# Patient Record
Sex: MALE | Race: WHITE | NOT HISPANIC OR LATINO | Employment: OTHER | URBAN - METROPOLITAN AREA
[De-identification: names, ages, dates, MRNs, and addresses within clinical notes are randomized per-mention and may not be internally consistent; named-entity substitution may affect disease eponyms.]

---

## 2018-04-23 ENCOUNTER — OFFICE VISIT (OUTPATIENT)
Dept: URGENT CARE | Facility: CLINIC | Age: 63
End: 2018-04-23
Payer: COMMERCIAL

## 2018-04-23 VITALS
BODY MASS INDEX: 26.07 KG/M2 | OXYGEN SATURATION: 95 % | DIASTOLIC BLOOD PRESSURE: 90 MMHG | HEIGHT: 68 IN | WEIGHT: 172 LBS | RESPIRATION RATE: 20 BRPM | HEART RATE: 65 BPM | TEMPERATURE: 98 F | SYSTOLIC BLOOD PRESSURE: 152 MMHG

## 2018-04-23 DIAGNOSIS — L03.811 CELLULITIS OF HEAD EXCEPT FACE: Primary | ICD-10-CM

## 2018-04-23 PROCEDURE — G0382 LEV 3 HOSP TYPE B ED VISIT: HCPCS | Performed by: PHYSICIAN ASSISTANT

## 2018-04-23 PROCEDURE — 99283 EMERGENCY DEPT VISIT LOW MDM: CPT | Performed by: PHYSICIAN ASSISTANT

## 2018-04-23 RX ORDER — OXYCODONE HYDROCHLORIDE 30 MG/1
30 TABLET ORAL EVERY 12 HOURS
Refills: 0 | COMMUNITY
Start: 2018-03-27 | End: 2019-03-18

## 2018-04-23 RX ORDER — HYDRALAZINE HYDROCHLORIDE 10 MG/1
10 TABLET, FILM COATED ORAL AS NEEDED
Refills: 0 | COMMUNITY
Start: 2018-04-09 | End: 2019-08-17 | Stop reason: SDUPTHER

## 2018-04-23 RX ORDER — ROSUVASTATIN CALCIUM 10 MG/1
10 TABLET, COATED ORAL DAILY
Refills: 0 | COMMUNITY
Start: 2018-03-27 | End: 2019-07-11 | Stop reason: SDUPTHER

## 2018-04-23 RX ORDER — TRAZODONE HYDROCHLORIDE 50 MG/1
50 TABLET ORAL
Refills: 0 | COMMUNITY
Start: 2018-04-09 | End: 2019-10-07 | Stop reason: SDUPTHER

## 2018-04-23 RX ORDER — GABAPENTIN 300 MG/1
CAPSULE ORAL
Refills: 0 | COMMUNITY
Start: 2018-04-09 | End: 2019-04-22

## 2018-04-23 RX ORDER — FLUOXETINE HYDROCHLORIDE 20 MG/1
20 CAPSULE ORAL DAILY
Refills: 0 | COMMUNITY
Start: 2018-02-13 | End: 2020-04-09 | Stop reason: ALTCHOICE

## 2018-04-23 RX ORDER — LISINOPRIL 30 MG/1
TABLET ORAL
COMMUNITY
End: 2019-01-28

## 2018-04-23 RX ORDER — METOPROLOL TARTRATE 50 MG/1
50 TABLET, FILM COATED ORAL
Refills: 0 | COMMUNITY
Start: 2018-04-09 | End: 2019-03-22 | Stop reason: DRUGHIGH

## 2018-04-23 RX ORDER — HYDRALAZINE HYDROCHLORIDE 50 MG/1
50 TABLET, FILM COATED ORAL 3 TIMES DAILY
COMMUNITY
End: 2018-04-23 | Stop reason: ALTCHOICE

## 2018-04-23 RX ORDER — METOPROLOL SUCCINATE 200 MG/1
TABLET, EXTENDED RELEASE ORAL
COMMUNITY
End: 2018-04-23 | Stop reason: ALTCHOICE

## 2018-04-23 RX ORDER — LISINOPRIL 40 MG/1
40 TABLET ORAL 2 TIMES DAILY
Refills: 0 | COMMUNITY
Start: 2018-04-09 | End: 2019-03-22 | Stop reason: SDUPTHER

## 2018-04-23 RX ORDER — LORAZEPAM 1 MG/1
1 TABLET ORAL EVERY 12 HOURS
Refills: 0 | COMMUNITY
Start: 2018-03-27 | End: 2019-02-05

## 2018-04-23 RX ORDER — AMLODIPINE BESYLATE 2.5 MG/1
TABLET ORAL
Refills: 0 | COMMUNITY
Start: 2018-03-13 | End: 2019-02-20

## 2018-04-23 RX ORDER — BENAZEPRIL HYDROCHLORIDE 40 MG/1
TABLET, FILM COATED ORAL
COMMUNITY
End: 2019-01-28

## 2018-04-23 RX ORDER — LORAZEPAM 2 MG/1
TABLET ORAL
COMMUNITY
End: 2018-04-23 | Stop reason: ALTCHOICE

## 2018-04-23 NOTE — PATIENT INSTRUCTIONS
Cellulitis   AMBULATORY CARE:   Cellulitis  is a skin infection caused by bacteria  Cellulitis usually appears on the legs and feet, arms and hands, or face  Common signs and symptoms include the following:   · A red, warm, swollen area on your skin    · Pain when the area is touched    · Bumps or blisters (abscess) that may drain pus    · Bumpy, raised skin that feels like an orange peel  Call 911 if:   · You have sudden trouble breathing or chest pain  Seek care immediately if:   · Your wound gets larger and more painful  · You feel a crackling under your skin when you touch it  · You have purple dots or bumps on your skin, or you see bleeding under your skin  · You have new swelling and pain in your legs  · The red, warm, swollen area gets larger  · You see red streaks coming from the infected area  Contact your healthcare provider if:   · You have a fever  · Your fever or pain does not go away or gets worse  · The area does not get smaller after 2 days of antibiotics  · Your skin is flaking or peeling off  · You have questions or concerns about your condition or care  Treatment for cellulitis  may decrease symptoms, stop the infection from spreading, and cure the infection  Treatment depends on how severe your cellulitis is  Cellulitis may go away on its own  You may  instead need antibiotics to help treat the bacterial infection and medicines for pain  Your healthcare provider may draw a [place] around the edges of your cellulitis  If your cellulitis spreads, your healthcare provider will see it outside of the [place]  Manage your symptoms:   · Elevate the area above the level of your heart  as often as you can  This will help decrease swelling and pain  Prop the area on pillows or blankets to keep it elevated comfortably  · Clean the area daily until the wound scabs over  Gently wash the area with soap and water  Pat dry  Use dressings as directed       · Place cool or warm, wet cloths on the area as directed  Use clean cloths and clean water  Leave it on the area until the cloth is room temperature  Pat the area dry with a clean, dry cloth  The cloths may help decrease pain  Prevent cellulitis:   · Do not scratch bug bites or areas of injury  You increase your risk for cellulitis by scratching these areas  · Do not share personal items, such as towels, clothing, and razors  · Clean exercise equipment  with germ-killing  before and after you use it  · Wash your hands often  Use soap and water  Wash your hands after you use the bathroom, change a child's diapers, or sneeze  Wash your hands before you prepare or eat food  Use lotion to prevent dry, cracked skin  · Wear pressure stockings as directed  You may be told to wear the stockings if you have peripheral edema  The stockings improve blood flow and decrease swelling  · Treat athlete's foot  This can help prevent the spread of a bacterial skin infection  Follow up with your healthcare provider within 3 days, or as directed: Your healthcare provider will check if your cellulitis is getting better  You may need different medicine  Write down your questions so you remember to ask them during your visits  © 2017 [address]  Information is for End User's use only and may not be sold, redistributed or otherwise used for commercial purposes  All illustrations and images included in CareNotes® are the copyrighted property of [employer]  or [employer]  The above information is an  only  It is not intended as medical advice for individual conditions or treatments  Talk to your doctor, nurse or pharmacist before following any medical regimen to see if it is safe and effective for you

## 2018-04-23 NOTE — PROGRESS NOTES
[facility]        NAME: Kinjal Doherty is a 60 y o  male  : 1955    MRN: [id number]  DATE: 2018  TIME: 12:18 PM    Assessment and Plan   Cellulitis of head except face [L03 [id number]]  1  Cellulitis of head except face           Patient Instructions   If redness is swelling of the scalp a worsen go to the nearest emergency room for further evaluation and treatment  Follow up with PCP in 3-5 days  Proceed to  ER if symptoms worsen  Chief Complaint     Chief Complaint   Patient presents with    Mass     C/O painful lump on the posterior aspect of his head with no known injury x 2-3 days  Pt has noted a scab on the lump   Nose Bleed     C/O of bleeding from right nostril which started yesterday  History of Present Illness       Patient presents with a 2 day history of a swelling and tenderness in his right occipital area  He states he usually gets scabs on the back of his scalp which usually dissipate on their own after 2 days  However, at this time it has swelled up is more tender and has not resolved  There is not associated with any drainage fever chills  He is also had an episode of a right sided nose bleed for 3 minutes  Review of Systems   Review of Systems   Constitutional: Negative for chills  HENT: Negative for congestion, facial swelling, postnasal drip, rhinorrhea, sinus pressure and trouble swallowing  Eyes: Negative for redness  Respiratory: Negative for cough, shortness of breath and wheezing  Cardiovascular: Negative for chest pain  Gastrointestinal: Negative for abdominal pain  Neurological: Negative for dizziness, light-headedness and headaches  Hematological: Negative for adenopathy           Current Medications       Current Outpatient Prescriptions:     cloNIDine (CATAPRES-TTS-3) 0 3 mg/24 hr, Place 1 patch on the skin once a week, Disp: , Rfl:     amLODIPine (NORVASC) 10 mg tablet, , Disp: , Rfl: 0    benazepril (LOTENSIN) 40 MG tablet, Take by mouth, Disp: , Rfl:     FLUoxetine (PROzac) 20 mg capsule, , Disp: , Rfl: 0    gabapentin (NEURONTIN) 400 mg capsule, , Disp: , Rfl: 0    hydrALAZINE (APRESOLINE) 10 mg tablet, , Disp: , Rfl: 0    lisinopril (ZESTRIL) 30 mg tablet, Take by mouth, Disp: , Rfl:     lisinopril (ZESTRIL) 40 mg tablet, , Disp: , Rfl: 0    LORazepam (ATIVAN) 1 mg tablet, Take 1 mg by mouth every 12 (twelve) hours, Disp: , Rfl: 0    metoprolol tartrate (LOPRESSOR) 50 mg tablet, , Disp: , Rfl: 0    oxyCODONE (ROXICODONE) 30 MG immediate release tablet, Take 30 mg by mouth every 12 (twelve) hours, Disp: , Rfl: 0    rosuvastatin (CRESTOR) 10 MG tablet, Take 10 mg by mouth daily, Disp: , Rfl: 0    traZODone (DESYREL) 50 mg tablet, , Disp: , Rfl: 0    Current Allergies     Allergies as of 04/23/2018 - Reviewed 04/23/2018   Allergen Reaction Noted    Morphine and related  02/22/2016            The following portions of the patient's history were reviewed and updated as appropriate: allergies, current medications, past family history, past medical history, past social history, past surgical history and problem list      Past Medical History:   Diagnosis Date    CAD (coronary artery disease)     Hx of cardiac cath     Hypertension        Past Surgical History:   Procedure Laterality Date    CHOLECYSTECTOMY         No family history on file  Medications have been verified  Objective   /90   Pulse 65   Temp 98 °F (36 7 °C) (Tympanic)   Resp 20   Ht 5' 8" (1 727 m)   Wt 78 kg (172 lb)   SpO2 95%   BMI 26 15 kg/m²        Physical Exam     Physical Exam   Constitutional: He is oriented to person, place, and time  He appears well-developed and well-nourished  HENT:   Head: Normocephalic and atraumatic     Nose: Nose normal    Mouth/Throat: Oropharynx is clear and moist    Approximately 2 cm area of erythema on the right occipital area with a central scabbed area there is tenderness to palpation there is not feel to be a palpable cystic structure  There is mild swelling with the erythema  There is no drainage from wound  Nares without any visible blood or area which may have been bleeding  Eyes: Conjunctivae are normal    Neck: Neck supple  Cardiovascular: Normal rate and regular rhythm  Pulmonary/Chest: Effort normal    Musculoskeletal: Normal range of motion  Lymphadenopathy:     He has no cervical adenopathy  Neurological: He is alert and oriented to person, place, and time  Skin: Skin is warm and dry  Psychiatric: He has a normal mood and affect  His behavior is normal  Judgment and thought content normal    Nursing note and vitals reviewed

## 2018-07-27 ENCOUNTER — HOSPITAL ENCOUNTER (OUTPATIENT)
Dept: RADIOLOGY | Facility: HOSPITAL | Age: 63
Discharge: HOME | End: 2018-07-27
Attending: FAMILY MEDICINE
Payer: COMMERCIAL

## 2018-07-27 ENCOUNTER — TRANSCRIBE ORDERS (OUTPATIENT)
Dept: ADMINISTRATIVE | Facility: HOSPITAL | Age: 63
End: 2018-07-27

## 2018-07-27 DIAGNOSIS — M54.5 CHRONIC LOW BACK PAIN, UNSPECIFIED BACK PAIN LATERALITY, WITH SCIATICA PRESENCE UNSPECIFIED: ICD-10-CM

## 2018-07-27 DIAGNOSIS — G89.29 CHRONIC LOW BACK PAIN, UNSPECIFIED BACK PAIN LATERALITY, WITH SCIATICA PRESENCE UNSPECIFIED: ICD-10-CM

## 2018-07-27 DIAGNOSIS — M25.512 LEFT SHOULDER PAIN, UNSPECIFIED CHRONICITY: ICD-10-CM

## 2018-07-27 DIAGNOSIS — M25.512 LEFT SHOULDER PAIN, UNSPECIFIED CHRONICITY: Primary | ICD-10-CM

## 2018-07-27 PROCEDURE — 73030 X-RAY EXAM OF SHOULDER: CPT

## 2019-01-28 ENCOUNTER — OFFICE VISIT (OUTPATIENT)
Dept: CARDIOLOGY CLINIC | Facility: CLINIC | Age: 64
End: 2019-01-28
Payer: COMMERCIAL

## 2019-01-28 VITALS
WEIGHT: 204 LBS | DIASTOLIC BLOOD PRESSURE: 88 MMHG | BODY MASS INDEX: 30.92 KG/M2 | SYSTOLIC BLOOD PRESSURE: 142 MMHG | HEIGHT: 68 IN | HEART RATE: 70 BPM

## 2019-01-28 DIAGNOSIS — I25.10 CORONARY ARTERY DISEASE INVOLVING NATIVE CORONARY ARTERY OF NATIVE HEART WITHOUT ANGINA PECTORIS: ICD-10-CM

## 2019-01-28 DIAGNOSIS — G47.33 OSA (OBSTRUCTIVE SLEEP APNEA): ICD-10-CM

## 2019-01-28 DIAGNOSIS — I10 ESSENTIAL HYPERTENSION: Primary | ICD-10-CM

## 2019-01-28 DIAGNOSIS — J42 CHRONIC BRONCHITIS, UNSPECIFIED CHRONIC BRONCHITIS TYPE (HCC): ICD-10-CM

## 2019-01-28 DIAGNOSIS — E78.5 DYSLIPIDEMIA: ICD-10-CM

## 2019-01-28 DIAGNOSIS — F17.200 SMOKING: ICD-10-CM

## 2019-01-28 PROBLEM — I71.4 ABDOMINAL AORTIC ANEURYSM (AAA) WITHOUT RUPTURE (HCC): Status: ACTIVE | Noted: 2019-01-28

## 2019-01-28 PROBLEM — I71.40 ABDOMINAL AORTIC ANEURYSM (AAA) WITHOUT RUPTURE: Status: ACTIVE | Noted: 2019-01-28

## 2019-01-28 PROCEDURE — 99214 OFFICE O/P EST MOD 30 MIN: CPT | Performed by: INTERNAL MEDICINE

## 2019-01-28 PROCEDURE — 93000 ELECTROCARDIOGRAM COMPLETE: CPT | Performed by: INTERNAL MEDICINE

## 2019-01-28 RX ORDER — CLONIDINE HYDROCHLORIDE 0.1 MG/1
0.1 TABLET ORAL
Qty: 90 TABLET | Refills: 5 | Status: SHIPPED | OUTPATIENT
Start: 2019-01-28 | End: 2019-02-05

## 2019-01-28 RX ORDER — CLONIDINE HYDROCHLORIDE 0.1 MG/1
0.2 TABLET ORAL EVERY 12 HOURS SCHEDULED
Qty: 90 TABLET | Refills: 5 | Status: SHIPPED | OUTPATIENT
Start: 2019-01-28 | End: 2019-01-28 | Stop reason: CLARIF

## 2019-01-28 NOTE — PATIENT INSTRUCTIONS
Chronic Hypertension, Ambulatory Care   GENERAL INFORMATION:   Chronic hypertension  is a long-term condition in which your blood pressure (BP) is higher than normal  Your BP is the force of your blood moving against the walls of your arteries  Hypertension is a BP of 140/90 or higher  Common symptoms include the following:   · Headache     · Blurred vision    · Chest pain     · Dizziness or weakness     · Trouble breathing     · Nosebleeds  Seek immediate care for the following symptoms:   · Severe headache or vision loss    · Weakness in an arm or leg    · Confusion or difficulty speaking    · Discomfort in your chest that feels like squeezing, pressure, fullness, or pain    · Suddenly feeling lightheaded or trouble breathing    · Pain or discomfort in your back, neck, jaw, stomach, or arm  Treatment for chronic hypertension  may include medicine to lower your BP  You may also need to make lifestyle changes  Take your medicine exactly as directed  Manage chronic hypertension:   · Take your BP at home  Sit and rest for 5 minutes before you take your BP  Extend your arm and support it on a flat surface  Your arm should be at the same level as your heart  Follow the directions that came with your BP monitor  If possible, take at least 2 BP readings each time  Take your BP at least twice a day at the same times each day, such as morning and evening  Keep a log of your BP readings and bring it to your follow-up visits  · Eat less sodium (salt)  Do not add sodium to your food  Limit foods that are high in sodium, such as canned foods, potato chips, and cold cuts  Your healthcare provider may suggest that you follow the [address] [place] [address] Street  The plan is low in sodium, unhealthy fats, and total fat  It is high in potassium, calcium, and fiber  · Exercise regularly  Exercise at least 30 minutes per day, on most days of the week  This will help decrease your BP   Ask your healthcare provider about the best exercise plan for you  · Limit alcohol  Women should limit alcohol to 1 drink a day  Men should limit alcohol to 2 drinks a day  A drink of alcohol is 12 ounces of beer, 5 ounces of wine, or 1½ ounces of liquor  · Do not smoke  If you smoke, it is never too late to quit  Smoking can increase your BP  Smoking also worsens other health conditions you may have that can increase your risk for hypertension  Ask your healthcare provider for information if you need help quitting  Follow up with your healthcare provider as directed: You will need to return to have your BP checked and to have other lab tests done  Write down your questions so you remember to ask them during your visits  CARE AGREEMENT:   You have the right to help plan your care  Learn about your health condition and how it may be treated  Discuss treatment options with your caregivers to decide what care you want to receive  You always have the right to refuse treatment  The above information is an  only  It is not intended as medical advice for individual conditions or treatments  Talk to your doctor, nurse or pharmacist before following any medical regimen to see if it is safe and effective for you  © 2014 [address] is for End User's use only and may not be sold, redistributed or otherwise used for commercial purposes  All illustrations and images included in CareNotes® are the copyrighted property of A D A M [employer]  or Georges Perez

## 2019-01-28 NOTE — PROGRESS NOTES
Subjective:        Patient ID: Blanquita Vera is a 58 y o  male  Chief Complaint:  Hurman Phoenix is here for routine cardiac follow-up, has been noting that his blood pressure is quite high lately  He says his insurance company will not pay for 10 mg of amlodipine  He used to be on 10 mg b i d  Now he is only on 2 5 mg daily  He says you have been unsuccessful in commission angry his insurance to get higher dose amlodipine  He is getting a terrible rash, this is going on for months if not years from his Catapres patch  He says it falls off to 3 or 4 days it is so itchy  His shortness of breath remains mild to moderate, he continues to smoke, he knows he has COPD  No edema orthopnea  He denies any palpitations presyncope or syncope  EKG normal     Euvolemic  He is mildly hypertensive  The following portions of the patient's history were reviewed and updated as appropriate: allergies, current medications, past family history, past medical history, past social history, past surgical history and problem list   Review of Systems   Constitution: Negative for chills, diaphoresis, malaise/fatigue and weight gain  HENT: Negative for nosebleeds and stridor  Eyes: Negative for double vision, vision loss in left eye, vision loss in right eye and visual disturbance  Cardiovascular: Positive for dyspnea on exertion  Negative for chest pain, claudication, cyanosis, irregular heartbeat, leg swelling, near-syncope, orthopnea, palpitations, paroxysmal nocturnal dyspnea and syncope  Respiratory: Positive for shortness of breath  Negative for cough, snoring and wheezing  Endocrine: Negative for polydipsia, polyphagia and polyuria  Hematologic/Lymphatic: Negative for bleeding problem  Does not bruise/bleed easily  Skin: Positive for itching and rash  Negative for flushing  Musculoskeletal: Negative for falls and myalgias     Gastrointestinal: Negative for abdominal pain, heartburn, hematemesis, hematochezia, melena and nausea  Genitourinary: Negative for hematuria  Neurological: Negative for brief paralysis, dizziness, focal weakness, headaches, light-headedness, loss of balance and vertigo  Psychiatric/Behavioral: Negative for altered mental status and substance abuse  Allergic/Immunologic: Negative for hives  Objective:      /88   Pulse 70   Ht 5' 8" (1 727 m)   Wt 92 5 kg (204 lb)   BMI 31 02 kg/m²   Physical Exam   Constitutional: He is oriented to person, place, and time  He appears well-developed and well-nourished  No distress  HENT:   Head: Normocephalic and atraumatic  Eyes: Pupils are equal, round, and reactive to light  EOM are normal  No scleral icterus  Neck: Normal range of motion  Neck supple  No JVD present  No thyromegaly present  Cardiovascular: Normal rate, regular rhythm and normal heart sounds  Exam reveals no gallop and no friction rub  No murmur heard  Pulmonary/Chest: Effort normal and breath sounds normal  No stridor  No respiratory distress  He has no wheezes  He has no rales  Abdominal: Soft  Bowel sounds are normal  He exhibits no distension and no mass  There is no tenderness  Musculoskeletal: Normal range of motion  He exhibits no edema or deformity  Neurological: He is alert and oriented to person, place, and time  Coordination normal    Skin: Skin is warm and dry  No erythema  No pallor  Macular mildly reddish rash size of Catapres patch left upper outer arm below shoulder, no signs or symptoms of infection  Psychiatric: He has a normal mood and affect  His behavior is normal        Lab Review:   No visits with results within 2 Month(s) from this visit  Latest known visit with results is:   Appointment on 02/22/2016   Component Date Value    Wound Culture 02/22/2016 No growth     Gram Stain Result 02/22/2016 No Polys or Bacteria seen      No results found  Assessment:       1   Essential hypertension  POCT ECG cloNIDine (CATAPRES) 0 1 mg tablet    DISCONTINUED: cloNIDine (CATAPRES) 0 1 mg tablet   2  Coronary artery disease involving native coronary artery of native heart without angina pectoris     3  Smoking     4  Dyslipidemia     5  Chronic bronchitis, unspecified chronic bronch[facility] type ([facility] )     6  JAKI (obstructive sleep apnea)          Plan: For uncontrolled hypertension I am going to ask my staff to contact his insurance company to see why they do not want to pay for amlodipine 10 mg daily  I do not want him on this yet but I think we will need to get to this  In Light of the rash from the Catapres patch I have discontinued this  Again prescription for clonidine 0 1 mg p o  T i d     I suspect his dyspnea is secondary his lung disease and ongoing smoking along with untreated sleep apnea, he does not examine to be any significant heart failure  I will see him back in about 2 months, he will call sooner with any problems

## 2019-02-05 ENCOUNTER — OFFICE VISIT (OUTPATIENT)
Dept: FAMILY MEDICINE CLINIC | Facility: CLINIC | Age: 64
End: 2019-02-05
Payer: COMMERCIAL

## 2019-02-05 VITALS
TEMPERATURE: 99.9 F | OXYGEN SATURATION: 93 % | HEART RATE: 41 BPM | SYSTOLIC BLOOD PRESSURE: 178 MMHG | DIASTOLIC BLOOD PRESSURE: 105 MMHG | BODY MASS INDEX: 30.52 KG/M2 | HEIGHT: 68 IN | WEIGHT: 201.4 LBS

## 2019-02-05 DIAGNOSIS — R25.1 TREMOR: ICD-10-CM

## 2019-02-05 DIAGNOSIS — E78.00 HYPERCHOLESTEROLEMIA: ICD-10-CM

## 2019-02-05 DIAGNOSIS — E66.09 CLASS 1 OBESITY DUE TO EXCESS CALORIES WITH SERIOUS COMORBIDITY AND BODY MASS INDEX (BMI) OF 30.0 TO 30.9 IN ADULT: ICD-10-CM

## 2019-02-05 DIAGNOSIS — I10 ESSENTIAL HYPERTENSION: Primary | ICD-10-CM

## 2019-02-05 DIAGNOSIS — M54.50 CHRONIC LOW BACK PAIN WITHOUT SCIATICA, UNSPECIFIED BACK PAIN LATERALITY: ICD-10-CM

## 2019-02-05 DIAGNOSIS — F17.200 SMOKING: ICD-10-CM

## 2019-02-05 DIAGNOSIS — J01.00 ACUTE NON-RECURRENT MAXILLARY SINUSITIS: ICD-10-CM

## 2019-02-05 DIAGNOSIS — G89.29 CHRONIC LOW BACK PAIN WITHOUT SCIATICA, UNSPECIFIED BACK PAIN LATERALITY: ICD-10-CM

## 2019-02-05 DIAGNOSIS — Z12.11 SCREENING FOR COLON CANCER: ICD-10-CM

## 2019-02-05 DIAGNOSIS — Z12.5 SCREENING FOR PROSTATE CANCER: ICD-10-CM

## 2019-02-05 PROCEDURE — 99204 OFFICE O/P NEW MOD 45 MIN: CPT | Performed by: FAMILY MEDICINE

## 2019-02-05 RX ORDER — TOBRAMYCIN 3 MG/ML
SOLUTION/ DROPS OPHTHALMIC
Refills: 0 | COMMUNITY
Start: 2019-01-29 | End: 2019-03-18

## 2019-02-05 RX ORDER — AZITHROMYCIN 250 MG/1
TABLET, FILM COATED ORAL
Qty: 6 TABLET | Refills: 0 | Status: SHIPPED | OUTPATIENT
Start: 2019-02-05 | End: 2019-02-10

## 2019-02-05 RX ORDER — SILDENAFIL CITRATE 100 MG
TABLET ORAL
Refills: 0 | COMMUNITY
Start: 2019-02-01 | End: 2019-10-08 | Stop reason: SDUPTHER

## 2019-02-05 RX ORDER — HYDROCHLOROTHIAZIDE 25 MG/1
25 TABLET ORAL DAILY
COMMUNITY
End: 2019-03-22 | Stop reason: SDUPTHER

## 2019-02-05 RX ORDER — CLONIDINE HYDROCHLORIDE 0.2 MG/1
0.2 TABLET ORAL 2 TIMES DAILY
Qty: 60 TABLET | Refills: 6 | Status: SHIPPED | OUTPATIENT
Start: 2019-02-05 | End: 2019-06-21

## 2019-02-08 ENCOUNTER — OFFICE VISIT (OUTPATIENT)
Dept: FAMILY MEDICINE CLINIC | Facility: CLINIC | Age: 64
End: 2019-02-08
Payer: COMMERCIAL

## 2019-02-08 VITALS
BODY MASS INDEX: 30.98 KG/M2 | HEIGHT: 68 IN | SYSTOLIC BLOOD PRESSURE: 154 MMHG | TEMPERATURE: 99.3 F | OXYGEN SATURATION: 93 % | WEIGHT: 204.4 LBS | DIASTOLIC BLOOD PRESSURE: 94 MMHG | HEART RATE: 108 BPM

## 2019-02-08 DIAGNOSIS — F17.200 SMOKING: ICD-10-CM

## 2019-02-08 DIAGNOSIS — I10 ESSENTIAL HYPERTENSION: ICD-10-CM

## 2019-02-08 DIAGNOSIS — R06.2 WHEEZING: Primary | ICD-10-CM

## 2019-02-08 DIAGNOSIS — R06.2 WHEEZE: ICD-10-CM

## 2019-02-08 DIAGNOSIS — J20.9 ACUTE BRONCHITIS, UNSPECIFIED ORGANISM: Primary | ICD-10-CM

## 2019-02-08 PROCEDURE — 3008F BODY MASS INDEX DOCD: CPT | Performed by: FAMILY MEDICINE

## 2019-02-08 PROCEDURE — 99214 OFFICE O/P EST MOD 30 MIN: CPT | Performed by: FAMILY MEDICINE

## 2019-02-08 RX ORDER — ALBUTEROL SULFATE 90 UG/1
2 AEROSOL, METERED RESPIRATORY (INHALATION) EVERY 4 HOURS PRN
Status: DISCONTINUED | OUTPATIENT
Start: 2019-02-08 | End: 2019-08-15 | Stop reason: ALTCHOICE

## 2019-02-08 RX ORDER — AMOXICILLIN AND CLAVULANATE POTASSIUM 875; 125 MG/1; MG/1
1 TABLET, FILM COATED ORAL EVERY 12 HOURS SCHEDULED
Qty: 20 TABLET | Refills: 0 | Status: SHIPPED | OUTPATIENT
Start: 2019-02-08 | End: 2019-02-20

## 2019-02-08 RX ORDER — ALBUTEROL SULFATE 90 UG/1
2 AEROSOL, METERED RESPIRATORY (INHALATION) EVERY 6 HOURS PRN
Qty: 1 INHALER | Refills: 3 | Status: ON HOLD | OUTPATIENT
Start: 2019-02-08 | End: 2019-08-15 | Stop reason: ALTCHOICE

## 2019-02-08 RX ORDER — METHYLPREDNISOLONE 4 MG/1
TABLET ORAL
Qty: 21 EACH | Refills: 0 | Status: SHIPPED | OUTPATIENT
Start: 2019-02-08 | End: 2019-02-20

## 2019-02-08 NOTE — PROGRESS NOTES
Assessment/Plan:    No problem-specific Assessment & Plan notes found for this encounter  Diagnoses and all orders for this visit:    Acute bronchitis, unspecified organism  -     amoxicillin-clavulanate (AUGMENTIN) 875-125 mg per tablet; Take 1 tablet by mouth every 12 (twelve) hours for 10 days  -     methylPREDNISolone 4 MG tablet therapy pack; Use as directed on package    Essential hypertension  Comments:  keep home log Bps slowly improving    Smoking  Comments:  pt counseled on quitting smoking    Wheeze  -     albuterol (PROVENTIL HFA,VENTOLIN HFA) inhaler 2 puff; Inhale 2 puffs every 4 (four) hours as needed for wheezing           Subjective:      Patient ID: Nghia Mendosa is a 58 y o  male  Pt complains of fever as high as 102 3 pt has been taking tylenol to keep the fever down, pt is checking Bps at home and seeing 154 over 94      Cough   This is a new problem  The current episode started in the past 7 days  The problem occurs constantly  The cough is non-productive  Associated symptoms include chills, a fever and shortness of breath  Pertinent negatives include no chest pain, nasal congestion, rhinorrhea or wheezing  Treatments tried: zithromax  The treatment provided mild relief  Hypertension   This is a chronic problem  The current episode started more than 1 year ago  The problem has been gradually improving since onset  The problem is uncontrolled  Associated symptoms include shortness of breath  Pertinent negatives include no chest pain or palpitations  Risk factors for coronary artery disease include obesity, male gender, sedentary lifestyle and smoking/tobacco exposure  Past treatments include beta blockers, ACE inhibitors and calcium channel blockers  The current treatment provides moderate improvement  Compliance problems include diet and exercise          The following portions of the patient's history were reviewed and updated as appropriate: allergies, current medications, past family history, past medical history, past social history, past surgical history and problem list     Review of Systems   Constitutional: Positive for chills and fever  HENT: Negative for rhinorrhea  Respiratory: Positive for cough and shortness of breath  Negative for wheezing  Cardiovascular: Negative for chest pain and palpitations  Objective:      /94   Pulse (!) 108   Temp 99 3 °F (37 4 °C) (Tympanic)   Ht 5' 8" (1 727 m)   Wt 92 7 kg (204 lb 6 4 oz)   SpO2 93%   BMI 31 08 kg/m²          Physical Exam   Constitutional: He is oriented to person, place, and time  He appears well-developed and well-nourished  No distress  HENT:   Head: Normocephalic and atraumatic  Right Ear: Tympanic membrane, external ear and ear canal normal    Left Ear: Tympanic membrane, external ear and ear canal normal    Nose: Mucosal edema and rhinorrhea present  Mouth/Throat: Mucous membranes are normal  No oropharyngeal exudate  [place] OP   Eyes: Right eye exhibits no discharge  Left eye exhibits no discharge  Neck: Normal range of motion  Neck supple  Cardiovascular: Normal rate, regular rhythm and normal heart sounds  No murmur heard  Pulmonary/Chest: Effort normal  No stridor  No respiratory distress  He has wheezes  He has rhonchi  He has no rales  He exhibits no tenderness  Lymphadenopathy:     He has no cervical adenopathy  Neurological: He is alert and oriented to person, place, and time  He exhibits normal muscle tone  Skin: Skin is warm and dry  No rash noted  He is not diaphoretic  Psychiatric: He has a normal mood and affect  His behavior is normal  Judgment and thought content normal    Nursing note and vitals reviewed

## 2019-02-13 ENCOUNTER — APPOINTMENT (OUTPATIENT)
Dept: LAB | Facility: HOSPITAL | Age: 64
End: 2019-02-13
Payer: COMMERCIAL

## 2019-02-13 ENCOUNTER — HOSPITAL ENCOUNTER (OUTPATIENT)
Dept: RADIOLOGY | Facility: HOSPITAL | Age: 64
Discharge: HOME | End: 2019-02-13
Payer: COMMERCIAL

## 2019-02-13 DIAGNOSIS — Z12.5 SCREENING FOR PROSTATE CANCER: ICD-10-CM

## 2019-02-13 DIAGNOSIS — G89.29 CHRONIC LOW BACK PAIN WITHOUT SCIATICA, UNSPECIFIED BACK PAIN LATERALITY: ICD-10-CM

## 2019-02-13 DIAGNOSIS — M54.50 CHRONIC LOW BACK PAIN WITHOUT SCIATICA, UNSPECIFIED BACK PAIN LATERALITY: ICD-10-CM

## 2019-02-13 DIAGNOSIS — I10 ESSENTIAL HYPERTENSION: ICD-10-CM

## 2019-02-13 DIAGNOSIS — E78.00 HYPERCHOLESTEROLEMIA: ICD-10-CM

## 2019-02-13 LAB
ALBUMIN SERPL BCP-MCNC: 4.2 G/DL (ref 3.5–5.7)
ALP SERPL-CCNC: 62 U/L (ref 55–165)
ALT SERPL W P-5'-P-CCNC: 36 U/L (ref 7–52)
ANION GAP SERPL CALCULATED.3IONS-SCNC: 8 MMOL/L (ref 4–13)
AST SERPL W P-5'-P-CCNC: 17 U/L (ref 13–39)
BASOPHILS # BLD AUTO: 0.15 THOUSAND/UL (ref 0–0.1)
BASOPHILS NFR MAR MANUAL: 1 % (ref 0–1)
BILIRUB SERPL-MCNC: 0.5 MG/DL (ref 0.2–1)
BLASTS NFR BLD MANUAL: 1 %
BUN SERPL-MCNC: 18 MG/DL (ref 7–25)
CALCIUM SERPL-MCNC: 9.5 MG/DL (ref 8.6–10.5)
CHLORIDE SERPL-SCNC: 103 MMOL/L (ref 98–107)
CO2 SERPL-SCNC: 27 MMOL/L (ref 21–31)
CREAT SERPL-MCNC: 1.06 MG/DL (ref 0.7–1.3)
EOSINOPHIL # BLD AUTO: 0.15 THOUSAND/UL (ref 0–0.61)
EOSINOPHIL NFR BLD MANUAL: 1 % (ref 0–6)
ERYTHROCYTE [DISTWIDTH] IN BLOOD BY AUTOMATED COUNT: 14.1 % (ref 11.5–14.5)
GFR SERPL CREATININE-BSD FRML MDRD: 74 ML/MIN/1.73SQ M
GLUCOSE SERPL-MCNC: 112 MG/DL (ref 65–99)
HCT VFR BLD AUTO: 51.2 % (ref 42–47)
HGB BLD-MCNC: 16.7 G/DL (ref 14–18)
LYMPHOCYTES # BLD AUTO: 15 % (ref 20–51)
LYMPHOCYTES # BLD AUTO: 2.21 THOUSAND/UL (ref 0.6–4.47)
MCH RBC QN AUTO: 29.7 PG (ref 26–34)
MCHC RBC AUTO-ENTMCNC: 32.7 G/DL (ref 31–37)
MCV RBC AUTO: 91 FL (ref 81–99)
MONOCYTES # BLD AUTO: 1.76 THOUSAND/UL (ref 0–1.22)
MONOCYTES NFR BLD AUTO: 12 % (ref 4–12)
NEUTS SEG # BLD: 10.29 THOUSAND/UL (ref 1.81–6.82)
NEUTS SEG NFR BLD AUTO: 70 % (ref 42–75)
NRBC BLD AUTO-RTO: 0 /100 WBCS
PLATELET # BLD AUTO: 217 THOUSANDS/UL (ref 149–390)
PMV BLD AUTO: 8.9 FL (ref 8.6–11.7)
POTASSIUM SERPL-SCNC: 4.1 MMOL/L (ref 3.5–5.5)
PROT SERPL-MCNC: 7.2 G/DL (ref 6.4–8.9)
PSA SERPL-MCNC: 2.6 NG/ML (ref 0–4)
RBC # BLD AUTO: 5.64 MILLION/UL (ref 4.3–5.9)
SODIUM SERPL-SCNC: 138 MMOL/L (ref 134–143)
TOTAL CELLS COUNTED SPEC: 100
TSH SERPL DL<=0.05 MIU/L-ACNC: 1.02 UIU/ML (ref 0.45–5.33)
WBC # BLD AUTO: 14.7 THOUSAND/UL (ref 4.8–10.8)

## 2019-02-13 PROCEDURE — 85027 COMPLETE CBC AUTOMATED: CPT

## 2019-02-13 PROCEDURE — G0103 PSA SCREENING: HCPCS

## 2019-02-13 PROCEDURE — 36415 COLL VENOUS BLD VENIPUNCTURE: CPT

## 2019-02-13 PROCEDURE — 72100 X-RAY EXAM L-S SPINE 2/3 VWS: CPT

## 2019-02-13 PROCEDURE — 84443 ASSAY THYROID STIM HORMONE: CPT

## 2019-02-13 PROCEDURE — 80053 COMPREHEN METABOLIC PANEL: CPT

## 2019-02-13 PROCEDURE — 85007 BL SMEAR W/DIFF WBC COUNT: CPT

## 2019-02-18 ENCOUNTER — OFFICE VISIT (OUTPATIENT)
Dept: PAIN MEDICINE | Facility: CLINIC | Age: 64
End: 2019-02-18
Payer: COMMERCIAL

## 2019-02-18 ENCOUNTER — APPOINTMENT (OUTPATIENT)
Dept: RADIOLOGY | Facility: CLINIC | Age: 64
End: 2019-02-18
Payer: COMMERCIAL

## 2019-02-18 VITALS
DIASTOLIC BLOOD PRESSURE: 100 MMHG | SYSTOLIC BLOOD PRESSURE: 168 MMHG | BODY MASS INDEX: 30.65 KG/M2 | WEIGHT: 201.6 LBS

## 2019-02-18 DIAGNOSIS — M79.18 MYOFASCIAL PAIN SYNDROME: Primary | ICD-10-CM

## 2019-02-18 DIAGNOSIS — M25.512 CHRONIC PAIN OF BOTH SHOULDERS: ICD-10-CM

## 2019-02-18 DIAGNOSIS — M51.36 LUMBAR DEGENERATIVE DISC DISEASE: ICD-10-CM

## 2019-02-18 DIAGNOSIS — G89.29 CHRONIC LOW BACK PAIN WITHOUT SCIATICA, UNSPECIFIED BACK PAIN LATERALITY: ICD-10-CM

## 2019-02-18 DIAGNOSIS — M54.12 CERVICAL RADICULOPATHY: ICD-10-CM

## 2019-02-18 DIAGNOSIS — M54.50 CHRONIC LOW BACK PAIN WITHOUT SCIATICA, UNSPECIFIED BACK PAIN LATERALITY: ICD-10-CM

## 2019-02-18 DIAGNOSIS — M25.511 CHRONIC PAIN OF BOTH SHOULDERS: ICD-10-CM

## 2019-02-18 DIAGNOSIS — M47.816 LUMBAR SPONDYLOSIS: ICD-10-CM

## 2019-02-18 DIAGNOSIS — G89.29 CHRONIC PAIN OF BOTH SHOULDERS: ICD-10-CM

## 2019-02-18 PROBLEM — M51.369 LUMBAR DEGENERATIVE DISC DISEASE: Status: ACTIVE | Noted: 2019-02-18

## 2019-02-18 PROCEDURE — 72050 X-RAY EXAM NECK SPINE 4/5VWS: CPT

## 2019-02-18 PROCEDURE — 99204 OFFICE O/P NEW MOD 45 MIN: CPT | Performed by: ANESTHESIOLOGY

## 2019-02-18 RX ORDER — NITROGLYCERIN 0.4 MG/1
0.4 TABLET SUBLINGUAL
COMMUNITY
End: 2021-03-12 | Stop reason: SDUPTHER

## 2019-02-18 RX ORDER — LORAZEPAM 1 MG/1
0.5 TABLET ORAL EVERY 12 HOURS
COMMUNITY
End: 2019-09-04 | Stop reason: ALTCHOICE

## 2019-02-18 NOTE — PROGRESS NOTES
Assessment:  1  Myofascial pain syndrome    2  Chronic low back pain without sciatica, unspecified back pain laterality    3  Lumbar degenerative disc disease    4  Lumbar spondylosis    5  Cervical radiculopathy        Plan:  Patient is a 69-year-old male with complaints of back pain and shoulder pain over the past 40 years presents today for initial consultation  X-ray lumbar spine shows slight retrolisthesis of L1-L2 through L4-L5 with mild degenerative disc changes at L1-L2, posterior disc space narrowing at L5-S1  X-rays of bilateral shows soda joints are non remarkable  1  We will order x-rays cervical spine to assess for referral pain pattern from cervical facet arthropathy versus radicular symptoms from the cervical spine  2  We will order physical therapy lumbar core strengthening based low back pain  3  We will order physical therapy for cervical spine strengthening based on patient's evidence of neck pain and left upper extremity radiculopathy  4  Follow-up in 1 month  5  We will also order physical therapy rotator cuff strengthening   6  We will obtain a UDS and then start patient on low-dose opioid pain regimen Percocet 7 5/325 patient cannot take this medication while taking Ativan in the high risk of respiratory depression when combined opioids with the benzodiazepine    There are risks associated with opioid medications, including dependence, addiction and tolerance  The patient understands and agrees to use these medications only as prescribed  Potential side effects of the medications include, but are not limited to, constipation, drowsiness, addiction, impaired judgment and risk of fatal overdose if not taken as prescribed  The patient was warned against driving while taking sedation medications  Sharing medications is a felony  At this point in time, the patient is showing no signs of addiction, abuse, diversion or suicidal ideation          [place] Prescription Drug Monitoring Program report was reviewed and was appropriate           History of Present Illness: The patient is a 58 y o  male who presents for consultation in regards to Back Pain and Shoulder Pain  Symptoms have been present for 40 years  Symptoms began without any precipitating injury or trauma  Pain is reported to be 9 on the numeric rating scale  Symptoms are felt nearly constantly and worst in the morning, nighttime  Symptoms are characterized as sharp, dull/aching, numbing and throbbing  Symptoms are associated with no weakness  Aggravating factors include lying down, bending and exercise  Relieving factors include nothing  No change in symptoms with standing, sitting, walking, relaxation, coughing/sneezing and bowel movements  Treatments that have been helpful include prior injections including facet steroid injection  chiropractic manipulation have provided no relief  Medications to relieve symptoms include oxycodone 30 mg b i d  which provides relief most of the time  Review of Systems:    Review of Systems   Constitutional: Positive for unexpected weight change  Respiratory: Positive for shortness of breath  Musculoskeletal: Positive for arthralgias  Joint stiffness   Neurological: Positive for dizziness  All other systems reviewed and are negative  Past Medical History:   Diagnosis Date    [facility]     Abdominal Aortic Duplex 02/21/2017    Ectatic infrarenal abdominal aorta   CAD (coronary artery disease)     COPD (chronic obstructive pulmonary disease) ([facility]     History of echocardiogram 03/18/2014    EF 55%, mild MR and AI  Mild concentric LVH      History of stress test 03/06/2017    Normal     Hypertension     Obstructive sleep apnea     cannot tolerate CPAP       Past Surgical History:   Procedure Laterality Date    CARDIAC CATHETERIZATION  02/13/2012    EF 70%, widely patent renal arteries, significant single-vessel CAD-medical therapy   CARDIAC CATHETERIZATION  04/11/2013    EF 65%, 50% mid LAD, 20% prox CFX, 90% diffuse RCA, 99% mid RCA  Medical management   CHOLECYSTECTOMY         No family history on file      Social History     Occupational History    Not on file   Tobacco Use    Smoking status: Current Every Day Smoker    Smokeless tobacco: Never Used   Substance and Sexual Activity    Alcohol use: No    Drug use: No    Sexual activity: Not on file         Current Outpatient Medications:     albuterol (VENTOLIN HFA) 90 mcg/act inhaler, Inhale 2 puffs every 6 (six) hours as needed for wheezing, Disp: 1 Inhaler, Rfl: 3    amLODIPine (NORVASC) 2 5 mg tablet, , Disp: , Rfl: 0    amoxicillin-clavulanate (AUGMENTIN) 875-125 mg per tablet, Take 1 tablet by mouth every 12 (twelve) hours for 10 days, Disp: 20 tablet, Rfl: 0    cloNIDine (CATAPRES) 0 2 mg tablet, Take 1 tablet (0 2 mg total) by mouth 2 (two) times a day, Disp: 60 tablet, Rfl: 6    FLUoxetine (PROzac) 20 mg capsule, , Disp: , Rfl: 0    gabapentin (NEURONTIN) 300 mg capsule, , Disp: , Rfl: 0    hydrALAZINE (APRESOLINE) 10 mg tablet, , Disp: , Rfl: 0    hydrochlorothiazide (HYDRODIURIL) 25 mg tablet, Take 25 mg by mouth daily, Disp: , Rfl:     lisinopril (ZESTRIL) 40 mg tablet, , Disp: , Rfl: 0    methylPREDNISolone 4 MG tablet therapy pack, Use as directed on package, Disp: 21 each, Rfl: 0    metoprolol tartrate (LOPRESSOR) 50 mg tablet, , Disp: , Rfl: 0    oxyCODONE (ROXICODONE) 30 MG immediate release tablet, Take 30 mg by mouth every 12 (twelve) hours, Disp: , Rfl: 0    rosuvastatin (CRESTOR) 10 MG tablet, Take 10 mg by mouth daily, Disp: , Rfl: 0    tobramycin (TOBREX) 0 3 % SOLN, instill 1 drop twice a day into right eye, Disp: , Rfl: 0    traZODone (DESYREL) 50 mg tablet, , Disp: , Rfl: 0    VIAGRA 100 MG tablet, , Disp: , Rfl: 0    Current Facility-Administered Medications:     albuterol (PROVENTIL HFA,VENTOLIN HFA) inhaler 2 puff, 2 puff, Inhalation, Q4H PRN, Alexander Crease, DO    Allergies   Allergen Reactions    Morphine And Related     Percocet [Oxycodone-Acetaminophen]        Physical Exam:    There were no vitals taken for this visit  Constitutional: normal, well developed, well nourished, alert, in no distress and non-toxic and no overt pain behavior  and obese  Eyes: anicteric  HEENT: grossly intact  Neck: supple, symmetric, trachea midline and no masses   Pulmonary:even and unlabored  Cardiovascular:No edema or pitting edema present  Skin:Normal without rashes or lesions and well hydrated  Psychiatric:Mood and affect appropriate  Neurologic:Cranial Nerves II-XII grossly intact  Musculoskeletal:antalgic     Cervical Spine examination demonstrates  Decreased ROM secondary to pain with lateral rotation to the left/right and bending to the left/right, in addition to neck flexion  3/5 left upper extremity strength in all muscle groups  Negative Spurling's maneuver to the b/l Ue, sensitivity to light touch intact b/l Ue  Lumbar/Sacral Spine examination demonstrates  Decreased range of motion lumbar spine with pain upon: flexion, lateral rotation to the left/right, and bending to the left/right  Bilateral lumbar paraspinals tender to palpation  Muscle spasms noted in the lumbar area bilaterally  4/5 lower extremity strength in all muscle groups bilaterally  Positive seated straight leg raise for bilateral lower extremities  Sensitivity to light touch intact bilateral lower extremities  2+ reflexes in the patella and Achilles  No ankle clonus     Imaging  No orders to display       No orders of the defined types were placed in this encounter

## 2019-02-20 ENCOUNTER — OFFICE VISIT (OUTPATIENT)
Dept: FAMILY MEDICINE CLINIC | Facility: CLINIC | Age: 64
End: 2019-02-20
Payer: COMMERCIAL

## 2019-02-20 VITALS
SYSTOLIC BLOOD PRESSURE: 142 MMHG | TEMPERATURE: 98.3 F | HEIGHT: 68 IN | DIASTOLIC BLOOD PRESSURE: 86 MMHG | BODY MASS INDEX: 30.31 KG/M2 | WEIGHT: 200 LBS

## 2019-02-20 DIAGNOSIS — M51.36 LUMBAR DEGENERATIVE DISC DISEASE: ICD-10-CM

## 2019-02-20 DIAGNOSIS — M54.2 NECK PAIN: ICD-10-CM

## 2019-02-20 DIAGNOSIS — F17.200 SMOKING: ICD-10-CM

## 2019-02-20 DIAGNOSIS — I10 ESSENTIAL HYPERTENSION: Primary | ICD-10-CM

## 2019-02-20 DIAGNOSIS — E66.09 CLASS 1 OBESITY DUE TO EXCESS CALORIES WITH SERIOUS COMORBIDITY AND BODY MASS INDEX (BMI) OF 30.0 TO 30.9 IN ADULT: ICD-10-CM

## 2019-02-20 PROCEDURE — 99214 OFFICE O/P EST MOD 30 MIN: CPT | Performed by: FAMILY MEDICINE

## 2019-02-20 RX ORDER — AMLODIPINE BESYLATE 5 MG/1
5 TABLET ORAL DAILY
Qty: 30 TABLET | Refills: 6 | Status: SHIPPED | OUTPATIENT
Start: 2019-02-20 | End: 2019-03-20

## 2019-02-20 NOTE — PROGRESS NOTES
Assessment/Plan:    No problem-specific Assessment & Plan notes found for this encounter  Diagnoses and all orders for this visit:    Essential hypertension  -     amLODIPine (NORVASC) 5 mg tablet; Take 1 tablet (5 mg total) by mouth daily    Smoking  Comments:  pt counseled on quitting smoking    Class 1 obesity due to excess calories with serious comorbidity and body mass index (BMI) of 30 0 to 30 9 in adult  Comments:  pt counseled on diet and exercise    Lumbar degenerative disc disease    Neck pain        Tobacco Cessation Counseling: Tobacco cessation counseling and education was provided  The patient is sincerely urged to quit consumption of tobacco  He is ready to quit tobacco  The numerous health risks of tobacco consumption were discussed  If he decides to quit, there are  anumber of helpful adjunctive aids, and he can see me to discuss nicotine replacement therapy, chantix, or bupropion anytime in the future  Subjective:   BMI Counseling: Body mass index is 30 41 kg/m²  Discussed the patient's BMI with him  The BMI is above average  BMI counseling and education was provided to the patient  Nutrition recommendations include reducing portion sizes, decreasing overall calorie intake, 3-5 servings of fruits/vegetables daily, reducing fast food intake, consuming healthier snacks, decreasing soda and/or juice intake, moderation in carbohydrate intake, increasing intake of lean protein, reducing intake of saturated fat and trans fat and reducing intake of cholesterol  Exercise recommendations include moderate aerobic physical activity for 150 minutes/week and exercising 3-5 times per week  Patient ID: Bowen Bush is a 58 y o  male      Pt is checking Bps at home and seeing an average of 150 over 95, pt is still smoking, pt is not watching his diet or exercising weight is remaining the same, follow up for chronic low back pain, and neck pain, pt saw the pain management doctor who he states did not give him anything and referred him for PT, pt is unhappy    Hypertension   This is a chronic problem  The current episode started more than 1 month ago  The problem has been gradually improving since onset  The problem is uncontrolled  Pertinent negatives include no chest pain, headaches or palpitations  There are no associated agents to hypertension  Risk factors for coronary artery disease include obesity, male gender, sedentary lifestyle and smoking/tobacco exposure  Past treatments include calcium channel blockers and alpha 1 blockers  The current treatment provides moderate improvement  Compliance problems include diet and exercise  The following portions of the patient's history were reviewed and updated as appropriate: allergies, current medications, past family history, past medical history, past social history, past surgical history and problem list     Review of Systems   Eyes: Negative for visual disturbance  Cardiovascular: Negative for chest pain and palpitations  Neurological: Negative for headaches  Objective:      /86 (BP Location: Left arm, Patient Position: Sitting, Cuff Size: Adult)   Temp 98 3 °F (36 8 °C) (Tympanic)   Ht 5' 8" (1 727 m)   Wt 90 7 kg (200 lb)   BMI 30 41 kg/m²          Physical Exam   Constitutional: He is oriented to person, place, and time  He appears well-developed and well-nourished  No distress  HENT:   Head: Normocephalic and atraumatic  Eyes: Pupils are equal, round, and reactive to light  Conjunctivae and EOM are normal  No scleral icterus  Neck: Normal range of motion  Neck supple  Cardiovascular: Normal rate, regular rhythm and normal heart sounds  No murmur heard  Pulmonary/Chest: Effort normal and breath sounds normal  No respiratory distress  He has no wheezes  He has no rales  Abdominal: Soft  Bowel sounds are normal  He exhibits no distension and no mass  There is no tenderness  There is no rebound and no guarding  Musculoskeletal: He exhibits no edema  Lymphadenopathy:     He has no cervical adenopathy  Neurological: He is alert and oriented to person, place, and time  Skin: Skin is warm and dry  He is not diaphoretic  Psychiatric: He has a normal mood and affect  His behavior is normal  Judgment and thought content normal    Nursing note and vitals reviewed

## 2019-02-22 ENCOUNTER — TELEPHONE (OUTPATIENT)
Dept: RADIOLOGY | Facility: CLINIC | Age: 64
End: 2019-02-22

## 2019-02-22 NOTE — TELEPHONE ENCOUNTER
----- Message from Sean Bass MD sent at 2/21/2019  3:46 PM EST -----  Please call the patient regarding his abnormal result    XR cervical spine shows right C6-7 neural foramen narrowing, left C3-4 and C4-5 neural foramen narrowing

## 2019-02-28 NOTE — TELEPHONE ENCOUNTER
It has been 6 days and we cannot reach this pt  Would you like us to send a cannot reach you letter?     Please advise-

## 2019-03-18 ENCOUNTER — TELEPHONE (OUTPATIENT)
Dept: PAIN MEDICINE | Facility: CLINIC | Age: 64
End: 2019-03-18

## 2019-03-18 ENCOUNTER — OFFICE VISIT (OUTPATIENT)
Dept: PAIN MEDICINE | Facility: CLINIC | Age: 64
End: 2019-03-18
Payer: COMMERCIAL

## 2019-03-18 VITALS
BODY MASS INDEX: 31.08 KG/M2 | WEIGHT: 204.4 LBS | DIASTOLIC BLOOD PRESSURE: 110 MMHG | SYSTOLIC BLOOD PRESSURE: 153 MMHG

## 2019-03-18 DIAGNOSIS — M54.12 CERVICAL RADICULOPATHY: ICD-10-CM

## 2019-03-18 DIAGNOSIS — G89.29 CHRONIC LOW BACK PAIN WITHOUT SCIATICA, UNSPECIFIED BACK PAIN LATERALITY: ICD-10-CM

## 2019-03-18 DIAGNOSIS — M51.36 LUMBAR DEGENERATIVE DISC DISEASE: ICD-10-CM

## 2019-03-18 DIAGNOSIS — M54.50 CHRONIC LOW BACK PAIN WITHOUT SCIATICA, UNSPECIFIED BACK PAIN LATERALITY: ICD-10-CM

## 2019-03-18 DIAGNOSIS — M79.18 MYOFASCIAL PAIN SYNDROME: ICD-10-CM

## 2019-03-18 DIAGNOSIS — M47.816 LUMBAR SPONDYLOSIS: Primary | ICD-10-CM

## 2019-03-18 PROCEDURE — 99214 OFFICE O/P EST MOD 30 MIN: CPT | Performed by: ANESTHESIOLOGY

## 2019-03-18 RX ORDER — GABAPENTIN 600 MG/1
600 TABLET ORAL 3 TIMES DAILY
Qty: 90 TABLET | Refills: 0 | Status: SHIPPED | OUTPATIENT
Start: 2019-03-18 | End: 2019-04-22 | Stop reason: SDUPTHER

## 2019-03-18 RX ORDER — ACETAMINOPHEN,DIPHENHYDRAMINE HCL 500; 25 MG/1; MG/1
2 TABLET, FILM COATED ORAL
COMMUNITY
End: 2022-07-25

## 2019-03-18 RX ORDER — OXYCODONE AND ACETAMINOPHEN 7.5; 325 MG/1; MG/1
1 TABLET ORAL EVERY 8 HOURS PRN
Qty: 90 TABLET | Refills: 0 | Status: SHIPPED | OUTPATIENT
Start: 2019-03-18 | End: 2019-03-27 | Stop reason: SDUPTHER

## 2019-03-18 RX ORDER — GABAPENTIN 400 MG/1
400 CAPSULE ORAL 3 TIMES DAILY
Qty: 90 CAPSULE | Refills: 0 | Status: CANCELLED | OUTPATIENT
Start: 2019-03-18 | End: 2019-04-17

## 2019-03-18 NOTE — PROGRESS NOTES
Assessment:  1  Lumbar spondylosis    2  Cervical radiculopathy    3  Lumbar degenerative disc disease    4  Myofascial pain syndrome    5  Chronic low back pain without sciatica, unspecified back pain laterality        Plan:  Patient is a 43-year-old man with complaints of low back pain and bilateral shoulder pain over the past 40 years complicated by history significant for lumbar spondylosis, lumbar degenerative disc disease presents to office for follow-up visit  Images reviewed: X-ray of cervical spine from 02/18/2019 which shows right C6-C7 neural foraminal narrowing, slight left C3-C4 and C4-C5 neural foraminal narrowing  Patient's clinical presentation correlates significantly the x-ray of the neck  Interventional will most likely include both cervical epidural steroid injections in addition to medial branch blocks of the mentioned facet joints  Patient may require surgical intervention  Patient notes not been able to work due to decreased strength in the left upper extremity in which he is having difficulty lifting his arm past his nipple line  Labs reviewed:   UDS from 02/18/2019 was reviewed and found to be within normal limits  1   Patient will need to complete 4-6 weeks of physical therapy of the neck and low back for us to obtain MRIs of aforementioned levels  Once physical therapy has been completed and the patient is been fully assess will then proceed with an MRI of the cervical spine to better assess the discogenic changes and nerve root impingement 7 correlate patient's symptoms  Based on the results will then consider interventional therapy to help alleviate his upper extremity radiculopathy and neck pain  2   We will start patient on a low-dose opioid pain regimen of Percocet 7 5/325 mg PO t i d  for neck and low back pain  Patient instructed not to take opioid with benzo (lorazepam) within a 4 hour  Of each other secondary to increased risk of respiratory depression    3   We will titrate up patient's gabapentin to 600 mg p o  t i d   4   Follow-up in 1 month      Complete risks and benefits including bleeding, infection, tissue reaction, nerve injury and allergic reaction were discussed  The approach was demonstrated using models and literature was provided  Verbal and written consent was obtained  There are risks associated with opioid medications, including dependence, addiction and tolerance  The patient understands and agrees to use these medications only as prescribed  Potential side effects of the medications include, but are not limited to, constipation, drowsiness, addiction, impaired judgment and risk of fatal overdose if not taken as prescribed  The patient was warned against driving while taking sedation medications  Sharing medications is a felony  At this point in time, the patient is showing no signs of addiction, abuse, diversion or suicidal ideation  [place] Prescription Drug Monitoring Program report was reviewed and was appropriate       History of Present Illness: The patient is a 58 y o  male who presents for a follow up office visit in regards to Neck Pain; Shoulder Pain; Arm Pain; and Back Pain  The patients current symptoms include 8/10 constant dull/aching, sharp, throbbing, pins and needles, numbness in neck, low back, left upper extremity without any particular pain pattern  Current pain medications includes:  gabapentin 300 mg p o  t i d , Tylenol 500 mg 2 tabs p o  p m     The patient reports that this regimen is providing 0% pain relief  The patient is reporting no side effects from this pain medication regimen  I have personally reviewed and/or updated the patient's past medical history, past surgical history, family history, social history, current medications, allergies, and vital signs today  Review of Systems  Review of Systems   Respiratory: Positive for shortness of breath      Musculoskeletal: Positive for arthralgias and gait problem  Decreased range of motion  Joint stiffness     Neurological: Positive for dizziness  All other systems reviewed and are negative  Past Medical History:   Diagnosis Date    [facility]     Abdominal Aortic Duplex 02/21/2017    Ectatic infrarenal abdominal aorta   CAD (coronary artery disease)     [facility]     History of echocardiogram 03/18/2014    EF 55%, mild MR and AI  Mild concentric LVH   History of stress test 03/06/2017    Normal     Hypertension     Obstructive sleep apnea     cannot tolerate CPAP       Past Surgical History:   Procedure Laterality Date    CARDIAC CATHETERIZATION  02/13/2012    EF 70%, widely patent renal arteries, significant single-vessel CAD-medical therapy   CARDIAC CATHETERIZATION  04/11/2013    EF 65%, 50% mid LAD, 20% prox CFX, 90% diffuse RCA, 99% mid RCA  Medical management      CHOLECYSTECTOMY         Family History   Adopted: Yes   Problem Relation Age of Onset    No Known Problems Mother     No Known Problems Father     No Known Problems Sister     No Known Problems Brother     No Known Problems Daughter     No Known Problems Son     No Known Problems Maternal Aunt     No Known Problems Maternal Uncle     No Known Problems Paternal Aunt     No Known Problems Paternal Uncle     No Known Problems Maternal Grandmother     No Known Problems Maternal Grandfather     No Known Problems Paternal Grandmother     No Known Problems Paternal Grandfather        Social History     Occupational History    Not on file   Tobacco Use    Smoking status: Current Every Day Smoker    Smokeless tobacco: Never Used   Substance and Sexual Activity    Alcohol use: No    Drug use: No    Sexual activity: Not on file         Current Outpatient Medications:     albuterol (VENTOLIN HFA) 90 mcg/act inhaler, Inhale 2 puffs every 6 (six) hours as needed for wheezing, Disp: 1 Inhaler, Rfl: 3    amLODIPine (NORVASC) 5 mg tablet, Take 1 tablet (5 mg total) by mouth daily, Disp: 30 tablet, Rfl: 6    cloNIDine (CATAPRES) 0 2 mg tablet, Take 1 tablet (0 2 mg total) by mouth 2 (two) times a day, Disp: 60 tablet, Rfl: 6    FLUoxetine (PROzac) 20 mg capsule, , Disp: , Rfl: 0    gabapentin (NEURONTIN) 300 mg capsule, , Disp: , Rfl: 0    hydrALAZINE (APRESOLINE) 10 mg tablet, , Disp: , Rfl: 0    hydrochlorothiazide (HYDRODIURIL) 25 mg tablet, Take 25 mg by mouth daily, Disp: , Rfl:     lisinopril (ZESTRIL) 40 mg tablet, , Disp: , Rfl: 0    LORazepam (ATIVAN) 1 mg tablet, Take 0 5 mg by mouth every 12 (twelve) hours, Disp: , Rfl:     metoprolol tartrate (LOPRESSOR) 50 mg tablet, , Disp: , Rfl: 0    nitroglycerin (NITROSTAT) 0 4 mg SL tablet, Place 0 4 mg under the tongue every 5 (five) minutes as needed for chest pain, Disp: , Rfl:     oxyCODONE (ROXICODONE) 30 MG immediate release tablet, Take 30 mg by mouth every 12 (twelve) hours, Disp: , Rfl: 0    rosuvastatin (CRESTOR) 10 MG tablet, Take 10 mg by mouth daily, Disp: , Rfl: 0    tobramycin (TOBREX) 0 3 % SOLN, instill 1 drop twice a day into right eye, Disp: , Rfl: 0    traZODone (DESYREL) 50 mg tablet, , Disp: , Rfl: 0    VIAGRA 100 MG tablet, , Disp: , Rfl: 0    Current Facility-Administered Medications:     albuterol (PROVENTIL HFA,VENTOLIN HFA) inhaler 2 puff, 2 puff, Inhalation, Q4H PRN, Luisa Coil, DO    Allergies   Allergen Reactions    Morphine And Related     Percocet [Oxycodone-Acetaminophen]        Physical Exam:    BP (!) 153/110   Wt 92 7 kg (204 lb 6 4 oz)   BMI 31 08 kg/m²     Constitutional:normal, well developed, well nourished, alert, in no distress and non-toxic and no overt pain behavior   and obese  Eyes:anicteric  HEENT:grossly intact  Neck:supple, symmetric, trachea midline and no masses   Pulmonary:even and unlabored  Cardiovascular:No edema or pitting edema present  Skin:Normal without rashes or lesions and well hydrated  Psychiatric:Mood and affect appropriate  Neurologic:Cranial Nerves II-XII grossly intact  Musculoskeletal:antalgic     Cervical Spine examination demonstrates  Decreased ROM secondary to pain with lateral rotation to the left/right and bending to the left/right, in addition to neck flexion  3/5 left upper extremity strength in all muscle groups  Negative Spurling's maneuver to the b/l Ue, sensitivity to light touch intact b/l Ue  Lumbar/Sacral Spine examination demonstrates  Decreased range of motion lumbar spine with pain upon: flexion, lateral rotation to the left/right, and bending to the left/right  Bilateral lumbar paraspinals tender to palpation  Muscle spasms noted in the lumbar area bilaterally  4/5 lower extremity strength in all muscle groups bilaterally  Positive seated straight leg raise for bilateral lower extremities  Sensitivity to light touch intact bilateral lower extremities  2+ reflexes in the patella and Achilles  No ankle clonus       Imaging  No orders to display       No orders of the defined types were placed in this encounter

## 2019-03-19 ENCOUNTER — APPOINTMENT (OUTPATIENT)
Dept: LAB | Facility: HOSPITAL | Age: 64
End: 2019-03-19
Payer: COMMERCIAL

## 2019-03-19 ENCOUNTER — TRANSCRIBE ORDERS (OUTPATIENT)
Dept: ADMINISTRATIVE | Facility: HOSPITAL | Age: 64
End: 2019-03-19

## 2019-03-19 DIAGNOSIS — E78.00 PURE HYPERCHOLESTEROLEMIA: Primary | ICD-10-CM

## 2019-03-19 DIAGNOSIS — I10 ESSENTIAL HYPERTENSION, MALIGNANT: ICD-10-CM

## 2019-03-19 LAB
CHOLEST SERPL-MCNC: 208 MG/DL (ref 0–200)
HDLC SERPL-MCNC: 34 MG/DL (ref 40–60)
LDLC SERPL CALC-MCNC: 116 MG/DL (ref 75–193)
NONHDLC SERPL-MCNC: 174 MG/DL
TRIGL SERPL-MCNC: 289 MG/DL (ref 44–166)

## 2019-03-19 PROCEDURE — 36415 COLL VENOUS BLD VENIPUNCTURE: CPT

## 2019-03-19 PROCEDURE — 80061 LIPID PANEL: CPT

## 2019-03-20 ENCOUNTER — DOCUMENTATION (OUTPATIENT)
Dept: PAIN MEDICINE | Facility: CLINIC | Age: 64
End: 2019-03-20

## 2019-03-20 ENCOUNTER — TELEPHONE (OUTPATIENT)
Dept: PAIN MEDICINE | Facility: CLINIC | Age: 64
End: 2019-03-20

## 2019-03-20 ENCOUNTER — OFFICE VISIT (OUTPATIENT)
Dept: FAMILY MEDICINE CLINIC | Facility: CLINIC | Age: 64
End: 2019-03-20
Payer: COMMERCIAL

## 2019-03-20 VITALS
DIASTOLIC BLOOD PRESSURE: 90 MMHG | TEMPERATURE: 98.7 F | BODY MASS INDEX: 30.86 KG/M2 | WEIGHT: 203.6 LBS | SYSTOLIC BLOOD PRESSURE: 162 MMHG | HEIGHT: 68 IN

## 2019-03-20 DIAGNOSIS — F17.200 SMOKING: ICD-10-CM

## 2019-03-20 DIAGNOSIS — E78.00 HYPERCHOLESTEROLEMIA: ICD-10-CM

## 2019-03-20 DIAGNOSIS — E78.1 HYPERTRIGLYCERIDEMIA: ICD-10-CM

## 2019-03-20 DIAGNOSIS — I10 ESSENTIAL HYPERTENSION: Primary | ICD-10-CM

## 2019-03-20 PROCEDURE — 99214 OFFICE O/P EST MOD 30 MIN: CPT | Performed by: FAMILY MEDICINE

## 2019-03-20 RX ORDER — AMLODIPINE BESYLATE 10 MG/1
10 TABLET ORAL DAILY
Qty: 30 TABLET | Refills: 6 | Status: SHIPPED | OUTPATIENT
Start: 2019-03-20 | End: 2020-04-14 | Stop reason: SDUPTHER

## 2019-03-20 NOTE — TELEPHONE ENCOUNTER
LMOM for return call  Please transfer pt to [facility] in [place] when he calls  Need Opioid Contract signed  Pt came into office and signed Opioid Contract    PA request sent with copy of Opioid Contract,

## 2019-03-20 NOTE — PROGRESS NOTES
Assessment/Plan:    No problem-specific Assessment & Plan notes found for this encounter  Diagnoses and all orders for this visit:    Essential hypertension  Comments:  continue home log    Hypercholesterolemia  Comments:  pt counseled on diet and exercise    Hypertriglyceridemia  Comments:  pt counseled on diet and exercise, pt refuses additional medication    Smoking  Comments:  pt counseled on quitting smoking          Subjective:      Patient ID: Alek Isidro is a 58 y o  male  Pt is checking Bps at home and seeing an average of 177 over 105, follow up for hypercholesterolemia pt is on crestor, triglycerides are high also but pt does not want additional medications    Hypertension   This is a chronic problem  The current episode started more than 1 month ago  The problem has been gradually improving since onset  The problem is uncontrolled  Associated symptoms include headaches  Pertinent negatives include no chest pain or palpitations  Past treatments include calcium channel blockers, beta blockers and ACE inhibitors  The current treatment provides moderate improvement  Compliance problems include diet and exercise  The following portions of the patient's history were reviewed and updated as appropriate: allergies, current medications, past family history, past medical history, past social history, past surgical history and problem list     Review of Systems   Eyes: Negative for visual disturbance  Cardiovascular: Negative for chest pain and palpitations  Neurological: Positive for headaches  Objective:      /90 (BP Location: Left arm, Patient Position: Sitting, Cuff Size: Adult)   Temp 98 7 °F (37 1 °C) (Tympanic)   Ht 5' 8" (1 727 m)   Wt 92 4 kg (203 lb 9 6 oz)   BMI 30 96 kg/m²          Physical Exam   Constitutional: He is oriented to person, place, and time  He appears well-developed and well-nourished  No distress  HENT:   Head: Normocephalic and atraumatic  Eyes: Pupils are equal, round, and reactive to light  Conjunctivae and EOM are normal  No scleral icterus  Neck: Normal range of motion  Neck supple  Cardiovascular: Normal rate, regular rhythm and normal heart sounds  No murmur heard  Pulmonary/Chest: Effort normal and breath sounds normal  No respiratory distress  He has no wheezes  He has no rales  Abdominal: Soft  Bowel sounds are normal  He exhibits no distension and no mass  There is no tenderness  There is no rebound and no guarding  Musculoskeletal: He exhibits no edema  Lymphadenopathy:     He has no cervical adenopathy  Neurological: He is alert and oriented to person, place, and time  Skin: Skin is warm and dry  He is not diaphoretic  Psychiatric: He has a normal mood and affect  His behavior is normal  Judgment and thought content normal    Nursing note and vitals reviewed

## 2019-03-21 ENCOUNTER — EVALUATION (OUTPATIENT)
Dept: PHYSICAL THERAPY | Facility: CLINIC | Age: 64
End: 2019-03-21
Payer: COMMERCIAL

## 2019-03-21 DIAGNOSIS — G89.29 CHRONIC PAIN OF BOTH SHOULDERS: Primary | ICD-10-CM

## 2019-03-21 DIAGNOSIS — M25.511 CHRONIC PAIN OF BOTH SHOULDERS: Primary | ICD-10-CM

## 2019-03-21 DIAGNOSIS — M25.512 CHRONIC PAIN OF BOTH SHOULDERS: Primary | ICD-10-CM

## 2019-03-21 PROCEDURE — 97163 PT EVAL HIGH COMPLEX 45 MIN: CPT | Performed by: PHYSICAL THERAPIST

## 2019-03-22 ENCOUNTER — OFFICE VISIT (OUTPATIENT)
Dept: CARDIOLOGY CLINIC | Facility: CLINIC | Age: 64
End: 2019-03-22
Payer: COMMERCIAL

## 2019-03-22 VITALS
SYSTOLIC BLOOD PRESSURE: 170 MMHG | DIASTOLIC BLOOD PRESSURE: 98 MMHG | WEIGHT: 204 LBS | BODY MASS INDEX: 30.92 KG/M2 | HEIGHT: 68 IN | HEART RATE: 72 BPM

## 2019-03-22 DIAGNOSIS — I10 ESSENTIAL HYPERTENSION: Primary | ICD-10-CM

## 2019-03-22 PROCEDURE — 99214 OFFICE O/P EST MOD 30 MIN: CPT | Performed by: INTERNAL MEDICINE

## 2019-03-22 RX ORDER — HYDROCHLOROTHIAZIDE 25 MG/1
25 TABLET ORAL
Qty: 60 TABLET | Refills: 3 | Status: SHIPPED | OUTPATIENT
Start: 2019-03-22 | End: 2019-07-16 | Stop reason: SDUPTHER

## 2019-03-22 RX ORDER — LISINOPRIL 40 MG/1
40 TABLET ORAL 2 TIMES DAILY
Qty: 60 TABLET | Refills: 5 | Status: SHIPPED | OUTPATIENT
Start: 2019-03-22 | End: 2019-05-03

## 2019-03-22 NOTE — PROGRESS NOTES
Subjective:        Patient ID: Mariya Lr is a 58 y o  male  Chief Complaint:  Kelly Hanson is here for follow-up after her blood pressure adjustments  At several places his blood pressure has been severely high since I have seen him  He says he feels well though  Denies any chest pain palpitations presyncope or syncope, no unilateral symptoms, no alarming back pains  He does have chronic moderate dyspnea, this has not changed or worsened  Today's blood pressure 170/98  His significant other says he does not like to take the metoprolol  He did not increase the clonidine as another physician suggested  He still smokes  The following portions of the patient's history were reviewed and updated as appropriate: allergies, current medications, past family history, past medical history, past social history, past surgical history and problem list   Review of Systems   Constitution: Negative for chills, diaphoresis, malaise/fatigue and weight gain  HENT: Negative for nosebleeds and stridor  Eyes: Negative for double vision, vision loss in left eye, vision loss in right eye and visual disturbance  Cardiovascular: Positive for dyspnea on exertion  Negative for chest pain, claudication, cyanosis, irregular heartbeat, leg swelling, near-syncope, orthopnea, palpitations, paroxysmal nocturnal dyspnea and syncope  Respiratory: Negative for cough, shortness of breath, snoring and wheezing  Endocrine: Negative for polydipsia, polyphagia and polyuria  Hematologic/Lymphatic: Negative for bleeding problem  Does not bruise/bleed easily  Skin: Negative for flushing and rash  Musculoskeletal: Negative for falls and myalgias  Gastrointestinal: Negative for abdominal pain, heartburn, hematemesis, hematochezia, melena and nausea  Genitourinary: Negative for hematuria  Neurological: Negative for brief paralysis, dizziness, focal weakness, headaches, light-headedness, loss of balance and vertigo  Psychiatric/Behavioral: Negative for altered mental status and substance abuse  Allergic/Immunologic: Negative for hives  Objective:      /98   Pulse 72   Ht 5' 8" (1 727 m)   Wt 92 5 kg (204 lb)   BMI 31 02 kg/m²   Physical Exam   Constitutional: He is oriented to person, place, and time  He appears well-developed and well-nourished  No distress  HENT:   Head: Normocephalic and atraumatic  Eyes: Pupils are equal, round, and reactive to light  EOM are normal  No scleral icterus  Neck: Normal range of motion  Neck supple  No JVD present  No thyromegaly present  Cardiovascular: Normal rate, regular rhythm and normal heart sounds  Exam reveals no gallop and no friction rub  No murmur heard  Pulmonary/Chest: Effort normal  No stridor  No respiratory distress  He has no wheezes  He has no rales  Decreased breath sounds bilaterally   Abdominal: Soft  Bowel sounds are normal  He exhibits no distension and no mass  There is no tenderness  Musculoskeletal: Normal range of motion  He exhibits no edema or deformity  Neurological: He is alert and oriented to person, place, and time  Coordination normal    Skin: Skin is warm and dry  No erythema  No pallor  Psychiatric: He has a normal mood and affect   His behavior is normal  Judgment and thought content normal        Lab Review:   Appointment on 02/13/2019   Component Date Value    WBC 02/13/2019 14 70*    RBC 02/13/2019 5 64     Hemoglobin 02/13/2019 16 7     Hematocrit 02/13/2019 51 2*    MCV 02/13/2019 91     MCH 02/13/2019 29 7     MCHC 02/13/2019 32 7     RDW 02/13/2019 14 1     MPV 02/13/2019 8 9     Platelets 37/25/4752 217     nRBC 02/13/2019 0     Sodium 02/13/2019 138     Potassium 02/13/2019 4 1     Chloride 02/13/2019 103     CO2 02/13/2019 27     ANION GAP 02/13/2019 8     BUN 02/13/2019 18     Creatinine 02/13/2019 1 06     Glucose 02/13/2019 112*    Calcium 02/13/2019 9 5     AST 02/13/2019 17     ALT 02/13/2019 36     Alkaline Phosphatase 02/13/2019 62     Total Protein 02/13/2019 7 2     Albumin 02/13/2019 4 2     Total Bilirubin 02/13/2019 0 50     eGFR 02/13/2019 74     TSH 3RD GENERATON 02/13/2019 1 020     PSA 02/13/2019 2 6     Cholesterol 03/19/2019 208*    Triglycerides 03/19/2019 289*    HDL, Direct 03/19/2019 34*    LDL Calculated 03/19/2019 116     Non-HDL-Chol (CHOL-HDL) 03/19/2019 174     Segmented % 02/13/2019 70     Lymphocytes % 02/13/2019 15*    Monocytes % 02/13/2019 12     Eosinophils, % 02/13/2019 1     Basophils % 02/13/2019 1     Blasts % 02/13/2019 1*    Neutrophils Absolute 02/13/2019 10 29*    Lymphocytes Absolute 02/13/2019 2 21     Monocytes Absolute 02/13/2019 1 76*    Eosinophils Absolute 02/13/2019 0 15     Basophils Absolute 02/13/2019 0 15*    Total Counted 02/13/2019 100      No results found  Assessment:       1  Essential hypertension  hydrochlorothiazide (HYDRODIURIL) 25 mg tablet    lisinopril (ZESTRIL) 40 mg tablet    metoprolol tartrate (LOPRESSOR) 25 mg tablet    Basic metabolic panel        Plan:       Brendon Courtney has uncontrolled hypertension  An effort to not add any new medications to his regimen I simply increase his lisinopril to 40 mg b i d  And hydrochlorothiazide 25 mg b i d  I ordered a BMP in 1 months time, stressed the importance of this  I also strongly advised to quit smoking, possibly contributing to his labile hypertension  He also was only taking metoprolol tartrate once daily, changed this to 25 mg p o  B i d , stressed the importance of this medication twice a day as well citing reflex hypertension beyond his baseline upon missing a dose  Return to office in 5 or 6 weeks for follow-up  Sooner p r n  Ruel Mchugh Trying to get a copy of his last catheterization done at [facility]  He is not having any angina, but it is absent from epic records    I suspect his dyspnea is from advanced COPD and ongoing cigarette habituation and deconditioning

## 2019-03-22 NOTE — PATIENT INSTRUCTIONS
Chronic Hypertension, Ambulatory Care   GENERAL INFORMATION:   Chronic hypertension  is a long-term condition in which your blood pressure (BP) is higher than normal  Your BP is the force of your blood moving against the walls of your arteries  Hypertension is a BP of 140/90 or higher  Common symptoms include the following:   · Headache     · Blurred vision    · Chest pain     · Dizziness or weakness     · Trouble breathing     · Nosebleeds  Seek immediate care for the following symptoms:   · Severe headache or vision loss    · Weakness in an arm or leg    · Confusion or difficulty speaking    · Discomfort in your chest that feels like squeezing, pressure, fullness, or pain    · Suddenly feeling lightheaded or trouble breathing    · Pain or discomfort in your back, neck, jaw, stomach, or arm  Treatment for chronic hypertension  may include medicine to lower your BP  You may also need to make lifestyle changes  Take your medicine exactly as directed  Manage chronic hypertension:   · Take your BP at home  Sit and rest for 5 minutes before you take your BP  Extend your arm and support it on a flat surface  Your arm should be at the same level as your heart  Follow the directions that came with your BP monitor  If possible, take at least 2 BP readings each time  Take your BP at least twice a day at the same times each day, such as morning and evening  Keep a log of your BP readings and bring it to your follow-up visits  · Eat less sodium (salt)  Do not add sodium to your food  Limit foods that are high in sodium, such as canned foods, potato chips, and cold cuts  Your healthcare provider may suggest that you follow the [address] [place] [address] Street  The plan is low in sodium, unhealthy fats, and total fat  It is high in potassium, calcium, and fiber  · Exercise regularly  Exercise at least 30 minutes per day, on most days of the week  This will help decrease your BP   Ask your healthcare provider about the best exercise plan for you  · Limit alcohol  Women should limit alcohol to 1 drink a day  Men should limit alcohol to 2 drinks a day  A drink of alcohol is 12 ounces of beer, 5 ounces of wine, or 1½ ounces of liquor  · Do not smoke  If you smoke, it is never too late to quit  Smoking can increase your BP  Smoking also worsens other health conditions you may have that can increase your risk for hypertension  Ask your healthcare provider for information if you need help quitting  Follow up with your healthcare provider as directed: You will need to return to have your BP checked and to have other lab tests done  Write down your questions so you remember to ask them during your visits  CARE AGREEMENT:   You have the right to help plan your care  Learn about your health condition and how it may be treated  Discuss treatment options with your caregivers to decide what care you want to receive  You always have the right to refuse treatment  The above information is an  only  It is not intended as medical advice for individual conditions or treatments  Talk to your doctor, nurse or pharmacist before following any medical regimen to see if it is safe and effective for you  © 2014 [address] is for End User's use only and may not be sold, redistributed or otherwise used for commercial purposes  All illustrations and images included in CareNotes® are the copyrighted property of [employer] M [employer]  or [employer]

## 2019-03-25 NOTE — TELEPHONE ENCOUNTER
Called pt back who stated he took the 5/325 mg [facility] over [facility] and patient states "they did nothing" for him  Pt stated he needs Oxycodone 7 5/325mg which [employer] denied  Pt stated that he does not want the 5/325 again as he was on 2 tablets of 30mg Oxycodone from his previous doctor and that is all that worked for him  Pt stated that he will just be in pain until his next appointment next month  Please advise

## 2019-03-25 NOTE — TELEPHONE ENCOUNTER
Patient called asking to speak to Carlos Rosen  He is very upset about the script that was written  He states there is an issue with the script

## 2019-03-25 NOTE — TELEPHONE ENCOUNTER
Insurance will not approve dosages higher than 5/325 milligrams  Patient was informed that he will not be prescribed anything close to 30 milligrams of oxycodone that the highest dose that would be willing to go to the Percocet 7 5/325 milligrams    There was no issue with a script there is an issue with insurance approval

## 2019-03-26 NOTE — TELEPHONE ENCOUNTER
Pts Oxycodone 7 5-325 was approved until 9/19/19   Patient is aware  I did speak with the pharmacist and they do need a new prescription since they gave the patient a 5 day supply

## 2019-03-27 DIAGNOSIS — M47.816 LUMBAR SPONDYLOSIS: ICD-10-CM

## 2019-03-27 DIAGNOSIS — M54.12 CERVICAL RADICULOPATHY: ICD-10-CM

## 2019-03-27 DIAGNOSIS — M79.18 MYOFASCIAL PAIN SYNDROME: ICD-10-CM

## 2019-03-27 RX ORDER — OXYCODONE AND ACETAMINOPHEN 7.5; 325 MG/1; MG/1
1 TABLET ORAL EVERY 8 HOURS PRN
Qty: 90 TABLET | Refills: 0 | Status: SHIPPED | OUTPATIENT
Start: 2019-03-27 | End: 2019-04-22 | Stop reason: SDUPTHER

## 2019-03-27 RX ORDER — OXYCODONE AND ACETAMINOPHEN 7.5; 325 MG/1; MG/1
1 TABLET ORAL EVERY 8 HOURS PRN
Qty: 90 TABLET | Refills: 0 | Status: SHIPPED | OUTPATIENT
Start: 2019-03-27 | End: 2019-03-27 | Stop reason: SDUPTHER

## 2019-03-27 NOTE — TELEPHONE ENCOUNTER
Patient called stating he called his pharmacy to check the status on his medication Oxycodone 7 5-325 mg and they haven't received his script   Please advise, alonso    Call back# [phone]

## 2019-04-22 ENCOUNTER — OFFICE VISIT (OUTPATIENT)
Dept: PAIN MEDICINE | Facility: CLINIC | Age: 64
End: 2019-04-22
Payer: COMMERCIAL

## 2019-04-22 VITALS
SYSTOLIC BLOOD PRESSURE: 186 MMHG | HEIGHT: 68 IN | BODY MASS INDEX: 30.71 KG/M2 | DIASTOLIC BLOOD PRESSURE: 106 MMHG | WEIGHT: 202.6 LBS

## 2019-04-22 DIAGNOSIS — M79.18 MYOFASCIAL PAIN SYNDROME: ICD-10-CM

## 2019-04-22 DIAGNOSIS — Z79.891 LONG-TERM CURRENT USE OF OPIATE ANALGESIC: ICD-10-CM

## 2019-04-22 DIAGNOSIS — F11.20 UNCOMPLICATED OPIOID DEPENDENCE (HCC): ICD-10-CM

## 2019-04-22 DIAGNOSIS — M51.36 LUMBAR DEGENERATIVE DISC DISEASE: ICD-10-CM

## 2019-04-22 DIAGNOSIS — G89.4 CHRONIC PAIN SYNDROME: ICD-10-CM

## 2019-04-22 DIAGNOSIS — M54.12 CERVICAL RADICULOPATHY: Primary | ICD-10-CM

## 2019-04-22 DIAGNOSIS — M47.816 LUMBAR SPONDYLOSIS: ICD-10-CM

## 2019-04-22 PROCEDURE — 80305 DRUG TEST PRSMV DIR OPT OBS: CPT | Performed by: ANESTHESIOLOGY

## 2019-04-22 PROCEDURE — 99214 OFFICE O/P EST MOD 30 MIN: CPT | Performed by: ANESTHESIOLOGY

## 2019-04-22 RX ORDER — NALOXONE HYDROCHLORIDE 0.4 MG/ML
0.4 INJECTION, SOLUTION INTRAMUSCULAR; INTRAVENOUS; SUBCUTANEOUS ONCE
Qty: 1 ML | Refills: 0 | Status: SHIPPED | OUTPATIENT
Start: 2019-04-22 | End: 2021-06-15 | Stop reason: ALTCHOICE

## 2019-04-22 RX ORDER — GABAPENTIN 600 MG/1
600 TABLET ORAL 3 TIMES DAILY
Qty: 90 TABLET | Refills: 0 | Status: SHIPPED | OUTPATIENT
Start: 2019-04-22 | End: 2019-06-17 | Stop reason: SDUPTHER

## 2019-04-22 RX ORDER — OXYCODONE AND ACETAMINOPHEN 7.5; 325 MG/1; MG/1
1 TABLET ORAL EVERY 8 HOURS PRN
Qty: 90 TABLET | Refills: 0 | Status: SHIPPED | OUTPATIENT
Start: 2019-04-22 | End: 2019-05-22

## 2019-04-22 RX ORDER — OXYCODONE AND ACETAMINOPHEN 7.5; 325 MG/1; MG/1
1 TABLET ORAL EVERY 8 HOURS PRN
Qty: 90 TABLET | Refills: 0 | Status: SHIPPED | OUTPATIENT
Start: 2019-05-22 | End: 2019-06-17 | Stop reason: SDUPTHER

## 2019-05-03 ENCOUNTER — OFFICE VISIT (OUTPATIENT)
Dept: CARDIOLOGY CLINIC | Facility: CLINIC | Age: 64
End: 2019-05-03
Payer: COMMERCIAL

## 2019-05-03 ENCOUNTER — TRANSCRIBE ORDERS (OUTPATIENT)
Dept: NON INVASIVE DIAGNOSTICS | Facility: CLINIC | Age: 64
End: 2019-05-03

## 2019-05-03 VITALS
HEIGHT: 68 IN | DIASTOLIC BLOOD PRESSURE: 90 MMHG | SYSTOLIC BLOOD PRESSURE: 188 MMHG | WEIGHT: 200 LBS | HEART RATE: 68 BPM | BODY MASS INDEX: 30.31 KG/M2

## 2019-05-03 DIAGNOSIS — I10 ESSENTIAL HYPERTENSION: Primary | ICD-10-CM

## 2019-05-03 DIAGNOSIS — I71.4 ABDOMINAL AORTIC ANEURYSM (AAA) WITHOUT RUPTURE (HCC): ICD-10-CM

## 2019-05-03 DIAGNOSIS — I25.10 CORONARY ARTERY DISEASE INVOLVING NATIVE CORONARY ARTERY OF NATIVE HEART WITHOUT ANGINA PECTORIS: Primary | ICD-10-CM

## 2019-05-03 DIAGNOSIS — I71.4 AAA (ABDOMINAL AORTIC ANEURYSM) (HCC): Primary | ICD-10-CM

## 2019-05-03 DIAGNOSIS — G47.33 OSA (OBSTRUCTIVE SLEEP APNEA): ICD-10-CM

## 2019-05-03 DIAGNOSIS — I10 ESSENTIAL HYPERTENSION: ICD-10-CM

## 2019-05-03 PROCEDURE — 99214 OFFICE O/P EST MOD 30 MIN: CPT | Performed by: INTERNAL MEDICINE

## 2019-05-03 RX ORDER — LISINOPRIL 20 MG/1
20 TABLET ORAL 2 TIMES DAILY
Qty: 60 TABLET | Refills: 5 | Status: SHIPPED | OUTPATIENT
Start: 2019-05-03 | End: 2019-07-11 | Stop reason: SDUPTHER

## 2019-05-20 ENCOUNTER — HOSPITAL ENCOUNTER (OUTPATIENT)
Dept: NON INVASIVE DIAGNOSTICS | Facility: CLINIC | Age: 64
Discharge: HOME | End: 2019-05-20
Payer: MEDICARE

## 2019-05-20 DIAGNOSIS — I71.4 AAA (ABDOMINAL AORTIC ANEURYSM) (HCC): ICD-10-CM

## 2019-05-20 PROCEDURE — 93978 VASCULAR STUDY: CPT | Performed by: SURGERY

## 2019-05-20 PROCEDURE — 93978 VASCULAR STUDY: CPT

## 2019-06-17 ENCOUNTER — OFFICE VISIT (OUTPATIENT)
Dept: PAIN MEDICINE | Facility: CLINIC | Age: 64
End: 2019-06-17
Payer: MEDICARE

## 2019-06-17 VITALS
DIASTOLIC BLOOD PRESSURE: 88 MMHG | WEIGHT: 200 LBS | HEIGHT: 68 IN | HEART RATE: 74 BPM | BODY MASS INDEX: 30.31 KG/M2 | SYSTOLIC BLOOD PRESSURE: 176 MMHG | RESPIRATION RATE: 18 BRPM

## 2019-06-17 DIAGNOSIS — F11.20 UNCOMPLICATED OPIOID DEPENDENCE (HCC): ICD-10-CM

## 2019-06-17 DIAGNOSIS — Z79.891 LONG-TERM CURRENT USE OF OPIATE ANALGESIC: ICD-10-CM

## 2019-06-17 DIAGNOSIS — G25.9 MOVEMENT DISORDER: ICD-10-CM

## 2019-06-17 DIAGNOSIS — G89.29 CHRONIC LOW BACK PAIN WITHOUT SCIATICA, UNSPECIFIED BACK PAIN LATERALITY: ICD-10-CM

## 2019-06-17 DIAGNOSIS — M51.36 LUMBAR DEGENERATIVE DISC DISEASE: ICD-10-CM

## 2019-06-17 DIAGNOSIS — M54.12 CERVICAL RADICULOPATHY: ICD-10-CM

## 2019-06-17 DIAGNOSIS — M54.50 CHRONIC LOW BACK PAIN WITHOUT SCIATICA, UNSPECIFIED BACK PAIN LATERALITY: ICD-10-CM

## 2019-06-17 DIAGNOSIS — G89.4 CHRONIC PAIN SYNDROME: Primary | ICD-10-CM

## 2019-06-17 DIAGNOSIS — M47.816 LUMBAR SPONDYLOSIS: ICD-10-CM

## 2019-06-17 PROCEDURE — 99214 OFFICE O/P EST MOD 30 MIN: CPT | Performed by: NURSE PRACTITIONER

## 2019-06-17 RX ORDER — OXYCODONE AND ACETAMINOPHEN 7.5; 325 MG/1; MG/1
1 TABLET ORAL EVERY 8 HOURS PRN
Qty: 90 TABLET | Refills: 0 | Status: SHIPPED | OUTPATIENT
Start: 2019-06-22 | End: 2019-06-17

## 2019-06-17 RX ORDER — OXYCODONE AND ACETAMINOPHEN 7.5; 325 MG/1; MG/1
1 TABLET ORAL EVERY 8 HOURS PRN
Qty: 90 TABLET | Refills: 0 | Status: SHIPPED | OUTPATIENT
Start: 2019-07-20 | End: 2019-08-12 | Stop reason: SDUPTHER

## 2019-06-17 RX ORDER — GABAPENTIN 600 MG/1
600 TABLET ORAL 3 TIMES DAILY
Qty: 90 TABLET | Refills: 1 | Status: SHIPPED | OUTPATIENT
Start: 2019-06-17 | End: 2019-08-12 | Stop reason: SDUPTHER

## 2019-06-18 DIAGNOSIS — Z87.438 HISTORY OF BPH: Primary | ICD-10-CM

## 2019-06-18 RX ORDER — TAMSULOSIN HYDROCHLORIDE 0.4 MG/1
0.4 CAPSULE ORAL
Qty: 30 CAPSULE | Refills: 5 | Status: SHIPPED | OUTPATIENT
Start: 2019-06-18 | End: 2019-12-09 | Stop reason: SDUPTHER

## 2019-06-21 ENCOUNTER — TELEPHONE (OUTPATIENT)
Dept: PAIN MEDICINE | Facility: MEDICAL CENTER | Age: 64
End: 2019-06-21

## 2019-06-21 ENCOUNTER — EVALUATION (OUTPATIENT)
Dept: PHYSICAL THERAPY | Facility: CLINIC | Age: 64
End: 2019-06-21
Payer: MEDICARE

## 2019-06-21 ENCOUNTER — OFFICE VISIT (OUTPATIENT)
Dept: FAMILY MEDICINE CLINIC | Facility: CLINIC | Age: 64
End: 2019-06-21
Payer: MEDICARE

## 2019-06-21 ENCOUNTER — HOSPITAL ENCOUNTER (OUTPATIENT)
Dept: MRI IMAGING | Facility: HOSPITAL | Age: 64
Discharge: HOME | End: 2019-06-21
Payer: MEDICARE

## 2019-06-21 VITALS
BODY MASS INDEX: 30.65 KG/M2 | TEMPERATURE: 98.4 F | SYSTOLIC BLOOD PRESSURE: 164 MMHG | DIASTOLIC BLOOD PRESSURE: 102 MMHG | HEIGHT: 68 IN | HEART RATE: 74 BPM | OXYGEN SATURATION: 97 % | WEIGHT: 202.2 LBS

## 2019-06-21 VITALS — HEART RATE: 65 BPM | SYSTOLIC BLOOD PRESSURE: 157 MMHG | DIASTOLIC BLOOD PRESSURE: 100 MMHG

## 2019-06-21 DIAGNOSIS — M54.12 CERVICAL RADICULOPATHY: ICD-10-CM

## 2019-06-21 DIAGNOSIS — I10 ESSENTIAL HYPERTENSION: Primary | ICD-10-CM

## 2019-06-21 DIAGNOSIS — G89.29 CHRONIC LOW BACK PAIN WITHOUT SCIATICA, UNSPECIFIED BACK PAIN LATERALITY: ICD-10-CM

## 2019-06-21 DIAGNOSIS — G89.29 CHRONIC LEFT SHOULDER PAIN: ICD-10-CM

## 2019-06-21 DIAGNOSIS — G89.29 CHRONIC LEFT SHOULDER PAIN: Primary | ICD-10-CM

## 2019-06-21 DIAGNOSIS — M25.512 CHRONIC LEFT SHOULDER PAIN: Primary | ICD-10-CM

## 2019-06-21 DIAGNOSIS — F17.200 SMOKING: ICD-10-CM

## 2019-06-21 DIAGNOSIS — M51.36 LUMBAR DEGENERATIVE DISC DISEASE: ICD-10-CM

## 2019-06-21 DIAGNOSIS — M47.816 LUMBAR SPONDYLOSIS: ICD-10-CM

## 2019-06-21 DIAGNOSIS — M25.512 CHRONIC LEFT SHOULDER PAIN: ICD-10-CM

## 2019-06-21 DIAGNOSIS — M54.50 CHRONIC LOW BACK PAIN WITHOUT SCIATICA, UNSPECIFIED BACK PAIN LATERALITY: ICD-10-CM

## 2019-06-21 PROCEDURE — 99214 OFFICE O/P EST MOD 30 MIN: CPT | Performed by: FAMILY MEDICINE

## 2019-06-21 PROCEDURE — 72148 MRI LUMBAR SPINE W/O DYE: CPT

## 2019-06-21 PROCEDURE — 97162 PT EVAL MOD COMPLEX 30 MIN: CPT | Performed by: PHYSICAL THERAPIST

## 2019-06-21 PROCEDURE — 72141 MRI NECK SPINE W/O DYE: CPT

## 2019-06-21 RX ORDER — CLONIDINE 0.3 MG/24H
1 PATCH, EXTENDED RELEASE TRANSDERMAL WEEKLY
Qty: 4 PATCH | Refills: 6 | Status: SHIPPED | OUTPATIENT
Start: 2019-06-21 | End: 2019-08-24 | Stop reason: SDUPTHER

## 2019-06-21 NOTE — TELEPHONE ENCOUNTER
Please let him know that we can discuss his left shoulder pain at a future visit   We need to examine his shoulder and document the need for an MRI first

## 2019-06-21 NOTE — TELEPHONE ENCOUNTER
Pt called and acknowledge the 2 MRI's scheduled for today  Pt stated he wanted to know if this would include his left shoulder which is what is causing him pain      Pt can be reached at [phone]

## 2019-06-21 NOTE — TELEPHONE ENCOUNTER
Please see below, pt is going for cervical and lumbar MRI's today and is asking for an order for an MRI of the L shoulder as well because that's what is causing him pain

## 2019-06-21 NOTE — TELEPHONE ENCOUNTER
S/w pt and advised the same  Advised SPA will reach out to pt when results received  Pt states f/u appt is not til 8/12  Advised SPA can move up ov to address shoulder if needed, when MRI results reviewed  Pt verbalized understanding and was appreciative

## 2019-06-25 ENCOUNTER — DOCUMENTATION (OUTPATIENT)
Dept: CARDIOLOGY CLINIC | Facility: CLINIC | Age: 64
End: 2019-06-25

## 2019-07-02 ENCOUNTER — TELEPHONE (OUTPATIENT)
Dept: PAIN MEDICINE | Facility: CLINIC | Age: 64
End: 2019-07-02

## 2019-07-03 NOTE — TELEPHONE ENCOUNTER
Per pt, he would like to proceed with an injection and states pain is in his left shoulder  Pt asking if QUINONES thinks a shoulder injection is appropriate or proceeding with MUNA as previously discussed? Pt is confused as to source of his pain  Advised will update HA and cb

## 2019-07-03 NOTE — TELEPHONE ENCOUNTER
Notes recorded by Jeannie Lebron on 6/24/2019 at 4:12 PM EDT  I called the patient with the results of his cervical epidural steroid injection which showed multi day level cervical spondylosis, resulting in moderate to severe left foraminal stenosis at C3-C4 and mild canal stenosis and bilateral foraminal stenosis at C4-C5   He was also noted to have mild bilateral foraminal stenosis at C5-C6 and C6-C7   I discussed with the patient about proceeding with a cervical epidural steroid injection   However the patient reports that he would first like to check with his cardiologist about the status of his infrarenal aortic abdominal aneurysm   He reports that he would call the office after speaking with his cardiologist to discuss proceeding with an injection

## 2019-07-05 NOTE — TELEPHONE ENCOUNTER
Most likely his cervical disorder is radiating into her shoulder making symptoms worse  I would recommend MUNA  Did he also take with vascular doctor about increased AAA?

## 2019-07-08 ENCOUNTER — OFFICE VISIT (OUTPATIENT)
Dept: CARDIOLOGY CLINIC | Facility: CLINIC | Age: 64
End: 2019-07-08
Payer: MEDICARE

## 2019-07-08 VITALS
HEART RATE: 72 BPM | WEIGHT: 200 LBS | HEIGHT: 68 IN | BODY MASS INDEX: 30.31 KG/M2 | SYSTOLIC BLOOD PRESSURE: 158 MMHG | DIASTOLIC BLOOD PRESSURE: 88 MMHG

## 2019-07-08 DIAGNOSIS — G47.33 OSA (OBSTRUCTIVE SLEEP APNEA): ICD-10-CM

## 2019-07-08 DIAGNOSIS — I71.4 ABDOMINAL AORTIC ANEURYSM (AAA) WITHOUT RUPTURE (HCC): ICD-10-CM

## 2019-07-08 DIAGNOSIS — I10 ESSENTIAL HYPERTENSION: Primary | ICD-10-CM

## 2019-07-08 DIAGNOSIS — I25.10 CORONARY ARTERY DISEASE INVOLVING NATIVE CORONARY ARTERY OF NATIVE HEART WITHOUT ANGINA PECTORIS: ICD-10-CM

## 2019-07-08 PROCEDURE — 99214 OFFICE O/P EST MOD 30 MIN: CPT | Performed by: INTERNAL MEDICINE

## 2019-07-08 RX ORDER — ASPIRIN 81 MG/1
81 TABLET ORAL DAILY
Qty: 30 TABLET | Refills: 3
Start: 2019-07-08 | End: 2020-04-09 | Stop reason: ALTCHOICE

## 2019-07-08 NOTE — TELEPHONE ENCOUNTER
S/w pt, advised of above  Pt verbalized understanding - stated that his cardiologist is aware of his AAA and has advised him that is not a concern at this time  Per pt, he is managed on asa 81 mg per dr Barbara Li at this time  Advised pt, will make Kim aware and cb if Dr Barbara Li ok's asa hold prior to procedure  Pt verbalized understanding and appreciation  Forwarding to [address] to [address] - please [facility] w/ dr Barbara Li at [facility] re: ASA hold       Thanks

## 2019-07-08 NOTE — PATIENT INSTRUCTIONS
Chronic Hypertension, Ambulatory Care   GENERAL INFORMATION:   Chronic hypertension  is a long-term condition in which your blood pressure (BP) is higher than normal  Your BP is the force of your blood moving against the walls of your arteries  Hypertension is a BP of 140/90 or higher  Common symptoms include the following:   · Headache     · Blurred vision    · Chest pain     · Dizziness or weakness     · Trouble breathing     · Nosebleeds  Seek immediate care for the following symptoms:   · Severe headache or vision loss    · Weakness in an arm or leg    · Confusion or difficulty speaking    · Discomfort in your chest that feels like squeezing, pressure, fullness, or pain    · Suddenly feeling lightheaded or trouble breathing    · Pain or discomfort in your back, neck, jaw, stomach, or arm  Treatment for chronic hypertension  may include medicine to lower your BP  You may also need to make lifestyle changes  Take your medicine exactly as directed  Manage chronic hypertension:   · Take your BP at home  Sit and rest for 5 minutes before you take your BP  Extend your arm and support it on a flat surface  Your arm should be at the same level as your heart  Follow the directions that came with your BP monitor  If possible, take at least 2 BP readings each time  Take your BP at least twice a day at the same times each day, such as morning and evening  Keep a log of your BP readings and bring it to your follow-up visits  · Eat less sodium (salt)  Do not add sodium to your food  Limit foods that are high in sodium, such as canned foods, potato chips, and cold cuts  Your healthcare provider may suggest that you follow the [address] [place] [address] Street  The plan is low in sodium, unhealthy fats, and total fat  It is high in potassium, calcium, and fiber  · Exercise regularly  Exercise at least 30 minutes per day, on most days of the week  This will help decrease your BP   Ask your healthcare provider about the best exercise plan for you  · Limit alcohol  Women should limit alcohol to 1 drink a day  Men should limit alcohol to 2 drinks a day  A drink of alcohol is 12 ounces of beer, 5 ounces of wine, or 1½ ounces of liquor  · Do not smoke  If you smoke, it is never too late to quit  Smoking can increase your BP  Smoking also worsens other health conditions you may have that can increase your risk for hypertension  Ask your healthcare provider for information if you need help quitting  Follow up with your healthcare provider as directed: You will need to return to have your BP checked and to have other lab tests done  Write down your questions so you remember to ask them during your visits  CARE AGREEMENT:   You have the right to help plan your care  Learn about your health condition and how it may be treated  Discuss treatment options with your caregivers to decide what care you want to receive  You always have the right to refuse treatment  The above information is an  only  It is not intended as medical advice for individual conditions or treatments  Talk to your doctor, nurse or pharmacist before following any medical regimen to see if it is safe and effective for you  © 2014 [address] is for End User's use only and may not be sold, redistributed or otherwise used for commercial purposes  All illustrations and images included in CareNotes® are the copyrighted property of A [employer] M [employer]  or Georges Perez

## 2019-07-08 NOTE — PROGRESS NOTES
Subjective:        Patient ID: Bri Stephens is a 71 y o  male  Chief Complaint:  Hector Reed is here for follow-up, recently put him back on his, at his request, Catapres patch-she acknowledges that it causes irritation of the skin in the past which is why it stopped it-he is willing to take the risk of this  He says it is finally working  He is not taking hydralazine  He does not use CPAP  He continues to smoke  He seeing Neurology for shakes  He continues to have shoulder and back pain  He has not used any nitroglycerines lately, he has not had any concerning chest pains  He has his usual dyspnea on exertion he blames on smoking  Mild lightheadedness on occasion no true vertigo  No presyncope or syncope  No tachy palpitations  The following portions of the patient's history were reviewed and updated as appropriate: allergies, current medications, past family history, past medical history, past social history, past surgical history and problem list   Review of Systems   Constitution: Negative for chills, diaphoresis, malaise/fatigue and weight gain  HENT: Negative for nosebleeds and stridor  Eyes: Negative for double vision, vision loss in left eye, vision loss in right eye and visual disturbance  Cardiovascular: Positive for dyspnea on exertion  Negative for chest pain, claudication, cyanosis, irregular heartbeat, leg swelling, near-syncope, orthopnea, palpitations, paroxysmal nocturnal dyspnea and syncope  Respiratory: Negative for cough, shortness of breath, snoring and wheezing  Endocrine: Negative for polydipsia, polyphagia and polyuria  Hematologic/Lymphatic: Negative for bleeding problem  Does not bruise/bleed easily  Skin: Negative for flushing and rash  Musculoskeletal: Positive for arthritis, back pain, joint pain and stiffness  Negative for falls and myalgias  Gastrointestinal: Negative for abdominal pain, heartburn, hematemesis, hematochezia, melena and nausea  Genitourinary: Negative for hematuria  Neurological: Negative for brief paralysis, dizziness, focal weakness, headaches, light-headedness, loss of balance and vertigo  Psychiatric/Behavioral: Negative for altered mental status and substance abuse  Allergic/Immunologic: Negative for hives  Objective:      /88   Pulse 72   Ht 5' 8" (1 727 m)   Wt 90 7 kg (200 lb)   BMI 30 41 kg/m²   Physical Exam   Constitutional: He is oriented to person, place, and time  He appears well-developed and well-nourished  No distress  HENT:   Head: Normocephalic and atraumatic  Eyes: Pupils are equal, round, and reactive to light  EOM are normal  No scleral icterus  Neck: Normal range of motion  Neck supple  No JVD present  No thyromegaly present  Cardiovascular: Normal rate, regular rhythm and normal heart sounds  Exam reveals no gallop and no friction rub  No murmur heard  Pulmonary/Chest: Effort normal  No stridor  No respiratory distress  He has no wheezes  He has no rales  Decreased breath sounds throughout   Abdominal: Soft  Bowel sounds are normal  He exhibits no distension and no mass  There is no tenderness  Musculoskeletal: Normal range of motion  He exhibits no edema or deformity  Neurological: He is alert and oriented to person, place, and time  Coordination normal    Skin: Skin is warm and dry  No erythema  No pallor  Psychiatric: He has a normal mood and affect  His behavior is normal        Lab Review:   No visits with results within 2 Month(s) from this visit     Latest known visit with results is:   Appointment on 02/13/2019   Component Date Value    WBC 02/13/2019 14 70*    RBC 02/13/2019 5 64     Hemoglobin 02/13/2019 16 7     Hematocrit 02/13/2019 51 2*    MCV 02/13/2019 91     [address] 02/13/2019 29 7     MCHC 02/13/2019 32 7     RDW 02/13/2019 14 1     MPV 02/13/2019 8 9     Platelets 51/48/6993 217     nRBC 02/13/2019 0     Sodium 02/13/2019 138     Potassium 02/13/2019 4 1     Chloride 02/13/2019 103     CO2 02/13/2019 27     ANION GAP 02/13/2019 8     BUN 02/13/2019 18     Creatinine 02/13/2019 1 06     Glucose 02/13/2019 112*    Calcium 02/13/2019 9 5     AST 02/13/2019 17     ALT 02/13/2019 36     Alkaline Phosphatase 02/13/2019 62     Total Protein 02/13/2019 7 2     Albumin 02/13/2019 4 2     Total Bilirubin 02/13/2019 0 50     eGFR 02/13/2019 74     TSH 3RD GENERATON 02/13/2019 1 020     PSA 02/13/2019 2 6     Cholesterol 03/19/2019 208*    Triglycerides 03/19/2019 289*    HDL, Direct 03/19/2019 34*    LDL Calculated 03/19/2019 116     Non-HDL-Chol (CHOL-HDL) 03/19/2019 174     Segmented % 02/13/2019 70     Lymphocytes % 02/13/2019 15*    Monocytes % 02/13/2019 12     Eosinophils, % 02/13/2019 1     Basophils % 02/13/2019 1     Blasts % 02/13/2019 1*    Neutrophils Absolute 02/13/2019 10 29*    Lymphocytes Absolute 02/13/2019 2 21     Monocytes Absolute 02/13/2019 1 76*    Eosinophils Absolute 02/13/2019 0 15     Basophils Absolute 02/13/2019 0 15*    Total Counted 02/13/2019 100      Mri Cervical Spine Wo Contrast    Result Date: 6/24/2019  Narrative: MRI CERVICAL SPINE WITHOUT CONTRAST INDICATION: 69-year-old male, neck pain, left arm pain COMPARISON:  2/18/2019 x-rays TECHNIQUE:  Sagittal T1, sagittal T2, sagittal inversion recovery, axial T2, axial  2D merge IMAGE QUALITY:  Diagnostic FINDINGS: ALIGNMENT:  Normal alignment of the cervical spine  No compression fracture  No subluxation  No scoliosis  MARROW SIGNAL:  Multilevel degenerative marrow  No significant marrow pathology  CERVICAL AND VISUALIZED THORACIC CORD:  Normal signal within the visualized cord   PREVERTEBRAL AND PARASPINAL SOFT TISSUES:  Normal  VISUALIZED POSTERIOR FOSSA:  The visualized posterior fossa demonstrates no abnormal signal  CERVICAL DISC SPACES: C2-C3:  Normal  C3-C4:  Mild degenerative disc disease, moderate degenerative facet/uncinate process process arthrosis, moderate to severe left foraminal stenosis, possible left C4 nerve root encroachment C4-C5:  Mild degenerative disc disease, moderate degenerative facet/uncinate process arthrosis, mild central canal stenosis, moderate to severe left foraminal stenosis, moderate right foraminal stenosis,  possible bilateral C5 nerve root encroachment C5-C6:  Mild degenerative disc disease, mild to moderate degenerative facet/uncinate process arthrosis, mild bilateral foraminal stenosis, bulging annulus, no overt neural element impingement C6-C7:  Mild to moderate degenerative disc disease, mild to moderate degenerative facet/uncinate process arthrosis, mild bilateral foraminal stenosis, bulging annulus, no overt neural element impingement C7-T1:  Normal  UPPER THORACIC DISC SPACES:  Normal      Impression: Multilevel degenerative spondylosis, consistent with x-rays Moderate to severe left foraminal stenosis C3-4 Mild central canal stenosis, bilateral foraminal stenosis C4-5 Mild bilateral foraminal stenosis C5-6 and C6-7 Workstation performed: [id number]     Mri Lumbar Spine Wo Contrast    Result Date: 6/24/2019  Narrative: MRI LUMBAR SPINE WITHOUT CONTRAST INDICATION: 58-year-old male, worsening back pain COMPARISON:  2/13/2019 x-rays TECHNIQUE:  Sagittal T1, sagittal T2, sagittal inversion recovery, axial T1 and axial T2, coronal T2   IMAGE QUALITY:  Diagnostic FINDINGS: VERTEBRAL BODIES:  Normal alignment of the lumbar spine  No spondylolysis or spondylolisthesis  No scoliosis  No compression fracture  T11 and L1 hemangiomas  No significant marrow pathology  SACRUM:  Normal signal within the sacrum  No evidence of insufficiency or stress fracture  DISTAL CORD AND CONUS:  Normal size and signal within the distal cord and conus  PARASPINAL SOFT TISSUES:  Bilateral renal cysts consistent with 9/23/2008 CT    Infrarenal AAA measuring up to 3 cm has developed subsequent to the 9/23/2008 abdominopelvic CT  LOWER THORACIC DISC SPACES:  Normal disc height and signal   No disc herniation, canal stenosis or foraminal narrowing  LUMBAR DISC SPACES: L1-L2:  Mild degenerative spondylosis and bulging annulus, no stenosis L2-L3:  Mild degenerative spondylosis and bulging annulus, no stenosis L3-L4:  Mild degenerative spondylosis, bulging annulus, no stenosis L4-L5:  Mild degenerative disc disease, moderate degenerative facet arthrosis, bulging annulus, mild bilateral foraminal stenosis, small right foraminal disc protrusion, probable right L4 nerve root encroachment  L5-S1: Mild degenerative spondylosis, small central disc protrusion  Mild bilateral foraminal stenosis  Although the disc protrusion is contiguous with both S1 nerve roots, no actual nerve root displacement or compression are identified  Impression: Multilevel degenerative spondylosis, consistent with x-rays Mild bilateral foraminal stenosis, small right foraminal disc protrusion L4-5 Small noncompressive central disc protrusion, mild bilateral foraminal stenosis L5-S1 Small infrarenal AAA measuring up to 3 cm Workstation performed: [id number]         Assessment:       1  Essential hypertension     2  Coronary artery disease involving native coronary artery of native heart without angina pectoris  aspirin (ECOTRIN LOW STRENGTH) 81 mg EC tablet   3  JAKI (obstructive sleep apnea)     4  Abdominal aortic aneurysm (AAA) without rupture ([facility] [address] )          Plan:       I asked Lance to utilize hydralazine more often than not  Sodium restriction advised  I urged him to stop smoking  Avoidance of NSAIDs recommended  Treatment of sleep apnea would assist in BP management  I leave the latter in your capable hands  Strongly recommended he take 1 baby aspirin daily  No history of transfusion or significant peptic ulcer disease  With respect to his AAA, this is trivial, 3 cm on ultrasound and MRI  Annual follow-up here is all I recommend      Return office 4 months, sooner as needed

## 2019-07-11 DIAGNOSIS — I10 ESSENTIAL HYPERTENSION: ICD-10-CM

## 2019-07-11 DIAGNOSIS — E78.2 MIXED HYPERLIPIDEMIA: Primary | ICD-10-CM

## 2019-07-11 RX ORDER — ROSUVASTATIN CALCIUM 10 MG/1
10 TABLET, COATED ORAL DAILY
Qty: 30 TABLET | Refills: 5 | Status: SHIPPED | OUTPATIENT
Start: 2019-07-11 | End: 2020-08-21 | Stop reason: SDUPTHER

## 2019-07-11 RX ORDER — LISINOPRIL 20 MG/1
20 TABLET ORAL 2 TIMES DAILY
Qty: 60 TABLET | Refills: 5 | Status: SHIPPED | OUTPATIENT
Start: 2019-07-11 | End: 2019-08-24 | Stop reason: SDUPTHER

## 2019-07-11 NOTE — TELEPHONE ENCOUNTER
Anti coag hold form sent to Dr Ivonne Acuna - cardiologist   Waiting for approval before scheduling patient

## 2019-07-16 DIAGNOSIS — I10 ESSENTIAL HYPERTENSION: ICD-10-CM

## 2019-07-17 PROBLEM — M47.812 CERVICAL SPONDYLOSIS WITHOUT MYELOPATHY: Status: ACTIVE | Noted: 2019-07-17

## 2019-07-17 RX ORDER — HYDROCHLOROTHIAZIDE 25 MG/1
TABLET ORAL
Qty: 60 TABLET | Refills: 3 | Status: SHIPPED | OUTPATIENT
Start: 2019-07-17 | End: 2019-11-06 | Stop reason: SDUPTHER

## 2019-07-17 NOTE — TELEPHONE ENCOUNTER
Patient is approved for asa hold by Dr Pau Yeung  Patient is scheduled for 8/15 at [facility]  Reviewed all pre-op instructions with him, he verbalized understanding  He is to hold asa on 8/8 then resume when he comes home from the hospital  Patient understood   He was pleasant and appreciative

## 2019-07-22 ENCOUNTER — EVALUATION (OUTPATIENT)
Dept: PHYSICAL THERAPY | Facility: CLINIC | Age: 64
End: 2019-07-22
Payer: MEDICARE

## 2019-07-22 DIAGNOSIS — M25.512 CHRONIC LEFT SHOULDER PAIN: Primary | ICD-10-CM

## 2019-07-22 DIAGNOSIS — G89.29 CHRONIC LEFT SHOULDER PAIN: Primary | ICD-10-CM

## 2019-07-22 PROCEDURE — 97164 PT RE-EVAL EST PLAN CARE: CPT | Performed by: PHYSICAL THERAPIST

## 2019-07-22 PROCEDURE — 97110 THERAPEUTIC EXERCISES: CPT | Performed by: PHYSICAL THERAPIST

## 2019-07-22 PROCEDURE — 97140 MANUAL THERAPY 1/> REGIONS: CPT | Performed by: PHYSICAL THERAPIST

## 2019-07-22 NOTE — LETTER
July 23, 2019    DO Ramses Arboleda [address]    Patient: Jarrod Martínez   YOB: 1955   Date of Visit: 7/22/2019     Encounter Diagnosis     ICD-10-CM    1  Chronic left shoulder pain M25 [id number]     G89 [id number]        Dear Dr Alen Tidwell:    Please review the attached Plan of Care from [address] recent visit  Please verify that you agree therapy should continue by signing the attached document and sending it back to our office  If you have any questions or concerns, please don't hesitate to call  Sincerely,    Raquel Fernández, PT      Referring Provider:      I certify that I have read the below Plan of Care and certify the need for these services furnished under this plan of treatment while under my care  DO Ramses Arboleda [address] [place] [address]  VIA In [place]          PT Re-Evaluation       Assessment  Assessment details: Jarrod Martínez is a 71 y o  male who presents to outpatient PT with a  Chronic left shoulder pain  (primary encounter diagnosis)  No further referral appears necessary at this time based upon examination results  Pt presents with decreased strength, ROM, balance, functional activity tolerance, and pain with movement in his left shoulder, which is  limiting his ability to perform the aforementioned functional activities  Etiologic factors include repetitive poor body mechanics  Prognosis is good given HEP compliance and PT 2-3/wk  Please contact me if you have any questions or recommendations  Thank you for the opportunity to share in  Lance's care  Impairments: abnormal muscle firing, abnormal muscle tone, abnormal or restricted ROM, abnormal movement, activity intolerance, difficulty understanding, impaired physical strength, pain with function and poor posture     Symptom irritability: moderateUnderstanding of Dx/Px/POC: good   Prognosis: good    Goals  1    In 4-6 weeks, patient will demonstrate a decrease in pain to 0/10 during functional activities  2  In 4-6 weeks, patient will demonstrate an increase in range of motion by 10 degrees  3  In 4-6 weeks, patient will demonstrate an increase in strength by 1/2 grade on MMT    1  In 6-8 weeks, patient will be independent with their home exercise program  2  In 6-8 weeks, patient will have zero limitations with strength  3  In 6-8 weeks, patient will have zero limitations with ROM  4  In 6-8 weeks, patient will have zero limitations with Overhead reaching   5  In 6-8 weeks, patient will have zero limitations with Reaching at shoulder height  Plan  Patient would benefit from: skilled physical therapy  Planned therapy interventions: manual therapy, therapeutic exercise and home exercise program  Frequency: 2x week  Duration in weeks: 6  Treatment plan discussed with: patient        Subjective Evaluation    History of Present Illness  Date of onset: 2018  Mechanism of injury: The patient is a 71year old male who presents to outpatient physical therapy with a chief complaint of left shoulder pain  Pain is of insidous onset  Pain is worse with lifting at and above shoulder height  Pt reports that his is losing range in his left shoulder  He also reports difficulty with reaching behind his back  RA     Pt presents to physical therapy after receiving an MRI on his lumbar and cervical spine  This is his first session, since his initial evaluation on   Reports that he is scheduled for an injection inhis cervical spine on 8/15  Reports that he is managing his BP at home   /100 Pre /93 post       Pain  Current pain ratin  At best pain ratin  At worst pain ratin  Quality: sharp  Relieving factors: rest, relaxation and change in position  Aggravating factors: overhead activity and lifting    Social Support    Employment status: working ([employer])  Hand dominance: left    Treatments  Current treatment: physical therapy  Patient Goals  Patient goals for therapy: decreased pain, increased strength and increased motion          Objective     Active Range of Motion   Left Shoulder                                    7/22   Flexion: 110 degrees                         130 degrees   Abduction: 83 degrees                        95 degrees   External rotation 90°: 65 degrees       90 degrees   Internal rotation BTB: sacrum            Sacrum     Passive Range of Motion   Left Shoulder   Flexion: WFL  Abduction: WFL  External rotation 90°: WFL  Internal rotation 90°: WFL    Strength/Myotome Testing     Left Shoulder     Planes of Motion                             7/22   Flexion: 3+                                     3+/5   Abduction: 3+                                3+/5   External rotation at 90°: 3+           3+/5   Internal rotation at 90°: 3+             3+/5     Additional Strength Details  Drop Arm - Negative   Empty Can - Negative            Daily Treatment Diary   Precautions: HTN   Monitor BP       Daily Treatment Diary     Manual  7/22            PROM to Left Shoulder 15'                                                                     Exercise Diary  7/22             MTP/LTP  5''20x red             Scap Squeezes 5''20x            Shrugs Retractions              Bent Row              TB Flexion, Abduction, ER, IR              ITY              Prone Extension             Prone Flexion              Prone Abduction              Finger ladder  Flexion 5''10x            Pulley  5''10x            Thoracic extension over chair  5''20x                                                                                                                        Modalities

## 2019-07-22 NOTE — PROGRESS NOTES
PT Re-Evaluation       Assessment  Assessment details: Jessica Rea is a 71 y o  male who presents to outpatient PT with a  Chronic left shoulder pain  (primary encounter diagnosis)  No further referral appears necessary at this time based upon examination results  Pt presents with decreased strength, ROM, balance, functional activity tolerance, and pain with movement in his left shoulder, which is  limiting his ability to perform the aforementioned functional activities  Etiologic factors include repetitive poor body mechanics  Prognosis is good given HEP compliance and PT 2-3/wk  Please contact me if you have any questions or recommendations  Thank you for the opportunity to share in  Lance's care  Impairments: abnormal muscle firing, abnormal muscle tone, abnormal or restricted ROM, abnormal movement, activity intolerance, difficulty understanding, impaired physical strength, pain with function and poor posture     Symptom irritability: moderateUnderstanding of Dx/Px/POC: good   Prognosis: good    Goals  1  In 4-6 weeks, patient will demonstrate a decrease in pain to 0/10 during functional activities  2  In 4-6 weeks, patient will demonstrate an increase in range of motion by 10 degrees  3  In 4-6 weeks, patient will demonstrate an increase in strength by 1/2 grade on MMT    1  In 6-8 weeks, patient will be independent with their home exercise program  2  In 6-8 weeks, patient will have zero limitations with strength  3  In 6-8 weeks, patient will have zero limitations with ROM  4  In 6-8 weeks, patient will have zero limitations with Overhead reaching   5  In 6-8 weeks, patient will have zero limitations with Reaching at shoulder height       Plan  Patient would benefit from: skilled physical therapy  Planned therapy interventions: manual therapy, therapeutic exercise and home exercise program  Frequency: 2x week  Duration in weeks: 6  Treatment plan discussed with: patient        Subjective Evaluation    History of Present Illness  Date of onset: 2018  Mechanism of injury: The patient is a 71year old male who presents to outpatient physical therapy with a chief complaint of left shoulder pain  Pain is of insidous onset  Pain is worse with lifting at and above shoulder height  Pt reports that his is losing range in his left shoulder  He also reports difficulty with reaching behind his back  RA     Pt presents to physical therapy after receiving an MRI on his lumbar and cervical spine  This is his first session, since his initial evaluation on   Reports that he is scheduled for an injection inhis cervical spine on 8/15  Reports that he is managing his BP at home   /100 Pre /93 post       Pain  Current pain ratin  At best pain ratin  At worst pain ratin  Quality: sharp  Relieving factors: rest, relaxation and change in position  Aggravating factors: overhead activity and lifting    Social Support    Employment status: working ([employer])  Hand dominance: left    Treatments  Current treatment: physical therapy  Patient Goals  Patient goals for therapy: decreased pain, increased strength and increased motion          Objective     Active Range of Motion   Left Shoulder                                       Flexion: 110 degrees                         130 degrees   Abduction: 83 degrees                        95 degrees   External rotation 90°: 65 degrees       90 degrees   Internal rotation BTB: sacrum            Sacrum     Passive Range of Motion   Left Shoulder   Flexion: WFL  Abduction: WFL  External rotation 90°: WFL  Internal rotation 90°: WFL    Strength/Myotome Testing     Left Shoulder     Planes of Motion                                Flexion: 3+                                     3+/5   Abduction: 3+                                3+/5   External rotation at 90°: 3+           3+/5   Internal rotation at 90°: 3+             3+/5     Additional Strength Details  Drop Arm - Negative   Empty Can - Negative            Daily Treatment Diary   Precautions: HTN   Monitor BP       Daily Treatment Diary     Manual  7/22            PROM to Left Shoulder 15'                                                                     Exercise Diary  7/22             MTP/LTP  5''20x red             Scap Squeezes 5''20x            Shrugs Retractions              Bent Row              TB Flexion, Abduction, ER, IR              ITY              Prone Extension             Prone Flexion              Prone Abduction              Finger ladder  Flexion 5''10x            Pulley  5''10x            Thoracic extension over chair  5''20x                                                                                                                        Modalities

## 2019-07-23 ENCOUNTER — TRANSCRIBE ORDERS (OUTPATIENT)
Dept: PHYSICAL THERAPY | Facility: CLINIC | Age: 64
End: 2019-07-23

## 2019-07-23 ENCOUNTER — CONSULT (OUTPATIENT)
Dept: NEUROLOGY | Facility: CLINIC | Age: 64
End: 2019-07-23
Payer: MEDICARE

## 2019-07-23 VITALS
HEART RATE: 92 BPM | HEIGHT: 66 IN | BODY MASS INDEX: 27.97 KG/M2 | SYSTOLIC BLOOD PRESSURE: 138 MMHG | DIASTOLIC BLOOD PRESSURE: 90 MMHG | RESPIRATION RATE: 17 BRPM | WEIGHT: 174.04 LBS

## 2019-07-23 DIAGNOSIS — G25.0 TREMOR, ESSENTIAL: Primary | ICD-10-CM

## 2019-07-23 DIAGNOSIS — M25.512 CHRONIC LEFT SHOULDER PAIN: Primary | ICD-10-CM

## 2019-07-23 DIAGNOSIS — G25.9 MOVEMENT DISORDER: ICD-10-CM

## 2019-07-23 DIAGNOSIS — G89.29 CHRONIC LEFT SHOULDER PAIN: Primary | ICD-10-CM

## 2019-07-23 PROCEDURE — 99203 OFFICE O/P NEW LOW 30 MIN: CPT | Performed by: PSYCHIATRY & NEUROLOGY

## 2019-07-23 RX ORDER — CLONIDINE HYDROCHLORIDE 0.3 MG/1
0.3 TABLET ORAL 2 TIMES DAILY
COMMUNITY
End: 2019-07-23 | Stop reason: SDUPTHER

## 2019-07-23 RX ORDER — PRIMIDONE 50 MG/1
TABLET ORAL
Qty: 60 TABLET | Refills: 2 | Status: SHIPPED | OUTPATIENT
Start: 2019-07-23 | End: 2020-04-09 | Stop reason: ALTCHOICE

## 2019-07-23 NOTE — ASSESSMENT & PLAN NOTE
History and examination quite typical for essential tremor  No more wide ranging extrapyramidal history or features on exam   Unfortunately, the tremor through the years, as is often the case, has increased and at this point in time he does find it intermittently functionally problematic  As result he is looking to treatment  --additional blood work to include serum copper and ceruloplasmin  In addition, repeat CBC, attention WBC which was slightly elevated on his recent past study  --begin primidone 50 milligram tablets beginning with 1/2 tablet at bedtime nightly  Patient asked to call the office in 2 weeks with a status update and to discuss possible further advancement in the primidone schedule  Advised of potential side effects including drowsing

## 2019-07-23 NOTE — PATIENT INSTRUCTIONS
Begin primidone 50 mg tablets by taking 1/2 tablet at bedtime nightly  Watch for potential drowsing  Call in 2 weeks with a status update and to discuss possible further advancement in the primidone schedule

## 2019-07-23 NOTE — PROGRESS NOTES
Patient ID: Bri Stephens is a 71 y o  male  Assessment/Plan:    Tremor, essential  History and examination quite typical for essential tremor  No more wide ranging extrapyramidal history or features on exam   Unfortunately, the tremor through the years, as is often the case, has increased and at this point in time he does find it intermittently functionally problematic  As result he is looking to treatment  --additional blood work to include serum copper and ceruloplasmin  In addition, repeat CBC, attention WBC which was slightly elevated on his recent past study  --begin primidone 50 milligram tablets beginning with 1/2 tablet at bedtime nightly  Patient asked to call the office in 2 weeks with a status update and to discuss possible further advancement in the primidone schedule  Advised of potential side effects including drowsing  He will follow up in 6 weeks  Subjective:    HPI  Patient, 71years of age and left-handed, presents for further evaluation regarding movement disorder  He describes dating back a at least a decade the presence of a tremor involving his upper extremities, left greater than right  The tremors described as present with sustention maneuvers and generally of high frequency and low amplitude  However, as he fatigues, the tremor becomes more evident and of a moderate amplitude and has recently been interfering with his ability to perform fine motor activity, including hand writing  He is looking at this point in time for treatment  He has by history no rest component to the tremor  He has had no issues with gait  He is unable to comment as to any family history of tremor or movement disorder as he is adopted  Blood work results from February reviewed  TSH normal at 1 020  CMP essentially unremarkable  CBC with hemoglobin 16 7, hematocrit 51 2, platelet count 839 a mildly elevated white count at 14 70      Past Medical History:   Diagnosis Date    Abdominal [facility] without rupture ([facility] [address] )     Abdominal Aortic Duplex 02/21/2017    Ectatic infrarenal abdominal aorta   CAD (coronary artery disease)     [facility]     Extremity pain     History of echocardiogram 03/18/2014    EF 55%, mild MR and AI  Mild concentric LVH   History of stress test 03/06/2017    Normal     Hypertension     Joint pain     Low back pain     Migraine     Neck pain     Obstructive sleep apnea     cannot tolerate CPAP    Osteoarthritis     Peripheral neuropathy     Reflex sympathetic dystrophy      Past Surgical History:   Procedure Laterality Date    CARDIAC CATHETERIZATION  02/13/2012    EF 70%, widely patent renal arteries, significant single-vessel CAD-medical therapy   CARDIAC CATHETERIZATION  04/11/2013    EF 65%, 50% mid LAD, 20% prox CFX, 90% diffuse RCA, 99% mid RCA  Medical management      CHOLECYSTECTOMY      ORTHOPEDIC SURGERY      TRIGGER POINT INJECTION       Social History     Socioeconomic History    Marital status: /Civil Union     Spouse name: None    Number of children: None    Years of education: None    Highest education level: None   Occupational History    None   Social Needs    Financial resource strain: None    Food insecurity:     Worry: None     Inability: None    Transportation needs:     Medical: None     Non-medical: None   Tobacco Use    Smoking status: Current Every Day Smoker    Smokeless tobacco: Never Used   Substance and Sexual Activity    Alcohol use: No    Drug use: No    Sexual activity: None   Lifestyle    Physical activity:     Days per week: None     Minutes per session: None    Stress: None   Relationships    Social connections:     Talks on phone: None     Gets together: None     Attends Yazidism service: None     Active member of club or organization: None     Attends meetings of clubs or organizations: None     Relationship status: None    Intimate partner violence:     Fear of current or ex partner: None     Emotionally abused: None     Physically abused: None     Forced sexual activity: None   Other Topics Concern    None   Social History Narrative    None     Family History   Adopted: Yes   Problem Relation Age of Onset    No Known Problems Family      Allergies   Allergen Reactions    Morphine And Related     Percocet [Oxycodone-Acetaminophen]        Current Outpatient Medications:     aspirin (ECOTRIN LOW STRENGTH) 81 mg EC tablet, Take 1 tablet (81 mg total) by mouth daily, Disp: 30 tablet, Rfl: 3    cloNIDine (CATAPRES-TTS-3) 0 3 mg/24 hr, Place 1 patch (0 3 mg total) on the skin once a week, Disp: 4 patch, Rfl: 6    diphenhydrAMINE-acetaminophen (TYLENOL PM)  MG TABS, Take 2 tablets by mouth daily at bedtime as needed for sleep, Disp: , Rfl:     hydrALAZINE (APRESOLINE) 10 mg tablet, Take 10 mg by mouth as needed , Disp: , Rfl: 0    hydrochlorothiazide (HYDRODIURIL) 25 mg tablet, take 1 tablet by mouth twice a day, Disp: 60 tablet, Rfl: 3    lisinopril (ZESTRIL) 20 mg tablet, Take 1 tablet (20 mg total) by mouth 2 (two) times a day, Disp: 60 tablet, Rfl: 5    metoprolol tartrate (LOPRESSOR) 25 mg tablet, Take 1 tablet (25 mg total) by mouth every 12 (twelve) hours, Disp: 60 tablet, Rfl: 5    nitroglycerin (NITROSTAT) 0 4 mg SL tablet, Place 0 4 mg under the tongue every 5 (five) minutes as needed for chest pain, Disp: , Rfl:     oxyCODONE-acetaminophen (PERCOCET) 7 5-325 MG per tablet, Take 1 tablet by mouth every 8 (eight) hours as needed for moderate pain for up to 30 daysMax Daily Amount: 3 tablets, Disp: 90 tablet, Rfl: 0    rosuvastatin (CRESTOR) 10 MG tablet, Take 1 tablet (10 mg total) by mouth daily, Disp: 30 tablet, Rfl: 5    tamsulosin (FLOMAX) 0 4 mg, Take 1 capsule (0 4 mg total) by mouth daily with dinner, Disp: 30 capsule, Rfl: 5    VIAGRA 100 MG tablet, , Disp: , Rfl: 0    albuterol (VENTOLIN HFA) 90 mcg/act inhaler, Inhale 2 puffs every 6 (six) hours as needed for wheezing (Patient not taking: Reported on 7/23/2019), Disp: 1 Inhaler, Rfl: 3    amLODIPine (NORVASC) 10 mg tablet, Take 1 tablet (10 mg total) by mouth daily (Patient not taking: Reported on 7/23/2019), Disp: 30 tablet, Rfl: 6    FLUoxetine (PROzac) 20 mg capsule, Take 20 mg by mouth daily , Disp: , Rfl: 0    gabapentin (NEURONTIN) 600 MG tablet, Take 1 tablet (600 mg total) by mouth 3 (three) times a day for 30 days, Disp: 90 tablet, Rfl: 1    LORazepam (ATIVAN) 1 mg tablet, Take 0 5 mg by mouth every 12 (twelve) hours, Disp: , Rfl:     naloxone (NARCAN) 0 4 mg/mL injection, Infuse 1 mL (0 4 mg total) into a venous catheter once for 1 dose, Disp: 1 mL, Rfl: 0    primidone (MYSOLINE) 50 mg tablet, By mouth take 1 tablet twice daily or as directed, Disp: 60 tablet, Rfl: 2    traZODone (DESYREL) 50 mg tablet, Take 50 mg by mouth daily at bedtime , Disp: , Rfl: 0    Current Facility-Administered Medications:     albuterol (PROVENTIL HFA,VENTOLIN HFA) inhaler 2 puff, 2 puff, Inhalation, Q4H PRN, Delmar Carver DO    Objective:    Blood pressure 138/90, pulse 92, resp  rate 17, height 5' 6" (1 676 m), weight 78 9 kg (174 lb 0 6 oz)  Physical Exam  Head normocephalic  Eyes nonicteric  No audible anterior neck bruits  Lungs clear to auscultation  Rhythm regular  GI (abdomen) soft nontender  Bowel sounds present  No significant lower extremity edema  Neurological Exam  Alert  Pleasantly interactive  Fully oriented  Strong voice with no voice fatigue or voice tremor  Unremarkable gait with good length strides and good spontaneous arm swing  Romberg maneuver performed unremarkably  Able to tandem walk  Cranial Nerves:   I: Olfactory sense intact bilaterally  II: Visual fields full to confrontation  Pupils equal, round, reactive to light with normal accomodation  Fundus: with bilaterally marginated discs    III,IV,VI: Extraocular muscles EOMI, no nystagmus  Mild bilateral ptosis  V: Masseter and pterygoid strength  Sensation in the V1 through V3 distributions intact to pinprick and light touch bilaterally  VII: Face is symmetric with no weakness noted  VIII: Audition intact to finger rub bilaterally  IX/X: Uvula midline  Soft palate elevation symmetric  XI: Trapezius and SCM strength 5/5 bilaterally  XII: Tongue midline with no atrophy or fasciculations with appropriate movement  Accurate with finger-to-nose and heel-to-shin maneuvers bilaterally  Tone normal   No tone increase or cogwheeling with contralateral distraction maneuver  High-frequency, low-amplitude tremor of the outstretched upper extremities, left greater than right noted  The tremor increased with extended sustention and with object holding  Essentially full symmetrical strength throughout the 4 extremities  Sensory testing grossly intact to pin, position and vibration for age  Muscle stretch reflexes bilaterally 1 throughout the upper extremities, at the knees and bilaterally 1+ at the ankles  Toe response downgoing bilaterally  ROS:    Review of Systems   Constitutional: Positive for fatigue  Negative for appetite change and fever  HENT: Negative  Negative for hearing loss, tinnitus, trouble swallowing and voice change  Eyes: Negative  Negative for photophobia and pain  Respiratory: Positive for shortness of breath  Cardiovascular: Negative  Negative for palpitations  Gastrointestinal: Negative  Negative for nausea and vomiting  Endocrine: Negative  Negative for cold intolerance and heat intolerance  Genitourinary: Negative  Negative for dysuria, frequency and urgency  Musculoskeletal: Positive for back pain (upper back and shoulders), neck pain and neck stiffness  Negative for myalgias  Skin: Negative  Negative for rash  Allergic/Immunologic: Negative      Neurological: Positive for dizziness, tremors, light-headedness, numbness (right arm) and headaches  Negative for seizures, syncope, facial asymmetry, speech difficulty and weakness  Hematological: Negative  Does not bruise/bleed easily  Psychiatric/Behavioral: Positive for sleep disturbance  Negative for confusion and hallucinations  I personally reviewed the ROS that was entered by the medical assistant  *Please note this document was created using voice recognition software and may contain sound-alike word errors  *

## 2019-07-23 NOTE — LETTER
July 23, 2019     Filipe Bradford DO  [address] [address]    Patient: Vignesh Yu   YOB: 1955   Date of Visit: 7/23/2019       Dear Dr Melisa Yu:    Thank you for referring Thedore Postal to me for evaluation  Below are my notes for this consultation  If you have questions, please do not hesitate to call me  I look forward to following your patient along with you  Sincerely,        Manda Kay MD        CC: No Recipients  Manda Kay MD  7/23/2019  9:46 AM  Sign at close encounter  Patient ID: Vignesh Yu is a 71 y o  male  Assessment/Plan:    Tremor, essential  History and examination quite typical for essential tremor  No more wide ranging extrapyramidal history or features on exam   Unfortunately, the tremor through the years, as is often the case, has increased and at this point in time he does find it intermittently functionally problematic  As result he is looking to treatment  --additional blood work to include serum copper and ceruloplasmin  In addition, repeat CBC, attention WBC which was slightly elevated on his recent past study  --begin primidone 50 milligram tablets beginning with 1/2 tablet at bedtime nightly  Patient asked to call the office in 2 weeks with a status update and to discuss possible further advancement in the primidone schedule  Advised of potential side effects including drowsing  He will follow up in 6 weeks  Subjective:    HPI  Patient, 71years of age and left-handed, presents for further evaluation regarding movement disorder  He describes dating back a at least a decade the presence of a tremor involving his upper extremities, left greater than right  The tremors described as present with sustention maneuvers and generally of high frequency and low amplitude    However, as he fatigues, the tremor becomes more evident and of a moderate amplitude and has recently been interfering with his ability to perform fine motor activity, including hand writing  He is looking at this point in time for treatment  He has by history no rest component to the tremor  He has had no issues with gait  He is unable to comment as to any family history of tremor or movement disorder as he is adopted  Blood work results from February reviewed  TSH normal at 1 020  CMP essentially unremarkable  CBC with hemoglobin 16 7, hematocrit 51 2, platelet count 751 a mildly elevated white count at 14 70  Past Medical History:   Diagnosis Date    Abdominal aortic aneurysm without rupture ([address] )     Abdominal Aortic Duplex 02/21/2017    Ectatic infrarenal abdominal aorta   CAD (coronary artery disease)     [facility]     Extremity pain     History of echocardiogram 03/18/2014    EF 55%, mild MR and AI  Mild concentric LVH   History of stress test 03/06/2017    Normal     Hypertension     Joint pain     Low back pain     Migraine     Neck pain     Obstructive sleep apnea     cannot tolerate CPAP    Osteoarthritis     Peripheral neuropathy     Reflex sympathetic dystrophy      Past Surgical History:   Procedure Laterality Date    CARDIAC CATHETERIZATION  02/13/2012    EF 70%, widely patent renal arteries, significant single-vessel CAD-medical therapy   CARDIAC CATHETERIZATION  04/11/2013    EF 65%, 50% mid LAD, 20% prox CFX, 90% diffuse RCA, 99% mid RCA  Medical management      CHOLECYSTECTOMY      ORTHOPEDIC SURGERY      TRIGGER POINT INJECTION       Social History     Socioeconomic History    Marital status: /Civil Union     Spouse name: None    Number of children: None    Years of education: None    Highest education level: None   Occupational History    None   Social Needs    Financial resource strain: None    Food insecurity:     Worry: None     Inability: None    Transportation needs:     Medical: None     Non-medical: None   Tobacco Use  Smoking status: Current Every Day Smoker    Smokeless tobacco: Never Used   Substance and Sexual Activity    Alcohol use: No    Drug use: No    Sexual activity: None   Lifestyle    Physical activity:     Days per week: None     Minutes per session: None    Stress: None   Relationships    Social connections:     Talks on phone: None     Gets together: None     Attends Oriental orthodox service: None     Active member of club or organization: None     Attends meetings of clubs or organizations: None     Relationship status: None    Intimate partner violence:     Fear of current or ex partner: None     Emotionally abused: None     Physically abused: None     Forced sexual activity: None   Other Topics Concern    None   Social History Narrative    None     Family History   Adopted: Yes   Problem Relation Age of Onset    No Known Problems Family      Allergies   Allergen Reactions    Morphine And Related     Percocet [Oxycodone-Acetaminophen]        Current Outpatient Medications:     aspirin (ECOTRIN LOW STRENGTH) 81 mg EC tablet, Take 1 tablet (81 mg total) by mouth daily, Disp: 30 tablet, Rfl: 3    cloNIDine (CATAPRES-TTS-3) 0 3 mg/24 hr, Place 1 patch (0 3 mg total) on the skin once a week, Disp: 4 patch, Rfl: 6    diphenhydrAMINE-acetaminophen (TYLENOL PM)  MG TABS, Take 2 tablets by mouth daily at bedtime as needed for sleep, Disp: , Rfl:     hydrALAZINE (APRESOLINE) 10 mg tablet, Take 10 mg by mouth as needed , Disp: , Rfl: 0    hydrochlorothiazide (HYDRODIURIL) 25 mg tablet, take 1 tablet by mouth twice a day, Disp: 60 tablet, Rfl: 3    lisinopril (ZESTRIL) 20 mg tablet, Take 1 tablet (20 mg total) by mouth 2 (two) times a day, Disp: 60 tablet, Rfl: 5    metoprolol tartrate (LOPRESSOR) 25 mg tablet, Take 1 tablet (25 mg total) by mouth every 12 (twelve) hours, Disp: 60 tablet, Rfl: 5    nitroglycerin (NITROSTAT) 0 4 mg SL tablet, Place 0 4 mg under the tongue every 5 (five) minutes as needed for chest pain, Disp: , Rfl:     oxyCODONE-acetaminophen (PERCOCET) 7 5-325 MG per tablet, Take 1 tablet by mouth every 8 (eight) hours as needed for moderate pain for up to 30 daysMax Daily Amount: 3 tablets, Disp: 90 tablet, Rfl: 0    rosuvastatin (CRESTOR) 10 MG tablet, Take 1 tablet (10 mg total) by mouth daily, Disp: 30 tablet, Rfl: 5    tamsulosin (FLOMAX) 0 4 mg, Take 1 capsule (0 4 mg total) by mouth daily with dinner, Disp: 30 capsule, Rfl: 5    VIAGRA 100 MG tablet, , Disp: , Rfl: 0    albuterol (VENTOLIN HFA) 90 mcg/act inhaler, Inhale 2 puffs every 6 (six) hours as needed for wheezing (Patient not taking: Reported on 7/23/2019), Disp: 1 Inhaler, Rfl: 3    amLODIPine (NORVASC) 10 mg tablet, Take 1 tablet (10 mg total) by mouth daily (Patient not taking: Reported on 7/23/2019), Disp: 30 tablet, Rfl: 6    FLUoxetine (PROzac) 20 mg capsule, Take 20 mg by mouth daily , Disp: , Rfl: 0    gabapentin (NEURONTIN) 600 MG tablet, Take 1 tablet (600 mg total) by mouth 3 (three) times a day for 30 days, Disp: 90 tablet, Rfl: 1    LORazepam (ATIVAN) 1 mg tablet, Take 0 5 mg by mouth every 12 (twelve) hours, Disp: , Rfl:     naloxone (NARCAN) 0 4 mg/mL injection, Infuse 1 mL (0 4 mg total) into a venous catheter once for 1 dose, Disp: 1 mL, Rfl: 0    primidone (MYSOLINE) 50 mg tablet, By mouth take 1 tablet twice daily or as directed, Disp: 60 tablet, Rfl: 2    traZODone (DESYREL) 50 mg tablet, Take 50 mg by mouth daily at bedtime , Disp: , Rfl: 0    Current Facility-Administered Medications:     albuterol (PROVENTIL HFA,VENTOLIN HFA) inhaler 2 puff, 2 puff, Inhalation, Q4H PRN, Gin Kahn DO    Objective:    Blood pressure 138/90, pulse 92, resp  rate 17, height 5' 6" (1 676 m), weight 78 9 kg (174 lb 0 6 oz)  Physical Exam  Head normocephalic  Eyes nonicteric  No audible anterior neck bruits  Lungs clear to auscultation  Rhythm regular  GI (abdomen) soft nontender    Bowel sounds present  No significant lower extremity edema  Neurological Exam  Alert  Pleasantly interactive  Fully oriented  Strong voice with no voice fatigue or voice tremor  Unremarkable gait with good length strides and good spontaneous arm swing  Romberg maneuver performed unremarkably  Able to tandem walk  Cranial Nerves:   I: Olfactory sense intact bilaterally  II: Visual fields full to confrontation  Pupils equal, round, reactive to light with normal accomodation  Fundus: with bilaterally marginated discs  III,IV,VI: Extraocular muscles EOMI, no nystagmus  Mild bilateral ptosis  V: Masseter and pterygoid strength  Sensation in the V1 through V3 distributions intact to pinprick and light touch bilaterally  VII: Face is symmetric with no weakness noted  VIII: Audition intact to finger rub bilaterally  IX/X: Uvula midline  Soft palate elevation symmetric  XI: Trapezius and SCM strength 5/5 bilaterally  XII: Tongue midline with no atrophy or fasciculations with appropriate movement  Accurate with finger-to-nose and heel-to-shin maneuvers bilaterally  Tone normal   No tone increase or cogwheeling with contralateral distraction maneuver  High-frequency, low-amplitude tremor of the outstretched upper extremities, left greater than right noted  The tremor increased with extended sustention and with object holding  Essentially full symmetrical strength throughout the 4 extremities  Sensory testing grossly intact to pin, position and vibration for age  Muscle stretch reflexes bilaterally 1 throughout the upper extremities, at the knees and bilaterally 1+ at the ankles  Toe response downgoing bilaterally  ROS:    Review of Systems   Constitutional: Positive for fatigue  Negative for appetite change and fever  HENT: Negative  Negative for hearing loss, tinnitus, trouble swallowing and voice change  Eyes: Negative  Negative for photophobia and pain     Respiratory: Positive for shortness of breath  Cardiovascular: Negative  Negative for palpitations  Gastrointestinal: Negative  Negative for nausea and vomiting  Endocrine: Negative  Negative for cold intolerance and heat intolerance  Genitourinary: Negative  Negative for dysuria, frequency and urgency  Musculoskeletal: Positive for back pain (upper back and shoulders), neck pain and neck stiffness  Negative for myalgias  Skin: Negative  Negative for rash  Allergic/Immunologic: Negative  Neurological: Positive for dizziness, tremors, light-headedness, numbness (right arm) and headaches  Negative for seizures, syncope, facial asymmetry, speech difficulty and weakness  Hematological: Negative  Does not bruise/bleed easily  Psychiatric/Behavioral: Positive for sleep disturbance  Negative for confusion and hallucinations  I personally reviewed the ROS that was entered by the medical assistant  *Please note this document was created using voice recognition software and may contain sound-alike word errors  *

## 2019-07-26 ENCOUNTER — APPOINTMENT (OUTPATIENT)
Dept: PHYSICAL THERAPY | Facility: CLINIC | Age: 64
End: 2019-07-26
Payer: MEDICARE

## 2019-07-31 ENCOUNTER — OFFICE VISIT (OUTPATIENT)
Dept: FAMILY MEDICINE CLINIC | Facility: CLINIC | Age: 64
End: 2019-07-31
Payer: MEDICARE

## 2019-07-31 VITALS
WEIGHT: 205 LBS | HEART RATE: 78 BPM | DIASTOLIC BLOOD PRESSURE: 84 MMHG | BODY MASS INDEX: 32.95 KG/M2 | SYSTOLIC BLOOD PRESSURE: 128 MMHG | TEMPERATURE: 98.5 F | OXYGEN SATURATION: 98 % | HEIGHT: 66 IN

## 2019-07-31 DIAGNOSIS — F17.200 SMOKING: ICD-10-CM

## 2019-07-31 DIAGNOSIS — Z12.11 SCREENING FOR COLON CANCER: ICD-10-CM

## 2019-07-31 DIAGNOSIS — G25.0 BENIGN ESSENTIAL TREMOR: ICD-10-CM

## 2019-07-31 DIAGNOSIS — I10 ESSENTIAL HYPERTENSION: Primary | ICD-10-CM

## 2019-07-31 DIAGNOSIS — Z11.59 ENCOUNTER FOR HEPATITIS C SCREENING TEST FOR LOW RISK PATIENT: ICD-10-CM

## 2019-07-31 PROCEDURE — 99214 OFFICE O/P EST MOD 30 MIN: CPT | Performed by: FAMILY MEDICINE

## 2019-07-31 NOTE — PROGRESS NOTES
Assessment/Plan:    No problem-specific Assessment & Plan notes found for this encounter  Diagnoses and all orders for this visit:    Essential hypertension  Comments:  well controlled    Screening for colon cancer  -     Cologuard; Future    Encounter for hepatitis C screening test for low risk patient  -     Hepatitis C antibody; Future    Smoking  Comments:  pt counseled on quitting smoking    Benign essential tremor  Comments:  continue mysoline, follow up with neurology          PHQ-9 Depression Screening    PHQ-9:    Frequency of the following problems over the past two weeks:       Little interest or pleasure in doing things:  0 - not at all  Feeling down, depressed, or hopeless:  0 - not at all  PHQ-2 Score:  0            Subjective:      Patient ID: Vignesh Yu is a 71 y o  male  Hypertension   This is a chronic problem  The current episode started more than 1 year ago  The problem has been gradually improving since onset  The problem is controlled  Risk factors for coronary artery disease include obesity and sedentary lifestyle         The following portions of the patient's history were reviewed and updated as appropriate: allergies, current medications, past family history, past medical history, past social history, past surgical history and problem list     Review of Systems      Objective:    /84 (BP Location: Left arm, Patient Position: Sitting, Cuff Size: Adult)   Pulse 78   Temp 98 5 °F (36 9 °C) (Tympanic)   Ht 5' 6" (1 676 m)   Wt 93 kg (205 lb)   SpO2 98%   BMI 33 09 kg/m²      Physical Exam

## 2019-08-06 ENCOUNTER — TELEPHONE (OUTPATIENT)
Dept: NEUROLOGY | Facility: CLINIC | Age: 64
End: 2019-08-06

## 2019-08-06 NOTE — TELEPHONE ENCOUNTER
Have patient increase his primidone 50 mg tablets to 1 full tablet at bedtime nightly  Recheck with the office in a week and after 2 weeks or before then should he have any difficulties tolerating

## 2019-08-06 NOTE — TELEPHONE ENCOUNTER
Patient stopped by the office to inform Dr Melissa Mcfadden that the Primidone 50 mg 1/2 tablet he takes at night is not helping his tremors  Patient also stated that the first 2 nights on the medication he was urinating a lot and was unable to sleep  Theses symptoms have gotten better, but the tremors are still the same   He would like a call back with an update on what he should do  [phone]

## 2019-08-07 NOTE — TELEPHONE ENCOUNTER
Spoke with the patient this morning and informed him that Dr [facility] wants him to increase his Primidone 50 mg to 1 full tablet at bedtime  He should call our office right away if he has any issues or concerns with the increase  He is to also call in 1 week to inform Dr [facility] on how he is doing with the new increase

## 2019-08-12 ENCOUNTER — OFFICE VISIT (OUTPATIENT)
Dept: PAIN MEDICINE | Facility: CLINIC | Age: 64
End: 2019-08-12
Payer: MEDICARE

## 2019-08-12 VITALS
SYSTOLIC BLOOD PRESSURE: 128 MMHG | BODY MASS INDEX: 33.11 KG/M2 | HEIGHT: 66 IN | DIASTOLIC BLOOD PRESSURE: 90 MMHG | WEIGHT: 206 LBS | RESPIRATION RATE: 18 BRPM

## 2019-08-12 DIAGNOSIS — M54.12 CERVICAL RADICULOPATHY: ICD-10-CM

## 2019-08-12 DIAGNOSIS — G89.29 CHRONIC LEFT SHOULDER PAIN: Primary | ICD-10-CM

## 2019-08-12 DIAGNOSIS — Z79.891 LONG-TERM CURRENT USE OF OPIATE ANALGESIC: ICD-10-CM

## 2019-08-12 DIAGNOSIS — M51.36 LUMBAR DEGENERATIVE DISC DISEASE: ICD-10-CM

## 2019-08-12 DIAGNOSIS — M25.512 CHRONIC LEFT SHOULDER PAIN: Primary | ICD-10-CM

## 2019-08-12 DIAGNOSIS — G89.4 CHRONIC PAIN SYNDROME: ICD-10-CM

## 2019-08-12 DIAGNOSIS — M51.16 LUMBAR DISC DISEASE WITH RADICULOPATHY: ICD-10-CM

## 2019-08-12 DIAGNOSIS — F11.20 UNCOMPLICATED OPIOID DEPENDENCE (HCC): ICD-10-CM

## 2019-08-12 DIAGNOSIS — M47.816 LUMBAR SPONDYLOSIS: ICD-10-CM

## 2019-08-12 PROCEDURE — 80305 DRUG TEST PRSMV DIR OPT OBS: CPT | Performed by: NURSE PRACTITIONER

## 2019-08-12 PROCEDURE — 99214 OFFICE O/P EST MOD 30 MIN: CPT | Performed by: NURSE PRACTITIONER

## 2019-08-12 RX ORDER — OXYCODONE AND ACETAMINOPHEN 7.5; 325 MG/1; MG/1
1 TABLET ORAL EVERY 8 HOURS PRN
Qty: 90 TABLET | Refills: 0 | Status: SHIPPED | OUTPATIENT
Start: 2019-09-17 | End: 2019-10-07 | Stop reason: SDUPTHER

## 2019-08-12 RX ORDER — OXYCODONE AND ACETAMINOPHEN 7.5; 325 MG/1; MG/1
1 TABLET ORAL EVERY 8 HOURS PRN
Qty: 90 TABLET | Refills: 0 | Status: SHIPPED | OUTPATIENT
Start: 2019-08-19 | End: 2019-08-12

## 2019-08-12 RX ORDER — GABAPENTIN 600 MG/1
600 TABLET ORAL 3 TIMES DAILY
Qty: 90 TABLET | Refills: 1 | Status: SHIPPED | OUTPATIENT
Start: 2019-08-12 | End: 2019-10-07 | Stop reason: SDUPTHER

## 2019-08-12 NOTE — PROGRESS NOTES
Pt c/o neck pain that radiates to the shoulders and arms and back pain    Last dose Percocet 08/11 PM    Assessment:  1  Chronic left shoulder pain    2  Chronic pain syndrome    3  Cervical radiculopathy    4  Lumbar spondylosis    5  Lumbar disc disease with radiculopathy    6  Uncomplicated opioid dependence ([address] )    7  Long-term current use of opiate analgesic        Plan:  Jasson Harris is a 71 y o  male with a history of chronic pain syndrome secondary to cervical radiculopathy, lumbar spondylosis, lumbar degenerative disc disease, chronic low back pain  He continues with ongoing low back and neck pain  He is scheduled to undergo a cervical epidural steroid injection on 08/15/2019  He is also reporting increased left shoulder pain and was noted to have limited range of motion on examination as well as a positive empty can test   I suspect possible left rotator cuff tear  Therefore I ordered the patient an x-ray an MRI of his left shoulder for further evaluation  He was instructed that our office will call with results of the studies once are completed  He is taking oxycodone/acetaminophen 7 5/325 mg 1 tablet 3 times daily as needed for pain  He reports that this medication allows him to function on a daily basis with decreased pain  Therefore, the patient will continue on this medication as prescribed and 2 prescriptions for this medication was sent electronically to the patient's pharmacy on file with 1 prescription for this month with do not fill date of 8/19/2019 a prescription for the following month with do not fill date of 9/17/2019  The patient brought his prescription pill bottle for oxycodone/acetaminophen 7 5/325 mg 1 tablet 3 times daily  The prescription was filled on 7/21/2019 for 90 tablets  The patient has 28 remaining tablets, which is appropriate  The patient also recently seen neurology after my referral last office visit for movement disorder    He reports that he was started on primidone, that he is no longer taking due to erectile dysfunction  The patient will continue on gabapentin 600 mg 3 times daily, and a prescription for this medication was sent electronically to the patient's pharmacy on file  There are risks associated with opioid medications, including dependence, addiction and tolerance  The patient understands and agrees to use these medications only as prescribed  Potential side effects of the medications include, but are not limited to, constipation, drowsiness, addiction, impaired judgment and risk of fatal overdose if not taken as prescribed  The patient was warned against driving while taking sedation medications  Sharing medications is a felony  At this point in time, the patient is showing no signs of addiction, abuse, diversion or suicidal ideation  A urine drug screen was collected at today's office visit as part of our medication management protocol  The point of care testing results were appropriate for what was being prescribed  The specimen will be sent for confirmatory testing  The drug screen is medically necessary because the patient is either dependent on opioid medication or is being considered for opioid medication therapy and the results could impact ongoing or future treatment  The drug screen is to evaluate for the presences or absence of prescribed, non-prescribed, and/or illicit drugs/substances  [place] Prescription Drug Monitoring Program report was reviewed and was appropriate     Complete risks and benefits including bleeding, infection, tissue reaction, nerve injury and allergic reaction were discussed  The approach was demonstrated using models and literature was provided  The patient will follow up in 8 weeks or sooner with the worsening of symptoms  My impressions and treatment recommendations were discussed in detail with the patient who verbalized understanding and had no further questions    Discharge instructions were provided  I personally saw and examined the patient and I agree with the above discussed plan of care  Orders Placed This Encounter   Procedures    X-ray shoulder left 2+ views     Standing Status:   Future     Standing Expiration Date:   8/12/2023     Scheduling Instructions:      Bring along any outside films relating to this procedure   MRI shoulder left wo contrast     Standing Status:   Future     Standing Expiration Date:   8/12/2023     Scheduling Instructions: There is no preparation for this test  Please leave your jewelry and valuables at home, wedding rings are the exception  Please bring your insurance cards, a form of photo ID and a list of your medications with you  Arrive 15 minutes prior to your appointment time in order to register  Please bring any prior CT or MRI studies of this area that were not performed at a [facility]  To schedule this appointment, please contact Central Scheduling at [phone]  Order Specific Question:   What is the patient's sedation requirement? Answer:   No Sedation     No orders of the defined types were placed in this encounter  History of Present Illness:  Maya Barksdale is a 71 y o  male with a history of chronic pain syndrome secondary to cervical radiculopathy, lumbar spondylosis, lumbar degenerative disc disease, chronic low back pain  The patient was last seen office on 6/17/2019 where he was continued on oxycodone/acetaminophen 7 5/325 mg 1 tablet 3 times daily  He was also referred to physical therapy last office visit  He was also ordered an MRI of his cervical and lumbar spine for further evaluation  who presents for a follow up office visit in regards to Neck Pain (Radiates to the shoulders and arms); Shoulder Pain; Back Pain; and Arm Pain  The patients current symptoms include neck pain that radiates to his bilateral shoulders and ongoing low back pain    He describes his pain as dull aching, sharp, pressure-like, numbness, pins and needles pain that is constant nature occurring mostly during the morning nighttime hours  The patient reports that his pain and symptoms have worsened since last office visit  He currently rates his pain 8 out 10 numeric pain scale  Current pain medications includes:  Oxycodone/acetaminophen 7 5/325 mg 1 tablet 3 times daily  The patient reports that this regimen is providing 15% pain relief  The patient is reporting no side effects from this pain medication regimen  Pain Contract Signed: 04/22/2019, Last Urine Drug Screen: 04/22/2019    I have personally reviewed and/or updated the patient's past medical history, past surgical history, family history, social history, current medications, allergies, and vital signs today  Review of Systems   Respiratory: Positive for shortness of breath  Cardiovascular: Negative for chest pain  Gastrointestinal: Negative for constipation, diarrhea, nausea and vomiting  Musculoskeletal: Positive for gait problem  Negative for arthralgias, joint swelling and myalgias  Decreased ROM  Joint stiffness   Skin: Negative for rash  Neurological: Positive for dizziness  Negative for seizures and weakness  All other systems reviewed and are negative  Patient Active Problem List   Diagnosis    JAKI (obstructive sleep apnea)    Chronic bronchitis (HCC)    Ab[facility] aortic [facility]AAA[facility] rupture [facility]    Lumbar spondylosis    Lumbar degenerative disc disease    Myofascial pain syndrome    Chronic low back pain without sciatica    Cervical radiculopathy    Cervical spondylosis without myelopathy    Tremor, essential       Past Medical History:   Diagnosis Date    Abdominal aortic aneurysm without rupture ([facility] [address] )     Abdominal Aortic Duplex 02/21/2017    Ectatic infrarenal abdominal aorta      CAD (coronary artery disease)     COPD (chronic obstructive pulmonary disease) ([facility])     Extremity pain  History of echocardiogram 03/18/2014    EF 55%, mild MR and AI  Mild concentric LVH   History of stress test 03/06/2017    Normal     Hypertension     Joint pain     Low back pain     Migraine     Neck pain     Obstructive sleep apnea     cannot tolerate CPAP    Osteoarthritis     Peripheral neuropathy     Reflex sympathetic dystrophy        Past Surgical History:   Procedure Laterality Date    CARDIAC CATHETERIZATION  02/13/2012    EF 70%, widely patent renal arteries, significant single-vessel CAD-medical therapy   CARDIAC CATHETERIZATION  04/11/2013    EF 65%, 50% mid LAD, 20% prox CFX, 90% diffuse RCA, 99% mid RCA  Medical management      CHOLECYSTECTOMY      ORTHOPEDIC SURGERY      TRIGGER POINT INJECTION         Family History   Adopted: Yes   Problem Relation Age of Onset    No Known Problems Family        Social History     Occupational History    Not on file   Tobacco Use    Smoking status: Current Every Day Smoker    Smokeless tobacco: Never Used   Substance and Sexual Activity    Alcohol use: No    Drug use: No    Sexual activity: Not on file       Current Outpatient Medications on File Prior to Visit   Medication Sig    albuterol (VENTOLIN HFA) 90 mcg/act inhaler Inhale 2 puffs every 6 (six) hours as needed for wheezing    amLODIPine (NORVASC) 10 mg tablet Take 1 tablet (10 mg total) by mouth daily    aspirin (ECOTRIN LOW STRENGTH) 81 mg EC tablet Take 1 tablet (81 mg total) by mouth daily    cloNIDine (CATAPRES-TTS-3) 0 3 mg/24 hr Place 1 patch (0 3 mg total) on the skin once a week    diphenhydrAMINE-acetaminophen (TYLENOL PM)  MG TABS Take 2 tablets by mouth daily at bedtime as needed for sleep    FLUoxetine (PROzac) 20 mg capsule Take 20 mg by mouth daily     hydrALAZINE (APRESOLINE) 10 mg tablet Take 10 mg by mouth as needed     hydrochlorothiazide (HYDRODIURIL) 25 mg tablet take 1 tablet by mouth twice a day    lisinopril (ZESTRIL) 20 mg tablet Take 1 tablet (20 mg total) by mouth 2 (two) times a day    LORazepam (ATIVAN) 1 mg tablet Take 0 5 mg by mouth every 12 (twelve) hours    metoprolol tartrate (LOPRESSOR) 25 mg tablet Take 1 tablet (25 mg total) by mouth every 12 (twelve) hours    nitroglycerin (NITROSTAT) 0 4 mg SL tablet Place 0 4 mg under the tongue every 5 (five) minutes as needed for chest pain    oxyCODONE-acetaminophen (PERCOCET) 7 5-325 MG per tablet Take 1 tablet by mouth every 8 (eight) hours as needed for moderate pain for up to 30 daysMax Daily Amount: 3 tablets    primidone (MYSOLINE) 50 mg tablet By mouth take 1 tablet twice daily or as directed    rosuvastatin (CRESTOR) 10 MG tablet Take 1 tablet (10 mg total) by mouth daily    tamsulosin (FLOMAX) 0 4 mg Take 1 capsule (0 4 mg total) by mouth daily with dinner    traZODone (DESYREL) 50 mg tablet Take 50 mg by mouth daily at bedtime     VIAGRA 100 MG tablet     gabapentin (NEURONTIN) 600 MG tablet Take 1 tablet (600 mg total) by mouth 3 (three) times a day for 30 days    naloxone (NARCAN) 0 4 mg/mL injection Infuse 1 mL (0 4 mg total) into a venous catheter once for 1 dose     Current Facility-Administered Medications on File Prior to Visit   Medication    albuterol (PROVENTIL HFA,VENTOLIN HFA) inhaler 2 puff       Allergies   Allergen Reactions    Morphine And Related        Physical Exam:    /90 (BP Location: Left arm, Patient Position: Sitting, Cuff Size: Standard)   Resp 18   Ht 5' 6" (1 676 m)   Wt 93 4 kg (206 lb)   BMI 33 25 kg/m²     Constitutional:normal, well developed, well nourished, alert, in no distress and non-toxic and no overt pain behavior   and obese  Eyes:anicteric  HEENT:grossly intact  Neck:supple, symmetric, trachea midline and no masses   Pulmonary:even and unlabored  Cardiovascular:No edema or pitting edema present  Skin:Normal without rashes or lesions and well hydrated  Psychiatric:Mood and affect appropriate  Neurologic:Cranial Nerves II-XII grossly intact  Musculoskeletal:normal     Cervical Spine Exam    Appearance:  Normal lordosis  Palpation/Tenderness:  left cervical paraspinal tenderness  right cervical paraspinal tenderness  left trapezium tenderness  right trapezium tenderness  Sensory:  no sensory deficits noted  Range of Motion:  Flexion:  Minimally limited  with pain  Extension:  Minimally limited  with pain  Lateral Flexion - Left:  Minimally limited  with pain  Lateral Flexion - Right:  Minimally limited  with pain  Rotation - Left:  Minimally limited  with pain  Rotation - Right:  Minimally limited  with pain  Motor Strength:  Left Arm Flexion  5/5  Left Arm Extension  5/5  Right Arm Flexion  5/5  Right Arm Extension  5/5  Left Wrist Flexion  5/5  Left Wrist Extension  5/5  Left Finger Abduction  5/5  Right Finger Abduction  5/5  Left    5/5  Right   5/5    Imaging  MRI CERVICAL SPINE WITHOUT CONTRAST     INDICATION: 70-year-old male, neck pain, left arm pain     COMPARISON:  2/18/2019 x-rays     TECHNIQUE:  Sagittal T1, sagittal T2, sagittal inversion recovery, axial T2, axial  2D merge     IMAGE QUALITY:  Diagnostic     FINDINGS:     ALIGNMENT:  Normal alignment of the cervical spine  No compression fracture  No subluxation  No scoliosis      MARROW SIGNAL:  Multilevel degenerative marrow    No significant marrow pathology      CERVICAL AND VISUALIZED THORACIC CORD:  Normal signal within the visualized cord      PREVERTEBRAL AND PARASPINAL SOFT TISSUES:  Normal      VISUALIZED POSTERIOR FOSSA:  The visualized posterior fossa demonstrates no abnormal signal      CERVICAL DISC SPACES:     C2-C3:  Normal      C3-C4:  Mild degenerative disc disease, moderate degenerative facet/uncinate process process arthrosis, moderate to severe left foraminal stenosis, possible left C4 nerve root encroachment     C4-C5:  Mild degenerative disc disease, moderate degenerative facet/uncinate process arthrosis, mild central canal stenosis, moderate to severe left foraminal stenosis, moderate right foraminal stenosis,  possible bilateral C5 nerve root encroachment     C5-C6:  Mild degenerative disc disease, mild to moderate degenerative facet/uncinate process arthrosis, mild bilateral foraminal stenosis, bulging annulus, no overt neural element impingement     C6-C7:  Mild to moderate degenerative disc disease, mild to moderate degenerative facet/uncinate process arthrosis, mild bilateral foraminal stenosis, bulging annulus, no overt neural element impingement     C7-T1:  Normal      UPPER THORACIC DISC SPACES:  Normal      IMPRESSION:  Multilevel degenerative spondylosis, consistent with x-rays     Moderate to severe left foraminal stenosis C3-4     Mild central canal stenosis, bilateral foraminal stenosis C4-5     Mild bilateral foraminal stenosis C5-6 and C6-7

## 2019-08-13 ENCOUNTER — TELEPHONE (OUTPATIENT)
Dept: PAIN MEDICINE | Facility: MEDICAL CENTER | Age: 64
End: 2019-08-13

## 2019-08-13 NOTE — TELEPHONE ENCOUNTER
Tried to call patient, voice mail full  If he calls back today please transfer to [facility] , if calls tomorrow please transfer to [employer]    Thank you

## 2019-08-13 NOTE — TELEPHONE ENCOUNTER
Pt called stating he had question about his arrival time and directions of procedure  8/15    Pt can be reached at [phone]

## 2019-08-14 ENCOUNTER — TELEPHONE (OUTPATIENT)
Dept: NEUROLOGY | Facility: CLINIC | Age: 64
End: 2019-08-14

## 2019-08-14 NOTE — PROGRESS NOTES
Patient stopped coming to physical therapy on 7/22 measurements are from the last patient progress note and were unable to be updated  As a result, patient is discharged from physical therapy

## 2019-08-14 NOTE — TELEPHONE ENCOUNTER
Pt called stated that the primidone has caused problems with his sex life, he does not like the side effects  Reports that it has helped his tremors a little in the morning but then it would wear off  Would like to stop the medication

## 2019-08-15 ENCOUNTER — APPOINTMENT (OUTPATIENT)
Dept: RADIOLOGY | Facility: HOSPITAL | Age: 64
End: 2019-08-15
Payer: MEDICARE

## 2019-08-15 ENCOUNTER — HOSPITAL ENCOUNTER (OUTPATIENT)
Facility: HOSPITAL | Age: 64
Setting detail: OUTPATIENT SURGERY
Discharge: HOME | End: 2019-08-15
Attending: ANESTHESIOLOGY | Admitting: ANESTHESIOLOGY
Payer: MEDICARE

## 2019-08-15 VITALS
TEMPERATURE: 97.6 F | DIASTOLIC BLOOD PRESSURE: 99 MMHG | HEART RATE: 59 BPM | SYSTOLIC BLOOD PRESSURE: 190 MMHG | RESPIRATION RATE: 18 BRPM | OXYGEN SATURATION: 95 %

## 2019-08-15 PROCEDURE — 62321 NJX INTERLAMINAR CRV/THRC: CPT | Performed by: ANESTHESIOLOGY

## 2019-08-15 RX ORDER — LIDOCAINE HYDROCHLORIDE 10 MG/ML
INJECTION, SOLUTION INFILTRATION; PERINEURAL AS NEEDED
Status: DISCONTINUED | OUTPATIENT
Start: 2019-08-15 | End: 2019-08-15 | Stop reason: HOSPADM

## 2019-08-15 RX ORDER — VARENICLINE TARTRATE 0.5 MG/1
0.5 TABLET, FILM COATED ORAL DAILY
COMMUNITY
End: 2020-04-09 | Stop reason: ALTCHOICE

## 2019-08-15 RX ORDER — DEXAMETHASONE SODIUM PHOSPHATE 10 MG/ML
INJECTION, SOLUTION INTRAMUSCULAR; INTRAVENOUS AS NEEDED
Status: DISCONTINUED | OUTPATIENT
Start: 2019-08-15 | End: 2019-08-15 | Stop reason: HOSPADM

## 2019-08-15 NOTE — H&P
Assessment:  1  Chronic left shoulder pain    2  Chronic pain syndrome    3  Cervical radiculopathy    4  Lumbar spondylosis    5  Lumbar disc disease with radiculopathy    6  Uncomplicated opioid dependence ([address] )    7  Long-term current use of opiate analgesic          Plan:  Amalia Mosley is a 71 y o  male with a history of chronic pain syndrome secondary to cervical radiculopathy, lumbar spondylosis, lumbar degenerative disc disease, chronic low back pain presents today for surgical procedure  1  We will perform a C7-T1 ILESI     Complete risks and benefits including bleeding, infection, tissue reaction, nerve injury and allergic reaction were discussed  The approach was demonstrated using models and literature was provided       No orders of the defined types were placed in this encounter         History of Present Illness:  Amalia Mosley is a 71 y o  male with a history of chronic pain syndrome secondary to cervical radiculopathy, lumbar spondylosis, lumbar degenerative disc disease, chronic low back pain  The patient was last seen office on 6/17/2019 where he was continued on oxycodone/acetaminophen 7 5/325 mg 1 tablet 3 times daily  He was also referred to physical therapy last office visit  He was also ordered an MRI of his cervical and lumbar spine for further evaluation  who presents for a follow up office visit in regards to Neck Pain (Radiates to the shoulders and arms); Shoulder Pain; Back Pain; and Arm Pain  The patients current symptoms include neck pain that radiates to his bilateral shoulders and ongoing low back pain  He describes his pain as dull aching, sharp, pressure-like, numbness, pins and needles pain that is constant nature occurring mostly during the morning nighttime hours  The patient reports that his pain and symptoms have worsened since last office visit    He currently rates his pain 8 out 10 numeric pain scale      Current pain medications includes: Oxycodone/acetaminophen 7 5/325 mg 1 tablet 3 times daily  The patient reports that this regimen is providing 15% pain relief  The patient is reporting no side effects from this pain medication regimen      Pain Contract Signed: 04/22/2019, Last Urine Drug Screen: 04/22/2019     I have personally reviewed and/or updated the patient's past medical history, past surgical history, family history, social history, current medications, allergies, and vital signs today       Review of Systems   Respiratory: Positive for shortness of breath  Cardiovascular: Negative for chest pain  Gastrointestinal: Negative for constipation, diarrhea, nausea and vomiting  Musculoskeletal: Positive for gait problem  Negative for arthralgias, joint swelling and myalgias  Decreased ROM  Joint stiffness   Skin: Negative for rash  Neurological: Positive for dizziness  Negative for seizures and weakness  All other systems reviewed and are negative             Patient Active Problem List   Diagnosis    JAKI (obstructive sleep apnea)    [facility])    Abdominal aortic aneurysm (AAA) without rupture ([facility])    Lumbar spondylosis    Lumbar degenerative disc disease    Myofascial pain syndrome    Chronic low back pain without sciatica    Cervical radiculopathy    Cervical spondylosis without myelopathy    Tremor, essential         Medical History        Past Medical History:   Diagnosis Date    Abdominal [facility] aneurysm without rupture [facility] [address] )      Abdominal Aortic Duplex 02/21/2017     Ectatic infrarenal abdominal aorta   CAD (coronary artery disease)      COPD (chronic obstructive pulmonary disease) [facility])      Extremity pain      History of echocardiogram 03/18/2014     EF 55%, mild MR and AI  Mild concentric LVH      History of stress test 03/06/2017     Normal     Hypertension      Joint pain      Low back pain      Migraine      Neck pain      Obstructive sleep apnea       cannot tolerate CPAP  Osteoarthritis      Peripheral neuropathy      Reflex sympathetic dystrophy              Surgical History   Past Surgical History:   Procedure Laterality Date    CARDIAC CATHETERIZATION   02/13/2012     EF 70%, widely patent renal arteries, significant single-vessel CAD-medical therapy   CARDIAC CATHETERIZATION   04/11/2013     EF 65%, 50% mid LAD, 20% prox CFX, 90% diffuse RCA, 99% mid RCA  Medical management      CHOLECYSTECTOMY        ORTHOPEDIC SURGERY        TRIGGER POINT INJECTION                      Family History   Adopted: Yes   Problem Relation Age of Onset    No Known Problems Family           Social History           Occupational History    Not on file   Tobacco Use    Smoking status: Current Every Day Smoker    Smokeless tobacco: Never Used   Substance and Sexual Activity    Alcohol use: No    Drug use: No    Sexual activity: Not on file              Current Outpatient Medications on File Prior to Visit   Medication Sig    albuterol (VENTOLIN HFA) 90 mcg/act inhaler Inhale 2 puffs every 6 (six) hours as needed for wheezing    amLODIPine (NORVASC) 10 mg tablet Take 1 tablet (10 mg total) by mouth daily    aspirin (ECOTRIN LOW STRENGTH) 81 mg EC tablet Take 1 tablet (81 mg total) by mouth daily    cloNIDine (CATAPRES-TTS-3) 0 3 mg/24 hr Place 1 patch (0 3 mg total) on the skin once a week    diphenhydrAMINE-acetaminophen (TYLENOL PM)  MG TABS Take 2 tablets by mouth daily at bedtime as needed for sleep    FLUoxetine (PROzac) 20 mg capsule Take 20 mg by mouth daily     hydrALAZINE (APRESOLINE) 10 mg tablet Take 10 mg by mouth as needed     hydrochlorothiazide (HYDRODIURIL) 25 mg tablet take 1 tablet by mouth twice a day    lisinopril (ZESTRIL) 20 mg tablet Take 1 tablet (20 mg total) by mouth 2 (two) times a day    LORazepam (ATIVAN) 1 mg tablet Take 0 5 mg by mouth every 12 (twelve) hours    metoprolol tartrate (LOPRESSOR) 25 mg tablet Take 1 tablet (25 mg total) by mouth every 12 (twelve) hours    nitroglycerin (NITROSTAT) 0 4 mg SL tablet Place 0 4 mg under the tongue every 5 (five) minutes as needed for chest pain    oxyCODONE-acetaminophen (PERCOCET) 7 5-325 MG per tablet Take 1 tablet by mouth every 8 (eight) hours as needed for moderate pain for up to 30 daysMax Daily Amount: 3 tablets    primidone (MYSOLINE) 50 mg tablet By mouth take 1 tablet twice daily or as directed    rosuvastatin (CRESTOR) 10 MG tablet Take 1 tablet (10 mg total) by mouth daily    tamsulosin (FLOMAX) 0 4 mg Take 1 capsule (0 4 mg total) by mouth daily with dinner    traZODone (DESYREL) 50 mg tablet Take 50 mg by mouth daily at bedtime     VIAGRA 100 MG tablet      gabapentin (NEURONTIN) 600 MG tablet Take 1 tablet (600 mg total) by mouth 3 (three) times a day for 30 days    naloxone (NARCAN) 0 4 mg/mL injection Infuse 1 mL (0 4 mg total) into a venous catheter once for 1 dose          Current Facility-Administered Medications on File Prior to Visit   Medication    albuterol (PROVENTIL HFA,VENTOLIN HFA) inhaler 2 puff         Allergies   Allergen Reactions    Morphine And Related           Physical Exam:     Vitals:    08/15/19 1056   BP: (!) 190/99   Pulse: 77   Resp: 18   Temp: 97 6 °F (36 4 °C)   SpO2: 94%          Constitutional:normal, well developed, well nourished, alert, in no distress and non-toxic and no overt pain behavior   and obese  Eyes:anicteric  HEENT:grossly intact  Neck:supple, symmetric, trachea midline and no masses   Pulmonary:even and unlabored  Cardiovascular:No edema or pitting edema present  Skin:Normal without rashes or lesions and well hydrated  Psychiatric:Mood and affect appropriate  Neurologic:Cranial Nerves II-XII grossly intact  Musculoskeletal:normal      Cervical Spine Exam     Appearance:  Normal lordosis  Palpation/Tenderness:  left cervical paraspinal tenderness  right cervical paraspinal tenderness  left trapezium tenderness  right trapezium tenderness  Sensory:  no sensory deficits noted  Range of Motion:  Flexion:  Minimally limited  with pain  Extension:  Minimally limited  with pain  Lateral Flexion - Left:  Minimally limited  with pain  Lateral Flexion - Right:  Minimally limited  with pain  Rotation - Left:  Minimally limited  with pain  Rotation - Right:  Minimally limited  with pain  Motor Strength:  Left Arm Flexion  5/5  Left Arm Extension  5/5  Right Arm Flexion  5/5  Right Arm Extension  5/5  Left Wrist Flexion  5/5  Left Wrist Extension  5/5  Left Finger Abduction  5/5  Right Finger Abduction  5/5  Left    5/5  Right   5/5     Imaging  MRI CERVICAL SPINE WITHOUT CONTRAST     INDICATION: 79-year-old male, neck pain, left arm pain     COMPARISON:  2/18/2019 x-rays     TECHNIQUE:  Sagittal T1, sagittal T2, sagittal inversion recovery, axial T2, axial  2D merge     IMAGE QUALITY:  Diagnostic     FINDINGS:     ALIGNMENT:  Normal alignment of the cervical spine   No compression fracture   No subluxation   No scoliosis      MARROW SIGNAL:  Multilevel degenerative marrow   No significant marrow pathology      CERVICAL AND VISUALIZED THORACIC CORD:  Normal signal within the visualized cord      PREVERTEBRAL AND PARASPINAL SOFT TISSUES:  Normal      VISUALIZED POSTERIOR FOSSA:  The visualized posterior fossa demonstrates no abnormal signal      CERVICAL DISC SPACES:     C2-C3:  Normal      C3-C4:  Mild degenerative disc disease, moderate degenerative facet/uncinate process process arthrosis, moderate to severe left foraminal stenosis, possible left C4 nerve root encroachment     C4-C5:  Mild degenerative disc disease, moderate degenerative facet/uncinate process arthrosis, mild central canal stenosis, moderate to severe left foraminal stenosis, moderate right foraminal stenosis,  possible bilateral C5 nerve root encroachment     C5-C6:  Mild degenerative disc disease, mild to moderate degenerative facet/uncinate process arthrosis, mild bilateral foraminal stenosis, bulging annulus, no overt neural element impingement     C6-C7:  Mild to moderate degenerative disc disease, mild to moderate degenerative facet/uncinate process arthrosis, mild bilateral foraminal stenosis, bulging annulus, no overt neural element impingement     C7-T1:  Normal      UPPER THORACIC DISC SPACES:  Normal      IMPRESSION:  Multilevel degenerative spondylosis, consistent with x-rays     Moderate to severe left foraminal stenosis C3-4     Mild central canal stenosis, bilateral foraminal stenosis C4-5     Mild bilateral foraminal stenosis C5-6 and C6-7

## 2019-08-15 NOTE — OP NOTE
OPERATIVE REPORT  PATIENT NAME: Julián Cobb    :  1955  MRN: [id number]  Pt Location: [facility] OR ROOM 01    SURGERY DATE: 8/15/2019    Surgeon(s) and Role:     * Todd Seo MD - Primary    Preop Diagnosis:  Cervical spondylosis without myelopathy [M47 812]    Post-Op Diagnosis Codes:     * Cervical spondylosis without myelopathy [M47 812]    Procedure(s) (LRB):  BLOCK / INJECTION EPIDURAL STEROID CERVICAL (Bilateral)    Specimen(s):  * No specimens in log *    Estimated Blood Loss:   Minimal    Drains:  * No LDAs found *    Anesthesia Type:   Local    Operative Indications:  Cervical spondylosis without myelopathy [M47 812]      Operative Findings:  same    Complications:   None    Procedure and Technique:  Fluoroscopically-guided cervical Interlaminar Epidural Steroid Injection     Indication:  Cervical pain  Preoperative diagnosis:  Cervical degenerative disc disease  Postoperative diagnosis:  Cervical degenerative disc disease    Procedure: Fluoroscopically-guided  C7-T1 interlaminar epidural steroid injection under fluoroscopy      After discussing the risks, benefits, and alternatives to the procedure, the patient expressed understanding and wished to proceed  The patient was brought to the fluoroscopy suite and placed in the prone position  A procedural pause was conducted to verify:  correct patient identity, procedure to be performed and as applicable, correct side and site, correct patient position, and availability of implants, special equipment and special requirements  After identifying the C7-T1 space fluoroscopically, the skin was sterilely prepped and draped in the usual fashion using Chloraprep skin prep  The skin and subcutaneous tissue were anesthetized with 0 5 % lidocaine  Utilizing a loss of resistance technique and intermittent fluoroscopic guidance, a 3 5 20 gauge Tuohy needle was advanced into the epidural space    Proper needle positioning was confirmed using multiple fluoroscopic views  After negative aspiration, Omnipaque 300 contrast was injected confirming epidural spread without evidence of intravascular or intrathecal spread  A 4 ml solution consisting of 10 mg of dexamethasone in sterile saline was injected slowly and incrementally into the epidural space  Following the injection the needle was withdrawn slightly and flushed with 0 5 % lidocaine as it was fully extracted  The patient tolerated the procedure well and there were no apparent complications  After appropriate observation, the patient was dismissed from the clinic in good condition under their own power       I was present for the entire procedure    Patient Disposition:  hemodynamically stable    SIGNATURE: Hussain Rodriguez MD  DATE: August 15, 2019  TIME: 12:10 PM

## 2019-08-15 NOTE — DISCHARGE INSTRUCTIONS
How to Stop Smoking   AMBULATORY CARE:   You will improve your health and the health of others around you  if you stop smoking  Your risk for heart and lung disease, cancer, stroke, heart attack, and vision problems will also decrease  You can benefit from quitting no matter how long you have smoked  Prepare to stop smoking:  Nicotine is a highly addictive drug found in cigarettes  Withdrawal symptoms can happen when you stop smoking and make it hard to quit  These include anxiety, depression, irritability, trouble sleeping, and increased appetite  You increase your chances of success if you prepare to quit  · Set a quit date  [place] a date that is within the next 2 weeks  Do not pick a day that you think may be stressful or busy  Write down the day or [place] it on your calender  · Tell friends and family that you plan to quit  Explain that you may have withdrawal symptoms when you try to quit  Ask them to support you  They may be able to encourage you and help reduce your stress to make it easier for you to quit  · Make a list of your reasons for quitting  Put the list somewhere you will see it every day, such as your refrigerator  You can look at the list when you have a craving  · Remove all tobacco and nicotine products from your home, car, and workplace  Also, remove anything else that will tempt you to smoke, such as lighters, matches, or ashtrays  Clean your car, home, and places at work that smell like smoke  The smell of smoke can trigger a craving  · Identify triggers that make you want to smoke  This may include activities, feelings, or people  Also write down 1 way you can deal with each of your triggers  For example, if you want to smoke as soon as you wake up, plan another activity during this time, such as exercise  · Make a plan for how you will quit  Learn about the tools that can help you quit, such as medicine, counseling, or nicotine replacement therapy   Choose at least 2 options to help you quit  Tools to help you stop smoking:   · Counseling  from a trained healthcare provider can provide you with support and skills to quit smoking  The provider will also teach you to manage your withdrawal symptoms and cravings  You may receive counseling from one counselor, in group therapy, or through phone therapy called a quit line  · Nicotine replacement therapy (NRT)  such as nicotine patches, gum, or lozenges may help reduce your nicotine cravings  You may get these without a doctor's order  Do not use e-cigarettes or smokeless tobacco in place of cigarettes or to help you quit  They still contain nicotine  · Prescription medicines  such as nasal sprays or nicotine inhalers may help reduce your withdrawal symptoms  Other medicines may also be used to reduce your urge to smoke  Ask your healthcare provider about these medicines  You may need to start certain medicines 2 weeks before your quit date for them to work well  · Hypnosis  is a practice that helps guide you through thoughts and feelings  Hypnosis may help decrease your cravings and make you more willing to quit  · Acupuncture therapy  uses very thin needles to balance energy channels in the body  This is thought to help decrease cravings and symptoms of nicotine withdrawal      · Support groups  let you talk to others who are trying to quit or have already quit  It may be helpful to speak with others about how they quit  Manage your cravings:   · Avoid situations, people, and places that tempt you to smoke  Go to nonsmoking places, such as libraries or restaurants  Understand what tempts you and try to avoid these things  · Keep your hands busy  Hold things such as a stress ball or pen  · Put candy or toothpicks in your mouth  Keep lollipops, sugarless gum, or toothpicks with you at all times  · Do not have alcohol or caffeine  These drinks may tempt you to smoke   Drink healthy liquids such as water or juice instead  · Reward yourself when you resist your cravings  Rewards will motivate you and help you stay positive  · Do an activity that distracts you from your craving  Examples include going for a walk, exercising, or cleaning  Prevent weight gain after you quit:  You may gain a few pounds after you quit smoking  It is healthier for you to gain a few pounds than to continue to smoke  The following can help you prevent weight gain:  · Eat healthy foods  These include fruits, vegetables, whole-grain breads, low-fat dairy products, beans, lean meats, and fish  Eat healthy snacks, such as low-fat yogurt, if you get hungry between meals  · Drink water before, during, and between meals  This will make your stomach feel full and help prevent you from overeating  Ask your healthcare provider how much liquid to drink each day and which liquids are best for you  · Exercise  Take a walk or do some kind of exercise every day  Ask your healthcare provider what exercise is right for you  This may help reduce your cravings and reduce stress  For more support and information:   · [employer]  Phone: 3- 863 - 319-6008  Web Address: www [employer]  © 2017 [address] Information is for End User's use only and may not be sold, redistributed or otherwise used for commercial purposes  All illustrations and images included in CareNotes® are the copyrighted property of [employer] , [employer]  or Georges Perez  The above information is an  only  It is not intended as medical advice for individual conditions or treatments  Talk to your doctor, nurse or pharmacist before following any medical regimen to see if it is safe and effective for you  Epidural Steroid Injection   WHAT YOU NEED TO KNOW:   An epidural steroid injection (TIM) is a procedure to inject steroid medicine into the epidural space  The epidural space is between your spinal cord and vertebrae   Steroids reduce inflammation and fluid buildup in your spine that may be causing pain  You may be given pain medicine along with the steroids  ACTIVITY  · Do not drive or operate machinery today  · No strenuous activity today - bending, lifting, etc   · You may resume normal activites starting tomorrow - start slowly and as tolerated  · You may shower today, but no tub baths or hot tubs  · You may have numbness for several hours from the local anesthetic  Please use caution and common sense, especially with weight-bearing activities  CARE OF THE INJECTION SITE  · If you have soreness or pain, apply ice to the area today (20 minutes on/20 minutes off)  · Starting tomorrow, you may use warm, moist heat or ice if needed  · You may have an increase or change in your discomfort for 36-48 hours after your treatment  · Apply ice and continue with any pain medication you have been prescribed  · Notify [facility] if you have any of the following: redness, drainage, swelling, headache, stiff neck or fever above 100°F     SPECIAL INSTRUCTIONS  · Our office will contact you in approximately 7 days for a progress report  MEDICATIONS  · Continue to take all routine medications  · Our office may have instructed you to hold some medications  If you have a problem specifically related to your procedure, please call our office at [phone]  Problems not related to your procedure should be directed to your primary care physician

## 2019-08-17 DIAGNOSIS — I10 ESSENTIAL HYPERTENSION: Primary | ICD-10-CM

## 2019-08-17 RX ORDER — HYDRALAZINE HYDROCHLORIDE 10 MG/1
TABLET, FILM COATED ORAL
Qty: 30 TABLET | Refills: 3 | Status: SHIPPED | OUTPATIENT
Start: 2019-08-17 | End: 2019-12-26 | Stop reason: SDUPTHER

## 2019-08-20 ENCOUNTER — HOSPITAL ENCOUNTER (OUTPATIENT)
Dept: MRI IMAGING | Facility: HOSPITAL | Age: 64
Discharge: HOME | End: 2019-08-20
Payer: MEDICARE

## 2019-08-20 DIAGNOSIS — G89.29 CHRONIC LEFT SHOULDER PAIN: ICD-10-CM

## 2019-08-20 DIAGNOSIS — M25.512 CHRONIC LEFT SHOULDER PAIN: ICD-10-CM

## 2019-08-20 PROCEDURE — 73221 MRI JOINT UPR EXTREM W/O DYE: CPT

## 2019-08-22 ENCOUNTER — TELEPHONE (OUTPATIENT)
Dept: PAIN MEDICINE | Facility: CLINIC | Age: 64
End: 2019-08-22

## 2019-08-24 DIAGNOSIS — I10 ESSENTIAL HYPERTENSION: ICD-10-CM

## 2019-08-26 RX ORDER — CLONIDINE 0.3 MG/24H
1 PATCH, EXTENDED RELEASE TRANSDERMAL WEEKLY
Qty: 4 PATCH | Refills: 2 | Status: SHIPPED | OUTPATIENT
Start: 2019-08-26 | End: 2020-04-08

## 2019-08-26 RX ORDER — LISINOPRIL 20 MG/1
20 TABLET ORAL 2 TIMES DAILY
Qty: 60 TABLET | Refills: 5 | Status: SHIPPED | OUTPATIENT
Start: 2019-08-26 | End: 2019-11-11

## 2019-08-27 DIAGNOSIS — M75.110 INCOMPLETE TEAR OF ROTATOR CUFF, UNSPECIFIED LATERALITY, UNSPECIFIED WHETHER TRAUMATIC: ICD-10-CM

## 2019-08-27 DIAGNOSIS — S43.439A GLENOID LABRUM TEAR, UNSPECIFIED LATERALITY, INITIAL ENCOUNTER: Primary | ICD-10-CM

## 2019-08-27 NOTE — TELEPHONE ENCOUNTER
S/W pt and advised of below results  Pt was driving and unable to take Dr Mejia's information    Called pt back and left information on VM per pt request

## 2019-08-30 ENCOUNTER — TRANSCRIBE ORDERS (OUTPATIENT)
Dept: LAB | Facility: CLINIC | Age: 64
End: 2019-08-30

## 2019-08-30 ENCOUNTER — APPOINTMENT (OUTPATIENT)
Dept: LAB | Facility: CLINIC | Age: 64
End: 2019-08-30
Payer: MEDICARE

## 2019-08-30 ENCOUNTER — TELEPHONE (OUTPATIENT)
Dept: PAIN MEDICINE | Facility: CLINIC | Age: 64
End: 2019-08-30

## 2019-08-30 DIAGNOSIS — M75.102 TEAR OF LEFT SUPRASPINATUS TENDON: Primary | ICD-10-CM

## 2019-08-30 DIAGNOSIS — G25.0 TREMOR, ESSENTIAL: ICD-10-CM

## 2019-08-30 DIAGNOSIS — Z11.59 ENCOUNTER FOR HEPATITIS C SCREENING TEST FOR LOW RISK PATIENT: ICD-10-CM

## 2019-08-30 LAB
BASOPHILS # BLD AUTO: 0.08 THOUSANDS/ΜL (ref 0–0.1)
BASOPHILS NFR BLD AUTO: 1 % (ref 0–1)
EOSINOPHIL # BLD AUTO: 0 THOUSAND/ΜL (ref 0–0.61)
EOSINOPHIL NFR BLD AUTO: 0 % (ref 0–6)
ERYTHROCYTE [DISTWIDTH] IN BLOOD BY AUTOMATED COUNT: 13.1 % (ref 11.6–15.1)
HCT VFR BLD AUTO: 48.8 % (ref 36.5–49.3)
HGB BLD-MCNC: 15.8 G/DL (ref 12–17)
IMM GRANULOCYTES # BLD AUTO: 0.02 THOUSAND/UL (ref 0–0.2)
IMM GRANULOCYTES NFR BLD AUTO: 0 % (ref 0–2)
LYMPHOCYTES # BLD AUTO: 3.14 THOUSANDS/ΜL (ref 0.6–4.47)
LYMPHOCYTES NFR BLD AUTO: 33 % (ref 14–44)
MCH RBC QN AUTO: 30.2 PG (ref 26.8–34.3)
MCHC RBC AUTO-ENTMCNC: 32.4 G/DL (ref 31.4–37.4)
MCV RBC AUTO: 93 FL (ref 82–98)
MONOCYTES # BLD AUTO: 0.61 THOUSAND/ΜL (ref 0.17–1.22)
MONOCYTES NFR BLD AUTO: 6 % (ref 4–12)
NEUTROPHILS # BLD AUTO: 5.75 THOUSANDS/ΜL (ref 1.85–7.62)
NEUTS SEG NFR BLD AUTO: 60 % (ref 43–75)
NRBC BLD AUTO-RTO: 0 /100 WBCS
PLATELET # BLD AUTO: 221 THOUSANDS/UL (ref 149–390)
PMV BLD AUTO: 10.9 FL (ref 8.9–12.7)
RBC # BLD AUTO: 5.24 MILLION/UL (ref 3.88–5.62)
WBC # BLD AUTO: 9.6 THOUSAND/UL (ref 4.31–10.16)

## 2019-08-30 PROCEDURE — 82390 ASSAY OF CERULOPLASMIN: CPT

## 2019-08-30 PROCEDURE — 86803 HEPATITIS C AB TEST: CPT

## 2019-08-30 PROCEDURE — 85025 COMPLETE CBC W/AUTO DIFF WBC: CPT

## 2019-08-30 PROCEDURE — 36415 COLL VENOUS BLD VENIPUNCTURE: CPT

## 2019-08-30 PROCEDURE — 82525 ASSAY OF COPPER: CPT

## 2019-08-30 NOTE — TELEPHONE ENCOUNTER
RN s/w pt who stated that he already received a call regarding his MRI result as well as having an appt with Dr Carlos Everett for his shoulder  RN did confirm RM appt for 10/7 pt pleasant and appreciative

## 2019-08-30 NOTE — TELEPHONE ENCOUNTER
Please call the patient and let him know that I would like for him to follow up with orthopedics  I have place an order in [facility] for referral to orthopedics       His MRI of his shoulder showed a large articular surface partial tear of the supraspinatus tendon and   Moderate articular surface and insertional partial tear subscapularis tendon with probable biceps pulley lesion      Severe partial tear and tenosynovitis proximal long head biceps tendon      Moderate acromioclavicular osteoarthritis

## 2019-08-31 LAB
CERULOPLASMIN SERPL-MCNC: 23.7 MG/DL (ref 16–31)
HCV AB SER QL: NORMAL

## 2019-09-03 ENCOUNTER — OFFICE VISIT (OUTPATIENT)
Dept: OBGYN CLINIC | Facility: CLINIC | Age: 64
End: 2019-09-03
Payer: MEDICARE

## 2019-09-03 VITALS
BODY MASS INDEX: 32.47 KG/M2 | WEIGHT: 202 LBS | HEART RATE: 75 BPM | HEIGHT: 66 IN | SYSTOLIC BLOOD PRESSURE: 188 MMHG | DIASTOLIC BLOOD PRESSURE: 103 MMHG

## 2019-09-03 DIAGNOSIS — M75.122 NONTRAUMATIC COMPLETE TEAR OF LEFT ROTATOR CUFF: Primary | ICD-10-CM

## 2019-09-03 PROCEDURE — 20610 DRAIN/INJ JOINT/BURSA W/O US: CPT | Performed by: ORTHOPAEDIC SURGERY

## 2019-09-03 PROCEDURE — 99203 OFFICE O/P NEW LOW 30 MIN: CPT | Performed by: ORTHOPAEDIC SURGERY

## 2019-09-03 RX ORDER — BETAMETHASONE SODIUM PHOSPHATE AND BETAMETHASONE ACETATE 3; 3 MG/ML; MG/ML
12 INJECTION, SUSPENSION INTRA-ARTICULAR; INTRALESIONAL; INTRAMUSCULAR; SOFT TISSUE
Status: COMPLETED | OUTPATIENT
Start: 2019-09-03 | End: 2019-09-03

## 2019-09-03 RX ORDER — LIDOCAINE HYDROCHLORIDE 10 MG/ML
4 INJECTION, SOLUTION INFILTRATION; PERINEURAL
Status: COMPLETED | OUTPATIENT
Start: 2019-09-03 | End: 2019-09-03

## 2019-09-03 RX ADMIN — LIDOCAINE HYDROCHLORIDE 4 ML: 10 INJECTION, SOLUTION INFILTRATION; PERINEURAL at 13:13

## 2019-09-03 RX ADMIN — BETAMETHASONE SODIUM PHOSPHATE AND BETAMETHASONE ACETATE 12 MG: 3; 3 INJECTION, SUSPENSION INTRA-ARTICULAR; INTRALESIONAL; INTRAMUSCULAR; SOFT TISSUE at 13:13

## 2019-09-03 NOTE — PROGRESS NOTES
Chief Complaint   left shoulder pain    History Of Presenting Illness  Checo Zhou 1955 presents with  Left shoulder pain 1 year  No history of trauma  Gradually getting worse  Patient also has an element of neck and back pain  Patient is right handed  Patient is a   Patient presents for evaluation with x-rays an MRI  Pain in the left shoulder aggravated by usage    Decreased with rest   Patient had started physical therapy but stopped due to increasing pain      Current Medications  Current Outpatient Medications   Medication Sig Dispense Refill    amLODIPine (NORVASC) 10 mg tablet Take 1 tablet (10 mg total) by mouth daily 30 tablet 6    cloNIDine (CATAPRES-TTS-3) 0 3 mg/24 hr Place 1 patch (0 3 mg total) on the skin once a week 4 patch 2    diphenhydrAMINE-acetaminophen (TYLENOL PM)  MG TABS Take 2 tablets by mouth daily at bedtime as needed for sleep      gabapentin (NEURONTIN) 600 MG tablet Take 1 tablet (600 mg total) by mouth 3 (three) times a day for 30 days 90 tablet 1    hydrALAZINE (APRESOLINE) 10 mg tablet take 1 tablet by mouth once daily 30 tablet 3    hydrochlorothiazide (HYDRODIURIL) 25 mg tablet take 1 tablet by mouth twice a day 60 tablet 3    lisinopril (ZESTRIL) 20 mg tablet Take 1 tablet (20 mg total) by mouth 2 (two) times a day 60 tablet 5    LORazepam (ATIVAN) 1 mg tablet Take 0 5 mg by mouth every 12 (twelve) hours      metoprolol tartrate (LOPRESSOR) 25 mg tablet Take 1 tablet (25 mg total) by mouth every 12 (twelve) hours 60 tablet 5    nitroglycerin (NITROSTAT) 0 4 mg SL tablet Place 0 4 mg under the tongue every 5 (five) minutes as needed for chest pain      [START ON 9/17/2019] oxyCODONE-acetaminophen (PERCOCET) 7 5-325 MG per tablet Take 1 tablet by mouth every 8 (eight) hours as needed for moderate pain for up to 30 daysMax Daily Amount: 3 tablets 90 tablet 0    rosuvastatin (CRESTOR) 10 MG tablet Take 1 tablet (10 mg total) by mouth daily 30 tablet 5    tamsulosin (FLOMAX) 0 4 mg Take 1 capsule (0 4 mg total) by mouth daily with dinner 30 capsule 5    VIAGRA 100 MG tablet   0    aspirin (ECOTRIN LOW STRENGTH) 81 mg EC tablet Take 1 tablet (81 mg total) by mouth daily (Patient not taking: Reported on 9/3/2019) 30 tablet 3    FLUoxetine (PROzac) 20 mg capsule Take 20 mg by mouth daily   0    naloxone (NARCAN) 0 4 mg/mL injection Infuse 1 mL (0 4 mg total) into a venous catheter once for 1 dose 1 mL 0    primidone (MYSOLINE) 50 mg tablet By mouth take 1 tablet twice daily or as directed (Patient not taking: Reported on 9/3/2019) 60 tablet 2    traZODone (DESYREL) 50 mg tablet Take 50 mg by mouth daily at bedtime   0    varenicline (CHANTIX) 0 5 mg tablet Take 0 5 mg by mouth daily       No current facility-administered medications for this visit  Current Problems    Active Problems:   Patient Active Problem List    Diagnosis Date Noted    Tremor, essential 07/23/2019    Cervical spondylosis without myelopathy 07/17/2019    Lumbar spondylosis 02/18/2019    Lumbar degenerative disc disease 02/18/2019    Myofascial pain syndrome 02/18/2019    Chronic low back pain without sciatica 02/18/2019    Cervical radiculopathy 02/18/2019    JAKI (obstructive sleep apnea) 01/28/2019    Chronic bronchitis ([facility] [address] ) 01/28/2019    Abdominal aortic aneurysm (AAA) without rupture ([facility] [address] ) 01/28/2019         Review of Systems:    General: negative for - chills, fatigue, fever,  weight gain or weight loss  Psychological: negative for - anxiety, behavioral disorder, concentration difficulties  Ophthalmic: negative for - blurry vision, decreased vision, double vision,      Past Medical History:   Past Medical History:   Diagnosis Date    Abdominal aortic aneurysm without rupture ([facility] [address] )     Abdominal Aortic Duplex 02/21/2017    Ectatic infrarenal abdominal aorta      CAD (coronary artery disease)     COPD [facility]chronic obstructive pulmonary [facility])     Extremity pain     History of echocardiogram 03/18/2014    EF 55%, mild MR and AI  Mild concentric LVH   History of stress test 03/06/2017    Normal     Hypertension     Joint pain     Low back pain     Migraine     Neck pain     Obstructive sleep apnea     cannot tolerate CPAP    Osteoarthritis     Peripheral neuropathy     Reflex sympathetic dystrophy        Past Surgical History:   Past Surgical History:   Procedure Laterality Date    CARDIAC CATHETERIZATION  02/13/2012    EF 70%, widely patent renal arteries, significant single-vessel CAD-medical therapy   CARDIAC CATHETERIZATION  04/11/2013    EF 65%, 50% mid LAD, 20% prox CFX, 90% diffuse RCA, 99% mid RCA  Medical management      CHOLECYSTECTOMY      EPIDURAL BLOCK INJECTION Bilateral 8/15/2019    Procedure: BLOCK / INJECTION EPIDURAL STEROID CERVICAL;  Surgeon: Raghav Shoemaker MD;  Location: [facility];  Service: Pain Management     FL GUIDED NEEDLE PLAC BX/ASP/INJ  8/15/2019    ORTHOPEDIC SURGERY      TRIGGER POINT INJECTION         Family History:  Family history reviewed and non-contributory  Family History   Adopted: Yes   Problem Relation Age of Onset    No Known Problems Family     No Known Problems Mother     No Known Problems Father        Social History:  Social History     Socioeconomic History    Marital status: /Civil Union     Spouse name: None    Number of children: None    Years of education: None    Highest education level: None   Occupational History    None   Social Needs    Financial resource strain: None    Food insecurity:     Worry: None     Inability: None    Transportation needs:     Medical: None     Non-medical: None   Tobacco Use    Smoking status: Current Every Day Smoker     Packs/day: 1 50    Smokeless tobacco: Never Used   Substance and Sexual Activity    Alcohol use: No    Drug use: No    Sexual activity: None   Lifestyle    Physical activity:     Days per week: None Minutes per session: None    Stress: None   Relationships    Social connections:     Talks on phone: None     Gets together: None     Attends Buddhist service: None     Active member of club or organization: None     Attends meetings of clubs or organizations: None     Relationship status: None    Intimate partner violence:     Fear of current or ex partner: None     Emotionally abused: None     Physically abused: None     Forced sexual activity: None   Other Topics Concern    None   Social History Narrative    None       Allergies:    Allergies   Allergen Reactions    Morphine And Related            Physical ExaminationBP (!) 188/103 Comment: patient states BP is always high  Pulse 75   Ht 5' 6" (1 676 m)   Wt 91 6 kg (202 lb)   BMI 32 60 kg/m²   Gen: Alert and oriented to person, place, time  HEENT: EOMI, eyes clear, moist mucus membranes, hearing intact      Orthopedic Exam   cervical spine no obvious swelling or deformity   left shoulder forward flexion 100 AB duction 110 Cuff strength 4/5 subscap intact Just able to reach the nape of the neck in the small of the back   radiographs left shoulder from 2018 within normal limits   MRI of the left shoulder shows delaminated tear of the supraspinatus on the joint side as well as a tear of the biceps tendon          Impression   left shoulder rotator cuff tear incomplete with the bicipital tendon tear        Plan     discussed treatment with the patient   patient counseled on smoking cessation, icing, over-the-counter pain medication and a home exercise program and physical therapy   patient does not want surgical intervention at present due to work constraints   left glenohumeral joint and subacromial bursa injected with steroid and local anesthetic   patient re- commenced and physical therapy   follow-up in 2 months  Large joint arthrocentesis: L subacromial bursa  Date/Time: 9/3/2019 1:13 PM  Consent given by: patient  Site marked: site marked  Timeout: Immediately prior to procedure a time out was called to verify the correct patient, procedure, equipment, support staff and site/side marked as required   Supporting Documentation  Indications: pain, diagnostic evaluation and joint swelling   Procedure Details  Location: shoulder - L subacromial bursa  Preparation: Patient was prepped and draped in the usual sterile fashion  Needle size: 22 G  Ultrasound guidance: no  Approach: posterolateral  Medications administered: 12 mg betamethasone acetate-betamethasone sodium phosphate 6 (3-3) mg/mL; 4 mL lidocaine 1 %    Patient tolerance: patient tolerated the procedure well with no immediate complications  Dressing:  Sterile dressing applied          Natali Mascorro MD        Portions of the record may have been created with voice recognition software  Occasional wrong word or "sound a like" substitutions may have occurred due to the inherent limitations of voice recognition software  Read the chart carefully and recognize, using context, where substitutions have occurred

## 2019-09-03 NOTE — PATIENT INSTRUCTIONS
Rotator Cuff Tear Repair   WHAT YOU NEED TO KNOW:   Rotator cuff repair is surgery to fix a tear in one or more of your rotator cuff tendons  A tendon is a cord of tough tissue that connects your muscles to your bones  The rotator cuff is made up of a group of muscles and tendons that hold the shoulder joint in place  DISCHARGE INSTRUCTIONS:   Medicines:   · Antibiotics: This medicine is given to fight or prevent an infection caused by bacteria  Always take your antibiotics exactly as ordered by your healthcare provider  Do not stop taking your medicine unless directed by your healthcare provider  Never save antibiotics or take leftover antibiotics that were given to you for another illness  · Pain medicine: You may be given medicine to take away or decrease pain  Do not wait until the pain is severe before you take your medicine  · NSAIDs , such as ibuprofen, help decrease swelling, pain, and fever  This medicine is available with or without a doctor's order  NSAIDs can cause stomach bleeding or kidney problems in certain people  If you take blood thinner medicine, always ask your healthcare provider if NSAIDs are safe for you  Always read the medicine label and follow directions  · Take your medicine as directed  Contact your healthcare provider if you think your medicine is not helping or if you have side effects  Tell him or her if you are allergic to any medicine  Keep a list of the medicines, vitamins, and herbs you take  Include the amounts, and when and why you take them  Bring the list or the pill bottles to follow-up visits  Carry your medicine list with you in case of an emergency  Follow up with your healthcare provider as directed: Ask when you should return to have your stitches taken out  Write down your questions so you remember to ask them during your visits  Care for your wound:  Keep your shoulder wound clean and dry  Ask how to care for the wound, and if you may get it wet     Ice your shoulder: Ice may decrease pain, swelling, and muscle spasms  Use an ice pack, or put crushed ice in a plastic bag  Cover it with a towel and place it on your shoulder for 15 to 20 minutes every hour or as directed  Use a sling: You may need to use an abduction immobilizer sling for 4 to 6 weeks after surgery  This sling stops your arm from moving  The pillow attached to the sling holds your arm away from your body  This position decreases pressure on your wound, and helps blood flow to the surgery area, which may help the wound heal    Physical therapy:   · Your physical therapy may begin soon after surgery  At first, a physical therapist will work with you to do safe exercises that will allow your rotator cuff to heal  Do not use your arm to lift anything  Do not use your arm to move around, push yourself up from lying down, or to change positions  After a few weeks, the therapist may suggest therapy in the pool  The water allows you to move your arm with very little stress on your shoulder  · Over 2 to 3 months, you will do exercises that include using your shoulder more  You will begin exercises such as raising and stretching your arm  Do only those exercises that you have been told to do, and only as often as you are told to do them  Over time, you will begin doing exercises that make your shoulder muscles stronger  After 4 to 6 months, you may be able to begin activities that require you to lift your arm overhead, such as tennis and other racquet sports  It may take up to a year to return to all of your regular daily activities after you have a rotator cuff tear repair  Contact your healthcare provider if:   · Your surgery area is swollen, red, or has pus coming from it  · You have chills, a cough, or feel weak and achy  · You have a fever  · You have stiffness or pain in your shoulder that is worse and making you stop your physical therapy  · You are vomiting      · Your skin is itchy, swollen, or has a rash  · You have questions or concerns about your condition or care  Seek care immediately or call 911 if:   · You suddenly have shortness of breath  · The stitches on your shoulder wound come apart  · You have new shoulder pain that does not go away, even after you take pain medicine  · You have sudden numbness or tingling down your arm  © 2017 [address] [employer] Information is for End User's use only and may not be sold, redistributed or otherwise used for commercial purposes  All illustrations and images included in CareNotes® are the copyrighted property of [employer]  or [employer]  The above information is an  only  It is not intended as medical advice for individual conditions or treatments  Talk to your doctor, nurse or pharmacist before following any medical regimen to see if it is safe and effective for you  Cigarette Smoking and Your Health   AMBULATORY CARE:   Risks to your health if you smoke:  Nicotine and other chemicals found in tobacco damage every cell in your body  Even if you are a light smoker, you have an increased risk for cancer, heart disease, and lung disease  If you are pregnant or have diabetes, smoking increases your risk for complications  Benefits to your health if you stop smoking:   · You decrease respiratory symptoms such as coughing, wheezing, and shortness of breath  · You reduce your risk for cancers of the lung, mouth, throat, kidney, bladder, pancreas, stomach, and cervix  If you already have cancer, you increase the benefits of chemotherapy  You also reduce your risk for cancer returning or a second cancer from developing  · You reduce your risk for heart disease, blood clots, heart attack, and stroke  · You reduce your risk for lung infections, and diseases such as pneumonia, asthma, chronic bronchitis, and emphysema  · Your circulation improves   More oxygen can be delivered to your body  If you have diabetes, you lower your risk for complications, such as kidney, artery, and eye diseases  You also lower your risk for nerve damage  Nerve damage can lead to amputations, poor vision, and blindness  · You improve your body's ability to heal and to fight infections  Benefits to the health of others if you stop smoking:  Tobacco is harmful to nonsmokers who breathe in your secondhand smoke  The following are ways the health of others around you may improve when you stop smoking:  · You lower the risks for lung cancer and heart disease in nonsmoking adults  · If you are pregnant, you lower the risk for miscarriage, early delivery, low birth weight, and stillbirth  You also lower your baby's risk for SIDS, obesity, developmental delay, and neurobehavioral problems, such as ADHD  · If you have children, you lower their risk for ear infections, colds, pneumonia, bronchitis, and asthma  For more information and support to stop smoking:   · [employer]  gov  Phone: 1- 324 - 660-4007  Web Address: [place]  Follow up with your healthcare provider as directed:  Write down your questions so you remember to ask them during your visits  © 2017 [address] [employer] Information is for End User's use only and may not be sold, redistributed or otherwise used for commercial purposes  All illustrations and images included in CareNotes® are the copyrighted property of [employer]  or [employer]  The above information is an  only  It is not intended as medical advice for individual conditions or treatments  Talk to your doctor, nurse or pharmacist before following any medical regimen to see if it is safe and effective for you

## 2019-09-04 ENCOUNTER — TELEPHONE (OUTPATIENT)
Dept: OBGYN CLINIC | Facility: HOSPITAL | Age: 64
End: 2019-09-04

## 2019-09-04 ENCOUNTER — OFFICE VISIT (OUTPATIENT)
Dept: NEUROLOGY | Facility: CLINIC | Age: 64
End: 2019-09-04
Payer: MEDICARE

## 2019-09-04 VITALS
HEART RATE: 90 BPM | WEIGHT: 203 LBS | BODY MASS INDEX: 32.62 KG/M2 | SYSTOLIC BLOOD PRESSURE: 162 MMHG | DIASTOLIC BLOOD PRESSURE: 90 MMHG | RESPIRATION RATE: 16 BRPM | HEIGHT: 66 IN

## 2019-09-04 DIAGNOSIS — G25.0 TREMOR, ESSENTIAL: Primary | ICD-10-CM

## 2019-09-04 LAB — COPPER SERPL-MCNC: 101 UG/DL (ref 72–166)

## 2019-09-04 PROCEDURE — 99213 OFFICE O/P EST LOW 20 MIN: CPT | Performed by: PSYCHIATRY & NEUROLOGY

## 2019-09-04 RX ORDER — CLONAZEPAM 0.5 MG/1
TABLET ORAL
Qty: 90 TABLET | Refills: 1 | Status: SHIPPED | OUTPATIENT
Start: 2019-09-04 | End: 2020-04-09 | Stop reason: ALTCHOICE

## 2019-09-04 NOTE — TELEPHONE ENCOUNTER
Spoke to pt  Who stated he wasn't sure how long he was going to be  in pain after receiving an injection to left shoulder and what can he do for the pain  I advised pt  The next couple of days he may have discomfort/pain but he should start seeing a decrease within a week, also advised to take otc pain medication Tylenol if needed and he should not exceed max dose of 3000 mg in 24 hrs, pt  Also advised to rest arm and ice for 20 minutes on and 20 minutes off

## 2019-09-04 NOTE — LETTER
September 4, 2019     Desiree Blanchard, [address] [place] [id number]    Patient: Khloe Power   YOB: 1955   Date of Visit: 9/4/2019       Dear Dr Miya Rogers:    Thank you for referring Marianna Watkins to me for evaluation  Below are my notes for this consultation  If you have questions, please do not hesitate to call me  I look forward to following your patient along with you  Sincerely,        Rosalva Nickerson MD        CC: No Recipients  Rosalva Nickerson MD  9/4/2019 10:55 AM  Sign at close encounter  Patient ID: Khloe Power is a 71 y o  male  Assessment/Plan:    Tremor, essential  Although the primidone was beginning to have a beneficial effect with regard to his tremor, he began to experience unacceptable side effects (sexual side effects) forcing its discontinuation  His tremor has resumed a presence which suggest the need for treatment at this point as he does find times functionally impairing   --intolerant of primidone  --already on a beta-blocker through Cardiology  --as an alternative, trial on clonazepam (no longer utilizing lorazepam p r n )  Will begin slowly using 0 5 mg tablets and taking 1/2 tablet at bedtime daily for 2 days and then 1/2 tablet twice daily  Advised of potential drowsing side effects  Can be further advanced pending clinical response and tolerance  --asked to call the office at the end of next week with a status report and to discuss possible further advancement in his clonazepam schedule  He will call before then should he have any questions or difficulties tolerating  He will follow up in 2 months  Subjective:    HPI  The patient, 71years of age, returns to reassess the status of his tremor disorder  After his initial evaluation felt to represent a form of essential tremor  When initially seen, he was started on low-dose primidone  He began did derive a benefit    However, he quickly experienced side effects and specifically sexual side effects  This forced its discontinuation  His tremor has resumed its pre treatment appearance and at times he finds it functionally impairing  He did have his additional study performed and those results were reviewed  Serum copper normal at 101  Ceruloplasmin normal at 23 7  Repeat CBC with hemoglobin 15 8, hematocrit 48 8, platelet count 502 and a normalized white count of 9 60  Past Medical History:   Diagnosis Date    Abdominal aortic aneurysm without rupture ([address] )     Abdominal Aortic Duplex 02/21/2017    Ectatic infrarenal abdominal aorta   CAD (coronary artery disease)     [facility])     Extremity pain     History of echocardiogram 03/18/2014    EF 55%, mild MR and AI  Mild concentric LVH   History of stress test 03/06/2017    Normal     Hypertension     Joint pain     Low back pain     Migraine     Neck pain     Obstructive sleep apnea     cannot tolerate CPAP    Osteoarthritis     Peripheral neuropathy     Reflex sympathetic dystrophy      Past Surgical History:   Procedure Laterality Date    CARDIAC CATHETERIZATION  02/13/2012    EF 70%, widely patent renal arteries, significant single-vessel CAD-medical therapy   CARDIAC CATHETERIZATION  04/11/2013    EF 65%, 50% mid LAD, 20% prox CFX, 90% diffuse RCA, 99% mid RCA  Medical management      CHOLECYSTECTOMY      EPIDURAL BLOCK INJECTION Bilateral 8/15/2019    Procedure: BLOCK / INJECTION EPIDURAL STEROID CERVICAL;  Surgeon: Haley Pérez MD;  Location: [facility];  Service: Pain Management     FL GUIDED NEEDLE PLAC BX/ASP/INJ  8/15/2019    ORTHOPEDIC SURGERY      TRIGGER POINT INJECTION       Social History     Socioeconomic History    Marital status: /Civil Union     Spouse name: None    Number of children: None    Years of education: None    Highest education level: None   Occupational History    None   Social Needs    Financial resource strain: None    Food insecurity:     Worry: None     Inability: None    Transportation needs:     Medical: None     Non-medical: None   Tobacco Use    Smoking status: Current Every Day Smoker     Packs/day: 1 50    Smokeless tobacco: Never Used   Substance and Sexual Activity    Alcohol use: No    Drug use: No    Sexual activity: None   Lifestyle    Physical activity:     Days per week: None     Minutes per session: None    Stress: None   Relationships    Social connections:     Talks on phone: None     Gets together: None     Attends Confucianist service: None     Active member of club or organization: None     Attends meetings of clubs or organizations: None     Relationship status: None    Intimate partner violence:     Fear of current or ex partner: None     Emotionally abused: None     Physically abused: None     Forced sexual activity: None   Other Topics Concern    None   Social History Narrative    None     Family History   Adopted: Yes   Problem Relation Age of Onset    No Known Problems Family     No Known Problems Mother     No Known Problems Father      Allergies   Allergen Reactions    Morphine And Related        Current Outpatient Medications:     amLODIPine (NORVASC) 10 mg tablet, Take 1 tablet (10 mg total) by mouth daily, Disp: 30 tablet, Rfl: 6    aspirin (ECOTRIN LOW STRENGTH) 81 mg EC tablet, Take 1 tablet (81 mg total) by mouth daily, Disp: 30 tablet, Rfl: 3    cloNIDine (CATAPRES-TTS-3) 0 3 mg/24 hr, Place 1 patch (0 3 mg total) on the skin once a week, Disp: 4 patch, Rfl: 2    diphenhydrAMINE-acetaminophen (TYLENOL PM)  MG TABS, Take 2 tablets by mouth daily at bedtime as needed for sleep, Disp: , Rfl:     FLUoxetine (PROzac) 20 mg capsule, Take 20 mg by mouth daily , Disp: , Rfl: 0    gabapentin (NEURONTIN) 600 MG tablet, Take 1 tablet (600 mg total) by mouth 3 (three) times a day for 30 days, Disp: 90 tablet, Rfl: 1    hydrALAZINE (APRESOLINE) 10 mg tablet, take 1 tablet by mouth once daily, Disp: 30 tablet, Rfl: 3    hydrochlorothiazide (HYDRODIURIL) 25 mg tablet, take 1 tablet by mouth twice a day, Disp: 60 tablet, Rfl: 3    lisinopril (ZESTRIL) 20 mg tablet, Take 1 tablet (20 mg total) by mouth 2 (two) times a day, Disp: 60 tablet, Rfl: 5    metoprolol tartrate (LOPRESSOR) 25 mg tablet, Take 1 tablet (25 mg total) by mouth every 12 (twelve) hours, Disp: 60 tablet, Rfl: 5    nitroglycerin (NITROSTAT) 0 4 mg SL tablet, Place 0 4 mg under the tongue every 5 (five) minutes as needed for chest pain, Disp: , Rfl:     [START ON 9/17/2019] oxyCODONE-acetaminophen (PERCOCET) 7 5-325 MG per tablet, Take 1 tablet by mouth every 8 (eight) hours as needed for moderate pain for up to 30 daysMax Daily Amount: 3 tablets, Disp: 90 tablet, Rfl: 0    primidone (MYSOLINE) 50 mg tablet, By mouth take 1 tablet twice daily or as directed, Disp: 60 tablet, Rfl: 2    rosuvastatin (CRESTOR) 10 MG tablet, Take 1 tablet (10 mg total) by mouth daily, Disp: 30 tablet, Rfl: 5    tamsulosin (FLOMAX) 0 4 mg, Take 1 capsule (0 4 mg total) by mouth daily with dinner, Disp: 30 capsule, Rfl: 5    traZODone (DESYREL) 50 mg tablet, Take 50 mg by mouth daily at bedtime , Disp: , Rfl: 0    varenicline (CHANTIX) 0 5 mg tablet, Take 0 5 mg by mouth daily, Disp: , Rfl:     VIAGRA 100 MG tablet, , Disp: , Rfl: 0    clonazePAM (KlonoPIN) 0 5 mg tablet, By mouth take 1 tablet 3 times daily or as directed, Disp: 90 tablet, Rfl: 1    naloxone (NARCAN) 0 4 mg/mL injection, Infuse 1 mL (0 4 mg total) into a venous catheter once for 1 dose, Disp: 1 mL, Rfl: 0  No current facility-administered medications for this visit  Objective:    Blood pressure 162/90, pulse 90, resp  rate 16, height 5' 6" (1 676 m), weight 92 1 kg (203 lb)  Physical Exam  Head normocephalic  Lungs clear to auscultation  Rhythm regular  GI (abdomen) soft nontender  Bowel sounds present    No significant lower extremity edema  Neurological Exam  Alert  Pleasantly interactive  Fully oriented  No voice tremor  Full symmetrical strength throughout the 4 extremities  Good facial and general animation  Normal rest tone  No tone increase or cogwheeling with contralateral distraction maneuver  No rest tremor observed  Did have a moderate frequency, moderate amplitude tremor of the outstretched upper extremities, left greater than right, which became more apparent with extended sustention  ROS:    Review of Systems   Constitutional: Positive for fatigue  Negative for appetite change and fever  HENT: Negative  Negative for hearing loss, tinnitus, trouble swallowing and voice change  Eyes: Negative  Negative for photophobia and pain  Respiratory: Positive for shortness of breath  Cardiovascular: Negative  Negative for palpitations  Gastrointestinal: Negative  Negative for nausea and vomiting  Endocrine: Negative  Negative for cold intolerance and heat intolerance  Genitourinary: Negative  Negative for dysuria, frequency and urgency  Musculoskeletal: Negative  Negative for myalgias and neck pain  Skin: Negative  Negative for rash  Allergic/Immunologic: Negative  Neurological: Positive for dizziness, tremors, light-headedness, numbness (left arm at times) and headaches  Negative for seizures, syncope, facial asymmetry, speech difficulty and weakness  Hematological: Negative  Does not bruise/bleed easily  Psychiatric/Behavioral: Positive for sleep disturbance  Negative for confusion and hallucinations  I personally reviewed the ROS that was entered by the medical assistant  *Please note this document was created using voice recognition software and may contain sound-alike word errors  *

## 2019-09-04 NOTE — PATIENT INSTRUCTIONS
Begin clonazepam 0 5 mg tablets taking 1/2 tablet at bedtime daily for 2 days then 1/2 tablet twice daily  Watch out for possible drowsing side effects  Call late next week with a status report and discuss possible further advancement in the clonazepam schedule

## 2019-09-04 NOTE — PROGRESS NOTES
Patient ID: Marisela Marie is a 71 y o  male  Assessment/Plan:    Tremor, essential  Although the primidone was beginning to have a beneficial effect with regard to his tremor, he began to experience unacceptable side effects (sexual side effects) forcing its discontinuation  His tremor has resumed a presence which suggest the need for treatment at this point as he does find times functionally impairing   --intolerant of primidone  --already on a beta-blocker through Cardiology  --as an alternative, trial on clonazepam (no longer utilizing lorazepam p r n )  Will begin slowly using 0 5 mg tablets and taking 1/2 tablet at bedtime daily for 2 days and then 1/2 tablet twice daily  Advised of potential drowsing side effects  Can be further advanced pending clinical response and tolerance  --asked to call the office at the end of next week with a status report and to discuss possible further advancement in his clonazepam schedule  He will call before then should he have any questions or difficulties tolerating  He will follow up in 2 months  Subjective:    HPI  The patient, 71years of age, returns to reassess the status of his tremor disorder  After his initial evaluation felt to represent a form of essential tremor  When initially seen, he was started on low-dose primidone  He began did derive a benefit  However, he quickly experienced side effects and specifically sexual side effects  This forced its discontinuation  His tremor has resumed its pre treatment appearance and at times he finds it functionally impairing  He did have his additional study performed and those results were reviewed  Serum copper normal at 101  Ceruloplasmin normal at 23 7  Repeat CBC with hemoglobin 15 8, hematocrit 48 8, platelet count 248 and a normalized white count of 9 60      Past Medical History:   Diagnosis Date    [facility] )     Abdominal Aortic Duplex 02/21/2017 Ectatic infrarenal abdominal aorta   CAD (coronary artery disease)     COPD (chronic obstructive pulmonary disease) ([facility])     Extremity pain     History of echocardiogram 03/18/2014    EF 55%, mild MR and AI  Mild concentric LVH   History of stress test 03/06/2017    Normal     Hypertension     Joint pain     Low back pain     Migraine     Neck pain     Obstructive sleep apnea     cannot tolerate CPAP    Osteoarthritis     Peripheral neuropathy     Reflex sympathetic dystrophy      Past Surgical History:   Procedure Laterality Date    CARDIAC CATHETERIZATION  02/13/2012    EF 70%, widely patent renal arteries, significant single-vessel CAD-medical therapy   CARDIAC CATHETERIZATION  04/11/2013    EF 65%, 50% mid LAD, 20% prox CFX, 90% diffuse RCA, 99% mid RCA  Medical management      CHOLECYSTECTOMY      EPIDURAL BLOCK INJECTION Bilateral 8/15/2019    Procedure: BLOCK / INJECTION EPIDURAL STEROID CERVICAL;  Surgeon: Janelle Reagan MD;  Location: [facility];  Service: Pain Management     FL GUIDED NEEDLE PLAC BX/ASP/INJ  8/15/2019    ORTHOPEDIC SURGERY      TRIGGER POINT INJECTION       Social History     Socioeconomic History    Marital status: /Civil Union     Spouse name: None    Number of children: None    Years of education: None    Highest education level: None   Occupational History    None   Social Needs    Financial resource strain: None    Food insecurity:     Worry: None     Inability: None    Transportation needs:     Medical: None     Non-medical: None   Tobacco Use    Smoking status: Current Every Day Smoker     Packs/day: 1 50    Smokeless tobacco: Never Used   Substance and Sexual Activity    Alcohol use: No    Drug use: No    Sexual activity: None   Lifestyle    Physical activity:     Days per week: None     Minutes per session: None    Stress: None   Relationships    Social connections:     Talks on phone: None     Gets together: None Attends Mandaeism service: None     Active member of club or organization: None     Attends meetings of clubs or organizations: None     Relationship status: None    Intimate partner violence:     Fear of current or ex partner: None     Emotionally abused: None     Physically abused: None     Forced sexual activity: None   Other Topics Concern    None   Social History Narrative    None     Family History   Adopted: Yes   Problem Relation Age of Onset    No Known Problems Family     No Known Problems Mother     No Known Problems Father      Allergies   Allergen Reactions    Morphine And Related        Current Outpatient Medications:     amLODIPine (NORVASC) 10 mg tablet, Take 1 tablet (10 mg total) by mouth daily, Disp: 30 tablet, Rfl: 6    aspirin (ECOTRIN LOW STRENGTH) 81 mg EC tablet, Take 1 tablet (81 mg total) by mouth daily, Disp: 30 tablet, Rfl: 3    cloNIDine (CATAPRES-TTS-3) 0 3 mg/24 hr, Place 1 patch (0 3 mg total) on the skin once a week, Disp: 4 patch, Rfl: 2    diphenhydrAMINE-acetaminophen (TYLENOL PM)  MG TABS, Take 2 tablets by mouth daily at bedtime as needed for sleep, Disp: , Rfl:     FLUoxetine (PROzac) 20 mg capsule, Take 20 mg by mouth daily , Disp: , Rfl: 0    gabapentin (NEURONTIN) 600 MG tablet, Take 1 tablet (600 mg total) by mouth 3 (three) times a day for 30 days, Disp: 90 tablet, Rfl: 1    hydrALAZINE (APRESOLINE) 10 mg tablet, take 1 tablet by mouth once daily, Disp: 30 tablet, Rfl: 3    hydrochlorothiazide (HYDRODIURIL) 25 mg tablet, take 1 tablet by mouth twice a day, Disp: 60 tablet, Rfl: 3    lisinopril (ZESTRIL) 20 mg tablet, Take 1 tablet (20 mg total) by mouth 2 (two) times a day, Disp: 60 tablet, Rfl: 5    metoprolol tartrate (LOPRESSOR) 25 mg tablet, Take 1 tablet (25 mg total) by mouth every 12 (twelve) hours, Disp: 60 tablet, Rfl: 5    nitroglycerin (NITROSTAT) 0 4 mg SL tablet, Place 0 4 mg under the tongue every 5 (five) minutes as needed for chest pain, Disp: , Rfl:     [START ON 9/17/2019] oxyCODONE-acetaminophen (PERCOCET) 7 5-325 MG per tablet, Take 1 tablet by mouth every 8 (eight) hours as needed for moderate pain for up to 30 daysMax Daily Amount: 3 tablets, Disp: 90 tablet, Rfl: 0    primidone (MYSOLINE) 50 mg tablet, By mouth take 1 tablet twice daily or as directed, Disp: 60 tablet, Rfl: 2    rosuvastatin (CRESTOR) 10 MG tablet, Take 1 tablet (10 mg total) by mouth daily, Disp: 30 tablet, Rfl: 5    tamsulosin (FLOMAX) 0 4 mg, Take 1 capsule (0 4 mg total) by mouth daily with dinner, Disp: 30 capsule, Rfl: 5    traZODone (DESYREL) 50 mg tablet, Take 50 mg by mouth daily at bedtime , Disp: , Rfl: 0    varenicline (CHANTIX) 0 5 mg tablet, Take 0 5 mg by mouth daily, Disp: , Rfl:     VIAGRA 100 MG tablet, , Disp: , Rfl: 0    clonazePAM (KlonoPIN) 0 5 mg tablet, By mouth take 1 tablet 3 times daily or as directed, Disp: 90 tablet, Rfl: 1    naloxone (NARCAN) 0 4 mg/mL injection, Infuse 1 mL (0 4 mg total) into a venous catheter once for 1 dose, Disp: 1 mL, Rfl: 0  No current facility-administered medications for this visit  Objective:    Blood pressure 162/90, pulse 90, resp  rate 16, height 5' 6" (1 676 m), weight 92 1 kg (203 lb)  Physical Exam  Head normocephalic  Lungs clear to auscultation  Rhythm regular  GI (abdomen) soft nontender  Bowel sounds present  No significant lower extremity edema  Neurological Exam  Alert  Pleasantly interactive  Fully oriented  No voice tremor  Full symmetrical strength throughout the 4 extremities  Good facial and general animation  Normal rest tone  No tone increase or cogwheeling with contralateral distraction maneuver  No rest tremor observed  Did have a moderate frequency, moderate amplitude tremor of the outstretched upper extremities, left greater than right, which became more apparent with extended sustention  ROS:    Review of Systems   Constitutional: Positive for fatigue  Negative for appetite change and fever  HENT: Negative  Negative for hearing loss, tinnitus, trouble swallowing and voice change  Eyes: Negative  Negative for photophobia and pain  Respiratory: Positive for shortness of breath  Cardiovascular: Negative  Negative for palpitations  Gastrointestinal: Negative  Negative for nausea and vomiting  Endocrine: Negative  Negative for cold intolerance and heat intolerance  Genitourinary: Negative  Negative for dysuria, frequency and urgency  Musculoskeletal: Negative  Negative for myalgias and neck pain  Skin: Negative  Negative for rash  Allergic/Immunologic: Negative  Neurological: Positive for dizziness, tremors, light-headedness, numbness (left arm at times) and headaches  Negative for seizures, syncope, facial asymmetry, speech difficulty and weakness  Hematological: Negative  Does not bruise/bleed easily  Psychiatric/Behavioral: Positive for sleep disturbance  Negative for confusion and hallucinations  I personally reviewed the ROS that was entered by the medical assistant  *Please note this document was created using voice recognition software and may contain sound-alike word errors  *

## 2019-09-04 NOTE — ASSESSMENT & PLAN NOTE
Although the primidone was beginning to have a beneficial effect with regard to his tremor, he began to experience unacceptable side effects (sexual side effects) forcing its discontinuation  His tremor has resumed a presence which suggest the need for treatment at this point as he does find times functionally impairing   --intolerant of primidone  --already on a beta-blocker through Cardiology  --as an alternative, trial on clonazepam (no longer utilizing lorazepam p r n )  Will begin slowly using 0 5 mg tablets and taking 1/2 tablet at bedtime daily for 2 days and then 1/2 tablet twice daily  Advised of potential drowsing side effects  Can be further advanced pending clinical response and tolerance  --asked to call the office at the end of next week with a status report and to discuss possible further advancement in his clonazepam schedule  He will call before then should he have any questions or difficulties tolerating

## 2019-09-04 NOTE — TELEPHONE ENCOUNTER
Patient was in the office to see Dr Solis Hoover yesterday  He had an injection in his left shoulder  He states that it is still giving him pain   He is asking how long it will last?

## 2019-09-09 DIAGNOSIS — I10 ESSENTIAL HYPERTENSION: ICD-10-CM

## 2019-09-09 RX ORDER — AMLODIPINE BESYLATE 5 MG/1
TABLET ORAL
Qty: 30 TABLET | Refills: 6 | Status: SHIPPED | OUTPATIENT
Start: 2019-09-09 | End: 2019-11-15

## 2019-09-12 ENCOUNTER — TELEPHONE (OUTPATIENT)
Dept: NEUROLOGY | Facility: CLINIC | Age: 64
End: 2019-09-12

## 2019-09-12 NOTE — TELEPHONE ENCOUNTER
Patient called with update on clonazepam  Medication is helping patients "shakes", but he is asking if this could cause insomnia  Since starting medication, patient has had difficulty falling asleep  Patient asking if you have any recommendations as this is becoming a problem  Currently taking only 1/2 tab at HS  Okay to leave a detailed message

## 2019-09-12 NOTE — TELEPHONE ENCOUNTER
Have him move the half tablet of clonazepam to the morning rather than at bedtime  Hopefully will not cause daytime drowsing  Have him call back in 1-2 weeks with a status report  Thanks

## 2019-09-17 ENCOUNTER — TELEPHONE (OUTPATIENT)
Dept: PAIN MEDICINE | Facility: CLINIC | Age: 64
End: 2019-09-17

## 2019-09-17 NOTE — TELEPHONE ENCOUNTER
RN s/w pt  Pt informed Percocet was filled and reminded pt of his next appt date and time on 10/7/19  Pt verbalized understanding and appreciative of the call

## 2019-09-17 NOTE — TELEPHONE ENCOUNTER
S/w pt, would like to know "are they sending my medications in for refill"  Pt would like to discuss further       #  [phone]

## 2019-09-23 ENCOUNTER — TELEPHONE (OUTPATIENT)
Dept: PAIN MEDICINE | Facility: CLINIC | Age: 64
End: 2019-09-23

## 2019-09-23 NOTE — TELEPHONE ENCOUNTER
I would like for this patient to come in for a random pill count  He was positive for oxycodone metabolite and not the parent drug

## 2019-10-04 DIAGNOSIS — M51.36 LUMBAR DEGENERATIVE DISC DISEASE: ICD-10-CM

## 2019-10-04 DIAGNOSIS — M54.12 CERVICAL RADICULOPATHY: ICD-10-CM

## 2019-10-04 RX ORDER — GABAPENTIN 600 MG/1
TABLET ORAL
Qty: 90 TABLET | Refills: 1 | OUTPATIENT
Start: 2019-10-04

## 2019-10-04 NOTE — TELEPHONE ENCOUNTER
Pt called stating why his medication was denied for the gabapentin  Informed him that when he needs a refill for his medication he would need to call in and request since we do not take refill requests from the pharmacy  Pt stated that was absolutely ridiculous and that he will request the Gabapentin refill from his PCP and will talk to Dr Cindi Moreno about this

## 2019-10-07 ENCOUNTER — OFFICE VISIT (OUTPATIENT)
Dept: PAIN MEDICINE | Facility: CLINIC | Age: 64
End: 2019-10-07
Payer: MEDICARE

## 2019-10-07 ENCOUNTER — OFFICE VISIT (OUTPATIENT)
Dept: FAMILY MEDICINE CLINIC | Facility: CLINIC | Age: 64
End: 2019-10-07
Payer: MEDICARE

## 2019-10-07 VITALS
BODY MASS INDEX: 32.14 KG/M2 | HEIGHT: 66 IN | DIASTOLIC BLOOD PRESSURE: 80 MMHG | SYSTOLIC BLOOD PRESSURE: 140 MMHG | WEIGHT: 200 LBS

## 2019-10-07 VITALS
HEART RATE: 98 BPM | SYSTOLIC BLOOD PRESSURE: 170 MMHG | TEMPERATURE: 98.2 F | OXYGEN SATURATION: 97 % | WEIGHT: 204.2 LBS | DIASTOLIC BLOOD PRESSURE: 98 MMHG | BODY MASS INDEX: 32.82 KG/M2 | HEIGHT: 66 IN

## 2019-10-07 DIAGNOSIS — Z13.31 NEGATIVE DEPRESSION SCREENING: ICD-10-CM

## 2019-10-07 DIAGNOSIS — M47.816 LUMBAR SPONDYLOSIS: ICD-10-CM

## 2019-10-07 DIAGNOSIS — I25.10 CORONARY ARTERY DISEASE INVOLVING NATIVE CORONARY ARTERY OF NATIVE HEART WITHOUT ANGINA PECTORIS: ICD-10-CM

## 2019-10-07 DIAGNOSIS — G89.4 CHRONIC PAIN SYNDROME: ICD-10-CM

## 2019-10-07 DIAGNOSIS — J18.9 PNEUMONIA OF RIGHT LOWER LOBE DUE TO INFECTIOUS ORGANISM: Primary | ICD-10-CM

## 2019-10-07 DIAGNOSIS — F11.20 UNCOMPLICATED OPIOID DEPENDENCE (HCC): ICD-10-CM

## 2019-10-07 DIAGNOSIS — G47.00 INSOMNIA, UNSPECIFIED TYPE: ICD-10-CM

## 2019-10-07 DIAGNOSIS — M51.36 LUMBAR DEGENERATIVE DISC DISEASE: ICD-10-CM

## 2019-10-07 DIAGNOSIS — M54.12 CERVICAL RADICULOPATHY: ICD-10-CM

## 2019-10-07 DIAGNOSIS — Z79.891 LONG-TERM CURRENT USE OF OPIATE ANALGESIC: ICD-10-CM

## 2019-10-07 PROCEDURE — 99214 OFFICE O/P EST MOD 30 MIN: CPT | Performed by: ANESTHESIOLOGY

## 2019-10-07 PROCEDURE — 99213 OFFICE O/P EST LOW 20 MIN: CPT | Performed by: FAMILY MEDICINE

## 2019-10-07 RX ORDER — OXYCODONE AND ACETAMINOPHEN 7.5; 325 MG/1; MG/1
1 TABLET ORAL EVERY 8 HOURS PRN
Qty: 90 TABLET | Refills: 0 | Status: SHIPPED | OUTPATIENT
Start: 2019-10-07 | End: 2019-11-06

## 2019-10-07 RX ORDER — GABAPENTIN 600 MG/1
600 TABLET ORAL 3 TIMES DAILY
Qty: 90 TABLET | Refills: 1 | Status: SHIPPED | OUTPATIENT
Start: 2019-10-07 | End: 2019-12-09 | Stop reason: SDUPTHER

## 2019-10-07 RX ORDER — LEVOFLOXACIN 500 MG/1
500 TABLET, FILM COATED ORAL EVERY 24 HOURS
Qty: 10 TABLET | Refills: 0 | Status: SHIPPED | OUTPATIENT
Start: 2019-10-07 | End: 2019-10-17

## 2019-10-07 RX ORDER — LISINOPRIL 40 MG/1
40 TABLET ORAL DAILY
Refills: 0 | COMMUNITY
Start: 2019-09-11 | End: 2019-12-14 | Stop reason: SDUPTHER

## 2019-10-07 RX ORDER — TRAZODONE HYDROCHLORIDE 50 MG/1
50 TABLET ORAL
Qty: 30 TABLET | Refills: 6 | Status: SHIPPED | OUTPATIENT
Start: 2019-10-07 | End: 2020-04-08

## 2019-10-07 NOTE — PROGRESS NOTES
Assessment:  1  Cervical radiculopathy    2  Lumbar degenerative disc disease    3  Lumbar spondylosis    4  Uncomplicated opioid dependence ([address] )    5  Chronic pain syndrome    6  Long-term current use of opiate analgesic    7  Coronary artery disease involving native coronary artery of native heart without angina pectoris        Plan:  Kenyetta Rockwell is a 71 y o  male with a history of chronic pain syndrome secondary to cervical radiculopathy, lumbar spondylosis, lumbar degenerative disc disease, chronic low back pain  Patient is status left shoulder injection performed by Dr Olivia Cuellar to which he reported 70% relief for 2 weeks  Patient denies any other adverse events since last office visit  1  We will refill patient's Percocet 7 5/325 mg p o  T i d  On for chronic neck and low back and bilateral knee pain (2 scripts provided)  2  We will refill patient's gabapentin 600 mg p o  T i d  For radicular symptoms/neuropathic symptoms (2 scripts provided)  3  Follow-up in 2 months    [place] Prescription Drug Monitoring Program report was reviewed and was appropriate     There are risks associated with opioid medications, including dependence, addiction and tolerance  The patient understands and agrees to use these medications only as prescribed  Potential side effects of the medications include, but are not limited to, constipation, drowsiness, addiction, impaired judgment and risk of fatal overdose if not taken as prescribed  The patient was warned against driving while taking sedation medications  Sharing medications is a felony  At this point in time, the patient is showing no signs of addiction, abuse, diversion or suicidal ideation  The patient will follow-up in 8 weeks for medication prescription refill and reevaluation  The patient was advised to contact the office should their symptoms worsen in the interim  The patient was agreeable and verbalized an understanding          History of Present Illness: The patient is a 71 y o  male last seen on 8/12/19 who presents for a follow up office visit in regards to chronic pain secondary to neck pain, low back pain, bilateral knee pain  The patient currently reports 7/10 constant dull/aching sharp throbbing pain in the neck and low back which is worse in the morning in the nighttime  Current pain medications includes:  Gabapentin 600 mg p o  T i d , Percocet 7 5/325   The patient reports that this regimen is providing 30% pain relief  The patient is reporting no side effects from this pain medication regimen  Pain Contract Signed: 4/22/19  Last Urine Drug Screen: 8/12/19  Last oxycodone 10/7/19 per Pt  I have personally reviewed and/or updated the patient's past medical history, past surgical history, family history, social history, current medications, allergies, and vital signs today  Review of Systems:    Review of Systems   Respiratory: Positive for shortness of breath  Cardiovascular: Negative for chest pain  Gastrointestinal: Negative for constipation, diarrhea, nausea and vomiting  Musculoskeletal: Positive for gait problem  Negative for arthralgias, joint swelling and myalgias  Skin: Negative for rash  Neurological: Positive for dizziness  Negative for seizures and weakness  All other systems reviewed and are negative  Past Medical History:   Diagnosis Date    Abdominal aortic aneurysm without rupture ([facility] [address] )     Abdominal Aortic Duplex 02/21/2017    Ectatic infrarenal abdominal aorta   CAD (coronary artery disease)     COPD (chronic obstructive pulmonary disease) (HCC)     Extremity pain     History of echocardiogram 03/18/2014    EF 55%, mild MR and AI  Mild concentric LVH      History of stress test 03/06/2017    Normal     Hypertension     Joint pain     Low back pain     Migraine     Neck pain     Obstructive sleep apnea     cannot tolerate CPAP    Osteoarthritis     Peripheral neuropathy     Reflex sympathetic dystrophy        Past Surgical History:   Procedure Laterality Date    CARDIAC CATHETERIZATION  02/13/2012    EF 70%, widely patent renal arteries, significant single-vessel CAD-medical therapy   CARDIAC CATHETERIZATION  04/11/2013    EF 65%, 50% mid LAD, 20% prox CFX, 90% diffuse RCA, 99% mid RCA  Medical management      CHOLECYSTECTOMY      EPIDURAL BLOCK INJECTION Bilateral 8/15/2019    Procedure: BLOCK / INJECTION EPIDURAL STEROID CERVICAL;  Surgeon: Bj Alfred MD;  Location: [facility];  Service: Pain Management     FL GUIDED NEEDLE PLAC BX/ASP/INJ  8/15/2019    ORTHOPEDIC SURGERY      TRIGGER POINT INJECTION         Family History   Adopted: Yes   Problem Relation Age of Onset    No Known Problems Family     No Known Problems Mother     No Known Problems Father        Social History     Occupational History    Not on file   Tobacco Use    Smoking status: Current Every Day Smoker     Packs/day: 1 50    Smokeless tobacco: Never Used   Substance and Sexual Activity    Alcohol use: No    Drug use: No    Sexual activity: Not on file         Current Outpatient Medications:     amLODIPine (NORVASC) 10 mg tablet, Take 1 tablet (10 mg total) by mouth daily, Disp: 30 tablet, Rfl: 6    amLODIPine (NORVASC) 5 mg tablet, take 1 tablet by mouth once daily, Disp: 30 tablet, Rfl: 6    aspirin (ECOTRIN LOW STRENGTH) 81 mg EC tablet, Take 1 tablet (81 mg total) by mouth daily, Disp: 30 tablet, Rfl: 3    clonazePAM (KlonoPIN) 0 5 mg tablet, By mouth take 1 tablet 3 times daily or as directed, Disp: 90 tablet, Rfl: 1    cloNIDine (CATAPRES-TTS-3) 0 3 mg/24 hr, Place 1 patch (0 3 mg total) on the skin once a week, Disp: 4 patch, Rfl: 2    diphenhydrAMINE-acetaminophen (TYLENOL PM)  MG TABS, Take 2 tablets by mouth daily at bedtime as needed for sleep, Disp: , Rfl:     FLUoxetine (PROzac) 20 mg capsule, Take 20 mg by mouth daily , Disp: , Rfl: 0    gabapentin (NEURONTIN) 600 MG tablet, Take 1 tablet (600 mg total) by mouth 3 (three) times a day for 30 days, Disp: 90 tablet, Rfl: 1    hydrALAZINE (APRESOLINE) 10 mg tablet, take 1 tablet by mouth once daily, Disp: 30 tablet, Rfl: 3    hydrochlorothiazide (HYDRODIURIL) 25 mg tablet, take 1 tablet by mouth twice a day, Disp: 60 tablet, Rfl: 3    lisinopril (ZESTRIL) 20 mg tablet, Take 1 tablet (20 mg total) by mouth 2 (two) times a day, Disp: 60 tablet, Rfl: 5    lisinopril (ZESTRIL) 40 mg tablet, take 1/2 tablet (20MG TOTAL) by mouth twice a day, Disp: , Rfl: 0    metoprolol tartrate (LOPRESSOR) 25 mg tablet, Take 1 tablet (25 mg total) by mouth every 12 (twelve) hours, Disp: 60 tablet, Rfl: 5    naloxone (NARCAN) 0 4 mg/mL injection, Infuse 1 mL (0 4 mg total) into a venous catheter once for 1 dose, Disp: 1 mL, Rfl: 0    nitroglycerin (NITROSTAT) 0 4 mg SL tablet, Place 0 4 mg under the tongue every 5 (five) minutes as needed for chest pain, Disp: , Rfl:     oxyCODONE-acetaminophen (PERCOCET) 7 5-325 MG per tablet, Take 1 tablet by mouth every 8 (eight) hours as needed for moderate pain for up to 30 daysMax Daily Amount: 3 tablets, Disp: 90 tablet, Rfl: 0    primidone (MYSOLINE) 50 mg tablet, By mouth take 1 tablet twice daily or as directed, Disp: 60 tablet, Rfl: 2    rosuvastatin (CRESTOR) 10 MG tablet, Take 1 tablet (10 mg total) by mouth daily, Disp: 30 tablet, Rfl: 5    tamsulosin (FLOMAX) 0 4 mg, Take 1 capsule (0 4 mg total) by mouth daily with dinner, Disp: 30 capsule, Rfl: 5    traZODone (DESYREL) 50 mg tablet, Take 50 mg by mouth daily at bedtime , Disp: , Rfl: 0    varenicline (CHANTIX) 0 5 mg tablet, Take 0 5 mg by mouth daily, Disp: , Rfl:     VIAGRA 100 MG tablet, , Disp: , Rfl: 0    Allergies   Allergen Reactions    Morphine And Related        Physical Exam:    /80   Ht 5' 6" (1 676 m)   Wt 90 7 kg (200 lb)   BMI 32 28 kg/m²     Constitutional:normal, well developed, well nourished, alert, in no distress and non-toxic and no overt pain behavior  and obese  Eyes:anicteric  HEENT:grossly intact  Neck:supple, symmetric, trachea midline and no masses   Pulmonary:even and unlabored  Cardiovascular:No edema or pitting edema present  Skin:Normal without rashes or lesions and well hydrated  Psychiatric:Mood and affect appropriate  Neurologic:Cranial Nerves II-XII grossly intact  Musculoskeletal:antalgic      Imaging  No orders to display         No orders of the defined types were placed in this encounter

## 2019-10-07 NOTE — PROGRESS NOTES
Assessment/Plan:    No problem-specific Assessment & Plan notes found for this encounter  Diagnoses and all orders for this visit:    Pneumonia of right lower lobe due to infectious organism ([address] )  -     levofloxacin (LEVAQUIN) 500 mg tablet; Take 1 tablet (500 mg total) by mouth every 24 hours for 10 days    Negative depression screening          PHQ-9 Depression Screening    PHQ-9:    Frequency of the following problems over the past two weeks:       Little interest or pleasure in doing things:  0 - not at all  Feeling down, depressed, or hopeless:  0 - not at all  PHQ-2 Score:  0            Subjective:      Patient ID: Rito Aguirre is a 71 y o  male  Cough   This is a new problem  The current episode started 1 to 4 weeks ago  The problem has been unchanged  The cough is non-productive  Associated symptoms include a fever, postnasal drip, rhinorrhea, shortness of breath and wheezing  Pertinent negatives include no chills  He has tried nothing for the symptoms  The treatment provided no relief  The following portions of the patient's history were reviewed and updated as appropriate: allergies, current medications, past family history, past medical history, past social history, past surgical history and problem list     Review of Systems   Constitutional: Positive for fever  Negative for chills  HENT: Positive for postnasal drip and rhinorrhea  Respiratory: Positive for cough, shortness of breath and wheezing  Objective:    /98   Pulse 98   Temp 98 2 °F (36 8 °C) (Tympanic)   Ht 5' 6" (1 676 m)   Wt 92 6 kg (204 lb 3 2 oz)   SpO2 97%   BMI 32 96 kg/m²      Physical Exam   Constitutional: He is oriented to person, place, and time  He appears well-developed and well-nourished  No distress  HENT:   Head: Normocephalic and atraumatic     Right Ear: Tympanic membrane, external ear and ear canal normal    Left Ear: Tympanic membrane, external ear and ear canal normal    Nose: Mucosal edema and rhinorrhea present  Mouth/Throat: Mucous membranes are normal  No oropharyngeal exudate  [place] OP   Eyes: Right eye exhibits no discharge  Left eye exhibits no discharge  Neck: Normal range of motion  Neck supple  Cardiovascular: Normal rate, regular rhythm and normal heart sounds  No murmur heard  Pulmonary/Chest: Effort normal  No stridor  No respiratory distress  He has no wheezes  He has rales  He exhibits no tenderness  Lymphadenopathy:     He has no cervical adenopathy  Neurological: He is alert and oriented to person, place, and time  Skin: Skin is warm and dry  No rash noted  He is not diaphoretic  Psychiatric: He has a normal mood and affect  His behavior is normal  Judgment and thought content normal    Nursing note and vitals reviewed

## 2019-10-08 DIAGNOSIS — N52.9 ERECTILE DYSFUNCTION, UNSPECIFIED ERECTILE DYSFUNCTION TYPE: Primary | ICD-10-CM

## 2019-10-09 RX ORDER — SILDENAFIL CITRATE 100 MG
100 TABLET ORAL AS NEEDED
Qty: 10 TABLET | Refills: 0 | Status: SHIPPED | OUTPATIENT
Start: 2019-10-09 | End: 2020-04-09 | Stop reason: ALTCHOICE

## 2019-10-10 DIAGNOSIS — G25.0 TREMOR, ESSENTIAL: ICD-10-CM

## 2019-10-10 RX ORDER — PRIMIDONE 50 MG/1
TABLET ORAL
Qty: 60 TABLET | Refills: 2 | OUTPATIENT
Start: 2019-10-10

## 2019-10-10 NOTE — TELEPHONE ENCOUNTER
Please check with patient  I believe he is intolerant of primidone and no longer taking it  Please confirm  Thanks

## 2019-11-06 DIAGNOSIS — I10 ESSENTIAL HYPERTENSION: ICD-10-CM

## 2019-11-07 RX ORDER — HYDROCHLOROTHIAZIDE 25 MG/1
TABLET ORAL
Qty: 60 TABLET | Refills: 3 | Status: SHIPPED | OUTPATIENT
Start: 2019-11-07 | End: 2021-03-12 | Stop reason: SDUPTHER

## 2019-11-11 ENCOUNTER — OFFICE VISIT (OUTPATIENT)
Dept: FAMILY MEDICINE CLINIC | Facility: CLINIC | Age: 64
End: 2019-11-11
Payer: MEDICARE

## 2019-11-11 VITALS
BODY MASS INDEX: 32.98 KG/M2 | TEMPERATURE: 98.3 F | WEIGHT: 205.2 LBS | OXYGEN SATURATION: 97 % | DIASTOLIC BLOOD PRESSURE: 88 MMHG | SYSTOLIC BLOOD PRESSURE: 130 MMHG | HEART RATE: 91 BPM | HEIGHT: 66 IN

## 2019-11-11 DIAGNOSIS — I10 ESSENTIAL HYPERTENSION: ICD-10-CM

## 2019-11-11 DIAGNOSIS — E78.00 HYPERCHOLESTEROLEMIA: ICD-10-CM

## 2019-11-11 DIAGNOSIS — M25.562 CHRONIC PAIN OF BOTH KNEES: ICD-10-CM

## 2019-11-11 DIAGNOSIS — Z12.5 SCREENING FOR PROSTATE CANCER: ICD-10-CM

## 2019-11-11 DIAGNOSIS — F17.200 SMOKING: ICD-10-CM

## 2019-11-11 DIAGNOSIS — M25.561 CHRONIC PAIN OF BOTH KNEES: ICD-10-CM

## 2019-11-11 DIAGNOSIS — Z12.11 SCREENING FOR COLON CANCER: ICD-10-CM

## 2019-11-11 DIAGNOSIS — G89.29 CHRONIC PAIN OF BOTH KNEES: ICD-10-CM

## 2019-11-11 DIAGNOSIS — Z00.00 MEDICARE ANNUAL WELLNESS VISIT, INITIAL: Primary | ICD-10-CM

## 2019-11-11 DIAGNOSIS — Z13.31 NEGATIVE DEPRESSION SCREENING: ICD-10-CM

## 2019-11-11 PROCEDURE — G0402 INITIAL PREVENTIVE EXAM: HCPCS | Performed by: FAMILY MEDICINE

## 2019-11-11 PROCEDURE — 99214 OFFICE O/P EST MOD 30 MIN: CPT | Performed by: FAMILY MEDICINE

## 2019-11-11 RX ORDER — OXYCODONE AND ACETAMINOPHEN 7.5; 325 MG/1; MG/1
1 TABLET ORAL EVERY 4 HOURS PRN
COMMUNITY
End: 2019-11-14 | Stop reason: SDUPTHER

## 2019-11-11 NOTE — PROGRESS NOTES
Assessment/Plan:    No problem-specific Assessment & Plan notes found for this encounter  Diagnoses and all orders for this visit:    Medicare annual wellness visit, initial  -     TSH, 3rd generation with Free T4 reflex; Future    Screening for colon cancer  -     Cologuard; Future    Hypercholesterolemia  -     Comprehensive metabolic panel; Future  -     Lipid panel; Future    Essential hypertension  -     CBC and differential; Future    Smoking  -     CT lung screening program; Future    Negative depression screening    Screening for prostate cancer  -     PSA, Total Screen; Future    Other orders  -     oxyCODONE-acetaminophen (PERCOCET) 7 5-325 MG per tablet; Take 1 tablet by mouth every 4 (four) hours as needed for moderate pain          PHQ-9 Depression Screening    PHQ-9:    Frequency of the following problems over the past two weeks:       Little interest or pleasure in doing things:  0 - not at all  Feeling down, depressed, or hopeless:  0 - not at all  PHQ-2 Score:  0            Subjective:      Patient ID: Nba Miller is a 71 y o  male  Follow up for choelsterol, pt is checking Bps at home and seeing an average of 130 over 88    Hypertension   This is a chronic problem  The current episode started more than 1 year ago  The problem is unchanged  The problem is controlled  Associated symptoms include shortness of breath  Pertinent negatives include no chest pain or palpitations  There are no associated agents to hypertension  Risk factors for coronary artery disease include obesity, male gender, smoking/tobacco exposure and sedentary lifestyle  Past treatments include central alpha agonists, ACE inhibitors and beta blockers  The current treatment provides moderate improvement  Compliance problems include diet and exercise          The following portions of the patient's history were reviewed and updated as appropriate: allergies, current medications, past family history, past medical history, past social history, past surgical history and problem list     Review of Systems   Constitutional: Positive for fatigue  Negative for chills and fever  HENT: Negative  Eyes: Negative  Respiratory: Positive for shortness of breath  Negative for wheezing  Cardiovascular: Negative for chest pain and palpitations  Gastrointestinal: Negative for abdominal pain, blood in stool, constipation, diarrhea, nausea and vomiting  Endocrine: Negative  Genitourinary: Negative for difficulty urinating and dysuria  Musculoskeletal: Positive for arthralgias  Negative for myalgias  Skin: Negative  Allergic/Immunologic: Negative  Neurological: Negative for seizures and syncope  Hematological: Negative for adenopathy  Psychiatric/Behavioral: Negative  Objective:    /88   Pulse 91   Temp 98 3 °F (36 8 °C) (Tympanic)   Ht 5' 6" (1 676 m)   Wt 93 1 kg (205 lb 3 2 oz)   SpO2 97%   BMI 33 12 kg/m²      Physical Exam   Constitutional: He is oriented to person, place, and time  He appears well-developed and well-nourished  No distress  HENT:   Head: Normocephalic and atraumatic  Right Ear: External ear normal    Left Ear: External ear normal    Nose: Nose normal    Mouth/Throat: Oropharynx is clear and moist    Eyes: Pupils are equal, round, and reactive to light  Conjunctivae and EOM are normal  No scleral icterus  Neck: Normal range of motion  Neck supple  Cardiovascular: Normal rate, regular rhythm and normal heart sounds  Exam reveals no gallop and no friction rub  No murmur heard  Pulmonary/Chest: Effort normal and breath sounds normal  No respiratory distress  He has no wheezes  He has no rales  Abdominal: Soft  Bowel sounds are normal  He exhibits no distension and no mass  There is no tenderness  There is no rebound and no guarding  Musculoskeletal: Normal range of motion  He exhibits no edema  Lymphadenopathy:     He has no cervical adenopathy     Neurological: He is alert and oriented to person, place, and time  He has normal reflexes  Skin: Skin is warm and dry  He is not diaphoretic  Psychiatric: He has a normal mood and affect  His behavior is normal  Judgment and thought content normal    Nursing note and vitals reviewed

## 2019-11-11 NOTE — PROGRESS NOTES
Assessment and Plan:     Problem List Items Addressed This Visit     None      Visit Diagnoses     Medicare annual wellness visit, initial    -  Primary    Screening for colon cancer               Preventive health issues were discussed with patient, and age appropriate screening tests were ordered as noted in patient's After Visit Summary  Personalized health advice and appropriate referrals for health education or preventive services given if needed, as noted in patient's After Visit Summary  History of Present Illness:     Patient presents for Medicare Annual Wellness visit    Patient Care Team:  Lorrie Velasco DO as PCP - General (Family Medicine)  Dana Cummings MD (Pain Medicine)     Problem List:     Patient Active Problem List   Diagnosis    JAKI (obstructive sleep apnea)    Chronic bronchitis ([facility] [address] )    Ab[facility] (AAA) without rupture ([facility])    Lumbar spondylosis    Lumbar degenerative disc disease    Myofascial pain syndrome    Chronic low back pain without sciatica    Cervical radiculopathy    Cervical spondylosis without myelopathy    Tremor, essential      Past Medical and Surgical History:     Past Medical History:   Diagnosis Date    Abdominal aortic aneurysm without rupture ([facility] [address] )     Abdominal Aortic Duplex 02/21/2017    Ectatic infrarenal abdominal aorta   CAD (coronary artery disease)     COPD (chronic obstructive pulmonary disease) ([facility])     Extremity pain     History of echocardiogram 03/18/2014    EF 55%, mild MR and AI  Mild concentric LVH      History of stress test 03/06/2017    Normal     Hypertension     Joint pain     Low back pain     Migraine     Neck pain     Obstructive sleep apnea     cannot tolerate CPAP    Osteoarthritis     Peripheral neuropathy     Reflex sympathetic dystrophy      Past Surgical History:   Procedure Laterality Date    CARDIAC CATHETERIZATION  02/13/2012    EF 70%, widely patent renal arteries, significant single-vessel CAD-medical therapy   CARDIAC CATHETERIZATION  04/11/2013    EF 65%, 50% mid LAD, 20% prox CFX, 90% diffuse RCA, 99% mid RCA  Medical management      CHOLECYSTECTOMY      EPIDURAL BLOCK INJECTION Bilateral 8/15/2019    Procedure: BLOCK / INJECTION EPIDURAL STEROID CERVICAL;  Surgeon: Wong Robertson MD;  Location: [facility];  Service: Pain Management     FL GUIDED NEEDLE PLAC BX/ASP/INJ  8/15/2019    ORTHOPEDIC SURGERY      TRIGGER POINT INJECTION        Family History:     Family History   Adopted: Yes   Problem Relation Age of Onset    No Known Problems Family     No Known Problems Mother     No Known Problems Father       Social History:     Social History     Socioeconomic History    Marital status: /Civil Union     Spouse name: None    Number of children: None    Years of education: None    Highest education level: None   Occupational History    None   Social Needs    Financial resource strain: None    Food insecurity:     Worry: None     Inability: None    Transportation needs:     Medical: None     Non-medical: None   Tobacco Use    Smoking status: Current Every Day Smoker     Packs/day: 1 50    Smokeless tobacco: Never Used   Substance and Sexual Activity    Alcohol use: No    Drug use: No    Sexual activity: None   Lifestyle    Physical activity:     Days per week: None     Minutes per session: None    Stress: None   Relationships    Social connections:     Talks on phone: None     Gets together: None     Attends Church service: None     Active member of club or organization: None     Attends meetings of clubs or organizations: None     Relationship status: None    Intimate partner violence:     Fear of current or ex partner: None     Emotionally abused: None     Physically abused: None     Forced sexual activity: None   Other Topics Concern    None   Social History Narrative    None       Medications and Allergies:     Current Outpatient Medications   Medication Sig Dispense Refill    amLODIPine (NORVASC) 10 mg tablet Take 1 tablet (10 mg total) by mouth daily 30 tablet 6    amLODIPine (NORVASC) 5 mg tablet take 1 tablet by mouth once daily 30 tablet 6    clonazePAM (KlonoPIN) 0 5 mg tablet By mouth take 1 tablet 3 times daily or as directed 90 tablet 1    cloNIDine (CATAPRES-TTS-3) 0 3 mg/24 hr Place 1 patch (0 3 mg total) on the skin once a week 4 patch 2    diphenhydrAMINE-acetaminophen (TYLENOL PM)  MG TABS Take 2 tablets by mouth daily at bedtime as needed for sleep      FLUoxetine (PROzac) 20 mg capsule Take 20 mg by mouth daily   0    hydrALAZINE (APRESOLINE) 10 mg tablet take 1 tablet by mouth once daily 30 tablet 3    hydrochlorothiazide (HYDRODIURIL) 25 mg tablet take 1 tablet by mouth twice a day 60 tablet 3    lisinopril (ZESTRIL) 40 mg tablet take 1/2 tablet (20MG TOTAL) by mouth twice a day  0    metoprolol tartrate (LOPRESSOR) 25 mg tablet Take 1 tablet (25 mg total) by mouth every 12 (twelve) hours 60 tablet 5    nitroglycerin (NITROSTAT) 0 4 mg SL tablet Place 0 4 mg under the tongue every 5 (five) minutes as needed for chest pain      oxyCODONE-acetaminophen (PERCOCET) 7 5-325 MG per tablet Take 1 tablet by mouth every 4 (four) hours as needed for moderate pain      primidone (MYSOLINE) 50 mg tablet By mouth take 1 tablet twice daily or as directed 60 tablet 2    rosuvastatin (CRESTOR) 10 MG tablet Take 1 tablet (10 mg total) by mouth daily 30 tablet 5    tamsulosin (FLOMAX) 0 4 mg Take 1 capsule (0 4 mg total) by mouth daily with dinner 30 capsule 5    traZODone (DESYREL) 50 mg tablet Take 1 tablet (50 mg total) by mouth daily at bedtime 30 tablet 6    varenicline (CHANTIX) 0 5 mg tablet Take 0 5 mg by mouth daily      VIAGRA 100 MG tablet Take 1 tablet (100 mg total) by mouth as needed for erectile dysfunction 10 tablet 0    aspirin (ECOTRIN LOW STRENGTH) 81 mg EC tablet Take 1 tablet (81 mg total) by mouth daily (Patient not taking: Reported on 10/7/2019) 30 tablet 3    gabapentin (NEURONTIN) 600 MG tablet Take 1 tablet (600 mg total) by mouth 3 (three) times a day 90 tablet 1    lisinopril (ZESTRIL) 20 mg tablet Take 1 tablet (20 mg total) by mouth 2 (two) times a day (Patient not taking: Reported on 10/7/2019) 60 tablet 5    naloxone (NARCAN) 0 4 mg/mL injection Infuse 1 mL (0 4 mg total) into a venous catheter once for 1 dose 1 mL 0     No current facility-administered medications for this visit  Allergies   Allergen Reactions    Morphine And Related       Immunizations:     Immunization History   Administered Date(s) Administered    Td (adult), adsorbed 01/01/2011      Health Maintenance:         Topic Date Due    CRC Screening: Colonoscopy  1955    CRC Screening: Cologuard  05/18/2004    Hepatitis C Screening  Completed         Topic Date Due    DTaP,Tdap,and Td Vaccines (1 - Tdap) 01/02/2011    Pneumococcal Vaccine: 65+ Years (1 of 2 - PCV13) 05/18/2019    INFLUENZA VACCINE  07/01/2019      Medicare Health Risk Assessment:     BP (!) 178/94   Pulse 91   Temp 98 3 °F (36 8 °C) (Tympanic)   Ht 5' 6" (1 676 m)   Wt 93 1 kg (205 lb 3 2 oz)   SpO2 97%   BMI 33 12 kg/m²      Jose Jackson is here for his Initial Wellness visit  Health Risk Assessment:   Patient rates overall health as fair  Patient feels that their physical health rating is slightly worse  Eyesight was rated as slightly worse  Hearing was rated as slightly worse  Patient feels that their emotional and mental health rating is same  Pain experienced in the last 7 days has been a lot  Patient's pain rating has been 7/10  Patient states that he has experienced no weight loss or gain in last 6 months  Depression Screening:   PHQ-2 Score: 0      Fall Risk Screening:    In the past year, patient has experienced: no history of falling in past year      Home Safety:  Patient does not have trouble with stairs inside or outside of their home  Patient has working smoke alarms and has no working carbon monoxide detector  Home safety hazards include: none  Nutrition:   Current diet is Regular  Medications:   Patient is currently taking over-the-counter supplements  OTC medications include: see medication list  Patient is able to manage medications  Activities of Daily Living (ADLs)/Instrumental Activities of Daily Living (IADLs):   Walk and transfer into and out of bed and chair?: Yes  Dress and groom yourself?: Yes    Bathe or shower yourself?: Yes    Feed yourself?  Yes  Do your laundry/housekeeping?: Yes  Manage your money, pay your bills and track your expenses?: Yes  Make your own meals?: Yes    Do your own shopping?: Yes    Previous Hospitalizations:   Any hospitalizations or ED visits within the last 12 months?: No      Advance Care Planning:   Living will: No    Durable POA for healthcare: No    Advanced directive: No    Advanced directive counseling given: Yes    Five wishes given: Yes    End of Life Decisions reviewed with patient: No    Provider agrees with end of life decisions: No      Cognitive Screening:   Provider or family/friend/caregiver concerned regarding cognition?: No    PREVENTIVE SCREENINGS      Cardiovascular Screening:    General: Screening Not Indicated and History Lipid Disorder      Diabetes Screening:     General: Screening Current      Colorectal Cancer Screening:       Due for: Cologuard      Prostate Cancer Screening:    General: Screening Current      Osteoporosis Screening:    General: Screening Not Indicated      Abdominal Aortic Aneurysm (AAA) Screening:    Risk factors include: age between 73-67 yo and tobacco use        General: Screening Not Indicated and History AAA      Lung Cancer Screening:       Due for: Low Dose CT (LDCT)      Hepatitis C Screening:    General: Screening Current      Darwin Safer, DO

## 2019-11-11 NOTE — PATIENT INSTRUCTIONS
Medicare Preventive Visit Patient Instructions  Thank you for completing your Welcome to Medicare Visit or Medicare Annual Wellness Visit today  Your next wellness visit will be due in one year (11/11/2020)  The screening/preventive services that you may require over the next 5-10 years are detailed below  Some tests may not apply to you based off risk factors and/or age  Screening tests ordered at today's visit but not completed yet may show as past due  Also, please note that scanned in results may not display below  Preventive Screenings:  Service Recommendations Previous Testing/Comments   Colorectal Cancer Screening  · Colonoscopy    · Fecal Occult Blood Test (FOBT)/Fecal Immunochemical Test (FIT)  · Fecal DNA/Cologuard Test  · Flexible Sigmoidoscopy Age: 54-65 years old   Colonoscopy: every 10 years (May be performed more frequently if at higher risk)  OR  FOBT/FIT: every 1 year  OR  Cologuard: every 3 years  OR  Sigmoidoscopy: every 5 years  Screening may be recommended earlier than age 48 if at higher risk for colorectal cancer  Also, an individualized decision between you and your healthcare provider will decide whether screening between the ages of 74-80 would be appropriate   Colonoscopy: Not on file  FOBT/FIT: Not on file  Cologuard: Not on file  Sigmoidoscopy: Not on file         Prostate Cancer Screening Individualized decision between patient and health care provider in men between ages of 53-78   Medicare will cover every 12 months beginning on the day after your 50th birthday PSA: 2 6 ng/mL     Screening Current     Hepatitis C Screening Once for adults born between 1945 and 1965  More frequently in patients at high risk for Hepatitis C Hep C Antibody: 08/30/2019    Screening Current   Diabetes Screening 1-2 times per year if you're at risk for diabetes or have pre-diabetes Fasting glucose: No results in last 5 years   A1C: No results in last 5 years    Screening Current   Cholesterol Screening Once every 5 years if you don't have a lipid disorder  May order more often based on risk factors  Lipid panel: 03/19/2019    Screening Not Indicated  History Lipid Disorder      Other Preventive Screenings Covered by Medicare:  1  Abdominal Aortic Aneurysm (AAA) Screening: covered once if your at risk  You're considered to be at risk if you have a family history of AAA or a male between the age of 73-68 who smoking at least 100 cigarettes in your lifetime  2  Lung Cancer Screening: covers low dose CT scan once per year if you meet all of the following conditions: (1) Age 50-69; (2) No signs or symptoms of lung cancer; (3) Current smoker or have quit smoking within the last 15 years; (4) You have a tobacco smoking history of at least 30 pack years (packs per day x number of years you smoked); (5) You get a written order from a healthcare provider  3  Glaucoma Screening: covered annually if you're considered high risk: (1) You have diabetes OR (2) Family history of glaucoma OR (3)  aged 48 and older OR (3)  American aged 72 and older  3  Osteoporosis Screening: covered every 2 years if you meet one of the following conditions: (1) Have a vertebral abnormality; (2) On glucocorticoid therapy for more than 3 months; (3) Have primary hyperparathyroidism; (4) On osteoporosis medications and need to assess response to drug therapy  5  HIV Screening: covered annually if you're between the age of 12-76  Also covered annually if you are younger than 13 and older than 72 with risk factors for HIV infection  For pregnant patients, it is covered up to 3 times per pregnancy      Immunizations:  Immunization Recommendations   Influenza Vaccine Annual influenza vaccination during flu season is recommended for all persons aged >= 6 months who do not have contraindications   Pneumococcal Vaccine (Prevnar and Pneumovax)  * Prevnar = PCV13  * Pneumovax = PPSV23 Adults 25-60 years old: 1-3 doses may be recommended based on certain risk factors  Adults 72 years old: Prevnar (PCV13) vaccine recommended followed by Pneumovax (PPSV23) vaccine  If already received PPSV23 since turning 65, then PCV13 recommended at least one year after PPSV23 dose  Hepatitis B Vaccine 3 dose series if at intermediate or high risk (ex: diabetes, end stage renal disease, liver disease)   Tetanus (Td) Vaccine - COST NOT COVERED BY MEDICARE PART B Following completion of primary series, a booster dose should be given every 10 years to maintain immunity against tetanus  Td may also be given as tetanus wound prophylaxis  Tdap Vaccine - COST NOT COVERED BY MEDICARE PART B Recommended at least once for all adults  For pregnant patients, recommended with each pregnancy  Shingles Vaccine (Shingrix) - COST NOT COVERED BY MEDICARE PART B  2 shot series recommended in those aged 48 and above     Health Maintenance Due:      Topic Date Due    CRC Screening: Colonoscopy  1955    CRC Screening: Cologuard  05/18/2004    Hepatitis C Screening  Completed     Immunizations Due:      Topic Date Due    DTaP,Tdap,and Td Vaccines (1 - Tdap) 01/02/2011    Pneumococcal Vaccine: 65+ Years (1 of 2 - PCV13) 05/18/2019    INFLUENZA VACCINE  07/01/2019     Advance Directives   What are advance directives? Advance directives are legal documents that state your wishes and plans for medical care  These plans are made ahead of time in case you lose your ability to make decisions for yourself  Advance directives can apply to any medical decision, such as the treatments you want, and if you want to donate organs  What are the types of advance directives? There are many types of advance directives, and each state has rules about how to use them  You may choose a combination of any of the following:  · Living will: This is a written record of the treatment you want   You can also choose which treatments you do not want, which to limit, and which to stop at a certain time  This includes surgery, medicine, IV fluid, and tube feedings  · Durable power of  for healthcare [facility]): This is a written record that states who you want to make healthcare choices for you when you are unable to make them for yourself  This person, called a proxy, is usually a family member or a friend  You may choose more than 1 proxy  · Do not resuscitate (DNR) order:  A DNR order is used in case your heart stops beating or you stop breathing  It is a request not to have certain forms of treatment, such as CPR  A DNR order may be included in other types of advance directives  · Medical directive: This covers the care that you want if you are in a coma, near death, or unable to make decisions for yourself  You can list the treatments you want for each condition  Treatment may include pain medicine, surgery, blood transfusions, dialysis, IV or tube feedings, and a ventilator (breathing machine)  · Values history: This document has questions about your views, beliefs, and how you feel and think about life  This information can help others choose the care that you would choose  Why are advance directives important? An advance directive helps you control your care  Although spoken wishes may be used, it is better to have your wishes written down  Spoken wishes can be misunderstood, or not followed  Treatments may be given even if you do not want them  An advance directive may make it easier for your family to make difficult choices about your care  Cigarette Smoking and Your Health   Risks to your health if you smoke:  Nicotine and other chemicals found in tobacco damage every cell in your body  Even if you are a light smoker, you have an increased risk for cancer, heart disease, and lung disease  If you are pregnant or have diabetes, smoking increases your risk for complications     Benefits to your health if you stop smoking:   · You decrease respiratory symptoms such as coughing, wheezing, and shortness of breath  · You reduce your risk for cancers of the lung, mouth, throat, kidney, bladder, pancreas, stomach, and cervix  If you already have cancer, you increase the benefits of chemotherapy  You also reduce your risk for cancer returning or a second cancer from developing  · You reduce your risk for heart disease, blood clots, heart attack, and stroke  · You reduce your risk for lung infections, and diseases such as pneumonia, asthma, chronic bronchitis, and emphysema  · Your circulation improves  More oxygen can be delivered to your body  If you have diabetes, you lower your risk for complications, such as kidney, artery, and eye diseases  You also lower your risk for nerve damage  Nerve damage can lead to amputations, poor vision, and blindness  · You improve your body's ability to heal and to fight infections  For more information and support to stop smoking:   · [employer]  Phone: 7- 228 - [phone]  Web Address: [place]  Weight Management   Why it is important to manage your weight:  Being overweight increases your risk of health conditions such as heart disease, high blood pressure, type 2 diabetes, and certain types of cancer  It can also increase your risk for osteoarthritis, sleep apnea, and other respiratory problems  Aim for a slow, steady weight loss  Even a small amount of weight loss can lower your risk of health problems  How to lose weight safely:  A safe and healthy way to lose weight is to eat fewer calories and get regular exercise  You can lose up about 1 pound a week by decreasing the number of calories you eat by 500 calories each day  Healthy meal plan for weight management:  A healthy meal plan includes a variety of foods, contains fewer calories, and helps you stay healthy  A healthy meal plan includes the following:  · Eat whole-grain foods more often  A healthy meal plan should contain fiber   Fiber is the part of grains, fruits, and vegetables that is not broken down by your body  Whole-grain foods are healthy and provide extra fiber in your diet  Some examples of whole-grain foods are whole-wheat breads and pastas, oatmeal, brown rice, and bulgur  · Eat a variety of vegetables every day  Include dark, leafy greens such as spinach, kale, tatiana greens, and mustard greens  Eat yellow and orange vegetables such as carrots, sweet potatoes, and winter squash  · Eat a variety of fruits every day  Choose fresh or canned fruit (canned in its own juice or light syrup) instead of juice  Fruit juice has very little or no fiber  · Eat low-fat dairy foods  Drink fat-free (skim) milk or 1% milk  Eat fat-free yogurt and low-fat cottage cheese  Try low-fat cheeses such as mozzarella and other reduced-fat cheeses  · Choose meat and other protein foods that are low in fat  Choose beans or other legumes such as split peas or lentils  Choose fish, skinless poultry (chicken or turkey), or lean cuts of red meat (beef or pork)  Before you cook meat or poultry, cut off any visible fat  · Use less fat and oil  Try baking foods instead of frying them  Add less fat, such as margarine, sour cream, regular salad dressing and mayonnaise to foods  Eat fewer high-fat foods  Some examples of high-fat foods include french fries, doughnuts, ice cream, and cakes  · Eat fewer sweets  Limit foods and drinks that are high in sugar  This includes candy, cookies, regular soda, and sweetened drinks  Exercise:  Exercise at least 30 minutes per day on most days of the week  Some examples of exercise include walking, biking, dancing, and swimming  You can also fit in more physical activity by taking the stairs instead of the elevator or parking farther away from stores  Ask your healthcare provider about the best exercise plan for you        © Copyright [employer] 2018 Information is for End User's use only and may not be sold, redistributed or otherwise used for commercial purposes   All illustrations and images included in CareNotes® are the copyrighted property of A [employer]  or [facility] Fiona Levine

## 2019-11-12 ENCOUNTER — OFFICE VISIT (OUTPATIENT)
Dept: OBGYN CLINIC | Facility: CLINIC | Age: 64
End: 2019-11-12
Payer: MEDICARE

## 2019-11-12 VITALS
DIASTOLIC BLOOD PRESSURE: 103 MMHG | WEIGHT: 203 LBS | BODY MASS INDEX: 32.62 KG/M2 | HEART RATE: 75 BPM | SYSTOLIC BLOOD PRESSURE: 184 MMHG | HEIGHT: 66 IN

## 2019-11-12 DIAGNOSIS — S46.219A BICEPS TENDON TEAR: ICD-10-CM

## 2019-11-12 DIAGNOSIS — M75.122 NONTRAUMATIC COMPLETE TEAR OF LEFT ROTATOR CUFF: Primary | ICD-10-CM

## 2019-11-12 DIAGNOSIS — M19.012 ARTHRITIS OF LEFT ACROMIOCLAVICULAR JOINT: ICD-10-CM

## 2019-11-12 PROCEDURE — 99212 OFFICE O/P EST SF 10 MIN: CPT | Performed by: ORTHOPAEDIC SURGERY

## 2019-11-12 NOTE — PROGRESS NOTES
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  He has not been able to do physical therapy because he had some blood pressure issues and was busy with work  He had a cortisone injection at his last visit on 09/03/2019 that lasted approximately 1 week  He notes his pain is in the top and front aspect of his shoulder  He has seen Dr Trisha Lantigua is well for his neck and back  He notes no significant worsening pain  He does use some narcotic pain medicine from Dr Trisha Lantigua and Tylenol p m  to sleep  Review of Systems  Review of systems negative unless otherwise specified in HPI    Past Medical History  Past Medical History:   Diagnosis Date    Abdominal aortic aneurysm without rupture ([facility] [address] )     Abdominal Aortic Duplex 02/21/2017    Ectatic infrarenal abdominal aorta   CAD (coronary artery disease)     COPD (chronic obstructive pulmonary disease) (HCC)     Extremity pain     History of echocardiogram 03/18/2014    EF 55%, mild MR and AI  Mild concentric LVH   History of stress test 03/06/2017    Normal     Hypertension     Joint pain     Low back pain     Migraine     Neck pain     Obstructive sleep apnea     cannot tolerate CPAP    Osteoarthritis     Peripheral neuropathy     Reflex sympathetic dystrophy      Past Surgical History  Past Surgical History:   Procedure Laterality Date    CARDIAC CATHETERIZATION  02/13/2012    EF 70%, widely patent renal arteries, significant single-vessel CAD-medical therapy   CARDIAC CATHETERIZATION  04/11/2013    EF 65%, 50% mid LAD, 20% prox CFX, 90% diffuse RCA, 99% mid RCA  Medical management      CHOLECYSTECTOMY      EPIDURAL BLOCK INJECTION Bilateral 8/15/2019    Procedure: BLOCK / INJECTION EPIDURAL STEROID CERVICAL;  Surgeon: Ricarda Giles MD;  Location: [facility];  Service: Pain Management     FL GUIDED NEEDLE PLAC BX/ASP/INJ  8/15/2019    ORTHOPEDIC SURGERY      TRIGGER POINT INJECTION       Current Medications  Current Outpatient Medications on File Prior to Visit   Medication Sig Dispense Refill    amLODIPine (NORVASC) 10 mg tablet Take 1 tablet (10 mg total) by mouth daily 30 tablet 6    amLODIPine (NORVASC) 5 mg tablet take 1 tablet by mouth once daily 30 tablet 6    clonazePAM (KlonoPIN) 0 5 mg tablet By mouth take 1 tablet 3 times daily or as directed 90 tablet 1    cloNIDine (CATAPRES-TTS-3) 0 3 mg/24 hr Place 1 patch (0 3 mg total) on the skin once a week 4 patch 2    diphenhydrAMINE-acetaminophen (TYLENOL PM)  MG TABS Take 2 tablets by mouth daily at bedtime as needed for sleep      FLUoxetine (PROzac) 20 mg capsule Take 20 mg by mouth daily   0    hydrALAZINE (APRESOLINE) 10 mg tablet take 1 tablet by mouth once daily 30 tablet 3    hydrochlorothiazide (HYDRODIURIL) 25 mg tablet take 1 tablet by mouth twice a day 60 tablet 3    lisinopril (ZESTRIL) 40 mg tablet take 1/2 tablet (20MG TOTAL) by mouth twice a day  0    metoprolol tartrate (LOPRESSOR) 25 mg tablet Take 1 tablet (25 mg total) by mouth every 12 (twelve) hours 60 tablet 5    nitroglycerin (NITROSTAT) 0 4 mg SL tablet Place 0 4 mg under the tongue every 5 (five) minutes as needed for chest pain      oxyCODONE-acetaminophen (PERCOCET) 7 5-325 MG per tablet Take 1 tablet by mouth every 4 (four) hours as needed for moderate pain      primidone (MYSOLINE) 50 mg tablet By mouth take 1 tablet twice daily or as directed 60 tablet 2    rosuvastatin (CRESTOR) 10 MG tablet Take 1 tablet (10 mg total) by mouth daily 30 tablet 5    tamsulosin (FLOMAX) 0 4 mg Take 1 capsule (0 4 mg total) by mouth daily with dinner 30 capsule 5    traZODone (DESYREL) 50 mg tablet Take 1 tablet (50 mg total) by mouth daily at bedtime 30 tablet 6    VIAGRA 100 MG tablet Take 1 tablet (100 mg total) by mouth as needed for erectile dysfunction 10 tablet 0    aspirin (ECOTRIN LOW STRENGTH) 81 mg EC tablet Take 1 tablet (81 mg total) by mouth daily (Patient not taking: Reported on 10/7/2019) 30 tablet 3    gabapentin (NEURONTIN) 600 MG tablet Take 1 tablet (600 mg total) by mouth 3 (three) times a day 90 tablet 1    naloxone (NARCAN) 0 4 mg/mL injection Infuse 1 mL (0 4 mg total) into a venous catheter once for 1 dose 1 mL 0    varenicline (CHANTIX) 0 5 mg tablet Take 0 5 mg by mouth daily       No current facility-administered medications on file prior to visit  Recent Labs (HCT,HGB,PT,INR,ESR,CRP,GLU,HgA1C)  0   Lab Value Date/Time    HCT 48 8 08/30/2019 1420    HGB 15 8 08/30/2019 1420    WBC 9 60 08/30/2019 1420     Physical exam  · General: Awake, Alert, Oriented  · Eyes: Pupils equal, round and reactive to light  · Heart: regular rate and rhythm  · Lungs: No audible wheezing  · Abdomen: soft  left Shoulder  · No pain with palpation at the [facility] joint  There is tenderness over the bicipital groove with palpation in internal external rotation of the arm  There is pain and weakness with supraspinatus empty can testing  Minimal discomfort with resisted external rotation  He is grossly neurovascular intact  Radial pulse 4/4  Procedure  None    Imaging  None today but previous MRI notes articular surface partial tear supraspinatus and delamination of the infraspinatus and subscap tendon along with severe partial tear long head of biceps and moderate AC osteoarthritis  1  Nontraumatic complete tear of left rotator cuff    2  Biceps tendon tear    3  Arthritis of left acromioclavicular joint      Assessment:  left shoulder rotator cuff and biceps tendon tears with AC joint arthritis  Patient had minimal improvement with cortisone injection  Plan:  Patient cannot afford to undergo any surgery at this point time  Labrum start physical therapy    Continue ice to the shoulder along with medications per Dr Nydia Simpson in Tylenol or ibuprofen as needed  He can also use Lidoderm patches that he can buy from [place]  Recommend surgical intervention when this is able to be fit into his work schedule, possibly in 3-4 months  Will recheck patient back in 2 months

## 2019-11-12 NOTE — PATIENT INSTRUCTIONS
Plan:  Patient cannot afford to undergo any surgery at this point time  Labrum start physical therapy  Continue ice to the shoulder along with medications per Dr Patricia Side in Tylenol or ibuprofen as needed  He can also use Lidoderm patches that he can buy from [place]  Recommend surgical intervention when this is able to be fit into his work schedule, possibly in 3-4 months  Will recheck patient back in 2 months

## 2019-11-13 ENCOUNTER — TELEPHONE (OUTPATIENT)
Dept: NEUROLOGY | Facility: CLINIC | Age: 64
End: 2019-11-13

## 2019-11-13 ENCOUNTER — OFFICE VISIT (OUTPATIENT)
Dept: NEUROLOGY | Facility: CLINIC | Age: 64
End: 2019-11-13
Payer: MEDICARE

## 2019-11-13 VITALS
HEIGHT: 68 IN | RESPIRATION RATE: 18 BRPM | BODY MASS INDEX: 30.77 KG/M2 | HEART RATE: 78 BPM | WEIGHT: 203 LBS | DIASTOLIC BLOOD PRESSURE: 100 MMHG | SYSTOLIC BLOOD PRESSURE: 196 MMHG

## 2019-11-13 DIAGNOSIS — G25.0 TREMOR, ESSENTIAL: Primary | ICD-10-CM

## 2019-11-13 PROCEDURE — 99213 OFFICE O/P EST LOW 20 MIN: CPT | Performed by: PSYCHIATRY & NEUROLOGY

## 2019-11-13 NOTE — TELEPHONE ENCOUNTER
Telephone call to patients PCP Dr Alanna Godfrey office to inform Dr Alanna Godfrey that Mr Swathi Watkins blood pressure when Dr Annette Palafox took it today was 196/100  Patient stated that he will not be going over to Dr Alanna Godfrey office  Dr Annette Palafox just wanted Dr Collins Chun to be aware of the patient high blood pressure

## 2019-11-13 NOTE — ASSESSMENT & PLAN NOTE
Unable to tolerate clonazepam even low-dose during the day as result of drowsing side effects  In summary, primidone was showing beneficial effect, but was discontinue when he developed significant and unacceptable sexual side effects  Has already been on a beta-blocker through Cardiology  Also has been on gabapentin 600 mg 3 times daily which has been used off label at times to benefit tremor  Continues to have issues with tremor, left greater than right upper extremity, typical for an essential or exaggerated physiologic are origin  The degree to which it is present is in knowing as well as at times functionally problematic  Given the failure of benefit noted above, do feel that his interest would best be served through formal by a movement team   --refer to Dr Jayson Carlson, movement disorder specialist, for further evaluation and treatment suggestions

## 2019-11-13 NOTE — LETTER
November 13, 2019     Midge Duverney, DO [facility] [address]    Patient: Elder Dorado   YOB: 1955   Date of Visit: 11/13/2019       Dear Dr Guy Martinez:    Thank you for referring Gisel Pina to me for evaluation  Below are my notes for this consultation  If you have questions, please do not hesitate to call me  I look forward to following your patient along with you  Sincerely,        Corrina Jordan MD        CC: MD Corrina Pope MD  11/13/2019  2:12 PM  Sign at close encounter  Patient ID: Elder Dorado is a 71 y o  male  Assessment/Plan:    Tremor, essential  Unable to tolerate clonazepam even low-dose during the day as result of drowsing side effects  In summary, primidone was showing beneficial effect, but was discontinue when he developed significant and unacceptable sexual side effects  Has already been on a beta-blocker through Cardiology  Also has been on gabapentin 600 mg 3 times daily which has been used off label at times to benefit tremor  Continues to have issues with tremor, left greater than right upper extremity, typical for an essential or exaggerated physiologic are origin  The degree to which it is present is in knowing as well as at times functionally problematic  Given the failure of benefit noted above, do feel that his interest would best be served through formal by a movement team   --refer to Dr Rosa Mantilla, movement disorder specialist, for further evaluation and treatment suggestions  He will follow up with Dr Fabiano Pompa  Subjective:    HPI  Patient, 71years of age, returns to assess the response of his tremor to the initiation of low-dose clonazepam   He carries a diagnosis of an essential tremor  Unfortunately, he the initiation of clonazepam at bedtime did not help his tremor  Low-dose clonazepam was instead moved to the morning    Unfortunately, he found himself quite drowsy on the preparation, forcing its discontinuation  In summary, he had unacceptable sexual side effects from the primidone, despite the fact that it was providing tremor benefit  He has long-term been on a beta-blocker through Cardiology  In addition he remains on gabapentin for other reasons, at a dose of 600 mg t i d  and gabapentin has also been used off label to assist with tremor control  Nonetheless, he finds his current tremor circumstance not only annoying but at times functionally impairing  He is looking for further advice with regard to potential control  Past Medical History:   Diagnosis Date    Abdominal aortic aneurysm without rupture ([address] )     Abdominal Aortic Duplex 02/21/2017    Ectatic infrarenal abdominal aorta   CAD (coronary artery disease)     [facility])     Extremity pain     History of echocardiogram 03/18/2014    EF 55%, mild MR and AI  Mild concentric LVH   History of stress test 03/06/2017    Normal     Hypertension     Joint pain     Low back pain     Migraine     Neck pain     Obstructive sleep apnea     cannot tolerate CPAP    Osteoarthritis     Peripheral neuropathy     Reflex sympathetic dystrophy      Past Surgical History:   Procedure Laterality Date    CARDIAC CATHETERIZATION  02/13/2012    EF 70%, widely patent renal arteries, significant single-vessel CAD-medical therapy   CARDIAC CATHETERIZATION  04/11/2013    EF 65%, 50% mid LAD, 20% prox CFX, 90% diffuse RCA, 99% mid RCA  Medical management      CHOLECYSTECTOMY      EPIDURAL BLOCK INJECTION Bilateral 8/15/2019    Procedure: BLOCK / INJECTION EPIDURAL STEROID CERVICAL;  Surgeon: Xin Rojas MD;  Location: [facility];  Service: Pain Management     FL GUIDED NEEDLE PLAC BX/ASP/INJ  8/15/2019    ORTHOPEDIC SURGERY      TRIGGER POINT INJECTION       Social History     Socioeconomic History    Marital status: /Civil Union     Spouse name: None    Number of children: None    Years of education: None    Highest education level: None   Occupational History    None   Social Needs    Financial resource strain: None    Food insecurity:     Worry: None     Inability: None    Transportation needs:     Medical: None     Non-medical: None   Tobacco Use    Smoking status: Current Every Day Smoker     Packs/day: 1 50    Smokeless tobacco: Never Used   Substance and Sexual Activity    Alcohol use: No    Drug use: No    Sexual activity: None   Lifestyle    Physical activity:     Days per week: None     Minutes per session: None    Stress: None   Relationships    Social connections:     Talks on phone: None     Gets together: None     Attends Voodoo service: None     Active member of club or organization: None     Attends meetings of clubs or organizations: None     Relationship status: None    Intimate partner violence:     Fear of current or ex partner: None     Emotionally abused: None     Physically abused: None     Forced sexual activity: None   Other Topics Concern    None   Social History Narrative    None     Family History   Adopted: Yes   Problem Relation Age of Onset    No Known Problems Family     No Known Problems Mother     No Known Problems Father      Allergies   Allergen Reactions    Morphine And Related        Current Outpatient Medications:     amLODIPine (NORVASC) 10 mg tablet, Take 1 tablet (10 mg total) by mouth daily, Disp: 30 tablet, Rfl: 6    amLODIPine (NORVASC) 5 mg tablet, take 1 tablet by mouth once daily, Disp: 30 tablet, Rfl: 6    aspirin (ECOTRIN LOW STRENGTH) 81 mg EC tablet, Take 1 tablet (81 mg total) by mouth daily (Patient not taking: Reported on 10/7/2019), Disp: 30 tablet, Rfl: 3    clonazePAM (KlonoPIN) 0 5 mg tablet, By mouth take 1 tablet 3 times daily or as directed, Disp: 90 tablet, Rfl: 1    cloNIDine (CATAPRES-TTS-3) 0 3 mg/24 hr, Place 1 patch (0 3 mg total) on the skin once a week, Disp: 4 patch, Rfl: 2    diphenhydrAMINE-acetaminophen (TYLENOL PM)  MG TABS, Take 2 tablets by mouth daily at bedtime as needed for sleep, Disp: , Rfl:     FLUoxetine (PROzac) 20 mg capsule, Take 20 mg by mouth daily , Disp: , Rfl: 0    gabapentin (NEURONTIN) 600 MG tablet, Take 1 tablet (600 mg total) by mouth 3 (three) times a day, Disp: 90 tablet, Rfl: 1    hydrALAZINE (APRESOLINE) 10 mg tablet, take 1 tablet by mouth once daily, Disp: 30 tablet, Rfl: 3    hydrochlorothiazide (HYDRODIURIL) 25 mg tablet, take 1 tablet by mouth twice a day, Disp: 60 tablet, Rfl: 3    lisinopril (ZESTRIL) 40 mg tablet, take 1/2 tablet (20MG TOTAL) by mouth twice a day, Disp: , Rfl: 0    metoprolol tartrate (LOPRESSOR) 25 mg tablet, Take 1 tablet (25 mg total) by mouth every 12 (twelve) hours, Disp: 60 tablet, Rfl: 5    naloxone (NARCAN) 0 4 mg/mL injection, Infuse 1 mL (0 4 mg total) into a venous catheter once for 1 dose, Disp: 1 mL, Rfl: 0    nitroglycerin (NITROSTAT) 0 4 mg SL tablet, Place 0 4 mg under the tongue every 5 (five) minutes as needed for chest pain, Disp: , Rfl:     oxyCODONE-acetaminophen (PERCOCET) 7 5-325 MG per tablet, Take 1 tablet by mouth every 4 (four) hours as needed for moderate pain, Disp: , Rfl:     primidone (MYSOLINE) 50 mg tablet, By mouth take 1 tablet twice daily or as directed, Disp: 60 tablet, Rfl: 2    rosuvastatin (CRESTOR) 10 MG tablet, Take 1 tablet (10 mg total) by mouth daily, Disp: 30 tablet, Rfl: 5    tamsulosin (FLOMAX) 0 4 mg, Take 1 capsule (0 4 mg total) by mouth daily with dinner, Disp: 30 capsule, Rfl: 5    traZODone (DESYREL) 50 mg tablet, Take 1 tablet (50 mg total) by mouth daily at bedtime, Disp: 30 tablet, Rfl: 6    varenicline (CHANTIX) 0 5 mg tablet, Take 0 5 mg by mouth daily, Disp: , Rfl:     VIAGRA 100 MG tablet, Take 1 tablet (100 mg total) by mouth as needed for erectile dysfunction, Disp: 10 tablet, Rfl: 0    Objective:    Blood pressure (!) 196/100, pulse 78, resp  rate 18, height 5' 8" (1 727 m), weight 92 1 kg (203 lb)  Physical Exam  Blood pressure was retaken at the end of his appointment and improved at 178/92  Head normocephalic  Eyes nonicteric  Lungs clear to auscultation  Rhythm regular  GI (abdomen) soft nontender with bowel sounds present  Neurological Exam  Alert  Pleasantly interactive  Fully oriented  No voice tremor  Full symmetrical strength throughout the 4 extremities  Excellent facial and general animation  Normal rest tone  No tone increase or cogwheeling apparent with contralateral distraction maneuver  No rest tremor observed  Did have a tremor of the outstretched upper extremities of moderate amplitude and moderate frequency, involving the left upper extremity more than right  This became more evident with extended sustention  ROS:    Review of Systems   Constitutional: Negative  Negative for appetite change and fever  HENT: Negative  Negative for hearing loss, tinnitus, trouble swallowing and voice change  Eyes: Negative  Negative for photophobia and pain  Respiratory: Positive for shortness of breath  Cardiovascular: Negative  Negative for palpitations  Gastrointestinal: Negative  Negative for nausea and vomiting  Endocrine: Negative  Negative for cold intolerance and heat intolerance  Genitourinary: Positive for frequency and urgency  Negative for dysuria  Musculoskeletal: Negative  Negative for myalgias and neck pain  Skin: Negative  Negative for rash  Allergic/Immunologic: Negative  Neurological: Positive for dizziness and tremors (arms)  Negative for seizures, facial asymmetry, weakness, light-headedness, numbness and headaches  Hematological: Negative  Psychiatric/Behavioral: Positive for sleep disturbance  Negative for confusion and hallucinations  I personally reviewed the ROS that was entered by the medical assistant      *Please note this document was created using voice recognition software and may contain sound-alike word errors  *

## 2019-11-13 NOTE — PROGRESS NOTES
Patient ID: Rito Aguirre is a 71 y o  male  Assessment/Plan:    Tremor, essential  Unable to tolerate clonazepam even low-dose during the day as result of drowsing side effects  In summary, primidone was showing beneficial effect, but was discontinue when he developed significant and unacceptable sexual side effects  Has already been on a beta-blocker through Cardiology  Also has been on gabapentin 600 mg 3 times daily which has been used off label at times to benefit tremor  Continues to have issues with tremor, left greater than right upper extremity, typical for an essential or exaggerated physiologic are origin  The degree to which it is present is in knowing as well as at times functionally problematic  Given the failure of benefit noted above, do feel that his interest would best be served through formal by a movement team   --refer to Dr Power Zuñiga, movement disorder specialist, for further evaluation and treatment suggestions  He will follow up with Dr Sandra Ortiz  Subjective:    HPI  Patient, 71years of age, returns to assess the response of his tremor to the initiation of low-dose clonazepam   He carries a diagnosis of an essential tremor  Unfortunately, he the initiation of clonazepam at bedtime did not help his tremor  Low-dose clonazepam was instead moved to the morning  Unfortunately, he found himself quite drowsy on the preparation, forcing its discontinuation  In summary, he had unacceptable sexual side effects from the primidone, despite the fact that it was providing tremor benefit  He has long-term been on a beta-blocker through Cardiology  In addition he remains on gabapentin for other reasons, at a dose of 600 mg t i d  and gabapentin has also been used off label to assist with tremor control  Nonetheless, he finds his current tremor circumstance not only annoying but at times functionally impairing    He is looking for further advice with regard to potential control  Past Medical History:   Diagnosis Date    Abdominal aortic aneurysm without rupture ([facility] [address] )     Abdominal Aortic Duplex 02/21/2017    Ectatic infrarenal abdominal aorta   CAD (coronary artery disease)     COPD (chronic obstructive pulmonary disease) (HCC)     Extremity pain     History of echocardiogram 03/18/2014    EF 55%, mild MR and AI  Mild concentric LVH   History of stress test 03/06/2017    Normal     Hypertension     Joint pain     Low back pain     Migraine     Neck pain     Obstructive sleep apnea     cannot tolerate CPAP    Osteoarthritis     Peripheral neuropathy     Reflex sympathetic dystrophy      Past Surgical History:   Procedure Laterality Date    CARDIAC CATHETERIZATION  02/13/2012    EF 70%, widely patent renal arteries, significant single-vessel CAD-medical therapy   CARDIAC CATHETERIZATION  04/11/2013    EF 65%, 50% mid LAD, 20% prox CFX, 90% diffuse RCA, 99% mid RCA  Medical management      CHOLECYSTECTOMY      EPIDURAL BLOCK INJECTION Bilateral 8/15/2019    Procedure: BLOCK / INJECTION EPIDURAL STEROID CERVICAL;  Surgeon: Ivelisse Gilliam MD;  Location: [facility];  Service: Pain Management     FL GUIDED NEEDLE PLAC BX/ASP/INJ  8/15/2019    ORTHOPEDIC SURGERY      TRIGGER POINT INJECTION       Social History     Socioeconomic History    Marital status: /Civil Union     Spouse name: None    Number of children: None    Years of education: None    Highest education level: None   Occupational History    None   Social Needs    Financial resource strain: None    Food insecurity:     Worry: None     Inability: None    Transportation needs:     Medical: None     Non-medical: None   Tobacco Use    Smoking status: Current Every Day Smoker     Packs/day: 1 50    Smokeless tobacco: Never Used   Substance and Sexual Activity    Alcohol use: No    Drug use: No    Sexual activity: None   Lifestyle    Physical activity:     Days per week: None Minutes per session: None    Stress: None   Relationships    Social connections:     Talks on phone: None     Gets together: None     Attends Yarsanism service: None     Active member of club or organization: None     Attends meetings of clubs or organizations: None     Relationship status: None    Intimate partner violence:     Fear of current or ex partner: None     Emotionally abused: None     Physically abused: None     Forced sexual activity: None   Other Topics Concern    None   Social History Narrative    None     Family History   Adopted: Yes   Problem Relation Age of Onset    No Known Problems Family     No Known Problems Mother     No Known Problems Father      Allergies   Allergen Reactions    Morphine And Related        Current Outpatient Medications:     amLODIPine (NORVASC) 10 mg tablet, Take 1 tablet (10 mg total) by mouth daily, Disp: 30 tablet, Rfl: 6    amLODIPine (NORVASC) 5 mg tablet, take 1 tablet by mouth once daily, Disp: 30 tablet, Rfl: 6    aspirin (ECOTRIN LOW STRENGTH) 81 mg EC tablet, Take 1 tablet (81 mg total) by mouth daily (Patient not taking: Reported on 10/7/2019), Disp: 30 tablet, Rfl: 3    clonazePAM (KlonoPIN) 0 5 mg tablet, By mouth take 1 tablet 3 times daily or as directed, Disp: 90 tablet, Rfl: 1    cloNIDine (CATAPRES-TTS-3) 0 3 mg/24 hr, Place 1 patch (0 3 mg total) on the skin once a week, Disp: 4 patch, Rfl: 2    diphenhydrAMINE-acetaminophen (TYLENOL PM)  MG TABS, Take 2 tablets by mouth daily at bedtime as needed for sleep, Disp: , Rfl:     FLUoxetine (PROzac) 20 mg capsule, Take 20 mg by mouth daily , Disp: , Rfl: 0    gabapentin (NEURONTIN) 600 MG tablet, Take 1 tablet (600 mg total) by mouth 3 (three) times a day, Disp: 90 tablet, Rfl: 1    hydrALAZINE (APRESOLINE) 10 mg tablet, take 1 tablet by mouth once daily, Disp: 30 tablet, Rfl: 3    hydrochlorothiazide (HYDRODIURIL) 25 mg tablet, take 1 tablet by mouth twice a day, Disp: 60 tablet, Rfl: 3    lisinopril (ZESTRIL) 40 mg tablet, take 1/2 tablet (20MG TOTAL) by mouth twice a day, Disp: , Rfl: 0    metoprolol tartrate (LOPRESSOR) 25 mg tablet, Take 1 tablet (25 mg total) by mouth every 12 (twelve) hours, Disp: 60 tablet, Rfl: 5    naloxone (NARCAN) 0 4 mg/mL injection, Infuse 1 mL (0 4 mg total) into a venous catheter once for 1 dose, Disp: 1 mL, Rfl: 0    nitroglycerin (NITROSTAT) 0 4 mg SL tablet, Place 0 4 mg under the tongue every 5 (five) minutes as needed for chest pain, Disp: , Rfl:     oxyCODONE-acetaminophen (PERCOCET) 7 5-325 MG per tablet, Take 1 tablet by mouth every 4 (four) hours as needed for moderate pain, Disp: , Rfl:     primidone (MYSOLINE) 50 mg tablet, By mouth take 1 tablet twice daily or as directed, Disp: 60 tablet, Rfl: 2    rosuvastatin (CRESTOR) 10 MG tablet, Take 1 tablet (10 mg total) by mouth daily, Disp: 30 tablet, Rfl: 5    tamsulosin (FLOMAX) 0 4 mg, Take 1 capsule (0 4 mg total) by mouth daily with dinner, Disp: 30 capsule, Rfl: 5    traZODone (DESYREL) 50 mg tablet, Take 1 tablet (50 mg total) by mouth daily at bedtime, Disp: 30 tablet, Rfl: 6    varenicline (CHANTIX) 0 5 mg tablet, Take 0 5 mg by mouth daily, Disp: , Rfl:     VIAGRA 100 MG tablet, Take 1 tablet (100 mg total) by mouth as needed for erectile dysfunction, Disp: 10 tablet, Rfl: 0    Objective:    Blood pressure (!) 196/100, pulse 78, resp  rate 18, height 5' 8" (1 727 m), weight 92 1 kg (203 lb)  Physical Exam  Blood pressure was retaken at the end of his appointment and improved at 178/92  Head normocephalic  Eyes nonicteric  Lungs clear to auscultation  Rhythm regular  GI (abdomen) soft nontender with bowel sounds present  Neurological Exam  Alert  Pleasantly interactive  Fully oriented  No voice tremor  Full symmetrical strength throughout the 4 extremities  Excellent facial and general animation  Normal rest tone    No tone increase or cogwheeling apparent with contralateral distraction maneuver  No rest tremor observed  Did have a tremor of the outstretched upper extremities of moderate amplitude and moderate frequency, involving the left upper extremity more than right  This became more evident with extended sustention  ROS:    Review of Systems   Constitutional: Negative  Negative for appetite change and fever  HENT: Negative  Negative for hearing loss, tinnitus, trouble swallowing and voice change  Eyes: Negative  Negative for photophobia and pain  Respiratory: Positive for shortness of breath  Cardiovascular: Negative  Negative for palpitations  Gastrointestinal: Negative  Negative for nausea and vomiting  Endocrine: Negative  Negative for cold intolerance and heat intolerance  Genitourinary: Positive for frequency and urgency  Negative for dysuria  Musculoskeletal: Negative  Negative for myalgias and neck pain  Skin: Negative  Negative for rash  Allergic/Immunologic: Negative  Neurological: Positive for dizziness and tremors (arms)  Negative for seizures, facial asymmetry, weakness, light-headedness, numbness and headaches  Hematological: Negative  Psychiatric/Behavioral: Positive for sleep disturbance  Negative for confusion and hallucinations  I personally reviewed the ROS that was entered by the medical assistant  *Please note this document was created using voice recognition software and may contain sound-alike word errors  *

## 2019-11-13 NOTE — TELEPHONE ENCOUNTER
Repeated patient's blood pressure at the end of the appointment  Improved 178/92  Will be discussing his hypertensive issues at his upcoming cardiology appointment the day after tomorrow

## 2019-11-14 ENCOUNTER — TELEPHONE (OUTPATIENT)
Dept: PAIN MEDICINE | Facility: CLINIC | Age: 64
End: 2019-11-14

## 2019-11-14 DIAGNOSIS — G89.4 CHRONIC PAIN SYNDROME: ICD-10-CM

## 2019-11-14 DIAGNOSIS — G89.29 CHRONIC LOW BACK PAIN WITHOUT SCIATICA, UNSPECIFIED BACK PAIN LATERALITY: Primary | ICD-10-CM

## 2019-11-14 DIAGNOSIS — M54.50 CHRONIC LOW BACK PAIN WITHOUT SCIATICA, UNSPECIFIED BACK PAIN LATERALITY: Primary | ICD-10-CM

## 2019-11-14 DIAGNOSIS — F11.20 UNCOMPLICATED OPIOID DEPENDENCE (HCC): ICD-10-CM

## 2019-11-14 DIAGNOSIS — M51.36 LUMBAR DEGENERATIVE DISC DISEASE: ICD-10-CM

## 2019-11-14 DIAGNOSIS — M47.816 LUMBAR SPONDYLOSIS: ICD-10-CM

## 2019-11-14 DIAGNOSIS — M47.812 CERVICAL SPONDYLOSIS WITHOUT MYELOPATHY: ICD-10-CM

## 2019-11-14 RX ORDER — OXYCODONE AND ACETAMINOPHEN 7.5; 325 MG/1; MG/1
1 TABLET ORAL EVERY 8 HOURS PRN
Qty: 90 TABLET | Refills: 0 | Status: SHIPPED | OUTPATIENT
Start: 2019-11-14 | End: 2019-12-09

## 2019-11-14 NOTE — TELEPHONE ENCOUNTER
Patient   [phone]  Dr Edilson Bach     Patient is calling in requesting a refill for oxyCODONE-acetaminophen (PERCOCET) 7 5-325 MG  He said he went to the pharmacy to pick it up and they didn't have a script  He said that last time the Dr gave him a 2 month refill for this script   He is also out of pills now

## 2019-11-14 NOTE — TELEPHONE ENCOUNTER
See below  Last OV 10/7/19   2 Rx's to be sent per OV note  Only one was transmitted on that date  Has f/u 12/2/19    Please sen second Rx for below mentioned medication

## 2019-11-15 ENCOUNTER — OFFICE VISIT (OUTPATIENT)
Dept: CARDIOLOGY CLINIC | Facility: CLINIC | Age: 64
End: 2019-11-15
Payer: MEDICARE

## 2019-11-15 VITALS
BODY MASS INDEX: 30.31 KG/M2 | HEART RATE: 60 BPM | HEIGHT: 68 IN | DIASTOLIC BLOOD PRESSURE: 78 MMHG | WEIGHT: 200 LBS | SYSTOLIC BLOOD PRESSURE: 128 MMHG

## 2019-11-15 DIAGNOSIS — I71.4 ABDOMINAL AORTIC ANEURYSM (AAA) WITHOUT RUPTURE (HCC): ICD-10-CM

## 2019-11-15 DIAGNOSIS — I10 ESSENTIAL HYPERTENSION: Primary | ICD-10-CM

## 2019-11-15 DIAGNOSIS — I25.10 CORONARY ARTERY DISEASE INVOLVING NATIVE CORONARY ARTERY OF NATIVE HEART WITHOUT ANGINA PECTORIS: ICD-10-CM

## 2019-11-15 DIAGNOSIS — N52.9 ERECTILE DYSFUNCTION, UNSPECIFIED ERECTILE DYSFUNCTION TYPE: Primary | ICD-10-CM

## 2019-11-15 PROCEDURE — 99214 OFFICE O/P EST MOD 30 MIN: CPT | Performed by: INTERNAL MEDICINE

## 2019-11-15 RX ORDER — SILDENAFIL 100 MG/1
100 TABLET, FILM COATED ORAL DAILY PRN
Qty: 10 TABLET | Refills: 0 | Status: SHIPPED | OUTPATIENT
Start: 2019-11-15 | End: 2019-12-08 | Stop reason: SDUPTHER

## 2019-11-15 NOTE — PROGRESS NOTES
Subjective:        Patient ID: Rasheed Bermudez is a 71 y o  male  Chief Complaint:  Michelle Madsen is here for routine follow-up regarding hypertension  He still having some skin issues with the Catapres patch but he is able to tolerate this by migrating the site of application every week  He does not want to change anything since this is the only thing that has work for him  He is not having any chest pains, he has fairly constant though stable moderate dyspnea on exertion, he continues to smoke  No palpitations presyncope or syncope  No edema orthopnea or PND  The following portions of the patient's history were reviewed and updated as appropriate: allergies, current medications, past family history, past medical history, past social history, past surgical history and problem list   Review of Systems   Constitution: Negative for chills, diaphoresis, malaise/fatigue and weight gain  HENT: Negative for nosebleeds and stridor  Eyes: Negative for double vision, vision loss in left eye, vision loss in right eye and visual disturbance  Cardiovascular: Positive for dyspnea on exertion  Negative for chest pain, claudication, cyanosis, irregular heartbeat, leg swelling, near-syncope, orthopnea, palpitations, paroxysmal nocturnal dyspnea and syncope  Respiratory: Negative for cough, shortness of breath, snoring and wheezing  Endocrine: Negative for polydipsia, polyphagia and polyuria  Hematologic/Lymphatic: Negative for bleeding problem  Does not bruise/bleed easily  Skin: Negative for flushing and rash  Musculoskeletal: Negative for falls and myalgias  Gastrointestinal: Negative for abdominal pain, heartburn, hematemesis, hematochezia, melena and nausea  Genitourinary: Negative for hematuria  Neurological: Negative for brief paralysis, dizziness, focal weakness, headaches, light-headedness, loss of balance and vertigo     Psychiatric/Behavioral: Negative for altered mental status and substance abuse  Allergic/Immunologic: Negative for hives  Objective:      /78   Pulse 60   Ht 5' 8" (1 727 m)   Wt 90 7 kg (200 lb)   BMI 30 41 kg/m²   Physical Exam   Constitutional: He is oriented to person, place, and time  He appears well-developed and well-nourished  No distress  HENT:   Head: Normocephalic and atraumatic  Eyes: Pupils are equal, round, and reactive to light  EOM are normal  No scleral icterus  Neck: Normal range of motion  Neck supple  No JVD present  No thyromegaly present  Cardiovascular: Normal rate, regular rhythm and normal heart sounds  Exam reveals no gallop and no friction rub  No murmur heard  Pulmonary/Chest: Effort normal  No stridor  No respiratory distress  He has wheezes  He has no rales  Scattered rhonchi   Abdominal: Soft  Bowel sounds are normal  He exhibits no distension and no mass  There is no tenderness  Musculoskeletal: Normal range of motion  He exhibits no edema or deformity  Neurological: He is alert and oriented to person, place, and time  Coordination normal    Skin: Skin is warm and dry  No erythema  No pallor  Psychiatric: He has a normal mood and affect  His behavior is normal        Lab Review:   No visits with results within 2 Month(s) from this visit     Latest known visit with results is:   Appointment on 08/30/2019   Component Date Value    WBC 08/30/2019 9 60     RBC 08/30/2019 5 24     Hemoglobin 08/30/2019 15 8     Hematocrit 08/30/2019 48 8     MCV 08/30/2019 93     MCH 08/30/2019 30 2     MCHC 08/30/2019 32 4     RDW 08/30/2019 13 1     MPV 08/30/2019 10 9     Platelets 08/06/7325 221     nRBC 08/30/2019 0     Neutrophils Relative 08/30/2019 60     Immat GRANS % 08/30/2019 0     Lymphocytes Relative 08/30/2019 33     Monocytes Relative 08/30/2019 6     Eosinophils Relative 08/30/2019 0     Basophils Relative 08/30/2019 1     Neutrophils Absolute 08/30/2019 5 75     Immature Grans Absolute 08/30/2019 0 02     Lymphocytes Absolute 08/30/2019 3 14     Monocytes Absolute 08/30/2019 0 61     Eosinophils Absolute 08/30/2019 0 00     Basophils Absolute 08/30/2019 0 08     Copper 08/30/2019 101     Ceruloplasmin 08/30/2019 23 7     Hepatitis C Ab 08/30/2019 Non-reactive      No results found  Assessment:       1  Essential hypertension  Echo complete with contrast if indicated   2  Abdominal aortic aneurysm (AAA) without rupture ([address] )     3  Coronary artery disease involving native coronary artery of native heart without angina pectoris          Plan:       Lance's blood pressure is reasonably well controlled, I made no changes today  Fortunately is not having any anginal symptoms  Pulse is regular suggesting sinus rhythm  Reviewed his last ultrasound, no significant aortic aneurysm seen, 3 0 cm  Will follow-up in May of 2020 with repeat abdominal ultrasound after my next visit  Strongly recommended smoking cessation and avoidance of NSAIDs as I have previously  Treatment of sleep apnea would assist in blood pressure management, I leave this in your capable hands  I strongly recommended he follow through with your recommendation of the CT scan of his lungs you have ordered  I stressed medication compliance as well, he readily admitted to not taking everything all the time  Return office 6 months, sooner as needed

## 2019-11-15 NOTE — PATIENT INSTRUCTIONS
Chronic Hypertension, Ambulatory Care   GENERAL INFORMATION:   Chronic hypertension  is a long-term condition in which your blood pressure (BP) is higher than normal  Your BP is the force of your blood moving against the walls of your arteries  Hypertension is a BP of 140/90 or higher  Common symptoms include the following:   · Headache     · Blurred vision    · Chest pain     · Dizziness or weakness     · Trouble breathing     · Nosebleeds  Seek immediate care for the following symptoms:   · Severe headache or vision loss    · Weakness in an arm or leg    · Confusion or difficulty speaking    · Discomfort in your chest that feels like squeezing, pressure, fullness, or pain    · Suddenly feeling lightheaded or trouble breathing    · Pain or discomfort in your back, neck, jaw, stomach, or arm  Treatment for chronic hypertension  may include medicine to lower your BP  You may also need to make lifestyle changes  Take your medicine exactly as directed  Manage chronic hypertension:   · Take your BP at home  Sit and rest for 5 minutes before you take your BP  Extend your arm and support it on a flat surface  Your arm should be at the same level as your heart  Follow the directions that came with your BP monitor  If possible, take at least 2 BP readings each time  Take your BP at least twice a day at the same times each day, such as morning and evening  Keep a log of your BP readings and bring it to your follow-up visits  · Eat less sodium (salt)  Do not add sodium to your food  Limit foods that are high in sodium, such as canned foods, potato chips, and cold cuts  Your healthcare provider may suggest that you follow the [address] [place] [address] Street  The plan is low in sodium, unhealthy fats, and total fat  It is high in potassium, calcium, and fiber  · Exercise regularly  Exercise at least 30 minutes per day, on most days of the week  This will help decrease your BP   Ask your healthcare provider about the best exercise plan for you  · Limit alcohol  Women should limit alcohol to 1 drink a day  Men should limit alcohol to 2 drinks a day  A drink of alcohol is 12 ounces of beer, 5 ounces of wine, or 1½ ounces of liquor  · Do not smoke  If you smoke, it is never too late to quit  Smoking can increase your BP  Smoking also worsens other health conditions you may have that can increase your risk for hypertension  Ask your healthcare provider for information if you need help quitting  Follow up with your healthcare provider as directed: You will need to return to have your BP checked and to have other lab tests done  Write down your questions so you remember to ask them during your visits  CARE AGREEMENT:   You have the right to help plan your care  Learn about your health condition and how it may be treated  Discuss treatment options with your caregivers to decide what care you want to receive  You always have the right to refuse treatment  The above information is an  only  It is not intended as medical advice for individual conditions or treatments  Talk to your doctor, nurse or pharmacist before following any medical regimen to see if it is safe and effective for you  © 2014 [address] is for End User's use only and may not be sold, redistributed or otherwise used for commercial purposes  All illustrations and images included in CareNotes® are the copyrighted property of [employer]  or [employer]

## 2019-11-20 ENCOUNTER — HOSPITAL ENCOUNTER (OUTPATIENT)
Dept: CT IMAGING | Facility: HOSPITAL | Age: 64
Discharge: HOME | End: 2019-11-20
Attending: FAMILY MEDICINE
Payer: MEDICARE

## 2019-11-20 ENCOUNTER — HOSPITAL ENCOUNTER (OUTPATIENT)
Dept: RADIOLOGY | Facility: HOSPITAL | Age: 64
Discharge: HOME | End: 2019-11-20
Attending: FAMILY MEDICINE
Payer: MEDICARE

## 2019-11-20 DIAGNOSIS — F17.200 SMOKING: ICD-10-CM

## 2019-11-20 DIAGNOSIS — G89.29 CHRONIC PAIN OF BOTH KNEES: ICD-10-CM

## 2019-11-20 DIAGNOSIS — M25.562 CHRONIC PAIN OF BOTH KNEES: ICD-10-CM

## 2019-11-20 DIAGNOSIS — M25.561 CHRONIC PAIN OF BOTH KNEES: ICD-10-CM

## 2019-11-20 DIAGNOSIS — Z87.891 PERSONAL HISTORY OF TOBACCO USE, PRESENTING HAZARDS TO HEALTH: ICD-10-CM

## 2019-11-20 PROCEDURE — G0297 LDCT FOR LUNG CA SCREEN: HCPCS

## 2019-11-20 PROCEDURE — 73562 X-RAY EXAM OF KNEE 3: CPT

## 2019-11-22 ENCOUNTER — TELEPHONE (OUTPATIENT)
Dept: FAMILY MEDICINE CLINIC | Facility: CLINIC | Age: 64
End: 2019-11-22

## 2019-11-22 DIAGNOSIS — M25.512 CHRONIC LEFT SHOULDER PAIN: Primary | ICD-10-CM

## 2019-11-22 DIAGNOSIS — N28.1 RENAL CYST: Primary | ICD-10-CM

## 2019-11-22 DIAGNOSIS — G89.29 CHRONIC LEFT SHOULDER PAIN: Primary | ICD-10-CM

## 2019-11-22 NOTE — TELEPHONE ENCOUNTER
I let patient know that hte xrays of the knee came back negative    He wants to know what the next step is because they are both hurting him to the point of wanting to scream

## 2019-11-27 ENCOUNTER — HOSPITAL ENCOUNTER (OUTPATIENT)
Dept: ULTRASOUND IMAGING | Facility: HOSPITAL | Age: 64
Discharge: HOME | End: 2019-11-27
Attending: FAMILY MEDICINE
Payer: MEDICARE

## 2019-11-27 DIAGNOSIS — N28.1 RENAL CYST: ICD-10-CM

## 2019-11-27 PROCEDURE — 76770 US EXAM ABDO BACK WALL COMP: CPT

## 2019-12-08 DIAGNOSIS — N52.9 ERECTILE DYSFUNCTION, UNSPECIFIED ERECTILE DYSFUNCTION TYPE: ICD-10-CM

## 2019-12-09 ENCOUNTER — OFFICE VISIT (OUTPATIENT)
Dept: PAIN MEDICINE | Facility: CLINIC | Age: 64
End: 2019-12-09
Payer: MEDICARE

## 2019-12-09 ENCOUNTER — HOSPITAL ENCOUNTER (OUTPATIENT)
Dept: NON INVASIVE DIAGNOSTICS | Facility: CLINIC | Age: 64
Discharge: HOME | End: 2019-12-09
Payer: MEDICARE

## 2019-12-09 VITALS
BODY MASS INDEX: 31.07 KG/M2 | WEIGHT: 205 LBS | SYSTOLIC BLOOD PRESSURE: 156 MMHG | HEIGHT: 68 IN | DIASTOLIC BLOOD PRESSURE: 84 MMHG

## 2019-12-09 DIAGNOSIS — Z79.891 LONG-TERM CURRENT USE OF OPIATE ANALGESIC: ICD-10-CM

## 2019-12-09 DIAGNOSIS — G89.29 CHRONIC LOW BACK PAIN WITHOUT SCIATICA, UNSPECIFIED BACK PAIN LATERALITY: ICD-10-CM

## 2019-12-09 DIAGNOSIS — F11.20 OPIOID DEPENDENCE, UNCOMPLICATED (HCC): ICD-10-CM

## 2019-12-09 DIAGNOSIS — I10 ESSENTIAL HYPERTENSION: ICD-10-CM

## 2019-12-09 DIAGNOSIS — M25.562 CHRONIC PAIN OF BOTH KNEES: Primary | ICD-10-CM

## 2019-12-09 DIAGNOSIS — G89.4 CHRONIC PAIN SYNDROME: ICD-10-CM

## 2019-12-09 DIAGNOSIS — G89.29 CHRONIC PAIN OF BOTH KNEES: Primary | ICD-10-CM

## 2019-12-09 DIAGNOSIS — M47.812 CERVICAL SPONDYLOSIS WITHOUT MYELOPATHY: ICD-10-CM

## 2019-12-09 DIAGNOSIS — M54.12 CERVICAL RADICULOPATHY: ICD-10-CM

## 2019-12-09 DIAGNOSIS — Z79.891 ENCOUNTER FOR LONG-TERM OPIATE ANALGESIC USE: ICD-10-CM

## 2019-12-09 DIAGNOSIS — F11.20 UNCOMPLICATED OPIOID DEPENDENCE (HCC): ICD-10-CM

## 2019-12-09 DIAGNOSIS — M47.816 LUMBAR SPONDYLOSIS: ICD-10-CM

## 2019-12-09 DIAGNOSIS — Z87.438 HISTORY OF BPH: ICD-10-CM

## 2019-12-09 DIAGNOSIS — M25.561 CHRONIC PAIN OF BOTH KNEES: Primary | ICD-10-CM

## 2019-12-09 DIAGNOSIS — M51.36 LUMBAR DEGENERATIVE DISC DISEASE: ICD-10-CM

## 2019-12-09 DIAGNOSIS — M54.50 CHRONIC LOW BACK PAIN WITHOUT SCIATICA, UNSPECIFIED BACK PAIN LATERALITY: ICD-10-CM

## 2019-12-09 PROCEDURE — 99214 OFFICE O/P EST MOD 30 MIN: CPT | Performed by: ANESTHESIOLOGY

## 2019-12-09 PROCEDURE — 93306 TTE W/DOPPLER COMPLETE: CPT

## 2019-12-09 PROCEDURE — 93306 TTE W/DOPPLER COMPLETE: CPT | Performed by: INTERNAL MEDICINE

## 2019-12-09 PROCEDURE — 80305 DRUG TEST PRSMV DIR OPT OBS: CPT | Performed by: ANESTHESIOLOGY

## 2019-12-09 RX ORDER — GABAPENTIN 600 MG/1
600 TABLET ORAL 3 TIMES DAILY
Qty: 90 TABLET | Refills: 1 | Status: SHIPPED | OUTPATIENT
Start: 2019-12-09 | End: 2020-02-10 | Stop reason: SDUPTHER

## 2019-12-09 RX ORDER — OXYCODONE AND ACETAMINOPHEN 7.5; 325 MG/1; MG/1
1 TABLET ORAL EVERY 8 HOURS PRN
Qty: 90 TABLET | Refills: 0 | Status: SHIPPED | OUTPATIENT
Start: 2019-12-09 | End: 2019-12-09

## 2019-12-09 RX ORDER — OXYCODONE AND ACETAMINOPHEN 7.5; 325 MG/1; MG/1
1 TABLET ORAL EVERY 8 HOURS PRN
Qty: 90 TABLET | Refills: 0 | Status: SHIPPED | OUTPATIENT
Start: 2020-01-08 | End: 2020-02-10 | Stop reason: SDUPTHER

## 2019-12-09 RX ORDER — TAMSULOSIN HYDROCHLORIDE 0.4 MG/1
CAPSULE ORAL
Qty: 30 CAPSULE | Refills: 0 | Status: SHIPPED | OUTPATIENT
Start: 2019-12-09 | End: 2020-01-06

## 2019-12-09 NOTE — PROGRESS NOTES
Assessment:  1  Chronic pain of both knees    2  Chronic low back pain without sciatica, unspecified back pain laterality    3  Cervical spondylosis without myelopathy    4  Lumbar degenerative disc disease    5  Lumbar spondylosis    6  Chronic pain syndrome    7  Uncomplicated opioid dependence ([address] )    8  Cervical radiculopathy        Plan:  [facility] W 5 y  o  male with a history of chronic pain syndrome secondary to cervical radiculopathy, lumbar spondylosis, lumbar degenerative disc disease, chronic low back pain  Patient is status left shoulder injection performed by Dr Shaq Kohli to which he reported 70% relief for 2 weeks  Patient denies any other adverse events since last office visit  1  We will refill patient's Percocet 7 5/325 mg p o  T i d  On for chronic neck and low back and bilateral knee pain (2 scripts provided)  2  We will refill patient's gabapentin 600 mg p o  T i d  For radicular symptoms/neuropathic symptoms (2 scripts provided)  3  We will schedule patient for ultrasound-guided bilateral on knee intra-articular steroid injection  3  Follow-up in 2 months           Complete risks and benefits including bleeding, infection, tissue reaction, nerve injury and allergic reaction were discussed  The approach was demonstrated using models and literature was provided  Verbal and written consent was obtained  There are risks associated with opioid medications, including dependence, addiction and tolerance  The patient understands and agrees to use these medications only as prescribed  Potential side effects of the medications include, but are not limited to, constipation, drowsiness, addiction, impaired judgment and risk of fatal overdose if not taken as prescribed  The patient was warned against driving while taking sedation medications  Sharing medications is a felony  At this point in time, the patient is showing no signs of addiction, abuse, diversion or suicidal ideation      A urine drug screen was collected at today's office visit as part of our medication management protocol  The point of care testing results were appropriate for what was being prescribed  The specimen will be sent for confirmatory testing  The drug screen is medically necessary because the patient is either dependent on opioid medication or is being considered for opioid medication therapy and the results could impact ongoing or future treatment  The drug screen is to evaluate for the presences or absence of prescribed, non-prescribed, and/or illicit drugs/substances  [place] Prescription Drug Monitoring Program report was reviewed and was appropriate       History of Present Illness: The patient is a 71 y o  male who presents for a follow up office visit in regards to Shoulder Pain; Knee Pain; Neck Pain; and Back Pain  The patients current symptoms include 7/10 constant sharp, throbbing, pins and needles in bilateral shoulders, low back, bilateral knees with worse in morning at nighttime  Current pain medications includes:    The patient reports that this regimen is providing 30% pain relief  The patient is reporting no side effects from this pain medication regimen  I have personally reviewed and/or updated the patient's past medical history, past surgical history, family history, social history, current medications, allergies, and vital signs today  Review of Systems  Review of Systems   Respiratory: Positive for shortness of breath  Musculoskeletal: Positive for gait problem  Decreased range of motion   Joint stiffness     Neurological: Positive for dizziness  All other systems reviewed and are negative  Past Medical History:   Diagnosis Date    Abdominal aortic aneurysm without rupture ([address] )     Abdominal Aortic Duplex 02/21/2017    Ectatic infrarenal abdominal aorta      CAD (coronary artery disease)     COPD (chronic obstructive pulmonary disease) (HCC)     Extremity pain     History of echocardiogram 2014    EF 55%, mild MR and AI  Mild concentric LVH   History of stress test 2017    Normal     Hypertension     Joint pain     Low back pain     Migraine     Neck pain     Obstructive sleep apnea     cannot tolerate CPAP    Osteoarthritis     Peripheral neuropathy     Reflex sympathetic dystrophy        Past Surgical History:   Procedure Laterality Date    CARDIAC CATHETERIZATION  2012    EF 70%, widely patent renal arteries, significant single-vessel CAD-medical therapy   CARDIAC CATHETERIZATION  2013    EF 65%, 50% mid LAD, 20% prox CFX, 90% diffuse RCA, 99% mid RCA  Medical management      CHOLECYSTECTOMY      EPIDURAL BLOCK INJECTION Bilateral 8/15/2019    Procedure: BLOCK / INJECTION EPIDURAL STEROID CERVICAL;  Surgeon: Areta Apgar, MD;  Location: [facility];  Service: Pain Management     FL GUIDED NEEDLE PLAC BX/ASP/INJ  8/15/2019    ORTHOPEDIC SURGERY      TRIGGER POINT INJECTION         Family History   Adopted: Yes   Problem Relation Age of Onset    No Known Problems Family     No Known Problems Mother     No Known Problems Father        Social History     Occupational History    Not on file   Tobacco Use    Smoking status: Current Every Day Smoker     Packs/day: 1 50    Smokeless tobacco: Never Used   Substance and Sexual Activity    Alcohol use: No    Drug use: No    Sexual activity: Not on file         Current Outpatient Medications:     amLODIPine (NORVASC) 10 mg tablet, Take 1 tablet (10 mg total) by mouth daily (Patient not taking: Reported on 11/15/2019), Disp: 30 tablet, Rfl: 6    aspirin (ECOTRIN LOW STRENGTH) 81 mg EC tablet, Take 1 tablet (81 mg total) by mouth daily (Patient not taking: Reported on 10/7/2019), Disp: 30 tablet, Rfl: 3    clonazePAM (KlonoPIN) 0 5 mg tablet, By mouth take 1 tablet 3 times daily or as directed, Disp: 90 tablet, Rfl: 1    cloNIDine (CATAPRES-TTS-3) 0 3 mg/24 hr, Place 1 patch (0 3 mg total) on the skin once a week, Disp: 4 patch, Rfl: 2    diphenhydrAMINE-acetaminophen (TYLENOL PM)  MG TABS, Take 2 tablets by mouth daily at bedtime as needed for sleep, Disp: , Rfl:     FLUoxetine (PROzac) 20 mg capsule, Take 20 mg by mouth daily , Disp: , Rfl: 0    gabapentin (NEURONTIN) 600 MG tablet, Take 1 tablet (600 mg total) by mouth 3 (three) times a day, Disp: 90 tablet, Rfl: 1    hydrALAZINE (APRESOLINE) 10 mg tablet, take 1 tablet by mouth once daily (Patient taking differently: Take 10 mg by mouth as needed ), Disp: 30 tablet, Rfl: 3    hydrochlorothiazide (HYDRODIURIL) 25 mg tablet, take 1 tablet by mouth twice a day, Disp: 60 tablet, Rfl: 3    lisinopril (ZESTRIL) 40 mg tablet, Take 40 mg by mouth daily , Disp: , Rfl: 0    metoprolol tartrate (LOPRESSOR) 25 mg tablet, Take 1 tablet (25 mg total) by mouth every 12 (twelve) hours, Disp: 60 tablet, Rfl: 5    naloxone (NARCAN) 0 4 mg/mL injection, Infuse 1 mL (0 4 mg total) into a venous catheter once for 1 dose, Disp: 1 mL, Rfl: 0    nitroglycerin (NITROSTAT) 0 4 mg SL tablet, Place 0 4 mg under the tongue every 5 (five) minutes as needed for chest pain, Disp: , Rfl:     oxyCODONE-acetaminophen (PERCOCET) 7 5-325 MG per tablet, Take 1 tablet by mouth every 8 (eight) hours as needed for moderate painMax Daily Amount: 3 tablets, Disp: 90 tablet, Rfl: 0    primidone (MYSOLINE) 50 mg tablet, By mouth take 1 tablet twice daily or as directed (Patient not taking: Reported on 11/15/2019), Disp: 60 tablet, Rfl: 2    rosuvastatin (CRESTOR) 10 MG tablet, Take 1 tablet (10 mg total) by mouth daily, Disp: 30 tablet, Rfl: 5    sildenafil (VIAGRA) 100 mg tablet, Take 1 tablet (100 mg total) by mouth daily as needed for erectile dysfunction, Disp: 10 tablet, Rfl: 0    tamsulosin (FLOMAX) 0 4 mg, Take 1 capsule (0 4 mg total) by mouth daily with dinner, Disp: 30 capsule, Rfl: 5    traZODone (DESYREL) 50 mg tablet, Take 1 tablet (50 mg total) by mouth daily at bedtime, Disp: 30 tablet, Rfl: 6    varenicline (CHANTIX) 0 5 mg tablet, Take 0 5 mg by mouth daily, Disp: , Rfl:     VIAGRA 100 MG tablet, Take 1 tablet (100 mg total) by mouth as needed for erectile dysfunction (Patient taking differently: Take 100 mg by mouth as needed for erectile dysfunction (and hypertension) ), Disp: 10 tablet, Rfl: 0    Allergies   Allergen Reactions    Morphine And Related        Physical Exam:    /84 (BP Location: Left arm, Patient Position: Sitting, Cuff Size: Large)   Ht 5' 8" (1 727 m)   Wt 93 kg (205 lb)   BMI 31 17 kg/m²     Constitutional:normal, well developed, well nourished, alert, in no distress and non-toxic and no overt pain behavior  and obese  Eyes:anicteric  HEENT:grossly intact  Neck:supple, symmetric, trachea midline and no masses   Pulmonary:even and unlabored  Cardiovascular:No edema or pitting edema present  Skin:Normal without rashes or lesions and well hydrated  Psychiatric:Mood and affect appropriate  Neurologic:Cranial Nerves II-XII grossly intact  Musculoskeletal:antalgic    Imaging  No orders to display       No orders of the defined types were placed in this encounter

## 2019-12-11 RX ORDER — SILDENAFIL 100 MG/1
100 TABLET, FILM COATED ORAL DAILY PRN
Qty: 10 TABLET | Refills: 3 | Status: SHIPPED | OUTPATIENT
Start: 2019-12-11 | End: 2020-07-05 | Stop reason: SDUPTHER

## 2019-12-12 LAB
6MAM UR QL CFM: NEGATIVE NG/ML
7AMINOCLONAZEPAM UR QL CFM: NEGATIVE NG/ML
A-OH ALPRAZ UR QL CFM: NEGATIVE NG/ML
AMPHET UR QL CFM: NEGATIVE NG/ML
AMPHET UR QL CFM: NEGATIVE NG/ML
BUPRENORPHINE UR QL CFM: NEGATIVE NG/ML
BUTALBITAL UR QL CFM: NEGATIVE NG/ML
BZE UR QL CFM: NEGATIVE NG/ML
CODEINE UR QL CFM: NEGATIVE NG/ML
DESIPRAMINE UR QL CFM: NEGATIVE NG/ML
DESIPRAMINE UR QL CFM: NEGATIVE NG/ML
EDDP UR QL CFM: NEGATIVE NG/ML
ETHYL GLUCURONIDE UR QL CFM: NEGATIVE NG/ML
ETHYL SULFATE UR QL SCN: NEGATIVE NG/ML
FENTANYL UR QL CFM: NEGATIVE NG/ML
GLIADIN IGG SER IA-ACNC: NEGATIVE NG/ML
GLUCOSE 30M P 50 G LAC PO SERPL-MCNC: NEGATIVE NG/ML
HYDROCODONE UR QL CFM: NEGATIVE NG/ML
HYDROCODONE UR QL CFM: NEGATIVE NG/ML
HYDROMORPHONE UR QL CFM: NEGATIVE NG/ML
IMIPRAMINE UR QL CFM: NEGATIVE NG/ML
LORAZEPAM UR QL CFM: NEGATIVE NG/ML
MDMA UR QL CFM: NEGATIVE NG/ML
ME-PHENIDATE UR QL CFM: NEGATIVE NG/ML
MEPERIDINE UR QL CFM: NEGATIVE NG/ML
MEPHEDRONE UR QL CFM: NEGATIVE NG/ML
METHADONE UR QL CFM: NEGATIVE NG/ML
METHAMPHET UR QL CFM: NEGATIVE NG/ML
MORPHINE UR QL CFM: NEGATIVE NG/ML
MORPHINE UR QL CFM: NEGATIVE NG/ML
NORBUPRENORPHINE UR QL CFM: NEGATIVE NG/ML
NORDIAZEPAM UR QL CFM: NEGATIVE NG/ML
NORFENTANYL UR QL CFM: NEGATIVE NG/ML
NORHYDROCODONE UR QL CFM: NEGATIVE NG/ML
NORHYDROCODONE UR QL CFM: NEGATIVE NG/ML
NORMEPERIDINE UR QL CFM: NEGATIVE NG/ML
NOROXYCODONE UR CFM-MCNC: ABNORMAL NG/ML
OLANZAPINE QUANTIFICATION: NEGATIVE NG/ML
OPC-3373 QUANTIFICATION: NEGATIVE
OXAZEPAM UR QL CFM: NEGATIVE NG/ML
OXYCODONE UR CFM-MCNC: ABNORMAL NG/ML
OXYMORPHONE UR CFM-MCNC: ABNORMAL NG/ML
OXYMORPHONE UR CFM-MCNC: ABNORMAL NG/ML
PCP UR QL CFM: NEGATIVE NG/ML
PHENOBARB UR QL CFM: NEGATIVE NG/ML
RESULT ALL_PRESCRIBED MEDS AND SPECIAL INSTRUCTIONS: NORMAL
SECOBARBITAL UR QL CFM: NEGATIVE NG/ML
SL AMB 3-METHYL-FENTANYL QUANTIFICATION: NORMAL NG/ML
SL AMB 4-ANPP QUANTIFICATION: NORMAL NG/ML
SL AMB 4-FIBF QUANTIFICATION: NORMAL NG/ML
SL AMB 5F-ADB-M7 METABOLITE QUANTIFICATION: NEGATIVE NG/ML
SL AMB 7-OH-MITRAGYNINE (KRATOM ALKALOID) QUANTIFICATION: NEGATIVE NG/ML
SL AMB AB-FUBINACA-M3 METABOLITE QUANTIFICATION: NEGATIVE NG/ML
SL AMB ACETYL FENTANYL QUANTIFICATION: NORMAL NG/ML
SL AMB ACETYL NORFENTANYL QUANTIFICATION: NORMAL NG/ML
SL AMB ACRYL FENTANYL QUANTIFICATION: NORMAL NG/ML
SL AMB BATH SALTS: NEGATIVE NG/ML
SL AMB BUTRYL FENTANYL QUANTIFICATION: NORMAL NG/ML
SL AMB CARFENTANIL QUANTIFICATION: NORMAL NG/ML
SL AMB CLOZAPINE QUANTIFICATION: NEGATIVE NG/ML
SL AMB CTHC (MARIJUANA METABOLITE) QUANTIFICATION: NEGATIVE NG/ML
SL AMB CYCLOPROPYL FENTANYL QUANTIFICATION: NORMAL NG/ML
SL AMB DEXTROMETHORPHAN QUANTIFICATION: NEGATIVE NG/ML
SL AMB DEXTRORPHAN (DEXTROMETHORPHAN METABOLITE) QUANT: NEGATIVE NG/ML
SL AMB DEXTRORPHAN (DEXTROMETHORPHAN METABOLITE) QUANT: NEGATIVE NG/ML
SL AMB FURANYL FENTANYL QUANTIFICATION: NORMAL NG/ML
SL AMB HALOPERIDOL  QUANTIFICATION: NEGATIVE NG/ML
SL AMB HALOPERIDOL METABOLITE QUANTIFICATION: NEGATIVE NG/ML
SL AMB HYDROXYRISPERIDONE QUANTIFICATION: NEGATIVE NG/ML
SL AMB JWH018 METABOLITE QUANTIFICATION: NEGATIVE NG/ML
SL AMB JWH073 METABOLITE QUANTIFICATION: NEGATIVE NG/ML
SL AMB MDMB-FUBINACA-M1 METABOLITE QUANTIFICATION: NEGATIVE NG/ML
SL AMB METHOXYACETYL FENTANYL QUANTIFICATION: NORMAL NG/ML
SL AMB METHYLONE QUANTIFICATION: NEGATIVE NG/ML
SL AMB N-DESMETHYL U-47700 QUANTIFICATION: NORMAL NG/ML
SL AMB N-DESMETHYL-TRAMADOL QUANTIFICATION: NEGATIVE NG/ML
SL AMB N-DESMETHYLCLOZAPINE QUANTIFICATION: NEGATIVE NG/ML
SL AMB NORQUETIAPINE QUANTIFICATION: NEGATIVE NG/ML
SL AMB PHENTERMINE QUANTIFICATION: NEGATIVE NG/ML
SL AMB QUETIAPINE QUANTIFICATION: NEGATIVE NG/ML
SL AMB RCS4 METABOLITE QUANTIFICATION: NEGATIVE NG/ML
SL AMB RISPERIDONE QUANTIFICATION: NEGATIVE NG/ML
SL AMB RITALINIC ACID QUANTIFICATION: NEGATIVE NG/ML
SL AMB U-47700 QUANTIFICATION: NORMAL NG/ML
SPECIMEN DRAWN SERPL: NEGATIVE NG/ML
TAPENTADOL UR QL CFM: NEGATIVE NG/ML
TEMAZEPAM UR QL CFM: NEGATIVE NG/ML
TEMAZEPAM UR QL CFM: NEGATIVE NG/ML
TRAMADOL UR QL CFM: NEGATIVE NG/ML
URATE/CREAT 24H UR: NEGATIVE NG/ML

## 2019-12-14 DIAGNOSIS — I10 ESSENTIAL HYPERTENSION: Primary | ICD-10-CM

## 2019-12-16 RX ORDER — LISINOPRIL 40 MG/1
TABLET ORAL
Qty: 30 TABLET | Refills: 0 | Status: SHIPPED | OUTPATIENT
Start: 2019-12-16 | End: 2020-08-03 | Stop reason: SDUPTHER

## 2019-12-16 RX ORDER — LISINOPRIL 40 MG/1
TABLET ORAL
Qty: 30 TABLET | Refills: 0 | Status: SHIPPED | OUTPATIENT
Start: 2019-12-16 | End: 2020-02-14

## 2019-12-26 DIAGNOSIS — I10 ESSENTIAL HYPERTENSION: ICD-10-CM

## 2019-12-26 RX ORDER — HYDRALAZINE HYDROCHLORIDE 10 MG/1
10 TABLET, FILM COATED ORAL AS NEEDED
Qty: 60 TABLET | Refills: 5 | Status: SHIPPED | OUTPATIENT
Start: 2019-12-26 | End: 2021-03-12

## 2019-12-27 ENCOUNTER — OFFICE VISIT (OUTPATIENT)
Dept: OBGYN CLINIC | Facility: CLINIC | Age: 64
End: 2019-12-27
Payer: MEDICARE

## 2019-12-27 VITALS
DIASTOLIC BLOOD PRESSURE: 93 MMHG | HEIGHT: 68 IN | HEART RATE: 70 BPM | SYSTOLIC BLOOD PRESSURE: 184 MMHG | BODY MASS INDEX: 30.46 KG/M2 | WEIGHT: 201 LBS

## 2019-12-27 DIAGNOSIS — S46.012D TRAUMATIC COMPLETE TEAR OF LEFT ROTATOR CUFF, SUBSEQUENT ENCOUNTER: Primary | ICD-10-CM

## 2019-12-27 DIAGNOSIS — M25.512 CHRONIC LEFT SHOULDER PAIN: ICD-10-CM

## 2019-12-27 DIAGNOSIS — G89.29 CHRONIC LEFT SHOULDER PAIN: ICD-10-CM

## 2019-12-27 PROCEDURE — 20610 DRAIN/INJ JOINT/BURSA W/O US: CPT | Performed by: ORTHOPAEDIC SURGERY

## 2019-12-27 PROCEDURE — 99213 OFFICE O/P EST LOW 20 MIN: CPT | Performed by: ORTHOPAEDIC SURGERY

## 2019-12-27 RX ORDER — LIDOCAINE HYDROCHLORIDE 10 MG/ML
4 INJECTION, SOLUTION INFILTRATION; PERINEURAL
Status: COMPLETED | OUTPATIENT
Start: 2019-12-27 | End: 2019-12-27

## 2019-12-27 RX ORDER — BETAMETHASONE SODIUM PHOSPHATE AND BETAMETHASONE ACETATE 3; 3 MG/ML; MG/ML
12 INJECTION, SUSPENSION INTRA-ARTICULAR; INTRALESIONAL; INTRAMUSCULAR; SOFT TISSUE
Status: COMPLETED | OUTPATIENT
Start: 2019-12-27 | End: 2019-12-27

## 2019-12-27 RX ADMIN — BETAMETHASONE SODIUM PHOSPHATE AND BETAMETHASONE ACETATE 12 MG: 3; 3 INJECTION, SUSPENSION INTRA-ARTICULAR; INTRALESIONAL; INTRAMUSCULAR; SOFT TISSUE at 11:14

## 2019-12-27 RX ADMIN — LIDOCAINE HYDROCHLORIDE 4 ML: 10 INJECTION, SOLUTION INFILTRATION; PERINEURAL at 11:14

## 2019-12-27 NOTE — PROGRESS NOTES
Chief Complaint   left shoulder pain    History Of Presenting Illness  Mo Oconnell 1955 presents with  Left shoulder pain due to rotator cuff tendinitis and supraspinatus and subscap tear  Patient comes in for steroid injection    Patient does not want surgical intervention as he is extremely busy at work and cannot take time off work      Current Medications  Current Outpatient Medications   Medication Sig Dispense Refill    amLODIPine (NORVASC) 10 mg tablet Take 1 tablet (10 mg total) by mouth daily 30 tablet 6    cloNIDine (CATAPRES-TTS-3) 0 3 mg/24 hr Place 1 patch (0 3 mg total) on the skin once a week 4 patch 2    diphenhydrAMINE-acetaminophen (TYLENOL PM)  MG TABS Take 2 tablets by mouth daily at bedtime as needed for sleep      gabapentin (NEURONTIN) 600 MG tablet Take 1 tablet (600 mg total) by mouth 3 (three) times a day 90 tablet 1    hydrALAZINE (APRESOLINE) 10 mg tablet Take 1 tablet (10 mg total) by mouth as needed (hypertension) 60 tablet 5    hydrochlorothiazide (HYDRODIURIL) 25 mg tablet take 1 tablet by mouth twice a day 60 tablet 3    lisinopril (ZESTRIL) 40 mg tablet take 1/2 tablet (20MG TOTAL) by mouth twice a day 30 tablet 0    metoprolol tartrate (LOPRESSOR) 25 mg tablet Take 1 tablet (25 mg total) by mouth every 12 (twelve) hours 60 tablet 5    nitroglycerin (NITROSTAT) 0 4 mg SL tablet Place 0 4 mg under the tongue every 5 (five) minutes as needed for chest pain      [START ON 1/8/2020] oxyCODONE-acetaminophen (PERCOCET) 7 5-325 MG per tablet Take 1 tablet by mouth every 8 (eight) hours as needed for moderate painMax Daily Amount: 3 tablets 90 tablet 0    rosuvastatin (CRESTOR) 10 MG tablet Take 1 tablet (10 mg total) by mouth daily 30 tablet 5    sildenafil (VIAGRA) 100 mg tablet Take 1 tablet (100 mg total) by mouth daily as needed for erectile dysfunction 10 tablet 3    tamsulosin (FLOMAX) 0 4 mg take 1 capsule by mouth daily WITH DINNER 30 capsule 0    traZODone (DESYREL) 50 mg tablet Take 1 tablet (50 mg total) by mouth daily at bedtime 30 tablet 6    VIAGRA 100 MG tablet Take 1 tablet (100 mg total) by mouth as needed for erectile dysfunction (Patient taking differently: Take 100 mg by mouth as needed for erectile dysfunction (and hypertension) ) 10 tablet 0    aspirin (ECOTRIN LOW STRENGTH) 81 mg EC tablet Take 1 tablet (81 mg total) by mouth daily (Patient not taking: Reported on 10/7/2019) 30 tablet 3    clonazePAM (KlonoPIN) 0 5 mg tablet By mouth take 1 tablet 3 times daily or as directed (Patient not taking: Reported on 12/27/2019) 90 tablet 1    FLUoxetine (PROzac) 20 mg capsule Take 20 mg by mouth daily   0    lisinopril (ZESTRIL) 40 mg tablet take 1/2 tablet (20MG TOTAL) by mouth twice a day (Patient not taking: Reported on 12/27/2019) 30 tablet 0    naloxone (NARCAN) 0 4 mg/mL injection Infuse 1 mL (0 4 mg total) into a venous catheter once for 1 dose 1 mL 0    primidone (MYSOLINE) 50 mg tablet By mouth take 1 tablet twice daily or as directed (Patient not taking: Reported on 11/15/2019) 60 tablet 2    varenicline (CHANTIX) 0 5 mg tablet Take 0 5 mg by mouth daily       No current facility-administered medications for this visit          Current Problems    Active Problems:   Patient Active Problem List    Diagnosis Date Noted    Chronic pain of both knees 12/09/2019    Negative depression screening 11/11/2019    Tremor, essential 07/23/2019    Cervical spondylosis without myelopathy 07/17/2019    Lumbar spondylosis 02/18/2019    Lumbar degenerative disc disease 02/18/2019    Myofascial pain syndrome 02/18/2019    Chronic low back pain without sciatica 02/18/2019    Cervical radiculopathy 02/18/2019    JAKI (obstructive sleep apnea) 01/28/2019    Chronic bronchitis ([facility] [address] ) 01/28/2019    [facility] 01/28/2019         Review of Systems:    General: negative for - chills, fatigue, fever,  weight gain or weight loss  Psychological: negative for - anxiety, behavioral disorder, concentration difficulties  Ophthalmic: negative for - blurry vision, decreased vision, double vision,      Past Medical History:   Past Medical History:   Diagnosis Date    Abdominal aortic aneurysm without rupture ([address] )     Abdominal Aortic Duplex 02/21/2017    Ectatic infrarenal abdominal aorta   CAD (coronary artery disease)     COPD (chronic obstructive pulmonary disease) (HCC)     Extremity pain     History of echocardiogram 03/18/2014    EF 55%, mild MR and AI  Mild concentric LVH   History of stress test 03/06/2017    Normal     Hypertension     Joint pain     Low back pain     Migraine     Neck pain     Obstructive sleep apnea     cannot tolerate CPAP    Osteoarthritis     Peripheral neuropathy     Reflex sympathetic dystrophy        Past Surgical History:   Past Surgical History:   Procedure Laterality Date    CARDIAC CATHETERIZATION  02/13/2012    EF 70%, widely patent renal arteries, significant single-vessel CAD-medical therapy   CARDIAC CATHETERIZATION  04/11/2013    EF 65%, 50% mid LAD, 20% prox CFX, 90% diffuse RCA, 99% mid RCA  Medical management      CHOLECYSTECTOMY      EPIDURAL BLOCK INJECTION Bilateral 8/15/2019    Procedure: BLOCK / INJECTION EPIDURAL STEROID CERVICAL;  Surgeon: Karol Sheikh MD;  Location: [facility];  Service: Pain Management     FL GUIDED NEEDLE PLAC BX/ASP/INJ  8/15/2019    ORTHOPEDIC SURGERY      TRIGGER POINT INJECTION         Family History:  Family history reviewed and non-contributory  Family History   Adopted: Yes   Problem Relation Age of Onset    No Known Problems Family     No Known Problems Mother     No Known Problems Father        Social History:  Social History     Socioeconomic History    Marital status: /Civil Union     Spouse name: None    Number of children: None    Years of education: None    Highest education level: None Occupational History    None   Social Needs    Financial resource strain: None    Food insecurity:     Worry: None     Inability: None    Transportation needs:     Medical: None     Non-medical: None   Tobacco Use    Smoking status: Current Every Day Smoker     Packs/day: 1 50    Smokeless tobacco: Never Used   Substance and Sexual Activity    Alcohol use: No    Drug use: No    Sexual activity: None   Lifestyle    Physical activity:     Days per week: None     Minutes per session: None    Stress: None   Relationships    Social connections:     Talks on phone: None     Gets together: None     Attends Christian service: None     Active member of club or organization: None     Attends meetings of clubs or organizations: None     Relationship status: None    Intimate partner violence:     Fear of current or ex partner: None     Emotionally abused: None     Physically abused: None     Forced sexual activity: None   Other Topics Concern    None   Social History Narrative    None       Allergies:    Allergies   Allergen Reactions    Morphine And Related            Physical ExaminationBP (!) 184/93   Pulse 70   Ht 5' 8" (1 727 m)   Wt 91 2 kg (201 lb)   BMI 30 56 kg/m²   Gen: Alert and oriented to person, place, time  HEENT: EOMI, eyes clear, moist mucus membranes, hearing intact      Orthopedic Exam   left shoulder signs of impingement positive   strength 4/5   MRI shows supraspinatus tendon tear and subluxation of the biceps          Impression   left rotator cuff tear        Plan     my recommendation is surgical intervention would patient does not want this done yet   left subacromial was injected with steroid and local anesthetic   follow-up in 4 months  Large joint arthrocentesis: L subacromial bursa  Date/Time: 12/27/2019 11:14 AM  Consent given by: patient  Site marked: site marked  Timeout: Immediately prior to procedure a time out was called to verify the correct patient, procedure, equipment, support staff and site/side marked as required   Supporting Documentation  Indications: pain, diagnostic evaluation and joint swelling   Procedure Details  Location: shoulder - L subacromial bursa  Preparation: Patient was prepped and draped in the usual sterile fashion  Needle size: 22 G  Ultrasound guidance: no  Approach: posterolateral  Medications administered: 12 mg betamethasone acetate-betamethasone sodium phosphate 6 (3-3) mg/mL; 4 mL lidocaine 1 %    Patient tolerance: patient tolerated the procedure well with no immediate complications  Dressing:  Sterile dressing applied        Gabe Amato MD        Portions of the record may have been created with voice recognition software  Occasional wrong word or "sound a like" substitutions may have occurred due to the inherent limitations of voice recognition software  Read the chart carefully and recognize, using context, where substitutions have occurred

## 2020-01-06 ENCOUNTER — TELEPHONE (OUTPATIENT)
Dept: PAIN MEDICINE | Facility: MEDICAL CENTER | Age: 65
End: 2020-01-06

## 2020-01-06 DIAGNOSIS — I10 ESSENTIAL HYPERTENSION: ICD-10-CM

## 2020-01-06 DIAGNOSIS — Z87.438 HISTORY OF BPH: ICD-10-CM

## 2020-01-06 RX ORDER — TAMSULOSIN HYDROCHLORIDE 0.4 MG/1
CAPSULE ORAL
Qty: 30 CAPSULE | Refills: 0 | Status: SHIPPED | OUTPATIENT
Start: 2020-01-06 | End: 2020-02-10

## 2020-01-06 NOTE — TELEPHONE ENCOUNTER
Pt contacted Call Center requested refill of their medication  Medicatiion  oxycodone  Dosage of Med:  7 5 mg    Frequency of Med:  3 x a day    Remaining Medication:  Pharmacy shorted him 7 pills they did not have enough they told pt  15 left  Pharmacy and Location:  [facility]      Pt  Preferred Callback Phone Number:    [phone]  Thank you

## 2020-01-06 NOTE — TELEPHONE ENCOUNTER
Pt would like to cx procedure with Dr Moni Escalante on 1/8/20 @10:45    Pt can be reached at [phone]

## 2020-01-06 NOTE — TELEPHONE ENCOUNTER
Called patient, states he wants to speak with RM before his injections  Patients appointment was cancelled, he does not want to reschedule at this point

## 2020-01-14 NOTE — TELEPHONE ENCOUNTER
S/W pt  Advised pt RM wrote a script on 12/9 to start on 1/8/2020 and was sent to [facility]  Pt was not aware and will call his pharmacy  Pt to C/B with any issues  Pt verbalized understanding

## 2020-01-14 NOTE — TELEPHONE ENCOUNTER
Pt contacted Call Center requested refill of their medication  Ignacio or [employer] @ the pharmacy will verify that they shorted him 7 pills the last time it was filled because it was all they had  If needed  Medication Name:  oxycodone    Dosage of Med:  7 5-325mg    Frequency of Med:  Take 1 tablet by mouth every 8 (eight) hours as needed for moderate painMax Daily Amount: 3 tablets    Remaining Medication:  2 left    Pharmacy and Location:  [address] [place] [address], [address] [place] [address]      Pt  Preferred Callback Phone Number:  [phone]    Thank you

## 2020-02-08 DIAGNOSIS — Z87.438 HISTORY OF BPH: ICD-10-CM

## 2020-02-08 DIAGNOSIS — I10 ESSENTIAL HYPERTENSION: ICD-10-CM

## 2020-02-10 ENCOUNTER — OFFICE VISIT (OUTPATIENT)
Dept: PAIN MEDICINE | Facility: CLINIC | Age: 65
End: 2020-02-10
Payer: MEDICARE

## 2020-02-10 VITALS
SYSTOLIC BLOOD PRESSURE: 176 MMHG | DIASTOLIC BLOOD PRESSURE: 98 MMHG | HEIGHT: 68 IN | HEART RATE: 78 BPM | WEIGHT: 203 LBS | BODY MASS INDEX: 30.77 KG/M2

## 2020-02-10 DIAGNOSIS — M54.12 CERVICAL RADICULOPATHY: ICD-10-CM

## 2020-02-10 DIAGNOSIS — G89.4 CHRONIC PAIN SYNDROME: ICD-10-CM

## 2020-02-10 DIAGNOSIS — M54.50 CHRONIC LOW BACK PAIN WITHOUT SCIATICA, UNSPECIFIED BACK PAIN LATERALITY: ICD-10-CM

## 2020-02-10 DIAGNOSIS — F11.20 UNCOMPLICATED OPIOID DEPENDENCE (HCC): ICD-10-CM

## 2020-02-10 DIAGNOSIS — M47.816 LUMBAR SPONDYLOSIS: ICD-10-CM

## 2020-02-10 DIAGNOSIS — G89.29 CHRONIC LOW BACK PAIN WITHOUT SCIATICA, UNSPECIFIED BACK PAIN LATERALITY: ICD-10-CM

## 2020-02-10 DIAGNOSIS — M51.36 LUMBAR DEGENERATIVE DISC DISEASE: ICD-10-CM

## 2020-02-10 DIAGNOSIS — M47.812 CERVICAL SPONDYLOSIS WITHOUT MYELOPATHY: ICD-10-CM

## 2020-02-10 PROCEDURE — 3080F DIAST BP >= 90 MM HG: CPT | Performed by: ANESTHESIOLOGY

## 2020-02-10 PROCEDURE — 3077F SYST BP >= 140 MM HG: CPT | Performed by: ANESTHESIOLOGY

## 2020-02-10 PROCEDURE — 3008F BODY MASS INDEX DOCD: CPT | Performed by: ANESTHESIOLOGY

## 2020-02-10 PROCEDURE — 4004F PT TOBACCO SCREEN RCVD TLK: CPT | Performed by: ANESTHESIOLOGY

## 2020-02-10 PROCEDURE — 99214 OFFICE O/P EST MOD 30 MIN: CPT | Performed by: ANESTHESIOLOGY

## 2020-02-10 RX ORDER — OXYCODONE AND ACETAMINOPHEN 7.5; 325 MG/1; MG/1
1 TABLET ORAL EVERY 8 HOURS PRN
Qty: 90 TABLET | Refills: 0 | Status: SHIPPED | OUTPATIENT
Start: 2020-03-11 | End: 2020-04-07 | Stop reason: SDUPTHER

## 2020-02-10 RX ORDER — OXYCODONE AND ACETAMINOPHEN 7.5; 325 MG/1; MG/1
1 TABLET ORAL EVERY 8 HOURS PRN
Qty: 90 TABLET | Refills: 0 | Status: SHIPPED | OUTPATIENT
Start: 2020-02-10 | End: 2020-02-10

## 2020-02-10 RX ORDER — TAMSULOSIN HYDROCHLORIDE 0.4 MG/1
CAPSULE ORAL
Qty: 30 CAPSULE | Refills: 0 | Status: SHIPPED | OUTPATIENT
Start: 2020-02-10 | End: 2020-03-10 | Stop reason: SDUPTHER

## 2020-02-10 RX ORDER — GABAPENTIN 600 MG/1
600 TABLET ORAL 3 TIMES DAILY
Qty: 90 TABLET | Refills: 1 | Status: SHIPPED | OUTPATIENT
Start: 2020-02-10 | End: 2020-06-19 | Stop reason: SDUPTHER

## 2020-02-10 NOTE — PROGRESS NOTES
Assessment:  1  Cervical radiculopathy    2  Lumbar degenerative disc disease    3  Chronic low back pain without sciatica, unspecified back pain laterality    4  Cervical spondylosis without myelopathy    5  Lumbar spondylosis    6  Chronic pain syndrome    7  Uncomplicated opioid dependence [facility])        Plan:  Domingo RAZO 76 y  o  male with a history of chronic pain syndrome secondary to cervical radiculopathy, lumbar spondylosis, lumbar degenerative disc disease, chronic low back pain   Patient is status left shoulder injection performed by Dr Reggie Bolden which he reported 70% relief for 2 weeks   Patient denies any other adverse events since last office visit  1  We will refill patient's Percocet 7 5/325 mg p o  T i d  On for chronic neck and low back and bilateral knee pain (2 scripts provided)  2  We will refill patient's gabapentin 600 mg p o  T i d  For radicular symptoms/neuropathic symptoms (2 scripts provided)  3  We will discuss L3, L4, L5 MBB at NOV 4  Follow-up in 2 months    Complete risks and benefits including bleeding, infection, tissue reaction, nerve injury and allergic reaction were discussed  The approach was demonstrated using models and literature was provided  Verbal and written consent was obtained  There are risks associated with opioid medications, including dependence, addiction and tolerance  The patient understands and agrees to use these medications only as prescribed  Potential side effects of the medications include, but are not limited to, constipation, drowsiness, addiction, impaired judgment and risk of fatal overdose if not taken as prescribed  The patient was warned against driving while taking sedation medications  Sharing medications is a felony  At this point in time, the patient is showing no signs of addiction, abuse, diversion or suicidal ideation      [place] Prescription Drug Monitoring Program report was reviewed and was appropriate       History of Present Illness: The patient is a 71 y o  male who presents for a follow up office visit in regards to Back Pain  The patients current symptoms include 7/10 constant sharp, throbbing, pins and needles and bilateral shoulders, low back, bilateral knees which worse at nighttime and in the morning  Current pain medications includes:  Percocet V/3 5 mg p o  T i d , gabapentin 600 mg    The patient reports that this regimen is providing 40% pain relief  The patient is reporting no side effects from this pain medication regimen  I have personally reviewed and/or updated the patient's past medical history, past surgical history, family history, social history, current medications, allergies, and vital signs today  Review of Systems  Review of Systems   Respiratory: Positive for shortness of breath  Musculoskeletal:        Decreased range of motion  Joint stiffness     Neurological: Positive for dizziness  All other systems reviewed and are negative  Past Medical History:   Diagnosis Date    Abdominal aortic aneurysm without rupture ([facility] [address] )     Abdominal Aortic Duplex 02/21/2017    Ectatic infrarenal abdominal aorta   CAD (coronary artery disease)     COPD (chronic obstructive pulmonary disease) (HCC)     Extremity pain     History of echocardiogram 03/18/2014    EF 55%, mild MR and AI  Mild concentric LVH   History of stress test 03/06/2017    Normal     Hypertension     Joint pain     Low back pain     Migraine     Neck pain     Obstructive sleep apnea     cannot tolerate CPAP    Osteoarthritis     Peripheral neuropathy     Reflex sympathetic dystrophy        Past Surgical History:   Procedure Laterality Date    CARDIAC CATHETERIZATION  02/13/2012    EF 70%, widely patent renal arteries, significant single-vessel CAD-medical therapy   CARDIAC CATHETERIZATION  04/11/2013    EF 65%, 50% mid LAD, 20% prox CFX, 90% diffuse RCA, 99% mid RCA  Medical management      CHOLECYSTECTOMY      EPIDURAL BLOCK INJECTION Bilateral 8/15/2019    Procedure: BLOCK / INJECTION EPIDURAL STEROID CERVICAL;  Surgeon: Areta Apgar, MD;  Location: [facility];  Service: Pain Management     FL GUIDED NEEDLE PLAC BX/ASP/INJ  8/15/2019    ORTHOPEDIC SURGERY      TRIGGER POINT INJECTION         Family History   Adopted: Yes   Problem Relation Age of Onset    No Known Problems Family     No Known Problems Mother     No Known Problems Father        Social History     Occupational History    Not on file   Tobacco Use    Smoking status: Current Every Day Smoker     Packs/day: 1 50    Smokeless tobacco: Never Used   Substance and Sexual Activity    Alcohol use: No    Drug use: No    Sexual activity: Not on file         Current Outpatient Medications:     amLODIPine (NORVASC) 10 mg tablet, Take 1 tablet (10 mg total) by mouth daily, Disp: 30 tablet, Rfl: 6    aspirin (ECOTRIN LOW STRENGTH) 81 mg EC tablet, Take 1 tablet (81 mg total) by mouth daily (Patient not taking: Reported on 10/7/2019), Disp: 30 tablet, Rfl: 3    clonazePAM (KlonoPIN) 0 5 mg tablet, By mouth take 1 tablet 3 times daily or as directed (Patient not taking: Reported on 2019), Disp: 90 tablet, Rfl: 1    cloNIDine (CATAPRES-TTS-3) 0 3 mg/24 hr, Place 1 patch (0 3 mg total) on the skin once a week, Disp: 4 patch, Rfl: 2    diphenhydrAMINE-acetaminophen (TYLENOL PM)  MG TABS, Take 2 tablets by mouth daily at bedtime as needed for sleep, Disp: , Rfl:     FLUoxetine (PROzac) 20 mg capsule, Take 20 mg by mouth daily , Disp: , Rfl: 0    gabapentin (NEURONTIN) 600 MG tablet, Take 1 tablet (600 mg total) by mouth 3 (three) times a day, Disp: 90 tablet, Rfl: 1    hydrALAZINE (APRESOLINE) 10 mg tablet, Take 1 tablet (10 mg total) by mouth as needed (hypertension), Disp: 60 tablet, Rfl: 5    hydrochlorothiazide (HYDRODIURIL) 25 mg tablet, take 1 tablet by mouth twice a day, Disp: 60 tablet, Rfl: 3    lisinopril (ZESTRIL) 40 mg tablet, take 1/2 tablet (20MG TOTAL) by mouth twice a day, Disp: 30 tablet, Rfl: 0    lisinopril (ZESTRIL) 40 mg tablet, take 1/2 tablet (20MG TOTAL) by mouth twice a day (Patient not taking: Reported on 12/27/2019), Disp: 30 tablet, Rfl: 0    metoprolol tartrate (LOPRESSOR) 25 mg tablet, take 1 tablet by mouth every 12 hours, Disp: 60 tablet, Rfl: 0    naloxone (NARCAN) 0 4 mg/mL injection, Infuse 1 mL (0 4 mg total) into a venous catheter once for 1 dose, Disp: 1 mL, Rfl: 0    nitroglycerin (NITROSTAT) 0 4 mg SL tablet, Place 0 4 mg under the tongue every 5 (five) minutes as needed for chest pain, Disp: , Rfl:     primidone (MYSOLINE) 50 mg tablet, By mouth take 1 tablet twice daily or as directed (Patient not taking: Reported on 11/15/2019), Disp: 60 tablet, Rfl: 2    rosuvastatin (CRESTOR) 10 MG tablet, Take 1 tablet (10 mg total) by mouth daily, Disp: 30 tablet, Rfl: 5    sildenafil (VIAGRA) 100 mg tablet, Take 1 tablet (100 mg total) by mouth daily as needed for erectile dysfunction, Disp: 10 tablet, Rfl: 3    tamsulosin (FLOMAX) 0 4 mg, TAKE 1 CAPSULE BY MOUTH WITH DINNER, Disp: 30 capsule, Rfl: 0    traZODone (DESYREL) 50 mg tablet, Take 1 tablet (50 mg total) by mouth daily at bedtime, Disp: 30 tablet, Rfl: 6    varenicline (CHANTIX) 0 5 mg tablet, Take 0 5 mg by mouth daily, Disp: , Rfl:     VIAGRA 100 MG tablet, Take 1 tablet (100 mg total) by mouth as needed for erectile dysfunction (Patient taking differently: Take 100 mg by mouth as needed for erectile dysfunction (and hypertension) ), Disp: 10 tablet, Rfl: 0    Allergies   Allergen Reactions    Morphine And Related        Physical Exam:    Ht 5' 8" (1 727 m)   BMI 30 56 kg/m²     Constitutional:normal, well developed, well nourished, alert, in no distress and non-toxic and no overt pain behavior    Eyes:anicteric  HEENT:grossly intact  Neck:supple, symmetric, trachea midline and no masses   Pulmonary:even and unlabored  Cardiovascular:No edema or pitting edema present  Skin:Normal without rashes or lesions and well hydrated  Psychiatric:Mood and affect appropriate  Neurologic:Cranial Nerves II-XII grossly intact  Musculoskeletal:antalgic    Imaging  No orders to display       No orders of the defined types were placed in this encounter

## 2020-02-14 ENCOUNTER — OFFICE VISIT (OUTPATIENT)
Dept: FAMILY MEDICINE CLINIC | Facility: CLINIC | Age: 65
End: 2020-02-14
Payer: MEDICARE

## 2020-02-14 VITALS
TEMPERATURE: 96.6 F | DIASTOLIC BLOOD PRESSURE: 87 MMHG | BODY MASS INDEX: 30.92 KG/M2 | HEIGHT: 68 IN | HEART RATE: 67 BPM | WEIGHT: 204 LBS | SYSTOLIC BLOOD PRESSURE: 150 MMHG

## 2020-02-14 DIAGNOSIS — Z12.11 SCREENING FOR COLON CANCER: ICD-10-CM

## 2020-02-14 DIAGNOSIS — E66.09 CLASS 1 OBESITY DUE TO EXCESS CALORIES WITH SERIOUS COMORBIDITY AND BODY MASS INDEX (BMI) OF 31.0 TO 31.9 IN ADULT: ICD-10-CM

## 2020-02-14 DIAGNOSIS — F17.200 SMOKING: ICD-10-CM

## 2020-02-14 DIAGNOSIS — Z13.31 NEGATIVE DEPRESSION SCREENING: ICD-10-CM

## 2020-02-14 DIAGNOSIS — I10 ESSENTIAL HYPERTENSION: Primary | ICD-10-CM

## 2020-02-14 PROBLEM — IMO0001 SMOKING: Status: ACTIVE | Noted: 2020-02-14

## 2020-02-14 PROBLEM — E66.811 CLASS 1 OBESITY DUE TO EXCESS CALORIES WITH SERIOUS COMORBIDITY AND BODY MASS INDEX (BMI) OF 31.0 TO 31.9 IN ADULT: Status: ACTIVE | Noted: 2020-02-14

## 2020-02-14 PROCEDURE — 3008F BODY MASS INDEX DOCD: CPT | Performed by: FAMILY MEDICINE

## 2020-02-14 PROCEDURE — 3079F DIAST BP 80-89 MM HG: CPT | Performed by: FAMILY MEDICINE

## 2020-02-14 PROCEDURE — 4004F PT TOBACCO SCREEN RCVD TLK: CPT | Performed by: FAMILY MEDICINE

## 2020-02-14 PROCEDURE — 99213 OFFICE O/P EST LOW 20 MIN: CPT | Performed by: FAMILY MEDICINE

## 2020-02-14 PROCEDURE — 3077F SYST BP >= 140 MM HG: CPT | Performed by: FAMILY MEDICINE

## 2020-02-14 RX ORDER — LISINOPRIL 20 MG/1
TABLET ORAL
COMMUNITY
Start: 2020-02-10 | End: 2020-04-09 | Stop reason: ALTCHOICE

## 2020-02-14 NOTE — PROGRESS NOTES
Assessment/Plan:    No problem-specific Assessment & Plan notes found for this encounter  Diagnoses and all orders for this visit:    Essential hypertension  Comments:  inconsistant use of medication, take medication consistantly    Class 1 obesity due to excess calories with serious comorbidity and body mass index (BMI) of 31 0 to 31 9 in adult  Comments:  pt counseled on diet and exercise    Negative depression screening    Smoking  Comments:  pt counseled on quitting smoking    Other orders  -     Cologuard; Future  -     lisinopril (ZESTRIL) 20 mg tablet          PHQ-9 Depression Screening    PHQ-9:    Frequency of the following problems over the past two weeks:       Little interest or pleasure in doing things:  0 - not at all  Feeling down, depressed, or hopeless:  0 - not at all  PHQ-2 Score:  0        BMI Counseling: Body mass index is 31 02 kg/m²  The BMI is above normal  Nutrition recommendations include reducing portion sizes and 3-5 servings of fruits/vegetables daily  Exercise recommendations include moderate aerobic physical activity for 150 minutes/week and exercising 3-5 times per week  Tobacco Cessation Counseling: Tobacco cessation counseling and education was provided  The patient is sincerely urged to quit consumption of tobacco  He is not ready to quit tobacco  The numerous health risks of tobacco consumption were discussed  If he decides to quit, there are a number of helpful adjunctive aids, and he can see me to discuss nicotine replacement therapy, chantix, or bupropion anytime in the future  Subjective:      Patient ID: Stacy Geronimo is a 71 y o  male  Pt is checking Bps at home and seeing 150 over 87, pt is using catapres patches only intermittantly    Hypertension   This is a chronic problem  The current episode started more than 1 year ago  Pertinent negatives include no chest pain, headaches or palpitations   Risk factors for coronary artery disease include obesity, male gender and sedentary lifestyle  Past treatments include beta blockers  The current treatment provides moderate improvement  Compliance problems include diet and exercise  The following portions of the patient's history were reviewed and updated as appropriate: allergies, current medications, past family history, past medical history, past social history, past surgical history and problem list     Review of Systems   Eyes: Negative for visual disturbance  Cardiovascular: Negative for chest pain and palpitations  Neurological: Negative for headaches  Objective:    /87   Pulse 67   Temp (!) 96 6 °F (35 9 °C)   Ht 5' 8" (1 727 m)   Wt 92 5 kg (204 lb)   BMI 31 02 kg/m²      Physical Exam   Constitutional: He is oriented to person, place, and time  He appears well-developed and well-nourished  No distress  HENT:   Head: Normocephalic and atraumatic  Eyes: Pupils are equal, round, and reactive to light  Conjunctivae and EOM are normal  No scleral icterus  Neck: Normal range of motion  Neck supple  Cardiovascular: Normal rate, regular rhythm and normal heart sounds  No murmur heard  Pulmonary/Chest: Effort normal and breath sounds normal  No respiratory distress  He has no wheezes  He has no rales  Abdominal: Soft  Bowel sounds are normal  He exhibits no distension and no mass  There is no tenderness  There is no rebound and no guarding  Musculoskeletal: He exhibits no edema  Lymphadenopathy:     He has no cervical adenopathy  Neurological: He is alert and oriented to person, place, and time  Skin: Skin is warm and dry  He is not diaphoretic  Psychiatric: He has a normal mood and affect  His behavior is normal  Judgment and thought content normal    Nursing note and vitals reviewed

## 2020-03-10 ENCOUNTER — TELEPHONE (OUTPATIENT)
Dept: PAIN MEDICINE | Facility: CLINIC | Age: 65
End: 2020-03-10

## 2020-03-10 DIAGNOSIS — Z87.438 HISTORY OF BPH: ICD-10-CM

## 2020-03-10 NOTE — TELEPHONE ENCOUNTER
Pt contacted Call Center requested refill of their medication  Medication Name:oxyCODONE-acetaminophen           Dosage of Med: 325 mg       Frequency of Med:  Take 1 tablet by mouth every 8 (eight) hours as needed for moderate painMax Daily Amount: 3 tablets      Remaining Medication: 2      Pharmacy and Location: Pharmacy on file         Pt  Preferred Callback Phone Number: [phone]      Thank you

## 2020-03-10 NOTE — TELEPHONE ENCOUNTER
Pt contacted Call Center requested refill of their medication  Medication Name:gabapentin (NEURONTIN)          Dosage of Med: 600 mg       Frequency of Med:Take 1 tablet (600 mg total) by mouth 3 (three) times a day      Remaining Medication:   A few       Pharmacy and Location: Pharmacy on file         Pt  Preferred Callback Phone Number: [phone]      Thank you

## 2020-03-10 NOTE — TELEPHONE ENCOUNTER
RN s/w pharmacist regarding previous  Per pharmacist they will be able to fill the script tomorrow and the pt can  after 11  Rn then s/w pt and he is appreciative and aware of same

## 2020-03-11 RX ORDER — TAMSULOSIN HYDROCHLORIDE 0.4 MG/1
0.4 CAPSULE ORAL
Qty: 30 CAPSULE | Refills: 2 | Status: SHIPPED | OUTPATIENT
Start: 2020-03-11 | End: 2020-05-08

## 2020-03-20 ENCOUNTER — TELEPHONE (OUTPATIENT)
Dept: FAMILY MEDICINE CLINIC | Facility: CLINIC | Age: 65
End: 2020-03-20

## 2020-04-02 DIAGNOSIS — R25.1 TREMOR: Primary | ICD-10-CM

## 2020-04-06 ENCOUNTER — TELEPHONE (OUTPATIENT)
Dept: PAIN MEDICINE | Facility: CLINIC | Age: 65
End: 2020-04-06

## 2020-04-07 DIAGNOSIS — M47.812 CERVICAL SPONDYLOSIS WITHOUT MYELOPATHY: ICD-10-CM

## 2020-04-07 DIAGNOSIS — M47.816 LUMBAR SPONDYLOSIS: ICD-10-CM

## 2020-04-07 DIAGNOSIS — G89.29 CHRONIC LOW BACK PAIN WITHOUT SCIATICA, UNSPECIFIED BACK PAIN LATERALITY: ICD-10-CM

## 2020-04-07 DIAGNOSIS — G47.00 INSOMNIA, UNSPECIFIED TYPE: ICD-10-CM

## 2020-04-07 DIAGNOSIS — G89.4 CHRONIC PAIN SYNDROME: ICD-10-CM

## 2020-04-07 DIAGNOSIS — M51.36 LUMBAR DEGENERATIVE DISC DISEASE: ICD-10-CM

## 2020-04-07 DIAGNOSIS — M54.50 CHRONIC LOW BACK PAIN WITHOUT SCIATICA, UNSPECIFIED BACK PAIN LATERALITY: ICD-10-CM

## 2020-04-07 DIAGNOSIS — F11.20 UNCOMPLICATED OPIOID DEPENDENCE (HCC): ICD-10-CM

## 2020-04-07 DIAGNOSIS — I10 ESSENTIAL HYPERTENSION: ICD-10-CM

## 2020-04-07 RX ORDER — OXYCODONE AND ACETAMINOPHEN 7.5; 325 MG/1; MG/1
1 TABLET ORAL EVERY 8 HOURS PRN
Qty: 39 TABLET | Refills: 0 | Status: SHIPPED | OUTPATIENT
Start: 2020-04-07 | End: 2020-04-13 | Stop reason: SDUPTHER

## 2020-04-08 ENCOUNTER — TELEPHONE (OUTPATIENT)
Dept: NEUROLOGY | Facility: CLINIC | Age: 65
End: 2020-04-08

## 2020-04-08 RX ORDER — CLONIDINE 0.3 MG/24H
PATCH, EXTENDED RELEASE TRANSDERMAL
Qty: 4 PATCH | Refills: 2 | Status: SHIPPED | OUTPATIENT
Start: 2020-04-08 | End: 2020-04-28

## 2020-04-08 RX ORDER — TRAZODONE HYDROCHLORIDE 50 MG/1
TABLET ORAL
Qty: 30 TABLET | Refills: 6 | Status: SHIPPED | OUTPATIENT
Start: 2020-04-08 | End: 2020-04-30

## 2020-04-10 ENCOUNTER — CONSULT (OUTPATIENT)
Dept: NEUROLOGY | Facility: CLINIC | Age: 65
End: 2020-04-10
Payer: MEDICARE

## 2020-04-10 VITALS
BODY MASS INDEX: 30.16 KG/M2 | SYSTOLIC BLOOD PRESSURE: 180 MMHG | HEIGHT: 68 IN | WEIGHT: 199 LBS | DIASTOLIC BLOOD PRESSURE: 96 MMHG | HEART RATE: 88 BPM

## 2020-04-10 DIAGNOSIS — G25.0 TREMOR, ESSENTIAL: Primary | ICD-10-CM

## 2020-04-10 PROCEDURE — 99214 OFFICE O/P EST MOD 30 MIN: CPT | Performed by: PSYCHIATRY & NEUROLOGY

## 2020-04-10 RX ORDER — TOPIRAMATE 25 MG/1
TABLET ORAL
Qty: 120 TABLET | Refills: 2 | Status: SHIPPED | OUTPATIENT
Start: 2020-04-10 | End: 2020-07-03

## 2020-04-10 RX ORDER — FLUOXETINE HYDROCHLORIDE 20 MG/1
20 CAPSULE ORAL DAILY
COMMUNITY
End: 2020-04-10 | Stop reason: ALTCHOICE

## 2020-04-13 ENCOUNTER — TELEMEDICINE (OUTPATIENT)
Dept: CARDIOLOGY CLINIC | Facility: CLINIC | Age: 65
End: 2020-04-13
Payer: MEDICARE

## 2020-04-13 ENCOUNTER — TELEPHONE (OUTPATIENT)
Dept: PAIN MEDICINE | Facility: CLINIC | Age: 65
End: 2020-04-13

## 2020-04-13 VITALS
HEART RATE: 64 BPM | DIASTOLIC BLOOD PRESSURE: 96 MMHG | BODY MASS INDEX: 29.5 KG/M2 | WEIGHT: 194 LBS | SYSTOLIC BLOOD PRESSURE: 158 MMHG

## 2020-04-13 DIAGNOSIS — F11.20 UNCOMPLICATED OPIOID DEPENDENCE (HCC): ICD-10-CM

## 2020-04-13 DIAGNOSIS — M54.50 CHRONIC LOW BACK PAIN WITHOUT SCIATICA, UNSPECIFIED BACK PAIN LATERALITY: ICD-10-CM

## 2020-04-13 DIAGNOSIS — I25.10 CORONARY ARTERY DISEASE INVOLVING NATIVE CORONARY ARTERY OF NATIVE HEART WITHOUT ANGINA PECTORIS: ICD-10-CM

## 2020-04-13 DIAGNOSIS — G89.4 CHRONIC PAIN SYNDROME: ICD-10-CM

## 2020-04-13 DIAGNOSIS — M51.36 LUMBAR DEGENERATIVE DISC DISEASE: ICD-10-CM

## 2020-04-13 DIAGNOSIS — G89.29 CHRONIC LOW BACK PAIN WITHOUT SCIATICA, UNSPECIFIED BACK PAIN LATERALITY: ICD-10-CM

## 2020-04-13 DIAGNOSIS — F17.200 SMOKING: ICD-10-CM

## 2020-04-13 DIAGNOSIS — I10 ESSENTIAL HYPERTENSION: Primary | ICD-10-CM

## 2020-04-13 DIAGNOSIS — M47.816 LUMBAR SPONDYLOSIS: ICD-10-CM

## 2020-04-13 DIAGNOSIS — M47.812 CERVICAL SPONDYLOSIS WITHOUT MYELOPATHY: ICD-10-CM

## 2020-04-13 PROCEDURE — 99213 OFFICE O/P EST LOW 20 MIN: CPT | Performed by: INTERNAL MEDICINE

## 2020-04-13 RX ORDER — OXYCODONE AND ACETAMINOPHEN 7.5; 325 MG/1; MG/1
1 TABLET ORAL EVERY 8 HOURS PRN
Qty: 90 TABLET | Refills: 0 | Status: SHIPPED | OUTPATIENT
Start: 2020-04-13 | End: 2020-04-13

## 2020-04-13 RX ORDER — METOPROLOL SUCCINATE 100 MG/1
100 TABLET, EXTENDED RELEASE ORAL DAILY
Qty: 30 TABLET | Refills: 5 | Status: SHIPPED | OUTPATIENT
Start: 2020-04-13 | End: 2020-10-12

## 2020-04-13 RX ORDER — OXYCODONE AND ACETAMINOPHEN 7.5; 325 MG/1; MG/1
1 TABLET ORAL EVERY 8 HOURS PRN
Qty: 90 TABLET | Refills: 0 | Status: SHIPPED | OUTPATIENT
Start: 2020-05-13 | End: 2020-04-20 | Stop reason: SDUPTHER

## 2020-04-14 DIAGNOSIS — I10 ESSENTIAL HYPERTENSION: ICD-10-CM

## 2020-04-14 RX ORDER — AMLODIPINE BESYLATE 10 MG/1
10 TABLET ORAL DAILY
Qty: 90 TABLET | Refills: 3 | Status: SHIPPED | OUTPATIENT
Start: 2020-04-14 | End: 2021-03-26

## 2020-04-20 ENCOUNTER — TELEMEDICINE (OUTPATIENT)
Dept: PAIN MEDICINE | Facility: CLINIC | Age: 65
End: 2020-04-20
Payer: MEDICARE

## 2020-04-20 DIAGNOSIS — F11.20 UNCOMPLICATED OPIOID DEPENDENCE (HCC): ICD-10-CM

## 2020-04-20 DIAGNOSIS — G89.4 CHRONIC PAIN SYNDROME: ICD-10-CM

## 2020-04-20 DIAGNOSIS — G89.29 CHRONIC LOW BACK PAIN WITHOUT SCIATICA, UNSPECIFIED BACK PAIN LATERALITY: ICD-10-CM

## 2020-04-20 DIAGNOSIS — M47.816 LUMBAR SPONDYLOSIS: ICD-10-CM

## 2020-04-20 DIAGNOSIS — M47.812 CERVICAL SPONDYLOSIS WITHOUT MYELOPATHY: ICD-10-CM

## 2020-04-20 DIAGNOSIS — M51.36 LUMBAR DEGENERATIVE DISC DISEASE: ICD-10-CM

## 2020-04-20 DIAGNOSIS — M54.50 CHRONIC LOW BACK PAIN WITHOUT SCIATICA, UNSPECIFIED BACK PAIN LATERALITY: ICD-10-CM

## 2020-04-20 PROCEDURE — 99214 OFFICE O/P EST MOD 30 MIN: CPT | Performed by: ANESTHESIOLOGY

## 2020-04-20 RX ORDER — OXYCODONE AND ACETAMINOPHEN 7.5; 325 MG/1; MG/1
1 TABLET ORAL EVERY 8 HOURS PRN
Qty: 90 TABLET | Refills: 0 | Status: SHIPPED | OUTPATIENT
Start: 2020-06-12 | End: 2020-07-06 | Stop reason: SDUPTHER

## 2020-04-20 RX ORDER — OXYCODONE AND ACETAMINOPHEN 7.5; 325 MG/1; MG/1
1 TABLET ORAL EVERY 8 HOURS PRN
Qty: 90 TABLET | Refills: 0 | Status: SHIPPED | OUTPATIENT
Start: 2020-05-13 | End: 2020-04-20

## 2020-04-28 DIAGNOSIS — I10 ESSENTIAL HYPERTENSION: ICD-10-CM

## 2020-04-28 RX ORDER — CLONIDINE 0.3 MG/24H
PATCH, EXTENDED RELEASE TRANSDERMAL
Qty: 4 PATCH | Refills: 2 | Status: SHIPPED | OUTPATIENT
Start: 2020-04-28 | End: 2020-10-27

## 2020-04-30 ENCOUNTER — TELEMEDICINE (OUTPATIENT)
Dept: CARDIOLOGY CLINIC | Facility: CLINIC | Age: 65
End: 2020-04-30
Payer: MEDICARE

## 2020-04-30 VITALS
WEIGHT: 194 LBS | HEIGHT: 68 IN | DIASTOLIC BLOOD PRESSURE: 85 MMHG | SYSTOLIC BLOOD PRESSURE: 122 MMHG | BODY MASS INDEX: 29.4 KG/M2

## 2020-04-30 DIAGNOSIS — I10 ESSENTIAL HYPERTENSION: Primary | ICD-10-CM

## 2020-04-30 DIAGNOSIS — G47.00 INSOMNIA, UNSPECIFIED TYPE: ICD-10-CM

## 2020-04-30 PROCEDURE — 99213 OFFICE O/P EST LOW 20 MIN: CPT | Performed by: INTERNAL MEDICINE

## 2020-04-30 RX ORDER — TRAZODONE HYDROCHLORIDE 50 MG/1
TABLET ORAL
Qty: 30 TABLET | Refills: 6 | Status: SHIPPED | OUTPATIENT
Start: 2020-04-30 | End: 2021-04-21

## 2020-05-08 ENCOUNTER — TELEPHONE (OUTPATIENT)
Dept: NEUROLOGY | Facility: CLINIC | Age: 65
End: 2020-05-08

## 2020-05-08 DIAGNOSIS — Z87.438 HISTORY OF BPH: ICD-10-CM

## 2020-05-08 RX ORDER — TAMSULOSIN HYDROCHLORIDE 0.4 MG/1
CAPSULE ORAL
Qty: 30 CAPSULE | Refills: 2 | Status: SHIPPED | OUTPATIENT
Start: 2020-05-08 | End: 2020-06-01

## 2020-05-22 ENCOUNTER — TELEPHONE (OUTPATIENT)
Dept: OTHER | Facility: OTHER | Age: 65
End: 2020-05-22

## 2020-05-31 DIAGNOSIS — Z87.438 HISTORY OF BPH: ICD-10-CM

## 2020-06-01 RX ORDER — TAMSULOSIN HYDROCHLORIDE 0.4 MG/1
CAPSULE ORAL
Qty: 30 CAPSULE | Refills: 2 | Status: SHIPPED | OUTPATIENT
Start: 2020-06-01 | End: 2020-12-09

## 2020-06-09 ENCOUNTER — OFFICE VISIT (OUTPATIENT)
Dept: OBGYN CLINIC | Facility: CLINIC | Age: 65
End: 2020-06-09
Payer: MEDICARE

## 2020-06-09 VITALS
DIASTOLIC BLOOD PRESSURE: 74 MMHG | BODY MASS INDEX: 30.3 KG/M2 | WEIGHT: 199.9 LBS | HEIGHT: 68 IN | SYSTOLIC BLOOD PRESSURE: 168 MMHG

## 2020-06-09 DIAGNOSIS — M25.512 CHRONIC LEFT SHOULDER PAIN: Primary | ICD-10-CM

## 2020-06-09 DIAGNOSIS — R20.0 NUMBNESS AND TINGLING OF LEFT HAND: ICD-10-CM

## 2020-06-09 DIAGNOSIS — R20.2 NUMBNESS AND TINGLING OF LEFT HAND: ICD-10-CM

## 2020-06-09 DIAGNOSIS — G89.29 CHRONIC LEFT SHOULDER PAIN: Primary | ICD-10-CM

## 2020-06-09 PROCEDURE — 3077F SYST BP >= 140 MM HG: CPT | Performed by: ORTHOPAEDIC SURGERY

## 2020-06-09 PROCEDURE — 3008F BODY MASS INDEX DOCD: CPT | Performed by: ORTHOPAEDIC SURGERY

## 2020-06-09 PROCEDURE — 99213 OFFICE O/P EST LOW 20 MIN: CPT | Performed by: ORTHOPAEDIC SURGERY

## 2020-06-09 PROCEDURE — 1160F RVW MEDS BY RX/DR IN RCRD: CPT | Performed by: ORTHOPAEDIC SURGERY

## 2020-06-09 PROCEDURE — 4004F PT TOBACCO SCREEN RCVD TLK: CPT | Performed by: ORTHOPAEDIC SURGERY

## 2020-06-09 PROCEDURE — 3078F DIAST BP <80 MM HG: CPT | Performed by: ORTHOPAEDIC SURGERY

## 2020-06-19 ENCOUNTER — TELEPHONE (OUTPATIENT)
Dept: PAIN MEDICINE | Facility: MEDICAL CENTER | Age: 65
End: 2020-06-19

## 2020-06-19 DIAGNOSIS — M54.12 CERVICAL RADICULOPATHY: ICD-10-CM

## 2020-06-19 DIAGNOSIS — M51.36 LUMBAR DEGENERATIVE DISC DISEASE: ICD-10-CM

## 2020-06-19 RX ORDER — GABAPENTIN 600 MG/1
600 TABLET ORAL 3 TIMES DAILY
Qty: 90 TABLET | Refills: 1 | Status: SHIPPED | OUTPATIENT
Start: 2020-06-19 | End: 2020-08-11

## 2020-06-19 NOTE — TELEPHONE ENCOUNTER
Pt contacted Call Center requested refill of their medication  Medication Name:  Gabapentin    Dosage of Med:  600 mg    Frequency of Med:  3 times a day     Remaining Medication:  Half a dozen or so    Pharmacy and Location:  [facility] on file       Pt  Preferred Callback Phone Number:      Thank you

## 2020-07-03 DIAGNOSIS — G25.0 TREMOR, ESSENTIAL: ICD-10-CM

## 2020-07-03 RX ORDER — TOPIRAMATE 25 MG/1
TABLET ORAL
Qty: 120 TABLET | Refills: 2 | Status: SHIPPED | OUTPATIENT
Start: 2020-07-03 | End: 2021-12-07 | Stop reason: SDUPTHER

## 2020-07-05 DIAGNOSIS — N52.9 ERECTILE DYSFUNCTION, UNSPECIFIED ERECTILE DYSFUNCTION TYPE: ICD-10-CM

## 2020-07-06 ENCOUNTER — OFFICE VISIT (OUTPATIENT)
Dept: PAIN MEDICINE | Facility: CLINIC | Age: 65
End: 2020-07-06
Payer: MEDICARE

## 2020-07-06 VITALS
DIASTOLIC BLOOD PRESSURE: 74 MMHG | TEMPERATURE: 97.9 F | SYSTOLIC BLOOD PRESSURE: 166 MMHG | HEIGHT: 68 IN | BODY MASS INDEX: 30.46 KG/M2 | WEIGHT: 201 LBS

## 2020-07-06 DIAGNOSIS — M79.18 MYOFASCIAL PAIN SYNDROME: ICD-10-CM

## 2020-07-06 DIAGNOSIS — M47.816 LUMBAR SPONDYLOSIS: ICD-10-CM

## 2020-07-06 DIAGNOSIS — G89.29 CHRONIC BILATERAL LOW BACK PAIN WITHOUT SCIATICA: ICD-10-CM

## 2020-07-06 DIAGNOSIS — G89.4 CHRONIC PAIN SYNDROME: Primary | ICD-10-CM

## 2020-07-06 DIAGNOSIS — M47.812 CERVICAL SPONDYLOSIS WITHOUT MYELOPATHY: ICD-10-CM

## 2020-07-06 DIAGNOSIS — M51.36 LUMBAR DEGENERATIVE DISC DISEASE: ICD-10-CM

## 2020-07-06 DIAGNOSIS — M54.50 CHRONIC BILATERAL LOW BACK PAIN WITHOUT SCIATICA: ICD-10-CM

## 2020-07-06 DIAGNOSIS — M25.561 CHRONIC PAIN OF BOTH KNEES: ICD-10-CM

## 2020-07-06 DIAGNOSIS — G89.29 CHRONIC PAIN OF BOTH KNEES: ICD-10-CM

## 2020-07-06 DIAGNOSIS — F11.20 UNCOMPLICATED OPIOID DEPENDENCE (HCC): ICD-10-CM

## 2020-07-06 DIAGNOSIS — Z79.891 LONG-TERM CURRENT USE OF OPIATE ANALGESIC: ICD-10-CM

## 2020-07-06 DIAGNOSIS — M25.562 CHRONIC PAIN OF BOTH KNEES: ICD-10-CM

## 2020-07-06 PROCEDURE — 99213 OFFICE O/P EST LOW 20 MIN: CPT | Performed by: NURSE PRACTITIONER

## 2020-07-06 PROCEDURE — 3078F DIAST BP <80 MM HG: CPT | Performed by: NURSE PRACTITIONER

## 2020-07-06 PROCEDURE — 4004F PT TOBACCO SCREEN RCVD TLK: CPT | Performed by: NURSE PRACTITIONER

## 2020-07-06 PROCEDURE — 3008F BODY MASS INDEX DOCD: CPT | Performed by: NURSE PRACTITIONER

## 2020-07-06 PROCEDURE — 1160F RVW MEDS BY RX/DR IN RCRD: CPT | Performed by: NURSE PRACTITIONER

## 2020-07-06 PROCEDURE — 3077F SYST BP >= 140 MM HG: CPT | Performed by: NURSE PRACTITIONER

## 2020-07-06 PROCEDURE — 80305 DRUG TEST PRSMV DIR OPT OBS: CPT | Performed by: NURSE PRACTITIONER

## 2020-07-06 RX ORDER — OXYCODONE AND ACETAMINOPHEN 7.5; 325 MG/1; MG/1
1 TABLET ORAL EVERY 8 HOURS PRN
Qty: 90 TABLET | Refills: 0 | Status: SHIPPED | OUTPATIENT
Start: 2020-07-06 | End: 2020-07-06 | Stop reason: SDUPTHER

## 2020-07-06 RX ORDER — OXYCODONE AND ACETAMINOPHEN 7.5; 325 MG/1; MG/1
1 TABLET ORAL EVERY 8 HOURS PRN
Qty: 90 TABLET | Refills: 0 | Status: SHIPPED | OUTPATIENT
Start: 2020-07-06 | End: 2020-08-05

## 2020-07-06 NOTE — PROGRESS NOTES
Assessment:  1  Chronic pain syndrome    2  Chronic bilateral low back pain without sciatica    3  Lumbar spondylosis    4  Lumbar degenerative disc disease    5  Myofascial pain syndrome    6  Chronic pain of both knees    7  Cervical spondylosis without myelopathy    8  Uncomplicated opioid dependence ([address] )    9  Long-term current use of opiate analgesic        Plan:   While the patient was in the office today, I did have a thorough conversation regarding their chronic pain syndrome, medication management, and treatment plan options  Patient is a 27-year-old male with chronic pain syndrome related to chronic low back pain, lumbar spondylosis, lumbar radiculopathy, lumbar degenerative disc disease, chronic neck pain, cervical radiculopathy, bilateral knee pain  Overall, he tells me that his pain is stable wth the  Use of Percocet 7 5/325 every 8 hours as needed for pain and gabapentin 600 mg 3 times daily  As such, we will continue to provide him with these 2 medications  The patient's opioid scripts were sent to their pharmacy electronically and was given a 2 month supply of prescriptions with a Do Not Fill date(s) of 07/17/2020 and 08/14/2020  continue gabapentin to address the neuropathic component of his pain  He did not require refill this medication during today's visit  There are risks associated with opioid medications, including dependence, addiction and tolerance  The patient understands and agrees to use these medications only as prescribed  Potential side effects of the medications include, but are not limited to, constipation, drowsiness, addiction, impaired judgment and risk of fatal overdose if not taken as prescribed  The patient was warned against driving while taking sedation medications  Sharing medications is a felony  At this point in time, the patient is showing no signs of addiction, abuse, diversion or suicidal ideation      A urine drug screen was collected at today's office visit as part of our medication management protocol  The point of care testing results were appropriate for what was being prescribed  The specimen will be sent for confirmatory testing  The drug screen is medically necessary because the patient is either dependent on opioid medication or is being considered for opioid medication therapy and the results could impact ongoing or future treatment  The drug screen is to evaluate for the presences or absence of prescribed, non-prescribed, and/or illicit drugs/substances  [place] Prescription Drug Monitoring Program report was reviewed and was appropriate     While the patient was in the office today an opioid contract was thoroughly reviewed and signed by the patient  The patient was given adequate time to ask questions in regards to the contract and a signed copy was sent home for his/her records  The patient will follow-up in 2 months for medication prescription refill and reevaluation  The patient was advised to contact the office should their symptoms worsen in the interim  The patient was agreeable and verbalized an understanding  History of Present Illness: The patient is a 76 y o  male who presents for a follow up office visit in regards to Neck Pain; Back Pain; and Shoulder Pain  The patients current symptoms include Low back pain, neck pain, bilateral knee pain current pain level is a 7/10  Pain is described as dull, aching, sharp, throbbing  Current pain medications includes:  Oxycodone 7 5/325 3 times daily if needed for pain, gabapentin 600 mg 3 times daily    The patient reports that this regimen is providing 35% pain relief  The patient is reporting no side effects from this pain medication regimen  I have personally reviewed and/or updated the patient's past medical history, past surgical history, family history, social history, current medications, allergies, and vital signs today           Review of Systems  Review of Systems   Respiratory: Positive for shortness of breath  Musculoskeletal:        Joint stiffness    Skin: Positive for rash  Neurological: Positive for dizziness  All other systems reviewed and are negative  Past Medical History:   Diagnosis Date    Abdominal aortic aneurysm without rupture ([address] )     Abdominal Aortic Duplex 02/21/2017    Ectatic infrarenal abdominal aorta   CAD (coronary artery disease)     COPD (chronic obstructive pulmonary disease) (HCC)     Extremity pain     History of echocardiogram 03/18/2014    EF 55%, mild MR and AI  Mild concentric LVH   History of stress test 03/06/2017    Normal     Hypertension     Joint pain     Low back pain     Migraine     Neck pain     Obstructive sleep apnea     cannot tolerate CPAP    Osteoarthritis     Peripheral neuropathy     Reflex sympathetic dystrophy        Past Surgical History:   Procedure Laterality Date    CARDIAC CATHETERIZATION  02/13/2012    EF 70%, widely patent renal arteries, significant single-vessel CAD-medical therapy   CARDIAC CATHETERIZATION  04/11/2013    EF 65%, 50% mid LAD, 20% prox CFX, 90% diffuse RCA, 99% mid RCA  Medical management      CHOLECYSTECTOMY      EPIDURAL BLOCK INJECTION Bilateral 8/15/2019    Procedure: BLOCK / INJECTION EPIDURAL STEROID CERVICAL;  Surgeon: Sherwin Vega MD;  Location: [facility];  Service: Pain Management     FL GUIDED NEEDLE PLAC BX/ASP/INJ  8/15/2019    ORTHOPEDIC SURGERY      TRIGGER POINT INJECTION         Family History   Adopted: Yes   Problem Relation Age of Onset    No Known Problems Family     No Known Problems Mother     No Known Problems Father        Social History     Occupational History    Not on file   Tobacco Use    Smoking status: Current Every Day Smoker     Packs/day: 1 50    Smokeless tobacco: Never Used   Substance and Sexual Activity    Alcohol use: No    Drug use: No    Sexual activity: Not on file         Current Outpatient Medications:     amLODIPine (NORVASC) 10 mg tablet, Take 1 tablet (10 mg total) by mouth daily, Disp: 90 tablet, Rfl: 3    cloNIDine (CATAPRES-TTS-3) 0 3 mg/24 hr, apply 1 patch every week as directed, Disp: 4 patch, Rfl: 2    diphenhydrAMINE-acetaminophen (TYLENOL PM)  MG TABS, Take 2 tablets by mouth daily at bedtime as needed for sleep, Disp: , Rfl:     gabapentin (NEURONTIN) 600 MG tablet, Take 1 tablet (600 mg total) by mouth 3 (three) times a day, Disp: 90 tablet, Rfl: 1    hydrALAZINE (APRESOLINE) 10 mg tablet, Take 1 tablet (10 mg total) by mouth as needed (hypertension), Disp: 60 tablet, Rfl: 5    hydrochlorothiazide (HYDRODIURIL) 25 mg tablet, take 1 tablet by mouth twice a day, Disp: 60 tablet, Rfl: 3    lisinopril (ZESTRIL) 40 mg tablet, take 1/2 tablet (20MG TOTAL) by mouth twice a day, Disp: 30 tablet, Rfl: 0    metoprolol succinate (TOPROL-XL) 100 mg 24 hr tablet, Take 1 tablet (100 mg total) by mouth daily, Disp: 30 tablet, Rfl: 5    nitroglycerin (NITROSTAT) 0 4 mg SL tablet, Place 0 4 mg under the tongue every 5 (five) minutes as needed for chest pain, Disp: , Rfl:     oxyCODONE-acetaminophen (PERCOCET) 7 5-325 MG per tablet, Take 1 tablet by mouth every 8 (eight) hours as needed for moderate pain Do not fill until 8/14/2020Max Daily Amount: 3 tablets, Disp: 90 tablet, Rfl: 0    rosuvastatin (CRESTOR) 10 MG tablet, Take 1 tablet (10 mg total) by mouth daily, Disp: 30 tablet, Rfl: 5    sildenafil (VIAGRA) 100 mg tablet, Take 1 tablet (100 mg total) by mouth daily as needed for erectile dysfunction, Disp: 10 tablet, Rfl: 3    tamsulosin (FLOMAX) 0 4 mg, take 1 capsule by mouth daily with dinner, Disp: 30 capsule, Rfl: 2    topiramate (TOPAMAX) 25 mg tablet, FOLLOW INSTRUCTIONS GIVEN TO TITRATE UP TO A MAX OF 2 TABLETS BY MOUTH TWICE DAILY AS TOLERATED AND TO LEVEL OF BENIFIT FOR TREMORS, Disp: 120 tablet, Rfl: 2    traZODone (DESYREL) 50 mg tablet, take 1 tablet by mouth at bedtime, Disp: 30 tablet, Rfl: 6    naloxone (NARCAN) 0 4 mg/mL injection, Infuse 1 mL (0 4 mg total) into a venous catheter once for 1 dose (Patient not taking: Reported on 4/10/2020), Disp: 1 mL, Rfl: 0    Allergies   Allergen Reactions    Morphine And Related        Physical Exam:    /74 (BP Location: Left arm, Patient Position: Sitting, Cuff Size: Large)   Temp 97 9 °F (36 6 °C)   Ht 5' 8" (1 727 m)   Wt 91 2 kg (201 lb)   BMI 30 56 kg/m²     Constitutional:normal, well developed, well nourished, alert, in no distress and non-toxic and no overt pain behavior    Eyes:anicteric  HEENT:grossly intact  Neck:supple, symmetric, trachea midline and no masses   Pulmonary:even and unlabored  Cardiovascular:No edema or pitting edema present  Skin:Normal without rashes or lesions and well hydrated  Psychiatric:Mood and affect appropriate  Neurologic:Cranial Nerves II-XII grossly intact  Musculoskeletal:normal    Imaging  No orders to display       Orders Placed This Encounter   Procedures    MM ALL_Prescribed Meds and Special Instructions    MM DT_Alprazolam Definitive Test    MM DT_Amitriptyline Definitive Test    MM DT_Amphetamine Definitive Test    MM DT_Bath Salts Definitive Test    MM DT_Buprenorphine Definitive Test    MM DT_Butalbital Definitive Test    MM DT_Clonazepam Definitive Test    MM DT_Clozapine Definitive Test    MM DT_Cocaine Definitive Test    MM DT_Codeine Definitive Test    MM DT_Desipramine Definitive Test    MM DT_Dextromethorphan Definitive Test    MM Diazepam Definitive Test    MM DT_Ethyl Glucuronide/Ethyl Sulfate Definitive Test    MM DT_Fentanyl Definitive Test    MM DT_Fluoxetine Definitive Test    MM DT_Heroin Definitive Test    MM DT_Hydrocodone Definitive Test    MM DT_Hydromorphone Definitive Test    MM DT_Imipramine Definitive Test    MM DT_Kratom Definitive Test    MM DT_Levorphanol Definitive Test    MM Lorazepam Definitive Test    MM DT_MDMA Definitive Test    MM DT_Meperidine Definitive Test    MM DT_Methadone Definitive Test    MM DT_Methamphetamine Definitive Test    MM DT_Methylphenidate Definitive Test    MM DT_Morphine Definitive Test    MM DT_Olanzapine Definitive Test    MM DT_Oxycodone Definitive Test    MM DT_Oxazepam Definitive Test    MM DT_Oxymorphone Definitive Test    MM DT_Perform Validity Testing    MM DT_Phencyclidine Definitive Test    MM DT_Phenobarbital Definitive Test    MM DT_Phentermine Definitive Test    MM DT_Quetiapine Definitive Test    MM DT_Risperidone Definitive Test    MM DT_Secobarbital Definitive Test    MM DT_Spice Definitive Test    MM DT_Tapentadol Definitive Test    MM DT_Temazapam Definitive Test    MM DT_THC Definitive Test    MM DT_Tramadol Definitive Test

## 2020-07-06 NOTE — PATIENT INSTRUCTIONS
Percocet refill date:  07/17/2020, 08/14/2020    Opioid Pain Management   AMBULATORY CARE:   An opioid  is a type of medicine used to treat pain  Examples of opioids are oxycodone, morphine, fentanyl, or codeine  Take opioid medicines as directed, for the condition it is prescribed:  Common problems that may occur when you do not take opioid medicines as directed include the following:  · Health problems  may occur  You may have trouble breathing  You may also develop liver or kidney damage, or stomach bleeding  Any of these health problems can become life-threatening  · Opioid dependence  means your body needs the opioid medicine to keep it from going through withdrawal      · Opioid tolerance  means the opioid does not control pain as well as it used to  You need higher doses of the opioid to get pain relief  · Opioid addiction  means you are not able to control the use of the opioid medicine  You use it when you do not have pain  You crave the opioid medicine  Call 911 or have someone call 911 for any of the following:   · You are breathing slower than normal, or you have trouble breathing  · You cannot be awakened  · You have a seizure  Seek care immediately if:   · Your heart is beating slower than usual     · Your heart feels like it is jumping or fluttering  · You are so sleepy that you cannot stay awake  · You have severe muscle pain or weakness  · You see or hear things that are not real   Contact your healthcare provider if:   · You are too dizzy to stand up  · Your pain gets worse or you have new pain  · You cannot do your usual activities because of side effects from the opioid  · You are constipated or have abdominal pain  · You have questions or concerns about your condition or care  Opioid safety measures:   · Take your medicine as directed  Ask if you need more information on how to take your medicine correctly   Follow up with your healthcare provider regularly  You may need to have your dose adjusted  Do not use opioid medicine if you are pregnant or breastfeeding  · Give your healthcare provider a list of all your medicines  Include any over-the-counter medicines, vitamins, and herbs  It can be dangerous to take opioids with certain other medicines, such as antihistamines  · Keep opioid medicine in a safe place  Store your opioid medicine in a locked cabinet to keep it away from children and others  · Do not drink alcohol while you use opioids  Alcohol use with an opioid medicine can make you sleepy and slow your breathing rate  You may stop breathing completely  · Do not drive or operate heavy machinery after you take opioid medicine  Opioid medicine can make you drowsy and make it hard to concentrate  You may injure yourself or others if you drive or operate heavy machinery while taking your medicine  · Drink fluids and eat high-fiber foods  Fluids and fiber will help prevent constipation  Ask your healthcare provider what fluids are right for you and how much you should drink  Also ask for a list of foods that contain fiber  Follow up with your healthcare provider as directed: You may need to have your dose adjusted  You may be referred to a pain specialist  Write down your questions so you remember to ask them during your visits  © 2017 [address] Information is for End User's use only and may not be sold, redistributed or otherwise used for commercial purposes  All illustrations and images included in CareNotes® are the copyrighted property of [employer]  or [employer]  The above information is an  only  It is not intended as medical advice for individual conditions or treatments  Talk to your doctor, nurse or pharmacist before following any medical regimen to see if it is safe and effective for you

## 2020-07-07 RX ORDER — SILDENAFIL 100 MG/1
100 TABLET, FILM COATED ORAL DAILY PRN
Qty: 10 TABLET | Refills: 0 | Status: SHIPPED | OUTPATIENT
Start: 2020-07-07 | End: 2020-08-21

## 2020-07-08 LAB
6MAM UR QL CFM: NEGATIVE NG/ML
7AMINOCLONAZEPAM UR QL CFM: NEGATIVE NG/ML
A-OH ALPRAZ UR QL CFM: NEGATIVE NG/ML
ACCEPTABLE CREAT UR QL: NORMAL MG/DL
ACCEPTIBLE SP GR UR QL: NORMAL
AMITRIP UR QL CFM: NEGATIVE NG/ML
AMPHET UR QL CFM: NEGATIVE NG/ML
AMPHET UR QL CFM: NEGATIVE NG/ML
BUPRENORPHINE UR QL CFM: NEGATIVE NG/ML
BUTALBITAL UR QL CFM: NEGATIVE NG/ML
BZE UR QL CFM: NEGATIVE NG/ML
CODEINE UR QL CFM: NEGATIVE NG/ML
DESIPRAMINE UR QL CFM: NEGATIVE NG/ML
DESIPRAMINE UR QL CFM: NEGATIVE NG/ML
EDDP UR QL CFM: NEGATIVE NG/ML
ETHYL GLUCURONIDE UR QL CFM: NEGATIVE NG/ML
ETHYL SULFATE UR QL SCN: NEGATIVE NG/ML
FENTANYL UR QL CFM: NEGATIVE NG/ML
FLUOXETINE UR QL CFM: NEGATIVE NG/ML
GLIADIN IGG SER IA-ACNC: NEGATIVE NG/ML
HYDROCODONE UR QL CFM: NEGATIVE NG/ML
HYDROCODONE UR QL CFM: NEGATIVE NG/ML
HYDROMORPHONE UR QL CFM: NEGATIVE NG/ML
IMIPRAMINE UR QL CFM: NEGATIVE NG/ML
LORAZEPAM UR QL CFM: NEGATIVE NG/ML
MDMA UR QL CFM: NEGATIVE NG/ML
ME-PHENIDATE UR QL CFM: NEGATIVE NG/ML
MEPERIDINE UR QL CFM: NEGATIVE NG/ML
MEPHEDRONE UR QL CFM: NEGATIVE NG/ML
METHADONE UR QL CFM: NEGATIVE NG/ML
METHAMPHET UR QL CFM: NEGATIVE NG/ML
MORPHINE UR QL CFM: NEGATIVE NG/ML
MORPHINE UR QL CFM: NEGATIVE NG/ML
NITRITE UR QL: NORMAL UG/ML
NORBUPRENORPHINE UR QL CFM: NEGATIVE NG/ML
NORDIAZEPAM UR QL CFM: NEGATIVE NG/ML
NORFENTANYL UR QL CFM: NEGATIVE NG/ML
NORFLUOXETINE UR QL CFM: NEGATIVE NG/ML
NORHYDROCODONE UR QL CFM: NEGATIVE NG/ML
NORHYDROCODONE UR QL CFM: NEGATIVE NG/ML
NORMEPERIDINE UR QL CFM: NEGATIVE NG/ML
NOROXYCODONE UR CFM-MCNC: 1550.01 NG/ML
NORTRIP UR QL CFM: NEGATIVE NG/ML
OLANZAPINE QUANTIFICATION: NEGATIVE NG/ML
OXAZEPAM UR QL CFM: NEGATIVE NG/ML
OXYCODONE UR CFM-MCNC: 1403.15 NG/ML
OXYMORPHONE UR CFM-MCNC: 89.22 NG/ML
OXYMORPHONE UR CFM-MCNC: 89.22 NG/ML
PCP UR QL CFM: NEGATIVE NG/ML
PHENOBARB UR QL CFM: NEGATIVE NG/ML
RESULT ALL_PRESCRIBED MEDS AND SPECIAL INSTRUCTIONS: NORMAL
SECOBARBITAL UR QL CFM: NEGATIVE NG/ML
SL AMB 3-METHYL-FENTANYL QUANTIFICATION: NORMAL NG/ML
SL AMB 4-ANPP QUANTIFICATION: NORMAL NG/ML
SL AMB 4-FIBF QUANTIFICATION: NORMAL NG/ML
SL AMB 5F-ADB-M7 METABOLITE QUANTIFICATION: NEGATIVE NG/ML
SL AMB 7-OH-MITRAGYNINE (KRATOM ALKALOID) QUANTIFICATION: NEGATIVE NG/ML
SL AMB AB-FUBINACA-M3 METABOLITE QUANTIFICATION: NEGATIVE NG/ML
SL AMB ACETYL FENTANYL QUANTIFICATION: NORMAL NG/ML
SL AMB ACETYL NORFENTANYL QUANTIFICATION: NORMAL NG/ML
SL AMB ACRYL FENTANYL QUANTIFICATION: NORMAL NG/ML
SL AMB BATH SALTS: NEGATIVE NG/ML
SL AMB BUTRYL FENTANYL QUANTIFICATION: NORMAL NG/ML
SL AMB CARFENTANIL QUANTIFICATION: NORMAL NG/ML
SL AMB CLOZAPINE QUANTIFICATION: NEGATIVE NG/ML
SL AMB CTHC (MARIJUANA METABOLITE) QUANTIFICATION: NEGATIVE NG/ML
SL AMB CYCLOPROPYL FENTANYL QUANTIFICATION: NORMAL NG/ML
SL AMB DEXTROMETHORPHAN QUANTIFICATION: NEGATIVE NG/ML
SL AMB DEXTRORPHAN (DEXTROMETHORPHAN METABOLITE) QUANT: NEGATIVE NG/ML
SL AMB DEXTRORPHAN (DEXTROMETHORPHAN METABOLITE) QUANT: NEGATIVE NG/ML
SL AMB FURANYL FENTANYL QUANTIFICATION: NORMAL NG/ML
SL AMB HYDROXYRISPERIDONE QUANTIFICATION: NEGATIVE NG/ML
SL AMB JWH018 METABOLITE QUANTIFICATION: NEGATIVE NG/ML
SL AMB JWH073 METABOLITE QUANTIFICATION: NEGATIVE NG/ML
SL AMB MDMB-FUBINACA-M1 METABOLITE QUANTIFICATION: NEGATIVE NG/ML
SL AMB METHOXYACETYL FENTANYL QUANTIFICATION: NORMAL NG/ML
SL AMB METHYLONE QUANTIFICATION: NEGATIVE NG/ML
SL AMB N-DESMETHYL U-47700 QUANTIFICATION: NORMAL NG/ML
SL AMB N-DESMETHYL-TRAMADOL QUANTIFICATION: NEGATIVE NG/ML
SL AMB N-DESMETHYLCLOZAPINE QUANTIFICATION: NEGATIVE NG/ML
SL AMB NORQUETIAPINE QUANTIFICATION: NEGATIVE NG/ML
SL AMB PHENTERMINE QUANTIFICATION: NEGATIVE NG/ML
SL AMB QUETIAPINE QUANTIFICATION: NEGATIVE NG/ML
SL AMB RCS4 METABOLITE QUANTIFICATION: NEGATIVE NG/ML
SL AMB RISPERIDONE QUANTIFICATION: NEGATIVE NG/ML
SL AMB RITALINIC ACID QUANTIFICATION: NEGATIVE NG/ML
SL AMB U-47700 QUANTIFICATION: NORMAL NG/ML
SPECIMEN DRAWN SERPL: NEGATIVE NG/ML
SPECIMEN PH ACCEPTABLE UR: NORMAL
TAPENTADOL UR QL CFM: NEGATIVE NG/ML
TEMAZEPAM UR QL CFM: NEGATIVE NG/ML
TEMAZEPAM UR QL CFM: NEGATIVE NG/ML
TRAMADOL UR QL CFM: NEGATIVE NG/ML
URATE/CREAT 24H UR: NEGATIVE NG/ML

## 2020-08-03 DIAGNOSIS — I10 ESSENTIAL HYPERTENSION: ICD-10-CM

## 2020-08-03 RX ORDER — LISINOPRIL 20 MG/1
20 TABLET ORAL DAILY
Qty: 60 TABLET | Refills: 5 | Status: SHIPPED | OUTPATIENT
Start: 2020-08-03 | End: 2021-08-11

## 2020-08-05 ENCOUNTER — OFFICE VISIT (OUTPATIENT)
Dept: CARDIOLOGY CLINIC | Facility: CLINIC | Age: 65
End: 2020-08-05
Payer: MEDICARE

## 2020-08-05 VITALS
SYSTOLIC BLOOD PRESSURE: 136 MMHG | WEIGHT: 200 LBS | HEART RATE: 60 BPM | DIASTOLIC BLOOD PRESSURE: 80 MMHG | HEIGHT: 68 IN | BODY MASS INDEX: 30.31 KG/M2

## 2020-08-05 DIAGNOSIS — E78.2 MIXED HYPERLIPIDEMIA: ICD-10-CM

## 2020-08-05 DIAGNOSIS — I25.10 CORONARY ARTERY DISEASE INVOLVING NATIVE CORONARY ARTERY OF NATIVE HEART WITHOUT ANGINA PECTORIS: ICD-10-CM

## 2020-08-05 DIAGNOSIS — I10 ESSENTIAL HYPERTENSION: Primary | ICD-10-CM

## 2020-08-05 PROCEDURE — 3075F SYST BP GE 130 - 139MM HG: CPT | Performed by: INTERNAL MEDICINE

## 2020-08-05 PROCEDURE — 1160F RVW MEDS BY RX/DR IN RCRD: CPT | Performed by: INTERNAL MEDICINE

## 2020-08-05 PROCEDURE — 4004F PT TOBACCO SCREEN RCVD TLK: CPT | Performed by: INTERNAL MEDICINE

## 2020-08-05 PROCEDURE — 3079F DIAST BP 80-89 MM HG: CPT | Performed by: INTERNAL MEDICINE

## 2020-08-05 PROCEDURE — 93000 ELECTROCARDIOGRAM COMPLETE: CPT | Performed by: INTERNAL MEDICINE

## 2020-08-05 PROCEDURE — 3008F BODY MASS INDEX DOCD: CPT | Performed by: INTERNAL MEDICINE

## 2020-08-05 PROCEDURE — 99214 OFFICE O/P EST MOD 30 MIN: CPT | Performed by: INTERNAL MEDICINE

## 2020-08-05 NOTE — PATIENT INSTRUCTIONS
Chronic Hypertension, Ambulatory Care   GENERAL INFORMATION:   Chronic hypertension  is a long-term condition in which your blood pressure (BP) is higher than normal  Your BP is the force of your blood moving against the walls of your arteries  Hypertension is a BP of 140/90 or higher  Common symptoms include the following:   · Headache     · Blurred vision    · Chest pain     · Dizziness or weakness     · Trouble breathing     · Nosebleeds  Seek immediate care for the following symptoms:   · Severe headache or vision loss    · Weakness in an arm or leg    · Confusion or difficulty speaking    · Discomfort in your chest that feels like squeezing, pressure, fullness, or pain    · Suddenly feeling lightheaded or trouble breathing    · Pain or discomfort in your back, neck, jaw, stomach, or arm  Treatment for chronic hypertension  may include medicine to lower your BP  You may also need to make lifestyle changes  Take your medicine exactly as directed  Manage chronic hypertension:   · Take your BP at home  Sit and rest for 5 minutes before you take your BP  Extend your arm and support it on a flat surface  Your arm should be at the same level as your heart  Follow the directions that came with your BP monitor  If possible, take at least 2 BP readings each time  Take your BP at least twice a day at the same times each day, such as morning and evening  Keep a log of your BP readings and bring it to your follow-up visits  · Eat less sodium (salt)  Do not add sodium to your food  Limit foods that are high in sodium, such as canned foods, potato chips, and cold cuts  Your healthcare provider may suggest that you follow the [address] [place] [address] Street  The plan is low in sodium, unhealthy fats, and total fat  It is high in potassium, calcium, and fiber  · Exercise regularly  Exercise at least 30 minutes per day, on most days of the week  This will help decrease your BP   Ask your healthcare provider about the best exercise plan for you  · Limit alcohol  Women should limit alcohol to 1 drink a day  Men should limit alcohol to 2 drinks a day  A drink of alcohol is 12 ounces of beer, 5 ounces of wine, or 1½ ounces of liquor  · Do not smoke  If you smoke, it is never too late to quit  Smoking can increase your BP  Smoking also worsens other health conditions you may have that can increase your risk for hypertension  Ask your healthcare provider for information if you need help quitting  Follow up with your healthcare provider as directed: You will need to return to have your BP checked and to have other lab tests done  Write down your questions so you remember to ask them during your visits  CARE AGREEMENT:   You have the right to help plan your care  Learn about your health condition and how it may be treated  Discuss treatment options with your caregivers to decide what care you want to receive  You always have the right to refuse treatment  The above information is an  only  It is not intended as medical advice for individual conditions or treatments  Talk to your doctor, nurse or pharmacist before following any medical regimen to see if it is safe and effective for you  © 2014 [address] is for End User's use only and may not be sold, redistributed or otherwise used for commercial purposes  All illustrations and images included in CareNotes® are the copyrighted property of [employer]  or [employer]es Perez

## 2020-08-05 NOTE — PROGRESS NOTES
Subjective:        Patient ID: Lacie Gottron is a 76 y o  male  Chief Complaint:  Gallito Olvera is here for routine follow-up  Denies any cardiac symptoms specifically no chest pains concerning shortness of breath palpitations presyncope syncope TIA or claudication like symptoms  No edema orthopnea or PND  He only takes hydralazine as needed, he takes Crestor when he remembers it, he still smokes a couple packs cigarettes per day but is trying to cut down  The following portions of the patient's history were reviewed and updated as appropriate: allergies, current medications, past family history, past medical history, past social history, past surgical history and problem list   Review of Systems   Constitution: Negative for chills, diaphoresis, malaise/fatigue and weight gain  HENT: Negative for nosebleeds and stridor  Eyes: Negative for double vision, vision loss in left eye, vision loss in right eye and visual disturbance  Cardiovascular: Negative for chest pain, claudication, cyanosis, dyspnea on exertion, irregular heartbeat, leg swelling, near-syncope, orthopnea, palpitations, paroxysmal nocturnal dyspnea and syncope  Respiratory: Negative for cough, shortness of breath, snoring and wheezing  Endocrine: Negative for polydipsia, polyphagia and polyuria  Hematologic/Lymphatic: Negative for bleeding problem  Does not bruise/bleed easily  Skin: Negative for flushing and rash  Musculoskeletal: Positive for arthritis and joint pain (Knees)  Negative for falls and myalgias  Gastrointestinal: Negative for abdominal pain, heartburn, hematemesis, hematochezia, melena and nausea  Genitourinary: Negative for hematuria  Neurological: Negative for brief paralysis, dizziness, focal weakness, headaches, light-headedness, loss of balance and vertigo  Psychiatric/Behavioral: Negative for altered mental status and substance abuse  Allergic/Immunologic: Negative for hives  Objective:      /80   Pulse 60   Ht 5' 8"   Wt 90 7 kg (200 lb)   BMI 30 41 kg/m²   Physical Exam   Constitutional: He is oriented to person, place, and time  He appears well-developed and well-nourished  No distress  HENT:   Head: Normocephalic and atraumatic  Eyes: Pupils are equal, round, and reactive to light  EOM are normal  No scleral icterus  Neck: Normal range of motion  Neck supple  No JVD present  No thyromegaly present  Cardiovascular: Normal rate, regular rhythm and normal heart sounds  Exam reveals no gallop and no friction rub  No murmur heard  Pulmonary/Chest: Effort normal and breath sounds normal  No stridor  No respiratory distress  He has no wheezes  He has no rales  Abdominal: Soft  Bowel sounds are normal  He exhibits no distension and no mass  There is no abdominal tenderness  Musculoskeletal: Normal range of motion  General: No deformity or edema  Neurological: He is alert and oriented to person, place, and time  Coordination normal    Skin: Skin is warm and dry  No erythema  No pallor  Psychiatric: He has a normal mood and affect   His behavior is normal        Lab Review:   Office Visit on 07/06/2020   Component Date Value    RESULT ALL_PRESC MEDS SP* 20/02/3703 Not Applicable     Alpha-Hydroxyalprazolam * 07/06/2020 negative     Amitriptyline Quantifica* 07/06/2020 negative     Nortriptyline Quantifica* 07/06/2020 negative     Amphetamine Quantificati* 07/06/2020 negative     BATH SALTS 07/06/2020 negative     Mephedrone Quantification 07/06/2020 negative     METHYLONE QUANTIFICATION 07/06/2020 negative     Buprenorphine Quantifica* 07/06/2020 negative     Norbuprenorphine Quantif* 07/06/2020 negative     Butalbital Quantification 07/06/2020 negative     7-Amino-Clonazepam Quant* 07/06/2020 negative     Clozapine Quantification 07/06/2020 negative     N-desmethylclozapine Jeovany* 07/06/2020 negative     Cocaine metabolite Quant* 07/06/2020 negative     Codeine Quantification 07/06/2020 negative     Morphine Quantification 07/06/2020 negative     Hydrocodone Quantificati* 07/06/2020 negative     Norhydrocodone Quantific* 07/06/2020 negative     Hydromorphone Quantifica* 07/06/2020 negative     Desipramine Quantificati* 07/06/2020 negative     Dextromethorphan Quantif* 07/06/2020 negative     Dextrorphan (Dextrometho* 07/06/2020 negative     Nordiazepam Quantificati* 07/06/2020 negative     Temazepam Quantification 07/06/2020 negative     Oxazepam Quantification 07/06/2020 negative     Ethyl Glucuronide Quanti* 07/06/2020 negative     Ethyl Sulfate Quantifica* 07/06/2020 negative     Fentanyl Quantification 07/06/2020 negative     Norfentanyl Quantificati* 07/06/2020 negative     3-methyl-fentanyl Quanti* 07/06/2020 Fen Neg     4-ANPP Quantification 07/06/2020 Fen Neg     4-FiBF Quantification 07/06/2020 Fen Neg     Acetyl fentanyl Quantifi* 07/06/2020 Fen Neg     Acetyl norfentanyl Quant* 07/06/2020 Fen Neg     Acryl fentanyl Quantific* 07/06/2020 Fen Neg     Butryl fentanyl Quantifi* 07/06/2020 Fen Neg     Carfentanil Quantificati* 07/06/2020 Fen Neg     Cyclopropyl fentanyl Jeovany* 07/06/2020 Fen Neg     Furanyl fentanyl Quantif* 07/06/2020 Fen Neg     Methoxyacetyl fentanyl Q* 07/06/2020 Fen Neg     H-[id number] Quantification 07/06/2020 Fen Neg     N-desmethyl X-03414 Jared* 07/06/2020 Fen Neg     Fluoxetine Quantification 07/06/2020 negative     Norfluoxetine Quantifica* 07/06/2020 negative     6-GORAN (Heroin metabolite* 07/06/2020 negative     Hydrocodone Quantificati* 07/06/2020 negative     Norhydrocodone Quantific* 07/06/2020 negative     Hydromorphone Quantifica* 07/06/2020 negative     Hydromorphone Quantifica* 07/06/2020 negative     Desipramine Quantificati* 07/06/2020 negative     Imipramine Quantification 07/06/2020 negative     Mitragynine (Kratom enrique* 07/06/2020 negative     0-LH-Mtkkaahrthq (Kratom* 07/06/2020 negative     Dextrorphan (Dextrometho* 07/06/2020 negative     Lorazepam Quantification 07/06/2020 negative     MDMA Quantification 07/06/2020 negative     Meperidine Quantification 07/06/2020 negative     Normeperidine Quantifica* 07/06/2020 negative     Methadone Quantification 07/06/2020 negative     EDDP (Methadone metaboli* 07/06/2020 negative     Amphetamine Quantificati* 07/06/2020 negative     Methamphetamine Quantifi* 07/06/2020 negative     Methylphenidate Quantifi* 07/06/2020 negative     RITALINIC ACID QUANTIFIC* 07/06/2020 negative     Morphine Quantification 07/06/2020 negative     Hydromorphone Quantifica* 07/06/2020 negative     OLANZAPINE QUANTIFICATION 07/06/2020 negative     Oxycodone Quantification* 07/06/2020 1,403 149     Noroxycodone Quantificat* 07/06/2020 1,550 013     Oxymorphone Quantificati* 07/06/2020 89 216     Oxazepam Quantification 07/06/2020 negative     Oxymorphone Quantificati* 07/06/2020 89 216     CREATININE 07/06/2020 normal     OXIDANT 07/06/2020 normal     pH 07/06/2020 normal     SPECIFIC GRAVITY URINE 07/06/2020 normal     Phencyclidine Quantifica* 07/06/2020 negative     Phenobarbital Quantifica* 07/06/2020 negative     PHENTERMINE QUANTIFICATI* 07/06/2020 negative     QUETIAPINE QUANTIFICATION 07/06/2020 negative     NORQUETIAPINE QUANTIFICA* 07/06/2020 negative     Risperidone Quantificati* 07/06/2020 negative     Hydroxyrisperidone Quant* 07/06/2020 negative     Secobarbital Quantificat* 07/06/2020 negative     5F-ADB-M7 07/06/2020 negative     RQ-[id number]-M2 METABOLIT* 07/06/2020 negative     WVDK-WHUTALPI-D9 METABOL* 07/06/2020 negative     XSW425 metabolite Quanti* 07/06/2020 negative     MCT049 metabolite Quanti* 07/06/2020 negative     RCS4 METABOLITE QUANTIFI* 07/06/2020 negative     MRZ72/ METABOLITE Q* 07/06/2020 negative     Tapentadol Quantification 07/06/2020 negative     Temazepam Quantification 07/06/2020 negative     Oxazepam Quantification 07/06/2020 negative     cTHC (Marijuana metaboli* 07/06/2020 negative     Tramadol Quantification 07/06/2020 negative     O-desmethyl-tramadol Jeovany* 07/06/2020 negative     N-DESMETHYL-TRAMADOL JEOVANY* 07/06/2020 negative      No results found  Assessment:       1  Essential hypertension  POCT ECG    Comprehensive metabolic panel   2  Mixed hyperlipidemia  Lipid panel   3  Abdominal aortic aneurysm (AAA) without rupture ([address] )     4  Coronary artery disease involving native coronary artery of native heart without angina pectoris          Plan:       Merryl Gauze blood pressure is reasonably well controlled, he has no symptoms reminiscent of unstable angina pectoris, he examines euvolemic and has no signs or symptoms of heart failure  2019 lung cancer screening CT scan reviewed, ultrasound of abdomen also negative for AAA at that time  Encouraged smoking cessation and statin compliance  Ordered routine follow-up blood work  We made no medication changes today, plan to see him back in 6 months, he will call sooner with any concerning potential cardiac symptoms or should any elective surgery be required

## 2020-08-11 DIAGNOSIS — M51.36 LUMBAR DEGENERATIVE DISC DISEASE: ICD-10-CM

## 2020-08-11 DIAGNOSIS — M54.12 CERVICAL RADICULOPATHY: ICD-10-CM

## 2020-08-11 RX ORDER — GABAPENTIN 600 MG/1
TABLET ORAL
Qty: 90 TABLET | Refills: 1 | Status: SHIPPED | OUTPATIENT
Start: 2020-08-11 | End: 2020-09-03

## 2020-08-21 DIAGNOSIS — N52.9 ERECTILE DYSFUNCTION, UNSPECIFIED ERECTILE DYSFUNCTION TYPE: ICD-10-CM

## 2020-08-21 DIAGNOSIS — E78.2 MIXED HYPERLIPIDEMIA: ICD-10-CM

## 2020-08-21 RX ORDER — SILDENAFIL 100 MG/1
TABLET, FILM COATED ORAL
Qty: 10 TABLET | Refills: 0 | Status: SHIPPED | OUTPATIENT
Start: 2020-08-21 | End: 2020-10-05 | Stop reason: SDUPTHER

## 2020-08-21 RX ORDER — ROSUVASTATIN CALCIUM 10 MG/1
10 TABLET, COATED ORAL DAILY
Qty: 30 TABLET | Refills: 5 | Status: SHIPPED | OUTPATIENT
Start: 2020-08-21 | End: 2021-12-10

## 2020-08-24 ENCOUNTER — TELEPHONE (OUTPATIENT)
Dept: PAIN MEDICINE | Facility: CLINIC | Age: 65
End: 2020-08-24

## 2020-08-24 NOTE — TELEPHONE ENCOUNTER
Patient called stating he's experiencing severe back pain from an injury he had over the weekend  He would like to discuss his status with a nurse  He's scheduled for an OVS on 8/26/20   Please advise, alonso    Call back# [phone]

## 2020-08-24 NOTE — TELEPHONE ENCOUNTER
SW patient, states he can barely walk or stand up straight     Patient said he was running after his 3year old when he pulled away and something in his back locked up and is causing pain 10/10  Patient said if he places his hands to his lower spine and applies pressure then he is able to walk a little  Patient said the pain is going down his left leg  The only way the patient can get comfortable is to lay on his side  Patient has used heat to the area with no relief  Advised patient to try ice - 20 minutes on and 20 minutes off  Patient is taking Percocet 3 tablets  7 5 / 325 with no relief  ?  X-ray  Patient has an office visit on 8/26/2020 with JACEY

## 2020-08-25 ENCOUNTER — HOSPITAL ENCOUNTER (OUTPATIENT)
Dept: MRI IMAGING | Facility: HOSPITAL | Age: 65
Discharge: HOME | End: 2020-08-25
Payer: MEDICARE

## 2020-08-25 ENCOUNTER — HOSPITAL ENCOUNTER (OUTPATIENT)
Dept: RADIOLOGY | Facility: HOSPITAL | Age: 65
Discharge: HOME | End: 2020-08-25
Payer: MEDICARE

## 2020-08-25 DIAGNOSIS — M47.816 LUMBAR SPONDYLOSIS: ICD-10-CM

## 2020-08-25 DIAGNOSIS — M48.062 SPINAL STENOSIS OF LUMBAR REGION WITH NEUROGENIC CLAUDICATION: Primary | ICD-10-CM

## 2020-08-25 DIAGNOSIS — M54.16 LUMBAR RADICULOPATHY: ICD-10-CM

## 2020-08-25 DIAGNOSIS — M48.062 SPINAL STENOSIS OF LUMBAR REGION WITH NEUROGENIC CLAUDICATION: ICD-10-CM

## 2020-08-25 PROCEDURE — G1004 CDSM NDSC: HCPCS

## 2020-08-25 PROCEDURE — 72148 MRI LUMBAR SPINE W/O DYE: CPT

## 2020-08-25 PROCEDURE — 72114 X-RAY EXAM L-S SPINE BENDING: CPT

## 2020-08-26 ENCOUNTER — OFFICE VISIT (OUTPATIENT)
Dept: PAIN MEDICINE | Facility: CLINIC | Age: 65
End: 2020-08-26
Payer: MEDICARE

## 2020-08-26 VITALS
TEMPERATURE: 97.9 F | SYSTOLIC BLOOD PRESSURE: 144 MMHG | BODY MASS INDEX: 30.31 KG/M2 | DIASTOLIC BLOOD PRESSURE: 82 MMHG | HEART RATE: 88 BPM | WEIGHT: 200 LBS | HEIGHT: 68 IN | RESPIRATION RATE: 18 BRPM

## 2020-08-26 DIAGNOSIS — G89.29 CHRONIC BILATERAL LOW BACK PAIN WITHOUT SCIATICA: ICD-10-CM

## 2020-08-26 DIAGNOSIS — M79.18 MYOFASCIAL PAIN SYNDROME: ICD-10-CM

## 2020-08-26 DIAGNOSIS — M54.50 CHRONIC BILATERAL LOW BACK PAIN WITHOUT SCIATICA: ICD-10-CM

## 2020-08-26 DIAGNOSIS — G89.4 CHRONIC PAIN SYNDROME: Primary | ICD-10-CM

## 2020-08-26 DIAGNOSIS — M51.26 HNP (HERNIATED NUCLEUS PULPOSUS), LUMBAR: ICD-10-CM

## 2020-08-26 DIAGNOSIS — M47.816 LUMBAR SPONDYLOSIS: ICD-10-CM

## 2020-08-26 DIAGNOSIS — M51.36 LUMBAR DEGENERATIVE DISC DISEASE: ICD-10-CM

## 2020-08-26 PROCEDURE — 3077F SYST BP >= 140 MM HG: CPT | Performed by: NURSE PRACTITIONER

## 2020-08-26 PROCEDURE — 1160F RVW MEDS BY RX/DR IN RCRD: CPT | Performed by: NURSE PRACTITIONER

## 2020-08-26 PROCEDURE — 3008F BODY MASS INDEX DOCD: CPT | Performed by: NURSE PRACTITIONER

## 2020-08-26 PROCEDURE — 99213 OFFICE O/P EST LOW 20 MIN: CPT | Performed by: NURSE PRACTITIONER

## 2020-08-26 PROCEDURE — 3079F DIAST BP 80-89 MM HG: CPT | Performed by: NURSE PRACTITIONER

## 2020-08-26 PROCEDURE — 4004F PT TOBACCO SCREEN RCVD TLK: CPT | Performed by: NURSE PRACTITIONER

## 2020-08-26 RX ORDER — PREDNISONE 10 MG/1
TABLET ORAL
Qty: 15 TABLET | Refills: 0 | Status: SHIPPED | OUTPATIENT
Start: 2020-08-26 | End: 2020-09-14

## 2020-08-26 RX ORDER — OXYCODONE AND ACETAMINOPHEN 7.5; 325 MG/1; MG/1
1 TABLET ORAL EVERY 8 HOURS PRN
Qty: 90 TABLET | Refills: 0 | Status: SHIPPED | OUTPATIENT
Start: 2020-08-26 | End: 2020-10-14 | Stop reason: SDUPTHER

## 2020-08-26 NOTE — PROGRESS NOTES
Assessment:  1  Chronic pain syndrome    2  Chronic bilateral low back pain without sciatica    3  HNP (herniated nucleus pulposus), lumbar    4  Lumbar spondylosis    5  Lumbar degenerative disc disease    6  Myofascial pain syndrome        Plan:  While the patient was in the office today, I did have a thorough conversation regarding their chronic pain syndrome, medication management, and treatment plan options  Patient is a 48-year-old male with chronic pain syndrome related to chronic low back pain, lumbar radiculopathy, lumbar spondylosis, lumbar degenerative disc disease  Pain has been increase for the last week  He was attempting to  his 3year-old son who jerked away from him and pulled his low back causing severe pain  Two days later he felt a pop sensation in his low back when he moved and has been having increased pain since  He had a flexion-extension x-ray performed yesterday with no acute findings  An MRI of the lumbar spine performed yesterday revealed a new L4-5 broad-based posterior and right lateral disc protrusion resulting in moderate to severe central canal stenosis encroachment of the traversing L5 nerve roots  There is severe bilateral neural foraminal narrowing  There is advanced  Disc and facet degenerative change at L5-S1 resulting in severe bilateral neural foraminal narrowing  at this point, we will refer patient to [facility] for a neurosurgical evaluation  I discussed with the patient that at this point time since I feel that there is a significant inflammatory component to their pain symptoms, that they would benefit from a titrating dose of oral steroids over the next 7 days  I advised the patient that while on the steroids, they should not take any other oral NSAIDs except for acetaminophen or Tylenol until they have completed the steroid taper   I also advised them that once they have completed the steroid taper, they are to give our office a follow up phone call to let us know how they are doing and if there is any improvement  The patient was agreeable and verbalized an understanding  continue Percocet 7 5/325 every 8 hours as needed for pain  Prescription was sent electronically to his pharmacy  Continue gabapentin 600 mg 3 times daily to address the neuropathic component of his pain  He did not require refill this medication during today's visit  Will follow-up with him after his neurosurgical evaluation  There are risks associated with opioid medications, including dependence, addiction and tolerance  The patient understands and agrees to use these medications only as prescribed  Potential side effects of the medications include, but are not limited to, constipation, drowsiness, addiction, impaired judgment and risk of fatal overdose if not taken as prescribed  The patient was warned against driving while taking sedation medications  Sharing medications is a felony  At this point in time, the patient is showing no signs of addiction, abuse, diversion or suicidal ideation  [address] Prescription Drug Monitoring Program report was reviewed and was appropriate       History of Present Illness: The patient is a 76 y o  male who presents for a follow up office visit in regards to Pain  The patients current symptoms include   Low back pain that radiates to bilateral buttocks and hips  He reports occasional leg pain  He denies bladder bowel dysfunction he denies saddle anesthesia  Current pain level is a 10/10  Pain is described as dull, aching, sharp, throbbing, shooting  Current pain medications includes:   Percocet 7 5/325 every 8 hours as needed for pain, gabapentin 600 mg 3 times daily   The patient reports that this regimen is providing 0% pain relief  The patient is reporting no side effects from this pain medication regimen      I have personally reviewed and/or updated the patient's past medical history, past surgical history, family history, social history, current medications, allergies, and vital signs today  Review of Systems  Review of Systems   Respiratory: Positive for shortness of breath  Cardiovascular: Negative  Negative for chest pain  Gastrointestinal: Negative  Negative for constipation, diarrhea, nausea and vomiting  Musculoskeletal: Positive for gait problem, joint swelling (Joint Stiffness) and myalgias (Decreased ROM)  Negative for arthralgias  Skin: Negative  Negative for rash  Neurological: Negative for dizziness, seizures and weakness  All other systems reviewed and are negative  Past Medical History:   Diagnosis Date    Abdominal aortic aneurysm without rupture ([facility] [address] )     Abdominal Aortic Duplex 02/21/2017    Ectatic infrarenal abdominal aorta   CAD (coronary artery disease)     [facility]     Extremity pain     History of echocardiogram 03/18/2014    EF 55%, mild MR and AI  Mild concentric LVH   History of stress test 03/06/2017    Normal     Hypertension     Joint pain     Low back pain     Migraine     Neck pain     Obstructive sleep apnea     cannot tolerate CPAP    Osteoarthritis     Peripheral neuropathy     Reflex sympathetic dystrophy        Past Surgical History:   Procedure Laterality Date    CARDIAC CATHETERIZATION  02/13/2012    EF 70%, widely patent renal arteries, significant single-vessel CAD-medical therapy   CARDIAC CATHETERIZATION  04/11/2013    EF 65%, 50% mid LAD, 20% prox CFX, 90% diffuse RCA, 99% mid RCA  Medical management      CHOLECYSTECTOMY      EPIDURAL BLOCK INJECTION Bilateral 8/15/2019    Procedure: BLOCK / INJECTION EPIDURAL STEROID CERVICAL;  Surgeon: Polo Ctoa MD;  Location: [facility];  Service: Pain Management     FL GUIDED NEEDLE PLAC BX/ASP/INJ  8/15/2019    ORTHOPEDIC SURGERY      TRIGGER POINT INJECTION         Family History   Adopted: Yes   Problem Relation Age of Onset    No Known Problems Family     No Known Problems Mother     No Known Problems Father        Social History     Occupational History    Not on file   Tobacco Use    Smoking status: Current Every Day Smoker     Packs/day: 1 50    Smokeless tobacco: Never Used   Substance and Sexual Activity    Alcohol use: No    Drug use: No    Sexual activity: Not on file         Current Outpatient Medications:     amLODIPine (NORVASC) 10 mg tablet, Take 1 tablet (10 mg total) by mouth daily, Disp: 90 tablet, Rfl: 3    diphenhydrAMINE-acetaminophen (TYLENOL PM)  MG TABS, Take 2 tablets by mouth daily at bedtime as needed for sleep, Disp: , Rfl:     gabapentin (NEURONTIN) 600 MG tablet, take 1 tablet by mouth three times a day, Disp: 90 tablet, Rfl: 1    hydrALAZINE (APRESOLINE) 10 mg tablet, Take 1 tablet (10 mg total) by mouth as needed (hypertension), Disp: 60 tablet, Rfl: 5    hydrochlorothiazide (HYDRODIURIL) 25 mg tablet, take 1 tablet by mouth twice a day (Patient taking differently: Take 25 mg by mouth daily ), Disp: 60 tablet, Rfl: 3    lisinopril (ZESTRIL) 20 mg tablet, Take 1 tablet (20 mg total) by mouth daily, Disp: 60 tablet, Rfl: 5    metoprolol succinate (TOPROL-XL) 100 mg 24 hr tablet, Take 1 tablet (100 mg total) by mouth daily, Disp: 30 tablet, Rfl: 5    rosuvastatin (CRESTOR) 10 MG tablet, Take 1 tablet (10 mg total) by mouth daily, Disp: 30 tablet, Rfl: 5    sildenafil (VIAGRA) 100 mg tablet, TAKE 1 TABLET BY MOUTH DAILY AS NEEDED FOR ERECTILE DYSFUNCTION, Disp: 10 tablet, Rfl: 0    tamsulosin (FLOMAX) 0 4 mg, take 1 capsule by mouth daily with dinner, Disp: 30 capsule, Rfl: 2    topiramate (TOPAMAX) 25 mg tablet, FOLLOW INSTRUCTIONS GIVEN TO TITRATE UP TO A MAX OF 2 TABLETS BY MOUTH TWICE DAILY AS TOLERATED AND TO LEVEL OF BENIFIT FOR TREMORS, Disp: 120 tablet, Rfl: 2    cloNIDine (CATAPRES-TTS-3) 0 3 mg/24 hr, apply 1 patch every week as directed (Patient not taking: Reported on 8/26/2020), Disp: 4 patch, Rfl: 2    naloxone (NARCAN) 0 4 mg/mL injection, Infuse 1 mL (0 4 mg total) into a venous catheter once for 1 dose (Patient not taking: Reported on 4/10/2020), Disp: 1 mL, Rfl: 0    nitroglycerin (NITROSTAT) 0 4 mg SL tablet, Place 0 4 mg under the tongue every 5 (five) minutes as needed for chest pain, Disp: , Rfl:     oxyCODONE-acetaminophen (PERCOCET) 7 5-325 MG per tablet, Take 1 tablet by mouth every 8 (eight) hours as needed for moderate painMax Daily Amount: 3 tablets, Disp: 90 tablet, Rfl: 0    predniSONE 10 mg tablet, 3 tabs once daily x 3 days; 2 tabs once daily x 2 days; 1 tab daily x 2 days, Disp: 15 tablet, Rfl: 0    traZODone (DESYREL) 50 mg tablet, take 1 tablet by mouth at bedtime (Patient not taking: Reported on 8/26/2020), Disp: 30 tablet, Rfl: 6    Allergies   Allergen Reactions    Morphine And Related        Physical Exam:    /82   Pulse 88   Temp 97 9 °F (36 6 °C)   Resp 18   Ht 5' 8" (1 727 m)   Wt 90 7 kg (200 lb)   BMI 30 41 kg/m²     Constitutional: patient appears in moderate distress due to pain    Eyes:anicteric  HEENT:grossly intact  Neck:supple, symmetric, trachea midline and no masses   Pulmonary:even and unlabored  Cardiovascular:No edema or pitting edema present  Skin:Normal without rashes or lesions and well hydrated  Psychiatric:Mood and affect appropriate  Neurologic:Cranial Nerves II-XII grossly intact  Musculoskeletal:antalgic    Imaging  No orders to display       Orders Placed This Encounter   Procedures    Ambulatory referral to Neurosurgery

## 2020-08-27 ENCOUNTER — TELEPHONE (OUTPATIENT)
Dept: PAIN MEDICINE | Facility: MEDICAL CENTER | Age: 65
End: 2020-08-27

## 2020-08-27 DIAGNOSIS — M51.36 LUMBAR DEGENERATIVE DISC DISEASE: ICD-10-CM

## 2020-08-27 DIAGNOSIS — M54.12 CERVICAL RADICULOPATHY: ICD-10-CM

## 2020-08-27 NOTE — TELEPHONE ENCOUNTER
--SEGUNDO--    S/W pt  Pt stated he can not see Dr Jean Valerio to 9/14  Pt asking if there is anything in the mean time that can be done for the pain? Pt asking if a cane would help b/c it feels like he needs support with his arms  Advised pt he can try a cane  Advised pt BK ordered him prednisone yesterday  Advised pt that should help with the pain and should start to help in a few days  Pt stated he started it last night  Pt will C/B with any issues  Pt verbalized understanding

## 2020-08-27 NOTE — TELEPHONE ENCOUNTER
Pt called stating he would like to s/w nurse regarding the appt he has to make with a surgeon     Pt can be reached at [phone]

## 2020-08-31 ENCOUNTER — OFFICE VISIT (OUTPATIENT)
Dept: FAMILY MEDICINE CLINIC | Facility: CLINIC | Age: 65
End: 2020-08-31
Payer: MEDICARE

## 2020-08-31 VITALS
WEIGHT: 203.4 LBS | DIASTOLIC BLOOD PRESSURE: 72 MMHG | SYSTOLIC BLOOD PRESSURE: 138 MMHG | HEART RATE: 58 BPM | HEIGHT: 68 IN | TEMPERATURE: 97 F | BODY MASS INDEX: 30.83 KG/M2 | OXYGEN SATURATION: 98 %

## 2020-08-31 DIAGNOSIS — E78.00 HYPERCHOLESTEROLEMIA: ICD-10-CM

## 2020-08-31 DIAGNOSIS — I10 ESSENTIAL HYPERTENSION: Primary | ICD-10-CM

## 2020-08-31 DIAGNOSIS — Z12.5 SCREENING FOR PROSTATE CANCER: ICD-10-CM

## 2020-08-31 DIAGNOSIS — F17.200 SMOKING: ICD-10-CM

## 2020-08-31 DIAGNOSIS — Z23 ENCOUNTER FOR IMMUNIZATION: ICD-10-CM

## 2020-08-31 PROCEDURE — 99214 OFFICE O/P EST MOD 30 MIN: CPT | Performed by: FAMILY MEDICINE

## 2020-08-31 PROCEDURE — 4004F PT TOBACCO SCREEN RCVD TLK: CPT | Performed by: FAMILY MEDICINE

## 2020-08-31 PROCEDURE — 1160F RVW MEDS BY RX/DR IN RCRD: CPT | Performed by: FAMILY MEDICINE

## 2020-08-31 PROCEDURE — 3075F SYST BP GE 130 - 139MM HG: CPT | Performed by: FAMILY MEDICINE

## 2020-08-31 PROCEDURE — 3078F DIAST BP <80 MM HG: CPT | Performed by: FAMILY MEDICINE

## 2020-08-31 PROCEDURE — 3008F BODY MASS INDEX DOCD: CPT | Performed by: FAMILY MEDICINE

## 2020-08-31 RX ORDER — DOXYCYCLINE HYCLATE 100 MG/1
100 CAPSULE ORAL 2 TIMES DAILY
COMMUNITY
Start: 2020-08-13 | End: 2021-05-24 | Stop reason: ALTCHOICE

## 2020-08-31 NOTE — PROGRESS NOTES
Assessment/Plan:    No problem-specific Assessment & Plan notes found for this encounter  Diagnoses and all orders for this visit:    Essential hypertension  -     TSH, 3rd generation with Free T4 reflex; Future    Encounter for immunization  -     PNEUMOCOCCAL CONJUGATE VACCINE 13-VALENT GREATER THAN 6 MONTHS    Smoking  Comments:  pt counseled on quitting smoking    Screening for prostate cancer  -     PSA, Total Screen; Future    Hypercholesterolemia  -     CBC and differential; Future  -     Comprehensive metabolic panel; Future  -     Lipid panel; Future    Other orders  -     doxycycline hyclate (VIBRAMYCIN) 100 mg capsule; Take 100 mg by mouth 2 (two) times a day          PHQ-9 Depression Screening    PHQ-9:    Frequency of the following problems over the past two weeks:       Little interest or pleasure in doing things:  0 - not at all  Feeling down, depressed, or hopeless:  0 - not at all  PHQ-2 Score:  0            Subjective:      Patient ID: Mariya Lr is a 76 y o  male  Hypertension   This is a chronic problem  The current episode started more than 1 year ago  The problem is unchanged  The problem is controlled  Pertinent negatives include no chest pain, headaches or palpitations  Risk factors for coronary artery disease include obesity, male gender, smoking/tobacco exposure and sedentary lifestyle  Past treatments include ACE inhibitors, beta blockers and diuretics  Compliance problems include diet and exercise  The following portions of the patient's history were reviewed and updated as appropriate: allergies, current medications, past family history, past medical history, past social history, past surgical history and problem list     Review of Systems   Eyes: Negative for visual disturbance  Cardiovascular: Negative for chest pain and palpitations  Neurological: Negative for headaches           Objective:    /72   Pulse 58   Temp (!) 97 °F (36 1 °C) (Tympanic)   Ht 5' 8" (1 727 m)   Wt 92 3 kg (203 lb 6 4 oz)   SpO2 98%   BMI 30 93 kg/m²      Physical Exam  Vitals signs and nursing note reviewed  Constitutional:       General: He is not in acute distress  Appearance: He is well-developed  He is not diaphoretic  HENT:      Head: Normocephalic and atraumatic  Eyes:      General: No scleral icterus  Conjunctiva/sclera: Conjunctivae normal       Pupils: Pupils are equal, round, and reactive to light  Neck:      Musculoskeletal: Normal range of motion and neck supple  Cardiovascular:      Rate and Rhythm: Normal rate and regular rhythm  Heart sounds: Normal heart sounds  No murmur  Pulmonary:      Effort: Pulmonary effort is normal  No respiratory distress  Breath sounds: Normal breath sounds  No wheezing or rales  Abdominal:      General: Bowel sounds are normal  There is no distension  Palpations: Abdomen is soft  There is no mass  Tenderness: There is no abdominal tenderness  There is no guarding or rebound  Lymphadenopathy:      Cervical: No cervical adenopathy  Skin:     General: Skin is warm and dry  Neurological:      Mental Status: He is alert and oriented to person, place, and time  Psychiatric:         Behavior: Behavior normal          Thought Content:  Thought content normal          Judgment: Judgment normal

## 2020-09-03 RX ORDER — GABAPENTIN 800 MG/1
800 TABLET ORAL 3 TIMES DAILY
Qty: 90 TABLET | Refills: 1 | Status: SHIPPED | OUTPATIENT
Start: 2020-09-03 | End: 2020-10-19

## 2020-09-03 NOTE — TELEPHONE ENCOUNTER
RN s/w pt and informed Gabapentin was increased to 800 mg tid per previous task  Pt verbalized understanding and appreciative

## 2020-09-03 NOTE — TELEPHONE ENCOUNTER
Pt called stating that he finished the steroid pack and he is feeling all the pain again     Pt can be reached at [phone]

## 2020-09-03 NOTE — TELEPHONE ENCOUNTER
RN s/w pt  Pt states " I finished the steroid pack on Tuesday  I went to get out of bed today and the pain is all back  I was doing pretty good, even thought I didn't need a surgeon  My pain is in my lower back  I don't see Dr Nitish Guerrier till 9/14 " Per pt he is taking Percocet which is not helping much, Gabapentin 600 mg tid, tried heat and tried Tylenol  Nothing is working        Please advise-

## 2020-09-03 NOTE — TELEPHONE ENCOUNTER
Will increase gabapentin to 800 mg 3 times daily  A prescription for 100 mg dose of gabapentin was sent to his pharmacy

## 2020-09-11 ENCOUNTER — TELEPHONE (OUTPATIENT)
Dept: NEUROSURGERY | Facility: CLINIC | Age: 65
End: 2020-09-11

## 2020-09-14 ENCOUNTER — CONSULT (OUTPATIENT)
Dept: NEUROSURGERY | Facility: CLINIC | Age: 65
End: 2020-09-14
Payer: MEDICARE

## 2020-09-14 VITALS
BODY MASS INDEX: 30.92 KG/M2 | WEIGHT: 204 LBS | SYSTOLIC BLOOD PRESSURE: 150 MMHG | DIASTOLIC BLOOD PRESSURE: 92 MMHG | TEMPERATURE: 98.5 F | RESPIRATION RATE: 16 BRPM | HEIGHT: 68 IN | HEART RATE: 65 BPM

## 2020-09-14 DIAGNOSIS — M47.816 LUMBAR SPONDYLOSIS: ICD-10-CM

## 2020-09-14 DIAGNOSIS — M51.36 LUMBAR DEGENERATIVE DISC DISEASE: ICD-10-CM

## 2020-09-14 DIAGNOSIS — M51.26 HNP (HERNIATED NUCLEUS PULPOSUS), LUMBAR: ICD-10-CM

## 2020-09-14 PROCEDURE — 99203 OFFICE O/P NEW LOW 30 MIN: CPT | Performed by: NEUROLOGICAL SURGERY

## 2020-09-28 DIAGNOSIS — G25.0 TREMOR, ESSENTIAL: ICD-10-CM

## 2020-10-05 DIAGNOSIS — N52.9 ERECTILE DYSFUNCTION, UNSPECIFIED ERECTILE DYSFUNCTION TYPE: ICD-10-CM

## 2020-10-05 RX ORDER — SILDENAFIL 100 MG/1
100 TABLET, FILM COATED ORAL AS NEEDED
Qty: 10 TABLET | Refills: 6 | Status: SHIPPED | OUTPATIENT
Start: 2020-10-05 | End: 2021-10-01

## 2020-10-12 DIAGNOSIS — M51.36 LUMBAR DEGENERATIVE DISC DISEASE: ICD-10-CM

## 2020-10-12 DIAGNOSIS — I10 ESSENTIAL HYPERTENSION: ICD-10-CM

## 2020-10-12 DIAGNOSIS — M54.12 CERVICAL RADICULOPATHY: ICD-10-CM

## 2020-10-12 RX ORDER — METOPROLOL SUCCINATE 100 MG/1
TABLET, EXTENDED RELEASE ORAL
Qty: 30 TABLET | Refills: 5 | Status: SHIPPED | OUTPATIENT
Start: 2020-10-12 | End: 2021-03-12

## 2020-10-13 RX ORDER — GABAPENTIN 600 MG/1
TABLET ORAL
Qty: 90 TABLET | Refills: 1 | Status: SHIPPED | OUTPATIENT
Start: 2020-10-13 | End: 2020-12-10

## 2020-10-14 ENCOUNTER — TELEPHONE (OUTPATIENT)
Dept: PAIN MEDICINE | Facility: CLINIC | Age: 65
End: 2020-10-14

## 2020-10-14 DIAGNOSIS — G89.29 CHRONIC BILATERAL LOW BACK PAIN WITHOUT SCIATICA: ICD-10-CM

## 2020-10-14 DIAGNOSIS — M51.26 HNP (HERNIATED NUCLEUS PULPOSUS), LUMBAR: ICD-10-CM

## 2020-10-14 DIAGNOSIS — M54.50 CHRONIC BILATERAL LOW BACK PAIN WITHOUT SCIATICA: ICD-10-CM

## 2020-10-14 DIAGNOSIS — M51.36 LUMBAR DEGENERATIVE DISC DISEASE: ICD-10-CM

## 2020-10-14 DIAGNOSIS — M47.816 LUMBAR SPONDYLOSIS: ICD-10-CM

## 2020-10-14 RX ORDER — OXYCODONE AND ACETAMINOPHEN 7.5; 325 MG/1; MG/1
1 TABLET ORAL EVERY 8 HOURS PRN
Qty: 15 TABLET | Refills: 0 | Status: SHIPPED | OUTPATIENT
Start: 2020-10-14 | End: 2020-10-19 | Stop reason: SDUPTHER

## 2020-10-19 ENCOUNTER — OFFICE VISIT (OUTPATIENT)
Dept: PAIN MEDICINE | Facility: CLINIC | Age: 65
End: 2020-10-19
Payer: MEDICARE

## 2020-10-19 VITALS — TEMPERATURE: 98.6 F | HEIGHT: 68 IN | BODY MASS INDEX: 30.92 KG/M2 | WEIGHT: 204 LBS

## 2020-10-19 DIAGNOSIS — Z79.891 ENCOUNTER FOR LONG-TERM OPIATE ANALGESIC USE: ICD-10-CM

## 2020-10-19 DIAGNOSIS — G89.4 CHRONIC PAIN SYNDROME: Primary | ICD-10-CM

## 2020-10-19 DIAGNOSIS — M51.36 LUMBAR DEGENERATIVE DISC DISEASE: ICD-10-CM

## 2020-10-19 DIAGNOSIS — M79.18 MYOFASCIAL PAIN SYNDROME: ICD-10-CM

## 2020-10-19 DIAGNOSIS — Z79.891 LONG-TERM CURRENT USE OF OPIATE ANALGESIC: ICD-10-CM

## 2020-10-19 DIAGNOSIS — G89.29 CHRONIC BILATERAL LOW BACK PAIN WITHOUT SCIATICA: ICD-10-CM

## 2020-10-19 DIAGNOSIS — M54.12 CERVICAL RADICULOPATHY: ICD-10-CM

## 2020-10-19 DIAGNOSIS — M47.816 LUMBAR SPONDYLOSIS: ICD-10-CM

## 2020-10-19 DIAGNOSIS — M54.2 NECK PAIN: ICD-10-CM

## 2020-10-19 DIAGNOSIS — F11.20 UNCOMPLICATED OPIOID DEPENDENCE (HCC): ICD-10-CM

## 2020-10-19 DIAGNOSIS — M54.50 CHRONIC BILATERAL LOW BACK PAIN WITHOUT SCIATICA: ICD-10-CM

## 2020-10-19 DIAGNOSIS — M51.26 HNP (HERNIATED NUCLEUS PULPOSUS), LUMBAR: ICD-10-CM

## 2020-10-19 PROCEDURE — 99213 OFFICE O/P EST LOW 20 MIN: CPT | Performed by: NURSE PRACTITIONER

## 2020-10-19 PROCEDURE — 80305 DRUG TEST PRSMV DIR OPT OBS: CPT | Performed by: NURSE PRACTITIONER

## 2020-10-19 RX ORDER — OXYCODONE AND ACETAMINOPHEN 7.5; 325 MG/1; MG/1
1 TABLET ORAL EVERY 8 HOURS PRN
Qty: 90 TABLET | Refills: 0 | Status: SHIPPED | OUTPATIENT
Start: 2020-10-19 | End: 2020-12-14 | Stop reason: SDUPTHER

## 2020-10-19 RX ORDER — OXYCODONE AND ACETAMINOPHEN 7.5; 325 MG/1; MG/1
1 TABLET ORAL EVERY 8 HOURS PRN
Qty: 90 TABLET | Refills: 0 | Status: SHIPPED | OUTPATIENT
Start: 2020-10-19 | End: 2020-10-19 | Stop reason: SDUPTHER

## 2020-10-21 LAB
6MAM UR QL CFM: NEGATIVE NG/ML
7AMINOCLONAZEPAM UR QL CFM: NEGATIVE NG/ML
A-OH ALPRAZ UR QL CFM: NEGATIVE NG/ML
ACCEPTABLE CREAT UR QL: NORMAL MG/DL
ACCEPTIBLE SP GR UR QL: NORMAL
AMITRIP UR QL CFM: NEGATIVE NG/ML
AMPHET UR QL CFM: NEGATIVE NG/ML
AMPHET UR QL CFM: NEGATIVE NG/ML
BENZODIAZ UR QL SCN: NORMAL
BUPRENORPHINE UR QL CFM: NEGATIVE NG/ML
BUTALBITAL UR QL CFM: NEGATIVE NG/ML
BZE UR QL CFM: NEGATIVE NG/ML
CARISOPRODOL UR QL CFM: NEGATIVE NG/ML
CODEINE UR QL CFM: NEGATIVE NG/ML
DESIPRAMINE UR QL CFM: NEGATIVE NG/ML
EDDP UR QL CFM: NEGATIVE NG/ML
FENTANYL UR QL CFM: NEGATIVE NG/ML
GLIADIN IGG SER IA-ACNC: NEGATIVE NG/ML
GLUCOSE 30M P 50 G LAC PO SERPL-MCNC: NEGATIVE NG/ML
HYDROCODONE UR QL CFM: NEGATIVE NG/ML
HYDROCODONE UR QL CFM: NEGATIVE NG/ML
HYDROMORPHONE UR QL CFM: NEGATIVE NG/ML
LORAZEPAM UR QL CFM: NEGATIVE NG/ML
MDMA UR QL CFM: NEGATIVE NG/ML
MEPROBAMATE UR QL CFM: NEGATIVE NG/ML
METHADONE UR QL CFM: NEGATIVE NG/ML
METHAMPHET UR QL CFM: NEGATIVE NG/ML
MORPHINE UR QL CFM: NEGATIVE NG/ML
MORPHINE UR QL CFM: NEGATIVE NG/ML
NALTREXONE UR QL CFM: NEGATIVE NG/ML
NITRITE UR QL: NORMAL UG/ML
NORBUPRENORPHINE UR QL CFM: NEGATIVE NG/ML
NORDIAZEPAM UR QL CFM: NEGATIVE NG/ML
NORFENTANYL UR QL CFM: NEGATIVE NG/ML
NORHYDROCODONE UR QL CFM: NEGATIVE NG/ML
NORHYDROCODONE UR QL CFM: NEGATIVE NG/ML
NOROXYCODONE UR QL CFM: NORMAL NG/ML
NORTRIP UR QL CFM: NEGATIVE NG/ML
OPC-3373 QUANTIFICATION: NEGATIVE
OXAZEPAM UR QL CFM: NEGATIVE NG/ML
OXYCODONE UR QL CFM: NORMAL NG/ML
OXYMORPHONE UR QL CFM: NORMAL NG/ML
OXYMORPHONE UR QL CFM: NORMAL NG/ML
PCP UR QL CFM: NEGATIVE NG/ML
PHENOBARB UR QL CFM: NEGATIVE NG/ML
RESULT ALL_PRESCRIBED MEDS AND SPECIAL INSTRUCTIONS: NORMAL
SECOBARBITAL UR QL CFM: NEGATIVE NG/ML
SL AMB 3-METHYL-FENTANYL QUANTIFICATION: NORMAL NG/ML
SL AMB 4-ANPP QUANTIFICATION: NORMAL NG/ML
SL AMB 4-FIBF QUANTIFICATION: NORMAL NG/ML
SL AMB 7-OH-MITRAGYNINE (KRATOM ALKALOID) QUANTIFICATION: NEGATIVE NG/ML
SL AMB ACETYL FENTANYL QUANTIFICATION: NORMAL NG/ML
SL AMB ACETYL NORFENTANYL QUANTIFICATION: NORMAL NG/ML
SL AMB ACRYL FENTANYL QUANTIFICATION: NORMAL NG/ML
SL AMB BUTRYL FENTANYL QUANTIFICATION: NORMAL NG/ML
SL AMB CARFENTANIL QUANTIFICATION: NORMAL NG/ML
SL AMB CLOZAPINE QUANTIFICATION: NEGATIVE NG/ML
SL AMB CTHC (MARIJUANA METABOLITE) QUANTIFICATION: NEGATIVE NG/ML
SL AMB CYCLOPROPYL FENTANYL QUANTIFICATION: NORMAL NG/ML
SL AMB FURANYL FENTANYL QUANTIFICATION: NORMAL NG/ML
SL AMB HALOPERIDOL  QUANTIFICATION: NEGATIVE NG/ML
SL AMB HALOPERIDOL METABOLITE QUANTIFICATION: NEGATIVE NG/ML
SL AMB HYDROXYBUPROPION QUANTIFICATION: NEGATIVE NG/ML
SL AMB HYDROXYRISPERIDONE QUANTIFICATION: NEGATIVE NG/ML
SL AMB METHOXYACETYL FENTANYL QUANTIFICATION: NORMAL NG/ML
SL AMB N-DESMETHYL U-47700 QUANTIFICATION: NORMAL NG/ML
SL AMB N-DESMETHYL-TRAMADOL QUANTIFICATION: NEGATIVE NG/ML
SL AMB N-DESMETHYLCLOZAPINE QUANTIFICATION: NEGATIVE NG/ML
SL AMB PHENTERMINE QUANTIFICATION: NEGATIVE NG/ML
SL AMB PREGABALIN QUANTIFICATION: NEGATIVE
SL AMB RISPERIDONE QUANTIFICATION: NEGATIVE NG/ML
SL AMB U-47700 QUANTIFICATION: NORMAL NG/ML
SMOOTH MUSCLE AB TITR SER IF: NEGATIVE NG/ML
SPECIMEN PH ACCEPTABLE UR: NORMAL
TAPENTADOL UR QL CFM: NEGATIVE NG/ML
TEMAZEPAM UR QL CFM: NEGATIVE NG/ML
TEMAZEPAM UR QL CFM: NEGATIVE NG/ML
TRAMADOL UR QL CFM: NEGATIVE NG/ML
URATE/CREAT 24H UR: NEGATIVE NG/ML

## 2020-10-23 DIAGNOSIS — I10 ESSENTIAL HYPERTENSION: ICD-10-CM

## 2020-10-27 RX ORDER — CLONIDINE 0.3 MG/24H
PATCH, EXTENDED RELEASE TRANSDERMAL
Qty: 4 PATCH | Refills: 2 | Status: SHIPPED | OUTPATIENT
Start: 2020-10-27 | End: 2021-02-09

## 2020-12-09 DIAGNOSIS — M51.36 LUMBAR DEGENERATIVE DISC DISEASE: ICD-10-CM

## 2020-12-09 DIAGNOSIS — M54.50 CHRONIC BILATERAL LOW BACK PAIN WITHOUT SCIATICA: ICD-10-CM

## 2020-12-09 DIAGNOSIS — M47.816 LUMBAR SPONDYLOSIS: ICD-10-CM

## 2020-12-09 DIAGNOSIS — G89.29 CHRONIC BILATERAL LOW BACK PAIN WITHOUT SCIATICA: ICD-10-CM

## 2020-12-09 DIAGNOSIS — Z87.438 HISTORY OF BPH: ICD-10-CM

## 2020-12-09 DIAGNOSIS — M54.12 CERVICAL RADICULOPATHY: ICD-10-CM

## 2020-12-09 DIAGNOSIS — M51.26 HNP (HERNIATED NUCLEUS PULPOSUS), LUMBAR: ICD-10-CM

## 2020-12-09 RX ORDER — TAMSULOSIN HYDROCHLORIDE 0.4 MG/1
CAPSULE ORAL
Qty: 30 CAPSULE | Refills: 2 | Status: SHIPPED | OUTPATIENT
Start: 2020-12-09 | End: 2021-03-22

## 2020-12-10 RX ORDER — GABAPENTIN 600 MG/1
TABLET ORAL
Qty: 90 TABLET | Refills: 1 | Status: SHIPPED | OUTPATIENT
Start: 2020-12-10 | End: 2021-02-15 | Stop reason: SDUPTHER

## 2020-12-14 RX ORDER — OXYCODONE AND ACETAMINOPHEN 7.5; 325 MG/1; MG/1
1 TABLET ORAL EVERY 8 HOURS PRN
Qty: 21 TABLET | Refills: 0 | Status: SHIPPED | OUTPATIENT
Start: 2020-12-14 | End: 2020-12-21 | Stop reason: SDUPTHER

## 2020-12-21 ENCOUNTER — OFFICE VISIT (OUTPATIENT)
Dept: PAIN MEDICINE | Facility: CLINIC | Age: 65
End: 2020-12-21
Payer: MEDICARE

## 2020-12-21 VITALS
BODY MASS INDEX: 30.92 KG/M2 | HEART RATE: 60 BPM | WEIGHT: 204 LBS | DIASTOLIC BLOOD PRESSURE: 84 MMHG | SYSTOLIC BLOOD PRESSURE: 151 MMHG | HEIGHT: 68 IN

## 2020-12-21 DIAGNOSIS — G89.29 CHRONIC BILATERAL LOW BACK PAIN WITHOUT SCIATICA: ICD-10-CM

## 2020-12-21 DIAGNOSIS — M47.812 CERVICAL SPONDYLOSIS WITHOUT MYELOPATHY: ICD-10-CM

## 2020-12-21 DIAGNOSIS — M54.50 CHRONIC BILATERAL LOW BACK PAIN WITHOUT SCIATICA: ICD-10-CM

## 2020-12-21 DIAGNOSIS — M47.816 LUMBAR SPONDYLOSIS: ICD-10-CM

## 2020-12-21 DIAGNOSIS — Z12.11 ENCOUNTER FOR SCREENING COLONOSCOPY: ICD-10-CM

## 2020-12-21 DIAGNOSIS — M51.36 LUMBAR DEGENERATIVE DISC DISEASE: ICD-10-CM

## 2020-12-21 DIAGNOSIS — G89.4 CHRONIC PAIN SYNDROME: Primary | ICD-10-CM

## 2020-12-21 DIAGNOSIS — M79.18 MYOFASCIAL PAIN SYNDROME: ICD-10-CM

## 2020-12-21 DIAGNOSIS — M51.26 HNP (HERNIATED NUCLEUS PULPOSUS), LUMBAR: ICD-10-CM

## 2020-12-21 DIAGNOSIS — M54.12 CERVICAL RADICULOPATHY: ICD-10-CM

## 2020-12-21 DIAGNOSIS — M54.2 NECK PAIN: ICD-10-CM

## 2020-12-21 PROCEDURE — 99213 OFFICE O/P EST LOW 20 MIN: CPT | Performed by: NURSE PRACTITIONER

## 2020-12-21 RX ORDER — OXYCODONE AND ACETAMINOPHEN 7.5; 325 MG/1; MG/1
1 TABLET ORAL EVERY 8 HOURS PRN
Qty: 90 TABLET | Refills: 0 | Status: SHIPPED | OUTPATIENT
Start: 2020-12-21 | End: 2021-02-15 | Stop reason: SDUPTHER

## 2020-12-21 RX ORDER — OXYCODONE AND ACETAMINOPHEN 7.5; 325 MG/1; MG/1
1 TABLET ORAL EVERY 8 HOURS PRN
Qty: 90 TABLET | Refills: 0 | Status: SHIPPED | OUTPATIENT
Start: 2020-12-21 | End: 2020-12-21 | Stop reason: SDUPTHER

## 2021-01-04 NOTE — TELEPHONE ENCOUNTER
S/W pt and advised of below    CS number given Orientation to room/Bed in low position, brakes on/Side rails x 2 or 4 up, assess large gaps, such that a patient could get extremity or other body part entrapped, use additional safety procedures/Use of non-skid footwear for ambulating patients, use of appropriate size clothing to prevent risk of tripping/Assess eliminations need, assist as needed/Call light is within reach, educate patient/family on its functionality/Environment clear of unused equipment, furniture's in place, clear of hazards/Assess for adequate lighting, leave nightlight on/Patient and family education available to parents and patient/Document fall prevention teaching and include in plan of care

## 2021-01-06 ENCOUNTER — TELEPHONE (OUTPATIENT)
Dept: NEUROLOGY | Facility: CLINIC | Age: 66
End: 2021-01-06

## 2021-02-09 DIAGNOSIS — I10 ESSENTIAL HYPERTENSION: ICD-10-CM

## 2021-02-09 RX ORDER — CLONIDINE 0.3 MG/24H
PATCH, EXTENDED RELEASE TRANSDERMAL
Qty: 4 PATCH | Refills: 2 | Status: SHIPPED | OUTPATIENT
Start: 2021-02-09 | End: 2021-03-12

## 2021-02-12 ENCOUNTER — TELEPHONE (OUTPATIENT)
Dept: PAIN MEDICINE | Facility: CLINIC | Age: 66
End: 2021-02-12

## 2021-02-12 NOTE — TELEPHONE ENCOUNTER
--FYI--    Pt aware should have 21 pills of percocet left, per pt he was just guessing and is on the road and got a call from pharmacy to refill gabapentin  RN s/w pharmacist who stated pt picked up percocet on 1/19  RN did move appt from 2/22 to 2/15 so pt would not run out of meds      BK can refill on 2/15

## 2021-02-12 NOTE — TELEPHONE ENCOUNTER
Pt contacted Call Center requested refill of their medication  Medication Name:  gabapentin  oxycodone  Dosage of Med:  600 mg  7 5/325  Frequency of Med:  3 x a day  3 x a day  Remaining Medication:  10  5  Pharmacy and Location:    [facility] [place]    Pt  Preferred Callback Phone Number:  [phone]    Thank you

## 2021-02-15 ENCOUNTER — TELEMEDICINE (OUTPATIENT)
Dept: PAIN MEDICINE | Facility: CLINIC | Age: 66
End: 2021-02-15
Payer: MEDICARE

## 2021-02-15 DIAGNOSIS — M47.816 LUMBAR SPONDYLOSIS: ICD-10-CM

## 2021-02-15 DIAGNOSIS — M54.12 CERVICAL RADICULOPATHY: ICD-10-CM

## 2021-02-15 DIAGNOSIS — M54.50 CHRONIC BILATERAL LOW BACK PAIN WITHOUT SCIATICA: ICD-10-CM

## 2021-02-15 DIAGNOSIS — M51.26 HNP (HERNIATED NUCLEUS PULPOSUS), LUMBAR: ICD-10-CM

## 2021-02-15 DIAGNOSIS — Z79.891 LONG-TERM CURRENT USE OF OPIATE ANALGESIC: ICD-10-CM

## 2021-02-15 DIAGNOSIS — M79.18 MYOFASCIAL PAIN SYNDROME: ICD-10-CM

## 2021-02-15 DIAGNOSIS — G89.4 CHRONIC PAIN SYNDROME: Primary | ICD-10-CM

## 2021-02-15 DIAGNOSIS — M51.36 LUMBAR DEGENERATIVE DISC DISEASE: ICD-10-CM

## 2021-02-15 DIAGNOSIS — F11.20 UNCOMPLICATED OPIOID DEPENDENCE (HCC): ICD-10-CM

## 2021-02-15 DIAGNOSIS — M54.2 NECK PAIN: ICD-10-CM

## 2021-02-15 DIAGNOSIS — M47.812 CERVICAL SPONDYLOSIS WITHOUT MYELOPATHY: ICD-10-CM

## 2021-02-15 DIAGNOSIS — G89.29 CHRONIC BILATERAL LOW BACK PAIN WITHOUT SCIATICA: ICD-10-CM

## 2021-02-15 PROCEDURE — 99214 OFFICE O/P EST MOD 30 MIN: CPT | Performed by: NURSE PRACTITIONER

## 2021-02-15 RX ORDER — OXYCODONE AND ACETAMINOPHEN 7.5; 325 MG/1; MG/1
1 TABLET ORAL EVERY 8 HOURS PRN
Qty: 90 TABLET | Refills: 0 | Status: SHIPPED | OUTPATIENT
Start: 2021-02-15 | End: 2021-04-12 | Stop reason: SDUPTHER

## 2021-02-15 RX ORDER — OXYCODONE AND ACETAMINOPHEN 7.5; 325 MG/1; MG/1
1 TABLET ORAL EVERY 8 HOURS PRN
Qty: 90 TABLET | Refills: 0 | Status: SHIPPED | OUTPATIENT
Start: 2021-02-15 | End: 2021-02-15 | Stop reason: SDUPTHER

## 2021-02-15 RX ORDER — GABAPENTIN 600 MG/1
600 TABLET ORAL 3 TIMES DAILY
Qty: 90 TABLET | Refills: 2 | Status: SHIPPED | OUTPATIENT
Start: 2021-02-15 | End: 2021-05-06

## 2021-02-15 NOTE — PROGRESS NOTES
Virtual Regular Visit      Assessment/Plan:    Problem List Items Addressed This Visit        Nervous and Auditory    Cervical radiculopathy    Relevant Medications    gabapentin (NEURONTIN) 600 MG tablet       Musculoskeletal and Integument    Lumbar spondylosis    Relevant Medications    oxyCODONE-acetaminophen (PERCOCET) 7 5-325 MG per tablet    Lumbar degenerative disc disease    Relevant Medications    gabapentin (NEURONTIN) 600 MG tablet    oxyCODONE-acetaminophen (PERCOCET) 7 5-325 MG per tablet    Myofascial pain syndrome    Cervical spondylosis without myelopathy       Other    Chronic low back pain without sciatica    Relevant Medications    oxyCODONE-acetaminophen (PERCOCET) 7 5-325 MG per tablet    Chronic pain syndrome - Primary    Neck pain      Other Visit Diagnoses     HNP (herniated nucleus pulposus), lumbar        Relevant Medications    oxyCODONE-acetaminophen (PERCOCET) 7 5-325 MG per tablet               Reason for visit is   Chief Complaint   Patient presents with    Virtual Regular Visit        Encounter provider ELOISA Trevizo    Provider located at [address] [address]      Recent Visits  Date Type Provider Dept   02/12/21 Telephone Francoise Gutierrez MD [facility] recent visits within past 7 days and meeting all other requirements     Today's Visits  Date Type Provider Dept   02/15/21 Telemedicine ELOISA Trevizo [facility] [place]   Showing today's visits and meeting all other requirements     Future Appointments  No visits were found meeting these conditions  Showing future appointments within next 150 days and meeting all other requirements        The patient was identified by name and date of birth   Pamela Steel was informed that this is a telemedicine visit and that the visit is being conducted through [facility] and patient was informed that this is a secure, HIPAA-compliant platform  He agrees to proceed     My office door was closed  No one else was in the room  He acknowledged consent and understanding of privacy and security of the video platform  The patient has agreed to participate and understands they can discontinue the visit at any time  Patient is aware this is a billable service  Subjective  Kassy Sherwood is a 76 y o  male He is concerned over the current crisis regarding COVID 19 and has opted to proceed with a telephone visit, however, is currently not noting any signs or symptoms at this time  Patient is being seen for virtual follow-up visit  He was last seen here on 12/21/2020  During that visit, we discussed possibility of doing lumbar facet medial branch block to determine if he would be a candidate for radiofrequency ablation  I provided him with a brochure to take home and read over  Patient states that if his pain gets any worse he would consider pursuing this treatment option  In the meantime, he continues to complain of pain in his low back and neck  Current pain level is a 6/10  Pain is described as dull, aching, sharp, shooting  Current medications include Percocet 7 5/325 every 8 hours as needed for pain and gabapentin 600 mg 3 times daily  He states that the medications reduced his pain by about 50%  He denies side effects from these medications  Estrada HILL     Past Medical History:   Diagnosis Date    Abdominal aortic aneurysm without rupture ([address] )     Abdominal Aortic Duplex 02/21/2017    Ectatic infrarenal abdominal aorta   CAD (coronary artery disease)     COPD (chronic obstructive pulmonary disease) (HCC)     Extremity pain     History of echocardiogram 03/18/2014    EF 55%, mild MR and AI  Mild concentric LVH      History of stress test 03/06/2017    Normal     Hypertension     Joint pain     Low back pain     Migraine     Neck pain     Obstructive sleep apnea     cannot tolerate CPAP    Osteoarthritis     Peripheral neuropathy     Reflex sympathetic dystrophy        Past Surgical History:   Procedure Laterality Date    CARDIAC CATHETERIZATION  02/13/2012    EF 70%, widely patent renal arteries, significant single-vessel CAD-medical therapy   CARDIAC CATHETERIZATION  04/11/2013    EF 65%, 50% mid LAD, 20% prox CFX, 90% diffuse RCA, 99% mid RCA  Medical management      CHOLECYSTECTOMY      EPIDURAL BLOCK INJECTION Bilateral 8/15/2019    Procedure: BLOCK / INJECTION EPIDURAL STEROID CERVICAL;  Surgeon: Gris García MD;  Location: [facility];  Service: Pain Management     FL GUIDED NEEDLE PLAC BX/ASP/INJ  8/15/2019    ORTHOPEDIC SURGERY      TRIGGER POINT INJECTION         Current Outpatient Medications   Medication Sig Dispense Refill    amLODIPine (NORVASC) 10 mg tablet Take 1 tablet (10 mg total) by mouth daily 90 tablet 3    cloNIDine (CATAPRES-TTS-3) 0 3 mg/24 hr apply 1 patch every week as directed 4 patch 2    diphenhydrAMINE-acetaminophen (TYLENOL PM)  MG TABS Take 2 tablets by mouth daily at bedtime as needed for sleep      doxycycline hyclate (VIBRAMYCIN) 100 mg capsule Take 100 mg by mouth 2 (two) times a day      gabapentin (NEURONTIN) 600 MG tablet Take 1 tablet (600 mg total) by mouth 3 (three) times a day 90 tablet 2    hydrALAZINE (APRESOLINE) 10 mg tablet Take 1 tablet (10 mg total) by mouth as needed (hypertension) (Patient not taking: Reported on 9/14/2020) 60 tablet 5    hydrochlorothiazide (HYDRODIURIL) 25 mg tablet take 1 tablet by mouth twice a day (Patient taking differently: Take 25 mg by mouth daily ) 60 tablet 3    lisinopril (ZESTRIL) 20 mg tablet Take 1 tablet (20 mg total) by mouth daily 60 tablet 5    metoprolol succinate (TOPROL-XL) 100 mg 24 hr tablet take 1 tablet by mouth once daily 30 tablet 5    naloxone (NARCAN) 0 4 mg/mL injection Infuse 1 mL (0 4 mg total) into a venous catheter once for 1 dose (Patient not taking: Reported on 4/10/2020) 1 mL 0    nitroglycerin (NITROSTAT) 0 4 mg SL tablet Place 0 4 mg under the tongue every 5 (five) minutes as needed for chest pain      oxyCODONE-acetaminophen (PERCOCET) 7 5-325 MG per tablet Take 1 tablet by mouth every 8 (eight) hours as needed for moderate pain Do not fill until 3/15/2021Max Daily Amount: 3 tablets 90 tablet 0    rosuvastatin (CRESTOR) 10 MG tablet Take 1 tablet (10 mg total) by mouth daily 30 tablet 5    sildenafil (VIAGRA) 100 mg tablet Take 1 tablet (100 mg total) by mouth as needed for erectile dysfunction 10 tablet 6    tamsulosin (FLOMAX) 0 4 mg take 1 capsule by mouth once daily WITH DINNER 30 capsule 2    topiramate (TOPAMAX) 25 mg tablet FOLLOW INSTRUCTIONS GIVEN TO TITRATE UP TO A MAX OF 2 TABLETS BY MOUTH TWICE DAILY AS TOLERATED AND TO LEVEL OF BENIFIT FOR TREMORS 120 tablet 2    traZODone (DESYREL) 50 mg tablet take 1 tablet by mouth at bedtime 30 tablet 6     No current facility-administered medications for this visit  Allergies   Allergen Reactions    Morphine And Related        Review of Systems   Musculoskeletal: Positive for back pain, gait problem, myalgias, neck pain and neck stiffness  Neurological: Positive for numbness  Negative for weakness  Video Exam    There were no vitals filed for this visit  Physical Exam  Constitutional:       General: He is not in acute distress  Appearance: Normal appearance  He is not ill-appearing  Neurological:      General: No focal deficit present  Mental Status: He is alert and oriented to person, place, and time  Psychiatric:         Mood and Affect: Mood normal          Behavior: Behavior normal          Thought Content: Thought content normal           Assessment/plan:    Continue Percocet 7 5/325 every 8 hours as needed for pain    The patient's opioid scripts were sent to their pharmacy electronically and was given a 2 month supply of prescriptions with a Do Not Fill date(s) of 02/15/2021, 03/15/2020  Continue gabapentin 600 mg 3 times daily to address the neuropathic component of his pain  Prescription was sent electronically to his pharmacy with refills  Patient will be stopping in the office in 2 weeks for urine drug screen  The patient will follow-up in 2 months for medication prescription refill and reevaluation  The patient was advised to contact the office should their symptoms worsen in the interim  The patient was agreeable and verbalized an understanding  I spent 15 minutes directly with the patient during this visit      [facility] acknowledges that he has consented to an online visit or consultation  He understands that the online visit is based solely on information provided by him, and that, in the absence of a face-to-face physical evaluation by the physician, the diagnosis he receives is both limited and provisional in terms of accuracy and completeness  This is not intended to replace a full medical face-to-face evaluation by the physician  Sushma Cárdenas understands and accepts these terms

## 2021-02-15 NOTE — PATIENT INSTRUCTIONS
Percocet refill dates:  02/15/2021, 03/15/2021    Opioid Safety   WHAT YOU NEED TO KNOW:   An opioid medicine is used to treat pain  Examples are oxycodone, morphine, fentanyl, or codeine  Pain control and management may help you rest, heal, and return to your daily activities  You and your family will receive information about how to manage your pain at home  The instructions will include what to do if you have side effects as your pain is managed  You will get information on how to handle opioid medicine safely  You will also get suggestions on how to control pain without opioids  It is important to follow all instructions so your pain is managed effectively  DISCHARGE INSTRUCTIONS:   Call your local emergency number (911 in the [place]), or have someone else call if:   · You have a seizure  · You cannot be woken  · You have trouble staying awake and your breathing is slow or shallow  · Your speech is slurred, or you are confused  · You are dizzy or stumble when you walk  Call your doctor, or have someone close to you call if:   · You are extremely drowsy, or you have trouble staying awake or speaking  · You have pale or clammy skin  · You have blue fingernails or lips  · Your heartbeat is slower than normal     · You cannot stop vomiting  · You have questions or concerns about your condition or care  Use opioids safely:   · Take prescribed opioids exactly as directed  Opioids come with directions based on the kind and how it is given  Talk to your healthcare provider or a pharmacist if you have any questions  Do not take more than the recommended amount  Too much can cause a life-threatening overdose  Do not continue to take it after your pain stops  You may develop tolerance  This means you keep needing higher doses to get the same effect  You may also develop opioid use disorder  This means you are not able to control your opioid use      · Do not give opioids to others or take opioids that belong to someone else  The kind or amount one person takes may not be right for another  The person you share them with may also be taking medicines that do not mix with opioids  He or she may drink alcohol or use other drugs that can cause life-threatening problems when mixed with opioids  · Do not mix opioids with other medicines or alcohol  The combination can cause an overdose, or cause you to stop breathing  Alcohol, sleeping pills, and medicines such as antihistamines can make you sleepy  A combination with opioids can lead to a coma  · Do not drive or operate heavy machinery after you use an opioid  You may feel drowsy or have trouble concentrating  You can injure yourself or others if you drive or use heavy machinery when you are not alert  Your provider or pharmacist can tell you how long to wait after a dose before you do these activities  · Talk to your healthcare provider if you have any side effects  Side effects include nausea, sleepiness, itching, and trouble thinking clearly  Your provider may need to make changes to the kind or amount of opioid you are taking  He or she can also help you find ways to prevent or relieve side effects  Manage constipation:  Constipation is the most common side effect of opioid medicine  Constipation is when you have hard, dry bowel movements, or you go longer than usual between bowel movements  Tell your healthcare provider about all changes in your bowel movements while you are taking opioids  He or she may recommend laxative medicine to help you have a bowel movement  He or she may also change the kind of opioid you are taking, or change when you take it  The following are more ways you can prevent or relieve constipation:  · Drink liquids as directed  You may need to drink extra liquids to help soften and move your bowels  Ask how much liquid to drink each day and which liquids are best for you  · Eat high-fiber foods    This may help decrease constipation by adding bulk to your bowel movements  High-fiber foods include fruits, vegetables, whole-grain breads and cereals, and beans  Your healthcare provider or dietitian can help you create a high-fiber meal plan  Your provider may also recommend a fiber supplement if you cannot get enough fiber from food  · Exercise regularly  Regular physical activity can help stimulate your intestines  Walking is a good exercise to prevent or relieve constipation  Ask which exercises are best for you  · Schedule a time each day to have a bowel movement  This may help train your body to have regular bowel movements  Bend forward while you are on the toilet to help move the bowel movement out  Sit on the toilet for at least 10 minutes, even if you do not have a bowel movement  Store opioids safely:   · Store opioids where others cannot easily get them  Keep them in a locked cabinet or secure area  Do not  keep them in a purse or other bag you carry with you  A person may be looking for something else and find the opioids  · Make sure opioids are stored out of the reach of children  A child can easily overdose on opioids  Opioids may look like candy to a small child  The best way to dispose of opioids: The laws vary by country and area  In the [place], the best way is to return the opioids through a take-back program  This program is offered by the [employer] ([employer])  The following are options for using the program:  · Take the opioids to a [employer] collection site  The site is often a law enforcement center  Call your local law enforcement center for scheduled take-back days in your area  You will be given information on where to go if the collection site is in a different location  · Take the opioids to an approved pharmacy or hospital   A pharmacy or hospital may be set up as a collection site   You will need to ask if it is a [employer] collection site if you were not directed there  A pharmacy or doctor's office may not be able to take back opioids unless it is a [employer] site  · Use a mail-back system  This means you are given containers to put the opioids into  You will then mail them in the containers  · Use a take-back drop box  This is a place to leave the opioids at any time  People and animals will not be able to get into the box  Your local law enforcement agency can tell you where to find a drop box in your area  Other safe ways to dispose of opioids: The medicine may come with disposal instructions  The instructions may vary depending on the brand of medicine you are using  Instructions may come in a Medication Guide, but not every medicine has one  You may instead get instructions from your pharmacy or doctor  Follow instructions carefully  The following are general guidelines to follow:  · Find out if you can flush the opioid  Some opioids can be flushed down the toilet or poured into the sink  You will need to contact authorities in your area to see if this is an option for you  The FDA also offers a list of medicines that are safe to flush down the toilet  You can check the list if you cannot get the information for your local area  · Ask your waste management company about rules for putting opioids in the trash  The company will be able to give you specific directions  Scratch out personal information on the original medicine label so it cannot be read  Then put it in the trash  Do not label the trash or put any information on it about the opioids  It should look like regular household trash so no one is tempted to look for the opioids  Keep the trash out of the reach of children and animals  Always make sure trash is secure  · Talk to officials if you live in a facility  If you live in a nursing home or assisted living center, talk to an official  The person will know the rules for your area      Other ways to manage pain:   · Ask your healthcare provider about non-opioid medicines to control pain  Some medicines may even work better than opioids, depending on the cause of your pain  Nonprescription medicines include NSAIDs (such as ibuprofen) and acetaminophen  Prescription medicines include muscle relaxers, antidepressants, and steroids  · Pain may be managed without any medicines  Some ways to relieve pain include massage, aromatherapy, or meditation  Physical or occupational therapy may also help  For more information:   · Drug Enforcement Administration  [address] [facility] [address]  Phone: 2- 853 - 117-5355  Web Address: [facility]/drug_disposal/    · [address]  and Drug Administration  [place] , [address] [facility]  Phone: 8- 164 - [phone]  Web Address: http://"[employer]/  Follow up with your doctor or pain specialist as directed: You may need to have your dose adjusted  Your doctor or pain specialist can also help you find ways to manage pain without opioids  Write down your questions so you remember to ask them during your visits  © Copyright [address] [place] [address] Information is for End User's use only and may not be sold, redistributed or otherwise used for commercial purposes  All illustrations and images included in CareNotes® are the copyrighted property of [employer]  or Uma Levine  The above information is an  only  It is not intended as medical advice for individual conditions or treatments  Talk to your doctor, nurse or pharmacist before following any medical regimen to see if it is safe and effective for you

## 2021-03-03 LAB
6MAM UR QL CFM: NEGATIVE NG/ML
7AMINOCLONAZEPAM UR QL CFM: NEGATIVE NG/ML
A-OH ALPRAZ UR QL CFM: NEGATIVE NG/ML
ACCEPTABLE CREAT UR QL: NORMAL MG/DL
ACCEPTIBLE SP GR UR QL: NORMAL
AMPHET UR QL CFM: NEGATIVE NG/ML
AMPHET UR QL CFM: NEGATIVE NG/ML
BUPRENORPHINE UR QL CFM: NEGATIVE NG/ML
BUTALBITAL UR QL CFM: NEGATIVE NG/ML
BZE UR QL CFM: NEGATIVE NG/ML
CODEINE UR QL CFM: NEGATIVE NG/ML
DESIPRAMINE UR QL CFM: NEGATIVE NG/ML
DESIPRAMINE UR QL CFM: NEGATIVE NG/ML
EDDP UR QL CFM: NEGATIVE NG/ML
ETHYL GLUCURONIDE UR QL CFM: NEGATIVE NG/ML
ETHYL SULFATE UR QL SCN: NEGATIVE NG/ML
FENTANYL UR QL CFM: NEGATIVE NG/ML
GLIADIN IGG SER IA-ACNC: NEGATIVE NG/ML
GLUCOSE 30M P 50 G LAC PO SERPL-MCNC: NEGATIVE NG/ML
HYDROCODONE UR QL CFM: NEGATIVE NG/ML
HYDROCODONE UR QL CFM: NEGATIVE NG/ML
HYDROMORPHONE UR QL CFM: NEGATIVE NG/ML
IMIPRAMINE UR QL CFM: NEGATIVE NG/ML
LORAZEPAM UR QL CFM: NEGATIVE NG/ML
MDMA UR QL CFM: NEGATIVE NG/ML
ME-PHENIDATE UR QL CFM: NEGATIVE NG/ML
MEPERIDINE UR QL CFM: NEGATIVE NG/ML
MEPHEDRONE UR QL CFM: NEGATIVE NG/ML
METHADONE UR QL CFM: NEGATIVE NG/ML
METHAMPHET UR QL CFM: NEGATIVE NG/ML
MORPHINE UR QL CFM: NEGATIVE NG/ML
MORPHINE UR QL CFM: NEGATIVE NG/ML
NITRITE UR QL: NORMAL UG/ML
NORBUPRENORPHINE UR QL CFM: NEGATIVE NG/ML
NORDIAZEPAM UR QL CFM: NEGATIVE NG/ML
NORFENTANYL UR QL CFM: NEGATIVE NG/ML
NORHYDROCODONE UR QL CFM: NEGATIVE NG/ML
NORHYDROCODONE UR QL CFM: NEGATIVE NG/ML
NORMEPERIDINE UR QL CFM: NEGATIVE NG/ML
NOROXYCODONE UR QL CFM: NORMAL NG/ML
OLANZAPINE QUANTIFICATION: NEGATIVE NG/ML
OPC-3373 QUANTIFICATION: NEGATIVE
OXAZEPAM UR QL CFM: NEGATIVE NG/ML
OXYCODONE UR QL CFM: NORMAL NG/ML
OXYMORPHONE UR QL CFM: NORMAL NG/ML
OXYMORPHONE UR QL CFM: NORMAL NG/ML
PCP UR QL CFM: NEGATIVE NG/ML
PHENOBARB UR QL CFM: NEGATIVE NG/ML
SECOBARBITAL UR QL CFM: NEGATIVE NG/ML
SL AMB 3-METHYL-FENTANYL QUANTIFICATION: NORMAL NG/ML
SL AMB 4-ANPP QUANTIFICATION: NORMAL NG/ML
SL AMB 4-FIBF QUANTIFICATION: NORMAL NG/ML
SL AMB 5F-ADB-M7 METABOLITE QUANTIFICATION: NEGATIVE NG/ML
SL AMB 7-OH-MITRAGYNINE (KRATOM ALKALOID) QUANTIFICATION: NEGATIVE NG/ML
SL AMB AB-FUBINACA-M3 METABOLITE QUANTIFICATION: NEGATIVE NG/ML
SL AMB ACETYL FENTANYL QUANTIFICATION: NORMAL NG/ML
SL AMB ACETYL NORFENTANYL QUANTIFICATION: NORMAL NG/ML
SL AMB ACRYL FENTANYL QUANTIFICATION: NORMAL NG/ML
SL AMB BATH SALTS: NEGATIVE NG/ML
SL AMB BUTRYL FENTANYL QUANTIFICATION: NORMAL NG/ML
SL AMB CARFENTANIL QUANTIFICATION: NORMAL NG/ML
SL AMB CLOZAPINE QUANTIFICATION: NEGATIVE NG/ML
SL AMB CTHC (MARIJUANA METABOLITE) QUANTIFICATION: NEGATIVE NG/ML
SL AMB CYCLOPROPYL FENTANYL QUANTIFICATION: NORMAL NG/ML
SL AMB DEXTROMETHORPHAN QUANTIFICATION: NEGATIVE NG/ML
SL AMB DEXTRORPHAN (DEXTROMETHORPHAN METABOLITE) QUANT: NEGATIVE NG/ML
SL AMB DEXTRORPHAN (DEXTROMETHORPHAN METABOLITE) QUANT: NEGATIVE NG/ML
SL AMB FURANYL FENTANYL QUANTIFICATION: NORMAL NG/ML
SL AMB HALOPERIDOL  QUANTIFICATION: NEGATIVE NG/ML
SL AMB HALOPERIDOL METABOLITE QUANTIFICATION: NEGATIVE NG/ML
SL AMB HYDROXYRISPERIDONE QUANTIFICATION: NEGATIVE NG/ML
SL AMB JWH018 METABOLITE QUANTIFICATION: NEGATIVE NG/ML
SL AMB JWH073 METABOLITE QUANTIFICATION: NEGATIVE NG/ML
SL AMB MDMB-FUBINACA-M1 METABOLITE QUANTIFICATION: NEGATIVE NG/ML
SL AMB METHOXYACETYL FENTANYL QUANTIFICATION: NORMAL NG/ML
SL AMB METHYLONE QUANTIFICATION: NEGATIVE NG/ML
SL AMB N-DESMETHYL U-47700 QUANTIFICATION: NORMAL NG/ML
SL AMB N-DESMETHYL-TRAMADOL QUANTIFICATION: NEGATIVE NG/ML
SL AMB N-DESMETHYLCLOZAPINE QUANTIFICATION: NEGATIVE NG/ML
SL AMB NORQUETIAPINE QUANTIFICATION: NEGATIVE NG/ML
SL AMB PHENTERMINE QUANTIFICATION: NEGATIVE NG/ML
SL AMB QUETIAPINE QUANTIFICATION: NEGATIVE NG/ML
SL AMB RCS4 METABOLITE QUANTIFICATION: NEGATIVE NG/ML
SL AMB RISPERIDONE QUANTIFICATION: NEGATIVE NG/ML
SL AMB RITALINIC ACID QUANTIFICATION: NEGATIVE NG/ML
SL AMB U-47700 QUANTIFICATION: NORMAL NG/ML
SPECIMEN DRAWN SERPL: NEGATIVE NG/ML
SPECIMEN PH ACCEPTABLE UR: NORMAL
TAPENTADOL UR QL CFM: NEGATIVE NG/ML
TEMAZEPAM UR QL CFM: NEGATIVE NG/ML
TEMAZEPAM UR QL CFM: NEGATIVE NG/ML
TRAMADOL UR QL CFM: NEGATIVE NG/ML
URATE/CREAT 24H UR: NEGATIVE NG/ML

## 2021-03-12 ENCOUNTER — OFFICE VISIT (OUTPATIENT)
Dept: CARDIOLOGY CLINIC | Facility: CLINIC | Age: 66
End: 2021-03-12
Payer: MEDICARE

## 2021-03-12 VITALS
DIASTOLIC BLOOD PRESSURE: 82 MMHG | WEIGHT: 209 LBS | BODY MASS INDEX: 31.67 KG/M2 | HEIGHT: 68 IN | SYSTOLIC BLOOD PRESSURE: 120 MMHG | HEART RATE: 58 BPM

## 2021-03-12 DIAGNOSIS — G89.4 CHRONIC PAIN SYNDROME: ICD-10-CM

## 2021-03-12 DIAGNOSIS — M54.50 CHRONIC BILATERAL LOW BACK PAIN WITHOUT SCIATICA: ICD-10-CM

## 2021-03-12 DIAGNOSIS — G89.29 CHRONIC PAIN OF BOTH KNEES: ICD-10-CM

## 2021-03-12 DIAGNOSIS — R07.9 CHEST PAIN, UNSPECIFIED TYPE: ICD-10-CM

## 2021-03-12 DIAGNOSIS — M25.562 CHRONIC PAIN OF BOTH KNEES: ICD-10-CM

## 2021-03-12 DIAGNOSIS — F17.200 SMOKING: ICD-10-CM

## 2021-03-12 DIAGNOSIS — G89.29 CHRONIC BILATERAL LOW BACK PAIN WITHOUT SCIATICA: ICD-10-CM

## 2021-03-12 DIAGNOSIS — I10 ESSENTIAL HYPERTENSION: ICD-10-CM

## 2021-03-12 DIAGNOSIS — M25.561 CHRONIC PAIN OF BOTH KNEES: ICD-10-CM

## 2021-03-12 DIAGNOSIS — I25.10 CORONARY ARTERY DISEASE INVOLVING NATIVE CORONARY ARTERY OF NATIVE HEART WITHOUT ANGINA PECTORIS: Primary | ICD-10-CM

## 2021-03-12 PROCEDURE — 99214 OFFICE O/P EST MOD 30 MIN: CPT | Performed by: INTERNAL MEDICINE

## 2021-03-12 PROCEDURE — 93000 ELECTROCARDIOGRAM COMPLETE: CPT | Performed by: INTERNAL MEDICINE

## 2021-03-12 RX ORDER — METOPROLOL SUCCINATE 50 MG/1
50 TABLET, EXTENDED RELEASE ORAL DAILY
Qty: 30 TABLET | Refills: 5
Start: 2021-03-12 | End: 2021-10-07 | Stop reason: ALTCHOICE

## 2021-03-12 RX ORDER — ASPIRIN 81 MG/1
81 TABLET ORAL DAILY
Qty: 30 TABLET | Refills: 5
Start: 2021-03-12 | End: 2021-10-07 | Stop reason: ALTCHOICE

## 2021-03-12 RX ORDER — HYDROCHLOROTHIAZIDE 25 MG/1
TABLET ORAL
Qty: 60 TABLET | Refills: 3
Start: 2021-03-12 | End: 2021-10-07 | Stop reason: ALTCHOICE

## 2021-03-12 RX ORDER — NITROGLYCERIN 0.4 MG/1
0.4 TABLET SUBLINGUAL
Qty: 25 TABLET | Refills: 5 | Status: SHIPPED | OUTPATIENT
Start: 2021-03-12

## 2021-03-12 NOTE — PATIENT INSTRUCTIONS
Chest Pain   AMBULATORY CARE:   Chest pain  can be caused by a range of conditions, from not serious to life-threatening  It may be caused by a heart attack or a blood clot in your lungs  Sometimes chest pain or pressure is caused by poor blood flow to your heart (angina)  Infection, inflammation, or a fracture in the bones or cartilage in your chest can cause pain or discomfort  Chest pain can also be a symptom of a digestive problem, such as acid reflux or a stomach ulcer  An anxiety attack or a strong emotion such as anger can also cause chest pain  It is important to follow up with your healthcare provider to find the cause of your chest pain  Common symptoms you may have with chest pain:   · Fever or sweating     · Nausea or vomiting     · Shortness of breath     · Discomfort or pressure that spreads from your chest to your back, jaw, or arm     · A racing or slow heartbeat     · Feeling weak, tired, or faint    Call 911 if:   · You have any of the following signs of a heart attack:      ? Squeezing, pressure, or pain in your chest    ? You may  also have any of the following:     § Discomfort or pain in your back, neck, jaw, stomach, or arm    § Shortness of breath    § Nausea or vomiting    § Lightheadedness or a sudden cold sweat      Seek care immediately if:   · You have chest discomfort that gets worse, even with medicine  · You cough or vomit blood  · Your bowel movements are black or bloody  · You cannot stop vomiting, or it hurts to swallow  Contact your healthcare provider if:   · You have questions or concerns about your condition or care  Treatment for chest pain  may include medicine to treat your symptoms while your healthcare provider finds the cause of your chest pain  · Medicines  may be given to treat the cause of your chest pain  Examples include pain medicine, anxiety medicine, or medicines to increase blood flow to your heart       · Do not take certain medicines without asking your healthcare provider first   These include NSAIDs, herbal or vitamin supplements, or hormones (estrogen or progestin)  · One or more stents  may need to be placed in your heart if pain was caused by blockage  A stent is a wire mesh tube that helps hold your artery open  Follow up with your healthcare provider within 72 hours, or as directed: You may need to return for more tests to find the cause of your chest pain  You may be referred to a specialist, such as a cardiologist or gastroenterologist  Write down your questions so you remember to ask them during your visits  Healthy living tips: The following are general healthy guidelines  If your chest pain is caused by a heart problem, your healthcare provider will give you specific guidelines to follow  · Do not smoke  Nicotine and other chemicals in cigarettes and cigars can cause lung and heart damage  Ask your healthcare provider for information if you currently smoke and need help to quit  E-cigarettes or smokeless tobacco still contain nicotine  Talk to your healthcare provider before you use these products  · Eat a variety of healthy, low-fat, low-salt foods  Healthy foods include fruits, vegetables, whole-grain breads, low-fat dairy products, beans, lean meats, and fish  Ask for more information about a heart healthy diet  · Drink plenty of water every day  Your body is made of mostly water  Water helps your body to control your temperature and blood pressure  Ask your healthcare provider how much water you should drink every day  · Ask about activity  Your healthcare provider will tell you which activities to limit or avoid  Ask when you can drive, return to work, and have sex  Ask about the best exercise plan for you  · Maintain a healthy weight  Ask your healthcare provider how much you should weigh  Ask him or her to help you create a weight loss plan if you are overweight  · Get the flu and pneumonia vaccines  All adults should get the influenza (flu) vaccine  Get it every year as soon as it becomes available  The pneumococcal vaccine is given to adults aged 72 years or older  The vaccine is given every 5 years to prevent pneumococcal disease, such as pneumonia  If you have a stent:   · Carry your stent card with you at all times  · Let all healthcare providers know that you have a stent  © Copyright [address] Information is for End User's use only and may not be sold, redistributed or otherwise used for commercial purposes  All illustrations and images included in CareNotes® are the copyrighted property of [employer]  or [address] [place] [address]  The above information is an  only  It is not intended as medical advice for individual conditions or treatments  Talk to your doctor, nurse or pharmacist before following any medical regimen to see if it is safe and effective for you

## 2021-03-12 NOTE — PROGRESS NOTES
Subjective:        Patient ID: Ancelmo Rivera is a 76 y o  male  Chief Complaint:  Kris Rodrigez has known severe single-vessel RCA disease by remote catheterization  He wanted to get checked out today as he is not feeling right  There is much stressors at home, his partners mother passed away in their now taking care of her disabled sister, and his 3year-old is quite a handful he says  He ran out of nitrates  He "self-medicated" with respect to his blood pressure and stop taking many of his meds  We cleaned up his drug list today at his request according to what he is actually taking  He says his blood pressures actually been very good lately  Unfortunately continues to smoke heavily  He has been 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 and actually has noticed little bit more dyspnea on exertion lately  But more importantly he has had a few episodes in the evening, he thinks this may be stress but he feels his heart pounding but when he checks his blood pressure is okay, he thinks his heart rate is okay he never got a high heart rate warning  He is not positive  Denies any clear tachy palpitations just forceful heartbeat with chest pressure  Again this is mostly at rest     EKG stable today  The following portions of the patient's history were reviewed and updated as appropriate: allergies, current medications, past family history, past medical history, past social history, past surgical history and problem list   Review of Systems   Constitution: Negative for chills, diaphoresis, malaise/fatigue and weight gain  HENT: Negative for nosebleeds and stridor  Eyes: Negative for double vision, vision loss in left eye, vision loss in right eye and visual disturbance  Cardiovascular: Positive for chest pain and dyspnea on exertion  Negative for claudication, cyanosis, irregular heartbeat, leg swelling, near-syncope, orthopnea, palpitations, paroxysmal nocturnal dyspnea and syncope  Respiratory: Negative for cough, shortness of breath, snoring and wheezing  Endocrine: Negative for polydipsia, polyphagia and polyuria  Hematologic/Lymphatic: Negative for bleeding problem  Does not bruise/bleed easily  Skin: Negative for flushing and rash  Musculoskeletal: Positive for arthritis, back pain and stiffness  Negative for falls and myalgias  Gastrointestinal: Negative for abdominal pain, heartburn, hematemesis, hematochezia, melena and nausea  Genitourinary: Negative for hematuria  Neurological: Negative for brief paralysis, dizziness, focal weakness, headaches, light-headedness, loss of balance and vertigo  Psychiatric/Behavioral: Negative for altered mental status and substance abuse  Allergic/Immunologic: Negative for hives  Objective:      /82   Pulse 58   Ht 5' 8" (1 727 m)   Wt 94 8 kg (209 lb)   BMI 31 78 kg/m²   Physical Exam   Constitutional: He is oriented to person, place, and time  He appears well-developed and well-nourished  No distress  HENT:   Head: Normocephalic and atraumatic  Eyes: Pupils are equal, round, and reactive to light  EOM are normal  No scleral icterus  Neck: Normal range of motion  Neck supple  No JVD present  No thyromegaly present  Cardiovascular: Normal rate, regular rhythm and normal heart sounds  Exam reveals no gallop and no friction rub  No murmur heard  Pulmonary/Chest: Effort normal and breath sounds normal  No stridor  No respiratory distress  He has no wheezes  He has no rales  Abdominal: Soft  Bowel sounds are normal  He exhibits no distension and no mass  There is no abdominal tenderness  Musculoskeletal: Normal range of motion  General: No deformity or edema  Neurological: He is alert and oriented to person, place, and time  Coordination normal    Skin: Skin is warm and dry  No erythema  No pallor  Psychiatric: He has a normal mood and affect   His behavior is normal        Lab Review: Telemedicine on 02/15/2021   Component Date Value    Alpha-Hydroxyalprazolam * 03/01/2021 negative     Aripiprazole Quantificat* 03/01/2021 negative     [id number] QUANTIFICATION 03/01/2021 negative     BATH SALTS 03/01/2021 negative     Mephedrone Quantification 03/01/2021 negative     METHYLONE QUANTIFICATION 03/01/2021 negative     Buprenorphine Quantifica* 03/01/2021 negative     Norbuprenorphine Quantif* 03/01/2021 negative     Butalbital Quantification 03/01/2021 negative     7-Amino-Clonazepam Quant* 03/01/2021 negative     Clozapine Quantification 03/01/2021 negative     N-desmethylclozapine Jeovany* 03/01/2021 negative     Cocaine metabolite Quant* 03/01/2021 negative     Codeine Quantification 03/01/2021 negative     Morphine Quantification 03/01/2021 negative     Hydrocodone Quantificati* 03/01/2021 negative     Norhydrocodone Quantific* 03/01/2021 negative     Hydromorphone Quantifica* 03/01/2021 negative     Desipramine Quantificati* 03/01/2021 negative     Dextromethorphan Quantif* 03/01/2021 negative     Dextrorphan (Dextrometho* 03/01/2021 negative     Nordiazepam Quantificati* 03/01/2021 negative     Temazepam Quantification 03/01/2021 negative     Oxazepam Quantification 03/01/2021 negative     Ethyl Glucuronide Quanti* 03/01/2021 negative     Ethyl Sulfate Quantifica* 03/01/2021 negative     Fentanyl Quantification 03/01/2021 negative     Norfentanyl Quantificati* 03/01/2021 negative     3-methyl-fentanyl Quanti* 03/01/2021 Fen Neg     4-ANPP Quantification 03/01/2021 Fen Neg     4-FiBF Quantification 03/01/2021 Fen Neg     Acetyl fentanyl Quantifi* 03/01/2021 Fen Neg     Acetyl norfentanyl Quant* 03/01/2021 Fen Neg     Acryl fentanyl Quantific* 03/01/2021 Fen Neg     Butryl fentanyl Quantifi* 03/01/2021 Fen Neg     Carfentanil Quantificati* 03/01/2021 Fen Neg     Cyclopropyl fentanyl Jeovany* 03/01/2021 Fen Neg     Furanyl fentanyl Quantif* 03/01/2021 Fen Neg     Methoxyacetyl fentanyl Q* 03/01/2021 Fen Neg     [id number] Quantification 03/01/2021 Fen Neg     N-desmethyl [id number] Jared* 03/01/2021 Fen Neg     Haloperidol  Quantificat* 03/01/2021 negative     Haloperidol metabolite Q* 03/01/2021 negative     6-GORAN (Heroin metabolite* 03/01/2021 negative     Hydrocodone Quantificati* 03/01/2021 negative     Norhydrocodone Quantific* 03/01/2021 negative     Hydromorphone Quantifica* 03/01/2021 negative     Hydromorphone Quantifica* 03/01/2021 negative     Desipramine Quantificati* 03/01/2021 negative     Imipramine Quantification 03/01/2021 negative     Mitragynine (Kratom enrique* 03/01/2021 negative     [id number] (Kratom* 03/01/2021 negative     Dextrorphan (Dextrometho* 03/01/2021 negative     Lorazepam Quantification 03/01/2021 negative     MDMA Quantification 03/01/2021 negative     Meperidine Quantification 03/01/2021 negative     Normeperidine Quantifica* 03/01/2021 negative     Methadone Quantification 03/01/2021 negative     EDDP (Methadone metaboli* 03/01/2021 negative     Amphetamine Quantificati* 03/01/2021 negative     Methamphetamine Quantifi* 03/01/2021 negative     Methylphenidate Quantifi* 03/01/2021 negative     RITALINIC ACID QUANTIFIC* 03/01/2021 negative     Morphine Quantification 03/01/2021 negative     Hydromorphone Quantifica* 03/01/2021 negative     OLANZAPINE QUANTIFICATION 03/01/2021 negative     Oxazepam Quantification 03/01/2021 negative     Oxycodone Quantification 03/01/2021 positive-1437  [address] [place]* 03/01/2021 positive-1852  [address] [place]* 03/01/2021 positive-149  [address] [place]* 03/01/2021 positive-149  443     Phencyclidine Quantifica* 03/01/2021 negative     Phenobarbital Quantifica* 03/01/2021 negative     PHENTERMINE QUANTIFICATI* 03/01/2021 negative     QUETIAPINE QUANTIFICATION 03/01/2021 negative     NORQUETIAPINE QUANTIFICA* 03/01/2021 negative  Risperidone Quantificati* 03/01/2021 negative     Hydroxyrisperidone Quant* 03/01/2021 negative     Secobarbital Quantificat* 03/01/2021 negative     5F-[id number] 03/01/2021 negative     QX-[id number]-H6 METABOLIT* 03/01/2021 negative     [id number]-[id number] METABOL* 03/01/2021 negative     DMT999 metabolite Quanti* 03/01/2021 negative     YKW025 metabolite Quanti* 03/01/2021 negative     RCS4 METABOLITE QUANTIFI* 03/01/2021 negative     CYY95/ METABOLITE Q* 03/01/2021 negative     Tapentadol Quantification 03/01/2021 negative     Temazepam Quantification 03/01/2021 negative     Oxazepam Quantification 03/01/2021 negative     cTHC (Marijuana metaboli* 03/01/2021 negative     Tramadol Quantification 03/01/2021 negative     O-desmethyl-tramadol Jeovany* 03/01/2021 negative     N-DESMETHYL-TRAMADOL JEOVANY* 03/01/2021 negative     Amphetamine Quantificati* 03/01/2021 negative     CREATININE 03/01/2021 normal-218 3     OXIDANT 03/01/2021 normal-0     pH 03/01/2021 normal-6 0     SPECIFIC GRAVITY URINE 03/01/2021 normal-1 024      No results found  Assessment:       1  Coronary artery disease involving native coronary artery of native heart without angina pectoris  aspirin (ECOTRIN LOW STRENGTH) 81 mg EC tablet    nitroglycerin (NITROSTAT) 0 4 mg SL tablet    NM myocardial perfusion spect (rx stress and/or rest)   2  Essential hypertension  metoprolol succinate (TOPROL-XL) 50 mg 24 hr tablet    nitroglycerin (NITROSTAT) 0 4 mg SL tablet    hydrochlorothiazide (HYDRODIURIL) 25 mg tablet    NM myocardial perfusion spect (rx stress and/or rest)   3  Chest pain, unspecified type  NM myocardial perfusion spect (rx stress and/or rest)   4  Chronic bilateral low back pain without sciatica     5  Chronic pain of both knees     6  Smoking     7  Chronic pain syndrome          Plan:       Strongly urged him to resume baby aspirin therapy and Crestor therapy    He had no side effects to either in the past     As walking on the treadmill be difficult with his orthopedic issues and chronic pain I have ordered a pharmacologic nuclear stress test the near future  Continue present current active medications, med list updated  Discussed possibility of adding Ranexa to his meds in the future, discussed reluctance to add long-acting nitrate therapy as he takes Viagra every 3 days or so  Did refill sublingual nitroglycerin with instructions, not to be taken the same day as Viagra therapy  Aware he needs to call 911 report the nearest ER if he has any chest pain not relieved with nitrate therapy  I am going to keep him on his routine scheduled follow-up visit, I was clear that he needs to call if his symptoms progress or worsen in any manner despite the results of the stress test, he understands  I told him to check his blood pressure at home when he feels his heart pounding and to make specific noted his blood pressure and heart rate and 1st any abnormality or his heart rates over 80 beats per minute often to let my office know for further testing  He understands  Strongly recommended smoking cessation

## 2021-03-20 DIAGNOSIS — Z87.438 HISTORY OF BPH: ICD-10-CM

## 2021-03-22 RX ORDER — TAMSULOSIN HYDROCHLORIDE 0.4 MG/1
CAPSULE ORAL
Qty: 30 CAPSULE | Refills: 2 | Status: SHIPPED | OUTPATIENT
Start: 2021-03-22 | End: 2021-06-29

## 2021-03-25 RX ORDER — TOPIRAMATE 25 MG/1
TABLET ORAL
Qty: 120 TABLET | Refills: 2 | OUTPATIENT
Start: 2021-03-25

## 2021-03-26 DIAGNOSIS — I10 ESSENTIAL HYPERTENSION: ICD-10-CM

## 2021-03-26 RX ORDER — AMLODIPINE BESYLATE 10 MG/1
TABLET ORAL
Qty: 90 TABLET | Refills: 3 | Status: SHIPPED | OUTPATIENT
Start: 2021-03-26 | End: 2022-03-29

## 2021-03-30 DIAGNOSIS — Z23 ENCOUNTER FOR IMMUNIZATION: ICD-10-CM

## 2021-04-06 ENCOUNTER — IMMUNIZATIONS (OUTPATIENT)
Dept: FAMILY MEDICINE CLINIC | Facility: HOSPITAL | Age: 66
End: 2021-04-06

## 2021-04-06 DIAGNOSIS — Z23 ENCOUNTER FOR IMMUNIZATION: Primary | ICD-10-CM

## 2021-04-06 PROCEDURE — 91301 SARS-COV-2 / COVID-19 MRNA VACCINE (MODERNA) 100 MCG: CPT

## 2021-04-06 PROCEDURE — 0011A SARS-COV-2 / COVID-19 MRNA VACCINE (MODERNA) 100 MCG: CPT

## 2021-04-12 ENCOUNTER — OFFICE VISIT (OUTPATIENT)
Dept: PAIN MEDICINE | Facility: CLINIC | Age: 66
End: 2021-04-12
Payer: MEDICARE

## 2021-04-12 VITALS
SYSTOLIC BLOOD PRESSURE: 118 MMHG | BODY MASS INDEX: 31.37 KG/M2 | TEMPERATURE: 98.2 F | WEIGHT: 207 LBS | DIASTOLIC BLOOD PRESSURE: 76 MMHG | HEIGHT: 68 IN

## 2021-04-12 DIAGNOSIS — M54.50 CHRONIC BILATERAL LOW BACK PAIN WITHOUT SCIATICA: ICD-10-CM

## 2021-04-12 DIAGNOSIS — M47.816 LUMBAR SPONDYLOSIS: ICD-10-CM

## 2021-04-12 DIAGNOSIS — G89.29 CHRONIC BILATERAL LOW BACK PAIN WITHOUT SCIATICA: ICD-10-CM

## 2021-04-12 DIAGNOSIS — M51.36 LUMBAR DEGENERATIVE DISC DISEASE: ICD-10-CM

## 2021-04-12 DIAGNOSIS — M51.26 HNP (HERNIATED NUCLEUS PULPOSUS), LUMBAR: ICD-10-CM

## 2021-04-12 PROCEDURE — 99214 OFFICE O/P EST MOD 30 MIN: CPT | Performed by: ANESTHESIOLOGY

## 2021-04-12 RX ORDER — OXYCODONE AND ACETAMINOPHEN 7.5; 325 MG/1; MG/1
1 TABLET ORAL EVERY 8 HOURS PRN
Qty: 90 TABLET | Refills: 0 | Status: SHIPPED | OUTPATIENT
Start: 2021-05-12 | End: 2021-06-11 | Stop reason: SDUPTHER

## 2021-04-12 RX ORDER — OXYCODONE AND ACETAMINOPHEN 7.5; 325 MG/1; MG/1
1 TABLET ORAL EVERY 8 HOURS PRN
Qty: 90 TABLET | Refills: 0 | Status: SHIPPED | OUTPATIENT
Start: 2021-04-12 | End: 2021-04-12

## 2021-04-12 NOTE — PATIENT INSTRUCTIONS
Opioid Dependence   WHAT YOU NEED TO KNOW:   What is opioid dependence? Opioids are medicines, such as morphine and codeine, used to treat pain  Dependence happens after you have used opioids regularly for a long period of time  Dependence means that your body gets used to how much medicine you take  Dependence is not the same as addiction  Addiction means that a person uses opioids to get high instead of using them to control pain  What are the signs and symptoms of opioid dependence? · You need more of the opioid to get the same amount of pain relief as you did when you first started taking it  · You have tried to use less opioid medicine but are not able to  · You have withdrawal symptoms when you take less of the opioid  What are the signs and symptoms of opioid withdrawal?  You may have the following signs and symptoms if you suddenly stop taking opioids or if you decrease the amount you normally take:  · Yawning     · Runny nose     · Nausea or vomiting     · Diarrhea     · Chills or goosebumps    · Muscle aches or cramps     · Anxiety  How is opioid dependence diagnosed? Your healthcare provider will do a physical exam  He will ask you questions about your symptoms and your use of opioids  He will also ask about your current and past use of other drugs and any family history of drug abuse  How is opioid dependence treated? You may be treated in a hospital or you may be treated as an outpatient  During detoxification (detox), healthcare providers will slowly decrease your dose of the opioid medicine you are dependent on  They may use another opioid medicine such as methadone to decrease symptoms of withdrawal  You may need to take this or another medicine for some time  Your healthcare provider will also replace the opioid with another pain medicine that is less likely to cause dependence  He may also suggest that you receive counseling and social support during treatment     What are the risks of opioid dependence? There is a risk of overdose during early treatment with methadone  You may become dependent on the medicines used to treat opioid dependence  Without treatment, you may develop other health problems or become addicted to opioids  Your risk of abusing alcohol and other drugs increases  You may also develop risky behaviors that can lead to an overdose, violence, and suicidal thoughts  When should I contact my healthcare provider? · Your speech is slurred  · You have difficulty staying awake  · You have nausea and vomiting  · You are easily upset or cry easily  · You have poor balance  · You have questions or concerns about your condition or care  When should I seek immediate care or call 911? · You feel lightheaded or faint  · You have a fast, slow, or irregular heartbeat  · You have a seizure  CARE AGREEMENT:   You have the right to help plan your care  Learn about your health condition and how it may be treated  Discuss treatment options with your caregivers to decide what care you want to receive  You always have the right to refuse treatment  The above information is an  only  It is not intended as medical advice for individual conditions or treatments  Talk to your doctor, nurse or pharmacist before following any medical regimen to see if it is safe and effective for you  © 2017 [employer]  Information is for End User's use only and may not be sold, redistributed or otherwise used for commercial purposes  All illustrations and images included in CareNotes® are the copyrighted property of [employer]  or [employer]

## 2021-04-12 NOTE — PROGRESS NOTES
Assessment:  1  Lumbar spondylosis    2  Lumbar degenerative disc disease    3  HNP (herniated nucleus pulposus), lumbar    4  Chronic bilateral low back pain without sciatica        Plan:  Patient is a 79-year-old male with complaints of neck pain, midback pain, low back pain: Left shoulder pain for chronic pain syndrome secondary to cervical degenerative disc disease, lumbar degenerative disc disease, cervical and lumbar spondylosis, primary osteoarthritis of left shoulder presents office for follow-up visit  Patient denies any adverse events since last office visit  Patient shows no signs of aberrant drug-related behavior  Patient is stable with current pain regimen  1  We will refill Percocet 7 5/325 mg p o  t i d  with 1 refill provided  2  Follow-up in 2 months    History of Present Illness: The patient is a 76 y o  male who presents for a follow up office visit in regards to Back Pain and Neck Pain  The patients current symptoms include 7/10  Intermittent dull/aching, sharp, throbbing pain without any particular time pattern  Current pain medications includes:    Percocet 7 5/325 mg p o  t i d   The patient reports that this regimen is providing 50% pain relief  The patient is reporting no side effects from this pain medication regimen  I have personally reviewed and/or updated the patient's past medical history, past surgical history, family history, social history, current medications, allergies, and vital signs today  Review of Systems  Review of Systems   Respiratory: Positive for shortness of breath  Musculoskeletal:        Decreased range of motion     Neurological: Positive for dizziness  All other systems reviewed and are negative  Past Medical History:   Diagnosis Date    Abdominal aortic aneurysm without rupture ([address] )     Abdominal Aortic Duplex 02/21/2017    Ectatic infrarenal abdominal aorta      CAD (coronary artery disease)     COPD (chronic obstructive pulmonary disease) (HCC)     Extremity pain     History of echocardiogram 03/18/2014    EF 55%, mild MR and AI  Mild concentric LVH   History of stress test 03/06/2017    Normal     Hypertension     Joint pain     Low back pain     Migraine     Neck pain     Obstructive sleep apnea     cannot tolerate CPAP    Osteoarthritis     Peripheral neuropathy     Reflex sympathetic dystrophy        Past Surgical History:   Procedure Laterality Date    CARDIAC CATHETERIZATION  02/13/2012    EF 70%, widely patent renal arteries, significant single-vessel CAD-medical therapy   CARDIAC CATHETERIZATION  04/11/2013    EF 65%, 50% mid LAD, 20% prox CFX, 90% diffuse RCA, 99% mid RCA  Medical management      CHOLECYSTECTOMY      EPIDURAL BLOCK INJECTION Bilateral 8/15/2019    Procedure: BLOCK / INJECTION EPIDURAL STEROID CERVICAL;  Surgeon: Gamaliel Blanco MD;  Location: [facility];  Service: Pain Management     FL GUIDED NEEDLE PLAC BX/ASP/INJ  8/15/2019    ORTHOPEDIC SURGERY      TRIGGER POINT INJECTION         Family History   Adopted: Yes   Problem Relation Age of Onset    No Known Problems Family     No Known Problems Mother     No Known Problems Father        Social History     Occupational History    Not on file   Tobacco Use    Smoking status: Current Every Day Smoker     Packs/day: 1 50    Smokeless tobacco: Never Used   Substance and Sexual Activity    Alcohol use: No    Drug use: No    Sexual activity: Not on file         Current Outpatient Medications:     amLODIPine (NORVASC) 10 mg tablet, swallow 1 tablet once daily, Disp: 90 tablet, Rfl: 3    aspirin (ECOTRIN LOW STRENGTH) 81 mg EC tablet, Take 1 tablet (81 mg total) by mouth daily, Disp: 30 tablet, Rfl: 5    diphenhydrAMINE-acetaminophen (TYLENOL PM)  MG TABS, Take 2 tablets by mouth daily at bedtime as needed for sleep, Disp: , Rfl:     doxycycline hyclate (VIBRAMYCIN) 100 mg capsule, Take 100 mg by mouth 2 (two) times a day, Disp: , Rfl:     gabapentin (NEURONTIN) 600 MG tablet, Take 1 tablet (600 mg total) by mouth 3 (three) times a day, Disp: 90 tablet, Rfl: 2    hydrochlorothiazide (HYDRODIURIL) 25 mg tablet, 1 daily as needed for high blood pressure readings, Disp: 60 tablet, Rfl: 3    lisinopril (ZESTRIL) 20 mg tablet, Take 1 tablet (20 mg total) by mouth daily, Disp: 60 tablet, Rfl: 5    metoprolol succinate (TOPROL-XL) 50 mg 24 hr tablet, Take 1 tablet (50 mg total) by mouth daily, Disp: 30 tablet, Rfl: 5    naloxone (NARCAN) 0 4 mg/mL injection, Infuse 1 mL (0 4 mg total) into a venous catheter once for 1 dose (Patient not taking: Reported on 4/10/2020), Disp: 1 mL, Rfl: 0    nitroglycerin (NITROSTAT) 0 4 mg SL tablet, Place 1 tablet (0 4 mg total) under the tongue every 5 (five) minutes as needed for chest pain, Disp: 25 tablet, Rfl: 5    oxyCODONE-acetaminophen (PERCOCET) 7 5-325 MG per tablet, Take 1 tablet by mouth every 8 (eight) hours as needed for moderate pain Do not fill until 3/15/2021Max Daily Amount: 3 tablets, Disp: 90 tablet, Rfl: 0    rosuvastatin (CRESTOR) 10 MG tablet, Take 1 tablet (10 mg total) by mouth daily (Patient not taking: Reported on 3/12/2021), Disp: 30 tablet, Rfl: 5    sildenafil (VIAGRA) 100 mg tablet, Take 1 tablet (100 mg total) by mouth as needed for erectile dysfunction, Disp: 10 tablet, Rfl: 6    tamsulosin (FLOMAX) 0 4 mg, take 1 capsule by mouth once daily WITH DINNER, Disp: 30 capsule, Rfl: 2    topiramate (TOPAMAX) 25 mg tablet, FOLLOW INSTRUCTIONS GIVEN TO TITRATE UP TO A MAX OF 2 TABLETS BY MOUTH TWICE DAILY AS TOLERATED AND TO LEVEL OF BENIFIT FOR TREMORS, Disp: 120 tablet, Rfl: 2    traZODone (DESYREL) 50 mg tablet, take 1 tablet by mouth at bedtime, Disp: 30 tablet, Rfl: 6    Allergies   Allergen Reactions    Morphine And Related        Physical Exam:    /76 (BP Location: Left arm, Patient Position: Sitting, Cuff Size: Standard)   Temp 98 2 °F (36 8 °C)   Ht 5' 8" (1 727 m)   Wt 93 9 kg (207 lb)   BMI 31 47 kg/m²     Constitutional:normal, well developed, well nourished, alert, in no distress and non-toxic and no overt pain behavior  Eyes:anicteric  HEENT:grossly intact  Neck:supple, symmetric, trachea midline and no masses   Pulmonary:even and unlabored  Cardiovascular:No edema or pitting edema present  Skin:Normal without rashes or lesions and well hydrated  Psychiatric:Mood and affect appropriate  Neurologic:Cranial Nerves II-XII grossly intact  Musculoskeletal:normal    Imaging  No orders to display       No orders of the defined types were placed in this encounter

## 2021-04-20 DIAGNOSIS — G47.00 INSOMNIA, UNSPECIFIED TYPE: ICD-10-CM

## 2021-04-20 DIAGNOSIS — I10 ESSENTIAL HYPERTENSION: ICD-10-CM

## 2021-04-21 RX ORDER — TRAZODONE HYDROCHLORIDE 50 MG/1
TABLET ORAL
Qty: 30 TABLET | Refills: 6 | Status: SHIPPED | OUTPATIENT
Start: 2021-04-21 | End: 2021-10-07 | Stop reason: ALTCHOICE

## 2021-04-21 RX ORDER — METOPROLOL SUCCINATE 100 MG/1
TABLET, EXTENDED RELEASE ORAL
Qty: 30 TABLET | Refills: 5 | Status: SHIPPED | OUTPATIENT
Start: 2021-04-21 | End: 2021-10-28

## 2021-05-05 ENCOUNTER — IMMUNIZATIONS (OUTPATIENT)
Dept: FAMILY MEDICINE CLINIC | Facility: HOSPITAL | Age: 66
End: 2021-05-05

## 2021-05-05 DIAGNOSIS — Z23 ENCOUNTER FOR IMMUNIZATION: Primary | ICD-10-CM

## 2021-05-05 PROCEDURE — 0012A SARS-COV-2 / COVID-19 MRNA VACCINE (MODERNA) 100 MCG: CPT

## 2021-05-05 PROCEDURE — 91301 SARS-COV-2 / COVID-19 MRNA VACCINE (MODERNA) 100 MCG: CPT

## 2021-05-06 DIAGNOSIS — M51.36 LUMBAR DEGENERATIVE DISC DISEASE: ICD-10-CM

## 2021-05-06 DIAGNOSIS — M54.12 CERVICAL RADICULOPATHY: ICD-10-CM

## 2021-05-06 RX ORDER — GABAPENTIN 600 MG/1
TABLET ORAL
Qty: 90 TABLET | Refills: 1 | Status: SHIPPED | OUTPATIENT
Start: 2021-05-06 | End: 2021-06-16 | Stop reason: SDUPTHER

## 2021-05-20 ENCOUNTER — OFFICE VISIT (OUTPATIENT)
Dept: FAMILY MEDICINE CLINIC | Facility: CLINIC | Age: 66
End: 2021-05-20
Payer: MEDICARE

## 2021-05-20 VITALS
OXYGEN SATURATION: 95 % | SYSTOLIC BLOOD PRESSURE: 134 MMHG | BODY MASS INDEX: 30.65 KG/M2 | HEIGHT: 68 IN | TEMPERATURE: 98.7 F | WEIGHT: 202.2 LBS | DIASTOLIC BLOOD PRESSURE: 72 MMHG | HEART RATE: 67 BPM

## 2021-05-20 DIAGNOSIS — M79.661 RIGHT CALF PAIN: Primary | ICD-10-CM

## 2021-05-20 PROCEDURE — 99213 OFFICE O/P EST LOW 20 MIN: CPT | Performed by: FAMILY MEDICINE

## 2021-05-20 RX ORDER — METHOCARBAMOL 500 MG/1
500 TABLET, FILM COATED ORAL 2 TIMES DAILY
Qty: 40 TABLET | Refills: 1 | Status: SHIPPED | OUTPATIENT
Start: 2021-05-20 | End: 2021-05-24 | Stop reason: CLARIF

## 2021-05-20 RX ORDER — NAPROXEN 500 MG/1
500 TABLET ORAL 2 TIMES DAILY WITH MEALS
Qty: 30 TABLET | Refills: 0 | Status: SHIPPED | OUTPATIENT
Start: 2021-05-20 | End: 2021-10-07 | Stop reason: ALTCHOICE

## 2021-05-20 NOTE — PATIENT INSTRUCTIONS
Check stat duplex doppler R  LE - low suspicion  Based on hx and exam strongly suspect muscle spasm occurrences - Trial naprosyn and robaxin

## 2021-05-20 NOTE — PROGRESS NOTES
Assessment/Plan:    No problem-specific Assessment & Plan notes found for this encounter  Diagnoses and all orders for this visit:    Right calf pain  -     naproxen (NAPROSYN) 500 mg tablet; Take 1 tablet (500 mg total) by mouth 2 (two) times a day with meals  -     methocarbamol (ROBAXIN) 500 mg tablet; Take 1 tablet (500 mg total) by mouth 2 (two) times a day  -     VAS lower limb venous duplex study, unilateral/limited; Future          PHQ-9 Depression Screening    PHQ-9:   Frequency of the following problems over the past two weeks:      Little interest or pleasure in doing things: 0 - not at all  Feeling down, depressed, or hopeless: 0 - not at all  PHQ-2 Score: 0        BMI Counseling: Body mass index is 30 74 kg/m²  The BMI is above normal  Nutrition recommendations include reducing portion sizes, decreasing overall calorie intake, 3-5 servings of fruits/vegetables daily, reducing fast food intake, consuming healthier snacks and decreasing soda and/or juice intake  Exercise recommendations include vigorous aerobic physical activity for 75 minutes/week and exercising 3-5 times per week  Subjective:      Patient ID: Tom Arce is a 78 y o  male  Over past few months he has been experiencing pain in the right posterior calf  He notes it when he is lying in bed and about 3 times has had episodes of feeling like someone is stabbing him in the distal aspect of the calf  He gets up and can walk it off and it feels better  He does not experience this in any other place in any other muscle, its always the same isolated spot  No pleuritic chest pain, he has baseline shortness of breath from COPD but its no worse  No long plane or car rides, no sedentary, no recent surgeries  He is a current smoker  No known family hx of blood clots but he was adopted and is not familiar with family hx  No swelling to the leg, no warmth  No new or change in medications   He does have a lot chronic low back pain from disc disease and degenerative disease  He has had several TIM  No pain or N/T radiating down the right leg  This mornings episode has been the worst thus far  Leg Pain         The following portions of the patient's history were reviewed and updated as appropriate: allergies, current medications, past family history, past medical history, past social history, past surgical history and problem list     Review of Systems   Respiratory: Positive for shortness of breath  Negative for cough, chest tightness and wheezing  No hemoptysis   Cardiovascular: Negative for chest pain, palpitations and leg swelling  Musculoskeletal: Positive for back pain  Neurological: Negative for dizziness, syncope, light-headedness and headaches  Objective:    /72 (BP Location: Left arm, Patient Position: Sitting)   Pulse 67   Temp 98 7 °F (37 1 °C) (Tympanic)   Ht 5' 8" (1 727 m)   Wt 91 7 kg (202 lb 3 2 oz)   SpO2 95%   BMI 30 74 kg/m²      Physical Exam  Constitutional:       General: He is not in acute distress  Appearance: Normal appearance  He is normal weight  He is not ill-appearing or toxic-appearing  Cardiovascular:      Rate and Rhythm: Normal rate and regular rhythm  Pulses: Normal pulses  Heart sounds: Normal heart sounds  No murmur  Pulmonary:      Effort: Pulmonary effort is normal  No respiratory distress  Breath sounds: Normal breath sounds  Musculoskeletal:         General: No swelling or tenderness  Right lower leg: No edema  Left lower leg: No edema  Comments:  Very TTP distal medial calf musculature, no swelling, erythema, - John's, No posterior calf tenderness     Skin:     General: Skin is warm and dry  Findings: No erythema  Neurological:      General: No focal deficit present  Mental Status: He is alert and oriented to person, place, and time  Sensory: No sensory deficit

## 2021-05-21 ENCOUNTER — TELEPHONE (OUTPATIENT)
Dept: FAMILY MEDICINE CLINIC | Facility: CLINIC | Age: 66
End: 2021-05-21

## 2021-05-21 ENCOUNTER — HOSPITAL ENCOUNTER (OUTPATIENT)
Dept: NON INVASIVE DIAGNOSTICS | Facility: HOSPITAL | Age: 66
Discharge: HOME | End: 2021-05-21
Payer: MEDICARE

## 2021-05-21 DIAGNOSIS — M79.661 RIGHT CALF PAIN: ICD-10-CM

## 2021-05-21 PROCEDURE — 93971 EXTREMITY STUDY: CPT

## 2021-05-21 PROCEDURE — 93971 EXTREMITY STUDY: CPT | Performed by: SURGERY

## 2021-05-21 NOTE — TELEPHONE ENCOUNTER
Patient called in the robaxin is not covered by insurance and is to expensive is their an alternative you can call in       Please advise

## 2021-05-24 DIAGNOSIS — M62.838 MUSCLE SPASM: Primary | ICD-10-CM

## 2021-05-24 RX ORDER — CYCLOBENZAPRINE HCL 5 MG
5 TABLET ORAL 2 TIMES DAILY PRN
Qty: 40 TABLET | Refills: 0 | Status: SHIPPED | OUTPATIENT
Start: 2021-05-24 | End: 2021-10-07 | Stop reason: ALTCHOICE

## 2021-06-11 DIAGNOSIS — M51.36 LUMBAR DEGENERATIVE DISC DISEASE: ICD-10-CM

## 2021-06-11 DIAGNOSIS — G89.29 CHRONIC BILATERAL LOW BACK PAIN WITHOUT SCIATICA: ICD-10-CM

## 2021-06-11 DIAGNOSIS — M47.816 LUMBAR SPONDYLOSIS: ICD-10-CM

## 2021-06-11 DIAGNOSIS — M54.50 CHRONIC BILATERAL LOW BACK PAIN WITHOUT SCIATICA: ICD-10-CM

## 2021-06-11 DIAGNOSIS — M51.26 HNP (HERNIATED NUCLEUS PULPOSUS), LUMBAR: ICD-10-CM

## 2021-06-11 RX ORDER — OXYCODONE AND ACETAMINOPHEN 7.5; 325 MG/1; MG/1
1 TABLET ORAL EVERY 8 HOURS PRN
Qty: 12 TABLET | Refills: 0 | Status: SHIPPED | OUTPATIENT
Start: 2021-06-12 | End: 2021-06-16 | Stop reason: SDUPTHER

## 2021-06-11 NOTE — TELEPHONE ENCOUNTER
Pt contacted Call Center requested refill of their medication          Medication Name: oxyCODONE-acetaminophen (PERCOCET      Dosage of Med:7 5 -325 mg       Frequency of Med: 3xs a day       Remaining Medication: 4      Pharmacy and Location:    [address] [facility], [address] [place] [address]

## 2021-06-11 NOTE — TELEPHONE ENCOUNTER
Pt has 6/16 ov which was made at last ov  Can a short term script of percocet be sent to get pt to ov?     Last seen by RM 4/12

## 2021-06-16 ENCOUNTER — OFFICE VISIT (OUTPATIENT)
Dept: PAIN MEDICINE | Facility: CLINIC | Age: 66
End: 2021-06-16
Payer: MEDICARE

## 2021-06-16 VITALS
BODY MASS INDEX: 30.92 KG/M2 | SYSTOLIC BLOOD PRESSURE: 147 MMHG | WEIGHT: 204 LBS | DIASTOLIC BLOOD PRESSURE: 94 MMHG | HEIGHT: 68 IN | HEART RATE: 68 BPM

## 2021-06-16 DIAGNOSIS — M54.50 CHRONIC MIDLINE LOW BACK PAIN WITHOUT SCIATICA: ICD-10-CM

## 2021-06-16 DIAGNOSIS — M54.2 NECK PAIN: ICD-10-CM

## 2021-06-16 DIAGNOSIS — Z79.891 ENCOUNTER FOR LONG-TERM OPIATE ANALGESIC USE: ICD-10-CM

## 2021-06-16 DIAGNOSIS — G89.29 CHRONIC MIDLINE LOW BACK PAIN WITHOUT SCIATICA: ICD-10-CM

## 2021-06-16 DIAGNOSIS — M79.18 MYOFASCIAL PAIN SYNDROME: ICD-10-CM

## 2021-06-16 DIAGNOSIS — M47.816 LUMBAR SPONDYLOSIS: ICD-10-CM

## 2021-06-16 DIAGNOSIS — M47.812 CERVICAL SPONDYLOSIS WITHOUT MYELOPATHY: ICD-10-CM

## 2021-06-16 DIAGNOSIS — M51.36 LUMBAR DEGENERATIVE DISC DISEASE: ICD-10-CM

## 2021-06-16 DIAGNOSIS — F11.20 UNCOMPLICATED OPIOID DEPENDENCE (HCC): ICD-10-CM

## 2021-06-16 DIAGNOSIS — G89.4 CHRONIC PAIN SYNDROME: Primary | ICD-10-CM

## 2021-06-16 DIAGNOSIS — M54.12 CERVICAL RADICULOPATHY: ICD-10-CM

## 2021-06-16 PROCEDURE — 80305 DRUG TEST PRSMV DIR OPT OBS: CPT | Performed by: NURSE PRACTITIONER

## 2021-06-16 PROCEDURE — 99214 OFFICE O/P EST MOD 30 MIN: CPT | Performed by: NURSE PRACTITIONER

## 2021-06-16 RX ORDER — OXYCODONE AND ACETAMINOPHEN 7.5; 325 MG/1; MG/1
1 TABLET ORAL EVERY 8 HOURS PRN
Qty: 90 TABLET | Refills: 0 | Status: SHIPPED | OUTPATIENT
Start: 2021-06-16 | End: 2021-06-16 | Stop reason: SDUPTHER

## 2021-06-16 RX ORDER — GABAPENTIN 600 MG/1
600 TABLET ORAL 3 TIMES DAILY
Qty: 90 TABLET | Refills: 2 | Status: SHIPPED | OUTPATIENT
Start: 2021-06-16 | End: 2021-08-18 | Stop reason: SDUPTHER

## 2021-06-16 RX ORDER — OXYCODONE AND ACETAMINOPHEN 7.5; 325 MG/1; MG/1
1 TABLET ORAL EVERY 8 HOURS PRN
Qty: 90 TABLET | Refills: 0 | Status: SHIPPED | OUTPATIENT
Start: 2021-06-16 | End: 2021-08-13 | Stop reason: SDUPTHER

## 2021-06-16 NOTE — PATIENT INSTRUCTIONS
Opioid Safety   WHAT YOU NEED TO KNOW:   An opioid medicine is used to treat pain  Examples are oxycodone, morphine, fentanyl, or codeine  Pain control and management may help you rest, heal, and return to your daily activities  You and your family will receive information about how to manage your pain at home  The instructions will include what to do if you have side effects as your pain is managed  You will get information on how to handle opioid medicine safely  You will also get suggestions on how to control pain without opioids  It is important to follow all instructions so your pain is managed effectively  DISCHARGE INSTRUCTIONS:   Call your local emergency number (911 in the [place]), or have someone else call if:   · You have a seizure  · You cannot be woken  · You have trouble staying awake and your breathing is slow or shallow  · Your speech is slurred, or you are confused  · You are dizzy or stumble when you walk  Call your doctor, or have someone close to you call if:   · You are extremely drowsy, or you have trouble staying awake or speaking  · You have pale or clammy skin  · You have blue fingernails or lips  · Your heartbeat is slower than normal     · You cannot stop vomiting  · You have questions or concerns about your condition or care  Use opioids safely:   · Take prescribed opioids exactly as directed  Opioids come with directions based on the kind and how it is given  Talk to your healthcare provider or a pharmacist if you have any questions  Do not take more than the recommended amount  Too much can cause a life-threatening overdose  Do not continue to take it after your pain stops  You may develop tolerance  This means you keep needing higher doses to get the same effect  You may also develop opioid use disorder  This means you are not able to control your opioid use  · Do not give opioids to others or take opioids that belong to someone else    The kind or amount one person takes may not be right for another  The person you share them with may also be taking medicines that do not mix with opioids  He or she may drink alcohol or use other drugs that can cause life-threatening problems when mixed with opioids  · Do not mix opioids with other medicines or alcohol  The combination can cause an overdose, or cause you to stop breathing  Alcohol, sleeping pills, and medicines such as antihistamines can make you sleepy  A combination with opioids can lead to a coma  · Do not drive or operate heavy machinery after you use an opioid  You may feel drowsy or have trouble concentrating  You can injure yourself or others if you drive or use heavy machinery when you are not alert  Your provider or pharmacist can tell you how long to wait after a dose before you do these activities  · Talk to your healthcare provider if you have any side effects  Side effects include nausea, sleepiness, itching, and trouble thinking clearly  Your provider may need to make changes to the kind or amount of opioid you are taking  He or she can also help you find ways to prevent or relieve side effects  Manage constipation:  Constipation is the most common side effect of opioid medicine  Constipation is when you have hard, dry bowel movements, or you go longer than usual between bowel movements  Tell your healthcare provider about all changes in your bowel movements while you are taking opioids  He or she may recommend laxative medicine to help you have a bowel movement  He or she may also change the kind of opioid you are taking, or change when you take it  The following are more ways you can prevent or relieve constipation:  · Drink liquids as directed  You may need to drink extra liquids to help soften and move your bowels  Ask how much liquid to drink each day and which liquids are best for you  · Eat high-fiber foods    This may help decrease constipation by adding bulk to your bowel movements  High-fiber foods include fruits, vegetables, whole-grain breads and cereals, and beans  Your healthcare provider or dietitian can help you create a high-fiber meal plan  Your provider may also recommend a fiber supplement if you cannot get enough fiber from food  · Exercise regularly  Regular physical activity can help stimulate your intestines  Walking is a good exercise to prevent or relieve constipation  Ask which exercises are best for you  · Schedule a time each day to have a bowel movement  This may help train your body to have regular bowel movements  Bend forward while you are on the toilet to help move the bowel movement out  Sit on the toilet for at least 10 minutes, even if you do not have a bowel movement  Store opioids safely:   · Store opioids where others cannot easily get them  Keep them in a locked cabinet or secure area  Do not  keep them in a purse or other bag you carry with you  A person may be looking for something else and find the opioids  · Make sure opioids are stored out of the reach of children  A child can easily overdose on opioids  Opioids may look like candy to a small child  The best way to dispose of opioids: The laws vary by country and area  In the [place], the best way is to return the opioids through a take-back program  This program is offered by the [employer]  The following are options for using the program:  · Take the opioids to a [facility] collection site  The site is often a law enforcement center  Call your local law enforcement center for scheduled take-back days in your area  You will be given information on where to go if the collection site is in a different location  · Take the opioids to an approved pharmacy or hospital   A pharmacy or hospital may be set up as a collection site  You will need to ask if it is a [facility] collection site if you were not directed there   A pharmacy or doctor's office may not be able to take back opioids unless it is a [facility] site  · Use a mail-back system  This means you are given containers to put the opioids into  You will then mail them in the containers  · Use a take-back drop box  This is a place to leave the opioids at any time  People and animals will not be able to get into the box  Your local law enforcement agency can tell you where to find a drop box in your area  Other safe ways to dispose of opioids: The medicine may come with disposal instructions  The instructions may vary depending on the brand of medicine you are using  Instructions may come in a Medication Guide, but not every medicine has one  You may instead get instructions from your pharmacy or doctor  Follow instructions carefully  The following are general guidelines to follow:  · Find out if you can flush the opioid  Some opioids can be flushed down the toilet or poured into the sink  You will need to contact authorities in your area to see if this is an option for you  The FDA also offers a list of medicines that are safe to flush down the toilet  You can check the list if you cannot get the information for your local area  · Ask your waste management company about rules for putting opioids in the trash  The company will be able to give you specific directions  Scratch out personal information on the original medicine label so it cannot be read  Then put it in the trash  Do not label the trash or put any information on it about the opioids  It should look like regular household trash so no one is tempted to look for the opioids  Keep the trash out of the reach of children and animals  Always make sure trash is secure  · Talk to officials if you live in a facility  If you live in a nursing home or assisted living center, talk to an official  The person will know the rules for your area  Other ways to manage pain:   · Ask your healthcare provider about non-opioid medicines to control pain  Some medicines may even work better than opioids, depending on the cause of your pain  Nonprescription medicines include NSAIDs (such as ibuprofen) and acetaminophen  Prescription medicines include muscle relaxers, antidepressants, and steroids  · Pain may be managed without any medicines  Some ways to relieve pain include massage, aromatherapy, or meditation  Physical or occupational therapy may also help  For more information:   · Drug Enforcement Administration  [address] [facility] [address]  Phone: 6- 365 - 344-8021  Web Address: [email or web address]_[email or web address]    · [address] and Drug Administration  [place] , [address] [facility]  Phone: 6- 046 - 586-3271  Web Address: http://[employer]/  Follow up with your doctor or pain specialist as directed: You may need to have your dose adjusted  Your doctor or pain specialist can also help you find ways to manage pain without opioids  Write down your questions so you remember to ask them during your visits  © Copyright [address] [place] [address] Information is for End User's use only and may not be sold, redistributed or otherwise used for commercial purposes  All illustrations and images included in CareNotes® are the copyrighted property of [employer]  or Uma Levine  The above information is an  only  It is not intended as medical advice for individual conditions or treatments  Talk to your doctor, nurse or pharmacist before following any medical regimen to see if it is safe and effective for you

## 2021-06-16 NOTE — PROGRESS NOTES
Assessment:  1  Chronic pain syndrome    2  Chronic midline low back pain without sciatica    3  Lumbar degenerative disc disease    4  Lumbar spondylosis    5  Neck pain    6  Cervical radiculopathy    7  Cervical spondylosis without myelopathy    8  Myofascial pain syndrome    9  Encounter for long-term opiate analgesic use    10  Uncomplicated opioid dependence ([address] )        Plan:  While the patient was in the office today, I did have a thorough conversation regarding their chronic pain syndrome, medication management, and treatment plan options  Patient is being seen for 2 month follow-up visit  Overall, he reports that his pain remains pretty stable with his current medication regimen including Percocet and gabapentin  Medications reduces pain by up to 50%  He denies any significant side effects from these medications  Renewed Percocet 7 5/325 3 times daily if needed for pain  The patient's opioid scripts were sent to their pharmacy electronically and was given a 2 month supply of prescriptions with a Do Not Fill date(s) of   06/16/2021, 07/14/2021  continue gabapentin 600 mg 3 times daily to address the neuropathic component of his pain  A prescription was sent electronically to his pharmacy with refills  While the patient was in the office today an opioid contract was thoroughly reviewed and signed by the patient  The patient was given adequate time to ask questions in regards to the contract and a signed copy was sent home for his/her records  [place] Prescription Drug Monitoring Program report was reviewed and was appropriate     A urine drug screen was collected at today's office visit as part of our medication management protocol  The point of care testing results were appropriate for what was being prescribed  The specimen will be sent for confirmatory testing   The drug screen is medically necessary because the patient is either dependent on opioid medication or is being considered for opioid medication therapy and the results could impact ongoing or future treatment  The drug screen is to evaluate for the presences or absence of prescribed, non-prescribed, and/or illicit drugs/substances  There are risks associated with opioid medications, including dependence, addiction and tolerance  The patient understands and agrees to use these medications only as prescribed  Potential side effects of the medications include, but are not limited to, constipation, drowsiness, addiction, impaired judgment and risk of fatal overdose if not taken as prescribed  The patient was warned against driving while taking sedation medications  Sharing medications is a felony  At this point in time, the patient is showing no signs of addiction, abuse, diversion or suicidal ideation  The patient will follow-up in 8 weeks for medication prescription refill and reevaluation  The patient was advised to contact the office should their symptoms worsen in the interim  The patient was agreeable and verbalized an understanding  History of Present Illness: The patient is a 78 y o  male last seen on 4/12/21 who presents for a follow up office visit in regards to chronic pain secondary to  Chronic low back pain, lumbar spondylosis, lumbar herniated disc, muscle spasms  The patient currently reports  Complaints of low back pain  Current pain level is a 5/10  Quality pain is described as dull, aching, sharp, throbbing  Current pain medications includes:  Oxycodone 5/325 3 times daily as needed for pain, gabapentin 600 mg 3 times daily    The patient reports that this regimen is providing 50% pain relief  The patient is reporting no side effects from this pain medication regimen      Pain Contract Signed: 6/16/2021  Last Urine Drug Screen: 6/16/2021    I have personally reviewed and/or updated the patient's past medical history, past surgical history, family history, social history, current medications, allergies, and vital signs today  Review of Systems:    Review of Systems   Constitutional: Negative for fatigue  HENT: Negative for ear pain and hearing loss  Eyes: Negative for redness  Respiratory: Positive for shortness of breath  Negative for cough  Cardiovascular: Negative for leg swelling  Gastrointestinal: Negative for anal bleeding and rectal pain  Endocrine: Negative for polydipsia  Genitourinary: Negative for hematuria  Musculoskeletal: Positive for arthralgias, back pain, gait problem and neck pain  Negative for joint swelling  Skin: Negative for rash  Neurological: Positive for dizziness  Negative for headaches  Hematological: Does not bruise/bleed easily  Psychiatric/Behavioral: Negative for behavioral problems and hallucinations  Past Medical History:   Diagnosis Date    Abdominal [facility] rupture [facility] [address] )     Abdominal Aortic Duplex 02/21/2017    Ectatic infrarenal abdominal aorta   CAD (coronary artery disease)     [facility]     Extremity pain     History of echocardiogram 03/18/2014    EF 55%, mild MR and AI  Mild concentric LVH   History of stress test 03/06/2017    Normal     Hypertension     Joint pain     Low back pain     Migraine     Neck pain     Obstructive sleep apnea     cannot tolerate CPAP    Osteoarthritis     Peripheral neuropathy     Reflex sympathetic dystrophy        Past Surgical History:   Procedure Laterality Date    CARDIAC CATHETERIZATION  02/13/2012    EF 70%, widely patent renal arteries, significant single-vessel CAD-medical therapy   CARDIAC CATHETERIZATION  04/11/2013    EF 65%, 50% mid LAD, 20% prox CFX, 90% diffuse RCA, 99% mid RCA  Medical management      CHOLECYSTECTOMY      EPIDURAL BLOCK INJECTION Bilateral 8/15/2019    Procedure: BLOCK / INJECTION EPIDURAL STEROID CERVICAL;  Surgeon: Carroll Bedolla MD;  Location: [facility];  Service: Pain Management     FL GUIDED NEEDLE PLAC BX/ASP/INJ  8/15/2019    ORTHOPEDIC SURGERY      TRIGGER POINT INJECTION         Family History   Adopted: Yes   Problem Relation Age of Onset    No Known Problems Family     No Known Problems Mother     No Known Problems Father        Social History     Occupational History    Not on file   Tobacco Use    Smoking status: Current Every Day Smoker     Packs/day: 1 50    Smokeless tobacco: Never Used   Vaping Use    Vaping Use: Never used   Substance and Sexual Activity    Alcohol use: No    Drug use: No    Sexual activity: Not on file         Current Outpatient Medications:     amLODIPine (NORVASC) 10 mg tablet, swallow 1 tablet once daily, Disp: 90 tablet, Rfl: 3    aspirin (ECOTRIN LOW STRENGTH) 81 mg EC tablet, Take 1 tablet (81 mg total) by mouth daily, Disp: 30 tablet, Rfl: 5    cyclobenzaprine (FLEXERIL) 5 mg tablet, Take 1 tablet (5 mg total) by mouth 2 (two) times a day as needed for muscle spasms, Disp: 40 tablet, Rfl: 0    gabapentin (NEURONTIN) 600 MG tablet, Take 1 tablet (600 mg total) by mouth 3 (three) times a day, Disp: 90 tablet, Rfl: 2    lisinopril (ZESTRIL) 20 mg tablet, Take 1 tablet (20 mg total) by mouth daily, Disp: 60 tablet, Rfl: 5    metoprolol succinate (TOPROL-XL) 100 mg 24 hr tablet, take 1 tablet by mouth once daily, Disp: 30 tablet, Rfl: 5    oxyCODONE-acetaminophen (PERCOCET) 7 5-325 MG per tablet, Take 1 tablet by mouth every 8 (eight) hours as needed for moderate pain for up to 4 days Do not fill until 7/14/2021Max Daily Amount: 3 tablets, Disp: 90 tablet, Rfl: 0    rosuvastatin (CRESTOR) 10 MG tablet, Take 1 tablet (10 mg total) by mouth daily, Disp: 30 tablet, Rfl: 5    sildenafil (VIAGRA) 100 mg tablet, Take 1 tablet (100 mg total) by mouth as needed for erectile dysfunction, Disp: 10 tablet, Rfl: 6    tamsulosin (FLOMAX) 0 4 mg, take 1 capsule by mouth once daily WITH DINNER, Disp: 30 capsule, Rfl: 2    topiramate (TOPAMAX) 25 mg tablet, FOLLOW INSTRUCTIONS GIVEN TO TITRATE UP TO A MAX OF 2 TABLETS BY MOUTH TWICE DAILY AS TOLERATED AND TO LEVEL OF BENIFIT FOR TREMORS, Disp: 120 tablet, Rfl: 2    diphenhydrAMINE-acetaminophen (TYLENOL PM)  MG TABS, Take 2 tablets by mouth daily at bedtime as needed for sleep, Disp: , Rfl:     hydrochlorothiazide (HYDRODIURIL) 25 mg tablet, 1 daily as needed for high blood pressure readings (Patient not taking: Reported on 4/12/2021), Disp: 60 tablet, Rfl: 3    metoprolol succinate (TOPROL-XL) 50 mg 24 hr tablet, Take 1 tablet (50 mg total) by mouth daily (Patient not taking: Reported on 5/20/2021), Disp: 30 tablet, Rfl: 5    naproxen (NAPROSYN) 500 mg tablet, Take 1 tablet (500 mg total) by mouth 2 (two) times a day with meals (Patient not taking: Reported on 6/16/2021), Disp: 30 tablet, Rfl: 0    nitroglycerin (NITROSTAT) 0 4 mg SL tablet, Place 1 tablet (0 4 mg total) under the tongue every 5 (five) minutes as needed for chest pain, Disp: 25 tablet, Rfl: 5    traZODone (DESYREL) 50 mg tablet, take 1 tablet by mouth at bedtime (Patient not taking: Reported on 5/20/2021), Disp: 30 tablet, Rfl: 6    No Known Allergies    Physical Exam:    /94 (BP Location: Right arm, Patient Position: Sitting, Cuff Size: Standard)   Pulse 68   Ht 5' 8" (1 727 m)   Wt 92 5 kg (204 lb)   BMI 31 02 kg/m²     Constitutional:normal, well developed, well nourished, alert, in no distress and non-toxic and no overt pain behavior    Eyes:anicteric  HEENT:grossly intact  Neck:supple, symmetric, trachea midline and no masses   Pulmonary:even and unlabored  Cardiovascular:No edema or pitting edema present  Skin:Normal without rashes or lesions and well hydrated  Psychiatric:Mood and affect appropriate  Neurologic:Cranial Nerves II-XII grossly intact  Musculoskeletal:normal      Imaging  No orders to display         Orders Placed This Encounter   Procedures    MM ALL_Prescribed Meds and Special Instructions    MM DT_Alprazolam Definitive Test    MM DT_Amphetamine Definitive Test    MM DT_Benzodiazepines Screen    MM DT_Buprenorphine Definitive Test    MM DT_Butalbital Definitive Test    MM DT_Citalopram/Escitalopram Definitive Test    MM DT_Clonazepam Definitive Test    MM DT_Cocaine Definitive Test    MM DT_Codeine Definitive Test    MM Diazepam Definitive Test    MM DT_Ethyl Glucuronide/Ethyl Sulfate Definitive Test    MM DT_Fentanyl Definitive Test    MM DT_Heroin Definitive Test    MM DT_Hydrocodone Definitive Test    MM DT_Hydromorphone Definitive Test    MM Lorazepam Definitive Test    MM DT_MDMA Definitive Test    MM DT_Methadone Definitive Test    MM DT_Methamphetamine Definitive Test    MM DT_Morphine Definitive Test    MM DT_Oxazepam Definitive Test    MM DT_Oxycodone Definitive Test    MM DT_Oxymorphone Definitive Test    MM DT_Phentermine Definitive Test    MM DT_Tapentadol Definitive Test    MM DT_Temazapam Definitive Test    MM DT_THC Definitive Test    MM DT_Tramadol Definitive Test    MM DT_Validity Creatinine    MM DT_Validity Oxidant    MM DT_Validity pH    MM DT_Validity Specific

## 2021-06-17 ENCOUNTER — OFFICE VISIT (OUTPATIENT)
Dept: FAMILY MEDICINE CLINIC | Facility: CLINIC | Age: 66
End: 2021-06-17
Payer: MEDICARE

## 2021-06-17 VITALS
BODY MASS INDEX: 30.98 KG/M2 | DIASTOLIC BLOOD PRESSURE: 89 MMHG | WEIGHT: 204.4 LBS | HEART RATE: 65 BPM | HEIGHT: 68 IN | TEMPERATURE: 97 F | SYSTOLIC BLOOD PRESSURE: 134 MMHG | OXYGEN SATURATION: 97 %

## 2021-06-17 DIAGNOSIS — Z12.5 SCREENING FOR PROSTATE CANCER: ICD-10-CM

## 2021-06-17 DIAGNOSIS — Z00.00 MEDICARE ANNUAL WELLNESS VISIT, SUBSEQUENT: Primary | ICD-10-CM

## 2021-06-17 DIAGNOSIS — E78.00 HYPERCHOLESTEROLEMIA: ICD-10-CM

## 2021-06-17 DIAGNOSIS — F17.200 SMOKING: ICD-10-CM

## 2021-06-17 DIAGNOSIS — I10 ESSENTIAL HYPERTENSION: ICD-10-CM

## 2021-06-17 PROCEDURE — 1123F ACP DISCUSS/DSCN MKR DOCD: CPT | Performed by: FAMILY MEDICINE

## 2021-06-17 PROCEDURE — 99214 OFFICE O/P EST MOD 30 MIN: CPT | Performed by: FAMILY MEDICINE

## 2021-06-17 PROCEDURE — G0438 PPPS, INITIAL VISIT: HCPCS | Performed by: FAMILY MEDICINE

## 2021-06-17 NOTE — PROGRESS NOTES
Assessment and Plan:     Problem List Items Addressed This Visit     None      Visit Diagnoses     Medicare annual wellness visit, subsequent    -  Primary           Preventive health issues were discussed with patient, and age appropriate screening tests were ordered as noted in patient's After Visit Summary  Personalized health advice and appropriate referrals for health education or preventive services given if needed, as noted in patient's After Visit Summary  History of Present Illness:     Patient presents for Medicare Annual Wellness visit    Patient Care Team:  Charlie Kowalski DO as PCP - General (Family Medicine)  Julian Shabazz MD (Pain Medicine)     Problem List:     Patient Active Problem List   Diagnosis    JAKI (obstructive sleep apnea)    Chronic bronchitis ([facility] [address] )    [facility] (AAA[facility] ([facility])    Lumbar spondylosis    Lumbar degenerative disc disease    Myofascial pain syndrome    Chronic low back pain without sciatica    Cervical radiculopathy    Cervical spondylosis without myelopathy    Tremor, essential    Negative depression screening    Chronic pain of both knees    Class 1 obesity due to excess calories with serious comorbidity and body mass index (BMI) of 31 0 to 31 9 in adult    Smoking    Hypertension    Chronic pain syndrome    Uncomplicated opioid dependence ([facility] [address] )    Encounter for long-term opiate analgesic use    Neck pain      Past Medical and Surgical History:     Past Medical History:   Diagnosis Date    Abdominal aortic aneurysm without rupture ([facility] [address] )     Abdominal Aortic Duplex 02/21/2017    Ectatic infrarenal abdominal aorta   CAD (coronary artery disease)     COPD (chronic obstructive pulmonary disease) ([facility])     Extremity pain     History of echocardiogram 03/18/2014    EF 55%, mild MR and AI  Mild concentric LVH      History of stress test 03/06/2017    Normal     Hypertension     Joint pain     Low back pain  Migraine     Neck pain     Obstructive sleep apnea     cannot tolerate CPAP    Osteoarthritis     Peripheral neuropathy     Reflex sympathetic dystrophy      Past Surgical History:   Procedure Laterality Date    CARDIAC CATHETERIZATION  02/13/2012    EF 70%, widely patent renal arteries, significant single-vessel CAD-medical therapy   CARDIAC CATHETERIZATION  04/11/2013    EF 65%, 50% mid LAD, 20% prox CFX, 90% diffuse RCA, 99% mid RCA  Medical management   CHOLECYSTECTOMY      EPIDURAL BLOCK INJECTION Bilateral 8/15/2019    Procedure: BLOCK / INJECTION EPIDURAL STEROID CERVICAL;  Surgeon: Paula Alexander MD;  Location: [facility];  Service: Pain Management     FL GUIDED NEEDLE PLAC BX/ASP/INJ  8/15/2019    ORTHOPEDIC SURGERY      TRIGGER POINT INJECTION        Family History:     Family History   Adopted: Yes   Problem Relation Age of Onset    No Known Problems Family     No Known Problems Mother     No Known Problems Father       Social History:     Social History     Socioeconomic History    Marital status: /Civil Union     Spouse name: None    Number of children: None    Years of education: None    Highest education level: None   Occupational History    None   Tobacco Use    Smoking status: Current Every Day Smoker     Packs/day: 1 50    Smokeless tobacco: Never Used   Vaping Use    Vaping Use: Never used   Substance and Sexual Activity    Alcohol use: No    Drug use: No    Sexual activity: None   Other Topics Concern    None   Social History Narrative    None     Social Determinants of Health     Financial Resource Strain:     Difficulty of Paying Living Expenses:    Food Insecurity:     Worried About Running Out of Food in the Last Year:     Ran Out of Food in the Last Year:    Transportation Needs:     Lack of Transportation (Medical):      Lack of Transportation (Non-Medical):    Physical Activity:     Days of Exercise per Week:     Minutes of Exercise per Session:    Stress:     Feeling of Stress :    Social Connections:     Frequency of Communication with Friends and Family:     Frequency of Social Gatherings with Friends and Family:     Attends Yazdanism Services:     Active Member of Clubs or Organizations:     Attends Club or Organization Meetings:     Marital Status:    Intimate Partner Violence:     Fear of Current or Ex-Partner:     Emotionally Abused:     Physically Abused:     Sexually Abused:       Medications and Allergies:     Current Outpatient Medications   Medication Sig Dispense Refill    amLODIPine (NORVASC) 10 mg tablet swallow 1 tablet once daily 90 tablet 3    aspirin (ECOTRIN LOW STRENGTH) 81 mg EC tablet Take 1 tablet (81 mg total) by mouth daily 30 tablet 5    cyclobenzaprine (FLEXERIL) 5 mg tablet Take 1 tablet (5 mg total) by mouth 2 (two) times a day as needed for muscle spasms 40 tablet 0    diphenhydrAMINE-acetaminophen (TYLENOL PM)  MG TABS Take 2 tablets by mouth daily at bedtime as needed for sleep      gabapentin (NEURONTIN) 600 MG tablet Take 1 tablet (600 mg total) by mouth 3 (three) times a day 90 tablet 2    lisinopril (ZESTRIL) 20 mg tablet Take 1 tablet (20 mg total) by mouth daily 60 tablet 5    metoprolol succinate (TOPROL-XL) 100 mg 24 hr tablet take 1 tablet by mouth once daily 30 tablet 5    nitroglycerin (NITROSTAT) 0 4 mg SL tablet Place 1 tablet (0 4 mg total) under the tongue every 5 (five) minutes as needed for chest pain 25 tablet 5    oxyCODONE-acetaminophen (PERCOCET) 7 5-325 MG per tablet Take 1 tablet by mouth every 8 (eight) hours as needed for moderate pain for up to 4 days Do not fill until 7/14/2021Max Daily Amount: 3 tablets 90 tablet 0    rosuvastatin (CRESTOR) 10 MG tablet Take 1 tablet (10 mg total) by mouth daily 30 tablet 5    sildenafil (VIAGRA) 100 mg tablet Take 1 tablet (100 mg total) by mouth as needed for erectile dysfunction 10 tablet 6    tamsulosin (FLOMAX) 0 4 mg take 1 capsule by mouth once daily WITH DINNER 30 capsule 2    topiramate (TOPAMAX) 25 mg tablet FOLLOW INSTRUCTIONS GIVEN TO TITRATE UP TO A MAX OF 2 TABLETS BY MOUTH TWICE DAILY AS TOLERATED AND TO LEVEL OF BENIFIT FOR TREMORS 120 tablet 2    traZODone (DESYREL) 50 mg tablet take 1 tablet by mouth at bedtime 30 tablet 6    hydrochlorothiazide (HYDRODIURIL) 25 mg tablet 1 daily as needed for high blood pressure readings (Patient not taking: Reported on 4/12/2021) 60 tablet 3    metoprolol succinate (TOPROL-XL) 50 mg 24 hr tablet Take 1 tablet (50 mg total) by mouth daily (Patient not taking: Reported on 5/20/2021) 30 tablet 5    naproxen (NAPROSYN) 500 mg tablet Take 1 tablet (500 mg total) by mouth 2 (two) times a day with meals (Patient not taking: Reported on 6/16/2021) 30 tablet 0     No current facility-administered medications for this visit  No Known Allergies   Immunizations:     Immunization History   Administered Date(s) Administered    SARS-CoV-2 / COVID-19 mRNA IM (Moderna) 04/06/2021, 05/05/2021    Td (adult), adsorbed 01/01/2011      Health Maintenance:         Topic Date Due    Colorectal Cancer Screening  Never done    Hepatitis C Screening  Completed         Topic Date Due    Pneumococcal Vaccine: 65+ Years (1 of 2 - PPSV23) Never done    DTaP,Tdap,and Td Vaccines (1 - Tdap) 05/18/1975    Influenza Vaccine (Season Ended) 09/01/2021      Medicare Health Risk Assessment:     /89   Pulse 65   Temp (!) 97 °F (36 1 °C) (Tympanic)   Ht 5' 8" (1 727 m)   Wt 92 7 kg (204 lb 6 4 oz)   SpO2 97%   BMI 31 08 kg/m²      Israel Fortune is here for his Subsequent Wellness visit  Last Medicare Wellness visit information reviewed, patient interviewed and updates made to the record today  Health Risk Assessment:   Patient rates overall health as good  Patient feels that their physical health rating is same  Patient is satisfied with their life  Eyesight was rated as same   Hearing was rated as same  Patient feels that their emotional and mental health rating is same  Patients states they are sometimes angry  Patient states they are sometimes unusually tired/fatigued  Pain experienced in the last 7 days has been some  Patient's pain rating has been 2/10  Patient states that he has experienced no weight loss or gain in last 6 months  Depression Screening:   PHQ-2 Score: 0      Fall Risk Screening: In the past year, patient has experienced: no history of falling in past year      Home Safety:  Patient does not have trouble with stairs inside or outside of their home  Patient has working smoke alarms and has working carbon monoxide detector  Home safety hazards include: none  Nutrition:   Current diet is Regular  Medications:   Patient is not currently taking any over-the-counter supplements  Patient is able to manage medications  Activities of Daily Living (ADLs)/Instrumental Activities of Daily Living (IADLs):   Walk and transfer into and out of bed and chair?: Yes  Dress and groom yourself?: Yes    Bathe or shower yourself?: Yes    Feed yourself?  Yes  Do your laundry/housekeeping?: Yes  Manage your money, pay your bills and track your expenses?: Yes  Make your own meals?: Yes    Do your own shopping?: Yes    Previous Hospitalizations:   Any hospitalizations or ED visits within the last 12 months?: No      Advance Care Planning:   Living will: No    Durable POA for healthcare: No    Advanced directive: No    Advanced directive counseling given: Yes    Five wishes given: Yes    Patient declined ACP directive: No    End of Life Decisions reviewed with patient: No    Provider agrees with end of life decisions: No      Cognitive Screening:   Provider or family/friend/caregiver concerned regarding cognition?: No    PREVENTIVE SCREENINGS      Cardiovascular Screening:    General: Screening Not Indicated and History Lipid Disorder      Diabetes Screening:     General: Screening Not Indicated      Colorectal Cancer Screening:     General: Screening Not Indicated      Prostate Cancer Screening:    General: Screening Not Indicated      Osteoporosis Screening:    General: Screening Not Indicated      Abdominal Aortic Aneurysm (AAA) Screening:    Risk factors include: age between 73-69 yo and tobacco use        General: Screening Not Indicated and History AAA      Lung Cancer Screening:     General: Screening Not Indicated      Hepatitis C Screening:    General: Screening Current    Screening, Brief Intervention, and Referral to Treatment (SBIRT)    Screening  Typical number of drinks in a day: 0  Typical number of drinks in a week: 0  Interpretation: Low risk drinking behavior      Single Item Drug Screening:  How often have you used an illegal drug (including marijuana) or a prescription medication for non-medical reasons in the past year? never    Single Item Drug Screen Score: 0  Interpretation: Negative screen for possible drug use disorder    Review of Current Opioid Use    Opioid Risk Tool (ORT) Interpretation: Complete Opioid Risk Tool (ORT)      Mari Hebert DO

## 2021-06-17 NOTE — PROGRESS NOTES
Assessment/Plan:    No problem-specific Assessment & Plan notes found for this encounter  Diagnoses and all orders for this visit:    Medicare annual wellness visit, subsequent    Essential hypertension  Comments:  no change in the medication  Orders:  -     CBC and differential; Future    Smoking  -     CT lung screening program; Future    Screening for prostate cancer  -     PSA, Total Screen; Future    Hypercholesterolemia  Comments:  pt counseled on diet and exercise  Orders:  -     Comprehensive metabolic panel; Future  -     Lipid panel; Future  -     TSH, 3rd generation with Free T4 reflex; Future          PHQ-9 Depression Screening    PHQ-9:   Frequency of the following problems over the past two weeks:      Little interest or pleasure in doing things: 0 - not at all  Feeling down, depressed, or hopeless: 0 - not at all  PHQ-2 Score: 0            Subjective:      Patient ID: Consuelo Antoine is a 78 y o  male  Hypertension  This is a chronic problem  The current episode started more than 1 year ago  The problem is unchanged  The problem is controlled  Associated symptoms include shortness of breath  Pertinent negatives include no chest pain or palpitations  There are no associated agents to hypertension  Risk factors for coronary artery disease include obesity and sedentary lifestyle  Past treatments include beta blockers and calcium channel blockers  Compliance problems include diet and exercise  The following portions of the patient's history were reviewed and updated as appropriate: allergies, current medications, past family history, past medical history, past social history, past surgical history and problem list     Review of Systems   Constitutional: Negative  Negative for chills, fatigue and fever  HENT: Negative  Negative for hearing loss  Eyes: Negative  Negative for visual disturbance  Respiratory: Positive for shortness of breath  Negative for wheezing  Cardiovascular: Negative for chest pain and palpitations  Gastrointestinal: Negative for abdominal pain, blood in stool, constipation, diarrhea, nausea and vomiting  Endocrine: Negative  Genitourinary: Negative for difficulty urinating and dysuria  Musculoskeletal: Negative for arthralgias and myalgias  Skin: Negative  Allergic/Immunologic: Negative  Neurological: Negative for seizures and syncope  Hematological: Negative for adenopathy  Psychiatric/Behavioral: Negative  Objective:    /89   Pulse 65   Temp (!) 97 °F (36 1 °C) (Tympanic)   Ht 5' 8" (1 727 m)   Wt 92 7 kg (204 lb 6 4 oz)   SpO2 97%   BMI 31 08 kg/m²      Physical Exam  Vitals and nursing note reviewed  Constitutional:       General: He is not in acute distress  Appearance: Normal appearance  He is well-developed  He is not ill-appearing, toxic-appearing or diaphoretic  HENT:      Head: Normocephalic and atraumatic  Right Ear: Tympanic membrane, ear canal and external ear normal  There is no impacted cerumen  Left Ear: Tympanic membrane, ear canal and external ear normal  There is no impacted cerumen  Nose: Nose normal  No congestion or rhinorrhea  Mouth/Throat:      Mouth: Mucous membranes are moist       Pharynx: Oropharynx is clear  No oropharyngeal exudate or posterior oropharyngeal erythema  Eyes:      General: No scleral icterus  Right eye: No discharge  Left eye: No discharge  Extraocular Movements: Extraocular movements intact  Conjunctiva/sclera: Conjunctivae normal       Pupils: Pupils are equal, round, and reactive to light  Cardiovascular:      Rate and Rhythm: Normal rate and regular rhythm  Pulses: Normal pulses  Heart sounds: Normal heart sounds  No murmur heard  No friction rub  No gallop  Pulmonary:      Effort: Pulmonary effort is normal  No respiratory distress  Breath sounds: Normal breath sounds   No wheezing, rhonchi or rales  Abdominal:      General: Bowel sounds are normal  There is no distension  Palpations: Abdomen is soft  There is no mass  Tenderness: There is no abdominal tenderness  There is no guarding or rebound  Musculoskeletal:         General: No swelling or deformity  Normal range of motion  Cervical back: Normal range of motion and neck supple  No rigidity  Right lower leg: No edema  Left lower leg: No edema  Lymphadenopathy:      Cervical: No cervical adenopathy  Skin:     General: Skin is warm and dry  Findings: No rash  Neurological:      General: No focal deficit present  Mental Status: He is alert and oriented to person, place, and time  Sensory: No sensory deficit  Motor: No weakness  Coordination: Coordination normal       Gait: Gait normal       Deep Tendon Reflexes: Reflexes are normal and symmetric  Reflexes normal    Psychiatric:         Mood and Affect: Mood normal          Behavior: Behavior normal          Thought Content:  Thought content normal          Judgment: Judgment normal

## 2021-06-17 NOTE — PATIENT INSTRUCTIONS
Medicare Preventive Visit Patient Instructions  Thank you for completing your Welcome to Medicare Visit or Medicare Annual Wellness Visit today  Your next wellness visit will be due in one year (6/18/2022)  The screening/preventive services that you may require over the next 5-10 years are detailed below  Some tests may not apply to you based off risk factors and/or age  Screening tests ordered at today's visit but not completed yet may show as past due  Also, please note that scanned in results may not display below  Preventive Screenings:  Service Recommendations Previous Testing/Comments   Colorectal Cancer Screening  · Colonoscopy    · Fecal Occult Blood Test (FOBT)/Fecal Immunochemical Test (FIT)  · Fecal DNA/Cologuard Test  · Flexible Sigmoidoscopy Age: 54-65 years old   Colonoscopy: every 10 years (May be performed more frequently if at higher risk)  OR  FOBT/FIT: every 1 year  OR  Cologuard: every 3 years  OR  Sigmoidoscopy: every 5 years  Screening may be recommended earlier than age 48 if at higher risk for colorectal cancer  Also, an individualized decision between you and your healthcare provider will decide whether screening between the ages of 74-80 would be appropriate   Colonoscopy: Not on file  FOBT/FIT: Not on file  Cologuard: Not on file  Sigmoidoscopy: Not on file    Screening Not Indicated     Prostate Cancer Screening Individualized decision between patient and health care provider in men between ages of 53-78   Medicare will cover every 12 months beginning on the day after your 50th birthday PSA: 2 6 ng/mL     Screening Not Indicated     Hepatitis C Screening Once for adults born between 1945 and 1965  More frequently in patients at high risk for Hepatitis C Hep C Antibody: 08/30/2019    Screening Current   Diabetes Screening 1-2 times per year if you're at risk for diabetes or have pre-diabetes Fasting glucose: No results in last 5 years   A1C: No results in last 5 years    Screening Not Indicated   Cholesterol Screening Once every 5 years if you don't have a lipid disorder  May order more often based on risk factors  Lipid panel: 03/19/2019    Screening Not Indicated  History Lipid Disorder      Other Preventive Screenings Covered by Medicare:  1  Abdominal Aortic Aneurysm (AAA) Screening: covered once if your at risk  You're considered to be at risk if you have a family history of AAA or a male between the age of 73-68 who smoking at least 100 cigarettes in your lifetime  2  Lung Cancer Screening: covers low dose CT scan once per year if you meet all of the following conditions: (1) Age 50-69; (2) No signs or symptoms of lung cancer; (3) Current smoker or have quit smoking within the last 15 years; (4) You have a tobacco smoking history of at least 30 pack years (packs per day x number of years you smoked); (5) You get a written order from a healthcare provider  3  Glaucoma Screening: covered annually if you're considered high risk: (1) You have diabetes OR (2) Family history of glaucoma OR (3)  aged 48 and older OR (3)  American aged 72 and older  3  Osteoporosis Screening: covered every 2 years if you meet one of the following conditions: (1) Have a vertebral abnormality; (2) On glucocorticoid therapy for more than 3 months; (3) Have primary hyperparathyroidism; (4) On osteoporosis medications and need to assess response to drug therapy  5  HIV Screening: covered annually if you're between the age of 12-76  Also covered annually if you are younger than 13 and older than 72 with risk factors for HIV infection  For pregnant patients, it is covered up to 3 times per pregnancy      Immunizations:  Immunization Recommendations   Influenza Vaccine Annual influenza vaccination during flu season is recommended for all persons aged >= 6 months who do not have contraindications   Pneumococcal Vaccine (Prevnar and Pneumovax)  * Prevnar = PCV13  * Pneumovax = PPSV23 Adults 19-64 years old: 1-3 doses may be recommended based on certain risk factors  Adults 72 years old: Prevnar (PCV13) vaccine recommended followed by Pneumovax (PPSV23) vaccine  If already received PPSV23 since turning 65, then PCV13 recommended at least one year after PPSV23 dose  Hepatitis B Vaccine 3 dose series if at intermediate or high risk (ex: diabetes, end stage renal disease, liver disease)   Tetanus (Td) Vaccine - COST NOT COVERED BY MEDICARE PART B Following completion of primary series, a booster dose should be given every 10 years to maintain immunity against tetanus  Td may also be given as tetanus wound prophylaxis  Tdap Vaccine - COST NOT COVERED BY MEDICARE PART B Recommended at least once for all adults  For pregnant patients, recommended with each pregnancy  Shingles Vaccine (Shingrix) - COST NOT COVERED BY MEDICARE PART B  2 shot series recommended in those aged 48 and above     Health Maintenance Due:      Topic Date Due    Colorectal Cancer Screening  Never done    Hepatitis C Screening  Completed     Immunizations Due:      Topic Date Due    Pneumococcal Vaccine: 65+ Years (1 of 2 - PPSV23) Never done    DTaP,Tdap,and Td Vaccines (1 - Tdap) 05/18/1975    Influenza Vaccine (Season Ended) 09/01/2021     Advance Directives   What are advance directives? Advance directives are legal documents that state your wishes and plans for medical care  These plans are made ahead of time in case you lose your ability to make decisions for yourself  Advance directives can apply to any medical decision, such as the treatments you want, and if you want to donate organs  What are the types of advance directives? There are many types of advance directives, and each state has rules about how to use them  You may choose a combination of any of the following:  · Living will: This is a written record of the treatment you want   You can also choose which treatments you do not want, which to limit, and which to stop at a certain time  This includes surgery, medicine, IV fluid, and tube feedings  · Durable power of  for healthcare [facility]): This is a written record that states who you want to make healthcare choices for you when you are unable to make them for yourself  This person, called a proxy, is usually a family member or a friend  You may choose more than 1 proxy  · Do not resuscitate (DNR) order:  A DNR order is used in case your heart stops beating or you stop breathing  It is a request not to have certain forms of treatment, such as CPR  A DNR order may be included in other types of advance directives  · Medical directive: This covers the care that you want if you are in a coma, near death, or unable to make decisions for yourself  You can list the treatments you want for each condition  Treatment may include pain medicine, surgery, blood transfusions, dialysis, IV or tube feedings, and a ventilator (breathing machine)  · Values history: This document has questions about your views, beliefs, and how you feel and think about life  This information can help others choose the care that you would choose  Why are advance directives important? An advance directive helps you control your care  Although spoken wishes may be used, it is better to have your wishes written down  Spoken wishes can be misunderstood, or not followed  Treatments may be given even if you do not want them  An advance directive may make it easier for your family to make difficult choices about your care  Cigarette Smoking and Your Health   Risks to your health if you smoke:  Nicotine and other chemicals found in tobacco damage every cell in your body  Even if you are a light smoker, you have an increased risk for cancer, heart disease, and lung disease  If you are pregnant or have diabetes, smoking increases your risk for complications     Benefits to your health if you stop smoking:   · You decrease respiratory symptoms such as coughing, wheezing, and shortness of breath  · You reduce your risk for cancers of the lung, mouth, throat, kidney, bladder, pancreas, stomach, and cervix  If you already have cancer, you increase the benefits of chemotherapy  You also reduce your risk for cancer returning or a second cancer from developing  · You reduce your risk for heart disease, blood clots, heart attack, and stroke  · You reduce your risk for lung infections, and diseases such as pneumonia, asthma, chronic bronchitis, and emphysema  · Your circulation improves  More oxygen can be delivered to your body  If you have diabetes, you lower your risk for complications, such as kidney, artery, and eye diseases  You also lower your risk for nerve damage  Nerve damage can lead to amputations, poor vision, and blindness  · You improve your body's ability to heal and to fight infections  For more information and support to stop smoking:   · [place]  Phone: 3- 552 - 027-9252  Web Address: [facility]  Weight Management   Why it is important to manage your weight:  Being overweight increases your risk of health conditions such as heart disease, high blood pressure, type 2 diabetes, and certain types of cancer  It can also increase your risk for osteoarthritis, sleep apnea, and other respiratory problems  Aim for a slow, steady weight loss  Even a small amount of weight loss can lower your risk of health problems  How to lose weight safely:  A safe and healthy way to lose weight is to eat fewer calories and get regular exercise  You can lose up about 1 pound a week by decreasing the number of calories you eat by 500 calories each day  Healthy meal plan for weight management:  A healthy meal plan includes a variety of foods, contains fewer calories, and helps you stay healthy  A healthy meal plan includes the following:  · Eat whole-grain foods more often  A healthy meal plan should contain fiber   Fiber is the part of grains, fruits, and vegetables that is not broken down by your body  Whole-grain foods are healthy and provide extra fiber in your diet  Some examples of whole-grain foods are whole-wheat breads and pastas, oatmeal, brown rice, and bulgur  · Eat a variety of vegetables every day  Include dark, leafy greens such as spinach, kale, tatiana greens, and mustard greens  Eat yellow and orange vegetables such as carrots, sweet potatoes, and winter squash  · Eat a variety of fruits every day  Choose fresh or canned fruit (canned in its own juice or light syrup) instead of juice  Fruit juice has very little or no fiber  · Eat low-fat dairy foods  Drink fat-free (skim) milk or 1% milk  Eat fat-free yogurt and low-fat cottage cheese  Try low-fat cheeses such as mozzarella and other reduced-fat cheeses  · Choose meat and other protein foods that are low in fat  Choose beans or other legumes such as split peas or lentils  Choose fish, skinless poultry (chicken or turkey), or lean cuts of red meat (beef or pork)  Before you cook meat or poultry, cut off any visible fat  · Use less fat and oil  Try baking foods instead of frying them  Add less fat, such as margarine, sour cream, regular salad dressing and mayonnaise to foods  Eat fewer high-fat foods  Some examples of high-fat foods include french fries, doughnuts, ice cream, and cakes  · Eat fewer sweets  Limit foods and drinks that are high in sugar  This includes candy, cookies, regular soda, and sweetened drinks  Exercise:  Exercise at least 30 minutes per day on most days of the week  Some examples of exercise include walking, biking, dancing, and swimming  You can also fit in more physical activity by taking the stairs instead of the elevator or parking farther away from stores  Ask your healthcare provider about the best exercise plan for you     Narcotic (Opioid) Safety    Use narcotics safely:  · Take prescribed narcotics exactly as directed  · Do not give narcotics to others or take narcotics that belong to someone else  · Do not mix narcotics without medicines or alcohol  · Do not drive or operate heavy machinery after you take the narcotic  · Monitor for side effects and notify your healthcare provider if you experienced side effects such as nausea, sleepiness, itching, or trouble thinking clearly  Manage constipation:    Constipation is the most common side effect of narcotic medicine  Constipation is when you have hard, dry bowel movements, or you go longer than usual between bowel movements  Tell your healthcare provider about all changes in your bowel movements while you are taking narcotics  He or she may recommend laxative medicine to help you have a bowel movement  He or she may also change the kind of narcotic you are taking, or change when you take it  The following are more ways you can prevent or relieve constipation:    · Drink liquids as directed  You may need to drink extra liquids to help soften and move your bowels  Ask how much liquid to drink each day and which liquids are best for you  · Eat high-fiber foods  This may help decrease constipation by adding bulk to your bowel movements  High-fiber foods include fruits, vegetables, whole-grain breads and cereals, and beans  Your healthcare provider or dietitian can help you create a high-fiber meal plan  Your provider may also recommend a fiber supplement if you cannot get enough fiber from food  · Exercise regularly  Regular physical activity can help stimulate your intestines  Walking is a good exercise to prevent or relieve constipation  Ask which exercises are best for you  · Schedule a time each day to have a bowel movement  This may help train your body to have regular bowel movements  Bend forward while you are on the toilet to help move the bowel movement out  Sit on the toilet for at least 10 minutes, even if you do not have a bowel movement      Store narcotics safely:   · Store narcotics where others cannot easily get them  Keep them in a locked cabinet or secure area  Do not  keep them in a purse or other bag you carry with you  A person may be looking for something else and find the narcotics  · Make sure narcotics are stored out of the reach of children  A child can easily overdose on narcotics  Narcotics may look like candy to a small child  The best way to dispose of narcotics: The laws vary by country and area  In the [place], the best way is to return the narcotics through a take-back program  This program is offered by the [employer])  The following are options for using the program:  · Take the narcotics to a [employer] collection site  The site is often a law enforcement center  Call your local law enforcement center for scheduled take-back days in your area  You will be given information on where to go if the collection site is in a different location  · Take the narcotics to an approved pharmacy or hospital   A pharmacy or hospital may be set up as a collection site  You will need to ask if it is a [employer] collection site if you were not directed there  A pharmacy or doctor's office may not be able to take back narcotics unless it is a [employer] site  · Use a mail-back system  This means you are given containers to put the narcotics into  You will then mail them in the containers  · Use a take-back drop box  This is a place to leave the narcotics at any time  People and animals will not be able to get into the box  Your local law enforcement agency can tell you where to find a drop box in your area  Other ways to manage pain:   · Ask your healthcare provider about non-narcotic medicines to control pain  Nonprescription medicines include NSAIDs (such as ibuprofen) and acetaminophen  Prescription medicines include muscle relaxers, antidepressants, and steroids  · Pain may be managed without any medicines    Some ways to relieve pain include massage, aromatherapy, or meditation  Physical or occupational therapy may also help  For more information:   · Drug Enforcement Administration  [address] [facility] [address]  Phone: 5- 759 - 607-7995  Web Address: [facility]/drug_disposal/    · [address] and Drug Administration  [place] Coleen Champagne , [address]  Phone: 7- 860 - 475-5012  Web Address: http://[employer]/     © Copyright [address] 2018 Information is for End User's use only and may not be sold, redistributed or otherwise used for commercial purposes   All illustrations and images included in CareNotes® are the copyrighted property of A D A M , Inc  or [facility] Fiona Price

## 2021-06-18 LAB
6MAM UR QL CFM: NEGATIVE NG/ML
7AMINOCLONAZEPAM UR QL CFM: NEGATIVE NG/ML
A-OH ALPRAZ UR QL CFM: NEGATIVE NG/ML
ACCEPTABLE CREAT UR QL: NORMAL MG/DL
ACCEPTIBLE SP GR UR QL: NORMAL
AMPHET UR QL CFM: NEGATIVE NG/ML
AMPHET UR QL CFM: NEGATIVE NG/ML
BENZODIAZ UR QL SCN: NORMAL
BUPRENORPHINE UR QL CFM: NEGATIVE NG/ML
BUTALBITAL UR QL CFM: NEGATIVE NG/ML
BZE UR QL CFM: NEGATIVE NG/ML
CODEINE UR QL CFM: NEGATIVE NG/ML
EDDP UR QL CFM: NEGATIVE NG/ML
ETHYL GLUCURONIDE UR QL CFM: NEGATIVE NG/ML
ETHYL SULFATE UR QL SCN: NEGATIVE NG/ML
FENTANYL UR QL CFM: NEGATIVE NG/ML
HYDROCODONE UR QL CFM: NEGATIVE NG/ML
HYDROCODONE UR QL CFM: NEGATIVE NG/ML
HYDROMORPHONE UR QL CFM: NEGATIVE NG/ML
LORAZEPAM UR QL CFM: NEGATIVE NG/ML
MDMA UR QL CFM: NEGATIVE NG/ML
METHADONE UR QL CFM: NEGATIVE NG/ML
METHAMPHET UR QL CFM: NEGATIVE NG/ML
MORPHINE UR QL CFM: NEGATIVE NG/ML
MORPHINE UR QL CFM: NEGATIVE NG/ML
NITRITE UR QL: NORMAL UG/ML
NORBUPRENORPHINE UR QL CFM: NEGATIVE NG/ML
NORDIAZEPAM UR QL CFM: NEGATIVE NG/ML
NORFENTANYL UR QL CFM: NEGATIVE NG/ML
NORHYDROCODONE UR QL CFM: NEGATIVE NG/ML
NORHYDROCODONE UR QL CFM: NEGATIVE NG/ML
NOROXYCODONE UR QL CFM: NORMAL NG/ML
OXAZEPAM UR QL CFM: NEGATIVE NG/ML
OXYCODONE UR QL CFM: NORMAL NG/ML
OXYMORPHONE UR QL CFM: NORMAL NG/ML
OXYMORPHONE UR QL CFM: NORMAL NG/ML
RESULT ALL_PRESCRIBED MEDS AND SPECIAL INSTRUCTIONS: NORMAL
SL AMB 3-METHYL-FENTANYL QUANTIFICATION: NORMAL NG/ML
SL AMB 4-ANPP QUANTIFICATION: NORMAL NG/ML
SL AMB 4-FIBF QUANTIFICATION: NORMAL NG/ML
SL AMB ACETYL FENTANYL QUANTIFICATION: NORMAL NG/ML
SL AMB ACETYL NORFENTANYL QUANTIFICATION: NORMAL NG/ML
SL AMB ACRYL FENTANYL QUANTIFICATION: NORMAL NG/ML
SL AMB BUTRYL FENTANYL QUANTIFICATION: NORMAL NG/ML
SL AMB CARFENTANIL QUANTIFICATION: NORMAL NG/ML
SL AMB CITALOPRAM/ESCITALOPRAM QUANTIFICATION: NEGATIVE NG/ML
SL AMB CITALOPRAM/ESCITALOPRAM QUANTIFICATION: NEGATIVE NG/ML
SL AMB CTHC (MARIJUANA METABOLITE) QUANTIFICATION: NEGATIVE NG/ML
SL AMB CYCLOPROPYL FENTANYL QUANTIFICATION: NORMAL NG/ML
SL AMB FURANYL FENTANYL QUANTIFICATION: NORMAL NG/ML
SL AMB METHOXYACETYL FENTANYL QUANTIFICATION: NORMAL NG/ML
SL AMB N-DESMETHYL U-47700 QUANTIFICATION: NORMAL NG/ML
SL AMB N-DESMETHYL-TRAMADOL QUANTIFICATION: NEGATIVE NG/ML
SL AMB PHENTERMINE QUANTIFICATION: NEGATIVE NG/ML
SL AMB U-47700 QUANTIFICATION: NORMAL NG/ML
SPECIMEN PH ACCEPTABLE UR: NORMAL
TAPENTADOL UR QL CFM: NEGATIVE NG/ML
TEMAZEPAM UR QL CFM: NEGATIVE NG/ML
TEMAZEPAM UR QL CFM: NEGATIVE NG/ML
TRAMADOL UR QL CFM: NEGATIVE NG/ML
URATE/CREAT 24H UR: NEGATIVE NG/ML

## 2021-06-23 DIAGNOSIS — Z87.438 HISTORY OF BPH: ICD-10-CM

## 2021-06-29 RX ORDER — TAMSULOSIN HYDROCHLORIDE 0.4 MG/1
CAPSULE ORAL
Qty: 30 CAPSULE | Refills: 2 | Status: SHIPPED | OUTPATIENT
Start: 2021-06-29 | End: 2021-09-28

## 2021-08-06 DIAGNOSIS — I10 ESSENTIAL HYPERTENSION: ICD-10-CM

## 2021-08-11 RX ORDER — LISINOPRIL 20 MG/1
TABLET ORAL
Qty: 60 TABLET | Refills: 5 | Status: SHIPPED | OUTPATIENT
Start: 2021-08-11

## 2021-08-13 ENCOUNTER — TELEPHONE (OUTPATIENT)
Dept: PAIN MEDICINE | Facility: CLINIC | Age: 66
End: 2021-08-13

## 2021-08-13 DIAGNOSIS — M51.36 LUMBAR DEGENERATIVE DISC DISEASE: ICD-10-CM

## 2021-08-13 DIAGNOSIS — M47.816 LUMBAR SPONDYLOSIS: ICD-10-CM

## 2021-08-13 RX ORDER — OXYCODONE AND ACETAMINOPHEN 7.5; 325 MG/1; MG/1
1 TABLET ORAL EVERY 8 HOURS PRN
Qty: 15 TABLET | Refills: 0 | Status: SHIPPED | OUTPATIENT
Start: 2021-08-13 | End: 2021-08-18 | Stop reason: SDUPTHER

## 2021-08-13 NOTE — TELEPHONE ENCOUNTER
Med refill   Name of medication: Hydrocodone 7 5 -325mg  Frequency: 3 a day   How many tablets left:3    Pharmacy: [address] [facility] [place], [address] [place] [address]   [address] [place] [address]  [phone]     Best call back # [phone]

## 2021-08-13 NOTE — TELEPHONE ENCOUNTER
Pt has 8/18 f/u ov for med refill with [facility], which was made at time of last ov 6/16  Pt has 1 day supply of hydrocodone remaining  Can short term fill be given to get pt to ov?

## 2021-08-18 ENCOUNTER — OFFICE VISIT (OUTPATIENT)
Dept: PAIN MEDICINE | Facility: CLINIC | Age: 66
End: 2021-08-18
Payer: MEDICARE

## 2021-08-18 VITALS
SYSTOLIC BLOOD PRESSURE: 130 MMHG | TEMPERATURE: 98 F | BODY MASS INDEX: 30.92 KG/M2 | HEIGHT: 68 IN | WEIGHT: 204 LBS | DIASTOLIC BLOOD PRESSURE: 84 MMHG | HEART RATE: 64 BPM

## 2021-08-18 DIAGNOSIS — M47.812 CERVICAL SPONDYLOSIS WITHOUT MYELOPATHY: ICD-10-CM

## 2021-08-18 DIAGNOSIS — G89.4 CHRONIC PAIN SYNDROME: Primary | ICD-10-CM

## 2021-08-18 DIAGNOSIS — Z79.891 ENCOUNTER FOR LONG-TERM OPIATE ANALGESIC USE: ICD-10-CM

## 2021-08-18 DIAGNOSIS — M47.816 LUMBAR SPONDYLOSIS: ICD-10-CM

## 2021-08-18 DIAGNOSIS — F11.20 UNCOMPLICATED OPIOID DEPENDENCE (HCC): ICD-10-CM

## 2021-08-18 DIAGNOSIS — M54.2 NECK PAIN: ICD-10-CM

## 2021-08-18 DIAGNOSIS — M51.36 LUMBAR DEGENERATIVE DISC DISEASE: ICD-10-CM

## 2021-08-18 DIAGNOSIS — M54.12 CERVICAL RADICULOPATHY: ICD-10-CM

## 2021-08-18 PROCEDURE — 99214 OFFICE O/P EST MOD 30 MIN: CPT | Performed by: NURSE PRACTITIONER

## 2021-08-18 RX ORDER — OXYCODONE AND ACETAMINOPHEN 7.5; 325 MG/1; MG/1
1 TABLET ORAL EVERY 8 HOURS PRN
Qty: 90 TABLET | Refills: 0 | Status: SHIPPED | OUTPATIENT
Start: 2021-08-18 | End: 2021-08-18 | Stop reason: SDUPTHER

## 2021-08-18 RX ORDER — OXYCODONE AND ACETAMINOPHEN 7.5; 325 MG/1; MG/1
1 TABLET ORAL EVERY 8 HOURS PRN
Qty: 90 TABLET | Refills: 0 | Status: SHIPPED | OUTPATIENT
Start: 2021-08-18 | End: 2021-09-17

## 2021-08-18 RX ORDER — GABAPENTIN 600 MG/1
600 TABLET ORAL 3 TIMES DAILY
Qty: 90 TABLET | Refills: 2 | Status: SHIPPED | OUTPATIENT
Start: 2021-08-18 | End: 2021-12-08 | Stop reason: SDUPTHER

## 2021-08-18 NOTE — PATIENT INSTRUCTIONS
Opioid Safety   WHAT YOU NEED TO KNOW:   An opioid medicine is used to treat pain  Examples are oxycodone, morphine, fentanyl, or codeine  Pain control and management may help you rest, heal, and return to your daily activities  You and your family will receive information about how to manage your pain at home  The instructions will include what to do if you have side effects as your pain is managed  You will get information on how to handle opioid medicine safely  You will also get suggestions on how to control pain without opioids  It is important to follow all instructions so your pain is managed effectively  DISCHARGE INSTRUCTIONS:   Call your local emergency number (911 in the [place]), or have someone else call if:   · You have a seizure  · You cannot be woken  · You have trouble staying awake and your breathing is slow or shallow  · Your speech is slurred, or you are confused  · You are dizzy or stumble when you walk  Call your doctor, or have someone close to you call if:   · You are extremely drowsy, or you have trouble staying awake or speaking  · You have pale or clammy skin  · You have blue fingernails or lips  · Your heartbeat is slower than normal     · You cannot stop vomiting  · You have questions or concerns about your condition or care  Use opioids safely:   · Take prescribed opioids exactly as directed  Opioids come with directions based on the kind and how it is given  Talk to your healthcare provider or a pharmacist if you have any questions  Do not take more than the recommended amount  Too much can cause a life-threatening overdose  Do not continue to take it after your pain stops  You may develop tolerance  This means you keep needing higher doses to get the same effect  You may also develop opioid use disorder  This means you are not able to control your opioid use  · Do not give opioids to others or take opioids that belong to someone else    The kind or amount one person takes may not be right for another  The person you share them with may also be taking medicines that do not mix with opioids  He or she may drink alcohol or use other drugs that can cause life-threatening problems when mixed with opioids  · Do not mix opioids with other medicines or alcohol  The combination can cause an overdose, or cause you to stop breathing  Alcohol, sleeping pills, and medicines such as antihistamines can make you sleepy  A combination with opioids can lead to a coma  · Do not drive or operate heavy machinery after you use an opioid  You may feel drowsy or have trouble concentrating  You can injure yourself or others if you drive or use heavy machinery when you are not alert  Your provider or pharmacist can tell you how long to wait after a dose before you do these activities  · Talk to your healthcare provider if you have any side effects  Side effects include nausea, sleepiness, itching, and trouble thinking clearly  Your provider may need to make changes to the kind or amount of opioid you are taking  He or she can also help you find ways to prevent or relieve side effects  Manage constipation:  Constipation is the most common side effect of opioid medicine  Constipation is when you have hard, dry bowel movements, or you go longer than usual between bowel movements  Tell your healthcare provider about all changes in your bowel movements while you are taking opioids  He or she may recommend laxative medicine to help you have a bowel movement  He or she may also change the kind of opioid you are taking, or change when you take it  The following are more ways you can prevent or relieve constipation:  · Drink liquids as directed  You may need to drink extra liquids to help soften and move your bowels  Ask how much liquid to drink each day and which liquids are best for you  · Eat high-fiber foods    This may help decrease constipation by adding bulk to your bowel movements  High-fiber foods include fruits, vegetables, whole-grain breads and cereals, and beans  Your healthcare provider or dietitian can help you create a high-fiber meal plan  Your provider may also recommend a fiber supplement if you cannot get enough fiber from food  · Exercise regularly  Regular physical activity can help stimulate your intestines  Walking is a good exercise to prevent or relieve constipation  Ask which exercises are best for you  · Schedule a time each day to have a bowel movement  This may help train your body to have regular bowel movements  Bend forward while you are on the toilet to help move the bowel movement out  Sit on the toilet for at least 10 minutes, even if you do not have a bowel movement  Store opioids safely:   · Store opioids where others cannot easily get them  Keep them in a locked cabinet or secure area  Do not  keep them in a purse or other bag you carry with you  A person may be looking for something else and find the opioids  · Make sure opioids are stored out of the reach of children  A child can easily overdose on opioids  Opioids may look like candy to a small child  The best way to dispose of opioids: The laws vary by country and area  In the [place], the best way is to return the opioids through a take-back program  This program is offered by the [employer]  The following are options for using the program:  · Take the opioids to a [facility] collection site  The site is often a law enforcement center  Call your local law enforcement center for scheduled take-back days in your area  You will be given information on where to go if the collection site is in a different location  · Take the opioids to an approved pharmacy or hospital   A pharmacy or hospital may be set up as a collection site  You will need to ask if it is a [employer] collection site if you were not directed there   A pharmacy or doctor's office may not be able to take back opioids unless it is a [employer] site  · Use a mail-back system  This means you are given containers to put the opioids into  You will then mail them in the containers  · Use a take-back drop box  This is a place to leave the opioids at any time  People and animals will not be able to get into the box  Your local law enforcement agency can tell you where to find a drop box in your area  Other safe ways to dispose of opioids: The medicine may come with disposal instructions  The instructions may vary depending on the brand of medicine you are using  Instructions may come in a Medication Guide, but not every medicine has one  You may instead get instructions from your pharmacy or doctor  Follow instructions carefully  The following are general guidelines to follow:  · Find out if you can flush the opioid  Some opioids can be flushed down the toilet or poured into the sink  You will need to contact authorities in your area to see if this is an option for you  The FDA also offers a list of medicines that are safe to flush down the toilet  You can check the list if you cannot get the information for your local area  · Ask your waste management company about rules for putting opioids in the trash  The company will be able to give you specific directions  Scratch out personal information on the original medicine label so it cannot be read  Then put it in the trash  Do not label the trash or put any information on it about the opioids  It should look like regular household trash so no one is tempted to look for the opioids  Keep the trash out of the reach of children and animals  Always make sure trash is secure  · Talk to officials if you live in a facility  If you live in a nursing home or assisted living center, talk to an official  The person will know the rules for your area  Other ways to manage pain:   · Ask your healthcare provider about non-opioid medicines to control pain  Some medicines may even work better than opioids, depending on the cause of your pain  Nonprescription medicines include NSAIDs (such as ibuprofen) and acetaminophen  Prescription medicines include muscle relaxers, antidepressants, and steroids  · Pain may be managed without any medicines  Some ways to relieve pain include massage, aromatherapy, or meditation  Physical or occupational therapy may also help  For more information:   · Drug Enforcement Administration  [address] [facility]  Phone: 7- 102 - [phone]  Web Address: [email or web address]    · [address] and Drug Administration  [place] , [address] [facility]  Phone: 5- 774 - [phone]  Web Address: http://[employer]/  Follow up with your doctor or pain specialist as directed: You may need to have your dose adjusted  Your doctor or pain specialist can also help you find ways to manage pain without opioids  Write down your questions so you remember to ask them during your visits  © Copyright [employer] 2021 Information is for End User's use only and may not be sold, redistributed or otherwise used for commercial purposes  All illustrations and images included in CareNotes® are the copyrighted property of [employer]  or Uma Levine  The above information is an  only  It is not intended as medical advice for individual conditions or treatments  Talk to your doctor, nurse or pharmacist before following any medical regimen to see if it is safe and effective for you

## 2021-08-18 NOTE — PROGRESS NOTES
Assessment:  1  Chronic pain syndrome    2  Neck pain    3  Cervical radiculopathy    4  Cervical spondylosis without myelopathy    5  Lumbar degenerative disc disease    6  Lumbar spondylosis    7  Encounter for long-term opiate analgesic use    8  Uncomplicated opioid dependence ([address] )        Plan:    While the patient was in the office today, I did have a thorough conversation regarding their chronic pain syndrome, medication management, and treatment plan options  Patient is being seen for 2 month follow-up visit  Overall, he reports that his pain is stable with his current medication regimen which includes oxycodone 7 5/325 3 times a day as well as gabapentin 600 mg 3 times daily  He denies side effects from either of these medications  Renewed oxycodone 7 5/325 3 times daily  The patient's opioid scripts were sent to their pharmacy electronically and was given a 2 month supply of prescriptions with a Do Not Fill date(s) of   08/18/2021, 09/15/2021  renewed gabapentin 600 mg 3 times daily  Prescription was sent to his pharmacy with refills  [place] Prescription Drug Monitoring Program report was reviewed and was appropriate     There are risks associated with opioid medications, including dependence, addiction and tolerance  The patient understands and agrees to use these medications only as prescribed  Potential side effects of the medications include, but are not limited to, constipation, drowsiness, addiction, impaired judgment and risk of fatal overdose if not taken as prescribed  The patient was warned against driving while taking sedation medications  Sharing medications is a felony  At this point in time, the patient is showing no signs of addiction, abuse, diversion or suicidal ideation  The patient will follow-up in 8 weeks for medication prescription refill and reevaluation  The patient was advised to contact the office should their symptoms worsen in the interim   The patient was agreeable and verbalized an understanding  History of Present Illness: The patient is a 78 y o  male last seen on   06/16/2021 who presents for a follow up office visit in regards to chronic pain secondary to  Chronic pain syndrome, chronic neck pain, cervical radiculopathy, cervical spondylosis, chronic low back pain,  Lumbar degenerative disc disease, lumbar spondylosis  The patient currently reports  Neck pain that radiates to both shoulders, low back pain  Current pain level is a  5/10  Quality pain is described as dull, aching, sharp, throbbing    Current pain medications includes:  Oxycodone 7 5/325 3 times daily if needed for pain, gabapentin 600 mg 3 times daily   The patient reports that this regimen is providing  Up to 70% pain relief  The patient is reporting no side effects from this pain medication regimen  Pain Contract Signed: 6/16/21  Last Urine Drug Screen: 6/16/21    I have personally reviewed and/or updated the patient's past medical history, past surgical history, family history, social history, current medications, allergies, and vital signs today  Review of Systems:    Review of Systems      Past Medical History:   Diagnosis Date    Abdominal aortic aneurysm without rupture ([address] )     Abdominal Aortic Duplex 02/21/2017    Ectatic infrarenal abdominal aorta   CAD (coronary artery disease)     COPD (chronic obstructive pulmonary disease) (HCC)     Extremity pain     History of echocardiogram 03/18/2014    EF 55%, mild MR and AI  Mild concentric LVH      History of stress test 03/06/2017    Normal     Hypertension     Joint pain     Low back pain     Migraine     Neck pain     Obstructive sleep apnea     cannot tolerate CPAP    Osteoarthritis     Peripheral neuropathy     Reflex sympathetic dystrophy        Past Surgical History:   Procedure Laterality Date    CARDIAC CATHETERIZATION  02/13/2012    EF 70%, widely patent renal arteries, significant single-vessel CAD-medical therapy   CARDIAC CATHETERIZATION  04/11/2013    EF 65%, 50% mid LAD, 20% prox CFX, 90% diffuse RCA, 99% mid RCA  Medical management      CHOLECYSTECTOMY      EPIDURAL BLOCK INJECTION Bilateral 8/15/2019    Procedure: BLOCK / INJECTION EPIDURAL STEROID CERVICAL;  Surgeon: Atiya Bonilla MD;  Location: [facility];  Service: Pain Management     FL GUIDED NEEDLE PLAC BX/ASP/INJ  8/15/2019    ORTHOPEDIC SURGERY      TRIGGER POINT INJECTION         Family History   Adopted: Yes   Problem Relation Age of Onset    No Known Problems Family     No Known Problems Mother     No Known Problems Father        Social History     Occupational History    Not on file   Tobacco Use    Smoking status: Current Every Day Smoker     Packs/day: 1 50    Smokeless tobacco: Never Used   Vaping Use    Vaping Use: Never used   Substance and Sexual Activity    Alcohol use: No    Drug use: No    Sexual activity: Not on file         Current Outpatient Medications:     amLODIPine (NORVASC) 10 mg tablet, swallow 1 tablet once daily, Disp: 90 tablet, Rfl: 3    diphenhydrAMINE-acetaminophen (TYLENOL PM)  MG TABS, Take 2 tablets by mouth daily at bedtime as needed for sleep, Disp: , Rfl:     gabapentin (NEURONTIN) 600 MG tablet, Take 1 tablet (600 mg total) by mouth 3 (three) times a day, Disp: 90 tablet, Rfl: 2    lisinopril (ZESTRIL) 20 mg tablet, take 1 tablet by mouth once daily, Disp: 60 tablet, Rfl: 5    metoprolol succinate (TOPROL-XL) 100 mg 24 hr tablet, take 1 tablet by mouth once daily, Disp: 30 tablet, Rfl: 5    oxyCODONE-acetaminophen (PERCOCET) 7 5-325 MG per tablet, Take 1 tablet by mouth every 8 (eight) hours as needed for moderate pain Do not fill until 9/15/2021Max Daily Amount: 3 tablets, Disp: 90 tablet, Rfl: 0    rosuvastatin (CRESTOR) 10 MG tablet, Take 1 tablet (10 mg total) by mouth daily, Disp: 30 tablet, Rfl: 5    sildenafil (VIAGRA) 100 mg tablet, Take 1 tablet (100 mg total) by mouth as needed for erectile dysfunction, Disp: 10 tablet, Rfl: 6    tamsulosin (FLOMAX) 0 4 mg, take 1 capsule by mouth once daily with dinner, Disp: 30 capsule, Rfl: 2    topiramate (TOPAMAX) 25 mg tablet, FOLLOW INSTRUCTIONS GIVEN TO TITRATE UP TO A MAX OF 2 TABLETS BY MOUTH TWICE DAILY AS TOLERATED AND TO LEVEL OF BENIFIT FOR TREMORS, Disp: 120 tablet, Rfl: 2    traZODone (DESYREL) 50 mg tablet, take 1 tablet by mouth at bedtime, Disp: 30 tablet, Rfl: 6    aspirin (ECOTRIN LOW STRENGTH) 81 mg EC tablet, Take 1 tablet (81 mg total) by mouth daily (Patient not taking: Reported on 8/18/2021), Disp: 30 tablet, Rfl: 5    cyclobenzaprine (FLEXERIL) 5 mg tablet, Take 1 tablet (5 mg total) by mouth 2 (two) times a day as needed for muscle spasms (Patient not taking: Reported on 8/18/2021), Disp: 40 tablet, Rfl: 0    hydrochlorothiazide (HYDRODIURIL) 25 mg tablet, 1 daily as needed for high blood pressure readings (Patient not taking: Reported on 4/12/2021), Disp: 60 tablet, Rfl: 3    metoprolol succinate (TOPROL-XL) 50 mg 24 hr tablet, Take 1 tablet (50 mg total) by mouth daily (Patient not taking: Reported on 5/20/2021), Disp: 30 tablet, Rfl: 5    naproxen (NAPROSYN) 500 mg tablet, Take 1 tablet (500 mg total) by mouth 2 (two) times a day with meals (Patient not taking: Reported on 6/16/2021), Disp: 30 tablet, Rfl: 0    nitroglycerin (NITROSTAT) 0 4 mg SL tablet, Place 1 tablet (0 4 mg total) under the tongue every 5 (five) minutes as needed for chest pain, Disp: 25 tablet, Rfl: 5    No Known Allergies    Physical Exam:    /84 (BP Location: Right arm, Patient Position: Sitting, Cuff Size: Standard)   Pulse 64   Temp 98 °F (36 7 °C)   Ht 5' 8" (1 727 m)   Wt 92 5 kg (204 lb)   BMI 31 02 kg/m²     Constitutional:normal, well developed, well nourished, alert, in no distress and non-toxic and no overt pain behavior    Eyes:anicteric  HEENT:grossly intact  Neck:supple, symmetric, trachea midline and no masses   Pulmonary:even and unlabored  Cardiovascular:No edema or pitting edema present  Skin:Normal without rashes or lesions and well hydrated  Psychiatric:Mood and affect appropriate  Neurologic:Cranial Nerves II-XII grossly intact  Musculoskeletal:normal      Imaging  No orders to display         No orders of the defined types were placed in this encounter

## 2021-09-21 ENCOUNTER — HOSPITAL ENCOUNTER (OUTPATIENT)
Dept: NUCLEAR MEDICINE | Facility: HOSPITAL | Age: 66
Discharge: HOME | End: 2021-09-21
Attending: INTERNAL MEDICINE
Payer: MEDICARE

## 2021-09-21 ENCOUNTER — APPOINTMENT (OUTPATIENT)
Dept: LAB | Facility: CLINIC | Age: 66
End: 2021-09-21
Payer: MEDICARE

## 2021-09-21 ENCOUNTER — HOSPITAL ENCOUNTER (OUTPATIENT)
Dept: NON INVASIVE DIAGNOSTICS | Facility: HOSPITAL | Age: 66
Discharge: HOME | End: 2021-09-21
Attending: INTERNAL MEDICINE
Payer: MEDICARE

## 2021-09-21 DIAGNOSIS — I10 ESSENTIAL HYPERTENSION: ICD-10-CM

## 2021-09-21 DIAGNOSIS — E78.00 HYPERCHOLESTEROLEMIA: ICD-10-CM

## 2021-09-21 DIAGNOSIS — Z12.5 SCREENING FOR PROSTATE CANCER: ICD-10-CM

## 2021-09-21 DIAGNOSIS — R07.9 CHEST PAIN, UNSPECIFIED TYPE: ICD-10-CM

## 2021-09-21 DIAGNOSIS — I25.10 CORONARY ARTERY DISEASE INVOLVING NATIVE CORONARY ARTERY OF NATIVE HEART WITHOUT ANGINA PECTORIS: ICD-10-CM

## 2021-09-21 LAB
ALBUMIN SERPL BCP-MCNC: 3.7 G/DL (ref 3.5–5)
ALP SERPL-CCNC: 101 U/L (ref 46–116)
ALT SERPL W P-5'-P-CCNC: 32 U/L (ref 12–78)
ANION GAP SERPL CALCULATED.3IONS-SCNC: 4 MMOL/L (ref 4–13)
AST SERPL W P-5'-P-CCNC: 16 U/L (ref 5–45)
BASOPHILS # BLD AUTO: 0.07 THOUSANDS/ΜL (ref 0–0.1)
BASOPHILS NFR BLD AUTO: 1 % (ref 0–1)
BILIRUB SERPL-MCNC: 0.5 MG/DL (ref 0.2–1)
BUN SERPL-MCNC: 16 MG/DL (ref 5–25)
CALCIUM SERPL-MCNC: 8.7 MG/DL (ref 8.3–10.1)
CHLORIDE SERPL-SCNC: 107 MMOL/L (ref 100–108)
CHOLEST SERPL-MCNC: 207 MG/DL (ref 50–200)
CO2 SERPL-SCNC: 26 MMOL/L (ref 21–32)
CREAT SERPL-MCNC: 1.06 MG/DL (ref 0.6–1.3)
EOSINOPHIL # BLD AUTO: 0 THOUSAND/ΜL (ref 0–0.61)
EOSINOPHIL NFR BLD AUTO: 0 % (ref 0–6)
ERYTHROCYTE [DISTWIDTH] IN BLOOD BY AUTOMATED COUNT: 13.1 % (ref 11.6–15.1)
GFR SERPL CREATININE-BSD FRML MDRD: 72 ML/MIN/1.73SQ M
GLUCOSE P FAST SERPL-MCNC: 130 MG/DL (ref 65–99)
HCT VFR BLD AUTO: 51.9 % (ref 36.5–49.3)
HDLC SERPL-MCNC: 24 MG/DL
HGB BLD-MCNC: 16.7 G/DL (ref 12–17)
IMM GRANULOCYTES # BLD AUTO: 0.03 THOUSAND/UL (ref 0–0.2)
IMM GRANULOCYTES NFR BLD AUTO: 0 % (ref 0–2)
LYMPHOCYTES # BLD AUTO: 3.43 THOUSANDS/ΜL (ref 0.6–4.47)
LYMPHOCYTES NFR BLD AUTO: 36 % (ref 14–44)
MCH RBC QN AUTO: 30.2 PG (ref 26.8–34.3)
MCHC RBC AUTO-ENTMCNC: 32.2 G/DL (ref 31.4–37.4)
MCV RBC AUTO: 94 FL (ref 82–98)
MONOCYTES # BLD AUTO: 0.68 THOUSAND/ΜL (ref 0.17–1.22)
MONOCYTES NFR BLD AUTO: 7 % (ref 4–12)
NEUTROPHILS # BLD AUTO: 5.33 THOUSANDS/ΜL (ref 1.85–7.62)
NEUTS SEG NFR BLD AUTO: 56 % (ref 43–75)
NONHDLC SERPL-MCNC: 183 MG/DL
NRBC BLD AUTO-RTO: 0 /100 WBCS
PLATELET # BLD AUTO: 212 THOUSANDS/UL (ref 149–390)
PMV BLD AUTO: 11 FL (ref 8.9–12.7)
POTASSIUM SERPL-SCNC: 3.9 MMOL/L (ref 3.5–5.3)
PROT SERPL-MCNC: 7.4 G/DL (ref 6.4–8.2)
PSA SERPL-MCNC: 2.8 NG/ML (ref 0–4)
RBC # BLD AUTO: 5.53 MILLION/UL (ref 3.88–5.62)
SODIUM SERPL-SCNC: 137 MMOL/L (ref 136–145)
TRIGL SERPL-MCNC: 430 MG/DL
TSH SERPL DL<=0.05 MIU/L-ACNC: 1.17 UIU/ML (ref 0.36–3.74)
WBC # BLD AUTO: 9.54 THOUSAND/UL (ref 4.31–10.16)

## 2021-09-21 PROCEDURE — 80061 LIPID PANEL: CPT | Performed by: INTERNAL MEDICINE

## 2021-09-21 PROCEDURE — 84443 ASSAY THYROID STIM HORMONE: CPT

## 2021-09-21 PROCEDURE — 93018 CV STRESS TEST I&R ONLY: CPT | Performed by: INTERNAL MEDICINE

## 2021-09-21 PROCEDURE — G1004 CDSM NDSC: HCPCS

## 2021-09-21 PROCEDURE — 78452 HT MUSCLE IMAGE SPECT MULT: CPT

## 2021-09-21 PROCEDURE — 93017 CV STRESS TEST TRACING ONLY: CPT

## 2021-09-21 PROCEDURE — 78452 HT MUSCLE IMAGE SPECT MULT: CPT | Performed by: INTERNAL MEDICINE

## 2021-09-21 PROCEDURE — 80053 COMPREHEN METABOLIC PANEL: CPT | Performed by: INTERNAL MEDICINE

## 2021-09-21 PROCEDURE — A9502 TC99M TETROFOSMIN: HCPCS

## 2021-09-21 PROCEDURE — G0103 PSA SCREENING: HCPCS

## 2021-09-21 PROCEDURE — 85025 COMPLETE CBC W/AUTO DIFF WBC: CPT

## 2021-09-21 PROCEDURE — 93016 CV STRESS TEST SUPVJ ONLY: CPT | Performed by: INTERNAL MEDICINE

## 2021-09-21 PROCEDURE — 36415 COLL VENOUS BLD VENIPUNCTURE: CPT | Performed by: INTERNAL MEDICINE

## 2021-09-21 RX ADMIN — REGADENOSON 0.4 MG: 0.08 INJECTION, SOLUTION INTRAVENOUS at 10:00

## 2021-09-22 LAB
ARRHY DURING EX: NORMAL
CHEST PAIN STATEMENT: NORMAL
MAX DIASTOLIC BP: 80 MMHG
MAX HEART RATE: 96 BPM
MAX PREDICTED HEART RATE: 153 BPM
MAX. SYSTOLIC BP: 156 MMHG
PROTOCOL NAME: NORMAL
REASON FOR TERMINATION: NORMAL
TARGET HR FORMULA: NORMAL
TEST INDICATION: NORMAL
TIME IN EXERCISE PHASE: NORMAL

## 2021-09-28 DIAGNOSIS — Z87.438 HISTORY OF BPH: ICD-10-CM

## 2021-09-28 RX ORDER — TAMSULOSIN HYDROCHLORIDE 0.4 MG/1
CAPSULE ORAL
Qty: 30 CAPSULE | Refills: 2 | Status: SHIPPED | OUTPATIENT
Start: 2021-09-28 | End: 2021-12-30

## 2021-10-07 ENCOUNTER — OFFICE VISIT (OUTPATIENT)
Dept: FAMILY MEDICINE CLINIC | Facility: CLINIC | Age: 66
End: 2021-10-07
Payer: MEDICARE

## 2021-10-07 VITALS
OXYGEN SATURATION: 97 % | HEART RATE: 57 BPM | DIASTOLIC BLOOD PRESSURE: 74 MMHG | BODY MASS INDEX: 31.64 KG/M2 | WEIGHT: 208.8 LBS | SYSTOLIC BLOOD PRESSURE: 130 MMHG | HEIGHT: 68 IN | TEMPERATURE: 97.3 F

## 2021-10-07 DIAGNOSIS — R73.03 PRE-DIABETES: ICD-10-CM

## 2021-10-07 DIAGNOSIS — N52.9 ERECTILE DYSFUNCTION, UNSPECIFIED ERECTILE DYSFUNCTION TYPE: ICD-10-CM

## 2021-10-07 DIAGNOSIS — E78.5 HYPERLIPIDEMIA, UNSPECIFIED HYPERLIPIDEMIA TYPE: Primary | ICD-10-CM

## 2021-10-07 DIAGNOSIS — R73.9 HYPERGLYCEMIA: ICD-10-CM

## 2021-10-07 DIAGNOSIS — Z12.11 SCREENING FOR COLON CANCER: ICD-10-CM

## 2021-10-07 DIAGNOSIS — G47.00 INSOMNIA, UNSPECIFIED TYPE: ICD-10-CM

## 2021-10-07 DIAGNOSIS — Z87.438 HISTORY OF BPH: ICD-10-CM

## 2021-10-07 LAB — SL AMB POCT HEMOGLOBIN AIC: 6 (ref ?–6.5)

## 2021-10-07 PROCEDURE — 99214 OFFICE O/P EST MOD 30 MIN: CPT | Performed by: NURSE PRACTITIONER

## 2021-10-07 PROCEDURE — 83036 HEMOGLOBIN GLYCOSYLATED A1C: CPT | Performed by: NURSE PRACTITIONER

## 2021-10-07 RX ORDER — SILDENAFIL 100 MG/1
100 TABLET, FILM COATED ORAL AS NEEDED
Qty: 20 TABLET | Refills: 0 | Status: SHIPPED | OUTPATIENT
Start: 2021-10-07 | End: 2022-01-26

## 2021-10-07 RX ORDER — TRAZODONE HYDROCHLORIDE 100 MG/1
100 TABLET ORAL
Qty: 90 TABLET | Refills: 3 | Status: SHIPPED | OUTPATIENT
Start: 2021-10-07

## 2021-10-13 ENCOUNTER — OFFICE VISIT (OUTPATIENT)
Dept: PAIN MEDICINE | Facility: CLINIC | Age: 66
End: 2021-10-13
Payer: MEDICARE

## 2021-10-13 VITALS
SYSTOLIC BLOOD PRESSURE: 99 MMHG | BODY MASS INDEX: 31.67 KG/M2 | WEIGHT: 209 LBS | HEART RATE: 64 BPM | HEIGHT: 68 IN | DIASTOLIC BLOOD PRESSURE: 63 MMHG

## 2021-10-13 DIAGNOSIS — G89.4 CHRONIC PAIN SYNDROME: Primary | ICD-10-CM

## 2021-10-13 DIAGNOSIS — M54.12 CERVICAL RADICULOPATHY: ICD-10-CM

## 2021-10-13 DIAGNOSIS — M54.50 CHRONIC RIGHT-SIDED LOW BACK PAIN WITHOUT SCIATICA: ICD-10-CM

## 2021-10-13 DIAGNOSIS — M47.816 LUMBAR SPONDYLOSIS: ICD-10-CM

## 2021-10-13 DIAGNOSIS — Z79.891 ENCOUNTER FOR LONG-TERM OPIATE ANALGESIC USE: ICD-10-CM

## 2021-10-13 DIAGNOSIS — M47.812 CERVICAL SPONDYLOSIS WITHOUT MYELOPATHY: ICD-10-CM

## 2021-10-13 DIAGNOSIS — G89.29 CHRONIC RIGHT-SIDED LOW BACK PAIN WITHOUT SCIATICA: ICD-10-CM

## 2021-10-13 DIAGNOSIS — F11.20 UNCOMPLICATED OPIOID DEPENDENCE (HCC): ICD-10-CM

## 2021-10-13 DIAGNOSIS — M54.2 NECK PAIN: ICD-10-CM

## 2021-10-13 DIAGNOSIS — M79.18 MYOFASCIAL PAIN SYNDROME: ICD-10-CM

## 2021-10-13 DIAGNOSIS — M51.36 LUMBAR DEGENERATIVE DISC DISEASE: ICD-10-CM

## 2021-10-13 PROCEDURE — 99214 OFFICE O/P EST MOD 30 MIN: CPT | Performed by: NURSE PRACTITIONER

## 2021-10-13 PROCEDURE — 80305 DRUG TEST PRSMV DIR OPT OBS: CPT | Performed by: NURSE PRACTITIONER

## 2021-10-13 RX ORDER — OXYCODONE AND ACETAMINOPHEN 7.5; 325 MG/1; MG/1
1 TABLET ORAL EVERY 8 HOURS PRN
Qty: 90 TABLET | Refills: 0 | Status: SHIPPED | OUTPATIENT
Start: 2021-10-13 | End: 2021-10-13 | Stop reason: SDUPTHER

## 2021-10-13 RX ORDER — OXYCODONE AND ACETAMINOPHEN 7.5; 325 MG/1; MG/1
1 TABLET ORAL EVERY 8 HOURS PRN
Qty: 90 TABLET | Refills: 0 | Status: SHIPPED | OUTPATIENT
Start: 2021-10-13 | End: 2021-12-08 | Stop reason: SDUPTHER

## 2021-10-28 DIAGNOSIS — I10 ESSENTIAL HYPERTENSION: ICD-10-CM

## 2021-10-28 RX ORDER — METOPROLOL SUCCINATE 100 MG/1
TABLET, EXTENDED RELEASE ORAL
Qty: 30 TABLET | Refills: 5 | Status: SHIPPED | OUTPATIENT
Start: 2021-10-28 | End: 2022-05-02

## 2021-10-29 ENCOUNTER — OFFICE VISIT (OUTPATIENT)
Dept: CARDIOLOGY CLINIC | Facility: CLINIC | Age: 66
End: 2021-10-29
Payer: MEDICARE

## 2021-10-29 VITALS
HEART RATE: 56 BPM | WEIGHT: 208 LBS | BODY MASS INDEX: 31.52 KG/M2 | DIASTOLIC BLOOD PRESSURE: 80 MMHG | SYSTOLIC BLOOD PRESSURE: 120 MMHG | HEIGHT: 68 IN

## 2021-10-29 DIAGNOSIS — I25.10 CORONARY ARTERY DISEASE INVOLVING NATIVE CORONARY ARTERY OF NATIVE HEART WITHOUT ANGINA PECTORIS: Primary | ICD-10-CM

## 2021-10-29 DIAGNOSIS — I10 PRIMARY HYPERTENSION: ICD-10-CM

## 2021-10-29 DIAGNOSIS — E78.2 MIXED HYPERLIPIDEMIA: ICD-10-CM

## 2021-10-29 DIAGNOSIS — F17.200 SMOKING: ICD-10-CM

## 2021-10-29 PROCEDURE — 99214 OFFICE O/P EST MOD 30 MIN: CPT | Performed by: INTERNAL MEDICINE

## 2021-12-07 DIAGNOSIS — G25.0 TREMOR, ESSENTIAL: ICD-10-CM

## 2021-12-07 RX ORDER — TOPIRAMATE 25 MG/1
TABLET ORAL
Qty: 120 TABLET | Refills: 2 | Status: SHIPPED | OUTPATIENT
Start: 2021-12-07 | End: 2022-03-15

## 2021-12-08 ENCOUNTER — OFFICE VISIT (OUTPATIENT)
Dept: PAIN MEDICINE | Facility: CLINIC | Age: 66
End: 2021-12-08
Payer: MEDICARE

## 2021-12-08 VITALS
HEART RATE: 60 BPM | WEIGHT: 210 LBS | BODY MASS INDEX: 31.83 KG/M2 | DIASTOLIC BLOOD PRESSURE: 82 MMHG | SYSTOLIC BLOOD PRESSURE: 154 MMHG | HEIGHT: 68 IN

## 2021-12-08 DIAGNOSIS — F11.20 UNCOMPLICATED OPIOID DEPENDENCE (HCC): ICD-10-CM

## 2021-12-08 DIAGNOSIS — G89.4 CHRONIC PAIN SYNDROME: Primary | ICD-10-CM

## 2021-12-08 DIAGNOSIS — M79.18 MYOFASCIAL PAIN SYNDROME: ICD-10-CM

## 2021-12-08 DIAGNOSIS — I71.4 ABDOMINAL AORTIC ANEURYSM (AAA) WITHOUT RUPTURE (HCC): ICD-10-CM

## 2021-12-08 DIAGNOSIS — M54.50 CHRONIC LOW BACK PAIN WITHOUT SCIATICA, UNSPECIFIED BACK PAIN LATERALITY: ICD-10-CM

## 2021-12-08 DIAGNOSIS — M54.12 CERVICAL RADICULOPATHY: ICD-10-CM

## 2021-12-08 DIAGNOSIS — Z79.891 ENCOUNTER FOR LONG-TERM OPIATE ANALGESIC USE: ICD-10-CM

## 2021-12-08 DIAGNOSIS — M51.36 LUMBAR DEGENERATIVE DISC DISEASE: ICD-10-CM

## 2021-12-08 DIAGNOSIS — M47.812 CERVICAL SPONDYLOSIS WITHOUT MYELOPATHY: ICD-10-CM

## 2021-12-08 DIAGNOSIS — M54.2 NECK PAIN: ICD-10-CM

## 2021-12-08 DIAGNOSIS — G89.29 CHRONIC LOW BACK PAIN WITHOUT SCIATICA, UNSPECIFIED BACK PAIN LATERALITY: ICD-10-CM

## 2021-12-08 DIAGNOSIS — M47.816 LUMBAR SPONDYLOSIS: ICD-10-CM

## 2021-12-08 PROCEDURE — 99214 OFFICE O/P EST MOD 30 MIN: CPT | Performed by: NURSE PRACTITIONER

## 2021-12-08 RX ORDER — OXYCODONE AND ACETAMINOPHEN 7.5; 325 MG/1; MG/1
1 TABLET ORAL EVERY 8 HOURS PRN
Qty: 90 TABLET | Refills: 0 | Status: SHIPPED | OUTPATIENT
Start: 2021-12-08 | End: 2022-02-02 | Stop reason: SDUPTHER

## 2021-12-08 RX ORDER — GABAPENTIN 600 MG/1
600 TABLET ORAL 3 TIMES DAILY
Qty: 90 TABLET | Refills: 2 | Status: SHIPPED | OUTPATIENT
Start: 2021-12-08 | End: 2022-03-15

## 2021-12-10 ENCOUNTER — OFFICE VISIT (OUTPATIENT)
Dept: FAMILY MEDICINE CLINIC | Facility: CLINIC | Age: 66
End: 2021-12-10
Payer: MEDICARE

## 2021-12-10 VITALS
OXYGEN SATURATION: 98 % | HEIGHT: 68 IN | HEART RATE: 54 BPM | TEMPERATURE: 98.7 F | WEIGHT: 209 LBS | SYSTOLIC BLOOD PRESSURE: 122 MMHG | BODY MASS INDEX: 31.67 KG/M2 | RESPIRATION RATE: 14 BRPM | DIASTOLIC BLOOD PRESSURE: 70 MMHG

## 2021-12-10 DIAGNOSIS — E78.00 HYPERCHOLESTEROLEMIA: ICD-10-CM

## 2021-12-10 DIAGNOSIS — H53.8 BLURRED VISION, BILATERAL: Primary | ICD-10-CM

## 2021-12-10 DIAGNOSIS — R73.9 HYPERGLYCEMIA: ICD-10-CM

## 2021-12-10 DIAGNOSIS — G45.9 TIA (TRANSIENT ISCHEMIC ATTACK): ICD-10-CM

## 2021-12-10 PROCEDURE — 99213 OFFICE O/P EST LOW 20 MIN: CPT | Performed by: FAMILY MEDICINE

## 2021-12-10 RX ORDER — PRAVASTATIN SODIUM 20 MG
20 TABLET ORAL DAILY
Qty: 30 TABLET | Refills: 6 | Status: SHIPPED | OUTPATIENT
Start: 2021-12-10 | End: 2022-04-15

## 2022-01-11 ENCOUNTER — IMMUNIZATIONS (OUTPATIENT)
Dept: FAMILY MEDICINE CLINIC | Facility: HOSPITAL | Age: 67
End: 2022-01-11

## 2022-01-11 DIAGNOSIS — Z23 ENCOUNTER FOR IMMUNIZATION: Primary | ICD-10-CM

## 2022-01-11 PROCEDURE — 91306 COVID-19 MODERNA VACC 0.25 ML BOOSTER: CPT

## 2022-01-11 PROCEDURE — 0064A COVID-19 MODERNA VACC 0.25 ML BOOSTER: CPT

## 2022-01-25 DIAGNOSIS — N52.9 ERECTILE DYSFUNCTION, UNSPECIFIED ERECTILE DYSFUNCTION TYPE: ICD-10-CM

## 2022-01-26 RX ORDER — SILDENAFIL 100 MG/1
TABLET, FILM COATED ORAL
Qty: 20 TABLET | Refills: 0 | Status: SHIPPED | OUTPATIENT
Start: 2022-01-26 | End: 2022-04-19

## 2022-01-28 ENCOUNTER — OFFICE VISIT (OUTPATIENT)
Dept: FAMILY MEDICINE CLINIC | Facility: CLINIC | Age: 67
End: 2022-01-28
Payer: MEDICARE

## 2022-01-28 VITALS
SYSTOLIC BLOOD PRESSURE: 128 MMHG | WEIGHT: 213.13 LBS | OXYGEN SATURATION: 100 % | DIASTOLIC BLOOD PRESSURE: 84 MMHG | TEMPERATURE: 97.3 F | HEART RATE: 55 BPM | HEIGHT: 68 IN | BODY MASS INDEX: 32.3 KG/M2

## 2022-01-28 DIAGNOSIS — Z23 ENCOUNTER FOR IMMUNIZATION: ICD-10-CM

## 2022-01-28 DIAGNOSIS — Z12.11 SCREENING FOR COLORECTAL CANCER: ICD-10-CM

## 2022-01-28 DIAGNOSIS — R50.9 LOW GRADE FEVER: Primary | ICD-10-CM

## 2022-01-28 DIAGNOSIS — Z12.12 SCREENING FOR COLORECTAL CANCER: ICD-10-CM

## 2022-01-28 PROCEDURE — 99213 OFFICE O/P EST LOW 20 MIN: CPT | Performed by: FAMILY MEDICINE

## 2022-01-28 RX ORDER — PREDNISOLONE ACETATE 10 MG/ML
SUSPENSION/ DROPS OPHTHALMIC
Status: ON HOLD | COMMUNITY
Start: 2022-01-06 | End: 2022-07-24 | Stop reason: ALTCHOICE

## 2022-01-28 RX ORDER — OFLOXACIN 3 MG/ML
SOLUTION/ DROPS OPHTHALMIC
Status: ON HOLD | COMMUNITY
Start: 2022-01-10 | End: 2022-07-24 | Stop reason: ALTCHOICE

## 2022-01-28 NOTE — PROGRESS NOTES
Assessment/Plan:    No problem-specific Assessment & Plan notes found for this encounter  Diagnoses and all orders for this visit:    Low grade fever  Comments:  sub fever numbers, pt is not ill    Screening for colorectal cancer  -     Ambulatory referral to Gastroenterology; Future  -     Cologuard    Encounter for immunization    Other orders  -     prednisoLONE acetate (PRED FORTE) 1 % ophthalmic suspension; instill 1 drop into left eye every 2 hours STARTING AFTER SURGERY IS COMPLETED (Patient not taking: Reported on 1/28/2022)  -     ofloxacin (OCUFLOX) 0 3 % ophthalmic solution; instill 1 drop into left eye START 3 days prior to surgery THEN O   (REFER TO PRESCRIPTION NOTES)  (Patient not taking: Reported on 1/28/2022)          PHQ-2/9 Depression Screening    Little interest or pleasure in doing things: 0 - not at all  Feeling down, depressed, or hopeless: 0 - not at all  PHQ-2 Score: 0  PHQ-2 Interpretation: Negative depression screen            Subjective:      Patient ID: Ayleen Coates is a 78 y o  male  Pt is having low grade temps of 99-99 5 over the last few weeks    Fever  This is a new problem  The current episode started 1 to 4 weeks ago  The problem occurs intermittently  The problem has been unchanged  Pertinent negatives include no chest pain, chills, coughing, fatigue or weakness  He has tried nothing for the symptoms  The following portions of the patient's history were reviewed and updated as appropriate: allergies, current medications, past family history, past medical history, past social history, past surgical history and problem list     Review of Systems   Constitutional: Negative for chills, fatigue and unexpected weight change  Respiratory: Positive for shortness of breath  Negative for cough and wheezing  Cardiovascular: Negative for chest pain and palpitations  Gastrointestinal: Negative for diarrhea     Neurological: Negative for dizziness, weakness and light-headedness  Hematological: Negative for adenopathy  Objective:    /84   Pulse 55   Temp (!) 97 3 °F (36 3 °C)   Ht 5' 8" (1 727 m)   Wt 96 7 kg (213 lb 2 oz)   SpO2 100%   BMI 32 41 kg/m²      Physical Exam  Vitals and nursing note reviewed  Constitutional:       General: He is not in acute distress  Appearance: Normal appearance  He is not ill-appearing or diaphoretic  HENT:      Head: Normocephalic and atraumatic  Eyes:      Conjunctiva/sclera: Conjunctivae normal    Cardiovascular:      Rate and Rhythm: Normal rate and regular rhythm  Heart sounds: Normal heart sounds  No murmur heard  Pulmonary:      Effort: Pulmonary effort is normal  No respiratory distress  Breath sounds: Normal breath sounds  No wheezing, rhonchi or rales  Abdominal:      General: There is no distension  Palpations: Abdomen is soft  There is no mass  Tenderness: There is no abdominal tenderness  There is no guarding or rebound  Musculoskeletal:      Cervical back: Normal range of motion and neck supple  No rigidity  Right lower leg: No edema  Left lower leg: No edema  Lymphadenopathy:      Cervical: No cervical adenopathy  Neurological:      Mental Status: He is alert and oriented to person, place, and time  Psychiatric:         Mood and Affect: Mood normal          Behavior: Behavior normal          Thought Content:  Thought content normal          Judgment: Judgment normal

## 2022-02-02 ENCOUNTER — OFFICE VISIT (OUTPATIENT)
Dept: PAIN MEDICINE | Facility: CLINIC | Age: 67
End: 2022-02-02
Payer: MEDICARE

## 2022-02-02 VITALS
WEIGHT: 207.6 LBS | TEMPERATURE: 98.6 F | BODY MASS INDEX: 31.46 KG/M2 | HEART RATE: 57 BPM | SYSTOLIC BLOOD PRESSURE: 127 MMHG | HEIGHT: 68 IN | DIASTOLIC BLOOD PRESSURE: 82 MMHG

## 2022-02-02 DIAGNOSIS — M47.812 CERVICAL SPONDYLOSIS WITHOUT MYELOPATHY: ICD-10-CM

## 2022-02-02 DIAGNOSIS — M54.2 NECK PAIN: ICD-10-CM

## 2022-02-02 DIAGNOSIS — M47.816 LUMBAR SPONDYLOSIS: ICD-10-CM

## 2022-02-02 DIAGNOSIS — M79.18 MYOFASCIAL PAIN SYNDROME: ICD-10-CM

## 2022-02-02 DIAGNOSIS — G89.4 CHRONIC PAIN SYNDROME: Primary | ICD-10-CM

## 2022-02-02 DIAGNOSIS — M46.1 SACROILIITIS (HCC): ICD-10-CM

## 2022-02-02 DIAGNOSIS — Z79.891 LONG-TERM CURRENT USE OF OPIATE ANALGESIC: ICD-10-CM

## 2022-02-02 DIAGNOSIS — M54.50 CHRONIC BILATERAL LOW BACK PAIN WITHOUT SCIATICA: ICD-10-CM

## 2022-02-02 DIAGNOSIS — G89.29 CHRONIC BILATERAL LOW BACK PAIN WITHOUT SCIATICA: ICD-10-CM

## 2022-02-02 DIAGNOSIS — G89.29 CHRONIC LOW BACK PAIN WITHOUT SCIATICA, UNSPECIFIED BACK PAIN LATERALITY: ICD-10-CM

## 2022-02-02 DIAGNOSIS — M51.36 LUMBAR DEGENERATIVE DISC DISEASE: ICD-10-CM

## 2022-02-02 DIAGNOSIS — Z79.891 ENCOUNTER FOR LONG-TERM OPIATE ANALGESIC USE: ICD-10-CM

## 2022-02-02 DIAGNOSIS — M54.50 CHRONIC LOW BACK PAIN WITHOUT SCIATICA, UNSPECIFIED BACK PAIN LATERALITY: ICD-10-CM

## 2022-02-02 DIAGNOSIS — F11.20 UNCOMPLICATED OPIOID DEPENDENCE (HCC): ICD-10-CM

## 2022-02-02 DIAGNOSIS — M54.12 CERVICAL RADICULOPATHY: ICD-10-CM

## 2022-02-02 PROCEDURE — 99214 OFFICE O/P EST MOD 30 MIN: CPT | Performed by: NURSE PRACTITIONER

## 2022-02-02 PROCEDURE — 80305 DRUG TEST PRSMV DIR OPT OBS: CPT | Performed by: NURSE PRACTITIONER

## 2022-02-02 RX ORDER — OXYCODONE AND ACETAMINOPHEN 7.5; 325 MG/1; MG/1
1 TABLET ORAL EVERY 8 HOURS PRN
Qty: 90 TABLET | Refills: 0 | Status: SHIPPED | OUTPATIENT
Start: 2022-02-02 | End: 2022-04-06 | Stop reason: SDUPTHER

## 2022-02-02 RX ORDER — PREDNISONE 10 MG/1
TABLET ORAL
Qty: 15 TABLET | Refills: 0 | Status: ON HOLD | OUTPATIENT
Start: 2022-02-02 | End: 2022-07-24 | Stop reason: ALTCHOICE

## 2022-02-02 NOTE — PATIENT INSTRUCTIONS
Opioid Safety   WHAT YOU NEED TO KNOW:   An opioid medicine is used to treat pain  Examples are oxycodone, morphine, fentanyl, or codeine  Pain control and management may help you rest, heal, and return to your daily activities  You and your family will receive information about how to manage your pain at home  The instructions will include what to do if you have side effects as your pain is managed  You will get information on how to handle opioid medicine safely  You will also get suggestions on how to control pain without opioids  It is important to follow all instructions so your pain is managed effectively  DISCHARGE INSTRUCTIONS:   Call your local emergency number (911 in the [place]), or have someone else call if:   · You have a seizure  · You cannot be woken  · You have trouble staying awake and your breathing is slow or shallow  · Your speech is slurred, or you are confused  · You are dizzy or stumble when you walk  Call your doctor, or have someone close to you call if:   · You are extremely drowsy, or you have trouble staying awake or speaking  · You have pale or clammy skin  · You have blue fingernails or lips  · Your heartbeat is slower than normal     · You cannot stop vomiting  · You have questions or concerns about your condition or care  Use opioids safely:   · Take prescribed opioids exactly as directed  Opioids come with directions based on the kind and how it is given  Talk to your healthcare provider or a pharmacist if you have any questions  Do not take more than the recommended amount  Too much can cause a life-threatening overdose  Do not continue to take it after your pain stops  You may develop tolerance  This means you keep needing higher doses to get the same effect  You may also develop opioid use disorder  This means you are not able to control your opioid use  · Do not give opioids to others or take opioids that belong to someone else    The kind or amount one person takes may not be right for another  The person you share them with may also be taking medicines that do not mix with opioids  He or she may drink alcohol or use other drugs that can cause life-threatening problems when mixed with opioids  · Do not mix opioids with other medicines or alcohol  The combination can cause an overdose, or cause you to stop breathing  Alcohol, sleeping pills, and medicines such as antihistamines can make you sleepy  A combination with opioids can lead to a coma  · Do not drive or operate heavy machinery after you use an opioid  You may feel drowsy or have trouble concentrating  You can injure yourself or others if you drive or use heavy machinery when you are not alert  Your provider or pharmacist can tell you how long to wait after a dose before you do these activities  · Talk to your healthcare provider if you have any side effects  Side effects include nausea, sleepiness, itching, and trouble thinking clearly  Your provider may need to make changes to the kind or amount of opioid you are taking  He or she can also help you find ways to prevent or relieve side effects  Manage constipation:  Constipation is the most common side effect of opioid medicine  Constipation is when you have hard, dry bowel movements, or you go longer than usual between bowel movements  Tell your healthcare provider about all changes in your bowel movements while you are taking opioids  He or she may recommend laxative medicine to help you have a bowel movement  He or she may also change the kind of opioid you are taking, or change when you take it  The following are more ways you can prevent or relieve constipation:  · Drink liquids as directed  You may need to drink extra liquids to help soften and move your bowels  Ask how much liquid to drink each day and which liquids are best for you  · Eat high-fiber foods    This may help decrease constipation by adding bulk to your bowel movements  High-fiber foods include fruits, vegetables, whole-grain breads and cereals, and beans  Your healthcare provider or dietitian can help you create a high-fiber meal plan  Your provider may also recommend a fiber supplement if you cannot get enough fiber from food  · Exercise regularly  Regular physical activity can help stimulate your intestines  Walking is a good exercise to prevent or relieve constipation  Ask which exercises are best for you  · Schedule a time each day to have a bowel movement  This may help train your body to have regular bowel movements  Bend forward while you are on the toilet to help move the bowel movement out  Sit on the toilet for at least 10 minutes, even if you do not have a bowel movement  Store opioids safely:   · Store opioids where others cannot easily get them  Keep them in a locked cabinet or secure area  Do not  keep them in a purse or other bag you carry with you  A person may be looking for something else and find the opioids  · Make sure opioids are stored out of the reach of children  A child can easily overdose on opioids  Opioids may look like candy to a small child  The best way to dispose of opioids: The laws vary by country and area  In the [place], the best way is to return the opioids through a take-back program  This program is offered by the [employer])  The following are options for using the program:  · Take the opioids to a [facility] collection site  The site is often a law enforcement center  Call your local law enforcement center for scheduled take-back days in your area  You will be given information on where to go if the collection site is in a different location  · Take the opioids to an approved pharmacy or hospital   A pharmacy or hospital may be set up as a collection site  You will need to ask if it is a [facility] collection site if you were not directed there   A pharmacy or doctor's office may not be able to take back opioids unless it is a [facility] site  · Use a mail-back system  This means you are given containers to put the opioids into  You will then mail them in the containers  · Use a take-back drop box  This is a place to leave the opioids at any time  People and animals will not be able to get into the box  Your local law enforcement agency can tell you where to find a drop box in your area  Other safe ways to dispose of opioids: The medicine may come with disposal instructions  The instructions may vary depending on the brand of medicine you are using  Instructions may come in a Medication Guide, but not every medicine has one  You may instead get instructions from your pharmacy or doctor  Follow instructions carefully  The following are general guidelines to follow:  · Find out if you can flush the opioid  Some opioids can be flushed down the toilet or poured into the sink  You will need to contact authorities in your area to see if this is an option for you  The FDA also offers a list of medicines that are safe to flush down the toilet  You can check the list if you cannot get the information for your local area  · Ask your waste management company about rules for putting opioids in the trash  The company will be able to give you specific directions  Scratch out personal information on the original medicine label so it cannot be read  Then put it in the trash  Do not label the trash or put any information on it about the opioids  It should look like regular household trash so no one is tempted to look for the opioids  Keep the trash out of the reach of children and animals  Always make sure trash is secure  · Talk to officials if you live in a facility  If you live in a nursing home or assisted living center, talk to an official  The person will know the rules for your area  Other ways to manage pain:   · Ask your healthcare provider about non-opioid medicines to control pain  Some medicines may even work better than opioids, depending on the cause of your pain  Nonprescription medicines include NSAIDs (such as ibuprofen) and acetaminophen  Prescription medicines include muscle relaxers, antidepressants, and steroids  · Pain may be managed without any medicines  Some ways to relieve pain include massage, aromatherapy, or meditation  Physical or occupational therapy may also help  For more information:   · Drug Enforcement Administration  [address] [facility] [address]  Phone: 7- 811 - [phone]  Web Address: [facility]/drug_disposal/    · [address] and Drug Administration  [place] , [address] [facility]  Phone: 1- 205 - 894-2171  Web Address: http://"[employer]/    Follow up with your doctor or pain specialist as directed: You may need to have your dose adjusted  Your doctor or pain specialist can also help you find ways to manage pain without opioids  Write down your questions so you remember to ask them during your visits  © Copyright [employer] 2021 Information is for End User's use only and may not be sold, redistributed or otherwise used for commercial purposes  All illustrations and images included in CareNotes® are the copyrighted property of [employer]  or Uma Levine  The above information is an  only  It is not intended as medical advice for individual conditions or treatments  Talk to your doctor, nurse or pharmacist before following any medical regimen to see if it is safe and effective for you

## 2022-02-02 NOTE — PROGRESS NOTES
Assessment:  1  Chronic pain syndrome    2  Chronic bilateral low back pain without sciatica    3  Lumbar degenerative disc disease    4  Lumbar spondylosis    5  Sacroiliitis ([facility] [address] )    6  Neck pain    7  Cervical radiculopathy    8  Cervical spondylosis without myelopathy    9  Myofascial pain syndrome    10  Uncomplicated opioid dependence ([facility] [address] )    11  Encounter for long-term opiate analgesic use    12  Chronic low back pain without sciatica, unspecified back pain laterality        Plan:  While the patient was in the office today, I did have a thorough conversation regarding their chronic pain syndrome, medication management, and treatment plan options  Patient is being seen for follow-up visit  Patient has been experiencing increased right-sided low back and buttock pain  Low back pain seems to be both facetogenic in nature as well as sacroiliac mediated  Patient is leaving for a cruise in 2 days  As such, I provided him with a prescription for a tapering dose of prednisone to address the acute inflammation  If his pain persists, we will consider either medial branch blocks or sacroiliac joint injection in the future  Continue oxycodone 7 5/325 3 times daily if needed for pain  The patient's opioid scripts were sent to their pharmacy electronically and was given a 2 month supply of prescriptions  The 1st prescription was filled 6 days early due to him leaving for vacation in 2 days  The 2nd prescription was dated 6 days later to adjust for the early refill this month  There are risks associated with opioid medications, including dependence, addiction and tolerance  The patient understands and agrees to use these medications only as prescribed  Potential side effects of the medications include, but are not limited to, constipation, drowsiness, addiction, impaired judgment and risk of fatal overdose if not taken as prescribed  The patient was warned against driving while taking sedation medications  Sharing medications is a felony  At this point in time, the patient is showing no signs of addiction, abuse, diversion or suicidal ideation  A urine drug screen was collected at today's office visit as part of our medication management protocol  The point of care testing results were appropriate for what was being prescribed  The specimen will be sent for confirmatory testing  The drug screen is medically necessary because the patient is either dependent on opioid medication or is being considered for opioid medication therapy and the results could impact ongoing or future treatment  The drug screen is to evaluate for the presences or absence of prescribed, non-prescribed, and/or illicit drugs/substances  [place] Prescription Drug Monitoring Program report was reviewed and was appropriate     The patient will follow-up in 2 months for medication prescription refill and reevaluation  The patient was advised to contact the office should their symptoms worsen in the interim  The patient was agreeable and verbalized an understanding  History of Present Illness: The patient is a 78 y o  male who presents for a follow up office visit in regards to Shoulder Pain, Neck Pain, and Hip Pain  The patients current symptoms include complaints of right-sided low back pain, neck pain, bilateral shoulder pain  Current pain level is an 8/10  Quality pain is described as dull, aching, sharp, throbbing, shooting  Current pain medications includes:  Oxycodone 10 5/325 3 times daily if needed for pain, gabapentin 600 mg 3 times daily   The patient reports that this regimen is providing 40 % pain relief  The patient is reporting no side effects from this pain medication regimen  I have personally reviewed and/or updated the patient's past medical history, past surgical history, family history, social history, current medications, allergies, and vital signs today           Review of Systems  Review of Systems Respiratory: Positive for shortness of breath  Cardiovascular: Negative for chest pain  Gastrointestinal: Negative for constipation, diarrhea, nausea and vomiting  Musculoskeletal: Negative for arthralgias, gait problem, joint swelling and myalgias  Decreased range of motion  Joint stiffness     Neurological: Negative for dizziness, seizures and weakness  All other systems reviewed and are negative  Past Medical History:   Diagnosis Date    Abdominal aortic aneurysm without rupture ([facility] [address] )     Abdominal Aortic Duplex 02/21/2017    Ectatic infrarenal abdominal aorta   CAD (coronary artery disease)     [facility]ch[facility] obstructive [facility])     Extremity pain     History of echocardiogram 03/18/2014    EF 55%, mild MR and AI  Mild concentric LVH   History of stress test 03/06/2017    Normal     Hypertension     Joint pain     Low back pain     Migraine     Neck pain     Obstructive sleep apnea     cannot tolerate CPAP    Osteoarthritis     Peripheral neuropathy     Reflex sympathetic dystrophy        Past Surgical History:   Procedure Laterality Date    CARDIAC CATHETERIZATION  02/13/2012    EF 70%, widely patent renal arteries, significant single-vessel CAD-medical therapy   CARDIAC CATHETERIZATION  04/11/2013    EF 65%, 50% mid LAD, 20% prox CFX, 90% diffuse RCA, 99% mid RCA  Medical management      CHOLECYSTECTOMY      EPIDURAL BLOCK INJECTION Bilateral 8/15/2019    Procedure: BLOCK / INJECTION EPIDURAL STEROID CERVICAL;  Surgeon: Shiva Woo MD;  Location: [facility];  Service: Pain Management     FL GUIDED NEEDLE PLAC BX/ASP/INJ  8/15/2019    ORTHOPEDIC SURGERY      TRIGGER POINT INJECTION         Family History   Adopted: Yes   Problem Relation Age of Onset    No Known Problems Family     No Known Problems Mother     No Known Problems Father        Social History     Occupational History    Not on file   Tobacco Use    Smoking status: Current Every Day Smoker     Packs/day: 1 50    Smokeless tobacco: Never Used   Vaping Use    Vaping Use: Never used   Substance and Sexual Activity    Alcohol use: No    Drug use: No    Sexual activity: Not on file         Current Outpatient Medications:     amLODIPine (NORVASC) 10 mg tablet, swallow 1 tablet once daily, Disp: 90 tablet, Rfl: 3    diphenhydrAMINE-acetaminophen (TYLENOL PM)  MG TABS, Take 2 tablets by mouth daily at bedtime as needed for sleep, Disp: , Rfl:     gabapentin (NEURONTIN) 600 MG tablet, Take 1 tablet (600 mg total) by mouth 3 (three) times a day, Disp: 90 tablet, Rfl: 2    lisinopril (ZESTRIL) 20 mg tablet, take 1 tablet by mouth once daily, Disp: 60 tablet, Rfl: 5    metoprolol succinate (TOPROL-XL) 100 mg 24 hr tablet, take 1 tablet by mouth once daily, Disp: 30 tablet, Rfl: 5    nitroglycerin (NITROSTAT) 0 4 mg SL tablet, Place 1 tablet (0 4 mg total) under the tongue every 5 (five) minutes as needed for chest pain, Disp: 25 tablet, Rfl: 5    oxyCODONE-acetaminophen (Percocet) 7 5-325 MG per tablet, Take 1 tablet by mouth every 8 (eight) hours as needed for moderate pain Do not fill until 3/10/2022 Max Daily Amount: 3 tablets, Disp: 90 tablet, Rfl: 0    oxyCODONE-acetaminophen (PERCOCET) 7 5-325 MG per tablet, Take 1 tablet by mouth every 8 (eight) hours as needed for moderate pain Ok to fill early on 2/4/2022 due to vacation    Will adjust next refill date to adjust for early refill Max Daily Amount: 3 tablets, Disp: 90 tablet, Rfl: 0    pravastatin (PRAVACHOL) 20 mg tablet, Take 1 tablet (20 mg total) by mouth daily, Disp: 30 tablet, Rfl: 6    sildenafil (VIAGRA) 100 mg tablet, TAKE 1 TABLET BY MOUTH AS NEEDED ERECTILE  DYSFUNCTION, Disp: 20 tablet, Rfl: 0    tamsulosin (FLOMAX) 0 4 mg, take 1 capsule by mouth once daily with dinner, Disp: 30 capsule, Rfl: 2    topiramate (TOPAMAX) 25 mg tablet, FOLLOW INSTRUCTIONS GIVEN TO TITRATE UP TO A MAX OF 2 TABLETS BY MOUTH TWICE DAILY AS TOLERATED AND TO LEVEL OF BENIFIT FOR TREMORS, Disp: 120 tablet, Rfl: 2    traZODone (DESYREL) 100 mg tablet, Take 1 tablet (100 mg total) by mouth daily at bedtime, Disp: 90 tablet, Rfl: 3    ofloxacin (OCUFLOX) 0 3 % ophthalmic solution, instill 1 drop into left eye START 3 days prior to surgery THEN O   (REFER TO PRESCRIPTION NOTES)  (Patient not taking: Reported on 1/28/2022), Disp: , Rfl:     prednisoLONE acetate (PRED FORTE) 1 % ophthalmic suspension, instill 1 drop into left eye every 2 hours STARTING AFTER SURGERY IS COMPLETED (Patient not taking: Reported on 1/28/2022), Disp: , Rfl:     predniSONE 10 mg tablet, 3 tabs once daily x 3 days; 2 tabs once daily x 2 days; 1 tab daily x 2 days, Disp: 15 tablet, Rfl: 0    No Known Allergies    Physical Exam:    /82 (BP Location: Left arm, Patient Position: Sitting, Cuff Size: Large)   Pulse 57   Temp 98 6 °F (37 °C)   Ht 5' 8" (1 727 m)   Wt 94 2 kg (207 lb 9 6 oz)   BMI 31 57 kg/m²     Constitutional:normal, well developed, well nourished, alert, in no distress and non-toxic and no overt pain behavior  Eyes:anicteric  HEENT:grossly intact  Neck:supple, symmetric, trachea midline and no masses   Pulmonary:even and unlabored  Cardiovascular:No edema or pitting edema present  Skin:Normal without rashes or lesions and well hydrated  Psychiatric:Mood and affect appropriate  Neurologic:Cranial Nerves II-XII grossly intact  Musculoskeletal:normal    Imaging  No orders to display       No orders of the defined types were placed in this encounter

## 2022-02-04 LAB
6MAM UR QL CFM: NEGATIVE NG/ML
7AMINOCLONAZEPAM UR QL CFM: NEGATIVE NG/ML
A-OH ALPRAZ UR QL CFM: NEGATIVE NG/ML
ACCEPTABLE CREAT UR QL: NORMAL MG/DL
ACCEPTIBLE SP GR UR QL: NORMAL
AMPHET UR QL CFM: NEGATIVE NG/ML
AMPHET UR QL CFM: NEGATIVE NG/ML
BUPRENORPHINE UR QL CFM: NEGATIVE NG/ML
BUTALBITAL UR QL CFM: NEGATIVE NG/ML
BZE UR QL CFM: NEGATIVE NG/ML
CODEINE UR QL CFM: NEGATIVE NG/ML
DESIPRAMINE UR QL CFM: NEGATIVE NG/ML
DESIPRAMINE UR QL CFM: NEGATIVE NG/ML
EDDP UR QL CFM: NEGATIVE NG/ML
ETHYL GLUCURONIDE UR QL CFM: NEGATIVE NG/ML
ETHYL SULFATE UR QL SCN: NEGATIVE NG/ML
EUTYLONE UR QL: NEGATIVE NG/ML
FENTANYL UR QL CFM: NEGATIVE NG/ML
GLIADIN IGG SER IA-ACNC: NEGATIVE NG/ML
GLUCOSE 30M P 50 G LAC PO SERPL-MCNC: NEGATIVE NG/ML
HYDROCODONE UR QL CFM: NEGATIVE NG/ML
HYDROCODONE UR QL CFM: NEGATIVE NG/ML
HYDROMORPHONE UR QL CFM: NEGATIVE NG/ML
IMIPRAMINE UR QL CFM: NEGATIVE NG/ML
LORAZEPAM UR QL CFM: NEGATIVE NG/ML
MDMA UR QL CFM: NEGATIVE NG/ML
ME-PHENIDATE UR QL CFM: NEGATIVE NG/ML
MEPERIDINE UR QL CFM: NEGATIVE NG/ML
METHADONE UR QL CFM: NEGATIVE NG/ML
METHAMPHET UR QL CFM: NEGATIVE NG/ML
MORPHINE UR QL CFM: NEGATIVE NG/ML
MORPHINE UR QL CFM: NEGATIVE NG/ML
NITRITE UR QL: NORMAL UG/ML
NORBUPRENORPHINE UR QL CFM: NEGATIVE NG/ML
NORDIAZEPAM UR QL CFM: NEGATIVE NG/ML
NORFENTANYL UR QL CFM: NEGATIVE NG/ML
NORHYDROCODONE UR QL CFM: NEGATIVE NG/ML
NORHYDROCODONE UR QL CFM: NEGATIVE NG/ML
NORMEPERIDINE UR QL CFM: NEGATIVE NG/ML
NOROXYCODONE UR QL CFM: NORMAL NG/ML
OLANZAPINE QUANTIFICATION: NEGATIVE NG/ML
OPC-3373 QUANTIFICATION: NEGATIVE
OXAZEPAM UR QL CFM: NEGATIVE NG/ML
OXYCODONE UR QL CFM: NORMAL NG/ML
OXYMORPHONE UR QL CFM: NORMAL NG/ML
OXYMORPHONE UR QL CFM: NORMAL NG/ML
PCP UR QL CFM: NEGATIVE NG/ML
PHENOBARB UR QL CFM: NEGATIVE NG/ML
SECOBARBITAL UR QL CFM: NEGATIVE NG/ML
SL AMB 3-METHYL-FENTANYL QUANTIFICATION: NORMAL NG/ML
SL AMB 4-ANPP QUANTIFICATION: NORMAL NG/ML
SL AMB 4-FIBF QUANTIFICATION: NORMAL NG/ML
SL AMB 5F-ADB-M7 METABOLITE QUANTIFICATION: NEGATIVE NG/ML
SL AMB 7-OH-MITRAGYNINE (KRATOM ALKALOID) QUANTIFICATION: NEGATIVE NG/ML
SL AMB AB-FUBINACA-M3 METABOLITE QUANTIFICATION: NEGATIVE NG/ML
SL AMB ACETYL FENTANYL QUANTIFICATION: NORMAL NG/ML
SL AMB ACETYL NORFENTANYL QUANTIFICATION: NORMAL NG/ML
SL AMB ACRYL FENTANYL QUANTIFICATION: NORMAL NG/ML
SL AMB BUTRYL FENTANYL QUANTIFICATION: NORMAL NG/ML
SL AMB CARFENTANIL QUANTIFICATION: NORMAL NG/ML
SL AMB CLOZAPINE QUANTIFICATION: NEGATIVE NG/ML
SL AMB CTHC (MARIJUANA METABOLITE) QUANTIFICATION: NEGATIVE NG/ML
SL AMB CYCLOPROPYL FENTANYL QUANTIFICATION: NORMAL NG/ML
SL AMB DEXTROMETHORPHAN QUANTIFICATION: NEGATIVE NG/ML
SL AMB DEXTRORPHAN (DEXTROMETHORPHAN METABOLITE) QUANT: NEGATIVE NG/ML
SL AMB DEXTRORPHAN (DEXTROMETHORPHAN METABOLITE) QUANT: NEGATIVE NG/ML
SL AMB FURANYL FENTANYL QUANTIFICATION: NORMAL NG/ML
SL AMB HALOPERIDOL  QUANTIFICATION: NEGATIVE NG/ML
SL AMB HALOPERIDOL METABOLITE QUANTIFICATION: NEGATIVE NG/ML
SL AMB HYDROXYRISPERIDONE QUANTIFICATION: NEGATIVE NG/ML
SL AMB JWH018 METABOLITE QUANTIFICATION: NEGATIVE NG/ML
SL AMB JWH073 METABOLITE QUANTIFICATION: NEGATIVE NG/ML
SL AMB MDMB-FUBINACA-M1 METABOLITE QUANTIFICATION: NEGATIVE NG/ML
SL AMB METHOXYACETYL FENTANYL QUANTIFICATION: NORMAL NG/ML
SL AMB METHYLONE QUANTIFICATION: NORMAL NG/ML
SL AMB N-DESMETHYL U-47700 QUANTIFICATION: NORMAL NG/ML
SL AMB N-DESMETHYL-TRAMADOL QUANTIFICATION: NEGATIVE NG/ML
SL AMB N-DESMETHYLCLOZAPINE QUANTIFICATION: NEGATIVE NG/ML
SL AMB NORQUETIAPINE QUANTIFICATION: NEGATIVE NG/ML
SL AMB PHENTERMINE QUANTIFICATION: NEGATIVE NG/ML
SL AMB QUETIAPINE QUANTIFICATION: NEGATIVE NG/ML
SL AMB RCS4 METABOLITE QUANTIFICATION: NEGATIVE NG/ML
SL AMB RISPERIDONE QUANTIFICATION: NEGATIVE NG/ML
SL AMB RITALINIC ACID QUANTIFICATION: NEGATIVE NG/ML
SL AMB U-47700 QUANTIFICATION: NORMAL NG/ML
SPECIMEN DRAWN SERPL: NEGATIVE NG/ML
SPECIMEN PH ACCEPTABLE UR: NORMAL
TAPENTADOL UR QL CFM: NEGATIVE NG/ML
TEMAZEPAM UR QL CFM: NEGATIVE NG/ML
TEMAZEPAM UR QL CFM: NEGATIVE NG/ML
TRAMADOL UR QL CFM: NEGATIVE NG/ML
URATE/CREAT 24H UR: NEGATIVE NG/ML

## 2022-03-04 ENCOUNTER — RA CDI HCC (OUTPATIENT)
Dept: OTHER | Facility: HOSPITAL | Age: 67
End: 2022-03-04

## 2022-03-04 NOTE — PROGRESS NOTES
[facility]  coding opportunities       Chart reviewed, no opportunity found: CHART REVIEWED, NO OPPORTUNITY FOUND                        Patients insurance company: [employer]

## 2022-03-14 ENCOUNTER — CONSULT (OUTPATIENT)
Dept: FAMILY MEDICINE CLINIC | Facility: CLINIC | Age: 67
End: 2022-03-14
Payer: MEDICARE

## 2022-03-14 VITALS
SYSTOLIC BLOOD PRESSURE: 122 MMHG | WEIGHT: 209 LBS | TEMPERATURE: 98.2 F | HEART RATE: 97 BPM | DIASTOLIC BLOOD PRESSURE: 86 MMHG | OXYGEN SATURATION: 95 % | HEIGHT: 68 IN | BODY MASS INDEX: 31.67 KG/M2

## 2022-03-14 DIAGNOSIS — H25.013 CORTICAL AGE-RELATED CATARACT OF BOTH EYES: Primary | ICD-10-CM

## 2022-03-14 DIAGNOSIS — R35.0 URINARY FREQUENCY: ICD-10-CM

## 2022-03-14 DIAGNOSIS — I10 PRIMARY HYPERTENSION: ICD-10-CM

## 2022-03-14 PROCEDURE — 99213 OFFICE O/P EST LOW 20 MIN: CPT | Performed by: FAMILY MEDICINE

## 2022-03-14 NOTE — PROGRESS NOTES
Assessment/Plan:    No problem-specific Assessment & Plan notes found for this encounter  Diagnoses and all orders for this visit:    Cortical age-related cataract of both eyes  Comments:  pt is a low cardiovascular risk for proposed procedure    Primary hypertension  Comments:  no change in the medication    Urinary frequency  -     Ambulatory Referral to Urology; Future          PHQ-2/9 Depression Screening    Little interest or pleasure in doing things: 0 - not at all  Feeling down, depressed, or hopeless: 0 - not at all  PHQ-2 Score: 0  PHQ-2 Interpretation: Negative depression screen            Subjective:      Patient ID: Serafin Rawls is a 78 y o  male  Pt complains of bilateral cataracts    Hypertension  This is a chronic problem  The current episode started more than 1 year ago  The problem is unchanged  The problem is controlled  Associated symptoms include shortness of breath  Pertinent negatives include no chest pain or palpitations  Past treatments include ACE inhibitors, beta blockers and calcium channel blockers  The following portions of the patient's history were reviewed and updated as appropriate: allergies, current medications, past family history, past medical history, past social history, past surgical history and problem list     Review of Systems   Constitutional: Positive for fatigue  Negative for chills and fever  HENT: Negative  Negative for hearing loss  Eyes: Positive for visual disturbance  Respiratory: Positive for shortness of breath  Negative for wheezing  Cardiovascular: Negative for chest pain and palpitations  Gastrointestinal: Negative for abdominal pain, blood in stool, constipation, diarrhea, nausea and vomiting  Endocrine: Negative  Genitourinary: Positive for frequency  Negative for difficulty urinating and dysuria  Musculoskeletal: Positive for arthralgias  Negative for myalgias  Skin: Negative  Allergic/Immunologic: Negative  Neurological: Negative for seizures and syncope  Hematological: Negative for adenopathy  Psychiatric/Behavioral: Negative  Objective:    /86   Pulse 97   Temp 98 2 °F (36 8 °C) (Tympanic)   Ht 5' 8" (1 727 m)   Wt 94 8 kg (209 lb)   SpO2 95%   BMI 31 78 kg/m²      Physical Exam  Vitals and nursing note reviewed  Constitutional:       General: He is not in acute distress  Appearance: Normal appearance  He is well-developed  He is not ill-appearing, toxic-appearing or diaphoretic  HENT:      Head: Normocephalic and atraumatic  Right Ear: Tympanic membrane, ear canal and external ear normal  There is no impacted cerumen  Left Ear: Tympanic membrane, ear canal and external ear normal  There is no impacted cerumen  Nose: Nose normal  No congestion or rhinorrhea  Mouth/Throat:      Mouth: Mucous membranes are moist       Pharynx: Oropharynx is clear  No oropharyngeal exudate or posterior oropharyngeal erythema  Eyes:      General: No scleral icterus  Right eye: No discharge  Left eye: No discharge  Extraocular Movements: Extraocular movements intact  Conjunctiva/sclera: Conjunctivae normal       Pupils: Pupils are equal, round, and reactive to light  Cardiovascular:      Rate and Rhythm: Normal rate and regular rhythm  Pulses: Normal pulses  Heart sounds: Normal heart sounds  No murmur heard  No friction rub  No gallop  Pulmonary:      Effort: Pulmonary effort is normal  No respiratory distress  Breath sounds: Normal breath sounds  No wheezing, rhonchi or rales  Abdominal:      General: Bowel sounds are normal  There is no distension  Palpations: Abdomen is soft  There is no mass  Tenderness: There is no abdominal tenderness  There is no guarding or rebound  Musculoskeletal:         General: No swelling or deformity  Normal range of motion  Cervical back: Normal range of motion and neck supple   No rigidity  Right lower leg: No edema  Left lower leg: No edema  Lymphadenopathy:      Cervical: No cervical adenopathy  Skin:     General: Skin is warm and dry  Findings: No rash  Neurological:      General: No focal deficit present  Mental Status: He is alert and oriented to person, place, and time  Sensory: No sensory deficit  Motor: No weakness  Coordination: Coordination normal       Gait: Gait normal       Deep Tendon Reflexes: Reflexes are normal and symmetric  Reflexes normal    Psychiatric:         Mood and Affect: Mood normal          Behavior: Behavior normal          Thought Content:  Thought content normal          Judgment: Judgment normal

## 2022-03-15 DIAGNOSIS — M51.36 LUMBAR DEGENERATIVE DISC DISEASE: ICD-10-CM

## 2022-03-15 DIAGNOSIS — M54.12 CERVICAL RADICULOPATHY: ICD-10-CM

## 2022-03-15 DIAGNOSIS — G25.0 TREMOR, ESSENTIAL: ICD-10-CM

## 2022-03-15 RX ORDER — TOPIRAMATE 25 MG/1
TABLET ORAL
Qty: 120 TABLET | Refills: 2 | Status: SHIPPED | OUTPATIENT
Start: 2022-03-15 | End: 2022-06-20

## 2022-03-15 RX ORDER — GABAPENTIN 600 MG/1
TABLET ORAL
Qty: 90 TABLET | Refills: 2 | Status: SHIPPED | OUTPATIENT
Start: 2022-03-15 | End: 2022-06-06 | Stop reason: SDUPTHER

## 2022-03-29 DIAGNOSIS — I10 ESSENTIAL HYPERTENSION: ICD-10-CM

## 2022-03-29 RX ORDER — AMLODIPINE BESYLATE 10 MG/1
TABLET ORAL
Qty: 90 TABLET | Refills: 3 | Status: SHIPPED | OUTPATIENT
Start: 2022-03-29

## 2022-04-02 DIAGNOSIS — Z87.438 HISTORY OF BPH: ICD-10-CM

## 2022-04-04 RX ORDER — TAMSULOSIN HYDROCHLORIDE 0.4 MG/1
CAPSULE ORAL
Qty: 30 CAPSULE | Refills: 2 | Status: SHIPPED | OUTPATIENT
Start: 2022-04-04 | End: 2022-06-29

## 2022-04-06 ENCOUNTER — OFFICE VISIT (OUTPATIENT)
Dept: PAIN MEDICINE | Facility: CLINIC | Age: 67
End: 2022-04-06
Payer: MEDICARE

## 2022-04-06 VITALS
DIASTOLIC BLOOD PRESSURE: 87 MMHG | WEIGHT: 204 LBS | BODY MASS INDEX: 30.92 KG/M2 | HEART RATE: 64 BPM | HEIGHT: 68 IN | SYSTOLIC BLOOD PRESSURE: 161 MMHG

## 2022-04-06 DIAGNOSIS — M54.2 NECK PAIN: ICD-10-CM

## 2022-04-06 DIAGNOSIS — M54.12 CERVICAL RADICULOPATHY: ICD-10-CM

## 2022-04-06 DIAGNOSIS — F11.20 UNCOMPLICATED OPIOID DEPENDENCE (HCC): ICD-10-CM

## 2022-04-06 DIAGNOSIS — Z79.891 ENCOUNTER FOR LONG-TERM OPIATE ANALGESIC USE: ICD-10-CM

## 2022-04-06 DIAGNOSIS — M47.812 CERVICAL SPONDYLOSIS WITHOUT MYELOPATHY: ICD-10-CM

## 2022-04-06 DIAGNOSIS — G89.29 CHRONIC LOW BACK PAIN WITHOUT SCIATICA, UNSPECIFIED BACK PAIN LATERALITY: ICD-10-CM

## 2022-04-06 DIAGNOSIS — M79.18 MYOFASCIAL PAIN SYNDROME: ICD-10-CM

## 2022-04-06 DIAGNOSIS — M51.36 LUMBAR DEGENERATIVE DISC DISEASE: ICD-10-CM

## 2022-04-06 DIAGNOSIS — G89.4 CHRONIC PAIN SYNDROME: ICD-10-CM

## 2022-04-06 DIAGNOSIS — M54.50 CHRONIC LOW BACK PAIN WITHOUT SCIATICA, UNSPECIFIED BACK PAIN LATERALITY: ICD-10-CM

## 2022-04-06 DIAGNOSIS — M47.816 LUMBAR SPONDYLOSIS: ICD-10-CM

## 2022-04-06 PROCEDURE — 99214 OFFICE O/P EST MOD 30 MIN: CPT | Performed by: NURSE PRACTITIONER

## 2022-04-06 RX ORDER — OXYCODONE AND ACETAMINOPHEN 7.5; 325 MG/1; MG/1
1 TABLET ORAL EVERY 8 HOURS PRN
Qty: 90 TABLET | Refills: 0 | Status: SHIPPED | OUTPATIENT
Start: 2022-04-06 | End: 2022-06-06 | Stop reason: SDUPTHER

## 2022-04-06 NOTE — PROGRESS NOTES
Assessment:  1  Chronic pain syndrome    2  Chronic low back pain without sciatica, unspecified back pain laterality    3  Lumbar degenerative disc disease    4  Lumbar spondylosis    5  Neck pain    6  Cervical radiculopathy    7  Cervical spondylosis without myelopathy    8  Myofascial pain syndrome    9  Uncomplicated opioid dependence ([address] )    10  Encounter for long-term opiate analgesic use        Plan:  While the patient was in the office today, I did have a thorough conversation regarding their chronic pain syndrome, medication management, and treatment plan options  Patient is being seen for follow-up visit  Overall, he reports that his pain remains reasonably controlled with his current medication regimen  Continue oxycodone 7 5/325 every 8 hours as needed for pain  The patient's opioid scripts were sent to their pharmacy electronically and was given a 2 month supply of prescriptions with a Do Not Fill date(s) of 04/07/2022, 05/05/2022  Continue gabapentin 600 mg 3 times daily to address the neuropathic component of his pain  He did not require refill this medication during today's visit  [place] Prescription Drug Monitoring Program report was reviewed and was appropriate     There are risks associated with opioid medications, including dependence, addiction and tolerance  The patient understands and agrees to use these medications only as prescribed  Potential side effects of the medications include, but are not limited to, constipation, drowsiness, addiction, impaired judgment and risk of fatal overdose if not taken as prescribed  The patient was warned against driving while taking sedation medications  Sharing medications is a felony  At this point in time, the patient is showing no signs of addiction, abuse, diversion or suicidal ideation  The patient will follow-up in 8 weeks for medication prescription refill and reevaluation   The patient was advised to contact the office should their symptoms worsen in the interim  The patient was agreeable and verbalized an understanding  History of Present Illness: The patient is a 78 y o  male last seen on 02/02/2022 who presents for a follow up office visit in regards to chronic pain secondary to chronic pain syndrome, chronic low back pain, lumbar degenerative disc disease, lumbar spondylosis, neck pain, cervical radiculopathy, cervical spondylosis, myofascial pain syndrome  The patient currently reports complaints of neck, shoulder, low back pain  Current pain level is an 8/10  Quality pain is described as dull, aching, sharp, shooting  Current pain medications includes:  Percocet 7 5/325 every 8 hours as needed for pain, gabapentin 600 mg 3 times daily    The patient reports that this regimen is providing up to 50 % pain relief  The patient is reporting no side effects from this pain medication regimen  Pain Contract Signed:  06/16/2021  Last Urine Drug Screen:  02/02/2022    I have personally reviewed and/or updated the patient's past medical history, past surgical history, family history, social history, current medications, allergies, and vital signs today  Review of Systems:    Review of Systems   Constitutional: Negative for chills and fever  HENT: Negative for ear pain and sore throat  Eyes: Negative for pain and visual disturbance  Respiratory: Positive for shortness of breath  Negative for cough  Cardiovascular: Negative for chest pain and palpitations  Gastrointestinal: Negative for abdominal pain and vomiting  Genitourinary: Negative for dysuria and hematuria  Musculoskeletal: Negative for arthralgias and back pain  Decreased range of motion  Joint stiffness     Skin: Negative for color change and rash  Neurological: Positive for dizziness  Negative for seizures and syncope  All other systems reviewed and are negative          Past Medical History:   Diagnosis Date    Abdominal aortic aneurysm without rupture ([facility] [address] )     Abdominal Aortic Duplex 02/21/2017    Ectatic infrarenal abdominal aorta   CAD (coronary artery disease)     [facility] obstructive [facility])     Extremity pain     History of echocardiogram 03/18/2014    EF 55%, mild MR and AI  Mild concentric LVH   History of stress test 03/06/2017    Normal     Hypertension     Joint pain     Low back pain     Migraine     Neck pain     Obstructive sleep apnea     cannot tolerate CPAP    Osteoarthritis     Peripheral neuropathy     Reflex sympathetic dystrophy        Past Surgical History:   Procedure Laterality Date    CARDIAC CATHETERIZATION  02/13/2012    EF 70%, widely patent renal arteries, significant single-vessel CAD-medical therapy   CARDIAC CATHETERIZATION  04/11/2013    EF 65%, 50% mid LAD, 20% prox CFX, 90% diffuse RCA, 99% mid RCA  Medical management      CHOLECYSTECTOMY      EPIDURAL BLOCK INJECTION Bilateral 8/15/2019    Procedure: BLOCK / INJECTION EPIDURAL STEROID CERVICAL;  Surgeon: Melinda Licona MD;  Location: [facility];  Service: Pain Management     FL GUIDED NEEDLE PLAC BX/ASP/INJ  8/15/2019    ORTHOPEDIC SURGERY      TRIGGER POINT INJECTION         Family History   Adopted: Yes   Problem Relation Age of Onset    No Known Problems Family     No Known Problems Mother     No Known Problems Father        Social History     Occupational History    Not on file   Tobacco Use    Smoking status: Current Every Day Smoker     Packs/day: 1 50    Smokeless tobacco: Never Used   Vaping Use    Vaping Use: Never used   Substance and Sexual Activity    Alcohol use: No    Drug use: No    Sexual activity: Not on file         Current Outpatient Medications:     amLODIPine (NORVASC) 10 mg tablet, swallow 1 tablet once daily, Disp: 90 tablet, Rfl: 3    diphenhydrAMINE-acetaminophen (TYLENOL PM)  MG TABS, Take 2 tablets by mouth daily at bedtime as needed for sleep, Disp: , Rfl:     gabapentin (NEURONTIN) 600 MG tablet, take 1 tablet by mouth three times a day, Disp: 90 tablet, Rfl: 2    lisinopril (ZESTRIL) 20 mg tablet, take 1 tablet by mouth once daily, Disp: 60 tablet, Rfl: 5    metoprolol succinate (TOPROL-XL) 100 mg 24 hr tablet, take 1 tablet by mouth once daily, Disp: 30 tablet, Rfl: 5    nitroglycerin (NITROSTAT) 0 4 mg SL tablet, Place 1 tablet (0 4 mg total) under the tongue every 5 (five) minutes as needed for chest pain, Disp: 25 tablet, Rfl: 5    oxyCODONE-acetaminophen (Percocet) 7 5-325 MG per tablet, Take 1 tablet by mouth every 8 (eight) hours as needed for moderate pain Do not fill until 5/5/2022 Max Daily Amount: 3 tablets, Disp: 90 tablet, Rfl: 0    oxyCODONE-acetaminophen (PERCOCET) 7 5-325 MG per tablet, Take 1 tablet by mouth every 8 (eight) hours as needed for moderate pain Do not fill until 4/7/2022 Max Daily Amount: 3 tablets, Disp: 90 tablet, Rfl: 0    pravastatin (PRAVACHOL) 20 mg tablet, Take 1 tablet (20 mg total) by mouth daily, Disp: 30 tablet, Rfl: 6    sildenafil (VIAGRA) 100 mg tablet, TAKE 1 TABLET BY MOUTH AS NEEDED ERECTILE  DYSFUNCTION, Disp: 20 tablet, Rfl: 0    tamsulosin (FLOMAX) 0 4 mg, take 1 capsule by mouth once daily with dinner, Disp: 30 capsule, Rfl: 2    topiramate (TOPAMAX) 25 mg tablet, FOLLOW INSTRUCTIONS GIVEN TO TITRATE UP TO A MAX OF 2 TABLETS BY MOUTH TWICE DAILY AS TOLERATED AND TO LEVEL OF BENEFIT FOR TREMORS, Disp: 120 tablet, Rfl: 2    traZODone (DESYREL) 100 mg tablet, Take 1 tablet (100 mg total) by mouth daily at bedtime, Disp: 90 tablet, Rfl: 3    ofloxacin (OCUFLOX) 0 3 % ophthalmic solution, instill 1 drop into left eye START 3 days prior to surgery THEN O   (REFER TO PRESCRIPTION NOTES)   (Patient not taking: Reported on 1/28/2022), Disp: , Rfl:     prednisoLONE acetate (PRED FORTE) 1 % ophthalmic suspension, instill 1 drop into left eye every 2 hours STARTING AFTER SURGERY IS COMPLETED (Patient not taking: Reported on 1/28/2022), Disp: , Rfl:     predniSONE 10 mg tablet, 3 tabs once daily x 3 days; 2 tabs once daily x 2 days; 1 tab daily x 2 days (Patient not taking: Reported on 3/14/2022 ), Disp: 15 tablet, Rfl: 0    No Known Allergies    Physical Exam:    /87   Pulse 64   Ht 5' 8" (1 727 m)   Wt 92 5 kg (204 lb)   BMI 31 02 kg/m²     Constitutional:normal, well developed, well nourished, alert, in no distress and non-toxic and no overt pain behavior  Eyes:anicteric  HEENT:grossly intact  Neck:supple, symmetric, trachea midline and no masses   Pulmonary:even and unlabored  Cardiovascular:No edema or pitting edema present  Skin:Normal without rashes or lesions and well hydrated  Psychiatric:Mood and affect appropriate  Neurologic:Cranial Nerves II-XII grossly intact  Musculoskeletal:normal      Imaging  No orders to display         No orders of the defined types were placed in this encounter

## 2022-04-06 NOTE — PATIENT INSTRUCTIONS
Opioid Safety   WHAT YOU NEED TO KNOW:   An opioid medicine is used to treat pain  Examples are oxycodone, morphine, fentanyl, or codeine  Pain control and management may help you rest, heal, and return to your daily activities  You and your family will receive information about how to manage your pain at home  The instructions will include what to do if you have side effects as your pain is managed  You will get information on how to handle opioid medicine safely  You will also get suggestions on how to control pain without opioids  It is important to follow all instructions so your pain is managed effectively  DISCHARGE INSTRUCTIONS:   Call your local emergency number (911 in the [place]), or have someone else call if:   · You have a seizure  · You cannot be woken  · You have trouble staying awake and your breathing is slow or shallow  · Your speech is slurred, or you are confused  · You are dizzy or stumble when you walk  Call your doctor, or have someone close to you call if:   · You are extremely drowsy, or you have trouble staying awake or speaking  · You have pale or clammy skin  · You have blue fingernails or lips  · Your heartbeat is slower than normal     · You cannot stop vomiting  · You have questions or concerns about your condition or care  Use opioids safely:   · Take prescribed opioids exactly as directed  Opioids come with directions based on the kind and how it is given  Talk to your healthcare provider or a pharmacist if you have any questions  Do not take more than the recommended amount  Too much can cause a life-threatening overdose  Do not continue to take it after your pain stops  You may develop tolerance  This means you keep needing higher doses to get the same effect  You may also develop opioid use disorder  This means you are not able to control your opioid use  · Do not give opioids to others or take opioids that belong to someone else    The kind or amount one person takes may not be right for another  The person you share them with may also be taking medicines that do not mix with opioids  He or she may drink alcohol or use other drugs that can cause life-threatening problems when mixed with opioids  · Do not mix opioids with other medicines or alcohol  The combination can cause an overdose, or cause you to stop breathing  Alcohol, sleeping pills, and medicines such as antihistamines can make you sleepy  A combination with opioids can lead to a coma  · Do not drive or operate heavy machinery after you use an opioid  You may feel drowsy or have trouble concentrating  You can injure yourself or others if you drive or use heavy machinery when you are not alert  Your provider or pharmacist can tell you how long to wait after a dose before you do these activities  · Talk to your healthcare provider if you have any side effects  Side effects include nausea, sleepiness, itching, and trouble thinking clearly  Your provider may need to make changes to the kind or amount of opioid you are taking  He or she can also help you find ways to prevent or relieve side effects  Manage constipation:  Constipation is the most common side effect of opioid medicine  Constipation is when you have hard, dry bowel movements, or you go longer than usual between bowel movements  Tell your healthcare provider about all changes in your bowel movements while you are taking opioids  He or she may recommend laxative medicine to help you have a bowel movement  He or she may also change the kind of opioid you are taking, or change when you take it  The following are more ways you can prevent or relieve constipation:  · Drink liquids as directed  You may need to drink extra liquids to help soften and move your bowels  Ask how much liquid to drink each day and which liquids are best for you  · Eat high-fiber foods    This may help decrease constipation by adding bulk to your bowel movements  High-fiber foods include fruits, vegetables, whole-grain breads and cereals, and beans  Your healthcare provider or dietitian can help you create a high-fiber meal plan  Your provider may also recommend a fiber supplement if you cannot get enough fiber from food  · Exercise regularly  Regular physical activity can help stimulate your intestines  Walking is a good exercise to prevent or relieve constipation  Ask which exercises are best for you  · Schedule a time each day to have a bowel movement  This may help train your body to have regular bowel movements  Bend forward while you are on the toilet to help move the bowel movement out  Sit on the toilet for at least 10 minutes, even if you do not have a bowel movement  Store opioids safely:   · Store opioids where others cannot easily get them  Keep them in a locked cabinet or secure area  Do not  keep them in a purse or other bag you carry with you  A person may be looking for something else and find the opioids  · Make sure opioids are stored out of the reach of children  A child can easily overdose on opioids  Opioids may look like candy to a small child  The best way to dispose of opioids: The laws vary by country and area  In the [place], the best way is to return the opioids through a take-back program  This program is offered by the [employer]  The following are options for using the program:  · Take the opioids to a [employer] collection site  The site is often a law enforcement center  Call your local law enforcement center for scheduled take-back days in your area  You will be given information on where to go if the collection site is in a different location  · Take the opioids to an approved pharmacy or hospital   A pharmacy or hospital may be set up as a collection site  You will need to ask if it is a [employer] collection site if you were not directed there   A pharmacy or doctor's office may not be able to take back opioids unless it is a [employer] site  · Use a mail-back system  This means you are given containers to put the opioids into  You will then mail them in the containers  · Use a take-back drop box  This is a place to leave the opioids at any time  People and animals will not be able to get into the box  Your local law enforcement agency can tell you where to find a drop box in your area  Other safe ways to dispose of opioids: The medicine may come with disposal instructions  The instructions may vary depending on the brand of medicine you are using  Instructions may come in a Medication Guide, but not every medicine has one  You may instead get instructions from your pharmacy or doctor  Follow instructions carefully  The following are general guidelines to follow:  · Find out if you can flush the opioid  Some opioids can be flushed down the toilet or poured into the sink  You will need to contact authorities in your area to see if this is an option for you  The FDA also offers a list of medicines that are safe to flush down the toilet  You can check the list if you cannot get the information for your local area  · Ask your waste management company about rules for putting opioids in the trash  The company will be able to give you specific directions  Scratch out personal information on the original medicine label so it cannot be read  Then put it in the trash  Do not label the trash or put any information on it about the opioids  It should look like regular household trash so no one is tempted to look for the opioids  Keep the trash out of the reach of children and animals  Always make sure trash is secure  · Talk to officials if you live in a facility  If you live in a nursing home or assisted living center, talk to an official  The person will know the rules for your area  Other ways to manage pain:   · Ask your healthcare provider about non-opioid medicines to control pain  Some medicines may even work better than opioids, depending on the cause of your pain  Nonprescription medicines include NSAIDs (such as ibuprofen) and acetaminophen  Prescription medicines include muscle relaxers, antidepressants, and steroids  · Pain may be managed without any medicines  Some ways to relieve pain include massage, aromatherapy, or meditation  Physical or occupational therapy may also help  For more information:   · Drug Enforcement Administration  [address] [facility] [address] [facility] [address]  Phone: 8- [phone] - [phone]  Web Address: [email or web address]    · [address] and Drug Administration  [place] , [address] [facility]  Phone: 4- 958 - [phone]  Web Address: http://[employer]/    Follow up with your doctor or pain specialist as directed: You may need to have your dose adjusted  Your doctor or pain specialist can also help you find ways to manage pain without opioids  Write down your questions so you remember to ask them during your visits  © Copyright [employer] 2022 Information is for End User's use only and may not be sold, redistributed or otherwise used for commercial purposes  All illustrations and images included in CareNotes® are the copyrighted property of [employer]  or [employer]  The above information is an  only  It is not intended as medical advice for individual conditions or treatments  Talk to your doctor, nurse or pharmacist before following any medical regimen to see if it is safe and effective for you

## 2022-04-15 DIAGNOSIS — N52.9 ERECTILE DYSFUNCTION, UNSPECIFIED ERECTILE DYSFUNCTION TYPE: ICD-10-CM

## 2022-04-19 RX ORDER — SILDENAFIL 100 MG/1
TABLET, FILM COATED ORAL
Qty: 20 TABLET | Refills: 0 | Status: SHIPPED | OUTPATIENT
Start: 2022-04-19 | End: 2022-06-10

## 2022-05-01 DIAGNOSIS — I10 ESSENTIAL HYPERTENSION: ICD-10-CM

## 2022-05-02 RX ORDER — METOPROLOL SUCCINATE 100 MG/1
TABLET, EXTENDED RELEASE ORAL
Qty: 30 TABLET | Refills: 5 | Status: SHIPPED | OUTPATIENT
Start: 2022-05-02

## 2022-05-06 ENCOUNTER — OFFICE VISIT (OUTPATIENT)
Dept: CARDIOLOGY CLINIC | Facility: CLINIC | Age: 67
End: 2022-05-06
Payer: MEDICARE

## 2022-05-06 VITALS
SYSTOLIC BLOOD PRESSURE: 140 MMHG | HEIGHT: 68 IN | HEART RATE: 60 BPM | DIASTOLIC BLOOD PRESSURE: 70 MMHG | WEIGHT: 206 LBS | BODY MASS INDEX: 31.22 KG/M2

## 2022-05-06 DIAGNOSIS — I71.4 ABDOMINAL AORTIC ANEURYSM (AAA) WITHOUT RUPTURE (HCC): ICD-10-CM

## 2022-05-06 DIAGNOSIS — I10 PRIMARY HYPERTENSION: ICD-10-CM

## 2022-05-06 DIAGNOSIS — I25.10 CORONARY ARTERY DISEASE INVOLVING NATIVE CORONARY ARTERY OF NATIVE HEART WITHOUT ANGINA PECTORIS: Primary | ICD-10-CM

## 2022-05-06 DIAGNOSIS — E78.2 MIXED HYPERLIPIDEMIA: ICD-10-CM

## 2022-05-06 DIAGNOSIS — F17.200 SMOKING: ICD-10-CM

## 2022-05-06 PROCEDURE — 99214 OFFICE O/P EST MOD 30 MIN: CPT | Performed by: INTERNAL MEDICINE

## 2022-05-06 RX ORDER — ROSUVASTATIN CALCIUM 20 MG/1
20 TABLET, COATED ORAL DAILY
Qty: 30 TABLET | Refills: 5 | Status: SHIPPED | OUTPATIENT
Start: 2022-05-06

## 2022-05-06 RX ORDER — ASPIRIN 81 MG/1
81 TABLET ORAL DAILY
Qty: 30 TABLET | Refills: 5
Start: 2022-05-06

## 2022-05-06 NOTE — TELEPHONE ENCOUNTER
FYI-  Family member tested positive he is currently quarantined  He will call back to reschedule  Physical Therapy Discharge Summary    Referred by: Mariel Navarrete MD  Medical Diagnosis (from order):     Current Functional Limitations: occasional leakage    OBJECTIVE   no measurements taken this session    ASSESSMENT   Patient has been being seen for urge incontinence, pelvic pain. To date the patient has made gains in terms of function and pain however still has occasional leakage, although better able to control sudden onset of urge. Patient has participated in therapy addressing her deficits with pelvic floor internal therapy, visceral mobilization, foods/dietary changes, reverse Kegel, urge  Control and training, timed voiding, as well as instruction in TENS 7000 use for sacral nerve stimulation.  Most recently she's used the sacral nerve stimulator- instructed to continue performing this treatment until she has her follow up appt in June with Dr. Navarrete. Patients pain has decreased with usage of dilator and reverse Kegel to allow for pelvic  Floor mobility. She's had  Some episodes of leaking but overall better controlled with all treatments performed.   Discharge from skilled therapy with instructions/recommendations: follow up with physician; continue with nerve  stimulation    PLAN    Discharge from therapy    Goals  1. Patient independent with modified and progressed home exercise program.  2. Decrease pain/symptoms to <1-2/10 (MET)   3. Improve involved strength to 3/5   4. Verbalize understanding of fluid and dietary needs for normal bladder and bowel health (MET)   through improvements listed above patient will:  5. Report normal voiding frequency of 2 to 5 hours in order to go to grocery store, complete ADLs without having to be near restroom or disrupted to use restroom (MET)   6. Decrease pad usage to <1 liner in order to avoid disruption of activities of daily living and instrumental activities of daily living (PROGRESSING TOWARDS)   7. Have a bowel movement daily in order to complete activities of  daily living and instrumental activities of daily living without disruption from abdominal pain (MET)          Patient Specific Functional Scale: Initial Score: 2; Discharge Outcome Score: 6    Physical Therapy Daily Treatment    Visit Count: 6     Plan of Care: 2/25/2022 Through: 5/20/2022  Insurance Information: medical necessity   Referred by: Mariel Navarrete MD; Next provider visit (if known/scheduled): To be Determined   Medical Diagnosis (from order):       Diagnosis Information             Diagnosis      788.31 (ICD-9-CM) - N39.41 (ICD-10-CM) - Urgency incontinence      788.63 (ICD-9-CM) - R39.15 (ICD-10-CM) - Urgency of urination      788.41 (ICD-9-CM) - R35.0 (ICD-10-CM) - Urinary frequency      596.51 (ICD-9-CM) - N32.81 (ICD-10-CM) - OAB (overactive bladder)      629.89 (ICD-9-CM) - N94.89 (ICD-10-CM) - High-tone pelvic floor dysfunction      623.2 (ICD-9-CM) - N89.5 (ICD-10-CM) - Vaginal stenosis                  Treatment Diagnosis: Pelvic floor dysfunction with increased pain/symptoms, impaired posture, impaired strength, impaired range of motion, impaired tissue mobility, impaired gait, impaired activity tolerance, impaired bladder health, impaired bowel health, impaired sexual health     Date of onset/injury: years  Diagnosis Precautions: total hysterectomy  Chart reviewed at time of initial evaluation (relevant co-morbidities, allergies, tests and medications listed):   History - past surgical         Past Surgical History:   Procedure Laterality Date   • Bariatric surgery   ~ 2006     lap gastric banding; repositioned port 4/2013   • Colonoscopy diagnostic   06/08/2011   • Esophagogastroduodenoscopy   8-20-15     inflammation   • Hb sclerotherapy single vein Right 06/05/2019     Dr Beth    • Hysterectomy   08/06/2010   • Lap gastric restrict w remove device &subq   05/21/2021     Michael; Robotic band and port removal   • Plantar fascia surgery Left       left foot   • Shoulder arthroscopy w/  rotator cuff repair Left           History - past medical        Past Medical History:   Diagnosis Date   • Anxiety     • Depressive disorder     • Diabetes mellitus (CMS/HCC)       diet controlled   • Fibromyalgia     • Fracture       right ankle - tx w/ casting    • GERD     • History of shingles 2018     mild case    • Hypercholesterolemia     • Migraines     • Osteoarthritis of right knee     • Psoriasis     • Right hip pain     • Urinary incontinence     • Varicose veins of both lower extremities     • Vitamin B12 deficiency              SUBJECTIVE   Did have 2  Instances where she lost some bladder control since last session (1 month ago)  Current Pain (0-10 scale): 0     OBJECTIVE   Outcome Measures: (Outcome Scoring)  Patient-Specific Activity Scoring Scheme  Provide up to 3 activities you are currently having difficulty completing and want to improve with therapy:  Activity Score    Date 5/6/2022 initial   1. Getting of bus without leaking 6 2   2.      3.      Average Score 6 2   Each activity is scored: 0=unable to perform activity to 10=able to perform activity at the same level as before injury to problem  Total score = sum of the activity scores/number of activities  Minimum detectable change (90%CI) for average score = 2 points  Minimum detectable change (90%CI) for single activity score = 3 points      Treatment   ADL's/self care:  Review of progress thus far. Patient seen for today's session in abbreviated form (patient thought that she had an appt but wasn't on the schedule). therapist able to accommodate a short  Visit to wrap up.  -discussion regarding TENS 7000 use. Set up proper parameters for home usage as well as review of continuing  To use this treatment modality until her  Follow up with Dr. Navarrete.  Review of goals.    Skilled input: verbal instruction/cues, tactile instruction/cues, as detailed above    Home Program:   foods  Reverse Kegel  32-60 oz of water  Bladder  diary  Urge  dilator  Tens 7000  Timed voiding     Suggestions for next session as indicated: discharged    ASSESSMENT   Reassessment performed this session. See above for additional information regarding treatment note.   Pain after treatment (patient reported, 0-10 scale): 0  Result of above outlined education: Verbalizes understanding and Needs reinforcement    Therapy procedure time and total treatment time can be found documented on the Time Entry flowsheet

## 2022-05-06 NOTE — PROGRESS NOTES
Subjective:        Patient ID: Elder Dorado is a 78 y o  male  Chief Complaint:  Ayaan Fischer is here for routine CAD follow-up with dyslipidemia prediabetes hypertension, chronic smoker, untreated sleep apnea intolerant of CPAP, single vessel severe RCA disease CAD proven By remote catheterization, last year's stress test nonischemic  He is looking for pain management specialist to take care of her shoulders  Says he does not think it ever be able to quit smoking  Unfortunately spending 900-1100 dollars a month on cigarettes  Denies any chest pains alarming chest tightness, no palpitations presyncope or syncope, no unusual dyspnea, no unusual edema  The following portions of the patient's history were reviewed and updated as appropriate: allergies, current medications, past family history, past medical history, past social history, past surgical history and problem list   Review of Systems   Constitutional: Negative for chills, diaphoresis, malaise/fatigue and weight gain  HENT: Negative for nosebleeds and stridor  Eyes: Negative for double vision, vision loss in left eye, vision loss in right eye and visual disturbance  Cardiovascular: Positive for dyspnea on exertion (Mild usual nonprogressive)  Negative for chest pain, claudication, cyanosis, irregular heartbeat, leg swelling, near-syncope, orthopnea, palpitations, paroxysmal nocturnal dyspnea and syncope  Respiratory: Negative for cough, shortness of breath, snoring and wheezing  Endocrine: Negative for polydipsia, polyphagia and polyuria  Hematologic/Lymphatic: Negative for bleeding problem  Does not bruise/bleed easily  Skin: Negative for flushing and rash  Musculoskeletal: Negative for falls and myalgias  Gastrointestinal: Negative for abdominal pain, heartburn, hematemesis, hematochezia, melena and nausea  Genitourinary: Negative for hematuria     Neurological: Negative for brief paralysis, dizziness, focal weakness, headaches, light-headedness, loss of balance and vertigo  Psychiatric/Behavioral: Negative for altered mental status and substance abuse  Allergic/Immunologic: Negative for hives  Objective:      /70   Pulse 60   Ht 5' 8" (1 727 m)   Wt 93 4 kg (206 lb)   BMI 31 32 kg/m²   Physical Exam  Constitutional:       General: He is not in acute distress  Appearance: He is well-developed  He is not diaphoretic  HENT:      Head: Normocephalic and atraumatic  Eyes:      General: No scleral icterus  Pupils: Pupils are equal, round, and reactive to light  Neck:      Thyroid: No thyromegaly  Vascular: No carotid bruit or JVD  Cardiovascular:      Rate and Rhythm: Normal rate and regular rhythm  Heart sounds: Normal heart sounds  No murmur heard  No friction rub  No gallop  Pulmonary:      Effort: Pulmonary effort is normal  No respiratory distress  Breath sounds: Normal breath sounds  No stridor  No wheezing or rales  Abdominal:      General: Bowel sounds are normal  There is no distension  Palpations: Abdomen is soft  There is no mass  Tenderness: There is no abdominal tenderness  Musculoskeletal:         General: No deformity  Normal range of motion  Cervical back: Normal range of motion and neck supple  Right lower leg: No edema  Left lower leg: No edema  Skin:     General: Skin is warm and dry  Coloration: Skin is not pale  Findings: No erythema  Neurological:      Mental Status: He is alert and oriented to person, place, and time  Coordination: Coordination normal    Psychiatric:         Mood and Affect: Mood normal          Behavior: Behavior normal          Thought Content: Thought content normal          Judgment: Judgment normal          Lab Review:   No visits with results within 2 Month(s) from this visit     Latest known visit with results is:   Office Visit on 02/02/2022   Component Date Value    Alpha-Hydroxyalprazolam * 02/02/2022 negative     Aripiprazole Quantificat* 02/02/2022 negative     [id number] QUANTIFICATION 02/02/2022 negative     EUTYLONE QUANTIFICATION 02/02/2022 negative     METHYLONE QUANTIFICATION 02/02/2022 SEE COMMENTS     Buprenorphine Quantifica* 02/02/2022 negative     Norbuprenorphine Quantif* 02/02/2022 negative     Butalbital Quantification 02/02/2022 negative     7-Amino-Clonazepam Quant* 02/02/2022 negative     Clozapine Quantification 02/02/2022 negative     N-desmethylclozapine Jeovany* 02/02/2022 negative     Cocaine metabolite Quant* 02/02/2022 negative     Codeine Quantification 02/02/2022 negative     Morphine Quantification 02/02/2022 negative     Hydrocodone Quantificati* 02/02/2022 negative     Norhydrocodone Quantific* 02/02/2022 negative     Hydromorphone Quantifica* 02/02/2022 negative     Desipramine Quantificati* 02/02/2022 negative     Dextromethorphan Quantif* 02/02/2022 negative     Dextrorphan (Dextrometho* 02/02/2022 negative     Nordiazepam Quantificati* 02/02/2022 negative     Temazepam Quantification 02/02/2022 negative     Oxazepam Quantification 02/02/2022 negative     Ethyl Glucuronide Quanti* 02/02/2022 negative     Ethyl Sulfate Quantifica* 02/02/2022 negative     Fentanyl Quantification 02/02/2022 negative     Norfentanyl Quantificati* 02/02/2022 negative     3-methyl-fentanyl Quanti* 02/02/2022 Fen Neg     4-ANPP Quantification 02/02/2022 Fen Neg     4-FiBF Quantification 02/02/2022 Fen Neg     Acetyl fentanyl Quantifi* 02/02/2022 Fen Neg     Acetyl norfentanyl Quant* 02/02/2022 Fen Neg     Acryl fentanyl Quantific* 02/02/2022 Fen Neg     Butryl fentanyl Quantifi* 02/02/2022 Fen Neg     Carfentanil Quantificati* 02/02/2022 Fen Neg     Cyclopropyl fentanyl Jeovany* 02/02/2022 Fen Neg     Furanyl fentanyl Quantif* 02/02/2022 Fen Neg     Methoxyacetyl fentanyl Q* 02/02/2022 Fen Neg     [id number] Quantification 02/02/2022 Fen Neg  N-desmethyl [id number] Jared* 02/02/2022 Fen Neg     Haloperidol  Quantificat* 02/02/2022 negative     Haloperidol metabolite Q* 02/02/2022 negative     6-GORAN (Heroin metabolite* 02/02/2022 negative     Hydrocodone Quantificati* 02/02/2022 negative     Norhydrocodone Quantific* 02/02/2022 negative     Hydromorphone Quantifica* 02/02/2022 negative     Hydromorphone Quantifica* 02/02/2022 negative     Desipramine Quantificati* 02/02/2022 negative     Imipramine Quantification 02/02/2022 negative     Mitragynine (Kratom enrique* 02/02/2022 negative     7-IJ-Fbuntoqkmog (Kratom* 02/02/2022 negative     Dextrorphan (Dextrometho* 02/02/2022 negative     Lorazepam Quantification 02/02/2022 negative     MDMA Quantification 02/02/2022 negative     Meperidine Quantification 02/02/2022 negative     Normeperidine Quantifica* 02/02/2022 negative     Methadone Quantification 02/02/2022 negative     EDDP (Methadone metaboli* 02/02/2022 negative     Amphetamine Quantificati* 02/02/2022 negative     Methamphetamine Quantifi* 02/02/2022 negative     Methylphenidate Quantifi* 02/02/2022 negative     RITALINIC ACID QUANTIFIC* 02/02/2022 negative     Morphine Quantification 02/02/2022 negative     Hydromorphone Quantifica* 02/02/2022 negative     OLANZAPINE QUANTIFICATION 02/02/2022 negative     Oxazepam Quantification 02/02/2022 negative     Oxycodone Quantification 02/02/2022 positive-3137  [address] [facility]* 02/02/2022 positive-2639  [address] [facility] [address] [facility] [address] 02/02/2022 positive-[phone]     Oxymorphone Quantificati* 02/02/2022 positive-105 739     Phencyclidine Quantifica* 02/02/2022 negative     Phenobarbital Quantifica* 02/02/2022 negative     PHENTERMINE QUANTIFICATI* 02/02/2022 negative     QUETIAPINE QUANTIFICATION 02/02/2022 negative     NORQUETIAPINE QUANTIFICA* 02/02/2022 negative     Risperidone Quantificati* 02/02/2022 negative     Hydroxyrisperidone Quant* 02/02/2022 negative     Secobarbital Quantificat* 02/02/2022 negative     5F-ADB-M7 02/02/2022 negative     KY-QNOZXECF-B7 METABOLIT* 02/02/2022 negative     QUBJ-FRTVRUQQ-S1 METABOL* 02/02/2022 negative     LPW049 metabolite Quanti* 02/02/2022 negative     CEL505 metabolite Quanti* 02/02/2022 negative     RCS4 METABOLITE QUANTIFI* 02/02/2022 negative     TOO35/ METABOLITE Q* 02/02/2022 negative     Tapentadol Quantification 02/02/2022 negative     Temazepam Quantification 02/02/2022 negative     Oxazepam Quantification 02/02/2022 negative     cTHC (Marijuana metaboli* 02/02/2022 negative     Tramadol Quantification 02/02/2022 negative     O-desmethyl-tramadol Jeovany* 02/02/2022 negative     N-DESMETHYL-TRAMADOL JEOVANY* 02/02/2022 negative     Amphetamine Quantificati* 02/02/2022 negative     CREATININE 02/02/2022 normal-241 7     OXIDANT 02/02/2022 normal-0     pH 02/02/2022 normal-5 6     SPECIFIC GRAVITY URINE 02/02/2022 normal-1 027      No results found  Assessment:       1  Coronary artery disease involving native coronary artery of native heart without angina pectoris  aspirin (ECOTRIN LOW STRENGTH) 81 mg EC tablet    US abdominal aorta    rosuvastatin (CRESTOR) 20 MG tablet    AST    ALT    LDL cholesterol, direct   2  Abdominal aortic aneurysm (AAA) without rupture ([address] )     3  Mixed hyperlipidemia  rosuvastatin (CRESTOR) 20 MG tablet    AST    ALT    LDL cholesterol, direct   4  Primary hypertension     5  Smoking          Plan:       Suraj Mendez is without any signs or symptoms reminiscent of unstable angina, volume excess/heart failure, nor malignant dysrhythmia  Examines in sinus  No alarming valvulopathy heard on exam, no recent AAA check, small via 2019 MRI, prior abdominal ultrasound negative for aneurysm  Lipids suboptimal, he is not taking pravastatin daily  He thinks he is taking baby aspirin daily      Urged the importance of daily baby aspirin, told him in light of pre diabetes as well as coronary disease we should switch him to a different statin per guidelines, he agrees, stopped pravastatin placed him on Crestor 20 mg daily, he will call with any side effects such as myalgias, ordered follow-up blood work in 2 months  I am happy with his blood pressure control  Suggested smoking cessation again  Ordered no acute cardiac testing otherwise, suggested he stay on current dose metoprolol and lisinopril  I will see him back in 6 months as long as the ultrasound of his abdomen is stable, he will call sooner with any concerning potential cardiac symptoms or should he require any major elective surgery

## 2022-05-13 ENCOUNTER — APPOINTMENT (OUTPATIENT)
Dept: LAB | Facility: HOSPITAL | Age: 67
End: 2022-05-13
Attending: FAMILY MEDICINE
Payer: MEDICARE

## 2022-05-13 ENCOUNTER — HOSPITAL ENCOUNTER (OUTPATIENT)
Dept: ULTRASOUND IMAGING | Facility: HOSPITAL | Age: 67
Discharge: HOME | End: 2022-05-13
Attending: INTERNAL MEDICINE
Payer: MEDICARE

## 2022-05-13 DIAGNOSIS — I25.10 CORONARY ARTERY DISEASE INVOLVING NATIVE CORONARY ARTERY OF NATIVE HEART WITHOUT ANGINA PECTORIS: ICD-10-CM

## 2022-05-13 DIAGNOSIS — H53.8 BLURRED VISION, BILATERAL: ICD-10-CM

## 2022-05-13 DIAGNOSIS — R73.9 HYPERGLYCEMIA: ICD-10-CM

## 2022-05-13 LAB
EST. AVERAGE GLUCOSE BLD GHB EST-MCNC: 126 MG/DL
GLUCOSE P FAST SERPL-MCNC: 127 MG/DL (ref 65–99)
HBA1C MFR BLD: 6 %

## 2022-05-13 PROCEDURE — 83036 HEMOGLOBIN GLYCOSYLATED A1C: CPT

## 2022-05-13 PROCEDURE — 76775 US EXAM ABDO BACK WALL LIM: CPT

## 2022-05-13 PROCEDURE — 82947 ASSAY GLUCOSE BLOOD QUANT: CPT

## 2022-05-13 PROCEDURE — 36415 COLL VENOUS BLD VENIPUNCTURE: CPT

## 2022-05-17 ENCOUNTER — TELEPHONE (OUTPATIENT)
Dept: CARDIOLOGY CLINIC | Facility: CLINIC | Age: 67
End: 2022-05-17

## 2022-05-17 DIAGNOSIS — R73.03 PRE-DIABETES: Primary | ICD-10-CM

## 2022-05-17 NOTE — TELEPHONE ENCOUNTER
Bharat Palmer saw his results of the abdominal US on his Mychart and he is concerned that they found another aneurysm    Can you please call patient with results since Dr Sharlene Melchor is on vacation and patient is worried

## 2022-05-17 NOTE — TELEPHONE ENCOUNTER
Results reviewed with pt  Advised that there is no indication for intervention at this point  Will need routine surveillance of AAA

## 2022-05-17 NOTE — TELEPHONE ENCOUNTER
Patient has been experiencing dizziness and frequent urination he is wondering if the his sugars could be the result of this, please review recent labs  and advise, patient is not scheduled until June for AWV

## 2022-05-19 DIAGNOSIS — R73.03 PRE-DIABETES: ICD-10-CM

## 2022-05-19 RX ORDER — BLOOD SUGAR DIAGNOSTIC
STRIP MISCELLANEOUS
Qty: 100 EACH | Refills: 0 | Status: SHIPPED | OUTPATIENT
Start: 2022-05-19

## 2022-05-19 RX ORDER — BLOOD-GLUCOSE METER
EACH MISCELLANEOUS DAILY
Qty: 1 KIT | Refills: 0 | Status: SHIPPED | OUTPATIENT
Start: 2022-05-19 | End: 2022-05-26

## 2022-05-19 RX ORDER — LANCETS
EACH MISCELLANEOUS
Qty: 100 EACH | Refills: 0 | Status: SHIPPED | OUTPATIENT
Start: 2022-05-19

## 2022-05-26 RX ORDER — BLOOD-GLUCOSE METER
EACH MISCELLANEOUS
Qty: 1 KIT | Refills: 0 | Status: SHIPPED | OUTPATIENT
Start: 2022-05-26

## 2022-06-06 ENCOUNTER — TELEPHONE (OUTPATIENT)
Dept: PAIN MEDICINE | Facility: CLINIC | Age: 67
End: 2022-06-06

## 2022-06-06 ENCOUNTER — OFFICE VISIT (OUTPATIENT)
Dept: PAIN MEDICINE | Facility: CLINIC | Age: 67
End: 2022-06-06
Payer: MEDICARE

## 2022-06-06 VITALS
HEIGHT: 68 IN | DIASTOLIC BLOOD PRESSURE: 70 MMHG | HEART RATE: 58 BPM | BODY MASS INDEX: 31.07 KG/M2 | SYSTOLIC BLOOD PRESSURE: 138 MMHG | WEIGHT: 205 LBS

## 2022-06-06 DIAGNOSIS — M54.12 CERVICAL RADICULOPATHY: ICD-10-CM

## 2022-06-06 DIAGNOSIS — G89.4 CHRONIC PAIN SYNDROME: ICD-10-CM

## 2022-06-06 DIAGNOSIS — M51.36 LUMBAR DEGENERATIVE DISC DISEASE: ICD-10-CM

## 2022-06-06 DIAGNOSIS — Z79.891 LONG-TERM CURRENT USE OF OPIATE ANALGESIC: ICD-10-CM

## 2022-06-06 DIAGNOSIS — F11.20 UNCOMPLICATED OPIOID DEPENDENCE (HCC): Primary | ICD-10-CM

## 2022-06-06 DIAGNOSIS — Z79.891 ENCOUNTER FOR LONG-TERM OPIATE ANALGESIC USE: ICD-10-CM

## 2022-06-06 DIAGNOSIS — M54.2 NECK PAIN: ICD-10-CM

## 2022-06-06 DIAGNOSIS — G89.29 CHRONIC LOW BACK PAIN WITHOUT SCIATICA, UNSPECIFIED BACK PAIN LATERALITY: ICD-10-CM

## 2022-06-06 DIAGNOSIS — M54.50 CHRONIC LOW BACK PAIN WITHOUT SCIATICA, UNSPECIFIED BACK PAIN LATERALITY: ICD-10-CM

## 2022-06-06 DIAGNOSIS — M79.18 MYOFASCIAL PAIN SYNDROME: ICD-10-CM

## 2022-06-06 DIAGNOSIS — M47.816 LUMBAR SPONDYLOSIS: ICD-10-CM

## 2022-06-06 DIAGNOSIS — M47.812 CERVICAL SPONDYLOSIS WITHOUT MYELOPATHY: ICD-10-CM

## 2022-06-06 PROCEDURE — 99214 OFFICE O/P EST MOD 30 MIN: CPT | Performed by: ANESTHESIOLOGY

## 2022-06-06 PROCEDURE — 80305 DRUG TEST PRSMV DIR OPT OBS: CPT | Performed by: ANESTHESIOLOGY

## 2022-06-06 RX ORDER — OXYCODONE AND ACETAMINOPHEN 7.5; 325 MG/1; MG/1
1 TABLET ORAL EVERY 8 HOURS PRN
Qty: 90 TABLET | Refills: 0 | Status: SHIPPED | OUTPATIENT
Start: 2022-06-06 | End: 2022-07-06

## 2022-06-06 RX ORDER — GABAPENTIN 600 MG/1
600 TABLET ORAL 3 TIMES DAILY
Qty: 90 TABLET | Refills: 2 | Status: SHIPPED | OUTPATIENT
Start: 2022-06-06 | End: 2022-08-01

## 2022-06-06 RX ORDER — OXYCODONE AND ACETAMINOPHEN 7.5; 325 MG/1; MG/1
1 TABLET ORAL EVERY 8 HOURS PRN
Qty: 90 TABLET | Refills: 0 | Status: SHIPPED | OUTPATIENT
Start: 2022-07-06 | End: 2022-07-27 | Stop reason: SDUPTHER

## 2022-06-06 NOTE — TELEPHONE ENCOUNTER
Pt contacted Call Center requested refill of their medication  Medication Name:  Oxycodone     Dosage of Med:  7 5-325 mg     Frequency of Med:  1 by mouth every 8 hours as needed    Remaining Medication:  None left     Pharmacy and Location:  [facility] on [address] has an appt on 6/8 with Robinson Rea but will need to medication to either hold him over or pt stated Robinson Rea can fill it for a month       Thank you

## 2022-06-06 NOTE — PROGRESS NOTES
Assessment:  No diagnosis found  Plan:  Patient is a 18-year-old male with complaints of bilateral shoulder pain, neck pain, low back pain, right hip pain presents to office for follow-up visit  Patient denies any adverse events since last office visit  Patient shows no signs aberrant drug-related behavior  1  We will refill Percocet 5/325 mg p o  b i d   2  Refill gabapentin 600 mg p o  t i d   3  Follow-up in 2 months    [place] Pres[facility] Drug Monitoring Program report was reviewed and was appropriate     A urine drug screen was collected at today's office visit as part of our medication management protocol  The point of care testing results were appropriate for what was being prescribed  The specimen will be sent for confirmatory testing  The drug screen is medically necessary because the patient is either dependent on opioid medication or is being considered for opioid medication therapy and the results could impact ongoing or future treatment  The drug screen is to evaluate for the presences or absence of prescribed, non-prescribed, and/or illicit drugs/substances  There are risks associated with opioid medications, including dependence, addiction and tolerance  The patient understands and agrees to use these medications only as prescribed  Potential side effects of the medications include, but are not limited to, constipation, drowsiness, addiction, impaired judgment and risk of fatal overdose if not taken as prescribed  The patient was warned against driving while taking sedation medications  Sharing medications is a felony  At this point in time, the patient is showing no signs of addiction, abuse, diversion or suicidal ideation  The patient will follow-up in 12 weeks for medication prescription refill and reevaluation  The patient was advised to contact the office should their symptoms worsen in the interim  The patient was agreeable and verbalized an understanding          History of Present Illness: The patient is a 75 y o  male last seen on 04/06/22 who presents for a follow up office visit in regards to chronic pain secondary to neck pain, shoulder pain, low back pain  The patient currently reports 7/10 intermittent sharp, shooting, dull/aching, throbbing pain which is worse in morning, evening time  Current pain medications includes:  Percocet 7 5/325 mg p o  t i d     The patient reports that this regimen is providing 50% pain relief  The patient is reporting no side effects from this pain medication regimen  Pain Contract Signed: 06/06/2022  Last Urine Drug Screen: 06/06/2022    I have personally reviewed and/or updated the patient's past medical history, past surgical history, family history, social history, current medications, allergies, and vital signs today  Review of Systems:    Review of Systems   Constitutional: Negative for chills and fever  HENT: Negative for ear pain and sore throat  Eyes: Negative for pain and visual disturbance  Respiratory: Positive for shortness of breath  Negative for cough  Cardiovascular: Negative for chest pain and palpitations  Gastrointestinal: Negative for abdominal pain and vomiting  Genitourinary: Negative for dysuria and hematuria  Musculoskeletal: Negative for arthralgias and back pain  Decreased range of motion  Joint stiffness     Skin: Negative for color change and rash  Neurological: Positive for dizziness  Negative for seizures and syncope  All other systems reviewed and are negative  Past Medical History:   Diagnosis Date    Abdominal aortic aneurysm without rupture ([address] )     Abdominal Aortic Duplex 02/21/2017    Ectatic infrarenal abdominal aorta   CAD (coronary artery disease)     COPD (chronic obstructive pulmonary disease) (HCC)     Extremity pain     History of echocardiogram 03/18/2014    EF 55%, mild MR and AI  Mild concentric LVH      History of stress test 03/06/2017 Normal     Hypertension     Joint pain     Low back pain     Migraine     Neck pain     Obstructive sleep apnea     cannot tolerate CPAP    Osteoarthritis     Peripheral neuropathy     Reflex sympathetic dystrophy        Past Surgical History:   Procedure Laterality Date    CARDIAC CATHETERIZATION  02/13/2012    EF 70%, widely patent renal arteries, significant single-vessel CAD-medical therapy   CARDIAC CATHETERIZATION  04/11/2013    EF 65%, 50% mid LAD, 20% prox CFX, 90% diffuse RCA, 99% mid RCA  Medical management      CHOLECYSTECTOMY      EPIDURAL BLOCK INJECTION Bilateral 8/15/2019    Procedure: BLOCK / INJECTION EPIDURAL STEROID CERVICAL;  Surgeon: Xochilt Carpio MD;  Location: [facility];  Service: Pain Management     FL GUIDED NEEDLE PLAC BX/ASP/INJ  8/15/2019    ORTHOPEDIC SURGERY      TRIGGER POINT INJECTION         Family History   Adopted: Yes   Problem Relation Age of Onset    No Known Problems Family     No Known Problems Mother     No Known Problems Father        Social History     Occupational History    Not on file   Tobacco Use    Smoking status: Current Every Day Smoker     Packs/day: 1 50    Smokeless tobacco: Never Used   Vaping Use    Vaping Use: Never used   Substance and Sexual Activity    Alcohol use: No    Drug use: No    Sexual activity: Not on file         Current Outpatient Medications:     amLODIPine (NORVASC) 10 mg tablet, swallow 1 tablet once daily, Disp: 90 tablet, Rfl: 3    aspirin (ECOTRIN LOW STRENGTH) 81 mg EC tablet, Take 1 tablet (81 mg total) by mouth daily, Disp: 30 tablet, Rfl: 5    Blood Glucose Monitoring Suppl (OneTouch Verio Flex System) w/Device KIT, USE IN THE MORNING TEST ONCE DAILY, Disp: 1 kit, Rfl: 0    diphenhydrAMINE-acetaminophen (TYLENOL PM)  MG TABS, Take 2 tablets by mouth daily at bedtime as needed for sleep, Disp: , Rfl:     gabapentin (NEURONTIN) 600 MG tablet, take 1 tablet by mouth three times a day, Disp: 90 tablet, Rfl: 2    glucose blood (OneTouch Verio) test strip, Test once daily, Disp: 100 each, Rfl: 0    Lancets (onetouch ultrasoft) lancets, Test once daily, Disp: 100 each, Rfl: 0    lisinopril (ZESTRIL) 20 mg tablet, take 1 tablet by mouth once daily, Disp: 60 tablet, Rfl: 5    metoprolol succinate (TOPROL-XL) 100 mg 24 hr tablet, take 1 tablet by mouth once daily, Disp: 30 tablet, Rfl: 5    nitroglycerin (NITROSTAT) 0 4 mg SL tablet, Place 1 tablet (0 4 mg total) under the tongue every 5 (five) minutes as needed for chest pain, Disp: 25 tablet, Rfl: 5    ofloxacin (OCUFLOX) 0 3 % ophthalmic solution, instill 1 drop into left eye START 3 days prior to surgery THEN O   (REFER TO PRESCRIPTION NOTES)   (Patient not taking: Reported on 1/28/2022), Disp: , Rfl:     oxyCODONE-acetaminophen (Percocet) 7 5-325 MG per tablet, Take 1 tablet by mouth every 8 (eight) hours as needed for moderate pain Do not fill until 5/5/2022 Max Daily Amount: 3 tablets, Disp: 90 tablet, Rfl: 0    oxyCODONE-acetaminophen (PERCOCET) 7 5-325 MG per tablet, Take 1 tablet by mouth every 8 (eight) hours as needed for moderate pain Do not fill until 4/7/2022 Max Daily Amount: 3 tablets (Patient not taking: Reported on 5/6/2022 ), Disp: 90 tablet, Rfl: 0    prednisoLONE acetate (PRED FORTE) 1 % ophthalmic suspension, instill 1 drop into left eye every 2 hours STARTING AFTER SURGERY IS COMPLETED (Patient not taking: Reported on 1/28/2022), Disp: , Rfl:     predniSONE 10 mg tablet, 3 tabs once daily x 3 days; 2 tabs once daily x 2 days; 1 tab daily x 2 days (Patient not taking: Reported on 3/14/2022 ), Disp: 15 tablet, Rfl: 0    rosuvastatin (CRESTOR) 20 MG tablet, Take 1 tablet (20 mg total) by mouth daily, Disp: 30 tablet, Rfl: 5    sildenafil (VIAGRA) 100 mg tablet, TAKE 1 TABLET BY MOUTH AS NEEDED FOR ERECTILE DYSFUNCTION, Disp: 20 tablet, Rfl: 0    tamsulosin (FLOMAX) 0 4 mg, take 1 capsule by mouth once daily with dinner, Disp: 30 capsule, Rfl: 2    topiramate (TOPAMAX) 25 mg tablet, FOLLOW INSTRUCTIONS GIVEN TO TITRATE UP TO A MAX OF 2 TABLETS BY MOUTH TWICE DAILY AS TOLERATED AND TO LEVEL OF BENEFIT FOR TREMORS, Disp: 120 tablet, Rfl: 2    traZODone (DESYREL) 100 mg tablet, Take 1 tablet (100 mg total) by mouth daily at bedtime, Disp: 90 tablet, Rfl: 3    No Known Allergies    Physical Exam:    /70   Pulse 58   Ht 5' 8" (1 727 m)   Wt 93 kg (205 lb)   BMI 31 17 kg/m²     Constitutional:normal, well developed, well nourished, alert, in no distress and non-toxic and no overt pain behavior  and obese  Eyes:anicteric  HEENT:grossly intact  Neck:supple, symmetric, trachea midline and no masses   Pulmonary:even and unlabored  Cardiovascular:No edema or pitting edema present  Skin:Normal without rashes or lesions and well hydrated  Psychiatric:Mood and affect appropriate  Neurologic:Cranial Nerves II-XII grossly intact  Musculoskeletal:antalgic      Imaging  No orders to display         No orders of the defined types were placed in this encounter

## 2022-06-06 NOTE — PATIENT INSTRUCTIONS

## 2022-06-06 NOTE — TELEPHONE ENCOUNTER
S/W pt  Offered pt S[facility] for today  Scheduled pt for SOVS today and cancelled appt for 6/8  Pt appreciative

## 2022-06-10 DIAGNOSIS — N52.9 ERECTILE DYSFUNCTION, UNSPECIFIED ERECTILE DYSFUNCTION TYPE: ICD-10-CM

## 2022-06-10 RX ORDER — SILDENAFIL 100 MG/1
TABLET, FILM COATED ORAL
Qty: 20 TABLET | Refills: 0 | Status: SHIPPED | OUTPATIENT
Start: 2022-06-10 | End: 2022-07-06 | Stop reason: SDUPTHER

## 2022-06-20 DIAGNOSIS — G25.0 TREMOR, ESSENTIAL: ICD-10-CM

## 2022-06-20 RX ORDER — TOPIRAMATE 25 MG/1
TABLET ORAL
Qty: 120 TABLET | Refills: 2 | Status: SHIPPED | OUTPATIENT
Start: 2022-06-20

## 2022-06-29 DIAGNOSIS — Z87.438 HISTORY OF BPH: ICD-10-CM

## 2022-06-29 RX ORDER — TAMSULOSIN HYDROCHLORIDE 0.4 MG/1
CAPSULE ORAL
Qty: 30 CAPSULE | Refills: 2 | Status: SHIPPED | OUTPATIENT
Start: 2022-06-29 | End: 2022-07-25

## 2022-07-06 DIAGNOSIS — N52.9 ERECTILE DYSFUNCTION, UNSPECIFIED ERECTILE DYSFUNCTION TYPE: ICD-10-CM

## 2022-07-07 RX ORDER — SILDENAFIL 100 MG/1
100 TABLET, FILM COATED ORAL DAILY PRN
Qty: 20 TABLET | Refills: 0 | Status: SHIPPED | OUTPATIENT
Start: 2022-07-07 | End: 2022-08-22 | Stop reason: SDUPTHER

## 2022-07-11 ENCOUNTER — APPOINTMENT (OUTPATIENT)
Dept: LAB | Facility: CLINIC | Age: 67
End: 2022-07-11
Payer: MEDICARE

## 2022-07-11 DIAGNOSIS — E78.2 MIXED HYPERLIPIDEMIA: ICD-10-CM

## 2022-07-11 DIAGNOSIS — I25.10 CORONARY ARTERY DISEASE INVOLVING NATIVE CORONARY ARTERY OF NATIVE HEART WITHOUT ANGINA PECTORIS: ICD-10-CM

## 2022-07-11 DIAGNOSIS — E78.5 HYPERLIPIDEMIA, UNSPECIFIED HYPERLIPIDEMIA TYPE: ICD-10-CM

## 2022-07-11 DIAGNOSIS — R73.03 PRE-DIABETES: ICD-10-CM

## 2022-07-11 LAB
ALBUMIN SERPL BCP-MCNC: 3.7 G/DL (ref 3.5–5)
ALP SERPL-CCNC: 88 U/L (ref 46–116)
ALT SERPL W P-5'-P-CCNC: 24 U/L (ref 12–78)
ANION GAP SERPL CALCULATED.3IONS-SCNC: 5 MMOL/L (ref 4–13)
AST SERPL W P-5'-P-CCNC: 15 U/L (ref 5–45)
BILIRUB SERPL-MCNC: 0.8 MG/DL (ref 0.2–1)
BUN SERPL-MCNC: 14 MG/DL (ref 5–25)
CALCIUM SERPL-MCNC: 9.1 MG/DL (ref 8.3–10.1)
CHLORIDE SERPL-SCNC: 109 MMOL/L (ref 100–108)
CHOLEST SERPL-MCNC: 135 MG/DL
CO2 SERPL-SCNC: 25 MMOL/L (ref 21–32)
CREAT SERPL-MCNC: 1.11 MG/DL (ref 0.6–1.3)
EST. AVERAGE GLUCOSE BLD GHB EST-MCNC: 123 MG/DL
GFR SERPL CREATININE-BSD FRML MDRD: 67 ML/MIN/1.73SQ M
GLUCOSE P FAST SERPL-MCNC: 120 MG/DL (ref 65–99)
HBA1C MFR BLD: 5.9 %
HDLC SERPL-MCNC: 34 MG/DL
LDLC SERPL CALC-MCNC: 68 MG/DL (ref 0–100)
LDLC SERPL DIRECT ASSAY-MCNC: 80 MG/DL (ref 0–100)
NONHDLC SERPL-MCNC: 101 MG/DL
POTASSIUM SERPL-SCNC: 4.1 MMOL/L (ref 3.5–5.3)
PROT SERPL-MCNC: 7.4 G/DL (ref 6.4–8.2)
SODIUM SERPL-SCNC: 139 MMOL/L (ref 136–145)
TRIGL SERPL-MCNC: 166 MG/DL

## 2022-07-11 PROCEDURE — 83036 HEMOGLOBIN GLYCOSYLATED A1C: CPT

## 2022-07-11 PROCEDURE — 80053 COMPREHEN METABOLIC PANEL: CPT

## 2022-07-11 PROCEDURE — 80061 LIPID PANEL: CPT

## 2022-07-11 PROCEDURE — 83721 ASSAY OF BLOOD LIPOPROTEIN: CPT

## 2022-07-11 PROCEDURE — 36415 COLL VENOUS BLD VENIPUNCTURE: CPT

## 2022-07-12 ENCOUNTER — OFFICE VISIT (OUTPATIENT)
Dept: FAMILY MEDICINE CLINIC | Facility: CLINIC | Age: 67
End: 2022-07-12
Payer: MEDICARE

## 2022-07-12 VITALS
HEIGHT: 68 IN | BODY MASS INDEX: 29.9 KG/M2 | HEART RATE: 57 BPM | DIASTOLIC BLOOD PRESSURE: 70 MMHG | WEIGHT: 197.31 LBS | TEMPERATURE: 98 F | SYSTOLIC BLOOD PRESSURE: 136 MMHG | OXYGEN SATURATION: 96 %

## 2022-07-12 DIAGNOSIS — G89.29 CHRONIC PAIN OF BOTH KNEES: Chronic | ICD-10-CM

## 2022-07-12 DIAGNOSIS — E66.9 OBESITY (BMI 30.0-34.9): ICD-10-CM

## 2022-07-12 DIAGNOSIS — R73.03 PRE-DIABETES: ICD-10-CM

## 2022-07-12 DIAGNOSIS — M25.561 CHRONIC PAIN OF BOTH KNEES: Chronic | ICD-10-CM

## 2022-07-12 DIAGNOSIS — M47.816 LUMBAR SPONDYLOSIS: ICD-10-CM

## 2022-07-12 DIAGNOSIS — M54.12 CERVICAL RADICULOPATHY: ICD-10-CM

## 2022-07-12 DIAGNOSIS — G47.33 OSA (OBSTRUCTIVE SLEEP APNEA): ICD-10-CM

## 2022-07-12 DIAGNOSIS — I25.118 CORONARY ARTERY DISEASE OF NATIVE ARTERY OF NATIVE HEART WITH STABLE ANGINA PECTORIS (HCC): ICD-10-CM

## 2022-07-12 DIAGNOSIS — Z00.00 MEDICARE ANNUAL WELLNESS VISIT, SUBSEQUENT: Primary | ICD-10-CM

## 2022-07-12 DIAGNOSIS — E78.2 MIXED HYPERLIPIDEMIA: ICD-10-CM

## 2022-07-12 DIAGNOSIS — Z12.5 SCREENING FOR PROSTATE CANCER: ICD-10-CM

## 2022-07-12 DIAGNOSIS — Z12.2 SCREENING FOR LUNG CANCER: ICD-10-CM

## 2022-07-12 DIAGNOSIS — F17.200 SMOKING: ICD-10-CM

## 2022-07-12 DIAGNOSIS — M25.562 CHRONIC PAIN OF BOTH KNEES: Chronic | ICD-10-CM

## 2022-07-12 DIAGNOSIS — Z71.6 ENCOUNTER FOR SMOKING CESSATION COUNSELING: ICD-10-CM

## 2022-07-12 DIAGNOSIS — I10 PRIMARY HYPERTENSION: ICD-10-CM

## 2022-07-12 PROCEDURE — 99213 OFFICE O/P EST LOW 20 MIN: CPT | Performed by: FAMILY MEDICINE

## 2022-07-12 PROCEDURE — G0439 PPPS, SUBSEQ VISIT: HCPCS | Performed by: FAMILY MEDICINE

## 2022-07-12 NOTE — PROGRESS NOTES
Assessment and Plan:     Problem List Items Addressed This Visit        Respiratory    JAKI (obstructive sleep apnea)       Cardiovascular and Mediastinum    Hypertension       Nervous and Auditory    Cervical radiculopathy       Musculoskeletal and Integument    Lumbar spondylosis       Other    Chronic pain of both knees    Smoking    Medicare annual wellness visit, subsequent - Primary    Hyperlipidemia    Pre-diabetes    Relevant Orders    TSH, 3rd generation with Free T4 reflex      Other Visit Diagnoses     Obesity (BMI 30 0-34 9)        diet and exercise counsling    Relevant Orders    TSH, 3rd generation with Free T4 reflex    Coronary artery disease of native artery of native heart with stable angina pectoris ([address] )        f/u with cardio, Dr Luciana Triana  NM non ischemic  Relevant Orders    TSH, 3rd generation with Free T4 reflex    Screening for prostate cancer        Follow-up in next visit    Relevant Orders    PSA, Total Screen    Encounter for smoking cessation counseling        Smoking cessation education and counseling given  Patient is not ready to quit at this time  Discussed different pharmacotherapy and approaches  Screening for lung cancer        Low-dose lung CT ordered on previous encounter  Patient directed to do the lung CT scan as soon as possible  BMI Counseling: Body mass index is 30 kg/m²  The BMI is above normal  Nutrition recommendations include decreasing portion sizes, encouraging healthy choices of fruits and vegetables, decreasing fast food intake and moderation in carbohydrate intake  Rationale for BMI follow-up plan is due to patient being overweight or obese  Depression Screening and Follow-up Plan: Patient was screened for depression during today's encounter  They screened negative with a PHQ-2 score of 0        Preventive health issues were discussed with patient, and age appropriate screening tests were ordered as noted in patient's After Visit Summary  Personalized health advice and appropriate referrals for health education or preventive services given if needed, as noted in patient's After Visit Summary  History of Present Illness:     Patient presents for a Medicare Wellness Visit    44-year-old male with a past medical history remarkable for coronary artery disease, obstructive sleep apnea, hypertension, hyperlipidemia, chronic pain syndrome and erectile dysfunction  Presented to the office today for his annual wellness visit anterior recheck  He does not have any active complaints  No shortness of breath, chest pain or tightness, sweating or palpitations, fever or chills  No nausea or vomiting, diarrhea or constipation  No active urinary symptoms noted  He does report continue to smoke cigarettes 1-2 packs per day for the past 30 or 40 years  He is currently not ready to quit  Smoking cessation education and counseling given  He agrees on following up with the next visit  Patient Care Team:  Balbina Cruz DO as PCP - General (Family Medicine)  Sean Bass MD (Pain Medicine)     Review of Systems:     Review of Systems   Constitutional: Negative  Negative for chills, fatigue and fever  HENT: Positive for rhinorrhea  Negative for hearing loss  Eyes: Negative  Negative for visual disturbance  Respiratory: Positive for shortness of breath (At baseline  )  Negative for wheezing  Cardiovascular: Negative for chest pain and palpitations  Gastrointestinal: Negative for abdominal pain, blood in stool, constipation, diarrhea, nausea and vomiting  Endocrine: Positive for polydipsia and polyuria  Genitourinary: Negative for difficulty urinating and dysuria  Musculoskeletal: Negative for arthralgias and myalgias  Skin: Negative  Allergic/Immunologic: Negative  Neurological: Positive for dizziness  Negative for seizures and syncope  Hematological: Negative for adenopathy     Psychiatric/Behavioral: Negative  All other systems reviewed and are negative  Problem List:     Patient Active Problem List   Diagnosis    JAKI (obstructive sleep apnea)    [facility] bronchitis ([facility])    Abdominal aortic aneurysm (AAA) without rupture ([facility])    Lumbar spondylosis    Lumbar degenerative disc disease    Myofascial pain syndrome    Chronic low back pain without sciatica    Cervical radiculopathy    Cervical spondylosis without myelopathy    Tremor, essential    Negative depression screening    Chronic pain of both knees    Class 1 obesity due to excess calories with serious comorbidity and body mass index (BMI) of 31 0 to 31 9 in adult    Smoking    Hypertension    Chronic pain syndrome    [facility]com[facility] opioid dependence [facility] )    Encounter for long-term opiate analgesic use    Neck pain    Medicare annual wellness visit, subsequent    Hyperlipidemia    Pre-diabetes    Erectile dysfunction    Insomnia    Sacroiliitis ([facility] )    Cortical age-related cataract of both eyes    Urinary frequency      Past Medical and Surgical History:     Past Medical History:   Diagnosis Date    Abdominal aortic aneurysm without rupture ([facility] )     Abdominal Aortic Duplex 02/21/2017    Ectatic infrarenal abdominal aorta   CAD (coronary artery disease)     COPD (chronic obstructive pulmonary disease) ([facility])     Extremity pain     History of echocardiogram 03/18/2014    EF 55%, mild MR and AI  Mild concentric LVH   History of stress test 03/06/2017    Normal     Hypertension     Joint pain     Low back pain     Migraine     Neck pain     Obstructive sleep apnea     cannot tolerate CPAP    Osteoarthritis     Peripheral neuropathy     Reflex sympathetic dystrophy      Past Surgical History:   Procedure Laterality Date    CARDIAC CATHETERIZATION  02/13/2012    EF 70%, widely patent renal arteries, significant single-vessel CAD-medical therapy      CARDIAC CATHETERIZATION  04/11/2013    EF 65%, 50% mid LAD, 20% prox CFX, 90% diffuse RCA, 99% mid RCA  Medical management      CHOLECYSTECTOMY      EPIDURAL BLOCK INJECTION Bilateral 8/15/2019    Procedure: BLOCK / INJECTION EPIDURAL STEROID CERVICAL;  Surgeon: Ilana Louie MD;  Location: [facility];  Service: Pain Management     FL GUIDED NEEDLE PLAC BX/ASP/INJ  8/15/2019    ORTHOPEDIC SURGERY      TRIGGER POINT INJECTION        Family History:     Family History   Adopted: Yes   Problem Relation Age of Onset    No Known Problems Family     No Known Problems Mother     No Known Problems Father       Social History:     Social History     Socioeconomic History    Marital status: /Civil Union     Spouse name: None    Number of children: None    Years of education: None    Highest education level: None   Occupational History    None   Tobacco Use    Smoking status: Current Every Day Smoker     Packs/day: 1 50    Smokeless tobacco: Never Used   Vaping Use    Vaping Use: Never used   Substance and Sexual Activity    Alcohol use: No    Drug use: No    Sexual activity: None   Other Topics Concern    None   Social History Narrative    None     Social Determinants of Health     Financial Resource Strain: Not on file   Food Insecurity: Not on file   Transportation Needs: Not on file   Physical Activity: Not on file   Stress: Not on file   Social Connections: Not on file   Intimate Partner Violence: Not on file   Housing Stability: Not on file      Medications and Allergies:     Current Outpatient Medications   Medication Sig Dispense Refill    amLODIPine (NORVASC) 10 mg tablet swallow 1 tablet once daily 90 tablet 3    aspirin (ECOTRIN LOW STRENGTH) 81 mg EC tablet Take 1 tablet (81 mg total) by mouth daily 30 tablet 5    Blood Glucose Monitoring Suppl (OneTouch Verio Flex System) w/Device KIT USE IN THE MORNING TEST ONCE DAILY 1 kit 0    diphenhydrAMINE-acetaminophen (TYLENOL PM)  MG TABS Take 2 tablets by mouth daily at bedtime as needed for sleep      gabapentin (NEURONTIN) 600 MG tablet Take 1 tablet (600 mg total) by mouth 3 (three) times a day 90 tablet 2    glucose blood (OneTouch Verio) test strip Test once daily 100 each 0    Lancets (onetouch ultrasoft) lancets Test once daily 100 each 0    lisinopril (ZESTRIL) 20 mg tablet take 1 tablet by mouth once daily 60 tablet 5    metoprolol succinate (TOPROL-XL) 100 mg 24 hr tablet take 1 tablet by mouth once daily 30 tablet 5    nitroglycerin (NITROSTAT) 0 4 mg SL tablet Place 1 tablet (0 4 mg total) under the tongue every 5 (five) minutes as needed for chest pain 25 tablet 5    oxyCODONE-acetaminophen (Percocet) 7 5-325 MG per tablet Take 1 tablet by mouth every 8 (eight) hours as needed for moderate pain Max Daily Amount: 3 tablets 90 tablet 0    rosuvastatin (CRESTOR) 20 MG tablet Take 1 tablet (20 mg total) by mouth daily 30 tablet 5    sildenafil (VIAGRA) 100 mg tablet Take 1 tablet (100 mg total) by mouth daily as needed for erectile dysfunction 20 tablet 0    tamsulosin (FLOMAX) 0 4 mg take 1 capsule by mouth once daily with dinner 30 capsule 2    topiramate (TOPAMAX) 25 mg tablet FOLLOW INSTRUCTIONS GIVEN TO TITRATE UP TO A MAX OF 2 TABLETS BY MOUTH TWICE DAILY AS TOLERATED AND TO LEVEL OF BENEFIT FOR TREMORS 120 tablet 2    traZODone (DESYREL) 100 mg tablet Take 1 tablet (100 mg total) by mouth daily at bedtime 90 tablet 3    ofloxacin (OCUFLOX) 0 3 % ophthalmic solution instill 1 drop into left eye START 3 days prior to surgery THEN O   (REFER TO PRESCRIPTION NOTES)   (Patient not taking: No sig reported)      prednisoLONE acetate (PRED FORTE) 1 % ophthalmic suspension instill 1 drop into left eye every 2 hours STARTING AFTER SURGERY IS COMPLETED (Patient not taking: No sig reported)      predniSONE 10 mg tablet 3 tabs once daily x 3 days; 2 tabs once daily x 2 days; 1 tab daily x 2 days (Patient not taking: No sig reported) 15 tablet 0     No current facility-administered medications for this visit  No Known Allergies   Immunizations:     Immunization History   Administered Date(s) Administered    COVID-19 MODERNA VACC 0 25 ML IM BOOSTER 01/11/2022    COVID-19 MODERNA VACC 0 5 ML IM 04/06/2021, 05/05/2021    Td (adult), adsorbed 01/01/2011      Health Maintenance:         Topic Date Due    Colorectal Cancer Screening  Never done    Hepatitis C Screening  Completed         Topic Date Due    Pneumococcal Vaccine: 65+ Years (1 - PCV) Never done    COVID-19 Vaccine (4 - Booster for Moderna series) 05/11/2022    Influenza Vaccine (1) 09/01/2022      Medicare Screening Tests and Risk Assessments:     Last Medicare Wellness visit information reviewed, patient interviewed, no change since last AWV  Health Risk Assessment:   Patient rates overall health as fair  Patient feels that their physical health rating is same  Patient is satisfied with their life  Eyesight was rated as same  Hearing was rated as same  Patients states they are never, rarely angry  Patient states they are never, rarely unusually tired/fatigued  Pain experienced in the last 7 days has been none  Patient states that he has experienced no weight loss or gain in last 6 months  Depression Screening:   PHQ-2 Score: 0      Fall Risk Screening: In the past year, patient has experienced: no history of falling in past year      Home Safety:  Patient does not have trouble with stairs inside or outside of their home  Patient has working smoke alarms and has working carbon monoxide detector  Home safety hazards include: none  Medications:   Patient is not currently taking any over-the-counter supplements  Patient is able to manage medications  Activities of Daily Living (ADLs)/Instrumental Activities of Daily Living (IADLs):   Walk and transfer into and out of bed and chair?: Yes  Dress and groom yourself?: Yes    Bathe or shower yourself?: Yes    Feed yourself?  Yes  Do your laundry/housekeeping?: Yes  Manage your money, pay your bills and track your expenses?: Yes  Make your own meals?: Yes    Do your own shopping?: Yes    Advance Care Planning:   Living will: No    Advanced directive counseling given: Yes    End of Life Decisions reviewed with patient: Yes      Cognitive Screening:   Provider or family/friend/caregiver concerned regarding cognition?: No    PREVENTIVE SCREENINGS      Cardiovascular Screening:    General: Screening Not Indicated and History Lipid Disorder      Diabetes Screening:     General: Screening Current      Colorectal Cancer Screening:     General: Risks and Benefits Discussed    Due for: Cologuard      Prostate Cancer Screening:    General: Screening Current      Abdominal Aortic Aneurysm (AAA) Screening:    Risk factors include: age between 73-69 yo and tobacco use        General: Screening Not Indicated and History AAA      Lung Cancer Screening:     General: Screening Not Indicated    Due for: Low Dose CT (LDCT)      Hepatitis C Screening:    General: Screening Current      Preventive Screening Comments: Low-dose lung CT scan was ordered on previous encounter  Patient did not do the imaging study yet  Patient encouraged to get the CT scan done as soon as possible  Will follow-up in next visit  Review of Current Opioid Use    Opioid Risk Tool (ORT) Interpretation: Complete Opioid Risk Tool (ORT)    [place] PDMP or [place]  reviewed  No red flags were identified    Educational information on non-opioid treatment options provided    Other Counseling Topics:   Calcium and vitamin D intake and regular weightbearing exercise  No exam data present     Physical Exam:     /70 (BP Location: Left arm, Patient Position: Sitting, Cuff Size: Large)   Pulse 57   Temp 98 °F (36 7 °C) (Tympanic)   Ht 5' 8" (1 727 m)   Wt 89 5 kg (197 lb 5 oz)   SpO2 96%   BMI 30 00 kg/m²     Physical Exam  Vitals and nursing note reviewed     Constitutional:       General: He is not in acute distress  Appearance: Normal appearance  HENT:      Head: Normocephalic and atraumatic  Mouth/Throat:      Mouth: Mucous membranes are moist    Eyes:      Conjunctiva/sclera: Conjunctivae normal       Pupils: Pupils are equal, round, and reactive to light  Cardiovascular:      Rate and Rhythm: Normal rate and regular rhythm  Pulses: Normal pulses  Carotid pulses are 2+ on the right side and 2+ on the left side  Radial pulses are 2+ on the right side and 2+ on the left side  Dorsalis pedis pulses are 2+ on the right side and 2+ on the left side  Heart sounds: S1 normal and S2 normal  No murmur heard  Gallop present  S4 sounds present  Pulmonary:      Effort: No tachypnea, bradypnea or accessory muscle usage  Breath sounds: Normal air entry  Examination of the left-upper field reveals rales  Examination of the left-lower field reveals rales  Rales (expiratory mild) present  No decreased breath sounds, wheezing or rhonchi  Musculoskeletal:      Right lower leg: No edema  Left lower leg: No edema  Neurological:      Mental Status: He is alert and oriented to person, place, and time  Mental status is at baseline  Светлана Zabala MD BMI Counseling: Body mass index is 30 kg/m²  The BMI is above normal  Nutrition recommendations include reducing portion sizes, decreasing overall calorie intake, 3-5 servings of fruits/vegetables daily, reducing fast food intake, consuming healthier snacks, decreasing soda and/or juice intake, moderation in carbohydrate intake and increasing intake of lean protein  Exercise recommendations include exercising 3-5 times per week  Falls Plan of Care: Balance, strength, and gait training instructions were provided

## 2022-07-12 NOTE — PROGRESS NOTES
Assessment and Plan:     Problem List Items Addressed This Visit        Respiratory    JAKI (obstructive sleep apnea)       Cardiovascular and Mediastinum    Hypertension       Nervous and Auditory    Cervical radiculopathy       Musculoskeletal and Integument    Lumbar spondylosis       Other    Chronic pain of both knees    Medicare annual wellness visit, subsequent - Primary    Hyperlipidemia    Pre-diabetes      Other Visit Diagnoses     Obesity (BMI 30 0-34 9)        diet and exercise counsling    Coronary artery disease of native artery of native heart with stable angina pectoris ([facility] [address] )        f/u with cardio, Dr Jensen Morfin non ischemic  Encounter for immunization               Preventive health issues were discussed with patient, and age appropriate screening tests were ordered as noted in patient's After Visit Summary  Personalized health advice and appropriate referrals for health education or preventive services given if needed, as noted in patient's After Visit Summary       History of Present Illness:     Patient presents for a Medicare Wellness Visit    HPI   Patient Care Team:  Zelda Yates DO as PCP - General (Family Medicine)  Elizabeth Varner MD (Pain Medicine)     Review of Systems:     Review of Systems     Problem List:     Patient Active Problem List   Diagnosis    JAKI (obstructive sleep apnea)    Chronic bronchitis (HCC)    Abdominal aortic aneurysm (AAA) without rupture (HCC)    Lumbar spondylosis    Lumbar degenerative disc disease    Myofascial pain syndrome    Chronic low back pain without sciatica    Cervical radiculopathy    Cervical spondylosis without myelopathy    Tremor, essential    Negative depression screening    Chronic pain of both knees    Class 1 obesity due to excess calories with serious comorbidity and body mass index (BMI) of 31 0 to 31 9 in adult    Smoking    Hypertension    Chronic pain syndrome    Uncomplicated opioid dependence ([facility] [address] )    Encounter for long-term opiate analgesic use    Neck pain    Medicare annual wellness visit, subsequent    Hyperlipidemia    Pre-diabetes    Erectile dysfunction    Insomnia    Sacroiliitis (HCC)    Cortical age-related cataract of both eyes    Urinary frequency      Past Medical and Surgical History:     Past Medical History:   Diagnosis Date    Abdominal aortic aneurysm without rupture ([address] )     Abdominal Aortic Duplex 02/21/2017    Ectatic infrarenal abdominal aorta   CAD (coronary artery disease)     COPD (chronic obstructive pulmonary disease) (HCC)     Extremity pain     History of echocardiogram 03/18/2014    EF 55%, mild MR and AI  Mild concentric LVH   History of stress test 03/06/2017    Normal     Hypertension     Joint pain     Low back pain     Migraine     Neck pain     Obstructive sleep apnea     cannot tolerate CPAP    Osteoarthritis     Peripheral neuropathy     Reflex sympathetic dystrophy      Past Surgical History:   Procedure Laterality Date    CARDIAC CATHETERIZATION  02/13/2012    EF 70%, widely patent renal arteries, significant single-vessel CAD-medical therapy   CARDIAC CATHETERIZATION  04/11/2013    EF 65%, 50% mid LAD, 20% prox CFX, 90% diffuse RCA, 99% mid RCA  Medical management      CHOLECYSTECTOMY      EPIDURAL BLOCK INJECTION Bilateral 8/15/2019    Procedure: BLOCK / INJECTION EPIDURAL STEROID CERVICAL;  Surgeon: Colby Valdes MD;  Location: [facility];  Service: Pain Management     FL GUIDED NEEDLE PLAC BX/ASP/INJ  8/15/2019    ORTHOPEDIC SURGERY      TRIGGER POINT INJECTION        Family History:     Family History   Adopted: Yes   Problem Relation Age of Onset    No Known Problems Family     No Known Problems Mother     No Known Problems Father       Social History:     Social History     Socioeconomic History    Marital status: /Civil Union     Spouse name: None    Number of children: None    Years of education: None    Highest education level: None   Occupational History    None   Tobacco Use    Smoking status: Current Every Day Smoker     Packs/day: 1 50    Smokeless tobacco: Never Used   Vaping Use    Vaping Use: Never used   Substance and Sexual Activity    Alcohol use: No    Drug use: No    Sexual activity: None   Other Topics Concern    None   Social History Narrative    None     Social Determinants of Health     Financial Resource Strain: Not on file   Food Insecurity: Not on file   Transportation Needs: Not on file   Physical Activity: Not on file   Stress: Not on file   Social Connections: Not on file   Intimate Partner Violence: Not on file   Housing Stability: Not on file      Medications and Allergies:     Current Outpatient Medications   Medication Sig Dispense Refill    amLODIPine (NORVASC) 10 mg tablet swallow 1 tablet once daily 90 tablet 3    aspirin (ECOTRIN LOW STRENGTH) 81 mg EC tablet Take 1 tablet (81 mg total) by mouth daily 30 tablet 5    Blood Glucose Monitoring Suppl (OneTouch Verio Flex System) w/Device KIT USE IN THE MORNING TEST ONCE DAILY 1 kit 0    diphenhydrAMINE-acetaminophen (TYLENOL PM)  MG TABS Take 2 tablets by mouth daily at bedtime as needed for sleep      gabapentin (NEURONTIN) 600 MG tablet Take 1 tablet (600 mg total) by mouth 3 (three) times a day 90 tablet 2    glucose blood (OneTouch Verio) test strip Test once daily 100 each 0    Lancets (onetouch ultrasoft) lancets Test once daily 100 each 0    lisinopril (ZESTRIL) 20 mg tablet take 1 tablet by mouth once daily 60 tablet 5    metoprolol succinate (TOPROL-XL) 100 mg 24 hr tablet take 1 tablet by mouth once daily 30 tablet 5    nitroglycerin (NITROSTAT) 0 4 mg SL tablet Place 1 tablet (0 4 mg total) under the tongue every 5 (five) minutes as needed for chest pain 25 tablet 5    oxyCODONE-acetaminophen (Percocet) 7 5-325 MG per tablet Take 1 tablet by mouth every 8 (eight) hours as needed for moderate pain Max Daily Amount: 3 tablets 90 tablet 0    rosuvastatin (CRESTOR) 20 MG tablet Take 1 tablet (20 mg total) by mouth daily 30 tablet 5    sildenafil (VIAGRA) 100 mg tablet Take 1 tablet (100 mg total) by mouth daily as needed for erectile dysfunction 20 tablet 0    tamsulosin (FLOMAX) 0 4 mg take 1 capsule by mouth once daily with dinner 30 capsule 2    topiramate (TOPAMAX) 25 mg tablet FOLLOW INSTRUCTIONS GIVEN TO TITRATE UP TO A MAX OF 2 TABLETS BY MOUTH TWICE DAILY AS TOLERATED AND TO LEVEL OF BENEFIT FOR TREMORS 120 tablet 2    traZODone (DESYREL) 100 mg tablet Take 1 tablet (100 mg total) by mouth daily at bedtime 90 tablet 3    ofloxacin (OCUFLOX) 0 3 % ophthalmic solution instill 1 drop into left eye START 3 days prior to surgery THEN O   (REFER TO PRESCRIPTION NOTES)  (Patient not taking: No sig reported)      prednisoLONE acetate (PRED FORTE) 1 % ophthalmic suspension instill 1 drop into left eye every 2 hours STARTING AFTER SURGERY IS COMPLETED (Patient not taking: No sig reported)      predniSONE 10 mg tablet 3 tabs once daily x 3 days; 2 tabs once daily x 2 days; 1 tab daily x 2 days (Patient not taking: No sig reported) 15 tablet 0     No current facility-administered medications for this visit  No Known Allergies   Immunizations:     Immunization History   Administered Date(s) Administered    COVID-19 MODERNA VACC 0 25 ML IM BOOSTER 01/11/2022    COVID-19 MODERNA VACC 0 5 ML IM 04/06/2021, 05/05/2021    Td (adult), adsorbed 01/01/2011      Health Maintenance:         Topic Date Due    Colorectal Cancer Screening  Never done    Hepatitis C Screening  Completed         Topic Date Due    Pneumococcal Vaccine: 65+ Years (1 - PCV) Never done    COVID-19 Vaccine (4 - Booster for Moderna series) 05/11/2022    Influenza Vaccine (1) 09/01/2022      Medicare Screening Tests and Risk Assessments:     Helene Gupta is here for his Subsequent Wellness visit       Health Risk Assessment:   Patient rates overall health as poor  Patient feels that their physical health rating is slightly worse  Patient is satisfied with their life  Eyesight was rated as slightly worse  Hearing was rated as same  Patient feels that their emotional and mental health rating is same  Patients states they are never, rarely angry  Patient states they are always unusually tired/fatigued  Pain experienced in the last 7 days has been a lot  Patient's pain rating has been 8/10  Patient states that he has experienced no weight loss or gain in last 6 months  Depression Screening:   PHQ-2 Score: 0      Fall Risk Screening: In the past year, patient has experienced: history of falling in past year    Number of falls: 1  Injured during fall?: No    Feels unsteady when standing or walking?: No    Worried about falling?: No      Home Safety:  Patient does not have trouble with stairs inside or outside of their home  Patient has working smoke alarms and has working carbon monoxide detector  Home safety hazards include: none  Nutrition:   Current diet is Regular  Medications:   Patient is currently taking over-the-counter supplements  OTC medications include: see medication list  Patient is able to manage medications  Activities of Daily Living (ADLs)/Instrumental Activities of Daily Living (IADLs):   Walk and transfer into and out of bed and chair?: Yes  Dress and groom yourself?: Yes    Bathe or shower yourself?: Yes    Feed yourself?  Yes  Do your laundry/housekeeping?: Yes  Manage your money, pay your bills and track your expenses?: Yes  Make your own meals?: Yes    Do your own shopping?: Yes    Previous Hospitalizations:   Any hospitalizations or ED visits within the last 12 months?: No      Advance Care Planning:   Living will: No    Durable POA for healthcare: No    Five wishes given: Yes      PREVENTIVE SCREENINGS      Cardiovascular Screening:    General: Screening Not Indicated and History Lipid Disorder      Diabetes Screening:     General: Screening Current      Colorectal Cancer Screening:     General: Screening Not Indicated      Prostate Cancer Screening:    General: Screening Current      Osteoporosis Screening:    General: Screening Not Indicated      Abdominal Aortic Aneurysm (AAA) Screening:    Risk factors include: age between 73-69 yo and tobacco use        General: Screening Not Indicated and History AAA      Lung Cancer Screening:     General: Screening Not Indicated      Hepatitis C Screening:    General: Screening Current    Screening, Brief Intervention, and Referral to Treatment (SBIRT)    Screening  Typical number of drinks in a day: 0  Typical number of drinks in a week: 0  Interpretation: Low risk drinking behavior      Single Item Drug Screening:  How often have you used an illegal drug (including marijuana) or a prescription medication for non-medical reasons in the past year? never    Single Item Drug Screen Score: 0  Interpretation: Negative screen for possible drug use disorder    Review of Current Opioid Use    Opioid Risk Tool (ORT) Interpretation: Complete Opioid Risk Tool (ORT)    No exam data present     Physical Exam:     Ht 5' 8" (1 727 m)   Wt 89 5 kg (197 lb 5 oz)   BMI 30 00 kg/m²     Physical Exam     Federica Sue MD

## 2022-07-12 NOTE — PROGRESS NOTES
Assessment/Plan:  69-year-old male with a past medical history notable for coronary artery disease, hypertension hyperlipidemia presented office today for his annual wellness visit  No major complaints today  Follow-up in 6 months with labs  In the meantime patient highly encouraged to obtain low-dose lung CT scan part of lung cancer screening for actively smoking patient  Will follow-up accordingly  No problem-specific Assessment & Plan notes found for this encounter  Diagnoses and all orders for this visit:    Medicare annual wellness visit, subsequent  Comments:  Stable  No active complaints  Obesity (BMI 30 0-34  9)  Comments:  diet and exercise counsling  Orders:  -     TSH, 3rd generation with Free T4 reflex; Future    Coronary artery disease of native artery of native heart with stable angina pectoris (HCC)  Comments:  f/u with cardio, Dr Vlad SANTOS non ischemic  Orders:  -     TSH, 3rd generation with Free T4 reflex; Future    JAKI (obstructive sleep apnea)  Comments:  encourage to wear cpap Qhs    Primary hypertension  Comments:  stable  continue same medical regimen    Lumbar spondylosis  Comments:  f/u with pain clinic  continue pain meds per primary team    Cervical radiculopathy  Comments:  f/u with pain clinic  continue pain meds per primary team    Mixed hyperlipidemia  Comments:  stable  continue same regimen    Chronic pain of both knees  Comments:  f/u with pain clinic  continue pain meds per primary team    Pre-diabetes  Comments:  stable  no meds needed at this time  Orders:  -     TSH, 3rd generation with Free T4 reflex; Future    Smoking  Comments:  Not ready to quit   education and counseling given to patient  Continue to educate and  patient  Follow-up in next visit  Screening for prostate cancer  Comments: Follow-up in next visit  Orders:  -     PSA, Total Screen;  Future    Encounter for smoking cessation counseling  Comments:  Smoking cessation education and counseling given  Patient is not ready to quit at this time  Discussed different pharmacotherapy and approaches  Screening for lung cancer  Comments:  Low-dose lung CT ordered on previous encounter  Patient directed to do the lung CT scan as soon as possible  Other orders  -     Cancel: Pneumococcal Conjugate Vaccine 20-valent (PCV20)          PHQ-2/9 Depression Screening    Little interest or pleasure in doing things: 0 - not at all  Feeling down, depressed, or hopeless: 0 - not at all  PHQ-2 Score: 0  PHQ-2 Interpretation: Negative depression screen            Subjective:      Patient ID: Gilson Lozano is a 75 y o  male  Patient presents for a Medicare Wellness Visit     78-year-old male with a past medical history remarkable for coronary artery disease, obstructive sleep apnea, hypertension, hyperlipidemia, chronic pain syndrome and erectile dysfunction  Presented to the office today for his annual wellness visit anterior recheck      He is complaining of mild bilateral lower extremity muscle cramps involving the hamstring muscles  Has been on and off for the past 2 weeks  Every other day  Lasts 1-2 minutes  Does not happen at night  Self resolves  No treatment tried  Happen at rest and while ambulating  No associated weakness or numbness within the extremities  No shortness of breath, chest pain or tightness, sweating or palpitations, fever or chills  No nausea or vomiting, diarrhea or constipation  No active urinary symptoms noted      He does report continue to smoke cigarettes 1-2 packs per day for the past 30 or 40 years  He is currently not ready to quit  Smoking cessation education and counseling given  He agrees on following up with the next visit  The following portions of the patient's history were reviewed and updated as appropriate: problem list     Review of Systems   Constitutional: Negative for activity change and appetite change     HENT: Positive for rhinorrhea  Negative for congestion, sinus pressure, sinus pain and trouble swallowing  Respiratory: Positive for shortness of breath  Negative for chest tightness  Cardiovascular: Negative for chest pain, palpitations and leg swelling  Gastrointestinal: Negative for abdominal pain, constipation, diarrhea, nausea and vomiting  Endocrine: Positive for polydipsia and polyuria  Negative for polyphagia  Genitourinary: Negative for dysuria and frequency  Neurological: Positive for dizziness and light-headedness  All other systems reviewed and are negative  Objective:    /70 (BP Location: Left arm, Patient Position: Sitting, Cuff Size: Large)   Pulse 57   Temp 98 °F (36 7 °C) (Tympanic)   Ht 5' 8" (1 727 m)   Wt 89 5 kg (197 lb 5 oz)   SpO2 96%   BMI 30 00 kg/m²      Physical Exam  Vitals and nursing note reviewed  Constitutional:       General: He is not in acute distress  Appearance: Normal appearance  He is well-developed  He is not ill-appearing, toxic-appearing or diaphoretic  HENT:      Head: Normocephalic and atraumatic  Right Ear: Tympanic membrane, ear canal and external ear normal  There is no impacted cerumen  Left Ear: Tympanic membrane, ear canal and external ear normal  There is no impacted cerumen  Nose: Nose normal  No congestion or rhinorrhea  Mouth/Throat:      Mouth: Mucous membranes are moist       Pharynx: Oropharynx is clear  No oropharyngeal exudate or posterior oropharyngeal erythema  Eyes:      General: No scleral icterus  Right eye: No discharge  Left eye: No discharge  Extraocular Movements: Extraocular movements intact  Conjunctiva/sclera: Conjunctivae normal       Pupils: Pupils are equal, round, and reactive to light  Cardiovascular:      Rate and Rhythm: Normal rate and regular rhythm  Pulses: Normal pulses  Heart sounds: No murmur heard  No friction rub  Gallop present   S4 sounds present  Pulmonary:      Effort: Pulmonary effort is normal  No respiratory distress  Breath sounds: Normal breath sounds  No wheezing, rhonchi or rales  Abdominal:      General: Bowel sounds are normal  There is no distension  Palpations: Abdomen is soft  There is no mass  Tenderness: There is no abdominal tenderness  There is no guarding or rebound  Musculoskeletal:         General: No swelling or deformity  Normal range of motion  Cervical back: Normal range of motion and neck supple  No rigidity  Right lower leg: No edema  Left lower leg: No edema  Lymphadenopathy:      Cervical: No cervical adenopathy  Skin:     General: Skin is warm and dry  Findings: No rash  Neurological:      General: No focal deficit present  Mental Status: He is alert and oriented to person, place, and time  Sensory: No sensory deficit  Motor: No weakness  Coordination: Coordination normal       Gait: Gait normal       Deep Tendon Reflexes: Reflexes are normal and symmetric  Reflexes normal    Psychiatric:         Mood and Affect: Mood normal          Behavior: Behavior normal          Thought Content:  Thought content normal          Judgment: Judgment normal

## 2022-07-12 NOTE — PATIENT INSTRUCTIONS
Medicare Preventive Visit Patient Instructions  Thank you for completing your Welcome to Medicare Visit or Medicare Annual Wellness Visit today  Your next wellness visit will be due in one year (7/12/2023)  The screening/preventive services that you may require over the next 5-10 years are detailed below  Some tests may not apply to you based off risk factors and/or age  Screening tests ordered at today's visit but not completed yet may show as past due  Also, please note that scanned in results may not display below  Preventive Screenings:  Service Recommendations Previous Testing/Comments   Colorectal Cancer Screening  · Colonoscopy    · Fecal Occult Blood Test (FOBT)/Fecal Immunochemical Test (FIT)  · Fecal DNA/Cologuard Test  · Flexible Sigmoidoscopy Age: 54-65 years old   Colonoscopy: every 10 years (May be performed more frequently if at higher risk)  OR  FOBT/FIT: every 1 year  OR  Cologuard: every 3 years  OR  Sigmoidoscopy: every 5 years  Screening may be recommended earlier than age 48 if at higher risk for colorectal cancer  Also, an individualized decision between you and your healthcare provider will decide whether screening between the ages of 74-80 would be appropriate  Colonoscopy: Not on file  FOBT/FIT: Not on file  Cologuard: Not on file  Sigmoidoscopy: Not on file          Prostate Cancer Screening Individualized decision between patient and health care provider in men between ages of 53-78   Medicare will cover every 12 months beginning on the day after your 50th birthday PSA: 2 8 ng/mL           Hepatitis C Screening Once for adults born between 1945 and 1965  More frequently in patients at high risk for Hepatitis C Hep C Antibody: 08/30/2019        Diabetes Screening 1-2 times per year if you're at risk for diabetes or have pre-diabetes Fasting glucose: 120 mg/dL   A1C: 5 9 %        Cholesterol Screening Once every 5 years if you don't have a lipid disorder   May order more often based on risk factors  Lipid panel: 07/11/2022           Other Preventive Screenings Covered by Medicare:  1  Abdominal Aortic Aneurysm (AAA) Screening: covered once if your at risk  You're considered to be at risk if you have a family history of AAA or a male between the age of 73-68 who smoking at least 100 cigarettes in your lifetime  2  Lung Cancer Screening: covers low dose CT scan once per year if you meet all of the following conditions: (1) Age 50-69; (2) No signs or symptoms of lung cancer; (3) Current smoker or have quit smoking within the last 15 years; (4) You have a tobacco smoking history of at least 30 pack years (packs per day x number of years you smoked); (5) You get a written order from a healthcare provider  3  Glaucoma Screening: covered annually if you're considered high risk: (1) You have diabetes OR (2) Family history of glaucoma OR (3)  aged 48 and older OR (3)  American aged 72 and older  3  Osteoporosis Screening: covered every 2 years if you meet one of the following conditions: (1) Have a vertebral abnormality; (2) On glucocorticoid therapy for more than 3 months; (3) Have primary hyperparathyroidism; (4) On osteoporosis medications and need to assess response to drug therapy  5  HIV Screening: covered annually if you're between the age of 12-76  Also covered annually if you are younger than 13 and older than 72 with risk factors for HIV infection  For pregnant patients, it is covered up to 3 times per pregnancy      Immunizations:  Immunization Recommendations   Influenza Vaccine Annual influenza vaccination during flu season is recommended for all persons aged >= 6 months who do not have contraindications   Pneumococcal Vaccine (Prevnar and Pneumovax)  * Prevnar = PCV13  * Pneumovax = PPSV23 Adults 25-60 years old: 1-3 doses may be recommended based on certain risk factors  Adults 72 years old: Prevnar (PCV13) vaccine recommended followed by Pneumovax (PPSV23) vaccine  If already received PPSV23 since turning 65, then PCV13 recommended at least one year after PPSV23 dose  Hepatitis B Vaccine 3 dose series if at intermediate or high risk (ex: diabetes, end stage renal disease, liver disease)   Tetanus (Td) Vaccine - COST NOT COVERED BY MEDICARE PART B Following completion of primary series, a booster dose should be given every 10 years to maintain immunity against tetanus  Td may also be given as tetanus wound prophylaxis  Tdap Vaccine - COST NOT COVERED BY MEDICARE PART B Recommended at least once for all adults  For pregnant patients, recommended with each pregnancy  Shingles Vaccine (Shingrix) - COST NOT COVERED BY MEDICARE PART B  2 shot series recommended in those aged 48 and above     Health Maintenance Due:      Topic Date Due    Colorectal Cancer Screening  Never done    Hepatitis C Screening  Completed     Immunizations Due:      Topic Date Due    Pneumococcal Vaccine: 65+ Years (1 - PCV) Never done    DTaP,Tdap,and Td Vaccines (1 - Tdap) 01/02/2011    COVID-19 Vaccine (4 - Booster for Moderna series) 05/11/2022    Influenza Vaccine (1) 09/01/2022     Advance Directives   What are advance directives? Advance directives are legal documents that state your wishes and plans for medical care  These plans are made ahead of time in case you lose your ability to make decisions for yourself  Advance directives can apply to any medical decision, such as the treatments you want, and if you want to donate organs  What are the types of advance directives? There are many types of advance directives, and each state has rules about how to use them  You may choose a combination of any of the following:  · Living will: This is a written record of the treatment you want  You can also choose which treatments you do not want, which to limit, and which to stop at a certain time  This includes surgery, medicine, IV fluid, and tube feedings     · Durable power of  for [facility]): This is a written record that states who you want to make healthcare choices for you when you are unable to make them for yourself  This person, called a proxy, is usually a family member or a friend  You may choose more than 1 proxy  · Do not resuscitate (DNR) order:  A DNR order is used in case your heart stops beating or you stop breathing  It is a request not to have certain forms of treatment, such as CPR  A DNR order may be included in other types of advance directives  · Medical directive: This covers the care that you want if you are in a coma, near death, or unable to make decisions for yourself  You can list the treatments you want for each condition  Treatment may include pain medicine, surgery, blood transfusions, dialysis, IV or tube feedings, and a ventilator (breathing machine)  · Values history: This document has questions about your views, beliefs, and how you feel and think about life  This information can help others choose the care that you would choose  Why are advance directives important? An advance directive helps you control your care  Although spoken wishes may be used, it is better to have your wishes written down  Spoken wishes can be misunderstood, or not followed  Treatments may be given even if you do not want them  An advance directive may make it easier for your family to make difficult choices about your care  Cigarette Smoking and Your Health   Risks to your health if you smoke:  Nicotine and other chemicals found in tobacco damage every cell in your body  Even if you are a light smoker, you have an increased risk for cancer, heart disease, and lung disease  If you are pregnant or have diabetes, smoking increases your risk for complications  Benefits to your health if you stop smoking:   · You decrease respiratory symptoms such as coughing, wheezing, and shortness of breath     · You reduce your risk for cancers of the lung, mouth, throat, kidney, bladder, pancreas, stomach, and cervix  If you already have cancer, you increase the benefits of chemotherapy  You also reduce your risk for cancer returning or a second cancer from developing  · You reduce your risk for heart disease, blood clots, heart attack, and stroke  · You reduce your risk for lung infections, and diseases such as pneumonia, asthma, chronic bronchitis, and emphysema  · Your circulation improves  More oxygen can be delivered to your body  If you have diabetes, you lower your risk for complications, such as kidney, artery, and eye diseases  You also lower your risk for nerve damage  Nerve damage can lead to amputations, poor vision, and blindness  · You improve your body's ability to heal and to fight infections  For more information and support to stop smoking:   · Michael Bieker  Phone: [phone]  Web Address: [facility]  Weight Management   Why it is important to manage your weight:  Being overweight increases your risk of health conditions such as heart disease, high blood pressure, type 2 diabetes, and certain types of cancer  It can also increase your risk for osteoarthritis, sleep apnea, and other respiratory problems  Aim for a slow, steady weight loss  Even a small amount of weight loss can lower your risk of health problems  How to lose weight safely:  A safe and healthy way to lose weight is to eat fewer calories and get regular exercise  You can lose up about 1 pound a week by decreasing the number of calories you eat by 500 calories each day  Healthy meal plan for weight management:  A healthy meal plan includes a variety of foods, contains fewer calories, and helps you stay healthy  A healthy meal plan includes the following:  · Eat whole-grain foods more often  A healthy meal plan should contain fiber  Fiber is the part of grains, fruits, and vegetables that is not broken down by your body   Whole-grain foods are healthy and provide extra fiber in your diet  Some examples of whole-grain foods are whole-wheat breads and pastas, oatmeal, brown rice, and bulgur  · Eat a variety of vegetables every day  Include dark, leafy greens such as spinach, kale, tatiana greens, and mustard greens  Eat yellow and orange vegetables such as carrots, sweet potatoes, and winter squash  · Eat a variety of fruits every day  Choose fresh or canned fruit (canned in its own juice or light syrup) instead of juice  Fruit juice has very little or no fiber  · Eat low-fat dairy foods  Drink fat-free (skim) milk or 1% milk  Eat fat-free yogurt and low-fat cottage cheese  Try low-fat cheeses such as mozzarella and other reduced-fat cheeses  · Choose meat and other protein foods that are low in fat  Choose beans or other legumes such as split peas or lentils  Choose fish, skinless poultry (chicken or turkey), or lean cuts of red meat (beef or pork)  Before you cook meat or poultry, cut off any visible fat  · Use less fat and oil  Try baking foods instead of frying them  Add less fat, such as margarine, sour cream, regular salad dressing and mayonnaise to foods  Eat fewer high-fat foods  Some examples of high-fat foods include french fries, doughnuts, ice cream, and cakes  · Eat fewer sweets  Limit foods and drinks that are high in sugar  This includes candy, cookies, regular soda, and sweetened drinks  Exercise:  Exercise at least 30 minutes per day on most days of the week  Some examples of exercise include walking, biking, dancing, and swimming  You can also fit in more physical activity by taking the stairs instead of the elevator or parking farther away from stores  Ask your healthcare provider about the best exercise plan for you  © Copyright [employer] 2018 Information is for End User's use only and may not be sold, redistributed or otherwise used for commercial purposes   All illustrations and images included in CareNotes® are the copyrighted property of A  D A M [employer] Inc  or [facility] [place] [facility]   AMBULATORY CARE:   A heart healthy diet  is an eating plan low in unhealthy fats and sodium (salt)  The plan is high in healthy fats and fiber  A heart healthy diet helps improve your cholesterol levels and lowers your risk for heart disease and stroke  A dietitian will teach you how to read and understand food labels  Heart healthy diet guidelines to follow:   · Choose foods that contain healthy fats  ? Unsaturated fats  include monounsaturated and polyunsaturated fats  Unsaturated fat is found in foods such as soybean, canola, olive, corn, and safflower oils  It is also found in soft tub margarine that is made with liquid vegetable oil  ? Omega-3 fat  is found in certain fish, such as salmon, tuna, and trout, and in walnuts and flaxseed  Eat fish high in omega-3 fats at least 2 times a week  · Get 20 to 30 grams of fiber each day  Fruits, vegetables, whole-grain foods, and legumes (cooked beans) are good sources of fiber  · Limit or do not have unhealthy fats  ? Cholesterol  is found in animal foods, such as eggs and lobster, and in dairy products made from whole milk  Limit cholesterol to less than 200 mg each day  ? Saturated fat  is found in meats, such as li and hamburger  It is also found in chicken or turkey skin, whole milk, and butter  Limit saturated fat to less than 7% of your total daily calories  ? Trans fat  is found in packaged foods, such as potato chips and cookies  It is also in hard margarine, some fried foods, and shortening  Do not eat foods that contain trans fats  · Limit sodium as directed  You may be told to limit sodium to 2,000 to 2,300 mg each day  Choose low-sodium or no-salt-added foods  Add little or no salt to food you prepare  Use herbs and spices in place of salt         Include the following in your heart healthy plan:  Ask your dietitian or healthcare provider how many servings to have from each of the following food groups:  · Grains:      ? Whole-wheat breads, cereals, and pastas, and brown rice    ? Low-fat, low-sodium crackers and chips    · Vegetables:      ? Broccoli, green beans, green peas, and spinach    ? Collards, kale, and lima beans    ? Carrots, sweet potatoes, tomatoes, and peppers    ? Canned vegetables with no salt added    · Fruits:      ? Bananas, peaches, pears, and pineapple    ? Grapes, raisins, and dates    ? Oranges, tangerines, grapefruit, orange juice, and grapefruit juice    ? Apricots, mangoes, melons, and papaya    ? Raspberries and strawberries    ? Canned fruit with no added sugar    · Low-fat dairy:      ? Nonfat (skim) milk, 1% milk, and low-fat almond, cashew, or soy milks fortified with calcium    ? Low-fat cheese, regular or frozen yogurt, and cottage cheese    · Meats and proteins:      ? Lean cuts of beef and pork (loin, leg, round), skinless chicken and turkey    ? Legumes, soy products, egg whites, or nuts    Limit or do not include the following in your heart healthy plan:   · Unhealthy fats and oils:      ? Whole or 2% milk, cream cheese, sour cream, or cheese    ? High-fat cuts of beef (T-bone steaks, ribs), chicken or turkey with skin, and organ meats such as liver    ? Butter, stick margarine, shortening, and cooking oils such as coconut or palm oil    · Foods and liquids high in sodium:      ? Packaged foods, such as frozen dinners, cookies, macaroni and cheese, and cereals with more than 300 mg of sodium per serving    ? Vegetables with added sodium, such as instant potatoes, vegetables with added sauces, or regular canned vegetables    ? Cured or smoked meats, such as hot dogs, li, and sausage    ? High-sodium ketchup, barbecue sauce, salad dressing, pickles, olives, soy sauce, or miso    · Foods and liquids high in sugar:      ? Candy, cake, cookies, pies, or doughnuts    ? Soft drinks (soda), sports drinks, or sweetened tea    ?  Canned or dry mixes for cakes, soups, sauces, or gravies    Other healthy heart guidelines:   · Do not smoke  Nicotine and other chemicals in cigarettes and cigars can cause lung and heart damage  Ask your healthcare provider for information if you currently smoke and need help to quit  E-cigarettes or smokeless tobacco still contain nicotine  Talk to your healthcare provider before you use these products  · Limit or do not drink alcohol as directed  Alcohol can damage your heart and raise your blood pressure  Your healthcare provider may give you specific daily and weekly limits  The general recommended limit is 1 drink a day for women 21 or older and for men 72 or older  Do not have more than 3 drinks in a day or 7 in a week  The recommended limit is 2 drinks a day for men 24to 59years of age  Do not have more than 4 drinks in a day or 14 in a week  A drink of alcohol is 12 ounces of beer, 5 ounces of wine, or 1½ ounces of liquor  · Exercise regularly  Exercise can help you maintain a healthy weight and improve your blood pressure and cholesterol levels  Regular exercise can also decrease your risk for heart problems  Ask your healthcare provider about the best exercise plan for you  Do not start an exercise program without asking your healthcare provider  Follow up with your doctor or cardiologist as directed:  Write down your questions so you remember to ask them during your visits  © Copyright [employer] 2022 Information is for End User's use only and may not be sold, redistributed or otherwise used for commercial purposes  All illustrations and images included in CareNotes® are the copyrighted property of A D A M , Inc  or [facility] Fiona Price   The above information is an  only  It is not intended as medical advice for individual conditions or treatments   Talk to your doctor, nurse or pharmacist before following any medical regimen to see if it is safe and effective for you     Fall Prevention   AMBULATORY CARE:   Fall prevention  includes ways to make your home and other areas safer  It also includes ways you can move more carefully to prevent a fall  Health conditions that cause changes in your blood pressure, vision, or muscle strength and coordination may increase your risk for falls  Medicines may also increase your risk for falls if they make you dizzy, weak, or sleepy  Call 911 or have someone else call if:   · You have fallen and are unconscious  · You have fallen and cannot move part of your body  Contact your healthcare provider if:   · You have fallen and have pain or a headache  · You have questions or concerns about your condition or care  Fall prevention tips:   · Stand or sit up slowly  This may help you keep your balance and prevent falls  · Use assistive devices as directed  Your healthcare provider may suggest that you use a cane or walker to help you keep your balance  You may need to have grab bars put in your bathroom near the toilet or in the shower  · Wear shoes that fit well and have soles that   Wear shoes both inside and outside  Use slippers with good   Do not wear shoes with high heels  · Wear a personal alarm  This is a device that allows you to call 911 if you fall and need help  Ask your healthcare provider for more information  · Stay active  Exercise can help strengthen your muscles and improve your balance  Your healthcare provider may recommend water aerobics or walking  He or she may also recommend physical therapy to improve your coordination  Never start an exercise program without talking to your healthcare provider first          · Manage your medical conditions  Keep all appointments with your healthcare providers  Visit your eye doctor as directed  Home safety tips:       · Add items to prevent falls in the bathroom  Put nonslip strips on your bath or shower floor to prevent you from slipping   Use a bath mat if you do not have carpet in the bathroom  This will prevent you from falling when you step out of the bath or shower  Use a shower seat so you do not need to stand while you shower  Sit on the toilet or a chair in your bathroom to dry yourself and put on clothing  This will prevent you from losing your balance from drying or dressing yourself while you are standing  · Keep paths clear  Remove books, shoes, and other objects from walkways and stairs  Place cords for telephones and lamps out of the way so that you do not need to walk over them  Tape them down if you cannot move them  Remove small rugs  If you cannot remove a rug, secure it with double-sided tape  This will prevent you from tripping  · Install bright lights in your home  Use night lights to help light paths to the bathroom or kitchen  Always turn on the light before you start walking  · Keep items you use often on shelves within reach  Do not use a step stool to help you reach an item  · Paint or place reflective tape on the edges of your stairs  This will help you see the stairs better  Follow up with your doctor as directed:  Write down your questions so you remember to ask them during your visits  © Copyright [employer] 2022 Information is for End User's use only and may not be sold, redistributed or otherwise used for commercial purposes  All illustrations and images included in CareNotes® are the copyrighted property of A D A M , Inc  or [facility] Fiona Price   The above information is an  only  It is not intended as medical advice for individual conditions or treatments  Talk to your doctor, nurse or pharmacist before following any medical regimen to see if it is safe and effective for you  Medicare Preventive Visit Patient Instructions  Thank you for completing your Welcome to Medicare Visit or Medicare Annual Wellness Visit today   Your next wellness visit will be due in one year (7/13/2023)  The screening/preventive services that you may require over the next 5-10 years are detailed below  Some tests may not apply to you based off risk factors and/or age  Screening tests ordered at today's visit but not completed yet may show as past due  Also, please note that scanned in results may not display below  Preventive Screenings:  Service Recommendations Previous Testing/Comments   Colorectal Cancer Screening  · Colonoscopy    · Fecal Occult Blood Test (FOBT)/Fecal Immunochemical Test (FIT)  · Fecal DNA/Cologuard Test  · Flexible Sigmoidoscopy Age: 54-65 years old   Colonoscopy: every 10 years (May be performed more frequently if at higher risk)  OR  FOBT/FIT: every 1 year  OR  Cologuard: every 3 years  OR  Sigmoidoscopy: every 5 years  Screening may be recommended earlier than age 48 if at higher risk for colorectal cancer  Also, an individualized decision between you and your healthcare provider will decide whether screening between the ages of 74-80 would be appropriate  Colonoscopy: Not on file  FOBT/FIT: Not on file  Cologuard: Not on file  Sigmoidoscopy: Not on file    Screening Not Indicated     Prostate Cancer Screening Individualized decision between patient and health care provider in men between ages of 53-78   Medicare will cover every 12 months beginning on the day after your 50th birthday PSA: 2 8 ng/mL     Screening Current     Hepatitis C Screening Once for adults born between 1945 and 1965  More frequently in patients at high risk for Hepatitis C Hep C Antibody: 08/30/2019    Screening Current   Diabetes Screening 1-2 times per year if you're at risk for diabetes or have pre-diabetes Fasting glucose: 120 mg/dL   A1C: 5 9 %    Screening Current   Cholesterol Screening Once every 5 years if you don't have a lipid disorder  May order more often based on risk factors   Lipid panel: 07/11/2022    Screening Not Indicated  History Lipid Disorder      Other Preventive Screenings Covered by Medicare:  6  Abdominal Aortic Aneurysm (AAA) Screening: covered once if your at risk  You're considered to be at risk if you have a family history of AAA or a male between the age of 73-68 who smoking at least 100 cigarettes in your lifetime  7  Lung Cancer Screening: covers low dose CT scan once per year if you meet all of the following conditions: (1) Age 50-69; (2) No signs or symptoms of lung cancer; (3) Current smoker or have quit smoking within the last 15 years; (4) You have a tobacco smoking history of at least 30 pack years (packs per day x number of years you smoked); (5) You get a written order from a healthcare provider  8  Glaucoma Screening: covered annually if you're considered high risk: (1) You have diabetes OR (2) Family history of glaucoma OR (3)  aged 48 and older OR (3)  American aged 72 and older  5  Osteoporosis Screening: covered every 2 years if you meet one of the following conditions: (1) Have a vertebral abnormality; (2) On glucocorticoid therapy for more than 3 months; (3) Have primary hyperparathyroidism; (4) On osteoporosis medications and need to assess response to drug therapy  10  HIV Screening: covered annually if you're between the age of 12-76  Also covered annually if you are younger than 13 and older than 72 with risk factors for HIV infection  For pregnant patients, it is covered up to 3 times per pregnancy  Immunizations:  Immunization Recommendations   Influenza Vaccine Annual influenza vaccination during flu season is recommended for all persons aged >= 6 months who do not have contraindications   Pneumococcal Vaccine (Prevnar and Pneumovax)  * Prevnar = PCV13  * Pneumovax = PPSV23 Adults 25-60 years old: 1-3 doses may be recommended based on certain risk factors  Adults 72 years old: Prevnar (PCV13) vaccine recommended followed by Pneumovax (PPSV23) vaccine   If already received PPSV23 since turning 65, then PCV13 recommended at least one year after PPSV23 dose  Hepatitis B Vaccine 3 dose series if at intermediate or high risk (ex: diabetes, end stage renal disease, liver disease)   Tetanus (Td) Vaccine - COST NOT COVERED BY MEDICARE PART B Following completion of primary series, a booster dose should be given every 10 years to maintain immunity against tetanus  Td may also be given as tetanus wound prophylaxis  Tdap Vaccine - COST NOT COVERED BY MEDICARE PART B Recommended at least once for all adults  For pregnant patients, recommended with each pregnancy  Shingles Vaccine (Shingrix) - COST NOT COVERED BY MEDICARE PART B  2 shot series recommended in those aged 48 and above     Health Maintenance Due:      Topic Date Due    Colorectal Cancer Screening  Never done    Hepatitis C Screening  Completed     Immunizations Due:      Topic Date Due    Pneumococcal Vaccine: 65+ Years (1 - PCV) Never done    COVID-19 Vaccine (4 - Booster for Moderna series) 05/11/2022    Influenza Vaccine (1) 09/01/2022     Advance Directives   What are advance directives? Advance directives are legal documents that state your wishes and plans for medical care  These plans are made ahead of time in case you lose your ability to make decisions for yourself  Advance directives can apply to any medical decision, such as the treatments you want, and if you want to donate organs  What are the types of advance directives? There are many types of advance directives, and each state has rules about how to use them  You may choose a combination of any of the following:  · Living will: This is a written record of the treatment you want  You can also choose which treatments you do not want, which to limit, and which to stop at a certain time  This includes surgery, medicine, IV fluid, and tube feedings  · Durable power of  for healthcare [facility]):   This is a written record that states who you want to make healthcare choices for you when you are unable to make them for yourself  This person, called a proxy, is usually a family member or a friend  You may choose more than 1 proxy  · Do not resuscitate (DNR) order:  A DNR order is used in case your heart stops beating or you stop breathing  It is a request not to have certain forms of treatment, such as CPR  A DNR order may be included in other types of advance directives  · Medical directive: This covers the care that you want if you are in a coma, near death, or unable to make decisions for yourself  You can list the treatments you want for each condition  Treatment may include pain medicine, surgery, blood transfusions, dialysis, IV or tube feedings, and a ventilator (breathing machine)  · Values history: This document has questions about your views, beliefs, and how you feel and think about life  This information can help others choose the care that you would choose  Why are advance directives important? An advance directive helps you control your care  Although spoken wishes may be used, it is better to have your wishes written down  Spoken wishes can be misunderstood, or not followed  Treatments may be given even if you do not want them  An advance directive may make it easier for your family to make difficult choices about your care  Fall Prevention    Fall prevention  includes ways to make your home and other areas safer  It also includes ways you can move more carefully to prevent a fall  Health conditions that cause changes in your blood pressure, vision, or muscle strength and coordination may increase your risk for falls  Medicines may also increase your risk for falls if they make you dizzy, weak, or sleepy  Fall prevention tips:   · Stand or sit up slowly  · Use assistive devices as directed  · Wear shoes that fit well and have soles that   · Wear a personal alarm  · Stay active  · Manage your medical conditions      Home Safety Tips:  · Add items to prevent falls in the bathroom  · Keep paths clear  · Install bright lights in your home  · Keep items you use often on shelves within reach  · Paint or place reflective tape on the edges of your stairs  Cigarette Smoking and Your Health   Risks to your health if you smoke:  Nicotine and other chemicals found in tobacco damage every cell in your body  Even if you are a light smoker, you have an increased risk for cancer, heart disease, and lung disease  If you are pregnant or have diabetes, smoking increases your risk for complications  Benefits to your health if you stop smoking:   · You decrease respiratory symptoms such as coughing, wheezing, and shortness of breath  · You reduce your risk for cancers of the lung, mouth, throat, kidney, bladder, pancreas, stomach, and cervix  If you already have cancer, you increase the benefits of chemotherapy  You also reduce your risk for cancer returning or a second cancer from developing  · You reduce your risk for heart disease, blood clots, heart attack, and stroke  · You reduce your risk for lung infections, and diseases such as pneumonia, asthma, chronic bronchitis, and emphysema  · Your circulation improves  More oxygen can be delivered to your body  If you have diabetes, you lower your risk for complications, such as kidney, artery, and eye diseases  You also lower your risk for nerve damage  Nerve damage can lead to amputations, poor vision, and blindness  · You improve your body's ability to heal and to fight infections  For more information and support to stop smoking:   · [employer]  Phone: 2- 721 - 598-6041  Web Address: [employer]  Weight Management   Why it is important to manage your weight:  Being overweight increases your risk of health conditions such as heart disease, high blood pressure, type 2 diabetes, and certain types of cancer  It can also increase your risk for osteoarthritis, sleep apnea, and other respiratory problems   Aim for a slow, steady weight loss  Even a small amount of weight loss can lower your risk of health problems  How to lose weight safely:  A safe and healthy way to lose weight is to eat fewer calories and get regular exercise  You can lose up about 1 pound a week by decreasing the number of calories you eat by 500 calories each day  Healthy meal plan for weight management:  A healthy meal plan includes a variety of foods, contains fewer calories, and helps you stay healthy  A healthy meal plan includes the following:  · Eat whole-grain foods more often  A healthy meal plan should contain fiber  Fiber is the part of grains, fruits, and vegetables that is not broken down by your body  Whole-grain foods are healthy and provide extra fiber in your diet  Some examples of whole-grain foods are whole-wheat breads and pastas, oatmeal, brown rice, and bulgur  · Eat a variety of vegetables every day  Include dark, leafy greens such as spinach, kale, tatiana greens, and mustard greens  Eat yellow and orange vegetables such as carrots, sweet potatoes, and winter squash  · Eat a variety of fruits every day  Choose fresh or canned fruit (canned in its own juice or light syrup) instead of juice  Fruit juice has very little or no fiber  · Eat low-fat dairy foods  Drink fat-free (skim) milk or 1% milk  Eat fat-free yogurt and low-fat cottage cheese  Try low-fat cheeses such as mozzarella and other reduced-fat cheeses  · Choose meat and other protein foods that are low in fat  Choose beans or other legumes such as split peas or lentils  Choose fish, skinless poultry (chicken or turkey), or lean cuts of red meat (beef or pork)  Before you cook meat or poultry, cut off any visible fat  · Use less fat and oil  Try baking foods instead of frying them  Add less fat, such as margarine, sour cream, regular salad dressing and mayonnaise to foods  Eat fewer high-fat foods   Some examples of high-fat foods include french fries, doughnuts, ice cream, and cakes  · Eat fewer sweets  Limit foods and drinks that are high in sugar  This includes candy, cookies, regular soda, and sweetened drinks  Exercise:  Exercise at least 30 minutes per day on most days of the week  Some examples of exercise include walking, biking, dancing, and swimming  You can also fit in more physical activity by taking the stairs instead of the elevator or parking farther away from stores  Ask your healthcare provider about the best exercise plan for you  Narcotic (Opioid) Safety    Use narcotics safely:  · Take prescribed narcotics exactly as directed  · Do not give narcotics to others or take narcotics that belong to someone else  · Do not mix narcotics without medicines or alcohol  · Do not drive or operate heavy machinery after you take the narcotic  · Monitor for side effects and notify your healthcare provider if you experienced side effects such as nausea, sleepiness, itching, or trouble thinking clearly  Manage constipation:    Constipation is the most common side effect of narcotic medicine  Constipation is when you have hard, dry bowel movements, or you go longer than usual between bowel movements  Tell your healthcare provider about all changes in your bowel movements while you are taking narcotics  He or she may recommend laxative medicine to help you have a bowel movement  He or she may also change the kind of narcotic you are taking, or change when you take it  The following are more ways you can prevent or relieve constipation:    · Drink liquids as directed  You may need to drink extra liquids to help soften and move your bowels  Ask how much liquid to drink each day and which liquids are best for you  · Eat high-fiber foods  This may help decrease constipation by adding bulk to your bowel movements  High-fiber foods include fruits, vegetables, whole-grain breads and cereals, and beans   Your healthcare provider or dietitian can help you create a high-fiber meal plan  Your provider may also recommend a fiber supplement if you cannot get enough fiber from food  · Exercise regularly  Regular physical activity can help stimulate your intestines  Walking is a good exercise to prevent or relieve constipation  Ask which exercises are best for you  · Schedule a time each day to have a bowel movement  This may help train your body to have regular bowel movements  Bend forward while you are on the toilet to help move the bowel movement out  Sit on the toilet for at least 10 minutes, even if you do not have a bowel movement  Store narcotics safely:   · Store narcotics where others cannot easily get them  Keep them in a locked cabinet or secure area  Do not  keep them in a purse or other bag you carry with you  A person may be looking for something else and find the narcotics  · Make sure narcotics are stored out of the reach of children  A child can easily overdose on narcotics  Narcotics may look like candy to a small child  The best way to dispose of narcotics: The laws vary by country and area  In the [facility], the best way is to return the narcotics through a take-back program  This program is offered by the [employer]  The following are options for using the program:  · Take the narcotics to a [facility] collection site  The site is often a law enforcement center  Call your local law enforcement center for scheduled take-back days in your area  You will be given information on where to go if the collection site is in a different location  · Take the narcotics to an approved pharmacy or hospital   A pharmacy or hospital may be set up as a collection site  You will need to ask if it is a [facility] collection site if you were not directed there  A pharmacy or doctor's office may not be able to take back narcotics unless it is a [facility] site  · Use a mail-back system  This means you are given containers to put the narcotics into   You will then mail them in the containers  · Use a take-back drop box  This is a place to leave the narcotics at any time  People and animals will not be able to get into the box  Your local law enforcement agency can tell you where to find a drop box in your area  Other ways to manage pain:   · Ask your healthcare provider about non-narcotic medicines to control pain  Nonprescription medicines include NSAIDs (such as ibuprofen) and acetaminophen  Prescription medicines include muscle relaxers, antidepressants, and steroids  · Pain may be managed without any medicines  Some ways to relieve pain include massage, aromatherapy, or meditation  Physical or occupational therapy may also help  For more information:   · Drug Enforcement Administration  [address] [facility] [address]  Phone: 2- 176 - 360-3190  Web Address: [facility]/drug_[email or web address]    · [address] and Drug Administration  [facility] , [address] [facility]  Phone: 6- 886 - 817-8913  Web Address: http://oohilove/     © Copyright [address] 2018 Information is for End User's use only and may not be sold, redistributed or otherwise used for commercial purposes   All illustrations and images included in CareNotes® are the copyrighted property of [employer]  or [address] [place] [address]

## 2022-07-23 ENCOUNTER — APPOINTMENT (EMERGENCY)
Dept: CT IMAGING | Facility: HOSPITAL | Age: 67
DRG: 683 | End: 2022-07-23
Payer: MEDICARE

## 2022-07-23 ENCOUNTER — APPOINTMENT (INPATIENT)
Dept: RADIOLOGY | Facility: HOSPITAL | Age: 67
DRG: 683 | End: 2022-07-23
Payer: MEDICARE

## 2022-07-23 ENCOUNTER — APPOINTMENT (EMERGENCY)
Dept: RADIOLOGY | Facility: HOSPITAL | Age: 67
DRG: 683 | End: 2022-07-23
Payer: MEDICARE

## 2022-07-23 ENCOUNTER — HOSPITAL ENCOUNTER (INPATIENT)
Facility: HOSPITAL | Age: 67
LOS: 2 days | Discharge: HOME | DRG: 683 | End: 2022-07-25
Attending: EMERGENCY MEDICINE | Admitting: INTERNAL MEDICINE
Payer: MEDICARE

## 2022-07-23 DIAGNOSIS — R93.89 ABNORMAL CT SCAN: ICD-10-CM

## 2022-07-23 DIAGNOSIS — N20.0 NEPHROLITHIASIS: ICD-10-CM

## 2022-07-23 DIAGNOSIS — F17.200 SMOKING: ICD-10-CM

## 2022-07-23 DIAGNOSIS — I10 ESSENTIAL HYPERTENSION: ICD-10-CM

## 2022-07-23 DIAGNOSIS — R91.8 PULMONARY NODULES: ICD-10-CM

## 2022-07-23 DIAGNOSIS — I25.10 CORONARY ARTERY DISEASE INVOLVING NATIVE CORONARY ARTERY OF NATIVE HEART WITHOUT ANGINA PECTORIS: ICD-10-CM

## 2022-07-23 DIAGNOSIS — N17.9 AKI (ACUTE KIDNEY INJURY) (HCC): Primary | ICD-10-CM

## 2022-07-23 LAB
ALBUMIN SERPL BCP-MCNC: 4.3 G/DL (ref 3.5–5)
ALP SERPL-CCNC: 74 U/L (ref 34–104)
ALT SERPL W P-5'-P-CCNC: 12 U/L (ref 7–52)
ANION GAP SERPL CALCULATED.3IONS-SCNC: 7 MMOL/L (ref 4–13)
AST SERPL W P-5'-P-CCNC: 15 U/L (ref 13–39)
BACTERIA UR QL AUTO: ABNORMAL /HPF
BASOPHILS # BLD AUTO: 0.07 THOUSANDS/ΜL (ref 0–0.1)
BASOPHILS NFR BLD AUTO: 1 % (ref 0–1)
BILIRUB SERPL-MCNC: 0.89 MG/DL (ref 0.2–1)
BILIRUB UR QL STRIP: NEGATIVE
BUN SERPL-MCNC: 20 MG/DL (ref 5–25)
CALCIUM SERPL-MCNC: 9.2 MG/DL (ref 8.4–10.2)
CAOX CRY URNS QL MICRO: ABNORMAL /HPF
CHLORIDE SERPL-SCNC: 100 MMOL/L (ref 96–108)
CLARITY UR: ABNORMAL
CO2 SERPL-SCNC: 27 MMOL/L (ref 21–32)
COLOR UR: YELLOW
CREAT SERPL-MCNC: 1.84 MG/DL (ref 0.6–1.3)
EOSINOPHIL # BLD AUTO: 0 THOUSAND/ΜL (ref 0–0.61)
EOSINOPHIL NFR BLD AUTO: 0 % (ref 0–6)
ERYTHROCYTE [DISTWIDTH] IN BLOOD BY AUTOMATED COUNT: 12.8 % (ref 11.6–15.1)
GFR SERPL CREATININE-BSD FRML MDRD: 36 ML/MIN/1.73SQ M
GLUCOSE SERPL-MCNC: 107 MG/DL (ref 65–140)
GLUCOSE UR STRIP-MCNC: NEGATIVE MG/DL
HCT VFR BLD AUTO: 49 % (ref 36.5–49.3)
HGB BLD-MCNC: 15.5 G/DL (ref 12–17)
HGB UR QL STRIP.AUTO: ABNORMAL
IMM GRANULOCYTES # BLD AUTO: 0.02 THOUSAND/UL (ref 0–0.2)
IMM GRANULOCYTES NFR BLD AUTO: 0 % (ref 0–2)
KETONES UR STRIP-MCNC: NEGATIVE MG/DL
LEUKOCYTE ESTERASE UR QL STRIP: NEGATIVE
LYMPHOCYTES # BLD AUTO: 3.2 THOUSANDS/ΜL (ref 0.6–4.47)
LYMPHOCYTES NFR BLD AUTO: 28 % (ref 14–44)
MAGNESIUM SERPL-MCNC: 2.1 MG/DL (ref 1.9–2.7)
MCH RBC QN AUTO: 29.8 PG (ref 26.8–34.3)
MCHC RBC AUTO-ENTMCNC: 31.6 G/DL (ref 31.4–37.4)
MCV RBC AUTO: 94 FL (ref 82–98)
MONOCYTES # BLD AUTO: 0.97 THOUSAND/ΜL (ref 0.17–1.22)
MONOCYTES NFR BLD AUTO: 9 % (ref 4–12)
NEUTROPHILS # BLD AUTO: 7.11 THOUSANDS/ΜL (ref 1.85–7.62)
NEUTS SEG NFR BLD AUTO: 62 % (ref 43–75)
NITRITE UR QL STRIP: NEGATIVE
NON-SQ EPI CELLS URNS QL MICRO: ABNORMAL /HPF
NRBC BLD AUTO-RTO: 0 /100 WBCS
PH UR STRIP.AUTO: 6 [PH]
PLATELET # BLD AUTO: 200 THOUSANDS/UL (ref 149–390)
PMV BLD AUTO: 10.4 FL (ref 8.9–12.7)
POTASSIUM SERPL-SCNC: 3.8 MMOL/L (ref 3.5–5.3)
PROT SERPL-MCNC: 7.2 G/DL (ref 6.4–8.4)
PROT UR STRIP-MCNC: ABNORMAL MG/DL
RBC # BLD AUTO: 5.2 MILLION/UL (ref 3.88–5.62)
RBC #/AREA URNS AUTO: ABNORMAL /HPF
SODIUM SERPL-SCNC: 134 MMOL/L (ref 135–147)
SP GR UR STRIP.AUTO: 1.02 (ref 1–1.03)
UROBILINOGEN UR QL STRIP.AUTO: 0.2 E.U./DL
WBC # BLD AUTO: 11.37 THOUSAND/UL (ref 4.31–10.16)
WBC #/AREA URNS AUTO: ABNORMAL /HPF

## 2022-07-23 PROCEDURE — 83735 ASSAY OF MAGNESIUM: CPT

## 2022-07-23 PROCEDURE — 74174 CTA ABD&PLVS W/CONTRAST: CPT

## 2022-07-23 PROCEDURE — 99285 EMERGENCY DEPT VISIT HI MDM: CPT

## 2022-07-23 PROCEDURE — 99223 1ST HOSP IP/OBS HIGH 75: CPT | Performed by: INTERNAL MEDICINE

## 2022-07-23 PROCEDURE — G1004 CDSM NDSC: HCPCS

## 2022-07-23 PROCEDURE — 71275 CT ANGIOGRAPHY CHEST: CPT

## 2022-07-23 PROCEDURE — 74018 RADEX ABDOMEN 1 VIEW: CPT

## 2022-07-23 PROCEDURE — 94760 N-INVAS EAR/PLS OXIMETRY 1: CPT

## 2022-07-23 PROCEDURE — 85025 COMPLETE CBC W/AUTO DIFF WBC: CPT

## 2022-07-23 PROCEDURE — 96374 THER/PROPH/DIAG INJ IV PUSH: CPT

## 2022-07-23 PROCEDURE — 80053 COMPREHEN METABOLIC PANEL: CPT

## 2022-07-23 PROCEDURE — 81001 URINALYSIS AUTO W/SCOPE: CPT

## 2022-07-23 PROCEDURE — 96375 TX/PRO/DX INJ NEW DRUG ADDON: CPT

## 2022-07-23 PROCEDURE — 96361 HYDRATE IV INFUSION ADD-ON: CPT

## 2022-07-23 PROCEDURE — 36415 COLL VENOUS BLD VENIPUNCTURE: CPT

## 2022-07-23 PROCEDURE — 96376 TX/PRO/DX INJ SAME DRUG ADON: CPT

## 2022-07-23 RX ORDER — HEPARIN SODIUM 5000 [USP'U]/ML
5000 INJECTION, SOLUTION INTRAVENOUS; SUBCUTANEOUS EVERY 8 HOURS SCHEDULED
Status: DISCONTINUED | OUTPATIENT
Start: 2022-07-23 | End: 2022-07-25 | Stop reason: HOSPADM

## 2022-07-23 RX ORDER — TRAZODONE HYDROCHLORIDE 50 MG/1
100 TABLET ORAL
Status: DISCONTINUED | OUTPATIENT
Start: 2022-07-23 | End: 2022-07-25 | Stop reason: HOSPADM

## 2022-07-23 RX ORDER — MORPHINE SULFATE 4 MG/ML
4 INJECTION, SOLUTION INTRAMUSCULAR; INTRAVENOUS ONCE
Status: COMPLETED | OUTPATIENT
Start: 2022-07-23 | End: 2022-07-23

## 2022-07-23 RX ORDER — NITROGLYCERIN 0.4 MG/1
0.4 TABLET SUBLINGUAL
Status: DISCONTINUED | OUTPATIENT
Start: 2022-07-23 | End: 2022-07-25 | Stop reason: HOSPADM

## 2022-07-23 RX ORDER — AMLODIPINE BESYLATE 10 MG/1
10 TABLET ORAL DAILY
Status: DISCONTINUED | OUTPATIENT
Start: 2022-07-24 | End: 2022-07-25 | Stop reason: HOSPADM

## 2022-07-23 RX ORDER — TOPIRAMATE 25 MG/1
25 TABLET ORAL DAILY
Status: DISCONTINUED | OUTPATIENT
Start: 2022-07-24 | End: 2022-07-25 | Stop reason: HOSPADM

## 2022-07-23 RX ORDER — FENTANYL CITRATE 50 UG/ML
75 INJECTION, SOLUTION INTRAMUSCULAR; INTRAVENOUS ONCE
Status: COMPLETED | OUTPATIENT
Start: 2022-07-23 | End: 2022-07-23

## 2022-07-23 RX ORDER — METOPROLOL SUCCINATE 50 MG/1
100 TABLET, EXTENDED RELEASE ORAL DAILY
Status: DISCONTINUED | OUTPATIENT
Start: 2022-07-24 | End: 2022-07-25 | Stop reason: HOSPADM

## 2022-07-23 RX ORDER — TAMSULOSIN HYDROCHLORIDE 0.4 MG/1
0.4 CAPSULE ORAL
Status: DISCONTINUED | OUTPATIENT
Start: 2022-07-23 | End: 2022-07-25 | Stop reason: HOSPADM

## 2022-07-23 RX ORDER — HYDROMORPHONE HCL/PF 1 MG/ML
0.5 SYRINGE (ML) INJECTION ONCE
Status: COMPLETED | OUTPATIENT
Start: 2022-07-23 | End: 2022-07-23

## 2022-07-23 RX ORDER — OXYCODONE HYDROCHLORIDE 5 MG/1
5 TABLET ORAL EVERY 6 HOURS PRN
Status: DISCONTINUED | OUTPATIENT
Start: 2022-07-23 | End: 2022-07-25 | Stop reason: HOSPADM

## 2022-07-23 RX ORDER — SODIUM CHLORIDE, SODIUM GLUCONATE, SODIUM ACETATE, POTASSIUM CHLORIDE, MAGNESIUM CHLORIDE, SODIUM PHOSPHATE, DIBASIC, AND POTASSIUM PHOSPHATE .53; .5; .37; .037; .03; .012; .00082 G/100ML; G/100ML; G/100ML; G/100ML; G/100ML; G/100ML; G/100ML
150 INJECTION, SOLUTION INTRAVENOUS CONTINUOUS
Status: DISCONTINUED | OUTPATIENT
Start: 2022-07-23 | End: 2022-07-25 | Stop reason: HOSPADM

## 2022-07-23 RX ORDER — NICOTINE 21 MG/24HR
1 PATCH, TRANSDERMAL 24 HOURS TRANSDERMAL DAILY
Status: DISCONTINUED | OUTPATIENT
Start: 2022-07-24 | End: 2022-07-25 | Stop reason: HOSPADM

## 2022-07-23 RX ORDER — HYDROMORPHONE HCL/PF 1 MG/ML
0.5 SYRINGE (ML) INJECTION EVERY 4 HOURS PRN
Status: DISCONTINUED | OUTPATIENT
Start: 2022-07-23 | End: 2022-07-25 | Stop reason: HOSPADM

## 2022-07-23 RX ORDER — ACETAMINOPHEN 325 MG/1
650 TABLET ORAL EVERY 6 HOURS PRN
Status: DISCONTINUED | OUTPATIENT
Start: 2022-07-23 | End: 2022-07-25 | Stop reason: HOSPADM

## 2022-07-23 RX ORDER — ONDANSETRON 2 MG/ML
4 INJECTION INTRAMUSCULAR; INTRAVENOUS EVERY 6 HOURS PRN
Status: DISCONTINUED | OUTPATIENT
Start: 2022-07-23 | End: 2022-07-25 | Stop reason: HOSPADM

## 2022-07-23 RX ORDER — NICOTINE 21 MG/24HR
21 PATCH, TRANSDERMAL 24 HOURS TRANSDERMAL ONCE
Status: COMPLETED | OUTPATIENT
Start: 2022-07-23 | End: 2022-07-24

## 2022-07-23 RX ORDER — GABAPENTIN 600 MG/1
300 TABLET ORAL 3 TIMES DAILY
Status: DISCONTINUED | OUTPATIENT
Start: 2022-07-23 | End: 2022-07-25 | Stop reason: HOSPADM

## 2022-07-23 RX ADMIN — FENTANYL CITRATE 75 MCG: 50 INJECTION, SOLUTION INTRAMUSCULAR; INTRAVENOUS at 17:09

## 2022-07-23 RX ADMIN — HYDROMORPHONE HYDROCHLORIDE 0.5 MG: 1 INJECTION, SOLUTION INTRAMUSCULAR; INTRAVENOUS; SUBCUTANEOUS at 15:26

## 2022-07-23 RX ADMIN — OXYCODONE HYDROCHLORIDE 5 MG: 5 TABLET ORAL at 21:35

## 2022-07-23 RX ADMIN — GABAPENTIN 300 MG: 600 TABLET, FILM COATED ORAL at 21:35

## 2022-07-23 RX ADMIN — SODIUM CHLORIDE 500 ML: 0.9 INJECTION, SOLUTION INTRAVENOUS at 15:22

## 2022-07-23 RX ADMIN — SODIUM CHLORIDE, SODIUM GLUCONATE, SODIUM ACETATE, POTASSIUM CHLORIDE, MAGNESIUM CHLORIDE, SODIUM PHOSPHATE, DIBASIC, AND POTASSIUM PHOSPHATE 125 ML/HR: .53; .5; .37; .037; .03; .012; .00082 INJECTION, SOLUTION INTRAVENOUS at 18:47

## 2022-07-23 RX ADMIN — TAMSULOSIN HYDROCHLORIDE 0.4 MG: 0.4 CAPSULE ORAL at 18:47

## 2022-07-23 RX ADMIN — IOHEXOL 100 ML: 350 INJECTION, SOLUTION INTRAVENOUS at 15:17

## 2022-07-23 RX ADMIN — TRAZODONE HYDROCHLORIDE 100 MG: 50 TABLET ORAL at 21:35

## 2022-07-23 RX ADMIN — HEPARIN SODIUM 5000 UNITS: 5000 INJECTION INTRAVENOUS; SUBCUTANEOUS at 21:35

## 2022-07-23 RX ADMIN — NICOTINE 21 MG: 21 PATCH, EXTENDED RELEASE TRANSDERMAL at 18:24

## 2022-07-23 RX ADMIN — MORPHINE SULFATE 4 MG: 4 INJECTION, SOLUTION INTRAMUSCULAR; INTRAVENOUS at 14:48

## 2022-07-23 RX ADMIN — HYDROMORPHONE HYDROCHLORIDE 0.5 MG: 1 INJECTION, SOLUTION INTRAMUSCULAR; INTRAVENOUS; SUBCUTANEOUS at 16:26

## 2022-07-23 NOTE — ED PROVIDER NOTES
History  Chief Complaint   Patient presents with    Flank Pain     Right sided flank pain         Pt is a 74 yo male with pmhx of hypertension, high cholesterol reporting a stabbing pain woke him out of sleep in right mid abdomen at 2:30 AM two nights ago  Denies nausea/vomiting  Pt reports right abdominal pain that radiates to his right flank  Pt denies chest pain, shortness of breath  Pt states he has a history of AAA  Pt denies syncope/near syncope  Pt denies dizziness  Pt states he called his cardiologist this morning and was told, "It's not a rupture " Pt denies leg pain, denies discoloration of legs  Pt denies gross blood in his urine, urinary symptoms urinary burning, blood in stool  Patient denies any constipation/diarrhea  Prior to Admission Medications   Prescriptions Last Dose Informant Patient Reported? Taking?    Blood Glucose Monitoring Suppl (OneTouch Verio Flex System) w/Device KIT   No No   Sig: USE IN THE MORNING TEST ONCE DAILY   Lancets (onetouch ultrasoft) lancets   No No   Sig: Test once daily   amLODIPine (NORVASC) 10 mg tablet 7/22/2022 at 2200  No No   Sig: swallow 1 tablet once daily   aspirin (ECOTRIN LOW STRENGTH) 81 mg EC tablet Past Month at Unknown time  No Yes   Sig: Take 1 tablet (81 mg total) by mouth daily   diphenhydrAMINE-acetaminophen (TYLENOL PM)  MG TABS Not Taking at Unknown time Self Yes No   Sig: Take 2 tablets by mouth daily at bedtime as needed for sleep   Patient not taking: Reported on 7/24/2022   gabapentin (NEURONTIN) 600 MG tablet 7/22/2022 at 2200  No No   Sig: Take 1 tablet (600 mg total) by mouth 3 (three) times a day   glucose blood (OneTouch Verio) test strip   No No   Sig: Test once daily   lisinopril (ZESTRIL) 20 mg tablet 7/22/2022 at 2200 Self No No   Sig: take 1 tablet by mouth once daily   metoprolol succinate (TOPROL-XL) 100 mg 24 hr tablet Past Week at Unknown time  No Yes   Sig: take 1 tablet by mouth once daily   nitroglycerin (NITROSTAT) 0 4 mg SL tablet Not Taking at Unknown time Self No No   Sig: Place 1 tablet (0 4 mg total) under the tongue every 5 (five) minutes as needed for chest pain   Patient not taking: Reported on 7/24/2022   oxyCODONE-acetaminophen (Percocet) 7 5-325 MG per tablet 7/23/2022 at 0700  No Yes   Sig: Take 1 tablet by mouth every 8 (eight) hours as needed for moderate pain Max Daily Amount: 3 tablets   rosuvastatin (CRESTOR) 20 MG tablet   No No   Sig: Take 1 tablet (20 mg total) by mouth daily   sildenafil (VIAGRA) 100 mg tablet 7/20/2022  No No   Sig: Take 1 tablet (100 mg total) by mouth daily as needed for erectile dysfunction   tamsulosin (FLOMAX) 0 4 mg 7/22/2022 at 2200  No No   Sig: take 1 capsule by mouth once daily with dinner   topiramate (TOPAMAX) 25 mg tablet 7/22/2022 at 2200  No No   Sig: FOLLOW INSTRUCTIONS GIVEN TO TITRATE UP TO A MAX OF 2 TABLETS BY MOUTH TWICE DAILY AS TOLERATED AND TO LEVEL OF BENEFIT FOR TREMORS   traZODone (DESYREL) 100 mg tablet 7/22/2022 at 2200 Self No No   Sig: Take 1 tablet (100 mg total) by mouth daily at bedtime      Facility-Administered Medications: None       Past Medical History:   Diagnosis Date    Abdominal aortic aneurysm without rupture ([facility] [address] )     Abdominal Aortic Duplex 02/21/2017    Ectatic infrarenal abdominal aorta   CAD (coronary artery disease)     COPD (chronic obstructive pulmonary disease) (HCC)     Extremity pain     History of echocardiogram 03/18/2014    EF 55%, mild MR and AI  Mild concentric LVH   History of stress test 03/06/2017    Normal     Hypertension     Joint pain     Low back pain     Migraine     Neck pain     Obstructive sleep apnea     cannot tolerate CPAP    Osteoarthritis     Peripheral neuropathy     Reflex sympathetic dystrophy        Past Surgical History:   Procedure Laterality Date    CARDIAC CATHETERIZATION  02/13/2012    EF 70%, widely patent renal arteries, significant single-vessel CAD-medical therapy   CARDIAC CATHETERIZATION  04/11/2013    EF 65%, 50% mid LAD, 20% prox CFX, 90% diffuse RCA, 99% mid RCA  Medical management   CHOLECYSTECTOMY      EPIDURAL BLOCK INJECTION Bilateral 8/15/2019    Procedure: BLOCK / INJECTION EPIDURAL STEROID CERVICAL;  Surgeon: Pawan Shields MD;  Location: [facility];  Service: Pain Management     FL GUIDED NEEDLE PLAC BX/ASP/INJ  8/15/2019    ORTHOPEDIC SURGERY      TRIGGER POINT INJECTION         Family History   Adopted: Yes   Problem Relation Age of Onset    No Known Problems Family     No Known Problems Mother     No Known Problems Father      I have reviewed and agree with the history as documented  E-Cigarette/Vaping    E-Cigarette Use Never User      E-Cigarette/Vaping Substances    Nicotine Yes     THC No     CBD No     Flavoring No     Other No     Unknown No      Social History     Tobacco Use    Smoking status: Current Every Day Smoker     Packs/day: 1 50    Smokeless tobacco: Never Used   Vaping Use    Vaping Use: Never used   Substance Use Topics    Alcohol use: Not Currently    Drug use: No       Review of Systems   Constitutional: Negative  HENT: Negative  Eyes: Negative  Respiratory: Negative  Cardiovascular: Negative  Gastrointestinal: Positive for abdominal pain  Endocrine: Negative  Genitourinary: Negative  Musculoskeletal: Negative  Skin: Negative  Allergic/Immunologic: Negative  Neurological: Negative  Hematological: Negative  All other systems reviewed and are negative  Physical Exam  Physical Exam  Vitals and nursing note reviewed  Constitutional:       Appearance: Normal appearance  He is normal weight  HENT:      Head: Normocephalic  Right Ear: External ear normal       Left Ear: External ear normal       Nose: Nose normal       Mouth/Throat:      Mouth: Mucous membranes are moist    Eyes:      Extraocular Movements: Extraocular movements intact        Conjunctiva/sclera: Conjunctivae normal       Pupils: Pupils are equal, round, and reactive to light  Cardiovascular:      Rate and Rhythm: Normal rate and regular rhythm  Pulses: Normal pulses  Heart sounds: Normal heart sounds  Pulmonary:      Effort: Pulmonary effort is normal  No respiratory distress  Breath sounds: Normal breath sounds  Abdominal:      General: Abdomen is flat  Bowel sounds are normal  There is no distension  Palpations: Abdomen is soft  There is no mass  Tenderness: There is abdominal tenderness  There is no right CVA tenderness, left CVA tenderness, guarding or rebound  Hernia: No hernia is present  Musculoskeletal:         General: Normal range of motion  Cervical back: Normal range of motion and neck supple  No rigidity or tenderness  Skin:     General: Skin is warm  Capillary Refill: Capillary refill takes less than 2 seconds  Neurological:      General: No focal deficit present  Mental Status: He is alert and oriented to person, place, and time  Mental status is at baseline     Psychiatric:         Mood and Affect: Mood normal          Vital Signs  ED Triage Vitals [07/23/22 1400]   Temperature Pulse Respirations Blood Pressure SpO2   97 6 °F (36 4 °C) 83 18 (!) 169/102 96 %      Temp Source Heart Rate Source Patient Position - Orthostatic VS BP Location FiO2 (%)   Tympanic Monitor Lying Right arm --      Pain Score       5           Vitals:    07/24/22 1002 07/24/22 1612 07/24/22 2208 07/25/22 0821   BP: 150/79 134/72 152/82 146/83   Pulse: 74 65 60 64   Patient Position - Orthostatic VS:             Visual Acuity      ED Medications  Medications   amLODIPine (NORVASC) tablet 10 mg (10 mg Oral Given 7/25/22 1011)   gabapentin (NEURONTIN) tablet 300 mg (300 mg Oral Given 7/25/22 1011)   metoprolol succinate (TOPROL-XL) 24 hr tablet 100 mg (100 mg Oral Given 7/25/22 1011)   nitroglycerin (NITROSTAT) SL tablet 0 4 mg (has no administration in time range) tamsulosin (FLOMAX) capsule 0 4 mg (0 4 mg Oral Given 7/24/22 1717)   topiramate (TOPAMAX) tablet 25 mg (25 mg Oral Given 7/25/22 1011)   traZODone (DESYREL) tablet 100 mg (100 mg Oral Given 7/24/22 2205)   multi-electrolyte (PLASMALYTE-A/ISOLYTE-S PH 7 4) IV solution (150 mL/hr Intravenous New Bag 7/25/22 0457)   acetaminophen (TYLENOL) tablet 650 mg (has no administration in time range)   ondansetron (ZOFRAN) injection 4 mg (has no administration in time range)   nicotine (NICODERM CQ) 14 mg/24hr TD 24 hr patch 1 patch (1 patch Transdermal Medication Applied 7/25/22 1012)   heparin (porcine) subcutaneous injection 5,000 Units (0 Units Subcutaneous Hold 7/25/22 0505)   oxyCODONE (ROXICODONE) IR tablet 5 mg (5 mg Oral Given 7/24/22 2209)   HYDROmorphone (DILAUDID) injection 0 5 mg (has no administration in time range)   morphine injection 2 mg (has no administration in time range)   morphine injection 4 mg (4 mg Intravenous Given 7/23/22 1448)   sodium chloride 0 9 % bolus 500 mL (0 mL Intravenous Stopped 7/23/22 1622)   iohexol (OMNIPAQUE) 350 MG/ML injection (SINGLE-DOSE) 100 mL (100 mL Intravenous Given 7/23/22 1517)   HYDROmorphone (DILAUDID) injection 0 5 mg (0 5 mg Intravenous Given 7/23/22 1526)   HYDROmorphone (DILAUDID) injection 0 5 mg (0 5 mg Intravenous Given 7/23/22 1626)   fentanyl citrate (PF) 100 MCG/2ML 75 mcg (75 mcg Intravenous Given 7/23/22 1709)   nicotine (NICODERM CQ) 21 mg/24 hr TD 24 hr patch 21 mg (21 mg Transdermal Patch Removed 7/25/22 1008)       Diagnostic Studies  Results Reviewed     Procedure Component Value Units Date/Time    Basic metabolic panel [[id number]]  (Abnormal) Collected: 07/24/22 0420    Lab Status: Final result Specimen: Blood from Arm, Left Updated: 07/24/22 0448     Sodium 137 mmol/L      Potassium 3 6 mmol/L      Chloride 102 mmol/L      CO2 28 mmol/L      ANION GAP 7 mmol/L      BUN 18 mg/dL      Creatinine 1 59 mg/dL      Glucose 110 mg/dL      Calcium 8 6 mg/dL eGFR 43 ml/min/1 73sq m     Narrative:      [place] guidelines for Chronic Kidney Disease (CKD):     Stage 1 with normal or high GFR (GFR > 90 mL/min/1 73 square meters)    Stage 2 Mild CKD (GFR = 60-89 mL/min/1 73 square meters)    Stage 3A Moderate CKD (GFR = 45-59 mL/min/1 73 square meters)    Stage 3B Moderate CKD (GFR = 30-44 mL/min/1 73 square meters)    Stage 4 Severe CKD (GFR = 15-29 mL/min/1 73 square meters)    Stage 5 End Stage CKD (GFR <15 mL/min/1 73 square meters)  Note: GFR calculation is accurate only with a steady state creatinine    CBC and differential [[id number]] Collected: 07/24/22 0420    Lab Status: Final result Specimen: Blood from Arm, Left Updated: 07/24/22 0430     WBC 9 54 Thousand/uL      RBC 4 78 Million/uL      Hemoglobin 14 4 g/dL      Hematocrit 45 2 %      MCV 95 fL      MCH 30 1 pg      MCHC 31 9 g/dL      RDW 12 7 %      MPV 10 0 fL      Platelets 156 Thousands/uL      nRBC 0 /100 WBCs      Neutrophils Relative 62 %      Immat GRANS % 0 %      Lymphocytes Relative 29 %      Monocytes Relative 8 %      Eosinophils Relative 0 %      Basophils Relative 1 %      Neutrophils Absolute 5 94 Thousands/µL      Immature Grans Absolute 0 02 Thousand/uL      Lymphocytes Absolute 2 72 Thousands/µL      Monocytes Absolute 0 79 Thousand/µL      Eosinophils Absolute 0 00 Thousand/µL      Basophils Absolute 0 07 Thousands/µL     Urine Microscopic [[id number]]  (Abnormal) Collected: 07/23/22 1429    Lab Status: Final result Specimen: Urine, Clean Catch Updated: 07/23/22 1517     RBC, UA 4-10 /hpf      WBC, UA 0-1 /hpf      Epithelial Cells Occasional /hpf      Bacteria, UA None Seen /hpf      Ca Oxalate Sol, UA Occasional /hpf     CMP [[id number]]  (Abnormal) Collected: 07/23/22 1429    Lab Status: Final result Specimen: Blood from Arm, Left Updated: 07/23/22 1508     Sodium 134 mmol/L      Potassium 3 8 mmol/L      Chloride 100 mmol/L      CO2 27 mmol/L ANION GAP 7 mmol/L      BUN 20 mg/dL      Creatinine 1 84 mg/dL      Glucose 107 mg/dL      Calcium 9 2 mg/dL      AST 15 U/L      ALT 12 U/L      Alkaline Phosphatase 74 U/L      Total Protein 7 2 g/dL      Albumin 4 3 g/dL      Total Bilirubin 0 89 mg/dL      eGFR 36 ml/min/1 73sq m     Narrative:      National Kid[employer] Foundation guidelines for Chronic Kidney Disease (CKD):     Stage 1 with normal or high GFR (GFR > 90 mL/min/1 73 square meters)    Stage 2 Mild CKD (GFR = 60-89 mL/min/1 73 square meters)    Stage 3A Moderate CKD (GFR = 45-59 mL/min/1 73 square meters)    Stage 3B Moderate CKD (GFR = 30-44 mL/min/1 73 square meters)    Stage 4 Severe CKD (GFR = 15-29 mL/min/1 73 square meters)    Stage 5 End Stage CKD (GFR <15 mL/min/1 73 square meters)  Note: GFR calculation is accurate only with a steady state creatinine    Magnesium [[id number]]  (Normal) Collected: 07/23/22 1429    Lab Status: Final result Specimen: Blood from Arm, Left Updated: 07/23/22 1508     Magnesium 2 1 mg/dL     CBC and differential [[id number]]  (Abnormal) Collected: 07/23/22 1429    Lab Status: Final result Specimen: Blood from Arm, Left Updated: 07/23/22 1442     WBC 11 37 Thousand/uL      RBC 5 20 Million/uL      Hemoglobin 15 5 g/dL      Hematocrit 49 0 %      MCV 94 fL      MCH 29 8 pg      MCHC 31 6 g/dL      RDW 12 8 %      MPV 10 4 fL      Platelets 627 Thousands/uL      nRBC 0 /100 WBCs      Neutrophils Relative 62 %      Immat GRANS % 0 %      Lymphocytes Relative 28 %      Monocytes Relative 9 %      Eosinophils Relative 0 %      Basophils Relative 1 %      Neutrophils Absolute 7 11 Thousands/µL      Immature Grans Absolute 0 02 Thousand/uL      Lymphocytes Absolute 3 20 Thousands/µL      Monocytes Absolute 0 97 Thousand/µL      Eosinophils Absolute 0 00 Thousand/µL      Basophils Absolute 0 07 Thousands/µL     UA w Reflex to Microscopic w Reflex to Culture [[id number]]  (Abnormal) Collected: 07/23/22 1429    Lab Status: Final result Specimen: Urine, Clean Catch Updated: 07/23/22 1441     Color, UA Yellow     Clarity, UA Hazy     Specific [place], UA 1 025     pH, UA 6 0     Leukocytes, UA Negative     Nitrite, UA Negative     Protein, UA Trace mg/dl      Glucose, UA Negative mg/dl      Ketones, UA Negative mg/dl      Urobilinogen, UA 0 2 E U /dl      Bilirubin, UA Negative     Occult Blood, UA 3+                 XR abdomen 1 view kub   Final Result by Reyna Mcgarry MD (07/25 8904)      No urinary tract calculi are not well visualized radiographically  Workstation performed: [id number]         XR abdomen 1 view kub   Final Result by Isma Hedrick DO (07/24 1011)      Persistent, asymmetric right nephrogram with contrast opacifying dilated ipsilateral collecting system in keeping with hydronephrosis  There is some opacification of the mid and distal right ureter suggested  Workstation performed: [id number]         XR abdomen 1 view kub   Final Result by Damian Burnham MD (07/23 1939)   1  Persistent right-sided nephrogram with no excretion of contrast on the right  2   Excretion of contrast by the left kidney with contrast in the intrarenal collecting system, distal left ureter, and bladder  Workstation performed: [id number]         CTA dissection protocol chest abdomen pelvis w wo contrast   Final Result by Damian Burnham MD (07/24 1414)   Addendum 1 of 1 by Damian Burnham MD (07/24 1414)   ADDENDUM:      This study was reviewed again on 7/24/2022  There is marked    hydronephrosis, but the ureter and renal pelvis are not dilated  There is    a large cyst projecting from the medial cortex of the right kidney shown    on prior studies, including an ultrasound    dated November 27, 2019 and prior CT scans, including a CT scan of the    abdomen and pelvis dated February 21, 2013 showing a cyst in this    location  Final         1    No evidence of aortic dissection or intramural hematoma  There is a fusiform infrarenal abdominal aortic aneurysm as described above  2   Right lower lobe pulmonary nodule suspicious for primary malignancy  A few additional subcentimeter nodules are present, including a right upper lobe groundglass nodule which is new  This could be further evaluated with PET/CT  3   Obstructing right distal ureteral calculus, and few additional calculi in the right ureterovesical junction with delayed nephrogram indicating a physiologically significant obstruction  There is marked hydronephrosis and moderate hydroureter  4   Mild diffuse circumferential thickening of the esophagus  This appears similar to prior CT scans, and may be related to reflux  The study was marked in [facility] for immediate notification  Workstation performed: [id number]         XR abdomen 1 view kub    (Results Pending)   FL < 1 hour    (Results Pending)              Procedures  Procedures         ED Course  ED Course as of 07/25/22 1050   Sat Jul 23, 2022   1529 Reached out to Dr Rachel of urology regarding CT results and MARYANN noted today  1821 Discussed case with Dr Rachel with recommendation for admission to [facility] for IV hydration, and strain all urine  Dr Rachel asking for KUB now  Radiology aware  SBIRT 20yo+    Flowsheet Row Most Recent Value   SBIRT (25 yo +)    In order to provide better care to our patients, we are screening all of our patients for alcohol and drug use  Would it be okay to ask you these screening questions? Yes Filed at: 07/23/2022 1417   Initial Alcohol Screen: US AUDIT-C     1  How often do you have a drink containing alcohol? 0 Filed at: 07/23/2022 1417   2  How many drinks containing alcohol do you have on a typical day you are drinking? 0 Filed at: 07/23/2022 1417   3a  Male UNDER 65: How often do you have five or more drinks on one occasion? 0 Filed at: 07/23/2022 1417   3b  FEMALE Any Age, or MALE 65+:  How often do you have 4 or more drinks on one occassion? 0 Filed at: 07/23/2022 1417   Audit-C Score 0 Filed at: 07/23/2022 1417   WISAM: How many times in the past year have you    Used an illegal drug or used a prescription medication for non-medical reasons? Never Filed at: 07/23/2022 1417                    MDM  Number of Diagnoses or Management Options  Abnormal CT scan  MARYANN (acute kidney injury) ([facility] [address] )  Nephrolithiasis  Diagnosis management comments: DDx: AAA dissection, kidney stone, appendicitis   Patient has history triple in her abdomen but is unable describe exactly where it is  Patient that his pain started right mid and has continued to smoke almost packs a day  Although this is the 3rd day of symptoms, dissection with CT scan  Patient has MARYANN, baseline creatinine has been about 1 1, today is 1 8  Slight leukocytosis noted in the presence of acute pain, no anemia noted  500 mL bolus provided for MARYANN as pt also received contrast today  BP is likely elevated secondary to pain level  CT findings appreciated  Discussed CT scan with Dr Benny Calderon with recommendations as documented in ED course  Dr Benny Calderon reported he reviewed imagining and spoke with radiologist and there will be an addendum placed  Orders placed per recommendations of Dr Benny Calderon  Obstruction is likely cause for MARYANN today  Pt requiring multiple rounds of analgesia, as he is noteably standing and moving side to side in pain  After pt received fentanyl he is finally resting comfortably in bed  Discussed blood work findings and CT scan findings with pt, and in agreement for admission  Utilized WOW to read exactly off of pt's CT report  Pt reports he has already read a copy in [address]  Also discussed incidental findings of  Right lower lobe pulmonary nodule suspicious for primary malignancy  A few additional subcentimeter nodules are present, including a right upper lobe groundglass nodule which is new    This could be further evaluated with PET/CT  Aware that if he does not follow up there is a likely chance a diagnosis such as but not limited to cancer can be missed  Discussed pt has increased risk factors secondary to excessive smoking  Pt is already aware of his AAA, and to continue following up outpatient  Discussed case with [facility] and in agreement to accept pt to inpt per recommendation of Dr Eyal Rodríguez  Aware pt to be NPO after midnight per Dr Rachel  Nicotine patch for 2 pack a day smoking habit  Amount and/or Complexity of Data Reviewed  Clinical lab tests: reviewed and ordered  Tests in the radiology section of CPT®: ordered and reviewed    Risk of Complications, Morbidity, and/or Mortality  General comments: Pt admitted for MARYANN, and urinary obstruction secondary to renal calculous  Reviewed reasons to return to ed  Patient verbalized understanding of diagnosis and agreement with discharge plan of care as well as understanding of reasons to return to ed  Patient Progress  Patient progress: improved      Disposition  Final diagnoses:   MARYANN (acute kidney injury) ([facility] [address] )   Nephrolithiasis   Abnormal CT scan     Time reflects when diagnosis was documented in both MDM as applicable and the Disposition within this note     Time User Action Codes Description Comment    7/23/2022  6:24 PM Jayson Turner Add [N17 9] MARYANN (acute kidney injury) ([facility] [address] )     7/23/2022  6:24 PM Big Arm Turner Add [N20 0] Nephrolithiasis     7/23/2022  6:24 PM Big Arm Turner Add [R93 89] Abnormal CT scan     7/24/2022  2:41 PM Santosh Martins Add [R91 8] Pulmonary nodules       ED Disposition     ED Disposition   Admit    Condition   Stable    Date/Time   Sat Jul 23, 2022  6:24 PM    Comment   Case was discussed with Sandra Rausch and the patient's admission status was agreed to be Admission Status: inpatient status to the service of Dr Tomy Moore              Follow-up Information    None         Current Discharge Medication List      CONTINUE these medications which have NOT CHANGED    Details   aspirin (ECOTRIN LOW STRENGTH) 81 mg EC tablet Take 1 tablet (81 mg total) by mouth daily  Qty: 30 tablet, Refills: 5    Associated Diagnoses: Coronary artery disease involving native coronary artery of native heart without angina pectoris      metoprolol succinate (TOPROL-XL) 100 mg 24 hr tablet take 1 tablet by mouth once daily  Qty: 30 tablet, Refills: 5    Associated Diagnoses: Essential hypertension      oxyCODONE-acetaminophen (Percocet) 7 5-325 MG per tablet Take 1 tablet by mouth every 8 (eight) hours as needed for moderate pain Max Daily Amount: 3 tablets  Qty: 90 tablet, Refills: 0    Associated Diagnoses: Chronic pain syndrome; Lumbar degenerative disc disease; Cervical radiculopathy; Lumbar spondylosis;  Neck pain; Cervical spondylosis without myelopathy; Chronic low back pain without sciatica, unspecified back pain laterality      amLODIPine (NORVASC) 10 mg tablet swallow 1 tablet once daily  Qty: 90 tablet, Refills: 3    Associated Diagnoses: Essential hypertension      Blood Glucose Monitoring Suppl (OneTouch Verio Flex System) w/Device KIT USE IN THE MORNING TEST ONCE DAILY  Qty: 1 kit, Refills: 0    Associated Diagnoses: Pre-diabetes      diphenhydrAMINE-acetaminophen (TYLENOL PM)  MG TABS Take 2 tablets by mouth daily at bedtime as needed for sleep      gabapentin (NEURONTIN) 600 MG tablet Take 1 tablet (600 mg total) by mouth 3 (three) times a day  Qty: 90 tablet, Refills: 2    Associated Diagnoses: Lumbar degenerative disc disease; Cervical radiculopathy      glucose blood (OneTouch Verio) test strip Test once daily  Qty: 100 each, Refills: 0    Associated Diagnoses: Pre-diabetes      Lancets (onetouch ultrasoft) lancets Test once daily  Qty: 100 each, Refills: 0    Associated Diagnoses: Pre-diabetes      lisinopril (ZESTRIL) 20 mg tablet take 1 tablet by mouth once daily  Qty: 60 tablet, Refills: 5    Associated Diagnoses: Essential hypertension nitroglycerin (NITROSTAT) 0 4 mg SL tablet Place 1 tablet (0 4 mg total) under the tongue every 5 (five) minutes as needed for chest pain  Qty: 25 tablet, Refills: 5    Associated Diagnoses: Coronary artery disease involving native coronary artery of native heart without angina pectoris; Essential hypertension      rosuvastatin (CRESTOR) 20 MG tablet Take 1 tablet (20 mg total) by mouth daily  Qty: 30 tablet, Refills: 5    Associated Diagnoses: Coronary artery disease involving native coronary artery of native heart without angina pectoris; Mixed hyperlipidemia      sildenafil (VIAGRA) 100 mg tablet Take 1 tablet (100 mg total) by mouth daily as needed for erectile dysfunction  Qty: 20 tablet, Refills: 0    Associated Diagnoses: Erectile dysfunction, unspecified erectile dysfunction type      tamsulosin (FLOMAX) 0 4 mg take 1 capsule by mouth once daily with dinner  Qty: 30 capsule, Refills: 2    Associated Diagnoses: History of BPH      topiramate (TOPAMAX) 25 mg tablet FOLLOW INSTRUCTIONS GIVEN TO TITRATE UP TO A MAX OF 2 TABLETS BY MOUTH TWICE DAILY AS TOLERATED AND TO LEVEL OF BENEFIT FOR TREMORS  Qty: 120 tablet, Refills: 2    Associated Diagnoses: Tremor, essential      traZODone (DESYREL) 100 mg tablet Take 1 tablet (100 mg total) by mouth daily at bedtime  Qty: 90 tablet, Refills: 3    Associated Diagnoses: Insomnia, unspecified type             No discharge procedures on file      PDMP Review       Value Time User    PDMP Reviewed  Yes 6/6/2022 10:29 AM Gracie Sorto MD          ED Provider  Electronically Signed by           ELOISA Shepard  07/25/22 9106

## 2022-07-23 NOTE — H&P
[address] [place] [birth year], 75 y o  male MRN: [id number]  Unit/Bed#: Z2 H3 Encounter: [id number]  Primary Care Provider: Chevy Cruz DO   Date and time admitted to hospital: 7/23/2022  2:05 PM    * Kidney stone  Assessment & Plan  · Kidney stone noted on CT imaging with hydronephrosis  · Urology consulted  · Will continue IV fluids  · Pain control as needed  · No signs of infection, will hold off on antibiotics  · NPO at midnight  · With associated acute kidney injury, will follow-up BMP in the morning    Acute kidney injury [facility])  Assessment & Plan  · Likely secondary to kidney stone  · Will continue IV fluids  · Repeat BMP in the morning  · Urology consulted    Pulmonary nodules  Assessment & Plan  · Seen on CT imaging  Given patient's smoking history will need outpatient follow-up with pulmonology/PCP for repeat imaging as outpatient within 1 month    Uncomplicated opioid dependence ([address] )  Assessment & Plan  · Will continue pain control as needed in the hospital   Resume home pain regimen on discharge    Hypertension  Assessment & Plan  · BP elevated on admission, likely secondary to pain  · Will continue amlodipine, Toprol  · Lisinopril held for now due to acute kidney injury    Smoking  Assessment & Plan  · Counseled on smoking cessation  · Nicotine patch    Abdominal aortic aneurysm (AAA) without rupture (HCC)  Assessment & Plan  · CT imaging with no signs of dissection  Aneurysm noted  · Outpatient follow-up with vascular    JAKI (obstructive sleep apnea)  Assessment & Plan  CPAP at bedtime      VTE Prophylaxis: Heparin  Code Status:  Full code  Discussion with family:  Updated patient regarding plan of care    Anticipated Length of Stay:  Patient will be admitted on an Inpatient basis with an anticipated length of stay of  > 2 midnights     Justification for Hospital Stay:  Kidney stone    Chief Complaint:   Right flank pain    History of Present Illness:    Monica Sandra is a 75 y o  male who presents with right flank pain  Patient states that pain started 2 days ago, located right flank starting in front and radiating to back  Pain 10/10 at worse  Has been intermittent since 2 days ago  Sharp/stabbing pain  No alleviating or exacerbating factors  Patient denies any fever or chills  No hematuria  No dysuria  History of kidney stone 40 years ago    In the ER patient had CTA to rule out dissection, no signs of dissection however patient noted to have right obstructing kidney stone  Review of Systems:    Review of Systems   Constitutional: Positive for activity change and appetite change  Respiratory: Negative for shortness of breath  Cardiovascular: Negative for chest pain  Gastrointestinal: Negative for abdominal pain and blood in stool  Genitourinary: Positive for flank pain and frequency  Negative for dysuria  All other systems reviewed and are negative  Past Medical and Surgical History:     Past Medical History:   Diagnosis Date    Abdominal aortic aneurysm without rupture ([facility] [address] )     Abdominal Aortic Duplex 02/21/2017    Ectatic infrarenal abdominal aorta   CAD (coronary artery disease)     COPD (chronic obstructive pulmonary disease) (HCC)     Extremity pain     History of echocardiogram 03/18/2014    EF 55%, mild MR and AI  Mild concentric LVH   History of stress test 03/06/2017    Normal     Hypertension     Joint pain     Low back pain     Migraine     Neck pain     Obstructive sleep apnea     cannot tolerate CPAP    Osteoarthritis     Peripheral neuropathy     Reflex sympathetic dystrophy        Past Surgical History:   Procedure Laterality Date    CARDIAC CATHETERIZATION  02/13/2012    EF 70%, widely patent renal arteries, significant single-vessel CAD-medical therapy   CARDIAC CATHETERIZATION  04/11/2013    EF 65%, 50% mid LAD, 20% prox CFX, 90% diffuse RCA, 99% mid RCA  Medical management   CHOLECYSTECTOMY      EPIDURAL BLOCK INJECTION Bilateral 8/15/2019    Procedure: BLOCK / INJECTION EPIDURAL STEROID CERVICAL;  Surgeon: Stanley London MD;  Location: [facility] MAIN OR;  Service: Pain Management     FL GUIDED NEEDLE PLAC BX/ASP/INJ  8/15/2019    ORTHOPEDIC SURGERY      TRIGGER POINT INJECTION         Meds/Allergies:    Prior to Admission medications    Medication Sig Start Date End Date Taking? Authorizing Provider   amLODIPine (NORVASC) 10 mg tablet swallow 1 tablet once daily 3/29/22   [facility], DO   aspirin (ECOTRIN LOW STRENGTH) 81 mg EC tablet Take 1 tablet (81 mg total) by mouth daily 5/6/22   [facility], DO   Blood Glucose Monitoring Suppl (OneTouch Verio Flex System) w/Device KIT USE IN THE MORNING TEST ONCE DAILY 5/26/22   Chevy Cruz, DO   diphenhydrAMINE-acetaminophen (TYLENOL PM)  MG TABS Take 2 tablets by mouth daily at bedtime as needed for sleep    Historical Provider, MD   gabapentin (NEURONTIN) 600 MG tablet Take 1 tablet (600 mg total) by mouth 3 (three) times a day 6/6/22 7/12/22  Ricardo Park MD   glucose blood (OneTouch Verio) test strip Test once daily 5/19/22   Chevy Cruz, DO   Lancets (onetouch ultrasoft) lancets Test once daily 5/19/22   Chevy Cruz, DO   lisinopril (ZESTRIL) 20 mg tablet take 1 tablet by mouth once daily 8/11/21   [facility], DO   metoprolol succinate (TOPROL-XL) 100 mg 24 hr tablet take 1 tablet by mouth once daily 5/2/22   [facility], DO   nitroglycerin (NITROSTAT) 0 4 mg SL tablet Place 1 tablet (0 4 mg total) under the tongue every 5 (five) minutes as needed for chest pain 3/12/21   [facility], DO   ofloxacin (OCUFLOX) 0 3 % ophthalmic solution instill 1 drop into left eye START 3 days prior to surgery THEN O   (REFER TO PRESCRIPTION NOTES)    Patient not taking: No sig reported 1/10/22   Historical Provider, MD   oxyCODONE-acetaminophen (Percocet) 7 5-325 MG per tablet Take 1 tablet by mouth every 8 (eight) hours as needed for moderate pain Max Daily Amount: 3 tablets 7/6/22 8/5/22  Ricardo Park MD   prednisoLONE acetate (PRED FORTE) 1 % ophthalmic suspension instill 1 drop into left eye every 2 hours STARTING AFTER SURGERY IS COMPLETED  Patient not taking: No sig reported 1/6/22   Historical Provider, MD   predniSONE 10 mg tablet 3 tabs once daily x 3 days; 2 tabs once daily x 2 days; 1 tab daily x 2 days  Patient not taking: No sig reported 2/2/22   ELOISA Amanda   rosuvastatin (CRESTOR) 20 MG tablet Take 1 tablet (20 mg total) by mouth daily 5/6/22   Elvia Morrow,    sildenafil (VIAGRA) 100 mg tablet Take 1 tablet (100 mg total) by mouth daily as needed for erectile dysfunction 7/7/22   Alexander Crease, DO   tamsulosin [facility]) 0 4 mg take 1 capsule by mouth once daily with dinner 6/29/22   Alexander Crease, DO   topiramate (TOPAMAX) 25 mg tablet FOLLOW INSTRUCTIONS GIVEN TO TITRATE UP TO A MAX OF 2 TABLETS BY MOUTH TWICE DAILY AS TOLERATED AND TO LEVEL OF BENEFIT FOR TREMORS 6/20/22   Alexander Crease, DO   traZODone (DESYREL) 100 mg tablet Take 1 tablet (100 mg total) by mouth daily at bedtime 10/7/21   ELOISA Alejandro     all medications and allergies reviewed    Allergies: No Known Allergies    Social History:     Marital Status: Single   Occupation:  None  Patient Pre-hospital Living Situation:  Lives with [facility]  Patient Pre-hospital Level of Mobility:  Ambulatory  Patient Pre-hospital Diet Restrictions:  None  Substance Use History:   Social History     Substance and Sexual Activity   Alcohol Use No     Social History     Tobacco Use   Smoking Status Current Every Day Smoker    Packs/day: 1 50   Smokeless Tobacco Never Used     Social History     Substance and Sexual Activity   Drug Use No       Family History:  I have reviewed the patient's family history    Physical Exam:     Vitals:   Blood Pressure: 168/96 (07/23/22 1836)  Pulse: 84 (07/23/22 1836)  Temperature: 97 6 °F (36 4 °C) (07/23/22 1400)  Temp Source: Tympanic (07/23/22 1400)  Respirations: 18 (07/23/22 1836)  Height: 5' 8" (172 7 cm) (07/23/22 1400)  Weight - Scale: 89 4 kg (197 lb) (07/23/22 1400)  SpO2: 99 % (07/23/22 1710)    Physical Exam  Constitutional:       General: He is not in acute distress  HENT:      Head: Normocephalic and atraumatic  Nose: Nose normal       Mouth/Throat:      Mouth: Mucous membranes are moist    Eyes:      Extraocular Movements: Extraocular movements intact  Conjunctiva/sclera: Conjunctivae normal    Cardiovascular:      Rate and Rhythm: Normal rate and regular rhythm  Pulmonary:      Effort: Pulmonary effort is normal  No respiratory distress  Abdominal:      Palpations: Abdomen is soft  Tenderness: There is no abdominal tenderness  There is no right CVA tenderness or left CVA tenderness  Musculoskeletal:         General: No swelling or tenderness  Normal range of motion  Cervical back: Normal range of motion and neck supple  Skin:     General: Skin is warm and dry  Neurological:      General: No focal deficit present  Mental Status: He is alert  Mental status is at baseline  Cranial Nerves: No cranial nerve deficit  Psychiatric:         Mood and Affect: Mood normal          Behavior: Behavior normal          Additional Data:     Lab Results: I have personally reviewed pertinent reports        Results from last 7 days   Lab Units 07/23/22  1429   WBC Thousand/uL 11 37*   HEMOGLOBIN g/dL 15 5   HEMATOCRIT % 49 0   PLATELETS Thousands/uL 200   NEUTROS PCT % 62   LYMPHS PCT % 28   MONOS PCT % 9   EOS PCT % 0     Results from last 7 days   Lab Units 07/23/22  1429   SODIUM mmol/L 134*   POTASSIUM mmol/L 3 8   CHLORIDE mmol/L 100   CO2 mmol/L 27   BUN mg/dL 20   CREATININE mg/dL 1 84*   ANION GAP mmol/L 7   CALCIUM mg/dL 9 2   ALBUMIN g/dL 4 3   TOTAL BILIRUBIN mg/dL 0 89   ALK PHOS U/L 74   ALT U/L 12   AST U/L 15   GLUCOSE RANDOM mg/dL 107 Imaging: I have personally reviewed pertinent reports  CTA dissection protocol chest abdomen pelvis w wo contrast   Final Result by Kim Shoemaker MD (07/23 1705)         1  No evidence of aortic dissection or intramural hematoma  There is a fusiform infrarenal abdominal aortic aneurysm as described above  2   Right lower lobe pulmonary nodule suspicious for primary malignancy  A few additional subcentimeter nodules are present, including a right upper lobe groundglass nodule which is new  This could be further evaluated with PET/CT  3   Obstructing right distal ureteral calculus, and few additional calculi in the right ureterovesical junction with delayed nephrogram indicating a physiologically significant obstruction  There is marked hydronephrosis and moderate hydroureter  4   Mild diffuse circumferential thickening of the esophagus  This appears similar to prior CT scans, and may be related to reflux  The study was marked in [facility] for immediate notification  Workstation performed: [id number]         XR abdomen 1 view kub    (Results Pending)     Epic / Care Everywhere Records Reviewed: Yes    ** Please Note: This note has been constructed using a voice recognition system   **

## 2022-07-23 NOTE — ED NOTES
Patient appears to be sleeping at this time  Will obtain vital signs when wakes       Rupa Velasco [facility]  07/23/22 [id number]

## 2022-07-23 NOTE — ED NOTES
Patient made aware of straining all urine       arnaldo Brooks, [facility]  07/23/22 0659

## 2022-07-23 NOTE — INCIDENTAL FINDINGS
The following findings require follow up:  Radiographic finding   Finding:  Lung nodules   Follow up required:   Follow-up with PCP for further imaging and possible PET scan   Follow up should be done within 1 month(s)    Please notify the following clinician to assist with the follow up:   Dr Yaneth Arauz

## 2022-07-23 NOTE — ASSESSMENT & PLAN NOTE
· Likely secondary to kidney stone  · Will continue IV fluids  · Repeat BMP in the morning  · Urology consulted

## 2022-07-23 NOTE — ASSESSMENT & PLAN NOTE
· BP elevated on admission, likely secondary to pain  · Will continue amlodipine, Toprol  · Lisinopril held for now due to acute kidney injury

## 2022-07-23 NOTE — ED NOTES
Patient reporting increase pain since CT contrast study  Provider made aware       Tuyet Neves RN  07/23/22 3228

## 2022-07-23 NOTE — ASSESSMENT & PLAN NOTE
· Kidney stone noted on CT imaging with hydronephrosis  · Urology consulted  · Will continue IV fluids  · Pain control as needed  · No signs of infection, will hold off on antibiotics  · NPO at midnight  · With associated acute kidney injury, will follow-up BMP in the morning

## 2022-07-23 NOTE — ASSESSMENT & PLAN NOTE
· Seen on CT imaging    Given patient's smoking history will need outpatient follow-up with pulmonology/PCP for repeat imaging as outpatient within 1 month

## 2022-07-24 ENCOUNTER — APPOINTMENT (INPATIENT)
Dept: RADIOLOGY | Facility: HOSPITAL | Age: 67
DRG: 683 | End: 2022-07-24
Payer: MEDICARE

## 2022-07-24 LAB
ANION GAP SERPL CALCULATED.3IONS-SCNC: 7 MMOL/L (ref 4–13)
BASOPHILS # BLD AUTO: 0.07 THOUSANDS/ΜL (ref 0–0.1)
BASOPHILS NFR BLD AUTO: 1 % (ref 0–1)
BUN SERPL-MCNC: 18 MG/DL (ref 5–25)
CALCIUM SERPL-MCNC: 8.6 MG/DL (ref 8.4–10.2)
CHLORIDE SERPL-SCNC: 102 MMOL/L (ref 96–108)
CO2 SERPL-SCNC: 28 MMOL/L (ref 21–32)
CREAT SERPL-MCNC: 1.59 MG/DL (ref 0.6–1.3)
EOSINOPHIL # BLD AUTO: 0 THOUSAND/ΜL (ref 0–0.61)
EOSINOPHIL NFR BLD AUTO: 0 % (ref 0–6)
ERYTHROCYTE [DISTWIDTH] IN BLOOD BY AUTOMATED COUNT: 12.7 % (ref 11.6–15.1)
GFR SERPL CREATININE-BSD FRML MDRD: 43 ML/MIN/1.73SQ M
GLUCOSE SERPL-MCNC: 110 MG/DL (ref 65–140)
HCT VFR BLD AUTO: 45.2 % (ref 36.5–49.3)
HGB BLD-MCNC: 14.4 G/DL (ref 12–17)
IMM GRANULOCYTES # BLD AUTO: 0.02 THOUSAND/UL (ref 0–0.2)
IMM GRANULOCYTES NFR BLD AUTO: 0 % (ref 0–2)
LYMPHOCYTES # BLD AUTO: 2.72 THOUSANDS/ΜL (ref 0.6–4.47)
LYMPHOCYTES NFR BLD AUTO: 29 % (ref 14–44)
MCH RBC QN AUTO: 30.1 PG (ref 26.8–34.3)
MCHC RBC AUTO-ENTMCNC: 31.9 G/DL (ref 31.4–37.4)
MCV RBC AUTO: 95 FL (ref 82–98)
MONOCYTES # BLD AUTO: 0.79 THOUSAND/ΜL (ref 0.17–1.22)
MONOCYTES NFR BLD AUTO: 8 % (ref 4–12)
NEUTROPHILS # BLD AUTO: 5.94 THOUSANDS/ΜL (ref 1.85–7.62)
NEUTS SEG NFR BLD AUTO: 62 % (ref 43–75)
NRBC BLD AUTO-RTO: 0 /100 WBCS
PLATELET # BLD AUTO: 169 THOUSANDS/UL (ref 149–390)
PMV BLD AUTO: 10 FL (ref 8.9–12.7)
POTASSIUM SERPL-SCNC: 3.6 MMOL/L (ref 3.5–5.3)
RBC # BLD AUTO: 4.78 MILLION/UL (ref 3.88–5.62)
SODIUM SERPL-SCNC: 137 MMOL/L (ref 135–147)
WBC # BLD AUTO: 9.54 THOUSAND/UL (ref 4.31–10.16)

## 2022-07-24 PROCEDURE — 85025 COMPLETE CBC W/AUTO DIFF WBC: CPT | Performed by: INTERNAL MEDICINE

## 2022-07-24 PROCEDURE — 80048 BASIC METABOLIC PNL TOTAL CA: CPT | Performed by: INTERNAL MEDICINE

## 2022-07-24 PROCEDURE — 99232 SBSQ HOSP IP/OBS MODERATE 35: CPT | Performed by: INTERNAL MEDICINE

## 2022-07-24 PROCEDURE — 94760 N-INVAS EAR/PLS OXIMETRY 1: CPT

## 2022-07-24 PROCEDURE — 36415 COLL VENOUS BLD VENIPUNCTURE: CPT | Performed by: INTERNAL MEDICINE

## 2022-07-24 PROCEDURE — 74018 RADEX ABDOMEN 1 VIEW: CPT

## 2022-07-24 RX ADMIN — TRAZODONE HYDROCHLORIDE 100 MG: 50 TABLET ORAL at 22:05

## 2022-07-24 RX ADMIN — TAMSULOSIN HYDROCHLORIDE 0.4 MG: 0.4 CAPSULE ORAL at 17:17

## 2022-07-24 RX ADMIN — HEPARIN SODIUM 5000 UNITS: 5000 INJECTION INTRAVENOUS; SUBCUTANEOUS at 22:05

## 2022-07-24 RX ADMIN — HEPARIN SODIUM 5000 UNITS: 5000 INJECTION INTRAVENOUS; SUBCUTANEOUS at 14:44

## 2022-07-24 RX ADMIN — OXYCODONE HYDROCHLORIDE 5 MG: 5 TABLET ORAL at 05:24

## 2022-07-24 RX ADMIN — GABAPENTIN 300 MG: 600 TABLET, FILM COATED ORAL at 22:05

## 2022-07-24 RX ADMIN — HEPARIN SODIUM 5000 UNITS: 5000 INJECTION INTRAVENOUS; SUBCUTANEOUS at 05:17

## 2022-07-24 RX ADMIN — AMLODIPINE BESYLATE 10 MG: 10 TABLET ORAL at 09:58

## 2022-07-24 RX ADMIN — NICOTINE 1 PATCH: 14 PATCH, EXTENDED RELEASE TRANSDERMAL at 10:04

## 2022-07-24 RX ADMIN — SODIUM CHLORIDE, SODIUM GLUCONATE, SODIUM ACETATE, POTASSIUM CHLORIDE, MAGNESIUM CHLORIDE, SODIUM PHOSPHATE, DIBASIC, AND POTASSIUM PHOSPHATE 150 ML/HR: .53; .5; .37; .037; .03; .012; .00082 INJECTION, SOLUTION INTRAVENOUS at 14:06

## 2022-07-24 RX ADMIN — GABAPENTIN 300 MG: 600 TABLET, FILM COATED ORAL at 17:17

## 2022-07-24 RX ADMIN — GABAPENTIN 300 MG: 600 TABLET, FILM COATED ORAL at 10:02

## 2022-07-24 RX ADMIN — METOPROLOL SUCCINATE 100 MG: 50 TABLET, EXTENDED RELEASE ORAL at 09:59

## 2022-07-24 RX ADMIN — OXYCODONE HYDROCHLORIDE 5 MG: 5 TABLET ORAL at 13:23

## 2022-07-24 RX ADMIN — SODIUM CHLORIDE, SODIUM GLUCONATE, SODIUM ACETATE, POTASSIUM CHLORIDE, MAGNESIUM CHLORIDE, SODIUM PHOSPHATE, DIBASIC, AND POTASSIUM PHOSPHATE 125 ML/HR: .53; .5; .37; .037; .03; .012; .00082 INJECTION, SOLUTION INTRAVENOUS at 05:18

## 2022-07-24 RX ADMIN — TOPIRAMATE 25 MG: 25 TABLET, FILM COATED ORAL at 09:58

## 2022-07-24 RX ADMIN — SODIUM CHLORIDE, SODIUM GLUCONATE, SODIUM ACETATE, POTASSIUM CHLORIDE, MAGNESIUM CHLORIDE, SODIUM PHOSPHATE, DIBASIC, AND POTASSIUM PHOSPHATE 150 ML/HR: .53; .5; .37; .037; .03; .012; .00082 INJECTION, SOLUTION INTRAVENOUS at 22:02

## 2022-07-24 RX ADMIN — OXYCODONE HYDROCHLORIDE 5 MG: 5 TABLET ORAL at 22:09

## 2022-07-24 NOTE — ASSESSMENT & PLAN NOTE
· BP elevated on admission, likely secondary to pain  · Gradually improving  · Will continue amlodipine, Toprol  · Lisinopril held for now due to acute kidney injury

## 2022-07-24 NOTE — ASSESSMENT & PLAN NOTE
· Likely secondary to kidney stone  · Will continue IV fluids  · Creatinine improving with IV fluids  · Repeat BMP in the morning  · Urology following

## 2022-07-24 NOTE — CONSULTS
Consultation - Urology   Jaime Evans 75 y o  male MRN: [id number]  Unit/Bed#: -01 Encounter: [id number]      Assessment/Plan      Assessment:  Right renal colic and MARYANN due to 2-3 tiny right distal ureteral stones  Renal colic has resolved this morning and creatinine is improving  Plan:  Options, procedures and risk discussed with the patient  Continue IV hydration and strain all urine today  Repeat labs and KUB in AM  Possibility of intervention discussed  Possibility of spontaneous passage of these very small stones also discussed  Further management depends upon clinical course  History of Present Illness   Attending: Maureen Barnett, *  Reason for Consult / Principal Problem: right renal colic  HPI: Jaime Evans is a 75y o  year old male who presents with right flank to lower quadrant pain intermittently for 2 days  Denies fever, chills, nausea or vomiting  Denies difficulty voiding, dysuria or hematuria  Came to ED yesterday because of severe pain  CTA shows mild to moderate right hydronephrosis and 2 2-3 mm stones at the UVJ and a 2 mm stone slightly more proximal, but the ureter is only minimally, if at all, dilated  There is a large right renal cyst adjacent to the renal pelvis that was initially thought to be a large renal pelvis  I discussed this finding with the radiologist  Delayed KUB x-rays show delayed excretion with mild to moderate hydronephrosis  A very slightly dilated mid and distal ureter is seen down to the bladder  This morning, all contrast is gone and 2 probable 2-3 mm adjacent stone are seen at the UVJ  His creatinine on admission was 1 84 and this morning is 1 59  He has no further pain this morning  He has a history of possibly passing 3 stones about 40 years ago  Inpatient consult to Urology  Consult performed by: Ashleigh Austin MD  Consult ordered by: Maureen Barnett MD          Review of Systems   Constitutional: Negative for fever  Gastrointestinal: Positive for abdominal pain  Negative for abdominal distention  Genitourinary: Positive for flank pain  Negative for difficulty urinating, dysuria, frequency and hematuria  Historical Information   Past Medical History:   Diagnosis Date    Abdominal aortic aneurysm without rupture ([facility] [address] )     Abdominal Aortic Duplex 02/21/2017    Ectatic infrarenal abdominal aorta   CAD (coronary artery disease)     COPD (chronic obstructive pulmonary disease) (HCC)     Extremity pain     History of echocardiogram 03/18/2014    EF 55%, mild MR and AI  Mild concentric LVH   History of stress test 03/06/2017    Normal     Hypertension     Joint pain     Low back pain     Migraine     Neck pain     Obstructive sleep apnea     cannot tolerate CPAP    Osteoarthritis     Peripheral neuropathy     Reflex sympathetic dystrophy      Past Surgical History:   Procedure Laterality Date    CARDIAC CATHETERIZATION  02/13/2012    EF 70%, widely patent renal arteries, significant single-vessel CAD-medical therapy   CARDIAC CATHETERIZATION  04/11/2013    EF 65%, 50% mid LAD, 20% prox CFX, 90% diffuse RCA, 99% mid RCA  Medical management      CHOLECYSTECTOMY      EPIDURAL BLOCK INJECTION Bilateral 8/15/2019    Procedure: BLOCK / INJECTION EPIDURAL STEROID CERVICAL;  Surgeon: Polo Cota MD;  Location: [facility] MAIN OR;  Service: Pain Management     FL GUIDED NEEDLE PLAC BX/ASP/INJ  8/15/2019    ORTHOPEDIC SURGERY      TRIGGER POINT INJECTION       Social History   Social History     Substance and Sexual Activity   Alcohol Use Not Currently     @DRUGHX  E-Cigarette/Vaping    E-Cigarette Use Never User      E-Cigarette/Vaping Substances    Nicotine Yes     THC No     CBD No     Flavoring No     Other No     Unknown No      Social History     Tobacco Use   Smoking Status Current Every Day Smoker    Packs/day: 1 50   Smokeless Tobacco Never Used     Family History: non-contributory    Meds/Allergies   current meds:   Current Facility-Administered Medications   Medication Dose Route Frequency    acetaminophen (TYLENOL) tablet 650 mg  650 mg Oral Q6H PRN    amLODIPine (NORVASC) tablet 10 mg  10 mg Oral Daily    gabapentin (NEURONTIN) tablet 300 mg  300 mg Oral TID    heparin (porcine) subcutaneous injection 5,000 Units  5,000 Units Subcutaneous Q8H [facility]    HYDROmorphone (DILAUDID) injection 0 5 mg  0 5 mg Intravenous Q4H PRN    metoprolol succinate (TOPROL-XL) 24 hr tablet 100 mg  100 mg Oral Daily    morphine injection 2 mg  2 mg Intravenous Q4H PRN    multi-electrolyte (PLASMALYTE-A/ISOLYTE-S PH 7 4) IV solution  150 mL/hr Intravenous Continuous    nicotine (NICODERM CQ) 14 mg/24hr TD 24 hr patch 1 patch  1 patch Transdermal Daily    nicotine (NICODERM CQ) 21 mg/24 hr TD 24 hr patch 21 mg  21 mg Transdermal Once    nitroglycerin (NITROSTAT) SL tablet 0 4 mg  0 4 mg Sublingual Q5 Min PRN    ondansetron (ZOFRAN) injection 4 mg  4 mg Intravenous Q6H PRN    oxyCODONE (ROXICODONE) IR tablet 5 mg  5 mg Oral Q6H PRN    tamsulosin (FLOMAX) capsule 0 4 mg  0 4 mg Oral Daily With Dinner    topiramate (TOPAMAX) tablet 25 mg  25 mg Oral Daily    traZODone (DESYREL) tablet 100 mg  100 mg Oral HS     No Known Allergies    Objective   Vitals: Blood pressure 144/72, pulse 73, temperature 97 6 °F (36 4 °C), temperature source Tympanic, resp  rate 16, height 5' 8" (1 727 m), weight 90 4 kg (199 lb 4 7 oz), SpO2 94 %  I/O last 24 hours:   In: 500 [IV Piggyback:500]  Out: 200 [Urine:200]    Invasive Devices  Report    Peripheral Intravenous Line  Duration           Peripheral IV 07/23/22 Left Antecubital <1 day              Lab Results:   CBC:   Lab Results   Component Value Date    WBC 9 54 07/24/2022    HGB 14 4 07/24/2022    HCT 45 2 07/24/2022    MCV 95 07/24/2022     07/24/2022    MCH 30 1 07/24/2022    MCHC 31 9 07/24/2022    RDW 12 7 07/24/2022    MPV 10 0 07/24/2022    NRBC 0 07/24/2022     CMP:   Lab Results   Component Value Date    SODIUM 137 07/24/2022     07/24/2022    CO2 28 07/24/2022    BUN 18 07/24/2022    CREATININE 1 59 (H) 07/24/2022    CALCIUM 8 6 07/24/2022    AST 15 07/23/2022    ALT 12 07/23/2022    ALKPHOS 74 07/23/2022    EGFR 43 07/24/2022     Urinalysis:   Lab Results   Component Value Date    COLORU Yellow 07/23/2022    CLARITYU Hazy 07/23/2022    SPECGRAV 1 025 07/23/2022    PHUR 6 0 07/23/2022    LEUKOCYTESUR Negative 07/23/2022    NITRITE Negative 07/23/2022    GLUCOSEU Negative 07/23/2022    KETONESU Negative 07/23/2022    BILIRUBINUR Negative 07/23/2022    BLOODU 3+ (A) 07/23/2022     Imaging Studies: I have personally reviewed pertinent reports  EKG, Pathology, and Other Studies: I have personally reviewed pertinent reports  VTE Prophylaxis: Sequential compression device [employer])     Code Status: Level 1 - Full Code  Advance Directive and Living Will:      Power of :    POLST:      Counseling / Coordination of Care  Total time spent today 60 minutes  Greater than 50% of total time was spent with the patient and / or family counseling and / or coordination of care   A description of the counseling / coordination of care: I discussed the case with the radiologist and hospitalist

## 2022-07-24 NOTE — PLAN OF CARE
Problem: PAIN - ADULT  Goal: Verbalizes/displays adequate comfort level or baseline comfort level  Description: Interventions:  - Encourage patient to monitor pain and request assistance  - Assess pain using appropriate pain scale  - Administer analgesics based on type and severity of pain and evaluate response  - Implement non-pharmacological measures as appropriate and evaluate response  - Consider cultural and social influences on pain and pain management  - Notify physician/advanced practitioner if interventions unsuccessful or patient reports new pain  Outcome: Progressing     Problem: INFECTION - ADULT  Goal: Absence or prevention of progression during hospitalization  Description: INTERVENTIONS:  - Assess and monitor for signs and symptoms of infection  - Monitor lab/diagnostic results  - Monitor all insertion sites, i e  indwelling lines, tubes, and drains  - Monitor endotracheal if appropriate and nasal secretions for changes in amount and color  - Woodman appropriate cooling/warming therapies per order  - Administer medications as ordered  - Instruct and encourage patient and family to use good hand hygiene technique  - Identify and instruct in appropriate isolation precautions for identified infection/condition  Outcome: Progressing

## 2022-07-24 NOTE — ASSESSMENT & PLAN NOTE
· Seen on CT imaging  · Approximately 1 cm right lower lobe nodule suspicious for malignancy per reading  · Will consult pulmonology in the hospital, if unable to see pulmonology in the hospital will need a referral to pulmonology for further imaging/PET scan as outpatient

## 2022-07-24 NOTE — ASSESSMENT & PLAN NOTE
· Kidney stone noted on CT imaging with hydronephrosis  · Will continue IV fluids  · Pain control as needed  · No signs of infection, will hold off on antibiotics  · NPO at midnight  · With associated acute kidney injury, will follow-up BMP in the morning

## 2022-07-24 NOTE — PROGRESS NOTES
[address]  Progress Note - Urvashi Alvarenga [birth year], 75 y o  male MRN: [id number]  Unit/Bed#: -01 Encounter: [id number]  Primary Care Provider: Alexander Goss DO   Date and time admitted to hospital: 7/23/2022  2:05 PM    * Kidney stone  Assessment & Plan  · Kidney stone noted on CT imaging with hydronephrosis  · Will continue IV fluids  · Pain control as needed  · No signs of infection, will hold off on antibiotics  · NPO at midnight  · With associated acute kidney injury, will follow-up BMP in the morning    Acute kidney injury [facility])  Assessment & Plan  · Likely secondary to kidney stone  · Will continue IV fluids  · Creatinine improving with IV fluids  · Repeat BMP in the morning  · Urology following    Pulmonary nodules  Assessment & Plan  · Seen on CT imaging  · Approximately 1 cm right lower lobe nodule suspicious for malignancy per reading  · Will consult pulmonology in the hospital, if unable to see pulmonology in the hospital will need a referral to pulmonology for further imaging/PET scan as outpatient    Uncomplicated opioid dependence ([address] )  Assessment & Plan  · Will continue pain control as needed in the hospital   Resume home pain regimen on discharge    Hypertension  Assessment & Plan  · BP elevated on admission, likely secondary to pain  · Gradually improving  · Will continue amlodipine, Toprol  · Lisinopril held for now due to acute kidney injury    Smoking  Assessment & Plan  · Counseled on smoking cessation  · Nicotine patch    Abdominal aortic aneurysm (AAA) without rupture (HCC)  Assessment & Plan  · CT imaging with no signs of dissection  Aneurysm noted  · Outpatient follow-up with vascular    JAKI (obstructive sleep apnea)  Assessment & Plan  CPAP at bedtime      VTE Prophylaxis:  Heparin    Patient Centered Rounds: I have performed bedside rounds with nursing staff today      Discussions with Specialists or Other Care Team Provider: yes  Education and Discussions with Family / Patient:  Updated patient regarding plan of care    Current Length of Stay: 1 day(s)    Current Patient Status: Inpatient   Certification Statement: The patient will continue to require additional inpatient hospital stay due to Kidney stone    Discharge Plan:  Pending hospital course    Code Status: Level 1 - Full Code    Subjective:   No complaints today  Patient states he is feeling slightly better  Pain controlled  Tolerating diet    Objective:     Vitals:   No data recorded  HR:  [62-87] 74  Resp:  [16-18] 18  BP: (144-178)/() 150/79  SpO2:  [92 %-99 %] 92 %  Body mass index is 30 3 kg/m²  Input and Output Summary (last 24 hours): Intake/Output Summary (Last 24 hours) at 7/24/2022 1444  Last data filed at 7/24/2022 1406  Gross per 24 hour   Intake 1450 ml   Output 375 ml   Net 1075 ml       Physical Exam:   Physical Exam  Constitutional:       General: He is not in acute distress  HENT:      Head: Normocephalic and atraumatic  Nose: Nose normal       Mouth/Throat:      Mouth: Mucous membranes are moist    Eyes:      Extraocular Movements: Extraocular movements intact  Conjunctiva/sclera: Conjunctivae normal    Cardiovascular:      Rate and Rhythm: Normal rate and regular rhythm  Pulmonary:      Effort: Pulmonary effort is normal  No respiratory distress  Abdominal:      Palpations: Abdomen is soft  Tenderness: There is no abdominal tenderness  Musculoskeletal:         General: Normal range of motion  Cervical back: Normal range of motion and neck supple  Skin:     General: Skin is warm and dry  Neurological:      General: No focal deficit present  Mental Status: He is alert  Mental status is at baseline  Cranial Nerves: No cranial nerve deficit     Psychiatric:         Mood and Affect: Mood normal          Behavior: Behavior normal          Additional Data:     Labs:    Results from last 7 days   Lab Units 07/24/22  7753 WBC Thousand/uL 9 54   HEMOGLOBIN g/dL 14 4   HEMATOCRIT % 45 2   PLATELETS Thousands/uL 169   NEUTROS PCT % 62   LYMPHS PCT % 29   MONOS PCT % 8   EOS PCT % 0     Results from last 7 days   Lab Units 07/24/22  0420 07/23/22  1429   SODIUM mmol/L 137 134*   POTASSIUM mmol/L 3 6 3 8   CHLORIDE mmol/L 102 100   CO2 mmol/L 28 27   BUN mg/dL 18 20   CREATININE mg/dL 1 59* 1 84*   CALCIUM mg/dL 8 6 9 2   ALK PHOS U/L  --  74   ALT U/L  --  12   AST U/L  --  15                   * I Have Reviewed All Lab Data Listed Above  * Additional Pertinent Lab Tests Reviewed:  [address] Admission  Reviewed    Imaging:  Imaging Reports Reviewed Today Include:  No new imaging    Recent Cultures (last 7 days):           Last 24 Hours Medication List:   Current Facility-Administered Medications   Medication Dose Route Frequency Provider Last Rate    acetaminophen  650 mg Oral Q6H PRN Ruddy Parish MD      amLODIPine  10 mg Oral Daily Ruddy Parish MD      gabapentin  300 mg Oral TID Ruddy Parish MD      heparin (porcine)  5,000 Units Subcutaneous [facility] Ruddy Parish MD      HYDROmorphone  0 5 mg Intravenous Q4H PRN Ruddy Parish MD      metoprolol succinate  100 mg Oral Daily Ruddy Parish MD      morphine injection  2 mg Intravenous Q4H PRN Ruddy Parish MD      multi-electrolyte  150 mL/hr Intravenous Continuous Ruddy Parish  mL/hr (07/24/22 1406)    nicotine  1 patch Transdermal Daily Ruddy Parish MD      nicotine  21 mg Transdermal Once ELOISA Santiago      nitroglycerin  0 4 mg Sublingual Q5 Min PRN Ruddy Parish MD      ondansetron  4 mg Intravenous Q6H PRN Ruddy Parish MD      oxyCODONE  5 mg Oral Q6H PRN Ruddy Parish MD      tamsulosin  0 4 mg Oral Daily With Mike Longoria MD      topiramate  25 mg Oral Daily Ruddy Parish MD  traZODone  100 mg Oral HS Devora Garibay MD          Today, Patient Was Seen By: Devora Garibay MD    ** Please Note: Dictation voice to text software may have been used in the creation of this document   **

## 2022-07-24 NOTE — NURSING NOTE
Patient voiding without difficulty, pain level 2 to R abdomen and flank area  No complaints of nausea  Tolerating diet and fluids  OOB ambulating in room

## 2022-07-25 ENCOUNTER — TELEPHONE (OUTPATIENT)
Dept: PULMONOLOGY | Facility: CLINIC | Age: 67
End: 2022-07-25

## 2022-07-25 ENCOUNTER — APPOINTMENT (INPATIENT)
Dept: RADIOLOGY | Facility: HOSPITAL | Age: 67
DRG: 683 | End: 2022-07-25
Payer: MEDICARE

## 2022-07-25 ENCOUNTER — TRANSITIONAL CARE MANAGEMENT (OUTPATIENT)
Dept: FAMILY MEDICINE CLINIC | Facility: CLINIC | Age: 67
End: 2022-07-25

## 2022-07-25 VITALS
WEIGHT: 199.3 LBS | HEART RATE: 64 BPM | DIASTOLIC BLOOD PRESSURE: 83 MMHG | HEIGHT: 68 IN | BODY MASS INDEX: 30.2 KG/M2 | RESPIRATION RATE: 16 BRPM | SYSTOLIC BLOOD PRESSURE: 146 MMHG | OXYGEN SATURATION: 90 % | TEMPERATURE: 98.4 F

## 2022-07-25 DIAGNOSIS — R91.8 PULMONARY NODULES: Primary | ICD-10-CM

## 2022-07-25 LAB
ANION GAP SERPL CALCULATED.3IONS-SCNC: 7 MMOL/L (ref 4–13)
BUN SERPL-MCNC: 15 MG/DL (ref 5–25)
CALCIUM SERPL-MCNC: 8.9 MG/DL (ref 8.4–10.2)
CHLORIDE SERPL-SCNC: 101 MMOL/L (ref 96–108)
CO2 SERPL-SCNC: 28 MMOL/L (ref 21–32)
CREAT SERPL-MCNC: 1.23 MG/DL (ref 0.6–1.3)
ERYTHROCYTE [DISTWIDTH] IN BLOOD BY AUTOMATED COUNT: 12.5 % (ref 11.6–15.1)
GFR SERPL CREATININE-BSD FRML MDRD: 59 ML/MIN/1.73SQ M
GLUCOSE SERPL-MCNC: 108 MG/DL (ref 65–140)
HCT VFR BLD AUTO: 46.8 % (ref 36.5–49.3)
HGB BLD-MCNC: 15 G/DL (ref 12–17)
MCH RBC QN AUTO: 30.2 PG (ref 26.8–34.3)
MCHC RBC AUTO-ENTMCNC: 32.1 G/DL (ref 31.4–37.4)
MCV RBC AUTO: 94 FL (ref 82–98)
PLATELET # BLD AUTO: 183 THOUSANDS/UL (ref 149–390)
PMV BLD AUTO: 10.4 FL (ref 8.9–12.7)
POTASSIUM SERPL-SCNC: 4.2 MMOL/L (ref 3.5–5.3)
RBC # BLD AUTO: 4.97 MILLION/UL (ref 3.88–5.62)
SODIUM SERPL-SCNC: 136 MMOL/L (ref 135–147)
WBC # BLD AUTO: 8.68 THOUSAND/UL (ref 4.31–10.16)

## 2022-07-25 PROCEDURE — 80048 BASIC METABOLIC PNL TOTAL CA: CPT | Performed by: UROLOGY

## 2022-07-25 PROCEDURE — 99239 HOSP IP/OBS DSCHRG MGMT >30: CPT | Performed by: HOSPITALIST

## 2022-07-25 PROCEDURE — 85027 COMPLETE CBC AUTOMATED: CPT | Performed by: UROLOGY

## 2022-07-25 PROCEDURE — 74018 RADEX ABDOMEN 1 VIEW: CPT

## 2022-07-25 PROCEDURE — 99223 1ST HOSP IP/OBS HIGH 75: CPT | Performed by: INTERNAL MEDICINE

## 2022-07-25 RX ORDER — NICOTINE 21 MG/24HR
1 PATCH, TRANSDERMAL 24 HOURS TRANSDERMAL DAILY
Qty: 28 PATCH | Refills: 0 | Status: SHIPPED | OUTPATIENT
Start: 2022-07-26 | End: 2022-08-01

## 2022-07-25 RX ORDER — TAMSULOSIN HYDROCHLORIDE 0.4 MG/1
0.8 CAPSULE ORAL
Qty: 60 CAPSULE | Refills: 0
Start: 2022-07-25

## 2022-07-25 RX ADMIN — NICOTINE 1 PATCH: 14 PATCH, EXTENDED RELEASE TRANSDERMAL at 10:12

## 2022-07-25 RX ADMIN — AMLODIPINE BESYLATE 10 MG: 10 TABLET ORAL at 10:11

## 2022-07-25 RX ADMIN — GABAPENTIN 300 MG: 600 TABLET, FILM COATED ORAL at 10:11

## 2022-07-25 RX ADMIN — SODIUM CHLORIDE, SODIUM GLUCONATE, SODIUM ACETATE, POTASSIUM CHLORIDE, MAGNESIUM CHLORIDE, SODIUM PHOSPHATE, DIBASIC, AND POTASSIUM PHOSPHATE 150 ML/HR: .53; .5; .37; .037; .03; .012; .00082 INJECTION, SOLUTION INTRAVENOUS at 04:57

## 2022-07-25 RX ADMIN — METOPROLOL SUCCINATE 100 MG: 50 TABLET, EXTENDED RELEASE ORAL at 10:11

## 2022-07-25 RX ADMIN — TOPIRAMATE 25 MG: 25 TABLET, FILM COATED ORAL at 10:11

## 2022-07-25 NOTE — CONSULTS
Pulmonary Medicine-Consultation  Serafin Rawls 75 y o  male MRN: [id number]  Unit/Bed#: -01 Encounter: [id number]      Assessment/Plan:  1  Pulmonary nodules - 1 1 x 0 9 RLL and 5 mm at RUL there is GGO, new since 2019  Also noted mild mediastinal lymphadenopathy at station 4R and station 7  Suspect malignancy given the significant tobacco abuse history/about 50 pack year  2  Possible COPD/chronic bronchitis- no PFT on file  3  Active tobacco abuse-currently smokes 1 pack per day  Smoked for 50 years, 1 pack per day for the most part sometimes up to 2 packs per day  4  JAKI-diagnosed more than 5 years ago, not interested in retesting  Significant intolerance to the mask    Plan:   · Discussed about potential etiologies of the pulmonary nodules and needs to exclude malignancy  · Ordered PET-CT to be done as an outpatient then a follow-up with Pulmonary   · Highly interested in quitting smoking, please provide nicotine patches on discharge   · Will order PFT as an outpatient    Physician Requesting Consult: Tim Collazo MD    Reason for Consult / Principal Problem:  Pulmonary nodule    HPI:   75 y o  male with a h/o CAD, COPD, HTN, tobacco abuse, CAD, JAKI not on CPAP, osteoarthritis, AAA, peripheral neuropathy  Hospitalized 7/23 for acute kidney injury/right flank pain and was found to have hydronephrosis/obstructive uropathy  Seen by Urology, recommended hydration no intervention performed  Chest imaging showed 1 1 x 0 9 RLL nodule appear to be new from prior imaging in 2019 when he had Last CT chest for lung cancer screening  Also noted RUL 5 mm GGO that is also new  Multiple mediastinal lymphadenopathy 2 x 1 2 cm subcarinal     On my assessment, patient is resting in bed comfortably  Reports improvement of the flank pain  States that actively smokes about 1 pack per day, total of about 50-60 pack year  No prior knowledge of the lung nodules    States that he underwent a PFT in the past however has never been on inhalers  Only reports dyspnea/mild on moderate to severe exertion lasting for the past 2 years  No significant cough or chest congestion  No recent weight loss, hemoptysis, fever, chills or recurrent pneumonia  Lives in an old house, no history of exposure to asbestos  Works in [address] currently  Dx with JAKI, required nocturnal oxygen more than 5 ys ago  Couldn't tolerate the CPAP  Inpatient consult to Pulmonology  Consult performed by: Antonia Vasquez MD  Consult ordered by: Janeth Brock MD          Review of Systems  As per hpi, all other systems reviewed and were negative      Studies:    Imaging Studies: I have personally reviewed pertinent films in PACS    EKG, Pathology, and Other Studies: I have personally reviewed pertinent films in PACS    Pulmonary Results (PFTs, PSG): None in file    Historical Information   Past Medical History:   Diagnosis Date    Abdominal aortic aneurysm without rupture ([facility] )     Abdominal Aortic Duplex 02/21/2017    Ectatic infrarenal abdominal aorta   CAD (coronary artery disease)     COPD (chronic obstructive pulmonary disease) (HCC)     Extremity pain     History of echocardiogram 03/18/2014    EF 55%, mild MR and AI  Mild concentric LVH   History of stress test 03/06/2017    Normal     Hypertension     Joint pain     Low back pain     Migraine     Neck pain     Obstructive sleep apnea     cannot tolerate CPAP    Osteoarthritis     Peripheral neuropathy     Reflex sympathetic dystrophy      Past Surgical History:   Procedure Laterality Date    CARDIAC CATHETERIZATION  02/13/2012    EF 70%, widely patent renal arteries, significant single-vessel CAD-medical therapy   CARDIAC CATHETERIZATION  04/11/2013    EF 65%, 50% mid LAD, 20% prox CFX, 90% diffuse RCA, 99% mid RCA  Medical management      CHOLECYSTECTOMY      EPIDURAL BLOCK INJECTION Bilateral 8/15/2019 Procedure: BLOCK / INJECTION EPIDURAL STEROID CERVICAL;  Surgeon: Francoise Rincon MD;  Location: [facility];  Service: Pain Management     FL GUIDED NEEDLE PLAC BX/ASP/INJ  8/15/2019    ORTHOPEDIC SURGERY      TRIGGER POINT INJECTION       Social History   Social History     Substance and Sexual Activity   Alcohol Use Not Currently     Social History     Substance and Sexual Activity   Drug Use No     Social History     Tobacco Use   Smoking Status Current Every Day Smoker    Packs/day: 1 50   Smokeless Tobacco Never Used     E-Cigarette/Vaping    E-Cigarette Use Never User      E-Cigarette/Vaping Substances    Nicotine Yes     THC No     CBD No     Flavoring No     Other No     Unknown No          Family History:   Family History   Adopted: Yes   Problem Relation Age of Onset    No Known Problems Family     No Known Problems Mother     No Known Problems Father        Meds/Allergies   all current active meds have been reviewed    No Known Allergies    Objective   Vitals: Blood pressure 146/83, pulse 64, temperature 98 4 °F (36 9 °C), resp  rate 16, height 5' 8" (1 727 m), weight 90 4 kg (199 lb 4 7 oz), SpO2 90 %  ,Body mass index is 30 3 kg/m²  Intake/Output Summary (Last 24 hours) at 7/25/2022 1108  Last data filed at 7/25/2022 0456  Gross per 24 hour   Intake 1130 ml   Output 3250 ml   Net -2120 ml     Invasive Devices  Report    Peripheral Intravenous Line  Duration           Peripheral IV 07/24/22 Right;Lateral Wrist <1 day                Physical Exam  Body mass index is 30 3 kg/m²     Gen: not in acute distress, pleasant  Neck/Eyes: supple, no adenopathy, PERRL  Ear: normal appearance, no significant hearing impairment  Nose:  normal nasal mucosa, no drainage  Mouth:  unremarkable/normal appearance of lips, teeth and gums  Oropharynx: mucosa is moist, no focal lesions or erythema  Salivary glands: soft nontender  Chest: normal respiratory efforts, diminished but clear breath sounds bilaterally  CV: RRR, no murmurs appreciated, no edema  Abdomen: soft, non tender  Extremities:  No observed deformity   Skin: unremarkable  Neuro: AAO X3, no focal motor deficit       Lab Results: I have personally reviewed pertinent lab results  Counseling/Coordination of Care: Total floor / unit time spent today 3 minutes  Greater than 50% of total time was spent with the patient and / or family counseling and / or coordination of care  A description of the counseling / coordination of care: Smoking cessation    Portions of the record may have been created with voice recognition software  Occasional wrong word or "sound a like" substitutions may have occurred due to the inherent limitations of voice recognition software  Read the chart carefully and recognize, using context, where substitutions have occurred

## 2022-07-25 NOTE — TELEPHONE ENCOUNTER
PET Scan scheduled for 8/5 @ [address] and hosp follow up scheduled 8/15 @ 120 with Dr Delon Quezada in [facility]

## 2022-07-25 NOTE — ASSESSMENT & PLAN NOTE
· Seen on CT imaging  · Approximately 1 cm right lower lobe nodule suspicious for malignancy per reading  · Status post a pulmonary evaluation  · Pulmonary have set up an outpatient follow-up PET scan to occur within the near future  · Patient will additionally follow up with Pulmonary  · Ambulatory referral provided

## 2022-07-25 NOTE — ASSESSMENT & PLAN NOTE
· Resolved with IV fluids  · Secondary to a component of pre renal as well as post renal causes  · Repeat BMP in 1 week in the outpatient setting

## 2022-07-25 NOTE — PROGRESS NOTES
Progress Note - Christin Murrieta 75 y o  male MRN: [id number]    Unit/Bed#: -01 Encounter: [id number]      Assessment:  Creatinine has returned toward normal and he has had no further renal colic  KUB x-ray this morning shows probably 2 less than 2 mm stones adjacent at the right UVJ measuring 3 4 mm in total length  The orientation of the stones has changed from yesterday and are now both lined up right at the UVJ  Plan:  The above findings with options, procedures, benefits and risks were discussed  The patient declines intervention at this time wishes to be discharged home with attempt at spontaneous stone passage  Instructions were detailed including drinking plenty of fluids and straining all urine  He understands that the stones may still not pass and may require intervention in the future  He was instructed to call me immediately if he has refractory pain, fever or vomiting  I have provided him with prescriptions to increase his tamsulosin to 0 8 mg daily, obtain a KUB x-ray 1 week from today and obtain a follow-up BMP blood test 1 week from today  I will then see him back in the office next week for follow-up  Subjective:   Feels better with no further pain  Voiding without difficulty  Objective:     Vitals: Blood pressure 146/83, pulse 64, temperature 98 4 °F (36 9 °C), resp  rate 16, height 5' 8" (1 727 m), weight 90 4 kg (199 lb 4 7 oz), SpO2 90 %  ,Body mass index is 30 3 kg/m²  Intake/Output Summary (Last 24 hours) at 7/25/2022 1138  Last data filed at 7/25/2022 0456  Gross per 24 hour   Intake 1130 ml   Output 3250 ml   Net -2120 ml       Physical Exam:  No abdominal or flank tenderness     Invasive Devices  Report    Peripheral Intravenous Line  Duration           Peripheral IV 07/24/22 Right;Lateral Wrist <1 day                Lab, Imaging and other studies: I have personally reviewed pertinent reports      VTE Pharmacologic Prophylaxis: Sequential compression device Teena Lang   VTE Mechanical Prophylaxis: sequential compression device

## 2022-07-25 NOTE — CASE MANAGEMENT
Case Management Assessment & Discharge Planning Note    Patient name Tish Tucker  Location [address] [id number]/-[id number] MRN [id number]  : 1955 Date 2022       Current Admission Date: 2022  Current Admission Diagnosis:Kidney stone   Patient Active Problem List    Diagnosis Date Noted    Acute kidney injury ([facility] [address] ) 2022    Pulmonary nodules 2022    Kidney stone 2022    Cortical age-related cataract of both eyes 2022    Urinary frequency 2022    Sacroiliitis ([facility] [address] ) 2022    Pre-diabetes 10/07/2021    Erectile dysfunction 10/07/2021    Insomnia 10/07/2021    Medicare annual wellness visit, subsequent 2021    Hyperlipidemia     Uncomplicated opioid dependence ([facility] [address] ) 10/19/2020    Encounter for long-term opiate analgesic use 10/19/2020    Neck pain 10/19/2020    Chronic pain syndrome 2020    Class 1 obesity due to excess calories with serious comorbidity and body mass index (BMI) of 31 0 to 31 9 in adult 2020    Smoking 2020    Chronic pain of both knees 2019    Negative depression screening 2019    Tremor, essential 2019    Cervical spondylosis without myelopathy 2019    Lumbar spondylosis 2019    Lumbar degenerative disc disease 2019    Myofascial pain syndrome 2019    Chronic low back pain without sciatica 2019    Cervical radiculopathy 2019    JAKI (obstructive sleep apnea) 2019    Chronic bronchitis ([facility] [address] [facility] ) 2019    Abdominal aortic aneurysm (AAA) without rupture ([facility] [address] ) 2019    Hypertension 2016      LOS (days): 2  Geometric Mean LOS (GMLOS) (days):   Days to GMLOS:     OBJECTIVE:    Risk of Unplanned Readmission Score: 11 82         Current admission status: Inpatient  Referral Reason: Other (Discharge planning)    Preferred Pharmacy:   [address] [place] [address] #[id number] [facility] - [address] [facility] [address] [place] [address]  Phone: [phone] Fax: [phone]    [address] [facility] [address] [place] [address] [address] [place] [address]  Phone: [phone] Fax: [phone]    Primary Care Provider: Mandi Mccracken DO    Primary Insurance: MEDICARE  Secondary Insurance: Yolanda Fair    ASSESSMENT:  Active Health Care Proxies    There are no active Health Care Proxies on file  Advance Directives  Does patient have a [address]?: No  Was patient offered paperwork?: Yes  Does patient currently have a Health Care decision maker?: No  Does patient have Advance Directives?: No  Was patient offered paperwork?: Yes  Primary Contact: Ted Perdue - CALE         Readmission Root Cause  30 Day Readmission: No    Patient Information  Admitted from[de-identified] Home  Mental Status: Alert  During Assessment patient was accompanied by: Not accompanied during assessment  Assessment information provided by[de-identified] Patient  Primary Caregiver: Self  Support Systems: Spouse/significant other, Son  [place] of Residence: [address] do you live in?: [address] entry access options   Select all that apply : Stairs  Number of steps to enter home : 2  Do the steps have railings?: Yes  Type of Current Residence: 2 story home  Upon entering residence, is there a bedroom on the main floor (no further steps)?: No  A bedroom is located on the following floor levels of residence (select all that apply):: 2nd Floor  Upon entering residence, is there a bathroom on the main floor (no further steps)?: No  Indicate which floors of current residence have a bathroom (select all the apply):: 2nd Floor  Number of steps to 2nd floor from main floor: One Flight  In the last 12 months, was there a time when you were not able to pay the mortgage or rent on time?: No  In the last 12 months, how many places have you lived?: 1  In the last 12 months, was there a time when you did not have a steady place to sleep or slept in a shelter (including now)?: No  Homeless/housing insecurity resource given?: N/A  Living Arrangements: Lives w/ Spouse/significant other, Lives w/ Son  Is patient a ?: No    Activities of Daily Living Prior to Admission  Functional Status: Independent  Completes ADLs independently?: Yes  Ambulates independently?: Yes  Does patient use assisted devices?: No  Does patient currently own DME?: No  Does patient have a history of Outpatient Therapy (PT/OT)?: No  Does the patient have a history of Short-Term Rehab?: No  Does patient have a history of HHC?: No  Does patient currently have [address]?: No         Patient Information Continued  Income Source: Pension/senior care  Does patient have prescription coverage?: Yes  Within the past 12 months, you worried that your food would run out before you got the money to buy more : Never true  Within the past 12 months, the food you bought just didn't last and you didn't have money to get more : Never true  Food insecurity resource given?: N/A  Does patient receive dialysis treatments?: No  Does patient have a history of substance abuse?: No  Does patient have a history of Mental Health Diagnosis?: No         Means of Transportation  Means of Transport to Appts[de-identified] Drives Self  In the past 12 months, has lack of transportation kept you from medical appointments or from getting medications?: No  In the past 12 months, has lack of transportation kept you from meetings, work, or from getting things needed for daily living?: No  Was application for public transport provided?: N/A        DISCHARGE DETAILS:    Discharge planning discussed with[de-identified] Patient  Freedom of Choice: Yes  Comments - Freedom of Choice: Pt denies any current discharge needs  Pt drive here and will drive self home  Pt denies any current CM discharge needs                       [address]ed [address]         Is the patient interested in [address] at discharge?: No    DME Referral Provided  Referral made for DME?: No         Would you like to participate in our [address] service program?  : No - Declined    Treatment Team Recommendation: Home  Discharge Destination Plan[de-identified] Home  Transport at Discharge : Self

## 2022-07-25 NOTE — DISCHARGE INSTR - AVS FIRST PAGE
Dear Esteban Chawla,     It was our pleasure to care for you here at [facility] [employer]  It is our hope that we were always able to exceed the expected standards for your care during your stay  You were hospitalized due to right-sided kidney stones  You were cared for on the medical/surgical floor by Haley Medina MD with the [facility] who covers for your primary care physician (PCP), [place], DO, while you were hospitalized  You were additionally seen by the [facility] associates, and by Dr Brad Solorzano of Urology  If you have any questions or concerns related to this hospitalization, you may contact us at [phone]  For follow up as well as any medication refills, we recommend that you follow up with your primary care physician  A registered nurse will reach out to you by phone within a few days after your discharge to answer any additional questions that you may have after going home    However, at this time we provide for you here, the most important instructions / recommendations at discharge:     Notable Medication Adjustments -   Flomax dosing has been increased as instructed by Dr Ana Maria So  Nicotine patches have been ordered  Okay to resume all other pre-admission medications at the preadmission doses  Testing Required after Discharge -   Repeat KUB x-ray in 1 week as instructed by Dr Ana Maria So  Repeat blood work in 1 week as prescribed by Dr Ana Maria So  PET scan testing that will be set up by Dr Alexandria Astorga follow up information -   Please follow-up with the providers as outlined in this discharge package  Other Instructions -   Please maintain a healthy diet  Please keep yourself well hydrated  Please review this entire after visit summary as additional general instructions including medication list, appointments, activity, diet, any pertinent wound care, and other additional recommendations from your care team that may be provided for you        Sincerely,     Feliz Aguayo MD

## 2022-07-25 NOTE — ASSESSMENT & PLAN NOTE
·  Kidney stone noted on CT imaging with hydronephrosis  · Status post treatment with IV fluids  · No antibiotics given on this admission  · Status post a urology evaluation - patient was given the option of a stent, patient declined, patient prefers for the stone to pass on its own, patient cleared for discharge by Urology  · Urology will be following up in the outpatient setting in the near future with repeat BMP testing, and a KUB within 1 week  · Flomax dosing has been increased  · Prescriptions for blood work, medications, and imaging was provided directly to the patient by Dr Ana Maria So  · [facility], outpatient follow-up with PCP and Pulmonary also

## 2022-07-25 NOTE — ASSESSMENT & PLAN NOTE
· CT imaging with no signs of dissection    Aneurysm noted  · Outpatient follow-up with vascular  · Patient notified of this finding

## 2022-07-25 NOTE — PLAN OF CARE
Problem: PAIN - ADULT  Goal: Verbalizes/displays adequate comfort level or baseline comfort level  Description: Interventions:  - Encourage patient to monitor pain and request assistance  - Assess pain using appropriate pain scale  - Administer analgesics based on type and severity of pain and evaluate response  - Implement non-pharmacological measures as appropriate and evaluate response  - Consider cultural and social influences on pain and pain management  - Notify physician/advanced practitioner if interventions unsuccessful or patient reports new pain  Outcome: Progressing     Problem: INFECTION - ADULT  Goal: Absence or prevention of progression during hospitalization  Description: INTERVENTIONS:  - Assess and monitor for signs and symptoms of infection  - Monitor lab/diagnostic results  - Monitor all insertion sites, i e  indwelling lines, tubes, and drains  - Monitor endotracheal if appropriate and nasal secretions for changes in amount and color  - [place] appropriate cooling/warming therapies per order  - Administer medications as ordered  - Instruct and encourage patient and family to use good hand hygiene technique  - Identify and instruct in appropriate isolation precautions for identified infection/condition  Outcome: Progressing  Goal: Absence of fever/infection during neutropenic period  Description: INTERVENTIONS:  - Monitor WBC    Outcome: Progressing     Problem: DISCHARGE PLANNING  Goal: Discharge to home or other facility with appropriate resources  Description: INTERVENTIONS:  - Identify barriers to discharge w/patient and caregiver  - Arrange for needed discharge resources and transportation as appropriate  - Identify discharge learning needs (meds, wound care, etc )  - Arrange for interpretive services to assist at discharge as needed  - Refer to Case Management Department for coordinating discharge planning if the patient needs post-hospital services based on physician/advanced practitioner order or complex needs related to functional status, cognitive ability, or social support system  Outcome: Progressing     Problem: Knowledge Deficit  Goal: Patient/family/caregiver demonstrates understanding of disease process, treatment plan, medications, and discharge instructions  Description: Complete learning assessment and assess knowledge base    Interventions:  - Provide teaching at level of understanding  - Provide teaching via preferred learning methods  Outcome: Progressing

## 2022-07-25 NOTE — PLAN OF CARE
Problem: Potential for Falls  Goal: Patient will remain free of falls  Description: INTERVENTIONS:  - Educate patient/family on patient safety including physical limitations  - Instruct patient to call for assistance with activity   - Consult OT/PT to assist with strengthening/mobility   - Keep Call bell within reach  - Keep bed low and locked with side rails adjusted as appropriate  - Keep care items and personal belongings within reach  - Initiate and maintain comfort rounds  - Make Fall Risk Sign visible to staff  - Offer Toileting every 2 Hours, in advance of need  - Initiate/Maintain bed alarm  - Obtain necessary fall risk management equipment:  - Apply yellow socks and bracelet for high fall risk patients  - Consider moving patient to room near nurses station  7/25/2022 1249 by Ariella Rodrigues LPN  Outcome: Adequate for Discharge  7/25/2022 1237 by Ariella Rodrigues LPN  Outcome: Progressing  7/25/2022 1237 by Ariella Rodrigues LPN  Outcome: Progressing     Problem: PAIN - ADULT  Goal: Verbalizes/displays adequate comfort level or baseline comfort level  Description: Interventions:  - Encourage patient to monitor pain and request assistance  - Assess pain using appropriate pain scale  - Administer analgesics based on type and severity of pain and evaluate response  - Implement non-pharmacological measures as appropriate and evaluate response  - Consider cultural and social influences on pain and pain management  - Notify physician/advanced practitioner if interventions unsuccessful or patient reports new pain  7/25/2022 1249 by Ariella Rodrigues LPN  Outcome: Adequate for Discharge  7/25/2022 1237 by Ariella Rodrigues LPN  Outcome: Progressing  7/25/2022 1237 by Ariella Rodrigues LPN  Outcome: Progressing     Problem: INFECTION - ADULT  Goal: Absence or prevention of progression during hospitalization  Description: INTERVENTIONS:  - Assess and monitor for signs and symptoms of infection  - Monitor lab/diagnostic results  - Monitor all insertion sites, i e  indwelling lines, tubes, and drains  - Monitor endotracheal if appropriate and nasal secretions for changes in amount and color  - [place] appropriate cooling/warming therapies per order  - Administer medications as ordered  - Instruct and encourage patient and family to use good hand hygiene technique  - Identify and instruct in appropriate isolation precautions for identified infection/condition  7/25/2022 1249 by Kathy Pérez LPN  Outcome: Adequate for Discharge  7/25/2022 1237 by Kathy Pérez LPN  Outcome: Progressing  7/25/2022 1237 by Kathy Pérez LPN  Outcome: Progressing  Goal: Absence of fever/infection during neutropenic period  Description: INTERVENTIONS:  - Monitor WBC    7/25/2022 1249 by Kathy Pérez LPN  Outcome: Adequate for Discharge  7/25/2022 1237 by Kathy Pérez LPN  Outcome: Progressing  7/25/2022 1237 by Kathy Pérez LPN  Outcome: Progressing     Problem: SAFETY ADULT  Goal: Maintain or return to baseline ADL function  Description: INTERVENTIONS:  -  Assess patient's ability to carry out ADLs; assess patient's baseline for ADL function and identify physical deficits which impact ability to perform ADLs (bathing, care of mouth/teeth, toileting, grooming, dressing, etc )  - Assess/evaluate cause of self-care deficits   - Assess range of motion  - Assess patient's mobility; develop plan if impaired  - Assess patient's need for assistive devices and provide as appropriate  - Encourage maximum independence but intervene and supervise when necessary  - Involve family in performance of ADLs  - Assess for home care needs following discharge   - Consider OT consult to assist with ADL evaluation and planning for discharge  - Provide patient education as appropriate  7/25/2022 1249 by Kathy Pérez LPN  Outcome: Adequate for Discharge  7/25/2022 1237 by Kathy Pérez LPN  Outcome: Progressing  7/25/2022 1237 by Kathy Pérez LPN  Outcome: Progressing  Goal: Maintains/Returns to pre admission functional level  Description: INTERVENTIONS:  - Perform BMAT or MOVE assessment daily    - Set and communicate daily mobility goal to care team and patient/family/caregiver  - Collaborate with rehabilitation services on mobility goals if consulted  - Perform Range of Motion 3 times a day  - Reposition patient every 3 hours  - Dangle patient 3 times a day  - Stand patient 3 times a day  - Ambulate patient 3 times a day  - Out of bed to chair 2 times a day   - Out of bed for meals 2 times a day  - Out of bed for toileting  - Record patient progress and toleration of activity level   7/25/2022 1249 by Deena Birch LPN  Outcome: Adequate for Discharge  7/25/2022 1237 by Deena Birch LPN  Outcome: Progressing  7/25/2022 1237 by Deena Birch LPN  Outcome: Progressing     Problem: DISCHARGE PLANNING  Goal: Discharge to home or other facility with appropriate resources  Description: INTERVENTIONS:  - Identify barriers to discharge w/patient and caregiver  - Arrange for needed discharge resources and transportation as appropriate  - Identify discharge learning needs (meds, wound care, etc )  - Refer to Case Management Department for coordinating discharge planning if the patient needs post-hospital services based on physician/advanced practitioner order or complex needs related to functional status, cognitive ability, or social support system  7/25/2022 1249 by Deena Birch LPN  Outcome: Adequate for Discharge  7/25/2022 1237 by Deena Birch LPN  Outcome: Progressing  7/25/2022 1237 by Deena Birch LPN  Outcome: Progressing     Problem: Knowledge Deficit  Goal: Patient/family/caregiver demonstrates understanding of disease process, treatment plan, medications, and discharge instructions  Description: Complete learning assessment and assess knowledge base    Interventions:  - Provide teaching at level of understanding  - Provide teaching via preferred learning methods  7/25/2022 1249 by [place] Foot, LPN  Outcome: Adequate for Discharge  7/25/2022 1237 by [place] Foot, LPN  Outcome: Progressing  7/25/2022 1237 by [place] Foot, LPN  Outcome: Progressing

## 2022-07-25 NOTE — PLAN OF CARE
Problem: PAIN - ADULT  Goal: Verbalizes/displays adequate comfort level or baseline comfort level  Description: Interventions:  - Encourage patient to monitor pain and request assistance  - Assess pain using appropriate pain scale  - Administer analgesics based on type and severity of pain and evaluate response  - Implement non-pharmacological measures as appropriate and evaluate response  - Consider cultural and social influences on pain and pain management  - Notify physician/advanced practitioner if interventions unsuccessful or patient reports new pain  7/25/2022 1237 by Amadou Paulino LPN  Outcome: Progressing  7/25/2022 1237 by Aamdou Paulino LPN  Outcome: Progressing

## 2022-07-25 NOTE — DISCHARGE SUMMARY
[address]  Discharge- Owen Alvarado 1955, 75 y o  male MRN: [id number]  Unit/Bed#: -01 Encounter: [id number]  Primary Care Provider: Chevy Cruz DO   Date and time admitted to hospital: 7/23/2022  2:05 PM    * Kidney stone  Assessment & Plan  ·  Kidney stone noted on CT imaging with hydronephrosis  · Status post treatment with IV fluids  · No antibiotics given on this admission  · Status post a urology evaluation - patient was given the option of a stent, patient declined, patient prefers for the stone to pass on its own, patient cleared for discharge by Urology  · Urology will be following up in the outpatient setting in the near future with repeat BMP testing, and a KUB within 1 week  · Flomax dosing has been increased  · Prescriptions for blood work, medications, and imaging was provided directly to the patient by Dr Anival Quinones  · DC home, outpatient follow-up with PCP and Pulmonary also    Acute kidney injury [facility])  Assessment & Plan  · Resolved with IV fluids  · Secondary to a component of pre renal as well as post renal causes  · Repeat BMP in 1 week in the outpatient setting    Pulmonary nodules  Assessment & Plan  · Seen on CT imaging  · Approximately 1 cm right lower lobe nodule suspicious for malignancy per reading  · Status post a pulmonary evaluation  · Pulmonary have set up an outpatient follow-up PET scan to occur within the near future  · Patient will additionally follow up with Pulmonary  · Ambulatory referral provided    Hypertension  Assessment & Plan  · Initial periods of accelerated hypertension have resolved  · Okay for DC home on pre-admit meds at pre-admit doses    Abdominal aortic aneurysm (AAA) without rupture ([address] )  Assessment & Plan  · CT imaging with no signs of dissection    Aneurysm noted  · Outpatient follow-up with vascular  · Patient notified of this finding    JAKI (obstructive sleep apnea)  Assessment & Plan  · Continue CPAP post discharge    Smoking  Assessment & Plan  · Cessation counseling provided  · Nicotine patches prescribed at time of discharge    Uncomplicated opioid dependence [facility])  Assessment & Plan  · DC home on pre-admit meds at pre-admit doses        Discharging Physician / Practitioner: Eloisa Purcell MD  PCP: Mandi Mccracken DO  Admission Date:   Admission Orders (From admission, onward)     Ordered        07/23/22 1825  INPATIENT ADMISSION  Once                      Discharge Date: 07/25/22    Medical Problems             Resolved Problems  Date Reviewed: 7/11/2022   None                 Consultations During Hospital Stay:  · Urology  · Pulmonary    Procedures Performed:   · None    Significant Findings / Test Results:   · CTA dissection protocol chest, abdomen, pelvis, with and without contrast:-1   No evidence of aortic dissection or intramural hematoma   There is a fusiform infrarenal abdominal aortic aneurysm as described above  2   Right lower lobe pulmonary nodule suspicious for primary malignancy   A few additional subcentimeter nodules are present, including a right upper lobe groundglass nodule which is new   This could be further evaluated with PET/CT  3   Obstructing right distal ureteral calculus, and few additional calculi in the right ureterovesical junction with delayed nephrogram indicating a physiologically significant obstruction   There is marked hydronephrosis and moderate hydroureter  4   Mild diffuse circumferential thickening of the esophagus   This appears similar to prior CT scans, and may be related to reflux  · X-ray abdomen KUB-1   Persistent right-sided nephrogram with no excretion of contrast on the right  2   Excretion of contrast by the left kidney with contrast in the intrarenal collecting system, distal left ureter, and bladder     · X-ray abdomen KUB 2 -Persistent, asymmetric right nephrogram with contrast opacifying dilated ipsilateral collecting system in keeping with hydronephrosis  Clyde Johnson is some opacification of the mid and distal right ureter suggested  · X-ray abdomen KUB 3 - No urinary tract calculi are not well visualized radiographically  · X-ray abdomen KUB 4 -3 mm calculus in the right side of the pelvis, possibly corresponding to the UVJ calculus seen on prior CT  · Incidental Findings:  None     Test Results Pending at Discharge (will require follow up): · None     Outpatient Tests Requested:  · Outpatient KUB, BMP by Urology in 1 week  · Outpatient PET scan of the lung via pulmonary  Complications:     None    Reason for Admission:  Right-sided nephrolithiasis, acute kidney injury, right lower lobe pulmonary nodule    Hospital Course:     Alek Isidro is a 75 y o  male patient who originally presented to the hospital on 7/23/2022 due to right flank pain  Please refer to the initial history and physical examination completed by Dr Esvin Hopson for the initial presenting features and complaints  In brief, the patient is a 69-year-old male, that presented to the emergency room back on day of admission with a chief complaint of right flank pain  The patient had a CTA dissection protocol chest, abdomen, and pelvis with the results as outlined above  Patient was diagnosed with an obstructing right distal ureteral calculus and a few additional calculi in the right ureterovesical junction with delayed nephrogram indicating a physiologically significant obstruction  Patient was made NPO, given IV fluids  IV antibiotics were not indicated  A urology consultation was obtained  Urology followed along throughout his stay  With IV fluids, his creatinine got better  He was diagnosed with an acute kidney injury that was present on admission  On Monday 07/25/2022, after being seen by Urology again, he was given the option of a stent placement, patient declined and preferred to follow up in the outpatient setting    He was additionally seen in conjunction with Pulmonary services because of an abnormal right-sided pulmonary nodule which was highly suspicious for malignancy  Pulmonary will be following up in the near future in the outpatient setting with PET scan testing  At time of discharge, his Flomax dosing was increased, and he was given a prescription for BMP, and a repeat KUB to occur within 1 week with subsequent follow-up in the outpatient setting with Urology  Please refer to the assessment/plan portion of this discharge summary as outlined above for the remainder of the details in regard to his stay    Please see above list of diagnoses and related plan for additional information  Condition at Discharge: good     Discharge Day Visit / Exam:     Subjective:  Patient seen and examined, looks and feels well, no complaints  Vitals: Blood Pressure: 146/83 (07/25/22 0821)  Pulse: 64 (07/25/22 0821)  Temperature: 98 4 °F (36 9 °C) (07/25/22 0821)  Temp Source: Tympanic (07/23/22 1400)  Respirations: 16 (07/25/22 0821)  Height: 5' 8" (172 7 cm) (07/24/22 0446)  Weight - Scale: 90 4 kg (199 lb 4 7 oz) (07/24/22 0446)  SpO2: 90 % (07/25/22 4678)  Exam:   Physical Exam  Vitals and nursing note reviewed  Constitutional:       General: He is not in acute distress  Appearance: Normal appearance  He is not ill-appearing  HENT:      Head: Normocephalic and atraumatic  Nose: Nose normal    Eyes:      Extraocular Movements: Extraocular movements intact  Pupils: Pupils are equal, round, and reactive to light  Cardiovascular:      Rate and Rhythm: Normal rate and regular rhythm  Pulses: Normal pulses  Heart sounds: Normal heart sounds  No murmur heard  No friction rub  No gallop  Pulmonary:      Effort: Pulmonary effort is normal       Breath sounds: Normal breath sounds  Abdominal:      General: There is no distension  Palpations: Abdomen is soft  There is no mass  Tenderness: There is no abdominal tenderness   There is no guarding or rebound  Musculoskeletal:         General: No swelling or tenderness  Normal range of motion  Cervical back: Normal range of motion and neck supple  No rigidity  No muscular tenderness  Right lower leg: No edema  Left lower leg: No edema  Skin:     General: Skin is warm  Capillary Refill: Capillary refill takes less than 2 seconds  Findings: No erythema or rash  Neurological:      General: No focal deficit present  Mental Status: He is alert and oriented to person, place, and time  Mental status is at baseline  Psychiatric:         Mood and Affect: Mood normal          Behavior: Behavior normal          Discussion with Family:  No family members were present at the time of my discharge evaluation, nor did the patient ask and or want me to call anyone    Discharge instructions/Information to patient and family:   See after visit summary for information provided to patient and family  Provisions for Follow-Up Care:  See after visit summary for information related to follow-up care and any pertinent home health orders  Disposition:     Home      Planned Readmission:    None     Discharge Statement:  I spent 40 minutes discharging the patient  This time was spent on the day of discharge  I had direct contact with the patient on the day of discharge  Greater than 50% of the total time was spent examining patient, answering all patient questions, arranging and discussing plan of care with patient as well as directly providing post-discharge instructions  Additional time then spent on discharge activities  Discharge Medications:  See after visit summary for reconciled discharge medications provided to patient and family        ** Please Note: This note has been constructed using a voice recognition system **

## 2022-07-27 ENCOUNTER — OFFICE VISIT (OUTPATIENT)
Dept: PAIN MEDICINE | Facility: CLINIC | Age: 67
End: 2022-07-27
Payer: MEDICARE

## 2022-07-27 VITALS
DIASTOLIC BLOOD PRESSURE: 73 MMHG | BODY MASS INDEX: 29.7 KG/M2 | HEART RATE: 72 BPM | HEIGHT: 68 IN | WEIGHT: 196 LBS | SYSTOLIC BLOOD PRESSURE: 127 MMHG

## 2022-07-27 DIAGNOSIS — M54.2 NECK PAIN: ICD-10-CM

## 2022-07-27 DIAGNOSIS — G89.29 CHRONIC LOW BACK PAIN WITHOUT SCIATICA, UNSPECIFIED BACK PAIN LATERALITY: ICD-10-CM

## 2022-07-27 DIAGNOSIS — M54.50 CHRONIC LOW BACK PAIN WITHOUT SCIATICA, UNSPECIFIED BACK PAIN LATERALITY: ICD-10-CM

## 2022-07-27 DIAGNOSIS — M54.12 CERVICAL RADICULOPATHY: ICD-10-CM

## 2022-07-27 DIAGNOSIS — M51.36 LUMBAR DEGENERATIVE DISC DISEASE: ICD-10-CM

## 2022-07-27 DIAGNOSIS — M47.812 CERVICAL SPONDYLOSIS WITHOUT MYELOPATHY: ICD-10-CM

## 2022-07-27 DIAGNOSIS — G89.4 CHRONIC PAIN SYNDROME: ICD-10-CM

## 2022-07-27 DIAGNOSIS — M47.816 LUMBAR SPONDYLOSIS: ICD-10-CM

## 2022-07-27 PROCEDURE — 99214 OFFICE O/P EST MOD 30 MIN: CPT | Performed by: NURSE PRACTITIONER

## 2022-07-27 RX ORDER — OXYCODONE AND ACETAMINOPHEN 7.5; 325 MG/1; MG/1
1 TABLET ORAL EVERY 8 HOURS PRN
Qty: 90 TABLET | Refills: 0 | Status: SHIPPED | OUTPATIENT
Start: 2022-07-27 | End: 2022-08-26

## 2022-07-27 RX ORDER — OXYCODONE AND ACETAMINOPHEN 7.5; 325 MG/1; MG/1
1 TABLET ORAL EVERY 8 HOURS PRN
Qty: 90 TABLET | Refills: 0 | Status: SHIPPED | OUTPATIENT
Start: 2022-07-27 | End: 2022-09-30 | Stop reason: SDUPTHER

## 2022-07-27 NOTE — PATIENT INSTRUCTIONS
Opioid Safety   WHAT YOU NEED TO KNOW:   An opioid medicine is used to treat pain  Examples are oxycodone, morphine, fentanyl, or codeine  Pain control and management may help you rest, heal, and return to your daily activities  You and your family will receive information about how to manage your pain at home  The instructions will include what to do if you have side effects as your pain is managed  You will get information on how to handle opioid medicine safely  You will also get suggestions on how to control pain without opioids  It is important to follow all instructions so your pain is managed effectively  DISCHARGE INSTRUCTIONS:   Call your local emergency number (911 in the [place]), or have someone else call if:   You have a seizure  You cannot be woken  You have trouble staying awake and your breathing is slow or shallow  Your speech is slurred, or you are confused  You are dizzy or stumble when you walk  Call your doctor, or have someone close to you call if:   You are extremely drowsy, or you have trouble staying awake or speaking  You have pale or clammy skin  You have blue fingernails or lips  Your heartbeat is slower than normal     You cannot stop vomiting  You have questions or concerns about your condition or care  Use opioids safely:   Take prescribed opioids exactly as directed  Opioids come with directions based on the kind and how it is given  Talk to your healthcare provider or a pharmacist if you have any questions  Do not take more than the recommended amount  Too much can cause a life-threatening overdose  Do not continue to take it after your pain stops  You may develop tolerance  This means you keep needing higher doses to get the same effect  You may also develop opioid use disorder  This means you are not able to control your opioid use  Do not give opioids to others or take opioids that belong to someone else    The kind or amount one person takes may not be right for another  The person you share them with may also be taking medicines that do not mix with opioids  He or she may drink alcohol or use other drugs that can cause life-threatening problems when mixed with opioids  Do not mix opioids with other medicines or alcohol  The combination can cause an overdose, or cause you to stop breathing  Alcohol, sleeping pills, and medicines such as antihistamines can make you sleepy  A combination with opioids can lead to a coma  Do not drive or operate heavy machinery after you use an opioid  You may feel drowsy or have trouble concentrating  You can injure yourself or others if you drive or use heavy machinery when you are not alert  Your provider or pharmacist can tell you how long to wait after a dose before you do these activities  Talk to your healthcare provider if you have any side effects  Side effects include nausea, sleepiness, itching, and trouble thinking clearly  Your provider may need to make changes to the kind or amount of opioid you are taking  He or she can also help you find ways to prevent or relieve side effects  Manage constipation:  Constipation is the most common side effect of opioid medicine  Constipation is when you have hard, dry bowel movements, or you go longer than usual between bowel movements  Tell your healthcare provider about all changes in your bowel movements while you are taking opioids  He or she may recommend laxative medicine to help you have a bowel movement  He or she may also change the kind of opioid you are taking, or change when you take it  The following are more ways you can prevent or relieve constipation:  Drink liquids as directed  You may need to drink extra liquids to help soften and move your bowels  Ask how much liquid to drink each day and which liquids are best for you  Eat high-fiber foods  This may help decrease constipation by adding bulk to your bowel movements   High-fiber foods include fruits, vegetables, whole-grain breads and cereals, and beans  Your healthcare provider or dietitian can help you create a high-fiber meal plan  Your provider may also recommend a fiber supplement if you cannot get enough fiber from food  Exercise regularly  Regular physical activity can help stimulate your intestines  Walking is a good exercise to prevent or relieve constipation  Ask which exercises are best for you  Schedule a time each day to have a bowel movement  This may help train your body to have regular bowel movements  Bend forward while you are on the toilet to help move the bowel movement out  Sit on the toilet for at least 10 minutes, even if you do not have a bowel movement  Store opioids safely:   Store opioids where others cannot easily get them  Keep them in a locked cabinet or secure area  Do not  keep them in a purse or other bag you carry with you  A person may be looking for something else and find the opioids  Make sure opioids are stored out of the reach of children  A child can easily overdose on opioids  Opioids may look like candy to a small child  The best way to dispose of opioids: The laws vary by country and area  In the [place] [facility], the best way is to return the opioids through a take-back program  This program is offered by the [employer])  The following are options for using the program:  Take the opioids to a [facility] collection site  The site is often a law enforcement center  Call your local law enforcement center for scheduled take-back days in your area  You will be given information on where to go if the collection site is in a different location  Take the opioids to an approved pharmacy or hospital   A pharmacy or hospital may be set up as a collection site  You will need to ask if it is a [facility] collection site if you were not directed there   A pharmacy or doctor's office may not be able to take back opioids unless it is a [facility] site     Use a mail-back system  This means you are given containers to put the opioids into  You will then mail them in the containers  Use a take-back drop box  This is a place to leave the opioids at any time  People and animals will not be able to get into the box  Your local law enforcement agency can tell you where to find a drop box in your area  Other safe ways to dispose of opioids: The medicine may come with disposal instructions  The instructions may vary depending on the brand of medicine you are using  Instructions may come in a Medication Guide, but not every medicine has one  You may instead get instructions from your pharmacy or doctor  Follow instructions carefully  The following are general guidelines to follow:  Find out if you can flush the opioid  Some opioids can be flushed down the toilet or poured into the sink  You will need to contact authorities in your area to see if this is an option for you  The FDA also offers a list of medicines that are safe to flush down the toilet  You can check the list if you cannot get the information for your local area  Ask your waste management company about rules for putting opioids in the trash  The company will be able to give you specific directions  Scratch out personal information on the original medicine label so it cannot be read  Then put it in the trash  Do not label the trash or put any information on it about the opioids  It should look like regular household trash so no one is tempted to look for the opioids  Keep the trash out of the reach of children and animals  Always make sure trash is secure  Talk to officials if you live in a facility  If you live in a nursing home or assisted living center, talk to an official  The person will know the rules for your area  Other ways to manage pain:   Ask your healthcare provider about non-opioid medicines to control pain    Some medicines may even work better than opioids, depending on the cause of your pain  Nonprescription medicines include NSAIDs (such as ibuprofen) and acetaminophen  Prescription medicines include muscle relaxers, antidepressants, and steroids  Pain may be managed without any medicines  Some ways to relieve pain include massage, aromatherapy, or meditation  Physical or occupational therapy may also help  For more information:   Drug Enforcement Administration  [facility] [facility] [address]  Phone: 6- [phone]  Web Address: [facility]/drug_disposal/    [address]  and Drug Administration  [place] Champagne , [address]  Phone: 2- [phone]  Web Address: [email or web address]/    Follow up with your doctor or pain specialist as directed: You may need to have your dose adjusted  Your doctor or pain specialist can also help you find ways to manage pain without opioids  Write down your questions so you remember to ask them during your visits  © Copyright [employer] 2022 Information is for End User's use only and may not be sold, redistributed or otherwise used for commercial purposes  All illustrations and images included in CareNotes® are the copyrighted property of A D A M , Inc  or [address] [place] [address]  The above information is an  only  It is not intended as medical advice for individual conditions or treatments  Talk to your doctor, nurse or pharmacist before following any medical regimen to see if it is safe and effective for you  Opioid Safety   WHAT YOU NEED TO KNOW:   An opioid medicine is used to treat pain  Examples are oxycodone, morphine, fentanyl, or codeine  Pain control and management may help you rest, heal, and return to your daily activities  You and your family will receive information about how to manage your pain at home  The instructions will include what to do if you have side effects as your pain is managed   You will get information on how to handle opioid medicine safely  You will also get suggestions on how to control pain without opioids  It is important to follow all instructions so your pain is managed effectively  DISCHARGE INSTRUCTIONS:   Call your local emergency number (911 in the [place]), or have someone else call if:   You have a seizure  You cannot be woken  You have trouble staying awake and your breathing is slow or shallow  Your speech is slurred, or you are confused  You are dizzy or stumble when you walk  Call your doctor, or have someone close to you call if:   You are extremely drowsy, or you have trouble staying awake or speaking  You have pale or clammy skin  You have blue fingernails or lips  Your heartbeat is slower than normal     You cannot stop vomiting  You have questions or concerns about your condition or care  Use opioids safely:   Take prescribed opioids exactly as directed  Opioids come with directions based on the kind and how it is given  Talk to your healthcare provider or a pharmacist if you have any questions  Do not take more than the recommended amount  Too much can cause a life-threatening overdose  Do not continue to take it after your pain stops  You may develop tolerance  This means you keep needing higher doses to get the same effect  You may also develop opioid use disorder  This means you are not able to control your opioid use  Do not give opioids to others or take opioids that belong to someone else  The kind or amount one person takes may not be right for another  The person you share them with may also be taking medicines that do not mix with opioids  He or she may drink alcohol or use other drugs that can cause life-threatening problems when mixed with opioids  Do not mix opioids with other medicines or alcohol  The combination can cause an overdose, or cause you to stop breathing  Alcohol, sleeping pills, and medicines such as antihistamines can make you sleepy   A combination with opioids can lead to a coma  Do not drive or operate heavy machinery after you use an opioid  You may feel drowsy or have trouble concentrating  You can injure yourself or others if you drive or use heavy machinery when you are not alert  Your provider or pharmacist can tell you how long to wait after a dose before you do these activities  Talk to your healthcare provider if you have any side effects  Side effects include nausea, sleepiness, itching, and trouble thinking clearly  Your provider may need to make changes to the kind or amount of opioid you are taking  He or she can also help you find ways to prevent or relieve side effects  Manage constipation:  Constipation is the most common side effect of opioid medicine  Constipation is when you have hard, dry bowel movements, or you go longer than usual between bowel movements  Tell your healthcare provider about all changes in your bowel movements while you are taking opioids  He or she may recommend laxative medicine to help you have a bowel movement  He or she may also change the kind of opioid you are taking, or change when you take it  The following are more ways you can prevent or relieve constipation:  Drink liquids as directed  You may need to drink extra liquids to help soften and move your bowels  Ask how much liquid to drink each day and which liquids are best for you  Eat high-fiber foods  This may help decrease constipation by adding bulk to your bowel movements  High-fiber foods include fruits, vegetables, whole-grain breads and cereals, and beans  Your healthcare provider or dietitian can help you create a high-fiber meal plan  Your provider may also recommend a fiber supplement if you cannot get enough fiber from food  Exercise regularly  Regular physical activity can help stimulate your intestines  Walking is a good exercise to prevent or relieve constipation  Ask which exercises are best for you           Schedule a time each day to have a bowel movement  This may help train your body to have regular bowel movements  Bend forward while you are on the toilet to help move the bowel movement out  Sit on the toilet for at least 10 minutes, even if you do not have a bowel movement  Store opioids safely:   Store opioids where others cannot easily get them  Keep them in a locked cabinet or secure area  Do not  keep them in a purse or other bag you carry with you  A person may be looking for something else and find the opioids  Make sure opioids are stored out of the reach of children  A child can easily overdose on opioids  Opioids may look like candy to a small child  The best way to dispose of opioids: The laws vary by country and area  In the [place], the best way is to return the opioids through a take-back program  This program is offered by the [employer])  The following are options for using the program:  Take the opioids to a [facility] collection site  The site is often a law enforcement center  Call your local law enforcement center for scheduled take-back days in your area  You will be given information on where to go if the collection site is in a different location  Take the opioids to an approved pharmacy or hospital   A pharmacy or hospital may be set up as a collection site  You will need to ask if it is a [facility] collection site if you were not directed there  A pharmacy or doctor's office may not be able to take back opioids unless it is a [facility] site  Use a mail-back system  This means you are given containers to put the opioids into  You will then mail them in the containers  Use a take-back drop box  This is a place to leave the opioids at any time  People and animals will not be able to get into the box  Your local law enforcement agency can tell you where to find a drop box in your area  Other safe ways to dispose of opioids: The medicine may come with disposal instructions  The instructions may vary depending on the brand of medicine you are using  Instructions may come in a Medication Guide, but not every medicine has one  You may instead get instructions from your pharmacy or doctor  Follow instructions carefully  The following are general guidelines to follow:  Find out if you can flush the opioid  Some opioids can be flushed down the toilet or poured into the sink  You will need to contact authorities in your area to see if this is an option for you  The FDA also offers a list of medicines that are safe to flush down the toilet  You can check the list if you cannot get the information for your local area  Ask your waste management company about rules for putting opioids in the trash  The company will be able to give you specific directions  Scratch out personal information on the original medicine label so it cannot be read  Then put it in the trash  Do not label the trash or put any information on it about the opioids  It should look like regular household trash so no one is tempted to look for the opioids  Keep the trash out of the reach of children and animals  Always make sure trash is secure  Talk to officials if you live in a facility  If you live in a nursing home or assisted living center, talk to an official  The person will know the rules for your area  Other ways to manage pain:   Ask your healthcare provider about non-opioid medicines to control pain  Some medicines may even work better than opioids, depending on the cause of your pain  Nonprescription medicines include NSAIDs (such as ibuprofen) and acetaminophen  Prescription medicines include muscle relaxers, antidepressants, and steroids  Pain may be managed without any medicines  Some ways to relieve pain include massage, aromatherapy, or meditation  Physical or occupational therapy may also help      For more information:   Drug Enforcement Administration  [address] [place] [address]  Phone: 1- 468 - [phone]  Web Address: [employer]/drug_disposal/    [address]  and Drug Administration  [place]agne , [address]  Phone: 5- [phone] - [phone]  Web Address: http://[employer]/    Follow up with your doctor or pain specialist as directed: You may need to have your dose adjusted  Your doctor or pain specialist can also help you find ways to manage pain without opioids  Write down your questions so you remember to ask them during your visits  © Copyright [employer] 2022 Information is for End User's use only and may not be sold, redistributed or otherwise used for commercial purposes  All illustrations and images included in CareNotes® are the copyrighted property of [employer]  or Uma Levine  The above information is an  only  It is not intended as medical advice for individual conditions or treatments  Talk to your doctor, nurse or pharmacist before following any medical regimen to see if it is safe and effective for you

## 2022-07-27 NOTE — PROGRESS NOTES
Assessment:  1  Chronic pain syndrome    2  Chronic low back pain without sciatica, unspecified back pain laterality    3  Lumbar degenerative disc disease    4  Lumbar spondylosis    5  Neck pain    6  Cervical radiculopathy    7  Cervical spondylosis without myelopathy        Plan:  While the patient was in the office today, I did have a thorough conversation regarding their chronic pain syndrome, medication management, and treatment plan options  Patient is being seen for follow-up visit  Overall, his typical pain remains reasonably controlled with his current medication regimen  Patient is currently being treated for a kidney stone and is also being worked up for lung cancer  Continue oxycodone 7 5/325 3 times daily if needed for pain  The patient's opioid scripts were sent to their pharmacy electronically and was given a 2 month supply of prescriptions with a Do Not Fill date(s) of 08/04/2022, 09/01/2022  Continue gabapentin 600 milligrams 3 times daily  He did not require refill this medication during today's visit  [place] Prescription Drug Monitoring Program report was reviewed and was appropriate     There are risks associated with opioid medications, including dependence, addiction and tolerance  The patient understands and agrees to use these medications only as prescribed  Potential side effects of the medications include, but are not limited to, constipation, drowsiness, addiction, impaired judgment and risk of fatal overdose if not taken as prescribed  The patient was warned against driving while taking sedation medications  Sharing medications is a felony  At this point in time, the patient is showing no signs of addiction, abuse, diversion or suicidal ideation  The patient will follow-up in 8 weeks for medication prescription refill and reevaluation  The patient was advised to contact the office should their symptoms worsen in the interim   The patient was agreeable and verbalized an understanding  History of Present Illness: The patient is a 75 y o  male last seen on 06/06/2022 who presents for a follow up office visit in regards to chronic pain secondary to chronic pain syndrome, chronic low back pain, lumbar degenerative disc disease, lumbar spondylosis, chronic neck pain, cervical radiculopathy  The patient currently reports complaints of pain in both shoulders, low back pain  Current pain level is an 8/10  Quality pain is described as sharp and throbbing  Complaining of flank pain related to kidney stones  Current pain medications includes:  Oxycodone 7 5/325 3 times daily if needed for pain, gabapentin 600 milligrams 3 times daily   The patient reports that this regimen is providing 40 % pain relief  The patient is reporting no side effects from this pain medication regimen  Pain Contract Signed:  06/06/2022  Last Urine Drug Screen:  06/06/2022    I have personally reviewed and/or updated the patient's past medical history, past surgical history, family history, social history, current medications, allergies, and vital signs today  Review of Systems:    Review of Systems   Constitutional: Negative for chills and fever  HENT: Negative for ear pain and sore throat  Eyes: Negative for pain and visual disturbance  Respiratory: Positive for shortness of breath  Negative for cough  Cardiovascular: Negative for chest pain and palpitations  Gastrointestinal: Negative for abdominal pain and vomiting  Genitourinary: Negative for dysuria and hematuria  Musculoskeletal: Negative for arthralgias and back pain  Decreased ROM, joint stiffness   Skin: Negative for color change and rash  Neurological: Positive for dizziness  Negative for seizures and syncope  All other systems reviewed and are negative          Past Medical History:   Diagnosis Date    Abdominal aortic aneurysm without rupture ([facility] [address] )     Abdominal Aortic Duplex 02/21/2017 Ectatic infrarenal abdominal aorta   CAD (coronary artery disease)     COPD (chronic obstructive pulmonary disease) (HCC)     Extremity pain     History of echocardiogram 03/18/2014    EF 55%, mild MR and AI  Mild concentric LVH   History of stress test 03/06/2017    Normal     Hypertension     Joint pain     Low back pain     Migraine     Neck pain     Obstructive sleep apnea     cannot tolerate CPAP    Osteoarthritis     Peripheral neuropathy     Reflex sympathetic dystrophy        Past Surgical History:   Procedure Laterality Date    CARDIAC CATHETERIZATION  02/13/2012    EF 70%, widely patent renal arteries, significant single-vessel CAD-medical therapy   CARDIAC CATHETERIZATION  04/11/2013    EF 65%, 50% mid LAD, 20% prox CFX, 90% diffuse RCA, 99% mid RCA  Medical management      CHOLECYSTECTOMY      EPIDURAL BLOCK INJECTION Bilateral 8/15/2019    Procedure: BLOCK / INJECTION EPIDURAL STEROID CERVICAL;  Surgeon: Francoise Rincon MD;  Location: [facility];  Service: Pain Management     FL GUIDED NEEDLE PLAC BX/ASP/INJ  8/15/2019    ORTHOPEDIC SURGERY      TRIGGER POINT INJECTION         Family History   Adopted: Yes   Problem Relation Age of Onset    No Known Problems Family     No Known Problems Mother     No Known Problems Father        Social History     Occupational History    Not on file   Tobacco Use    Smoking status: Current Every Day Smoker     Packs/day: 1 50    Smokeless tobacco: Never Used   Vaping Use    Vaping Use: Never used   Substance and Sexual Activity    Alcohol use: Not Currently    Drug use: No    Sexual activity: Yes         Current Outpatient Medications:     amLODIPine (NORVASC) 10 mg tablet, swallow 1 tablet once daily, Disp: 90 tablet, Rfl: 3    aspirin (ECOTRIN LOW STRENGTH) 81 mg EC tablet, Take 1 tablet (81 mg total) by mouth daily, Disp: 30 tablet, Rfl: 5    Blood Glucose Monitoring Suppl (OneTouch Verio Flex System) w/Device KIT, USE IN THE MORNING TEST ONCE DAILY, Disp: 1 kit, Rfl: 0    glucose blood (OneTouch Verio) test strip, Test once daily, Disp: 100 each, Rfl: 0    Lancets (onetouch ultrasoft) lancets, Test once daily, Disp: 100 each, Rfl: 0    lisinopril (ZESTRIL) 20 mg tablet, take 1 tablet by mouth once daily, Disp: 60 tablet, Rfl: 5    metoprolol succinate (TOPROL-XL) 100 mg 24 hr tablet, take 1 tablet by mouth once daily, Disp: 30 tablet, Rfl: 5    nicotine (NICODERM CQ) 14 mg/24hr TD 24 hr patch, Place 1 patch on the skin daily, Disp: 28 patch, Rfl: 0    oxyCODONE-acetaminophen (Percocet) 7 5-325 MG per tablet, Take 1 tablet by mouth every 8 (eight) hours as needed for moderate pain Do not fill until 9/1/2022 Max Daily Amount: 3 tablets, Disp: 90 tablet, Rfl: 0    oxyCODONE-acetaminophen (Percocet) 7 5-325 MG per tablet, Take 1 tablet by mouth every 8 (eight) hours as needed for moderate pain Do not fill until 8/4/2022 Max Daily Amount: 3 tablets, Disp: 90 tablet, Rfl: 0    rosuvastatin (CRESTOR) 20 MG tablet, Take 1 tablet (20 mg total) by mouth daily, Disp: 30 tablet, Rfl: 5    sildenafil (VIAGRA) 100 mg tablet, Take 1 tablet (100 mg total) by mouth daily as needed for erectile dysfunction, Disp: 20 tablet, Rfl: 0    tamsulosin (FLOMAX) 0 4 mg, Take 2 capsules (0 8 mg total) by mouth daily with dinner, Disp: 60 capsule, Rfl: 0    topiramate (TOPAMAX) 25 mg tablet, FOLLOW INSTRUCTIONS GIVEN TO TITRATE UP TO A MAX OF 2 TABLETS BY MOUTH TWICE DAILY AS TOLERATED AND TO LEVEL OF BENEFIT FOR TREMORS, Disp: 120 tablet, Rfl: 2    traZODone (DESYREL) 100 mg tablet, Take 1 tablet (100 mg total) by mouth daily at bedtime, Disp: 90 tablet, Rfl: 3    gabapentin (NEURONTIN) 600 MG tablet, Take 1 tablet (600 mg total) by mouth 3 (three) times a day, Disp: 90 tablet, Rfl: 2    nitroglycerin (NITROSTAT) 0 4 mg SL tablet, Place 1 tablet (0 4 mg total) under the tongue every 5 (five) minutes as needed for chest pain (Patient not taking: No sig reported), Disp: 25 tablet, Rfl: 5    No Known Allergies    Physical Exam:    /73 (BP Location: Left arm, Patient Position: Sitting, Cuff Size: Large)   Pulse 72   Ht 5' 8" (1 727 m)   Wt 88 9 kg (196 lb)   BMI 29 80 kg/m²     Constitutional:normal, well developed, well nourished, alert, in no distress and non-toxic and no overt pain behavior  Eyes:anicteric  HEENT:grossly intact  Neck:supple, symmetric, trachea midline and no masses   Pulmonary:even and unlabored  Cardiovascular:No edema or pitting edema present  Skin:Normal without rashes or lesions and well hydrated  Psychiatric:Mood and affect appropriate  Neurologic:Cranial Nerves II-XII grossly intact  Musculoskeletal:normal      Imaging  No orders to display         No orders of the defined types were placed in this encounter

## 2022-08-01 ENCOUNTER — OFFICE VISIT (OUTPATIENT)
Dept: FAMILY MEDICINE CLINIC | Facility: CLINIC | Age: 67
End: 2022-08-01
Payer: MEDICARE

## 2022-08-01 ENCOUNTER — APPOINTMENT (OUTPATIENT)
Dept: LAB | Facility: CLINIC | Age: 67
End: 2022-08-01
Payer: MEDICARE

## 2022-08-01 ENCOUNTER — APPOINTMENT (OUTPATIENT)
Dept: RADIOLOGY | Facility: CLINIC | Age: 67
End: 2022-08-01
Payer: MEDICARE

## 2022-08-01 VITALS
HEIGHT: 68 IN | OXYGEN SATURATION: 97 % | TEMPERATURE: 97.5 F | WEIGHT: 196 LBS | HEART RATE: 52 BPM | BODY MASS INDEX: 29.7 KG/M2 | DIASTOLIC BLOOD PRESSURE: 70 MMHG | SYSTOLIC BLOOD PRESSURE: 126 MMHG

## 2022-08-01 DIAGNOSIS — I71.40 ABDOMINAL AORTIC ANEURYSM (AAA) WITHOUT RUPTURE: ICD-10-CM

## 2022-08-01 DIAGNOSIS — N20.1 URETERAL CALCULUS: ICD-10-CM

## 2022-08-01 DIAGNOSIS — Z76.89 ENCOUNTER FOR SUPPORT AND COORDINATION OF TRANSITION OF CARE: Primary | ICD-10-CM

## 2022-08-01 DIAGNOSIS — N20.0 KIDNEY STONE: ICD-10-CM

## 2022-08-01 DIAGNOSIS — Z23 ENCOUNTER FOR IMMUNIZATION: ICD-10-CM

## 2022-08-01 DIAGNOSIS — F17.200 SMOKING: ICD-10-CM

## 2022-08-01 DIAGNOSIS — R91.8 PULMONARY NODULES: ICD-10-CM

## 2022-08-01 DIAGNOSIS — N17.9 AKI (ACUTE KIDNEY INJURY) (HCC): ICD-10-CM

## 2022-08-01 PROCEDURE — 99495 TRANSJ CARE MGMT MOD F2F 14D: CPT | Performed by: NURSE PRACTITIONER

## 2022-08-01 PROCEDURE — 74018 RADEX ABDOMEN 1 VIEW: CPT

## 2022-08-01 RX ORDER — NICOTINE 21 MG/24HR
1 PATCH, TRANSDERMAL 24 HOURS TRANSDERMAL EVERY 24 HOURS
Qty: 28 PATCH | Refills: 0 | Status: SHIPPED | OUTPATIENT
Start: 2022-08-01

## 2022-08-01 NOTE — PROGRESS NOTES
Assessment/Plan:    No problem-specific Assessment & Plan notes found for this encounter  Diagnoses and all orders for this visit:    Encounter for support and coordination of transition of care    Encounter for immunization    Smoking  -     nicotine (NICODERM CQ) 21 mg/24 hr TD 24 hr patch; Place 1 patch on the skin every 24 hours    Abdominal aortic aneurysm (AAA) without rupture ([facility] [address] )    Kidney stone    Pulmonary nodules    MARYANN (acute kidney injury) ([facility] [address] )        Subjective:      Patient ID: Lacie Gottron is a 75 y o  male  HPI  Patient presents for follow up from hospitalization on 7/23 for kidney stone  Patient was found to have  Renal calculi with hydronephrosis  No evidence of infection  Was seen by urology- declined stenting in the hospital  His flomax was increased  However, he continues to have weak stream and mild intermittent flank pain  Is considering stent- will call urology for follow up  MARYANN- CR 1 8 on admission but 1 2 by time of d/c  Had repeat BMP this morning- results pending  Pulmonary Nodules - Unfortunately, patient was found to have suspicious 1 cm suspicious nodule that is concerning for malignancy  He has an appointmetn for PET scan in two days  He also has f/u with pulmonology scheduled  He is nervous given current potential diagnosis  Support and encouragement provided  Smoking- he is still smoking daily 10 cig  Minimum a day  Does not feel like the patches 14 are helping  Will send in 21 as he tolerated them better  AAA- f u with vascular as instructed  Muscle cramping- patient has been having intermittent toe/leg cramps  Does not drink more than 1 cup of water per day and drinks mostly coffee and ice tea    Happens at the end of the day  Does work in construction  Sometimes feels dizzy when going from bending to standing  Stretches as instructed  Increase hydration to 64 ounces daily  Take BP when dizzy and keep log  Call in 1 month with log   Follow up with cardiology if needed  The following portions of the patient's history were reviewed and updated as appropriate: allergies, current medications, past family history, past medical history, past social history, past surgical history and problem list     Review of Systems   Constitutional: Negative for activity change, appetite change, diaphoresis, fatigue, fever and unexpected weight change  HENT: Negative for congestion, mouth sores, rhinorrhea, sinus pain, trouble swallowing and voice change  Eyes: Negative for photophobia and visual disturbance  Respiratory: Negative for apnea, cough, chest tightness, shortness of breath and wheezing  Cardiovascular: Negative for chest pain, palpitations and leg swelling  Gastrointestinal: Negative for abdominal distention, abdominal pain, blood in stool, constipation, diarrhea, nausea and vomiting  Endocrine: Negative for cold intolerance, heat intolerance, polydipsia, polyphagia and polyuria  Genitourinary: Positive for decreased urine volume and flank pain  Negative for difficulty urinating, frequency, genital sores, hematuria and urgency  Musculoskeletal: Positive for myalgias  Negative for arthralgias, neck pain and neck stiffness  Skin: Negative for color change, rash and wound  Neurological: Negative for dizziness, weakness, light-headedness, numbness and headaches  Hematological: Negative for adenopathy  Does not bruise/bleed easily  Psychiatric/Behavioral: Negative for self-injury, sleep disturbance and suicidal ideas  The patient is not nervous/anxious  Objective:      /70 (BP Location: Left arm, Patient Position: Sitting)   Pulse (!) 52   Temp 97 5 °F (36 4 °C)   Ht 5' 8" (1 727 m)   Wt 88 9 kg (196 lb)   SpO2 97%   BMI 29 80 kg/m²          Physical Exam  Vitals and nursing note reviewed  Constitutional:       General: He is not in acute distress  Appearance: Normal appearance   He is not ill-appearing or toxic-appearing  HENT:      Head: Normocephalic and atraumatic  Cardiovascular:      Rate and Rhythm: Normal rate and regular rhythm  Pulses: Normal pulses  Heart sounds: Normal heart sounds  No murmur heard  No friction rub  No gallop  Pulmonary:      Effort: Pulmonary effort is normal  No respiratory distress  Breath sounds: Normal breath sounds  No stridor  No wheezing  Abdominal:      General: Abdomen is flat  Bowel sounds are normal       Palpations: Abdomen is soft  Tenderness: There is no abdominal tenderness  There is no guarding  Hernia: No hernia is present  Musculoskeletal:      Cervical back: Normal range of motion and neck supple  No rigidity  No muscular tenderness  Right lower leg: No edema  Left lower leg: No edema  Skin:     General: Skin is warm and dry  Capillary Refill: Capillary refill takes less than 2 seconds  Coloration: Skin is not jaundiced or pale  Findings: No bruising  Neurological:      General: No focal deficit present  Mental Status: He is alert and oriented to person, place, and time  Mental status is at baseline  Motor: No weakness  Coordination: Coordination normal       Gait: Gait normal    Psychiatric:         Mood and Affect: Mood normal          Behavior: Behavior normal          Thought Content:  Thought content normal          Judgment: Judgment normal

## 2022-08-05 ENCOUNTER — HOSPITAL ENCOUNTER (OUTPATIENT)
Dept: RADIOLOGY | Age: 67
Discharge: HOME | End: 2022-08-05
Payer: MEDICARE

## 2022-08-05 DIAGNOSIS — D38.1 NEOPLASM OF UNCERTAIN BEHAVIOR OF RIGHT LOWER LOBE OF LUNG: ICD-10-CM

## 2022-08-05 LAB — GLUCOSE SERPL-MCNC: 130 MG/DL (ref 65–140)

## 2022-08-05 PROCEDURE — 78815 PET IMAGE W/CT SKULL-THIGH: CPT

## 2022-08-05 PROCEDURE — A9552 F18 FDG: HCPCS

## 2022-08-05 PROCEDURE — G1004 CDSM NDSC: HCPCS

## 2022-08-05 PROCEDURE — 82948 REAGENT STRIP/BLOOD GLUCOSE: CPT

## 2022-08-15 ENCOUNTER — OFFICE VISIT (OUTPATIENT)
Dept: PULMONOLOGY | Facility: CLINIC | Age: 67
End: 2022-08-15
Payer: MEDICARE

## 2022-08-15 ENCOUNTER — TELEPHONE (OUTPATIENT)
Dept: PULMONOLOGY | Facility: CLINIC | Age: 67
End: 2022-08-15

## 2022-08-15 VITALS
TEMPERATURE: 99.1 F | BODY MASS INDEX: 29.92 KG/M2 | HEART RATE: 59 BPM | RESPIRATION RATE: 17 BRPM | OXYGEN SATURATION: 97 % | DIASTOLIC BLOOD PRESSURE: 76 MMHG | SYSTOLIC BLOOD PRESSURE: 138 MMHG | HEIGHT: 68 IN | WEIGHT: 197.4 LBS

## 2022-08-15 DIAGNOSIS — J42 CHRONIC BRONCHITIS, UNSPECIFIED CHRONIC BRONCHITIS TYPE (HCC): ICD-10-CM

## 2022-08-15 DIAGNOSIS — J43.9 PULMONARY EMPHYSEMA, UNSPECIFIED EMPHYSEMA TYPE (HCC): ICD-10-CM

## 2022-08-15 DIAGNOSIS — R91.8 PULMONARY NODULES: Primary | ICD-10-CM

## 2022-08-15 DIAGNOSIS — G47.33 OSA (OBSTRUCTIVE SLEEP APNEA): ICD-10-CM

## 2022-08-15 PROCEDURE — 99214 OFFICE O/P EST MOD 30 MIN: CPT | Performed by: INTERNAL MEDICINE

## 2022-08-15 RX ORDER — TIOTROPIUM BROMIDE AND OLODATEROL 3.124; 2.736 UG/1; UG/1
2 SPRAY, METERED RESPIRATORY (INHALATION) DAILY
Qty: 4 G | Refills: 0 | Status: SHIPPED | COMMUNITY
Start: 2022-08-15

## 2022-08-15 RX ORDER — IPRATROPIUM BROMIDE AND ALBUTEROL SULFATE 2.5; .5 MG/3ML; MG/3ML
3 SOLUTION RESPIRATORY (INHALATION) EVERY 6 HOURS PRN
Qty: 180 ML | Refills: 11 | Status: SHIPPED | OUTPATIENT
Start: 2022-08-15 | End: 2022-09-14

## 2022-08-15 RX ORDER — TIOTROPIUM BROMIDE AND OLODATEROL 3.124; 2.736 UG/1; UG/1
2 SPRAY, METERED RESPIRATORY (INHALATION) DAILY
Qty: 4 G | Refills: 11 | Status: SHIPPED | OUTPATIENT
Start: 2022-08-15

## 2022-08-15 RX ORDER — ALBUTEROL SULFATE 90 UG/1
2 AEROSOL, METERED RESPIRATORY (INHALATION) EVERY 6 HOURS PRN
Qty: 18 G | Refills: 11 | Status: SHIPPED | OUTPATIENT
Start: 2022-08-15

## 2022-08-15 NOTE — PROGRESS NOTES
Pulmonary Follow Up Note   Meli Rojas 75 y o  male MRN: [id number]  8/15/2022    Assessment:  Chronic bronchitis/emphysema  · Likely from significant tobacco abuse history/COPD  · Unknown severity no PFT on file   · No history of frequent exacerbation  · Noted symptoms of chest congestion/dyspnea on going uphill/1 flight or more flight of stairs   · Still able to be active, doing all his job duties including lifting  · Not on any inhalers currently    Plan:   · Start Lama/Laba with Stiolto Respimat  · P r n  albuterol q 6 p r n  for dyspnea/wheezing/chest tightness  · Ordered a nebulizer/supplies with DuoNeb to be used q 6 p r n  · Will check complete pulmonary function test/BD response    Pulmonary nodules  · Noted on initial CT chest at the end of July, 1 1 x 0 9 RLL nodule this was FDG avid on PET-CT, in addition to 5 mm RUL GGO   · Initially noted mild mediastinal lymphadenopathy at station 4R, station 7 however not prominent on PET-CT and not FDG avid  · Discussed IP/thoracic surgery team, does not appear to amenable for approach by navigational bronchoscopy  · Sent a message to the interventional radiology team/if difficult to approach this within he would be a good candidate for surgical biopsy/superior segmentectomy per thoracic surgery Dr Geraldine Mike review    Active tobacco abuse  · About 55 pack year history total   · Cut down from 2 packs per day to 1 pack few months ago  · Now smokes about 10 cigarettes a day   · Counseled extensively  · He would like to use nicotine patches and cut down cigarettes intake on his own    JAKI   · Diagnosed 5 years ago   · Not interested in therapy given the intolerance to the mask    Return in about 3 months (around 11/15/2022)      History of Present Illness     Follow up for: COPD/pulmonary nodules    Background:  75 y o  male with a h/o CAD, COPD, HTN, tobacco abuse, CAD, JAKI not on CPAP, osteoarthritis, AAA, peripheral neuropathy      Hospitalized 7/23 for acute kidney injury/right flank pain and was found to have hydronephrosis/obstructive uropathy  Chest imaging showed 1 1 x 0 9 RLL nodule appear to be new from prior imaging in 2019 when he had Last CT chest for lung cancer screening  Also noted RUL 5 mm GGO that is also new  Multiple mediastinal lymphadenopathy 2 x 1 2 cm subcarinal     Dx with JAKI, required nocturnal oxygen more than 5 ys ago  Couldn't tolerate the CPAP  Actively smokes about 1 pack per day, total of about 50-60 pack year     Interval History  Since discharge, feeling back to baseline  Still smoking, was able to cut down to 8 cigarettes per day however went up again to about 10-15 cigarettes a day  No change in symptoms, continues with daily chest congestion/minimally productive cough  Not on any inhalers  Only dyspnea with going up steps/uphill  No dyspnea on walking on the surface level long distances  Review of Systems  As per hpi, all other systems reviewed and were negative    Studies:    Imaging and other studies: I have personally reviewed pertinent films in PACS    CT chest 7/23/2022- 1 1 x 0 9 RLL nodule appear to be new from prior imaging in 2019 when he had Last CT chest for lung cancer screening  Also noted RUL 5 mm GGO that is also new  Multiple mediastinal lymphadenopathy 2 x 1 2 cm subcarinal      PET-CT 08/05/2022-RLL at the major fissure 1 2 x 1 0 nodule SUV 2 9/concerning for malignancy  Several small GGOs at the RUL, 5 mm at RLL  No FDG avidity at the mediastinal lymph nodes  Small parotid nodule on the left, suggesting MRI of the neck or close interval surveillance    Pulmonary function testing:   None on file    EKG, Pathology, and Other Studies: I have personally reviewed pertinent reports  Past medical, surgical, social and family histories reviewed  Medications/Allergies: Reviewed      Vitals: Blood pressure 138/76, pulse 59, temperature 99 1 °F (37 3 °C), resp   rate 17, height 5' 8" (1 727 m), weight 89 5 kg (197 lb 6 4 oz), SpO2 97 %  Body mass index is 30 01 kg/m²  Oxygen Therapy  SpO2: 97 %      Physical Exam  Body mass index is 30 01 kg/m²  Gen: not in acute distress, obese  Neck/Eyes: supple, no adenopathy, PERRL  Ear: normal appearance, no significant hearing impairment  Nose:  normal nasal mucosa, no drainage  Mouth:  unremarkable/normal appearance of lips, teeth and gums  Oropharynx: mucosa is moist, no focal lesions or erythema  Salivary glands: soft nontender  Chest: normal respiratory efforts, diminished breath sounds on the right, fine/mild basilar crackle on the left  CV:  Distant heart sounds, no murmurs appreciated distant heart sounds, no murmurs appreciated, no edema  Abdomen: soft, non tender  Extremities:  No observed deformity   Skin: unremarkable  Neuro: AAO X3, no focal motor deficit        Labs:  Lab Results   Component Value Date    WBC 8 68 07/25/2022    HGB 15 0 07/25/2022    HCT 46 8 07/25/2022    MCV 94 07/25/2022     07/25/2022     Lab Results   Component Value Date    CALCIUM 9 2 08/01/2022    K 4 3 08/01/2022    CO2 22 08/01/2022     08/01/2022    BUN 16 08/01/2022    CREATININE 1 15 08/01/2022     No results found for: IGE  Lab Results   Component Value Date    ALT 12 07/23/2022    AST 15 07/23/2022    ALKPHOS 74 07/23/2022           Portions of the record may have been created with voice recognition software  Occasional wrong word or "sound a like" substitutions may have occurred due to the inherent limitations of voice recognition software  Read the chart carefully and recognize, using context, where substitutions have occurred    [facility]

## 2022-08-15 NOTE — TELEPHONE ENCOUNTER
Anne Corrigan, from [facility], called re: she just spoke w/ Ananya Ryder in re: to his nebulizer order and he would also like to have his duo-neb nebulizer solution sent to [facility]  Please cxl order to [facility] and send script to [facility] through [facility]    Please advise

## 2022-08-16 LAB
DME PARACHUTE DELIVERY DATE ACTUAL: NORMAL
DME PARACHUTE DELIVERY DATE EXPECTED: NORMAL
DME PARACHUTE DELIVERY DATE REQUESTED: NORMAL
DME PARACHUTE ITEM DESCRIPTION: NORMAL
DME PARACHUTE ORDER STATUS: NORMAL
DME PARACHUTE SUPPLIER NAME: NORMAL
DME PARACHUTE SUPPLIER PHONE: NORMAL

## 2022-08-17 ENCOUNTER — TELEPHONE (OUTPATIENT)
Dept: PULMONOLOGY | Facility: CLINIC | Age: 67
End: 2022-08-17

## 2022-08-17 DIAGNOSIS — R91.8 PULMONARY NODULES: Primary | ICD-10-CM

## 2022-08-18 ENCOUNTER — PREP FOR PROCEDURE (OUTPATIENT)
Dept: INTERVENTIONAL RADIOLOGY/VASCULAR | Facility: HOSPITAL | Age: 67
End: 2022-08-18

## 2022-08-18 DIAGNOSIS — R91.1 PULMONARY NODULE, RIGHT: Primary | ICD-10-CM

## 2022-08-18 RX ORDER — SODIUM CHLORIDE 9 MG/ML
30 INJECTION, SOLUTION INTRAVENOUS CONTINUOUS
Status: CANCELLED | OUTPATIENT
Start: 2022-08-18

## 2022-08-18 NOTE — H&P
Found to have pulmonary nodule on the right  At a discussion with pulmonary as to whether not this was a good candidate for guided endobronchial biopsy  I recommended CT-guided biopsy

## 2022-08-22 DIAGNOSIS — N52.9 ERECTILE DYSFUNCTION, UNSPECIFIED ERECTILE DYSFUNCTION TYPE: ICD-10-CM

## 2022-08-23 RX ORDER — SILDENAFIL 100 MG/1
100 TABLET, FILM COATED ORAL DAILY PRN
Qty: 20 TABLET | Refills: 0 | Status: SHIPPED | OUTPATIENT
Start: 2022-08-23 | End: 2022-08-31 | Stop reason: SDUPTHER

## 2022-08-31 DIAGNOSIS — N52.9 ERECTILE DYSFUNCTION, UNSPECIFIED ERECTILE DYSFUNCTION TYPE: ICD-10-CM

## 2022-08-31 RX ORDER — SILDENAFIL 100 MG/1
100 TABLET, FILM COATED ORAL DAILY PRN
Qty: 20 TABLET | Refills: 0 | Status: SHIPPED | OUTPATIENT
Start: 2022-08-31

## 2022-08-31 NOTE — TELEPHONE ENCOUNTER
Medication Refill Request     Name sildenafil  Dose/Frequency 100mg daily  Quantity 20  Verified pharmacy   [x]  Verified ordering Provider   [x]  Does patient have enough for the next 3 days? Yes [] No [x]    Patient on vacation; needs it sent to [address] [place] [address] instead  Ok picking it up tomorrow

## 2022-09-02 ENCOUNTER — APPOINTMENT (OUTPATIENT)
Dept: RADIOLOGY | Facility: CLINIC | Age: 67
End: 2022-09-02
Payer: MEDICARE

## 2022-09-02 DIAGNOSIS — N20.0 KIDNEY STONE: ICD-10-CM

## 2022-09-02 DIAGNOSIS — N20.1 CALCULUS OF URETER: ICD-10-CM

## 2022-09-02 PROCEDURE — 74018 RADEX ABDOMEN 1 VIEW: CPT

## 2022-09-06 ENCOUNTER — TELEPHONE (OUTPATIENT)
Dept: INTERVENTIONAL RADIOLOGY/VASCULAR | Facility: HOSPITAL | Age: 67
End: 2022-09-06

## 2022-09-13 ENCOUNTER — HOSPITAL ENCOUNTER (OUTPATIENT)
Dept: CT IMAGING | Facility: HOSPITAL | Age: 67
Discharge: HOME | End: 2022-09-13
Attending: RADIOLOGY
Payer: MEDICARE

## 2022-09-13 ENCOUNTER — HOSPITAL ENCOUNTER (OUTPATIENT)
Dept: RADIOLOGY | Facility: HOSPITAL | Age: 67
Discharge: HOME | End: 2022-09-13
Attending: RADIOLOGY
Payer: MEDICARE

## 2022-09-13 VITALS
BODY MASS INDEX: 29.86 KG/M2 | OXYGEN SATURATION: 94 % | TEMPERATURE: 98.5 F | WEIGHT: 197 LBS | HEART RATE: 66 BPM | HEIGHT: 68 IN | SYSTOLIC BLOOD PRESSURE: 127 MMHG | RESPIRATION RATE: 18 BRPM | DIASTOLIC BLOOD PRESSURE: 79 MMHG

## 2022-09-13 DIAGNOSIS — R91.1 PULMONARY NODULE, RIGHT: ICD-10-CM

## 2022-09-13 PROCEDURE — 88305 TISSUE EXAM BY PATHOLOGIST: CPT | Performed by: PATHOLOGY

## 2022-09-13 PROCEDURE — 71045 X-RAY EXAM CHEST 1 VIEW: CPT

## 2022-09-13 PROCEDURE — 32408 CORE NDL BX LNG/MED PERQ: CPT | Performed by: RADIOLOGY

## 2022-09-13 PROCEDURE — 88342 IMHCHEM/IMCYTCHM 1ST ANTB: CPT | Performed by: PATHOLOGY

## 2022-09-13 PROCEDURE — 32408 CORE NDL BX LNG/MED PERQ: CPT

## 2022-09-13 PROCEDURE — 88341 IMHCHEM/IMCYTCHM EA ADD ANTB: CPT | Performed by: PATHOLOGY

## 2022-09-13 PROCEDURE — 77012 CT SCAN FOR NEEDLE BIOPSY: CPT

## 2022-09-13 RX ORDER — OXYCODONE HYDROCHLORIDE AND ACETAMINOPHEN 5; 325 MG/1; MG/1
2 TABLET ORAL ONCE
Status: COMPLETED | OUTPATIENT
Start: 2022-09-13 | End: 2022-09-13

## 2022-09-13 RX ORDER — FENTANYL CITRATE 50 UG/ML
INJECTION, SOLUTION INTRAMUSCULAR; INTRAVENOUS CODE/TRAUMA/SEDATION MEDICATION
Status: COMPLETED | OUTPATIENT
Start: 2022-09-13 | End: 2022-09-13

## 2022-09-13 RX ORDER — LIDOCAINE HYDROCHLORIDE 10 MG/ML
INJECTION, SOLUTION EPIDURAL; INFILTRATION; INTRACAUDAL; PERINEURAL CODE/TRAUMA/SEDATION MEDICATION
Status: COMPLETED | OUTPATIENT
Start: 2022-09-13 | End: 2022-09-13

## 2022-09-13 RX ORDER — SODIUM CHLORIDE 9 MG/ML
30 INJECTION, SOLUTION INTRAVENOUS CONTINUOUS
Status: DISCONTINUED | OUTPATIENT
Start: 2022-09-13 | End: 2022-09-14 | Stop reason: HOSPADM

## 2022-09-13 RX ADMIN — FENTANYL CITRATE 25 MCG: 50 INJECTION, SOLUTION INTRAMUSCULAR; INTRAVENOUS at 10:00

## 2022-09-13 RX ADMIN — OXYCODONE HYDROCHLORIDE AND ACETAMINOPHEN 2 TABLET: 5; 325 TABLET ORAL at 10:45

## 2022-09-13 RX ADMIN — LIDOCAINE HYDROCHLORIDE 15 ML: 10 INJECTION, SOLUTION EPIDURAL; INFILTRATION; INTRACAUDAL; PERINEURAL at 09:53

## 2022-09-13 RX ADMIN — SODIUM CHLORIDE 30 ML/HR: 0.9 INJECTION, SOLUTION INTRAVENOUS at 09:09

## 2022-09-13 RX ADMIN — FENTANYL CITRATE 25 MCG: 50 INJECTION, SOLUTION INTRAMUSCULAR; INTRAVENOUS at 09:41

## 2022-09-13 NOTE — H&P
Interventional Radiology  History and Physical 9/13/2022     Sravanthi Martinez   [birth year]   [id number]    Assessment/Plan:  Assessment is PET avid right lower lobe mass  Plan is CT-guided core biopsy, POND positioning  Problem List Items Addressed This Visit    None     Visit Diagnoses     Pulmonary nodule, right        Relevant Orders    IR biopsy lung             Subjective:     Patient ID: Sravanthi Martinez is a 75 y o  male  History of Present Illness  Smoker with PET avid lesion    Review of Systems      Past Medical History:   Diagnosis Date    Abdominal aortic aneurysm without rupture ([address] )     Abdominal Aortic Duplex 02/21/2017    Ectatic infrarenal abdominal aorta   CAD (coronary artery disease)     COPD (chronic obstructive pulmonary disease) (HCC)     Extremity pain     History of echocardiogram 03/18/2014    EF 55%, mild MR and AI  Mild concentric LVH   History of stress test 03/06/2017    Normal     Hypertension     Joint pain     Low back pain     Migraine     Neck pain     Obstructive sleep apnea     cannot tolerate CPAP    Osteoarthritis     Peripheral neuropathy     Reflex sympathetic dystrophy         Past Surgical History:   Procedure Laterality Date    CARDIAC CATHETERIZATION  02/13/2012    EF 70%, widely patent renal arteries, significant single-vessel CAD-medical therapy   CARDIAC CATHETERIZATION  04/11/2013    EF 65%, 50% mid LAD, 20% prox CFX, 90% diffuse RCA, 99% mid RCA  Medical management      CHOLECYSTECTOMY      EPIDURAL BLOCK INJECTION Bilateral 8/15/2019    Procedure: BLOCK / INJECTION EPIDURAL STEROID CERVICAL;  Surgeon: Francoise Rincon MD;  Location: [facility];  Service: Pain Management     FL GUIDED NEEDLE PLAC BX/ASP/INJ  8/15/2019    ORTHOPEDIC SURGERY      TRIGGER POINT INJECTION          Social History     Tobacco Use   Smoking Status Current Every Day Smoker    Packs/day: 1 50   Smokeless Tobacco Never Used        Social History Substance and Sexual Activity   Alcohol Use Not Currently        Social History     Substance and Sexual Activity   Drug Use No        No Known Allergies    Current Outpatient Medications   Medication Sig Dispense Refill    albuterol (Ventolin HFA) 90 mcg/act inhaler Inhale 2 puffs every 6 (six) hours as needed for wheezing 18 g 11    amLODIPine (NORVASC) 10 mg tablet swallow 1 tablet once daily 90 tablet 3    aspirin (ECOTRIN LOW STRENGTH) 81 mg EC tablet Take 1 tablet (81 mg total) by mouth daily 30 tablet 5    gabapentin (NEURONTIN) 600 MG tablet Take 1 tablet (600 mg total) by mouth 3 (three) times a day 90 tablet 2    lisinopril (ZESTRIL) 20 mg tablet take 1 tablet by mouth once daily 30 tablet 5    metoprolol succinate (TOPROL-XL) 100 mg 24 hr tablet take 1 tablet by mouth once daily 30 tablet 5    nicotine (NICODERM CQ) 21 mg/24 hr TD 24 hr patch Place 1 patch on the skin every 24 hours 28 patch 0    rosuvastatin (CRESTOR) 20 MG tablet Take 1 tablet (20 mg total) by mouth daily 30 tablet 5    sildenafil (VIAGRA) 100 mg tablet Take 1 tablet (100 mg total) by mouth daily as needed for erectile dysfunction 20 tablet 0    tamsulosin (FLOMAX) 0 4 mg Take 2 capsules (0 8 mg total) by mouth daily with dinner 60 capsule 0    tiotropium-olodaterol (Stiolto Respimat) 2 5-2 5 MCG/ACT inhaler Inhale 2 puffs daily 4 g 0    topiramate (TOPAMAX) 25 mg tablet FOLLOW INSTRUCTIONS GIVEN TO TITRATE UP TO A MAX OF 2 TABLETS BY MOUTH TWICE DAILY AS TOLERATED AND TO LEVEL OF BENEFIT FOR TREMORS 120 tablet 2    traZODone (DESYREL) 100 mg tablet Take 1 tablet (100 mg total) by mouth daily at bedtime 90 tablet 3    Blood Glucose Monitoring Suppl (OneTouch Verio Flex System) w/Device KIT USE IN THE MORNING TEST ONCE DAILY 1 kit 0    glucose blood (OneTouch Verio) test strip Test once daily 100 each 0    ipratropium-albuterol (DUO-NEB) 0 5-2 5 mg/3 mL nebulizer solution Take 3 mL by nebulization every 6 (six) hours as needed for wheezing or shortness of breath 180 mL 11    Lancets (onetouch ultrasoft) lancets Test once daily 100 each 0    nitroglycerin (NITROSTAT) 0 4 mg SL tablet Place 1 tablet (0 4 mg total) under the tongue every 5 (five) minutes as needed for chest pain (Patient not taking: No sig reported) 25 tablet 5    oxyCODONE-acetaminophen (Percocet) 7 5-325 MG per tablet Take 1 tablet by mouth every 8 (eight) hours as needed for moderate pain Do not fill until 8/4/2022 Max Daily Amount: 3 tablets (Patient not taking: No sig reported) 90 tablet 0    tiotropium-olodaterol (Stiolto Respimat) 2 5-2 5 MCG/ACT inhaler Inhale 2 puffs daily 4 g 11     Current Facility-Administered Medications   Medication Dose Route Frequency Provider Last Rate Last Admin    sodium chloride 0 9 % infusion  30 mL/hr Intravenous Continuous Odella Lennox, MD 30 mL/hr at 09/13/22 0909 30 mL/hr at 09/13/22 0909          Objective:    Vitals:    09/13/22 1005 09/13/22 1020 09/13/22 1035 09/13/22 1050   BP: (!) 174/76 (!) 175/99 138/99 (!) 184/99   Pulse: 80 69 77 64   Resp: 17 16 18    Temp:  98 5 °F (36 9 °C)     TempSrc:       SpO2: 97% 97% 97% 97%   Weight:       Height:            Physical Exam    Dysconjugate gaze  Nicotine staining to beard  Regular rate and rhythm  Normal respiratory excursion    No results found for: BNP   Lab Results   Component Value Date    WBC 8 68 07/25/2022    HGB 15 0 07/25/2022    HCT 46 8 07/25/2022    MCV 94 07/25/2022     07/25/2022     No results found for: INR, PROTIME  No results found for: PTT      I have personally reviewed pertinent imaging and laboratory results  Code Status: Prior  Advance Directive and Living Will:      Power of :    POLST:      This text is generated with voice recognition software  There may be translation, syntax,  or grammatical errors  If you have any questions, please contact the dictating provider

## 2022-09-13 NOTE — PROCEDURES
Interventional Radiology Procedure Note    PATIENT NAME: Jaime Evans  :   MRN: [id number]     Pre-op Diagnosis:   1  Pulmonary nodule, right      2  Post-op Diagnosis:   1  Pulmonary nodule, right      2     Same    Procedure: CT Guided Biopsy of the right lower lobe mass  Surgeon: Venu Posada MD  Assistants: No qualified resident was available, Resident is only observing  Estimated Blood Loss: 10 cc  Findings: Please see full report in PACS  Specimens: Please see full report in PACS  Complications: None  Anesthesia: conscious sedation and local and IV Fentanyl  Prep: 2% Chlorhexidine and alcohol, allowed to dry prior to sterile draping  Timeout: Performed  Fluoro time: Please see full report in PACS  Radiation dose: Please see full report in PACS  Contrast dose: Please see full report in PACS  Contrast type: Please see full report in PACS  Contrast strength: Please see full report in PACS  Contrast route of administration: Please see full report in PACS  Antibiotics: None        Venu Posada MD     Date: 2022  Time: 11:06 AM

## 2022-09-13 NOTE — DISCHARGE INSTRUCTIONS
[facility]  Interventional Radiology  Dr Kayce Fulton  [phone]                         Needle Biopsy of the Lung    WHAT YOU NEED TO KNOW:  A needle biopsy of the lung is a procedure to remove cells or tissue from your lung  You may have a fine needle aspiration biopsy (FNAB), or a core needle biopsy (CNB)  A FNAB is used to remove cells through a thin needle  CNB uses a thicker needle to remove lung tissue  The samples are collected and tested for inflammation, infection, or cancer  DISCHARGE INSTRUCTIONS:   Resume your normal diet  Small sips of flat soda will help with nausea  Limit your activity for 24 hours  Wound Care:      - Remove band aid in 24 hours      Contact Interventional Radiology at [phone] Stacey PATIENTS: Contact Interventional Radiology at [phone]) Mariela Lomeli PATIENTS: Contact Interventional Radiology at [phone]) if any of the following occur:    - You have a fever greater than 101*    - You cough up large amounts of bright red blood     - You have chest pain with breathing    - You have shortness of breath    -You have persistent nausea and vomiting    - You have pus, redness or swelling around your biopsy site    - You have questions or concerns about your condition or care

## 2022-09-15 PROCEDURE — 88341 IMHCHEM/IMCYTCHM EA ADD ANTB: CPT | Performed by: PATHOLOGY

## 2022-09-15 PROCEDURE — 88305 TISSUE EXAM BY PATHOLOGIST: CPT | Performed by: PATHOLOGY

## 2022-09-15 PROCEDURE — 88342 IMHCHEM/IMCYTCHM 1ST ANTB: CPT | Performed by: PATHOLOGY

## 2022-09-19 ENCOUNTER — TELEPHONE (OUTPATIENT)
Dept: PULMONOLOGY | Facility: CLINIC | Age: 67
End: 2022-09-19

## 2022-09-19 DIAGNOSIS — C34.90 NON-SMALL CELL LUNG CANCER, UNSPECIFIED LATERALITY (HCC): Primary | ICD-10-CM

## 2022-09-19 NOTE — TELEPHONE ENCOUNTER
I spoke to the patient over the phone, released the lung biopsy results to him  For now, will obtain the PFT, brain MRI with/without contrast to complete staging  Will discuss the case at the tumor board/referred to Medical Oncology

## 2022-09-19 NOTE — TELEPHONE ENCOUNTER
Called and scheduled patient for MRI anf PFT  They are both scheduled for 9/21 at the [facility] arrival time 7:45am     Called and notified Jose Dodson

## 2022-09-21 ENCOUNTER — DOCUMENTATION (OUTPATIENT)
Dept: HEMATOLOGY ONCOLOGY | Facility: CLINIC | Age: 67
End: 2022-09-21

## 2022-09-21 ENCOUNTER — HOSPITAL ENCOUNTER (OUTPATIENT)
Dept: MRI IMAGING | Facility: HOSPITAL | Age: 67
Discharge: HOME | End: 2022-09-21
Attending: INTERNAL MEDICINE
Payer: MEDICARE

## 2022-09-21 ENCOUNTER — HOSPITAL ENCOUNTER (OUTPATIENT)
Dept: PULMONOLOGY | Facility: HOSPITAL | Age: 67
Discharge: HOME | End: 2022-09-21
Attending: INTERNAL MEDICINE
Payer: MEDICARE

## 2022-09-21 DIAGNOSIS — C34.90 NON-SMALL CELL LUNG CANCER, UNSPECIFIED LATERALITY (HCC): ICD-10-CM

## 2022-09-21 DIAGNOSIS — J43.9 PULMONARY EMPHYSEMA, UNSPECIFIED EMPHYSEMA TYPE (HCC): ICD-10-CM

## 2022-09-21 PROCEDURE — 94060 EVALUATION OF WHEEZING: CPT

## 2022-09-21 PROCEDURE — 94726 PLETHYSMOGRAPHY LUNG VOLUMES: CPT

## 2022-09-21 PROCEDURE — 70553 MRI BRAIN STEM W/O & W/DYE: CPT

## 2022-09-21 PROCEDURE — A9585 GADOBUTROL INJECTION: HCPCS | Performed by: INTERNAL MEDICINE

## 2022-09-21 PROCEDURE — 94060 EVALUATION OF WHEEZING: CPT | Performed by: INTERNAL MEDICINE

## 2022-09-21 PROCEDURE — 94760 N-INVAS EAR/PLS OXIMETRY 1: CPT

## 2022-09-21 PROCEDURE — 94729 DIFFUSING CAPACITY: CPT | Performed by: INTERNAL MEDICINE

## 2022-09-21 PROCEDURE — 94729 DIFFUSING CAPACITY: CPT

## 2022-09-21 PROCEDURE — 94726 PLETHYSMOGRAPHY LUNG VOLUMES: CPT | Performed by: INTERNAL MEDICINE

## 2022-09-21 PROCEDURE — G1004 CDSM NDSC: HCPCS

## 2022-09-21 RX ORDER — ALBUTEROL SULFATE 2.5 MG/3ML
2.5 SOLUTION RESPIRATORY (INHALATION) ONCE AS NEEDED
Status: COMPLETED | OUTPATIENT
Start: 2022-09-21 | End: 2022-09-21

## 2022-09-21 RX ADMIN — ALBUTEROL SULFATE 2.5 MG: 2.5 SOLUTION RESPIRATORY (INHALATION) at 08:39

## 2022-09-21 RX ADMIN — GADOBUTROL 9 ML: 604.72 INJECTION INTRAVENOUS at 10:03

## 2022-09-21 NOTE — PROGRESS NOTES
Chart reviewed in preparation of Thoracic Tumor Conference presentation by Dr Leisa Tillman on 9/26/22  Patient with history of COPD and tobacco abuse  He was hospitalized in July for acute kidney injury  Chest imaging showed a new 1 1 x 0 9 RLL nodule  Also noted RUL 5 mm GGO that is also new   Multiple mediastinal lymphadenopathy 2 x 1 2 cm subcarinal   He underwent RLL lung biopsy on 9/13/22 with pathology positive for squamous cell carcinoma

## 2022-09-22 ENCOUNTER — PATIENT OUTREACH (OUTPATIENT)
Dept: HEMATOLOGY ONCOLOGY | Facility: CLINIC | Age: 67
End: 2022-09-22

## 2022-09-22 NOTE — PROGRESS NOTES
In-basket message received from Dr Alfredo Sun to add pt to 9/26/22 tumor board  Chart Reviewed and tumor board prep completed

## 2022-09-26 ENCOUNTER — APPOINTMENT (OUTPATIENT)
Dept: RADIOLOGY | Facility: HOSPITAL | Age: 67
End: 2022-09-26
Payer: MEDICARE

## 2022-09-26 ENCOUNTER — TELEPHONE (OUTPATIENT)
Dept: PAIN MEDICINE | Facility: CLINIC | Age: 67
End: 2022-09-26

## 2022-09-26 ENCOUNTER — DOCUMENTATION (OUTPATIENT)
Dept: HEMATOLOGY ONCOLOGY | Facility: CLINIC | Age: 67
End: 2022-09-26

## 2022-09-26 ENCOUNTER — TELEPHONE (OUTPATIENT)
Dept: OTHER | Facility: HOSPITAL | Age: 67
End: 2022-09-26

## 2022-09-26 ENCOUNTER — HOSPITAL ENCOUNTER (EMERGENCY)
Facility: HOSPITAL | Age: 67
Discharge: HOME | End: 2022-09-26
Attending: EMERGENCY MEDICINE
Payer: MEDICARE

## 2022-09-26 VITALS
RESPIRATION RATE: 20 BRPM | TEMPERATURE: 98.9 F | HEIGHT: 68 IN | WEIGHT: 197 LBS | OXYGEN SATURATION: 98 % | DIASTOLIC BLOOD PRESSURE: 107 MMHG | HEART RATE: 53 BPM | SYSTOLIC BLOOD PRESSURE: 170 MMHG | BODY MASS INDEX: 29.86 KG/M2

## 2022-09-26 DIAGNOSIS — C34.11 PRIMARY SQUAMOUS CELL CARCINOMA OF UPPER LOBE OF RIGHT LUNG (HCC): Primary | ICD-10-CM

## 2022-09-26 DIAGNOSIS — M62.838 TRAPEZIUS MUSCLE SPASM: Primary | ICD-10-CM

## 2022-09-26 LAB
ALBUMIN SERPL BCP-MCNC: 3.9 G/DL (ref 3.5–5)
ALP SERPL-CCNC: 88 U/L (ref 46–116)
ALT SERPL W P-5'-P-CCNC: 24 U/L (ref 12–78)
ANION GAP SERPL CALCULATED.3IONS-SCNC: 10 MMOL/L (ref 4–13)
AST SERPL W P-5'-P-CCNC: 15 U/L (ref 5–45)
BASOPHILS # BLD AUTO: 0.08 THOUSANDS/ÂΜL (ref 0–0.1)
BASOPHILS NFR BLD AUTO: 1 % (ref 0–1)
BILIRUB SERPL-MCNC: 0.85 MG/DL (ref 0.2–1)
BUN SERPL-MCNC: 11 MG/DL (ref 5–25)
CALCIUM SERPL-MCNC: 9.1 MG/DL (ref 8.3–10.1)
CARDIAC TROPONIN I PNL SERPL HS: 5 NG/L
CHLORIDE SERPL-SCNC: 100 MMOL/L (ref 96–108)
CO2 SERPL-SCNC: 25 MMOL/L (ref 21–32)
CREAT SERPL-MCNC: 1.08 MG/DL (ref 0.6–1.3)
EOSINOPHIL # BLD AUTO: 0 THOUSAND/ÂΜL (ref 0–0.61)
EOSINOPHIL NFR BLD AUTO: 0 % (ref 0–6)
ERYTHROCYTE [DISTWIDTH] IN BLOOD BY AUTOMATED COUNT: 13.2 % (ref 11.6–15.1)
GFR SERPL CREATININE-BSD FRML MDRD: 70 ML/MIN/1.73SQ M
GLUCOSE SERPL-MCNC: 135 MG/DL (ref 65–140)
HCT VFR BLD AUTO: 49.5 % (ref 36.5–49.3)
HGB BLD-MCNC: 16.4 G/DL (ref 12–17)
IMM GRANULOCYTES # BLD AUTO: 0.02 THOUSAND/UL (ref 0–0.2)
IMM GRANULOCYTES NFR BLD AUTO: 0 % (ref 0–2)
LYMPHOCYTES # BLD AUTO: 3.01 THOUSANDS/ÂΜL (ref 0.6–4.47)
LYMPHOCYTES NFR BLD AUTO: 31 % (ref 14–44)
MCH RBC QN AUTO: 30.6 PG (ref 26.8–34.3)
MCHC RBC AUTO-ENTMCNC: 33.1 G/DL (ref 31.4–37.4)
MCV RBC AUTO: 92 FL (ref 82–98)
MONOCYTES # BLD AUTO: 0.73 THOUSAND/ÂΜL (ref 0.17–1.22)
MONOCYTES NFR BLD AUTO: 8 % (ref 4–12)
NEUTROPHILS # BLD AUTO: 5.88 THOUSANDS/ÂΜL (ref 1.85–7.62)
NEUTS SEG NFR BLD AUTO: 60 % (ref 43–75)
NRBC BLD AUTO-RTO: 0 /100 WBCS
PLATELET # BLD AUTO: 213 THOUSANDS/UL (ref 149–390)
PMV BLD AUTO: 10.4 FL (ref 8.9–12.7)
POTASSIUM SERPL-SCNC: 3.7 MMOL/L (ref 3.5–5.3)
PROT SERPL-MCNC: 7.4 G/DL (ref 6.4–8.4)
RBC # BLD AUTO: 5.36 MILLION/UL (ref 3.88–5.62)
SODIUM SERPL-SCNC: 135 MMOL/L (ref 135–147)
WBC # BLD AUTO: 9.72 THOUSAND/UL (ref 4.31–10.16)

## 2022-09-26 PROCEDURE — 99285 EMERGENCY DEPT VISIT HI MDM: CPT | Performed by: EMERGENCY MEDICINE

## 2022-09-26 PROCEDURE — 80053 COMPREHEN METABOLIC PANEL: CPT | Performed by: EMERGENCY MEDICINE

## 2022-09-26 PROCEDURE — 84484 ASSAY OF TROPONIN QUANT: CPT | Performed by: EMERGENCY MEDICINE

## 2022-09-26 PROCEDURE — 85025 COMPLETE CBC W/AUTO DIFF WBC: CPT | Performed by: EMERGENCY MEDICINE

## 2022-09-26 PROCEDURE — 96374 THER/PROPH/DIAG INJ IV PUSH: CPT

## 2022-09-26 PROCEDURE — 99284 EMERGENCY DEPT VISIT MOD MDM: CPT

## 2022-09-26 PROCEDURE — 71045 X-RAY EXAM CHEST 1 VIEW: CPT

## 2022-09-26 PROCEDURE — 36415 COLL VENOUS BLD VENIPUNCTURE: CPT | Performed by: EMERGENCY MEDICINE

## 2022-09-26 PROCEDURE — 93005 ELECTROCARDIOGRAM TRACING: CPT

## 2022-09-26 RX ORDER — KETOROLAC TROMETHAMINE 30 MG/ML
15 INJECTION, SOLUTION INTRAMUSCULAR; INTRAVENOUS ONCE
Status: COMPLETED | OUTPATIENT
Start: 2022-09-26 | End: 2022-09-26

## 2022-09-26 RX ORDER — METHOCARBAMOL 500 MG/1
500 TABLET, FILM COATED ORAL 3 TIMES DAILY PRN
Qty: 20 TABLET | Refills: 0 | Status: SHIPPED | OUTPATIENT
Start: 2022-09-26 | End: 2022-10-12

## 2022-09-26 RX ORDER — KETOROLAC TROMETHAMINE 30 MG/ML
15 INJECTION, SOLUTION INTRAMUSCULAR; INTRAVENOUS ONCE
Status: DISCONTINUED | OUTPATIENT
Start: 2022-09-26 | End: 2022-09-26

## 2022-09-26 RX ORDER — LIDOCAINE 50 MG/G
1 PATCH TOPICAL ONCE
Status: DISCONTINUED | OUTPATIENT
Start: 2022-09-26 | End: 2022-09-26 | Stop reason: HOSPADM

## 2022-09-26 RX ADMIN — LIDOCAINE 5% 1 PATCH: 700 PATCH TOPICAL at 12:43

## 2022-09-26 RX ADMIN — KETOROLAC TROMETHAMINE 15 MG: 30 INJECTION, SOLUTION INTRAMUSCULAR at 12:42

## 2022-09-26 NOTE — TELEPHONE ENCOUNTER
S/w pt having 10/10 pain in Lt Arm, shoulder, scapula and neck  Pt states the only relief he has is when he bows his head to his chest, but as soon as he positions neck back to neutral he is in "extreme" pain again  Pt states pain is sharp/shooting w/ numbness in Lt arm  Pt states pain started 9/25/22 w/o injury or trauma  Pt denies chest pain or diff breathing  Pt states trying hot shower, percocet as well as other prescribed meds w/ 0% relief in pain sx  Pt states he will either call ambulance or go to ER due to unbearable pain sx  RN advised pt to seek ER tx ASAP for further eval   Pt recently dx w/ lung CA (going for CT today 9/26/22) and addit renal stones  Please advise

## 2022-09-26 NOTE — TELEPHONE ENCOUNTER
Called patient to review recommendations discussed at thoracic tumor board  Recommend referral to thoracic surgery for possible lobectomy  Patient verbalized understanding and agrees to the plan

## 2022-09-26 NOTE — PROGRESS NOTES
THORACIC ONCOLOGY MULTIDISCIPLINARY CASE REVIEW    DATE:  9/26/2022    PRESENTING DOCTOR:  Dr Amira Torres for Dr Alfredo Sun    DIAGNOSIS:  NSCLC~squamous cell carcinoma  STAGING:     Viral Gan is a 75 y o  male who was presented at the Thoracic Oncology Multidisciplinary Tumor Conference today  He has a history of COPD and tobacco abuse  He was hospitalized in July for acute kidney injury  Chest imaging showed a new 1 1 x 0 9 RLL nodule  Also noted RUL 5 mm GGO that is also new   Multiple mediastinal lymphadenopathy 2 x 1 2 cm subcarinal   He underwent RLL lung biopsy on 9/13/22 with pathology positive for squamous cell carcinoma  PHYSICIAN RECOMMENDED PLAN:  Refer to Thoracic Surgery for possible resection  Imaging reviewed:   8/5/2022 PET/CT-no evidence of glucose avid intrathoracic adenopathy  There is no evidence of distant glucose avid metastatic disease    7/23/2022 CTA chest, abdomen and pelvis  11/20/2019 CT lung screening    Pathology reviewed:   9/13/2022 Lung RLL core bx: NSCLC~squamous cell carcinoma    PFT's reviewed:  9/21/2022 FEV1 72%; DLCO 71%    Future imaging:  None at this time  Referrals:  Thoracic Surgery     Procedures:  TBD      Team agreed to plan  NCCN guidelines were readily available for review at this discussion    The final treatment plan will be left to the discretion of the patient and the treating physician  DISCLAIMERS:  TO THE TREATING PHYSICIAN:  This conference is a meeting of clinicians from various specialty areas who evaluate and discuss patients for whom a multidisciplinary treatment approach is being considered  Please note that the above opinion was a consensus of the conference attendees and is intended only to assist in quality care of the discussed patient  The responsibility for follow up on the input given during the conference, along with any final decisions regarding plan of care, is that of the patient and the patient's provider  Accordingly, appointments have only been recommended based on this information and have NOT been scheduled unless otherwise noted  TO THE PATIENT:  This summary is a brief record of major aspects of your cancer treatment  You may choose to share a copy with any of your doctors or nurses  However, this is not a detailed or comprehensive record of your care

## 2022-09-26 NOTE — TELEPHONE ENCOUNTER
Patient is in extreme pain will be heading to ER soon he wanted to speak with BK to see if there was anything else he can do hand numbness, left shoulder pain and sever neck pain       Please advise

## 2022-09-26 NOTE — PROGRESS NOTES
Discussed case at tumor board  Recommend referral to thoracic surgery  Attempted to call patient but no answer  Left messaged asking him to call back

## 2022-09-26 NOTE — ED PROVIDER NOTES
History  Chief Complaint   Patient presents with    Arm Pain     Patient presents to the ED for left shoulder pain that starts in his neck, radiates down his back and into his left arm  Patient denies injury  Patient states numbness and tingling on the left  Decreased sensation  Patient states touching his chin to chest helps to ease the pain  50-year-old male presents for evaluation of left back pain  Patient reports recent diagnosis of non-small cell carcinoma of the lung, he has nodules in his right lung and he recently underwent PET scan in August that showed no evidence of metastatic disease, he additionally had a brain MRI performed on 09/21/2022 with no evidence of metastatic disease  Patient reports yesterday he got out of his car and felt the onset of pain, pain is near his left scapula  He believes there is some radiation down his left arm however he feels the most in his left fingers and palm, back of hand  When patient flexes his neck towards his chest this helps relieve his pain  Movement exacerbates the pain  Patient tried taking narcotic pain medicine which he is prescribed at home without relief in symptoms  Patient denies other recent injury, headache, chest pain, dyspnea, nausea or vomiting  Patient was also found to be bradycardic on monitor, states he is unaware of this previously however on review of recent office visits he has been in the 46s  He denies lightheadedness or syncope  Prior to Admission Medications   Prescriptions Last Dose Informant Patient Reported? Taking?    Blood Glucose Monitoring Suppl (OneTouch Verio Flex System) w/Device KIT   No No   Sig: USE IN THE MORNING TEST ONCE DAILY   Lancets (onetouch ultrasoft) lancets   No No   Sig: Test once daily   albuterol (Ventolin HFA) 90 mcg/act inhaler   No No   Sig: Inhale 2 puffs every 6 (six) hours as needed for wheezing   amLODIPine (NORVASC) 10 mg tablet   No No   Sig: swallow 1 tablet once daily   aspirin (ECOTRIN LOW STRENGTH) 81 mg EC tablet   No No   Sig: Take 1 tablet (81 mg total) by mouth daily   gabapentin (NEURONTIN) 600 MG tablet   No No   Sig: Take 1 tablet (600 mg total) by mouth 3 (three) times a day   glucose blood (OneTouch Verio) test strip   No No   Sig: Test once daily   lisinopril (ZESTRIL) 20 mg tablet   No No   Sig: take 1 tablet by mouth once daily   metoprolol succinate (TOPROL-XL) 100 mg 24 hr tablet   No No   Sig: take 1 tablet by mouth once daily   nicotine (NICODERM CQ) 21 mg/24 hr TD 24 hr patch   No No   Sig: Place 1 patch on the skin every 24 hours   nitroglycerin (NITROSTAT) 0 4 mg SL tablet   No No   Sig: Place 1 tablet (0 4 mg total) under the tongue every 5 (five) minutes as needed for chest pain   Patient not taking: No sig reported   oxyCODONE-acetaminophen (Percocet) 7 5-325 MG per tablet   No No   Sig: Take 1 tablet by mouth every 8 (eight) hours as needed for moderate pain Do not fill until 8/4/2022 Max Daily Amount: 3 tablets   Patient not taking: No sig reported   rosuvastatin (CRESTOR) 20 MG tablet   No No   Sig: Take 1 tablet (20 mg total) by mouth daily   sildenafil (VIAGRA) 100 mg tablet   No No   Sig: Take 1 tablet (100 mg total) by mouth daily as needed for erectile dysfunction   tamsulosin (FLOMAX) 0 4 mg   No No   Sig: Take 2 capsules (0 8 mg total) by mouth daily with dinner   tiotropium-olodaterol (Stiolto Respimat) 2 5-2 5 MCG/ACT inhaler   No No   Sig: Inhale 2 puffs daily   tiotropium-olodaterol (Stiolto Respimat) 2 5-2 5 MCG/ACT inhaler   No No   Sig: Inhale 2 puffs daily   traZODone (DESYREL) 100 mg tablet  Self No No   Sig: Take 1 tablet (100 mg total) by mouth daily at bedtime      Facility-Administered Medications: None       Past Medical History:   Diagnosis Date    Abdominal aortic aneurysm without rupture (HCC)     Abdominal Aortic Duplex 02/21/2017    Ectatic infrarenal abdominal aorta      CAD (coronary artery disease)     COPD [facility]chronic obstructive pulmonary disease) ([facility] [address] )     Extremity pain     History of echocardiogram 03/18/2014    EF 55%, mild MR and AI  Mild concentric LVH   History of stress test 03/06/2017    Normal     Hypertension     Joint pain     Low back pain     Migraine     Neck pain     Obstructive sleep apnea     cannot tolerate CPAP    Osteoarthritis     Peripheral neuropathy     Reflex sympathetic dystrophy        Past Surgical History:   Procedure Laterality Date    CARDIAC CATHETERIZATION  02/13/2012    EF 70%, widely patent renal arteries, significant single-vessel CAD-medical therapy   CARDIAC CATHETERIZATION  04/11/2013    EF 65%, 50% mid LAD, 20% prox CFX, 90% diffuse RCA, 99% mid RCA  Medical management   CHOLECYSTECTOMY      EPIDURAL BLOCK INJECTION Bilateral 8/15/2019    Procedure: BLOCK / INJECTION EPIDURAL STEROID CERVICAL;  Surgeon: Joana Stanton MD;  Location: [facility];  Service: Pain Management     FL GUIDED NEEDLE PLAC BX/ASP/INJ  8/15/2019    IR BIOPSY LUNG  9/13/2022    ORTHOPEDIC SURGERY      TRIGGER POINT INJECTION         Family History   Adopted: Yes   Problem Relation Age of Onset    No Known Problems Family     No Known Problems Mother     No Known Problems Father      I have reviewed and agree with the history as documented  E-Cigarette/Vaping    E-Cigarette Use Never User      E-Cigarette/Vaping Substances    Nicotine Yes     THC No     CBD No     Flavoring No     Other No     Unknown No      Social History     Tobacco Use    Smoking status: Current Every Day Smoker     Packs/day: 1 50    Smokeless tobacco: Never Used   Vaping Use    Vaping Use: Never used   Substance Use Topics    Alcohol use: Not Currently    Drug use: No       Review of Systems   Constitutional: Negative for activity change, appetite change, chills and fever  Respiratory: Negative for shortness of breath  Cardiovascular: Negative for chest pain     Gastrointestinal: Negative for abdominal pain, nausea and vomiting  Musculoskeletal: Positive for arthralgias and myalgias  Negative for back pain, neck pain and neck stiffness  Neurological: Negative for weakness, light-headedness, numbness and headaches  All other systems reviewed and are negative  Physical Exam  Physical Exam  Vitals reviewed  Constitutional:       General: He is not in acute distress  Appearance: Normal appearance  He is not ill-appearing, toxic-appearing or diaphoretic  HENT:      Head: Normocephalic and atraumatic  Right Ear: External ear normal       Left Ear: External ear normal       Nose: Nose normal       Mouth/Throat:      Mouth: Mucous membranes are moist    Eyes:      General: No scleral icterus  Right eye: No discharge  Left eye: No discharge  Extraocular Movements: Extraocular movements intact  Cardiovascular:      Rate and Rhythm: Regular rhythm  Bradycardia present  Pulmonary:      Effort: Pulmonary effort is normal  No respiratory distress  Musculoskeletal:         General: Tenderness present  No deformity or signs of injury  Back:       Comments: 5/5 strength in R , arm flexion/extension; tenderness along L trapezius; no midline c/t/l pain; L radial pulse palpable   Skin:     General: Skin is warm  Coloration: Skin is not jaundiced or pale  Findings: No bruising, erythema, lesion or rash  Neurological:      General: No focal deficit present  Mental Status: He is alert  Mental status is at baseline  Cranial Nerves: No cranial nerve deficit  Sensory: No sensory deficit  Motor: No weakness           Vital Signs  ED Triage Vitals   Temperature Pulse Respirations Blood Pressure SpO2   09/26/22 1059 09/26/22 1059 09/26/22 1059 09/26/22 1059 09/26/22 1059   98 9 °F (37 2 °C) (S) (!) 48 20 (!) 188/92 97 %      Temp src Heart Rate Source Patient Position - Orthostatic VS BP Location FiO2 (%)   -- 09/26/22 1059 09/26/22 1200 09/26/22 1200 --    Monitor Lying Right arm       Pain Score       09/26/22 1059       10 - Worst Possible Pain           Vitals:    09/26/22 1059 09/26/22 1200   BP: (!) 188/92 (!) 170/107   Pulse: (S) (!) 48 (!) 53   Patient Position - Orthostatic VS:  Lying         Visual Acuity      ED Medications  Medications   ketorolac (TORADOL) injection 15 mg (15 mg Intravenous Given 9/26/22 1242)       Diagnostic Studies  Results Reviewed     Procedure Component Value Units Date/Time    HS Troponin 0hr (reflex protocol) [[id number]]  (Normal) Collected: 09/26/22 1137    Lab Status: Final result Specimen: Blood from Arm, Left Updated: 09/26/22 1208     hs TnI 0hr 5 ng/L     Comprehensive metabolic panel [[id number]] Collected: 09/26/22 1137    Lab Status: Final result Specimen: Blood from Arm, Left Updated: 09/26/22 1206     Sodium 135 mmol/L      Potassium 3 7 mmol/L      Chloride 100 mmol/L      CO2 25 mmol/L      ANION GAP 10 mmol/L      BUN 11 mg/dL      Creatinine 1 08 mg/dL      Glucose 135 mg/dL      Calcium 9 1 mg/dL      AST 15 U/L      ALT 24 U/L      Alkaline Phosphatase 88 U/L      Total Protein 7 4 g/dL      Albumin 3 9 g/dL      Total Bilirubin 0 85 mg/dL      eGFR 70 ml/min/1 73sq m     Narrative:      Carly guidelines for Chronic Kidney Disease (CKD):     Stage 1 with normal or high GFR (GFR > 90 mL/min/1 73 square meters)    Stage 2 Mild CKD (GFR = 60-89 mL/min/1 73 square meters)    Stage 3A Moderate CKD (GFR = 45-59 mL/min/1 73 square meters)    Stage 3B Moderate CKD (GFR = 30-44 mL/min/1 73 square meters)    Stage 4 Severe CKD (GFR = 15-29 mL/min/1 73 square meters)    Stage 5 End Stage CKD (GFR <15 mL/min/1 73 square meters)  Note: GFR calculation is accurate only with a steady state creatinine    CBC and differential [[id number]]  (Abnormal) Collected: 09/26/22 1137    Lab Status: Final result Specimen: Blood from Arm, Left Updated: 09/26/22 1146     WBC 9 72 Thousand/uL      RBC 5 36 Million/uL      Hemoglobin 16 4 g/dL      Hematocrit 49 5 %      MCV 92 fL      MCH 30 6 pg      MCHC 33 1 g/dL      RDW 13 2 %      MPV 10 4 fL      Platelets 498 Thousands/uL      nRBC 0 /100 WBCs      Neutrophils Relative 60 %      Immat GRANS % 0 %      Lymphocytes Relative 31 %      Monocytes Relative 8 %      Eosinophils Relative 0 %      Basophils Relative 1 %      Neutrophils Absolute 5 88 Thousands/µL      Immature Grans Absolute 0 02 Thousand/uL      Lymphocytes Absolute 3 01 Thousands/µL      Monocytes Absolute 0 73 Thousand/µL      Eosinophils Absolute 0 00 Thousand/µL      Basophils Absolute 0 08 Thousands/µL                  XR chest 1 view portable   Final Result by Remedios Martínez MD (09/26 1207)      No acute cardiopulmonary disease  Previous right basal infiltrate has resolved            Workstation performed: [id number]                    Procedures  Procedures         ED Course  ED Course as of 09/27/22 1206   Mon Sep 26, 2022   1137 Procedure Note: EKG  Date/Time: 09/26/22 11:38 AM   Interpreted by: Melissa Aguilar  Indications / Diagnosis: arm pain  ECG reviewed by me, the ED Provider: yes   The EKG demonstrates:  Rhythm: sinus bradycardia  Intervals: normal intervals  Axis: normal axis  QRS/Blocks: normal QRS  ST Changes: No acute ST Changes, no STD/VICTOR MANUEL  Early repolarizations       [facility] 48  On review, patient does not recal his HR being in 46s previously however he has been recorded most recently at office visits in 46s  Asymptomatic  1215 XR chest 1 view portable  No acute cardiopulomary disease, L shouler arthritis--previously undewent injections   1222 hs TnI 0hr: 5  Do not feel this represents ACS, nor does patient complaint represent ACS  MDM  Number of Diagnoses or Management Options  Trapezius muscle spasm  Diagnosis management comments: 28-year-old male presents for evaluation of left back pain    He noticed the pain yesterday after getting out of his vehicle, movement exacerbates the pain, neck flexion seems to help  He has intermittent tingling sensation only in his fingers, palm and back of hand, no forearm or upper arm involvement  Patient was 1st nurse cardiac evaluation, will await the results of this however pain seems to be musculoskeletal in nature  Doubt metastatic disease to his scapulas as patient recently had imaging without evidence of this  Will treat patient with Toradol, Lidoderm patch, provide prescription for muscle relaxer  He is advised to continue to stretch and massage the area he can additionally use heating pads  Disposition  Final diagnoses:   Trapezius muscle spasm     Time reflects when diagnosis was documented in both MDM as applicable and the Disposition within this note     Time User Action Codes Description Comment    9/26/2022 12:14 PM Emy Thomason Add [[id number]] Trapezius muscle spasm       ED Disposition     ED Disposition   Discharge    Condition   Stable    Date/Time   Mon Sep 26, 2022 12:14 PM    [address] discharge to home/self care  Follow-up Information     Follow up With Specialties Details Why Contact Info    Lui Acuna DO Family Medicine   [address] [place] [address]  [phone]            Discharge Medication List as of 9/26/2022 12:38 PM      START taking these medications    Details   methocarbamol (ROBAXIN) 500 mg tablet Take 1 tablet (500 mg total) by mouth 3 (three) times a day as needed for muscle spasms, Starting Mon 9/26/2022, Normal         CONTINUE these medications which have NOT CHANGED    Details   albuterol (Ventolin HFA) 90 mcg/act inhaler Inhale 2 puffs every 6 (six) hours as needed for wheezing, Starting Mon 8/15/2022, Normal      amLODIPine (NORVASC) 10 mg tablet swallow 1 tablet once daily, Normal      aspirin (ECOTRIN LOW STRENGTH) 81 mg EC tablet Take 1 tablet (81 mg total) by mouth daily, Starting Fri 5/6/2022, No Print      Blood Glucose Monitoring Suppl (OneTouch Verio Flex System) w/Device KIT USE IN THE MORNING TEST ONCE DAILY, Normal      gabapentin (NEURONTIN) 600 MG tablet Take 1 tablet (600 mg total) by mouth 3 (three) times a day, Starting Mon 6/6/2022, Until Tue 9/13/2022, Normal      glucose blood (OneTouch Verio) test strip Test once daily, Normal      Lancets (onetouch ultrasoft) lancets Test once daily, Normal      lisinopril (ZESTRIL) 20 mg tablet take 1 tablet by mouth once daily, Normal      metoprolol succinate (TOPROL-XL) 100 mg 24 hr tablet take 1 tablet by mouth once daily, Normal      nicotine (NICODERM CQ) 21 mg/24 hr TD 24 hr patch Place 1 patch on the skin every 24 hours, Starting Mon 8/1/2022, Normal      nitroglycerin (NITROSTAT) 0 4 mg SL tablet Place 1 tablet (0 4 mg total) under the tongue every 5 (five) minutes as needed for chest pain, Starting Fri 3/12/2021, Normal      oxyCODONE-acetaminophen (Percocet) 7 5-325 MG per tablet Take 1 tablet by mouth every 8 (eight) hours as needed for moderate pain Do not fill until 8/4/2022 Max Daily Amount: 3 tablets, Starting Wed 7/27/2022, Normal      rosuvastatin (CRESTOR) 20 MG tablet Take 1 tablet (20 mg total) by mouth daily, Starting Fri 5/6/2022, Normal      sildenafil (VIAGRA) 100 mg tablet Take 1 tablet (100 mg total) by mouth daily as needed for erectile dysfunction, Starting Wed 8/31/2022, Normal      tamsulosin (FLOMAX) 0 4 mg Take 2 capsules (0 8 mg total) by mouth daily with dinner, Starting Mon 7/25/2022, Until Tue 9/13/2022, No Print      !! tiotropium-olodaterol (Stiolto Respimat) 2 5-2 5 MCG/ACT inhaler Inhale 2 puffs daily, Starting Mon 8/15/2022, Normal      !! tiotropium-olodaterol (Stiolto Respimat) 2 5-2 5 MCG/ACT inhaler Inhale 2 puffs daily, Starting Mon 8/15/2022, Sample      traZODone (DESYREL) 100 mg tablet Take 1 tablet (100 mg total) by mouth daily at bedtime, Starting Thu 10/7/2021, Normal      topiramate (TOPAMAX) 25 mg tablet FOLLOW INSTRUCTIONS GIVEN TO TITRATE UP TO A MAX OF 2 TABLETS BY MOUTH TWICE DAILY AS TOLERATED AND TO LEVEL OF BENEFIT FOR TREMORS, Normal       !! - Potential duplicate medications found  Please discuss with provider  No discharge procedures on file      PDMP Review       Value Time User    PDMP Reviewed  Yes 7/27/2022  9:49 AM Caroline Kiser, [address]           ED Provider  Electronically Signed by           Yahaira Gil DO  09/27/22 8405

## 2022-09-27 DIAGNOSIS — G47.00 INSOMNIA, UNSPECIFIED TYPE: ICD-10-CM

## 2022-09-27 DIAGNOSIS — M54.12 CERVICAL RADICULOPATHY: ICD-10-CM

## 2022-09-27 DIAGNOSIS — M51.36 LUMBAR DEGENERATIVE DISC DISEASE: ICD-10-CM

## 2022-09-27 LAB
ATRIAL RATE: 46 BPM
P AXIS: 62 DEGREES
PR INTERVAL: 174 MS
QRS AXIS: 62 DEGREES
QRSD INTERVAL: 86 MS
QT INTERVAL: 444 MS
QTC INTERVAL: 388 MS
T WAVE AXIS: 69 DEGREES
VENTRICULAR RATE: 46 BPM

## 2022-09-27 PROCEDURE — 93010 ELECTROCARDIOGRAM REPORT: CPT | Performed by: INTERNAL MEDICINE

## 2022-09-27 RX ORDER — TRAZODONE HYDROCHLORIDE 100 MG/1
TABLET ORAL
Qty: 90 TABLET | Refills: 3 | Status: SHIPPED | OUTPATIENT
Start: 2022-09-27

## 2022-09-28 ENCOUNTER — TELEPHONE (OUTPATIENT)
Dept: PAIN MEDICINE | Facility: CLINIC | Age: 67
End: 2022-09-28

## 2022-09-28 RX ORDER — GABAPENTIN 600 MG/1
TABLET ORAL
Qty: 90 TABLET | Refills: 2 | Status: SHIPPED | OUTPATIENT
Start: 2022-09-28 | End: 2022-10-14

## 2022-09-28 NOTE — TELEPHONE ENCOUNTER
Please see previous task and advise  Pt treated in ER with Lidoderm patch and Toradol   D/c with methocarbamol and advised to use heating pad  Schedule OVS?

## 2022-09-28 NOTE — TELEPHONE ENCOUNTER
SEGUNDO    S/w pt  Pt scheduled for OVS 10/3  Pt requested pain medication  Pt taking Percocet and methocarbamol that pt states is not helping  Pt did get some relief with Toradol in the hospital   Putting his chin to his chest helps his pain  Pt advised if not contraindicated to take NSAIDS no more that 2400 mg /24hrs, and try ice and or heat 20 mins off and on  Pt advised to take with food to avoid GI upset  Pt verbalized understanding and appreciation

## 2022-09-28 NOTE — TELEPHONE ENCOUNTER
Patient would like to ELOISA Francois to view his recent xray images  He had a back injury this past weekend  He is currently experiencing numbness on his left wing of his shoulder down to his hand   Please reach out to him at #  [phone], thx

## 2022-09-30 ENCOUNTER — OFFICE VISIT (OUTPATIENT)
Dept: PAIN MEDICINE | Facility: CLINIC | Age: 67
End: 2022-09-30
Payer: MEDICARE

## 2022-09-30 VITALS
DIASTOLIC BLOOD PRESSURE: 91 MMHG | HEART RATE: 49 BPM | BODY MASS INDEX: 29.7 KG/M2 | HEIGHT: 68 IN | SYSTOLIC BLOOD PRESSURE: 172 MMHG | WEIGHT: 196 LBS

## 2022-09-30 DIAGNOSIS — M54.2 NECK PAIN: ICD-10-CM

## 2022-09-30 DIAGNOSIS — M54.12 CERVICAL RADICULOPATHY: ICD-10-CM

## 2022-09-30 DIAGNOSIS — Z79.891 LONG-TERM CURRENT USE OF OPIATE ANALGESIC: ICD-10-CM

## 2022-09-30 DIAGNOSIS — M79.18 MYOFASCIAL PAIN SYNDROME: ICD-10-CM

## 2022-09-30 DIAGNOSIS — G89.4 CHRONIC PAIN SYNDROME: Primary | ICD-10-CM

## 2022-09-30 DIAGNOSIS — M47.812 CERVICAL SPONDYLOSIS WITHOUT MYELOPATHY: ICD-10-CM

## 2022-09-30 DIAGNOSIS — F11.20 UNCOMPLICATED OPIOID DEPENDENCE (HCC): ICD-10-CM

## 2022-09-30 PROCEDURE — 99214 OFFICE O/P EST MOD 30 MIN: CPT | Performed by: NURSE PRACTITIONER

## 2022-09-30 PROCEDURE — 80305 DRUG TEST PRSMV DIR OPT OBS: CPT | Performed by: NURSE PRACTITIONER

## 2022-09-30 RX ORDER — METHYLPREDNISOLONE 4 MG/1
TABLET ORAL
Qty: 1 EACH | Refills: 0 | Status: SHIPPED | OUTPATIENT
Start: 2022-09-30

## 2022-09-30 RX ORDER — OXYCODONE AND ACETAMINOPHEN 7.5; 325 MG/1; MG/1
1 TABLET ORAL EVERY 8 HOURS PRN
Qty: 90 TABLET | Refills: 0 | Status: SHIPPED | OUTPATIENT
Start: 2022-09-30 | End: 2022-10-10 | Stop reason: DRUGHIGH

## 2022-09-30 NOTE — H&P (VIEW-ONLY)
Assessment:  1  Chronic pain syndrome    2  Neck pain    3  Cervical radiculopathy    4  Cervical spondylosis without myelopathy    5  Myofascial pain syndrome        Plan:  While the patient was in the office today, I did have a thorough conversation regarding their chronic pain syndrome, medication management, and treatment plan options  Patient is being seen for an emergency visit for increased neck and left upper extremity pain  Patient's me that he recently lifted and moved a heavy cook top weighing approximately 150 lb  During it, but started experiencing increased pain in his neck the next day  He is now experiencing increasing weakness involving the left upper extremity  MRI of his cervical spine to determine if there is intraspinal pathology that would contribute to his current symptoms  Patient understands that I will call him with the results of the MRI soon as they are available to review  I discussed with the patient that at this point time since I feel that there is a significant inflammatory component to their pain symptoms, that they would benefit from a titrating dose of oral steroids over the next 7 days  I advised the patient that while on the steroids, they should not take any other oral NSAIDs except for acetaminophen or Tylenol until they have completed the steroid taper  I also advised them that once they have completed the steroid taper, they are to give our office a follow up phone call to let us know how they are doing and if there is any improvement  The patient was agreeable and verbalized an understanding  Renewed oxycodone 7 5/325 3 times daily if needed for pain  The patient's opioid scripts were sent to their pharmacy electronically and was given a 2 month supply of prescriptions with a Do Not Fill date(s) of 09/30/2022, 10/28/2022      [place] Prescription Drug Monitoring Program report was reviewed and was appropriate     A urine drug screen was collected at today's office visit as part of our medication management protocol  The point of care testing results were appropriate for what was being prescribed  The specimen will be sent for confirmatory testing  The drug screen is medically necessary because the patient is either dependent on opioid medication or is being considered for opioid medication therapy and the results could impact ongoing or future treatment  The drug screen is to evaluate for the presences or absence of prescribed, non-prescribed, and/or illicit drugs/substances  There are risks associated with opioid medications, including dependence, addiction and tolerance  The patient understands and agrees to use these medications only as prescribed  Potential side effects of the medications include, but are not limited to, constipation, drowsiness, addiction, impaired judgment and risk of fatal overdose if not taken as prescribed  The patient was warned against driving while taking sedation medications  Sharing medications is a felony  At this point in time, the patient is showing no signs of addiction, abuse, diversion or suicidal ideation  The patient will follow-up in 8 weeks for medication prescription refill and reevaluation  The patient was advised to contact the office should their symptoms worsen in the interim  The patient was agreeable and verbalized an understanding  History of Present Illness: The patient is a 75 y o  male last seen on 07/27/2022 who presents for a follow up office visit in regards to chronic pain secondary to chronic pain syndrome, neck pain, cervical radiculopathy  The patient currently reports complaint of increasing neck and left upper extremity pain  Increasing left upper extremity weakness  Current pain level is a 10/10  Quality pain is described as burning, dull, aching, sharp, throbbing cramping, pressure-like, shooting      Current pain medications includes:  Oxycodone 7 5/325 3 times daily if needed for pain, gabapentin 600 mg 3 times daily    The patient reports that this regimen is providing 10 % pain relief  The patient is reporting no side effects from this pain medication regimen  Pain Contract Signed: 6/6/22  Last Urine Drug Screen: 9/30/22    I have personally reviewed and/or updated the patient's past medical history, past surgical history, family history, social history, current medications, allergies, and vital signs today  Review of Systems:    Review of Systems   Constitutional: Negative for unexpected weight change  HENT: Negative for hearing loss  Eyes: Negative for visual disturbance  Respiratory: Positive for shortness of breath  Cardiovascular: Negative for leg swelling  Gastrointestinal: Positive for constipation  Endocrine: Negative for polyuria  Genitourinary: Negative for difficulty urinating  Musculoskeletal: Positive for myalgias  Negative for gait problem and joint swelling  Decreased range of motion  Joint stiffness  Pain in extremity- left arm   Skin: Negative for rash  Neurological: Negative for weakness and headaches  Psychiatric/Behavioral: Negative for decreased concentration  All other systems reviewed and are negative  Past Medical History:   Diagnosis Date   • Abdominal aortic aneurysm without rupture (HCC)    • Abdominal Aortic Duplex 02/21/2017    Ectatic infrarenal abdominal aorta  • CAD (coronary artery disease)    • COPD (chronic obstructive pulmonary disease) (HCC)    • Extremity pain    • History of echocardiogram 03/18/2014    EF 55%, mild MR and AI  Mild concentric LVH     • History of stress test 03/06/2017    Normal    • Hypertension    • Joint pain    • Low back pain    • Migraine    • Neck pain    • Obstructive sleep apnea     cannot tolerate CPAP   • Osteoarthritis    • Peripheral neuropathy    • Reflex sympathetic dystrophy        Past Surgical History:   Procedure Laterality Date   • CARDIAC CATHETERIZATION 02/13/2012    EF 70%, widely patent renal arteries, significant single-vessel CAD-medical therapy  • CARDIAC CATHETERIZATION  04/11/2013    EF 65%, 50% mid LAD, 20% prox CFX, 90% diffuse RCA, 99% mid RCA  Medical management     • CHOLECYSTECTOMY     • EPIDURAL BLOCK INJECTION Bilateral 8/15/2019    Procedure: BLOCK / INJECTION EPIDURAL STEROID CERVICAL;  Surgeon: Claudean Gambles, MD;  Location: [facility];  Service: Pain Management    • FL GUIDED NEEDLE PLAC BX/ASP/INJ  8/15/2019   • IR BIOPSY LUNG  9/13/2022   • ORTHOPEDIC SURGERY     • TRIGGER POINT INJECTION         Family History   Adopted: Yes   Problem Relation Age of Onset   • No Known Problems Family    • No Known Problems Mother    • No Known Problems Father        Social History     Occupational History   • Not on file   Tobacco Use   • Smoking status: Current Every Day Smoker     Packs/day: 1 50   • Smokeless tobacco: Never Used   Vaping Use   • Vaping Use: Never used   Substance and Sexual Activity   • Alcohol use: Not Currently   • Drug use: No   • Sexual activity: Yes         Current Outpatient Medications:   •  albuterol (Ventolin HFA) 90 mcg/act inhaler, Inhale 2 puffs every 6 (six) hours as needed for wheezing, Disp: 18 g, Rfl: 11  •  amLODIPine (NORVASC) 10 mg tablet, swallow 1 tablet once daily, Disp: 90 tablet, Rfl: 3  •  aspirin (ECOTRIN LOW STRENGTH) 81 mg EC tablet, Take 1 tablet (81 mg total) by mouth daily, Disp: 30 tablet, Rfl: 5  •  Blood Glucose Monitoring Suppl (OneTouch Verio Flex System) w/Device KIT, USE IN THE MORNING TEST ONCE DAILY, Disp: 1 kit, Rfl: 0  •  gabapentin (NEURONTIN) 600 MG tablet, take 1 tablet by mouth three times a day, Disp: 90 tablet, Rfl: 2  •  glucose blood (OneTouch Verio) test strip, Test once daily, Disp: 100 each, Rfl: 0  •  Lancets (onetouch ultrasoft) lancets, Test once daily, Disp: 100 each, Rfl: 0  •  lisinopril (ZESTRIL) 20 mg tablet, take 1 tablet by mouth once daily, Disp: 30 tablet, Rfl: 5  • methocarbamol (ROBAXIN) 500 mg tablet, Take 1 tablet (500 mg total) by mouth 3 (three) times a day as needed for muscle spasms, Disp: 20 tablet, Rfl: 0  •  methylPREDNISolone 4 MG tablet therapy pack, Use as directed on package, Disp: 1 each, Rfl: 0  •  metoprolol succinate (TOPROL-XL) 100 mg 24 hr tablet, take 1 tablet by mouth once daily, Disp: 30 tablet, Rfl: 5  •  nicotine (NICODERM CQ) 21 mg/24 hr TD 24 hr patch, Place 1 patch on the skin every 24 hours, Disp: 28 patch, Rfl: 0  •  nitroglycerin (NITROSTAT) 0 4 mg SL tablet, Place 1 tablet (0 4 mg total) under the tongue every 5 (five) minutes as needed for chest pain, Disp: 25 tablet, Rfl: 5  •  oxyCODONE-acetaminophen (Percocet) 7 5-325 MG per tablet, Take 1 tablet by mouth every 8 (eight) hours as needed for moderate pain Do not fill until 10/28/2022 Max Daily Amount: 3 tablets, Disp: 90 tablet, Rfl: 0  •  oxyCODONE-acetaminophen (Percocet) 7 5-325 MG per tablet, Take 1 tablet by mouth every 8 (eight) hours as needed for moderate pain Max Daily Amount: 3 tablets, Disp: 90 tablet, Rfl: 0  •  rosuvastatin (CRESTOR) 20 MG tablet, Take 1 tablet (20 mg total) by mouth daily, Disp: 30 tablet, Rfl: 5  •  sildenafil (VIAGRA) 100 mg tablet, Take 1 tablet (100 mg total) by mouth daily as needed for erectile dysfunction, Disp: 20 tablet, Rfl: 0  •  tiotropium-olodaterol (Stiolto Respimat) 2 5-2 5 MCG/ACT inhaler, Inhale 2 puffs daily, Disp: 4 g, Rfl: 11  •  tiotropium-olodaterol (Stiolto Respimat) 2 5-2 5 MCG/ACT inhaler, Inhale 2 puffs daily, Disp: 4 g, Rfl: 0  •  topiramate (TOPAMAX) 25 mg tablet, FOLLOW INSTRUCTIONS GIVEN TO TITRATE UP TO A MAX OF 2 TABLETS BY MOUTH TWICE DAILY AS TOLERATED AND TO LEVEL OF BENEFIT FOR TREMORS, Disp: 120 tablet, Rfl: 6  •  traZODone (DESYREL) 100 mg tablet, take 1 tablet by mouth daily at bedtime, Disp: 90 tablet, Rfl: 3  •  tamsulosin (FLOMAX) 0 4 mg, Take 2 capsules (0 8 mg total) by mouth daily with dinner, Disp: 60 capsule, Rfl: 0    No Known Allergies    Physical Exam:    BP (!) 172/91   Pulse (!) 49   Ht 5' 8" (1 727 m)   Wt 88 9 kg (196 lb)   BMI 29 80 kg/m²     Constitutional:normal, well developed, well nourished, alert, in no distress and non-toxic and no overt pain behavior  Eyes:anicteric  HEENT:grossly intact  Neck:supple, symmetric, trachea midline and no masses   Pulmonary:even and unlabored  Cardiovascular:No edema or pitting edema present  Skin:Normal without rashes or lesions and well hydrated  Psychiatric:Mood and affect appropriate  Neurologic:Cranial Nerves II-XII grossly intact  Musculoskeletal:Range of motion of the cervical spine is significantly limited in all planes  left  strength is +3 compared to the right at +5  I was unable to elicit left biceps or triceps reflexes  Reflexes and the right biceps and triceps are +2  Sensory exam is normal in both upper extremities without deficits        Imaging  MRI cervical spine without contrast    (Results Pending)         Orders Placed This Encounter   Procedures   • MRI cervical spine without contrast

## 2022-09-30 NOTE — PATIENT INSTRUCTIONS

## 2022-09-30 NOTE — PROGRESS NOTES
Assessment:  1  Chronic pain syndrome    2  Neck pain    3  Cervical radiculopathy    4  Cervical spondylosis without myelopathy    5  Myofascial pain syndrome        Plan:  While the patient was in the office today, I did have a thorough conversation regarding their chronic pain syndrome, medication management, and treatment plan options  Patient is being seen for an emergency visit for increased neck and left upper extremity pain  Patient's me that he recently lifted and moved a heavy cook top weighing approximately 150 lb  During it, but started experiencing increased pain in his neck the next day  He is now experiencing increasing weakness involving the left upper extremity  MRI of his cervical spine to determine if there is intraspinal pathology that would contribute to his current symptoms  Patient understands that I will call him with the results of the MRI soon as they are available to review  I discussed with the patient that at this point time since I feel that there is a significant inflammatory component to their pain symptoms, that they would benefit from a titrating dose of oral steroids over the next 7 days  I advised the patient that while on the steroids, they should not take any other oral NSAIDs except for acetaminophen or Tylenol until they have completed the steroid taper  I also advised them that once they have completed the steroid taper, they are to give our office a follow up phone call to let us know how they are doing and if there is any improvement  The patient was agreeable and verbalized an understanding  Renewed oxycodone 7 5/325 3 times daily if needed for pain  The patient's opioid scripts were sent to their pharmacy electronically and was given a 2 month supply of prescriptions with a Do Not Fill date(s) of 09/30/2022, 10/28/2022      [place] Prescription Drug Monitoring Program report was reviewed and was appropriate     A urine drug screen was collected at today's office visit as part of our medication management protocol  The point of care testing results were appropriate for what was being prescribed  The specimen will be sent for confirmatory testing  The drug screen is medically necessary because the patient is either dependent on opioid medication or is being considered for opioid medication therapy and the results could impact ongoing or future treatment  The drug screen is to evaluate for the presences or absence of prescribed, non-prescribed, and/or illicit drugs/substances  There are risks associated with opioid medications, including dependence, addiction and tolerance  The patient understands and agrees to use these medications only as prescribed  Potential side effects of the medications include, but are not limited to, constipation, drowsiness, addiction, impaired judgment and risk of fatal overdose if not taken as prescribed  The patient was warned against driving while taking sedation medications  Sharing medications is a felony  At this point in time, the patient is showing no signs of addiction, abuse, diversion or suicidal ideation  The patient will follow-up in 8 weeks for medication prescription refill and reevaluation  The patient was advised to contact the office should their symptoms worsen in the interim  The patient was agreeable and verbalized an understanding  History of Present Illness: The patient is a 75 y o  male last seen on 07/27/2022 who presents for a follow up office visit in regards to chronic pain secondary to chronic pain syndrome, neck pain, cervical radiculopathy  The patient currently reports complaint of increasing neck and left upper extremity pain  Increasing left upper extremity weakness  Current pain level is a 10/10  Quality pain is described as burning, dull, aching, sharp, throbbing cramping, pressure-like, shooting      Current pain medications includes:  Oxycodone 7 5/325 3 times daily if needed for pain, gabapentin 600 mg 3 times daily    The patient reports that this regimen is providing 10 % pain relief  The patient is reporting no side effects from this pain medication regimen  Pain Contract Signed: 6/6/22  Last Urine Drug Screen: 9/30/22    I have personally reviewed and/or updated the patient's past medical history, past surgical history, family history, social history, current medications, allergies, and vital signs today  Review of Systems:    Review of Systems   Constitutional: Negative for unexpected weight change  HENT: Negative for hearing loss  Eyes: Negative for visual disturbance  Respiratory: Positive for shortness of breath  Cardiovascular: Negative for leg swelling  Gastrointestinal: Positive for constipation  Endocrine: Negative for polyuria  Genitourinary: Negative for difficulty urinating  Musculoskeletal: Positive for myalgias  Negative for gait problem and joint swelling  Decreased range of motion  Joint stiffness  Pain in extremity- left arm   Skin: Negative for rash  Neurological: Negative for weakness and headaches  Psychiatric/Behavioral: Negative for decreased concentration  All other systems reviewed and are negative  Past Medical History:   Diagnosis Date    Abdominal aortic aneurysm without rupture ([facility] [address] )     Abdominal Aortic Duplex 02/21/2017    Ectatic infrarenal abdominal aorta   CAD (coronary artery disease)     COPD (chronic obstructive pulmonary disease) (HCC)     Extremity pain     History of echocardiogram 03/18/2014    EF 55%, mild MR and AI  Mild concentric LVH      History of stress test 03/06/2017    Normal     Hypertension     Joint pain     Low back pain     Migraine     Neck pain     Obstructive sleep apnea     cannot tolerate CPAP    Osteoarthritis     Peripheral neuropathy     Reflex sympathetic dystrophy        Past Surgical History:   Procedure Laterality Date    CARDIAC CATHETERIZATION 02/13/2012    EF 70%, widely patent renal arteries, significant single-vessel CAD-medical therapy   CARDIAC CATHETERIZATION  04/11/2013    EF 65%, 50% mid LAD, 20% prox CFX, 90% diffuse RCA, 99% mid RCA  Medical management      CHOLECYSTECTOMY      EPIDURAL BLOCK INJECTION Bilateral 8/15/2019    Procedure: BLOCK / INJECTION EPIDURAL STEROID CERVICAL;  Surgeon: Darius Wadsworth MD;  Location: [facility];  Service: Pain Management     FL GUIDED NEEDLE PLAC BX/ASP/INJ  8/15/2019    IR BIOPSY LUNG  9/13/2022    ORTHOPEDIC SURGERY      TRIGGER POINT INJECTION         Family History   Adopted: Yes   Problem Relation Age of Onset    No Known Problems Family     No Known Problems Mother     No Known Problems Father        Social History     Occupational History    Not on file   Tobacco Use    Smoking status: Current Every Day Smoker     Packs/day: 1 50    Smokeless tobacco: Never Used   Vaping Use    Vaping Use: Never used   Substance and Sexual Activity    Alcohol use: Not Currently    Drug use: No    Sexual activity: Yes         Current Outpatient Medications:     albuterol (Ventolin HFA) 90 mcg/act inhaler, Inhale 2 puffs every 6 (six) hours as needed for wheezing, Disp: 18 g, Rfl: 11    amLODIPine (NORVASC) 10 mg tablet, swallow 1 tablet once daily, Disp: 90 tablet, Rfl: 3    aspirin (ECOTRIN LOW STRENGTH) 81 mg EC tablet, Take 1 tablet (81 mg total) by mouth daily, Disp: 30 tablet, Rfl: 5    Blood Glucose Monitoring Suppl (OneTouch Verio Flex System) w/Device KIT, USE IN THE MORNING TEST ONCE DAILY, Disp: 1 kit, Rfl: 0    gabapentin (NEURONTIN) 600 MG tablet, take 1 tablet by mouth three times a day, Disp: 90 tablet, Rfl: 2    glucose blood (OneTouch Verio) test strip, Test once daily, Disp: 100 each, Rfl: 0    Lancets (onetouch ultrasoft) lancets, Test once daily, Disp: 100 each, Rfl: 0    lisinopril (ZESTRIL) 20 mg tablet, take 1 tablet by mouth once daily, Disp: 30 tablet, Rfl: 5   methocarbamol (ROBAXIN) 500 mg tablet, Take 1 tablet (500 mg total) by mouth 3 (three) times a day as needed for muscle spasms, Disp: 20 tablet, Rfl: 0    methylPREDNISolone 4 MG tablet therapy pack, Use as directed on package, Disp: 1 each, Rfl: 0    metoprolol succinate (TOPROL-XL) 100 mg 24 hr tablet, take 1 tablet by mouth once daily, Disp: 30 tablet, Rfl: 5    nicotine (NICODERM CQ) 21 mg/24 hr TD 24 hr patch, Place 1 patch on the skin every 24 hours, Disp: 28 patch, Rfl: 0    nitroglycerin (NITROSTAT) 0 4 mg SL tablet, Place 1 tablet (0 4 mg total) under the tongue every 5 (five) minutes as needed for chest pain, Disp: 25 tablet, Rfl: 5    oxyCODONE-acetaminophen (Percocet) 7 5-325 MG per tablet, Take 1 tablet by mouth every 8 (eight) hours as needed for moderate pain Do not fill until 10/28/2022 Max Daily Amount: 3 tablets, Disp: 90 tablet, Rfl: 0    oxyCODONE-acetaminophen (Percocet) 7 5-325 MG per tablet, Take 1 tablet by mouth every 8 (eight) hours as needed for moderate pain Max Daily Amount: 3 tablets, Disp: 90 tablet, Rfl: 0    rosuvastatin (CRESTOR) 20 MG tablet, Take 1 tablet (20 mg total) by mouth daily, Disp: 30 tablet, Rfl: 5    sildenafil (VIAGRA) 100 mg tablet, Take 1 tablet (100 mg total) by mouth daily as needed for erectile dysfunction, Disp: 20 tablet, Rfl: 0    tiotropium-olodaterol (Stiolto Respimat) 2 5-2 5 MCG/ACT inhaler, Inhale 2 puffs daily, Disp: 4 g, Rfl: 11    tiotropium-olodaterol (Stiolto Respimat) 2 5-2 5 MCG/ACT inhaler, Inhale 2 puffs daily, Disp: 4 g, Rfl: 0    topiramate (TOPAMAX) 25 mg tablet, FOLLOW INSTRUCTIONS GIVEN TO TITRATE UP TO A MAX OF 2 TABLETS BY MOUTH TWICE DAILY AS TOLERATED AND TO LEVEL OF BENEFIT FOR TREMORS, Disp: 120 tablet, Rfl: 6    traZODone (DESYREL) 100 mg tablet, take 1 tablet by mouth daily at bedtime, Disp: 90 tablet, Rfl: 3    tamsulosin (FLOMAX) 0 4 mg, Take 2 capsules (0 8 mg total) by mouth daily with dinner, Disp: 60 capsule, Rfl: 0    No Known Allergies    Physical Exam:    BP (!) 172/91   Pulse (!) 49   Ht 5' 8" (1 727 m)   Wt 88 9 kg (196 lb)   BMI 29 80 kg/m²     Constitutional:normal, well developed, well nourished, alert, in no distress and non-toxic and no overt pain behavior  Eyes:anicteric  HEENT:grossly intact  Neck:supple, symmetric, trachea midline and no masses   Pulmonary:even and unlabored  Cardiovascular:No edema or pitting edema present  Skin:Normal without rashes or lesions and well hydrated  Psychiatric:Mood and affect appropriate  Neurologic:Cranial Nerves II-XII grossly intact  Musculoskeletal:Range of motion of the cervical spine is significantly limited in all planes  left  strength is +3 compared to the right at +5  I was unable to elicit left biceps or triceps reflexes  Reflexes and the right biceps and triceps are +2  Sensory exam is normal in both upper extremities without deficits        Imaging  MRI cervical spine without contrast    (Results Pending)         Orders Placed This Encounter   Procedures    MRI cervical spine without contrast

## 2022-09-30 NOTE — H&P (VIEW-ONLY)
Assessment:  1  Chronic pain syndrome    2  Neck pain    3  Cervical radiculopathy    4  Cervical spondylosis without myelopathy    5  Myofascial pain syndrome        Plan:  While the patient was in the office today, I did have a thorough conversation regarding their chronic pain syndrome, medication management, and treatment plan options  Patient is being seen for an emergency visit for increased neck and left upper extremity pain  Patient's me that he recently lifted and moved a heavy cook top weighing approximately 150 lb  During it, but started experiencing increased pain in his neck the next day  He is now experiencing increasing weakness involving the left upper extremity  MRI of his cervical spine to determine if there is intraspinal pathology that would contribute to his current symptoms  Patient understands that I will call him with the results of the MRI soon as they are available to review  I discussed with the patient that at this point time since I feel that there is a significant inflammatory component to their pain symptoms, that they would benefit from a titrating dose of oral steroids over the next 7 days  I advised the patient that while on the steroids, they should not take any other oral NSAIDs except for acetaminophen or Tylenol until they have completed the steroid taper  I also advised them that once they have completed the steroid taper, they are to give our office a follow up phone call to let us know how they are doing and if there is any improvement  The patient was agreeable and verbalized an understanding  Renewed oxycodone 7 5/325 3 times daily if needed for pain  The patient's opioid scripts were sent to their pharmacy electronically and was given a 2 month supply of prescriptions with a Do Not Fill date(s) of 09/30/2022, 10/28/2022      [place] Prescription Drug Monitoring Program report was reviewed and was appropriate     A urine drug screen was collected at today's office visit as part of our medication management protocol  The point of care testing results were appropriate for what was being prescribed  The specimen will be sent for confirmatory testing  The drug screen is medically necessary because the patient is either dependent on opioid medication or is being considered for opioid medication therapy and the results could impact ongoing or future treatment  The drug screen is to evaluate for the presences or absence of prescribed, non-prescribed, and/or illicit drugs/substances  There are risks associated with opioid medications, including dependence, addiction and tolerance  The patient understands and agrees to use these medications only as prescribed  Potential side effects of the medications include, but are not limited to, constipation, drowsiness, addiction, impaired judgment and risk of fatal overdose if not taken as prescribed  The patient was warned against driving while taking sedation medications  Sharing medications is a felony  At this point in time, the patient is showing no signs of addiction, abuse, diversion or suicidal ideation  The patient will follow-up in 8 weeks for medication prescription refill and reevaluation  The patient was advised to contact the office should their symptoms worsen in the interim  The patient was agreeable and verbalized an understanding  History of Present Illness: The patient is a 75 y o  male last seen on 07/27/2022 who presents for a follow up office visit in regards to chronic pain secondary to chronic pain syndrome, neck pain, cervical radiculopathy  The patient currently reports complaint of increasing neck and left upper extremity pain  Increasing left upper extremity weakness  Current pain level is a 10/10  Quality pain is described as burning, dull, aching, sharp, throbbing cramping, pressure-like, shooting      Current pain medications includes:  Oxycodone 7 5/325 3 times daily if needed for pain, gabapentin 600 mg 3 times daily    The patient reports that this regimen is providing 10 % pain relief  The patient is reporting no side effects from this pain medication regimen  Pain Contract Signed: 6/6/22  Last Urine Drug Screen: 9/30/22    I have personally reviewed and/or updated the patient's past medical history, past surgical history, family history, social history, current medications, allergies, and vital signs today  Review of Systems:    Review of Systems   Constitutional: Negative for unexpected weight change  HENT: Negative for hearing loss  Eyes: Negative for visual disturbance  Respiratory: Positive for shortness of breath  Cardiovascular: Negative for leg swelling  Gastrointestinal: Positive for constipation  Endocrine: Negative for polyuria  Genitourinary: Negative for difficulty urinating  Musculoskeletal: Positive for myalgias  Negative for gait problem and joint swelling  Decreased range of motion  Joint stiffness  Pain in extremity- left arm   Skin: Negative for rash  Neurological: Negative for weakness and headaches  Psychiatric/Behavioral: Negative for decreased concentration  All other systems reviewed and are negative  Past Medical History:   Diagnosis Date   • Abdominal aortic aneurysm without rupture (HCC)    • Abdominal Aortic Duplex 02/21/2017    Ectatic infrarenal abdominal aorta  • CAD (coronary artery disease)    • COPD (chronic obstructive pulmonary disease) (HCC)    • Extremity pain    • History of echocardiogram 03/18/2014    EF 55%, mild MR and AI  Mild concentric LVH     • History of stress test 03/06/2017    Normal    • Hypertension    • Joint pain    • Low back pain    • Migraine    • Neck pain    • Obstructive sleep apnea     cannot tolerate CPAP   • Osteoarthritis    • Peripheral neuropathy    • Reflex sympathetic dystrophy        Past Surgical History:   Procedure Laterality Date   • CARDIAC CATHETERIZATION 02/13/2012    EF 70%, widely patent renal arteries, significant single-vessel CAD-medical therapy  • CARDIAC CATHETERIZATION  04/11/2013    EF 65%, 50% mid LAD, 20% prox CFX, 90% diffuse RCA, 99% mid RCA  Medical management     • CHOLECYSTECTOMY     • EPIDURAL BLOCK INJECTION Bilateral 8/15/2019    Procedure: BLOCK / INJECTION EPIDURAL STEROID CERVICAL;  Surgeon: Elizabeth Varner MD;  Location: [facility];  Service: Pain Management    • FL GUIDED NEEDLE PLAC BX/ASP/INJ  8/15/2019   • IR BIOPSY LUNG  9/13/2022   • ORTHOPEDIC SURGERY     • TRIGGER POINT INJECTION         Family History   Adopted: Yes   Problem Relation Age of Onset   • No Known Problems Family    • No Known Problems Mother    • No Known Problems Father        Social History     Occupational History   • Not on file   Tobacco Use   • Smoking status: Current Every Day Smoker     Packs/day: 1 50   • Smokeless tobacco: Never Used   Vaping Use   • Vaping Use: Never used   Substance and Sexual Activity   • Alcohol use: Not Currently   • Drug use: No   • Sexual activity: Yes         Current Outpatient Medications:   •  albuterol (Ventolin HFA) 90 mcg/act inhaler, Inhale 2 puffs every 6 (six) hours as needed for wheezing, Disp: 18 g, Rfl: 11  •  amLODIPine (NORVASC) 10 mg tablet, swallow 1 tablet once daily, Disp: 90 tablet, Rfl: 3  •  aspirin (ECOTRIN LOW STRENGTH) 81 mg EC tablet, Take 1 tablet (81 mg total) by mouth daily, Disp: 30 tablet, Rfl: 5  •  Blood Glucose Monitoring Suppl (OneTouch Verio Flex System) w/Device KIT, USE IN THE MORNING TEST ONCE DAILY, Disp: 1 kit, Rfl: 0  •  gabapentin (NEURONTIN) 600 MG tablet, take 1 tablet by mouth three times a day, Disp: 90 tablet, Rfl: 2  •  glucose blood (OneTouch Verio) test strip, Test once daily, Disp: 100 each, Rfl: 0  •  Lancets (onetouch ultrasoft) lancets, Test once daily, Disp: 100 each, Rfl: 0  •  lisinopril (ZESTRIL) 20 mg tablet, take 1 tablet by mouth once daily, Disp: 30 tablet, Rfl: 5  • methocarbamol (ROBAXIN) 500 mg tablet, Take 1 tablet (500 mg total) by mouth 3 (three) times a day as needed for muscle spasms, Disp: 20 tablet, Rfl: 0  •  methylPREDNISolone 4 MG tablet therapy pack, Use as directed on package, Disp: 1 each, Rfl: 0  •  metoprolol succinate (TOPROL-XL) 100 mg 24 hr tablet, take 1 tablet by mouth once daily, Disp: 30 tablet, Rfl: 5  •  nicotine (NICODERM CQ) 21 mg/24 hr TD 24 hr patch, Place 1 patch on the skin every 24 hours, Disp: 28 patch, Rfl: 0  •  nitroglycerin (NITROSTAT) 0 4 mg SL tablet, Place 1 tablet (0 4 mg total) under the tongue every 5 (five) minutes as needed for chest pain, Disp: 25 tablet, Rfl: 5  •  oxyCODONE-acetaminophen (Percocet) 7 5-325 MG per tablet, Take 1 tablet by mouth every 8 (eight) hours as needed for moderate pain Do not fill until 10/28/2022 Max Daily Amount: 3 tablets, Disp: 90 tablet, Rfl: 0  •  oxyCODONE-acetaminophen (Percocet) 7 5-325 MG per tablet, Take 1 tablet by mouth every 8 (eight) hours as needed for moderate pain Max Daily Amount: 3 tablets, Disp: 90 tablet, Rfl: 0  •  rosuvastatin (CRESTOR) 20 MG tablet, Take 1 tablet (20 mg total) by mouth daily, Disp: 30 tablet, Rfl: 5  •  sildenafil (VIAGRA) 100 mg tablet, Take 1 tablet (100 mg total) by mouth daily as needed for erectile dysfunction, Disp: 20 tablet, Rfl: 0  •  tiotropium-olodaterol (Stiolto Respimat) 2 5-2 5 MCG/ACT inhaler, Inhale 2 puffs daily, Disp: 4 g, Rfl: 11  •  tiotropium-olodaterol (Stiolto Respimat) 2 5-2 5 MCG/ACT inhaler, Inhale 2 puffs daily, Disp: 4 g, Rfl: 0  •  topiramate (TOPAMAX) 25 mg tablet, FOLLOW INSTRUCTIONS GIVEN TO TITRATE UP TO A MAX OF 2 TABLETS BY MOUTH TWICE DAILY AS TOLERATED AND TO LEVEL OF BENEFIT FOR TREMORS, Disp: 120 tablet, Rfl: 6  •  traZODone (DESYREL) 100 mg tablet, take 1 tablet by mouth daily at bedtime, Disp: 90 tablet, Rfl: 3  •  tamsulosin (FLOMAX) 0 4 mg, Take 2 capsules (0 8 mg total) by mouth daily with dinner, Disp: 60 capsule, Rfl: 0    No Known Allergies    Physical Exam:    BP (!) 172/91   Pulse (!) 49   Ht 5' 8" (1 727 m)   Wt 88 9 kg (196 lb)   BMI 29 80 kg/m²     Constitutional:normal, well developed, well nourished, alert, in no distress and non-toxic and no overt pain behavior  Eyes:anicteric  HEENT:grossly intact  Neck:supple, symmetric, trachea midline and no masses   Pulmonary:even and unlabored  Cardiovascular:No edema or pitting edema present  Skin:Normal without rashes or lesions and well hydrated  Psychiatric:Mood and affect appropriate  Neurologic:Cranial Nerves II-XII grossly intact  Musculoskeletal:Range of motion of the cervical spine is significantly limited in all planes  left  strength is +3 compared to the right at +5  I was unable to elicit left biceps or triceps reflexes  Reflexes and the right biceps and triceps are +2  Sensory exam is normal in both upper extremities without deficits        Imaging  MRI cervical spine without contrast    (Results Pending)         Orders Placed This Encounter   Procedures   • MRI cervical spine without contrast

## 2022-10-02 DIAGNOSIS — I25.10 CORONARY ARTERY DISEASE INVOLVING NATIVE CORONARY ARTERY OF NATIVE HEART WITHOUT ANGINA PECTORIS: ICD-10-CM

## 2022-10-02 DIAGNOSIS — E78.2 MIXED HYPERLIPIDEMIA: ICD-10-CM

## 2022-10-03 ENCOUNTER — HOSPITAL ENCOUNTER (OUTPATIENT)
Dept: MRI IMAGING | Facility: HOSPITAL | Age: 67
Discharge: HOME | End: 2022-10-03

## 2022-10-03 ENCOUNTER — TELEPHONE (OUTPATIENT)
Dept: PAIN MEDICINE | Facility: CLINIC | Age: 67
End: 2022-10-03

## 2022-10-03 DIAGNOSIS — M54.2 NECK PAIN: ICD-10-CM

## 2022-10-03 DIAGNOSIS — M54.12 CERVICAL RADICULOPATHY: ICD-10-CM

## 2022-10-03 DIAGNOSIS — G89.4 CHRONIC PAIN SYNDROME: ICD-10-CM

## 2022-10-03 RX ORDER — ROSUVASTATIN CALCIUM 20 MG/1
TABLET, COATED ORAL
Qty: 90 TABLET | Refills: 5 | Status: SHIPPED | OUTPATIENT
Start: 2022-10-03

## 2022-10-03 NOTE — TELEPHONE ENCOUNTER
Patient states he has to reschedule his MRI study due to facility machine not working, after being there this morning for 45 minutes  He is requesting to reschedule his MRI for a different location asap & hoping Baudilio Rahman can help him get in urgent   Please advise, alonso    Call back # [phone]

## 2022-10-03 NOTE — TELEPHONE ENCOUNTER
--pt to C/B--    S/w pt,  Advised pt to call central scheduling to reschedule  Pt will C/B with his MRI appt

## 2022-10-04 ENCOUNTER — OFFICE VISIT (OUTPATIENT)
Dept: CARDIAC SURGERY | Facility: CLINIC | Age: 67
End: 2022-10-04
Payer: MEDICARE

## 2022-10-04 ENCOUNTER — TELEPHONE (OUTPATIENT)
Dept: CARDIAC SURGERY | Facility: CLINIC | Age: 67
End: 2022-10-04

## 2022-10-04 VITALS
RESPIRATION RATE: 17 BRPM | HEART RATE: 56 BPM | WEIGHT: 196 LBS | HEIGHT: 68 IN | BODY MASS INDEX: 29.7 KG/M2 | SYSTOLIC BLOOD PRESSURE: 172 MMHG | TEMPERATURE: 99 F | DIASTOLIC BLOOD PRESSURE: 94 MMHG | OXYGEN SATURATION: 97 %

## 2022-10-04 DIAGNOSIS — C34.31 MALIGNANT NEOPLASM OF LOWER LOBE OF RIGHT LUNG (HCC): Primary | ICD-10-CM

## 2022-10-04 DIAGNOSIS — Z71.6 TOBACCO ABUSE COUNSELING: ICD-10-CM

## 2022-10-04 DIAGNOSIS — R79.1 ABNORMAL BLOOD COAGULATION PROFILE: ICD-10-CM

## 2022-10-04 DIAGNOSIS — C34.11 PRIMARY SQUAMOUS CELL CARCINOMA OF UPPER LOBE OF RIGHT LUNG (HCC): ICD-10-CM

## 2022-10-04 LAB
6MAM UR QL CFM: NEGATIVE NG/ML
7AMINOCLONAZEPAM UR QL CFM: NEGATIVE NG/ML
A-OH ALPRAZ UR QL CFM: NEGATIVE NG/ML
ACCEPTABLE CREAT UR QL: NORMAL MG/DL
ACCEPTIBLE SP GR UR QL: ABNORMAL
AMPHET UR QL CFM: NEGATIVE NG/ML
AMPHET UR QL CFM: NEGATIVE NG/ML
BUPRENORPHINE UR QL CFM: NEGATIVE NG/ML
BUTALBITAL UR QL CFM: NEGATIVE NG/ML
BZE UR QL CFM: NEGATIVE NG/ML
CODEINE UR QL CFM: NEGATIVE NG/ML
DESIPRAMINE UR QL CFM: NEGATIVE NG/ML
EDDP UR QL CFM: NEGATIVE NG/ML
ETHYL GLUCURONIDE UR QL CFM: NEGATIVE NG/ML
ETHYL SULFATE UR QL SCN: NEGATIVE NG/ML
FENTANYL UR QL CFM: NEGATIVE NG/ML
GLIADIN IGG SER IA-ACNC: NEGATIVE NG/ML
GLUCOSE 30M P 50 G LAC PO SERPL-MCNC: NEGATIVE NG/ML
HYDROCODONE UR QL CFM: NEGATIVE NG/ML
HYDROCODONE UR QL CFM: NEGATIVE NG/ML
HYDROMORPHONE UR QL CFM: NEGATIVE NG/ML
LORAZEPAM UR QL CFM: NEGATIVE NG/ML
MDMA UR QL CFM: NEGATIVE NG/ML
ME-PHENIDATE UR QL CFM: NEGATIVE NG/ML
MEPERIDINE UR QL CFM: NEGATIVE NG/ML
METHADONE UR QL CFM: NEGATIVE NG/ML
METHAMPHET UR QL CFM: NEGATIVE NG/ML
MORPHINE UR QL CFM: NEGATIVE NG/ML
MORPHINE UR QL CFM: NEGATIVE NG/ML
NITRITE UR QL: NORMAL UG/ML
NORBUPRENORPHINE UR QL CFM: NEGATIVE NG/ML
NORDIAZEPAM UR QL CFM: NEGATIVE NG/ML
NORFENTANYL UR QL CFM: NEGATIVE NG/ML
NORHYDROCODONE UR QL CFM: NEGATIVE NG/ML
NORHYDROCODONE UR QL CFM: NEGATIVE NG/ML
NORMEPERIDINE UR QL CFM: NEGATIVE NG/ML
NOROXYCODONE UR QL CFM: NORMAL NG/ML
OLANZAPINE QUANTIFICATION: NEGATIVE NG/ML
OPC-3373 QUANTIFICATION: NEGATIVE
OXAZEPAM UR QL CFM: NEGATIVE NG/ML
OXYCODONE UR QL CFM: NORMAL NG/ML
OXYMORPHONE UR QL CFM: NORMAL NG/ML
OXYMORPHONE UR QL CFM: NORMAL NG/ML
PARA-FLUOROFENTANYL QUANTIFICATION: NORMAL NG/ML
PCP UR QL CFM: NEGATIVE NG/ML
PHENOBARB UR QL CFM: NEGATIVE NG/ML
RESULT ALL_PRESCRIBED MEDS AND SPECIAL INSTRUCTIONS: NORMAL
SECOBARBITAL UR QL CFM: NEGATIVE NG/ML
SL AMB 4-ANPP QUANTIFICATION: NORMAL NG/ML
SL AMB 7-OH-MITRAGYNINE (KRATOM ALKALOID) QUANTIFICATION: NEGATIVE NG/ML
SL AMB ACETYL FENTANYL QUANTIFICATION: NORMAL NG/ML
SL AMB ACETYL NORFENTANYL QUANTIFICATION: NORMAL NG/ML
SL AMB ACRYL FENTANYL QUANTIFICATION: NORMAL NG/ML
SL AMB CARFENTANIL QUANTIFICATION: NORMAL NG/ML
SL AMB CLOZAPINE QUANTIFICATION: NEGATIVE NG/ML
SL AMB CTHC (MARIJUANA METABOLITE) QUANTIFICATION: NEGATIVE NG/ML
SL AMB DEXTRORPHAN (DEXTROMETHORPHAN METABOLITE) QUANT: NEGATIVE NG/ML
SL AMB HALOPERIDOL  QUANTIFICATION: NEGATIVE NG/ML
SL AMB HALOPERIDOL METABOLITE QUANTIFICATION: NEGATIVE NG/ML
SL AMB HYDROXYRISPERIDONE QUANTIFICATION: NEGATIVE NG/ML
SL AMB N-DESMETHYL-TRAMADOL QUANTIFICATION: NEGATIVE NG/ML
SL AMB N-DESMETHYLCLOZAPINE QUANTIFICATION: NEGATIVE NG/ML
SL AMB NORQUETIAPINE QUANTIFICATION: NEGATIVE NG/ML
SL AMB PHENTERMINE QUANTIFICATION: NEGATIVE NG/ML
SL AMB PREGABALIN QUANTIFICATION: NEGATIVE
SL AMB QUETIAPINE QUANTIFICATION: NEGATIVE NG/ML
SL AMB RISPERIDONE QUANTIFICATION: NEGATIVE NG/ML
SL AMB RITALINIC ACID QUANTIFICATION: NEGATIVE NG/ML
SPECIMEN PH ACCEPTABLE UR: NORMAL
TAPENTADOL UR QL CFM: NEGATIVE NG/ML
TEMAZEPAM UR QL CFM: NEGATIVE NG/ML
TEMAZEPAM UR QL CFM: NEGATIVE NG/ML
TRAMADOL UR QL CFM: NEGATIVE NG/ML
URATE/CREAT 24H UR: NEGATIVE NG/ML

## 2022-10-04 PROCEDURE — 99205 OFFICE O/P NEW HI 60 MIN: CPT | Performed by: THORACIC SURGERY (CARDIOTHORACIC VASCULAR SURGERY)

## 2022-10-04 RX ORDER — GABAPENTIN 300 MG/1
600 CAPSULE ORAL ONCE
OUTPATIENT
Start: 2022-10-04 | End: 2022-10-04

## 2022-10-04 RX ORDER — ACETAMINOPHEN 325 MG/1
975 TABLET ORAL ONCE
OUTPATIENT
Start: 2022-10-04 | End: 2022-10-04

## 2022-10-04 RX ORDER — HEPARIN SODIUM 5000 [USP'U]/ML
5000 INJECTION, SOLUTION INTRAVENOUS; SUBCUTANEOUS
OUTPATIENT
Start: 2022-10-05 | End: 2022-10-06

## 2022-10-04 RX ORDER — CEFAZOLIN SODIUM 2 G/50ML
2000 SOLUTION INTRAVENOUS ONCE
OUTPATIENT
Start: 2022-10-04 | End: 2022-10-04

## 2022-10-04 NOTE — TELEPHONE ENCOUNTER
Cardiac clearance form faxed to provider's office at [phone]  Pt has appt tomorrow  Will need clearance for upcoming thoracic surgery

## 2022-10-04 NOTE — TELEPHONE ENCOUNTER
It does not necessarily have to be ordered as stat, however I did order it as "urgent"  Can someone please call central scheduling and see how soon they can get him in  Please remind him that the MRI was ordered as urgent  I think that means it should be done within 5 days of ordering it  no edema, no murmurs, regular rate and rhythm

## 2022-10-04 NOTE — PROGRESS NOTES
Thoracic Consult  Assessment/Plan:    Malignant neoplasm of lower lobe of right lung [facility])  We had a discussion with Mr  Gonzalo Gandara after personally reviewing his medical history, imaging, and testing  He has a biopsy proven right lower lobe squamous cell carcinoma  He is clinical stage I disease and his PFT's are within normal limits  Therefore, he is a candidate for surgical resection if he completely stops quit smoking for at least 2 weeks prior to surgery  He would also have to undergo cardiac risk stratification by his cardiologist  The procedure was explained, which would consist of a flexible bronchoscopy, right thoracoscopic lower lobe segmentectomy, possible right lower lobectomy  The risks and post operative course were explained and all questions were answered  He also would like to have his left shoulder pain improve prior to surgery  We will schedule this for the middle of November and he will return for an updated H and P, as well as undergo blood work  He has a current EKG  Tobacco abuse counseling  We discussed, at length, the important of smoking cessation prior to the surgery  He is in agreement and will start with a nicotine patch  Diagnoses and all orders for this visit:    Malignant neoplasm of lower lobe of right lung (HCC)  -     CBC and Platelet; Future  -     Type and screen; Future  -     APTT; Future  -     Protime-INR; Future  -     Basic metabolic panel;  Future  -     Case request operating room: LOBECTOMY LUNG; segmentectomy, possible lobectomy, THORACOSCOPY VIDEO ASSISTED SURGERY (VATS), BRONCHOSCOPY FLEXIBLE; Standing  -     Case request operating room: LOBECTOMY LUNG; segmentectomy, possible lobectomy, THORACOSCOPY VIDEO ASSISTED SURGERY (VATS), BRONCHOSCOPY FLEXIBLE    Primary squamous cell carcinoma of upper lobe of right lung ([facility] [address] )  -     Ambulatory Referral to Thoracic Surgery    Tobacco abuse counseling    Abnormal blood coagulation profile     Other orders  -     Diet NPO; Sips with meds; Standing  -     Nursing communication Please give pre-op Carbohydrate drink to patient 2-4 hours prior to surgery; Standing  -     Void on call to OR; Standing  -     Insert peripheral IV; Standing  -     Place sequential compression device; Standing  -     acetaminophen (TYLENOL) tablet 975 mg  -     gabapentin (NEURONTIN) capsule 600 mg  -     heparin (porcine) subcutaneous injection 5,000 Units  -     ceFAZolin (ANCEF) IVPB (premix in dextrose) 2,000 mg 50 mL             Thoracic History   Diagnosis: Right lower lobe lung cancer   Procedures/Surgeries:    Pathology:    Adjuvant Therapy:           Patient ID: Megan Abraham is a 75 y o  male  ECOG 1    HPI     Mr Gloria Segal is a 74 yo male with a history of AAA, CAD, COPD, HTN, migraines, JAKI who was referred by [facility] Pineda for a right lower lobe squamous cell lung cancer  CTA of the chest from 7/23/22 revealed a 1 1 x 0 9 cm right lower lobe nodule with a subcarinal node measuring 2 x 1 2 cm  PET from 8/5/22 revealed a 1 2 x 1 0 cm right lower lobe nodule with a SUV of 2 9, several small ggo in the right upper lobe and superior segment of right lower lobe without FDG activity  It also revealed enlarged prostate, AAA, emphysema, and hepatic steatosis  There was also a left parotid gland nodule that could be evaluated with a MRI of the neck or close surveillance  PFT's from 9/21/22 revealed a FVC of 78%, FEV1 of 72%, and a DLCO of 71% predicted  IR biopsy of nodule from 9/13/22 revealed squamous cell carcinoma  On discussion, he is having significant pain in his left scapula, radiating down his arm, making it difficult to lift his left arm  He had a MRI scheduled for yesterday, but the machine was not working  They have to reschedule it at this point  He has numbness in his left hand  He was seen by his pain physician  He attributes it to carrying a grill recently  He is currently smoking 1 ppd  He was a 75 ppy smoker   He does have shortness of breath with stairs and carrying things  He can walk on flat ground for quite some time without having any issues  He sees a pain management physician for chronic mid-low back pain, for which he takes Percocet  He is also on muscle relaxers  The following portions of the patient's history were reviewed and updated as appropriate: allergies, current medications, past family history, past medical history, past social history, past surgical history and problem list     Past Medical History:   Diagnosis Date    Abdominal aortic aneurysm without rupture     Abdominal Aortic Duplex 02/21/2017    Ectatic infrarenal abdominal aorta   CAD (coronary artery disease)     COPD (chronic obstructive pulmonary disease) (HCC)     Extremity pain     History of echocardiogram 03/18/2014    EF 55%, mild MR and AI  Mild concentric LVH   History of stress test 03/06/2017    Normal     Hypertension     Joint pain     Low back pain     Migraine     Neck pain     Obstructive sleep apnea     cannot tolerate CPAP    Osteoarthritis     Peripheral neuropathy     Reflex sympathetic dystrophy       Past Surgical History:   Procedure Laterality Date    CARDIAC CATHETERIZATION  02/13/2012    EF 70%, widely patent renal arteries, significant single-vessel CAD-medical therapy   CARDIAC CATHETERIZATION  04/11/2013    EF 65%, 50% mid LAD, 20% prox CFX, 90% diffuse RCA, 99% mid RCA  Medical management      CHOLECYSTECTOMY      EPIDURAL BLOCK INJECTION Bilateral 8/15/2019    Procedure: BLOCK / INJECTION EPIDURAL STEROID CERVICAL;  Surgeon: Ravin Musa MD;  Location: [facility];  Service: Pain Management     FL GUIDED NEEDLE PLAC BX/ASP/INJ  8/15/2019    IR BIOPSY LUNG  9/13/2022    ORTHOPEDIC SURGERY      TRIGGER POINT INJECTION        Family History   Adopted: Yes   Problem Relation Age of Onset    No Known Problems Family     No Known Problems Mother     No Known Problems Father       Social History     Socioeconomic History    Marital status: Single     Spouse name: Not on file    Number of children: Not on file    Years of education: Not on file    Highest education level: Not on file   Occupational History    Not on file   Tobacco Use    Smoking status: Current Every Day Smoker     Packs/day: 1 50    Smokeless tobacco: Never Used   Vaping Use    Vaping Use: Never used   Substance and Sexual Activity    Alcohol use: Not Currently    Drug use: No    Sexual activity: Yes   Other Topics Concern    Not on file   Social History Narrative    Not on file     Social Determinants of Health     Financial Resource Strain: Not on file   Food Insecurity: No Food Insecurity    Worried About Running Out of Food in the Last Year: Never true    Sana of Food in the Last Year: Never true   Transportation Needs: No Transportation Needs    Lack of Transportation (Medical): No    Lack of Transportation (Non-Medical):  No   Physical Activity: Not on file   Stress: Not on file   Social Connections: Not on file   Intimate Partner Violence: Not on file   Housing Stability: Low Risk     Unable to Pay for Housing in the Last Year: No    Number of Places Lived in the Last Year: 1    Unstable Housing in the Last Year: No        No Known Allergies  Current Outpatient Medications on File Prior to Visit   Medication Sig Dispense Refill    albuterol (Ventolin HFA) 90 mcg/act inhaler Inhale 2 puffs every 6 (six) hours as needed for wheezing 18 g 11    amLODIPine (NORVASC) 10 mg tablet swallow 1 tablet once daily 90 tablet 3    aspirin (ECOTRIN LOW STRENGTH) 81 mg EC tablet Take 1 tablet (81 mg total) by mouth daily 30 tablet 5    Blood Glucose Monitoring Suppl (OneTouch Verio Flex System) w/Device KIT USE IN THE MORNING TEST ONCE DAILY 1 kit 0    gabapentin (NEURONTIN) 600 MG tablet take 1 tablet by mouth three times a day 90 tablet 2    glucose blood (OneTouch Verio) test strip Test once daily 100 each 0  Lancets (onetouch ultrasoft) lancets Test once daily 100 each 0    lisinopril (ZESTRIL) 20 mg tablet take 1 tablet by mouth once daily 30 tablet 5    methocarbamol (ROBAXIN) 500 mg tablet Take 1 tablet (500 mg total) by mouth 3 (three) times a day as needed for muscle spasms 20 tablet 0    methylPREDNISolone 4 MG tablet therapy pack Use as directed on package 1 each 0    metoprolol succinate (TOPROL-XL) 100 mg 24 hr tablet take 1 tablet by mouth once daily 30 tablet 5    nicotine (NICODERM CQ) 21 mg/24 hr TD 24 hr patch Place 1 patch on the skin every 24 hours 28 patch 0    nitroglycerin (NITROSTAT) 0 4 mg SL tablet Place 1 tablet (0 4 mg total) under the tongue every 5 (five) minutes as needed for chest pain 25 tablet 5    oxyCODONE-acetaminophen (Percocet) 7 5-325 MG per tablet Take 1 tablet by mouth every 8 (eight) hours as needed for moderate pain Do not fill until 10/28/2022 Max Daily Amount: 3 tablets 90 tablet 0    oxyCODONE-acetaminophen (Percocet) 7 5-325 MG per tablet Take 1 tablet by mouth every 8 (eight) hours as needed for moderate pain Max Daily Amount: 3 tablets 90 tablet 0    rosuvastatin (CRESTOR) 20 MG tablet take 1 tablet by mouth once daily 90 tablet 5    sildenafil (VIAGRA) 100 mg tablet Take 1 tablet (100 mg total) by mouth daily as needed for erectile dysfunction 20 tablet 0    tiotropium-olodaterol (Stiolto Respimat) 2 5-2 5 MCG/ACT inhaler Inhale 2 puffs daily 4 g 11    tiotropium-olodaterol (Stiolto Respimat) 2 5-2 5 MCG/ACT inhaler Inhale 2 puffs daily 4 g 0    topiramate (TOPAMAX) 25 mg tablet FOLLOW INSTRUCTIONS GIVEN TO TITRATE UP TO A MAX OF 2 TABLETS BY MOUTH TWICE DAILY AS TOLERATED AND TO LEVEL OF BENEFIT FOR TREMORS 120 tablet 6    traZODone (DESYREL) 100 mg tablet take 1 tablet by mouth daily at bedtime 90 tablet 3    tamsulosin (FLOMAX) 0 4 mg Take 2 capsules (0 8 mg total) by mouth daily with dinner 60 capsule 0     No current facility-administered medications on file prior to visit  Review of Systems   Constitutional: Positive for appetite change (decreased appetite  ) and unexpected weight change (7 lbs wt loss over the past 1-2 months)  Negative for activity change, chills, fatigue and fever  HENT: Negative for postnasal drip, sore throat, trouble swallowing and voice change  Eyes: Negative for visual disturbance  Respiratory: Positive for shortness of breath (with stairs, carrying things)  Negative for cough, chest tightness and wheezing  Cardiovascular: Negative for chest pain and leg swelling  Gastrointestinal: Negative for abdominal pain, constipation, diarrhea, nausea and vomiting  Musculoskeletal: Positive for arthralgias (left scapular/ shoulder pain  ) and back pain  Negative for myalgias  Skin: Negative for color change  Neurological: Negative for dizziness, syncope, weakness, light-headedness and headaches  Psychiatric/Behavioral: Negative for agitation and behavioral problems  The patient is not nervous/anxious  All other systems reviewed and are negative  Objective:   Physical Exam  Vitals reviewed  Constitutional:       General: He is not in acute distress  Appearance: Normal appearance  He is well-developed  He is not diaphoretic  HENT:      Head: Normocephalic and atraumatic  Eyes:      General: No scleral icterus  Extraocular Movements: Extraocular movements intact  Neck:      Trachea: No tracheal deviation  Cardiovascular:      Rate and Rhythm: Normal rate and regular rhythm  Pulses: Normal pulses  Heart sounds: Normal heart sounds  No murmur heard  Pulmonary:      Effort: Pulmonary effort is normal  No respiratory distress  Breath sounds: Normal breath sounds  No wheezing  Abdominal:      General: Bowel sounds are normal  There is no distension  Palpations: Abdomen is soft  Musculoskeletal:         General: Tenderness and signs of injury present        Cervical back: Normal range of motion and neck supple  Right lower leg: No edema  Left lower leg: No edema  Comments: Unable to lift left shoulder  + ttp over left shoulder  Severely decreased ROM of left arm  Lymphadenopathy:      Cervical: No cervical adenopathy  Skin:     General: Skin is warm and dry  Neurological:      Mental Status: He is alert and oriented to person, place, and time  Cranial Nerves: No cranial nerve deficit  Psychiatric:         Mood and Affect: Mood normal          Behavior: Behavior normal          Thought Content: Thought content normal      BP (!) 172/94   Pulse 56   Temp 99 °F (37 2 °C)   Resp 17   Ht 5' 8" (1 727 m)   Wt 88 9 kg (196 lb)   SpO2 97%   BMI 29 80 kg/m²       NM PET CT skull base to mid thigh    Result Date: 8/5/2022  Narrative PET/CT SCAN INDICATION:  D38 1: Neoplasm of uncertain behavior of trachea, bronchus and lung   Abnormal CT study  Right lower lobe nodule  MODIFIER: PI COMPARISON: No prior PET scans  CT chest abdomen and pelvis, 7/23/2022 CELL TYPE:  N/A TECHNIQUE:   9 2 mCi F-18-FDG administered IV  Multiplanar attenuation corrected and non attenuation corrected PET images were acquired 60 minutes post injection  Contiguous, low dose, axial CT sections were obtained from the skull base through the femurs   Intravenous contrast material was not utilized  This examination, like all CT scans performed in the [facility], was performed utilizing techniques to minimize radiation dose exposure, including the use of iterative reconstruction and automated exposure control  Fasting serum glucose: 130 mg/dl FINDINGS: VISUALIZED BRAIN:   No acute abnormalities are seen  HEAD/NECK:   Subcentimeter solitary focus of increased activity within the posterior aspect of the left parotid gland  (1200/50) associated with a subcentimeter ovoid nodule measuring approximately 0 8 cm with SUV max 3 8    CT images: No significant findings CHEST:   - Solid nodule in the superior segment of the right lower lobe abutting the major fissure  (1200/113) (3/127) measures approximately 1 2 x 1 0 cm with SUV max 2 9  A subpleural nodule on image 3/126 measuring 5 mm is unchanged from the prior study, and shows no abnormal glucose activity but too small for accurate characterization with PET/CT  Prior PET CT also showed subtle groundglass opacities within the right upper lobe, for example as seen on image 3/106, without abnormal glucose activity  There is no glucose avid mediastinal or hilar adenopathy CT images: Prior CT demonstrated airspace disease within the posterior inferior right lower lobe, since resolved  There is pulmonary emphysema  No pleural or pericardial effusion  Coronary artery calcification  ABDOMEN:   No FDG avid soft tissue lesions are seen  CT images: Hepatic steatosis and bilateral completely evaluated renal cysts  (Please refer to prior CT report)  Bilateral nephrolithiasis  The time of the prior study, there is right-sided hydronephrosis with ureteral calculi  There is mild residual dilatation of the right kidney collecting system with reduced caliber of the right ureter and diminished periureteral inflammation  No residual ureteral stone appreciated  There is at least one small calculus measuring about 2 to 3 mm in the urinary bladder (3/255)  Abdominal aortic aneurysm and dilated left common iliac artery noted  PELVIS: No FDG avid soft tissue lesions are seen  CT images: Enlarged prostate gland with calcification  Circumferential bladder wall thickening which may be related to muscular hypertrophy  OSSEOUS STRUCTURES: No FDG avid lesions are seen  CT images: No significant findings  Impression 1  Solid lesion in the superior segment of the right lower lobe abutting the major fissure measuring 1 2 x 1 0 cm in size with SUV max 2 9 most concerning for primary bronchogenic carcinoma of the lung with soft tissue sampling recommended   2  Several small groundglass opacities within the right upper lobe and an additional 5 mm nodule in the superior segment of the right lower lobe without abnormal glucose activity but too small to characterize  Reassessment during the course of follow-up  recommended  3  There is no evidence of glucose avid intrathoracic adenopathy  There is no evidence of distant glucose avid metastatic disease 4  Limited CT study shows no residual ureteral stones  A small bladder calculus is present  Right renal collecting system distention partially resolved  5   Enlarged prostate gland, abdominal aortic aneurysm, pulmonary emphysema, and hepatic steatosis 6  Subcentimeter glucose avid nodule in the left parotid gland  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

## 2022-10-04 NOTE — TELEPHONE ENCOUNTER
Pt is asking if his MRI order can be changed to stat  Pt said he went yesterday morning and the machine broke and now they are trying to reschedule him further out than he'd like

## 2022-10-04 NOTE — ASSESSMENT & PLAN NOTE
We discussed, at length, the important of smoking cessation prior to the surgery  He is in agreement and will start with a nicotine patch

## 2022-10-04 NOTE — TELEPHONE ENCOUNTER
S/w central scheduling, asked for next available appt for MRI for pt at [facility], scheduled pt for 10/6 at 8:15 pm  Called pt and made him aware of MRI appt and that our office will call him once we have the results

## 2022-10-04 NOTE — ASSESSMENT & PLAN NOTE
We had a discussion with Mr Gonzalo Gandara after personally reviewing his medical history, imaging, and testing  He has a biopsy proven right lower lobe squamous cell carcinoma  He is clinical stage I disease and his PFT's are within normal limits  Therefore, he is a candidate for surgical resection if he completely stops quit smoking for at least 2 weeks prior to surgery  He would also have to undergo cardiac risk stratification by his cardiologist  The procedure was explained, which would consist of a flexible bronchoscopy, right thoracoscopic lower lobe segmentectomy, possible right lower lobectomy  The risks and post operative course were explained and all questions were answered  He also would like to have his left shoulder pain improve prior to surgery  We will schedule this for the middle of November and he will return for an updated H and P, as well as undergo blood work  He has a current EKG

## 2022-10-04 NOTE — TELEPHONE ENCOUNTER
Patients is requesting his MRI referral be noted as Stat, so he can be scheduled asap  The MRI facility is not scheduling him as soon as he needs to be scheduled   Please advise, alonso    Call back# [phone]

## 2022-10-05 ENCOUNTER — CONSULT (OUTPATIENT)
Dept: CARDIOLOGY CLINIC | Facility: CLINIC | Age: 67
End: 2022-10-05
Payer: MEDICARE

## 2022-10-05 VITALS
SYSTOLIC BLOOD PRESSURE: 162 MMHG | BODY MASS INDEX: 29.86 KG/M2 | HEIGHT: 68 IN | WEIGHT: 197 LBS | DIASTOLIC BLOOD PRESSURE: 92 MMHG | HEART RATE: 54 BPM

## 2022-10-05 DIAGNOSIS — Z91.89 SEDENTARY LIFESTYLE: ICD-10-CM

## 2022-10-05 DIAGNOSIS — C34.90 MALIGNANT NEOPLASM OF LUNG, UNSPECIFIED LATERALITY, UNSPECIFIED PART OF LUNG (HCC): ICD-10-CM

## 2022-10-05 DIAGNOSIS — Z01.810 PREPROCEDURAL CARDIOVASCULAR EXAMINATION: Primary | ICD-10-CM

## 2022-10-05 DIAGNOSIS — I10 PRIMARY HYPERTENSION: ICD-10-CM

## 2022-10-05 DIAGNOSIS — I25.10 CORONARY ARTERY DISEASE INVOLVING NATIVE CORONARY ARTERY OF NATIVE HEART WITHOUT ANGINA PECTORIS: ICD-10-CM

## 2022-10-05 DIAGNOSIS — F17.200 SMOKING: ICD-10-CM

## 2022-10-05 DIAGNOSIS — M79.622 PAIN OF LEFT UPPER ARM: ICD-10-CM

## 2022-10-05 PROCEDURE — 99204 OFFICE O/P NEW MOD 45 MIN: CPT | Performed by: INTERNAL MEDICINE

## 2022-10-05 PROCEDURE — 99214 OFFICE O/P EST MOD 30 MIN: CPT | Performed by: INTERNAL MEDICINE

## 2022-10-05 NOTE — PATIENT INSTRUCTIONS
Coronary Artery Disease   AMBULATORY CARE:   Coronary artery disease (CAD)  is narrowing of the arteries to your heart caused by a buildup of plaque (cholesterol and other substances)  The narrowing in your arteries decreases the amount of blood that can flow to your heart  This causes your heart to get less oxygen, which may be life-threatening  Common symptoms include the following:   Chest pain (angina), causing burning, squeezing, or crushing tightness in your chest    Pain that spreads to your neck, jaw, or shoulder blade    Nausea, vomiting, sweating, fainting, and hands and feet that are cold to the touch    Call your local emergency number (911 in the [place]), or have someone call if:   You have any of the following signs of a heart attack:      Squeezing, pressure, or pain in your chest    You may  also have any of the following:     Discomfort or pain in your back, neck, jaw, stomach, or arm    Shortness of breath    Nausea or vomiting    Lightheadedness or a sudden cold sweat      Seek care immediately if:   You have chest pain that happens often  You have chest pain at rest     Call your doctor or cardiologist if:   You have questions or concerns about your condition or care  Medicines used to treat CAD:  You may  need any of the following:  Blood pressure medicines  are given to lower your blood pressure  These medicines may include ACE inhibitors and beta-blockers  ACE inhibitors help keep your blood vessels relaxed and open  This helps keep blood flowing into your heart  Beta-blockers keep your heart pumping strongly and regularly  This helps keep your heart from working too hard to get oxygen  Cholesterol medicines  help lower blood cholesterol levels  Nitrates , such as nitroglycerin, relax the arteries of your heart so it gets more oxygen  Nitrates may also help relieve your chest pain  Diuretics  help your body get rid of extra fluid and protect your heart from more damage   You may urinate more often while you are taking diuretics  Antiplatelets , such as aspirin, help prevent blood clots  Take your antiplatelet medicine exactly as directed  These medicines make it more likely for you to bleed or bruise  If you are told to take aspirin, do not take acetaminophen or ibuprofen instead  Blood thinners  keep clots from forming in your blood  Clots may cause heart attacks, strokes, or death  This medicine makes it more likely for you to bleed or bruise  Other ways CAD may be treated: Your healthcare provider will work with you to create a treatment plan  In addition to medicines, he or she may recommend a procedure or surgery to open your arteries  He or she can explain the benefits and risks of each treatment  The following are commonly used to treat CAD:  An angioplasty  may be done to open an artery blocked by plaque  A tube with a balloon on the end is put into the blocked artery  When the tube is in the artery, the balloon is inflated  As the balloon inflates, it presses the plaque against the artery wall to open the artery  A stent may be placed in your artery to keep it open  Coronary artery bypass surgery (CABG)  is open heart surgery  Healthcare providers take arteries or veins from other areas in your body  These are used to bypass (go around) the blocked arteries of your heart  Cardiac rehabilitation (rehab)  is a program run by specialists who will help you safely strengthen your heart and reduce the risk for more heart disease  The plan includes exercise, relaxation, stress management, and heart-healthy nutrition  Healthcare providers will also check to make sure any medicines you are taking are working  Manage or prevent CAD:   Do not take certain medicines without asking your healthcare provider first   These include NSAIDs, herbal or vitamin supplements, or hormones (estrogen or progestin)  Do not smoke    Nicotine and other chemicals in cigarettes and cigars can cause heart and lung damage  Ask your healthcare provider for information if you currently smoke and need help to quit  E-cigarettes or smokeless tobacco still contain nicotine  Talk to your healthcare provider before you use these products  Be physically active  Physical activity, such as exercise, can lower your blood pressure, cholesterol, weight, and blood sugar levels  Healthcare providers will help you create physical activity goals  They can also help you make a plan to reach your goals  For example, you can break activity into 10-minute periods, 3 times in the day  Find activities you enjoy  This will make it easier for you to reach your goals  Maintain a healthy weight  Ask your healthcare provider what a healthy weight is for you  He or she can help you create a safe weight loss plan, if needed  A weight loss of at least 10% can improve your heart health  Eat heart-healthy foods  Include fresh fruits and vegetables in your meal plan  Choose low-fat foods, such as skim or 1% fat milk, low-fat cheese and yogurt, fish, chicken (without skin), and lean meats  Eat two 4-ounce servings of fish high in omega-3 fats each week, such as salmon, fresh tuna, and herring  Limit sodium (salt) as directed  Avoid foods that are high in sodium, such as canned foods, potato chips, salty snacks, and cold cuts  If you add salt when you cook, do not add more salt at the table  Limit or do not drink alcohol  A drink of alcohol is 12 ounces of beer, 5 ounces of wine, or 1½ ounces of liquor  Your healthcare provider can tell you how many drinks are okay within 24 hours and within 1 week  Manage other health conditions  Follow your healthcare provider's advice on how to manage other conditions that can affect your heart health  These include diabetes, high blood pressure, and high cholesterol   You may need to take medicines for these conditions and make other lifestyle changes  Manage stress  Stress can raise your blood pressure  Find new ways to relax, such as deep breathing or listening to music  Ask about vaccines you may need  Your healthcare provider can tell you which vaccines you need, and when to get them  The following vaccines help prevent diseases that can become serious for a person with CAD:    The influenza (flu) vaccine is given each year  Get a flu vaccine as soon as recommended, usually in September or October  The pneumonia vaccine is usually given every 5 years  Your healthcare provider may recommend the pneumonia vaccine if you are 72 or older  COVID-19 vaccines are given to adults as a shot in 1 or 2 doses  Vaccination is recommended for all adults  A booster (additional) dose is also recommended to help your immune system continue to protect against severe COVID-19  The booster can be a different brand of the COVID-19 vaccine than you originally received  The timing for the booster depends on the type of vaccine you received:    1-dose vaccine: The booster is given at least 2 months after you received the vaccine  2-dose vaccine: The booster is given at least 5 to 6 months after the second dose  Follow up with your doctor or cardiologist as directed: You may need to return for other tests  You may also be referred to a cardiac surgeon  Write down your questions so you remember to ask them during your visits  © Copyright [employer] 2022 Information is for End User's use only and may not be sold, redistributed or otherwise used for commercial purposes  All illustrations and images included in CareNotes® are the copyrighted property of [employer]  or [employer]   The above information is an  only  It is not intended as medical advice for individual conditions or treatments  Talk to your doctor, nurse or pharmacist before following any medical regimen to see if it is safe and effective for you

## 2022-10-05 NOTE — PROGRESS NOTES
Subjective:        Patient ID: Megan Abraham is a 75 y o  male  Chief Complaint:  Reddy Gabriel is here for cardiac clearance in light of recently biopsy-proven lung cancer in need of bronchoscopy, right lower lobe thorascopic segmentectomy, possible lobectomy  He has moderate dyspnea, fortunately is PFTs do not prohibits surgery  He continues to smoke but is cutting down on plans on quitting 2 weeks before the surgery  He is having left shoulder discomfort, feels this is musculoskeletal awaiting MRI scanning here and pain management versus orthopedic follow-up preop lung surgery  Has not really achieved more than 5 Mets since his stress test over a year ago  Known severe RCA disease and mild LAD disease disease on catheterization 10 years ago  The following portions of the patient's history were reviewed and updated as appropriate: allergies, current medications, past family history, past medical history, past social history, past surgical history and problem list   Review of Systems   Constitutional: Negative for chills, diaphoresis, malaise/fatigue and weight gain  HENT: Negative for nosebleeds and stridor  Eyes: Negative for double vision, vision loss in left eye, vision loss in right eye and visual disturbance  Cardiovascular: Positive for dyspnea on exertion  Negative for chest pain, claudication, cyanosis, irregular heartbeat, leg swelling, near-syncope, orthopnea, palpitations, paroxysmal nocturnal dyspnea and syncope  Respiratory: Positive for shortness of breath  Negative for cough, snoring and wheezing  Endocrine: Negative for polydipsia, polyphagia and polyuria  Hematologic/Lymphatic: Negative for bleeding problem  Does not bruise/bleed easily  Skin: Negative for flushing and rash  Musculoskeletal: Positive for joint pain (Left shoulder arm discomfort though can raise it above his head and put hand behind head for stress testing)  Negative for falls and myalgias  Gastrointestinal: Negative for abdominal pain, heartburn, hematemesis, hematochezia, melena and nausea  Genitourinary: Negative for hematuria  Neurological: Negative for brief paralysis, dizziness, focal weakness, headaches, light-headedness, loss of balance and vertigo  Psychiatric/Behavioral: Negative for altered mental status and substance abuse  Allergic/Immunologic: Negative for hives  Objective:      /92   Pulse (!) 54   Ht 5' 8" (1 727 m)   Wt 89 4 kg (197 lb)   BMI 29 95 kg/m²   Physical Exam  Constitutional:       General: He is not in acute distress  Appearance: He is well-developed  He is not diaphoretic  HENT:      Head: Normocephalic and atraumatic  Eyes:      General: No scleral icterus  Pupils: Pupils are equal, round, and reactive to light  Neck:      Thyroid: No thyromegaly  Vascular: No carotid bruit or JVD  Cardiovascular:      Rate and Rhythm: Normal rate and regular rhythm  Heart sounds: Normal heart sounds  No murmur heard  No friction rub  No gallop  Pulmonary:      Effort: Pulmonary effort is normal  No respiratory distress  Breath sounds: Normal breath sounds  No stridor  No wheezing or rales  Abdominal:      General: Bowel sounds are normal  There is no distension  Palpations: Abdomen is soft  There is no mass  Tenderness: There is no abdominal tenderness  Musculoskeletal:         General: No deformity  Cervical back: Normal range of motion and neck supple  Right lower leg: No edema  Left lower leg: No edema  Skin:     General: Skin is warm and dry  Coloration: Skin is not pale  Findings: No erythema  Neurological:      Mental Status: He is alert and oriented to person, place, and time        Coordination: Coordination normal    Psychiatric:         Mood and Affect: Mood normal          Behavior: Behavior normal          Lab Review:   Office Visit on 09/30/2022   Component Date Value  Alpha-Hydroxyalprazolam * 09/30/2022 negative     Amphetamine Quantificati* 09/30/2022 negative     Aripiprazole Quantificat* 09/30/2022 negative     [id number] QUANTIFICATION 09/30/2022 negative     RESULT ALL_PRESC MEDS SP* 53/57/1603 Not Applicable     Buprenorphine Quantifica* 09/30/2022 negative     Norbuprenorphine Quantif* 09/30/2022 negative     Butalbital Quantification 09/30/2022 negative     7-Amino-Clonazepam Quant* 09/30/2022 negative     Clozapine Quantification 09/30/2022 negative     N-desmethylclozapine Jeovany* 09/30/2022 negative     Cocaine metabolite Quant* 09/30/2022 negative     Codeine Quantification 09/30/2022 negative     Morphine Quantification 09/30/2022 negative     Hydrocodone Quantificati* 09/30/2022 negative     Norhydrocodone Quantific* 09/30/2022 negative     Hydromorphone Quantifica* 09/30/2022 negative     Desipramine Quantificati* 09/30/2022 negative     Nordiazepam Quantificati* 09/30/2022 negative     Temazepam Quantification 09/30/2022 negative     Oxazepam Quantification 09/30/2022 negative     Ethyl Glucuronide Quanti* 09/30/2022 negative     Ethyl Sulfate Quantifica* 09/30/2022 negative     Fentanyl Quantification 09/30/2022 negative     Norfentanyl Quantificati* 09/30/2022 negative     4-ANPP Quantification 09/30/2022 Fen Neg     Acetyl fentanyl Quantifi* 09/30/2022 Fen Neg     Acetyl norfentanyl Quant* 09/30/2022 Fen Neg     Acryl fentanyl Quantific* 09/30/2022 Fen Neg     Carfentanil Quantificati* 09/30/2022 Fen Neg     Para-fluorofentanyl Jared* 09/30/2022 Fen Neg     Haloperidol  Quantificat* 09/30/2022 negative     Haloperidol metabolite Q* 09/30/2022 negative     6-GORAN (Heroin metabolite* 09/30/2022 negative     Hydrocodone Quantificati* 09/30/2022 negative     Norhydrocodone Quantific* 09/30/2022 negative     Hydromorphone Quantifica* 09/30/2022 negative     Hydromorphone Quantifica* 09/30/2022 negative     Mitragynine (Kratom enrique* 09/30/2022 negative     1-AA-Bnnzygbwasd (Kratom* 09/30/2022 negative     Dextrorphan (Dextrometho* 09/30/2022 negative     Lorazepam Quantification 09/30/2022 negative     MDMA Quantification 09/30/2022 negative     Meperidine Quantification 09/30/2022 negative     Normeperidine Quantifica* 09/30/2022 negative     Methadone Quantification 09/30/2022 negative     EDDP (Methadone metaboli* 09/30/2022 negative     Amphetamine Quantificati* 09/30/2022 negative     Methamphetamine Quantifi* 09/30/2022 negative     Methylphenidate Quantifi* 09/30/2022 negative     RITALINIC ACID QUANTIFIC* 09/30/2022 negative     Morphine Quantification 09/30/2022 negative     Hydromorphone Quantifica* 09/30/2022 negative     OLANZAPINE QUANTIFICATION 09/30/2022 negative     Oxazepam Quantification 09/30/2022 negative     Oxycodone Quantification 09/30/2022 positive-[id number]     Noroxycodone Quantificat* 09/30/2022 positive-> 6400     Oxymorphone Quantificati* 09/30/2022 positive-201 034     Oxymorphone Quantificati* 09/30/2022 positive-201 034     Phencyclidine Quantifica* 09/30/2022 negative     Phenobarbital Quantifica* 09/30/2022 negative     PHENTERMINE QUANTIFICATI* 09/30/2022 negative     PREGABALIN QUANTIFICATION 09/30/2022 negative     QUETIAPINE QUANTIFICATION 09/30/2022 negative     NORQUETIAPINE QUANTIFICA* 09/30/2022 negative     Risperidone Quantificati* 09/30/2022 negative     Hydroxyrisperidone Quant* 09/30/2022 negative     Secobarbital Quantificat* 09/30/2022 negative     Tapentadol Quantification 09/30/2022 negative     Temazepam Quantification 09/30/2022 negative     Oxazepam Quantification 09/30/2022 negative     cTHC (Marijuana metaboli* 09/30/2022 negative     Tramadol Quantification 09/30/2022 negative     O-desmethyl-tramadol Jeovany* 09/30/2022 negative     N-DESMETHYL-TRAMADOL JEOVANY* 09/30/2022 negative     CREATININE 09/30/2022 normal-229 8     OXIDANT 09/30/2022 normal-0  pH 09/30/2022 normal-5 4     SPECIFIC GRAVITY URINE 09/30/2022 abnormal-1 036 (A)   Admission on 09/26/2022, Discharged on 09/26/2022   Component Date Value    Ventricular Rate 09/26/2022 46     Atrial Rate 09/26/2022 46     LA Interval 09/26/2022 174     QRSD Interval 09/26/2022 86     QT Interval 09/26/2022 444     QTC Interval 09/26/2022 388     P Axis 09/26/2022 62     QRS Axis 09/26/2022 62     T Wave Axis 09/26/2022 69     WBC 09/26/2022 9 72     RBC 09/26/2022 5 36     Hemoglobin 09/26/2022 16 4     Hematocrit 09/26/2022 49 5 (A)    MCV 09/26/2022 92     MCH 09/26/2022 30 6     MCHC 09/26/2022 33 1     RDW 09/26/2022 13 2     MPV 09/26/2022 10 4     Platelets 47/27/1994 213     nRBC 09/26/2022 0     Neutrophils Relative 09/26/2022 60     Immat GRANS % 09/26/2022 0     Lymphocytes Relative 09/26/2022 31     Monocytes Relative 09/26/2022 8     Eosinophils Relative 09/26/2022 0     Basophils Relative 09/26/2022 1     Neutrophils Absolute 09/26/2022 5 88     Immature Grans Absolute 09/26/2022 0 02     Lymphocytes Absolute 09/26/2022 3 01     Monocytes Absolute 09/26/2022 0 73     Eosinophils Absolute 09/26/2022 0 00     Basophils Absolute 09/26/2022 0 08     Sodium 09/26/2022 135     Potassium 09/26/2022 3 7     Chloride 09/26/2022 100     CO2 09/26/2022 25     ANION GAP 09/26/2022 10     BUN 09/26/2022 11     Creatinine 09/26/2022 1 08     Glucose 09/26/2022 135     Calcium 09/26/2022 9 1     AST 09/26/2022 15     ALT 09/26/2022 24     Alkaline Phosphatase 09/26/2022 88     Total Protein 09/26/2022 7 4     Albumin 09/26/2022 3 9     Total Bilirubin 09/26/2022 0 85     eGFR 09/26/2022 70     hs TnI 0hr 09/26/2022 5    Hospital Outpatient Visit on 09/13/2022   Component Date Value    Case Report 09/13/2022                      Value:Surgical Pathology Report                         Case: [id number]                                   Authorizing Provider:  Brittanie Torres Leana Nagel MD        Collected:           09/13/2022 1002              Ordering Location:     [facility] Received:            09/13/2022 1043                                     CAT Scan                                                                     Pathologist:           Amara Croft MD                                                       Specimen:    Lung, Right Lower Lobe                                                                     Final Diagnosis 09/13/2022                      Value: This result contains rich text formatting which cannot be displayed here   Preliminary Diagnosis 09/13/2022                      Value: This result contains rich text formatting which cannot be displayed here   Note 09/13/2022                      Value: This result contains rich text formatting which cannot be displayed here   Additional Information 09/13/2022                      Value: This result contains rich text formatting which cannot be displayed here  Elisabet Pall Gross Description 09/13/2022                      Value: This result contains rich text formatting which cannot be displayed here   Clinical Information 09/13/2022                      Value:RLL lung mass   Office Visit on 08/15/2022   Component Date Value    Supplier Name 08/15/2022 [employer]     Supplier Phone Number 08/15/2022 [phone]     Order Status 08/15/2022 Delivery Successful     Delivery Request Date 08/15/2022 08/15/2022     Date Delivered  08/15/2022 08/16/2022     Supplier Name 08/15/2022 08/16/2022     Item Description 08/15/2022 Nebulizer Compressor with Mouthpiece Only     Item Description 08/15/2022 Nebulizer Set, Reusable     Item Description 08/15/2022 Mouthpiece Only, N/A      XR chest 1 view portable    Result Date: 9/26/2022  Narrative: CHEST INDICATION:   chest pain   COMPARISON:  9/13/2022 EXAM PERFORMED/VIEWS:  XR CHEST PORTABLE Single view FINDINGS: Cardiomediastinal silhouette appears unremarkable  The lungs are clear  Resolution of previous right basal infiltrate No pneumothorax or pleural effusion  Osseous structures appear within normal limits for patient age  Impression: No acute cardiopulmonary disease  Previous right basal infiltrate has resolved Workstation performed: [id number]     CXR 1 view PA portable stat    Result Date: 9/13/2022  Narrative: CHEST INDICATION:   pain after bx  COMPARISON:  None EXAM PERFORMED/VIEWS:  XR CHEST PORTABLE FINDINGS: Cardiomediastinal silhouette appears unremarkable  No pneumothorax seen  Small right pleural effusion with extensive right lower lobe airspace opacities noted  Left basilar subtle infiltrates and/or atelectasis also seen  Osseous structures appear within normal limits for patient age  No free air in the upper abdomen  Impression: No pneumothorax seen  Small right pleural effusion with extensive right lower lobe airspace opacities noted  Left basilar subtle infiltrates and/or atelectasis also seen  Workstation performed: [id number]     MRI brain w wo contrast    Result Date: 9/23/2022  Narrative: MRI BRAIN WITH AND WITHOUT CONTRAST INDICATION: C34 90: Malignant neoplasm of unspecified part of unspecified bronchus or lung  COMPARISON:  None  TECHNIQUE: Sagittal T1, axial T2, axial FLAIR, axial T1, axial [place], axial diffusion  Sagittal, axial T1 postcontrast   Axial bravo postcontrast with coronal reconstructions  IV Contrast:  9 mL of Gadobutrol injection (SINGLE-DOSE)  IMAGE QUALITY:   Diagnostic  FINDINGS: BRAIN PARENCHYMA:  There is no discrete mass, mass effect or midline shift  There is no intracranial hemorrhage  Normal posterior fossa  Diffusion imaging is unremarkable  There are no white matter changes in the cerebral hemispheres  Postcontrast imaging of the brain demonstrates no abnormal enhancement  VENTRICLES:  Normal for the patient's age   SELLA AND PITUITARY GLAND:  Normal  ORBITS: Normal  PARANASAL SINUSES:  Normal  VASCULATURE:  Evaluation of the major intracranial vasculature demonstrates appropriate flow voids  CALVARIUM AND SKULL BASE:  Normal  EXTRACRANIAL SOFT TISSUES:  Normal      Impression: No evidence of metastatic disease  Workstation performed: [id number]     IR biopsy lung    Result Date: 9/13/2022  Narrative: Examination: CT guided percutaneous core needle biopsy of right lower lobe  HISTORY:  PET avid mass in a smoker TECHNIQUE: The patient was brought to CT  Informed consent was obtained  The right back  was prepped and draped in usual sterile fashion  Timeout was performed  Lidocaine was given as local anesthesia  Monitored, intravenous, conscious sedation was provided  Using CT guidance,  a 17-gauge needle was advanced to abut the lesion  An 18-gauge needle was placed coaxially  Core biopsy was performed of   Cores were submitted in formalin  media  The tract was not closed with D stat  Postbiopsy scanning showed nothing to suggest a complication  The patient tolerated the procedure well  There were no immediate complications or complaints  FINDINGS: Postbiopsy scan showed no pneumothorax  There was some bleeding  Patient initially had blood in his sputum  This lessened after several minutes  Impression: Technically successful CT-guided biopsy  This is the end of the clinically relevant portion of this report  Subsequent information is for compliance, documentation, and coding purposes  Automated exposure control was utilized, and was minimize, in accordance with the application of the ALARA technique  Chlorhexidine and alcohol was used for cleansing and sterile preparation of the skin  This was allowed to dry prior to the application of sterile draping  Timeout was performed, with all team members present, and in agreement  Confirmation of patient, procedure, laterally, allergies, and all needed equipment was performed   During the course of informed consent, information was communicated, verbally, to the patient regarding the procedure  The patient was informed of; the name of the procedure, nature of the procedure, nature of the condition, risks, benefits, alternatives, and potential complications, as well as the consequences of not having the procedure  All the patient's questions were answered  Informed consent was signed  The patient was examined, and is in satisfactory condition for planned moderate sedation  Mallampati score: 1 Intravenous conscious sedation was provided by an independent  There was continuous monitoring of blood pressure, heart rate, respiratory rate, and oxygen saturation, by an independent trained observer  Conscious sedation time: 29 Minutes Versed dose: 0 milligrams Fentanyl dose: 50 micrograms After the procedure, the patient was recovered, and return to their baseline status, and was deemed fit for discharge from   Radiation dose: 1139 mGy-cm PROCEDURE: CT guided biopsy of the above-named organ or abnormality Preprocedure diagnosis: Please see "history ", above Postprocedure diagnosis: Same Antibiotics: None Estimated blood loss: Less than 5 mL Specimen: Core biopsies specimens were sent to pathology Anesthesia: Local and monitored intravenous conscious sedation Workstation performed: [id number]     Complete PFT with post bronchodilator    Result Date: 9/23/2022  Narrative: Mariya Lr 75 y o  male MRN: [id number] The patient underwent spirometry, lung volumes, diffusion capacity and airway resistance studies  The patient gave a good effort  Study Interpretation: · Normal lung volumes  · Restrictive pattern on the spirometry  · Near significant improvement in airflow and vital capacity following the administration of bronchodilators  · Mildly reduced diffusion capacity  · Normal airway resistance as indicated by the specific airway conductance  Assessment:       1   Preprocedural cardiovascular examination  NM myocardial perfusion spect (rx stress and/or rest)   2  Coronary artery disease involving native coronary artery of native heart without angina pectoris  NM myocardial perfusion spect (rx stress and/or rest)   3  Pain of left upper arm  NM myocardial perfusion spect (rx stress and/or rest)   4  Primary hypertension  NM myocardial perfusion spect (rx stress and/or rest)   5  Smoking  NM myocardial perfusion spect (rx stress and/or rest)   6  Sedentary lifestyle  NM myocardial perfusion spect (rx stress and/or rest)   7  Malignant neoplasm of lung, unspecified laterality, unspecified part of lung ([address] )  NM myocardial perfusion spect (rx stress and/or rest)        Plan:       Desiree Salazar has no signs or symptoms reminiscent of volume excess/heart failure/dysrhythmia  Clinically examines in sinus rhythm  No alarming valvulopathy heard  Blood pressure controlled, lipids properly treated  He takes all of his meds in the evening  As long as the stress test I have ordered demonstrates no more than mild RCA territory ischemia he can proceed with planned surgery without further ado  Ask the continue with all of his medications on the night before surgery as he normally takes them  Stressed the importance of smoking cessation indefinitely beginning 2 weeks prior to his surgery  He understands  I made no medication changes today  Will keep his regular scheduled appointment otherwise

## 2022-10-06 ENCOUNTER — HOSPITAL ENCOUNTER (OUTPATIENT)
Dept: MRI IMAGING | Facility: HOSPITAL | Age: 67
End: 2022-10-06
Payer: MEDICARE

## 2022-10-06 PROCEDURE — G1004 CDSM NDSC: HCPCS

## 2022-10-06 PROCEDURE — 72141 MRI NECK SPINE W/O DYE: CPT

## 2022-10-10 ENCOUNTER — HOSPITAL ENCOUNTER (EMERGENCY)
Facility: HOSPITAL | Age: 67
Discharge: HOME | End: 2022-10-10
Attending: EMERGENCY MEDICINE
Payer: MEDICARE

## 2022-10-10 VITALS
RESPIRATION RATE: 18 BRPM | OXYGEN SATURATION: 96 % | WEIGHT: 197 LBS | TEMPERATURE: 98 F | DIASTOLIC BLOOD PRESSURE: 95 MMHG | HEART RATE: 52 BPM | BODY MASS INDEX: 29.86 KG/M2 | HEIGHT: 68 IN | SYSTOLIC BLOOD PRESSURE: 195 MMHG

## 2022-10-10 DIAGNOSIS — M54.12 CERVICAL RADICULOPATHY: Primary | ICD-10-CM

## 2022-10-10 PROCEDURE — 99283 EMERGENCY DEPT VISIT LOW MDM: CPT

## 2022-10-10 PROCEDURE — 96372 THER/PROPH/DIAG INJ SC/IM: CPT

## 2022-10-10 PROCEDURE — 99284 EMERGENCY DEPT VISIT MOD MDM: CPT | Performed by: EMERGENCY MEDICINE

## 2022-10-10 RX ORDER — KETOROLAC TROMETHAMINE 10 MG/1
10 TABLET, FILM COATED ORAL EVERY 6 HOURS PRN
Qty: 8 TABLET | Refills: 0 | Status: SHIPPED | OUTPATIENT
Start: 2022-10-10

## 2022-10-10 RX ORDER — OXYCODONE AND ACETAMINOPHEN 10; 325 MG/1; MG/1
1 TABLET ORAL EVERY 8 HOURS PRN
Qty: 12 TABLET | Refills: 0 | Status: SHIPPED | OUTPATIENT
Start: 2022-10-10 | End: 2022-10-31

## 2022-10-10 RX ORDER — KETOROLAC TROMETHAMINE 30 MG/ML
30 INJECTION, SOLUTION INTRAMUSCULAR; INTRAVENOUS ONCE
Status: COMPLETED | OUTPATIENT
Start: 2022-10-10 | End: 2022-10-10

## 2022-10-10 RX ORDER — OXYCODONE AND ACETAMINOPHEN 10; 325 MG/1; MG/1
1 TABLET ORAL EVERY 6 HOURS PRN
Qty: 12 TABLET | Refills: 0 | Status: SHIPPED | OUTPATIENT
Start: 2022-10-10 | End: 2022-10-10 | Stop reason: SDUPTHER

## 2022-10-10 RX ADMIN — KETOROLAC TROMETHAMINE 30 MG: 30 INJECTION, SOLUTION INTRAMUSCULAR at 14:17

## 2022-10-10 NOTE — DISCHARGE INSTRUCTIONS
Increased Percocet to 10 mg strength as per pain management and use Toradol 1 pill every 8 hours as needed  Take with food  Do not take Advil, Motrin, ibuprofen, Aleve, Naprosyn, or aspirin while on Toradol  You may take Tylenol if necessary  Call pain management tomorrow and see where they can fit you in for your epidural shot      Return to the ER with any new, concerning, worsening issues

## 2022-10-11 ENCOUNTER — TELEPHONE (OUTPATIENT)
Dept: PAIN MEDICINE | Facility: CLINIC | Age: 67
End: 2022-10-11

## 2022-10-11 DIAGNOSIS — G89.4 CHRONIC PAIN SYNDROME: Primary | ICD-10-CM

## 2022-10-11 NOTE — TELEPHONE ENCOUNTER
Ankit Quintero,    Patient was given Percocet 10/235mg  He only has enough for 4 days  I did get him scheduled for this Friday (10/14) because he said that he does not take 81mg of Aspirin  I confirmed this with him twice  Patient is asking if he can get a refill on the Percocet?

## 2022-10-12 PROBLEM — Z00.00 MEDICARE ANNUAL WELLNESS VISIT, SUBSEQUENT: Status: RESOLVED | Noted: 2021-06-17 | Resolved: 2022-10-12

## 2022-10-12 RX ORDER — CYCLOBENZAPRINE HCL 10 MG
10 TABLET ORAL 3 TIMES DAILY PRN
Qty: 90 TABLET | Refills: 1 | Status: SHIPPED | OUTPATIENT
Start: 2022-10-12

## 2022-10-12 NOTE — TELEPHONE ENCOUNTER
Caller: Patient    Doctor: Esperanza Flowers    Reason for call: returning call    Call back#: [phone]

## 2022-10-12 NOTE — TELEPHONE ENCOUNTER
Please let patient know that it was not my intention to keep him on 10 mg of oxycodone permanently  Since we are doing the injection this week, he can resume 7 5 mg of oxycodone when he runs out of the 10 mg  I would like to add a stronger muscle relaxer to his medication regimen  I will send a prescription for Flexeril to his pharmacy  He should discontinue methocarbamol if he is still taking it

## 2022-10-13 NOTE — ED PROVIDER NOTES
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  69-year-old male presents emergency department complaining of pain in his left shoulder and scapular region that radiates down the left arm  The patient notes he has been on multiple medications as well as Percocet, and muscle relaxants but is not getting relief  The patient had an MRI done recently as well however the patient's results are not known at this time  Patient is also undergoing treatment for lung cancer  Patient notes paresthesias as well as some weakness in his  and notes he is dropping things frequently  Prior to Admission Medications   Prescriptions Last Dose Informant Patient Reported? Taking?    Blood Glucose Monitoring Suppl (OneTouch Verio Flex System) w/Device KIT   No No   Sig: USE IN THE MORNING TEST ONCE DAILY   Lancets (onetouch ultrasoft) lancets   No No   Sig: Test once daily   albuterol (Ventolin HFA) 90 mcg/act inhaler   No No   Sig: Inhale 2 puffs every 6 (six) hours as needed for wheezing   amLODIPine (NORVASC) 10 mg tablet   No No   Sig: swallow 1 tablet once daily   aspirin (ECOTRIN LOW STRENGTH) 81 mg EC tablet   No No   Sig: Take 1 tablet (81 mg total) by mouth daily   gabapentin (NEURONTIN) 600 MG tablet   No No   Sig: take 1 tablet by mouth three times a day   glucose blood (OneTouch Verio) test strip   No No   Sig: Test once daily   lisinopril (ZESTRIL) 20 mg tablet   No No   Sig: take 1 tablet by mouth once daily   methylPREDNISolone 4 MG tablet therapy pack   No No   Sig: Use as directed on package   metoprolol succinate (TOPROL-XL) 100 mg 24 hr tablet   No No   Sig: take 1 tablet by mouth once daily   nicotine (NICODERM CQ) 21 mg/24 hr TD 24 hr patch No No   Sig: Place 1 patch on the skin every 24 hours   nitroglycerin (NITROSTAT) 0 4 mg SL tablet   No No   Sig: Place 1 tablet (0 4 mg total) under the tongue every 5 (five) minutes as needed for chest pain   rosuvastatin (CRESTOR) 20 MG tablet   No No   Sig: take 1 tablet by mouth once daily   sildenafil (VIAGRA) 100 mg tablet   No No   Sig: Take 1 tablet (100 mg total) by mouth daily as needed for erectile dysfunction   tamsulosin (FLOMAX) 0 4 mg   No No   Sig: Take 2 capsules (0 8 mg total) by mouth daily with dinner   tiotropium-olodaterol (Stiolto Respimat) 2 5-2 5 MCG/ACT inhaler   No No   Sig: Inhale 2 puffs daily   tiotropium-olodaterol (Stiolto Respimat) 2 5-2 5 MCG/ACT inhaler   No No   Sig: Inhale 2 puffs daily   topiramate (TOPAMAX) 25 mg tablet   No No   Sig: FOLLOW INSTRUCTIONS GIVEN TO TITRATE UP TO A MAX OF 2 TABLETS BY MOUTH TWICE DAILY AS TOLERATED AND TO LEVEL OF BENEFIT FOR TREMORS   traZODone (DESYREL) 100 mg tablet   No No   Sig: take 1 tablet by mouth daily at bedtime      Facility-Administered Medications: None       Past Medical History:   Diagnosis Date   • Abdominal aortic aneurysm without rupture    • Abdominal Aortic Duplex 02/21/2017    Ectatic infrarenal abdominal aorta  • CAD (coronary artery disease)    • COPD (chronic obstructive pulmonary disease) (HCC)    • Extremity pain    • History of echocardiogram 03/18/2014    EF 55%, mild MR and AI  Mild concentric LVH  • History of stress test 03/06/2017    Normal    • Hypertension    • Joint pain    • Low back pain    • Migraine    • Neck pain    • Obstructive sleep apnea     cannot tolerate CPAP   • Osteoarthritis    • Peripheral neuropathy    • Reflex sympathetic dystrophy        Past Surgical History:   Procedure Laterality Date   • CARDIAC CATHETERIZATION  02/13/2012    EF 70%, widely patent renal arteries, significant single-vessel CAD-medical therapy     • CARDIAC CATHETERIZATION  04/11/2013    EF 65%, 50% mid LAD, 20% prox CFX, 90% diffuse RCA, 99% mid RCA  Medical management  • CHOLECYSTECTOMY     • EPIDURAL BLOCK INJECTION Bilateral 8/15/2019    Procedure: BLOCK / INJECTION EPIDURAL STEROID CERVICAL;  Surgeon: Francy Del Toro MD;  Location: [facility];  Service: Pain Management    • FL GUIDED NEEDLE PLAC BX/ASP/INJ  8/15/2019   • IR BIOPSY LUNG  9/13/2022   • ORTHOPEDIC SURGERY     • TRIGGER POINT INJECTION         Family History   Adopted: Yes   Problem Relation Age of Onset   • No Known Problems Family    • No Known Problems Mother    • No Known Problems Father      I have reviewed and agree with the history as documented  E-Cigarette/Vaping   • E-Cigarette Use Never User      E-Cigarette/Vaping Substances   • Nicotine Yes    • THC No    • CBD No    • Flavoring No    • Other No    • Unknown No      Social History     Tobacco Use   • Smoking status: Current Every Day Smoker     Packs/day: 1 50   • Smokeless tobacco: Never Used   Vaping Use   • Vaping Use: Never used   Substance Use Topics   • Alcohol use: Not Currently   • Drug use: No       Review of Systems   Constitutional: Negative for chills and fever  HENT: Negative for ear pain and sore throat  Eyes: Negative for pain and visual disturbance  Respiratory: Negative for cough and shortness of breath  Cardiovascular: Negative for chest pain and palpitations  Gastrointestinal: Negative for abdominal pain and vomiting  Genitourinary: Negative for dysuria and hematuria  Musculoskeletal: Positive for myalgias  Negative for arthralgias and back pain  Skin: Negative for color change and rash  Neurological: Positive for weakness and numbness  Negative for seizures and syncope  All other systems reviewed and are negative  Physical Exam  Physical Exam  Vitals and nursing note reviewed  Constitutional:       Appearance: He is well-developed  HENT:      Head: Normocephalic and atraumatic     Eyes:      Conjunctiva/sclera: Conjunctivae normal  Cardiovascular:      Rate and Rhythm: Normal rate and regular rhythm  Heart sounds: No murmur heard  Pulmonary:      Effort: Pulmonary effort is normal  No respiratory distress  Breath sounds: Normal breath sounds  Abdominal:      Palpations: Abdomen is soft  Tenderness: There is no abdominal tenderness  Musculoskeletal:      Cervical back: Neck supple  Skin:     General: Skin is warm and dry  Capillary Refill: Capillary refill takes less than 2 seconds  Neurological:      Mental Status: He is alert  Sensory: Sensory deficit present  Motor: Weakness present  Comments: Patient with 1/4 reflexes in the biceps and brachioradialis regions bilaterally  The patient does have decreased  strength in the left hand  The patient has good biceps flexion however slightly decreased tricep extension  The patient has full sensation but admits to weight feeling “different of the upper arm  Patient is right-hand dominant  Radial ulnar pulses are 2/4     Psychiatric:         Mood and Affect: Mood normal          Vital Signs  ED Triage Vitals   Temperature Pulse Respirations Blood Pressure SpO2   10/10/22 1327 10/10/22 1327 10/10/22 1327 10/10/22 1327 10/10/22 1327   98 °F (36 7 °C) (!) 51 16 (!) 192/91 95 %      Temp Source Heart Rate Source Patient Position - Orthostatic VS BP Location FiO2 (%)   10/10/22 1327 10/10/22 1327 10/10/22 1327 10/10/22 1507 --   Oral Monitor Sitting Left arm       Pain Score       10/10/22 1327       10 - Worst Possible Pain           Vitals:    10/10/22 1327 10/10/22 1507   BP: (!) 192/91 (!) 195/95   Pulse: (!) 51 (!) 52   Patient Position - Orthostatic VS: Sitting Sitting         Visual Acuity      ED Medications  Medications   ketorolac (TORADOL) injection 30 mg (30 mg Intramuscular Given 10/10/22 1417)       Diagnostic Studies  Results Reviewed     None                 No orders to display              Procedures  Procedures         ED Course  ED Course as of 10/13/22 1709   Mon Oct 10, 2022   1421 Patient notes that he has had Toradol in the past with seemed to help somewhat    1454 MRI: IMPRESSION:  1  Multilevel degenerative disc disease similar to the prior study of 6/21/2019 with no new disc herniation  Stable broad-based disc protrusions are noted at the C4-5 and C5-6 levels with mild central canal narrowing at C4-5  There is again moderate to   severe left C3-4 and C4-5 neural foraminal narrowing  2  The cervical cord appears normal in caliber and signal with no evidence of focal impingement  1454 Patient will be placed on Toradol for short course as well as increasing Percocet 10 mg tablets pain management has been contacted and they will arrange epidural injections for the patient                               SBIRT 20yo+    Flowsheet Row Most Recent Value   SBIRT (24 yo +)    In order to provide better care to our patients, we are screening all of our patients for alcohol and drug use  Would it be okay to ask you these screening questions? Yes Filed at: 10/10/2022 1332   Initial Alcohol Screen: US AUDIT-C     1  How often do you have a drink containing alcohol? 0 Filed at: 10/10/2022 1332   2  How many drinks containing alcohol do you have on a typical day you are drinking? 0 Filed at: 10/10/2022 1332   3b  FEMALE Any Age, or MALE 65+: How often do you have 4 or more drinks on one occassion? 0 Filed at: 10/10/2022 1332   Audit-C Score 0 Filed at: 10/10/2022 1332   WISAM: How many times in the past year have you    Used an illegal drug or used a prescription medication for non-medical reasons?  Never Filed at: 10/10/2022 1332                    MDM    Disposition  Final diagnoses:   Cervical radiculopathy     Time reflects when diagnosis was documented in both MDM as applicable and the Disposition within this note     Time User Action Codes Description Comment    10/10/2022  3:02 PM Sharlene Mckeon Add [M89 80] Cervical radiculopathy 10/10/2022  3:06 PM Bulmaroanali Rut Modify [L52 60] Cervical radiculopathy       ED Disposition     ED Disposition   Discharge    Condition   Stable    Date/Time   Mon Oct 10, 2022  3:02 PM    Comment   Emeli Urban discharge to home/self care                 Follow-up Information    None         Discharge Medication List as of 10/10/2022  3:07 PM      START taking these medications    Details   ketorolac (TORADOL) 10 mg tablet Take 1 tablet (10 mg total) by mouth every 6 (six) hours as needed for moderate pain for up to 8 doses, Starting Mon 10/10/2022, Normal         CONTINUE these medications which have CHANGED    Details   oxyCODONE-acetaminophen (Percocet)  mg per tablet Take 1 tablet by mouth every 8 (eight) hours as needed for moderate pain for up to 12 doses Max Daily Amount: 3 tablets, Starting Mon 10/10/2022, Normal         CONTINUE these medications which have NOT CHANGED    Details   albuterol (Ventolin HFA) 90 mcg/act inhaler Inhale 2 puffs every 6 (six) hours as needed for wheezing, Starting Mon 8/15/2022, Normal      amLODIPine (NORVASC) 10 mg tablet swallow 1 tablet once daily, Normal      aspirin (ECOTRIN LOW STRENGTH) 81 mg EC tablet Take 1 tablet (81 mg total) by mouth daily, Starting Fri 5/6/2022, No Print      Blood Glucose Monitoring Suppl (OneTouch Verio Flex System) w/Device KIT USE IN THE MORNING TEST ONCE DAILY, Normal      gabapentin (NEURONTIN) 600 MG tablet take 1 tablet by mouth three times a day, Normal      glucose blood (OneTouch Verio) test strip Test once daily, Normal      Lancets (onetouch ultrasoft) lancets Test once daily, Normal      lisinopril (ZESTRIL) 20 mg tablet take 1 tablet by mouth once daily, Normal      methylPREDNISolone 4 MG tablet therapy pack Use as directed on package, Normal      metoprolol succinate (TOPROL-XL) 100 mg 24 hr tablet take 1 tablet by mouth once daily, Normal      nicotine (NICODERM CQ) 21 mg/24 hr TD 24 hr patch Place 1 patch on the skin every 24 hours, Starting Mon 8/1/2022, Normal      nitroglycerin (NITROSTAT) 0 4 mg SL tablet Place 1 tablet (0 4 mg total) under the tongue every 5 (five) minutes as needed for chest pain, Starting Fri 3/12/2021, Normal      rosuvastatin (CRESTOR) 20 MG tablet take 1 tablet by mouth once daily, Normal      sildenafil (VIAGRA) 100 mg tablet Take 1 tablet (100 mg total) by mouth daily as needed for erectile dysfunction, Starting Wed 8/31/2022, Normal      tamsulosin (FLOMAX) 0 4 mg Take 2 capsules (0 8 mg total) by mouth daily with dinner, Starting Mon 7/25/2022, No Print      !! tiotropium-olodaterol (Stiolto Respimat) 2 5-2 5 MCG/ACT inhaler Inhale 2 puffs daily, Starting Mon 8/15/2022, Normal      !! tiotropium-olodaterol (Stiolto Respimat) 2 5-2 5 MCG/ACT inhaler Inhale 2 puffs daily, Starting Mon 8/15/2022, Sample      topiramate (TOPAMAX) 25 mg tablet FOLLOW INSTRUCTIONS GIVEN TO TITRATE UP TO A MAX OF 2 TABLETS BY MOUTH TWICE DAILY AS TOLERATED AND TO LEVEL OF BENEFIT FOR TREMORS, Normal      traZODone (DESYREL) 100 mg tablet take 1 tablet by mouth daily at bedtime, Normal      methocarbamol (ROBAXIN) 500 mg tablet Take 1 tablet (500 mg total) by mouth 3 (three) times a day as needed for muscle spasms, Starting Mon 9/26/2022, Normal       !! - Potential duplicate medications found  Please discuss with provider  No discharge procedures on file      PDMP Review       Value Time User    PDMP Reviewed  Yes 9/30/2022  1:45 PM [place], [place]          ED Provider  Electronically Signed by           Bridgette Philip DO  10/15/22 3874

## 2022-10-14 ENCOUNTER — HOSPITAL ENCOUNTER (OUTPATIENT)
Dept: RADIOLOGY | Facility: HOSPITAL | Age: 67
End: 2022-10-14
Payer: MEDICARE

## 2022-10-14 VITALS
SYSTOLIC BLOOD PRESSURE: 161 MMHG | RESPIRATION RATE: 20 BRPM | TEMPERATURE: 98.1 F | DIASTOLIC BLOOD PRESSURE: 80 MMHG | HEART RATE: 58 BPM

## 2022-10-14 DIAGNOSIS — M54.12 RADICULOPATHY, CERVICAL: ICD-10-CM

## 2022-10-14 DIAGNOSIS — R29.898 LEFT HAND WEAKNESS: Primary | ICD-10-CM

## 2022-10-14 DIAGNOSIS — M54.12 CERVICAL RADICULOPATHY: ICD-10-CM

## 2022-10-14 DIAGNOSIS — M51.36 LUMBAR DEGENERATIVE DISC DISEASE: ICD-10-CM

## 2022-10-14 RX ORDER — PAPAVERINE HCL 150 MG
10 CAPSULE, EXTENDED RELEASE ORAL ONCE
Status: DISCONTINUED | OUTPATIENT
Start: 2022-10-14 | End: 2022-10-18 | Stop reason: HOSPADM

## 2022-10-14 RX ORDER — LIDOCAINE HYDROCHLORIDE 10 MG/ML
1 INJECTION, SOLUTION EPIDURAL; INFILTRATION; INTRACAUDAL; PERINEURAL ONCE
Status: DISCONTINUED | OUTPATIENT
Start: 2022-10-14 | End: 2022-10-18 | Stop reason: HOSPADM

## 2022-10-14 RX ORDER — GABAPENTIN 800 MG/1
800 TABLET ORAL 3 TIMES DAILY
Qty: 90 TABLET | Refills: 1 | Status: SHIPPED | OUTPATIENT
Start: 2022-10-14 | End: 2023-01-31

## 2022-10-14 NOTE — INTERVAL H&P NOTE
Update: (This section must be completed if the H&P was completed greater than 24 hrs to procedure or admission)    H&P reviewed  After examining the patient, I find no changed to the H&P since it had been written  Patient re-evaluated   Accept as history and physical     Ricardo Park/October 14, 2022/8:57 AM

## 2022-10-14 NOTE — PROGRESS NOTES
Pt admitted to taking Toradol yesterday morning  Dr Ocasio March made aware and procedure cancelled today  PT will be called with new date and time for reschedule  Pt verbalizing understanding

## 2022-10-14 NOTE — PROGRESS NOTES
Patient had been scheduled for cervical epidural steroid injection however went to the emergency department yesterday received Toradol  Patient was informed that he was instructed not to take any inserts prior to cervical epidural steroid injection secondary to the risk of spinal canal hematoma  Patient does have significant weakness in the left wrist and hand   He also has radicular symptoms into the shoulder which is explained by the MRI of the cervical spine however the weakness in the hand does not correlate with the MRI of the cervical spine  1  We will order an x-ray of the left wrist and hand  2  We will order a EMG of the left upper extremity to assess for carpal tunnel syndrome  3   We will wean patient back in a week to perform cervical epidural steroid injection

## 2022-10-20 ENCOUNTER — TELEPHONE (OUTPATIENT)
Dept: PAIN MEDICINE | Facility: CLINIC | Age: 67
End: 2022-10-20

## 2022-10-20 ENCOUNTER — HOSPITAL ENCOUNTER (OUTPATIENT)
Dept: NON INVASIVE DIAGNOSTICS | Facility: HOSPITAL | Age: 67
Discharge: HOME | End: 2022-10-20
Attending: INTERNAL MEDICINE
Payer: MEDICARE

## 2022-10-20 ENCOUNTER — HOSPITAL ENCOUNTER (OUTPATIENT)
Dept: NUCLEAR MEDICINE | Facility: HOSPITAL | Age: 67
End: 2022-10-20
Attending: INTERNAL MEDICINE
Payer: MEDICARE

## 2022-10-20 VITALS
DIASTOLIC BLOOD PRESSURE: 84 MMHG | RESPIRATION RATE: 16 BRPM | HEART RATE: 57 BPM | OXYGEN SATURATION: 92 % | SYSTOLIC BLOOD PRESSURE: 136 MMHG | BODY MASS INDEX: 29.86 KG/M2 | HEIGHT: 68 IN | WEIGHT: 197 LBS

## 2022-10-20 DIAGNOSIS — Z91.89 SEDENTARY LIFESTYLE: ICD-10-CM

## 2022-10-20 DIAGNOSIS — M79.622 PAIN OF LEFT UPPER ARM: ICD-10-CM

## 2022-10-20 DIAGNOSIS — Z01.810 PREPROCEDURAL CARDIOVASCULAR EXAMINATION: ICD-10-CM

## 2022-10-20 DIAGNOSIS — F17.200 SMOKING: ICD-10-CM

## 2022-10-20 DIAGNOSIS — I10 PRIMARY HYPERTENSION: ICD-10-CM

## 2022-10-20 DIAGNOSIS — I25.10 CORONARY ARTERY DISEASE INVOLVING NATIVE CORONARY ARTERY OF NATIVE HEART WITHOUT ANGINA PECTORIS: ICD-10-CM

## 2022-10-20 DIAGNOSIS — C34.90 MALIGNANT NEOPLASM OF LUNG, UNSPECIFIED LATERALITY, UNSPECIFIED PART OF LUNG (HCC): ICD-10-CM

## 2022-10-20 LAB
MAX DIASTOLIC BP: 84 MMHG
MAX HEART RATE: 81 BPM
MAX PREDICTED HEART RATE: 152 BPM
MAX. SYSTOLIC BP: 144 MMHG
NUC STRESS EJECTION FRACTION: 59 %
PROTOCOL NAME: NORMAL
RATE PRESSURE PRODUCT: NORMAL
REASON FOR TERMINATION: NORMAL
SL CV REST NUCLEAR ISOTOPE DOSE: 10.6 MCI
SL CV STRESS NUCLEAR ISOTOPE DOSE: 32.2 MCI
SL CV STRESS RECOVERY BP: NORMAL MMHG
SL CV STRESS RECOVERY HR: 70 BPM
SL CV STRESS RECOVERY O2 SAT: 95 %
STRESS ANGINA INDEX: 0
STRESS BASELINE BP: NORMAL MMHG
STRESS BASELINE HR: 57 BPM
STRESS O2 SAT REST: 92 %
STRESS PEAK HR: 81 BPM
STRESS POST O2 SAT PEAK: 96 %
STRESS POST PEAK BP: 144 MMHG
TARGET HR FORMULA: NORMAL
TIME IN EXERCISE PHASE: NORMAL

## 2022-10-20 PROCEDURE — 93018 CV STRESS TEST I&R ONLY: CPT | Performed by: INTERNAL MEDICINE

## 2022-10-20 PROCEDURE — 78452 HT MUSCLE IMAGE SPECT MULT: CPT | Performed by: INTERNAL MEDICINE

## 2022-10-20 PROCEDURE — G1004 CDSM NDSC: HCPCS

## 2022-10-20 PROCEDURE — 93016 CV STRESS TEST SUPVJ ONLY: CPT | Performed by: INTERNAL MEDICINE

## 2022-10-20 PROCEDURE — A9502 TC99M TETROFOSMIN: HCPCS

## 2022-10-20 PROCEDURE — 78452 HT MUSCLE IMAGE SPECT MULT: CPT

## 2022-10-20 PROCEDURE — 93017 CV STRESS TEST TRACING ONLY: CPT

## 2022-10-20 RX ADMIN — REGADENOSON 0.4 MG: 0.08 INJECTION, SOLUTION INTRAVENOUS at 10:00

## 2022-10-20 NOTE — TELEPHONE ENCOUNTER
Caller: Sravanthi Elizondo     Doctor: Esperanza Flowers     Reason for call: Patient calling back states No Toradol and No Baby Aspirin in the past 4 days     Call back#: [phone]

## 2022-10-20 NOTE — TELEPHONE ENCOUNTER
LM for pt to call  Back  Last week his procedure was cancelled as he had taken Toradol day prior to procedure  Faby Jacobsen to verify he has not taken any toradol in the past 4 days and clarify that he is not taking baby aspirin   If unable to reach us today he should call at 8am tomorrow morning to discuss

## 2022-10-20 NOTE — TELEPHONE ENCOUNTER
LM for patient to call office in regards to a procedure appt tomorrow with Dr Ocasio March  I wanted to make sure that the patient held his medication that he was told to hold last Friday    I also wanted to know if he can come in for 2:20pm tomorrow with an arrival time of 2:05pm at [address] [place] [address] [address]

## 2022-10-21 ENCOUNTER — HOSPITAL ENCOUNTER (OUTPATIENT)
Dept: RADIOLOGY | Facility: HOSPITAL | Age: 67
End: 2022-10-21
Payer: MEDICARE

## 2022-10-21 VITALS
TEMPERATURE: 97.8 F | RESPIRATION RATE: 18 BRPM | DIASTOLIC BLOOD PRESSURE: 84 MMHG | SYSTOLIC BLOOD PRESSURE: 133 MMHG | OXYGEN SATURATION: 97 % | HEART RATE: 71 BPM

## 2022-10-21 DIAGNOSIS — M54.12 RADICULOPATHY, CERVICAL: ICD-10-CM

## 2022-10-21 PROCEDURE — 62321 NJX INTERLAMINAR CRV/THRC: CPT | Performed by: ANESTHESIOLOGY

## 2022-10-21 RX ORDER — PAPAVERINE HCL 150 MG
10 CAPSULE, EXTENDED RELEASE ORAL ONCE
Status: COMPLETED | OUTPATIENT
Start: 2022-10-21 | End: 2022-10-21

## 2022-10-21 RX ORDER — LIDOCAINE HYDROCHLORIDE 10 MG/ML
1 INJECTION, SOLUTION EPIDURAL; INFILTRATION; INTRACAUDAL; PERINEURAL ONCE
Status: COMPLETED | OUTPATIENT
Start: 2022-10-21 | End: 2022-10-21

## 2022-10-21 RX ADMIN — LIDOCAINE HYDROCHLORIDE 1 ML: 10 INJECTION, SOLUTION EPIDURAL; INFILTRATION; INTRACAUDAL; PERINEURAL at 14:26

## 2022-10-21 RX ADMIN — IOHEXOL 1 ML: 300 INJECTION, SOLUTION INTRAVENOUS at 14:29

## 2022-10-21 RX ADMIN — Medication 10 MG: at 14:30

## 2022-10-21 NOTE — INTERVAL H&P NOTE
Update: (This section must be completed if the H&P was completed greater than 24 hrs to procedure or admission)    H&P reviewed  After examining the patient, I find no changed to the H&P since it had been written  Patient re-evaluated   Accept as history and physical     Ricardo Park/October 21, 2022/2:19 PM

## 2022-10-21 NOTE — DISCHARGE INSTRUCTIONS
Epidural Steroid Injection   WHAT YOU NEED TO KNOW:   An epidural steroid injection (TIM) is a procedure to inject steroid medicine into the epidural space  The epidural space is between your spinal cord and vertebrae  Steroids reduce inflammation and fluid buildup in your spine that may be causing pain  You may be given pain medicine along with the steroids  ACTIVITY  Do not drive or operate machinery today  No strenuous activity today - bending, lifting, etc   You may resume normal activites starting tomorrow - start slowly and as tolerated  You may shower today, but no tub baths or hot tubs  You may have numbness for several hours from the local anesthetic  Please use caution and common sense, especially with weight-bearing activities  CARE OF THE INJECTION SITE  If you have soreness or pain, apply ice to the area today (20 minutes on/20 minutes off)  Starting tomorrow, you may use warm, moist heat or ice if needed  You may have an increase or change in your discomfort for 36-48 hours after your treatment  Apply ice and continue with any pain medication you have been prescribed  Notify the [facility] if you have any of the following: redness, drainage, swelling, headache, stiff neck or fever above 100°F     SPECIAL INSTRUCTIONS  Our office will contact you in approximately 7 days for a progress report  MEDICATIONS  Continue to take all routine medications  Our office may have instructed you to hold some medications  As no general anesthesia was used in today's procedure, you should not experience any side effects related to anesthesia  If you are diabetic, the steroids used in today's injection may temporarily increase your blood sugar levels after the first few days after your injection  Please keep a close eye on your sugars and alert the doctor who manages your diabetes if your sugars are significantly high from your baseline or you are symptomatic       If you have a problem specifically related to your procedure, please call our office at [phone]  Problems not related to your procedure should be directed to your primary care physician

## 2022-10-25 ENCOUNTER — TELEPHONE (OUTPATIENT)
Dept: PAIN MEDICINE | Facility: CLINIC | Age: 67
End: 2022-10-25

## 2022-10-25 NOTE — TELEPHONE ENCOUNTER
S/w pt, he is s/p MUNA on Friday  Pt said he is extremely sluggish and tired since getting his injection  Pt said he could sleep all day if he could and wanted to know if this is normal with an TIM?

## 2022-10-25 NOTE — TELEPHONE ENCOUNTER
Caller: Asha Taveras    Doctor: Vivian    Reason for call: patient called stating that hes very tired and sluggish post procedure on 10/21     Call back#: [phone]

## 2022-10-26 NOTE — TELEPHONE ENCOUNTER
S/w pt and advised same  Pt states sluggish this AM but was up all night with an ill family member  Advised may feel sluggish if hyperglycemic  Pt denies being diabetic  Advised SPA will cb at 1 week post procedure   Pt states he is having noticeable pain relief at this time

## 2022-10-28 ENCOUNTER — TELEPHONE (OUTPATIENT)
Dept: OBGYN CLINIC | Facility: HOSPITAL | Age: 67
End: 2022-10-28

## 2022-10-28 ENCOUNTER — TELEPHONE (OUTPATIENT)
Dept: PAIN MEDICINE | Facility: CLINIC | Age: 67
End: 2022-10-28

## 2022-10-28 DIAGNOSIS — M47.812 CERVICAL SPONDYLOSIS WITHOUT MYELOPATHY: ICD-10-CM

## 2022-10-28 DIAGNOSIS — M54.2 NECK PAIN: ICD-10-CM

## 2022-10-28 DIAGNOSIS — M54.12 CERVICAL RADICULOPATHY: ICD-10-CM

## 2022-10-28 DIAGNOSIS — G89.4 CHRONIC PAIN SYNDROME: ICD-10-CM

## 2022-10-28 NOTE — TELEPHONE ENCOUNTER
Patient report: 80% improvement post injection  Pain Level: 4 /10    Pain has shifted from the back to the arm

## 2022-10-28 NOTE — TELEPHONE ENCOUNTER
Caller: Josefina Branch    Doctor: Tommie Johnson    Reason for call: patient called stating 10/28 oxycodone script is not at this pharmacy, pls resend    Call back#: [phone]

## 2022-10-30 DIAGNOSIS — I10 ESSENTIAL HYPERTENSION: ICD-10-CM

## 2022-10-31 ENCOUNTER — ANESTHESIA EVENT (INPATIENT)
Dept: PERIOP | Facility: HOSPITAL | Age: 67
End: 2022-10-31

## 2022-10-31 RX ORDER — OXYCODONE AND ACETAMINOPHEN 7.5; 325 MG/1; MG/1
1 TABLET ORAL EVERY 8 HOURS PRN
Qty: 90 TABLET | Refills: 0 | Status: SHIPPED | OUTPATIENT
Start: 2022-10-31

## 2022-10-31 RX ORDER — METOPROLOL SUCCINATE 100 MG/1
TABLET, EXTENDED RELEASE ORAL
Qty: 30 TABLET | Refills: 5 | Status: SHIPPED | OUTPATIENT
Start: 2022-10-31

## 2022-10-31 NOTE — TELEPHONE ENCOUNTER
Upon chart review, it appears 10/28 script was cancelled on 10/10 when new script was sent upon DC from [facility]  However, script from 10/10 only for #12 tabs  Please advise, thank you

## 2022-10-31 NOTE — TELEPHONE ENCOUNTER
Zara Peguero    Doctor: Kodi Kat    Reason for call: patient called for status of oxycodone     Call back#: [phone]

## 2022-11-01 ENCOUNTER — OFFICE VISIT (OUTPATIENT)
Dept: FAMILY MEDICINE CLINIC | Facility: CLINIC | Age: 67
End: 2022-11-01

## 2022-11-01 VITALS
DIASTOLIC BLOOD PRESSURE: 62 MMHG | OXYGEN SATURATION: 93 % | TEMPERATURE: 97.5 F | BODY MASS INDEX: 29.7 KG/M2 | HEART RATE: 60 BPM | HEIGHT: 68 IN | SYSTOLIC BLOOD PRESSURE: 102 MMHG | WEIGHT: 196 LBS

## 2022-11-01 DIAGNOSIS — M54.12 CERVICAL RADICULOPATHY: ICD-10-CM

## 2022-11-01 DIAGNOSIS — F17.200 NEEDS SMOKING CESSATION EDUCATION: ICD-10-CM

## 2022-11-01 DIAGNOSIS — R53.1 WEAKNESS: Primary | ICD-10-CM

## 2022-11-01 DIAGNOSIS — F17.219 CIGARETTE NICOTINE DEPENDENCE WITH NICOTINE-INDUCED DISORDER: ICD-10-CM

## 2022-11-01 DIAGNOSIS — Z23 ENCOUNTER FOR IMMUNIZATION: ICD-10-CM

## 2022-11-01 DIAGNOSIS — G81.92 HEMIPARESIS OF LEFT DOMINANT SIDE DUE TO NON-CEREBROVASCULAR ETIOLOGY (HCC): ICD-10-CM

## 2022-11-01 DIAGNOSIS — Z71.6 ENCOUNTER FOR SMOKING CESSATION COUNSELING: ICD-10-CM

## 2022-11-01 RX ORDER — BUPROPION HYDROCHLORIDE 150 MG/1
150 TABLET ORAL EVERY MORNING
Qty: 90 TABLET | Refills: 3 | Status: SHIPPED | OUTPATIENT
Start: 2022-11-01 | End: 2022-12-01

## 2022-11-01 RX ORDER — NICOTINE 21 MG/24HR
1 PATCH, TRANSDERMAL 24 HOURS TRANSDERMAL EVERY 24 HOURS
Qty: 28 PATCH | Refills: 0 | Status: SHIPPED | OUTPATIENT
Start: 2022-11-01

## 2022-11-01 NOTE — PROGRESS NOTES
Subjective     Mignon Purdy is a 75 y o  male here for a discussion regarding smoking cessation  He began smoking 35 year ago  He currently smokes 1 packs per day  He has attempted to quit smoking in the past, most recently 1 week ago  Best success quitting using nicotine gum and nicotine patch  Barriers to quitting include: under a lot of stress now  He denies chest pain, hemoptysis, shortness of breath and wheezing  The following portions of the patient's history were reviewed and updated as appropriate: past surgical history and problem list     Review of Systems  Pertinent items are noted in HPI  Randy Maxwell Assessment/Plan     Tobacco Use/Cessation  I assessed Mignon Purdy to be in an action stage with respect to tobacco use  I advised patient to quit, and offered support  Educational material distributed  Discussed current use pattern  Brochure to patient  Quit date set for 11/01/22  Reviewed strategies to maximize success  Commended for decision  Wellbutrin: denies h/o seizures or eating disorders  Nicotine patches beginning at 21 mg  Nicotine gum  Follow up in 8 weeks or as needed  I spent approximately 30 minutes counseling the patient  Randy Maxwell

## 2022-11-01 NOTE — PATIENT INSTRUCTIONS
Start taking bupropion every morning   Use nicotine patch and nicotine gum every day for the next 1 week  Use nicotine gum for cravings changed every 2-4 hours  Continue take medication as directed  Nicotine patch to be used on as-needed basis after 1 week from starting medication  Please make sure to call the office or come into the office at any time if you decide to stop the medication or if you think that having side effects from the medication

## 2022-11-01 NOTE — ASSESSMENT & PLAN NOTE
This is a chronic condition  Was 1st noted by remarkable left-sided hemiparesis post holding a heavy item at home 2 months ago  MRI cervical spine 10/22:  1  Multilevel degenerative disc disease similar to the prior study of 6/21/2019 with no new disc herniation  Stable broad-based disc protrusions are noted at the C4-5 and C5-6 levels with mild central canal narrowing at C4-5  There is again moderate to   severe left C3-4 and C4-5 neural foraminal narrowing  2  The cervical cord appears normal in caliber and signal with no evidence of focal impingement  Plan:  -on bedside assessment patient is having power 3/5 on the left upper extremity  Unrestricted passive and active range of motion otherwise  Decreased reflexes including biceps, triceps the left side  After discussing with the patient the current condition and reviewing MRI reports for brain and cervical spine a decision was made at the patient's most common pathological etiology is due to 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

## 2022-11-01 NOTE — ASSESSMENT & PLAN NOTE
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

## 2022-11-01 NOTE — PROGRESS NOTES
Assessment/Plan:    Cervical radiculopathy  This is a chronic condition  Was 1st noted by remarkable left-sided hemiparesis post holding a heavy item at home 2 months ago  MRI cervical spine 10/22:  1  Multilevel degenerative disc disease similar to the prior study of 6/21/2019 with no new disc herniation  Stable broad-based disc protrusions are noted at the C4-5 and C5-6 levels with mild central canal narrowing at C4-5  There is again moderate to   severe left C3-4 and C4-5 neural foraminal narrowing  2  The cervical cord appears normal in caliber and signal with no evidence of focal impingement  Plan:  -on bedside assessment patient is having power 3/5 on the left upper extremity  Unrestricted passive and active range of motion otherwise  Decreased reflexes including biceps, triceps the left side  After discussing with the patient the current condition and reviewing MRI reports for brain and cervical spine a decision was made at the patient's most common pathological etiology is due to 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  Malignant neoplasm of lower lobe of right lung 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  Plan: flexible bronchoscopy, right thoracoscopic lower lobe segmentectomy, possible right lower lobectomy to be performed on 11/18/2022 per thoracic surgery notes at [facility]  Will follow-up with the patient in 8 weeks  Diagnoses and all orders for this visit:    Weakness  Comments:  Left upper extremity power 3-5  Compared to the other side  Refer to assessment and plan for cervical radiculopathy    Encounter for immunization  Comments:  Declined pneumococcal and flu shot and COVID vaccine at this time    Would like to think about it and follow-up on next visit  Follow-up in 8 weeks  Orders:  -     Cancel: Pneumococcal Conjugate Vaccine 20-valent (PCV20)    Cervical radiculopathy  -     Ambulatory Referral to Orthopedic Surgery; Future  -     Ambulatory Referral to Physical Therapy; Future    Hemiparesis of left dominant side due to non-cerebrovascular etiology ([address] )  Comments: This is a chronic condition  First noted around 2 months ago  Due to excessive weight lifting  Follow-up with Orthopedics and PT  Orders:  -     Ambulatory Referral to Orthopedic Surgery; Future  -     Ambulatory Referral to Physical Therapy; Future    Encounter for smoking cessation counseling  Comments:  Patient is ready to quit  Bupropion prescribed to aid in smoking cessation  Continue nicotine patch and gum, at this time  Re-evaluate in 8 weeks  Orders:  -     buPROPion (Wellbutrin XL) 150 mg 24 hr tablet; Take 1 tablet (150 mg total) by mouth every morning  -     nicotine polacrilex (NICORETTE) 2 mg gum; Chew 1 each (2 mg total) as needed for smoking cessation  -     nicotine (NICODERM CQ) 21 mg/24 hr TD 24 hr patch; Place 1 patch on the skin every 24 hours    Needs smoking cessation education  -     nicotine polacrilex (NICORETTE) 2 mg gum; Chew 1 each (2 mg total) as needed for smoking cessation  -     nicotine (NICODERM CQ) 21 mg/24 hr TD 24 hr patch; Place 1 patch on the skin every 24 hours    Cigarette nicotine dependence with nicotine-induced disorder  Comments: For the past thirty-five years  One pack per day  Ready to quit at this time  Appropriate orders placed on this encounter follow-up in 8 weeks  PHQ-2/9 Depression Screening    Little interest or pleasure in doing things: 0 - not at all  Feeling down, depressed, or hopeless: 0 - not at all  PHQ-2 Score: 0  PHQ-2 Interpretation: Negative depression screen            Subjective:      Patient ID: Ranjeet Gomez is a 75 y o  male      65-year-old male with past medical history notable for chronic lower back pain and left shoulder pain status post multiple corticosteroid injections per Orthopedics and Pain Medicine providers  He presented to the office today to follow-up regarding left upper extremity weakness and shoulder pain     Shoulder pain is on the left side, localized to the anterior shoulder area, exacerbated with upper extremity movement, alleviated with rest, 3-5/10 on SS, associated with numbness and weakness of the left upper extremity, was 1st noted by the patient around 2 months ago  Patient reports trauma incident when he was picking up and heavy item and since then he started complaining of the shoulder pain, weakness and numbness on the left upper extremity  Symptoms have been the same since onset, intermittent  Treatments tried:  Corticosteroid injections by Pain Medicine and Orthopedic surgery previously were tried with partial remission of symptoms however with persistence of numbness and weakness  Of note; MRI brain and MRI cervical spine were done within the past 6 month  MRI brain was negative  MRI cervical spine showed some changes that are consistent with degenerative changes, spinal canal narrowing and radiculopathy which could explain the prominent physical exam findings  The patient is an active smoker for the past 35 years at least   One pack per day  He is willing to quit smoking and this time and have been trying nicotine patch and gum for the past 1 week  However still actively smoking around 3/4 of a pack a day  The following portions of the patient's history were reviewed and updated as appropriate: problem list     Review of Systems   Constitutional: Negative for activity change, appetite change, chills, diaphoresis, fatigue, fever and unexpected weight change  HENT: Positive for congestion, postnasal drip and sneezing   Negative for ear discharge, ear pain, rhinorrhea, sinus pressure, sinus pain, sore throat, tinnitus, trouble swallowing and voice change  Respiratory: Positive for cough  Negative for apnea, chest tightness, shortness of breath and wheezing  Gastrointestinal: Negative for abdominal pain, constipation, diarrhea and nausea  Genitourinary: Negative for difficulty urinating, dysuria, flank pain and frequency  Neurological: Negative for dizziness, light-headedness and headaches  Objective:    /62 (BP Location: Left arm, Patient Position: Sitting, Cuff Size: Standard)   Pulse 60   Temp 97 5 °F (36 4 °C) (Tympanic)   Ht 5' 8" (1 727 m)   Wt 88 9 kg (196 lb)   SpO2 93%   BMI 29 80 kg/m²      Physical Exam  Constitutional:       General: He is not in acute distress  Appearance: Normal appearance  He is not ill-appearing  HENT:      Head: Normocephalic and atraumatic  Nose: Nose normal  No congestion or rhinorrhea  Mouth/Throat:      Mouth: Mucous membranes are moist       Pharynx: Oropharynx is clear  No oropharyngeal exudate  Eyes:      Extraocular Movements: Extraocular movements intact  Conjunctiva/sclera: Conjunctivae normal       Pupils: Pupils are equal, round, and reactive to light  Cardiovascular:      Rate and Rhythm: Normal rate and regular rhythm  Pulses: Normal pulses  Heart sounds: Normal heart sounds  No murmur heard  Pulmonary:      Effort: Pulmonary effort is normal  No respiratory distress  Breath sounds: No stridor  Rales present  No wheezing or rhonchi  Chest:      Chest wall: No tenderness  Abdominal:      General: Abdomen is flat  Bowel sounds are normal  There is no distension  Palpations: Abdomen is soft  Tenderness: There is no abdominal tenderness  Musculoskeletal:         General: Normal range of motion        Right upper arm: Normal       Left upper arm: Normal       Right elbow: Normal       Left elbow: Normal       Right forearm: Normal       Left forearm: Normal       Right wrist: Normal       Left wrist: Normal       Right hand: Normal       Left hand: Normal       Cervical back: Normal range of motion and neck supple  No rigidity  Lymphadenopathy:      Cervical: No cervical adenopathy  Skin:     General: Skin is warm  Capillary Refill: Capillary refill takes less than 2 seconds  Neurological:      General: No focal deficit present  Mental Status: He is alert  Mental status is at baseline  GCS: GCS eye subscore is 4  GCS verbal subscore is 5  GCS motor subscore is 6  Motor: Weakness present  Coordination: Romberg sign negative  Coordination normal  Finger-Nose-Finger Test and Heel to Roger Sorrel Test normal       Gait: Gait is intact  Deep Tendon Reflexes: Reflexes abnormal       Reflex Scores:       Bicep reflexes are 2+ on the right side and 1+ on the left side  Brachioradialis reflexes are 2+ on the right side and 1+ on the left side  Comments: Bindu Naidu noted to be 3/5 on the left upper extremity including shoulder, arm, forearm and hands including hand   Otherwise power is 5/5 throughout  On the other extremities     Psychiatric:         Mood and Affect: Mood normal          Behavior: Behavior normal

## 2022-11-02 ENCOUNTER — HOSPITAL ENCOUNTER (OUTPATIENT)
Dept: CT IMAGING | Facility: HOSPITAL | Age: 67
Discharge: HOME | End: 2022-11-02
Attending: UROLOGY

## 2022-11-02 DIAGNOSIS — N21.0 CALCULUS IN DIVERTICULUM OF BLADDER: ICD-10-CM

## 2022-11-02 DIAGNOSIS — N20.1 CALCULUS OF URETER: ICD-10-CM

## 2022-11-03 ENCOUNTER — OFFICE VISIT (OUTPATIENT)
Dept: CARDIAC SURGERY | Facility: CLINIC | Age: 67
End: 2022-11-03

## 2022-11-03 VITALS
OXYGEN SATURATION: 98 % | RESPIRATION RATE: 16 BRPM | TEMPERATURE: 98.9 F | HEIGHT: 68 IN | HEART RATE: 68 BPM | SYSTOLIC BLOOD PRESSURE: 128 MMHG | WEIGHT: 196.43 LBS | BODY MASS INDEX: 29.77 KG/M2 | DIASTOLIC BLOOD PRESSURE: 86 MMHG

## 2022-11-03 DIAGNOSIS — R91.8 PULMONARY NODULES: Primary | ICD-10-CM

## 2022-11-03 NOTE — PROGRESS NOTES
Assessment/Plan:    Pulmonary nodules  Mr Justice Waters is stable from a thoracic surgery standpoint  He is going to undergo blood work next week  He is not a diabetic  He quit smoking last night at midnight  We discussed the important of smoking cessation and staying off cigarettes  All questions were answered and he is scheduled for a right lower lobe segmentectomy vs lobectomy on 11/16/22  We will call him next week to ensure that he remains off cigarettes  He is in agreement with the plan  Diagnoses and all orders for this visit:    Pulmonary nodules          Thoracic History   Cancer Staging  No matching staging information was found for the patient  Oncology History    No history exists  Patient ID: Antwan Vasquez is a 75 y o  male  ECOG 0    HPI     Mr Justice Waters is a 76 yo gentleman with a history of AAA, CAD, COPD, HTN, migraines, and JAKI who was referred by pulmonary for a right lower lobe squamous cell carcinoma  A CTA from 7/23/22 revealed a 1 1 x 0 9 cm right lower lobe nodule and subcarinal node measuring 2 x 1 2 cm  PET from 8/5/22 revealed a 1 2 x 1 0 cm right lower lobe nodule with a SUV of 2 9  IR biopsy from 9/13/22 was consistent with squamous cell carcinoma  PFT's from 9/21/22 revealed a FVC of 78%, FEV1 of 72%, and a DLCO of 71% predicted  He was evaluated in our office on 10/4/22 at which point Dr Cece Lopez recommended a right thoracoscopic lower lobe segmentectomy, possible right lower lobectomy  He returns today for an updated H and P  On discussion, he is still having left shoulder/ scapular pain with some numbness in his left hand  He has not lost any weight since his previous office visit  He will undergo his blood work in the beginning of next week  He continues with shortness of breath with exertion       The following portions of the patient's history were reviewed and updated as appropriate: allergies, current medications, past family history, past medical history, past social history, past surgical history and problem list       Past Medical History:   Diagnosis Date   • Abdominal aortic aneurysm without rupture    • Abdominal Aortic Duplex 02/21/2017    Ectatic infrarenal abdominal aorta  • CAD (coronary artery disease)    • COPD (chronic obstructive pulmonary disease) (HCC)    • Extremity pain    • History of echocardiogram 03/18/2014    EF 55%, mild MR and AI  Mild concentric LVH  • History of stress test 03/06/2017    Normal    • Hypertension    • Joint pain    • Low back pain    • Migraine    • Neck pain    • Obstructive sleep apnea     cannot tolerate CPAP   • Osteoarthritis    • Peripheral neuropathy    • Reflex sympathetic dystrophy      Past Surgical History:   Procedure Laterality Date   • CARDIAC CATHETERIZATION  02/13/2012    EF 70%, widely patent renal arteries, significant single-vessel CAD-medical therapy  • CARDIAC CATHETERIZATION  04/11/2013    EF 65%, 50% mid LAD, 20% prox CFX, 90% diffuse RCA, 99% mid RCA  Medical management     • CHOLECYSTECTOMY     • EPIDURAL BLOCK INJECTION Bilateral 8/15/2019    Procedure: BLOCK / INJECTION EPIDURAL STEROID CERVICAL;  Surgeon: Jose Madden MD;  Location: [facility];  Service: Pain Management    • FL GUIDED NEEDLE PLAC BX/ASP/INJ  8/15/2019   • IR BIOPSY LUNG  9/13/2022   • ORTHOPEDIC SURGERY     • TRIGGER POINT INJECTION       Family History   Adopted: Yes   Problem Relation Age of Onset   • No Known Problems Family    • No Known Problems Mother    • No Known Problems Father      Social History     Socioeconomic History   • Marital status: Single     Spouse name: Not on file   • Number of children: Not on file   • Years of education: Not on file   • Highest education level: Not on file   Occupational History   • Not on file   Tobacco Use   • Smoking status: Current Every Day Smoker     Packs/day: 1 50   • Smokeless tobacco: Never Used   Vaping Use   • Vaping Use: Never used   Substance and Sexual Activity • Alcohol use: Not Currently   • Drug use: No   • Sexual activity: Yes   Other Topics Concern   • Not on file   Social History Narrative   • Not on file     Social Determinants of Health     Financial Resource Strain: Not on file   Food Insecurity: No Food Insecurity   • Worried About Running Out of Food in the Last Year: Never true   • Ran Out of Food in the Last Year: Never true   Transportation Needs: No Transportation Needs   • Lack of Transportation (Medical): No   • Lack of Transportation (Non-Medical):  No   Physical Activity: Not on file   Stress: Not on file   Social Connections: Not on file   Intimate Partner Violence: Not on file   Housing Stability: Low Risk    • Unable to Pay for Housing in the Last Year: No   • Number of Places Lived in the Last Year: 1   • Unstable Housing in the Last Year: No     No Known Allergies  Current Outpatient Medications on File Prior to Visit   Medication Sig Dispense Refill   • albuterol (Ventolin HFA) 90 mcg/act inhaler Inhale 2 puffs every 6 (six) hours as needed for wheezing 18 g 11   • amLODIPine (NORVASC) 10 mg tablet swallow 1 tablet once daily 90 tablet 3   • aspirin (ECOTRIN LOW STRENGTH) 81 mg EC tablet Take 1 tablet (81 mg total) by mouth daily 30 tablet 5   • buPROPion (Wellbutrin XL) 150 mg 24 hr tablet Take 1 tablet (150 mg total) by mouth every morning 90 tablet 3   • cyclobenzaprine (FLEXERIL) 10 mg tablet Take 1 tablet (10 mg total) by mouth 3 (three) times a day as needed for muscle spasms 90 tablet 1   • gabapentin (NEURONTIN) 800 mg tablet Take 1 tablet (800 mg total) by mouth 3 (three) times a day 90 tablet 1   • glucose blood (OneTouch Verio) test strip Test once daily 100 each 0   • ketorolac (TORADOL) 10 mg tablet Take 1 tablet (10 mg total) by mouth every 6 (six) hours as needed for moderate pain for up to 8 doses 8 tablet 0   • lisinopril (ZESTRIL) 20 mg tablet take 1 tablet by mouth once daily 30 tablet 5   • methylPREDNISolone 4 MG tablet therapy pack Use as directed on package 1 each 0   • metoprolol succinate (TOPROL-XL) 100 mg 24 hr tablet take 1 tablet by mouth once daily 30 tablet 5   • nicotine (NICODERM CQ) 21 mg/24 hr TD 24 hr patch Place 1 patch on the skin every 24 hours 28 patch 0   • nicotine (NICODERM CQ) 21 mg/24 hr TD 24 hr patch Place 1 patch on the skin every 24 hours 28 patch 0   • nicotine polacrilex (NICORETTE) 2 mg gum Chew 1 each (2 mg total) as needed for smoking cessation 100 each 0   • nitroglycerin (NITROSTAT) 0 4 mg SL tablet Place 1 tablet (0 4 mg total) under the tongue every 5 (five) minutes as needed for chest pain 25 tablet 5   • oxyCODONE-acetaminophen (Percocet) 7 5-325 MG per tablet Take 1 tablet by mouth every 8 (eight) hours as needed for moderate pain Max Daily Amount: 3 tablets 90 tablet 0   • rosuvastatin (CRESTOR) 20 MG tablet take 1 tablet by mouth once daily 90 tablet 5   • sildenafil (VIAGRA) 100 mg tablet Take 1 tablet (100 mg total) by mouth daily as needed for erectile dysfunction 20 tablet 0   • tamsulosin (FLOMAX) 0 4 mg Take 2 capsules (0 8 mg total) by mouth daily with dinner 60 capsule 0   • tiotropium-olodaterol (Stiolto Respimat) 2 5-2 5 MCG/ACT inhaler Inhale 2 puffs daily 4 g 11   • tiotropium-olodaterol (Stiolto Respimat) 2 5-2 5 MCG/ACT inhaler Inhale 2 puffs daily 4 g 0   • topiramate (TOPAMAX) 25 mg tablet FOLLOW INSTRUCTIONS GIVEN TO TITRATE UP TO A MAX OF 2 TABLETS BY MOUTH TWICE DAILY AS TOLERATED AND TO LEVEL OF BENEFIT FOR TREMORS 120 tablet 6   • traZODone (DESYREL) 100 mg tablet take 1 tablet by mouth daily at bedtime 90 tablet 3   • [DISCONTINUED] Blood Glucose Monitoring Suppl (OneTouch Verio Flex System) w/Device KIT USE IN THE MORNING TEST ONCE DAILY 1 kit 0   • [DISCONTINUED] Lancets (onetouch ultrasoft) lancets Test once daily 100 each 0     No current facility-administered medications on file prior to visit         Review of Systems   Constitutional: Positive for appetite change (decreased appetite  somewhat ) and unexpected weight change (7 lbs wt loss over the past 1-2 months)  Negative for activity change, chills, fatigue and fever  HENT: Negative for postnasal drip, sore throat, trouble swallowing and voice change  Eyes: Negative for visual disturbance  Respiratory: Positive for shortness of breath (with stairs, carrying things)  Negative for cough, chest tightness and wheezing  Cardiovascular: Negative for chest pain and leg swelling  Gastrointestinal: Negative for abdominal pain, constipation, diarrhea, nausea and vomiting  Musculoskeletal: Positive for arthralgias (left scapular/ shoulder pain  ) and back pain  Negative for myalgias  Skin: Negative for color change  Neurological: Negative for dizziness, syncope, weakness, light-headedness and headaches  Psychiatric/Behavioral: Negative for agitation and behavioral problems  The patient is not nervous/anxious  All other systems reviewed and are negative  Objective:   Physical Exam  Vitals reviewed  Constitutional:       General: He is not in acute distress  Appearance: Normal appearance  He is well-developed  He is not diaphoretic  HENT:      Head: Normocephalic and atraumatic  Eyes:      General: No scleral icterus  Extraocular Movements: Extraocular movements intact  Neck:      Trachea: No tracheal deviation  Cardiovascular:      Rate and Rhythm: Normal rate and regular rhythm  Pulses: Normal pulses  Heart sounds: Normal heart sounds  No murmur heard  Pulmonary:      Effort: Pulmonary effort is normal  No respiratory distress  Breath sounds: Normal breath sounds  No wheezing  Abdominal:      General: Bowel sounds are normal  There is no distension  Palpations: Abdomen is soft  Musculoskeletal:         General: Tenderness and signs of injury present  Cervical back: Normal range of motion and neck supple  Right lower leg: No edema        Left lower leg: No edema  Comments: Unable to lift left shoulder  + ttp over left shoulder  Severely decreased ROM of left arm  Lymphadenopathy:      Cervical: No cervical adenopathy  Skin:     General: Skin is warm and dry  Neurological:      Mental Status: He is alert and oriented to person, place, and time  Cranial Nerves: No cranial nerve deficit  Psychiatric:         Mood and Affect: Mood normal          Behavior: Behavior normal          Thought Content: Thought content normal      /86   Pulse 68   Temp 98 9 °F (37 2 °C)   Resp 16   Ht 5' 8" (1 727 m)   Wt 89 1 kg (196 lb 6 9 oz)   SpO2 98%   BMI 29 87 kg/m²       Result Date: 8/5/2022  Narrative PET/CT SCAN INDICATION:  D38 1: Neoplasm of uncertain behavior of trachea, bronchus and lung   Abnormal CT study  Right lower lobe nodule  MODIFIER: PI COMPARISON: No prior PET scans  CT chest abdomen and pelvis, 7/23/2022 CELL TYPE:  N/A TECHNIQUE:   9 2 mCi F-18-FDG administered IV  Multiplanar attenuation corrected and non attenuation corrected PET images were acquired 60 minutes post injection  Contiguous, low dose, axial CT sections were obtained from the skull base through the femurs   Intravenous contrast material was not utilized  This examination, like all CT scans performed in the [facility], was performed utilizing techniques to minimize radiation dose exposure, including the use of iterative reconstruction and automated exposure control  Fasting serum glucose: 130 mg/dl FINDINGS: VISUALIZED BRAIN:   No acute abnormalities are seen  HEAD/NECK:   Subcentimeter solitary focus of increased activity within the posterior aspect of the left parotid gland  (1200/50) associated with a subcentimeter ovoid nodule measuring approximately 0 8 cm with SUV max 3 8    CT images: No significant findings CHEST:   - Solid nodule in the superior segment of the right lower lobe abutting the major fissure  (1200/113) (3/127) measures approximately 1 2 x 1 0 cm with SUV max 2 9  A subpleural nodule on image 3/126 measuring 5 mm is unchanged from the prior study, and shows no abnormal glucose activity but too small for accurate characterization with PET/CT  Prior PET CT also showed subtle groundglass opacities within the right upper lobe, for example as seen on image 3/106, without abnormal glucose activity  There is no glucose avid mediastinal or hilar adenopathy CT images: Prior CT demonstrated airspace disease within the posterior inferior right lower lobe, since resolved  There is pulmonary emphysema  No pleural or pericardial effusion  Coronary artery calcification  ABDOMEN:   No FDG avid soft tissue lesions are seen  CT images: Hepatic steatosis and bilateral completely evaluated renal cysts  (Please refer to prior CT report)  Bilateral nephrolithiasis  The time of the prior study, there is right-sided hydronephrosis with ureteral calculi  There is mild residual dilatation of the right kidney collecting system with reduced caliber of the right ureter and diminished periureteral inflammation  No residual ureteral stone appreciated  There is at least one small calculus measuring about 2 to 3 mm in the urinary bladder (3/255)  Abdominal aortic aneurysm and dilated left common iliac artery noted  PELVIS: No FDG avid soft tissue lesions are seen  CT images: Enlarged prostate gland with calcification  Circumferential bladder wall thickening which may be related to muscular hypertrophy  OSSEOUS STRUCTURES: No FDG avid lesions are seen  CT images: No significant findings  Impression 1  Solid lesion in the superior segment of the right lower lobe abutting the major fissure measuring 1 2 x 1 0 cm in size with SUV max 2 9 most concerning for primary bronchogenic carcinoma of the lung with soft tissue sampling recommended   2  Several small groundglass opacities within the right upper lobe and an additional 5 mm nodule in the superior segment of the right lower lobe without abnormal glucose activity but too small to characterize  Reassessment during the course of follow-up  recommended  3  There is no evidence of glucose avid intrathoracic adenopathy  There is no evidence of distant glucose avid metastatic disease 4  Limited CT study shows no residual ureteral stones  A small bladder calculus is present  Right renal collecting system distention partially resolved  5   Enlarged prostate gland, abdominal aortic aneurysm, pulmonary emphysema, and hepatic steatosis 6  Subcentimeter glucose avid nodule in the left parotid gland  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

## 2022-11-03 NOTE — ASSESSMENT & PLAN NOTE
Mr Sierra Lesch is stable from a thoracic surgery standpoint  He is going to undergo blood work next week  He is not a diabetic  He quit smoking last night at midnight  We discussed the important of smoking cessation and staying off cigarettes  All questions were answered and he is scheduled for a right lower lobe segmentectomy vs lobectomy on 11/16/22  We will call him next week to ensure that he remains off cigarettes  He is in agreement with the plan

## 2022-11-03 NOTE — H&P (VIEW-ONLY)
Assessment/Plan:    Pulmonary nodules  Mr Kym Akers is stable from a thoracic surgery standpoint  He is going to undergo blood work next week  He is not a diabetic  He quit smoking last night at midnight  We discussed the important of smoking cessation and staying off cigarettes  All questions were answered and he is scheduled for a right lower lobe segmentectomy vs lobectomy on 11/16/22  We will call him next week to ensure that he remains off cigarettes  He is in agreement with the plan  Diagnoses and all orders for this visit:    Pulmonary nodules          Thoracic History   Cancer Staging  No matching staging information was found for the patient  Oncology History    No history exists  Patient ID: Cecilia Gardner is a 75 y o  male  ECOG 0    HPI     Mr Kym Akers is a 74 yo gentleman with a history of AAA, CAD, COPD, HTN, migraines, and JAKI who was referred by pulmonary for a right lower lobe squamous cell carcinoma  A CTA from 7/23/22 revealed a 1 1 x 0 9 cm right lower lobe nodule and subcarinal node measuring 2 x 1 2 cm  PET from 8/5/22 revealed a 1 2 x 1 0 cm right lower lobe nodule with a SUV of 2 9  IR biopsy from 9/13/22 was consistent with squamous cell carcinoma  PFT's from 9/21/22 revealed a FVC of 78%, FEV1 of 72%, and a DLCO of 71% predicted  He was evaluated in our office on 10/4/22 at which point Dr Jovita Rodriguez recommended a right thoracoscopic lower lobe segmentectomy, possible right lower lobectomy  He returns today for an updated H and P  On discussion, he is still having left shoulder/ scapular pain with some numbness in his left hand  He has not lost any weight since his previous office visit  He will undergo his blood work in the beginning of next week  He continues with shortness of breath with exertion       The following portions of the patient's history were reviewed and updated as appropriate: allergies, current medications, past family history, past medical history, past social history, past surgical history and problem list       Past Medical History:   Diagnosis Date   • Abdominal aortic aneurysm without rupture    • Abdominal Aortic Duplex 02/21/2017    Ectatic infrarenal abdominal aorta  • CAD (coronary artery disease)    • COPD (chronic obstructive pulmonary disease) ([facility])    • Extremity pain    • History of echocardiogram 03/18/2014    EF 55%, mild MR and AI  Mild concentric LVH  • History of stress test 03/06/2017    Normal    • Hypertension    • Joint pain    • Low back pain    • Migraine    • Neck pain    • Obstructive sleep apnea     cannot tolerate CPAP   • Osteoarthritis    • Peripheral neuropathy    • Reflex sympathetic dystrophy      Past Surgical History:   Procedure Laterality Date   • CARDIAC CATHETERIZATION  02/13/2012    EF 70%, widely patent renal arteries, significant single-vessel CAD-medical therapy  • CARDIAC CATHETERIZATION  04/11/2013    EF 65%, 50% mid LAD, 20% prox CFX, 90% diffuse RCA, 99% mid RCA  Medical management     • CHOLECYSTECTOMY     • EPIDURAL BLOCK INJECTION Bilateral 8/15/2019    Procedure: BLOCK / INJECTION EPIDURAL STEROID CERVICAL;  Surgeon: Jyotsna Rider MD;  Location: [facility];  Service: Pain Management    • FL GUIDED NEEDLE PLAC BX/ASP/INJ  8/15/2019   • IR BIOPSY LUNG  9/13/2022   • ORTHOPEDIC SURGERY     • TRIGGER POINT INJECTION       Family History   Adopted: Yes   Problem Relation Age of Onset   • No Known Problems Family    • No Known Problems Mother    • No Known Problems Father      Social History     Socioeconomic History   • Marital status: Single     Spouse name: Not on file   • Number of children: Not on file   • Years of education: Not on file   • Highest education level: Not on file   Occupational History   • Not on file   Tobacco Use   • Smoking status: Current Every Day Smoker     Packs/day: 1 50   • Smokeless tobacco: Never Used   Vaping Use   • Vaping Use: Never used   Substance and Sexual Activity • Alcohol use: Not Currently   • Drug use: No   • Sexual activity: Yes   Other Topics Concern   • Not on file   Social History Narrative   • Not on file     Social Determinants of Health     Financial Resource Strain: Not on file   Food Insecurity: No Food Insecurity   • Worried About Running Out of Food in the Last Year: Never true   • Ran Out of Food in the Last Year: Never true   Transportation Needs: No Transportation Needs   • Lack of Transportation (Medical): No   • Lack of Transportation (Non-Medical):  No   Physical Activity: Not on file   Stress: Not on file   Social Connections: Not on file   Intimate Partner Violence: Not on file   Housing Stability: Low Risk    • Unable to Pay for Housing in the Last Year: No   • Number of Places Lived in the Last Year: 1   • Unstable Housing in the Last Year: No     No Known Allergies  Current Outpatient Medications on File Prior to Visit   Medication Sig Dispense Refill   • albuterol (Ventolin HFA) 90 mcg/act inhaler Inhale 2 puffs every 6 (six) hours as needed for wheezing 18 g 11   • amLODIPine (NORVASC) 10 mg tablet swallow 1 tablet once daily 90 tablet 3   • aspirin (ECOTRIN LOW STRENGTH) 81 mg EC tablet Take 1 tablet (81 mg total) by mouth daily 30 tablet 5   • buPROPion (Wellbutrin XL) 150 mg 24 hr tablet Take 1 tablet (150 mg total) by mouth every morning 90 tablet 3   • cyclobenzaprine (FLEXERIL) 10 mg tablet Take 1 tablet (10 mg total) by mouth 3 (three) times a day as needed for muscle spasms 90 tablet 1   • gabapentin (NEURONTIN) 800 mg tablet Take 1 tablet (800 mg total) by mouth 3 (three) times a day 90 tablet 1   • glucose blood (OneTouch Verio) test strip Test once daily 100 each 0   • ketorolac (TORADOL) 10 mg tablet Take 1 tablet (10 mg total) by mouth every 6 (six) hours as needed for moderate pain for up to 8 doses 8 tablet 0   • lisinopril (ZESTRIL) 20 mg tablet take 1 tablet by mouth once daily 30 tablet 5   • methylPREDNISolone 4 MG tablet therapy pack Use as directed on package 1 each 0   • metoprolol succinate (TOPROL-XL) 100 mg 24 hr tablet take 1 tablet by mouth once daily 30 tablet 5   • nicotine (NICODERM CQ) 21 mg/24 hr TD 24 hr patch Place 1 patch on the skin every 24 hours 28 patch 0   • nicotine (NICODERM CQ) 21 mg/24 hr TD 24 hr patch Place 1 patch on the skin every 24 hours 28 patch 0   • nicotine polacrilex (NICORETTE) 2 mg gum Chew 1 each (2 mg total) as needed for smoking cessation 100 each 0   • nitroglycerin (NITROSTAT) 0 4 mg SL tablet Place 1 tablet (0 4 mg total) under the tongue every 5 (five) minutes as needed for chest pain 25 tablet 5   • oxyCODONE-acetaminophen (Percocet) 7 5-325 MG per tablet Take 1 tablet by mouth every 8 (eight) hours as needed for moderate pain Max Daily Amount: 3 tablets 90 tablet 0   • rosuvastatin (CRESTOR) 20 MG tablet take 1 tablet by mouth once daily 90 tablet 5   • sildenafil (VIAGRA) 100 mg tablet Take 1 tablet (100 mg total) by mouth daily as needed for erectile dysfunction 20 tablet 0   • tamsulosin (FLOMAX) 0 4 mg Take 2 capsules (0 8 mg total) by mouth daily with dinner 60 capsule 0   • tiotropium-olodaterol (Stiolto Respimat) 2 5-2 5 MCG/ACT inhaler Inhale 2 puffs daily 4 g 11   • tiotropium-olodaterol (Stiolto Respimat) 2 5-2 5 MCG/ACT inhaler Inhale 2 puffs daily 4 g 0   • topiramate (TOPAMAX) 25 mg tablet FOLLOW INSTRUCTIONS GIVEN TO TITRATE UP TO A MAX OF 2 TABLETS BY MOUTH TWICE DAILY AS TOLERATED AND TO LEVEL OF BENEFIT FOR TREMORS 120 tablet 6   • traZODone (DESYREL) 100 mg tablet take 1 tablet by mouth daily at bedtime 90 tablet 3   • [DISCONTINUED] Blood Glucose Monitoring Suppl (OneTouch Verio Flex System) w/Device KIT USE IN THE MORNING TEST ONCE DAILY 1 kit 0   • [DISCONTINUED] Lancets (onetouch ultrasoft) lancets Test once daily 100 each 0     No current facility-administered medications on file prior to visit         Review of Systems   Constitutional: Positive for appetite change (decreased appetite  somewhat ) and unexpected weight change (7 lbs wt loss over the past 1-2 months)  Negative for activity change, chills, fatigue and fever  HENT: Negative for postnasal drip, sore throat, trouble swallowing and voice change  Eyes: Negative for visual disturbance  Respiratory: Positive for shortness of breath (with stairs, carrying things)  Negative for cough, chest tightness and wheezing  Cardiovascular: Negative for chest pain and leg swelling  Gastrointestinal: Negative for abdominal pain, constipation, diarrhea, nausea and vomiting  Musculoskeletal: Positive for arthralgias (left scapular/ shoulder pain  ) and back pain  Negative for myalgias  Skin: Negative for color change  Neurological: Negative for dizziness, syncope, weakness, light-headedness and headaches  Psychiatric/Behavioral: Negative for agitation and behavioral problems  The patient is not nervous/anxious  All other systems reviewed and are negative  Objective:   Physical Exam  Vitals reviewed  Constitutional:       General: He is not in acute distress  Appearance: Normal appearance  He is well-developed  He is not diaphoretic  HENT:      Head: Normocephalic and atraumatic  Eyes:      General: No scleral icterus  Extraocular Movements: Extraocular movements intact  Neck:      Trachea: No tracheal deviation  Cardiovascular:      Rate and Rhythm: Normal rate and regular rhythm  Pulses: Normal pulses  Heart sounds: Normal heart sounds  No murmur heard  Pulmonary:      Effort: Pulmonary effort is normal  No respiratory distress  Breath sounds: Normal breath sounds  No wheezing  Abdominal:      General: Bowel sounds are normal  There is no distension  Palpations: Abdomen is soft  Musculoskeletal:         General: Tenderness and signs of injury present  Cervical back: Normal range of motion and neck supple  Right lower leg: No edema        Left lower leg: No edema  Comments: Unable to lift left shoulder  + ttp over left shoulder  Severely decreased ROM of left arm  Lymphadenopathy:      Cervical: No cervical adenopathy  Skin:     General: Skin is warm and dry  Neurological:      Mental Status: He is alert and oriented to person, place, and time  Cranial Nerves: No cranial nerve deficit  Psychiatric:         Mood and Affect: Mood normal          Behavior: Behavior normal          Thought Content: Thought content normal      /86   Pulse 68   Temp 98 9 °F (37 2 °C)   Resp 16   Ht 5' 8" (1 727 m)   Wt 89 1 kg (196 lb 6 9 oz)   SpO2 98%   BMI 29 87 kg/m²       Result Date: 8/5/2022  Narrative PET/CT SCAN INDICATION:  D38 1: Neoplasm of uncertain behavior of trachea, bronchus and lung   Abnormal CT study  Right lower lobe nodule  MODIFIER: PI COMPARISON: No prior PET scans  CT chest abdomen and pelvis, 7/23/2022 CELL TYPE:  N/A TECHNIQUE:   9 2 mCi F-18-FDG administered IV  Multiplanar attenuation corrected and non attenuation corrected PET images were acquired 60 minutes post injection  Contiguous, low dose, axial CT sections were obtained from the skull base through the femurs   Intravenous contrast material was not utilized  This examination, like all CT scans performed in the [facility], was performed utilizing techniques to minimize radiation dose exposure, including the use of iterative reconstruction and automated exposure control  Fasting serum glucose: 130 mg/dl FINDINGS: VISUALIZED BRAIN:   No acute abnormalities are seen  HEAD/NECK:   Subcentimeter solitary focus of increased activity within the posterior aspect of the left parotid gland  (1200/50) associated with a subcentimeter ovoid nodule measuring approximately 0 8 cm with SUV max 3 8    CT images: No significant findings CHEST:   - Solid nodule in the superior segment of the right lower lobe abutting the major fissure  (1200/113) (3/127) measures approximately 1 2 x 1 0 cm with SUV max 2 9  A subpleural nodule on image 3/126 measuring 5 mm is unchanged from the prior study, and shows no abnormal glucose activity but too small for accurate characterization with PET/CT  Prior PET CT also showed subtle groundglass opacities within the right upper lobe, for example as seen on image 3/106, without abnormal glucose activity  There is no glucose avid mediastinal or hilar adenopathy CT images: Prior CT demonstrated airspace disease within the posterior inferior right lower lobe, since resolved  There is pulmonary emphysema  No pleural or pericardial effusion  Coronary artery calcification  ABDOMEN:   No FDG avid soft tissue lesions are seen  CT images: Hepatic steatosis and bilateral completely evaluated renal cysts  (Please refer to prior CT report)  Bilateral nephrolithiasis  The time of the prior study, there is right-sided hydronephrosis with ureteral calculi  There is mild residual dilatation of the right kidney collecting system with reduced caliber of the right ureter and diminished periureteral inflammation  No residual ureteral stone appreciated  There is at least one small calculus measuring about 2 to 3 mm in the urinary bladder (3/255)  Abdominal aortic aneurysm and dilated left common iliac artery noted  PELVIS: No FDG avid soft tissue lesions are seen  CT images: Enlarged prostate gland with calcification  Circumferential bladder wall thickening which may be related to muscular hypertrophy  OSSEOUS STRUCTURES: No FDG avid lesions are seen  CT images: No significant findings  Impression 1  Solid lesion in the superior segment of the right lower lobe abutting the major fissure measuring 1 2 x 1 0 cm in size with SUV max 2 9 most concerning for primary bronchogenic carcinoma of the lung with soft tissue sampling recommended   2  Several small groundglass opacities within the right upper lobe and an additional 5 mm nodule in the superior segment of the right lower lobe without abnormal glucose activity but too small to characterize  Reassessment during the course of follow-up  recommended  3  There is no evidence of glucose avid intrathoracic adenopathy  There is no evidence of distant glucose avid metastatic disease 4  Limited CT study shows no residual ureteral stones  A small bladder calculus is present  Right renal collecting system distention partially resolved  5   Enlarged prostate gland, abdominal aortic aneurysm, pulmonary emphysema, and hepatic steatosis 6  Subcentimeter glucose avid nodule in the left parotid gland  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

## 2022-11-07 ENCOUNTER — TELEPHONE (OUTPATIENT)
Dept: CARDIAC SURGERY | Facility: CLINIC | Age: 67
End: 2022-11-07

## 2022-11-07 NOTE — PRE-PROCEDURE INSTRUCTIONS
Pre-Surgery Instructions:   Medication Instructions   • albuterol (Ventolin HFA) 90 mcg/act inhaler Uses PRN- OK to take day of surgery   • amLODIPine (NORVASC) 10 mg tablet Take night before surgery   • buPROPion (Wellbutrin XL) 150 mg 24 hr tablet Take day of surgery  • cyclobenzaprine (FLEXERIL) 10 mg tablet Uses PRN- OK to take day of surgery   • gabapentin (NEURONTIN) 800 mg tablet Take day of surgery  • lisinopril (ZESTRIL) 20 mg tablet Take night before surgery   • metoprolol succinate (TOPROL-XL) 100 mg 24 hr tablet Take night before surgery   • nicotine (NICODERM CQ) 21 mg/24 hr TD 24 hr patch Take day of surgery  • nitroglycerin (NITROSTAT) 0 4 mg SL tablet PRN   • oxyCODONE-acetaminophen (Percocet) 7 5-325 MG per tablet Uses PRN- OK to take day of surgery   • rosuvastatin (CRESTOR) 20 MG tablet Take night before surgery   • sildenafil (VIAGRA) 100 mg tablet Hold day of surgery  • tamsulosin (FLOMAX) 0 4 mg Take night before surgery   • tiotropium-olodaterol (Stiolto Respimat) 2 5-2 5 MCG/ACT inhaler Uses PRN- OK to take day of surgery   • topiramate (TOPAMAX) 25 mg tablet Take day of surgery  • traZODone (DESYREL) 100 mg tablet Take night before surgery   Have you had / have a sore throat? No  Have you had / have a cough less than 1 week? No  Have you had / have a fever greater than 100 0 - 100  4? No  Are you experiencing any shortness of breath? No    Review with patient via phone medications and showering instructions  Instructed to avoid all ASA and OTC Vit/Supp 1 week prior to surgery and to avoid NSAIDs 3 days prior to surgery per anesthesia instructions  Tylenol ok to take prn  Advised ASC call with surgery schedule time, nothing eat or drink after midnight  Verbalized understanding  Advise Smoking Cessation Education not interested

## 2022-11-07 NOTE — TELEPHONE ENCOUNTER
I called Mr Smita Carreno regarding his smoking  He reports he hasn't smoked  He's using a Nicoderm patch and Wellbutrin  He can't afford the Nicoret gum  I gave him the 1-800-QUIT-NOW number to call and see if they can help with gum or lozenges  Support provided  Re-enforced that patient needs to call office if he's smoking prior to surgery

## 2022-11-09 ENCOUNTER — TELEPHONE (OUTPATIENT)
Dept: FAMILY MEDICINE CLINIC | Facility: CLINIC | Age: 67
End: 2022-11-09

## 2022-11-09 ENCOUNTER — LAB REQUISITION (OUTPATIENT)
Dept: LAB | Facility: HOSPITAL | Age: 67
End: 2022-11-09

## 2022-11-09 ENCOUNTER — APPOINTMENT (OUTPATIENT)
Dept: LAB | Facility: CLINIC | Age: 67
End: 2022-11-09

## 2022-11-09 DIAGNOSIS — Z12.5 SCREENING FOR PROSTATE CANCER: ICD-10-CM

## 2022-11-09 DIAGNOSIS — I25.118 CORONARY ARTERY DISEASE OF NATIVE ARTERY OF NATIVE HEART WITH STABLE ANGINA PECTORIS (HCC): ICD-10-CM

## 2022-11-09 DIAGNOSIS — R35.1 NOCTURIA: ICD-10-CM

## 2022-11-09 DIAGNOSIS — R73.03 PREDIABETES: ICD-10-CM

## 2022-11-09 DIAGNOSIS — R73.03 PREDIABETES: Primary | ICD-10-CM

## 2022-11-09 DIAGNOSIS — C34.31 MALIGNANT NEOPLASM OF LOWER LOBE OF RIGHT LUNG (HCC): ICD-10-CM

## 2022-11-09 DIAGNOSIS — E66.9 OBESITY (BMI 30.0-34.9): ICD-10-CM

## 2022-11-09 DIAGNOSIS — R73.03 PRE-DIABETES: ICD-10-CM

## 2022-11-09 DIAGNOSIS — C34.31 MALIGNANT NEOPLASM OF LOWER LOBE, RIGHT BRONCHUS OR LUNG (HCC): ICD-10-CM

## 2022-11-09 LAB
ABO GROUP BLD: NORMAL
ANION GAP SERPL CALCULATED.3IONS-SCNC: 5 MMOL/L (ref 4–13)
APTT PPP: 31 SECONDS (ref 23–37)
BLD GP AB SCN SERPL QL: NEGATIVE
BUN SERPL-MCNC: 17 MG/DL (ref 5–25)
CALCIUM SERPL-MCNC: 8.9 MG/DL (ref 8.3–10.1)
CHLORIDE SERPL-SCNC: 109 MMOL/L (ref 96–108)
CO2 SERPL-SCNC: 25 MMOL/L (ref 21–32)
CREAT SERPL-MCNC: 1.19 MG/DL (ref 0.6–1.3)
ERYTHROCYTE [DISTWIDTH] IN BLOOD BY AUTOMATED COUNT: 13.4 % (ref 11.6–15.1)
EST. AVERAGE GLUCOSE BLD GHB EST-MCNC: 134 MG/DL
GFR SERPL CREATININE-BSD FRML MDRD: 62 ML/MIN/1.73SQ M
GLUCOSE P FAST SERPL-MCNC: 167 MG/DL (ref 65–99)
HBA1C MFR BLD: 6.3 %
HCT VFR BLD AUTO: 52.2 % (ref 36.5–49.3)
HGB BLD-MCNC: 17.1 G/DL (ref 12–17)
INR PPP: 0.92 (ref 0.84–1.19)
MCH RBC QN AUTO: 30.3 PG (ref 26.8–34.3)
MCHC RBC AUTO-ENTMCNC: 32.8 G/DL (ref 31.4–37.4)
MCV RBC AUTO: 93 FL (ref 82–98)
PLATELET # BLD AUTO: 201 THOUSANDS/UL (ref 149–390)
PMV BLD AUTO: 10.5 FL (ref 8.9–12.7)
POTASSIUM SERPL-SCNC: 4.3 MMOL/L (ref 3.5–5.3)
PROTHROMBIN TIME: 12.5 SECONDS (ref 11.6–14.5)
PSA SERPL-MCNC: 3 NG/ML (ref 0–4)
PSA SERPL-MCNC: 3 NG/ML (ref 0–4)
RBC # BLD AUTO: 5.64 MILLION/UL (ref 3.88–5.62)
RH BLD: POSITIVE
SODIUM SERPL-SCNC: 139 MMOL/L (ref 135–147)
SPECIMEN EXPIRATION DATE: NORMAL
TSH SERPL DL<=0.05 MIU/L-ACNC: 1.28 UIU/ML (ref 0.45–4.5)
WBC # BLD AUTO: 9.87 THOUSAND/UL (ref 4.31–10.16)

## 2022-11-09 NOTE — TELEPHONE ENCOUNTER
I called the patient and discussed the recent blood work that he did   BMP was notable for >127   At this time it seems that he is progressing from prediabetes to DMT2  However will add HBA1C and follow up next month with him      -If HBA1C is remarkably elevated will consider starting metformin immediately  -patient aware and agrees with the plan

## 2022-11-16 ENCOUNTER — HOSPITAL ENCOUNTER (INPATIENT)
Facility: HOSPITAL | Age: 67
LOS: 11 days | Discharge: HOME HEALTH | End: 2022-11-27
Attending: THORACIC SURGERY (CARDIOTHORACIC VASCULAR SURGERY) | Admitting: THORACIC SURGERY (CARDIOTHORACIC VASCULAR SURGERY)

## 2022-11-16 ENCOUNTER — APPOINTMENT (OUTPATIENT)
Dept: RADIOLOGY | Facility: HOSPITAL | Age: 67
End: 2022-11-16

## 2022-11-16 ENCOUNTER — ANESTHESIA (INPATIENT)
Dept: PERIOP | Facility: HOSPITAL | Age: 67
End: 2022-11-16

## 2022-11-16 DIAGNOSIS — C34.31 MALIGNANT NEOPLASM OF LOWER LOBE OF RIGHT LUNG (HCC): ICD-10-CM

## 2022-11-16 DIAGNOSIS — S27.899A: Primary | ICD-10-CM

## 2022-11-16 LAB
ABO GROUP BLD: NORMAL
ANION GAP SERPL CALCULATED.3IONS-SCNC: 6 MMOL/L (ref 4–13)
BUN SERPL-MCNC: 13 MG/DL (ref 5–25)
CALCIUM SERPL-MCNC: 8.1 MG/DL (ref 8.3–10.1)
CHLORIDE SERPL-SCNC: 110 MMOL/L (ref 96–108)
CO2 SERPL-SCNC: 23 MMOL/L (ref 21–32)
CREAT SERPL-MCNC: 0.99 MG/DL (ref 0.6–1.3)
ERYTHROCYTE [DISTWIDTH] IN BLOOD BY AUTOMATED COUNT: 13.3 % (ref 11.6–15.1)
GFR SERPL CREATININE-BSD FRML MDRD: 77 ML/MIN/1.73SQ M
GLUCOSE SERPL-MCNC: 153 MG/DL (ref 65–140)
HCT VFR BLD AUTO: 45.7 % (ref 36.5–49.3)
HGB BLD-MCNC: 14.8 G/DL (ref 12–17)
MAGNESIUM SERPL-MCNC: 2.3 MG/DL (ref 1.6–2.6)
MCH RBC QN AUTO: 30.3 PG (ref 26.8–34.3)
MCHC RBC AUTO-ENTMCNC: 32.4 G/DL (ref 31.4–37.4)
MCV RBC AUTO: 94 FL (ref 82–98)
PLATELET # BLD AUTO: 179 THOUSANDS/UL (ref 149–390)
PMV BLD AUTO: 10.2 FL (ref 8.9–12.7)
POTASSIUM SERPL-SCNC: 4.1 MMOL/L (ref 3.5–5.3)
RBC # BLD AUTO: 4.88 MILLION/UL (ref 3.88–5.62)
RH BLD: POSITIVE
SODIUM SERPL-SCNC: 139 MMOL/L (ref 135–147)
WBC # BLD AUTO: 8.43 THOUSAND/UL (ref 4.31–10.16)

## 2022-11-16 PROCEDURE — 07B74ZX EXCISION OF THORAX LYMPHATIC, PERCUTANEOUS ENDOSCOPIC APPROACH, DIAGNOSTIC: ICD-10-PCS | Performed by: THORACIC SURGERY (CARDIOTHORACIC VASCULAR SURGERY)

## 2022-11-16 PROCEDURE — 0BBF4ZZ EXCISION OF RIGHT LOWER LUNG LOBE, PERCUTANEOUS ENDOSCOPIC APPROACH: ICD-10-PCS | Performed by: THORACIC SURGERY (CARDIOTHORACIC VASCULAR SURGERY)

## 2022-11-16 PROCEDURE — 0BJ08ZZ INSPECTION OF TRACHEOBRONCHIAL TREE, VIA NATURAL OR ARTIFICIAL OPENING ENDOSCOPIC: ICD-10-PCS | Performed by: THORACIC SURGERY (CARDIOTHORACIC VASCULAR SURGERY)

## 2022-11-16 RX ORDER — ALBUTEROL SULFATE 90 UG/1
AEROSOL, METERED RESPIRATORY (INHALATION) AS NEEDED
Status: DISCONTINUED | OUTPATIENT
Start: 2022-11-16 | End: 2022-11-16

## 2022-11-16 RX ORDER — PANTOPRAZOLE SODIUM 40 MG/1
40 TABLET, DELAYED RELEASE ORAL
Status: DISCONTINUED | OUTPATIENT
Start: 2022-11-16 | End: 2022-11-27 | Stop reason: HOSPADM

## 2022-11-16 RX ORDER — HEPARIN SODIUM 5000 [USP'U]/ML
5000 INJECTION, SOLUTION INTRAVENOUS; SUBCUTANEOUS
Status: COMPLETED | OUTPATIENT
Start: 2022-11-16 | End: 2022-11-16

## 2022-11-16 RX ORDER — SODIUM CHLORIDE, SODIUM LACTATE, POTASSIUM CHLORIDE, CALCIUM CHLORIDE 600; 310; 30; 20 MG/100ML; MG/100ML; MG/100ML; MG/100ML
INJECTION, SOLUTION INTRAVENOUS CONTINUOUS PRN
Status: DISCONTINUED | OUTPATIENT
Start: 2022-11-16 | End: 2022-11-16

## 2022-11-16 RX ORDER — OXYCODONE HYDROCHLORIDE 5 MG/1
5 TABLET ORAL EVERY 4 HOURS PRN
Status: DISCONTINUED | OUTPATIENT
Start: 2022-11-16 | End: 2022-11-27 | Stop reason: HOSPADM

## 2022-11-16 RX ORDER — LIDOCAINE HYDROCHLORIDE 20 MG/ML
INJECTION, SOLUTION EPIDURAL; INFILTRATION; INTRACAUDAL; PERINEURAL AS NEEDED
Status: DISCONTINUED | OUTPATIENT
Start: 2022-11-16 | End: 2022-11-16

## 2022-11-16 RX ORDER — ACETAMINOPHEN 325 MG/1
975 TABLET ORAL EVERY 6 HOURS
Status: DISCONTINUED | OUTPATIENT
Start: 2022-11-16 | End: 2022-11-27 | Stop reason: HOSPADM

## 2022-11-16 RX ORDER — TAMSULOSIN HYDROCHLORIDE 0.4 MG/1
0.8 CAPSULE ORAL
Status: DISCONTINUED | OUTPATIENT
Start: 2022-11-16 | End: 2022-11-27 | Stop reason: HOSPADM

## 2022-11-16 RX ORDER — DEXAMETHASONE SODIUM PHOSPHATE 10 MG/ML
INJECTION, SOLUTION INTRAMUSCULAR; INTRAVENOUS AS NEEDED
Status: DISCONTINUED | OUTPATIENT
Start: 2022-11-16 | End: 2022-11-16

## 2022-11-16 RX ORDER — ROCURONIUM BROMIDE 10 MG/ML
INJECTION, SOLUTION INTRAVENOUS AS NEEDED
Status: DISCONTINUED | OUTPATIENT
Start: 2022-11-16 | End: 2022-11-16

## 2022-11-16 RX ORDER — EPHEDRINE SULFATE 50 MG/ML
INJECTION INTRAVENOUS AS NEEDED
Status: DISCONTINUED | OUTPATIENT
Start: 2022-11-16 | End: 2022-11-16

## 2022-11-16 RX ORDER — AMLODIPINE BESYLATE 10 MG/1
10 TABLET ORAL DAILY
Status: DISCONTINUED | OUTPATIENT
Start: 2022-11-16 | End: 2022-11-27 | Stop reason: HOSPADM

## 2022-11-16 RX ORDER — SODIUM CHLORIDE 9 MG/ML
INJECTION INTRAVENOUS AS NEEDED
Status: DISCONTINUED | OUTPATIENT
Start: 2022-11-16 | End: 2022-11-16 | Stop reason: HOSPADM

## 2022-11-16 RX ORDER — FENTANYL CITRATE 50 UG/ML
INJECTION, SOLUTION INTRAMUSCULAR; INTRAVENOUS AS NEEDED
Status: DISCONTINUED | OUTPATIENT
Start: 2022-11-16 | End: 2022-11-16

## 2022-11-16 RX ORDER — ALBUMIN, HUMAN INJ 5% 5 %
SOLUTION INTRAVENOUS CONTINUOUS PRN
Status: DISCONTINUED | OUTPATIENT
Start: 2022-11-16 | End: 2022-11-16

## 2022-11-16 RX ORDER — HYDROMORPHONE HCL IN WATER/PF 6 MG/30 ML
0.2 PATIENT CONTROLLED ANALGESIA SYRINGE INTRAVENOUS
Status: DISCONTINUED | OUTPATIENT
Start: 2022-11-16 | End: 2022-11-16 | Stop reason: HOSPADM

## 2022-11-16 RX ORDER — GABAPENTIN 300 MG/1
600 CAPSULE ORAL ONCE
Status: COMPLETED | OUTPATIENT
Start: 2022-11-16 | End: 2022-11-16

## 2022-11-16 RX ORDER — PROPOFOL 10 MG/ML
INJECTION, EMULSION INTRAVENOUS AS NEEDED
Status: DISCONTINUED | OUTPATIENT
Start: 2022-11-16 | End: 2022-11-16

## 2022-11-16 RX ORDER — SODIUM CHLORIDE 9 MG/ML
INJECTION, SOLUTION INTRAVENOUS CONTINUOUS PRN
Status: DISCONTINUED | OUTPATIENT
Start: 2022-11-16 | End: 2022-11-16

## 2022-11-16 RX ORDER — MIDAZOLAM HYDROCHLORIDE 2 MG/2ML
INJECTION, SOLUTION INTRAMUSCULAR; INTRAVENOUS AS NEEDED
Status: DISCONTINUED | OUTPATIENT
Start: 2022-11-16 | End: 2022-11-16

## 2022-11-16 RX ORDER — DOCUSATE SODIUM 100 MG/1
100 CAPSULE, LIQUID FILLED ORAL 2 TIMES DAILY
Status: DISCONTINUED | OUTPATIENT
Start: 2022-11-16 | End: 2022-11-27 | Stop reason: HOSPADM

## 2022-11-16 RX ORDER — SENNOSIDES 8.6 MG
1 TABLET ORAL DAILY
Status: DISCONTINUED | OUTPATIENT
Start: 2022-11-16 | End: 2022-11-27 | Stop reason: HOSPADM

## 2022-11-16 RX ORDER — ALBUTEROL SULFATE 90 UG/1
2 AEROSOL, METERED RESPIRATORY (INHALATION) EVERY 6 HOURS PRN
Status: DISCONTINUED | OUTPATIENT
Start: 2022-11-16 | End: 2022-11-27 | Stop reason: HOSPADM

## 2022-11-16 RX ORDER — PROMETHAZINE HYDROCHLORIDE 25 MG/ML
6.25 INJECTION, SOLUTION INTRAMUSCULAR; INTRAVENOUS ONCE AS NEEDED
Status: DISCONTINUED | OUTPATIENT
Start: 2022-11-16 | End: 2022-11-16 | Stop reason: HOSPADM

## 2022-11-16 RX ORDER — ACETAMINOPHEN 325 MG/1
975 TABLET ORAL ONCE
Status: COMPLETED | OUTPATIENT
Start: 2022-11-16 | End: 2022-11-16

## 2022-11-16 RX ORDER — BUPIVACAINE HYDROCHLORIDE 2.5 MG/ML
INJECTION, SOLUTION EPIDURAL; INFILTRATION; INTRACAUDAL AS NEEDED
Status: DISCONTINUED | OUTPATIENT
Start: 2022-11-16 | End: 2022-11-16 | Stop reason: HOSPADM

## 2022-11-16 RX ORDER — ONDANSETRON 2 MG/ML
4 INJECTION INTRAMUSCULAR; INTRAVENOUS ONCE AS NEEDED
Status: DISCONTINUED | OUTPATIENT
Start: 2022-11-16 | End: 2022-11-16 | Stop reason: HOSPADM

## 2022-11-16 RX ORDER — ONDANSETRON 2 MG/ML
4 INJECTION INTRAMUSCULAR; INTRAVENOUS EVERY 6 HOURS PRN
Status: DISCONTINUED | OUTPATIENT
Start: 2022-11-16 | End: 2022-11-27 | Stop reason: HOSPADM

## 2022-11-16 RX ORDER — CEFAZOLIN SODIUM 2 G/50ML
2000 SOLUTION INTRAVENOUS ONCE
Status: COMPLETED | OUTPATIENT
Start: 2022-11-16 | End: 2022-11-16

## 2022-11-16 RX ORDER — HYDROMORPHONE HCL/PF 1 MG/ML
SYRINGE (ML) INJECTION AS NEEDED
Status: DISCONTINUED | OUTPATIENT
Start: 2022-11-16 | End: 2022-11-16

## 2022-11-16 RX ORDER — GABAPENTIN 400 MG/1
800 CAPSULE ORAL 3 TIMES DAILY
Status: DISCONTINUED | OUTPATIENT
Start: 2022-11-16 | End: 2022-11-27 | Stop reason: HOSPADM

## 2022-11-16 RX ORDER — BUPROPION HYDROCHLORIDE 150 MG/1
150 TABLET ORAL EVERY MORNING
Status: DISCONTINUED | OUTPATIENT
Start: 2022-11-16 | End: 2022-11-27 | Stop reason: HOSPADM

## 2022-11-16 RX ORDER — SODIUM CHLORIDE, SODIUM LACTATE, POTASSIUM CHLORIDE, CALCIUM CHLORIDE 600; 310; 30; 20 MG/100ML; MG/100ML; MG/100ML; MG/100ML
60 INJECTION, SOLUTION INTRAVENOUS CONTINUOUS
Status: DISCONTINUED | OUTPATIENT
Start: 2022-11-16 | End: 2022-11-18

## 2022-11-16 RX ORDER — METOPROLOL SUCCINATE 100 MG/1
100 TABLET, EXTENDED RELEASE ORAL DAILY
Status: DISCONTINUED | OUTPATIENT
Start: 2022-11-16 | End: 2022-11-27 | Stop reason: HOSPADM

## 2022-11-16 RX ORDER — OXYCODONE HYDROCHLORIDE 5 MG/1
10 TABLET ORAL EVERY 4 HOURS PRN
Status: DISCONTINUED | OUTPATIENT
Start: 2022-11-16 | End: 2022-11-27 | Stop reason: HOSPADM

## 2022-11-16 RX ORDER — NICOTINE 21 MG/24HR
1 PATCH, TRANSDERMAL 24 HOURS TRANSDERMAL EVERY 24 HOURS
Status: DISCONTINUED | OUTPATIENT
Start: 2022-11-16 | End: 2022-11-27 | Stop reason: HOSPADM

## 2022-11-16 RX ORDER — FENTANYL CITRATE/PF 50 MCG/ML
25 SYRINGE (ML) INJECTION
Status: COMPLETED | OUTPATIENT
Start: 2022-11-16 | End: 2022-11-16

## 2022-11-16 RX ORDER — POLYETHYLENE GLYCOL 3350 17 G/17G
17 POWDER, FOR SOLUTION ORAL DAILY
Status: DISCONTINUED | OUTPATIENT
Start: 2022-11-16 | End: 2022-11-27 | Stop reason: HOSPADM

## 2022-11-16 RX ORDER — HYDROMORPHONE HCL/PF 1 MG/ML
0.5 SYRINGE (ML) INJECTION
Status: DISCONTINUED | OUTPATIENT
Start: 2022-11-16 | End: 2022-11-27 | Stop reason: HOSPADM

## 2022-11-16 RX ORDER — ENOXAPARIN SODIUM 100 MG/ML
40 INJECTION SUBCUTANEOUS DAILY
Status: DISCONTINUED | OUTPATIENT
Start: 2022-11-17 | End: 2022-11-27 | Stop reason: HOSPADM

## 2022-11-16 RX ORDER — ONDANSETRON 2 MG/ML
INJECTION INTRAMUSCULAR; INTRAVENOUS AS NEEDED
Status: DISCONTINUED | OUTPATIENT
Start: 2022-11-16 | End: 2022-11-16

## 2022-11-16 RX ADMIN — TAMSULOSIN HYDROCHLORIDE 0.8 MG: 0.4 CAPSULE ORAL at 15:56

## 2022-11-16 RX ADMIN — SENNOSIDES 8.6 MG: 8.6 TABLET, FILM COATED ORAL at 15:57

## 2022-11-16 RX ADMIN — Medication 25 MCG: at 11:32

## 2022-11-16 RX ADMIN — EPHEDRINE SULFATE 5 MG: 50 INJECTION INTRAVENOUS at 09:18

## 2022-11-16 RX ADMIN — DEXAMETHASONE SODIUM PHOSPHATE 10 MG: 10 INJECTION, SOLUTION INTRAMUSCULAR; INTRAVENOUS at 09:11

## 2022-11-16 RX ADMIN — ACETAMINOPHEN 975 MG: 325 TABLET ORAL at 07:04

## 2022-11-16 RX ADMIN — DOCUSATE SODIUM 100 MG: 100 CAPSULE, LIQUID FILLED ORAL at 17:47

## 2022-11-16 RX ADMIN — ACETAMINOPHEN 975 MG: 325 TABLET ORAL at 22:04

## 2022-11-16 RX ADMIN — KETAMINE HYDROCHLORIDE 0.2 MG/KG/HR: 50 INJECTION, SOLUTION INTRAMUSCULAR; INTRAVENOUS at 08:53

## 2022-11-16 RX ADMIN — FENTANYL CITRATE 100 MCG: 50 INJECTION INTRAMUSCULAR; INTRAVENOUS at 08:43

## 2022-11-16 RX ADMIN — Medication 25 MCG: at 11:21

## 2022-11-16 RX ADMIN — PHENYLEPHRINE HYDROCHLORIDE 40 MCG/MIN: 10 INJECTION INTRAVENOUS at 08:53

## 2022-11-16 RX ADMIN — NICOTINE 1 PATCH: 21 PATCH, EXTENDED RELEASE TRANSDERMAL at 16:03

## 2022-11-16 RX ADMIN — SUGAMMADEX 200 MG: 100 INJECTION, SOLUTION INTRAVENOUS at 10:51

## 2022-11-16 RX ADMIN — CEFAZOLIN SODIUM 2000 MG: 2 SOLUTION INTRAVENOUS at 08:51

## 2022-11-16 RX ADMIN — FENTANYL CITRATE 50 MCG: 50 INJECTION INTRAMUSCULAR; INTRAVENOUS at 11:00

## 2022-11-16 RX ADMIN — SODIUM CHLORIDE: 0.9 INJECTION, SOLUTION INTRAVENOUS at 08:47

## 2022-11-16 RX ADMIN — EPHEDRINE SULFATE 10 MG: 50 INJECTION INTRAVENOUS at 09:21

## 2022-11-16 RX ADMIN — ROCURONIUM BROMIDE 50 MG: 10 SOLUTION INTRAVENOUS at 08:43

## 2022-11-16 RX ADMIN — SODIUM CHLORIDE, SODIUM LACTATE, POTASSIUM CHLORIDE, AND CALCIUM CHLORIDE: .6; .31; .03; .02 INJECTION, SOLUTION INTRAVENOUS at 08:42

## 2022-11-16 RX ADMIN — ONDANSETRON 4 MG: 2 INJECTION INTRAMUSCULAR; INTRAVENOUS at 10:44

## 2022-11-16 RX ADMIN — GABAPENTIN 800 MG: 400 CAPSULE ORAL at 22:04

## 2022-11-16 RX ADMIN — BUPROPION HYDROCHLORIDE 150 MG: 150 TABLET, FILM COATED, EXTENDED RELEASE ORAL at 15:57

## 2022-11-16 RX ADMIN — HYDROMORPHONE HYDROCHLORIDE 0.2 MG: 0.2 INJECTION, SOLUTION INTRAMUSCULAR; INTRAVENOUS; SUBCUTANEOUS at 11:06

## 2022-11-16 RX ADMIN — HEPARIN SODIUM 5000 UNITS: 5000 INJECTION INTRAVENOUS; SUBCUTANEOUS at 08:04

## 2022-11-16 RX ADMIN — PANTOPRAZOLE SODIUM 40 MG: 40 TABLET, DELAYED RELEASE ORAL at 15:56

## 2022-11-16 RX ADMIN — AMLODIPINE BESYLATE 10 MG: 10 TABLET ORAL at 15:57

## 2022-11-16 RX ADMIN — GABAPENTIN 800 MG: 400 CAPSULE ORAL at 15:56

## 2022-11-16 RX ADMIN — ALBUMIN HUMAN: 0.05 INJECTION, SOLUTION INTRAVENOUS at 09:37

## 2022-11-16 RX ADMIN — PROPOFOL 200 MG: 10 INJECTION, EMULSION INTRAVENOUS at 08:43

## 2022-11-16 RX ADMIN — HYDROMORPHONE HYDROCHLORIDE 0.5 MG: 1 INJECTION, SOLUTION INTRAMUSCULAR; INTRAVENOUS; SUBCUTANEOUS at 11:00

## 2022-11-16 RX ADMIN — OXYCODONE HYDROCHLORIDE 10 MG: 5 TABLET ORAL at 22:05

## 2022-11-16 RX ADMIN — Medication 25 MCG: at 11:27

## 2022-11-16 RX ADMIN — METOPROLOL SUCCINATE 100 MG: 100 TABLET, EXTENDED RELEASE ORAL at 15:56

## 2022-11-16 RX ADMIN — FENTANYL CITRATE 50 MCG: 50 INJECTION INTRAMUSCULAR; INTRAVENOUS at 09:13

## 2022-11-16 RX ADMIN — OXYCODONE HYDROCHLORIDE 10 MG: 5 TABLET ORAL at 13:18

## 2022-11-16 RX ADMIN — ALBUMIN HUMAN: 0.05 INJECTION, SOLUTION INTRAVENOUS at 09:17

## 2022-11-16 RX ADMIN — ROCURONIUM BROMIDE 20 MG: 10 SOLUTION INTRAVENOUS at 09:27

## 2022-11-16 RX ADMIN — HYDROMORPHONE HYDROCHLORIDE 0.2 MG: 0.2 INJECTION, SOLUTION INTRAMUSCULAR; INTRAVENOUS; SUBCUTANEOUS at 11:11

## 2022-11-16 RX ADMIN — OXYCODONE HYDROCHLORIDE 10 MG: 5 TABLET ORAL at 17:47

## 2022-11-16 RX ADMIN — MIDAZOLAM 2 MG: 1 INJECTION INTRAMUSCULAR; INTRAVENOUS at 08:43

## 2022-11-16 RX ADMIN — GABAPENTIN 600 MG: 300 CAPSULE ORAL at 07:04

## 2022-11-16 RX ADMIN — SODIUM CHLORIDE, SODIUM LACTATE, POTASSIUM CHLORIDE, AND CALCIUM CHLORIDE 60 ML/HR: .6; .31; .03; .02 INJECTION, SOLUTION INTRAVENOUS at 15:57

## 2022-11-16 RX ADMIN — LIDOCAINE HYDROCHLORIDE 100 MG: 20 INJECTION, SOLUTION EPIDURAL; INFILTRATION; INTRACAUDAL at 08:43

## 2022-11-16 RX ADMIN — HYDROMORPHONE HYDROCHLORIDE 0.2 MG: 0.2 INJECTION, SOLUTION INTRAMUSCULAR; INTRAVENOUS; SUBCUTANEOUS at 11:20

## 2022-11-16 RX ADMIN — Medication 25 MCG: at 11:24

## 2022-11-16 RX ADMIN — ALBUTEROL SULFATE 8 PUFF: 90 AEROSOL, METERED RESPIRATORY (INHALATION) at 10:49

## 2022-11-16 NOTE — INTERVAL H&P NOTE
H&P reviewed  After examining the patient I find no changes in the patients condition since the H&P had been written      Vitals:    11/16/22 0724   BP: (S) 140/80   Pulse: (S) 59   Resp: (S) 20   Temp: (S) 98 1 °F (36 7 °C)   SpO2: 96%

## 2022-11-16 NOTE — OP NOTE
OPERATIVE REPORT  PATIENT NAME: Stacy Geronimo    :    MRN: [id number]  Pt Location: [facility] OR ROOM 08    SURGERY DATE: 2022    Surgeon(s) and Role:     * Keiko Wiley MD - Primary     * Kelly Contreras PA-C - Assisting     * Jacy Mcdonough MD - Assisting    Preop Diagnosis:  Squamous cell cancer of the right lower lobe  Clinical stage I A    Postop diagnosis:  Squamous cell cancer of the right lower lobe  Clinical stage I A    Procedure(s) (LRB):  RIGHT THORACOSCOPIC LOWER LOBE SUPERIOR SEGMENTECTOMY  MEDIASTINAL LYMPH NODE DISSECTION  BRONCHOSCOPY FLEXIBLE (N/A)    Specimen(s):  ID Type Source Tests Collected by Time Destination   1 : Level 11 Tissue Lymph Node TISSUE EXAM Keiko Wiley MD 2022 4363    2 : right lower lobe superior segment Tissue Lung TISSUE EXAM Keiko Wiley MD 2022 0941    3 : Level 8 Tissue Lymph Node TISSUE EXAM Keiko Wiley MD 2022 1015    4 : Level 7 Tissue Lymph Node TISSUE EXAM Keiko Wiley MD 2022 1016    5 : Level 11 Tissue Lymph Node TISSUE EXAM Keiko Wiley MD 2022 1021    6 : Level 2R Tissue Lymph Node TISSUE EXAM Keiko Wiley MD 2022 1034    7 : Level 4R Tissue Lymph Node TISSUE EXAM Keiko Wiley MD 2022 1034        Estimated Blood Loss:   Minimal    Drains:  Chest Tube 1 Right Pleural 24 Fr  (Active)   Number of days: 0       Anesthesia Type:   General    Operative Indications:  Squamous cell cancer of the right lower lobe  Clinical stage I A  Mr Lauryn Rincon this 18-year-old who has a biopsy-proven right lower lobe squamous cell cancer  He quit smoking approximately 2 weeks ago  His pulmonary function tests are adequate for resection as this is performance status  He is brought to the operating room for lower lobe superior segmentectomy      Operative Findings:  Mass completely resected in the superior segment of the right lower lobe with an approximate 2 5 cm gross negative margin    Complications:   None    Procedure and Technique:  No suspicion or identified risk for TB or other airborne infectious disease; bronchoscopy procedure being performed for diagnostic purposes  The patient is brought to the operating room and placed in the supine position  After institution of adequate general anesthesia, the fiberoptic bronchoscope was inserted  The distal trachea is normal in the gigi is sharp  The airways are examined to a subsegmental level bilaterally  There are no endobronchial lesions, the anatomy appears normal, and secretions are scant  After placement of a double-lumen endotracheal tube the patient is turned into the left lateral decubitus position  The patient's entire right chest is prepped and draped in the usual sterile fashion  Standard port incisions are utilized with a 0 5 cm incision in the eighth intercostal space in the posterior axillary line as well as a 4 cm incision more anteriorly  The thoracoscope is inserted and there is no evidence of pleural carcinomatosis  The mass was identified in the right lower lobe superior segment  The inferior pulmonary ligament was divided and no level 9 lymph node was visible g  The level 7 and 8 space is then opened and completely dissected  The posterior pleura is opened to expose the bronchus intermedius and dissection is performed between the superior segmental bronchus and the bronchus intermedius  The inferior pulmonary vein was dissected and the superior segment of the inferior pulmonary vein circumferentially dissected and divided with a linear stapler  The level of 11 lymph nodes were dissected as a packet and sent as a separate specimen  This exposes the superior segmental bronchus Sebastian which can be circumferentially dissected and divided with a linear stapler    The distal divided end of the bronchus is grasped and retracted laterally exposing the underlying superior segmental pulmonary artery  This artery is circumferentially dissected and divided with a linear stapler  The fissure can be further undermined to isolate the superior segment  The parenchyma is then divided with a linear stapler along the intersegmental plane and the superior segment placed inside of a protective bag and removed from the body  ProGEL was placed on the parenchymal staple line  The pleura above the azygos vein is then opened and both levels 4R and 2R are dissected for permanent sections  Hemostasis is assured  Paravertebral blocks are then placed with 20 cc of 0 25% Marcaine mixed with 20 cc of Exparel over 8 levels  A 24 Cymraes chest tube was then placed through the camera port to lie posteriorly and upwards the apex  The remaining lung is then watched as it reexpands and there is no atelectatic lung remaining in the lower lobe  The anterior access incision was then closed in layers with running absorbable suture to the pectoral fascia, the subcutaneous and subcuticular layers  The chest tube is affixed to the chest wall and placed to Thopaz drainage  Dry sterile dressings were applied, the patient was extubated, and transferred to the thoracic recovery area  I was present and scrubbed for the entire procedure  The physician assistant was critical to the performance of this operation  She was scrubbed for the entire procedure, drove the thoracoscope, provided retraction, as well as wound closure     I was present for the entire procedure    Patient Disposition:  PACU         SIGNATURE: Booker Schaefer MD  DATE: November 16, 2022  TIME: 10:56 AM

## 2022-11-16 NOTE — QUICK NOTE
Postoperative Evaluation    74 yo male with SCC RLL cancer s/p R VATS superior segmentectomy, MLND  Doing okay  Pain not well controlled  Tolerated diet   Denies SOB    On 4 L NC   R CT to -8, no AL  Dressing c/d/i    Plan:  Al Harmon  R CT x1 (WS, -AL)      Pat Meza MD  4:36 PM  11/16/22

## 2022-11-16 NOTE — ANESTHESIA POSTPROCEDURE EVALUATION
Post-Op Assessment Note    CV Status:  Stable  Pain Score: 3    Pain management: adequate     Mental Status:  Alert and awake   Hydration Status:  Euvolemic   PONV Controlled:  Controlled   Airway Patency:  Patent      Post Op Vitals Reviewed: Yes      Staff: CRNA         No notable events documented      /82 (11/16/22 1103)    Temp (!) 96 6 °F (35 9 °C) (11/16/22 1103)    Pulse 70 (11/16/22 1103)   Resp   12   SpO2 99 % (11/16/22 1103)

## 2022-11-16 NOTE — ANESTHESIA PROCEDURE NOTES
Arterial Line Insertion  Performed by: Christian Roberto CRNA  Authorized by: Truddie Osgood, MD   Preparation: Patient was prepped and draped in the usual sterile fashion    Indications: hemodynamic monitoring  Orientation:  Right  Location: radial artery  Sedation:  Patient sedated: yes    Procedure Details:  Needle gauge: 20  Number of attempts: 1    Post-procedure:  Post-procedure: dressing applied  Waveform: good waveform  Post-procedure CNS: normal  Patient tolerance: Patient tolerated the procedure well with no immediate complications

## 2022-11-16 NOTE — ANESTHESIA PREPROCEDURE EVALUATION
Procedure:  LOBECTOMY LUNG; segmentectomy, possible lobectomy (Right: Chest)  THORACOSCOPY VIDEO ASSISTED SURGERY (VATS) segmentectomy (Right: Chest)  BRONCHOSCOPY FLEXIBLE ([place])    Relevant Problems   CARDIO   (+) Abdominal aortic aneurysm (AAA) without rupture   (+) Hyperlipidemia   (+) Hypertension      /RENAL   (+) Acute kidney injury (HCC)   (+) Kidney stone      MUSCULOSKELETAL   (+) Cervical spondylosis without myelopathy   (+) Chronic low back pain without sciatica   (+) Lumbar degenerative disc disease   (+) Lumbar spondylosis   (+) Myofascial pain syndrome   (+) Sacroiliitis (HCC)      NEURO/PSYCH   (+) Chronic low back pain without sciatica   (+) Chronic pain syndrome   (+) Myofascial pain syndrome      PULMONARY   (+) Chronic bronchitis (HCC)   (+) JAKI (obstructive sleep apnea)   (+) Pulmonary emphysema ([address] )   (+) Smoking      October 2022:  Nuclear Stress Findings    Isotope Administration The isotope used for nuclear imaging was Tc 99m tetrofosmin  The isotope was administered intravenously via the left antecubital fossa  Imaging was performed at rest 53 minutes after an injection on 10/20/2022 at 08:01 EDT of 10 60 mCi  Imaging was performed at peak stress 37 minutes after an injection on 10/20/2022 at 10:00 EDT of 32 20 mCi  Nuclear Study Quality Images were viewed in the short axis, vertical long axis and horizontal long axis  Stress Findings A pharmacological stress test was performed using regadenoson  The patient experienced no angina during the test  The patient reached the end of the protocol  The patient reported no symptoms during the stress test  Blood pressure demonstrated a normal response and heart rate demonstrated a normal response to stress  Perfusion There are no perfusion defects  Stress Function Left ventricular function post-stress is normal  Post-stress ejection fraction is 59 %  General Findings There is no evidence of transient ischemic dilation (TID)   The stress end diastolic cavity size is normal        Physical Exam    Airway    Mallampati score: II  TM Distance: >3 FB  Neck ROM: full     Dental       Cardiovascular      Pulmonary      Other Findings        Anesthesia Plan  ASA Score- 3     Anesthesia Type- general with ASA Monitors  Additional Monitors: arterial line  Airway Plan: ETT  Plan Factors-    Chart reviewed  Imaging results reviewed  Existing labs reviewed  Patient summary reviewed  Patient is a current smoker  Patient did not smoke on day of surgery  Induction- intravenous and rapid sequence induction  Postoperative Plan- Plan for postoperative opioid use  Planned trial extubation    Informed Consent- Anesthetic plan and risks discussed with patient  I personally reviewed this patient with the CRNA  Discussed and agreed on the Anesthesia Plan with the CRNA  Vitaly Keene

## 2022-11-17 ENCOUNTER — APPOINTMENT (OUTPATIENT)
Dept: RADIOLOGY | Facility: HOSPITAL | Age: 67
End: 2022-11-17

## 2022-11-17 LAB
ANION GAP SERPL CALCULATED.3IONS-SCNC: 5 MMOL/L (ref 4–13)
BUN SERPL-MCNC: 26 MG/DL (ref 5–25)
CALCIUM SERPL-MCNC: 8.6 MG/DL (ref 8.3–10.1)
CHLORIDE SERPL-SCNC: 106 MMOL/L (ref 96–108)
CO2 SERPL-SCNC: 25 MMOL/L (ref 21–32)
CREAT SERPL-MCNC: 1.1 MG/DL (ref 0.6–1.3)
ERYTHROCYTE [DISTWIDTH] IN BLOOD BY AUTOMATED COUNT: 13.5 % (ref 11.6–15.1)
GFR SERPL CREATININE-BSD FRML MDRD: 68 ML/MIN/1.73SQ M
GLUCOSE SERPL-MCNC: 163 MG/DL (ref 65–140)
HCT VFR BLD AUTO: 49 % (ref 36.5–49.3)
HGB BLD-MCNC: 15.9 G/DL (ref 12–17)
MAGNESIUM SERPL-MCNC: 2.3 MG/DL (ref 1.6–2.6)
MCH RBC QN AUTO: 30.3 PG (ref 26.8–34.3)
MCHC RBC AUTO-ENTMCNC: 32.4 G/DL (ref 31.4–37.4)
MCV RBC AUTO: 93 FL (ref 82–98)
PLATELET # BLD AUTO: 228 THOUSANDS/UL (ref 149–390)
PMV BLD AUTO: 10.3 FL (ref 8.9–12.7)
POTASSIUM SERPL-SCNC: 4.3 MMOL/L (ref 3.5–5.3)
RBC # BLD AUTO: 5.25 MILLION/UL (ref 3.88–5.62)
SODIUM SERPL-SCNC: 136 MMOL/L (ref 135–147)
WBC # BLD AUTO: 18.23 THOUSAND/UL (ref 4.31–10.16)

## 2022-11-17 PROCEDURE — 02HV33Z INSERTION OF INFUSION DEVICE INTO SUPERIOR VENA CAVA, PERCUTANEOUS APPROACH: ICD-10-PCS | Performed by: THORACIC SURGERY (CARDIOTHORACIC VASCULAR SURGERY)

## 2022-11-17 PROCEDURE — 3E0436Z INTRODUCTION OF NUTRITIONAL SUBSTANCE INTO CENTRAL VEIN, PERCUTANEOUS APPROACH: ICD-10-PCS | Performed by: THORACIC SURGERY (CARDIOTHORACIC VASCULAR SURGERY)

## 2022-11-17 RX ORDER — METHOCARBAMOL 500 MG/1
500 TABLET, FILM COATED ORAL EVERY 6 HOURS PRN
Status: DISCONTINUED | OUTPATIENT
Start: 2022-11-17 | End: 2022-11-27 | Stop reason: HOSPADM

## 2022-11-17 RX ORDER — HYDROMORPHONE HCL/PF 1 MG/ML
1 SYRINGE (ML) INJECTION ONCE
Status: DISCONTINUED | OUTPATIENT
Start: 2022-11-17 | End: 2022-11-27 | Stop reason: HOSPADM

## 2022-11-17 RX ADMIN — METHOCARBAMOL 500 MG: 500 TABLET ORAL at 20:07

## 2022-11-17 RX ADMIN — ACETAMINOPHEN 975 MG: 325 TABLET ORAL at 13:18

## 2022-11-17 RX ADMIN — DOCUSATE SODIUM 100 MG: 100 CAPSULE, LIQUID FILLED ORAL at 17:53

## 2022-11-17 RX ADMIN — NICOTINE 1 PATCH: 21 PATCH, EXTENDED RELEASE TRANSDERMAL at 13:18

## 2022-11-17 RX ADMIN — OXYCODONE HYDROCHLORIDE 10 MG: 5 TABLET ORAL at 17:53

## 2022-11-17 RX ADMIN — AMLODIPINE BESYLATE 10 MG: 10 TABLET ORAL at 09:07

## 2022-11-17 RX ADMIN — ACETAMINOPHEN 975 MG: 325 TABLET ORAL at 09:07

## 2022-11-17 RX ADMIN — ACETAMINOPHEN 975 MG: 325 TABLET ORAL at 20:07

## 2022-11-17 RX ADMIN — GABAPENTIN 800 MG: 400 CAPSULE ORAL at 09:07

## 2022-11-17 RX ADMIN — TAMSULOSIN HYDROCHLORIDE 0.8 MG: 0.4 CAPSULE ORAL at 17:53

## 2022-11-17 RX ADMIN — HYDROMORPHONE HYDROCHLORIDE 0.5 MG: 1 INJECTION, SOLUTION INTRAMUSCULAR; INTRAVENOUS; SUBCUTANEOUS at 08:18

## 2022-11-17 RX ADMIN — GABAPENTIN 800 MG: 400 CAPSULE ORAL at 17:58

## 2022-11-17 RX ADMIN — SENNOSIDES 8.6 MG: 8.6 TABLET, FILM COATED ORAL at 09:07

## 2022-11-17 RX ADMIN — HYDROMORPHONE HYDROCHLORIDE 0.5 MG: 1 INJECTION, SOLUTION INTRAMUSCULAR; INTRAVENOUS; SUBCUTANEOUS at 20:08

## 2022-11-17 RX ADMIN — METOPROLOL SUCCINATE 100 MG: 100 TABLET, EXTENDED RELEASE ORAL at 09:07

## 2022-11-17 RX ADMIN — ENOXAPARIN SODIUM 40 MG: 40 INJECTION SUBCUTANEOUS at 09:06

## 2022-11-17 RX ADMIN — PANTOPRAZOLE SODIUM 40 MG: 40 TABLET, DELAYED RELEASE ORAL at 05:22

## 2022-11-17 RX ADMIN — Medication: at 21:19

## 2022-11-17 RX ADMIN — METHOCARBAMOL 500 MG: 500 TABLET ORAL at 03:05

## 2022-11-17 RX ADMIN — HYDROMORPHONE HYDROCHLORIDE 0.5 MG: 1 INJECTION, SOLUTION INTRAMUSCULAR; INTRAVENOUS; SUBCUTANEOUS at 02:50

## 2022-11-17 RX ADMIN — OXYCODONE HYDROCHLORIDE 10 MG: 5 TABLET ORAL at 13:18

## 2022-11-17 RX ADMIN — DOCUSATE SODIUM 100 MG: 100 CAPSULE, LIQUID FILLED ORAL at 09:09

## 2022-11-17 RX ADMIN — OXYCODONE HYDROCHLORIDE 10 MG: 5 TABLET ORAL at 09:18

## 2022-11-17 RX ADMIN — GABAPENTIN 800 MG: 400 CAPSULE ORAL at 20:07

## 2022-11-17 NOTE — PLAN OF CARE
Problem: RESPIRATORY - ADULT  Goal: Achieves optimal ventilation and oxygenation  Description: INTERVENTIONS:  - Assess for changes in respiratory status  - Assess for changes in mentation and behavior  - Position to facilitate oxygenation and minimize respiratory effort  - Oxygen administered by appropriate delivery if ordered  - Initiate smoking cessation education as indicated  - Encourage broncho-pulmonary hygiene including cough, deep breathe, Incentive Spirometry  - Assess the need for suctioning and aspirate as needed  - Assess and instruct to report SOB or any respiratory difficulty  - Respiratory Therapy support as indicated  Outcome: Progressing     Problem: MOBILITY - ADULT  Goal: Maintain or return to baseline ADL function  Description: INTERVENTIONS:  -  Assess patient's ability to carry out ADLs; assess patient's baseline for ADL function and identify physical deficits which impact ability to perform ADLs (bathing, care of mouth/teeth, toileting, grooming, dressing, etc )  - Assess/evaluate cause of self-care deficits   - Assess range of motion  - Assess patient's mobility; develop plan if impaired  - Assess patient's need for assistive devices and provide as appropriate  - Encourage maximum independence but intervene and supervise when necessary  - Involve family in performance of ADLs  - Assess for home care needs following discharge   - Consider OT consult to assist with ADL evaluation and planning for discharge  - Provide patient education as appropriate  Outcome: Progressing  Goal: Maintains/Returns to pre admission functional level  Description: INTERVENTIONS:  - Perform BMAT or MOVE assessment daily    - Set and communicate daily mobility goal to care team and patient/family/caregiver     - Collaborate with rehabilitation services on mobility goals if consulted  - Out of bed for toileting  - Record patient progress and toleration of activity level   Outcome: Progressing

## 2022-11-17 NOTE — DISCHARGE INSTRUCTIONS
Gently wash your incisions daily with soap and water, do not soak in a tub  Do not apply any lotions, creams, or ointments to incisions  No lifting over 10 lbs or strenuous exercise  No driving until seen at your post operative visit  The blue stitch will be removed at your post operative visit  Please obtain a pa/lat chest xray at a [facility] within 3 days of your follow up visit  Please call the office first if you have any questions or concerns during your post op period, prior to going to the emergency room or urgent care  You will be prescribed 3 medications to take at home, that should be taken exactly as directed  These have been shown to greatly improve post-operative pain:     Gabapentin 300mg tablet  Take 1 tablet by mouth 3x a day for 3 weeks  2    Tylenol 325mg tablet  Take 2 tablets by mouth, every 6 hours, for 1 week  3    Ibuprofen 200mg tablet  Take 3 tablets by mouth, 3x a day with meals for 1 week  You will also be prescribed a medication that you may take on an as needed basis:  Oxycodone 5mg tablet  Take 1-2 tablets every 4 hours as needed for pain  Chylous Leak Gulshan Specking                                                                                                         Today you had a lymphogram/ lymphangiogram    Lymphangiography (or lymphography) is the use of imaging, such as X-ray (fluoroscopy)  to examine the body's lymphatic system and identify lymphatic leaks  Chyle is an alkaline, milky, odorless fluid that provides  about 200 kcal/liter  It contains greater than  30 g/L of protein, 4-40 g/L of lipid (mostly triglyceride)  and cells consisting primarily of lymphocytes (1)  Chyle leaks are a rare complication; they can occur for a  variety of reasons after injury to the intra-abdominal  lymphatics (Table 1)   Leakage may manifest as a chylothorax  or chylous effusion (thoracic cavity); chylous  ascites (peritoneal cavity); chylopericardium (cardiac  cavity) or as an external draining fistula  Approximately  60% of chyle leaks are due to lymphoma; 25% due to  trauma (iatrogenic or penetrating); other causes make up  the remaining 15% of cases (2)  The incidence of chyle  leaks varies depending on the underlying cause  Loretta Jeffrey RD, Nutrition  Consultant,  and Ck Johnson RD, MS, Nutrition  , [address]  Chylous leaks, such as chylothorax and chylopericardium, are uncommon effusions resulting from the leakage of intestinal lymphatic fluid from the thoracic duct (TD) and its tributaries, or intestinal lymphatic ducts  The cause can be either traumatic (thoracic surgery) or nontraumatic (idiopathic, malignancy)  Treatment has traditionally consisted of dietary modification (nonfat diet) and/or surgery (TD ligation, pleurodesis)  Thoracic duct embolization (TDE) has become a viable treatment alternative due to it high success rate and minimal complications  Remove chest tube suture dressing in 24 hours and may shower  Please obtain a pa/lat chest xray within 2-3 days prior to your appointment at any [place] [address] facility  No baths or hot tub  Take your regular Gabapentin dose at home  Take your regular Percocet dose at home  No additional Tylenol  Oxycodone 5 mg #20 was prescribed to you for breakthrough/ severe pain  You  may take motrin 600 mg 3x a day for 7 days to try to get off of the oxycodone  Wash incisions with soap and water

## 2022-11-17 NOTE — PLAN OF CARE
Problem: PHYSICAL THERAPY ADULT  Goal: Performs mobility at highest level of function for planned discharge setting  See evaluation for individualized goals  Description: Treatment/Interventions: LE strengthening/ROM, Endurance training, Gait training, Spoke to nursing, OT, Elevations, Therapeutic exercise, Equipment eval/education, Bed mobility          See flowsheet documentation for full assessment, interventions and recommendations  Note: Prognosis: Good  Problem List: Decreased endurance, Decreased mobility, Decreased range of motion, Obesity, Pain, Impaired balance  Assessment: Pt is 75 y o  male admitted with Dx of Malignant neoplasm of lower lobe of right lung and underwent RIGHT THORACOSCOPIC LOWER LOBE SUPERIOR SEGMENTECTOMY, MEDIASTINAL LYMPH NODE DISSECTION and BRONCHOSCOPY FLEXIBLE on 11/16/2022  Pt 's comorbidities affecting POC include: Abdominal aortic aneurysm without rupture, CAD (coronary artery disease), COPD (chronic obstructive pulmonary disease) (HCC), Extremity pain, Hypertension, Migraine, Neck pain, Obstructive sleep apnea, Osteoarthritis, Peripheral neuropathy, and Reflex sympathetic dystrophy and personal factors of: VICTOR MANUEL and steps in the house  Pt's clinical presentation is currently unstable/unpredictable which is evident in ongoing telem monitoring, CT in place and  need for stand by assist w/ amb when usually mobilizing independently  Pt presents w/ min overall weakness, decreased functional endurance and inconsistent amb balance and gait patterns w/ (S) provided during amb  Will cont to follow pt in PT for progressive mobilization to max level of (I), endurance, and safety  Otherwise, anticipate pt will return home w/ available support upon D/C when medically cleared; an OP pulm rehab may need to be considered when medically cleared        PT Discharge Recommendation: Home with outpatient rehabilitation (pulm rehab)    See flowsheet documentation for full assessment

## 2022-11-17 NOTE — PROGRESS NOTES
Thoracic Surgery  Progress Note   Pamela Steel 75 y o  male MRN: [id number]  Unit/Bed#: [facility] 404-01 Encounter: [id number]    Assessment:  74 yo male with SCC RLL cancer s/p R VATS superior segmentectomy on  with Dr Madhuri Herman; Concern for a chyle leak  Afebrile, VSS, 2L NC  WBC: 18 from 8; Hbg:15 9 from 14 8; Cr 1 10 from 0 99    RCT: -8, -AL, 1500cc milky output  UOP:750cc    Plan:  - NPO for suspected chyle leak  - IVF  - R CT: -8; monitor output and character  - Pain and nausea control as needed  - DVT ppx  - IS/pulmonary toilet  - Encourage ambulation     Subjective/Objective     Subjective: Patient seen and examined at bedside, in no acute distress  No acute events overnight  Patient's pain poorly controlled  Pt denies nausea or vomiting  Passing gas and stool  Objective:     Vitals:Blood pressure 153/87, pulse 69, temperature 97 9 °F (36 6 °C), temperature source Oral, resp  rate 20, height 5' 8" (1 727 m), weight 88 9 kg (196 lb), SpO2 96 %  ,Body mass index is 29 8 kg/m²  Temp (24hrs), Av 8 °F (36 6 °C), Min:96 6 °F (35 9 °C), Max:98 5 °F (36 9 °C)  Current: Temperature: 97 9 °F (36 6 °C)      Intake/Output Summary (Last 24 hours) at 2022 0412  Last data filed at 2022 0001  Gross per 24 hour   Intake 2604 ml   Output 1300 ml   Net 1304 ml       Invasive Devices     Peripheral Intravenous Line  Duration           Peripheral IV 22 Left Antecubital <1 day    Peripheral IV 22 Right Antecubital <1 day          Arterial Line  Duration           Arterial Line 22 Right Radial <1 day          Drain  Duration           Chest Tube 1 Right Pleural 24 Fr  <1 day                Physical Exam:  General: No acute distress, alert and oriented  Chest: R chest incision and chest tube site c/d/i  CV: Well perfused, regular rate and rhythm  Lungs: Normal work of breathing, no increased respiratory effort  Abdomen: Soft, non-tender, non-distended    Extremities: No edema, clubbing or cyanosis  Skin: Warm, dry    Lab Results: Results: I have personally reviewed all pertinent laboratory/tests results  VTE Prophylaxis: Sequential compression device (Venodyne)  and Enoxaparin (Lovenox)    Corrine Real MD  11/17/2022

## 2022-11-17 NOTE — OCCUPATIONAL THERAPY NOTE
Occupational Therapy Evaluation      Colin Benson    11/17/2022    Principal Problem:    Malignant neoplasm of lower lobe of right lung (HCC)  Active Problems:    JAKI (obstructive sleep apnea)    Chronic bronchitis (HCC)    Abdominal aortic aneurysm (AAA) without rupture    Cervical radiculopathy    Cervical spondylosis without myelopathy    Tremor, essential    Class 1 obesity due to excess calories with serious comorbidity and body mass index (BMI) of 31 0 to 31 9 in adult    Smoking    Hypertension    Uncomplicated opioid dependence ([facility] )    Hyperlipidemia    Pre-diabetes    Acute kidney injury ([facility] )    Pulmonary emphysema (HCC)    Hemiparesis of left dominant side due to non-cerebrovascular etiology [facility])      Past Medical History:   Diagnosis Date    Abdominal aortic aneurysm without rupture     Abdominal Aortic Duplex 02/21/2017    Ectatic infrarenal abdominal aorta  CAD (coronary artery disease)     COPD (chronic obstructive pulmonary disease) (HCC)     Extremity pain     History of echocardiogram 03/18/2014    EF 55%, mild MR and AI  Mild concentric LVH  History of stress test 03/06/2017    Normal     Hyperlipidemia     Hypertension     Joint pain     Kidney stone     Low back pain     Migraine     Neck pain     Obstructive sleep apnea     cannot tolerate CPAP    Osteoarthritis     Peripheral neuropathy     Reflex sympathetic dystrophy        Past Surgical History:   Procedure Laterality Date    CARDIAC CATHETERIZATION  02/13/2012    EF 70%, widely patent renal arteries, significant single-vessel CAD-medical therapy  CARDIAC CATHETERIZATION  04/11/2013    EF 65%, 50% mid LAD, 20% prox CFX, 90% diffuse RCA, 99% mid RCA  Medical management      CHOLECYSTECTOMY      COLONOSCOPY      EPIDURAL BLOCK INJECTION Bilateral 08/15/2019    Procedure: BLOCK / INJECTION EPIDURAL STEROID CERVICAL;  Surgeon: María Pederson MD;  Location: [facility];  Service: Pain Management     FL GUIDED NEEDLE PLAC BX/ASP/INJ  08/15/2019    IR BIOPSY LUNG  09/13/2022    ORTHOPEDIC SURGERY      HI BRONCHOSCOPY,DIAGNOSTIC N/A 11/16/2022    Procedure: BRONCHOSCOPY FLEXIBLE;  Surgeon: Bambi Beltran MD;  Location: [facility];  Service: Thoracic    HI THORACOSCOPY SURG LOBECTOMY Right 11/16/2022    Procedure: LOBECTOMY LUNG; lower lobe superior segmentectomy, mediastinal lymph node dissection;  Surgeon: Bambi Beltran MD;  [facility];  Service: Thoracic    HI THORACOSCOPY W/SEGMENTECTOMY Right 11/16/2022    Procedure: THORACOSCOPY VIDEO ASSISTED SURGERY (VATS); Surgeon: Bambi Beltran MD;  [facility];  Service: Thoracic    TRIGGER POINT INJECTION          11/17/22 1030   OT Last Visit   OT Visit Date 11/17/22   Note Type   Note type Evaluation   Pain Assessment   Pain Assessment Tool 0-10   Pain Score 10 - Worst Possible Pain   Pain Location/Orientation Location: Chest;Location: Rib Cage; Location: Incision;Orientation: Right   Restrictions/Precautions   Other Precautions Multiple lines;Pain  (chest tube)   Home Living   Type of [address] [place] [address] Two level;1/2 bath on main level;Bed/bath upstairs  (3 VI[address] MANUEL)   Bathroom Shower/Tub Walk-in shower   Additional Comments denies use of DME at baseline   Prior Function   Level of [place] Independent with ADLs; Independent with functional mobility   Lives With Significant other  (fimiguel and 3 y/o son)   IADLs Independent with driving; Independent with meal prep; Independent with medication management   Falls in the last 6 months 0   Vocational Full time employment   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   Lifestyle   Autonomy pt reports being fully independent at [address]  +   Reciprocal Relationships supportive angela   General   Additional Pertinent History pt reports he has no sensation in his L hand, from carrying a heavy grill a while back  he reports pain, numbness/difficulty holding items in L hand  tremors  He is L handed   Subjective   Subjective "I can't really use this L hand"   ADL   Eating Assistance 5  Supervision/Setup   Eating Deficit Setup; Increased time to complete  (sometime drops utensil due to decreased sensation/fm skills)   Grooming Assistance 5  Supervision/Setup   UB Bathing Assistance 5  Supervision/Setup   LB Bathing Assistance 5  Supervision/Setup   UB Dressing Assistance 5  Supervision/Setup   LB Dressing Assistance 5  Supervision/Setup   Bed Mobility   Additional Comments pt seated at EOB  reports being able to get in/out of bed himself   Transfers   Sit to Stand 6  Modified independent   Stand to Sit 6  Modified independent   Stand pivot 5  Supervision   Functional Mobility   Functional Mobility 5  Supervision   Balance   Static Sitting Good   Dynamic Sitting Good   Static Standing Fair   Dynamic Standing Fair   Activity Tolerance   Activity Tolerance Patient tolerated treatment well   Medical Staff Made Aware seen for co-eval with DPT Miles due to medical complexity   Nurse Made Aware OK to see   RUE Assessment   RUE Assessment WFL   LUE Assessment   LUE Assessment X   LUE Overall AROM   L Shoulder Flexion L shoulder flexion limited to 80*   L Mass Grasp impaired   Hand Function   Gross Motor Coordination Functional   Fine Motor Coordination Impaired   Hand Function Comments pt with decreased sensation and weakness L hand  he c/o frequently dropping items due to poor    Sensation   Light Touch Severe deficits in the LUE   Additional Comments pt reports the problems with his dominant L hand started when he was carrying a heavy grill a while back   Vision - Complex Assessment   Tracking Intact   Psychosocial   Psychosocial (WDL) WDL   Cognition   Overall Cognitive Status WFL   Arousal/Participation Alert; Cooperative   Attention Within functional limits   Orientation Level Oriented X4   Memory Within functional limits   Following Commands Follows all commands and directions without difficulty   Assessment   Assessment Pt is a 75 y o  male seen for OT evaluation s/p admit to [facility] on 11/16/2022 w/ Malignant neoplasm of lower lobe of right lung ([facility] [address] )  Pt is now s/p R VATS superior segmentectomy  Has chest tube  Comorbidities affecting pt's functional performance at time of assessment include: JAKI, cervical radiculopathy, essential tremor, smoking, HTN, pulmonary emphysema,   Personal factors affecting pt at time of IE include:difficulty performing IADLS   Prior to admission, pt was living at home w sig  Other and 3 y/o son  He is working full time in construction  Upon evaluation: Pt requires S for self care and mobility  He reports having had difficulty w his L hand for a month or 2  He has no sensation, poor FM skills and frequently drops items when trying to hold items  He is L handed  Pt was educated on seeking outpt OT/hand therapy for further assessment of L handed weakness and decreased sensation  Pt reports he was supposed to do so in the past, but then "this happened"  Pt was also educated on ECT/self pacing skills with good understanding  Based on findings from OT evaluation and functional performance deficits, pt has been identified as a  high complexity evaluation  The patient's raw score on the AM-PAC Daily Activity inpatient short form is 23, standardized score is 51 12, greater than 39 4  Patients at this level are likely to benefit from discharge to home  From OT standpoint, recommendation at time of d/c would be home with outpt OT for hand weakness  Further ongoing skilled OT services while in acute care will focus on ongoing educaiton re ECT/self pacing skills as well as FM Skills/compensatory strategies for L UE  Goals   Patient Goals to get his L hand working   Plan   Treatment Interventions ADL retraining;Functional transfer training;UE strengthening/ROM; Endurance training;Patient/family training; Compensatory technique education; Energy conservation; Activityengagement   Goal Expiration Date 11/27/22   OT Frequency Other (comment)  (2-4x/week)   Recommendation   OT Discharge Recommendation Home with outpatient rehabilitation   Additional Comments  pt can benefit from outp t OT for LUE/hand   AM-PAC Daily Activity Inpatient   Lower Body Dressing 3   Bathing 4   Toileting 4   Upper Body Dressing 4   Grooming 4   Eating 4   Daily Activity Raw Score 23   Daily Activity Standardized Score (Calc for Raw Score >=11) 51 12     OT GOALS TO BE ACHIEVED IN 14 DAYS:    Pt will complete grooming independently with set up     Pt will complete UB bathing and dressing independently    Pt will complete LB bathing and dressing independently    Pt will complete toileting w mod I and good safety     Pt will complete functional transfers with mod I good safety  Pt will tolerate standing at sinkside x 5 min w F+ balance for increased safety w hygiene    Pt will demonstrate good compensatory strategies for decreased FM skills/strength/decreased sensation L hand in order to complete ADL's independently    Pt will complete functional mobility in room and bathroom mod I     Pt will demonstrate good ECT/self pacing skills with all self care and functional mobility

## 2022-11-17 NOTE — CASE MANAGEMENT
Case Management Assessment    Patient name Kwame Sagastume  Location [facility] 404/[facility] 351-99 MRN [id number]  : 1955 Date 2022       Current Admission Date: 2022  Current Admission Diagnosis:Malignant neoplasm of lower lobe of right lung [facility])   Patient Active Problem List    Diagnosis Date Noted   • Hemiparesis of left dominant side due to non-cerebrovascular etiology ([address] ) 2022   • Malignant neoplasm of lower lobe of right lung ([address] ) 10/04/2022   • Encounter for smoking cessation counseling 10/04/2022   • Pulmonary emphysema ([address] ) 08/15/2022   • Acute kidney injury ([address] ) 2022   • Pulmonary nodules 2022   • Kidney stone 2022   • Cortical age-related cataract of both eyes 2022   • Urinary frequency 2022   • Sacroiliitis ([address] ) 2022   • Pre-diabetes 10/07/2021   • Erectile dysfunction 10/07/2021   • Insomnia 10/07/2021   • Hyperlipidemia 950   • Uncomplicated opioid dependence ([address] ) 10/19/2020   • Encounter for long-term opiate analgesic use 10/19/2020   • Neck pain 10/19/2020   • Chronic pain syndrome 2020   • Class 1 obesity due to excess calories with serious comorbidity and body mass index (BMI) of 31 0 to 31 9 in adult 2020   • Smoking 2020   • Chronic pain of both knees 2019   • Negative depression screening 2019   • Tremor, essential 2019   • Cervical spondylosis without myelopathy 2019   • Lumbar spondylosis 2019   • Lumbar degenerative disc disease 2019   • Myofascial pain syndrome 2019   • Chronic low back pain without sciatica 2019   • Cervical radiculopathy 2019   • JAKI (obstructive sleep apnea) 2019   • Chronic bronchitis ([address] ) 2019   • Abdominal aortic aneurysm (AAA) without rupture 2019   • Hypertension 2016      LOS (days): 1  Geometric Mean LOS (GMLOS) (days): 2 30  Days to GMLOS:1 1     OBJECTIVE:    Risk of Unplanned Readmission Score: 25 65         Current admission status: Inpatient       Preferred Pharmacy:   [facility], [address] [address]  Phone: [phone] Fax: [phone]    Primary Care Provider: Shelley Zuniga DO    Primary Insurance: MEDICARE  Secondary Insurance: Kira Kumar    ASSESSMENT:  [address] Representative - Significant Other   Primary Phone: [phone] (Mobile)               Advance Directives  Does patient have a [address]?: Yes  Does patient have Advance Directives?: Yes  Advance Directives: Power of  for health care, Living will  Primary Contact: Deborah Almanzar (Significant Other)         Readmission Root Cause  30 Day Readmission: No    Patient Information  Admitted from[de-identified] Home  Mental Status: Alert  During Assessment patient was accompanied by: Not accompanied during assessment  Assessment information provided by[de-identified] Patient  Primary Caregiver: Self  Support Systems: Spouse/significant other, Son  [place] of Residence: [facility] [address] do you live in?: [address] entry access options   Select all that apply : Stairs  Number of steps to enter home : 3  Do the steps have railings?: Yes  Type of Current Residence: [address] home  Upon entering residence, is there a bedroom on the main floor (no further steps)?: No  A bedroom is located on the following floor levels of residence (select all that apply):: 2nd Floor  Upon entering residence, is there a bathroom on the main floor (no further steps)?: Yes  Number of steps to 2nd floor from main floor: One Flight  In the last 12 months, was there a time when you were not able to pay the mortgage or rent on time?: No  In the last 12 months, how many places have you lived?: 1  In the last 12 months, was there a time when you did not have a steady place to sleep or slept in a shelter (including now)?: No  Homeless/housing insecurity resource given?: N/A  Living Arrangements: Lives w/ Spouse/significant other, Lives w/ Son  Is patient a ?: No    Activities of Daily Living Prior to Admission  Functional Status: Independent  Completes ADLs independently?: Yes  Ambulates independently?: Yes  Does patient use assisted devices?: No  Does patient currently own DME?: No  Does patient have a history of Outpatient Therapy (PT/OT)?: Yes ([facility])  Does the patient have a history of Short-Term Rehab?: No  Does patient have a history of HHC?: No  [facility] No         Patient Information Continued  Income Source: SSI/SSD  Does patient have prescription coverage?: Yes  Within the past 12 months, you worried that your food would run out before you got the money to buy more : Never true  Within the past 12 months, the food you bought just didn't last and you didn't have money to get more : Never true  Food insecurity resource given?: N/A  Does patient receive dialysis treatments?: No  Does patient have a history of substance abuse?: No  Does patient have a history of Mental Health Diagnosis?: No         Means of Transportation  Means of Transport to Appts[de-identified] Drives Self  In the past 12 months, has lack of transportation kept you from medical appointments or from getting medications?: No  In the past 12 months, has lack of transportation kept you from meetings, work, or from getting things needed for daily living?: No  Was application for public transport provided?: N/A

## 2022-11-17 NOTE — PROCEDURES
Insert PICC line    Date/Time: 11/17/2022 12:19 PM  Performed by: Marilee Prasad RN  Authorized by: Alfredo Hernandez MD     Patient location:  Bedside  Other Assisting Provider: Yes (comment)    Consent:     Consent obtained:  Written (Obtained by provider)    Consent given by:  Patient    Risks discussed:  Arterial puncture, bleeding, incorrect placement, infection, pneumothorax and nerve damage (Discussed by provider)    Alternatives discussed: Discussed by provider  Universal protocol:     Procedure explained and questions answered to patient or proxy's satisfaction: yes      Relevant documents present and verified: yes      Test results available and properly labeled: yes      Radiology Images displayed and confirmed  If images not available, report reviewed: yes      Required blood products, implants, devices, and special equipment available: yes      Site/side marked: yes      Immediately prior to procedure, a time out was called: yes      Patient identity confirmed:  Verbally with patient, arm band, provided demographic data and hospital-assigned identification number  Pre-procedure details:     Hand hygiene: Hand hygiene performed prior to insertion      Sterile barrier technique: All elements of maximal sterile technique followed      Skin preparation:  ChloraPrep    Skin preparation agent: Skin preparation agent completely dried prior to procedure    Indications:     PICC line indications: total parenteral nutrition    Sedation:     Sedation type: None  Anesthesia (see MAR for exact dosages):      Anesthesia method:  Local infiltration    Local anesthetic:  Lidocaine 1% w/o epi (3 mL administered)  Procedure details:     Location:  Basilic    Vessel type: vein      Laterality:  Right    Site selection rationale:  Largest, most patent vessel    Approach: percutaneous technique used      Patient position:  Flat    Procedural supplies:  Double lumen    Catheter size:  5 Fr    Landmarks identified: yes Ultrasound guidance: yes      Ultrasound image availability:  Not saved (37% vessel occupancy  Unable to save image to Epic )    Sterile ultrasound techniques: Sterile gel and sterile probe covers were used      Number of attempts:  1    Successful placement: yes      Vessel of catheter tip end:  Sherlock 3CG confirmed (OK to use PICC  Sherlock 3CG confirmation results saved to chart )    Total catheter length (cm):  41    Catheter out on skin (cm):  0    Max flow rate:  999 ml/hr    Arm circumference:  31  Post-procedure details:     Post-procedure:  Dressing applied and securement device placed    Assessment:  Blood return through all ports and free fluid flow    Post-procedure complications: none      Patient tolerance of procedure:   Tolerated well, no immediate complications

## 2022-11-17 NOTE — PLAN OF CARE
Problem: OCCUPATIONAL THERAPY ADULT  Goal: Performs self-care activities at highest level of function for planned discharge setting  See evaluation for individualized goals  Note:       Assessment: Pt is a 75 y o  male seen for OT evaluation s/p admit to [facility] on 11/16/2022 w/ Malignant neoplasm of lower lobe of right lung ([address] )  Pt is now s/p R VATS superior segmentectomy  Has chest tube  Comorbidities affecting pt's functional performance at time of assessment include: JAKI, cervical radiculopathy, essential tremor, smoking, HTN, pulmonary emphysema,   Personal factors affecting pt at time of IE include:difficulty performing IADLS   Prior to admission, pt was living at home w sig  Other and 3 y/o son  He is working full time in construction  Upon evaluation: Pt requires S for self care and mobility  He reports having had difficulty w his L hand for a month or 2  He has no sensation, poor FM skills and frequently drops items when trying to hold items  He is L handed  Pt was educated on seeking outpt OT/hand therapy for further assessment of L handed weakness and decreased sensation  Pt reports he was supposed to do so in the past, but then "this happened"  Pt was also educated on ECT/self pacing skills with good understanding  Based on findings from OT evaluation and functional performance deficits, pt has been identified as a  high complexity evaluation  The patient's raw score on the AM-PAC Daily Activity inpatient short form is 23, standardized score is 51 12, greater than 39 4  Patients at this level are likely to benefit from discharge to home  From OT standpoint, recommendation at time of d/c would be home with outpt OT for hand weakness   Further ongoing skilled OT services while in acute care will focus on ongoing educaiton re ECT/self pacing skills as well as FM Skills/compensatory strategies for L UE      OT Discharge Recommendation: Home with outpatient rehabilitation

## 2022-11-17 NOTE — PHYSICAL THERAPY NOTE
Physical Therapy Evaluation     Patient's Name: Clarita Garcia    Admitting Diagnosis  Malignant neoplasm of lower lobe of right lung ([facility] [address] ) [[address] 31]    Problem List  Patient Active Problem List   Diagnosis    JAKI (obstructive sleep apnea)    Chronic bronchitis (HCC)    Abdominal aortic aneurysm (AAA) without rupture    Lumbar spondylosis    Lumbar degenerative disc disease    Myofascial pain syndrome    Chronic low back pain without sciatica    Cervical radiculopathy    Cervical spondylosis without myelopathy    Tremor, essential    Negative depression screening    Chronic pain of both knees    Class 1 obesity due to excess calories with serious comorbidity and body mass index (BMI) of 31 0 to 31 9 in adult    Smoking    Hypertension    Chronic pain syndrome    Uncomplicated opioid dependence ([facility] [address] [address] )    Encounter for long-term opiate analgesic use    Neck pain    Hyperlipidemia    Pre-diabetes    Erectile dysfunction    Insomnia    Sacroiliitis (HCC)    Cortical age-related cataract of both eyes    Urinary frequency    Acute kidney injury ([facility] [address] )    Pulmonary nodules    Kidney stone    Pulmonary emphysema (HCC)    Malignant neoplasm of lower lobe of right lung ([facility] [address] )    Encounter for smoking cessation counseling    Hemiparesis of left dominant side due to non-cerebrovascular etiology [facility])       Past Medical History  Past Medical History:   Diagnosis Date    Abdominal aortic aneurysm without rupture     Abdominal Aortic Duplex 02/21/2017    Ectatic infrarenal abdominal aorta  CAD (coronary artery disease)     COPD (chronic obstructive pulmonary disease) (HCC)     Extremity pain     History of echocardiogram 03/18/2014    EF 55%, mild MR and AI  Mild concentric LVH      History of stress test 03/06/2017    Normal     Hyperlipidemia     Hypertension     Joint pain     Kidney stone     Low back pain     Migraine     Neck pain     Obstructive sleep apnea     cannot tolerate CPAP    Osteoarthritis     Peripheral neuropathy     Reflex sympathetic dystrophy        Past Surgical History  Past Surgical History:   Procedure Laterality Date    CARDIAC CATHETERIZATION  02/13/2012    EF 70%, widely patent renal arteries, significant single-vessel CAD-medical therapy  CARDIAC CATHETERIZATION  04/11/2013    EF 65%, 50% mid LAD, 20% prox CFX, 90% diffuse RCA, 99% mid RCA  Medical management  CHOLECYSTECTOMY      COLONOSCOPY      EPIDURAL BLOCK INJECTION Bilateral 08/15/2019    Procedure: BLOCK / INJECTION EPIDURAL STEROID CERVICAL;  Surgeon: Sukhjinder Johnson MD;  Location: [facility];  Service: Pain Management     FL GUIDED NEEDLE PLAC BX/ASP/INJ  08/15/2019    IR BIOPSY LUNG  09/13/2022    ORTHOPEDIC SURGERY      MS BRONCHOSCOPY,DIAGNOSTIC N/A 11/16/2022    Procedure: Andrew Elizondo;  Surgeon: Chucky Alvarez MD;  Location: [facility];  Service: Thoracic    MS THORACOSCOPY SURG LOBECTOMY Right 11/16/2022    Procedure: LOBECTOMY LUNG; lower lobe superior segmentectomy, mediastinal lymph node dissection;  Surgeon: Chucky Alvarez MD;  Location[facility];  Service: Thoracic    MS THORACOSCOPY W/SEGMENTECTOMY Right 11/16/2022    Procedure: THORACOSCOPY VIDEO ASSISTED SURGERY (VATS); Surgeon: Chucky Alvarez MD;  Location: [facility];  Service: Thoracic    TRIGGER POINT INJECTION            11/17/22 1031   PT Last Visit   PT Visit Date 11/17/22   Note Type   Note type Evaluation   Pain Assessment   Pain Assessment Tool FLACC   Pain Location/Orientation Location: Chest;Orientation: Right   Pain Onset/Description Onset: Ongoing;Frequency: Intermittent; Descriptor: Aching;Descriptor: Discomfort   Effect of Pain on Daily Activities Gaurding with mobilization   Patient's Stated Pain Goal No pain   Hospital Pain Intervention(s) Repositioned; Ambulation/increased activity; Emotional support   Pain Rating: FLACC (Rest) - Face 0   Pain Rating: FLACC (Rest) - Legs 0   Pain Rating: FLACC (Rest) - Activity 0   Pain Rating: FLACC (Rest) - Cry 0   Pain Rating: FLACC (Rest) - Consolability 0   Score: FLACC (Rest) 0   Pain Rating: FLACC (Activity) - Face 1   Pain Rating: FLACC (Activity) - Legs 0   Pain Rating: FLACC (Activity) - Activity 0   Pain Rating: FLACC (Activity) - Cry 1   Pain Rating: FLACC (Activity) - Consolability 0   Score: FLACC (Activity) 2   Restrictions/Precautions   Braces or Orthoses   (denies)   Other Precautions Telemetry;Multiple lines  (R CT)   Home Living   Type of Home House   Home Layout Two level; Able to live on main level with bedroom/bathroom;Stairs to enter with rails  (Stairs in house w/o railing currently; 3 [address] w/ hand rail)   Prior Function   Level of [place] Independent with functional mobility  (ambulates w/o AD)   Lives With Significant other   Vocational Full time employment  (construction)   General   Additional Pertinent History Cleared for assessment (spoke to ns)   Cognition   Overall Cognitive Status [facility]   Arousal/Participation Cooperative   Attention Attends with cues to redirect   Orientation Level Oriented to person;Oriented to place;Oriented to situation   Memory Within functional limits   Following Commands Follows one step commands without difficulty   Subjective   Subjective pt alert sitting at edge of bed; agreed for mobilize   RUE Assessment   RUE Assessment WFL  (AROM)   LUE Assessment   LUE Assessment WFL  (AROM)   RLE Assessment   RLE Assessment WFL  (AROM)   Strength RLE   RLE Overall Strength   (Good)   LLE Assessment   LLE Assessment WFL  (AROM)   Strength LLE   LLE Overall Strength   (Good)   Bed Mobility   Additional Comments pt declined getting back to bed post mobilization and wished to remain sitting on edge of bed   Transfers   Sit to Stand 6  Modified independent   Stand to Sit 6  Modified independent   Ambulation/Elevation   Gait pattern Inconsistent mukul; Foward flexed; Short stride   Gait Assistance 5  Supervision   Additional items Verbal cues   Assistive Device None   Distance 200ft total distance   Balance   Static Sitting Good   Dynamic Sitting Good   Static Standing Fair +   Dynamic Standing Fair   Ambulatory Fair -   Activity Tolerance   Activity Tolerance Patient tolerated treatment well   Medical Staff Made Aware Co-eval performed w/ OTR due to complexity of medical status   Nurse Made Aware Spoke with RN   Assessment   Prognosis Good   Problem List Decreased endurance;Decreased mobility; Decreased range of motion;Obesity;Pain; Impaired balance   Assessment Pt is 75 y o  male admitted with Dx of Malignant neoplasm of lower lobe of right lung and underwent RIGHT THORACOSCOPIC LOWER LOBE SUPERIOR SEGMENTECTOMY, MEDIASTINAL LYMPH NODE DISSECTION and BRONCHOSCOPY FLEXIBLE on 11/16/2022  Pt 's comorbidities affecting POC include: Abdominal aortic aneurysm without rupture, CAD (coronary artery disease), COPD (chronic obstructive pulmonary disease) (HCC), Extremity pain, Hypertension, Migraine, Neck pain, Obstructive sleep apnea, Osteoarthritis, Peripheral neuropathy, and Reflex sympathetic dystrophy and personal factors of: VI[address] and steps in the house  Pt's clinical presentation is currently unstable/unpredictable which is evident in ongoing telem monitoring, CT in place and  need for stand by assist w/ amb when usually mobilizing independently  Pt presents w/ min overall weakness, decreased functional endurance and inconsistent amb balance and gait patterns w/ (S) provided during amb  Will cont to follow pt in PT for progressive mobilization to max level of (I), endurance, and safety  Otherwise, anticipate pt will return home w/ available support upon D/C when medically cleared; an OP pulm rehab may need to be considered when medically cleared   Goals   Patient Goals to go home   STG Expiration Date 11/27/22   Short Term Goal #1 7-10 days   Pt will amb 400 ft w/o assistive device, (I) in order to facilitate safe return to premorbid environment and community amb status  Pt will negotiate 14 steps w/o hand rail and w/ SPC PRN, mod (I) in order to navigate between levels of home environment safely  Pt will negotiate 3 steps w/ hand rail and w/ SPC PRN, mod (I) in order to assure safe navigation in and out of the premorbid living environment  Pt will achieve (I) level w/ bed mob in order to facilitate safety with OOB and back to bed transitions in own living environment  Pt will participate in LE therex and balance activities to max progression w/ mobility skills  PT Treatment Day 0   Plan   Treatment/Interventions LE strengthening/ROM; Endurance training;Gait training;Spoke to nursing;OT;Elevations; Therapeutic exercise;Equipment eval/education; Bed mobility   PT Frequency Other (Comment)  (3-6x)   Recommendation   PT Discharge Recommendation Home with outpatient rehabilitation  (pulm rehab)   [address] [place] [address] Mobility Inpatient   Turning in Bed Without Bedrails 4   Lying on Back to Sitting on Edge of Flat Bed 4   Moving Bed to Chair 4   Standing Up From Chair 4   Walk in Room 3   Climb 3-5 Stairs 3   Basic Mobility Inpatient Raw Score 22   Basic Mobility Standardized Score 47 4   Highest Level Of Mobility   JH-HLM Goal 7: Walk 25 feet or more   JH-HLM Achieved 7: Walk 25 feet or more   Modified Cecelia Scale   Modified Lemhi Scale 3   End of Consult   Patient Position at End of Consult Seated edge of bed; All needs within reach         [facility], PT

## 2022-11-17 NOTE — RESTORATIVE TECHNICIAN NOTE
Restorative Technician Note      Patient Name: Luli Khan     Restorative Tech Visit Date: 11/17/22  Note Type: Mobility  Patient Position Upon Consult: Supine  Activity Performed: Ambulated  Assistive Device: Other (Comment) (none)  Patient Position at End of Consult: All needs within reach;  Bedside chair

## 2022-11-18 LAB
ANION GAP SERPL CALCULATED.3IONS-SCNC: 5 MMOL/L (ref 4–13)
BASOPHILS # BLD AUTO: 0.06 THOUSANDS/ÂΜL (ref 0–0.1)
BASOPHILS NFR BLD AUTO: 0 % (ref 0–1)
BUN SERPL-MCNC: 35 MG/DL (ref 5–25)
CALCIUM SERPL-MCNC: 8.3 MG/DL (ref 8.3–10.1)
CHLORIDE SERPL-SCNC: 107 MMOL/L (ref 96–108)
CO2 SERPL-SCNC: 25 MMOL/L (ref 21–32)
CREAT SERPL-MCNC: 1.21 MG/DL (ref 0.6–1.3)
EOSINOPHIL # BLD AUTO: 0.04 THOUSAND/ÂΜL (ref 0–0.61)
EOSINOPHIL NFR BLD AUTO: 0 % (ref 0–6)
ERYTHROCYTE [DISTWIDTH] IN BLOOD BY AUTOMATED COUNT: 13.7 % (ref 11.6–15.1)
GFR SERPL CREATININE-BSD FRML MDRD: 61 ML/MIN/1.73SQ M
GLUCOSE SERPL-MCNC: 132 MG/DL (ref 65–140)
HCT VFR BLD AUTO: 48.7 % (ref 36.5–49.3)
HGB BLD-MCNC: 15.6 G/DL (ref 12–17)
IMM GRANULOCYTES # BLD AUTO: 0.06 THOUSAND/UL (ref 0–0.2)
IMM GRANULOCYTES NFR BLD AUTO: 0 % (ref 0–2)
LYMPHOCYTES # BLD AUTO: 3.43 THOUSANDS/ÂΜL (ref 0.6–4.47)
LYMPHOCYTES NFR BLD AUTO: 23 % (ref 14–44)
MAGNESIUM SERPL-MCNC: 2.3 MG/DL (ref 1.6–2.6)
MCH RBC QN AUTO: 30.6 PG (ref 26.8–34.3)
MCHC RBC AUTO-ENTMCNC: 32 G/DL (ref 31.4–37.4)
MCV RBC AUTO: 96 FL (ref 82–98)
MONOCYTES # BLD AUTO: 1.36 THOUSAND/ÂΜL (ref 0.17–1.22)
MONOCYTES NFR BLD AUTO: 9 % (ref 4–12)
NEUTROPHILS # BLD AUTO: 9.97 THOUSANDS/ÂΜL (ref 1.85–7.62)
NEUTS SEG NFR BLD AUTO: 68 % (ref 43–75)
NRBC BLD AUTO-RTO: 0 /100 WBCS
PHOSPHATE SERPL-MCNC: 2.8 MG/DL (ref 2.3–4.1)
PLATELET # BLD AUTO: 188 THOUSANDS/UL (ref 149–390)
PMV BLD AUTO: 10 FL (ref 8.9–12.7)
POTASSIUM SERPL-SCNC: 4.1 MMOL/L (ref 3.5–5.3)
RBC # BLD AUTO: 5.09 MILLION/UL (ref 3.88–5.62)
SODIUM SERPL-SCNC: 137 MMOL/L (ref 135–147)
WBC # BLD AUTO: 14.92 THOUSAND/UL (ref 4.31–10.16)

## 2022-11-18 RX ORDER — LANOLIN ALCOHOL/MO/W.PET/CERES
3 CREAM (GRAM) TOPICAL
Status: DISCONTINUED | OUTPATIENT
Start: 2022-11-18 | End: 2022-11-24

## 2022-11-18 RX ADMIN — METHOCARBAMOL 500 MG: 500 TABLET ORAL at 13:58

## 2022-11-18 RX ADMIN — GABAPENTIN 800 MG: 400 CAPSULE ORAL at 08:20

## 2022-11-18 RX ADMIN — DOCUSATE SODIUM 100 MG: 100 CAPSULE, LIQUID FILLED ORAL at 17:00

## 2022-11-18 RX ADMIN — METHOCARBAMOL 500 MG: 500 TABLET ORAL at 21:34

## 2022-11-18 RX ADMIN — ACETAMINOPHEN 975 MG: 325 TABLET ORAL at 08:21

## 2022-11-18 RX ADMIN — GABAPENTIN 800 MG: 400 CAPSULE ORAL at 16:41

## 2022-11-18 RX ADMIN — TAMSULOSIN HYDROCHLORIDE 0.8 MG: 0.4 CAPSULE ORAL at 16:41

## 2022-11-18 RX ADMIN — SODIUM CHLORIDE, SODIUM LACTATE, POTASSIUM CHLORIDE, AND CALCIUM CHLORIDE 60 ML/HR: .6; .31; .03; .02 INJECTION, SOLUTION INTRAVENOUS at 06:31

## 2022-11-18 RX ADMIN — HYDROMORPHONE HYDROCHLORIDE 0.5 MG: 1 INJECTION, SOLUTION INTRAMUSCULAR; INTRAVENOUS; SUBCUTANEOUS at 16:41

## 2022-11-18 RX ADMIN — ACETAMINOPHEN 975 MG: 325 TABLET ORAL at 21:31

## 2022-11-18 RX ADMIN — ENOXAPARIN SODIUM 40 MG: 40 INJECTION SUBCUTANEOUS at 08:18

## 2022-11-18 RX ADMIN — HYDROMORPHONE HYDROCHLORIDE 0.5 MG: 1 INJECTION, SOLUTION INTRAMUSCULAR; INTRAVENOUS; SUBCUTANEOUS at 04:20

## 2022-11-18 RX ADMIN — BUPROPION HYDROCHLORIDE 150 MG: 150 TABLET, FILM COATED, EXTENDED RELEASE ORAL at 08:18

## 2022-11-18 RX ADMIN — SENNOSIDES 8.6 MG: 8.6 TABLET, FILM COATED ORAL at 08:18

## 2022-11-18 RX ADMIN — OXYCODONE HYDROCHLORIDE 10 MG: 5 TABLET ORAL at 13:58

## 2022-11-18 RX ADMIN — NICOTINE 1 PATCH: 21 PATCH, EXTENDED RELEASE TRANSDERMAL at 10:55

## 2022-11-18 RX ADMIN — AMLODIPINE BESYLATE 10 MG: 10 TABLET ORAL at 08:20

## 2022-11-18 RX ADMIN — METHOCARBAMOL 500 MG: 500 TABLET ORAL at 02:34

## 2022-11-18 RX ADMIN — Medication: at 21:34

## 2022-11-18 RX ADMIN — DOCUSATE SODIUM 100 MG: 100 CAPSULE, LIQUID FILLED ORAL at 08:20

## 2022-11-18 RX ADMIN — ACETAMINOPHEN 975 MG: 325 TABLET ORAL at 13:56

## 2022-11-18 RX ADMIN — PANTOPRAZOLE SODIUM 40 MG: 40 TABLET, DELAYED RELEASE ORAL at 06:29

## 2022-11-18 RX ADMIN — GABAPENTIN 800 MG: 400 CAPSULE ORAL at 21:31

## 2022-11-18 RX ADMIN — OXYCODONE HYDROCHLORIDE 10 MG: 5 TABLET ORAL at 02:30

## 2022-11-18 RX ADMIN — OXYCODONE HYDROCHLORIDE 10 MG: 5 TABLET ORAL at 06:29

## 2022-11-18 RX ADMIN — OXYCODONE HYDROCHLORIDE 10 MG: 5 TABLET ORAL at 21:30

## 2022-11-18 NOTE — PLAN OF CARE
Problem: PHYSICAL THERAPY ADULT  Goal: Performs mobility at highest level of function for planned discharge setting  See evaluation for individualized goals  Description: Treatment/Interventions: LE strengthening/ROM, Endurance training, Gait training, Spoke to nursing, OT, Elevations, Therapeutic exercise, Equipment eval/education, Bed mobility          See flowsheet documentation for full assessment, interventions and recommendations  Outcome: Progressing  Note: Prognosis: Good  Problem List: Decreased strength, Decreased endurance  Assessment: Pt demonstrated overall improvement in tolerance to mobilization ambulating further distances w/ rest periods provided b/in bouts of amb; pt remains to be on suppl O2 w/ O2 sat stable  Overall, cont to anticipate pt will return home upon D/C when medically cleared; will follow        PT Discharge Recommendation: Home with outpatient rehabilitation (pulm rehab when medically cleared; otherwise, no rehab needs upon D/C)    See flowsheet documentation for full assessment

## 2022-11-18 NOTE — PROGRESS NOTES
Thoracic Surgery  Progress Note   Elder Dorado 75 y o  male MRN: [id number]  Unit/Bed#: [facility] 404-01 Encounter: [id number]    Assessment:  74 yo male with SCC RLL cancer s/p R VATS superior segmentectomy on , now with concern for a chyle leak  Afebrile, VSS, on room air      R CT: -8, -AL, 1 8 L milky-serous output  UOP:  525 cc    Plan:  - NPO for suspected chyle leak  - IVF dc   - R CT: -8; monitor output and character  - Pain and nausea control as needed  - DVT ppx  -reorder TPN and f/u nutrition recs  -maintain PICC  - IS/pulmonary toilet  - Encourage ambulation     Subjective/Objective     Subjective: PICC placed yesterday and TPN started  Patient does have some pain with inspiration  He has been ambulating  Objective:     Vitals:Blood pressure 109/58, pulse 61, temperature 98 °F (36 7 °C), temperature source Oral, resp  rate 18, height 5' 8" (1 727 m), weight 89 4 kg (197 lb 1 5 oz), SpO2 90 %  ,Body mass index is 29 97 kg/m²  Temp (24hrs), Av 1 °F (36 7 °C), Min:97 9 °F (36 6 °C), Max:98 4 °F (36 9 °C)  Current: Temperature: 98 °F (36 7 °C)      Intake/Output Summary (Last 24 hours) at 2022 0116  Last data filed at 2022 2235  Gross per 24 hour   Intake 1061 ml   Output 2750 ml   Net -1689 ml       Invasive Devices     Peripherally Inserted Central Catheter Line  Duration           PICC Line  Right Basilic <1 day          Drain  Duration           Chest Tube 1 Right Pleural 24 Fr  1 day                Physical Exam  Vitals reviewed  Constitutional:       General: He is not in acute distress  Appearance: He is not ill-appearing, toxic-appearing or diaphoretic  HENT:      Head: Normocephalic and atraumatic  Eyes:      Extraocular Movements: Extraocular movements intact  Cardiovascular:      Rate and Rhythm: Normal rate  Pulmonary:      Effort: Pulmonary effort is normal  No respiratory distress        Comments: Room air  R CT in place  Incision/dressing clean dry and intact  Skin:     General: Skin is warm and dry  Neurological:      Mental Status: He is alert           Lab Results: Results: I have personally reviewed all pertinent laboratory/tests results  VTE Prophylaxis: Sequential compression device (Venodyne)  and Enoxaparin (Lovenox)    López Vasquez MD  11/18/2022

## 2022-11-18 NOTE — PLAN OF CARE
Problem: RESPIRATORY - ADULT  Goal: Achieves optimal ventilation and oxygenation  Description: INTERVENTIONS:  - Assess for changes in respiratory status  - Assess for changes in mentation and behavior  - Position to facilitate oxygenation and minimize respiratory effort  - Oxygen administered by appropriate delivery if ordered  - Initiate smoking cessation education as indicated  - Encourage broncho-pulmonary hygiene including cough, deep breathe, Incentive Spirometry  - Assess the need for suctioning and aspirate as needed  - Assess and instruct to report SOB or any respiratory difficulty  - Respiratory Therapy support as indicated  Outcome: Progressing     Problem: MOBILITY - ADULT  Goal: Maintain or return to baseline ADL function  Description: INTERVENTIONS:  -  Assess patient's ability to carry out ADLs; assess patient's baseline for ADL function and identify physical deficits which impact ability to perform ADLs (bathing, care of mouth/teeth, toileting, grooming, dressing, etc )  - Assess/evaluate cause of self-care deficits   - Assess range of motion  - Assess patient's mobility; develop plan if impaired  - Assess patient's need for assistive devices and provide as appropriate  - Encourage maximum independence but intervene and supervise when necessary  - Involve family in performance of ADLs  - Assess for home care needs following discharge   - Consider OT consult to assist with ADL evaluation and planning for discharge  - Provide patient education as appropriate  Outcome: Progressing  Goal: Maintains/Returns to pre admission functional level  Description: INTERVENTIONS:  - Perform BMAT or MOVE assessment daily    - Set and communicate daily mobility goal to care team and patient/family/caregiver  - Collaborate with rehabilitation services on mobility goals if consulted  - Perform Range of Motion 4 times a day  - Reposition patient every 2 hours    - Dangle patient 4 times a day  - Stand patient 4 times a day  - Ambulate patient 4 times a day  - Out of bed to chair 3 times a day   - Out of bed for meals 3 times a day  - Out of bed for toileting  - Record patient progress and toleration of activity level   Outcome: Progressing     Problem: Prexisting or High Potential for Compromised Skin Integrity  Goal: Skin integrity is maintained or improved  Description: INTERVENTIONS:  - Identify patients at risk for skin breakdown  - Assess and monitor skin integrity  - Assess and monitor nutrition and hydration status  - Monitor labs   - Assess for incontinence   - Turn and reposition patient  - Assist with mobility/ambulation  - Relieve pressure over bony prominences  - Avoid friction and shearing  - Provide appropriate hygiene as needed including keeping skin clean and dry  - Evaluate need for skin moisturizer/barrier cream  - Collaborate with interdisciplinary team   - Patient/family teaching  - Consider wound care consult   Outcome: Progressing     Problem: Potential for Falls  Goal: Patient will remain free of falls  Description: INTERVENTIONS:  - Educate patient/family on patient safety including physical limitations  - Instruct patient to call for assistance with activity   - Consult OT/PT to assist with strengthening/mobility   - Keep Call bell within reach  - Keep bed low and locked with side rails adjusted as appropriate  - Keep care items and personal belongings within reach  - Initiate and maintain comfort rounds  - Make Fall Risk Sign visible to staff  - Offer Toileting every 2 Hours, in advance of need  - Initiate/Maintain alarm  - Obtain necessary fall risk management equipment:   - Apply yellow socks and bracelet for high fall risk patients  - Consider moving patient to room near nurses station  Outcome: Progressing

## 2022-11-18 NOTE — PHYSICAL THERAPY NOTE
PHYSICAL THERAPY NOTE          Patient Name: Douglas GRAYSON Date: 11/18/2022 11/18/22 0934   PT Last Visit   PT Visit Date 11/18/22   Note Type   Note Type Treatment   Pain Assessment   Pain Assessment Tool FLACC   Pain Location/Orientation Location: Back   Pain Onset/Description Onset: Gradual;Frequency: Intermittent; Descriptor: Aching;Descriptor: Discomfort   Effect of Pain on Daily Activities initial discomfort w/ positional changes   Patient's Stated Pain Goal No pain   Hospital Pain Intervention(s) Repositioned; Ambulation/increased activity; Emotional support   Pain Rating: FLACC (Rest) - Face 0   Pain Rating: FLACC (Rest) - Legs 0   Pain Rating: FLACC (Rest) - Activity 0   Pain Rating: FLACC (Rest) - Cry 0   Pain Rating: FLACC (Rest) - Consolability 0   Score: FLACC (Rest) 0   Pain Rating: FLACC (Activity) - Face 1   Pain Rating: FLACC (Activity) - Legs 0   Pain Rating: FLACC (Activity) - Activity 0   Pain Rating: FLACC (Activity) - Cry 1   Pain Rating: FLACC (Activity) - Consolability 0   Score: FLACC (Activity) 2   Restrictions/Precautions   Other Precautions Multiple lines;Telemetry;O2  (CT)   General   Chart Reviewed Yes   Additional Pertinent History cleared for Tx session (spoke to nsg)   Response to Previous Treatment Patient with no complaints from previous session     Cognition   Overall Cognitive Status WFL   Arousal/Participation Cooperative   Attention Within functional limits   Orientation Level Oriented to person;Oriented to place;Oriented to situation   Memory Within functional limits   Following Commands Follows all commands and directions without difficulty   Subjective   Subjective Pt is resting in bed; arousable; agreeable to mobilize   Bed Mobility   Supine to Sit 6  Modified independent   Sit to Supine 6  Modified independent   Transfers   Sit to Stand 6  Modified independent   Stand to Sit 6 Modified independent   Ambulation/Elevation   Gait pattern Short stride; Inconsistent mukul  (no overt LOB)   Gait Assistance 5  Supervision  (mainly for multiple lines)   Additional items Verbal cues   Assistive Device None   Distance 100 ft + 100 ft + 150 ft + 150 ft w/ standing rest periods in between   Stair Management Assistance Not tested  (multiple lines)   Balance   Static Sitting Good   Static Standing Fair +   Ambulatory Fair   Activity Tolerance   Activity Tolerance Patient tolerated treatment well   Nurse Made Aware spoke to CHANDLER Juarez   Assessment   Prognosis Good   Problem List Decreased strength;Decreased endurance   Assessment Pt demonstrated overall improvement in tolerance to mobilization ambulating further distances w/ rest periods provided b/in bouts of amb; pt remains to be on suppl O2 w/ O2 sat stable  Overall, cont to anticipate pt will return home upon D/C when medically cleared; will follow   Goals   Patient Goals to return home   STG Expiration Date 11/27/22   PT Treatment Day 1   Plan   Treatment/Interventions LE strengthening/ROM; Elevations; Therapeutic exercise; Endurance training;Gait training;Spoke to nursing   Progress Progressing toward goals   PT Frequency Other (Comment)  (3-6x)   Recommendation   PT Discharge Recommendation Home with outpatient rehabilitation  (pulm rehab when medically cleared; otherwise, no rehab needs upon D/C)   AM-PAC Basic Mobility Inpatient   Turning in Bed Without Bedrails 4   Lying on Back to Sitting on Edge of Flat Bed 4   Moving Bed to Chair 4   Standing Up From Chair 4   Walk in Room 3   Climb 3-5 Stairs 3   Basic Mobility Inpatient Raw Score 22   Basic Mobility Standardized Score 47 4   Highest Level Of Mobility   JH-HLM Goal 7: Walk 25 feet or more   JH-HLM Achieved 8: Walk 250 feet ot more   End of Consult   Patient Position at End of Consult Supine; All needs within [facility]

## 2022-11-18 NOTE — NUTRITION
11/18/22 1223   Recommendations/Interventions   Recommendations to Provider Recommendations for 11/18 TPN: 700mL 15% amino acids (105g protein, 420 kcals), 833mL 30% dextrose (250g, 850 kcals), 200mL 20% lipids (40g, 400 kcals)  If blood sugars are <180 and electrolytes are WNL, on Saturday 11/19, recommend increase to goal TPN: 700mL 15% amino acids (105g protein, 420 kcals), 1230mL 30% dextrose (369g dextrose, 1255 kcals), 300mL 20% lipids (60g, 600 kcals)  Total of 2275 kcals, meets estimated needs

## 2022-11-19 ENCOUNTER — APPOINTMENT (OUTPATIENT)
Dept: RADIOLOGY | Facility: HOSPITAL | Age: 67
End: 2022-11-19

## 2022-11-19 LAB
ALBUMIN SERPL BCP-MCNC: 2.2 G/DL (ref 3.5–5)
ALP SERPL-CCNC: 53 U/L (ref 46–116)
ALT SERPL W P-5'-P-CCNC: 11 U/L (ref 12–78)
ANION GAP SERPL CALCULATED.3IONS-SCNC: 3 MMOL/L (ref 4–13)
AST SERPL W P-5'-P-CCNC: 12 U/L (ref 5–45)
BASOPHILS # BLD AUTO: 0.05 THOUSANDS/ÂΜL (ref 0–0.1)
BASOPHILS NFR BLD AUTO: 1 % (ref 0–1)
BILIRUB SERPL-MCNC: 0.66 MG/DL (ref 0.2–1)
BUN SERPL-MCNC: 33 MG/DL (ref 5–25)
CALCIUM ALBUM COR SERPL-MCNC: 9.5 MG/DL (ref 8.3–10.1)
CALCIUM SERPL-MCNC: 8.1 MG/DL (ref 8.3–10.1)
CHLORIDE SERPL-SCNC: 105 MMOL/L (ref 96–108)
CHOLEST SERPL-MCNC: 92 MG/DL
CO2 SERPL-SCNC: 27 MMOL/L (ref 21–32)
CREAT SERPL-MCNC: 0.98 MG/DL (ref 0.6–1.3)
EOSINOPHIL # BLD AUTO: 0.01 THOUSAND/ÂΜL (ref 0–0.61)
EOSINOPHIL NFR BLD AUTO: 0 % (ref 0–6)
ERYTHROCYTE [DISTWIDTH] IN BLOOD BY AUTOMATED COUNT: 13.7 % (ref 11.6–15.1)
GFR SERPL CREATININE-BSD FRML MDRD: 78 ML/MIN/1.73SQ M
GLUCOSE SERPL-MCNC: 177 MG/DL (ref 65–140)
HCT VFR BLD AUTO: 47.3 % (ref 36.5–49.3)
HDLC SERPL-MCNC: 27 MG/DL
HGB BLD-MCNC: 15 G/DL (ref 12–17)
IMM GRANULOCYTES # BLD AUTO: 0.05 THOUSAND/UL (ref 0–0.2)
IMM GRANULOCYTES NFR BLD AUTO: 1 % (ref 0–2)
LDLC SERPL CALC-MCNC: 16 MG/DL (ref 0–100)
LYMPHOCYTES # BLD AUTO: 2.35 THOUSANDS/ÂΜL (ref 0.6–4.47)
LYMPHOCYTES NFR BLD AUTO: 22 % (ref 14–44)
MCH RBC QN AUTO: 29.9 PG (ref 26.8–34.3)
MCHC RBC AUTO-ENTMCNC: 31.7 G/DL (ref 31.4–37.4)
MCV RBC AUTO: 94 FL (ref 82–98)
MONOCYTES # BLD AUTO: 0.97 THOUSAND/ÂΜL (ref 0.17–1.22)
MONOCYTES NFR BLD AUTO: 9 % (ref 4–12)
NEUTROPHILS # BLD AUTO: 7.33 THOUSANDS/ÂΜL (ref 1.85–7.62)
NEUTS SEG NFR BLD AUTO: 67 % (ref 43–75)
NONHDLC SERPL-MCNC: 65 MG/DL
NRBC BLD AUTO-RTO: 0 /100 WBCS
PLATELET # BLD AUTO: 159 THOUSANDS/UL (ref 149–390)
PMV BLD AUTO: 10.8 FL (ref 8.9–12.7)
POTASSIUM SERPL-SCNC: 3.9 MMOL/L (ref 3.5–5.3)
PROT SERPL-MCNC: 5.2 G/DL (ref 6.4–8.4)
RBC # BLD AUTO: 5.02 MILLION/UL (ref 3.88–5.62)
SODIUM SERPL-SCNC: 135 MMOL/L (ref 135–147)
TRIGL SERPL-MCNC: 245 MG/DL
WBC # BLD AUTO: 10.76 THOUSAND/UL (ref 4.31–10.16)

## 2022-11-19 RX ORDER — DEXTROSE MONOHYDRATE 100 MG/ML
100 INJECTION, SOLUTION INTRAVENOUS CONTINUOUS
Status: DISCONTINUED | OUTPATIENT
Start: 2022-11-19 | End: 2022-11-19

## 2022-11-19 RX ADMIN — DOCUSATE SODIUM 100 MG: 100 CAPSULE, LIQUID FILLED ORAL at 17:45

## 2022-11-19 RX ADMIN — ACETAMINOPHEN 975 MG: 325 TABLET ORAL at 17:45

## 2022-11-19 RX ADMIN — METOPROLOL SUCCINATE 100 MG: 100 TABLET, EXTENDED RELEASE ORAL at 08:55

## 2022-11-19 RX ADMIN — OXYCODONE HYDROCHLORIDE 10 MG: 5 TABLET ORAL at 01:59

## 2022-11-19 RX ADMIN — HYDROMORPHONE HYDROCHLORIDE 0.5 MG: 1 INJECTION, SOLUTION INTRAMUSCULAR; INTRAVENOUS; SUBCUTANEOUS at 21:08

## 2022-11-19 RX ADMIN — OXYCODONE HYDROCHLORIDE 10 MG: 5 TABLET ORAL at 22:59

## 2022-11-19 RX ADMIN — DOCUSATE SODIUM 100 MG: 100 CAPSULE, LIQUID FILLED ORAL at 08:55

## 2022-11-19 RX ADMIN — ACETAMINOPHEN 975 MG: 325 TABLET ORAL at 08:55

## 2022-11-19 RX ADMIN — Medication 3 MG: at 01:59

## 2022-11-19 RX ADMIN — ACETAMINOPHEN 975 MG: 325 TABLET ORAL at 05:25

## 2022-11-19 RX ADMIN — PANTOPRAZOLE SODIUM 40 MG: 40 TABLET, DELAYED RELEASE ORAL at 05:26

## 2022-11-19 RX ADMIN — GABAPENTIN 800 MG: 400 CAPSULE ORAL at 21:06

## 2022-11-19 RX ADMIN — METHOCARBAMOL 500 MG: 500 TABLET ORAL at 21:06

## 2022-11-19 RX ADMIN — TAMSULOSIN HYDROCHLORIDE 0.8 MG: 0.4 CAPSULE ORAL at 17:45

## 2022-11-19 RX ADMIN — GABAPENTIN 800 MG: 400 CAPSULE ORAL at 08:55

## 2022-11-19 RX ADMIN — ENOXAPARIN SODIUM 40 MG: 40 INJECTION SUBCUTANEOUS at 08:55

## 2022-11-19 RX ADMIN — OXYCODONE HYDROCHLORIDE 10 MG: 5 TABLET ORAL at 17:45

## 2022-11-19 RX ADMIN — GABAPENTIN 800 MG: 400 CAPSULE ORAL at 17:45

## 2022-11-19 RX ADMIN — NICOTINE 1 PATCH: 21 PATCH, EXTENDED RELEASE TRANSDERMAL at 12:55

## 2022-11-19 RX ADMIN — BUPROPION HYDROCHLORIDE 150 MG: 150 TABLET, FILM COATED, EXTENDED RELEASE ORAL at 08:55

## 2022-11-19 RX ADMIN — OXYCODONE HYDROCHLORIDE 10 MG: 5 TABLET ORAL at 12:56

## 2022-11-19 RX ADMIN — Medication 3 MG: at 21:06

## 2022-11-19 RX ADMIN — Medication: at 22:47

## 2022-11-19 RX ADMIN — SENNOSIDES 8.6 MG: 8.6 TABLET, FILM COATED ORAL at 08:55

## 2022-11-19 RX ADMIN — OXYCODONE HYDROCHLORIDE 10 MG: 5 TABLET ORAL at 08:55

## 2022-11-19 RX ADMIN — ACETAMINOPHEN 975 MG: 325 TABLET ORAL at 22:54

## 2022-11-19 RX ADMIN — AMLODIPINE BESYLATE 10 MG: 10 TABLET ORAL at 08:55

## 2022-11-19 NOTE — PROGRESS NOTES
Progress Note    Ariella Case 75 y o  male MRN: [id number]  Unit/Bed#: 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 s/p R VATS superior segmentectomy on 11/16, now with concern for a chyle leak  Afebrile/AVSS on 2 L NC   R CT: 600 cc; serous/ws/no air leak  Wbc: 10 7 (15)  PLAN:  - reorder TPN (see nutrition recs)  - keep NPO (ok to have sips of clears w meds)  - OOB, ambulate   - DVT/PE chemoppx  Subjective:   - no new complaints this morning  Objective:     Vitals: Temp:  [98 °F (36 7 °C)-98 9 °F (37 2 °C)] 98 8 °F (37 1 °C)  HR:  [61-77] 77  Resp:  [16-20] 20  BP: ()/(56-68) 100/64  Body mass index is 29 1 kg/m²  I/O       11/17 0701  11/18 0700 11/18 0701  11/19 0700    I V  (mL/kg) 1438 (16 4) 216 (2 5)     7 84    Total Intake(mL/kg) 1802 7 (20 6) 300 (3 5)    Urine (mL/kg/hr) 525 (0 2) 1025 (0 5)    Chest Tube 1975 600    Total Output 2500 1625    Net -697 3 -1325                Physical Exam:  GEN: NAD  HEENT: atraumatic  CV: RRR  Lung: Normal effort on 2 L  Incisions cdi  R CT in situ; serous effluent/ws/AL-  Ab: Soft, NT/ND  Extrem: No CCE  Neuro: A+Ox3    Lab, Imaging and other studies:   I have personally reviewed pertinent reports    , CBC with diff:   Lab Results   Component Value Date    WBC 10 76 (H) 11/19/2022    HGB 15 0 11/19/2022    HCT 47 3 11/19/2022    MCV 94 11/19/2022     11/19/2022    MCH 29 9 11/19/2022    MCHC 31 7 11/19/2022    RDW 13 7 11/19/2022    MPV 10 8 11/19/2022    NRBC 0 11/19/2022   , BMP/CMP:   Lab Results   Component Value Date    SODIUM 135 11/19/2022    K 3 9 11/19/2022     11/19/2022    CO2 27 11/19/2022    BUN 33 (H) 11/19/2022    CREATININE 0 98 11/19/2022    CALCIUM 8 1 (L) 11/19/2022    AST 12 11/19/2022    ALT 11 (L) 11/19/2022    ALKPHOS 53 11/19/2022    EGFR 78 11/19/2022     VTE Pharmacologic Prophylaxis: Enoxaparin (Lovenox)  VTE Mechanical Prophylaxis: sequential compression device

## 2022-11-20 LAB
ANION GAP SERPL CALCULATED.3IONS-SCNC: 5 MMOL/L (ref 4–13)
BASOPHILS # BLD AUTO: 0.05 THOUSANDS/ÂΜL (ref 0–0.1)
BASOPHILS NFR BLD AUTO: 0 % (ref 0–1)
BUN SERPL-MCNC: 43 MG/DL (ref 5–25)
CALCIUM SERPL-MCNC: 8.3 MG/DL (ref 8.3–10.1)
CHLORIDE SERPL-SCNC: 105 MMOL/L (ref 96–108)
CO2 SERPL-SCNC: 26 MMOL/L (ref 21–32)
CREAT SERPL-MCNC: 1.06 MG/DL (ref 0.6–1.3)
EOSINOPHIL # BLD AUTO: 0 THOUSAND/ÂΜL (ref 0–0.61)
EOSINOPHIL NFR BLD AUTO: 0 % (ref 0–6)
ERYTHROCYTE [DISTWIDTH] IN BLOOD BY AUTOMATED COUNT: 13.6 % (ref 11.6–15.1)
GFR SERPL CREATININE-BSD FRML MDRD: 71 ML/MIN/1.73SQ M
GLUCOSE SERPL-MCNC: 152 MG/DL (ref 65–140)
HCT VFR BLD AUTO: 48.5 % (ref 36.5–49.3)
HGB BLD-MCNC: 15.5 G/DL (ref 12–17)
IMM GRANULOCYTES # BLD AUTO: 0.06 THOUSAND/UL (ref 0–0.2)
IMM GRANULOCYTES NFR BLD AUTO: 1 % (ref 0–2)
LYMPHOCYTES # BLD AUTO: 2.42 THOUSANDS/ÂΜL (ref 0.6–4.47)
LYMPHOCYTES NFR BLD AUTO: 21 % (ref 14–44)
MAGNESIUM SERPL-MCNC: 2.4 MG/DL (ref 1.6–2.6)
MCH RBC QN AUTO: 30.5 PG (ref 26.8–34.3)
MCHC RBC AUTO-ENTMCNC: 32 G/DL (ref 31.4–37.4)
MCV RBC AUTO: 95 FL (ref 82–98)
MONOCYTES # BLD AUTO: 1.13 THOUSAND/ÂΜL (ref 0.17–1.22)
MONOCYTES NFR BLD AUTO: 10 % (ref 4–12)
NEUTROPHILS # BLD AUTO: 7.9 THOUSANDS/ÂΜL (ref 1.85–7.62)
NEUTS SEG NFR BLD AUTO: 68 % (ref 43–75)
NRBC BLD AUTO-RTO: 0 /100 WBCS
PHOSPHATE SERPL-MCNC: 3.6 MG/DL (ref 2.3–4.1)
PLATELET # BLD AUTO: 170 THOUSANDS/UL (ref 149–390)
PMV BLD AUTO: 10.9 FL (ref 8.9–12.7)
POTASSIUM SERPL-SCNC: 4.1 MMOL/L (ref 3.5–5.3)
RBC # BLD AUTO: 5.09 MILLION/UL (ref 3.88–5.62)
SODIUM SERPL-SCNC: 136 MMOL/L (ref 135–147)
WBC # BLD AUTO: 11.56 THOUSAND/UL (ref 4.31–10.16)

## 2022-11-20 RX ADMIN — OXYCODONE HYDROCHLORIDE 10 MG: 5 TABLET ORAL at 02:35

## 2022-11-20 RX ADMIN — TAMSULOSIN HYDROCHLORIDE 0.8 MG: 0.4 CAPSULE ORAL at 18:07

## 2022-11-20 RX ADMIN — Medication 3 MG: at 22:09

## 2022-11-20 RX ADMIN — NICOTINE 1 PATCH: 21 PATCH, EXTENDED RELEASE TRANSDERMAL at 10:05

## 2022-11-20 RX ADMIN — DOCUSATE SODIUM 100 MG: 100 CAPSULE, LIQUID FILLED ORAL at 09:53

## 2022-11-20 RX ADMIN — GABAPENTIN 800 MG: 400 CAPSULE ORAL at 18:09

## 2022-11-20 RX ADMIN — GABAPENTIN 800 MG: 400 CAPSULE ORAL at 09:53

## 2022-11-20 RX ADMIN — ENOXAPARIN SODIUM 40 MG: 40 INJECTION SUBCUTANEOUS at 09:54

## 2022-11-20 RX ADMIN — POLYETHYLENE GLYCOL 3350 17 G: 17 POWDER, FOR SOLUTION ORAL at 09:54

## 2022-11-20 RX ADMIN — ACETAMINOPHEN 975 MG: 325 TABLET ORAL at 02:35

## 2022-11-20 RX ADMIN — HYDROMORPHONE HYDROCHLORIDE 0.5 MG: 1 INJECTION, SOLUTION INTRAMUSCULAR; INTRAVENOUS; SUBCUTANEOUS at 10:12

## 2022-11-20 RX ADMIN — ACETAMINOPHEN 975 MG: 325 TABLET ORAL at 18:07

## 2022-11-20 RX ADMIN — ACETAMINOPHEN 975 MG: 325 TABLET ORAL at 09:53

## 2022-11-20 RX ADMIN — Medication: at 21:24

## 2022-11-20 RX ADMIN — SENNOSIDES 8.6 MG: 8.6 TABLET, FILM COATED ORAL at 09:53

## 2022-11-20 RX ADMIN — OXYCODONE HYDROCHLORIDE 10 MG: 5 TABLET ORAL at 22:09

## 2022-11-20 RX ADMIN — OXYCODONE HYDROCHLORIDE 10 MG: 5 TABLET ORAL at 13:44

## 2022-11-20 RX ADMIN — OXYCODONE HYDROCHLORIDE 10 MG: 5 TABLET ORAL at 18:07

## 2022-11-20 RX ADMIN — GABAPENTIN 800 MG: 400 CAPSULE ORAL at 22:12

## 2022-11-20 RX ADMIN — ACETAMINOPHEN 975 MG: 325 TABLET ORAL at 22:09

## 2022-11-20 RX ADMIN — PANTOPRAZOLE SODIUM 40 MG: 40 TABLET, DELAYED RELEASE ORAL at 06:21

## 2022-11-20 NOTE — PLAN OF CARE
Problem: RESPIRATORY - ADULT  Goal: Achieves optimal ventilation and oxygenation  Description: INTERVENTIONS:  - Assess for changes in respiratory status  - Assess for changes in mentation and behavior  - Position to facilitate oxygenation and minimize respiratory effort  - Oxygen administered by appropriate delivery if ordered  - Initiate smoking cessation education as indicated  - Encourage broncho-pulmonary hygiene including cough, deep breathe, Incentive Spirometry  - Assess the need for suctioning and aspirate as needed  - Assess and instruct to report SOB or any respiratory difficulty  - Respiratory Therapy support as indicated  Outcome: Progressing     Problem: MOBILITY - ADULT  Goal: Maintain or return to baseline ADL function  Description: INTERVENTIONS:  -  Assess patient's ability to carry out ADLs; assess patient's baseline for ADL function and identify physical deficits which impact ability to perform ADLs (bathing, care of mouth/teeth, toileting, grooming, dressing, etc )  - Assess/evaluate cause of self-care deficits   - Assess range of motion  - Assess patient's mobility; develop plan if impaired  - Assess patient's need for assistive devices and provide as appropriate  - Encourage maximum independence but intervene and supervise when necessary  - Involve family in performance of ADLs  - Assess for home care needs following discharge   - Consider OT consult to assist with ADL evaluation and planning for discharge  - Provide patient education as appropriate  Outcome: Progressing  Goal: Maintains/Returns to pre admission functional level  Description: INTERVENTIONS:  - Perform BMAT or MOVE assessment daily    - Set and communicate daily mobility goal to care team and patient/family/caregiver     - Collaborate with rehabilitation services on mobility goals if consulted  Problem: Prexisting or High Potential for Compromised Skin Integrity  Goal: Skin integrity is maintained or improved  Description: INTERVENTIONS:  - Identify patients at risk for skin breakdown  - Assess and monitor skin integrity  - Assess and monitor nutrition and hydration status  - Monitor labs   - Assess for incontinence   - Turn and reposition patient  - Assist with mobility/ambulation  - Relieve pressure over bony prominences  - Avoid friction and shearing  - Provide appropriate hygiene as needed including keeping skin clean and dry  - Evaluate need for skin moisturizer/barrier cream  - Collaborate with interdisciplinary team   - Patient/family teaching  - Consider wound care consult   Outcome: Progressing     Problem: Potential for Falls  Goal: Patient will remain free of falls  Description: INTERVENTIONS:  - Educate patient/family on patient safety including physical limitations  - Instruct patient to call for assistance with activity   - Consult OT/PT to assist with strengthening/mobility   - Keep Call bell within reach  - Keep bed low and locked with side rails adjusted as appropriate  - Keep care items and personal belongings within reach  - Initiate and maintain comfort rounds  - Make Fall Risk Sign visible to staff  - Apply yellow socks and bracelet for high fall risk patients  - Consider moving patient to room near nurses station  Outcome: Progressing     - Record patient progress and toleration of activity level   Outcome: Progressing

## 2022-11-20 NOTE — PROGRESS NOTES
Progress Note - Thoracic Surgery   Justin Garland 75 y o  male MRN: [id number]  Unit/Bed#: [facility] 404-01 Encounter: [id number]    Assessment:  66yo M POD4 s/p thoracoscopic right lower lobe superior segmentectomy and mediastinal lymph node dissection with a post-operative course complicated by a chyle leak  Afebrile, vitals WNL, and saturating adequately on room air  R chest tube (to -20mmHg suction): 1,800cc/24hrs  UOP: 850cc/24hrs    Plan:  - Continue R chest tube to suction, if chest tube outputs continue to be high will request IR consultation for embolization of thoracic duct tomorrow  - Continue NPO + TPN (nutrition recommendations from yesterday appreciated)  - Aggressive pulmonary toilet + incentive spirometer  - OOB/ambulation  - Multimodal analgesia    Subjective/Objective     Subjective:   No acute events overnight  This morning the patient reported only mild R-sided chest pain adequately controlled on his current analgesic regimen w/ no fevers/chills, nausea/emesis, or dyspnea/chest pain  He remains NPO + TPN, has been using his incentive spirometer, and ambulating  Objective:     Blood pressure 119/66, pulse 76, temperature 98 5 °F (36 9 °C), resp  rate 18, height 5' 8" (1 727 m), weight 86 1 kg (189 lb 13 1 oz), SpO2 93 %  ,Body mass index is 28 86 kg/m²  Gen: Awake, alert, and in no acute distress  Head: Normocephalic, atraumatic  Neck: No obvious JVD, trachea midline  Cardiovascular: Regular rate  Respiratory:  In no acute respiratory distress w/ no use of accessory muscles of respiration; R chest tube in place connected to Topaz device w/ no visible air leak   Abd: Soft w/ no signs of peritonitis  Extremities: No visible wounds/ulcerations to the B/L upper/lower extremities  Skin: Warm/dry  Psychiatric: Appropriate mood/affect    Intake/Output Summary (Last 24 hours) at 11/20/2022 0646  Last data filed at 11/20/2022 5062  Gross per 24 hour   Intake 3298 22 ml   Output 2650 ml   Net 648 22 ml Invasive Devices     Peripherally Inserted Central Catheter Line  Duration           PICC Line 60/51/61 Right Basilic 2 days          Drain  Duration           Chest Tube 1 Right Pleural 24 Fr  3 days                I have personally reviewed pertinent reports    , CBC with diff:   No results found for: WBC, HGB, HCT, MCV, PLT, ADJUSTEDWBC, MCH, MCHC, RDW, MPV, NRBC, BMP/CMP:   No results found for: SODIUM, K, CL, CO2, ANIONGAP, BUN, CREATININE, GLUCOSE, CALCIUM, AST, ALT, ALKPHOS, PROT, BILITOT, EGFR and Magnesium: No components found for: MAG

## 2022-11-20 NOTE — TELEMEDICINE
e-Consult (IPC)  - Interventional Radiology  Kassy Sherwood 75 y o  male MRN: [id number]  Unit/Bed#: [facility] 404-01 Encounter: [id number]          Interventional Radiology has been consulted to evaluate Kassy Sherwood    We were consulted by thoracic surgery concerning this patient with Chyle leak  IP Consult to IR  Consult performed by: Robbie Ely MD  Consult ordered by: Tita Blackwell MD        11/20/22    Assessment/Recommendation:   14-year-old male with squamous cell carcinoma status post right superior segmentectomy, complicated by Chyle leak  Patient has been NPO and receiving TPN, however, output from the chest tube is increasing, and has been 1 8 L in last 24 hours  Thoracic duct embolization has been requested  Orders have been placed to perform the procedure on Tuesday afternoon  There is only a small subset of IR doctors that performed this procedure, and requires a 4 hour block on the schedule as well as anesthesia  This is in the process of being coordinated at this time  The patient will need to be off his Lovenox as well     11-20 minutes, >50% of the total time devoted to medical consultative verbal/EMR discussion between providers  Written report will be generated in the EMR  Thank you for allowing Interventional Radiology to participate in the care of Kassy Sherwood  Please don't hesitate to call or TigerText us with any questions       Robbie Ely MD

## 2022-11-21 LAB
ANION GAP SERPL CALCULATED.3IONS-SCNC: 3 MMOL/L (ref 4–13)
BASOPHILS # BLD AUTO: 0.07 THOUSANDS/ÂΜL (ref 0–0.1)
BASOPHILS NFR BLD AUTO: 1 % (ref 0–1)
BUN SERPL-MCNC: 36 MG/DL (ref 5–25)
CALCIUM SERPL-MCNC: 8 MG/DL (ref 8.3–10.1)
CHLORIDE SERPL-SCNC: 103 MMOL/L (ref 96–108)
CO2 SERPL-SCNC: 26 MMOL/L (ref 21–32)
CREAT SERPL-MCNC: 0.99 MG/DL (ref 0.6–1.3)
EOSINOPHIL # BLD AUTO: 0 THOUSAND/ÂΜL (ref 0–0.61)
EOSINOPHIL NFR BLD AUTO: 0 % (ref 0–6)
ERYTHROCYTE [DISTWIDTH] IN BLOOD BY AUTOMATED COUNT: 13.5 % (ref 11.6–15.1)
GFR SERPL CREATININE-BSD FRML MDRD: 77 ML/MIN/1.73SQ M
GLUCOSE SERPL-MCNC: 191 MG/DL (ref 65–140)
GLUCOSE SERPL-MCNC: 208 MG/DL (ref 65–140)
GLUCOSE SERPL-MCNC: 213 MG/DL (ref 65–140)
HCT VFR BLD AUTO: 48.9 % (ref 36.5–49.3)
HGB BLD-MCNC: 15.9 G/DL (ref 12–17)
IMM GRANULOCYTES # BLD AUTO: 0.07 THOUSAND/UL (ref 0–0.2)
IMM GRANULOCYTES NFR BLD AUTO: 1 % (ref 0–2)
LYMPHOCYTES # BLD AUTO: 2.06 THOUSANDS/ÂΜL (ref 0.6–4.47)
LYMPHOCYTES NFR BLD AUTO: 19 % (ref 14–44)
MAGNESIUM SERPL-MCNC: 2.3 MG/DL (ref 1.6–2.6)
MCH RBC QN AUTO: 29.8 PG (ref 26.8–34.3)
MCHC RBC AUTO-ENTMCNC: 32.5 G/DL (ref 31.4–37.4)
MCV RBC AUTO: 92 FL (ref 82–98)
MONOCYTES # BLD AUTO: 0.88 THOUSAND/ÂΜL (ref 0.17–1.22)
MONOCYTES NFR BLD AUTO: 8 % (ref 4–12)
NEUTROPHILS # BLD AUTO: 7.84 THOUSANDS/ÂΜL (ref 1.85–7.62)
NEUTS SEG NFR BLD AUTO: 71 % (ref 43–75)
NRBC BLD AUTO-RTO: 0 /100 WBCS
PHOSPHATE SERPL-MCNC: 2.6 MG/DL (ref 2.3–4.1)
PLATELET # BLD AUTO: 191 THOUSANDS/UL (ref 149–390)
PMV BLD AUTO: 10.9 FL (ref 8.9–12.7)
POTASSIUM SERPL-SCNC: 4.2 MMOL/L (ref 3.5–5.3)
RBC # BLD AUTO: 5.34 MILLION/UL (ref 3.88–5.62)
SODIUM SERPL-SCNC: 132 MMOL/L (ref 135–147)
WBC # BLD AUTO: 10.92 THOUSAND/UL (ref 4.31–10.16)

## 2022-11-21 RX ORDER — INSULIN LISPRO 100 [IU]/ML
1-5 INJECTION, SOLUTION INTRAVENOUS; SUBCUTANEOUS EVERY 6 HOURS SCHEDULED
Status: DISCONTINUED | OUTPATIENT
Start: 2022-11-21 | End: 2022-11-25

## 2022-11-21 RX ADMIN — Medication 3 MG: at 21:42

## 2022-11-21 RX ADMIN — METOPROLOL SUCCINATE 100 MG: 100 TABLET, EXTENDED RELEASE ORAL at 09:09

## 2022-11-21 RX ADMIN — INSULIN LISPRO 1 UNITS: 100 INJECTION, SOLUTION INTRAVENOUS; SUBCUTANEOUS at 18:04

## 2022-11-21 RX ADMIN — ACETAMINOPHEN 975 MG: 325 TABLET ORAL at 06:36

## 2022-11-21 RX ADMIN — NICOTINE 1 PATCH: 21 PATCH, EXTENDED RELEASE TRANSDERMAL at 11:59

## 2022-11-21 RX ADMIN — METHOCARBAMOL 500 MG: 500 TABLET ORAL at 21:43

## 2022-11-21 RX ADMIN — GABAPENTIN 800 MG: 400 CAPSULE ORAL at 21:43

## 2022-11-21 RX ADMIN — TAMSULOSIN HYDROCHLORIDE 0.8 MG: 0.4 CAPSULE ORAL at 16:12

## 2022-11-21 RX ADMIN — OXYCODONE HYDROCHLORIDE 10 MG: 5 TABLET ORAL at 21:42

## 2022-11-21 RX ADMIN — ENOXAPARIN SODIUM 40 MG: 40 INJECTION SUBCUTANEOUS at 09:08

## 2022-11-21 RX ADMIN — OXYCODONE HYDROCHLORIDE 10 MG: 5 TABLET ORAL at 06:36

## 2022-11-21 RX ADMIN — Medication: at 21:42

## 2022-11-21 RX ADMIN — ACETAMINOPHEN 975 MG: 325 TABLET ORAL at 11:59

## 2022-11-21 RX ADMIN — PANTOPRAZOLE SODIUM 40 MG: 40 TABLET, DELAYED RELEASE ORAL at 06:36

## 2022-11-21 RX ADMIN — GABAPENTIN 800 MG: 400 CAPSULE ORAL at 09:09

## 2022-11-21 RX ADMIN — AMLODIPINE BESYLATE 10 MG: 10 TABLET ORAL at 09:09

## 2022-11-21 RX ADMIN — GABAPENTIN 800 MG: 400 CAPSULE ORAL at 15:09

## 2022-11-21 RX ADMIN — ACETAMINOPHEN 975 MG: 325 TABLET ORAL at 18:05

## 2022-11-21 RX ADMIN — DOCUSATE SODIUM 100 MG: 100 CAPSULE, LIQUID FILLED ORAL at 18:05

## 2022-11-21 RX ADMIN — OXYCODONE HYDROCHLORIDE 10 MG: 5 TABLET ORAL at 12:00

## 2022-11-21 NOTE — PLAN OF CARE
Problem: OCCUPATIONAL THERAPY ADULT  Goal: Performs self-care activities at highest level of function for planned discharge setting  See evaluation for individualized goals  Outcome: Adequate for Discharge  Note:       Assessment: Patient participated in Skilled OT session this date with interventions consisting of ADL re training with the use of correct body mechanics and  therapeutic activities to: increase activity tolerance   Patient agreeable to OT treatment session, upon arrival patient was found seated at edge of bed  From OT standpoint, recommendation at time of d/c would be Home with family support and Outpatient OT  Pt reports that they are still trying to figure out the cause of his L UE numbness  Pt reports that he has no concerns about returning home, and hopes to return home soon  Recommend continued participation in [facility] and functional mobility with staff  No further acute OT needs, d/c OT       OT Discharge Recommendation: Home with outpatient rehabilitation (outpatient hand therapy)

## 2022-11-21 NOTE — PLAN OF CARE
Problem: PHYSICAL THERAPY ADULT  Goal: Performs mobility at highest level of function for planned discharge setting  See evaluation for individualized goals  Description: Treatment/Interventions: LE strengthening/ROM, Endurance training, Gait training, Spoke to nursing, OT, Elevations, Therapeutic exercise, Equipment eval/education, Bed mobility          See flowsheet documentation for full assessment, interventions and recommendations  Outcome: Progressing  Note: Prognosis: Good  Problem List: Decreased strength, Decreased endurance  Assessment: Pt cont improving w/ functional endurance, balance and mobility skills progressing to mod (I) level w/ all aspects of mobility on level surfaces; will need to address elevations prior to D/C; otherwise, anticipate pt will return home upon D/C when medically cleared; will follow        PT Discharge Recommendation: Home with outpatient rehabilitation    See flowsheet documentation for full assessment

## 2022-11-21 NOTE — NUTRITION
11/21/22 1224   Recommendations/Interventions   Recommendations to Provider 1  Consider scheduled blood sugar checks at least q6 hrs while on TPN  Consider adding sliding scale insulin as needed to aim to keep blood sugars <180  2  Given elevated triglycerides even before lipids were at goal, suggest decreasing lipids to 250mL of 20% lipids (provides 50g and 500 kcals)  This will provide a total of 2175 kcals which still meets pt's estimated calorie needs  If pt remains on TPN on 11/23, consider rechecking triglycerides that day  3  Consider increasing total TPN volume to account for high amount of chest tube output (current total volume is 2304mL)

## 2022-11-21 NOTE — PHYSICAL THERAPY NOTE
PHYSICAL THERAPY NOTE          Patient Name: Lisbeth Cordoba  VXWMR'Y Date: 11/21/2022 11/21/22 1212   PT Last Visit   PT Visit Date 11/21/22   Note Type   Note Type Treatment   Pain Assessment   Pain Assessment Tool 0-10   Pain Score 6   Pain Location/Orientation Orientation: Right;Location: Chest;Location: Incision   Pain Onset/Description Onset: Ongoing;Frequency: Intermittent; Descriptor: Aching;Descriptor: Discomfort   Effect of Pain on Daily Activities guarding   Patient's Stated Pain Goal No pain   Hospital Pain Intervention(s) Repositioned; Ambulation/increased activity; Emotional support   Pain Rating: FLACC (Rest) - Face 0   Pain Rating: FLACC (Rest) - Legs 0   Pain Rating: FLACC (Rest) - Activity 0   Pain Rating: FLACC (Rest) - Cry 0   Pain Rating: FLACC (Rest) - Consolability 0   Score: FLACC (Rest) 0   Pain Rating: FLACC (Activity) - Face 1   Pain Rating: FLACC (Activity) - Legs 0   Pain Rating: FLACC (Activity) - Activity 0   Pain Rating: FLACC (Activity) - Cry 1   Pain Rating: FLACC (Activity) - Consolability 0   Score: FLACC (Activity) 2   Restrictions/Precautions   Other Precautions Multiple lines;Telemetry  (CT)   General   Chart Reviewed Yes   Additional Pertinent History cleared for tx session (spoke to nsg)   Response to Previous Treatment Patient with no complaints from previous session  Cognition   Overall Cognitive Status WFL   Arousal/Participation Alert; Cooperative   Attention Within functional limits   Orientation Level Oriented to place;Oriented to situation;Oriented to person   Memory Within functional limits   Following Commands Follows all commands and directions without difficulty   Subjective   Subjective Pt is resting in bed; arousable; agreeable to mobilize   Bed Mobility   Supine to Sit 6  Modified independent   Transfers   Sit to Stand 6  Modified independent   Stand to Sit 6  Modified independent   Ambulation/Elevation   Gait pattern Excessively slow; Short stride  (no overt LOB)   Gait Assistance 6  Modified independent   Assistive Device None   Distance 2 x 220 ft w/ standing rest period in between; additional 8 ft in the room (to the BR)   Stair Management Assistance Not tested   Balance   Static Sitting Good   Static Standing Fair +   Ambulatory Fair   Activity Tolerance   Activity Tolerance Patient tolerated treatment well   Nurse Made Aware spoke to [employer] pravin Steward RN   Exercises   Knee AROM Long Arc Quad Sitting;20 reps;AROM; Bilateral  (2 sets)   Ankle Pumps Sitting;20 reps;AROM; Bilateral  (2 sets)   Marching Sitting;10 reps;AROM; Bilateral  (2 sets)   Assessment   Prognosis Good   Problem List Decreased strength;Decreased endurance   Assessment Pt cont improving w/ functional endurance, balance and mobility skills progressing to mod (I) level w/ all aspects of mobility on level surfaces; will need to address elevations prior to D/C; otherwise, anticipate pt will return home upon D/C when medically cleared; will follow   Goals   Patient Goals to get better   STG Expiration Date 11/27/22   PT Treatment Day 2   Plan   Treatment/Interventions Elevations; Endurance training;Spoke to nursing;Spoke to case management   Progress Improving as expected   PT Frequency Other (Comment)  (3-6x)   Recommendation   PT Discharge Recommendation Home with outpatient rehabilitation   AM-PAC Basic Mobility Inpatient   Turning in Bed Without Bedrails 4   Lying on Back to Sitting on Edge of Flat Bed 4   Moving Bed to Chair 4   Standing Up From Chair 4   Walk in Room 4   Climb 3-5 Stairs 3   Basic Mobility Inpatient Raw Score 23   Basic Mobility Standardized Score 50 88   Highest Level Of Mobility   JH-HLM Goal 7: Walk 25 feet or more   JH-HLM Achieved 8: Walk 250 feet ot more   Education   Education Provided Mobility training;Home exercise program   Patient Demonstrates verbal understanding   End of Consult Patient Position at End of Consult Seated edge of bed; All needs within reach     [facility]

## 2022-11-21 NOTE — PROGRESS NOTES
Progress Note    Douglas Johnson 75 y o  male MRN: [id number]  Unit/Bed#: [facility] 404-01 Encounter: [id number]    Assessment:  76 yo male with SCC RLL cancer s/p R VATS superior segmentectomy on 11/16, now with concern for a chyle leak  avss RA/afebrile  R CT: 1 L/serous & cloudy/suction/no air leak  UO: 600 cc  PLAN:  - PICC/TPN   - NPO   - d/w IR attending yesterday; embolization planned for Tuesday 11/22   - DVT/PE chemoppx  - OOB, IS  Subjective:   - no new complaints  Objective:     Vitals: Temp:  [98 °F (36 7 °C)-98 5 °F (36 9 °C)] 98 3 °F (36 8 °C)  HR:  [71-88] 88  Resp:  [16-18] 18  BP: (102-133)/(62-87) 112/72  Body mass index is 28 86 kg/m²  I/O       11/19 0701  11/20 0700 11/20 0701  11/21 0700    P  O  100     TPN 3198 2 1137 6    Total Intake(mL/kg) 3298 2 (38 3) 1137 6 (13 2)    Urine (mL/kg/hr) 850 (0 4) 600 (0 3)    Chest Tube 1800 1000    Total Output 2650 1600    Net +648 2 -462 4                Physical Exam:  GEN: NAD  HEENT: atraumatic  CV: RRR  Lung: Normal effort on room air  R CT in situ  Ab: Soft, NT/ND  Extrem: No CCE  Neuro: A+Ox3    Lab, Imaging and other studies:   I have personally reviewed pertinent reports    , CBC with diff:   Lab Results   Component Value Date    WBC 11 56 (H) 11/20/2022    HGB 15 5 11/20/2022    HCT 48 5 11/20/2022    MCV 95 11/20/2022     11/20/2022    MCH 30 5 11/20/2022    MCHC 32 0 11/20/2022    RDW 13 6 11/20/2022    MPV 10 9 11/20/2022    NRBC 0 11/20/2022   , BMP/CMP:   Lab Results   Component Value Date    SODIUM 136 11/20/2022    K 4 1 11/20/2022     11/20/2022    CO2 26 11/20/2022    BUN 43 (H) 11/20/2022    CREATININE 1 06 11/20/2022    CALCIUM 8 3 11/20/2022    EGFR 71 11/20/2022     VTE Pharmacologic Prophylaxis: Enoxaparin (Lovenox)  VTE Mechanical Prophylaxis: sequential compression device

## 2022-11-21 NOTE — OCCUPATIONAL THERAPY NOTE
Occupational Therapy Progress Note     Patient Name: Rasheed Bermudez  VMEKH'U Date: 11/21/2022  Problem List  Principal Problem:    Malignant neoplasm of lower lobe of right lung ([facility] )  Active Problems:    JAKI (obstructive sleep apnea)    Chronic bronchitis (HCC)    Abdominal aortic aneurysm (AAA) without rupture    Cervical radiculopathy    Cervical spondylosis without myelopathy    Tremor, essential    Class 1 obesity due to excess calories with serious comorbidity and body mass index (BMI) of 31 0 to 31 9 in adult    Smoking    Hypertension    Uncomplicated opioid dependence ([facility] )    Hyperlipidemia    Pre-diabetes    Acute kidney injury ([facility] )    Pulmonary emphysema (HCC)    Hemiparesis of left dominant side due to non-cerebrovascular etiology ([facility] )          11/21/22 0901   OT Last Visit   OT Visit Date 11/21/22   Note Type   Note Type Treatment   Pain Assessment   Pain Assessment Tool FLACC   Pain Rating: FLACC (Rest) - Face 0   Pain Rating: FLACC (Rest) - Legs 0   Pain Rating: FLACC (Rest) - Activity 0   Pain Rating: FLACC (Rest) - Cry 0   Pain Rating: FLACC (Rest) - Consolability 0   Score: FLACC (Rest) 0   Pain Rating: FLACC (Activity) - Face 1   Pain Rating: FLACC (Activity) - Legs 0   Pain Rating: FLACC (Activity) - Activity 0   Pain Rating: FLACC (Activity) - Cry 1   Pain Rating: FLACC (Activity) - Consolability 0   Score: FLACC (Activity) 2   Restrictions/Precautions   Other Precautions Multiple lines;Telemetry  (CT)   Lifestyle   Autonomy pt reports being fully independent at basline  +   Reciprocal Relationships supportive fiance   ADL   Where Assessed Edge of bed   LB Dressing Assistance 6  Modified independent   Transfers   Sit to Stand 6  Modified independent   Stand to Sit 6  Modified independent   Functional Mobility   Functional Mobility 5  Supervision   Additional Comments Pt demonstrated household mobility with no device or rest breaks     Additional items   (IV pole)   Cognition Overall Cognitive Status [facility]   Arousal/Participation Alert; Cooperative   Attention Within functional limits   Orientation Level Oriented X4   Memory Within functional limits   Following Commands Follows all commands and directions without difficulty   Comments Pt is very pleasant and cooperative  Activity Tolerance   Activity Tolerance Patient tolerated treatment well   Assessment   Assessment Patient participated in Skilled OT session this date with interventions consisting of ADL re training with the use of correct body mechanics and  therapeutic activities to: increase activity tolerance   Patient agreeable to OT treatment session, upon arrival patient was found seated at edge of bed  From OT standpoint, recommendation at time of d/c would be Home with family support and Outpatient OT  Pt reports that they are still trying to figure out the cause of his L UE numbness  Pt reports that he has no concerns about returning home, and hopes to return home soon  Recommend continued participation in [facility] and functional mobility with staff  No further acute OT needs, d/c OT  Plan   Treatment Interventions ADL retraining; Endurance training   Goal Expiration Date 11/27/22   OT Treatment Day 1   OT Frequency   (2-4)   Recommendation   OT Discharge Recommendation Home with outpatient rehabilitation  (outpatient hand therapy)   AM-PAC Daily Activity Inpatient   Lower Body Dressing 4   Bathing 4   Toileting 4   Upper Body Dressing 4   Grooming 4   Eating 4   Daily Activity Raw Score 24   Daily Activity Standardized Score (Calc for Raw Score >=11) 57 54   AM-PAC Applied Cognition Inpatient   Following a Speech/Presentation 4   Understanding Ordinary Conversation 4   Taking Medications 4   Remembering Where Things Are Placed or Put Away 4   Remembering List of 4-5 Errands 4   Taking Care of Complicated Tasks 4   Applied Cognition Raw Score 24   Applied Cognition Standardized Score 62 21   Modified Cecelia Scale   Modified [place] Scale 2       Ayesha Carias, MOT, OTR/L

## 2022-11-22 ENCOUNTER — ANESTHESIA (OUTPATIENT)
Dept: ANESTHESIOLOGY | Facility: HOSPITAL | Age: 67
End: 2022-11-22

## 2022-11-22 ENCOUNTER — ANESTHESIA EVENT (INPATIENT)
Dept: RADIOLOGY | Facility: HOSPITAL | Age: 67
End: 2022-11-22

## 2022-11-22 ENCOUNTER — APPOINTMENT (INPATIENT)
Dept: RADIOLOGY | Facility: HOSPITAL | Age: 67
End: 2022-11-22
Attending: RADIOLOGY

## 2022-11-22 ENCOUNTER — ANESTHESIA EVENT (OUTPATIENT)
Dept: ANESTHESIOLOGY | Facility: HOSPITAL | Age: 67
End: 2022-11-22

## 2022-11-22 ENCOUNTER — ANESTHESIA (INPATIENT)
Dept: RADIOLOGY | Facility: HOSPITAL | Age: 67
End: 2022-11-22

## 2022-11-22 LAB
ANION GAP SERPL CALCULATED.3IONS-SCNC: 6 MMOL/L (ref 4–13)
BASOPHILS # BLD AUTO: 0.06 THOUSANDS/ÂΜL (ref 0–0.1)
BASOPHILS NFR BLD AUTO: 1 % (ref 0–1)
BUN SERPL-MCNC: 37 MG/DL (ref 5–25)
CA-I BLD-SCNC: 1.15 MMOL/L (ref 1.12–1.32)
CALCIUM SERPL-MCNC: 8.4 MG/DL (ref 8.3–10.1)
CHLORIDE SERPL-SCNC: 102 MMOL/L (ref 96–108)
CO2 SERPL-SCNC: 26 MMOL/L (ref 21–32)
CREAT SERPL-MCNC: 0.92 MG/DL (ref 0.6–1.3)
EOSINOPHIL # BLD AUTO: 0 THOUSAND/ÂΜL (ref 0–0.61)
EOSINOPHIL NFR BLD AUTO: 0 % (ref 0–6)
ERYTHROCYTE [DISTWIDTH] IN BLOOD BY AUTOMATED COUNT: 13.3 % (ref 11.6–15.1)
GFR SERPL CREATININE-BSD FRML MDRD: 85 ML/MIN/1.73SQ M
GLUCOSE SERPL-MCNC: 133 MG/DL (ref 65–140)
GLUCOSE SERPL-MCNC: 207 MG/DL (ref 65–140)
GLUCOSE SERPL-MCNC: 225 MG/DL (ref 65–140)
HCT VFR BLD AUTO: 47.1 % (ref 36.5–49.3)
HGB BLD-MCNC: 15.7 G/DL (ref 12–17)
IMM GRANULOCYTES # BLD AUTO: 0.1 THOUSAND/UL (ref 0–0.2)
IMM GRANULOCYTES NFR BLD AUTO: 1 % (ref 0–2)
LYMPHOCYTES # BLD AUTO: 2.11 THOUSANDS/ÂΜL (ref 0.6–4.47)
LYMPHOCYTES NFR BLD AUTO: 19 % (ref 14–44)
MAGNESIUM SERPL-MCNC: 2.5 MG/DL (ref 1.6–2.6)
MCH RBC QN AUTO: 30.5 PG (ref 26.8–34.3)
MCHC RBC AUTO-ENTMCNC: 33.3 G/DL (ref 31.4–37.4)
MCV RBC AUTO: 92 FL (ref 82–98)
MONOCYTES # BLD AUTO: 1.1 THOUSAND/ÂΜL (ref 0.17–1.22)
MONOCYTES NFR BLD AUTO: 10 % (ref 4–12)
NEUTROPHILS # BLD AUTO: 7.5 THOUSANDS/ÂΜL (ref 1.85–7.62)
NEUTS SEG NFR BLD AUTO: 69 % (ref 43–75)
NRBC BLD AUTO-RTO: 0 /100 WBCS
PHOSPHATE SERPL-MCNC: 2.8 MG/DL (ref 2.3–4.1)
PLATELET # BLD AUTO: 189 THOUSANDS/UL (ref 149–390)
PMV BLD AUTO: 11.1 FL (ref 8.9–12.7)
POTASSIUM SERPL-SCNC: 3.9 MMOL/L (ref 3.5–5.3)
RBC # BLD AUTO: 5.14 MILLION/UL (ref 3.88–5.62)
SODIUM SERPL-SCNC: 134 MMOL/L (ref 135–147)
WBC # BLD AUTO: 10.87 THOUSAND/UL (ref 4.31–10.16)

## 2022-11-22 PROCEDURE — 07VK3DZ RESTRICTION OF THORACIC DUCT WITH INTRALUMINAL DEVICE, PERCUTANEOUS APPROACH: ICD-10-PCS | Performed by: RADIOLOGY

## 2022-11-22 PROCEDURE — B7011ZZ PLAIN RADIOGRAPHY OF BILATERAL ABDOMINAL/RETROPERITONEAL LYMPHATICS USING LOW OSMOLAR CONTRAST: ICD-10-PCS | Performed by: RADIOLOGY

## 2022-11-22 RX ORDER — ROCURONIUM BROMIDE 10 MG/ML
INJECTION, SOLUTION INTRAVENOUS AS NEEDED
Status: DISCONTINUED | OUTPATIENT
Start: 2022-11-22 | End: 2022-11-22

## 2022-11-22 RX ORDER — DIPHENHYDRAMINE HYDROCHLORIDE 50 MG/ML
12.5 INJECTION INTRAMUSCULAR; INTRAVENOUS ONCE AS NEEDED
Status: DISCONTINUED | OUTPATIENT
Start: 2022-11-22 | End: 2022-11-22 | Stop reason: HOSPADM

## 2022-11-22 RX ORDER — TOBRAMYCIN 3 MG/ML
1 SOLUTION/ DROPS OPHTHALMIC 4 TIMES DAILY
Status: DISCONTINUED | OUTPATIENT
Start: 2022-11-22 | End: 2022-11-27 | Stop reason: HOSPADM

## 2022-11-22 RX ORDER — ONDANSETRON 2 MG/ML
4 INJECTION INTRAMUSCULAR; INTRAVENOUS ONCE AS NEEDED
Status: COMPLETED | OUTPATIENT
Start: 2022-11-22 | End: 2022-11-22

## 2022-11-22 RX ORDER — MINERAL OIL AND PETROLATUM 150; 830 MG/G; MG/G
1 OINTMENT OPHTHALMIC
Status: DISCONTINUED | OUTPATIENT
Start: 2022-11-22 | End: 2022-11-27 | Stop reason: HOSPADM

## 2022-11-22 RX ORDER — CEFAZOLIN SODIUM 2 G/50ML
SOLUTION INTRAVENOUS
Status: COMPLETED | OUTPATIENT
Start: 2022-11-22 | End: 2022-11-22

## 2022-11-22 RX ORDER — HYDROMORPHONE HCL/PF 1 MG/ML
0.5 SYRINGE (ML) INJECTION
Status: DISCONTINUED | OUTPATIENT
Start: 2022-11-22 | End: 2022-11-22 | Stop reason: HOSPADM

## 2022-11-22 RX ORDER — SODIUM CHLORIDE 9 MG/ML
INJECTION, SOLUTION INTRAVENOUS CONTINUOUS PRN
Status: DISCONTINUED | OUTPATIENT
Start: 2022-11-22 | End: 2022-11-22

## 2022-11-22 RX ORDER — FENTANYL CITRATE 50 UG/ML
INJECTION, SOLUTION INTRAMUSCULAR; INTRAVENOUS AS NEEDED
Status: DISCONTINUED | OUTPATIENT
Start: 2022-11-22 | End: 2022-11-22

## 2022-11-22 RX ORDER — LIDOCAINE HYDROCHLORIDE 10 MG/ML
INJECTION, SOLUTION EPIDURAL; INFILTRATION; INTRACAUDAL; PERINEURAL AS NEEDED
Status: DISCONTINUED | OUTPATIENT
Start: 2022-11-22 | End: 2022-11-22

## 2022-11-22 RX ORDER — FENTANYL CITRATE/PF 50 MCG/ML
25 SYRINGE (ML) INJECTION
Status: DISCONTINUED | OUTPATIENT
Start: 2022-11-22 | End: 2022-11-22 | Stop reason: HOSPADM

## 2022-11-22 RX ORDER — CEFAZOLIN SODIUM 2 G/50ML
SOLUTION INTRAVENOUS AS NEEDED
Status: DISCONTINUED | OUTPATIENT
Start: 2022-11-22 | End: 2022-11-22

## 2022-11-22 RX ORDER — PROPOFOL 10 MG/ML
INJECTION, EMULSION INTRAVENOUS AS NEEDED
Status: DISCONTINUED | OUTPATIENT
Start: 2022-11-22 | End: 2022-11-22

## 2022-11-22 RX ORDER — NOREPINEPHRINE BITARTRATE 1 MG/ML
INJECTION, SOLUTION INTRAVENOUS AS NEEDED
Status: DISCONTINUED | OUTPATIENT
Start: 2022-11-22 | End: 2022-11-22

## 2022-11-22 RX ADMIN — ACETAMINOPHEN 975 MG: 325 TABLET ORAL at 11:26

## 2022-11-22 RX ADMIN — Medication: at 21:55

## 2022-11-22 RX ADMIN — SUGAMMADEX 200 MG: 100 INJECTION, SOLUTION INTRAVENOUS at 17:47

## 2022-11-22 RX ADMIN — OXYCODONE HYDROCHLORIDE 10 MG: 5 TABLET ORAL at 08:49

## 2022-11-22 RX ADMIN — Medication 3 MG: at 21:56

## 2022-11-22 RX ADMIN — INSULIN LISPRO 1 UNITS: 100 INJECTION, SOLUTION INTRAVENOUS; SUBCUTANEOUS at 00:09

## 2022-11-22 RX ADMIN — TAMSULOSIN HYDROCHLORIDE 0.8 MG: 0.4 CAPSULE ORAL at 19:42

## 2022-11-22 RX ADMIN — INSULIN LISPRO 1 UNITS: 100 INJECTION, SOLUTION INTRAVENOUS; SUBCUTANEOUS at 06:21

## 2022-11-22 RX ADMIN — CEFAZOLIN SODIUM 2000 MG: 2 SOLUTION INTRAVENOUS at 14:25

## 2022-11-22 RX ADMIN — PANTOPRAZOLE SODIUM 40 MG: 40 TABLET, DELAYED RELEASE ORAL at 06:21

## 2022-11-22 RX ADMIN — SODIUM CHLORIDE: 0.9 INJECTION, SOLUTION INTRAVENOUS at 17:36

## 2022-11-22 RX ADMIN — NOREPINEPHRINE BITARTRATE 32 MCG: 1 INJECTION INTRAVENOUS at 14:38

## 2022-11-22 RX ADMIN — ROCURONIUM BROMIDE 50 MG: 50 INJECTION INTRAVENOUS at 14:28

## 2022-11-22 RX ADMIN — NOREPINEPHRINE BITARTRATE 48 MCG: 1 INJECTION INTRAVENOUS at 14:33

## 2022-11-22 RX ADMIN — PROPOFOL 100 MG: 10 INJECTION, EMULSION INTRAVENOUS at 14:28

## 2022-11-22 RX ADMIN — BUPROPION HYDROCHLORIDE 150 MG: 150 TABLET, FILM COATED, EXTENDED RELEASE ORAL at 08:48

## 2022-11-22 RX ADMIN — GABAPENTIN 800 MG: 400 CAPSULE ORAL at 08:49

## 2022-11-22 RX ADMIN — METOPROLOL SUCCINATE 100 MG: 100 TABLET, EXTENDED RELEASE ORAL at 08:48

## 2022-11-22 RX ADMIN — LIDOCAINE HYDROCHLORIDE 50 MG: 10 INJECTION, SOLUTION EPIDURAL; INFILTRATION; INTRACAUDAL; PERINEURAL at 14:28

## 2022-11-22 RX ADMIN — GLYCERIN, HYPROMELLOSE, POLYETHYLENE GLYCOL 1 DROP: .2; .2; 1 LIQUID OPHTHALMIC at 19:54

## 2022-11-22 RX ADMIN — ACETAMINOPHEN 975 MG: 325 TABLET ORAL at 06:50

## 2022-11-22 RX ADMIN — AMLODIPINE BESYLATE 10 MG: 10 TABLET ORAL at 08:48

## 2022-11-22 RX ADMIN — HYDROMORPHONE HYDROCHLORIDE 0.5 MG: 1 INJECTION, SOLUTION INTRAMUSCULAR; INTRAVENOUS; SUBCUTANEOUS at 20:37

## 2022-11-22 RX ADMIN — NOREPINEPHRINE BITARTRATE 16 MCG: 1 INJECTION INTRAVENOUS at 15:55

## 2022-11-22 RX ADMIN — ACETAMINOPHEN 975 MG: 325 TABLET ORAL at 19:40

## 2022-11-22 RX ADMIN — NOREPINEPHRINE BITARTRATE 3 MCG/MIN: 1 INJECTION INTRAVENOUS at 14:33

## 2022-11-22 RX ADMIN — IOHEXOL 50 ML: 350 INJECTION, SOLUTION INTRAVENOUS at 18:04

## 2022-11-22 RX ADMIN — NICOTINE 1 PATCH: 21 PATCH, EXTENDED RELEASE TRANSDERMAL at 11:27

## 2022-11-22 RX ADMIN — GABAPENTIN 800 MG: 400 CAPSULE ORAL at 19:41

## 2022-11-22 RX ADMIN — ROCURONIUM BROMIDE 20 MG: 50 INJECTION INTRAVENOUS at 17:17

## 2022-11-22 RX ADMIN — ONDANSETRON 4 MG: 2 INJECTION INTRAMUSCULAR; INTRAVENOUS at 18:07

## 2022-11-22 RX ADMIN — ACETAMINOPHEN 975 MG: 325 TABLET ORAL at 00:09

## 2022-11-22 RX ADMIN — GABAPENTIN 800 MG: 400 CAPSULE ORAL at 22:05

## 2022-11-22 RX ADMIN — SODIUM CHLORIDE: 0.9 INJECTION, SOLUTION INTRAVENOUS at 14:22

## 2022-11-22 RX ADMIN — ROCURONIUM BROMIDE 20 MG: 50 INJECTION INTRAVENOUS at 16:26

## 2022-11-22 RX ADMIN — INSULIN LISPRO 2 UNITS: 100 INJECTION, SOLUTION INTRAVENOUS; SUBCUTANEOUS at 11:27

## 2022-11-22 RX ADMIN — OXYCODONE HYDROCHLORIDE 10 MG: 5 TABLET ORAL at 19:42

## 2022-11-22 RX ADMIN — FENTANYL CITRATE 100 MCG: 50 INJECTION INTRAMUSCULAR; INTRAVENOUS at 14:28

## 2022-11-22 NOTE — ANESTHESIA POSTPROCEDURE EVALUATION
Post-Op Assessment Note    CV Status:  Stable  Pain Score: 0    Pain management: adequate     Mental Status:  Alert and awake   Hydration Status:  Euvolemic   PONV Controlled:  Controlled   Airway Patency:  Patent      Post Op Vitals Reviewed: Yes      Staff: CRNA         No notable events documented      /67 (11/22/22 1800)    Temp 98 2 °F (36 8 °C) (11/22/22 1800)    Pulse 74 (11/22/22 1800)   Resp 18 (11/22/22 1800)    SpO2 96 % (11/22/22 1800)

## 2022-11-22 NOTE — H&P
Interventional Radiology Preprocedure Note     History/Indication for procedure:   Clarita Garcia is a 75 y o  male with chylothorax s/p right lower lobectomy concerning for thoracic duct leak  He presents for intranodal lymphangiogram with thoracic duct access and embolization (TDE)  Risks, benefits and alternative have been discussed with the patient and his significant other, Kahlil Marcelo  We discussed risks including but not limited to bleeding, infection, pancreatitis, injury to the surround tissues/organs in the abdomen, chylous ascites, and lymphedema of the lower extremity  We discussed that if this attempt is unsuccessful, he may need additional future attempts and/or retrograde transcervical or transvenous access  After our discussions, the patient offers consent to proceed with TDE  All questions and concerns have been addressed  Relevant past medical history:    Past Medical History:   Diagnosis Date   • Abdominal aortic aneurysm without rupture    • Abdominal Aortic Duplex 02/21/2017    Ectatic infrarenal abdominal aorta  • CAD (coronary artery disease)    • COPD (chronic obstructive pulmonary disease) (HCC)    • Extremity pain    • History of echocardiogram 03/18/2014    EF 55%, mild MR and AI  Mild concentric LVH     • History of stress test 03/06/2017    Normal    • Hyperlipidemia    • Hypertension    • Joint pain    • Kidney stone    • Low back pain    • Migraine    • Neck pain    • Obstructive sleep apnea     cannot tolerate CPAP   • Osteoarthritis    • Peripheral neuropathy    • Reflex sympathetic dystrophy      Patient Active Problem List   Diagnosis   • JAKI (obstructive sleep apnea)   • Chronic bronchitis (HCC)   • Abdominal aortic aneurysm (AAA) without rupture   • Lumbar spondylosis   • Lumbar degenerative disc disease   • Myofascial pain syndrome   • Chronic low back pain without sciatica   • Cervical radiculopathy   • Cervical spondylosis without myelopathy   • Tremor, essential   • Negative depression screening   • Chronic pain of both knees   • Class 1 obesity due to excess calories with serious comorbidity and body mass index (BMI) of 31 0 to 31 9 in adult   • Smoking   • Hypertension   • Chronic pain syndrome   • Uncomplicated opioid dependence ([facility] )   • Encounter for long-term opiate analgesic use   • Neck pain   • Hyperlipidemia   • Pre-diabetes   • Erectile dysfunction   • Insomnia   • Sacroiliitis (HCC)   • Cortical age-related cataract of both eyes   • Urinary frequency   • Acute kidney injury ([facility] )   • Pulmonary nodules   • Kidney stone   • Pulmonary emphysema (HCC)   • Malignant neoplasm of lower lobe of right lung ([facility] )   • Encounter for smoking cessation counseling   • Hemiparesis of left dominant side due to non-cerebrovascular etiology (HCC)       /66   Pulse 78   Temp 98 °F (36 7 °C)   Resp 19   Ht 5' 8" (1 727 m)   Wt 83 5 kg (184 lb 1 6 oz)   SpO2 91%   BMI 27 99 kg/m²     Medications:    Inpatient Medications:     Scheduled Medications:  Current Facility-Administered Medications   Medication Dose Route Frequency Provider Last Rate   • acetaminophen  975 mg Oral Q6H Justin Whitney PA-C     • Adult TPN (CUSTOM BASE/STANDARD ELECTROLYTE)   Intravenous Continuous TPN Yoli Ames MD 96 mL/hr at 11/21/22 2142   • Adult TPN (CUSTOM BASE/STANDARD ELECTROLYTE)   Intravenous Continuous TPN Nenita Mantilla MD     • albuterol  2 puff Inhalation Q6H PRN Justin Whitney PA-C     • amLODIPine  10 mg Oral Daily Justin Whitney PA-C     • buPROPion  150 mg Oral QAM Justin Whitney PA-C     • docusate sodium  100 mg Oral BID Justin Whitney PA-C     • enoxaparin  40 mg Subcutaneous Daily Justin Whitney PA-C     • gabapentin  800 mg Oral TID Justin Whitney PA-C     • HYDROmorphone  0 5 mg Intravenous Q3H PRN Justin Whitney PA-C     • HYDROmorphone  1 mg Intravenous Once Vibha Rivera MD     • insulin lispro  1-5 Units Subcutaneous Q6H [facility] Gael Ramos MD     • melatonin  3 mg Oral HS Nguyen Harris MD     • methocarbamol  500 mg Oral Q6H PRN Jaqueline Metcalf MD     • metoprolol succinate  100 mg Oral Daily Verónica Hobbs PA-C     • nicotine  1 patch Transdermal Q24H Verónica Hobbs PA-C     • ondansetron  4 mg Intravenous Q6H PRN Verónica Hobbs PA-C     • oxyCODONE  10 mg Oral Q4H PRN Verónica Hobbs PA-C     • oxyCODONE  5 mg Oral Q4H PRN Verónica Hobbs PA-C     • pantoprazole  40 mg Oral Early Morning Verónica Hobbs PA-C     • polyethylene glycol  17 g Oral Daily Verónica Hobbs PA-C     • senna  1 tablet Oral Daily Verónica Hobbs PA-C     • tamsulosin  0 8 mg Oral Daily With Dinner Verónica Hobbs PA-C         Infusions:  Adult TPN (CUSTOM BASE/STANDARD ELECTROLYTE), , Last Rate: 96 mL/hr at 11/21/22 2142  Adult TPN (CUSTOM BASE/STANDARD ELECTROLYTE),         PRN:  •  albuterol  •  HYDROmorphone  •  methocarbamol  •  ondansetron  •  oxyCODONE  •  oxyCODONE    Outpatient Medications:  No current facility-administered medications on file prior to encounter       Current Outpatient Medications on File Prior to Encounter   Medication Sig Dispense Refill   • amLODIPine (NORVASC) 10 mg tablet swallow 1 tablet once daily (Patient taking differently: daily at bedtime) 90 tablet 3   • lisinopril (ZESTRIL) 20 mg tablet take 1 tablet by mouth once daily (Patient taking differently: daily at bedtime) 30 tablet 5   • rosuvastatin (CRESTOR) 20 MG tablet take 1 tablet by mouth once daily (Patient taking differently: daily at bedtime) 90 tablet 5   • tamsulosin (FLOMAX) 0 4 mg Take 2 capsules (0 8 mg total) by mouth daily with dinner 60 capsule 0   • topiramate (TOPAMAX) 25 mg tablet FOLLOW INSTRUCTIONS GIVEN TO TITRATE UP TO A MAX OF 2 TABLETS BY MOUTH TWICE DAILY AS TOLERATED AND TO LEVEL OF BENEFIT FOR TREMORS 120 tablet 6   • traZODone (DESYREL) 100 mg tablet take 1 tablet by mouth daily at bedtime 90 tablet 3   • albuterol (Ventolin HFA) 90 mcg/act inhaler Inhale 2 puffs every 6 (six) hours as needed for wheezing 18 g 11   • glucose blood (OneTouch Verio) test strip Test once daily 100 each 0   • nitroglycerin (NITROSTAT) 0 4 mg SL tablet Place 1 tablet (0 4 mg total) under the tongue every 5 (five) minutes as needed for chest pain 25 tablet 5   • sildenafil (VIAGRA) 100 mg tablet Take 1 tablet (100 mg total) by mouth daily as needed for erectile dysfunction 20 tablet 0   • tiotropium-olodaterol (Stiolto Respimat) 2 5-2 5 MCG/ACT inhaler Inhale 2 puffs daily (Patient taking differently: Inhale 2 puffs as needed) 4 g 11       No Known Allergies    Anticoagulants: none     Labs:   CBC with diff:   Lab Results   Component Value Date    WBC 10 87 (H) 11/22/2022    HGB 15 7 11/22/2022    HCT 47 1 11/22/2022    MCV 92 11/22/2022     11/22/2022    MCH 30 5 11/22/2022    MCHC 33 3 11/22/2022    RDW 13 3 11/22/2022    MPV 11 1 11/22/2022    NRBC 0 11/22/2022     BMP/CMP:  Lab Results   Component Value Date    K 3 9 11/22/2022     11/22/2022    CO2 26 11/22/2022    BUN 37 (H) 11/22/2022    CREATININE 0 92 11/22/2022    CALCIUM 8 4 11/22/2022    AST 12 11/19/2022    ALT 11 (L) 11/19/2022    ALKPHOS 53 11/19/2022    EGFR 85 11/22/2022   ,     Coags:   Lab Results   Component Value Date    PTT 31 11/09/2022    INR 0 92 11/09/2022   ,          Relevant imaging studies:   CT abd/pelvis reviewed  Directed physical examination:  NAD  AAO x3  Normal respiratory effort  RRR    Assessment/Plan:   Bilateral inguinal intranodal lymphangiogram with TDE  Sedation/Anesthesia plan: General anesthesia will be used as needed for procedure      Consent with alternatives to the procedure, risks and benefits have been explained and discussed with the patient/patient's family: yes

## 2022-11-22 NOTE — PROGRESS NOTES
Progress Note - Sushma Cárdenas 75 y o  male MRN: [id number]    Unit/Bed#: [facility] 404-01 Encounter: [id number]      Assessment:  76 y/o M w SCC of RLL s/p R VATS superior segmentectomy 11/16 c/b chyle leak  Vss  Afebrile  R  cc ouput thin sero chylous effluent  Plan:  Continue picc tpn, npo  Hold lovenox this am  Thoracic duct embolization with IR @ 1pm today  Ambulate pulmonary toilet  Continue chest tube to suction    Subjective:   Denied any chest pain or shortness of breath  Denied fever or chills  Objective:     Vitals: Blood pressure 110/68, pulse 68, temperature 98 2 °F (36 8 °C), temperature source Oral, resp  rate 17, height 5' 8" (1 727 m), weight 83 5 kg (184 lb 1 6 oz), SpO2 92 %  ,Body mass index is 27 99 kg/m²  Intake/Output Summary (Last 24 hours) at 11/22/2022 0020  Last data filed at 11/21/2022 2200  Gross per 24 hour   Intake 768 ml   Output 1725 ml   Net -957 ml       Physical Exam  General: NAD  HEENT: NC/AT  MMM  Cv: RRR     Lungs: normal effort  Ab: Soft, NT/ND  Ex: no CCE  Neuro: AAOx3    Scheduled Meds:  Current Facility-Administered Medications   Medication Dose Route Frequency Provider Last Rate   • acetaminophen  975 mg Oral Q6H Aide Mckinnon PA-C     • Adult TPN (CUSTOM BASE/STANDARD ELECTROLYTE)   Intravenous Continuous TPN Adonica Mortimer, MD 96 mL/hr at 11/21/22 2142   • albuterol  2 puff Inhalation Q6H PRN Aide Mckinnon PA-C     • amLODIPine  10 mg Oral Daily Aide Mckinnon PA-C     • buPROPion  150 mg Oral QAM Aide Mckinnon PA-C     • docusate sodium  100 mg Oral BID Aide Mckinnon PA-C     • enoxaparin  40 mg Subcutaneous Daily Aide Mckinnon PA-C     • gabapentin  800 mg Oral TID Aide Mckinnon PA-C     • HYDROmorphone  0 5 mg Intravenous Q3H PRN Aide Mckinnon PA-C     • HYDROmorphone  1 mg Intravenous Once Blaine Jones MD     • insulin lispro  1-5 Units Subcutaneous Q6H [address] Bibi Valentin MD     • melatonin  3 mg Oral HS [place] Kim Perea MD     • methocarbamol  500 mg Oral Q6H PRN Lennie Canales MD     • metoprolol succinate  100 mg Oral Daily Cezar Lam PA-C     • nicotine  1 patch Transdermal Q24H Cezar Lam PA-C     • ondansetron  4 mg Intravenous Q6H PRN Cezar Lam PA-C     • oxyCODONE  10 mg Oral Q4H PRN Cezar Lam PA-C     • oxyCODONE  5 mg Oral Q4H PRN Cezar Lam PA-C     • pantoprazole  40 mg Oral Early Morning Cezar Lam PA-C     • polyethylene glycol  17 g Oral Daily Cezar Lam PA-C     • senna  1 tablet Oral Daily Cezar Lam PA-C     • tamsulosin  0 8 mg Oral Daily With Dinner Cezar Lam PA-C       Continuous Infusions:Adult TPN (CUSTOM BASE/STANDARD ELECTROLYTE), , Last Rate: 96 mL/hr at 11/21/22 2142      PRN Meds: •  albuterol  •  HYDROmorphone  •  methocarbamol  •  ondansetron  •  oxyCODONE  •  oxyCODONE    Invasive Devices     Peripherally Inserted Central Catheter Line  Duration           PICC Line 14/78/10 Right Basilic 4 days          Drain  Duration           Chest Tube 1 Right Pleural 24 Fr  5 days                Lab, Imaging and other studies: I have personally reviewed pertinent reports      VTE Pharmacologic Prophylaxis: Sequential compression device (Venodyne)   VTE Mechanical Prophylaxis: sequential compression device

## 2022-11-22 NOTE — SEDATION DOCUMENTATION
Thoracic duct embolization completed by Dr Anthony Banuelos   Bilateral groin lymph catheters removed by Dr Percy Hastings   Mid sub-sternal lymph puncture clean dry intact with band aid  Extubated and discharged to PACU  Report given to PACU RN

## 2022-11-22 NOTE — PHYSICAL THERAPY NOTE
Physical Therapy Cancellation Note             11/22/22 1307   Note Type   Note Type Cancelled Session   Cancel Reasons Patient off floor/test     Chart reviewed; attempted to see pt in PM; pt is leaving to IR for embolization; will follow as clinical course allows      Purvi Edwards

## 2022-11-22 NOTE — BRIEF OP NOTE (RAD/CATH)
INTERVENTIONAL RADIOLOGY PROCEDURE NOTE    Date: 11/22/2022    Procedure: IR THORACIC DUCT EMBOLIZATION     Preoperative diagnosis:   1  Injury of thoracic duct, initial encounter    2  Malignant neoplasm of lower lobe of right lung (HCC)         Postoperative diagnosis: Same  Surgeon: Zoran Dueñas MD     Assistant: None  No qualified resident was available  Blood loss: Minimal    Specimens: None    Findings:   Lymphangiogram demonstrated a thoracic duct leak into the right hemithorax  Successful thoracic duct embolization with coils and glue  Complications: None immediate      Anesthesia: general anesthesia

## 2022-11-22 NOTE — ANESTHESIA PREPROCEDURE EVALUATION
Procedure:  IR THORACIC DUCT EMBOLIZATION    Relevant Problems   CARDIO   (+) Abdominal aortic aneurysm (AAA) without rupture   (+) Hyperlipidemia   (+) Hypertension      /RENAL   (+) Acute kidney injury ([address] )   (+) Kidney stone      MUSCULOSKELETAL   (+) Cervical spondylosis without myelopathy   (+) Chronic low back pain without sciatica   (+) Lumbar degenerative disc disease   (+) Lumbar spondylosis   (+) Myofascial pain syndrome   (+) Sacroiliitis (HCC)      NEURO/PSYCH   (+) Chronic low back pain without sciatica   (+) Chronic pain syndrome   (+) Myofascial pain syndrome      PULMONARY   (+) Chronic bronchitis (HCC)   (+) JAKI (obstructive sleep apnea)   (+) Pulmonary emphysema (HCC)   (+) Smoking        Physical Exam    Airway       Dental       Cardiovascular  Cardiovascular exam normal    Pulmonary  Pulmonary exam normal     Other Findings        Anesthesia Plan  ASA Score- 3     Anesthesia Type- general with ASA Monitors  Additional Monitors:   Airway Plan: ETT  Comment: EF 55%, no major valve issues  S/p VATS/lobectomy six days ago with no issues  Now with a thoracic duct leak          Plan Factors-Exercise tolerance (METS): >4 METS  Chart reviewed  EKG reviewed  Imaging results reviewed  Existing labs reviewed  Patient summary reviewed  Patient is not a current smoker  Induction- intravenous  Postoperative Plan- Plan for postoperative opioid use  Planned trial extubation    Informed Consent- Anesthetic plan and risks discussed with patient  I personally reviewed this patient with the CRNA  Discussed and agreed on the Anesthesia Plan with the CRNA  Anu Toscano
1

## 2022-11-23 ENCOUNTER — APPOINTMENT (INPATIENT)
Dept: RADIOLOGY | Facility: HOSPITAL | Age: 67
End: 2022-11-23

## 2022-11-23 ENCOUNTER — APPOINTMENT (OUTPATIENT)
Dept: RADIOLOGY | Facility: HOSPITAL | Age: 67
End: 2022-11-23

## 2022-11-23 LAB
2HR DELTA HS TROPONIN: -2 NG/L
ALBUMIN SERPL BCP-MCNC: 1.7 G/DL (ref 3.5–5)
ALP SERPL-CCNC: 64 U/L (ref 46–116)
ALT SERPL W P-5'-P-CCNC: 36 U/L (ref 12–78)
ANION GAP SERPL CALCULATED.3IONS-SCNC: 7 MMOL/L (ref 4–13)
AST SERPL W P-5'-P-CCNC: 32 U/L (ref 5–45)
BASOPHILS # BLD AUTO: 0.08 THOUSANDS/ÂΜL (ref 0–0.1)
BASOPHILS NFR BLD AUTO: 1 % (ref 0–1)
BILIRUB SERPL-MCNC: 0.56 MG/DL (ref 0.2–1)
BUN SERPL-MCNC: 40 MG/DL (ref 5–25)
CALCIUM ALBUM COR SERPL-MCNC: 9.9 MG/DL (ref 8.3–10.1)
CALCIUM SERPL-MCNC: 8.1 MG/DL (ref 8.3–10.1)
CARDIAC TROPONIN I PNL SERPL HS: 10 NG/L
CARDIAC TROPONIN I PNL SERPL HS: 8 NG/L
CHLORIDE SERPL-SCNC: 102 MMOL/L (ref 96–108)
CO2 SERPL-SCNC: 24 MMOL/L (ref 21–32)
CREAT SERPL-MCNC: 1.17 MG/DL (ref 0.6–1.3)
EOSINOPHIL # BLD AUTO: 0 THOUSAND/ÂΜL (ref 0–0.61)
EOSINOPHIL NFR BLD AUTO: 0 % (ref 0–6)
ERYTHROCYTE [DISTWIDTH] IN BLOOD BY AUTOMATED COUNT: 13.5 % (ref 11.6–15.1)
GFR SERPL CREATININE-BSD FRML MDRD: 63 ML/MIN/1.73SQ M
GLUCOSE SERPL-MCNC: 102 MG/DL (ref 65–140)
GLUCOSE SERPL-MCNC: 119 MG/DL (ref 65–140)
GLUCOSE SERPL-MCNC: 148 MG/DL (ref 65–140)
GLUCOSE SERPL-MCNC: 181 MG/DL (ref 65–140)
GLUCOSE SERPL-MCNC: 225 MG/DL (ref 65–140)
GLUCOSE SERPL-MCNC: 238 MG/DL (ref 65–140)
GLUCOSE SERPL-MCNC: 263 MG/DL (ref 65–140)
GLUCOSE SERPL-MCNC: 99 MG/DL (ref 65–140)
HCT VFR BLD AUTO: 45.3 % (ref 36.5–49.3)
HGB BLD-MCNC: 15.1 G/DL (ref 12–17)
IMM GRANULOCYTES # BLD AUTO: 0.1 THOUSAND/UL (ref 0–0.2)
IMM GRANULOCYTES NFR BLD AUTO: 1 % (ref 0–2)
LACTATE SERPL-SCNC: 1.6 MMOL/L (ref 0.5–2)
LIPASE SERPL-CCNC: 1222 U/L (ref 73–393)
LYMPHOCYTES # BLD AUTO: 2.21 THOUSANDS/ÂΜL (ref 0.6–4.47)
LYMPHOCYTES NFR BLD AUTO: 14 % (ref 14–44)
MAGNESIUM SERPL-MCNC: 2.3 MG/DL (ref 1.6–2.6)
MCH RBC QN AUTO: 30.4 PG (ref 26.8–34.3)
MCHC RBC AUTO-ENTMCNC: 33.3 G/DL (ref 31.4–37.4)
MCV RBC AUTO: 91 FL (ref 82–98)
MONOCYTES # BLD AUTO: 1.58 THOUSAND/ÂΜL (ref 0.17–1.22)
MONOCYTES NFR BLD AUTO: 10 % (ref 4–12)
NEUTROPHILS # BLD AUTO: 12.2 THOUSANDS/ÂΜL (ref 1.85–7.62)
NEUTS SEG NFR BLD AUTO: 74 % (ref 43–75)
NRBC BLD AUTO-RTO: 0 /100 WBCS
PHOSPHATE SERPL-MCNC: 3.8 MG/DL (ref 2.3–4.1)
PLATELET # BLD AUTO: 189 THOUSANDS/UL (ref 149–390)
PMV BLD AUTO: 10.9 FL (ref 8.9–12.7)
POTASSIUM SERPL-SCNC: 4.9 MMOL/L (ref 3.5–5.3)
PROT SERPL-MCNC: 5.1 G/DL (ref 6.4–8.4)
RBC # BLD AUTO: 4.96 MILLION/UL (ref 3.88–5.62)
SODIUM SERPL-SCNC: 133 MMOL/L (ref 135–147)
TRIGL SERPL-MCNC: 333 MG/DL
WBC # BLD AUTO: 16.17 THOUSAND/UL (ref 4.31–10.16)

## 2022-11-23 RX ORDER — SODIUM CHLORIDE, SODIUM LACTATE, POTASSIUM CHLORIDE, CALCIUM CHLORIDE 600; 310; 30; 20 MG/100ML; MG/100ML; MG/100ML; MG/100ML
75 INJECTION, SOLUTION INTRAVENOUS CONTINUOUS
Status: DISCONTINUED | OUTPATIENT
Start: 2022-11-23 | End: 2022-11-25

## 2022-11-23 RX ORDER — INSULIN GLARGINE 100 [IU]/ML
5 INJECTION, SOLUTION SUBCUTANEOUS
Status: DISCONTINUED | OUTPATIENT
Start: 2022-11-23 | End: 2022-11-25

## 2022-11-23 RX ORDER — HYDROMORPHONE HCL/PF 1 MG/ML
0.5 SYRINGE (ML) INJECTION ONCE
Status: COMPLETED | OUTPATIENT
Start: 2022-11-23 | End: 2022-11-23

## 2022-11-23 RX ORDER — INSULIN GLARGINE 100 [IU]/ML
10 INJECTION, SOLUTION SUBCUTANEOUS
Status: DISCONTINUED | OUTPATIENT
Start: 2022-11-23 | End: 2022-11-23

## 2022-11-23 RX ADMIN — OXYCODONE HYDROCHLORIDE 10 MG: 5 TABLET ORAL at 21:14

## 2022-11-23 RX ADMIN — GABAPENTIN 800 MG: 400 CAPSULE ORAL at 21:14

## 2022-11-23 RX ADMIN — OXYCODONE HYDROCHLORIDE 10 MG: 5 TABLET ORAL at 00:05

## 2022-11-23 RX ADMIN — ENOXAPARIN SODIUM 40 MG: 40 INJECTION SUBCUTANEOUS at 09:16

## 2022-11-23 RX ADMIN — OXYCODONE HYDROCHLORIDE 10 MG: 5 TABLET ORAL at 06:09

## 2022-11-23 RX ADMIN — ACETAMINOPHEN 975 MG: 325 TABLET ORAL at 11:53

## 2022-11-23 RX ADMIN — IOHEXOL 100 ML: 350 INJECTION, SOLUTION INTRAVENOUS at 13:23

## 2022-11-23 RX ADMIN — GABAPENTIN 800 MG: 400 CAPSULE ORAL at 16:51

## 2022-11-23 RX ADMIN — INSULIN LISPRO 2 UNITS: 100 INJECTION, SOLUTION INTRAVENOUS; SUBCUTANEOUS at 07:25

## 2022-11-23 RX ADMIN — INSULIN LISPRO 1 UNITS: 100 INJECTION, SOLUTION INTRAVENOUS; SUBCUTANEOUS at 13:54

## 2022-11-23 RX ADMIN — TOBRAMYCIN 1 DROP: 3 SOLUTION/ DROPS OPHTHALMIC at 09:20

## 2022-11-23 RX ADMIN — INSULIN GLARGINE 5 UNITS: 100 INJECTION, SOLUTION SUBCUTANEOUS at 21:14

## 2022-11-23 RX ADMIN — INSULIN LISPRO 1 UNITS: 100 INJECTION, SOLUTION INTRAVENOUS; SUBCUTANEOUS at 00:06

## 2022-11-23 RX ADMIN — OXYCODONE HYDROCHLORIDE 10 MG: 5 TABLET ORAL at 16:51

## 2022-11-23 RX ADMIN — BUPROPION HYDROCHLORIDE 150 MG: 150 TABLET, FILM COATED, EXTENDED RELEASE ORAL at 09:17

## 2022-11-23 RX ADMIN — METHOCARBAMOL 500 MG: 500 TABLET ORAL at 02:40

## 2022-11-23 RX ADMIN — HYDROMORPHONE HYDROCHLORIDE 0.5 MG: 1 INJECTION, SOLUTION INTRAMUSCULAR; INTRAVENOUS; SUBCUTANEOUS at 23:29

## 2022-11-23 RX ADMIN — DOCUSATE SODIUM 100 MG: 100 CAPSULE, LIQUID FILLED ORAL at 09:17

## 2022-11-23 RX ADMIN — ACETAMINOPHEN 975 MG: 325 TABLET ORAL at 17:00

## 2022-11-23 RX ADMIN — IOHEXOL 50 ML: 240 INJECTION, SOLUTION INTRATHECAL; INTRAVASCULAR; INTRAVENOUS; ORAL at 09:18

## 2022-11-23 RX ADMIN — PANTOPRAZOLE SODIUM 40 MG: 40 TABLET, DELAYED RELEASE ORAL at 06:08

## 2022-11-23 RX ADMIN — HYDROMORPHONE HYDROCHLORIDE 0.5 MG: 1 INJECTION, SOLUTION INTRAMUSCULAR; INTRAVENOUS; SUBCUTANEOUS at 02:40

## 2022-11-23 RX ADMIN — WHITE PETROLATUM 57.7 %-MINERAL OIL 31.9 % EYE OINTMENT 1 APPLICATION: at 00:01

## 2022-11-23 RX ADMIN — GABAPENTIN 800 MG: 400 CAPSULE ORAL at 09:17

## 2022-11-23 RX ADMIN — ACETAMINOPHEN 975 MG: 325 TABLET ORAL at 23:29

## 2022-11-23 RX ADMIN — METOPROLOL SUCCINATE 100 MG: 100 TABLET, EXTENDED RELEASE ORAL at 09:17

## 2022-11-23 RX ADMIN — HYDROMORPHONE HYDROCHLORIDE 0.5 MG: 1 INJECTION, SOLUTION INTRAMUSCULAR; INTRAVENOUS; SUBCUTANEOUS at 15:15

## 2022-11-23 RX ADMIN — SENNOSIDES 8.6 MG: 8.6 TABLET, FILM COATED ORAL at 09:17

## 2022-11-23 RX ADMIN — ACETAMINOPHEN 975 MG: 325 TABLET ORAL at 00:05

## 2022-11-23 RX ADMIN — NICOTINE 1 PATCH: 21 PATCH, EXTENDED RELEASE TRANSDERMAL at 10:23

## 2022-11-23 RX ADMIN — TAMSULOSIN HYDROCHLORIDE 0.8 MG: 0.4 CAPSULE ORAL at 16:51

## 2022-11-23 RX ADMIN — DOCUSATE SODIUM 100 MG: 100 CAPSULE, LIQUID FILLED ORAL at 17:00

## 2022-11-23 RX ADMIN — Medication 3 MG: at 21:14

## 2022-11-23 RX ADMIN — HYDROMORPHONE HYDROCHLORIDE 0.5 MG: 1 INJECTION, SOLUTION INTRAMUSCULAR; INTRAVENOUS; SUBCUTANEOUS at 03:16

## 2022-11-23 RX ADMIN — ACETAMINOPHEN 975 MG: 325 TABLET ORAL at 06:09

## 2022-11-23 RX ADMIN — OXYCODONE HYDROCHLORIDE 10 MG: 5 TABLET ORAL at 11:42

## 2022-11-23 RX ADMIN — Medication: at 21:12

## 2022-11-23 RX ADMIN — SODIUM CHLORIDE, POTASSIUM CHLORIDE, SODIUM LACTATE AND CALCIUM CHLORIDE 150 ML/HR: 600; 310; 30; 20 INJECTION, SOLUTION INTRAVENOUS at 13:50

## 2022-11-23 RX ADMIN — SODIUM CHLORIDE, POTASSIUM CHLORIDE, SODIUM LACTATE AND CALCIUM CHLORIDE 150 ML/HR: 600; 310; 30; 20 INJECTION, SOLUTION INTRAVENOUS at 21:13

## 2022-11-23 RX ADMIN — AMLODIPINE BESYLATE 10 MG: 10 TABLET ORAL at 09:17

## 2022-11-23 RX ADMIN — HYDROMORPHONE HYDROCHLORIDE 0.5 MG: 1 INJECTION, SOLUTION INTRAMUSCULAR; INTRAVENOUS; SUBCUTANEOUS at 12:12

## 2022-11-23 NOTE — PHYSICAL THERAPY NOTE
Physical Therapy Cancellation Note           11/23/22 1015   Note Type   Note Type Cancelled Session   Cancel Reasons Refusal     Chart reviewed; attempted to see pt in AM; pt is observed sitting on edge of bed; reports much discomfort and is unable to mobilize; also awaits CT scan; will follow as clinical course allows      Khloe Chris

## 2022-11-23 NOTE — PROGRESS NOTES
Progress Note -Interventional Radiology PA  Brennen March 75 y o  male MRN: [id number]  Unit/Bed#: [facility] 404-01 Encounter: [id number]    Assessment:    75year old male with pmh of right VATS superior segmentectomy 11/16 with subsequent chyle leak, s/p IR thoracic duct embolization 11/22/22  Patient with severe central abdominal pain    Chest tube output: 50cc since TDE     Plan:  - patient with likely pancreatitis s/p IR thoracic duct embolization  Unfortunately, this can be common after this procedure  Please continue pain management  - continue to monitor output from chest tube  Appears output has significantly decreased  Patient Active Problem List   Diagnosis   • JAKI (obstructive sleep apnea)   • Chronic bronchitis (HCC)   • Abdominal aortic aneurysm (AAA) without rupture   • Lumbar spondylosis   • Lumbar degenerative disc disease   • Myofascial pain syndrome   • Chronic low back pain without sciatica   • Cervical radiculopathy   • Cervical spondylosis without myelopathy   • Tremor, essential   • Negative depression screening   • Chronic pain of both knees   • Class 1 obesity due to excess calories with serious comorbidity and body mass index (BMI) of 31 0 to 31 9 in adult   • Smoking   • Hypertension   • Chronic pain syndrome   • Uncomplicated opioid dependence ([address] )   • Encounter for long-term opiate analgesic use   • Neck pain   • Hyperlipidemia   • Pre-diabetes   • Erectile dysfunction   • Insomnia   • Sacroiliitis (HCC)   • Cortical age-related cataract of both eyes   • Urinary frequency   • Acute kidney injury ([address] )   • Pulmonary nodules   • Kidney stone   • Pulmonary emphysema (HCC)   • Malignant neoplasm of lower lobe of right lung ([address] )   • Encounter for smoking cessation counseling   • Hemiparesis of left dominant side due to non-cerebrovascular etiology ([address] )          Subjective: Patient with moderate abdominal pain  Has improved since last night  Output from chest tube has decreased  Objective:    Vitals:  /69 (BP Location: Right arm)   Pulse 72   Temp 97 7 °F (36 5 °C) (Oral)   Resp 17   Ht 5' 8" (1 727 m)   Wt 84 8 kg (187 lb)   SpO2 95%   BMI 28 43 kg/m²   Body mass index is 28 43 kg/m²    Weight (last 2 days)     Date/Time Weight    11/23/22 0600 84 8 (187)    11/21/22 06:51:08 83 5 (184 1)          I/Os:    Intake/Output Summary (Last 24 hours) at 11/23/2022 1615  Last data filed at 11/23/2022 1401  Gross per 24 hour   Intake 4366 49 ml   Output 800 ml   Net 3566 49 ml       Invasive Devices     Peripherally Inserted Central Catheter Line  Duration           PICC Line 91/76/35 Right Basilic 6 days          Drain  Duration           Chest Tube 1 Right Pleural 24 Fr  7 days    Urethral Catheter Latex 16 Fr  1 day                Physical Exam:  General appearance: alert and oriented, in no acute distress  Lungs: clear to auscultation bilaterally  Heart: regular rate and rhythm, S1, S2 normal, no murmur, click, rub or gallop  Abdomen: soft, bowel sounds +, mid abdomen tenderness, no masses appreciated  Skin: TDE bandage clean                Lab Results and Cultures:   CBC with diff:   Lab Results   Component Value Date    WBC 16 17 (H) 11/23/2022    HGB 15 1 11/23/2022    HCT 45 3 11/23/2022    MCV 91 11/23/2022     11/23/2022    MCH 30 4 11/23/2022    MCHC 33 3 11/23/2022    RDW 13 5 11/23/2022    MPV 10 9 11/23/2022    NRBC 0 11/23/2022      BMP/CMP:  Lab Results   Component Value Date    K 4 9 11/23/2022     11/23/2022    CO2 24 11/23/2022    BUN 40 (H) 11/23/2022    CREATININE 1 17 11/23/2022    CALCIUM 8 1 (L) 11/23/2022    AST 32 11/23/2022    ALT 36 11/23/2022    ALKPHOS 64 11/23/2022    EGFR 63 11/23/2022   ,     Coags:   Lab Results   Component Value Date    PTT 31 11/09/2022    INR 0 92 11/09/2022   ,          Lipid Panel: No results found for: CHOL  Lab Results   Component Value Date    HDL 27 (L) 11/19/2022     Lab Results   Component Value Date    HDL 27 (L) 11/19/2022     Lab Results   Component Value Date    LDLCALC 16 11/19/2022     Lab Results   Component Value Date    TRIG 333 (H) 11/23/2022       HgbA1c:   Lab Results   Component Value Date    HGBA1C 6 3 (H) 11/09/2022    HGBA1C 5 9 (H) 07/11/2022    HGBA1C 6 0 (H) 05/13/2022       Blood Culture: No results found for: BLOODCX,   Urinalysis:   Lab Results   Component Value Date    COLORU Yellow 07/23/2022    CLARITYU Hazy 07/23/2022    SPECGRAV abnormal-1 036 (A) 09/30/2022    SPECGRAV 1 025 07/23/2022    PHUR 6 0 07/23/2022    LEUKOCYTESUR Negative 07/23/2022    NITRITE Negative 07/23/2022    GLUCOSEU Negative 07/23/2022    KETONESU Negative 07/23/2022    BILIRUBINUR Negative 07/23/2022    BLOODU 3+ (A) 07/23/2022   ,   Urine Culture: No results found for: URINECX,   Wound Culure:    Lab Results   Component Value Date    WOUNDCULT No growth 02/22/2016         Thank you for allowing me to participate in the care of [employer]  Please don't hesitate to call, text, email, or TigerText with any questions  This text is generated with voice recognition software  There may be translation, syntax,  or grammatical errors  If you have any questions, please contact the dictating provider        Francheska Sears PA-C

## 2022-11-23 NOTE — CONSULTS
Consultation - Stacy Geronimo 75 y o  male MRN: [id number]    Unit/Bed#: [facility] 404-01 Encounter: [id number]      Impression:  TPN induced Hyperglycemia  Hx of Squamous cell carcinoma of the lung s/p R VATS superior segmentectomy     Assessment: This is a 75y o -year-old male with pmhx of lung cancer presents to the hospital fir an elective superior segmentectomy  Endocrinology consulted for TPN induced Hyperglycemia    Plan:  Patient denies any history of diabetes never been on medications in the past  A1c 6 2  Current glucose levels 150s to 200s currently patient is on correctional scale  TPN held due to concern of hypertriglyceridemia inducing pancreatitis? Recommend to start Lantus 10 units at bedtime and continue with correctional scale          CC:  TPN induced hyperglycemia      HPI: Stacy Geronimo is a 75y o  year old male with past medical history of lung cancer who was admitted to the hospital for elective superior segmentectomy  Patient denies any history of diabetes type 2 and denies family history of diabetes patient has never been on any medication for diabetes endocrinology was consulted because TPN was started on the hospital and patient has been developing hyperglycemia in the 200s      Review of Systems   Constitutional: Negative for chills and fever  HENT: Negative for ear pain and sore throat  Eyes: Negative for pain and visual disturbance  Respiratory: Negative for cough and shortness of breath  Cardiovascular: Negative for chest pain and palpitations  Gastrointestinal: Negative for abdominal pain and vomiting  Genitourinary: Negative for dysuria and hematuria  Musculoskeletal: Negative for arthralgias and back pain  Skin: Negative for color change and rash  Neurological: Negative for seizures and syncope  All other systems reviewed and are negative        Historical Information   Past Medical History:   Diagnosis Date   • Abdominal aortic aneurysm without rupture    • Abdominal Aortic Duplex 02/21/2017    Ectatic infrarenal abdominal aorta  • CAD (coronary artery disease)    • COPD (chronic obstructive pulmonary disease) (HCC)    • Extremity pain    • History of echocardiogram 03/18/2014    EF 55%, mild MR and AI  Mild concentric LVH  • History of stress test 03/06/2017    Normal    • Hyperlipidemia    • Hypertension    • Joint pain    • Kidney stone    • Low back pain    • Migraine    • Neck pain    • Obstructive sleep apnea     cannot tolerate CPAP   • Osteoarthritis    • Peripheral neuropathy    • Reflex sympathetic dystrophy      Past Surgical History:   Procedure Laterality Date   • CARDIAC CATHETERIZATION  02/13/2012    EF 70%, widely patent renal arteries, significant single-vessel CAD-medical therapy  • CARDIAC CATHETERIZATION  04/11/2013    EF 65%, 50% mid LAD, 20% prox CFX, 90% diffuse RCA, 99% mid RCA  Medical management  • CHOLECYSTECTOMY     • COLONOSCOPY     • EPIDURAL BLOCK INJECTION Bilateral 08/15/2019    Procedure: BLOCK / INJECTION EPIDURAL STEROID CERVICAL;  Surgeon: Ariella Chua MD;  Location: [facility];  Service: Pain Management    • FL GUIDED NEEDLE PLAC BX/ASP/INJ  08/15/2019   • IR BIOPSY LUNG  09/13/2022   • ORTHOPEDIC SURGERY     • GA BRONCHOSCOPY,DIAGNOSTIC N/A 11/16/2022    Procedure: Bessie Schwab;  Surgeon: Elia Crowley MD;  Location: [facility];  Service: Thoracic   • GA THORACOSCOPY SURG LOBECTOMY Right 11/16/2022    Procedure: LOBECTOMY LUNG; lower lobe superior segmentectomy, mediastinal lymph node dissection;  Surgeon: Elia Crowley MD;  [facility];  Service: Thoracic   • GA THORACOSCOPY W/SEGMENTECTOMY Right 11/16/2022    Procedure: THORACOSCOPY VIDEO ASSISTED SURGERY (VATS);   Surgeon: Elia Crowley MD;  Location: [facility];  Service: Thoracic   • TRIGGER POINT INJECTION       Social History   Social History     Substance and Sexual Activity   Alcohol Use Not Currently Social History     Substance and Sexual Activity   Drug Use Never     Social History     Tobacco Use   Smoking Status Former   • Packs/day: 1 50   • Types: Cigarettes   • Quit date: 11/3/2022   • Years since quittin 0   Smokeless Tobacco Never     Family History:   Family History   Adopted: Yes   Problem Relation Age of Onset   • No Known Problems Family    • No Known Problems Mother    • No Known Problems Father        Meds/Allergies   Current Facility-Administered Medications   Medication Dose Route Frequency Provider Last Rate Last Admin   • acetaminophen (TYLENOL) tablet 975 mg  975 mg Oral Q6H KOFFI WilburnC   975 mg at 22 1153   • Adult 3-in-1 TPN (custom base / standard electrolytes)   Intravenous Continuous TPN Albert Ruvalcaba MD 93 9 mL/hr at 22 0000 Rate Verify at 22 0000   • Adult 3-in-1 TPN (custom base / standard electrolytes)   Intravenous Continuous TPN Anil Pedroza MD       • albuterol (PROVENTIL HFA,VENTOLIN HFA) inhaler 2 puff  2 puff Inhalation Q6H PRN Yvonne Lai PA-C       • amLODIPine (NORVASC) tablet 10 mg  10 mg Oral Daily Yvonne Lai PA-C   10 mg at 22 5470   • artificial tear (LUBRIFRESH P M ) ophthalmic ointment 1 application  1 application Right Eye F1O PRN Jae Givens MD   1 application at 59/76/92 0001   • buPROPion (WELLBUTRIN XL) 24 hr tablet 150 mg  150 mg Oral QAM Molly Ponce PA-C   150 mg at 22 3462   • docusate sodium (COLACE) capsule 100 mg  100 mg Oral BID Yvonne Lai PA-C   100 mg at 22 7400   • enoxaparin (LOVENOX) subcutaneous injection 40 mg  40 mg Subcutaneous Daily Yvonne Lai PA-C   40 mg at 22 3368   • gabapentin (NEURONTIN) capsule 800 mg  800 mg Oral TID Yvonne Lai PA-C   800 mg at 22 1682   • glycerin-hypromellose- (ARTIFICIAL TEARS) ophthalmic solution 1 drop  1 drop Right Eye Q6H PRN Albert Ruvalcaba MD   1 drop at 22 1954   • HYDROmorphone (DILAUDID) injection 0 5 mg  0 5 mg Intravenous Q3H PRN Luis Manuel Economy, PA-C   0 5 mg at 11/23/22 1212   • HYDROmorphone (DILAUDID) injection 1 mg  1 mg Intravenous Once Corrine Real MD       • insulin lispro (HumaLOG) 100 units/mL subcutaneous injection 1-5 Units  1-5 Units Subcutaneous Q6H [address] Joellen Calvert MD   2 Units at 11/23/22 0725   • melatonin tablet 3 mg  3 mg Oral HS Diamante Balbuena MD   3 mg at 11/22/22 2156   • methocarbamol (ROBAXIN) tablet 500 mg  500 mg Oral Q6H PRN Corrine Real MD   500 mg at 11/23/22 0240   • metoprolol succinate (TOPROL-XL) 24 hr tablet 100 mg  100 mg Oral Daily Luis Manuel Economy, PA-C   100 mg at 11/23/22 6066   • nicotine (NICODERM CQ) 21 mg/24 hr TD 24 hr patch 1 patch  1 patch Transdermal Q24H Luis Manuel Economy, PA-C   1 patch at 11/23/22 1023   • ondansetron (ZOFRAN) injection 4 mg  4 mg Intravenous Q6H PRN Luis Manuel Economy, PA-C       • oxyCODONE (ROXICODONE) IR tablet 10 mg  10 mg Oral Q4H PRN Luis Manuel Economy, PA-C   10 mg at 11/23/22 1142   • oxyCODONE (ROXICODONE) IR tablet 5 mg  5 mg Oral Q4H PRN Luis Manuel Economy, PA-C       • pantoprazole (PROTONIX) EC tablet 40 mg  40 mg Oral Early Morning Molly Grapsy, PA-C   40 mg at 11/23/22 4283   • polyethylene glycol (MIRALAX) packet 17 g  17 g Oral Daily Luis Manuel Economy, PA-C   17 g at 11/20/22 7750   • senna (SENOKOT) tablet 8 6 mg  1 tablet Oral Daily Molly Grapsy, PA-C   8 6 mg at 11/23/22 3288   • tamsulosin (FLOMAX) capsule 0 8 mg  0 8 mg Oral Daily With [employer], PA-C   0 8 mg at 11/22/22 1942   • tobramycin (TOBREX) 0 3 % ophthalmic solution 1 drop  1 drop Right Eye 4x Daily Johnny Delgado MD   1 drop at 11/23/22 0920     No Known Allergies    Objective   Vitals: Blood pressure 121/76, pulse 72, temperature 97 9 °F (36 6 °C), resp  rate 20, height 5' 8" (1 727 m), weight 84 8 kg (187 lb), SpO2 94 %      Intake/Output Summary (Last 24 hours) at 11/23/2022 1240  Last data filed at 11/23/2022 0600  Gross per 24 hour   Intake 4366 49 ml   Output 410 ml   Net 3956 49 ml     Invasive Devices     Peripherally Inserted Central Catheter Line  Duration           PICC Line 55/70/18 Right Basilic 6 days          Drain  Duration           Chest Tube 1 Right Pleural 24 Fr  7 days    Urethral Catheter Latex 16 Fr  <1 day                Physical Exam  Constitutional:       General: He is not in acute distress  Appearance: He is not ill-appearing  HENT:      Head: Normocephalic and atraumatic  Nose: No congestion or rhinorrhea  Eyes:      Conjunctiva/sclera: Conjunctivae normal       Pupils: Pupils are equal, round, and reactive to light  Cardiovascular:      Rate and Rhythm: Normal rate  Pulses: Normal pulses  Heart sounds: No murmur heard  Pulmonary:      Effort: No respiratory distress  Breath sounds: Normal breath sounds  No wheezing  Abdominal:      General: There is no distension  Tenderness: There is no abdominal tenderness  Musculoskeletal:         General: No swelling or tenderness  Cervical back: No rigidity or tenderness  Skin:     Coloration: Skin is not jaundiced or pale  Neurological:      Mental Status: He is alert and oriented to person, place, and time  Motor: No weakness  Psychiatric:         Mood and Affect: Mood normal          Thought Content:  Thought content normal              Lab Results:       Lab Results   Component Value Date    WBC 16 17 (H) 11/23/2022    HGB 15 1 11/23/2022    HCT 45 3 11/23/2022    MCV 91 11/23/2022     11/23/2022     Lab Results   Component Value Date/Time    BUN 40 (H) 11/23/2022 03:17 AM    K 4 9 11/23/2022 03:17 AM     11/23/2022 03:17 AM    CO2 24 11/23/2022 03:17 AM    CREATININE 1 17 11/23/2022 03:17 AM    AST 32 11/23/2022 03:17 AM    ALT 36 11/23/2022 03:17 AM    ALB 1 7 (L) 11/23/2022 03:17 AM     Recent Labs     11/23/22  0317   TRIG 333*     No results found for: Marisol Vázquez  POC Glucose (mg/dl)   Date Value   11/23/2022 263 (H)   11/23/2022 181 (H)   11/22/2022 119   11/22/2022 133   11/22/2022 238 (H)   11/22/2022 207 (H)   11/21/2022 208 (H)   11/21/2022 191 (H)   08/05/2022 130           Portions of the record may have been created with voice recognition software

## 2022-11-23 NOTE — PROGRESS NOTES
I performed chart review on this patient 1 day after thoracic duct embolization  He appears to have postprocedural pancreatitis which is a known risk with transabdominal thoracic duct needle access and is usually self-limited  This was discussed with Dr Eleno Wetzel via TigerText earlier this morning  I will continue to follow peripherally through chart review  Do not hesitate to contact me via tiger text if there is any question

## 2022-11-23 NOTE — CASE MANAGEMENT
Case Management Assessment    Patient name Maya Andrea  Location [facility] 404/[facility] 839-08 MRN [id number]  : 1955 Date 2022       Current Admission Date: 2022  Current Admission Diagnosis:Malignant neoplasm of lower lobe of right lung [facility])   Patient Active Problem List    Diagnosis Date Noted   • Hemiparesis of left dominant side due to non-cerebrovascular etiology ([address] ) 2022   • Malignant neoplasm of lower lobe of right lung ([address] ) 10/04/2022   • Encounter for smoking cessation counseling 10/04/2022   • Pulmonary emphysema ([address] ) 08/15/2022   • Acute kidney injury ([address] ) 2022   • Pulmonary nodules 2022   • Kidney stone 2022   • Cortical age-related cataract of both eyes 2022   • Urinary frequency 2022   • Sacroiliitis ([facility] ) 2022   • Pre-diabetes 10/07/2021   • Erectile dysfunction 10/07/2021   • Insomnia 10/07/2021   • Hyperlipidemia    • Uncomplicated opioid dependence ([facility] ) 10/19/2020   • Encounter for long-term opiate analgesic use 10/19/2020   • Neck pain 10/19/2020   • Chronic pain syndrome 2020   • Class 1 obesity due to excess calories with serious comorbidity and body mass index (BMI) of 31 0 to 31 9 in adult 2020   • Smoking 2020   • Chronic pain of both knees 2019   • Negative depression screening 2019   • Tremor, essential 2019   • Cervical spondylosis without myelopathy 2019   • Lumbar spondylosis 2019   • Lumbar degenerative disc disease 2019   • Myofascial pain syndrome 2019   • Chronic low back pain without sciatica 2019   • Cervical radiculopathy 2019   • JAKI (obstructive sleep apnea) 2019   • Chronic bronchitis ([address] ) 2019   • Abdominal aortic aneurysm (AAA) without rupture 2019   • Hypertension 2016      LOS (days): 7  Geometric Mean LOS (GMLOS) (days): 8 10  Days to GMLOS:0 7     OBJECTIVE:    Risk of Unplanned Readmission Score: 34 93         Current admission status: Inpatient       Preferred Pharmacy:   [facility] [facility] [address] [place] [address] [address] [address]  Phone: [phone] Fax: [phone]    Primary Care Provider: Meryle German, DO    Primary Insurance: MEDICARE  Secondary Insurance: [address]    ASSESSMENT:  [address] Representative - Significant Other   Primary Phone: [phone] (Mobile)               Advance Directives  Does patient have a [address]?: No  Was patient offered paperwork?: Yes  Does patient currently have a Health Care decision maker?: Yes, please see Health Care Proxy section  Does patient have Advance Directives?: No  Was patient offered paperwork?: Yes (none on file)         Readmission Root Cause  30 Day Readmission: No    Patient Information  Admitted from[de-identified] Home  Mental Status: Alert  During Assessment patient was accompanied by: Not accompanied during assessment  Assessment information provided by[de-identified] Patient  Primary Caregiver: Self  Support Systems: Spouse/significant other, Son  [place] of Residence: [address] do you live in?: [address] entry access options   Select all that apply : Stairs  Number of steps to enter home : 2  Do the steps have railings?: Yes  Upon entering residence, is there a bedroom on the main floor (no further steps)?: No  A bedroom is located on the following floor levels of residence (select all that apply):: 2nd Floor  Upon entering residence, is there a bathroom on the main floor (no further steps)?: No  Indicate which floors of current residence have a bathroom (select all the apply):: 2nd Floor  Number of steps to 2nd floor from main floor: One Flight  In the last 12 months, was there a time when you were not able to pay the mortgage or rent on time?: No  In the last 12 months, how many places have you lived?: 1  In the last 12 months, was there a time when you did not have a steady place to sleep or slept in a shelter (including now)?: No  Homeless/housing insecurity resource given?: N/A  Living Arrangements: Lives w/ Spouse/significant other  Is patient a ?: No    Activities of Daily Living Prior to Admission  Functional Status: Independent  Completes ADLs independently?: Yes  Ambulates independently?: Yes  Does patient currently own DME?: No  Does patient have a history of Outpatient Therapy (PT/OT)?: No  Does the patient have a history of Short-Term Rehab?: No  Does patient have a history of HHC?: No  Does patient currently have [facility]?: No         Patient Information Continued  Income Source: SSI/SSD  Does patient have prescription coverage?: Yes  Within the past 12 months, you worried that your food would run out before you got the money to buy more : Never true  Within the past 12 months, the food you bought just didn't last and you didn't have money to get more : Never true  Food insecurity resource given?: N/A  Does patient receive dialysis treatments?: No  Does patient have a history of substance abuse?: No  Does patient have a history of Mental Health Diagnosis?: No         Means of Transportation  Means of Transport to Appts[de-identified] Drives Self  In the past 12 months, has lack of transportation kept you from medical appointments or from getting medications?: No  In the past 12 months, has lack of transportation kept you from meetings, work, or from getting things needed for daily living?: No  Was application for public transport provided?: N/A

## 2022-11-23 NOTE — ANESTHESIA POSTPROCEDURE EVALUATION
Post-Op Assessment Note    Encounter Notable Events   Notable Event Outcome Phase Comment   Corneal abrasion  Intraprocedure Pt w burning eye pain post procedure  Possible corneal abrasion   Team advised for tobrex and lubricanteye drops and optho consult       BP      Temp      Pulse     Resp      SpO2

## 2022-11-23 NOTE — NUTRITION
11/23/22 1100   Recommendations/Interventions   Recommendations to Provider Sent TIger Text to thoracic surgery regarding TPN recommendations: 1  Given highly variable blood sugars at present, recommend to continue sliding scale insulin for now  Consider adding insulin to the TPN bag if blood sugars become more consistently elevated  Could also consider involving Endocrinology  2  triglycerides continue to rise - suggest decreasing lipids in TPN to 200mL of 20% lipids  In total, this will provide 2075 kcals, continues to meet estimated calorie needs  Remove lipids entirely from TPN if triglycerides >400

## 2022-11-23 NOTE — PROGRESS NOTES
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/16 c/b chyle leak  Now s/p IR embolization of thoracic duct  Afebrile, hemodynamically stable  With new abdominal pain this morning  EKG/troponin not indicative of MI      R CT 775cc    Plan:  - NPO  - PICC/ TPN  - CT AP for evaluation of thoracic duct s/p embolization in setting of new abdominal pain and guarding   - Encourage IS and OOB  - Continue CT   - DVT ppx  - PRN analgesia and antiemetics  Subjective: Patient did well post-procedure until approximately 3AM when he developed sudden onset severe epigastric abdominal pain  Patient underwent IR embolization of thoracic duct on 11/22  EKG, troponin, and CXR were obtained  Workup negative for STEMI and lab findings were unremarkable  Pain improved slightly with dilaudid, however, patient developed guarding this morning and continued to have abdominal pain  Objective:   Temp:  [98 °F (36 7 °C)-98 8 °F (37 1 °C)] 98 4 °F (36 9 °C)  HR:  [71-74] 71  Resp:  [18-19] 19  BP: ()/(63-87) 123/76    Physical Exam:  General: No acute distress, alert and oriented  CV: Well perfused, regular rate and rhythm  Lungs: Normal work of breathing, no increased respiratory effort  Abdomen: Soft, tender, non-distended  Guarding  Extremities: No edema, clubbing or cyanosis  Skin: Warm, dry      I/O       11/21 0701  11/22 0700 11/22 0701  11/23 0700 11/23 0701  11/24 0700    I V  (mL/kg)  1246 1 (14 7)      3120 4     Total Intake(mL/kg) 768 (9 2) 4366 5 (51 5)     Urine (mL/kg/hr) 600 (0 3) 125 (0 1)     Chest Tube 850 775     Total Output 1450 900     Net -682 +3466 5                  Lab, Imaging and other studies: I have personally reviewed pertinent reports    , CBC with diff:   Lab Results   Component Value Date    WBC 16 17 (H) 11/23/2022    HGB 15 1 11/23/2022    HCT 45 3 11/23/2022    MCV 91 11/23/2022     11/23/2022    MCH 30 4 11/23/2022    MCHC 33 3 11/23/2022    RDW 13 5 11/23/2022    MPV 10 9 11/23/2022    NRBC 0 11/23/2022   , BMP/CMP:   Lab Results   Component Value Date    SODIUM 133 (L) 11/23/2022    K 4 9 11/23/2022     11/23/2022    CO2 24 11/23/2022    BUN 40 (H) 11/23/2022    CREATININE 1 17 11/23/2022    CALCIUM 8 1 (L) 11/23/2022    AST 32 11/23/2022    ALT 36 11/23/2022    ALKPHOS 64 11/23/2022    EGFR 63 11/23/2022         Izzy Mack MD  11/23/2022 8:07 AM

## 2022-11-24 LAB
GLUCOSE SERPL-MCNC: 171 MG/DL (ref 65–140)
GLUCOSE SERPL-MCNC: 173 MG/DL (ref 65–140)
GLUCOSE SERPL-MCNC: 188 MG/DL (ref 65–140)

## 2022-11-24 RX ORDER — LANOLIN ALCOHOL/MO/W.PET/CERES
6 CREAM (GRAM) TOPICAL
Status: DISCONTINUED | OUTPATIENT
Start: 2022-11-24 | End: 2022-11-27 | Stop reason: HOSPADM

## 2022-11-24 RX ADMIN — SENNOSIDES 8.6 MG: 8.6 TABLET, FILM COATED ORAL at 08:00

## 2022-11-24 RX ADMIN — ACETAMINOPHEN 975 MG: 325 TABLET ORAL at 05:19

## 2022-11-24 RX ADMIN — ACETAMINOPHEN 975 MG: 325 TABLET ORAL at 17:05

## 2022-11-24 RX ADMIN — OXYCODONE HYDROCHLORIDE 10 MG: 5 TABLET ORAL at 22:14

## 2022-11-24 RX ADMIN — OXYCODONE HYDROCHLORIDE 10 MG: 5 TABLET ORAL at 03:45

## 2022-11-24 RX ADMIN — INSULIN GLARGINE 5 UNITS: 100 INJECTION, SOLUTION SUBCUTANEOUS at 22:11

## 2022-11-24 RX ADMIN — OXYCODONE HYDROCHLORIDE 10 MG: 5 TABLET ORAL at 17:04

## 2022-11-24 RX ADMIN — GABAPENTIN 800 MG: 400 CAPSULE ORAL at 08:00

## 2022-11-24 RX ADMIN — OXYCODONE HYDROCHLORIDE 10 MG: 5 TABLET ORAL at 07:59

## 2022-11-24 RX ADMIN — SODIUM CHLORIDE, POTASSIUM CHLORIDE, SODIUM LACTATE AND CALCIUM CHLORIDE 75 ML/HR: 600; 310; 30; 20 INJECTION, SOLUTION INTRAVENOUS at 06:11

## 2022-11-24 RX ADMIN — SODIUM CHLORIDE, POTASSIUM CHLORIDE, SODIUM LACTATE AND CALCIUM CHLORIDE 75 ML/HR: 600; 310; 30; 20 INJECTION, SOLUTION INTRAVENOUS at 20:00

## 2022-11-24 RX ADMIN — BUPROPION HYDROCHLORIDE 150 MG: 150 TABLET, FILM COATED, EXTENDED RELEASE ORAL at 08:00

## 2022-11-24 RX ADMIN — TOBRAMYCIN 1 DROP: 3 SOLUTION/ DROPS OPHTHALMIC at 08:06

## 2022-11-24 RX ADMIN — DOCUSATE SODIUM 100 MG: 100 CAPSULE, LIQUID FILLED ORAL at 17:05

## 2022-11-24 RX ADMIN — OXYCODONE HYDROCHLORIDE 10 MG: 5 TABLET ORAL at 12:22

## 2022-11-24 RX ADMIN — PANTOPRAZOLE SODIUM 40 MG: 40 TABLET, DELAYED RELEASE ORAL at 05:19

## 2022-11-24 RX ADMIN — TOBRAMYCIN 1 DROP: 3 SOLUTION/ DROPS OPHTHALMIC at 17:06

## 2022-11-24 RX ADMIN — ACETAMINOPHEN 975 MG: 325 TABLET ORAL at 12:17

## 2022-11-24 RX ADMIN — HYDROMORPHONE HYDROCHLORIDE 0.5 MG: 1 INJECTION, SOLUTION INTRAMUSCULAR; INTRAVENOUS; SUBCUTANEOUS at 10:53

## 2022-11-24 RX ADMIN — DOCUSATE SODIUM 100 MG: 100 CAPSULE, LIQUID FILLED ORAL at 08:00

## 2022-11-24 RX ADMIN — TOBRAMYCIN 1 DROP: 3 SOLUTION/ DROPS OPHTHALMIC at 12:17

## 2022-11-24 RX ADMIN — Medication: at 22:09

## 2022-11-24 RX ADMIN — GABAPENTIN 800 MG: 400 CAPSULE ORAL at 22:03

## 2022-11-24 RX ADMIN — INSULIN LISPRO 1 UNITS: 100 INJECTION, SOLUTION INTRAVENOUS; SUBCUTANEOUS at 19:44

## 2022-11-24 RX ADMIN — ENOXAPARIN SODIUM 40 MG: 40 INJECTION SUBCUTANEOUS at 08:00

## 2022-11-24 RX ADMIN — INSULIN LISPRO 1 UNITS: 100 INJECTION, SOLUTION INTRAVENOUS; SUBCUTANEOUS at 12:16

## 2022-11-24 RX ADMIN — Medication 6 MG: at 22:03

## 2022-11-24 RX ADMIN — GABAPENTIN 800 MG: 400 CAPSULE ORAL at 17:00

## 2022-11-24 RX ADMIN — HYDROMORPHONE HYDROCHLORIDE 0.5 MG: 1 INJECTION, SOLUTION INTRAMUSCULAR; INTRAVENOUS; SUBCUTANEOUS at 20:12

## 2022-11-24 RX ADMIN — NICOTINE 1 PATCH: 21 PATCH, EXTENDED RELEASE TRANSDERMAL at 10:54

## 2022-11-24 RX ADMIN — TAMSULOSIN HYDROCHLORIDE 0.8 MG: 0.4 CAPSULE ORAL at 17:05

## 2022-11-24 RX ADMIN — INSULIN LISPRO 1 UNITS: 100 INJECTION, SOLUTION INTRAVENOUS; SUBCUTANEOUS at 05:21

## 2022-11-24 NOTE — PLAN OF CARE
Problem: RESPIRATORY - ADULT  Goal: Achieves optimal ventilation and oxygenation  Description: INTERVENTIONS:  - Assess for changes in respiratory status  - Assess for changes in mentation and behavior  - Position to facilitate oxygenation and minimize respiratory effort  - Oxygen administered by appropriate delivery if ordered  - Initiate smoking cessation education as indicated  - Encourage broncho-pulmonary hygiene including cough, deep breathe, Incentive Spirometry  - Assess the need for suctioning and aspirate as needed  - Assess and instruct to report SOB or any respiratory difficulty  - Respiratory Therapy support as indicated  Outcome: Progressing     Problem: MOBILITY - ADULT  Goal: Maintain or return to baseline ADL function  Description: INTERVENTIONS:  -  Assess patient's ability to carry out ADLs; assess patient's baseline for ADL function and identify physical deficits which impact ability to perform ADLs (bathing, care of mouth/teeth, toileting, grooming, dressing, etc )  - Assess/evaluate cause of self-care deficits   - Assess range of motion  - Assess patient's mobility; develop plan if impaired  - Assess patient's need for assistive devices and provide as appropriate  - Encourage maximum independence but intervene and supervise when necessary  - Involve family in performance of ADLs  - Assess for home care needs following discharge   - Consider OT consult to assist with ADL evaluation and planning for discharge  - Provide patient education as appropriate  Outcome: Progressing  Goal: Maintains/Returns to pre admission functional level  Description: INTERVENTIONS:  - Perform BMAT or MOVE assessment daily    - Set and communicate daily mobility goal to care team and patient/family/caregiver     - Collaborate with rehabilitation services on mobility goals if consulted    - Out of bed for toileting  - Record patient progress and toleration of activity level   Outcome: Progressing     Problem: Prexisting or High Potential for Compromised Skin Integrity  Goal: Skin integrity is maintained or improved  Description: INTERVENTIONS:  - Identify patients at risk for skin breakdown  - Assess and monitor skin integrity  - Assess and monitor nutrition and hydration status  - Monitor labs   - Assess for incontinence   - Turn and reposition patient  - Assist with mobility/ambulation  - Relieve pressure over bony prominences  - Avoid friction and shearing  - Provide appropriate hygiene as needed including keeping skin clean and dry  - Evaluate need for skin moisturizer/barrier cream  - Collaborate with interdisciplinary team   - Patient/family teaching  - Consider wound care consult   Outcome: Progressing     Problem: Potential for Falls  Goal: Patient will remain free of falls  Description: INTERVENTIONS:  - Educate patient/family on patient safety including physical limitations  - Instruct patient to call for assistance with activity   - Consult OT/PT to assist with strengthening/mobility   - Keep Call bell within reach  - Keep bed low and locked with side rails adjusted as appropriate  - Keep care items and personal belongings within reach  - Initiate and maintain comfort rounds  - Make Fall Risk Sign visible to staff    - Apply yellow socks and bracelet for high fall risk patients  - Consider moving patient to room near nurses station  Outcome: Progressing     Problem: Nutrition/Hydration-ADULT  Goal: Nutrient/Hydration intake appropriate for improving, restoring or maintaining nutritional needs  Description: Monitor and assess patient's nutrition/hydration status for malnutrition  Collaborate with interdisciplinary team and initiate plan and interventions as ordered  Monitor patient's weight and dietary intake as ordered or per policy  Utilize nutrition screening tool and intervene as necessary  Determine patient's food preferences and provide high-protein, high-caloric foods as appropriate  INTERVENTIONS:  - Monitor oral intake, urinary output, labs, and treatment plans  - Assess nutrition and hydration status and recommend course of action  - Evaluate amount of meals eaten  - Assist patient with eating if necessary   - Allow adequate time for meals  - Recommend/ encourage appropriate diets, oral nutritional supplements, and vitamin/mineral supplements  - Order, calculate, and assess calorie counts as needed  - Recommend, monitor, and adjust tube feedings and TPN/PPN based on assessed needs  - Assess need for intravenous fluids  - Provide specific nutrition/hydration education as appropriate  - Include patient/family/caregiver in decisions related to nutrition  Outcome: Progressing

## 2022-11-24 NOTE — PROGRESS NOTES
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/16 c/b chyle leak  Now s/p IR embolization of thoracic duct 11/22  Now with pancreatitis     AVSS on RA     R  cc, -AL, serous maybe some white, WS    Plan:  - NPO, consider  clears  - PICC/ TPN  - Encourage IS and OOB  - DVT ppx  - PRN analgesia and antiemetics  Subjective/Objective     Subjective:   No acute events overnight  Pain improved  -N  -V  No bowel function  Objective:     Blood pressure 112/69, pulse 72, temperature 97 7 °F (36 5 °C), temperature source Oral, resp  rate 17, height 5' 8" (1 727 m), weight 84 8 kg (187 lb), SpO2 95 %  ,Body mass index is 28 43 kg/m²        Intake/Output Summary (Last 24 hours) at 11/23/2022 2258  Last data filed at 11/23/2022 2100  Gross per 24 hour   Intake 3266 49 ml   Output 750 ml   Net 2516 49 ml       Invasive Devices     Peripherally Inserted Central Catheter Line  Duration           PICC Line 25/89/00 Right Basilic 6 days          Drain  Duration           Chest Tube 1 Right Pleural 24 Fr  7 days    Urethral Catheter Latex 16 Fr  1 day                Physical Exam:   Gen: NAD, Comfortable  Neuro: A&O, No focal deficits  Head: Normal Cephalic, Atraumatic  Eye: EOMI, PERRLA, No scleral icterus  Neck: Supple, No JVD, Midline trachea  CV: RRR, Cap refill <2 sec  Pulm: Normal work of breathing, no respiratory distress  Abd: Soft, Non-Distended, Non-Tender  Ext: No edema, Non-tender  Skin: warm, dry, intact

## 2022-11-25 ENCOUNTER — APPOINTMENT (OUTPATIENT)
Dept: RADIOLOGY | Facility: HOSPITAL | Age: 67
End: 2022-11-25

## 2022-11-25 LAB
ANION GAP SERPL CALCULATED.3IONS-SCNC: 6 MMOL/L (ref 4–13)
ATRIAL RATE: 72 BPM
BUN SERPL-MCNC: 17 MG/DL (ref 5–25)
CALCIUM SERPL-MCNC: 7.9 MG/DL (ref 8.3–10.1)
CHLORIDE SERPL-SCNC: 104 MMOL/L (ref 96–108)
CHOLEST SERPL-MCNC: 110 MG/DL
CO2 SERPL-SCNC: 27 MMOL/L (ref 21–32)
CREAT SERPL-MCNC: 0.71 MG/DL (ref 0.6–1.3)
ERYTHROCYTE [DISTWIDTH] IN BLOOD BY AUTOMATED COUNT: 13.6 % (ref 11.6–15.1)
GFR SERPL CREATININE-BSD FRML MDRD: 96 ML/MIN/1.73SQ M
GLUCOSE SERPL-MCNC: 100 MG/DL (ref 65–140)
GLUCOSE SERPL-MCNC: 114 MG/DL (ref 65–140)
GLUCOSE SERPL-MCNC: 146 MG/DL (ref 65–140)
GLUCOSE SERPL-MCNC: 147 MG/DL (ref 65–140)
GLUCOSE SERPL-MCNC: 164 MG/DL (ref 65–140)
GLUCOSE SERPL-MCNC: 196 MG/DL (ref 65–140)
GLUCOSE SERPL-MCNC: 260 MG/DL (ref 65–140)
HCT VFR BLD AUTO: 37.3 % (ref 36.5–49.3)
HDLC SERPL-MCNC: 23 MG/DL
HGB BLD-MCNC: 11.8 G/DL (ref 12–17)
LDLC SERPL CALC-MCNC: 56 MG/DL (ref 0–100)
LIPASE SERPL-CCNC: 169 U/L (ref 73–393)
MAGNESIUM SERPL-MCNC: 2.3 MG/DL (ref 1.6–2.6)
MCH RBC QN AUTO: 29.7 PG (ref 26.8–34.3)
MCHC RBC AUTO-ENTMCNC: 31.6 G/DL (ref 31.4–37.4)
MCV RBC AUTO: 94 FL (ref 82–98)
NONHDLC SERPL-MCNC: 87 MG/DL
P AXIS: 78 DEGREES
PHOSPHATE SERPL-MCNC: 2.4 MG/DL (ref 2.3–4.1)
PLATELET # BLD AUTO: 148 THOUSANDS/UL (ref 149–390)
PMV BLD AUTO: 11.5 FL (ref 8.9–12.7)
POTASSIUM SERPL-SCNC: 3.7 MMOL/L (ref 3.5–5.3)
PR INTERVAL: 172 MS
QRS AXIS: 26 DEGREES
QRSD INTERVAL: 90 MS
QT INTERVAL: 374 MS
QTC INTERVAL: 409 MS
RBC # BLD AUTO: 3.97 MILLION/UL (ref 3.88–5.62)
SODIUM SERPL-SCNC: 137 MMOL/L (ref 135–147)
T WAVE AXIS: 40 DEGREES
TRIGL SERPL-MCNC: 155 MG/DL
VENTRICULAR RATE: 72 BPM
WBC # BLD AUTO: 12.25 THOUSAND/UL (ref 4.31–10.16)

## 2022-11-25 RX ORDER — INSULIN LISPRO 100 [IU]/ML
1-5 INJECTION, SOLUTION INTRAVENOUS; SUBCUTANEOUS
Status: DISCONTINUED | OUTPATIENT
Start: 2022-11-25 | End: 2022-11-27 | Stop reason: HOSPADM

## 2022-11-25 RX ADMIN — TOBRAMYCIN 1 DROP: 3 SOLUTION/ DROPS OPHTHALMIC at 12:04

## 2022-11-25 RX ADMIN — HYDROMORPHONE HYDROCHLORIDE 0.5 MG: 1 INJECTION, SOLUTION INTRAMUSCULAR; INTRAVENOUS; SUBCUTANEOUS at 11:07

## 2022-11-25 RX ADMIN — TOBRAMYCIN 1 DROP: 3 SOLUTION/ DROPS OPHTHALMIC at 17:29

## 2022-11-25 RX ADMIN — TAMSULOSIN HYDROCHLORIDE 0.8 MG: 0.4 CAPSULE ORAL at 15:50

## 2022-11-25 RX ADMIN — TOBRAMYCIN 1 DROP: 3 SOLUTION/ DROPS OPHTHALMIC at 00:05

## 2022-11-25 RX ADMIN — ENOXAPARIN SODIUM 40 MG: 40 INJECTION SUBCUTANEOUS at 09:52

## 2022-11-25 RX ADMIN — METOPROLOL SUCCINATE 100 MG: 100 TABLET, EXTENDED RELEASE ORAL at 09:53

## 2022-11-25 RX ADMIN — OXYCODONE HYDROCHLORIDE 10 MG: 5 TABLET ORAL at 15:50

## 2022-11-25 RX ADMIN — PANTOPRAZOLE SODIUM 40 MG: 40 TABLET, DELAYED RELEASE ORAL at 05:03

## 2022-11-25 RX ADMIN — NICOTINE 1 PATCH: 21 PATCH, EXTENDED RELEASE TRANSDERMAL at 10:04

## 2022-11-25 RX ADMIN — GABAPENTIN 800 MG: 400 CAPSULE ORAL at 21:30

## 2022-11-25 RX ADMIN — AMLODIPINE BESYLATE 10 MG: 10 TABLET ORAL at 09:53

## 2022-11-25 RX ADMIN — OXYCODONE HYDROCHLORIDE 10 MG: 5 TABLET ORAL at 09:53

## 2022-11-25 RX ADMIN — GABAPENTIN 800 MG: 400 CAPSULE ORAL at 15:50

## 2022-11-25 RX ADMIN — BUPROPION HYDROCHLORIDE 150 MG: 150 TABLET, FILM COATED, EXTENDED RELEASE ORAL at 09:53

## 2022-11-25 RX ADMIN — TOBRAMYCIN 1 DROP: 3 SOLUTION/ DROPS OPHTHALMIC at 09:57

## 2022-11-25 RX ADMIN — METHOCARBAMOL 500 MG: 500 TABLET ORAL at 15:50

## 2022-11-25 RX ADMIN — ACETAMINOPHEN 975 MG: 325 TABLET ORAL at 00:05

## 2022-11-25 RX ADMIN — ACETAMINOPHEN 975 MG: 325 TABLET ORAL at 05:03

## 2022-11-25 RX ADMIN — ACETAMINOPHEN 975 MG: 325 TABLET ORAL at 17:29

## 2022-11-25 RX ADMIN — INSULIN LISPRO 1 UNITS: 100 INJECTION, SOLUTION INTRAVENOUS; SUBCUTANEOUS at 07:18

## 2022-11-25 RX ADMIN — GABAPENTIN 800 MG: 400 CAPSULE ORAL at 09:53

## 2022-11-25 RX ADMIN — Medication 6 MG: at 21:30

## 2022-11-25 RX ADMIN — OXYCODONE HYDROCHLORIDE 10 MG: 5 TABLET ORAL at 04:54

## 2022-11-25 RX ADMIN — TOBRAMYCIN 1 DROP: 3 SOLUTION/ DROPS OPHTHALMIC at 21:28

## 2022-11-25 NOTE — PROGRESS NOTES
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/16 c/b chyle leak  Now s/p IR embolization of thoracic duct 11/22  Now with pancreatitis    Afebrile, hemodynamically stable overnight  T max 100 4 at 7AM on 11/24    CT: 50 cc, no AL  Chest tube became clogged overnight requiring replacement of the Thopaz tube  Chest tube was sealed to patient and thopaz tube was removed and replaced  This showed as an airleak on the chest tube but resolved when reconnected  Plan:  - NPO  - Consider starting clear liquid diet  - CT to water seal, consider removal today  - Will follow up lipid panel and lipase  - Continue PICC/TPN until tolerating diet  - Encourage IS and OOB  - DVT ppx  - PRN analgesia and antiemetics  Subjective: No acute events overnight  Afebrile overnight, hemodynamically stable  No nausea, or vomiting, fevers or chills  Abdominal pain is significantly better  Passing flatus and liquid BM  Objective:   Temp:  [98 7 °F (37 1 °C)-100 4 °F (38 °C)] 99 8 °F (37 7 °C)  HR:  [78-93] 93  Resp:  [17-18] 17  BP: (101-117)/(62-72) 117/70    Physical Exam:  General: No acute distress, alert and oriented  CV: Well perfused, regular rate and rhythm  Lungs: Normal work of breathing, no increased respiratory effort  Abdomen: Soft, mildly-tender, non-distended  Extremities: No edema, clubbing or cyanosis  Skin: Warm, dry      I/O       11/23 0701  11/24 0700 11/24 0701  11/25 0700    P  O  50     I V  (mL/kg) 1922 5 (22 7)      7     Total Intake(mL/kg) 2550 2 (30 1)     Urine (mL/kg/hr) 1250 (0 6) 770 (0 7)    Chest Tube 175     Total Output 1425 770    Net +1125 2 -770                Lab, Imaging and other studies: I have personally reviewed pertinent reports    , CBC with diff: No results found for: WBC, HGB, HCT, MCV, PLT, ADJUSTEDWBC, MCH, MCHC, RDW, MPV, NRBC, BMP/CMP: No results found for: SODIUM, K, CL, CO2, ANIONGAP, BUN, CREATININE, GLUCOSE, CALCIUM, AST, ALT, ALKPHOS, PROT, BILITOT, EGFR        Joellen Calvert MD  11/25/2022 6:00 AM

## 2022-11-25 NOTE — QUICK NOTE
R chest tube removed in routine fashion without issues  O2 sats in the high 90s throughout  PLAN:  - CXR (PA/lateral)

## 2022-11-25 NOTE — PHYSICAL THERAPY NOTE
11/25/22 1055   Note Type   Note Type Cancelled Session   Cancel Reasons Refusal   Pt declines this AM due to bowel issues, asked therapist to return in PM but was unable  Will follow    Jaycee Cota PT, DPT CSRS

## 2022-11-25 NOTE — PROGRESS NOTES
Progress Note - Clarita Garcia 75 y o  male MRN: [id number]    Unit/Bed#: [facility] 404-01 Encounter: [id number]      CC: diabetes f/u    Subjective: This is a 75y o -year-old male with pmhx of lung cancer presents to the hospital fir an elective superior segmentectomy  Endocrinology consulted for TPN induced Hyperglycemia  Currently pt off of TPN     Objective:     Vitals: Blood pressure 123/73, pulse 93, temperature (!) 97 4 °F (36 3 °C), resp  rate 16, height 5' 8" (1 727 m), weight 87 kg (191 lb 14 4 oz), SpO2 93 %  ,Body mass index is 29 18 kg/m²  Intake/Output Summary (Last 24 hours) at 11/25/2022 1434  Last data filed at 11/25/2022 1004  Gross per 24 hour   Intake 4576 44 ml   Output 1450 ml   Net 3126 44 ml       Physical Exam:  General Appearance: awake, appears stated age and cooperative  Head: Normocephalic, without obvious abnormality, atraumatic  Extremities: moves all extremities  Skin: Skin color and temperature normal    Pulm: no labored breathing        POC Glucose (mg/dl)   Date Value   11/25/2022 114   11/25/2022 164 (H)   11/25/2022 147 (H)   11/24/2022 173 (H)   11/24/2022 188 (H)   11/24/2022 171 (H)   11/23/2022 148 (H)   11/23/2022 99   11/23/2022 102   11/23/2022 196 (H)     Impression:  TPN induced Hyperglycemia  Hx of Squamous cell carcinoma of the lung s/p R VATS superior segmentectomy      Assessment: This is a 75y o -year-old male with pmhx of lung cancer presents to the hospital fir an elective superior segmentectomy  Endocrinology consulted for TPN induced Hyperglycemia     Plan:  Patient denies any history of diabetes never been on medications in the past  A1c 6 2  Currently patient is on Lantus 5 units and off of TPN  Recommend to discontinue Lantus and continue with correctional scale  F/u accu checks       Portions of the record may have been created with voice recognition software

## 2022-11-25 NOTE — CASE MANAGEMENT
Case Management Discharge Planning Note    Patient name Cecilia Gardner  Location [facility] 404/[facility] 720-03 MRN [id number]  : 1955 Date 2022       Current Admission Date: 2022  Current Admission Diagnosis:Malignant neoplasm of lower lobe of right lung [facility])   Patient Active Problem List    Diagnosis Date Noted   • Hemiparesis of left dominant side due to non-cerebrovascular etiology ([address] ) 2022   • Malignant neoplasm of lower lobe of right lung ([address] ) 10/04/2022   • Encounter for smoking cessation counseling 10/04/2022   • Pulmonary emphysema ([facility] ) 08/15/2022   • Acute kidney injury ([facility] ) 2022   • Pulmonary nodules 2022   • Kidney stone 2022   • Cortical age-related cataract of both eyes 2022   • Urinary frequency 2022   • Sacroiliitis ([facility] ) 2022   • Pre-diabetes 10/07/2021   • Erectile dysfunction 10/07/2021   • Insomnia 10/07/2021   • Hyperlipidemia    • Uncomplicated opioid dependence ([facility] ) 10/19/2020   • Encounter for long-term opiate analgesic use 10/19/2020   • Neck pain 10/19/2020   • Chronic pain syndrome 2020   • Class 1 obesity due to excess calories with serious comorbidity and body mass index (BMI) of 31 0 to 31 9 in adult 2020   • Smoking 2020   • Chronic pain of both knees 2019   • Negative depression screening 2019   • Tremor, essential 2019   • Cervical spondylosis without myelopathy 2019   • Lumbar spondylosis 2019   • Lumbar degenerative disc disease 2019   • Myofascial pain syndrome 2019   • Chronic low back pain without sciatica 2019   • Cervical radiculopathy 2019   • JAKI (obstructive sleep apnea) 2019   • Chronic bronchitis ([facility] ) 2019   • Abdominal aortic aneurysm (AAA) without rupture 2019   • Hypertension 2016      LOS (days): 9  Geometric Mean LOS (GMLOS) (days): 8 10  Days to GMLOS:-1 4     OBJECTIVE:  Risk of Unplanned Readmission Score: 34 44         Current admission status: Inpatient   Preferred Pharmacy:   [address] [place] [address] [place] [address]  Phone: [phone] Fax: [phone]    Primary Care Provider: Shayy Alexander DO    Primary Insurance: MEDICARE  Secondary Insurance: Isabelle0 Ruslan Farias DETAILS:    Discharge planning discussed with[de-identified] Patient  Freedom of Choice: Yes  Comments - Freedom of Choice: discussed FOC for VNA, pt expresses no preference, agreeable to [place] referral   CM contacted family/caregiver?: No- see comments (pt alert and oriented)  Were Treatment Team discharge recommendations reviewed with patient/caregiver?: Yes  Did patient/caregiver verbalize understanding of patient care needs?: Yes  Were patient/caregiver advised of the risks associated with not following Treatment Team discharge recommendations?: Yes         Requested [address]         Is the patient interested in [facility] at discharge?: Yes  Via Cody Dudely requested[de-identified] [email or web address] Name[de-identified] Other  [address] [place] [address] Provider[de-identified] PCP  Home Health Services Needed[de-identified] Other (comment) (f/u for chest tube removal, medication education and management)  Homebound Criteria Met[de-identified] Requires the Assistance of Another Person for Safe Ambulation or to Leave the Home  Supporting Clincal Findings[de-identified] Limited Endurance    DME Referral Provided  Referral made for DME?: No    Other Referral/Resources/Interventions Provided:  Interventions: HHA  Referral Comments: Pt agreeable to dc with VNA for SN services  pt expresses no preference for VNA and agreeable to [place] referral  Referrals placed on AIDIN           Treatment Team Recommendation: Home with [address] [employer]  Discharge Destination Plan[de-identified] Home with [place] at Discharge : Family                             IMM Given (Date):: 11/25/22  IMM Given to[de-identified] Patient  Family notified[de-identified] Reviewed IMM with pt, answered all questions regaridng poss dc tomorrow  Pt agreeable with DC tomorrow to home  Copy of Imm given to the pt and original sent to medical records

## 2022-11-26 ENCOUNTER — APPOINTMENT (OUTPATIENT)
Dept: RADIOLOGY | Facility: HOSPITAL | Age: 67
End: 2022-11-26

## 2022-11-26 LAB
ANION GAP SERPL CALCULATED.3IONS-SCNC: 7 MMOL/L (ref 4–13)
BASOPHILS # BLD AUTO: 0.06 THOUSANDS/ÂΜL (ref 0–0.1)
BASOPHILS NFR BLD AUTO: 0 % (ref 0–1)
BUN SERPL-MCNC: 16 MG/DL (ref 5–25)
CALCIUM SERPL-MCNC: 8.2 MG/DL (ref 8.3–10.1)
CHLORIDE SERPL-SCNC: 101 MMOL/L (ref 96–108)
CO2 SERPL-SCNC: 27 MMOL/L (ref 21–32)
CREAT SERPL-MCNC: 1.04 MG/DL (ref 0.6–1.3)
EOSINOPHIL # BLD AUTO: 0 THOUSAND/ÂΜL (ref 0–0.61)
EOSINOPHIL NFR BLD AUTO: 0 % (ref 0–6)
ERYTHROCYTE [DISTWIDTH] IN BLOOD BY AUTOMATED COUNT: 13.5 % (ref 11.6–15.1)
GFR SERPL CREATININE-BSD FRML MDRD: 73 ML/MIN/1.73SQ M
GLUCOSE SERPL-MCNC: 102 MG/DL (ref 65–140)
GLUCOSE SERPL-MCNC: 128 MG/DL (ref 65–140)
GLUCOSE SERPL-MCNC: 145 MG/DL (ref 65–140)
GLUCOSE SERPL-MCNC: 148 MG/DL (ref 65–140)
GLUCOSE SERPL-MCNC: 159 MG/DL (ref 65–140)
HCT VFR BLD AUTO: 38.1 % (ref 36.5–49.3)
HGB BLD-MCNC: 11.8 G/DL (ref 12–17)
IMM GRANULOCYTES # BLD AUTO: 0.07 THOUSAND/UL (ref 0–0.2)
IMM GRANULOCYTES NFR BLD AUTO: 1 % (ref 0–2)
LYMPHOCYTES # BLD AUTO: 0.98 THOUSANDS/ÂΜL (ref 0.6–4.47)
LYMPHOCYTES NFR BLD AUTO: 7 % (ref 14–44)
MCH RBC QN AUTO: 29.1 PG (ref 26.8–34.3)
MCHC RBC AUTO-ENTMCNC: 31 G/DL (ref 31.4–37.4)
MCV RBC AUTO: 94 FL (ref 82–98)
MONOCYTES # BLD AUTO: 0.94 THOUSAND/ÂΜL (ref 0.17–1.22)
MONOCYTES NFR BLD AUTO: 7 % (ref 4–12)
NEUTROPHILS # BLD AUTO: 11.34 THOUSANDS/ÂΜL (ref 1.85–7.62)
NEUTS SEG NFR BLD AUTO: 85 % (ref 43–75)
NRBC BLD AUTO-RTO: 0 /100 WBCS
PLATELET # BLD AUTO: 169 THOUSANDS/UL (ref 149–390)
PMV BLD AUTO: 10.7 FL (ref 8.9–12.7)
POTASSIUM SERPL-SCNC: 4 MMOL/L (ref 3.5–5.3)
RBC # BLD AUTO: 4.06 MILLION/UL (ref 3.88–5.62)
SODIUM SERPL-SCNC: 135 MMOL/L (ref 135–147)
WBC # BLD AUTO: 13.39 THOUSAND/UL (ref 4.31–10.16)

## 2022-11-26 RX ADMIN — ACETAMINOPHEN 975 MG: 325 TABLET ORAL at 06:46

## 2022-11-26 RX ADMIN — OXYCODONE HYDROCHLORIDE 10 MG: 5 TABLET ORAL at 20:29

## 2022-11-26 RX ADMIN — AMLODIPINE BESYLATE 10 MG: 10 TABLET ORAL at 08:39

## 2022-11-26 RX ADMIN — ACETAMINOPHEN 975 MG: 325 TABLET ORAL at 23:11

## 2022-11-26 RX ADMIN — TAMSULOSIN HYDROCHLORIDE 0.8 MG: 0.4 CAPSULE ORAL at 16:29

## 2022-11-26 RX ADMIN — OXYCODONE HYDROCHLORIDE 10 MG: 5 TABLET ORAL at 16:29

## 2022-11-26 RX ADMIN — ACETAMINOPHEN 975 MG: 325 TABLET ORAL at 11:11

## 2022-11-26 RX ADMIN — TOBRAMYCIN 1 DROP: 3 SOLUTION/ DROPS OPHTHALMIC at 17:24

## 2022-11-26 RX ADMIN — Medication 6 MG: at 23:12

## 2022-11-26 RX ADMIN — PANTOPRAZOLE SODIUM 40 MG: 40 TABLET, DELAYED RELEASE ORAL at 06:45

## 2022-11-26 RX ADMIN — ACETAMINOPHEN 975 MG: 325 TABLET ORAL at 17:23

## 2022-11-26 RX ADMIN — GABAPENTIN 800 MG: 400 CAPSULE ORAL at 08:40

## 2022-11-26 RX ADMIN — OXYCODONE HYDROCHLORIDE 10 MG: 5 TABLET ORAL at 12:18

## 2022-11-26 RX ADMIN — ACETAMINOPHEN 975 MG: 325 TABLET ORAL at 00:10

## 2022-11-26 RX ADMIN — INSULIN LISPRO 1 UNITS: 100 INJECTION, SOLUTION INTRAVENOUS; SUBCUTANEOUS at 16:26

## 2022-11-26 RX ADMIN — OXYCODONE HYDROCHLORIDE 10 MG: 5 TABLET ORAL at 06:45

## 2022-11-26 RX ADMIN — HYDROMORPHONE HYDROCHLORIDE 0.5 MG: 1 INJECTION, SOLUTION INTRAMUSCULAR; INTRAVENOUS; SUBCUTANEOUS at 08:50

## 2022-11-26 RX ADMIN — BUPROPION HYDROCHLORIDE 150 MG: 150 TABLET, FILM COATED, EXTENDED RELEASE ORAL at 08:39

## 2022-11-26 RX ADMIN — NICOTINE 1 PATCH: 21 PATCH, EXTENDED RELEASE TRANSDERMAL at 11:11

## 2022-11-26 RX ADMIN — GABAPENTIN 800 MG: 400 CAPSULE ORAL at 20:24

## 2022-11-26 RX ADMIN — OXYCODONE HYDROCHLORIDE 10 MG: 5 TABLET ORAL at 00:12

## 2022-11-26 RX ADMIN — HYDROMORPHONE HYDROCHLORIDE 0.5 MG: 1 INJECTION, SOLUTION INTRAMUSCULAR; INTRAVENOUS; SUBCUTANEOUS at 22:38

## 2022-11-26 RX ADMIN — METOPROLOL SUCCINATE 100 MG: 100 TABLET, EXTENDED RELEASE ORAL at 08:39

## 2022-11-26 RX ADMIN — GABAPENTIN 800 MG: 400 CAPSULE ORAL at 16:29

## 2022-11-26 RX ADMIN — TOBRAMYCIN 1 DROP: 3 SOLUTION/ DROPS OPHTHALMIC at 22:34

## 2022-11-26 RX ADMIN — ENOXAPARIN SODIUM 40 MG: 40 INJECTION SUBCUTANEOUS at 08:39

## 2022-11-26 NOTE — PROGRESS NOTES
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 11/16 c/b chyle leak  Now s/p IR embolization of thoracic duct 11/22  Tmax: 101 4 (solitary; 3 pm)  Tolerating moderate amounts of regular diet  Lipase: 169  Abdominal pain resolved; no tenderness on exam   Chest tube removed yesterday; tiny/trace post-pull PTX  CXR this AM stable appearance; area of consolidation in the region of the R hilum; similar in appearance compared to prior images  PLAN:  - regular diet as tolerated  - trend temperature; f/u AM wbc  - aggressive pulmonary toilet  - incentive spirometry  - discharge home later today once afebrile for 24 hrs  Subjective:   - c/o: clarisa-incisional pain  Objective:     Vitals: Temp:  [97 4 °F (36 3 °C)-101 4 °F (38 6 °C)] 99 4 °F (37 4 °C)  Resp:  [15-20] 16  BP: (108-136)/(69-73) 128/69  Body mass index is 29 18 kg/m²  I/O       11/24 0701  11/25 0700 11/25 0701  11/26 0700    P  O   680    I V  (mL/kg)  2177 5 (25)    TPN  1918 9    Total Intake(mL/kg)  4776 4 (54 9)    Urine (mL/kg/hr) 1520 (0 7) 450 (0 2)    Chest Tube 50 0    Total Output 1570 450    Net -1570 +4326 4                Physical Exam:  GEN: NAD  HEENT: atraumatic  CV: RRR  Lung: Normal effort  Ab: Soft, NT/ND  Extrem: No CCE  Neuro: A+Ox3    Lab, Imaging and other studies: I have personally reviewed pertinent reports    , CBC with diff: No results found for: WBC, HGB, HCT, MCV, PLT, ADJUSTEDWBC, MCH, MCHC, RDW, MPV, NRBC, BMP/CMP: No results found for: SODIUM, K, CL, CO2, ANIONGAP, BUN, CREATININE, GLUCOSE, CALCIUM, AST, ALT, ALKPHOS, PROT, BILITOT, EGFR, Magnesium: No components found for: MAG  VTE Pharmacologic Prophylaxis: Enoxaparin (Lovenox)  VTE Mechanical Prophylaxis: sequential compression device

## 2022-11-26 NOTE — CASE MANAGEMENT
Case Management Discharge Planning Note    Patient name Luli Khan  Location [facility] 404/[facility] 477-25 MRN [id number]  : 1955 Date 2022       Current Admission Date: 2022  Current Admission Diagnosis:Malignant neoplasm of lower lobe of right lung [facility])   Patient Active Problem List    Diagnosis Date Noted   • Hemiparesis of left dominant side due to non-cerebrovascular etiology ([address] ) 2022   • Malignant neoplasm of lower lobe of right lung ([address] ) 10/04/2022   • Encounter for smoking cessation counseling 10/04/2022   • Pulmonary emphysema ([facility] ) 08/15/2022   • Acute kidney injury ([facility] ) 2022   • Pulmonary nodules 2022   • Kidney stone 2022   • Cortical age-related cataract of both eyes 2022   • Urinary frequency 2022   • Sacroiliitis ([facility] ) 2022   • Pre-diabetes 10/07/2021   • Erectile dysfunction 10/07/2021   • Insomnia 10/07/2021   • Hyperlipidemia    • Uncomplicated opioid dependence ([facility] ) 10/19/2020   • Encounter for long-term opiate analgesic use 10/19/2020   • Neck pain 10/19/2020   • Chronic pain syndrome 2020   • Class 1 obesity due to excess calories with serious comorbidity and body mass index (BMI) of 31 0 to 31 9 in adult 2020   • Smoking 2020   • Chronic pain of both knees 2019   • Negative depression screening 2019   • Tremor, essential 2019   • Cervical spondylosis without myelopathy 2019   • Lumbar spondylosis 2019   • Lumbar degenerative disc disease 2019   • Myofascial pain syndrome 2019   • Chronic low back pain without sciatica 2019   • Cervical radiculopathy 2019   • JAKI (obstructive sleep apnea) 2019   • Chronic bronchitis ([facility] ) 2019   • Abdominal aortic aneurysm (AAA) without rupture 2019   • Hypertension 2016      LOS (days): 10  Geometric Mean LOS (GMLOS) (days): 8 10  Days to GMLOS:-2 3     OBJECTIVE:  Risk of Unplanned Readmission Score: 34 77         Current admission status: Inpatient   Preferred Pharmacy:   [address] [place], [place] [address], [address] [facility] [address] [address]  Phone: [phone] Fax: [phone]    Primary Care Provider: Sia Davalos DO    Primary Insurance: MEDICARE  Secondary Insurance: Hulan Crimes    DISCHARGE DETAILS:    Discharge planning discussed with[de-identified] Patient,  Freedom of Choice: Yes  Comments - Freedom of Choice: accepting [facility] agencies reviewed with patient, [facility] upon d/c  Were Treatment Team discharge recommendations reviewed with patient/caregiver?: Yes  Did patient/caregiver verbalize understanding of patient care needs?: Yes  Were patient/caregiver advised of the risks associated with not following Treatment Team discharge recommendations?: Yes    Other Referral/Resources/Interventions Provided:  Interventions: HHC  Ref[facility] Comments: [id number]  [facility] Monday 11/28, reviewed options with patient, pt requesting to move forward with [facility] with [facility]   [facility] added to AVS     Treatment Team Recommendation: Home with [address] [place] [employer]  Discharge Destination Plan[de-identified] Home with [address] [place] [employer] (Home with [address] )

## 2022-11-27 VITALS
WEIGHT: 190.92 LBS | DIASTOLIC BLOOD PRESSURE: 71 MMHG | HEART RATE: 85 BPM | OXYGEN SATURATION: 95 % | BODY MASS INDEX: 28.94 KG/M2 | RESPIRATION RATE: 19 BRPM | TEMPERATURE: 99.3 F | SYSTOLIC BLOOD PRESSURE: 113 MMHG | HEIGHT: 68 IN

## 2022-11-27 LAB
ANION GAP SERPL CALCULATED.3IONS-SCNC: 7 MMOL/L (ref 4–13)
BASOPHILS # BLD AUTO: 0.05 THOUSANDS/ÂΜL (ref 0–0.1)
BASOPHILS NFR BLD AUTO: 0 % (ref 0–1)
BUN SERPL-MCNC: 15 MG/DL (ref 5–25)
CALCIUM SERPL-MCNC: 8 MG/DL (ref 8.3–10.1)
CHLORIDE SERPL-SCNC: 101 MMOL/L (ref 96–108)
CO2 SERPL-SCNC: 28 MMOL/L (ref 21–32)
CREAT SERPL-MCNC: 0.99 MG/DL (ref 0.6–1.3)
EOSINOPHIL # BLD AUTO: 0 THOUSAND/ÂΜL (ref 0–0.61)
EOSINOPHIL NFR BLD AUTO: 0 % (ref 0–6)
ERYTHROCYTE [DISTWIDTH] IN BLOOD BY AUTOMATED COUNT: 13.6 % (ref 11.6–15.1)
GFR SERPL CREATININE-BSD FRML MDRD: 77 ML/MIN/1.73SQ M
GLUCOSE SERPL-MCNC: 117 MG/DL (ref 65–140)
GLUCOSE SERPL-MCNC: 136 MG/DL (ref 65–140)
GLUCOSE SERPL-MCNC: 144 MG/DL (ref 65–140)
HCT VFR BLD AUTO: 36.3 % (ref 36.5–49.3)
HGB BLD-MCNC: 11.1 G/DL (ref 12–17)
IMM GRANULOCYTES # BLD AUTO: 0.07 THOUSAND/UL (ref 0–0.2)
IMM GRANULOCYTES NFR BLD AUTO: 1 % (ref 0–2)
LYMPHOCYTES # BLD AUTO: 1.57 THOUSANDS/ÂΜL (ref 0.6–4.47)
LYMPHOCYTES NFR BLD AUTO: 13 % (ref 14–44)
MCH RBC QN AUTO: 29.5 PG (ref 26.8–34.3)
MCHC RBC AUTO-ENTMCNC: 30.6 G/DL (ref 31.4–37.4)
MCV RBC AUTO: 97 FL (ref 82–98)
MONOCYTES # BLD AUTO: 1.04 THOUSAND/ÂΜL (ref 0.17–1.22)
MONOCYTES NFR BLD AUTO: 9 % (ref 4–12)
NEUTROPHILS # BLD AUTO: 9.17 THOUSANDS/ÂΜL (ref 1.85–7.62)
NEUTS SEG NFR BLD AUTO: 77 % (ref 43–75)
NRBC BLD AUTO-RTO: 0 /100 WBCS
PLATELET # BLD AUTO: 182 THOUSANDS/UL (ref 149–390)
PMV BLD AUTO: 10.7 FL (ref 8.9–12.7)
POTASSIUM SERPL-SCNC: 3.6 MMOL/L (ref 3.5–5.3)
RBC # BLD AUTO: 3.76 MILLION/UL (ref 3.88–5.62)
SODIUM SERPL-SCNC: 136 MMOL/L (ref 135–147)
WBC # BLD AUTO: 11.9 THOUSAND/UL (ref 4.31–10.16)

## 2022-11-27 RX ORDER — DOCUSATE SODIUM 100 MG/1
100 CAPSULE, LIQUID FILLED ORAL 2 TIMES DAILY
Qty: 20 CAPSULE | Refills: 0 | Status: SHIPPED | OUTPATIENT
Start: 2022-11-27

## 2022-11-27 RX ORDER — POLYETHYLENE GLYCOL 3350 17 G/17G
17 POWDER, FOR SOLUTION ORAL DAILY
Qty: 85 G | Refills: 0 | Status: SHIPPED | OUTPATIENT
Start: 2022-11-27 | End: 2022-12-14

## 2022-11-27 RX ORDER — SENNOSIDES 8.6 MG
8.6 TABLET ORAL DAILY
Qty: 30 TABLET | Refills: 0 | Status: SHIPPED | OUTPATIENT
Start: 2022-11-27 | End: 2022-12-27

## 2022-11-27 RX ORDER — OXYCODONE HYDROCHLORIDE 5 MG/1
5 TABLET ORAL EVERY 6 HOURS PRN
Qty: 20 TABLET | Refills: 0 | Status: SHIPPED | OUTPATIENT
Start: 2022-11-27 | End: 2022-12-07

## 2022-11-27 RX ADMIN — NICOTINE 1 PATCH: 21 PATCH, EXTENDED RELEASE TRANSDERMAL at 11:48

## 2022-11-27 RX ADMIN — METOPROLOL SUCCINATE 100 MG: 100 TABLET, EXTENDED RELEASE ORAL at 09:53

## 2022-11-27 RX ADMIN — OXYCODONE HYDROCHLORIDE 10 MG: 5 TABLET ORAL at 06:10

## 2022-11-27 RX ADMIN — ACETAMINOPHEN 975 MG: 325 TABLET ORAL at 06:10

## 2022-11-27 RX ADMIN — PANTOPRAZOLE SODIUM 40 MG: 40 TABLET, DELAYED RELEASE ORAL at 06:10

## 2022-11-27 RX ADMIN — ACETAMINOPHEN 975 MG: 325 TABLET ORAL at 11:47

## 2022-11-27 RX ADMIN — AMLODIPINE BESYLATE 10 MG: 10 TABLET ORAL at 09:53

## 2022-11-27 RX ADMIN — BUPROPION HYDROCHLORIDE 150 MG: 150 TABLET, FILM COATED, EXTENDED RELEASE ORAL at 09:53

## 2022-11-27 RX ADMIN — ENOXAPARIN SODIUM 40 MG: 40 INJECTION SUBCUTANEOUS at 09:54

## 2022-11-27 RX ADMIN — DOCUSATE SODIUM 100 MG: 100 CAPSULE, LIQUID FILLED ORAL at 09:53

## 2022-11-27 RX ADMIN — GABAPENTIN 800 MG: 400 CAPSULE ORAL at 09:53

## 2022-11-27 NOTE — PLAN OF CARE
Problem: RESPIRATORY - ADULT  Goal: Achieves optimal ventilation and oxygenation  Description: INTERVENTIONS:  - Assess for changes in respiratory status  - Assess for changes in mentation and behavior  - Position to facilitate oxygenation and minimize respiratory effort  - Oxygen administered by appropriate delivery if ordered  - Initiate smoking cessation education as indicated  - Encourage broncho-pulmonary hygiene including cough, deep breathe, Incentive Spirometry  - Assess the need for suctioning and aspirate as needed  - Assess and instruct to report SOB or any respiratory difficulty  - Respiratory Therapy support as indicated  Outcome: Progressing     Problem: MOBILITY - ADULT  Goal: Maintain or return to baseline ADL function  Description: INTERVENTIONS:  -  Assess patient's ability to carry out ADLs; assess patient's baseline for ADL function and identify physical deficits which impact ability to perform ADLs (bathing, care of mouth/teeth, toileting, grooming, dressing, etc )  - Assess/evaluate cause of self-care deficits   - Assess range of motion  - Assess patient's mobility; develop plan if impaired  - Assess patient's need for assistive devices and provide as appropriate  - Encourage maximum independence but intervene and supervise when necessary  - Involve family in performance of ADLs  - Assess for home care needs following discharge   - Consider OT consult to assist with ADL evaluation and planning for discharge  - Provide patient education as appropriate  Outcome: Progressing  Goal: Maintains/Returns to pre admission functional level  Description: INTERVENTIONS:  - Perform BMAT or MOVE assessment daily    - Set and communicate daily mobility goal to care team and patient/family/caregiver     - Collaborate with rehabilitation services on mobility goals if consulted  Problem: Prexisting or High Potential for Compromised Skin Integrity  Goal: Skin integrity is maintained or improved  Description: INTERVENTIONS:  - Identify patients at risk for skin breakdown  - Assess and monitor skin integrity  - Assess and monitor nutrition and hydration status  - Monitor labs   - Assess for incontinence   - Turn and reposition patient  - Assist with mobility/ambulation  - Relieve pressure over bony prominences  - Avoid friction and shearing  - Provide appropriate hygiene as needed including keeping skin clean and dry  - Evaluate need for skin moisturizer/barrier cream  - Collaborate with interdisciplinary team   - Patient/family teaching  - Consider wound care consult   Outcome: Progressing     Problem: Potential for Falls  Goal: Patient will remain free of falls  Description: INTERVENTIONS:  - Educate patient/family on patient safety including physical limitations  - Instruct patient to call for assistance with activity   - Consult OT/PT to assist with strengthening/mobility   - Keep Call bell within reach  - Keep bed low and locked with side rails adjusted as appropriate  - Keep care items and personal belongings within reach  - Initiate and maintain comfort rounds  - Make Fall Risk Sign visible to staff  Problem: Nutrition/Hydration-ADULT  Goal: Nutrient/Hydration intake appropriate for improving, restoring or maintaining nutritional needs  Description: Monitor and assess patient's nutrition/hydration status for malnutrition  Collaborate with interdisciplinary team and initiate plan and interventions as ordered  Monitor patient's weight and dietary intake as ordered or per policy  Utilize nutrition screening tool and intervene as necessary  Determine patient's food preferences and provide high-protein, high-caloric foods as appropriate       INTERVENTIONS:  - Monitor oral intake, urinary output, labs, and treatment plans  - Assess nutrition and hydration status and recommend course of action  - Evaluate amount of meals eaten  - Assist patient with eating if necessary   - Allow adequate time for meals  - Recommend/ encourage appropriate diets, oral nutritional supplements, and vitamin/mineral supplements  - Order, calculate, and assess calorie counts as needed  - Recommend, monitor, and adjust tube feedings and TPN/PPN based on assessed needs  - Assess need for intravenous fluids  - Provide specific nutrition/hydration education as appropriate  - Include patient/family/caregiver in decisions related to nutrition  Outcome: Progressing     Problem: PAIN - ADULT  Goal: Verbalizes/displays adequate comfort level or baseline comfort level  Description: Interventions:  - Encourage patient to monitor pain and request assistance  - Assess pain using appropriate pain scale  - Administer analgesics based on type and severity of pain and evaluate response  - Implement non-pharmacological measures as appropriate and evaluate response  - Consider cultural and social influences on pain and pain management  - Notify physician/advanced practitioner if interventions unsuccessful or patient reports new pain  Outcome: Progressing     - Apply yellow socks and bracelet for high fall risk patients  - Consider moving patient to room near nurses station  Outcome: Progressing     - Out of bed for toileting  - Record patient progress and toleration of activity level   Outcome: Progressing

## 2022-11-27 NOTE — CASE MANAGEMENT
Case Management Discharge Planning Note    Patient name Karri Stevenson  Location [facility] 152-19 MRN [id number]  : 1955 Date 2022       Current Admission Date: 2022  Current Admission Diagnosis:Malignant neoplasm of lower lobe of right lung [facility])   Patient Active Problem List    Diagnosis Date Noted   • Hemiparesis of left dominant side due to non-cerebrovascular etiology ([address] ) 2022   • Malignant neoplasm of lower lobe of right lung ([facility] [address] ) 10/04/2022   • Encounter for smoking cessation counseling 10/04/2022   • Pulmonary emphysema ([facility] [address] ) 08/15/2022   • Acute kidney injury ([facility] [address] ) 2022   • Pulmonary nodules 2022   • Kidney stone 2022   • Cortical age-related cataract of both eyes 2022   • Urinary frequency 2022   • Sacroiliitis ([facility] [address] ) 2022   • Pre-diabetes 10/07/2021   • Erectile dysfunction 10/07/2021   • Insomnia 10/07/2021   • Hyperlipidemia    • Uncomplicated opioid dependence ([facility] [address] ) 10/19/2020   • Encounter for long-term opiate analgesic use 10/19/2020   • Neck pain 10/19/2020   • Chronic pain syndrome 2020   • Class 1 obesity due to excess calories with serious comorbidity and body mass index (BMI) of 31 0 to 31 9 in adult 2020   • Smoking 2020   • Chronic pain of both knees 2019   • Negative depression screening 2019   • Tremor, essential 2019   • Cervical spondylosis without myelopathy 2019   • Lumbar spondylosis 2019   • Lumbar degenerative disc disease 2019   • Myofascial pain syndrome 2019   • Chronic low back pain without sciatica 2019   • Cervical radiculopathy 2019   • JAKI (obstructive sleep apnea) 2019   • Chronic bronchitis ([facility] [address] ) 2019   • Abdominal aortic aneurysm (AAA) without rupture 2019   • Hypertension 2016      LOS (days): 11  Geometric Mean LOS (GMLOS) (days): 8 10  Days to GMLOS:-3 1     OBJECTIVE:  Risk of Unplanned Readmission Score: 35 1         Current admission status: Inpatient   Preferred Pharmacy:   [address] [place] [address] 812, [address] [facility] [address] [address]  Phone: [phone] Fax: [phone]    Primary Care Provider: Stevenson Ayala DO    Primary Insurance: MEDICARE  Secondary Insurance: [facility]    DISCHARGE DETAILS:  Additional Comments: Summary of Care and Christa 77 [id number] faxed to Lorena Hard: [phone] as requested

## 2022-11-27 NOTE — PROGRESS NOTES
Progress Note - Dorean Old 75 y o  male MRN: [id number]    Unit/Bed#: [facility] 404-01 Encounter: [id number]      Assessment:  75 y  o  male w SCC of RLL s/p R VATS superior segmentectomy 11/16 c/b chyle leak  Now s/p IR embolization of thoracic duct 11/22  Vss  Afebrile last 24 h  tmax 100 3  Wbc: 13-> 11    Plan:  Regular diet  Ambulate  dvt ppx  Discharge home today  Subjective:   Feels great  No complaints  Denied fever, chills, chest pain, shortness of breath, nausea, vomiting, or abdominal pain this morning  Objective:     Vitals: Blood pressure 111/74, pulse 86, temperature 99 5 °F (37 5 °C), temperature source Oral, resp  rate 18, height 5' 8" (1 727 m), weight 86 6 kg (190 lb 14 7 oz), SpO2 92 %  ,Body mass index is 29 03 kg/m²  Intake/Output Summary (Last 24 hours) at 11/27/2022 0613  Last data filed at 11/26/2022 1208  Gross per 24 hour   Intake 1100 ml   Output --   Net 1100 ml     Physical Exam  General: NAD  HEENT: NC/AT  MMM  Cv: RRR  Lungs: normal effort  Ab: Soft, NT/ND  Ex: no CCE  Neuro: AAOx3    Invasive Devices     Peripheral Intravenous Line  Duration           Peripheral IV 11/26/22 Right Wrist <1 day                Lab, Imaging and other studies: I have personally reviewed pertinent reports      VTE Pharmacologic Prophylaxis: Sequential compression device (Venodyne)   VTE Mechanical Prophylaxis: sequential compression device

## 2022-11-27 NOTE — DISCHARGE SUMMARY
Discharge Summary - Thoracic Surgery   Maya Andrea 75 y o  male MRN: [id number]  Unit/Bed#: [facility] 404-01 Encounter: [id number]    Admission Date: 11/16/2022     Discharge Date:  11/27/22     Admitting Diagnosis: Malignant neoplasm of lower lobe of right lung [facility]) [[address] 31]    Discharge Diagnosis: Principal Problem:    Malignant neoplasm of lower lobe of right lung (HCC)  Active Problems:    JAKI (obstructive sleep apnea)    Chronic bronchitis (HCC)    Abdominal aortic aneurysm (AAA) without rupture    Cervical radiculopathy    Cervical spondylosis without myelopathy    Tremor, essential    Class 1 obesity due to excess calories with serious comorbidity and body mass index (BMI) of 31 0 to 31 9 in adult    Smoking    Hypertension    Uncomplicated opioid dependence ([facility] [address] )    Hyperlipidemia    Pre-diabetes    Acute kidney injury ([facility] [address] )    Pulmonary emphysema (HCC)    Hemiparesis of left dominant side due to non-cerebrovascular etiology ([facility] [address] )  Resolved Problems:    * No resolved hospital problems  *      Attending and Service:   Jethro Livingston*; Thoracic Surgery    Consulting Physician(s):   endo    Procedures Performed:   Procedure(s) (LRB):  LOBECTOMY LUNG; lower lobe superior segmentectomy, mediastinal lymph node dissection (Right)  THORACOSCOPY VIDEO ASSISTED SURGERY (VATS) (Right)  BRONCHOSCOPY FLEXIBLE (N/A)      Imaging:  Procedure: XR chest portable    Result Date: 11/23/2022  Narrative: CHEST INDICATION:   New onset chest pain, pain on inspiration  Right lower lobe superior segmentectomy on 11/16/2022  COMPARISON:  CXR 11/19/2022 and 11/17/2022, chest CT 7/23/2022  EXAM PERFORMED/VIEWS:  XR CHEST PORTABLE FINDINGS:  Right PICC in mid SVC  Cardiomediastinal silhouette appears unremarkable  Thoracic duct embolization coils  Persistent patchy opacity in the left base  Surgical changes in the right hemithorax with trace right apical pneumothorax   Osseous structures appear within normal limits for patient age      Impression: Persistent opacity at the left base, question pneumonia  Surgical changes in the right lung with right chest tube and trace right apical pneumothorax  Workstation performed: [id number]     Procedure: CT chest abdomen pelvis w contrast    Result Date: 11/23/2022  Narrative: CT CHEST, ABDOMEN AND PELVIS WITH IV CONTRAST INDICATION:   s/p thoracic duct embolization for chylothorax w/ abdominal pain & guarding  COMPARISON:  11/2/2022 and 7/23/2022 TECHNIQUE: CT examination of the chest, abdomen and pelvis was performed  Axial, sagittal, and coronal 2D reformatted images were created from the source data and submitted for interpretation  Radiation dose length product (DLP) for this visit:  878 21 mGy-cm   This examination, like all CT scans performed in the [facility], was performed utilizing techniques to minimize radiation dose exposure, including the use of iterative  reconstruction and automated exposure control  IV Contrast:  100 mL of iohexol (OMNIPAQUE) 50 mL of iohexol (OMNIPAQUE) Enteric Contrast: Enteric contrast was administered  FINDINGS: CHEST LUNGS:  There is consolidation in the right lower lobe along the surgical staple line likely representing atelectasis  There is patchy opacity in the lingula in the left lower lobe which also likely represents atelectasis  There is moderate centrilobular  emphysema  There is no tracheal or endobronchial lesion  PLEURA:  There is a chest tube in place  There is no pleural effusion  HEART/GREAT VESSELS: Heart is unremarkable for patient's age  No thoracic aortic aneurysm  MEDIASTINUM AND DANILO:  Unremarkable  CHEST WALL AND LOWER NECK:  Unremarkable  ABDOMEN LIVER/BILIARY TREE:  One or more subcentimeter sharply circumscribed low-density hepatic lesion(s) are noted, too small to accurately characterize, but statistically most likely to represent subcentimeter hepatic cysts  No suspicious solid hepatic lesion is identified    Hepatic contours are normal   No biliary dilatation  GALLBLADDER:  Gallbladder is surgically absent  SPLEEN:  Unremarkable  PANCREAS:  There is retroperitoneal fluid and stranding, particularly around the head of the pancreas  Lipase is elevated and this is consistent with post thoracic duct embolization pancreatitis  There is no evidence of fluid collection or pancreatic necrosis  ADRENAL GLANDS:  Unremarkable  KIDNEYS/URETERS:  There are numerous bilateral renal lesions consistent with simple and mildly complex cysts  There was no enhancement on the previous dissection protocol CT from July 2022  There are bilateral nonobstructing renal calculi  No hydronephrosis  STOMACH AND BOWEL:  Unremarkable  APPENDIX:  No findings to suggest appendicitis  ABDOMINOPELVIC CAVITY:  No ascites  No pneumoperitoneum  No lymphadenopathy  VESSELS:  The patient is status post thoracic duct embolization  There is embolization glue throughout the lymphatic ducts in the retroperitoneum and pelvis  There is a small amount of glue within the wall or lumen of the proximal left renal vein though the renal vein is completely patent distal to the glue  PELVIS REPRODUCTIVE ORGANS:  The prostate is enlarged  URINARY BLADDER:  The bladder is collapsed with a Sebastian catheter  ABDOMINAL WALL/INGUINAL REGIONS:  Unremarkable  OSSEOUS STRUCTURES:  No acute fracture or destructive osseous lesion  Impression: 1  Post thoracic duct embolization pancreatitis  2   Postoperative changes in the chest without recurrent effusion or pneumothorax  3   Embolization glue within the wall or lumen of the proximal left renal vein without occlusion  The study was marked in [facility] for immediate notification  Workstation performed: [id number]     Procedure: IR thoracic duct embolization    Result Date: 11/23/2022  Narrative: PROCEDURE: Thoracic duct embolization PROCEDURE SUMMARY: 1  Ultrasound-guided inguinal lymph node needle access x2 (bilateral) 2    Intranodal lymphangiogram of the chest, abdomen and pelvis 3  Transabdominal needle access of the cisterna chyli with fluoroscopic guidance 4  Diagnostic lymphangiogram of the thoracic duct 5  Thoracic duct embolization using a combination of coils and glue PROCEDURAL PERSONNEL: Attending physician(s): Cindy Saldana MD Resident physician(s): None Advanced practice provider(s): None INDICATION: Chylous leak status post right lower lobe superior segmentectomy  COMPLICATIONS: No immediate complications  ESTIMATED BLOOD LOSS (mL): Less than 10 CONTRAST: Contrast agent: Omnipaque 350 Contrast volume (mL): 50 RADIATION DOSE: Fluoroscopy time: 40 6 min Reference air kerma: 1890 mGy ANESTHESIA/SEDATION: Level of anesthesia/sedation: General anesthesia Anesthesia/sedation administered by: Anesthesiology PROCEDURE DETAILS: Informed consent for the procedure including risks, benefits and alternatives was obtained and time-out was performed prior to the procedure  The site was prepared and draped using maximal sterile barrier technique including cutaneous antisepsis  Intranodal lymphangiogram of the chest, abdomen and pelvis Under real-time sonographic guidance, bilateral inguinal lymph nodes were accessed using 25-gauge needles at the cortical medullary junction  Slow infusion of lipiodol was performed under fluoroscopy to opacify the inguinal lymphatic ducts  Serial radiography was performed to follow cephalad transit of lipiodol along lumbar lymphatics and thoracic duct  Transabdominal needle access of the cisterna chyli After local opacification of the cisterna chyli, transabdominal needle access was performed using a 22-gauge needle  The cisterna chyli was successfully accessed at T12-L1 level after a few attempts  A combination of microcatheters and wires was used to catheterize the thoracic duct    There was some difficulty tracking the microcatheter over the microwire due to the long transabdominal course and the low profile of the microwire but ultimately a microcatheter was successfully advanced into the thoracic duct  Diagnostic lymphangiogram and thoracic duct embolization With the microcatheter in the thoracic duct, diagnostic lymphangiogram was performed  Based on the findings, embolization of the thoracic duct was performed using multiple micro-Donte coils ranging from 4 mm to 6 mm in size  After deployment of the coils as protection against central venous migration of glue, embolization was performed along the thoracic duct using admixture of Trufill and lipiodol in 1:2 ratio and carried out to the level of the access point at the cisterna chyli  The microcatheter was removed through the transabdominal tract under negative pressure  The needles at bilateral inguinal regions were removed  FINDINGS: 1  Sonographic evaluation of bilateral inguinal lymph nodes demonstrated normal-appearing lymph nodes with fatty echogenic central marilyn  2   Intraprocedural ultrasound demonstrated needle tip at the cortical medullary junction of the lymph nodes  3   Intranodal lymphangiogram demonstrated pooling of lipiodol in bilateral inguinal lymphatic ducts and serial radiography showed opacification of bilateral lumbar lymphatics coalescing into the cisterna Griselda and opacifying the thoracic duct  4   After successful transabdominal cannulation of the thoracic duct, diagnostic lymphangiogram showed variant thoracic duct anatomy with a large, high flow, right-sided branch coming off the thoracic duct at T5-6 level  There was extravasation of contrast from this hypertrophied side branch with contrast pooling in the subcarinal region  The thoracic duct above this level appears to be hypoplastic in appearance and continues to the jugular subclavian venous angle   5   After a combination of coils and glue embolization, there was somewhat delayed polymerization of the glue with some cephalad migration but the previously seen extravasating side branch was successfully embolized with stasis of the glue cast      Impression: 1  Lymphangiogram showed thoracic duct leak through a hypertrophied right-sided branch of the thoracic duct with pooling of extravasated contrast in the right subcarinal region  2   Successful thoracic duct embolization  Workstation performed: [id number]         [facility] Course:   HPI, per Sandra Palencia MD: "Has a clinical stage I biopsy-proven right lower lobe squamous cell cancer in the superior segment  His PET-CT scan demonstrates that this mass has a SUV of 2 9 and there was no evidence of local regional or distant metastatic disease  His pulmonary function tests are adequate for resection  We will have him see his cardiologist for cardiac risk assessment  I discussed the risks and benefits associated with a bronchoscopy, a right thoracoscopic superior segmentectomy, possible lower lobectomy, and mediastinal lymph node dissection  He will stop smoking immediately and will plan for surgery at least 2 weeks after he has been abstinent from cigarettes "    The patient was taken to the operating room on 11/16/22 for performance of the above-stated procedures  Intraoperative findings included: Mass completely resected in the superior segment of the right lower lobe with an approximate 2 5 cm gross negative margin    Patient post op course complicated by a chyle leak  Patient was made NPO and PICC line was placed and TPN was started  The patient was taken to the IR suite on 11/22/22 for thoracic duct embolization  The emobolization was successful but complicated by pancreatitis  Patient was placed on aggressive IVF  His pain improved  The patient was held for an additional day because he had intermittent fevers which subsided and his WBC downtrend  He was given strict return precautions if he continued to have fevers or malaise  Patient was discharged on HD#11   On the day of discharge, the patient was voiding spontaneously, ambulating at baseline, and pain was well controlled  The patient was sent home with medication for pain  He understood all instructions for discharge  He was also given the names and numbers of the providers as well as instructions for follow up appointments  Condition at Discharge: stable     Discharge instructions/Information to patient and family:   See after visit summary for information provided to patient and family  Provisions for Follow-Up Care:  See after visit summary for information related to follow-up care and any pertinent home health orders  Disposition: See After Visit Summary for discharge disposition information  Planned Readmission: No    Discharge Statement   I spent 30 minutes discharging the patient  This time was spent on the day of discharge  I had direct contact with the patient on the day of discharge  Additional documentation is required if more than 30 minutes were spent on discharge  Discharge Medications:  See after visit summary for reconciled discharge medications provided to patient and family        Susan Jarquin MD  11/27/2022  12:15 PM

## 2022-11-28 ENCOUNTER — TELEPHONE (OUTPATIENT)
Dept: SURGICAL ONCOLOGY | Facility: CLINIC | Age: 67
End: 2022-11-28

## 2022-11-28 ENCOUNTER — TRANSITIONAL CARE MANAGEMENT (OUTPATIENT)
Dept: FAMILY MEDICINE CLINIC | Facility: CLINIC | Age: 67
End: 2022-11-28

## 2022-11-28 NOTE — TELEPHONE ENCOUNTER
Donavan Perez, the thoracic nurse called and spoke with pt  He denies any chills, coughing or sob  His pain level is ok  He recently took his temperature and it was below 99  Donavan Perez advised pt that he should call our office if he does start to experience any of the above or if his temp goes above 100 4  Pt said the visiting nurse checked his incision and that he is not having any issues  Donavan Perez advised pt to keep an eye out for any redness, swelling or puss like discharge  Pt was of understanding and will call if he needs anything

## 2022-11-30 ENCOUNTER — OFFICE VISIT (OUTPATIENT)
Dept: PAIN MEDICINE | Facility: CLINIC | Age: 67
End: 2022-11-30

## 2022-11-30 VITALS
BODY MASS INDEX: 28.07 KG/M2 | HEART RATE: 63 BPM | HEIGHT: 68 IN | TEMPERATURE: 98.5 F | DIASTOLIC BLOOD PRESSURE: 74 MMHG | SYSTOLIC BLOOD PRESSURE: 119 MMHG | WEIGHT: 185.2 LBS

## 2022-11-30 DIAGNOSIS — M47.812 CERVICAL SPONDYLOSIS WITHOUT MYELOPATHY: ICD-10-CM

## 2022-11-30 DIAGNOSIS — M54.12 CERVICAL RADICULOPATHY: ICD-10-CM

## 2022-11-30 DIAGNOSIS — F11.20 UNCOMPLICATED OPIOID DEPENDENCE (HCC): ICD-10-CM

## 2022-11-30 DIAGNOSIS — M79.18 MYOFASCIAL PAIN SYNDROME: ICD-10-CM

## 2022-11-30 DIAGNOSIS — M47.816 LUMBAR SPONDYLOSIS: ICD-10-CM

## 2022-11-30 DIAGNOSIS — G89.4 CHRONIC PAIN SYNDROME: Primary | ICD-10-CM

## 2022-11-30 DIAGNOSIS — G89.29 CHRONIC MIDLINE LOW BACK PAIN WITHOUT SCIATICA: ICD-10-CM

## 2022-11-30 DIAGNOSIS — M51.36 LUMBAR DEGENERATIVE DISC DISEASE: ICD-10-CM

## 2022-11-30 DIAGNOSIS — Z79.891 ENCOUNTER FOR LONG-TERM OPIATE ANALGESIC USE: ICD-10-CM

## 2022-11-30 DIAGNOSIS — M54.50 CHRONIC MIDLINE LOW BACK PAIN WITHOUT SCIATICA: ICD-10-CM

## 2022-11-30 DIAGNOSIS — M54.2 NECK PAIN: ICD-10-CM

## 2022-11-30 RX ORDER — OXYCODONE AND ACETAMINOPHEN 7.5; 325 MG/1; MG/1
1 TABLET ORAL EVERY 8 HOURS PRN
Qty: 90 TABLET | Refills: 0 | Status: SHIPPED | OUTPATIENT
Start: 2022-11-30

## 2022-11-30 RX ORDER — IPRATROPIUM BROMIDE AND ALBUTEROL SULFATE 2.5; .5 MG/3ML; MG/3ML
SOLUTION RESPIRATORY (INHALATION)
COMMUNITY
Start: 2022-11-23

## 2022-11-30 RX ORDER — OXYCODONE HYDROCHLORIDE AND ACETAMINOPHEN 5; 325 MG/1; MG/1
1 TABLET ORAL EVERY 8 HOURS PRN
Qty: 90 TABLET | Refills: 0 | Status: SHIPPED | OUTPATIENT
Start: 2022-11-30

## 2022-11-30 NOTE — PATIENT INSTRUCTIONS

## 2022-11-30 NOTE — PROGRESS NOTES
Assessment:  1  Chronic pain syndrome    2  Neck pain    3  Cervical radiculopathy    4  Cervical spondylosis without myelopathy    5  Chronic midline low back pain without sciatica    6  Lumbar degenerative disc disease    7  Lumbar spondylosis    8  Myofascial pain syndrome    9  Encounter for long-term opiate analgesic use    10  Uncomplicated opioid dependence ([address] )        Plan:  While the patient was in the office today, I did have a thorough conversation regarding their chronic pain syndrome, medication management, and treatment plan options  Patient is being seen for follow-up visit  He underwent a right lower lobectomy on 11/16/2022  There were some postop complications  Patient was discharged only 3 days ago  He underwent a C6-7 interlaminar epidural steroid injection on 10/21/2022  Neck pain has improved by more than 50%  Patient aggravated his low back lifting some grocery bags yesterday  Continue oxycodone 7 5/325 3 times daily if needed for pain  The patient's opioid scripts were sent to their pharmacy electronically and was given a 2 month supply of prescriptions with a Do Not Fill date(s) of 12/10/2022, 11/07/2023  Renewed gabapentin 800 mg 3 times daily  He did not require refill this medication during today's visit  Continue Flexeril 10 mg 3 times daily if needed for spasms  He did not require refill this medication during today's visit  [facility] [place] Prescription Drug Monitoring Program report was reviewed and was appropriate     There are risks associated with opioid medications, including dependence, addiction and tolerance  The patient understands and agrees to use these medications only as prescribed  Potential side effects of the medications include, but are not limited to, constipation, drowsiness, addiction, impaired judgment and risk of fatal overdose if not taken as prescribed  The patient was warned against driving while taking sedation medications    [employer] testing:    Mary Call at [facility]       medications is a felony  At this point in time, the patient is showing no signs of addiction, abuse, diversion or suicidal ideation  The patient will follow-up in 8 weeks for medication prescription refill and reevaluation  The patient was advised to contact the office should their symptoms worsen in the interim  The patient was agreeable and verbalized an understanding  History of Present Illness: The patient is a 75 y o  male last seen on 10/21/2022 for C6-7 interlaminar epidural steroid injection  who presents for a follow up office visit in regards to chronic pain secondary to chronic pain syndrome, neck pain, cervical radiculopathy, cervical spondylosis, chronic back pain, lumbar degenerative disc disease, lumbar spondylosis, myofascial pain syndrome  The patient currently reports complaints of neck pain, low back pain  Current pain level is a 10/10  Quality pain is described as burning, dull, aching, sharp, throbbing, cramping, pressure-like, shooting, numb, pins and needles peer    Current pain medications includes:  Oxycodone 5/325 every 8 hours as needed for pain, gabapentin 800 mg 3 times daily, Flexeril 10 mg 3 times daily if needed for spasm    The patient reports that this regimen is providing 30-40 % pain relief  The patient is reporting no side effects from this pain medication regimen  Pain Contract Signed: 6/6/22  Last Urine Drug Screen: 9/30/22    I have personally reviewed and/or updated the patient's past medical history, past surgical history, family history, social history, current medications, allergies, and vital signs today  Review of Systems:    Review of Systems   Constitutional: Negative for unexpected weight change  HENT: Negative for hearing loss  Eyes: Negative for visual disturbance  Respiratory: Positive for shortness of breath  Cardiovascular: Negative for leg swelling  Gastrointestinal: Positive for constipation     Endocrine: Negative for polyuria  Genitourinary: Negative for difficulty urinating  Musculoskeletal: Positive for gait problem  Negative for joint swelling and myalgias  Decreased range of motion  Joint stiffness  Pain in extremity   Skin: Negative for rash  Neurological: Positive for dizziness  Negative for weakness and headaches  Psychiatric/Behavioral: Negative for decreased concentration  All other systems reviewed and are negative  Past Medical History:   Diagnosis Date   • Abdominal aortic aneurysm without rupture    • Abdominal Aortic Duplex 02/21/2017    Ectatic infrarenal abdominal aorta  • CAD (coronary artery disease)    • COPD (chronic obstructive pulmonary disease) (HCC)    • Extremity pain    • History of echocardiogram 03/18/2014    EF 55%, mild MR and AI  Mild concentric LVH  • History of stress test 03/06/2017    Normal    • Hyperlipidemia    • Hypertension    • Joint pain    • Kidney stone    • Low back pain    • Migraine    • Neck pain    • Obstructive sleep apnea     cannot tolerate CPAP   • Osteoarthritis    • Peripheral neuropathy    • Reflex sympathetic dystrophy        Past Surgical History:   Procedure Laterality Date   • CARDIAC CATHETERIZATION  02/13/2012    EF 70%, widely patent renal arteries, significant single-vessel CAD-medical therapy  • CARDIAC CATHETERIZATION  04/11/2013    EF 65%, 50% mid LAD, 20% prox CFX, 90% diffuse RCA, 99% mid RCA  Medical management     • CHOLECYSTECTOMY     • COLONOSCOPY     • EPIDURAL BLOCK INJECTION Bilateral 08/15/2019    Procedure: BLOCK / INJECTION EPIDURAL STEROID CERVICAL;  Surgeon: Gris García MD;  Location: [facility];  Service: Pain Management    • FL GUIDED NEEDLE PLAC BX/ASP/INJ  08/15/2019   • IR BIOPSY LUNG  09/13/2022   • IR THORACIC DUCT EMBOLIZATION  11/22/2022   • ORTHOPEDIC SURGERY     • ME BRONCHOSCOPY,DIAGNOSTIC N/A 11/16/2022    Procedure: Annie Montero;  Surgeon: Virgie Sandra MD;  Location: [facility] OR;  Service: Thoracic   • IN THORACOSCOPY SURG LOBECTOMY Right 2022    Procedure: LOBECTOMY LUNG; lower lobe superior segmentectomy, mediastinal lymph node dissection;  Surgeon: Neli Reyes MD;  Location: [facility];  Service: Thoracic   • IN THORACOSCOPY W/SEGMENTECTOMY Right 2022    Procedure: THORACOSCOPY VIDEO ASSISTED SURGERY (VATS);   Surgeon: Neli Reyes MD;  Location: [facility];  Service: Thoracic   • TRIGGER POINT INJECTION         Family History   Adopted: Yes   Problem Relation Age of Onset   • No Known Problems Family    • No Known Problems Mother    • No Known Problems Father        Social History     Occupational History   • Not on file   Tobacco Use   • Smoking status: Former     Packs/day: 1 50     Types: Cigarettes     Quit date: 11/3/2022     Years since quittin 0   • Smokeless tobacco: Never   Vaping Use   • Vaping Use: Never used   Substance and Sexual Activity   • Alcohol use: Not Currently   • Drug use: Never   • Sexual activity: Yes         Current Outpatient Medications:   •  albuterol (Ventolin HFA) 90 mcg/act inhaler, Inhale 2 puffs every 6 (six) hours as needed for wheezing, Disp: 18 g, Rfl: 11  •  amLODIPine (NORVASC) 10 mg tablet, swallow 1 tablet once daily (Patient taking differently: daily at bedtime), Disp: 90 tablet, Rfl: 3  •  buPROPion (Wellbutrin XL) 150 mg 24 hr tablet, Take 1 tablet (150 mg total) by mouth every morning, Disp: 90 tablet, Rfl: 3  •  cyclobenzaprine (FLEXERIL) 10 mg tablet, Take 1 tablet (10 mg total) by mouth 3 (three) times a day as needed for muscle spasms, Disp: 90 tablet, Rfl: 1  •  docusate sodium (COLACE) 100 mg capsule, Take 1 capsule (100 mg total) by mouth 2 (two) times a day, Disp: 20 capsule, Rfl: 0  •  gabapentin (NEURONTIN) 800 mg tablet, Take 1 tablet (800 mg total) by mouth 3 (three) times a day, Disp: 90 tablet, Rfl: 1  •  glucose blood (OneTouch Verio) test strip, Test once daily, Disp: 100 each, Rfl: 0  •  ipratropium-albuterol (DUO-NEB) 0 5-2 5 mg/3 mL nebulizer solution, inhale contents of 1 vial in nebulizer every 6 hours if needed for wheezing or shortness of breath, Disp: , Rfl:   •  lisinopril (ZESTRIL) 20 mg tablet, take 1 tablet by mouth once daily (Patient taking differently: daily at bedtime), Disp: 30 tablet, Rfl: 5  •  metoprolol succinate (TOPROL-XL) 100 mg 24 hr tablet, take 1 tablet by mouth once daily (Patient taking differently: daily at bedtime), Disp: 30 tablet, Rfl: 5  •  nicotine (NICODERM CQ) 21 mg/24 hr TD 24 hr patch, Place 1 patch on the skin every 24 hours, Disp: 28 patch, Rfl: 0  •  nicotine polacrilex (NICORETTE) 2 mg gum, Chew 1 each (2 mg total) as needed for smoking cessation, Disp: 100 each, Rfl: 0  •  nitroglycerin (NITROSTAT) 0 4 mg SL tablet, Place 1 tablet (0 4 mg total) under the tongue every 5 (five) minutes as needed for chest pain, Disp: 25 tablet, Rfl: 5  •  oxyCODONE (ROXICODONE) 5 immediate release tablet, Take 1 tablet (5 mg total) by mouth every 6 (six) hours as needed for severe pain for up to 10 days Max Daily Amount: 20 mg, Disp: 20 tablet, Rfl: 0  •  oxyCODONE-acetaminophen (PERCOCET) 5-325 mg per tablet, Take 1 tablet by mouth every 8 (eight) hours as needed for moderate pain Do not fill until 1/7/2023 Max Daily Amount: 3 tablets, Disp: 90 tablet, Rfl: 0  •  oxyCODONE-acetaminophen (Percocet) 7 5-325 MG per tablet, Take 1 tablet by mouth every 8 (eight) hours as needed for moderate pain Do not fill until 12/10/2022 Max Daily Amount: 3 tablets, Disp: 90 tablet, Rfl: 0  •  polyethylene glycol (MIRALAX) 17 g packet, Take 17 g by mouth daily for 5 days, Disp: 85 g, Rfl: 0  •  rosuvastatin (CRESTOR) 20 MG tablet, take 1 tablet by mouth once daily (Patient taking differently: daily at bedtime), Disp: 90 tablet, Rfl: 5  •  senna (SENOKOT) 8 6 mg, Take 1 tablet (8 6 mg total) by mouth daily, Disp: 30 tablet, Rfl: 0  •  sildenafil (VIAGRA) 100 mg tablet, Take 1 tablet (100 mg total) by mouth daily as needed for erectile dysfunction, Disp: 20 tablet, Rfl: 0  •  tamsulosin (FLOMAX) 0 4 mg, Take 2 capsules (0 8 mg total) by mouth daily with dinner, Disp: 60 capsule, Rfl: 0  •  tiotropium-olodaterol (Stiolto Respimat) 2 5-2 5 MCG/ACT inhaler, Inhale 2 puffs daily, Disp: 4 g, Rfl: 11  •  topiramate (TOPAMAX) 25 mg tablet, FOLLOW INSTRUCTIONS GIVEN TO TITRATE UP TO A MAX OF 2 TABLETS BY MOUTH TWICE DAILY AS TOLERATED AND TO LEVEL OF BENEFIT FOR TREMORS, Disp: 120 tablet, Rfl: 6  •  traZODone (DESYREL) 100 mg tablet, take 1 tablet by mouth daily at bedtime, Disp: 90 tablet, Rfl: 3    No Known Allergies    Physical Exam:    /74   Pulse 63   Temp 98 5 °F (36 9 °C)   Ht 5' 8" (1 727 m)   Wt 84 kg (185 lb 3 2 oz)   BMI 28 16 kg/m²     Constitutional:normal, well developed, well nourished, alert, in no distress and non-toxic and no overt pain behavior  Eyes:anicteric  HEENT:grossly intact  Neck:supple, symmetric, trachea midline and no masses   Pulmonary:even and unlabored  Cardiovascular:No edema or pitting edema present  Skin:Normal without rashes or lesions and well hydrated  Psychiatric:Mood and affect appropriate  Neurologic:Cranial Nerves II-XII grossly intact  Musculoskeletal:normal      Imaging  No orders to display         No orders of the defined types were placed in this encounter

## 2022-12-01 DIAGNOSIS — Z71.6 ENCOUNTER FOR SMOKING CESSATION COUNSELING: ICD-10-CM

## 2022-12-01 DIAGNOSIS — F17.200 NEEDS SMOKING CESSATION EDUCATION: ICD-10-CM

## 2022-12-01 RX ORDER — NICOTINE 21 MG/24HR
1 PATCH, TRANSDERMAL 24 HOURS TRANSDERMAL EVERY 24 HOURS
Qty: 28 PATCH | Refills: 0 | Status: SHIPPED | OUTPATIENT
Start: 2022-12-01

## 2022-12-02 ENCOUNTER — APPOINTMENT (OUTPATIENT)
Dept: RADIOLOGY | Facility: CLINIC | Age: 67
End: 2022-12-02

## 2022-12-02 DIAGNOSIS — S27.899A: ICD-10-CM

## 2022-12-06 ENCOUNTER — OFFICE VISIT (OUTPATIENT)
Dept: CARDIAC SURGERY | Facility: CLINIC | Age: 67
End: 2022-12-06

## 2022-12-06 ENCOUNTER — DOCUMENTATION (OUTPATIENT)
Dept: CARDIAC SURGERY | Facility: CLINIC | Age: 67
End: 2022-12-06

## 2022-12-06 VITALS
DIASTOLIC BLOOD PRESSURE: 79 MMHG | TEMPERATURE: 98 F | SYSTOLIC BLOOD PRESSURE: 117 MMHG | HEIGHT: 68 IN | RESPIRATION RATE: 16 BRPM | HEART RATE: 60 BPM | OXYGEN SATURATION: 99 % | BODY MASS INDEX: 28.33 KG/M2 | WEIGHT: 186.95 LBS

## 2022-12-06 DIAGNOSIS — R68.83 CHILLS: ICD-10-CM

## 2022-12-06 DIAGNOSIS — C34.91 SQUAMOUS CELL CARCINOMA OF RIGHT LUNG (HCC): Primary | ICD-10-CM

## 2022-12-06 PROBLEM — C34.31: Status: ACTIVE | Noted: 2022-07-23

## 2022-12-06 NOTE — ASSESSMENT & PLAN NOTE
Mr Edwige Box presents for follow-up after right segmentectomy of right lower lobe lung mass  He was found to have lymph node involvement of level 11 and 4R nodes classifying Stage IIIA lung cancer  This was discussed with the patient and he will be referred to medical oncology for adjuvant therapy  For his continued chills/sweats labs were ordered including CBC, CMP UA and lipase to be completed now  He will complete a CXR and repeat CBC prior to his next visit and follow-up in 2-3 weeks  All questions answered

## 2022-12-06 NOTE — PROGRESS NOTES
Thoracic Follow-Up  Assessment/Plan:    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 [facility])  Mr Christina Mcclendon presents for follow-up after right segmentectomy of right lower lobe lung mass  He was found to have lymph node involvement of level 11 and 4R nodes classifying Stage IIIA lung cancer  This was discussed with the patient and he will be referred to medical oncology for adjuvant therapy  For his continued chills/sweats labs were ordered including CBC, CMP UA and lipase to be completed now  He will complete a CXR and repeat CBC prior to his next visit and follow-up in 2-3 weeks  All questions answered  Diagnoses and all orders for this visit:    Squamous cell carcinoma of right lung [facility])  -     Ambulatory Referral to Hematology / Oncology; Future    Chills  -     CBC and differential; Future  -     CBC and differential; Future  -     UA (URINE) with reflex to Scope; Future  -     XR chest pa & lateral; Future  -     Comprehensive metabolic panel; Future  -     Lipase; Future          Thoracic History          Subjective:    Patient ID: Ayleen Coates is a 75 y o  male  HPI     Mr Christina Mcclendon is a 75year old male who presents for post-operative follow-up after resection of a mass in the superior segment of the right lower lobe on 11/16 for squamous cell carcinoma  Pathology results with level 11 node positive for metastatic carcinomal and level 4R node positive for metastatic carcinoma    His post-operative course was complicated by a chyle weak for which he was made NPO, PICC line was placed and he was started on TPN  He underwent successful embolization of thoracic duct on 11/22/22  His hospital course was then complicated by pancreatitis which improved with aggressive IV fluid resuscitation  He was discharged on HD 11  On discussion he reports poor appetite, cold sweats to the point that he is drenching his clothes, and pain at the incision sites         The following portions of the patient's history were reviewed and updated as appropriate: allergies, current medications, past family history, past medical history, past social history, past surgical history and problem list     Review of Systems   Constitutional: Positive for appetite change, chills and diaphoresis  Negative for unexpected weight change  HENT: Negative  Eyes: Negative  Respiratory: Negative for cough, shortness of breath and wheezing  Cardiovascular: Negative for chest pain and leg swelling  Gastrointestinal: Negative for abdominal pain, diarrhea and nausea  Endocrine: Negative  Genitourinary: Negative for difficulty urinating and dysuria  Musculoskeletal: Negative  Neurological: Negative  Hematological: Negative for adenopathy  Does not bruise/bleed easily  All other systems reviewed and are negative  Objective:   Physical Exam  Vitals reviewed  Constitutional:       General: He is not in acute distress  Appearance: Normal appearance  He is not ill-appearing  HENT:      Head: Normocephalic  Nose: Nose normal       Mouth/Throat:      Mouth: Mucous membranes are moist    Eyes:      Pupils: Pupils are equal, round, and reactive to light  Cardiovascular:      Rate and Rhythm: Normal rate and regular rhythm  Heart sounds: Normal heart sounds  Pulmonary:      Effort: Pulmonary effort is normal       Breath sounds: Normal breath sounds  No wheezing, rhonchi or rales  Comments: Incisions in right chest x 2 clean and well approximated  Abdominal:      Palpations: Abdomen is soft  Musculoskeletal:         General: No swelling  Normal range of motion  Skin:     General: Skin is warm  Neurological:      General: No focal deficit present  Mental Status: He is alert and oriented to person, place, and time     Psychiatric:         Mood and Affect: Mood normal      /79 (BP Location: Left arm, Patient Position: Sitting, Cuff Size: Standard)   Pulse 60   Temp 98 °F (36 7 °C) (Temporal)   Resp 16   Ht 5' 8" (1 727 m)   Wt 84 8 kg (186 lb 15 2 oz)   SpO2 99%   BMI 28 43 kg/m²     XR chest pa & lateral    Result Date: 12/2/2022  Impression Resolution of right apical pneumothorax  Status post thoracic duct embolization with stable postsurgical changes in the right lung  Workstation performed: [id number]     XR chest pa & lateral    Result Date: 11/26/2022  Impression Improving consolidation about the right lower lobe staple line  Persistent trace right apical pneumothorax  Workstation performed: [id number]     XR chest pa & lateral    Result Date: 11/25/2022  Impression Status post right-sided chest tube removal   There is interval development of a tiny right apical pneumothorax  Recommend follow-up chest x-ray  I personally discussed this study with ANAY BERMUDEZ on 11/25/2022 at 1:43 PM  Workstation performed: [id number]     XR chest pa & lateral    Result Date: 11/17/2022  Impression Small left pleural effusion and left basilar linear atelectasis  No pneumothorax  Workstation performed: [id number]      No CT Chest results available for this patient  CT chest abdomen pelvis w contrast    Result Date: 11/23/2022  Narrative CT CHEST, ABDOMEN AND PELVIS WITH IV CONTRAST INDICATION:   s/p thoracic duct embolization for chylothorax w/ abdominal pain & guarding  COMPARISON:  11/2/2022 and 7/23/2022 TECHNIQUE: CT examination of the chest, abdomen and pelvis was performed  Axial, sagittal, and coronal 2D reformatted images were created from the source data and submitted for interpretation  Radiation dose length product (DLP) for this visit:  878 21 mGy-cm   This examination, like all CT scans performed in the [facility], was performed utilizing techniques to minimize radiation dose exposure, including the use of iterative  reconstruction and automated exposure control   IV Contrast:  100 mL of iohexol (OMNIPAQUE) 50 mL of iohexol (OMNIPAQUE) Enteric Contrast: Enteric contrast was administered  FINDINGS: CHEST LUNGS:  There is consolidation in the right lower lobe along the surgical staple line likely representing atelectasis  There is patchy opacity in the lingula in the left lower lobe which also likely represents atelectasis  There is moderate centrilobular  emphysema  There is no tracheal or endobronchial lesion  PLEURA:  There is a chest tube in place  There is no pleural effusion  HEART/GREAT VESSELS: Heart is unremarkable for patient's age  No thoracic aortic aneurysm  MEDIASTINUM AND DANILO:  Unremarkable  CHEST WALL AND LOWER NECK:  Unremarkable  ABDOMEN LIVER/BILIARY TREE:  One or more subcentimeter sharply circumscribed low-density hepatic lesion(s) are noted, too small to accurately characterize, but statistically most likely to represent subcentimeter hepatic cysts  No suspicious solid hepatic lesion is identified  Hepatic contours are normal   No biliary dilatation  GALLBLADDER:  Gallbladder is surgically absent  SPLEEN:  Unremarkable  PANCREAS:  There is retroperitoneal fluid and stranding, particularly around the head of the pancreas  Lipase is elevated and this is consistent with post thoracic duct embolization pancreatitis  There is no evidence of fluid collection or pancreatic necrosis  ADRENAL GLANDS:  Unremarkable  KIDNEYS/URETERS:  There are numerous bilateral renal lesions consistent with simple and mildly complex cysts  There was no enhancement on the previous dissection protocol CT from July 2022  There are bilateral nonobstructing renal calculi  No hydronephrosis  STOMACH AND BOWEL:  Unremarkable  APPENDIX:  No findings to suggest appendicitis  ABDOMINOPELVIC CAVITY:  No ascites  No pneumoperitoneum  No lymphadenopathy  VESSELS:  The patient is status post thoracic duct embolization  There is embolization glue throughout the lymphatic ducts in the retroperitoneum and pelvis   There is a small amount of glue within the wall or lumen of the proximal left renal vein though the renal vein is completely patent distal to the glue  PELVIS REPRODUCTIVE ORGANS:  The prostate is enlarged  URINARY BLADDER:  The bladder is collapsed with a Sebastian catheter  ABDOMINAL WALL/INGUINAL REGIONS:  Unremarkable  OSSEOUS STRUCTURES:  No acute fracture or destructive osseous lesion  Impression 1  Post thoracic duct embolization pancreatitis  2   Postoperative changes in the chest without recurrent effusion or pneumothorax  3   Embolization glue within the wall or lumen of the proximal left renal vein without occlusion  The study was marked in [facility] for immediate notification  Workstation performed: [id number]      NM PET CT skull base to mid thigh    Result Date: 8/5/2022  Narrative PET/CT SCAN INDICATION:  D38 1: Neoplasm of uncertain behavior of trachea, bronchus and lung   Abnormal CT study  Right lower lobe nodule  MODIFIER: PI COMPARISON: No prior PET scans  CT chest abdomen and pelvis, 7/23/2022 CELL TYPE:  N/A TECHNIQUE:   9 2 mCi F-18-FDG administered IV  Multiplanar attenuation corrected and non attenuation corrected PET images were acquired 60 minutes post injection  Contiguous, low dose, axial CT sections were obtained from the skull base through the femurs   Intravenous contrast material was not utilized  This examination, like all CT scans performed in the [facility], was performed utilizing techniques to minimize radiation dose exposure, including the use of iterative reconstruction and automated exposure control  Fasting serum glucose: 130 mg/dl FINDINGS: VISUALIZED BRAIN:   No acute abnormalities are seen  HEAD/NECK:   Subcentimeter solitary focus of increased activity within the posterior aspect of the left parotid gland  (1200/50) associated with a subcentimeter ovoid nodule measuring approximately 0 8 cm with SUV max 3 8    CT images: No significant findings CHEST:   - Solid nodule in the superior segment of the right lower lobe abutting the major fissure  (1200/113) (3/127) measures approximately 1 2 x 1 0 cm with SUV max 2 9  A subpleural nodule on image 3/126 measuring 5 mm is unchanged from the prior study, and shows no abnormal glucose activity but too small for accurate characterization with PET/CT  Prior PET CT also showed subtle groundglass opacities within the right upper lobe, for example as seen on image 3/106, without abnormal glucose activity  There is no glucose avid mediastinal or hilar adenopathy CT images: Prior CT demonstrated airspace disease within the posterior inferior right lower lobe, since resolved  There is pulmonary emphysema  No pleural or pericardial effusion  Coronary artery calcification  ABDOMEN:   No FDG avid soft tissue lesions are seen  CT images: Hepatic steatosis and bilateral completely evaluated renal cysts  (Please refer to prior CT report)  Bilateral nephrolithiasis  The time of the prior study, there is right-sided hydronephrosis with ureteral calculi  There is mild residual dilatation of the right kidney collecting system with reduced caliber of the right ureter and diminished periureteral inflammation  No residual ureteral stone appreciated  There is at least one small calculus measuring about 2 to 3 mm in the urinary bladder (3/255)  Abdominal aortic aneurysm and dilated left common iliac artery noted  PELVIS: No FDG avid soft tissue lesions are seen  CT images: Enlarged prostate gland with calcification  Circumferential bladder wall thickening which may be related to muscular hypertrophy  OSSEOUS STRUCTURES: No FDG avid lesions are seen  CT images: No significant findings  Impression 1  Solid lesion in the superior segment of the right lower lobe abutting the major fissure measuring 1 2 x 1 0 cm in size with SUV max 2 9 most concerning for primary bronchogenic carcinoma of the lung with soft tissue sampling recommended   2  Several small groundglass opacities within the right upper lobe and an additional 5 mm nodule in the superior segment of the right lower lobe without abnormal glucose activity but too small to characterize  Reassessment during the course of follow-up  recommended  3  There is no evidence of glucose avid intrathoracic adenopathy  There is no evidence of distant glucose avid metastatic disease 4  Limited CT study shows no residual ureteral stones  A small bladder calculus is present  Right renal collecting system distention partially resolved  5   Enlarged prostate gland, abdominal aortic aneurysm, pulmonary emphysema, and hepatic steatosis 6  Subcentimeter glucose avid nodule in the left parotid gland  This may represent a small parotid neoplasm  This could either be further evaluated with gadolinium-enhanced MRI of the neck or with close interval surveillance Workstation performed: [id number]      No Barium Swallow results available for this patient

## 2022-12-06 NOTE — PROGRESS NOTES
I met with Dar Bazan at his visit with Dr Pantera Le  He's familiar with me from prior visits  Patient going for lab work today  To call for temp >100 4, chills, increased SOB or cough  Questions answered, support provided

## 2022-12-08 ENCOUNTER — APPOINTMENT (OUTPATIENT)
Dept: LAB | Facility: CLINIC | Age: 67
End: 2022-12-08

## 2022-12-08 ENCOUNTER — APPOINTMENT (OUTPATIENT)
Dept: RADIOLOGY | Facility: CLINIC | Age: 67
End: 2022-12-08

## 2022-12-08 DIAGNOSIS — R68.83 CHILLS: ICD-10-CM

## 2022-12-08 LAB
ALBUMIN SERPL BCP-MCNC: 2.8 G/DL (ref 3.5–5)
ALP SERPL-CCNC: 102 U/L (ref 46–116)
ALT SERPL W P-5'-P-CCNC: 32 U/L (ref 12–78)
ANION GAP SERPL CALCULATED.3IONS-SCNC: 5 MMOL/L (ref 4–13)
AST SERPL W P-5'-P-CCNC: 17 U/L (ref 5–45)
BILIRUB SERPL-MCNC: 0.4 MG/DL (ref 0.2–1)
BUN SERPL-MCNC: 20 MG/DL (ref 5–25)
CALCIUM ALBUM COR SERPL-MCNC: 10.2 MG/DL (ref 8.3–10.1)
CALCIUM SERPL-MCNC: 9.2 MG/DL (ref 8.3–10.1)
CHLORIDE SERPL-SCNC: 108 MMOL/L (ref 96–108)
CO2 SERPL-SCNC: 26 MMOL/L (ref 21–32)
CREAT SERPL-MCNC: 1.68 MG/DL (ref 0.6–1.3)
GFR SERPL CREATININE-BSD FRML MDRD: 41 ML/MIN/1.73SQ M
GLUCOSE P FAST SERPL-MCNC: 103 MG/DL (ref 65–99)
LIPASE SERPL-CCNC: 251 U/L (ref 73–393)
POTASSIUM SERPL-SCNC: 4.6 MMOL/L (ref 3.5–5.3)
PROT SERPL-MCNC: 7.1 G/DL (ref 6.4–8.4)
SODIUM SERPL-SCNC: 139 MMOL/L (ref 135–147)

## 2022-12-09 ENCOUNTER — DOCUMENTATION (OUTPATIENT)
Dept: HEMATOLOGY ONCOLOGY | Facility: CLINIC | Age: 67
End: 2022-12-09

## 2022-12-09 NOTE — PROGRESS NOTES
Intake received/ Chart reviewed: 12/9/22    Pathology completed:  - Lung Bx 11/16/22  - Lung Bx 9/13/22    Imaging completed:  - 12/8/22 CXR  - 11/23/22 CT C/A/P  - 9/21/22 MRI Brain  - 8/5/22 PET/ CT    All records needed are in patients chart  No records retrieval needed at this time

## 2022-12-12 ENCOUNTER — TELEPHONE (OUTPATIENT)
Dept: SURGICAL ONCOLOGY | Facility: CLINIC | Age: 67
End: 2022-12-12

## 2022-12-12 NOTE — TELEPHONE ENCOUNTER
12/12/22  Intake received  Medicare A/B  [employer]  Alysia Queen Mele  Recipient ID: [id number]  Active   07/01/22 thru current  No outreach needed at this time

## 2022-12-14 ENCOUNTER — OFFICE VISIT (OUTPATIENT)
Dept: FAMILY MEDICINE CLINIC | Facility: CLINIC | Age: 67
End: 2022-12-14

## 2022-12-14 VITALS
TEMPERATURE: 96.6 F | WEIGHT: 188 LBS | HEART RATE: 64 BPM | BODY MASS INDEX: 28.49 KG/M2 | DIASTOLIC BLOOD PRESSURE: 72 MMHG | SYSTOLIC BLOOD PRESSURE: 112 MMHG | OXYGEN SATURATION: 98 % | HEIGHT: 68 IN

## 2022-12-14 DIAGNOSIS — R68.83 CHILLS: ICD-10-CM

## 2022-12-14 DIAGNOSIS — M54.50 CHRONIC MIDLINE LOW BACK PAIN WITHOUT SCIATICA: ICD-10-CM

## 2022-12-14 DIAGNOSIS — G89.29 CHRONIC MIDLINE LOW BACK PAIN WITHOUT SCIATICA: ICD-10-CM

## 2022-12-14 DIAGNOSIS — N17.9 AKI (ACUTE KIDNEY INJURY) (HCC): ICD-10-CM

## 2022-12-14 DIAGNOSIS — E55.9 VITAMIN D INSUFFICIENCY: ICD-10-CM

## 2022-12-14 DIAGNOSIS — Z91.89 AT HIGH RISK FOR OSTEOPOROSIS: ICD-10-CM

## 2022-12-14 DIAGNOSIS — R53.83 OTHER FATIGUE: Primary | ICD-10-CM

## 2022-12-14 DIAGNOSIS — C34.91 SQUAMOUS CELL CARCINOMA OF RIGHT LUNG (HCC): ICD-10-CM

## 2022-12-14 NOTE — PROGRESS NOTES
Assessment/Plan:    Chronic low back pain without sciatica  This is a chronic condition  Patient is being seen in collaboration with pain medicine clinic  Medications recently were re adjusted by pain medicine clinic and dosage of gabapentin was increased  Flexeril added  Oxycodone dosage adjusted    Patient presents today with worsening fatigue and drowsiness   No fall   He reports that symptoms progressed since medications were up titrated  Also, he developed new onset MARYANN possible due to polypharmacy     Plan:   -decrease gabapentin dose as documented  -patient self discontinued flexeril; agree   Do not take  -oxycodone continue at same dose per pain medicine clinic  Diagnoses and all orders for this visit:    Other fatigue  Comments:  mostly due to use of muscle relaxants and oxycodone  reconciled mediations accordingly  mentioned above  blood work sent to check for other etiologies  Orders:  -     TSH, 3rd generation; Future  -     T4, free; Future  -     T3; Future  -     CBC and differential; Future  -     UA w Reflex to Microscopic w Reflex to Culture -Lab Collect; Future    Chills  Comments:  patient at intermediate risk for infection given his PMH of lung cancer and recent hospitalization  check blood tests and urine tests as documented  follow up   Orders:  -     TSH, 3rd generation; Future  -     T4, free; Future  -     T3; Future  -     CBC and differential; Future  -     UA w Reflex to Microscopic w Reflex to Culture -Lab Collect; Future    Chronic midline low back pain without sciatica  -     Vitamin D 25 hydroxy; Future    At high risk for osteoporosis  Comments:  check vitamin D   Orders:  -     Vitamin D 25 hydroxy; Future    Vitamin D insufficiency  -     Vitamin D 25 hydroxy; Future    MARYANN (acute kidney injury) ([facility] [address] )  Comments:  mostly due to recent hospitalization on top of polypharmacy given recent medications re adjustments     f/u accoridngly with labs  medications reconciled Squamous cell carcinoma of right lung (HCC)  Comments:  s/p segmentectomy  Stage 3 b   f/u with thoracic Surgx and oncology  reji on 1-2023          PHQ-2/9 Depression Screening    Little interest or pleasure in doing things: 1 - several days  Feeling down, depressed, or hopeless: 1 - several days  PHQ-2 Score: 2  PHQ-2 Interpretation: Negative depression screen            Subjective:      Patient ID: Shanelle Tobar is a 75 y o  male  75year old male with a PM remarkable for chronic pain syndrome, HTN, HLD , and recent diagnosis of SCC lung stage 3  S/p segmentectomy   He presented today to the office to follow up regarding lower back pain  The patient reports that the pain is the same since the hospitalization despite uptitrating his pain medications per pain medicine clinic  Lower back pain, central, radiates to lower extremities, b/l , sharp pain, 5-7 /10 SS, aggravated with weight bearing and bending forward, alleviated partially with rest and medications, nocturnal episodes several times a week  Recently since the last hospitalization ( 3 weeks ago) he started developing worsening generalized fatigue, chills, and drowsiness   Symptoms are more prominent early in the morning and partially improve throughout the day  No associated fever, N/V, diarrhea , or any active urinary symptoms  No recent falls  Ofnote: patient was seen by pain medicine clinic recently and pain medications were increased and re adjusted accordingly  Patient stopped flexeril without provider notice few days ago with partial improvement in symptoms   The following portions of the patient's history were reviewed and updated as appropriate: past medical history and problem list     Review of Systems   Constitutional: Positive for activity change, appetite change and fatigue  Negative for chills, diaphoresis, fever and unexpected weight change     HENT: Negative for sinus pressure, sneezing, sore throat, tinnitus, trouble swallowing and voice change  Respiratory: Positive for shortness of breath (on exertion)  Negative for apnea, cough, choking, chest tightness and wheezing  Cardiovascular: Negative for chest pain, palpitations and leg swelling  Gastrointestinal: Positive for constipation  Negative for abdominal distention, abdominal pain and diarrhea  Endocrine: Negative for cold intolerance, heat intolerance and polydipsia  Genitourinary: Negative for dysuria, flank pain, frequency and hematuria  Musculoskeletal: Negative for arthralgias and back pain  Neurological: Negative for dizziness, light-headedness and headaches  Psychiatric/Behavioral: Negative for agitation and behavioral problems  Objective:    /72 (BP Location: Left arm, Patient Position: Sitting, Cuff Size: Large)   Pulse 64   Temp (!) 96 6 °F (35 9 °C) (Tympanic)   Ht 5' 8" (1 727 m)   Wt 85 3 kg (188 lb)   SpO2 98%   BMI 28 59 kg/m²      Physical Exam  Vitals and nursing note reviewed  Constitutional:       General: He is not in acute distress  Appearance: Normal appearance  He is not ill-appearing  HENT:      Head: Normocephalic and atraumatic  Mouth/Throat:      Mouth: Mucous membranes are moist       Pharynx: Oropharynx is clear  No oropharyngeal exudate  Eyes:      Conjunctiva/sclera: Conjunctivae normal       Pupils: Pupils are equal, round, and reactive to light  Cardiovascular:      Rate and Rhythm: Normal rate and regular rhythm  Pulses: Normal pulses  Heart sounds: Normal heart sounds  Pulmonary:      Effort: Pulmonary effort is normal  No respiratory distress  Breath sounds: Normal breath sounds  No wheezing or rales  Abdominal:      General: Abdomen is flat  Bowel sounds are normal  There is no distension  Palpations: Abdomen is soft  Tenderness: There is no abdominal tenderness  There is no right CVA tenderness, left CVA tenderness, guarding or rebound  Musculoskeletal:         General: Normal range of motion  Cervical back: Normal range of motion and neck supple  No rigidity or tenderness  Lymphadenopathy:      Cervical: No cervical adenopathy  Skin:     General: Skin is warm  Capillary Refill: Capillary refill takes less than 2 seconds  Neurological:      General: No focal deficit present  Mental Status: He is alert  Mental status is at baseline

## 2022-12-14 NOTE — ASSESSMENT & PLAN NOTE
This is a chronic condition  Patient is being seen in collaboration with pain medicine clinic  Medications recently were re adjusted by pain medicine clinic and dosage of gabapentin was increased  Flexeril added  Oxycodone dosage adjusted    Patient presents today with worsening fatigue and drowsiness   No fall   He reports that symptoms progressed since medications were up titrated  Also, he developed new onset MARYANN possible due to polypharmacy     Plan:   -decrease gabapentin dose as documented  -patient self discontinued flexeril; agree   Do not take  -oxycodone continue at same dose per pain medicine clinic

## 2022-12-16 ENCOUNTER — LAB (OUTPATIENT)
Dept: LAB | Facility: CLINIC | Age: 67
End: 2022-12-16

## 2022-12-16 DIAGNOSIS — R68.83 CHILLS: ICD-10-CM

## 2022-12-16 DIAGNOSIS — R12 HEARTBURN: ICD-10-CM

## 2022-12-16 DIAGNOSIS — R53.83 OTHER FATIGUE: ICD-10-CM

## 2022-12-16 DIAGNOSIS — E55.9 VITAMIN D INSUFFICIENCY: ICD-10-CM

## 2022-12-16 DIAGNOSIS — G89.29 CHRONIC MIDLINE LOW BACK PAIN WITHOUT SCIATICA: ICD-10-CM

## 2022-12-16 DIAGNOSIS — R82.90 ABNORMAL URINALYSIS: Primary | ICD-10-CM

## 2022-12-16 DIAGNOSIS — Z91.89 AT HIGH RISK FOR OSTEOPOROSIS: ICD-10-CM

## 2022-12-16 DIAGNOSIS — M54.50 CHRONIC MIDLINE LOW BACK PAIN WITHOUT SCIATICA: ICD-10-CM

## 2022-12-16 LAB
BACTERIA UR QL AUTO: ABNORMAL /HPF
BASOPHILS # BLD AUTO: 0.07 THOUSANDS/ÂΜL (ref 0–0.1)
BASOPHILS NFR BLD AUTO: 1 % (ref 0–1)
BILIRUB UR QL STRIP: NEGATIVE
CAOX CRY URNS QL MICRO: ABNORMAL /HPF
CLARITY UR: ABNORMAL
COLOR UR: YELLOW
EOSINOPHIL # BLD AUTO: 0 THOUSAND/ÂΜL (ref 0–0.61)
EOSINOPHIL NFR BLD AUTO: 0 % (ref 0–6)
ERYTHROCYTE [DISTWIDTH] IN BLOOD BY AUTOMATED COUNT: 13 % (ref 11.6–15.1)
GLUCOSE UR STRIP-MCNC: NEGATIVE MG/DL
HCT VFR BLD AUTO: 45.6 % (ref 36.5–49.3)
HGB BLD-MCNC: 13.9 G/DL (ref 12–17)
HGB UR QL STRIP.AUTO: NEGATIVE
HYALINE CASTS #/AREA URNS LPF: ABNORMAL /LPF
IMM GRANULOCYTES # BLD AUTO: 0.04 THOUSAND/UL (ref 0–0.2)
IMM GRANULOCYTES NFR BLD AUTO: 0 % (ref 0–2)
KETONES UR STRIP-MCNC: NEGATIVE MG/DL
LEUKOCYTE ESTERASE UR QL STRIP: ABNORMAL
LYMPHOCYTES # BLD AUTO: 1.51 THOUSANDS/ÂΜL (ref 0.6–4.47)
LYMPHOCYTES NFR BLD AUTO: 16 % (ref 14–44)
MCH RBC QN AUTO: 29.5 PG (ref 26.8–34.3)
MCHC RBC AUTO-ENTMCNC: 30.5 G/DL (ref 31.4–37.4)
MCV RBC AUTO: 97 FL (ref 82–98)
MONOCYTES # BLD AUTO: 0.69 THOUSAND/ÂΜL (ref 0.17–1.22)
MONOCYTES NFR BLD AUTO: 7 % (ref 4–12)
MUCOUS THREADS UR QL AUTO: ABNORMAL
NEUTROPHILS # BLD AUTO: 7.17 THOUSANDS/ÂΜL (ref 1.85–7.62)
NEUTS SEG NFR BLD AUTO: 76 % (ref 43–75)
NITRITE UR QL STRIP: NEGATIVE
NON-SQ EPI CELLS URNS QL MICRO: ABNORMAL /HPF
NRBC BLD AUTO-RTO: 0 /100 WBCS
PH UR STRIP.AUTO: 6 [PH]
PLATELET # BLD AUTO: 321 THOUSANDS/UL (ref 149–390)
PMV BLD AUTO: 10.1 FL (ref 8.9–12.7)
PROT UR STRIP-MCNC: ABNORMAL MG/DL
RBC # BLD AUTO: 4.71 MILLION/UL (ref 3.88–5.62)
RBC #/AREA URNS AUTO: ABNORMAL /HPF
SP GR UR STRIP.AUTO: 1.02 (ref 1–1.03)
T4 FREE SERPL-MCNC: 0.79 NG/DL (ref 0.76–1.46)
TSH SERPL DL<=0.05 MIU/L-ACNC: 4.91 UIU/ML (ref 0.45–4.5)
UROBILINOGEN UR STRIP-ACNC: 3 MG/DL
WBC # BLD AUTO: 9.48 THOUSAND/UL (ref 4.31–10.16)
WBC #/AREA URNS AUTO: ABNORMAL /HPF

## 2022-12-16 RX ORDER — SULFAMETHOXAZOLE AND TRIMETHOPRIM 800; 160 MG/1; MG/1
1 TABLET ORAL EVERY 12 HOURS SCHEDULED
Qty: 14 TABLET | Refills: 0 | Status: SHIPPED | OUTPATIENT
Start: 2022-12-16 | End: 2022-12-23

## 2022-12-16 RX ORDER — PANTOPRAZOLE SODIUM 20 MG/1
20 TABLET, DELAYED RELEASE ORAL DAILY
Qty: 30 TABLET | Refills: 0 | Status: SHIPPED | OUTPATIENT
Start: 2022-12-16

## 2022-12-17 LAB — BACTERIA UR CULT: NORMAL

## 2022-12-17 NOTE — PROGRESS NOTES
I called the patient and followed up with him with the recent blood and urine tests  Of note: Patient was seen recently in the office with complaints of chills and fatigue were progressing for around 2 weeks  -He was recently hospitalized and diagnosed with lung cancer accordingly  Given the patient was a high risk further blood and urine tests were ordered  -Urinalysis showing signs of UTI  With the given complaint of progressing to chills and fatigue today during the phone call   -Start course of Bactrim for 7 days   -Start pantoprazole 20 mg OD heartburn       -Patient was directed to proceed to the emergency department if his symptoms progress or new symptoms develop such as fevers, chills or worsening fatigue     -Agrees with the plan with no further questions at this time

## 2022-12-18 LAB
25(OH)D3 SERPL-MCNC: 23.6 NG/ML (ref 30–100)
T3 SERPL-MCNC: 1 NG/ML (ref 0.6–1.8)

## 2022-12-20 ENCOUNTER — OFFICE VISIT (OUTPATIENT)
Dept: CARDIAC SURGERY | Facility: CLINIC | Age: 67
End: 2022-12-20

## 2022-12-20 VITALS
RESPIRATION RATE: 17 BRPM | SYSTOLIC BLOOD PRESSURE: 122 MMHG | WEIGHT: 187.61 LBS | BODY MASS INDEX: 28.43 KG/M2 | TEMPERATURE: 98 F | HEART RATE: 68 BPM | OXYGEN SATURATION: 97 % | HEIGHT: 68 IN | DIASTOLIC BLOOD PRESSURE: 75 MMHG

## 2022-12-20 DIAGNOSIS — C34.31 PRIMARY SQUAMOUS CELL CARCINOMA OF LOWER LOBE OF RIGHT LUNG (HCC): ICD-10-CM

## 2022-12-20 DIAGNOSIS — C34.31 MALIGNANT NEOPLASM OF LOWER LOBE OF RIGHT LUNG (HCC): Primary | ICD-10-CM

## 2022-12-20 NOTE — PROGRESS NOTES
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  We discussed that he is still recovering from a major surgery, and that all the activity he is performing is contributing to these symptoms  It is important to stay active, but also to listen to your body and rest when needed  He agrees  His PCP prescribed him inhalers which he will use as needed  He is scheduled to see Dr Jamie Meredith to discuss adjuvant chemotherapy 1/9; we will see if there are any sooner openings as he is anxious for this consult  At this time, he will return with a 6 month interval CT scan to begin lung cancer surveillance  All questions addressed, and he is in agreement with this plan  Diagnoses and all orders for this visit:    Malignant neoplasm of lower lobe of right lung ([facility] [address] )  -     CT chest wo contrast; Future    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 [facility])          Thoracic History    Diagnosis: Stage IIIA squamous cell lung cancer right lower lobe superior segment  Procedure: right thoracoscopic lower lobe superior segmentectomy 11/16/2022  Pathology: 1 8x1  8x1 1cm invasive squamous cell carcinoma, G3, without  invasion  +lymphovascular invasion present  2 of 10 lymph nodes involved, including levels 4R and 11R  PATHOLOGIC STAGE CLASSIFICATION (pTNM, AJCC 8th Edition)- Stage IIIA- pT1b, pN0, Mx, G3)       Cancer Staging   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 ([facility] [address] )  Staging form: Lung, AJCC 8th Edition  - Clinical: Stage IIIA (cT1b, cN2, cM0) - Signed by Kathleen Coyne PA-C on 12/6/2022  Laterality: Right    Oncology History   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 ([facility] [address] )   7/23/2022 Initial Diagnosis    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 [facility] )     12/6/2022 -  Cancer Staged    Staging form: Lung, AJCC 8th Edition  - Clinical: Stage IIIA (cT1b, cN2, cM0) - Signed by Kathleen Coyne PA-C on 12/6/2022  Laterality: Right                Subjective:    Patient ID: Airam Thompson is a 75 y o  male  HPI   Mr Caprice Sifuentes is a 75year old man who underwent a right thoracoscopic superior segmentectomy on 11/16/2022 for what turned out to be Stage IIIA squamous cell lung cancer  His post-operative course was complicated by a chyle weak for which he was made NPO, PICC line was placed and he was started on TPN  He underwent successful embolization of thoracic duct on 11/22/22  His hospital course was then complicated by pancreatitis which improved with aggressive IV fluid resuscitation  He was discharged on HD 11  He was experiencing chills and night sweats, so lab work was obtained  His CBC was normal, as was the lipase  His creatinine was slightly elevated  UA indicated a UTI and his PCP started a 7 day course of Bactrim  CXR 12/8/22 demonstrated no pleural effusion or pneumothorax  He is seeing Dr Jose Broderick on 1/9/2023 to discuss adjuvant chemotherapy  On discussion, he reports fatigue  He is working in construction full time  He denies fever over 100 4, but he does still have some chills on occasion  He has shortness if breath on exertion, and he has inhalers which he is not using right now  He will start using these as needed          The following portions of the patient's history were reviewed and updated as appropriate: allergies, current medications, past family history, past medical history, past social history, past surgical history and problem list     Review of Systems Constitutional: Positive for chills and fatigue  Respiratory: Positive for shortness of breath  Cardiovascular: Positive for chest pain (improving)  Gastrointestinal: Positive for abdominal pain  Genitourinary: Negative  Musculoskeletal: Positive for back pain  Objective:   Physical Exam  Constitutional:       General: He is not in acute distress  Appearance: Normal appearance  HENT:      Head: Normocephalic and atraumatic  Eyes:      General: No scleral icterus  Conjunctiva/sclera: Conjunctivae normal    Cardiovascular:      Rate and Rhythm: Normal rate and regular rhythm  Pulmonary:      Effort: Pulmonary effort is normal  No respiratory distress  Breath sounds: Normal breath sounds  Comments: Well healed right VATS incisions  Abdominal:      General: There is no distension  Palpations: Abdomen is soft  Musculoskeletal:      Cervical back: Neck supple  Lymphadenopathy:      Cervical: No cervical adenopathy  Skin:     General: Skin is warm and dry  Neurological:      General: No focal deficit present  Mental Status: He is alert and oriented to person, place, and time  Psychiatric:         Mood and Affect: Mood normal      /75 (BP Location: Right arm, Patient Position: Sitting, Cuff Size: Standard)   Pulse 68   Temp 98 °F (36 7 °C) (Temporal)   Resp 17   Ht 5' 8" (1 727 m)   Wt 85 1 kg (187 lb 9 8 oz)   SpO2 97%   BMI 28 53 kg/m²     XR chest pa & lateral    Result Date: 12/10/2022  Impression No acute cardiopulmonary disease  Workstation performed: [id number]     CT chest abdomen pelvis w contrast    Result Date: 11/23/2022  Narrative CT CHEST, ABDOMEN AND PELVIS WITH IV CONTRAST INDICATION:   s/p thoracic duct embolization for chylothorax w/ abdominal pain & guarding  COMPARISON:  11/2/2022 and 7/23/2022 TECHNIQUE: CT examination of the chest, abdomen and pelvis was performed   Axial, sagittal, and coronal 2D reformatted images were created from the source data and submitted for interpretation  Radiation dose length product (DLP) for this visit:  878 21 mGy-cm   This examination, like all CT scans performed in the [facility], was performed utilizing techniques to minimize radiation dose exposure, including the use of iterative  reconstruction and automated exposure control  IV Contrast:  100 mL of iohexol (OMNIPAQUE) 50 mL of iohexol (OMNIPAQUE) Enteric Contrast: Enteric contrast was administered  FINDINGS: CHEST LUNGS:  There is consolidation in the right lower lobe along the surgical staple line likely representing atelectasis  There is patchy opacity in the lingula in the left lower lobe which also likely represents atelectasis  There is moderate centrilobular  emphysema  There is no tracheal or endobronchial lesion  PLEURA:  There is a chest tube in place  There is no pleural effusion  HEART/GREAT VESSELS: Heart is unremarkable for patient's age  No thoracic aortic aneurysm  MEDIASTINUM AND DANILO:  Unremarkable  CHEST WALL AND LOWER NECK:  Unremarkable  ABDOMEN LIVER/BILIARY TREE:  One or more subcentimeter sharply circumscribed low-density hepatic lesion(s) are noted, too small to accurately characterize, but statistically most likely to represent subcentimeter hepatic cysts  No suspicious solid hepatic lesion is identified  Hepatic contours are normal   No biliary dilatation  GALLBLADDER:  Gallbladder is surgically absent  SPLEEN:  Unremarkable  PANCREAS:  There is retroperitoneal fluid and stranding, particularly around the head of the pancreas  Lipase is elevated and this is consistent with post thoracic duct embolization pancreatitis  There is no evidence of fluid collection or pancreatic necrosis  ADRENAL GLANDS:  Unremarkable  KIDNEYS/URETERS:  There are numerous bilateral renal lesions consistent with simple and mildly complex cysts  There was no enhancement on the previous dissection protocol CT from July 2022  There are bilateral nonobstructing renal calculi  No hydronephrosis  STOMACH AND BOWEL:  Unremarkable  APPENDIX:  No findings to suggest appendicitis  ABDOMINOPELVIC CAVITY:  No ascites  No pneumoperitoneum  No lymphadenopathy  VESSELS:  The patient is status post thoracic duct embolization  There is embolization glue throughout the lymphatic ducts in the retroperitoneum and pelvis  There is a small amount of glue within the wall or lumen of the proximal left renal vein though the renal vein is completely patent distal to the glue  PELVIS REPRODUCTIVE ORGANS:  The prostate is enlarged  URINARY BLADDER:  The bladder is collapsed with a Sebastian catheter  ABDOMINAL WALL/INGUINAL REGIONS:  Unremarkable  OSSEOUS STRUCTURES:  No acute fracture or destructive osseous lesion  Impression 1  Post thoracic duct embolization pancreatitis  2   Postoperative changes in the chest without recurrent effusion or pneumothorax  3   Embolization glue within the wall or lumen of the proximal left renal vein without occlusion  The study was marked in [facility] for immediate notification  Workstation performed: [id number]      NM PET CT skull base to mid thigh    Result Date: 8/5/2022  Narrative PET/CT SCAN INDICATION:  D38 1: Neoplasm of uncertain behavior of trachea, bronchus and lung   Abnormal CT study  Right lower lobe nodule  MODIFIER: PI COMPARISON: No prior PET scans  CT chest abdomen and pelvis, 7/23/2022 CELL TYPE:  N/A TECHNIQUE:   9 2 mCi F-18-FDG administered IV  Multiplanar attenuation corrected and non attenuation corrected PET images were acquired 60 minutes post injection  Contiguous, low dose, axial CT sections were obtained from the skull base through the femurs   Intravenous contrast material was not utilized    This examination, like all CT scans performed in the [facility], was performed utilizing techniques to minimize radiation dose exposure, including the use of iterative reconstruction and automated exposure control  Fasting serum glucose: 130 mg/dl FINDINGS: VISUALIZED BRAIN:   No acute abnormalities are seen  HEAD/NECK:   Subcentimeter solitary focus of increased activity within the posterior aspect of the left parotid gland  (1200/50) associated with a subcentimeter ovoid nodule measuring approximately 0 8 cm with SUV max 3 8  CT images: No significant findings CHEST:   - Solid nodule in the superior segment of the right lower lobe abutting the major fissure  (1200/113) (3/127) measures approximately 1 2 x 1 0 cm with SUV max 2 9  A subpleural nodule on image 3/126 measuring 5 mm is unchanged from the prior study, and shows no abnormal glucose activity but too small for accurate characterization with PET/CT  Prior PET CT also showed subtle groundglass opacities within the right upper lobe, for example as seen on image 3/106, without abnormal glucose activity  There is no glucose avid mediastinal or hilar adenopathy CT images: Prior CT demonstrated airspace disease within the posterior inferior right lower lobe, since resolved  There is pulmonary emphysema  No pleural or pericardial effusion  Coronary artery calcification  ABDOMEN:   No FDG avid soft tissue lesions are seen  CT images: Hepatic steatosis and bilateral completely evaluated renal cysts  (Please refer to prior CT report)  Bilateral nephrolithiasis  The time of the prior study, there is right-sided hydronephrosis with ureteral calculi  There is mild residual dilatation of the right kidney collecting system with reduced caliber of the right ureter and diminished periureteral inflammation  No residual ureteral stone appreciated  There is at least one small calculus measuring about 2 to 3 mm in the urinary bladder (3/255)  Abdominal aortic aneurysm and dilated left common iliac artery noted  PELVIS: No FDG avid soft tissue lesions are seen  CT images: Enlarged prostate gland with calcification    Circumferential bladder wall thickening which may be related to muscular hypertrophy  OSSEOUS STRUCTURES: No FDG avid lesions are seen  CT images: No significant findings  Impression 1  Solid lesion in the superior segment of the right lower lobe abutting the major fissure measuring 1 2 x 1 0 cm in size with SUV max 2 9 most concerning for primary bronchogenic carcinoma of the lung with soft tissue sampling recommended  2  Several small groundglass opacities within the right upper lobe and an additional 5 mm nodule in the superior segment of the right lower lobe without abnormal glucose activity but too small to characterize  Reassessment during the course of follow-up  recommended  3  There is no evidence of glucose avid intrathoracic adenopathy  There is no evidence of distant glucose avid metastatic disease 4  Limited CT study shows no residual ureteral stones  A small bladder calculus is present  Right renal collecting system distention partially resolved  5   Enlarged prostate gland, abdominal aortic aneurysm, pulmonary emphysema, and hepatic steatosis 6  Subcentimeter glucose avid nodule in the left parotid gland  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

## 2022-12-20 NOTE — ASSESSMENT & PLAN NOTE
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  We discussed that he is still recovering from a major surgery, and that all the activity he is performing is contributing to these symptoms  It is important to stay active, but also to listen to your body and rest when needed  He agrees  His PCP prescribed him inhalers which he will use as needed  He is scheduled to see Dr Bola Nguyen to discuss adjuvant chemotherapy 1/9; we will see if there are any sooner openings as he is anxious for this consult  At this time, he will return with a 6 month interval CT scan to begin lung cancer surveillance  All questions addressed, and he is in agreement with this plan

## 2022-12-27 ENCOUNTER — OFFICE VISIT (OUTPATIENT)
Dept: FAMILY MEDICINE CLINIC | Facility: CLINIC | Age: 67
End: 2022-12-27

## 2022-12-27 VITALS
WEIGHT: 189 LBS | DIASTOLIC BLOOD PRESSURE: 72 MMHG | HEIGHT: 68 IN | OXYGEN SATURATION: 98 % | SYSTOLIC BLOOD PRESSURE: 120 MMHG | TEMPERATURE: 98.2 F | HEART RATE: 64 BPM | BODY MASS INDEX: 28.64 KG/M2

## 2022-12-27 DIAGNOSIS — J06.9 VIRAL UPPER RESPIRATORY ILLNESS: ICD-10-CM

## 2022-12-27 DIAGNOSIS — R09.82 POSTNASAL DRIP: ICD-10-CM

## 2022-12-27 DIAGNOSIS — Z23 ENCOUNTER FOR IMMUNIZATION: ICD-10-CM

## 2022-12-27 DIAGNOSIS — Z71.6 ENCOUNTER FOR SMOKING CESSATION COUNSELING: Primary | ICD-10-CM

## 2022-12-27 DIAGNOSIS — C34.31 PRIMARY SQUAMOUS CELL CARCINOMA OF LOWER LOBE OF RIGHT LUNG (HCC): ICD-10-CM

## 2022-12-27 RX ORDER — NICOTINE 21 MG/24HR
1 PATCH, TRANSDERMAL 24 HOURS TRANSDERMAL EVERY 24 HOURS
Qty: 28 PATCH | Refills: 0 | Status: SHIPPED | OUTPATIENT
Start: 2022-12-27 | End: 2023-01-26

## 2022-12-27 RX ORDER — FLUTICASONE PROPIONATE 50 MCG
1 SPRAY, SUSPENSION (ML) NASAL DAILY
Qty: 11.1 ML | Refills: 0 | Status: SHIPPED | OUTPATIENT
Start: 2022-12-27 | End: 2023-01-10

## 2022-12-27 NOTE — PROGRESS NOTES
Assessment/Plan:    No problem-specific Assessment & Plan notes found for this encounter  Diagnoses and all orders for this visit:    Encounter for smoking cessation counseling  Comments:  quit date 11-1-22  nictoine patch/gum  2-3 cravings per day  4 week followup    Orders:  -     nicotine (NICODERM CQ) 14 mg/24hr TD 24 hr patch; Place 1 patch on the skin every 24 hours    Encounter for immunization  Comments:  refused  will re address on next visit  Orders:  -     Cancel: Pneumococcal Conjugate Vaccine 20-valent (PCV20)    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)  Comments:  s/p segmentectomy  Stage 3 b   f/u with thoracic Surgx and oncology  reji on 1-2023    Postnasal drip  Comments:  flonase 2 wks  f/u as needed  Orders:  -     fluticasone (FLONASE) 50 mcg/act nasal spray; 1 spray into each nostril daily for 14 days    Viral upper respiratory illness  -     fluticasone (FLONASE) 50 mcg/act nasal spray; 1 spray into each nostril daily for 14 days          PHQ-2/9 Depression Screening    Little interest or pleasure in doing things: 0 - not at all  Feeling down, depressed, or hopeless: 0 - not at all  PHQ-2 Score: 0  PHQ-2 Interpretation: Negative depression screen            Subjective:      Patient ID: Anup Knight is a 75 y o  male  75year old male with a PM remarkable for chronic pain syndrome, HTN, HLD , and recent diagnosis of SCC lung stage 3  S/p segmentectomy   He presented to the office today to follow up regarding smoking cessation  Quit date 11-1-22  Denies smoking since quit date  He have been maintained on nicotine patch and gum since then  Complaint with the management plan  He complains of 2-3 craving per day for the past 4 weeks  He is using 21 mg Nicotine patch OD and is not using nicotine gum for cravings  He is managing cravings with physical exercise succesfully     He was prescribed wellbutrin to aid in smoking cessation however he stopped using it 1 week ago, without medical advice  No active withdrawal symptoms  Ofnote: he complains that for the past one week he have been experiencing nasal congestion,sneezing and runny nose that have been gradually improving since onset  Symptoms are associated with dry cough that is intermittent and occurs every several hours most prominent at night  No associated fever, chills or fatigue  The following portions of the patient's history were reviewed and updated as appropriate: allergies and past social history  Review of Systems   Constitutional: Negative for activity change, appetite change, chills, diaphoresis, fatigue and fever  HENT: Positive for congestion, postnasal drip and rhinorrhea  Negative for drooling, ear discharge, ear pain, nosebleeds, sinus pressure, sinus pain, sneezing, sore throat, tinnitus and voice change  Respiratory: Negative for apnea, cough, chest tightness, shortness of breath and wheezing  Cardiovascular: Negative for chest pain, palpitations and leg swelling  Gastrointestinal: Negative for abdominal pain, constipation, diarrhea, nausea and vomiting  Genitourinary: Negative for dysuria, flank pain and frequency  Neurological: Negative for dizziness, light-headedness and headaches  Objective:    /72 (BP Location: Left arm, Patient Position: Sitting)   Pulse 64   Temp 98 2 °F (36 8 °C) (Tympanic)   Ht 5' 8" (1 727 m)   Wt 85 7 kg (189 lb)   SpO2 98%   BMI 28 74 kg/m²      Physical Exam  Vitals and nursing note reviewed  Constitutional:       General: He is not in acute distress  Appearance: Normal appearance  He is not ill-appearing  HENT:      Head: Normocephalic and atraumatic  Nose: Rhinorrhea present  No congestion  Mouth/Throat:      Mouth: Mucous membranes are moist       Pharynx: Oropharynx is clear  No oropharyngeal exudate or posterior oropharyngeal erythema     Eyes:      Conjunctiva/sclera: Conjunctivae normal       Pupils: Pupils are equal, round, and reactive to light  Cardiovascular:      Rate and Rhythm: Normal rate and regular rhythm  Pulses: Normal pulses  Heart sounds: Normal heart sounds  No murmur heard  Pulmonary:      Effort: Pulmonary effort is normal  No respiratory distress  Breath sounds: Normal breath sounds  No wheezing or rales  Skin:     General: Skin is warm  Capillary Refill: Capillary refill takes less than 2 seconds  Neurological:      General: No focal deficit present  Mental Status: He is alert  Mental status is at baseline     Psychiatric:         Mood and Affect: Mood normal          Behavior: Behavior normal

## 2022-12-27 NOTE — PATIENT INSTRUCTIONS
Cigarette Smoking and Your Health   AMBULATORY CARE:   Risks to your health if you smoke:  Nicotine and other chemicals found in tobacco and e-cigarettes can damage every cell in your body  Even if you are a light smoker, you have an increased risk for cancer, heart disease, and lung disease  If you are pregnant or have diabetes, smoking increases your risk for complications  Nicotine can affect an adolescent's developing brain  This can lead to trouble thinking, learning, or paying attention  Benefits to your health if you stop smoking: You decrease respiratory symptoms such as coughing, wheezing, and shortness of breath  You reduce your risk for cancers of the lung, mouth, throat, kidney, bladder, pancreas, stomach, and cervix  If you already have cancer, you increase the benefits of chemotherapy  You also reduce your risk for cancer returning or a second cancer from developing  You reduce your risk for heart disease, blood clots, heart attack, and stroke  You reduce your risk for lung infections, and diseases such as pneumonia, asthma, chronic bronchitis, and emphysema  Your circulation improves  More oxygen can be delivered to your body  If you have diabetes, you lower your risk for complications, such as kidney, artery, and eye diseases  You also lower your risk for nerve damage  Nerve damage can lead to amputations, poor vision, and blindness  You improve your body's ability to heal and to fight infections  An adolescent can help his or her brain and body develop in a healthy way  Talk to your adolescent about all the health risks of nicotine  If you can, start talking about nicotine when your child is younger than 12 years  This may make it easier for him or her not to start using nicotine as a teenager or adult  Explain to him or her that it is best never to start  It can be hard to try to quit later      Benefits to the health of others if you stop smoking:  Tobacco is harmful to nonsmokers who breathe in your secondhand smoke  The following are ways the health of others around you may improve when you stop smoking: You lower the risks for lung cancer and heart disease in nonsmoking adults  If you are pregnant, you lower the risk for miscarriage, early delivery, low birth weight, and stillbirth  You also lower your baby's risk for SIDS, obesity, developmental delay, and neurobehavioral problems, such as ADHD  If you have children, you lower their risk for ear infections, colds, pneumonia, bronchitis, and asthma  Follow up with your doctor as directed:  Write down your questions so you remember to ask them during your visits  For support and more information:   American [employer] Association  [address] [place] [address],[address]  [address]  Phone: [facility] [address]  Phone: 7- 692 - 018-3465  Web Address: [employer]  org    Smokefree  gov  Phone: 2- 042 - 198-6325  Web Address: www smokefree  [facility] 21 2022 Information is for End User's use only and may not be sold, redistributed or otherwise used for commercial purposes  All illustrations and images included in CareNotes® are the copyrighted property of [employer]  or [address] [place], [place] [address]tree   The above information is an  only  It is not intended as medical advice for individual conditions or treatments  Talk to your doctor, nurse or pharmacist before following any medical regimen to see if it is safe and effective for you

## 2022-12-30 ENCOUNTER — OFFICE VISIT (OUTPATIENT)
Dept: CARDIOLOGY CLINIC | Facility: CLINIC | Age: 67
End: 2022-12-30

## 2022-12-30 VITALS
DIASTOLIC BLOOD PRESSURE: 78 MMHG | HEART RATE: 72 BPM | BODY MASS INDEX: 28.95 KG/M2 | HEIGHT: 68 IN | WEIGHT: 191 LBS | SYSTOLIC BLOOD PRESSURE: 122 MMHG

## 2022-12-30 DIAGNOSIS — I10 ESSENTIAL HYPERTENSION: Primary | ICD-10-CM

## 2022-12-30 DIAGNOSIS — E78.2 MIXED HYPERLIPIDEMIA: ICD-10-CM

## 2022-12-30 DIAGNOSIS — I10 PRIMARY HYPERTENSION: ICD-10-CM

## 2022-12-30 DIAGNOSIS — I25.10 CORONARY ARTERY DISEASE INVOLVING NATIVE CORONARY ARTERY OF NATIVE HEART WITHOUT ANGINA PECTORIS: ICD-10-CM

## 2022-12-30 NOTE — PATIENT INSTRUCTIONS
Cholesterol and Your Health   AMBULATORY CARE:   Cholesterol  is a waxy, fat-like substance  Your body uses cholesterol to make hormones and new cells, and to protect nerves  Cholesterol is made by your body  It also comes from certain foods you eat, such as meat and dairy products  Your healthcare provider can help you set goals for your cholesterol levels  He or she can help you create a plan to meet your goals  Cholesterol level goals: Your cholesterol level goals depend on your risk for heart disease, your age, and your other health conditions  The following are general guidelines: Total cholesterol  includes low-density lipoprotein (LDL), high-density lipoprotein (HDL), and triglyceride levels  The total cholesterol level should be lower than 200 mg/dL and is best at about 150 mg/dL  LDL cholesterol  is called bad cholesterol  because it forms plaque in your arteries  As plaque builds up, your arteries become narrow, and less blood flows through  When plaque decreases blood flow to your heart, you may have chest pain  If plaque completely blocks an artery that brings blood to your heart, you may have a heart attack  Plaque can break off and form blood clots  Blood clots may block arteries in your brain and cause a stroke  The level should be less than 130 mg/dL and is best at about 100 mg/dL  HDL cholesterol  is called good cholesterol  because it helps remove LDL cholesterol from your arteries  It does this by attaching to LDL cholesterol and carrying it to your liver  Your liver breaks down LDL cholesterol so your body can get rid of it  High levels of HDL cholesterol can help prevent a heart attack and stroke  Low levels of HDL cholesterol can increase your risk for heart disease, heart attack, and stroke  The level should be 60 mg/dL or higher  Triglycerides  are a type of fat that store energy from foods you eat  High levels of triglycerides also cause plaque buildup   This can increase your risk for a heart attack or stroke  If your triglyceride level is high, your LDL cholesterol level may also be high  The level should be less than 150 mg/dL  Any of the following can increase your risk for high cholesterol:   Smoking cigarettes    Being overweight or obese, or not getting enough exercise    Drinking large amounts of alcohol    A medical condition such as hypertension (high blood pressure) or diabetes    Certain genes passed from your parents to you    Age older than 65 years    What you need to know about having your cholesterol levels checked: Adults 21to 39years of age should have their cholesterol levels checked every 4 to 6 years  Adults 45 years or older should have their cholesterol checked every 1 to 2 years  You may need your cholesterol checked more often, or at a younger age, if you have risk factors for heart disease  You may also need to have your cholesterol checked more often if you have other health conditions, such as diabetes  Blood tests are used to check cholesterol levels  Blood tests measure your levels of triglycerides, LDL cholesterol, and HDL cholesterol  How healthy fats affect your cholesterol levels:  Healthy fats, also called unsaturated fats, help lower LDL cholesterol and triglyceride levels  Healthy fats include the following:  Monounsaturated fats  are found in foods such as olive oil, canola oil, avocado, nuts, and olives  Polyunsaturated fats,  such as omega 3 fats, are found in fish, such as salmon, trout, and tuna  They can also be found in plant foods such as flaxseed, walnuts, and soybeans  How unhealthy fats affect your cholesterol levels:  Unhealthy fats increase LDL cholesterol and triglyceride levels  They are found in foods high in cholesterol, saturated fat, and trans fat:  Cholesterol  is found in eggs, dairy, and meat  Saturated fat  is found in butter, cheese, ice cream, whole milk, and coconut oil   Saturated fat is also found in meat, such as sausage, hot dogs, and bologna  Trans fat  is found in liquid oils and is used in fried and baked foods  Foods that contain trans fats include chips, crackers, muffins, sweet rolls, microwave popcorn, and cookies  Treatment  for high cholesterol will also decrease your risk of heart disease, heart attack, and stroke  Treatment may include any of the following:  Lifestyle changes  may include food, exercise, weight loss, and quitting smoking  You may also need to decrease the amount of alcohol you drink  Your healthcare provider will want you to start with lifestyle changes  Other treatment may be added if lifestyle changes are not enough  Your healthcare provider may recommend you work with a team to manage hyperlipidemia  The team may include medical experts such as a dietitian, an exercise or physical therapist, and a behavior therapist  Your family members may be included in helping you create lifestyle changes  Medicines  may be given to lower your LDL cholesterol, triglyceride levels, or total cholesterol level  You may need medicines to lower your cholesterol if any of the following is true:    You have a history of stroke, TIA, unstable angina, or a heart attack  Your LDL cholesterol level is 190 mg/dL or higher  You are age 36 to 76 years, have diabetes or heart disease risk factors, and your LDL cholesterol is 70 mg/dL or higher  Supplements  include fish oil, red yeast rice, and garlic  Fish oil may help lower your triglyceride and LDL cholesterol levels  It may also increase your HDL cholesterol level  Red yeast rice may help decrease your total cholesterol level and LDL cholesterol level  Garlic may help lower your total cholesterol level  Do not take any supplements without talking to your healthcare provider  Food changes you can make to lower your cholesterol levels:  A dietitian can help you create a healthy eating plan   He or she can show you how to read food labels and choose foods low in saturated fat, trans fats, and cholesterol  Decrease the total amount of fat you eat  Choose lean meats, fat-free or 1% fat milk, and low-fat dairy products, such as yogurt and cheese  Try to limit or avoid red meats  Limit or do not eat fried foods or baked goods, such as cookies  Replace unhealthy fats with healthy fats  Cook foods in olive oil or canola oil  Choose soft margarines that are low in saturated fat and trans fat  Seeds, nuts, and avocados are other examples of healthy fats  Eat foods with omega-3 fats  Examples include salmon, tuna, mackerel, walnuts, and flaxseed  Eat fish 2 times per week  Pregnant women should not eat fish that have high levels of mercury, such as shark, swordfish, and angela mackerel  Increase the amount of high-fiber foods you eat  High-fiber foods can help lower your LDL cholesterol  Aim to get between 20 and 30 grams of fiber each day  Fruits and vegetables are high in fiber  Eat at least 5 servings each day  Other high-fiber foods are whole-grain or whole-wheat breads, pastas, or cereals, and brown rice  Eat 3 ounces of whole-grain foods each day  Increase fiber slowly  You may have abdominal discomfort, bloating, and gas if you add fiber to your diet too quickly  Eat healthy protein foods  Examples include low-fat dairy products, skinless chicken and turkey, fish, and nuts  Limit foods and drinks that are high in sugar  Your dietitian or healthcare provider can help you create daily limits for high-sugar foods and drinks  The limit may be lower if you have diabetes or another health condition  Limits can also help you lose weight if needed  Lifestyle changes you can make to lower your cholesterol levels:   Maintain a healthy weight  Ask your healthcare provider what a healthy weight is for you  Ask him or her to help you create a weight loss plan if needed   Weight loss can decrease your total cholesterol and triglyceride levels  Weight loss may also help keep your blood pressure at a healthy level  Be physically active throughout the day  Physical activity, such as exercise, can help lower your total cholesterol level and maintain a healthy weight  Physical activity can also help increase your HDL cholesterol level  Work with your healthcare provider to create an program that is right for you  Get at least 30 to 40 minutes of moderate physical activity most days of the week  Examples of exercise include brisk walking, swimming, or biking  Also include strength training at least 2 times each week  Your healthcare providers can help you create a physical activity plan  Do not smoke  Nicotine and other chemicals in cigarettes and cigars can raise your cholesterol levels  Ask your healthcare provider for information if you currently smoke and need help to quit  E-cigarettes or smokeless tobacco still contain nicotine  Talk to your healthcare provider before you use these products  Limit or do not drink alcohol  Alcohol can increase your triglyceride levels  Ask your healthcare provider before you drink alcohol  Ask how much is okay for you to drink in 24 hours or 1 week  Follow up with your doctor as directed:  Write down your questions so you remember to ask them during your visits  © Copyright [address] 2022 Information is for End User's use only and may not be sold, redistributed or otherwise used for commercial purposes  All illustrations and images included in CareNotes® are the copyrighted property of [employer]  or [facility] Fiona Price   The above information is an  only  It is not intended as medical advice for individual conditions or treatments  Talk to your doctor, nurse or pharmacist before following any medical regimen to see if it is safe and effective for you

## 2022-12-30 NOTE — PROGRESS NOTES
Subjective:        Patient ID: Greer Paget is a 75 y o  male  Chief Complaint:  Emerita Baptiste is here for routine follow-up status post lobectomy stage IIIa lung cancer  This was complicated by a chyle leak, pancreatitis  Says he is slowly recovering  CT surgical note briefly reviewed  He is seeing oncology early in the new year  He quit smoking entirely, I applauded him for this  Just does not feel himself yet, a little bit more short of breath than usual, no edema orthopnea or PND  No palpitations  No chest pain  Postprandial midepigastric discomfort on occasion a mild degree  This may be slightly improving he says  The following portions of the patient's history were reviewed and updated as appropriate: allergies, current medications, past family history, past medical history, past social history, past surgical history and problem list   Review of Systems   Constitutional: Positive for malaise/fatigue  Negative for chills, diaphoresis and weight gain  HENT: Negative for nosebleeds and stridor  Eyes: Negative for double vision, vision loss in left eye, vision loss in right eye and visual disturbance  Cardiovascular: Positive for dyspnea on exertion  Negative for chest pain, claudication, cyanosis, irregular heartbeat, leg swelling, near-syncope, orthopnea, palpitations, paroxysmal nocturnal dyspnea and syncope  Respiratory: Negative for cough, shortness of breath, snoring and wheezing  Endocrine: Negative for polydipsia, polyphagia and polyuria  Hematologic/Lymphatic: Negative for bleeding problem  Does not bruise/bleed easily  Skin: Negative for flushing and rash  Musculoskeletal: Negative for falls and myalgias  Gastrointestinal: Positive for abdominal pain  Negative for heartburn, hematemesis, hematochezia, melena and nausea  Genitourinary: Negative for hematuria     Neurological: Negative for brief paralysis, dizziness, focal weakness, headaches, light-headedness, loss of balance and vertigo  Psychiatric/Behavioral: Negative for altered mental status and substance abuse  Allergic/Immunologic: Negative for hives  Objective:      /78   Pulse 72   Ht 5' 8" (1 727 m)   Wt 86 6 kg (191 lb)   BMI 29 04 kg/m²   Physical Exam  Constitutional:       General: He is not in acute distress  Appearance: He is well-developed  He is not diaphoretic  HENT:      Head: Normocephalic and atraumatic  Eyes:      General: No scleral icterus  Pupils: Pupils are equal, round, and reactive to light  Neck:      Thyroid: No thyromegaly  Vascular: No JVD  Cardiovascular:      Rate and Rhythm: Normal rate and regular rhythm  Heart sounds: Normal heart sounds  No murmur heard  No friction rub  No gallop  Pulmonary:      Effort: Pulmonary effort is normal  No respiratory distress  Breath sounds: Normal breath sounds  No stridor  No wheezing or rales  Abdominal:      General: Bowel sounds are normal    Musculoskeletal:         General: No deformity  Cervical back: Normal range of motion and neck supple  Right lower leg: No edema  Left lower leg: No edema  Skin:     General: Skin is warm and dry  Coloration: Skin is not pale  Findings: No erythema  Neurological:      Mental Status: He is alert and oriented to person, place, and time        Coordination: Coordination normal    Psychiatric:         Mood and Affect: Mood normal          Behavior: Behavior normal          Lab Review:   Lab on 12/16/2022   Component Date Value   • TSH 3RD GENERATON 12/16/2022 4 910 (H)    • Free T4 12/16/2022 0 79    • T3, Total 12/16/2022 1 00    • WBC 12/16/2022 9 48    • RBC 12/16/2022 4 71    • Hemoglobin 12/16/2022 13 9    • Hematocrit 12/16/2022 45 6    • MCV 12/16/2022 97    • MCH 12/16/2022 29 5    • MCHC 12/16/2022 30 5 (L)    • RDW 12/16/2022 13 0    • MPV 12/16/2022 10 1    • Platelets 11/71[birth year]1986 321    • nRBC 12/16/2022 0    • Neutrophils Relative 12/16/2022 76 (H)    • Immat GRANS % 12/16/2022 0    • Lymphocytes Relative 12/16/2022 16    • Monocytes Relative 12/16/2022 7    • Eosinophils Relative 12/16/2022 0    • Basophils Relative 12/16/2022 1    • Neutrophils Absolute 12/16/2022 7 17    • Immature Grans Absolute 12/16/2022 0 04    • Lymphocytes Absolute 12/16/2022 1 51    • Monocytes Absolute 12/16/2022 0 69    • Eosinophils Absolute 12/16/2022 0 00    • Basophils Absolute 12/16/2022 0 07    • Color, UA 12/16/2022 Yellow    • Clarity, UA 12/16/2022 Turbid    • Specific Gravity, UA 12/16/2022 1 023    • pH, UA 12/16/2022 6 0    • Leukocytes, UA 12/16/2022 Moderate (A)    • Nitrite, UA 12/16/2022 Negative    • Protein, UA 12/16/2022 100 (2+) (A)    • Glucose, UA 12/16/2022 Negative    • Ketones, UA 12/16/2022 Negative    • Urobilinogen, UA 12/16/2022 3 0 (A)    • Bilirubin, UA 12/16/2022 Negative    • Occult Blood, UA 12/16/2022 Negative    • Vit D, 25-Hydroxy 12/16/2022 23 6 (L)    • RBC, UA 12/16/2022 4-10 (A)    • WBC, UA 12/16/2022 30-50 (A)    • Epithelial Cells 12/16/2022 Occasional    • Bacteria, UA 12/16/2022 Occasional    • MUCUS THREADS 12/16/2022 Innumerable (A)    • Hyaline Casts, UA 12/16/2022 Innumerable (A)    • Ca Oxalate Sol, UA 12/16/2022 Moderate (A)    • Urine Culture 12/16/2022 [id number] cfu/ml    Appointment on 12/08/2022   Component Date Value   • Sodium 12/08/2022 139    • Potassium 12/08/2022 4 6    • Chloride 12/08/2022 108    • CO2 12/08/2022 26    • ANION GAP 12/08/2022 5    • BUN 12/08/2022 20    • Creatinine 12/08/2022 1 68 (H)    • Glucose, Fasting 12/08/2022 103 (H)    • Calcium 12/08/2022 9 2    • Corrected Calcium 12/08/2022 10 2 (H)    • AST 12/08/2022 17    • ALT 12/08/2022 32    • Alkaline Phosphatase 12/08/2022 102    • Total Protein 12/08/2022 7 1    • Albumin 12/08/2022 2 8 (L)    • Total Bilirubin 12/08/2022 0 40    • eGFR 12/08/2022 41    • Lipase 12/08/2022 251    No results displayed because visit has over 200 results  Lab Requisition on 11/09/2022   Component Date Value   • ABO Grouping 11/09/2022 A    • Rh Factor 11/09/2022 Positive    • Antibody Screen 11/09/2022 Negative    • Specimen Expiration Date 11/09/2022 [id number]    Appointment on 11/09/2022   Component Date Value   • TSH 3RD GENERATON 11/09/2022 1 280    • PSA 11/09/2022 3 0    • WBC 11/09/2022 9 87    • RBC 11/09/2022 5 64 (H)    • Hemoglobin 11/09/2022 17 1 (H)    • Hematocrit 11/09/2022 52 2 (H)    • MCV 11/09/2022 93    • MCH 11/09/2022 30 3    • MCHC 11/09/2022 32 8    • RDW 11/09/2022 13 4    • Platelets 40/19/9131 201    • MPV 11/09/2022 10 5    • PTT 11/09/2022 31    • Protime 11/09/2022 12 5    • INR 11/09/2022 0 92    • Sodium 11/09/2022 139    • Potassium 11/09/2022 4 3    • Chloride 11/09/2022 109 (H)    • CO2 11/09/2022 25    • ANION GAP 11/09/2022 5    • BUN 11/09/2022 17    • Creatinine 11/09/2022 1 19    • Glucose, Fasting 11/09/2022 167 (H)    • Calcium 11/09/2022 8 9    • eGFR 11/09/2022 62    • PSA, Diagnostic 11/09/2022 3 0    • Hemoglobin A1C 11/09/2022 6 3 (H)    • EAG 11/09/2022 134      XR chest pa & lateral    Result Date: 12/10/2022  Narrative: CHEST INDICATION:   [id number]: Chills (without fever)  COMPARISON:  CXR 12/2/2022 and chest CT 11/23/2022  EXAM PERFORMED/VIEWS:  XR CHEST PA & LATERAL FINDINGS: Embolization coils in the mediastinum  No acute disease  Masslike opacity in the right lower lobe unchanged with staple lines in the right lower lobe  No effusion or pneumothorax  Benign calcified granulomas  Mild scar in the left base  Osseous structures appear within normal limits for patient age  Impression: No acute cardiopulmonary disease  Workstation performed: [id number]     XR chest pa & lateral    Result Date: 12/2/2022  Narrative: CHEST INDICATION:   [id number]: Unspecified injury of other specified intrathoracic organs, initial encounter    Status post thoracic duct embolization COMPARISON:  Chest x-ray November 26, 2022 EXAM PERFORMED/VIEWS:  XR CHEST PA & LATERAL FINDINGS: Patient is status post thoracic duct embolization  Cardiomediastinal silhouette appears unremarkable  Right apical pneumothorax has resolved  Right arm PICC line has been removed  Stable postoperative changes are noted in the right chest  Thoracic spondylosis is seen  Impression: Resolution of right apical pneumothorax  Status post thoracic duct embolization with stable postsurgical changes in the right lung  Workstation performed: [id number]         Assessment:       1  Essential hypertension  Basic metabolic panel      2  Coronary artery disease involving native coronary artery of native heart without angina pectoris        3  Mixed hyperlipidemia        4  Primary hypertension             Plan:       Olena Pritchett has no signs or symptoms of unstable angina, heart failure/volume excess, nor electrical instability  Examines in sinus rhythm  He is normotensive  He is euvolemic  Creatinine has bumped a bit, since I prescribe his lisinopril for hypertensive control I ordered a BMP, January  Recommended he continue with metoprolol succinate 100 mg a day, Crestor 20 mg a day, lisinopril 20 mg a day and amlodipine 10 mg a day  He has as needed sublingual nitroglycerin and knows how to properly use it  He as you recall had a normal stress test preoperatively  Strongly recommended oncologic follow-up    I will see him back in 6 months, asked him to call me sooner with any concerning potential cardiac symptoms in the meantime

## 2023-01-09 ENCOUNTER — TELEPHONE (OUTPATIENT)
Dept: HEMATOLOGY ONCOLOGY | Facility: CLINIC | Age: 68
End: 2023-01-09

## 2023-01-09 ENCOUNTER — CONSULT (OUTPATIENT)
Dept: HEMATOLOGY ONCOLOGY | Facility: CLINIC | Age: 68
End: 2023-01-09

## 2023-01-09 ENCOUNTER — TELEPHONE (OUTPATIENT)
Dept: CARDIAC SURGERY | Facility: CLINIC | Age: 68
End: 2023-01-09

## 2023-01-09 ENCOUNTER — TELEPHONE (OUTPATIENT)
Dept: PAIN MEDICINE | Facility: MEDICAL CENTER | Age: 68
End: 2023-01-09

## 2023-01-09 VITALS
BODY MASS INDEX: 28.95 KG/M2 | OXYGEN SATURATION: 99 % | HEIGHT: 68 IN | SYSTOLIC BLOOD PRESSURE: 140 MMHG | WEIGHT: 191 LBS | HEART RATE: 63 BPM | TEMPERATURE: 97.9 F | DIASTOLIC BLOOD PRESSURE: 78 MMHG

## 2023-01-09 DIAGNOSIS — N52.9 ERECTILE DYSFUNCTION, UNSPECIFIED ERECTILE DYSFUNCTION TYPE: ICD-10-CM

## 2023-01-09 DIAGNOSIS — C34.31 MALIGNANT NEOPLASM OF LOWER LOBE OF RIGHT LUNG (HCC): ICD-10-CM

## 2023-01-09 DIAGNOSIS — R12 HEARTBURN: ICD-10-CM

## 2023-01-09 DIAGNOSIS — C34.91 SQUAMOUS CELL CARCINOMA OF RIGHT LUNG (HCC): Primary | ICD-10-CM

## 2023-01-09 RX ORDER — PANTOPRAZOLE SODIUM 20 MG/1
TABLET, DELAYED RELEASE ORAL
Qty: 30 TABLET | Refills: 0 | Status: SHIPPED | OUTPATIENT
Start: 2023-01-09

## 2023-01-09 RX ORDER — SILDENAFIL 100 MG/1
100 TABLET, FILM COATED ORAL DAILY PRN
Qty: 20 TABLET | Refills: 0 | Status: SHIPPED | OUTPATIENT
Start: 2023-01-09 | End: 2023-01-16 | Stop reason: SDUPTHER

## 2023-01-09 NOTE — PROGRESS NOTES
Bowen Bush  1955  [facility] HEMATOLOGY ONCOLOGY SPECIALISTS [facility] [address] [id number] [phone]    Chief Complaint   Patient presents with   • Consult           Oncology History   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 ([facility] )   7/23/2022 Initial Diagnosis    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 ([facility] )     12/6/2022 -  Cancer Staged    Staging form: Lung, AJCC 8th Edition  - Clinical: Stage IIIA (cT1b, cN2, cM0) - Signed by Madison Cardenas PA-C on 12/6/2022  Laterality: Right           History of Present Illness:  July 23, 2022 patient had CT chest ab pelvis regarding rule out AAA  This showed 1 1 cm right lower lobe pulmonary nodule new since prior study of November 2019  Some other smaller nodules identified  Subcarinal lymph node 2 cm  No other findings suspicious for metastatic disease  August 5, 2022 PET/CT showed 1 2 cm pulmonary nodule, SUV 2 9  Other nodules noted, subcentimeter, no hypermetabolism  Some groundglass opacities in the right upper lobe  September 13, 2022 patient had needle biopsy of the right lower lobe mass showing squamous cell carcinoma, TTF-1 positive  November 16, 2022 patient underwent right lower lobe segmentectomy  Pathology showed 1 8 cm squamous cell carcinoma  Level 4 and 11 lymph node were positive for metastatic carcinoma  Review of Systems   Constitutional: Negative for chills and fever  HENT: Negative for nosebleeds  Eyes: Negative for discharge  Respiratory: Negative for cough and shortness of breath  Cardiovascular: Negative for chest pain  Gastrointestinal: Negative for abdominal pain, constipation and diarrhea  Endocrine: Negative for polydipsia  Genitourinary: Negative for hematuria  Musculoskeletal: Negative for arthralgias  Skin: Negative for color change  Allergic/Immunologic: Negative for immunocompromised state     Neurological: Negative for dizziness and headaches  Hematological: Negative for adenopathy  Psychiatric/Behavioral: Negative for agitation  Patient Active Problem List   Diagnosis   • JAKI (obstructive sleep apnea)   • Chronic bronchitis (HCC)   • Abdominal aortic aneurysm (AAA) without rupture   • Lumbar spondylosis   • Lumbar degenerative disc disease   • Myofascial pain syndrome   • Chronic low back pain without sciatica   • Cervical radiculopathy   • Cervical spondylosis without myelopathy   • Tremor, essential   • Negative depression screening   • Chronic pain of both knees   • Class 1 obesity due to excess calories with serious comorbidity and body mass index (BMI) of 31 0 to 31 9 in adult   • Smoking   • Hypertension   • Chronic pain syndrome   • Uncomplicated opioid dependence ([facility] )   • Encounter for long-term opiate analgesic use   • Neck pain   • Hyperlipidemia   • Pre-diabetes   • Erectile dysfunction   • Insomnia   • Sacroiliitis (HCC)   • Cortical age-related cataract of both eyes   • Urinary frequency   • MARYANN (acute kidney injury) ([facility] )   • 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)   • Kidney stone   • Pulmonary emphysema (HCC)   • Malignant neoplasm of lower lobe of right lung (HCC)   • Encounter for smoking cessation counseling   • Hemiparesis of left dominant side due to non-cerebrovascular etiology ([facility] )   • At high risk for osteoporosis     Past Medical History:   Diagnosis Date   • Abdominal aortic aneurysm without rupture    • Abdominal Aortic Duplex 02/21/2017    Ectatic infrarenal abdominal aorta  • CAD (coronary artery disease)    • Cancer ([facility] ) 2022    right lung CA   • COPD (chronic obstructive pulmonary disease) (HCC)    • Extremity pain    • History of echocardiogram 03/18/2014    EF 55%, mild MR and AI  Mild concentric LVH     • History of stress test 03/06/2017    Normal    • Hyperlipidemia    • Hypertension    • Joint pain    • Kidney stone    • Low back pain    • Migraine    • Neck pain    • Obstructive sleep apnea     cannot tolerate CPAP   • Osteoarthritis    • Peripheral neuropathy    • Reflex sympathetic dystrophy      Past Surgical History:   Procedure Laterality Date   • CARDIAC CATHETERIZATION  02/13/2012    EF 70%, widely patent renal arteries, significant single-vessel CAD-medical therapy  • CARDIAC CATHETERIZATION  04/11/2013    EF 65%, 50% mid LAD, 20% prox CFX, 90% diffuse RCA, 99% mid RCA  Medical management  • CHOLECYSTECTOMY     • COLONOSCOPY     • EPIDURAL BLOCK INJECTION Bilateral 08/15/2019    Procedure: BLOCK / INJECTION EPIDURAL STEROID CERVICAL;  Surgeon: Ayesha Ramirez MD;  Location: [facility];  Service: Pain Management    • FL GUIDED NEEDLE PLAC BX/ASP/INJ  08/15/2019   • IR BIOPSY LUNG  09/13/2022   • IR THORACIC DUCT EMBOLIZATION  11/22/2022   • ORTHOPEDIC SURGERY     • LA [address] INCL FLUOR GDNCE DX W/CELL WASHG [facility] N/A 11/16/2022    Procedure: [address];  Surgeon: Sonia Jones MD;  Location: [facility];  Service: Thoracic   • LA THORACOSCOPY W/LOBECTOMY SINGLE LOBE Right 11/16/2022    Procedure: LOBECTOMY LUNG; lower lobe superior segmentectomy, mediastinal lymph node dissection;  Surgeon: Sonia Jones MD;  Location: [facility];  Service: Thoracic   • LA THORACOSCOPY W/SEGMENTECTOMY Right 11/16/2022    Procedure: THORACOSCOPY VIDEO ASSISTED SURGERY (VATS);   Surgeon: Sonia Jones MD;  Location: [facility];  Service: Thoracic   • TRIGGER POINT INJECTION       Family History   Adopted: Yes   Problem Relation Age of Onset   • No Known Problems Family    • No Known Problems Mother    • No Known Problems Father      Social History     Socioeconomic History   • Marital status: Single     Spouse name: Not on file   • Number of children: Not on file   • Years of education: Not on file   • Highest education level: Not on file   Occupational History   • Not on file   Tobacco Use   • Smoking status: Former     Packs/day: 1 50     Types: Cigarettes     Quit date: 11/3/2022     Years since quittin 1   • Smokeless tobacco: Never   Vaping Use   • Vaping Use: Never used   Substance and Sexual Activity   • Alcohol use: Not Currently   • Drug use: Never   • Sexual activity: Yes   Other Topics Concern   • Not on file   Social History Narrative   • Not on file     Social Determinants of Health     Financial Resource Strain: Not on file   Food Insecurity: No Food Insecurity   • Worried About Running Out of Food in the Last Year: Never true   • Ran Out of Food in the Last Year: Never true   Transportation Needs: No Transportation Needs   • Lack of Transportation (Medical): No   • Lack of Transportation (Non-Medical):  No   Physical Activity: Not on file   Stress: Not on file   Social Connections: Not on file   Intimate Partner Violence: Not on file   Housing Stability: Low Risk    • Unable to Pay for Housing in the Last Year: No   • Number of Places Lived in the Last Year: 1   • Unstable Housing in the Last Year: No       Current Outpatient Medications:   •  amLODIPine (NORVASC) 10 mg tablet, swallow 1 tablet once daily (Patient taking differently: daily at bedtime), Disp: 90 tablet, Rfl: 3  •  docusate sodium (COLACE) 100 mg capsule, Take 1 capsule (100 mg total) by mouth 2 (two) times a day, Disp: 20 capsule, Rfl: 0  •  gabapentin (NEURONTIN) 800 mg tablet, Take 1 tablet (800 mg total) by mouth 3 (three) times a day, Disp: 90 tablet, Rfl: 1  •  glucose blood (OneTouch Verio) test strip, Test once daily, Disp: 100 each, Rfl: 0  •  lisinopril (ZESTRIL) 20 mg tablet, take 1 tablet by mouth once daily (Patient taking differently: daily at bedtime), Disp: 30 tablet, Rfl: 5  •  metoprolol succinate (TOPROL-XL) 100 mg 24 hr tablet, take 1 tablet by mouth once daily (Patient taking differently: daily at bedtime), Disp: 30 tablet, Rfl: 5  •  nicotine (NICODERM CQ) 14 mg/24hr TD 24 hr patch, Place 1 patch on the skin every 24 hours, Disp: 28 patch, Rfl: 0  •  oxyCODONE-acetaminophen (Percocet) 7 5-325 MG per tablet, Take 1 tablet by mouth every 8 (eight) hours as needed for moderate pain Do not fill until 12/10/2022 Max Daily Amount: 3 tablets, Disp: 90 tablet, Rfl: 0  •  rosuvastatin (CRESTOR) 20 MG tablet, take 1 tablet by mouth once daily (Patient taking differently: daily at bedtime), Disp: 90 tablet, Rfl: 5  •  sildenafil (VIAGRA) 100 mg tablet, Take 1 tablet (100 mg total) by mouth daily as needed for erectile dysfunction, Disp: 20 tablet, Rfl: 0  •  tamsulosin (FLOMAX) 0 4 mg, Take 2 capsules (0 8 mg total) by mouth daily with dinner, Disp: 60 capsule, Rfl: 0  •  traZODone (DESYREL) 100 mg tablet, take 1 tablet by mouth daily at bedtime, Disp: 90 tablet, Rfl: 3  •  fluticasone (FLONASE) 50 mcg/act nasal spray, 1 spray into each nostril daily for 14 days (Patient not taking: Reported on 1/9/2023), Disp: 11 1 mL, Rfl: 0  •  ipratropium-albuterol (DUO-NEB) 0 5-2 5 mg/3 mL nebulizer solution, inhale contents of 1 vial in nebulizer every 6 hours if needed for wheezing or shortness of breath (Patient not taking: Reported on 12/27/2022), Disp: , Rfl:   •  nicotine polacrilex (NICORETTE) 2 mg gum, Chew 1 each (2 mg total) as needed for smoking cessation (Patient not taking: Reported on 12/14/2022), Disp: 100 each, Rfl: 0  •  nitroglycerin (NITROSTAT) 0 4 mg SL tablet, Place 1 tablet (0 4 mg total) under the tongue every 5 (five) minutes as needed for chest pain (Patient not taking: Reported on 12/27/2022), Disp: 25 tablet, Rfl: 5  •  oxyCODONE-acetaminophen (PERCOCET) 5-325 mg per tablet, Take 1 tablet by mouth every 8 (eight) hours as needed for moderate pain Do not fill until 1/7/2023 Max Daily Amount: 3 tablets (Patient not taking: Reported on 12/27/2022), Disp: 90 tablet, Rfl: 0  •  pantoprazole (PROTONIX) 20 mg tablet, take 1 tablet by mouth once daily (Patient not taking: Reported on 1/9/2023), Disp: 30 tablet, Rfl: 0  • polyethylene glycol (MIRALAX) 17 g packet, Take 17 g by mouth daily for 5 days (Patient not taking: Reported on 12/20/2022), Disp: 85 g, Rfl: 0  •  senna (SENOKOT) 8 6 mg, Take 1 tablet (8 6 mg total) by mouth daily (Patient not taking: Reported on 12/14/2022), Disp: 30 tablet, Rfl: 0  •  tiotropium-olodaterol (Stiolto Respimat) 2 5-2 5 MCG/ACT inhaler, Inhale 2 puffs daily (Patient not taking: Reported on 12/27/2022), Disp: 4 g, Rfl: 11  •  topiramate (TOPAMAX) 25 mg tablet, FOLLOW INSTRUCTIONS GIVEN TO TITRATE UP TO A MAX OF 2 TABLETS BY MOUTH TWICE DAILY AS TOLERATED AND TO LEVEL OF BENEFIT FOR TREMORS (Patient not taking: Reported on 12/27/2022), Disp: 120 tablet, Rfl: 6  No Known Allergies  Vitals:    01/09/23 1133   BP: 140/78   Pulse: 63   Temp: 97 9 °F (36 6 °C)   SpO2: 99%       Physical Exam  Constitutional:       Appearance: He is well-developed  HENT:      Head: Normocephalic and atraumatic  Eyes:      Pupils: Pupils are equal, round, and reactive to light  Cardiovascular:      Rate and Rhythm: Normal rate and regular rhythm  Heart sounds: No murmur heard  Pulmonary:      Breath sounds: Normal breath sounds  No wheezing or rales  Abdominal:      Palpations: Abdomen is soft  Tenderness: There is no abdominal tenderness  Musculoskeletal:         General: No tenderness  Normal range of motion  Cervical back: Neck supple  Lymphadenopathy:      Cervical: No cervical adenopathy  Skin:     Findings: No erythema or rash  Neurological:      Mental Status: He is alert and oriented to person, place, and time  Cranial Nerves: No cranial nerve deficit  Deep Tendon Reflexes: Reflexes are normal and symmetric  Psychiatric:         Behavior: Behavior normal            Labs:  CBC, Coags, BMP, Mg, Phos     Imaging  No results found  I reviewed the above laboratory and imaging data      Discussion/Summary: In summary, this is a 50-year-old male with history of recently resected right lower lobe squamous cell carcinoma, T1c, N2, stage IIIa  We reviewed that noted adjuvant therapy would include chemotherapy followed by radiation therapy in sequential fashion  Taxol/carboplatin were reviewed for this purpose  He has moderate neuropathy in the left hand post injury in summer 2022  I would start with 175 mg per metered squared and, if acceptably tolerated, increase to 200 mg per metered squared  We reviewed potential toxicities of this program including but not limited to nausea, vomiting, allergic reaction, alopecia, cytopenias, peripheral neuropathy  We reviewed prophylactic measures taken routinely as well as monitoring to allow for early intervention as appropriate  Our chemotherapy nurse reviewed the above information as well as providing written information in this regard  We reviewed the goal of therapy to increase the likelihood of cure from surgery  Further, we reviewed that he has other pulmonary nodules  These will continue under surveillance  Stability over time would suggest benign processes  The patient voiced understanding the above discussion and is agreeable to proceed as outlined

## 2023-01-09 NOTE — TELEPHONE ENCOUNTER
While we try to accommodate patient requests, our priority is to schedule treatment according to Doctor's orders and site availability  Does the Provider use the intake sheet or checkout note? What would be a preferred day of the week that would work best for your infusion appointment? ANY  Do you prefer mornings or afternoons for your appointments? MID MORNING  Are there any days or dates that do not work for your schedule, including any upcoming vacations? 1/18, 1/20  We are going to try our best to schedule you at the infusion center closest to your home  In the event that we are unable to what would be your next preferred infusion site or sites? 1  [facility]  2    3      Do you have transportation to take you to all of your appointments?  YES  Would you like the infusion center to draw labs from your port? (disregard if patient doesn't have a port or need labs for infusion appointment) NO

## 2023-01-09 NOTE — TELEPHONE ENCOUNTER
Caller: Lian Guardian    Doctor: Dr Lexi Gerard    Reason for call: Patient calling stating he  medication oxyCODONE-acetaminophen (PERCOCET) and wrong doses was given he received 5-325 mg and is supposed to be doses 7 5-325 mg     Call back#: [phone]

## 2023-01-09 NOTE — TELEPHONE ENCOUNTER
Tell him to break them in half and take 1-1/2 pills every 8 hours as needed for pain  Obviously, he will run out sooner    Please advise him that when he calls for refill he should remind us that he needs the 7 5 mg

## 2023-01-12 ENCOUNTER — PATIENT MESSAGE (OUTPATIENT)
Dept: FAMILY MEDICINE CLINIC | Facility: CLINIC | Age: 68
End: 2023-01-12

## 2023-01-12 LAB — SARS-COV-2 AG UPPER RESP QL IA: ABNORMAL

## 2023-01-16 ENCOUNTER — OFFICE VISIT (OUTPATIENT)
Dept: FAMILY MEDICINE CLINIC | Facility: CLINIC | Age: 68
End: 2023-01-16

## 2023-01-16 VITALS
OXYGEN SATURATION: 97 % | WEIGHT: 186 LBS | TEMPERATURE: 97.9 F | HEIGHT: 68 IN | DIASTOLIC BLOOD PRESSURE: 76 MMHG | HEART RATE: 66 BPM | SYSTOLIC BLOOD PRESSURE: 120 MMHG | BODY MASS INDEX: 28.19 KG/M2

## 2023-01-16 DIAGNOSIS — I25.10 CORONARY ARTERY DISEASE INVOLVING NATIVE CORONARY ARTERY OF NATIVE HEART WITHOUT ANGINA PECTORIS: ICD-10-CM

## 2023-01-16 DIAGNOSIS — C34.31 PRIMARY SQUAMOUS CELL CARCINOMA OF LOWER LOBE OF RIGHT LUNG (HCC): ICD-10-CM

## 2023-01-16 DIAGNOSIS — I25.118 CORONARY ARTERY DISEASE OF NATIVE ARTERY OF NATIVE HEART WITH STABLE ANGINA PECTORIS (HCC): ICD-10-CM

## 2023-01-16 DIAGNOSIS — N52.9 ERECTILE DYSFUNCTION, UNSPECIFIED ERECTILE DYSFUNCTION TYPE: ICD-10-CM

## 2023-01-16 DIAGNOSIS — E66.3 OVERWEIGHT (BMI 25.0-29.9): ICD-10-CM

## 2023-01-16 DIAGNOSIS — I10 ESSENTIAL HYPERTENSION: Primary | ICD-10-CM

## 2023-01-16 DIAGNOSIS — E78.00 HYPERCHOLESTEROLEMIA: ICD-10-CM

## 2023-01-16 DIAGNOSIS — J43.9 PULMONARY EMPHYSEMA, UNSPECIFIED EMPHYSEMA TYPE (HCC): ICD-10-CM

## 2023-01-16 DIAGNOSIS — C34.31 MALIGNANT NEOPLASM OF LOWER LOBE OF RIGHT LUNG (HCC): ICD-10-CM

## 2023-01-16 DIAGNOSIS — I10 ESSENTIAL HYPERTENSION: ICD-10-CM

## 2023-01-16 DIAGNOSIS — J42 CHRONIC BRONCHITIS, UNSPECIFIED CHRONIC BRONCHITIS TYPE (HCC): ICD-10-CM

## 2023-01-16 DIAGNOSIS — Z12.5 SCREENING FOR PROSTATE CANCER: ICD-10-CM

## 2023-01-16 DIAGNOSIS — J01.00 ACUTE NON-RECURRENT MAXILLARY SINUSITIS: ICD-10-CM

## 2023-01-16 DIAGNOSIS — F11.20 UNCOMPLICATED OPIOID DEPENDENCE (HCC): ICD-10-CM

## 2023-01-16 DIAGNOSIS — N18.31 STAGE 3A CHRONIC KIDNEY DISEASE (HCC): ICD-10-CM

## 2023-01-16 DIAGNOSIS — Z23 ENCOUNTER FOR IMMUNIZATION: ICD-10-CM

## 2023-01-16 DIAGNOSIS — Z12.11 SCREENING FOR COLON CANCER: ICD-10-CM

## 2023-01-16 PROBLEM — M46.1 SACROILIITIS (HCC): Status: RESOLVED | Noted: 2022-02-02 | Resolved: 2023-01-16

## 2023-01-16 PROBLEM — N17.9 AKI (ACUTE KIDNEY INJURY) (HCC): Status: RESOLVED | Noted: 2022-07-23 | Resolved: 2023-01-16

## 2023-01-16 RX ORDER — AZITHROMYCIN 250 MG/1
TABLET, FILM COATED ORAL
Qty: 6 TABLET | Refills: 0 | Status: SHIPPED | OUTPATIENT
Start: 2023-01-16 | End: 2023-01-16 | Stop reason: SDUPTHER

## 2023-01-16 RX ORDER — NITROGLYCERIN 0.4 MG/1
0.4 TABLET SUBLINGUAL
Qty: 25 TABLET | Refills: 5 | Status: SHIPPED | OUTPATIENT
Start: 2023-01-16

## 2023-01-16 RX ORDER — SILDENAFIL 100 MG/1
100 TABLET, FILM COATED ORAL DAILY PRN
Qty: 20 TABLET | Refills: 0 | Status: SHIPPED | OUTPATIENT
Start: 2023-01-16 | End: 2023-04-04

## 2023-01-16 RX ORDER — METHYLPREDNISOLONE 4 MG/1
TABLET ORAL
Qty: 21 EACH | Refills: 0 | Status: SHIPPED | OUTPATIENT
Start: 2023-01-16 | End: 2023-01-16 | Stop reason: SDUPTHER

## 2023-01-16 NOTE — PROGRESS NOTES
Assessment/Plan:    No problem-specific Assessment & Plan notes found for this encounter  Diagnoses and all orders for this visit:    Essential hypertension  Comments:  no change in the medication  Orders:  -     CBC and differential; Future  -     TSH, 3rd generation with Free T4 reflex; Future  -     nitroglycerin (NITROSTAT) 0 4 mg SL tablet; Place 1 tablet (0 4 mg total) under the tongue every 5 (five) minutes as needed for chest pain    Hypercholesterolemia  Comments:  no change in the medication  Orders:  -     Comprehensive metabolic panel; Future  -     Lipid panel; Future    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)  Comments:  per oncology    Acute non-recurrent maxillary sinusitis  -     azithromycin (Zithromax) 250 mg tablet; Take 2 tablets (500 mg total) by mouth daily for 1 day, THEN 1 tablet (250 mg total) daily for 4 days  -     methylPREDNISolone 4 MG tablet therapy pack; Use as directed on package    Malignant neoplasm of lower lobe of right lung (HCC)  Comments:  per oncology    Pulmonary emphysema, unspecified emph[facility]sema type ([facility] )  Comments:  stable    Encounter for immunization  -     Pneumococcal Conjugate Vaccine 20-valent (PCV20)    Chronic bronchitis, unspecified chronic bronchitis type ([facility] )    Uncomplicated opioid dependence ([facility] )  Comments:  stable    Overweight (BMI 25 0-29  9)  Comments:  pt counseled on diet and exercise    Screening for prostate cancer  -     PSA, Total Screen; Future    Erectile dysfunction, unspecified erectile dysfunction type  -     sildenafil (VIAGRA) 100 mg tablet;  Take 1 tablet (100 mg total) by mouth daily as needed for erectile dysfunction    Coronary artery disease involving native coronary artery of native heart without angina pectoris  -     nitroglycerin (NITROSTAT) 0 4 mg SL tablet; Place 1 tablet (0 4 mg total) under the tongue every 5 (five) minutes as needed for chest pain    Essential hypertension  -     CBC and differential; Future  -     TSH, 3rd generation with Free T4 reflex; Future  -     nitroglycerin (NITROSTAT) 0 4 mg SL tablet; Place 1 tablet (0 4 mg total) under the tongue every 5 (five) minutes as needed for chest pain    Screening for colon cancer  -     Ambulatory referral for colonoscopy; Future    Coronary artery disease of native artery of native heart with stable angina pectoris (HCC)    Stage 3a chronic kidney disease ([facility] )          PHQ-2/9 Depression Screening    Little interest or pleasure in doing things: 0 - not at all  Feeling down, depressed, or hopeless: 0 - not at all  PHQ-2 Score: 0  PHQ-2 Interpretation: Negative depression screen        BMI Counseling: Body mass index is 28 28 kg/m²  The BMI is above normal  Nutrition recommendations include reducing portion sizes and 3-5 servings of fruits/vegetables daily  Exercise recommendations include moderate aerobic physical activity for 150 minutes/week and exercising 3-5 times per week  Subjective:      Patient ID: Taylor Flores is a 75 y o  male  Hypertension  This is a chronic problem  The current episode started more than 1 year ago  The problem is unchanged  The problem is controlled  Pertinent negatives include no chest pain, headaches or palpitations  There are no associated agents to hypertension  Risk factors for coronary artery disease include male gender and sedentary lifestyle  Past treatments include beta blockers and ACE inhibitors  The current treatment provides moderate improvement  Compliance problems include exercise and diet  Cough  This is a new problem  The current episode started in the past 7 days  The problem has been gradually improving  The cough is productive of sputum  Associated symptoms include chills  Pertinent negatives include no chest pain, fever or headaches  He has tried nothing for the symptoms         The following portions of the patient's history were reviewed and updated as appropriate: allergies, "current medications, past family history, past medical history, past social history, past surgical history and problem list     Review of Systems   Constitutional: Positive for chills  Negative for fever  Eyes: Negative for visual disturbance  Respiratory: Positive for cough  Cardiovascular: Negative for chest pain and palpitations  Neurological: Negative for headaches  Objective:    /76   Pulse 66   Temp 97 9 °F (36 6 °C) (Tympanic)   Ht 5' 8\" (1 727 m)   Wt 84 4 kg (186 lb)   SpO2 97%   BMI 28 28 kg/m²      Physical Exam  Vitals and nursing note reviewed  Constitutional:       General: He is not in acute distress  Appearance: Normal appearance  He is not ill-appearing, toxic-appearing or diaphoretic  HENT:      Head: Normocephalic and atraumatic  Eyes:      Conjunctiva/sclera: Conjunctivae normal    Cardiovascular:      Rate and Rhythm: Normal rate and regular rhythm  Heart sounds: Normal heart sounds  No murmur heard  Pulmonary:      Effort: Pulmonary effort is normal  No respiratory distress  Breath sounds: Normal breath sounds  No wheezing, rhonchi or rales  Abdominal:      General: There is no distension  Palpations: Abdomen is soft  There is no mass  Tenderness: There is no abdominal tenderness  There is no guarding or rebound  Musculoskeletal:      Cervical back: Normal range of motion and neck supple  No rigidity  Right lower leg: No edema  Left lower leg: No edema  Lymphadenopathy:      Cervical: No cervical adenopathy  Neurological:      Mental Status: He is alert and oriented to person, place, and time  Psychiatric:         Mood and Affect: Mood normal          Behavior: Behavior normal          Thought Content:  Thought content normal          Judgment: Judgment normal          "

## 2023-01-17 RX ORDER — METHYLPREDNISOLONE 4 MG/1
TABLET ORAL
Qty: 21 EACH | Refills: 0 | Status: SHIPPED | OUTPATIENT
Start: 2023-01-17

## 2023-01-17 RX ORDER — AZITHROMYCIN 250 MG/1
TABLET, FILM COATED ORAL
Qty: 6 TABLET | Refills: 0 | Status: SHIPPED | OUTPATIENT
Start: 2023-01-17 | End: 2023-01-22

## 2023-01-20 ENCOUNTER — APPOINTMENT (OUTPATIENT)
Dept: LAB | Facility: HOSPITAL | Age: 68
End: 2023-01-20
Attending: INTERNAL MEDICINE

## 2023-01-20 DIAGNOSIS — J40 BRONCHITIS: Primary | ICD-10-CM

## 2023-01-20 DIAGNOSIS — C34.31 MALIGNANT NEOPLASM OF LOWER LOBE OF RIGHT LUNG (HCC): ICD-10-CM

## 2023-01-20 LAB
ALBUMIN SERPL BCP-MCNC: 4.4 G/DL (ref 3.5–5)
ALP SERPL-CCNC: 76 U/L (ref 34–104)
ALT SERPL W P-5'-P-CCNC: 12 U/L (ref 7–52)
ANION GAP SERPL CALCULATED.3IONS-SCNC: 9 MMOL/L (ref 4–13)
AST SERPL W P-5'-P-CCNC: 13 U/L (ref 13–39)
BASOPHILS # BLD AUTO: 0.06 THOUSANDS/ÂΜL (ref 0–0.1)
BASOPHILS NFR BLD AUTO: 1 % (ref 0–1)
BILIRUB SERPL-MCNC: 0.68 MG/DL (ref 0.2–1)
BUN SERPL-MCNC: 21 MG/DL (ref 5–25)
CALCIUM SERPL-MCNC: 9.7 MG/DL (ref 8.4–10.2)
CHLORIDE SERPL-SCNC: 103 MMOL/L (ref 96–108)
CO2 SERPL-SCNC: 24 MMOL/L (ref 21–32)
CREAT SERPL-MCNC: 1.13 MG/DL (ref 0.6–1.3)
EOSINOPHIL # BLD AUTO: 0.01 THOUSAND/ÂΜL (ref 0–0.61)
EOSINOPHIL NFR BLD AUTO: 0 % (ref 0–6)
ERYTHROCYTE [DISTWIDTH] IN BLOOD BY AUTOMATED COUNT: 13.4 % (ref 11.6–15.1)
GFR SERPL CREATININE-BSD FRML MDRD: 66 ML/MIN/1.73SQ M
GLUCOSE SERPL-MCNC: 137 MG/DL (ref 65–140)
HCT VFR BLD AUTO: 44.6 % (ref 36.5–49.3)
HGB BLD-MCNC: 14.6 G/DL (ref 12–17)
IMM GRANULOCYTES # BLD AUTO: 0.07 THOUSAND/UL (ref 0–0.2)
IMM GRANULOCYTES NFR BLD AUTO: 1 % (ref 0–2)
LYMPHOCYTES # BLD AUTO: 1.36 THOUSANDS/ÂΜL (ref 0.6–4.47)
LYMPHOCYTES NFR BLD AUTO: 11 % (ref 14–44)
MCH RBC QN AUTO: 30.4 PG (ref 26.8–34.3)
MCHC RBC AUTO-ENTMCNC: 32.7 G/DL (ref 31.4–37.4)
MCV RBC AUTO: 93 FL (ref 82–98)
MONOCYTES # BLD AUTO: 0.72 THOUSAND/ÂΜL (ref 0.17–1.22)
MONOCYTES NFR BLD AUTO: 6 % (ref 4–12)
NEUTROPHILS # BLD AUTO: 10.71 THOUSANDS/ÂΜL (ref 1.85–7.62)
NEUTS SEG NFR BLD AUTO: 81 % (ref 43–75)
NRBC BLD AUTO-RTO: 0 /100 WBCS
PLATELET # BLD AUTO: 298 THOUSANDS/UL (ref 149–390)
PMV BLD AUTO: 9.7 FL (ref 8.9–12.7)
POTASSIUM SERPL-SCNC: 3.9 MMOL/L (ref 3.5–5.3)
PROT SERPL-MCNC: 7.4 G/DL (ref 6.4–8.4)
RBC # BLD AUTO: 4.8 MILLION/UL (ref 3.88–5.62)
SODIUM SERPL-SCNC: 136 MMOL/L (ref 135–147)
WBC # BLD AUTO: 12.93 THOUSAND/UL (ref 4.31–10.16)

## 2023-01-20 RX ORDER — AMOXICILLIN AND CLAVULANATE POTASSIUM 875; 125 MG/1; MG/1
1 TABLET, FILM COATED ORAL EVERY 12 HOURS SCHEDULED
Qty: 14 TABLET | Refills: 0 | Status: SHIPPED | OUTPATIENT
Start: 2023-01-20 | End: 2023-01-27

## 2023-01-20 RX ORDER — AMOXICILLIN AND CLAVULANATE POTASSIUM 875; 125 MG/1; MG/1
1 TABLET, FILM COATED ORAL EVERY 12 HOURS SCHEDULED
Qty: 14 TABLET | Refills: 0 | Status: SHIPPED | OUTPATIENT
Start: 2023-01-20 | End: 2023-01-20 | Stop reason: SDUPTHER

## 2023-01-20 NOTE — TELEPHONE ENCOUNTER
Pt left a "[employer]" VM stating he has not been feeling well  Has a productive cough  Coughing up whitish grey colored mucus  Is also concerned that his wbc count has been elevated  (Pt is also on the last day of steroid taper dose)  Pt also stated he has been running fevers  (First stated at night)  Upon questioning, pt has been taking percocet during the day  Temperatures @ night have been 102 1    Reviewed with Dr Xavier Tierney  Rx for Augmentin has been escribed to his pharmacy  Pt is aware  Instructed to call Chelsea Meng on Monday @ 0800 to let Dr Xavier Tierney know how his fevers have been, as pt has Chemo Treatment @ 0900 at [address]

## 2023-01-23 ENCOUNTER — TELEPHONE (OUTPATIENT)
Dept: HEMATOLOGY ONCOLOGY | Facility: CLINIC | Age: 68
End: 2023-01-23

## 2023-01-23 DIAGNOSIS — C34.31 MALIGNANT NEOPLASM OF LOWER LOBE OF RIGHT LUNG (HCC): Primary | ICD-10-CM

## 2023-01-23 RX ORDER — SODIUM CHLORIDE 9 MG/ML
20 INJECTION, SOLUTION INTRAVENOUS ONCE
Status: CANCELLED | OUTPATIENT
Start: 2023-01-30

## 2023-01-23 RX ORDER — PALONOSETRON 0.05 MG/ML
0.25 INJECTION, SOLUTION INTRAVENOUS ONCE
Status: CANCELLED | OUTPATIENT
Start: 2023-01-30

## 2023-01-23 NOTE — TELEPHONE ENCOUNTER
Rec'd VM from pt: "Rad Marni, this is [facility]  I was asked to call you at 8:00 this morning  I'm scheduled for chemo at 9, but apparently I'm still running a fever  My number is [phone]  If you can return my call  Thank you "    Returned call to pt  Pt still with fevers  Says last night tmax was 102 0  Coughing up yellow/grey phlegm  Pt states this has been ongoing since his 'lymph node thing'  He has only take 2 doses of antibiotics so far  Discussed with Dr Fay Jung  Pt to be rescheduled for tx to next Monday  Pt voiced understanding

## 2023-01-26 ENCOUNTER — TELEPHONE (OUTPATIENT)
Dept: HEMATOLOGY ONCOLOGY | Facility: CLINIC | Age: 68
End: 2023-01-26

## 2023-01-26 NOTE — TELEPHONE ENCOUNTER
Called pt to check on how he was doing  Pt says he is relatively the same  The night sweats are improving but still have fevers at night  He states he feels good during the day  He is taking his antibiotic  Pt going to have labs done tomorrow  Advised him we would review those and reach out if any changes need to be made  He voiced understanding

## 2023-01-27 ENCOUNTER — TELEPHONE (OUTPATIENT)
Dept: HEMATOLOGY ONCOLOGY | Facility: CLINIC | Age: 68
End: 2023-01-27

## 2023-01-27 ENCOUNTER — TELEPHONE (OUTPATIENT)
Dept: CARDIAC SURGERY | Facility: CLINIC | Age: 68
End: 2023-01-27

## 2023-01-27 ENCOUNTER — APPOINTMENT (OUTPATIENT)
Dept: LAB | Facility: HOSPITAL | Age: 68
End: 2023-01-27
Attending: INTERNAL MEDICINE

## 2023-01-27 ENCOUNTER — TELEPHONE (OUTPATIENT)
Dept: GYNECOLOGIC ONCOLOGY | Facility: CLINIC | Age: 68
End: 2023-01-27

## 2023-01-27 DIAGNOSIS — A77.9: Primary | ICD-10-CM

## 2023-01-27 DIAGNOSIS — C34.31 MALIGNANT NEOPLASM OF LOWER LOBE OF RIGHT LUNG (HCC): ICD-10-CM

## 2023-01-27 LAB
ALBUMIN SERPL BCP-MCNC: 3.9 G/DL (ref 3.5–5)
ALP SERPL-CCNC: 68 U/L (ref 34–104)
ALT SERPL W P-5'-P-CCNC: 10 U/L (ref 7–52)
ANION GAP SERPL CALCULATED.3IONS-SCNC: 5 MMOL/L (ref 4–13)
AST SERPL W P-5'-P-CCNC: 10 U/L (ref 13–39)
BASOPHILS # BLD AUTO: 0.06 THOUSANDS/ÂΜL (ref 0–0.1)
BASOPHILS NFR BLD AUTO: 1 % (ref 0–1)
BILIRUB SERPL-MCNC: 0.75 MG/DL (ref 0.2–1)
BUN SERPL-MCNC: 17 MG/DL (ref 5–25)
CALCIUM SERPL-MCNC: 9.2 MG/DL (ref 8.4–10.2)
CHLORIDE SERPL-SCNC: 100 MMOL/L (ref 96–108)
CO2 SERPL-SCNC: 30 MMOL/L (ref 21–32)
CREAT SERPL-MCNC: 1.13 MG/DL (ref 0.6–1.3)
EOSINOPHIL # BLD AUTO: 0 THOUSAND/ÂΜL (ref 0–0.61)
EOSINOPHIL NFR BLD AUTO: 0 % (ref 0–6)
ERYTHROCYTE [DISTWIDTH] IN BLOOD BY AUTOMATED COUNT: 13.7 % (ref 11.6–15.1)
GFR SERPL CREATININE-BSD FRML MDRD: 66 ML/MIN/1.73SQ M
GLUCOSE SERPL-MCNC: 104 MG/DL (ref 65–140)
HCT VFR BLD AUTO: 43.9 % (ref 36.5–49.3)
HGB BLD-MCNC: 14.2 G/DL (ref 12–17)
IMM GRANULOCYTES # BLD AUTO: 0.03 THOUSAND/UL (ref 0–0.2)
IMM GRANULOCYTES NFR BLD AUTO: 0 % (ref 0–2)
LYMPHOCYTES # BLD AUTO: 2.96 THOUSANDS/ÂΜL (ref 0.6–4.47)
LYMPHOCYTES NFR BLD AUTO: 31 % (ref 14–44)
MCH RBC QN AUTO: 30.7 PG (ref 26.8–34.3)
MCHC RBC AUTO-ENTMCNC: 32.3 G/DL (ref 31.4–37.4)
MCV RBC AUTO: 95 FL (ref 82–98)
MONOCYTES # BLD AUTO: 0.86 THOUSAND/ÂΜL (ref 0.17–1.22)
MONOCYTES NFR BLD AUTO: 9 % (ref 4–12)
NEUTROPHILS # BLD AUTO: 5.57 THOUSANDS/ÂΜL (ref 1.85–7.62)
NEUTS SEG NFR BLD AUTO: 59 % (ref 43–75)
NRBC BLD AUTO-RTO: 0 /100 WBCS
PLATELET # BLD AUTO: 231 THOUSANDS/UL (ref 149–390)
PMV BLD AUTO: 9.4 FL (ref 8.9–12.7)
POTASSIUM SERPL-SCNC: 3.8 MMOL/L (ref 3.5–5.3)
PROT SERPL-MCNC: 6.5 G/DL (ref 6.4–8.4)
RBC # BLD AUTO: 4.62 MILLION/UL (ref 3.88–5.62)
SODIUM SERPL-SCNC: 135 MMOL/L (ref 135–147)
WBC # BLD AUTO: 9.48 THOUSAND/UL (ref 4.31–10.16)

## 2023-01-27 NOTE — TELEPHONE ENCOUNTER
Returned patient's call regarding fevers chest x-ray and chemotherapy on Monday  Instructed him to have the chest x-ray done either tonight or over the weekend  Reviewed CBC and CMP which are within normal limits  I instructed him to monitor fevers over the weekend  He may proceed with chemotherapy on Monday as scheduled  We will continue to monitor and manage as indicated  Patient verbalized understanding and is in agreement with the plan

## 2023-01-27 NOTE — TELEPHONE ENCOUNTER
Dr Dong Jones called into the office requesting to speak with the thoracic team regarding the patient stating that he is having a fever   Ros can be reached back [phone]

## 2023-01-27 NOTE — TELEPHONE ENCOUNTER
Pt is to have a chest x ray over the weekend  He will be calling on Monday @ 0800 to let you know if he is still having fevers, and have Dr Aileen Miller look at the chest xray

## 2023-01-27 NOTE — TELEPHONE ENCOUNTER
Spoke with patient regarding his "fevers" since his segmentectomy in November 2022  He states his temps have been occurring since he got home from hospital at the end of November, ranging from   He hit 106 with COVID when he was diagnosed with it in January  He takes percocet in the morning for his back, which bring his temp down for the day, secondary to tylenol  However, he states when he gets home, he will have higher temps again  He has also been coughing up gray phlegm for quite some time and was  placed on steroids and antibiotic by his PCP at one point  Dr Marilu Lombardo just placed him on a Augmentin and he has two days left of this course  He had one cycle of chemotherapy thus far, but they canceled it last week secondary to his fever  He is also having shortness of breath, which is chronic for him  I informed him that I will order a cxr to start with, since his CT scan is not scheduled until May 2023  I will also send a note to Dr Jerardo Karimi to make him aware of the situation  I told him I would call him Monday, he will try to get cxr tomorrow  Spoke with Ros from Dr Akosua Hurtado office at [phone], I told her the plan  She was not sure what to do with the patient's fevers and thought she would reach out to us

## 2023-01-27 NOTE — TELEPHONE ENCOUNTER
Called and spoke with  @ [facility]  Pt has been running fevers for greater than 2 weeks    Awaiting c/b from NP

## 2023-01-28 ENCOUNTER — HOSPITAL ENCOUNTER (OUTPATIENT)
Dept: RADIOLOGY | Facility: HOSPITAL | Age: 68
Discharge: HOME | End: 2023-01-28

## 2023-01-28 DIAGNOSIS — A77.9: ICD-10-CM

## 2023-01-30 ENCOUNTER — TELEPHONE (OUTPATIENT)
Dept: PAIN MEDICINE | Facility: MEDICAL CENTER | Age: 68
End: 2023-01-30

## 2023-01-30 ENCOUNTER — HOSPITAL ENCOUNTER (OUTPATIENT)
Dept: INFUSION CENTER | Facility: HOSPITAL | Age: 68
Discharge: HOME | End: 2023-01-30
Attending: INTERNAL MEDICINE

## 2023-01-30 ENCOUNTER — TELEPHONE (OUTPATIENT)
Dept: HEMATOLOGY ONCOLOGY | Facility: CLINIC | Age: 68
End: 2023-01-30

## 2023-01-30 ENCOUNTER — TELEPHONE (OUTPATIENT)
Dept: GYNECOLOGIC ONCOLOGY | Facility: CLINIC | Age: 68
End: 2023-01-30

## 2023-01-30 VITALS
DIASTOLIC BLOOD PRESSURE: 71 MMHG | RESPIRATION RATE: 18 BRPM | TEMPERATURE: 98.1 F | HEART RATE: 62 BPM | WEIGHT: 187.83 LBS | BODY MASS INDEX: 28.47 KG/M2 | OXYGEN SATURATION: 91 % | HEIGHT: 68 IN | SYSTOLIC BLOOD PRESSURE: 134 MMHG

## 2023-01-30 DIAGNOSIS — C34.11 PRIMARY SQUAMOUS CELL CARCINOMA OF UPPER LOBE OF RIGHT LUNG (HCC): Primary | ICD-10-CM

## 2023-01-30 DIAGNOSIS — C34.31 MALIGNANT NEOPLASM OF LOWER LOBE OF RIGHT LUNG (HCC): Primary | ICD-10-CM

## 2023-01-30 RX ORDER — SODIUM CHLORIDE 9 MG/ML
20 INJECTION, SOLUTION INTRAVENOUS ONCE
Status: COMPLETED | OUTPATIENT
Start: 2023-01-30 | End: 2023-01-30

## 2023-01-30 RX ORDER — PALONOSETRON 0.05 MG/ML
0.25 INJECTION, SOLUTION INTRAVENOUS ONCE
Status: COMPLETED | OUTPATIENT
Start: 2023-01-30 | End: 2023-01-30

## 2023-01-30 RX ADMIN — PACLITAXEL 360 MG: 6 INJECTION, SOLUTION, CONCENTRATE INTRAVENOUS at 12:38

## 2023-01-30 RX ADMIN — DIPHENHYDRAMINE HYDROCHLORIDE 25 MG: 50 INJECTION, SOLUTION INTRAMUSCULAR; INTRAVENOUS at 10:45

## 2023-01-30 RX ADMIN — DEXAMETHASONE SODIUM PHOSPHATE 20 MG: 10 INJECTION, SOLUTION INTRAMUSCULAR; INTRAVENOUS at 10:20

## 2023-01-30 RX ADMIN — FAMOTIDINE 20 MG: 10 INJECTION, SOLUTION INTRAVENOUS at 11:21

## 2023-01-30 RX ADMIN — FOSAPREPITANT 150 MG: 150 INJECTION, POWDER, LYOPHILIZED, FOR SOLUTION INTRAVENOUS at 11:56

## 2023-01-30 RX ADMIN — SODIUM CHLORIDE 20 ML/HR: 0.9 INJECTION, SOLUTION INTRAVENOUS at 09:50

## 2023-01-30 RX ADMIN — PALONOSETRON HYDROCHLORIDE 0.25 MG: 0.25 INJECTION INTRAVENOUS at 11:47

## 2023-01-30 RX ADMIN — CARBOPLATIN 550 MG: 10 INJECTION, SOLUTION INTRAVENOUS at 15:49

## 2023-01-30 NOTE — TELEPHONE ENCOUNTER
CALL RETURN FORM   Reason for patient call? Patient is calling in  that he is under the impression that his xray from Saturday is supposed to read and determine whether he needs to complete his chemo today    Patient's primary oncologist?  Milton Rivas    Name of person the patient was calling for? Shari    Any additional information to add, if applicable?  N/a    Informed patient that the message will be forwarded to the team and someone will get back to them as soon as possible    Did you relay this information to the patient?  yes, patient can be reached back at [phone]

## 2023-01-30 NOTE — TELEPHONE ENCOUNTER
S/w pt   This must be an old message as this was already addressed and he has the new correct Rx in his possession already

## 2023-01-30 NOTE — PROGRESS NOTES
Pt ambulated into infusion in stable condition sent note to dr Yeison Goncalves if ok to treat due to pt having fevers last week  Dr Yeison Goncalves responded ok to treat   Pt oriented to unit, drugs explained to pt

## 2023-01-30 NOTE — PLAN OF CARE
Problem: Potential for Falls  Goal: Patient will remain free of falls  Description: INTERVENTIONS:  - Educate patient/family on patient safety including physical limitations  - Instruct patient to call for assistance with activity   - Consult OT/PT to assist with strengthening/mobility   -    - Consider moving patient to room near nurses station  Outcome: Progressing     Problem: PAIN - ADULT  Goal: Verbalizes/displays adequate comfort level or baseline comfort level  Description: Interventions:  - Encourage patient to monitor pain and request assistance  - Assess pain using appropriate pain scale  - Administer analgesics based on type and severity of pain and evaluate response  - Implement non-pharmacological measures as appropriate and evaluate response  - Consider cultural and social influences on pain and pain management  - Notify physician/advanced practitioner if interventions unsuccessful or patient reports new pain  Outcome: Progressing     Problem: Knowledge Deficit  Goal: Patient/family/caregiver demonstrates understanding of disease process, treatment plan, medications, and discharge instructions  Description: Complete learning assessment and assess knowledge base    Interventions:  - Provide teaching at level of understanding  - Provide teaching via preferred learning methods  Outcome: Progressing

## 2023-01-30 NOTE — PROGRESS NOTES
Pt tolerated treatment well with no complications  IV removed and gauze dressing applied  Pt aware of future appts  AVS printed and given to pt  Aware to call office in AM if he realizes that he does not have any PO Zofran at home

## 2023-01-30 NOTE — TELEPHONE ENCOUNTER
Pt LVM stating that there was a mess up on his medication with the mgs dispensed and would like to s/w someone regarding it     Pt can be reached at [phone]

## 2023-01-30 NOTE — TELEPHONE ENCOUNTER
Patient called into the office this morning regarding the results from his CXR that was done on 1/28  Patient CXR results are not back in  Patient was sent over to Dr Daxa Ocampo office to discuss whether or not  It is appropriate for him to attend his chemo appointment this morning

## 2023-01-30 NOTE — TELEPHONE ENCOUNTER
Can you schedule a CT scan for this patient and tell him to get a cbc  Both orders already in the computer   This was decided after discussing this with Dr Manish Stephens

## 2023-01-31 ENCOUNTER — TELEPHONE (OUTPATIENT)
Dept: CARDIAC SURGERY | Facility: CLINIC | Age: 68
End: 2023-01-31

## 2023-01-31 ENCOUNTER — OFFICE VISIT (OUTPATIENT)
Dept: FAMILY MEDICINE CLINIC | Facility: CLINIC | Age: 68
End: 2023-01-31

## 2023-01-31 VITALS
BODY MASS INDEX: 28.95 KG/M2 | DIASTOLIC BLOOD PRESSURE: 76 MMHG | TEMPERATURE: 97.5 F | HEIGHT: 68 IN | SYSTOLIC BLOOD PRESSURE: 130 MMHG | HEART RATE: 83 BPM | WEIGHT: 191 LBS | OXYGEN SATURATION: 97 %

## 2023-01-31 DIAGNOSIS — R61 NIGHT SWEATS: ICD-10-CM

## 2023-01-31 DIAGNOSIS — Z87.891 HISTORY OF SMOKING: ICD-10-CM

## 2023-01-31 DIAGNOSIS — F32.A DEPRESSION, UNSPECIFIED DEPRESSION TYPE: Primary | ICD-10-CM

## 2023-01-31 DIAGNOSIS — Z23 ENCOUNTER FOR IMMUNIZATION: ICD-10-CM

## 2023-01-31 DIAGNOSIS — M51.36 LUMBAR DEGENERATIVE DISC DISEASE: ICD-10-CM

## 2023-01-31 DIAGNOSIS — M54.12 CERVICAL RADICULOPATHY: ICD-10-CM

## 2023-01-31 DIAGNOSIS — Z71.6 ENCOUNTER FOR SMOKING CESSATION COUNSELING: ICD-10-CM

## 2023-01-31 DIAGNOSIS — C34.31 MALIGNANT NEOPLASM OF LOWER LOBE OF RIGHT LUNG (HCC): ICD-10-CM

## 2023-01-31 RX ORDER — BUPROPION HYDROCHLORIDE 150 MG/1
150 TABLET ORAL EVERY MORNING
Qty: 90 TABLET | Refills: 3 | Status: SHIPPED | OUTPATIENT
Start: 2023-01-31

## 2023-01-31 RX ORDER — GABAPENTIN 800 MG/1
800 TABLET ORAL DAILY
Qty: 90 TABLET | Refills: 1 | Status: SHIPPED | OUTPATIENT
Start: 2023-01-31 | End: 2023-03-02

## 2023-01-31 NOTE — ASSESSMENT & PLAN NOTE
1 month duration  almost every night  assx with fatigue  unclear etiology todate  Blood work negative to date  Afternoon around the chart and looking hematology/oncology notes I assumed that patient's fatigue/night sweats is mostly caused by them ongoing malignancy or chemotherapy  At the same time it could be a side effect of one of his pain medications  Less likely to be an active infectious process going on at this time given the patient's recent antibiotic course the negative blood tests    F/u with hem/onc

## 2023-01-31 NOTE — PROGRESS NOTES
Assessment/Plan:    Night sweats  1 month duration  almost every night  assx with fatigue  unclear etiology todate  Blood work negative to date  Afternoon around the chart and looking hematology/oncology notes I assumed that patient's fatigue/night sweats is mostly caused by them ongoing malignancy or chemotherapy  At the same time it could be a side effect of one of his pain medications  Less likely to be an active infectious process going on at this time given the patient's recent antibiotic course the negative blood tests  F/u with hem/onc          Diagnoses and all orders for this visit:    Depression, unspecified depression type  Comments:  stable  due to life stressors including recent diagnosis of lung carcinoma  no SI or HI   continue bupropion  stable  Orders:  -     buPROPion (Wellbutrin XL) 150 mg 24 hr tablet; Take 1 tablet (150 mg total) by mouth every morning    Encounter for immunization  Comments:  declined vaccines  health risks explained    Encounter for smoking cessation counseling  Comments:  quite date: 11-1-22   no relapse  continue to use 1 patch PD   bupropion helping  continue regimen and f/u accordingly    History of smoking  -     buPROPion (Wellbutrin XL) 150 mg 24 hr tablet; Take 1 tablet (150 mg total) by mouth every morning    Lumbar degenerative disc disease  Comments:  patient currently taking gabapentin 800 mg OD  self weaned from TID    no change in pain severity  f/u with pain med clinic  Orders:  -     gabapentin (NEURONTIN) 800 mg tablet; Take 1 tablet (800 mg total) by mouth daily    Cervical radiculopathy  -     gabapentin (NEURONTIN) 800 mg tablet;  Take 1 tablet (800 mg total) by mouth daily    Night sweats    Malignant neoplasm of lower lobe of right lung (HCC)        PHQ-2/9 Depression Screening    Little interest or pleasure in doing things: 0 - not at all  Feeling down, depressed, or hopeless: 0 - not at all  PHQ-2 Score: 0  PHQ-2 Interpretation: Negative depression screen            Subjective:      Patient ID: Pollie Opitz is a 75 y o  male  75year old male with a PMH remarkable for chronic pain syndrome, HTN, HLD , and recent diagnosis of SCC lung stage 3 a  S/p lobectomey and currently on chemotherapy per hem/onc  He presented to the office today to follow up regarding smoking cessation monitoring and complaining of night sweats and fatigue  Smoking quite date: 11-1-2022   Currently using nicotine patch 14 OD   3-4 cravings per day  Controlled with gum  Taking bupropion 150 mg OD   With remarkable improvement in depression and cravings  During the visit the patient mentions that he has been experiencing night sweats/fatigue for the past 4 weeks  Have been the same since onset  Almost every night  Chart review was remarkable for recent visit to the hematology/oncology team work-up was obtained for infectious etiology  All work-up is negative to date  Patient was prescribed recently course of Augmentin  No associated fevers or chills, nausea or vomiting, diarrhea or constipation  No chest pain or tightness, shortness of breath or cough  No active urinary symptoms     -Tolerating oral diet  Decreased appetite due to being on chemotherapy   -No active suicidal or homicidal ideation or plans  The following portions of the patient's history were reviewed and updated as appropriate: allergies, past surgical history and problem list     Review of Systems   Constitutional: Positive for activity change, appetite change and fatigue  Negative for chills, diaphoresis and fever  HENT: Negative for congestion, sinus pressure, sinus pain, sneezing, sore throat, tinnitus, trouble swallowing and voice change  Respiratory: Negative for apnea, cough, chest tightness, shortness of breath and stridor  Cardiovascular: Negative for chest pain, palpitations and leg swelling     Gastrointestinal: Negative for abdominal pain, constipation, diarrhea, nausea and vomiting  Genitourinary: Negative for dysuria and urgency  Neurological: Negative for dizziness, syncope, light-headedness and numbness  Psychiatric/Behavioral: Negative for agitation, behavioral problems and confusion  Objective:    /76   Pulse 83   Temp 97 5 °F (36 4 °C)   Ht 5' 8" (1 727 m)   Wt 86 6 kg (191 lb)   SpO2 97%   BMI 29 04 kg/m²       Vitals and nursing note reviewed  Constitutional:       General: He is not in acute distress  Appearance: Normal appearance  He is not ill-appearing  HENT:      Head: Normocephalic and atraumatic  Nose: Rhinorrhea present  No congestion  Mouth/Throat:      Mouth: Mucous membranes are moist       Pharynx: Oropharynx is clear  No oropharyngeal exudate or posterior oropharyngeal erythema  Eyes:      Conjunctiva/sclera: Conjunctivae normal       Pupils: Pupils are equal, round, and reactive to light  Cardiovascular:      Rate and Rhythm: Normal rate and regular rhythm  Pulses: Normal pulses  Heart sounds: Normal heart sounds  No murmur heard  Pulmonary:      Effort: Pulmonary effort is normal  No respiratory distress  Breath sounds: Normal breath sounds  No wheezing or rales  Skin:     General: Skin is warm  Capillary Refill: Capillary refill takes less than 2 seconds  Neurological:      General: No focal deficit present  Mental Status: He is alert  Mental status is at baseline     Psychiatric:         Mood and Affect: Mood normal          Behavior: Behavior normal

## 2023-01-31 NOTE — TELEPHONE ENCOUNTER
Attempted to call pt in regards to scheduling a CT and getting bw done   LVM to please c/b to schedule

## 2023-02-01 ENCOUNTER — OFFICE VISIT (OUTPATIENT)
Dept: PAIN MEDICINE | Facility: CLINIC | Age: 68
End: 2023-02-01

## 2023-02-01 VITALS
DIASTOLIC BLOOD PRESSURE: 76 MMHG | SYSTOLIC BLOOD PRESSURE: 137 MMHG | HEIGHT: 68 IN | BODY MASS INDEX: 28.95 KG/M2 | HEART RATE: 75 BPM | WEIGHT: 191 LBS

## 2023-02-01 DIAGNOSIS — M54.2 NECK PAIN: ICD-10-CM

## 2023-02-01 DIAGNOSIS — M47.816 LUMBAR SPONDYLOSIS: ICD-10-CM

## 2023-02-01 DIAGNOSIS — M54.12 CERVICAL RADICULOPATHY: ICD-10-CM

## 2023-02-01 DIAGNOSIS — M47.812 CERVICAL SPONDYLOSIS WITHOUT MYELOPATHY: ICD-10-CM

## 2023-02-01 DIAGNOSIS — M51.36 LUMBAR DEGENERATIVE DISC DISEASE: ICD-10-CM

## 2023-02-01 DIAGNOSIS — F11.20 UNCOMPLICATED OPIOID DEPENDENCE (HCC): ICD-10-CM

## 2023-02-01 DIAGNOSIS — M54.50 CHRONIC LOW BACK PAIN WITHOUT SCIATICA, UNSPECIFIED BACK PAIN LATERALITY: ICD-10-CM

## 2023-02-01 DIAGNOSIS — M79.18 MYOFASCIAL PAIN SYNDROME: ICD-10-CM

## 2023-02-01 DIAGNOSIS — Z79.891 ENCOUNTER FOR LONG-TERM OPIATE ANALGESIC USE: ICD-10-CM

## 2023-02-01 DIAGNOSIS — G89.4 CHRONIC PAIN SYNDROME: Primary | ICD-10-CM

## 2023-02-01 DIAGNOSIS — G89.29 CHRONIC LOW BACK PAIN WITHOUT SCIATICA, UNSPECIFIED BACK PAIN LATERALITY: ICD-10-CM

## 2023-02-01 RX ORDER — OXYCODONE AND ACETAMINOPHEN 7.5; 325 MG/1; MG/1
1 TABLET ORAL EVERY 8 HOURS PRN
Qty: 90 TABLET | Refills: 0 | Status: SHIPPED | OUTPATIENT
Start: 2023-02-01

## 2023-02-01 NOTE — PATIENT INSTRUCTIONS

## 2023-02-01 NOTE — PROGRESS NOTES
Assessment:  1  Chronic pain syndrome    2  Neck pain    3  Cervical radiculopathy    4  Cervical spondylosis without myelopathy    5  Chronic low back pain without sciatica, unspecified back pain laterality    6  Lumbar degenerative disc disease    7  Lumbar spondylosis    8  Myofascial pain syndrome    9  Encounter for long-term opiate analgesic use    10  Uncomplicated opioid dependence ([address] )        Plan:  While the patient was in the office today, I did have a thorough conversation regarding their chronic pain syndrome, medication management, and treatment plan options  Patient is being seen for a follow-up visit  Overall, his pain is reasonably controlled with current medication regimen  He was diagnosed with cancer at the end of 2022  He underwent a right lower lobectomy on 11/16/2022  He started chemo 2 days ago  Continue oxycodone 7 5/325 every 8 hours as needed for pain  The patient's opioid scripts were sent to their pharmacy electronically and was given a 2 month supply of prescriptions with a Do Not Fill date(s) of 2/1/2023, 3/1/2023  Continue gabapentin 800 mg 3 times daily  Medication was taken over by his PCP  [place] Prescription Drug Monitoring Program report was reviewed and was appropriate     A urine drug screen was collected at today's office visit as part of our medication management protocol  The point of care testing results were appropriate for what was being prescribed  The specimen will be sent for confirmatory testing  The drug screen is medically necessary because the patient is either dependent on opioid medication or is being considered for opioid medication therapy and the results could impact ongoing or future treatment  The drug screen is to evaluate for the presences or absence of prescribed, non-prescribed, and/or illicit drugs/substances  There are risks associated with opioid medications, including dependence, addiction and tolerance   The patient understands and agrees to use these medications only as prescribed  Potential side effects of the medications include, but are not limited to, constipation, drowsiness, addiction, impaired judgment and risk of fatal overdose if not taken as prescribed  The patient was warned against driving while taking sedation medications  Sharing medications is a felony  At this point in time, the patient is showing no signs of addiction, abuse, diversion or suicidal ideation  The patient will follow-up in 8 weeks for medication prescription refill and reevaluation  The patient was advised to contact the office should their symptoms worsen in the interim  The patient was agreeable and verbalized an understanding  History of Present Illness: The patient is a 75 y o  male last seen on 11/30/2022 who presents for a follow up office visit in regards to chronic pain secondary to chronic pain syndrome, neck pain, cervical radiculopathy, cervical spondylosis, chronic low back pain, lumbar degenerative disc disease, lumbar spondylosis, myofascial pain syndrome  The patient currently reports complaints of neck pain, bilateral shoulder pain, mid back pain, low back pain  Current pain level is 7/10  Quality pain is described as dull, aching, sharp, throbbing  Current pain medications includes: Oxycodone 7 5/325 every 8 hours as needed for pain, gabapentin 800 mg 3 times daily  The patient reports that this regimen is providing 60% pain relief  The patient is reporting no side effects from this pain medication regimen  Pain Contract Signed: 6/6/22  Last Urine Drug Screen: 2/1/23    I have personally reviewed and/or updated the patient's past medical history, past surgical history, family history, social history, current medications, allergies, and vital signs today  Review of Systems:    Review of Systems   Constitutional: Negative for unexpected weight change  HENT: Negative for hearing loss      Eyes: Negative for visual disturbance  Respiratory: Positive for shortness of breath  Cardiovascular: Negative for leg swelling  Gastrointestinal: Positive for constipation  Endocrine: Negative for polyuria  Genitourinary: Negative for difficulty urinating  Musculoskeletal: Negative for gait problem, joint swelling and myalgias  Decreased range of motion   Skin: Negative for rash  Neurological: Positive for dizziness  Negative for weakness and headaches  Psychiatric/Behavioral: Negative for decreased concentration  All other systems reviewed and are negative  Past Medical History:   Diagnosis Date   • Abdominal aortic aneurysm without rupture    • Abdominal Aortic Duplex 02/21/2017    Ectatic infrarenal abdominal aorta  • MARYANN (acute kidney injury) ([facility] ) 07/23/2022   • CAD (coronary artery disease)    • Cancer ([facility] ) 2022    right lung CA   • COPD (chronic obstructive pulmonary disease) (HCC)    • Extremity pain    • History of echocardiogram 03/18/2014    EF 55%, mild MR and AI  Mild concentric LVH  • History of stress test 03/06/2017    Normal    • Hyperlipidemia    • Hypertension    • Joint pain    • Kidney stone    • Low back pain    • Lung cancer (HCC)    • Migraine    • Neck pain    • Obstructive sleep apnea     cannot tolerate CPAP   • Osteoarthritis    • Peripheral neuropathy    • Reflex sympathetic dystrophy    • Sacroiliitis ([facility] ) 02/02/2022       Past Surgical History:   Procedure Laterality Date   • CARDIAC CATHETERIZATION  02/13/2012    EF 70%, widely patent renal arteries, significant single-vessel CAD-medical therapy  • CARDIAC CATHETERIZATION  04/11/2013    EF 65%, 50% mid LAD, 20% prox CFX, 90% diffuse RCA, 99% mid RCA  Medical management     • CHOLECYSTECTOMY     • COLONOSCOPY     • EPIDURAL BLOCK INJECTION Bilateral 08/15/2019    Procedure: BLOCK / INJECTION EPIDURAL STEROID CERVICAL;  Surgeon: Adrian Mercedes MD;  Location: [facility];  Service: Pain Management • FL GUIDED NEEDLE PLAC BX/ASP/INJ  08/15/2019   • IR BIOPSY LUNG  2022   • IR THORACIC DUCT EMBOLIZATION  2022   • ORTHOPEDIC SURGERY     • [place] [address] INCL FLUOR GDNCE DX W/CELL WASHG SPX N/A 2022    Procedure: BRONCHOSCOPY FLEXIBLE;  Surgeon: Telma Marvin MD;  Location: [facility];  Service: Thoracic   • [place] THORACOSCOPY W/LOBECTOMY SINGLE LOBE Right 2022    Procedure: LOBECTOMY LUNG; lower lobe superior segmentectomy, mediastinal lymph node dissection;  Surgeon: Telma Marvin MD;  Location: [facility];  Service: Thoracic   • IA THORACOSCOPY W/SEGMENTECTOMY Right 2022    Procedure: THORACOSCOPY VIDEO ASSISTED SURGERY (VATS);   Surgeon: Telma Marvin MD;  Location: [facility];  Service: Thoracic   • TRIGGER POINT INJECTION         Family History   Adopted: Yes   Problem Relation Age of Onset   • No Known Problems Family    • No Known Problems Mother    • No Known Problems Father        Social History     Occupational History   • Not on file   Tobacco Use   • Smoking status: Former     Packs/day: 1 50     Types: Cigarettes     Quit date: 11/3/2022     Years since quittin 2   • Smokeless tobacco: Never   Vaping Use   • Vaping Use: Never used   Substance and Sexual Activity   • Alcohol use: Not Currently   • Drug use: Never   • Sexual activity: Yes         Current Outpatient Medications:   •  amLODIPine (NORVASC) 10 mg tablet, swallow 1 tablet once daily (Patient taking differently: daily at bedtime), Disp: 90 tablet, Rfl: 3  •  buPROPion (Wellbutrin XL) 150 mg 24 hr tablet, Take 1 tablet (150 mg total) by mouth every morning, Disp: 90 tablet, Rfl: 3  •  docusate sodium (COLACE) 100 mg capsule, Take 1 capsule (100 mg total) by mouth 2 (two) times a day (Patient taking differently: Take 100 mg by mouth if needed), Disp: 20 capsule, Rfl: 0  •  gabapentin (NEURONTIN) 800 mg tablet, Take 1 tablet (800 mg total) by mouth daily, Disp: 90 tablet, Rfl: 1  •  glucose blood (OneTouch Verio) test strip, Test once daily, Disp: 100 each, Rfl: 0  •  lisinopril (ZESTRIL) 20 mg tablet, take 1 tablet by mouth once daily (Patient taking differently: daily at bedtime), Disp: 30 tablet, Rfl: 5  •  metoprolol succinate (TOPROL-XL) 100 mg 24 hr tablet, take 1 tablet by mouth once daily (Patient taking differently: daily at bedtime), Disp: 30 tablet, Rfl: 5  •  nicotine polacrilex (NICORETTE) 2 mg gum, Chew 1 each (2 mg total) as needed for smoking cessation, Disp: 100 each, Rfl: 0  •  nitroglycerin (NITROSTAT) 0 4 mg SL tablet, Place 1 tablet (0 4 mg total) under the tongue every 5 (five) minutes as needed for chest pain, Disp: 25 tablet, Rfl: 5  •  oxyCODONE-acetaminophen (Percocet) 7 5-325 MG per tablet, Take 1 tablet by mouth every 8 (eight) hours as needed for moderate pain Do not fill until 3/1/2023 Max Daily Amount: 3 tablets, Disp: 90 tablet, Rfl: 0  •  oxyCODONE-acetaminophen (Percocet) 7 5-325 MG per tablet, Take 1 tablet by mouth every 8 (eight) hours as needed for moderate pain Max Daily Amount: 3 tablets, Disp: 90 tablet, Rfl: 0  •  pantoprazole (PROTONIX) 20 mg tablet, take 1 tablet by mouth once daily, Disp: 30 tablet, Rfl: 0  •  rosuvastatin (CRESTOR) 20 MG tablet, take 1 tablet by mouth once daily (Patient taking differently: daily at bedtime), Disp: 90 tablet, Rfl: 5  •  sildenafil (VIAGRA) 100 mg tablet, Take 1 tablet (100 mg total) by mouth daily as needed for erectile dysfunction, Disp: 20 tablet, Rfl: 0  •  tamsulosin (FLOMAX) 0 4 mg, Take 2 capsules (0 8 mg total) by mouth daily with dinner, Disp: 60 capsule, Rfl: 0  •  tiotropium-olodaterol (Stiolto Respimat) 2 5-2 5 MCG/ACT inhaler, Inhale 2 puffs daily (Patient taking differently: Inhale 2 puffs if needed), Disp: 4 g, Rfl: 11  •  traZODone (DESYREL) 100 mg tablet, take 1 tablet by mouth daily at bedtime, Disp: 90 tablet, Rfl: 3  •  fluticasone (FLONASE) 50 mcg/act nasal spray, 1 spray into each nostril daily for 14 days (Patient taking differently: 1 spray into each nostril if needed), Disp: 11 1 mL, Rfl: 0  •  ipratropium-albuterol (DUO-NEB) 0 5-2 5 mg/3 mL nebulizer solution, inhale contents of 1 vial in nebulizer every 6 hours if needed for wheezing or shortness of breath (Patient not taking: Reported on 12/27/2022), Disp: , Rfl:   •  methylPREDNISolone 4 MG tablet therapy pack, Use as directed on package (Patient not taking: Reported on 1/30/2023), Disp: 21 each, Rfl: 0  •  nicotine (NICODERM CQ) 14 mg/24hr TD 24 hr patch, Place 1 patch on the skin every 24 hours, Disp: 28 patch, Rfl: 0  •  polyethylene glycol (MIRALAX) 17 g packet, Take 17 g by mouth daily for 5 days (Patient not taking: Reported on 12/20/2022), Disp: 85 g, Rfl: 0  •  senna (SENOKOT) 8 6 mg, Take 1 tablet (8 6 mg total) by mouth daily (Patient not taking: Reported on 12/14/2022), Disp: 30 tablet, Rfl: 0  •  topiramate (TOPAMAX) 25 mg tablet, FOLLOW INSTRUCTIONS GIVEN TO TITRATE UP TO A MAX OF 2 TABLETS BY MOUTH TWICE DAILY AS TOLERATED AND TO LEVEL OF BENEFIT FOR TREMORS (Patient not taking: Reported on 12/27/2022), Disp: 120 tablet, Rfl: 6  No current facility-administered medications for this visit  Facility-Administered Medications Ordered in Other Visits:   •  alteplase (CATHFLO) injection 2 mg, 2 mg, Intracatheter, Q2H PRN, Edmundo Carbajal, DO    No Known Allergies    Physical Exam:    /76   Pulse 75   Ht 5' 8" (1 727 m)   Wt 86 6 kg (191 lb)   BMI 29 04 kg/m²     Constitutional:normal, well developed, well nourished, alert, in no distress and non-toxic and no overt pain behavior    Eyes:anicteric  HEENT:grossly intact  Neck:supple, symmetric, trachea midline and no masses   Pulmonary:even and unlabored  Cardiovascular:No edema or pitting edema present  Skin:Normal without rashes or lesions and well hydrated  Psychiatric:Mood and affect appropriate  Neurologic:Cranial Nerves II-XII grossly intact  Musculoskeletal:Gait is slow and guarded  Imaging  No orders to display         No orders of the defined types were placed in this encounter

## 2023-02-02 ENCOUNTER — TELEPHONE (OUTPATIENT)
Dept: HEMATOLOGY ONCOLOGY | Facility: CLINIC | Age: 68
End: 2023-02-02

## 2023-02-02 NOTE — TELEPHONE ENCOUNTER
CALL RETURN FORM   Reason for patient call? Patient is calling in to inform that he is having symptoms after his chemo    Patient's primary oncologist?  [facility]    Name of person the patient was calling for? Shari    Any additional information to add, if applicable? n/a   Informed patient that the message will be forwarded to the team and someone will get back to them as soon as possible    Did you relay this information to the patient?  Yes, patient can be reached back at [phone]

## 2023-02-02 NOTE — TELEPHONE ENCOUNTER
Returned call to pt  Pt states he felt good the 2 days after treatment, but today is feeling aches and pains  Advised pt this is normal and that he can take tylenol or ibuprofen as needed  Instructed him to call us if this gets worse or doesn't go away by Monday  He voiced understanding

## 2023-02-03 ENCOUNTER — HOSPITAL ENCOUNTER (OUTPATIENT)
Dept: CT IMAGING | Facility: HOSPITAL | Age: 68
Discharge: HOME | End: 2023-02-03

## 2023-02-03 ENCOUNTER — TELEPHONE (OUTPATIENT)
Dept: HEMATOLOGY ONCOLOGY | Facility: HOSPITAL | Age: 68
End: 2023-02-03

## 2023-02-03 ENCOUNTER — TELEPHONE (OUTPATIENT)
Dept: CARDIAC SURGERY | Facility: CLINIC | Age: 68
End: 2023-02-03

## 2023-02-03 ENCOUNTER — APPOINTMENT (OUTPATIENT)
Dept: LAB | Facility: HOSPITAL | Age: 68
End: 2023-02-03
Attending: INTERNAL MEDICINE

## 2023-02-03 DIAGNOSIS — C34.91 SQUAMOUS CELL CARCINOMA OF RIGHT LUNG (HCC): Primary | ICD-10-CM

## 2023-02-03 DIAGNOSIS — C34.11 PRIMARY SQUAMOUS CELL CARCINOMA OF UPPER LOBE OF RIGHT LUNG (HCC): ICD-10-CM

## 2023-02-03 DIAGNOSIS — C34.31 MALIGNANT NEOPLASM OF LOWER LOBE OF RIGHT LUNG (HCC): ICD-10-CM

## 2023-02-03 DIAGNOSIS — R50.2 DRUG-INDUCED FEVER: ICD-10-CM

## 2023-02-03 LAB
6MAM UR QL CFM: NEGATIVE NG/ML
7AMINOCLONAZEPAM UR QL CFM: NEGATIVE NG/ML
A-OH ALPRAZ UR QL CFM: NEGATIVE NG/ML
ACCEPTABLE CREAT UR QL: NORMAL MG/DL
ACCEPTIBLE SP GR UR QL: NORMAL
ALBUMIN SERPL BCP-MCNC: 4 G/DL (ref 3.5–5)
ALP SERPL-CCNC: 63 U/L (ref 34–104)
ALT SERPL W P-5'-P-CCNC: 13 U/L (ref 7–52)
AMPHET UR QL CFM: NEGATIVE NG/ML
AMPHET UR QL CFM: NEGATIVE NG/ML
ANION GAP SERPL CALCULATED.3IONS-SCNC: 7 MMOL/L (ref 4–13)
AST SERPL W P-5'-P-CCNC: 12 U/L (ref 13–39)
BASOPHILS # BLD AUTO: 0.05 THOUSANDS/ÂΜL (ref 0–0.1)
BASOPHILS NFR BLD AUTO: 1 % (ref 0–1)
BILIRUB SERPL-MCNC: 0.62 MG/DL (ref 0.2–1)
BUN SERPL-MCNC: 21 MG/DL (ref 5–25)
BUPRENORPHINE UR QL CFM: NEGATIVE NG/ML
BUTALBITAL UR QL CFM: NEGATIVE NG/ML
BZE UR QL CFM: NEGATIVE NG/ML
CALCIUM SERPL-MCNC: 8.7 MG/DL (ref 8.4–10.2)
CHLORIDE SERPL-SCNC: 101 MMOL/L (ref 96–108)
CO2 SERPL-SCNC: 27 MMOL/L (ref 21–32)
CODEINE UR QL CFM: NEGATIVE NG/ML
CREAT SERPL-MCNC: 0.86 MG/DL (ref 0.6–1.3)
DESIPRAMINE UR QL CFM: NEGATIVE NG/ML
EDDP UR QL CFM: NEGATIVE NG/ML
EOSINOPHIL # BLD AUTO: 0 THOUSAND/ÂΜL (ref 0–0.61)
EOSINOPHIL NFR BLD AUTO: 0 % (ref 0–6)
ERYTHROCYTE [DISTWIDTH] IN BLOOD BY AUTOMATED COUNT: 13.3 % (ref 11.6–15.1)
ETHYL GLUCURONIDE UR QL CFM: NEGATIVE NG/ML
ETHYL SULFATE UR QL SCN: NEGATIVE NG/ML
FENTANYL UR QL CFM: NEGATIVE NG/ML
GFR SERPL CREATININE-BSD FRML MDRD: 89 ML/MIN/1.73SQ M
GLIADIN IGG SER IA-ACNC: NEGATIVE NG/ML
GLUCOSE 30M P 50 G LAC PO SERPL-MCNC: NEGATIVE NG/ML
GLUCOSE SERPL-MCNC: 118 MG/DL (ref 65–140)
HCT VFR BLD AUTO: 46.1 % (ref 36.5–49.3)
HGB BLD-MCNC: 14.8 G/DL (ref 12–17)
HYDROCODONE UR QL CFM: NEGATIVE NG/ML
HYDROCODONE UR QL CFM: NEGATIVE NG/ML
HYDROMORPHONE UR QL CFM: NEGATIVE NG/ML
IMM GRANULOCYTES # BLD AUTO: 0.02 THOUSAND/UL (ref 0–0.2)
IMM GRANULOCYTES NFR BLD AUTO: 0 % (ref 0–2)
LORAZEPAM UR QL CFM: NEGATIVE NG/ML
LYMPHOCYTES # BLD AUTO: 2.62 THOUSANDS/ÂΜL (ref 0.6–4.47)
LYMPHOCYTES NFR BLD AUTO: 34 % (ref 14–44)
MCH RBC QN AUTO: 30.6 PG (ref 26.8–34.3)
MCHC RBC AUTO-ENTMCNC: 32.1 G/DL (ref 31.4–37.4)
MCV RBC AUTO: 95 FL (ref 82–98)
MDMA UR QL CFM: NEGATIVE NG/ML
ME-PHENIDATE UR QL CFM: NEGATIVE NG/ML
MEPERIDINE UR QL CFM: NEGATIVE NG/ML
METHADONE UR QL CFM: NEGATIVE NG/ML
METHAMPHET UR QL CFM: NEGATIVE NG/ML
MONOCYTES # BLD AUTO: 0.11 THOUSAND/ÂΜL (ref 0.17–1.22)
MONOCYTES NFR BLD AUTO: 1 % (ref 4–12)
MORPHINE UR QL CFM: NEGATIVE NG/ML
MORPHINE UR QL CFM: NEGATIVE NG/ML
NEUTROPHILS # BLD AUTO: 4.94 THOUSANDS/ÂΜL (ref 1.85–7.62)
NEUTS SEG NFR BLD AUTO: 64 % (ref 43–75)
NITRITE UR QL: NORMAL UG/ML
NORBUPRENORPHINE UR QL CFM: NEGATIVE NG/ML
NORDIAZEPAM UR QL CFM: NEGATIVE NG/ML
NORFENTANYL UR QL CFM: NEGATIVE NG/ML
NORHYDROCODONE UR QL CFM: NEGATIVE NG/ML
NORHYDROCODONE UR QL CFM: NEGATIVE NG/ML
NORMEPERIDINE UR QL CFM: NEGATIVE NG/ML
NOROXYCODONE UR QL CFM: NORMAL NG/ML
NRBC BLD AUTO-RTO: 0 /100 WBCS
OLANZAPINE QUANTIFICATION: NEGATIVE NG/ML
OPC-3373 QUANTIFICATION: NEGATIVE
OXAZEPAM UR QL CFM: NEGATIVE NG/ML
OXYCODONE UR QL CFM: NORMAL NG/ML
OXYMORPHONE UR QL CFM: NORMAL NG/ML
OXYMORPHONE UR QL CFM: NORMAL NG/ML
PARA-FLUOROFENTANYL QUANTIFICATION: NORMAL NG/ML
PCP UR QL CFM: NEGATIVE NG/ML
PHENOBARB UR QL CFM: NEGATIVE NG/ML
PLATELET # BLD AUTO: 177 THOUSANDS/UL (ref 149–390)
PMV BLD AUTO: 10.4 FL (ref 8.9–12.7)
POTASSIUM SERPL-SCNC: 3.7 MMOL/L (ref 3.5–5.3)
PROT SERPL-MCNC: 6.5 G/DL (ref 6.4–8.4)
RBC # BLD AUTO: 4.84 MILLION/UL (ref 3.88–5.62)
RESULT ALL_PRESCRIBED MEDS AND SPECIAL INSTRUCTIONS: NORMAL
SECOBARBITAL UR QL CFM: NEGATIVE NG/ML
SL AMB 4-ANPP QUANTIFICATION: NORMAL NG/ML
SL AMB 7-OH-MITRAGYNINE (KRATOM ALKALOID) QUANTIFICATION: NEGATIVE NG/ML
SL AMB ACETYL FENTANYL QUANTIFICATION: NORMAL NG/ML
SL AMB ACETYL NORFENTANYL QUANTIFICATION: NORMAL NG/ML
SL AMB ACRYL FENTANYL QUANTIFICATION: NORMAL NG/ML
SL AMB CARFENTANIL QUANTIFICATION: NORMAL NG/ML
SL AMB CLOZAPINE QUANTIFICATION: NEGATIVE NG/ML
SL AMB CTHC (MARIJUANA METABOLITE) QUANTIFICATION: NEGATIVE NG/ML
SL AMB DEXTRORPHAN (DEXTROMETHORPHAN METABOLITE) QUANT: NEGATIVE NG/ML
SL AMB HALOPERIDOL  QUANTIFICATION: NEGATIVE NG/ML
SL AMB HALOPERIDOL METABOLITE QUANTIFICATION: NEGATIVE NG/ML
SL AMB HYDROXYRISPERIDONE QUANTIFICATION: NEGATIVE NG/ML
SL AMB N-DESMETHYL-TRAMADOL QUANTIFICATION: NEGATIVE NG/ML
SL AMB N-DESMETHYLCLOZAPINE QUANTIFICATION: NEGATIVE NG/ML
SL AMB NORQUETIAPINE QUANTIFICATION: NEGATIVE NG/ML
SL AMB PHENTERMINE QUANTIFICATION: NEGATIVE NG/ML
SL AMB PREGABALIN QUANTIFICATION: NEGATIVE
SL AMB QUETIAPINE QUANTIFICATION: NEGATIVE NG/ML
SL AMB RISPERIDONE QUANTIFICATION: NEGATIVE NG/ML
SL AMB RITALINIC ACID QUANTIFICATION: NEGATIVE NG/ML
SODIUM SERPL-SCNC: 135 MMOL/L (ref 135–147)
SPECIMEN PH ACCEPTABLE UR: NORMAL
TAPENTADOL UR QL CFM: NEGATIVE NG/ML
TEMAZEPAM UR QL CFM: NEGATIVE NG/ML
TEMAZEPAM UR QL CFM: NEGATIVE NG/ML
TRAMADOL UR QL CFM: NEGATIVE NG/ML
URATE/CREAT 24H UR: NEGATIVE NG/ML
WBC # BLD AUTO: 7.74 THOUSAND/UL (ref 4.31–10.16)

## 2023-02-03 RX ORDER — DEXAMETHASONE 4 MG/1
2 TABLET ORAL 2 TIMES DAILY WITH MEALS
Qty: 5 TABLET | Refills: 0 | Status: SHIPPED | OUTPATIENT
Start: 2023-02-03 | End: 2023-02-08

## 2023-02-03 NOTE — TELEPHONE ENCOUNTER
I relayed CT scan results to the patient and will let him know what Dr Patricia Paz thinks about it when I hear back  He doesn't have chemo scheduled again until 2/13

## 2023-02-03 NOTE — TELEPHONE ENCOUNTER
Spoke with patient to let him know that the pain is from the paclitaxel chemo that he rec'd on Monday  I told the patient that we are prescribing him 2mg dexamethasone BID for 5 days  I told the patient to take this with food so it does not irritate his stomach  Patient verbalized understanding and will go get this from the pharmacy today  I told the patient to call us if it gets worse or to go to the ED if it is too severe to manage at home  Patient verbalized understanding

## 2023-02-03 NOTE — TELEPHONE ENCOUNTER
Rec'd call from patient complaining of 8/10 pain in his joints, back and stomach  Patient states that he has no nausea, vomiting or diarrhea  Patient states he deals with some constipation, but his last BM was 2/2/23  Patient does state he has fevers for awhile now, since he was originally diagnosed  I asked him what his temperature was today and patient stated 100 4 degrees and it goes as high as 102 degrees, but never higher  Patient takes 3 percocet a day and states that it relieves the pain, but only for a short while  Patient states that the pain gets so bad that he cannot sleep at night and has been awake over 24 hours  I told the patient that I will discuss with the doctor and call him back shortly  Patient verbalized understanding

## 2023-02-06 DIAGNOSIS — K86.1 CHRONIC PANCREATITIS, UNSPECIFIED PANCREATITIS TYPE (HCC): Primary | ICD-10-CM

## 2023-02-07 ENCOUNTER — TELEPHONE (OUTPATIENT)
Dept: HEMATOLOGY ONCOLOGY | Facility: CLINIC | Age: 68
End: 2023-02-07

## 2023-02-07 NOTE — TELEPHONE ENCOUNTER
Returned "Teams" call to pt asking for an appt with GI in [place]  Will discuss with Mgr Donaldson Neither for pt to be put on a waiting list

## 2023-02-07 NOTE — TELEPHONE ENCOUNTER
Can you please put this pt on a waiting list for GI  He has an appt in March but he is experiencing pain      Dm Hernandez

## 2023-02-08 ENCOUNTER — OFFICE VISIT (OUTPATIENT)
Dept: GASTROENTEROLOGY | Facility: CLINIC | Age: 68
End: 2023-02-08

## 2023-02-08 VITALS
HEIGHT: 68 IN | TEMPERATURE: 98.3 F | WEIGHT: 190.6 LBS | BODY MASS INDEX: 28.89 KG/M2 | HEART RATE: 97 BPM | DIASTOLIC BLOOD PRESSURE: 88 MMHG | SYSTOLIC BLOOD PRESSURE: 160 MMHG | OXYGEN SATURATION: 97 %

## 2023-02-08 DIAGNOSIS — C34.31 PRIMARY SQUAMOUS CELL CARCINOMA OF LOWER LOBE OF RIGHT LUNG (HCC): ICD-10-CM

## 2023-02-08 DIAGNOSIS — R12 HEARTBURN: ICD-10-CM

## 2023-02-08 DIAGNOSIS — R10.13 EPIGASTRIC PAIN: ICD-10-CM

## 2023-02-08 DIAGNOSIS — Z12.11 SCREENING FOR COLON CANCER: ICD-10-CM

## 2023-02-08 DIAGNOSIS — J43.9 PULMONARY EMPHYSEMA, UNSPECIFIED EMPHYSEMA TYPE (HCC): ICD-10-CM

## 2023-02-08 DIAGNOSIS — C34.31 MALIGNANT NEOPLASM OF LOWER LOBE OF RIGHT LUNG (HCC): ICD-10-CM

## 2023-02-08 DIAGNOSIS — K86.1 CHRONIC PANCREATITIS, UNSPECIFIED PANCREATITIS TYPE (HCC): Primary | ICD-10-CM

## 2023-02-08 RX ORDER — PANTOPRAZOLE SODIUM 20 MG/1
TABLET, DELAYED RELEASE ORAL
Qty: 30 TABLET | Refills: 0 | Status: SHIPPED | OUTPATIENT
Start: 2023-02-08

## 2023-02-08 NOTE — PROGRESS NOTES
[facility] - Outpatient Consultation  Azeb Alonso 75 y o  male MRN: [id number]  Encounter: [id number]          ASSESSMENT AND PLAN:    Azeb Alonso is a 75 y o  male who presents with complaint of lung cancer status postresection but with disease in the lymph nodes, complicated by thoracic duct embolization with pancreatitis and since then with intermittent abdominal pain relatively in the epigastric and upper abdomen  This may represent some degree of chronic pancreatitis versus gastritis versus irritable bowel syndrome versus gastroparesis versus other  Differential is relatively broad  Also has occasional constipation but Colace helps  There may also be a component of exocrine pancreas insufficiency  Recent CT scan of the chest with soft tissue density along the right lower lobe staple line  CT from November 2022 with post thoracic duct embolization pancreatitis, postoperative changes without recurrent effusion or pneumothorax, embolization glue within the wall or lumen of the proximal left renal bleed occlusion  CMP with AST of 12 but otherwise normal   CBC normal   Lipase was 1222 in November but was 251 in December  1  Chronic pancreatitis, unspecified pancreatitis type ([address] )    2  Malignant neoplasm of lower lobe of right lung ([address] )    3  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 ([address] )    4  Pulmonary emphysema, unspecified emphysema type ([address] )    5  Screening for colon cancer    6   Epigastric pain        Orders Placed This Encounter   Procedures   • MRI abdomen wo contrast and mrcp   • Lipase   • Colonoscopy   • EGD     Plan for endoscopy given pain  Schedule colonoscopy for screening  Schedule MRI/MRCP  Miralax or colace as needed to help with constipation  Drink at least 8 cups of water per day  Consider in the future checking stool tests for EPI, H pylori  Repeat lipase  Continue pantoprazole daily    ______________________________________________________________________    Referred by Dr Nando Watkins and Dr Bindu Dill for pancreatitis and abdominal pain    HPI:    Tal Woodard is a 75 y o  male who presents with complaint of abdominal pain  He had lung resection and was noted to have drainage  He was there for 2 weeks  They did a procedure on the thoracic duct and he got pancreatitis  It was severe pain  Some time he feels well and sometimes he has abdominal pain  It can keep him up all night  The pain occurs every 2-3 days  Food is often a trigger  Mostly upper abdomen  It does not travel to the back  Can be felt across the abdomen at times  It kept him awake all night and he was not comfortable  No nausea, vomiting  It can be 7/10  He takes percocet for his spine and he notices the abdominal pain is after his last pill at night  No heartburn  Rare dysphagia but not significant  No odynophagia, nausea, vomiting  BMs are good  He is on colace  No BRBPR, melena  Weight now stable but he lost weight while in the hospital  Appetite is up now on steroids  He had a prior colonoscopy and he is due for one  Maybe 10 years ago  He had a prior EGD many years ago  He was adopted  REVIEW OF SYSTEMS:  10 point ROS reviewed and negative, except as above    Historical Information   Past Medical History:   Diagnosis Date   • Abdominal aortic aneurysm without rupture    • Abdominal Aortic Duplex 02/21/2017    Ectatic infrarenal abdominal aorta  • MARYANN (acute kidney [facility]) [facility] ) 07/23/2022   • CAD (coronary artery disease)    • Cancer ([facility] ) 2022    right lung CA   • COPD (chronic obstructive pulmonary disease) (HCC)    • Extremity pain    • History of echocardiogram 03/18/2014    EF 55%, mild MR and AI  Mild concentric LVH     • History of stress test 03/06/2017    Normal    • Hyperlipidemia    • Hypertension    • Joint pain    • Kidney stone    • Low back pain    • Lung cancer ([facility] )    • Migraine    • Neck pain    • Obstructive sleep apnea     cannot tolerate CPAP   • Osteoarthritis    • Peripheral neuropathy    • Reflex sympathetic dystrophy    • Sacroiliitis ([facility] ) 2022     Past Surgical History:   Procedure Laterality Date   • CARDIAC CATHETERIZATION  2012    EF 70%, widely patent renal arteries, significant single-vessel CAD-medical therapy  • CARDIAC CATHETERIZATION  2013    EF 65%, 50% mid LAD, 20% prox CFX, 90% diffuse RCA, 99% mid RCA  Medical management  • CHOLECYSTECTOMY     • COLONOSCOPY     • EPIDURAL BLOCK INJECTION Bilateral 08/15/2019    Procedure: BLOCK / INJECTION EPIDURAL STEROID CERVICAL;  Surgeon: Mary Marte MD;  Location: [facility];  Service: Pain Management    • FL GUIDED NEEDLE PLAC BX/ASP/INJ  08/15/2019   • IR BIOPSY LUNG  2022   • IR THORACIC DUCT EMBOLIZATION  2022   • ORTHOPEDIC SURGERY     • [facility] [address] INCL FLUOR GDNCE DX W/CELL WASHG [address] [facility] N/A 2022    Procedure: Keren Harrison;  Surgeon: Eligio Mays MD;  Location: [facility];  Service: Thoracic   • VA THORACOSCOPY W/LOBECTOMY SINGLE LOBE Right 2022    Procedure: LOBECTOMY LUNG; lower lobe superior segmentectomy, mediastinal lymph node dissection;  Surgeon: Eligio Mays MD;  Location: [facility];  Service: Thoracic   • VA THORACOSCOPY W/SEGMENTECTOMY Right 2022    Procedure: THORACOSCOPY VIDEO ASSISTED SURGERY (VATS);   Surgeon: Eligio Mays MD;  Location: [facility];  Service: Thoracic   • TRIGGER POINT INJECTION       Social History   Social History     Substance and Sexual Activity   Alcohol Use Not Currently     Social History     Substance and Sexual Activity   Drug Use Never     Social History     Tobacco Use   Smoking Status Former   • Packs/day: 1 50   • Types: Cigarettes   • Quit date: 11/3/2022   • Years since quittin 2   Smokeless Tobacco Never     Family History   Adopted: Yes   Problem Relation Age of Onset   • No Known Problems Family    • No Known Problems Mother    • No Known Problems Father        Meds/Allergies       Current Outpatient Medications:   •  amLODIPine (NORVASC) 10 mg tablet  •  buPROPion (Wellbutrin XL) 150 mg 24 hr tablet  •  dexamethasone (DECADRON) 4 mg tablet  •  docusate sodium (COLACE) 100 mg capsule  •  fluticasone (FLONASE) 50 mcg/act nasal spray  •  gabapentin (NEURONTIN) 800 mg tablet  •  glucose blood (OneTouch Verio) test strip  •  ipratropium-albuterol (DUO-NEB) 0 5-2 5 mg/3 mL nebulizer solution  •  lisinopril (ZESTRIL) 20 mg tablet  •  methylPREDNISolone 4 MG tablet therapy pack  •  metoprolol succinate (TOPROL-XL) 100 mg 24 hr tablet  •  nicotine (NICODERM CQ) 14 mg/24hr TD 24 hr patch  •  nicotine polacrilex (NICORETTE) 2 mg gum  •  nitroglycerin (NITROSTAT) 0 4 mg SL tablet  •  oxyCODONE-acetaminophen (Percocet) 7 5-325 MG per tablet  •  oxyCODONE-acetaminophen (Percocet) 7 5-325 MG per tablet  •  pantoprazole (PROTONIX) 20 mg tablet  •  polyethylene glycol (MIRALAX) 17 g packet  •  rosuvastatin (CRESTOR) 20 MG tablet  •  senna (SENOKOT) 8 6 mg  •  sildenafil (VIAGRA) 100 mg tablet  •  tamsulosin (FLOMAX) 0 4 mg  •  tiotropium-olodaterol (Stiolto Respimat) 2 5-2 5 MCG/ACT inhaler  •  topiramate (TOPAMAX) 25 mg tablet  •  traZODone (DESYREL) 100 mg tablet    No Known Allergies        Objective     Blood pressure 160/88, pulse 97, temperature 98 3 °F (36 8 °C), temperature source Tympanic, height 5' 8" (1 727 m), weight 86 5 kg (190 lb 9 6 oz), SpO2 97 %  Body mass index is 28 98 kg/m²  PHYSICAL EXAMINATION:    General Appearance:   Alert, cooperative, no distress   HEENT:  Normocephalic, atraumatic, anicteric  Neck supple, symmetrical, trachea midline  Lungs:   Equal chest rise and unlabored breathing, normal effort, no coughing  Cardiovascular:   No visualized JVD  Abdomen:   No abdominal distension  Skin:   No jaundice, rashes, or lesions      Musculoskeletal:   Normal range of motion visualized  Psych:  Normal affect and normal insight  Neuro:  Alert and appropriate  Lab Results:   No visits with results within 1 Day(s) from this visit     Latest known visit with results is:   Appointment on 02/03/2023   Component Date Value   • WBC 02/03/2023 7 74    • RBC 02/03/2023 4 84    • Hemoglobin 02/03/2023 14 8    • Hematocrit 02/03/2023 46 1    • MCV 02/03/2023 95    • MCH 02/03/2023 30 6    • MCHC 02/03/2023 32 1    • RDW 02/03/2023 13 3    • MPV 02/03/2023 10 4    • Platelets 55/68/2892 177    • nRBC 02/03/2023 0    • Neutrophils Relative 02/03/2023 64    • Immat GRANS % 02/03/2023 0    • Lymphocytes Relative 02/03/2023 34    • Monocytes Relative 02/03/2023 1 (L)    • Eosinophils Relative 02/03/2023 0    • Basophils Relative 02/03/2023 1    • Neutrophils Absolute 02/03/2023 4 94    • Immature Grans Absolute 02/03/2023 0 02    • Lymphocytes Absolute 02/03/2023 2 62    • Monocytes Absolute 02/03/2023 0 11 (L)    • Eosinophils Absolute 02/03/2023 0 00    • Basophils Absolute 02/03/2023 0 05    • Sodium 02/03/2023 135    • Potassium 02/03/2023 3 7    • Chloride 02/03/2023 101    • CO2 02/03/2023 27    • ANION GAP 02/03/2023 7    • BUN 02/03/2023 21    • Creatinine 02/03/2023 0 86    • Glucose 02/03/2023 118    • Calcium 02/03/2023 8 7    • AST 02/03/2023 12 (L)    • ALT 02/03/2023 13    • Alkaline Phosphatase 02/03/2023 63    • Total Protein 02/03/2023 6 5    • Albumin 02/03/2023 4 0    • Total Bilirubin 02/03/2023 0 62    • eGFR 02/03/2023 89        Lab Results   Component Value Date    WBC 7 74 02/03/2023    HGB 14 8 02/03/2023    HCT 46 1 02/03/2023    MCV 95 02/03/2023     02/03/2023       Lab Results   Component Value Date    SODIUM 135 02/03/2023    K 3 7 02/03/2023     02/03/2023    CO2 27 02/03/2023    AGAP 7 02/03/2023    BUN 21 02/03/2023    CREATININE 0 86 02/03/2023    GLUC 118 02/03/2023    GLUF 103 (H) 12/08/2022    CALCIUM 8 7 02/03/2023    AST 12 (L) 02/03/2023    ALT 13 02/03/2023    ALKPHOS 63 02/03/2023    TP 6 5 02/03/2023    TBILI 0 62 02/03/2023    EGFR 89 02/03/2023       No results found for: CRP    Lab Results   Component Value Date    FBX4CWQGBTKO 4 910 (H) 12/16/2022       No results found for: IRON, TIBC, FERRITIN    Radiology Results:   XR chest pa & lateral    Result Date: 1/30/2023  Narrative: CHEST INDICATION:   A77 9: Spotted fever, unspecified  COMPARISON:  12/8/2022 EXAM PERFORMED/VIEWS:  XR CHEST PA & LATERAL  The frontal view was performed utilizing dual energy radiographic technique  Images: 4 FINDINGS:  Mediastinal embolization coils again noted  Upper abdominal surgical clips  Cardiac size remains normal   Hilar silhouettes are symmetrical  Right base scarring noted, stable  No evidence for lung consolidation, effusion or pneumothorax  No acute osseous abnormalities  Impression: No acute cardiopulmonary disease  Workstation performed: [id number]     CT chest wo contrast    Result Date: 2/3/2023  Narrative: CT CHEST WITHOUT IV CONTRAST INDICATION:   C34 11: Malignant neoplasm of upper lobe, right bronchus or lung  COMPARISON:  CT chest 11/23/2022  Multiple chest x-rays with the most recent study obtained 1/28/2023  TECHNIQUE: CT examination of the chest was performed without intravenous contrast  Axial, sagittal, and coronal 2D reformatted images were created from the source data and submitted for interpretation  Radiation dose length product (DLP) for this visit:  343 mGy-cm   This examination, like all CT scans performed in the [facility], was performed utilizing techniques to minimize radiation dose exposure, including the use of iterative reconstruction and automated exposure control  FINDINGS: LUNGS:  Moderate centrilobular emphysema is noted  Postsurgical changes are noted within the right lower lobe  There is somewhat linear density is demonstrated along the right lower lobe staple line    The most conspicuous portion measures 3 4 x 1 5 cm (3:121)  The extent of this consolidation/density is substantially decreased from comparison CT of November 2022  as this density measures approximately 4 6 x 3 6 cm on that exam when measured similarly  Some pleural reticular opacities within the right middle lobe and lingula likely representing atelectasis/scarring  Ill-defined subpleural groundglass opacity within the lingula appears stable from chest CT of July 2022  Stable right upper lobe calcified granuloma is noted  There is no discrete tracheal or endobronchial lesion  PLEURA:  Unremarkable  HEART/GREAT VESSELS: Heart is normal in size  Coronary calcifications are noted  Mild-to-moderate atherosclerotic calcifications are noted within the thoracic aorta and branching vessels  MEDIASTINUM AND DANILO:  Unremarkable  CHEST WALL AND LOWER NECK:  Unremarkable  VISUALIZED STRUCTURES IN THE UPPER ABDOMEN:  Status post cholecystectomy  Nonobstructing left renal calculus is noted  There are numerous renal cysts seen bilaterally  These are not fully characterized on this noncontrast study but appear overall stable in size from CT of November 2022  OSSEOUS STRUCTURES:  No acute fracture or destructive osseous lesion  Degenerative changes are noted within the spine  Impression: 1  Residual consolidation/soft tissue density along the right lower lobe stable line  This is substantially improved from prior CT of November 2022 and the remaining density is favored to represent residual scarring/atelectasis, however, attention on follow-up is recommended to confirm stability/continued improvement    No new suspicious CT findings elsewhere within the chest  Workstation performed: [id number]

## 2023-02-08 NOTE — PATIENT INSTRUCTIONS
Scheduled date of EGD/colonoscopy (as of today):3/29/2023  Physician performing EGD/colonoscopy: Linda Cat   Location of EGD/colonoscopy: [address] [place] [address]  Desired bowel prep reviewed with patient: Golytely/Dulcolax  Instructions reviewed with patient by:  Lisbeth Barrera  Clearances:   N/A

## 2023-02-10 ENCOUNTER — OFFICE VISIT (OUTPATIENT)
Dept: HEMATOLOGY ONCOLOGY | Facility: CLINIC | Age: 68
End: 2023-02-10

## 2023-02-10 ENCOUNTER — APPOINTMENT (OUTPATIENT)
Dept: LAB | Facility: HOSPITAL | Age: 68
End: 2023-02-10
Attending: INTERNAL MEDICINE

## 2023-02-10 VITALS
SYSTOLIC BLOOD PRESSURE: 138 MMHG | OXYGEN SATURATION: 97 % | HEART RATE: 68 BPM | BODY MASS INDEX: 29.07 KG/M2 | HEIGHT: 68 IN | WEIGHT: 191.8 LBS | TEMPERATURE: 97.5 F | DIASTOLIC BLOOD PRESSURE: 74 MMHG

## 2023-02-10 DIAGNOSIS — K86.1 CHRONIC PANCREATITIS, UNSPECIFIED PANCREATITIS TYPE (HCC): ICD-10-CM

## 2023-02-10 DIAGNOSIS — C34.31 MALIGNANT NEOPLASM OF LOWER LOBE OF RIGHT LUNG (HCC): Primary | ICD-10-CM

## 2023-02-10 DIAGNOSIS — R50.9 FEVER, UNSPECIFIED FEVER CAUSE: ICD-10-CM

## 2023-02-10 DIAGNOSIS — C34.31 MALIGNANT NEOPLASM OF LOWER LOBE OF RIGHT LUNG (HCC): ICD-10-CM

## 2023-02-10 DIAGNOSIS — K86.1 OTHER CHRONIC PANCREATITIS (HCC): ICD-10-CM

## 2023-02-10 LAB
ALBUMIN SERPL BCP-MCNC: 4.2 G/DL (ref 3.5–5)
ALP SERPL-CCNC: 68 U/L (ref 34–104)
ALT SERPL W P-5'-P-CCNC: 15 U/L (ref 7–52)
ANION GAP SERPL CALCULATED.3IONS-SCNC: 9 MMOL/L (ref 4–13)
AST SERPL W P-5'-P-CCNC: 12 U/L (ref 13–39)
BASOPHILS # BLD AUTO: 0.02 THOUSANDS/ÂΜL (ref 0–0.1)
BASOPHILS NFR BLD AUTO: 1 % (ref 0–1)
BILIRUB SERPL-MCNC: 0.48 MG/DL (ref 0.2–1)
BUN SERPL-MCNC: 18 MG/DL (ref 5–25)
CALCIUM SERPL-MCNC: 9.3 MG/DL (ref 8.4–10.2)
CHLORIDE SERPL-SCNC: 101 MMOL/L (ref 96–108)
CO2 SERPL-SCNC: 23 MMOL/L (ref 21–32)
CREAT SERPL-MCNC: 0.91 MG/DL (ref 0.6–1.3)
EOSINOPHIL # BLD AUTO: 0 THOUSAND/ÂΜL (ref 0–0.61)
EOSINOPHIL NFR BLD AUTO: 0 % (ref 0–6)
ERYTHROCYTE [DISTWIDTH] IN BLOOD BY AUTOMATED COUNT: 13.9 % (ref 11.6–15.1)
GFR SERPL CREATININE-BSD FRML MDRD: 86 ML/MIN/1.73SQ M
GLUCOSE SERPL-MCNC: 158 MG/DL (ref 65–140)
HCT VFR BLD AUTO: 42.6 % (ref 36.5–49.3)
HGB BLD-MCNC: 14.1 G/DL (ref 12–17)
IMM GRANULOCYTES # BLD AUTO: 0.04 THOUSAND/UL (ref 0–0.2)
IMM GRANULOCYTES NFR BLD AUTO: 1 % (ref 0–2)
LIPASE SERPL-CCNC: 53 U/L (ref 11–82)
LYMPHOCYTES # BLD AUTO: 1.15 THOUSANDS/ÂΜL (ref 0.6–4.47)
LYMPHOCYTES NFR BLD AUTO: 27 % (ref 14–44)
MCH RBC QN AUTO: 31.5 PG (ref 26.8–34.3)
MCHC RBC AUTO-ENTMCNC: 33.1 G/DL (ref 31.4–37.4)
MCV RBC AUTO: 95 FL (ref 82–98)
MONOCYTES # BLD AUTO: 0.68 THOUSAND/ÂΜL (ref 0.17–1.22)
MONOCYTES NFR BLD AUTO: 16 % (ref 4–12)
NEUTROPHILS # BLD AUTO: 2.35 THOUSANDS/ÂΜL (ref 1.85–7.62)
NEUTS SEG NFR BLD AUTO: 55 % (ref 43–75)
NRBC BLD AUTO-RTO: 0 /100 WBCS
PLATELET # BLD AUTO: 194 THOUSANDS/UL (ref 149–390)
PMV BLD AUTO: 9.7 FL (ref 8.9–12.7)
POTASSIUM SERPL-SCNC: 3.9 MMOL/L (ref 3.5–5.3)
PROT SERPL-MCNC: 6.9 G/DL (ref 6.4–8.4)
RBC # BLD AUTO: 4.48 MILLION/UL (ref 3.88–5.62)
SODIUM SERPL-SCNC: 133 MMOL/L (ref 135–147)
WBC # BLD AUTO: 4.24 THOUSAND/UL (ref 4.31–10.16)

## 2023-02-10 RX ORDER — SODIUM CHLORIDE 9 MG/ML
20 INJECTION, SOLUTION INTRAVENOUS ONCE
Status: CANCELLED | OUTPATIENT
Start: 2023-02-13

## 2023-02-10 RX ORDER — PALONOSETRON 0.05 MG/ML
0.25 INJECTION, SOLUTION INTRAVENOUS ONCE
Status: CANCELLED | OUTPATIENT
Start: 2023-02-13

## 2023-02-10 NOTE — PROGRESS NOTES
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, N2, stage IIIa  He is undergoing adjuvant Taxol/carboplatin  He had his first cycle about 2 weeks ago  He tolerated this relatively well  Side effects to speak of  Recent CBC and CMP are essentially normal   He has had ongoing low-grade fevers  This dates back to his hospitalization and may reflect complication of pancreatitis  I do not believe this is related to infection  Continuation of treatment is recommended  Increase Taxol to 200 mg per metered squared  I discussed the above with the patient  The patient  voiced understanding and agreement   ______________________________________________________________________    No chief complaint on file  HPI:  Oncology History   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 ([facility] )   7/23/2022 Initial Diagnosis    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 ([facility] )     12/6/2022 -  Cancer Staged    Staging form: Lung, AJCC 8th Edition  - Clinical: Stage IIIA (cT1b, cN2, cM0) - Signed by Vinita Ragsdale PA-C on 12/6/2022  Laterality: Right       Malignant neoplasm of lower lobe of right lung ([facility] )   9/13/2022 Initial Diagnosis    July 23, 2022 patient had CT chest ab pelvis regarding rule out AAA  This showed 1 1 cm right lower lobe pulmonary nodule new since prior study of November 2019  Some other smaller nodules identified  Subcarinal lymph node 2 cm  No other findings suspicious for metastatic disease  August 5, 2022 PET/CT showed 1 2 cm pulmonary nodule, SUV 2 9  Other nodules noted, subcentimeter, no hypermetabolism  Some groundglass opacities in the right upper lobe    September 13, 2022 patient had needle biopsy of the right lower lobe mass showing squamous cell carcinoma, TTF-1 positive  November 16, 2022 patient underwent right lower lobe segmentectomy  Pathology showed 1 8 cm squamous cell carcinoma  Level 4 and 11 lymph node were positive for metastatic carcinoma  1/30/2023 -  Chemotherapy    palonosetron (ALOXI), 0 25 mg, Intravenous, Once, 1 of 4 cycles  Administration: 0 25 mg (1/30/2023)  fosaprepitant (EMEND) IVPB, 150 mg, Intravenous, Once, 1 of 4 cycles  Administration: 150 mg (1/30/2023)  CARBOplatin (PARAPLATIN) IVPB (GOG AUC DOSING), 552 mg, Intravenous, Once, 1 of 4 cycles  Administration: 550 mg (1/30/2023)  PACLItaxel (TAXOL) chemo IVPB, 349 8 mg, Intravenous, Once, 1 of 4 cycles  Administration: 360 mg (1/30/2023)         Interval History: Clinically stable  ECOG-  1 - Symptomatic but completely ambulatory    Review of Systems   Constitutional: Positive for fever ( Intermittent)  Negative for chills  HENT: Negative for nosebleeds  Eyes: Negative for discharge  Respiratory: Negative for cough and shortness of breath  Cardiovascular: Negative for chest pain  Gastrointestinal: Positive for abdominal pain ( Intermittent)  Negative for constipation and diarrhea  Endocrine: Negative for polydipsia  Genitourinary: Negative for hematuria  Musculoskeletal: Negative for arthralgias  Skin: Negative for color change  Allergic/Immunologic: Negative for immunocompromised state  Neurological: Negative for dizziness and headaches  Hematological: Negative for adenopathy  Psychiatric/Behavioral: Negative for agitation  Past Medical History:   Diagnosis Date   • Abdominal aortic aneurysm without rupture    • Abdominal Aortic Duplex 02/21/2017    Ectatic infrarenal abdominal aorta     • MARYANN (acute kidney injury) ([facility] ) 07/23/2022   • CAD (coronary artery disease)    • Cancer ([facility] ) 2022    right lung CA   • COPD (chronic obstructive pulmonary disease) (HCC)    • Extremity pain    • History of echocardiogram 03/18/2014    EF 55%, mild MR and AI  Mild concentric LVH     • History of stress test 03/06/2017    Normal    • Hyperlipidemia    • Hypertension    • Joint pain    • Kidney stone    • Low back pain    • Lung cancer (HCC)    • Migraine    • Neck pain    • Obstructive sleep apnea     cannot tolerate CPAP   • Osteoarthritis    • Peripheral neuropathy    • Reflex sympathetic dystrophy    • Sacroiliitis ([facility] ) 02/02/2022     Patient Active Problem List   Diagnosis   • JAKI (obstructive sleep apnea)   • Chronic bronchitis (HCC)   • Abdominal aortic aneurysm (AAA) without rupture   • Lumbar spondylosis   • Lumbar degenerative disc disease   • Myofascial pain syndrome   • Chronic low back pain without sciatica   • Cervical radiculopathy   • Cervical spondylosis without myelopathy   • Tremor, essential   • Negative depression screening   • Chronic pain of both knees   • Class 1 obesity due to excess calories with serious comorbidity and body mass index (BMI) of 31 0 to 31 9 in adult   • Smoking   • Hypertension   • Chronic pain syndrome   • Uncomplicated opioid dependence ([facility] )   • Encounter for long-term opiate analgesic use   • Neck pain   • Hyperlipidemia   • Pre-diabetes   • Erectile dysfunction   • Insomnia   • Cortical age-related cataract of both eyes   • Urinary frequency   • 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)   • Kidney stone   • Pulmonary emphysema (HCC)   • Malignant neoplasm of lower lobe of right lung (HCC)   • Encounter for smoking cessation counseling   • Hemiparesis of left dominant side due to non-cerebrovascular etiology ([facility] )   • At high risk for osteoporosis   • Coronary artery disease of native artery of native heart with stable angina pectoris (HCC)   • Stage 3a chronic kidney disease (HCC)   • Night sweats       Current Outpatient Medications:   •  amLODIPine (NORVASC) 10 mg tablet, swallow 1 tablet once daily (Patient taking differently: daily at bedtime), Disp: 90 tablet, Rfl: 3  •  buPROPion (Wellbutrin XL) 150 mg 24 hr tablet, Take 1 tablet (150 mg total) by mouth every morning, Disp: 90 tablet, Rfl: 3  •  docusate sodium (COLACE) 100 mg capsule, Take 1 capsule (100 mg total) by mouth 2 (two) times a day (Patient taking differently: Take 100 mg by mouth if needed), Disp: 20 capsule, Rfl: 0  •  gabapentin (NEURONTIN) 800 mg tablet, Take 1 tablet (800 mg total) by mouth daily, Disp: 90 tablet, Rfl: 1  •  glucose blood (OneTouch Verio) test strip, Test once daily, Disp: 100 each, Rfl: 0  •  lisinopril (ZESTRIL) 20 mg tablet, take 1 tablet by mouth once daily (Patient taking differently: daily at bedtime), Disp: 30 tablet, Rfl: 5  •  metoprolol succinate (TOPROL-XL) 100 mg 24 hr tablet, take 1 tablet by mouth once daily (Patient taking differently: daily at bedtime), Disp: 30 tablet, Rfl: 5  •  oxyCODONE-acetaminophen (Percocet) 7 5-325 MG per tablet, Take 1 tablet by mouth every 8 (eight) hours as needed for moderate pain Max Daily Amount: 3 tablets, Disp: 90 tablet, Rfl: 0  •  rosuvastatin (CRESTOR) 20 MG tablet, take 1 tablet by mouth once daily (Patient taking differently: daily at bedtime), Disp: 90 tablet, Rfl: 5  •  sildenafil (VIAGRA) 100 mg tablet, Take 1 tablet (100 mg total) by mouth daily as needed for erectile dysfunction, Disp: 20 tablet, Rfl: 0  •  tamsulosin (FLOMAX) 0 4 mg, Take 2 capsules (0 8 mg total) by mouth daily with dinner, Disp: 60 capsule, Rfl: 0  •  tiotropium-olodaterol (Stiolto Respimat) 2 5-2 5 MCG/ACT inhaler, Inhale 2 puffs daily (Patient taking differently: Inhale 2 puffs if needed), Disp: 4 g, Rfl: 11  •  traZODone (DESYREL) 100 mg tablet, take 1 tablet by mouth daily at bedtime, Disp: 90 tablet, Rfl: 3  •  fluticasone (FLONASE) 50 mcg/act nasal spray, 1 spray into each nostril daily for 14 days (Patient taking differently: 1 spray into each nostril if needed), Disp: 11 1 mL, Rfl: 0  •  ipratropium-albuterol (DUO-NEB) 0 5-2 5 mg/3 mL nebulizer solution, inhale contents of 1 vial in nebulizer every 6 hours if needed for wheezing or shortness of breath (Patient not taking: Reported on 12/27/2022), Disp: , Rfl:   •  methylPREDNISolone 4 MG tablet therapy pack, Use as directed on package (Patient not taking: Reported on 1/30/2023), Disp: 21 each, Rfl: 0  •  nicotine (NICODERM CQ) 14 mg/24hr TD 24 hr patch, Place 1 patch on the skin every 24 hours, Disp: 28 patch, Rfl: 0  •  nicotine polacrilex (NICORETTE) 2 mg gum, Chew 1 each (2 mg total) as needed for smoking cessation (Patient not taking: Reported on 2/10/2023), Disp: 100 each, Rfl: 0  •  nitroglycerin (NITROSTAT) 0 4 mg SL tablet, Place 1 tablet (0 4 mg total) under the tongue every 5 (five) minutes as needed for chest pain (Patient not taking: Reported on 2/10/2023), Disp: 25 tablet, Rfl: 5  •  oxyCODONE-acetaminophen (Percocet) 7 5-325 MG per tablet, Take 1 tablet by mouth every 8 (eight) hours as needed for moderate pain Do not fill until 3/1/2023 Max Daily Amount: 3 tablets (Patient not taking: Reported on 2/10/2023), Disp: 90 tablet, Rfl: 0  •  pantoprazole (PROTONIX) 20 mg tablet, take 1 tablet by mouth once daily (Patient not taking: Reported on 2/10/2023), Disp: 30 tablet, Rfl: 0  •  polyethylene glycol (MIRALAX) 17 g packet, Take 17 g by mouth daily for 5 days (Patient not taking: Reported on 12/20/2022), Disp: 85 g, Rfl: 0  •  senna (SENOKOT) 8 6 mg, Take 1 tablet (8 6 mg total) by mouth daily (Patient not taking: Reported on 12/14/2022), Disp: 30 tablet, Rfl: 0  •  topiramate (TOPAMAX) 25 mg tablet, FOLLOW INSTRUCTIONS GIVEN TO TITRATE UP TO A MAX OF 2 TABLETS BY MOUTH TWICE DAILY AS TOLERATED AND TO LEVEL OF BENEFIT FOR TREMORS (Patient not taking: Reported on 12/27/2022), Disp: 120 tablet, Rfl: 6  No Known Allergies  Past Surgical History:   Procedure Laterality Date   • CARDIAC CATHETERIZATION  02/13/2012    EF 70%, widely patent renal arteries, significant single-vessel CAD-medical therapy  • CARDIAC CATHETERIZATION  04/11/2013    EF 65%, 50% mid LAD, 20% prox CFX, 90% diffuse RCA, 99% mid RCA  Medical management  • CHOLECYSTECTOMY     • COLONOSCOPY     • EPIDURAL BLOCK INJECTION Bilateral 08/15/2019    Procedure: BLOCK / INJECTION EPIDURAL STEROID CERVICAL;  Surgeon: Pepe Stratton MD;  Location: [facility];  Service: Pain Management    • FL GUIDED NEEDLE PLAC BX/ASP/INJ  08/15/2019   • IR BIOPSY LUNG  09/13/2022   • IR THORACIC DUCT EMBOLIZATION  11/22/2022   • ORTHOPEDIC SURGERY     • [place] [address] INCL FLUOR GDNCE DX W/CELL WASHG [facility] N/A 11/16/2022    Procedure: Aiyana Ramirez;  Surgeon: Jerrica Foreman MD;  Location: [facility];  Service: Thoracic   • VA THORACOSCOPY W/LOBECTOMY SINGLE LOBE Right 11/16/2022    Procedure: LOBECTOMY LUNG; lower lobe superior segmentectomy, mediastinal lymph node dissection;  Surgeon: Jerrica Foreman MD;  Location: [facility];  Service: Thoracic   • VA THORACOSCOPY W/SEGMENTECTOMY Right 11/16/2022    Procedure: THORACOSCOPY VIDEO ASSISTED SURGERY (VATS); Surgeon: Jerrica Foreman MD;  Location: [facility];  Service: Thoracic   • TRIGGER POINT INJECTION       Social History     Objective:  Vitals:    02/10/23 1040   BP: 138/74   BP Location: Left arm   Patient Position: Sitting   Cuff Size: Standard   Pulse: 68   Temp: 97 5 °F (36 4 °C)   TempSrc: Tympanic   SpO2: 97%   Weight: 87 kg (191 lb 12 8 oz)   Height: 5' 8" (1 727 m)     Physical Exam  Constitutional:       Appearance: He is well-developed  HENT:      Head: Normocephalic and atraumatic  Eyes:      Pupils: Pupils are equal, round, and reactive to light  Cardiovascular:      Rate and Rhythm: Normal rate and regular rhythm  Heart sounds: No murmur heard  Pulmonary:      Breath sounds: Normal breath sounds  No wheezing or rales  Abdominal:      Palpations: Abdomen is soft  Tenderness: There is no abdominal tenderness  Musculoskeletal:         General: No tenderness  Normal range of motion  Cervical back: Neck supple  Lymphadenopathy:      Cervical: No cervical adenopathy  Skin:     Findings: No erythema or rash  Neurological:      Mental Status: He is alert and oriented to person, place, and time  Cranial Nerves: No cranial nerve deficit  Deep Tendon Reflexes: Reflexes are normal and symmetric  Psychiatric:         Behavior: Behavior normal            Labs: I personally reviewed the labs and imaging pertinent to this patient care

## 2023-02-13 ENCOUNTER — HOSPITAL ENCOUNTER (OUTPATIENT)
Dept: INFUSION CENTER | Facility: HOSPITAL | Age: 68
Discharge: HOME | End: 2023-02-13
Attending: INTERNAL MEDICINE

## 2023-02-13 ENCOUNTER — TELEPHONE (OUTPATIENT)
Dept: GASTROENTEROLOGY | Facility: CLINIC | Age: 68
End: 2023-02-13

## 2023-02-13 VITALS
WEIGHT: 190.48 LBS | BODY MASS INDEX: 28.87 KG/M2 | TEMPERATURE: 98.6 F | HEIGHT: 68 IN | SYSTOLIC BLOOD PRESSURE: 144 MMHG | OXYGEN SATURATION: 95 % | DIASTOLIC BLOOD PRESSURE: 77 MMHG | RESPIRATION RATE: 18 BRPM | HEART RATE: 71 BPM

## 2023-02-13 DIAGNOSIS — C34.31 MALIGNANT NEOPLASM OF LOWER LOBE OF RIGHT LUNG (HCC): Primary | ICD-10-CM

## 2023-02-13 RX ORDER — SODIUM CHLORIDE 9 MG/ML
20 INJECTION, SOLUTION INTRAVENOUS ONCE
Status: COMPLETED | OUTPATIENT
Start: 2023-02-13 | End: 2023-02-13

## 2023-02-13 RX ORDER — PALONOSETRON 0.05 MG/ML
0.25 INJECTION, SOLUTION INTRAVENOUS ONCE
Status: COMPLETED | OUTPATIENT
Start: 2023-02-13 | End: 2023-02-13

## 2023-02-13 RX ADMIN — SODIUM CHLORIDE 20 ML/HR: 0.9 INJECTION, SOLUTION INTRAVENOUS at 09:17

## 2023-02-13 RX ADMIN — FOSAPREPITANT DIMEGLUMINE 150 MG: 150 INJECTION, POWDER, LYOPHILIZED, FOR SOLUTION INTRAVENOUS at 10:45

## 2023-02-13 RX ADMIN — FAMOTIDINE 20 MG: 10 INJECTION, SOLUTION INTRAVENOUS at 10:20

## 2023-02-13 RX ADMIN — PALONOSETRON HYDROCHLORIDE 0.25 MG: 0.25 INJECTION INTRAVENOUS at 09:16

## 2023-02-13 RX ADMIN — PACLITAXEL 400 MG: 6 INJECTION, SOLUTION INTRAVENOUS at 11:37

## 2023-02-13 RX ADMIN — CARBOPLATIN 650 MG: 10 INJECTION, SOLUTION INTRAVENOUS at 14:46

## 2023-02-13 RX ADMIN — DEXAMETHASONE SODIUM PHOSPHATE 20 MG: 10 INJECTION, SOLUTION INTRAMUSCULAR; INTRAVENOUS at 09:24

## 2023-02-13 RX ADMIN — DIPHENHYDRAMINE HYDROCHLORIDE 25 MG: 50 INJECTION, SOLUTION INTRAMUSCULAR; INTRAVENOUS at 09:51

## 2023-02-13 NOTE — PLAN OF CARE
Problem: Potential for Falls  Goal: Patient will remain free of falls  Description: INTERVENTIONS:  - Educate patient/family on patient safety including physical limitations  - Instruct patient to call for assistance with activity   - Consult OT/PT to assist with strengthening/mobility   - Keep Call bell within reach  - Keep bed low and locked with side rails adjusted as appropriate  - Keep care items and personal belongings within reach      - Consider moving patient to room near nurses station  Outcome: Progressing     Problem: Knowledge Deficit  Goal: Patient/family/caregiver demonstrates understanding of disease process, treatment plan, medications, and discharge instructions  Description: Complete learning assessment and assess knowledge base  Interventions:  - Provide teaching at level of understanding  - Provide teaching via preferred learning methods  Outcome: Progressing     Problem: Nutrition/Hydration-ADULT  Goal: Nutrient/Hydration intake appropriate for improving, restoring or maintaining nutritional needs  Description: Monitor and assess patient's nutrition/hydration status for malnutrition  Collaborate with interdisciplinary team and initiate plan and interventions as ordered  Monitor patient's weight and dietary intake as ordered or per policy  Utilize nutrition screening tool and intervene as necessary  Determine patient's food preferences and provide high-protein, high-caloric foods as appropriate       INTERVENTIONS:  - Monitor oral intake, urinary output, labs, and treatment plans  - Assess nutrition and hydration status and recommend course of action  - Evaluate amount of meals eaten  - Assist patient with eating if necessary   - Allow adequate time for meals  - Recommend/ encourage appropriate diets, oral nutritional supplements, and vitamin/mineral supplements  - Order, calculate, and assess calorie counts as needed  - Recommend, monitor, and adjust tube feedings and TPN/PPN based on assessed needs  - Assess need for intravenous fluids  - Provide specific nutrition/hydration education as appropriate  - Include patient/family/caregiver in decisions related to nutrition  Outcome: Progressing

## 2023-02-15 ENCOUNTER — TELEPHONE (OUTPATIENT)
Dept: HEMATOLOGY ONCOLOGY | Facility: CLINIC | Age: 68
End: 2023-02-15

## 2023-02-15 DIAGNOSIS — K12.30 MUCOSITIS ORAL: Primary | ICD-10-CM

## 2023-02-15 NOTE — TELEPHONE ENCOUNTER
Returned call to [employer]  Advised him we would be sending a script for magic mouthwash  Instructions for use reviewed  He is aware to reach out if it is not helping in a few days

## 2023-02-15 NOTE — TELEPHONE ENCOUNTER
Rec'd Vm from pt:   "Good morning, Yolie Archuleta [address]  Phone number [phone]  For the last few days I had a chemo on Monday  It's like I burnt my entire tongue  It's store actually might be swollen a little bit  Is there anything I can do for it? If you can give me a call back sometime when you're free, I would appreciate it  Thanks so much  jono Byers "    Returned call to Evelyne who says tongue is sore all over and feels like he burnt it on hot food  Says he did notice some white "down the middle crack" last night  Unsure if it is still there  Advised him I would discuss with Dr Juan Cordova and get back to him

## 2023-02-16 ENCOUNTER — HOSPITAL ENCOUNTER (OUTPATIENT)
Dept: CT IMAGING | Facility: HOSPITAL | Age: 68
Discharge: HOME | End: 2023-02-16

## 2023-02-16 ENCOUNTER — HOSPITAL ENCOUNTER (OUTPATIENT)
Dept: MRI IMAGING | Facility: HOSPITAL | Age: 68
Discharge: HOME | End: 2023-02-16
Attending: INTERNAL MEDICINE

## 2023-02-16 DIAGNOSIS — N20.1 CALCULUS OF URETER: ICD-10-CM

## 2023-02-16 DIAGNOSIS — K86.1 CHRONIC PANCREATITIS, UNSPECIFIED PANCREATITIS TYPE (HCC): ICD-10-CM

## 2023-02-16 DIAGNOSIS — N21.0 CALCULUS IN DIVERTICULUM OF BLADDER: ICD-10-CM

## 2023-02-16 DIAGNOSIS — K12.30 MUCOSITIS ORAL: Primary | ICD-10-CM

## 2023-02-20 ENCOUNTER — TELEPHONE (OUTPATIENT)
Dept: HEMATOLOGY ONCOLOGY | Facility: CLINIC | Age: 68
End: 2023-02-20

## 2023-02-20 DIAGNOSIS — R50.9 FEVER, UNSPECIFIED FEVER CAUSE: Primary | ICD-10-CM

## 2023-02-20 NOTE — TELEPHONE ENCOUNTER
Rec'd VM from pt: "Hi Shari, it's [facility], your neighborhood complainer  I can't shake some of these things going on right now  The mouth has calmed down with the sores, but I'm still running fevers  I have enough mucus coming out of me to fill buckets  Sorry, not to be gross  And last night I called the entire night with the female about 101 Still bringing stuff up  I just can't shake this cold  Whatever it is, I don't know whether it's part of the chemo  I just don't know what's going on right now  If you can give me a call back  My phone number is [phone] and my date of birth is [birth year]  I appreciate it  Thanks so much  Bye bye "    Returned call to pt  Pt still with fevers of unknown origin  Also has a cough and lots of mucus  Pt not taking any medication other than his percocet for these sx  Reviewed with Dr Rosalva Smalls  Advised pt blood cultures were ordered  Pt voiced understanding and will have them drawn

## 2023-02-21 ENCOUNTER — APPOINTMENT (OUTPATIENT)
Dept: LAB | Facility: HOSPITAL | Age: 68
End: 2023-02-21
Attending: INTERNAL MEDICINE

## 2023-02-21 DIAGNOSIS — R50.9 FEVER, UNSPECIFIED FEVER CAUSE: ICD-10-CM

## 2023-02-24 DIAGNOSIS — R50.9 FEVER, UNSPECIFIED FEVER CAUSE: Primary | ICD-10-CM

## 2023-02-24 LAB
BACTERIA BLD CULT: ABNORMAL
GRAM STN SPEC: ABNORMAL
S AUREUS+CONS DNA BLD POS NAA+NON-PROBE: DETECTED

## 2023-02-24 NOTE — TELEPHONE ENCOUNTER
Reviewed lab results of blood cultures with Dr Libby Xie ordered for repeat blood cultures  Called and spoke with pt  He is agreement to having blood cultures obtained  Awaiting results

## 2023-02-25 ENCOUNTER — APPOINTMENT (OUTPATIENT)
Dept: LAB | Facility: HOSPITAL | Age: 68
End: 2023-02-25
Attending: INTERNAL MEDICINE

## 2023-02-25 DIAGNOSIS — R50.9 FEVER, UNSPECIFIED FEVER CAUSE: ICD-10-CM

## 2023-02-26 LAB — BACTERIA BLD CULT: NORMAL

## 2023-02-27 RX ORDER — PALONOSETRON 0.05 MG/ML
0.25 INJECTION, SOLUTION INTRAVENOUS ONCE
Status: CANCELLED | OUTPATIENT
Start: 2023-03-06

## 2023-02-27 RX ORDER — SODIUM CHLORIDE 9 MG/ML
20 INJECTION, SOLUTION INTRAVENOUS ONCE
Status: CANCELLED | OUTPATIENT
Start: 2023-03-06

## 2023-02-28 DIAGNOSIS — I10 ESSENTIAL HYPERTENSION: ICD-10-CM

## 2023-02-28 RX ORDER — LISINOPRIL 20 MG/1
TABLET ORAL
Qty: 30 TABLET | Refills: 5 | Status: SHIPPED | OUTPATIENT
Start: 2023-02-28

## 2023-03-02 ENCOUNTER — APPOINTMENT (OUTPATIENT)
Dept: LAB | Facility: HOSPITAL | Age: 68
End: 2023-03-02
Attending: INTERNAL MEDICINE

## 2023-03-02 DIAGNOSIS — C34.31 MALIGNANT NEOPLASM OF LOWER LOBE OF RIGHT LUNG (HCC): ICD-10-CM

## 2023-03-02 LAB
ALBUMIN SERPL BCP-MCNC: 4 G/DL (ref 3.5–5)
ALP SERPL-CCNC: 76 U/L (ref 34–104)
ALT SERPL W P-5'-P-CCNC: 14 U/L (ref 7–52)
ANION GAP SERPL CALCULATED.3IONS-SCNC: 9 MMOL/L (ref 4–13)
AST SERPL W P-5'-P-CCNC: 12 U/L (ref 13–39)
BACTERIA BLD CULT: NORMAL
BACTERIA BLD CULT: NORMAL
BASOPHILS # BLD AUTO: 0.04 THOUSANDS/ÂΜL (ref 0–0.1)
BASOPHILS NFR BLD AUTO: 1 % (ref 0–1)
BILIRUB SERPL-MCNC: 0.42 MG/DL (ref 0.2–1)
BUN SERPL-MCNC: 17 MG/DL (ref 5–25)
CALCIUM SERPL-MCNC: 9 MG/DL (ref 8.4–10.2)
CHLORIDE SERPL-SCNC: 103 MMOL/L (ref 96–108)
CO2 SERPL-SCNC: 25 MMOL/L (ref 21–32)
CREAT SERPL-MCNC: 1.01 MG/DL (ref 0.6–1.3)
EOSINOPHIL # BLD AUTO: 0.12 THOUSAND/ÂΜL (ref 0–0.61)
EOSINOPHIL NFR BLD AUTO: 2 % (ref 0–6)
ERYTHROCYTE [DISTWIDTH] IN BLOOD BY AUTOMATED COUNT: 14.3 % (ref 11.6–15.1)
GFR SERPL CREATININE-BSD FRML MDRD: 76 ML/MIN/1.73SQ M
GLUCOSE SERPL-MCNC: 138 MG/DL (ref 65–140)
HCT VFR BLD AUTO: 42.1 % (ref 36.5–49.3)
HGB BLD-MCNC: 14 G/DL (ref 12–17)
IMM GRANULOCYTES # BLD AUTO: 0.05 THOUSAND/UL (ref 0–0.2)
IMM GRANULOCYTES NFR BLD AUTO: 1 % (ref 0–2)
LYMPHOCYTES # BLD AUTO: 2.04 THOUSANDS/ÂΜL (ref 0.6–4.47)
LYMPHOCYTES NFR BLD AUTO: 31 % (ref 14–44)
MCH RBC QN AUTO: 31.5 PG (ref 26.8–34.3)
MCHC RBC AUTO-ENTMCNC: 33.3 G/DL (ref 31.4–37.4)
MCV RBC AUTO: 95 FL (ref 82–98)
MONOCYTES # BLD AUTO: 0.66 THOUSAND/ÂΜL (ref 0.17–1.22)
MONOCYTES NFR BLD AUTO: 10 % (ref 4–12)
NEUTROPHILS # BLD AUTO: 3.67 THOUSANDS/ÂΜL (ref 1.85–7.62)
NEUTS SEG NFR BLD AUTO: 55 % (ref 43–75)
NRBC BLD AUTO-RTO: 0 /100 WBCS
PLATELET # BLD AUTO: 199 THOUSANDS/UL (ref 149–390)
PMV BLD AUTO: 9.2 FL (ref 8.9–12.7)
POTASSIUM SERPL-SCNC: 4.1 MMOL/L (ref 3.5–5.3)
PROT SERPL-MCNC: 6.8 G/DL (ref 6.4–8.4)
RBC # BLD AUTO: 4.44 MILLION/UL (ref 3.88–5.62)
SODIUM SERPL-SCNC: 137 MMOL/L (ref 135–147)
WBC # BLD AUTO: 6.58 THOUSAND/UL (ref 4.31–10.16)

## 2023-03-06 ENCOUNTER — HOSPITAL ENCOUNTER (OUTPATIENT)
Dept: INFUSION CENTER | Facility: HOSPITAL | Age: 68
Discharge: HOME | End: 2023-03-06
Attending: INTERNAL MEDICINE

## 2023-03-06 VITALS
BODY MASS INDEX: 28.57 KG/M2 | DIASTOLIC BLOOD PRESSURE: 75 MMHG | RESPIRATION RATE: 18 BRPM | HEIGHT: 68 IN | TEMPERATURE: 97.5 F | HEART RATE: 75 BPM | OXYGEN SATURATION: 90 % | WEIGHT: 188.49 LBS | SYSTOLIC BLOOD PRESSURE: 153 MMHG

## 2023-03-06 DIAGNOSIS — C34.31 MALIGNANT NEOPLASM OF LOWER LOBE OF RIGHT LUNG (HCC): Primary | ICD-10-CM

## 2023-03-06 RX ORDER — SODIUM CHLORIDE 9 MG/ML
20 INJECTION, SOLUTION INTRAVENOUS ONCE
Status: COMPLETED | OUTPATIENT
Start: 2023-03-06 | End: 2023-03-06

## 2023-03-06 RX ORDER — PALONOSETRON 0.05 MG/ML
0.25 INJECTION, SOLUTION INTRAVENOUS ONCE
Status: COMPLETED | OUTPATIENT
Start: 2023-03-06 | End: 2023-03-06

## 2023-03-06 RX ADMIN — FOSAPREPITANT 150 MG: 150 INJECTION, POWDER, LYOPHILIZED, FOR SOLUTION INTRAVENOUS at 10:05

## 2023-03-06 RX ADMIN — DEXAMETHASONE SODIUM PHOSPHATE 20 MG: 10 INJECTION, SOLUTION INTRAMUSCULAR; INTRAVENOUS at 08:20

## 2023-03-06 RX ADMIN — CARBOPLATIN 650 MG: 10 INJECTION, SOLUTION INTRAVENOUS at 14:11

## 2023-03-06 RX ADMIN — FAMOTIDINE 20 MG: 10 INJECTION, SOLUTION INTRAVENOUS at 08:57

## 2023-03-06 RX ADMIN — PALONOSETRON 0.25 MG: 0.05 INJECTION, SOLUTION INTRAVENOUS at 08:52

## 2023-03-06 RX ADMIN — DIPHENHYDRAMINE HYDROCHLORIDE 25 MG: 50 INJECTION, SOLUTION INTRAMUSCULAR; INTRAVENOUS at 09:34

## 2023-03-06 RX ADMIN — PACLITAXEL 400 MG: 6 INJECTION, SOLUTION INTRAVENOUS at 10:54

## 2023-03-06 RX ADMIN — SODIUM CHLORIDE 20 ML/HR: 0.9 INJECTION, SOLUTION INTRAVENOUS at 08:18

## 2023-03-06 NOTE — PLAN OF CARE
Problem: Potential for Falls  Goal: Patient will remain free of falls  Description: INTERVENTIONS:  - Educate patient/family on patient safety including physical limitations  - Instruct patient to call for assistance with activity   - Consult OT/PT to assist with strengthening/mobility   - Keep Call bell within reach  - Keep bed low and locked with side rails adjusted as appropriate  - Keep care items and personal belongings within reach      - Consider moving patient to room near nurses station  Outcome: Progressing     Problem: Knowledge Deficit  Goal: Patient/family/caregiver demonstrates understanding of disease process, treatment plan, medications, and discharge instructions  Description: Complete learning assessment and assess knowledge base    Interventions:  - Provide teaching at level of understanding  - Provide teaching via preferred learning methods  Outcome: Progressing

## 2023-03-07 DIAGNOSIS — Z12.11 SCREENING FOR COLON CANCER: Primary | ICD-10-CM

## 2023-03-07 NOTE — TELEPHONE ENCOUNTER
Patients GI provider:  Dr Shilpi Macedo    Number to return call: [phone]    Reason for call: Pt calling requesting to go over his results from his MRI that was on 2/16/2023      Scheduled procedure/appointment date if applicable: Procedure: 3/39/7711

## 2023-03-08 NOTE — TELEPHONE ENCOUNTER
Spoke with patient, relayed results as per Dr Rosa Mauro with pt, reviewed bowel prep instructions for Golytely and send necessary scripts to pharmacy  Golytely instructions sent to pt via [employer] for review  He will call with any questions

## 2023-03-11 DIAGNOSIS — R12 HEARTBURN: ICD-10-CM

## 2023-03-11 RX ORDER — PANTOPRAZOLE SODIUM 20 MG/1
TABLET, DELAYED RELEASE ORAL
Qty: 30 TABLET | Refills: 0 | Status: SHIPPED | OUTPATIENT
Start: 2023-03-11

## 2023-03-13 ENCOUNTER — TELEPHONE (OUTPATIENT)
Dept: HEMATOLOGY ONCOLOGY | Facility: CLINIC | Age: 68
End: 2023-03-13

## 2023-03-13 NOTE — TELEPHONE ENCOUNTER
Per Dr Bola Nguyen pt to increase Gabapentin to 800mg twice a day  Pt instructed to call back later this week if no improvement

## 2023-03-13 NOTE — TELEPHONE ENCOUNTER
Pt reports constant tingling and numbness in hands and toes which started about two days after last chemo cycle  States he is taking Gabapentin 800mg daily  Is on Percocet for other pain - this does not help with the tingling/numbness  Also reports intermittent abd pain which worsens after eating  He has not yet started Protonix which was ordered by other provider but agreed to do so

## 2023-03-13 NOTE — TELEPHONE ENCOUNTER
Received voicemail from pt:  Good morning  This is Huma Neff, [address]  Phone number [phone]  Seemed to be experiencing more side effects  My hands are tingly and numb and so are my toes and also a lot of stomach pain  If you give me a call back and give me any suggestions I can do to ease some of this, I would appreciate it  Thank you much  Modesta de la cruz

## 2023-03-16 DIAGNOSIS — M79.2 NEUROPATHIC PAIN: Primary | ICD-10-CM

## 2023-03-16 NOTE — TELEPHONE ENCOUNTER
Spoke with patient and reviewed case with Dr Jose Porter  Numbness and tingling is not improving will order Lyrica  Patient will discontinue gabapentin on start of Lyrica  Dosages reviewed including 25mg once daily x 7 days, starting day 8 will increase 25mg to BID  Patient understanding of instructions  Teach-back method utilized for understanding  Script sent to Dr Jose Porter for sign

## 2023-03-17 RX ORDER — PREGABALIN 25 MG/1
25 CAPSULE ORAL DAILY
Qty: 49 CAPSULE | Refills: 0 | Status: SHIPPED | OUTPATIENT
Start: 2023-03-17

## 2023-03-20 RX ORDER — PALONOSETRON 0.05 MG/ML
0.25 INJECTION, SOLUTION INTRAVENOUS ONCE
Status: CANCELLED | OUTPATIENT
Start: 2023-03-27

## 2023-03-20 RX ORDER — SODIUM CHLORIDE 9 MG/ML
20 INJECTION, SOLUTION INTRAVENOUS ONCE
Status: CANCELLED | OUTPATIENT
Start: 2023-03-27

## 2023-03-23 ENCOUNTER — APPOINTMENT (OUTPATIENT)
Dept: LAB | Facility: HOSPITAL | Age: 68
End: 2023-03-23
Attending: INTERNAL MEDICINE

## 2023-03-23 DIAGNOSIS — C34.31 MALIGNANT NEOPLASM OF LOWER LOBE OF RIGHT LUNG (HCC): ICD-10-CM

## 2023-03-23 DIAGNOSIS — C34.31 MALIGNANT NEOPLASM OF LOWER LOBE OF RIGHT LUNG (HCC): Primary | ICD-10-CM

## 2023-03-23 LAB
ALBUMIN SERPL BCP-MCNC: 4.3 G/DL (ref 3.5–5)
ALP SERPL-CCNC: 81 U/L (ref 34–104)
ALT SERPL W P-5'-P-CCNC: 16 U/L (ref 7–52)
ANION GAP SERPL CALCULATED.3IONS-SCNC: 6 MMOL/L (ref 4–13)
AST SERPL W P-5'-P-CCNC: 14 U/L (ref 13–39)
BASOPHILS # BLD AUTO: 0.03 THOUSANDS/ÂΜL (ref 0–0.1)
BASOPHILS NFR BLD AUTO: 1 % (ref 0–1)
BILIRUB SERPL-MCNC: 0.66 MG/DL (ref 0.2–1)
BUN SERPL-MCNC: 20 MG/DL (ref 5–25)
CALCIUM SERPL-MCNC: 9.3 MG/DL (ref 8.4–10.2)
CHLORIDE SERPL-SCNC: 101 MMOL/L (ref 96–108)
CO2 SERPL-SCNC: 28 MMOL/L (ref 21–32)
CREAT SERPL-MCNC: 1.25 MG/DL (ref 0.6–1.3)
EOSINOPHIL # BLD AUTO: 0 THOUSAND/ÂΜL (ref 0–0.61)
EOSINOPHIL NFR BLD AUTO: 0 % (ref 0–6)
ERYTHROCYTE [DISTWIDTH] IN BLOOD BY AUTOMATED COUNT: 15.2 % (ref 11.6–15.1)
GFR SERPL CREATININE-BSD FRML MDRD: 58 ML/MIN/1.73SQ M
GLUCOSE SERPL-MCNC: 148 MG/DL (ref 65–140)
HCT VFR BLD AUTO: 42.4 % (ref 36.5–49.3)
HGB BLD-MCNC: 13.6 G/DL (ref 12–17)
IMM GRANULOCYTES # BLD AUTO: 0.02 THOUSAND/UL (ref 0–0.2)
IMM GRANULOCYTES NFR BLD AUTO: 1 % (ref 0–2)
LYMPHOCYTES # BLD AUTO: 1.63 THOUSANDS/ÂΜL (ref 0.6–4.47)
LYMPHOCYTES NFR BLD AUTO: 39 % (ref 14–44)
MCH RBC QN AUTO: 30.8 PG (ref 26.8–34.3)
MCHC RBC AUTO-ENTMCNC: 32.1 G/DL (ref 31.4–37.4)
MCV RBC AUTO: 96 FL (ref 82–98)
MONOCYTES # BLD AUTO: 0.57 THOUSAND/ÂΜL (ref 0.17–1.22)
MONOCYTES NFR BLD AUTO: 14 % (ref 4–12)
NEUTROPHILS # BLD AUTO: 1.95 THOUSANDS/ÂΜL (ref 1.85–7.62)
NEUTS SEG NFR BLD AUTO: 45 % (ref 43–75)
NRBC BLD AUTO-RTO: 0 /100 WBCS
PLATELET # BLD AUTO: 162 THOUSANDS/UL (ref 149–390)
PMV BLD AUTO: 9.7 FL (ref 8.9–12.7)
POTASSIUM SERPL-SCNC: 4.5 MMOL/L (ref 3.5–5.3)
PROT SERPL-MCNC: 6.8 G/DL (ref 6.4–8.4)
RBC # BLD AUTO: 4.41 MILLION/UL (ref 3.88–5.62)
SODIUM SERPL-SCNC: 135 MMOL/L (ref 135–147)
WBC # BLD AUTO: 4.2 THOUSAND/UL (ref 4.31–10.16)

## 2023-03-24 ENCOUNTER — DOCUMENTATION (OUTPATIENT)
Dept: HEMATOLOGY ONCOLOGY | Facility: CLINIC | Age: 68
End: 2023-03-24

## 2023-03-24 ENCOUNTER — TELEPHONE (OUTPATIENT)
Dept: HEMATOLOGY ONCOLOGY | Facility: CLINIC | Age: 68
End: 2023-03-24

## 2023-03-24 NOTE — TELEPHONE ENCOUNTER
Reviewed pt's c/o of peripheral neuropathy of fingers with Dr Ashby Living  Pt to continue with his treatment next week

## 2023-03-24 NOTE — TELEPHONE ENCOUNTER
Can you please send a note for this patient to excuse him from [facility], asap       Thank you

## 2023-03-24 NOTE — TELEPHONE ENCOUNTER
Called pt to review his symptoms of fatigue and continued numbness in his fingers  Pt also requesting to have a note to excuse him from [email or web address] [facility]  MA/Merari Burgos to send [email or web address] Excuse note  Will review with Dr Arnav Harper, patients symptoms

## 2023-03-27 ENCOUNTER — TELEPHONE (OUTPATIENT)
Dept: PAIN MEDICINE | Facility: CLINIC | Age: 68
End: 2023-03-27

## 2023-03-27 ENCOUNTER — HOSPITAL ENCOUNTER (OUTPATIENT)
Dept: INFUSION CENTER | Facility: HOSPITAL | Age: 68
Discharge: HOME | End: 2023-03-27
Attending: INTERNAL MEDICINE

## 2023-03-27 VITALS
WEIGHT: 188.05 LBS | DIASTOLIC BLOOD PRESSURE: 81 MMHG | SYSTOLIC BLOOD PRESSURE: 136 MMHG | RESPIRATION RATE: 16 BRPM | BODY MASS INDEX: 28.5 KG/M2 | OXYGEN SATURATION: 91 % | TEMPERATURE: 98.4 F | HEIGHT: 68 IN | HEART RATE: 65 BPM

## 2023-03-27 DIAGNOSIS — G89.4 CHRONIC PAIN SYNDROME: ICD-10-CM

## 2023-03-27 DIAGNOSIS — C34.31 MALIGNANT NEOPLASM OF LOWER LOBE OF RIGHT LUNG (HCC): Primary | ICD-10-CM

## 2023-03-27 DIAGNOSIS — M47.812 CERVICAL SPONDYLOSIS WITHOUT MYELOPATHY: ICD-10-CM

## 2023-03-27 DIAGNOSIS — M54.12 CERVICAL RADICULOPATHY: ICD-10-CM

## 2023-03-27 DIAGNOSIS — M54.2 NECK PAIN: ICD-10-CM

## 2023-03-27 RX ORDER — PALONOSETRON 0.05 MG/ML
0.25 INJECTION, SOLUTION INTRAVENOUS ONCE
Status: COMPLETED | OUTPATIENT
Start: 2023-03-27 | End: 2023-03-27

## 2023-03-27 RX ORDER — SODIUM CHLORIDE 9 MG/ML
20 INJECTION, SOLUTION INTRAVENOUS ONCE
Status: COMPLETED | OUTPATIENT
Start: 2023-03-27 | End: 2023-03-27

## 2023-03-27 RX ORDER — OXYCODONE AND ACETAMINOPHEN 7.5; 325 MG/1; MG/1
1 TABLET ORAL EVERY 8 HOURS PRN
Qty: 21 TABLET | Refills: 0 | Status: SHIPPED | OUTPATIENT
Start: 2023-03-27 | End: 2023-04-05 | Stop reason: SDUPTHER

## 2023-03-27 RX ADMIN — DIPHENHYDRAMINE HYDROCHLORIDE 25 MG: 50 INJECTION, SOLUTION INTRAMUSCULAR; INTRAVENOUS at 09:18

## 2023-03-27 RX ADMIN — APREPITANT 130 MG: 130 INJECTION, EMULSION INTRAVENOUS at 10:29

## 2023-03-27 RX ADMIN — FAMOTIDINE 20 MG: 10 INJECTION, SOLUTION INTRAVENOUS at 09:54

## 2023-03-27 RX ADMIN — DEXAMETHASONE SODIUM PHOSPHATE 20 MG: 10 INJECTION, SOLUTION INTRAMUSCULAR; INTRAVENOUS at 08:44

## 2023-03-27 RX ADMIN — PALONOSETRON HYDROCHLORIDE 0.25 MG: 0.25 INJECTION INTRAVENOUS at 10:27

## 2023-03-27 RX ADMIN — CARBOPLATIN 500 MG: 10 INJECTION, SOLUTION INTRAVENOUS at 14:42

## 2023-03-27 RX ADMIN — PACLITAXEL 400 MG: 6 INJECTION, SOLUTION, CONCENTRATE INTRAVENOUS at 11:17

## 2023-03-27 RX ADMIN — SODIUM CHLORIDE 20 ML/HR: 0.9 INJECTION, SOLUTION INTRAVENOUS at 08:43

## 2023-03-27 NOTE — TELEPHONE ENCOUNTER
RN spoke with Pt  Notified Pt that prescription refill request was granted with a do not fill date until 3/29 and sent to preferred pharmacy

## 2023-03-27 NOTE — TELEPHONE ENCOUNTER
Pt requesting Percocet refill  LOV 2/1 with BK  Next OV 4/5 with BK  Last Rx given 2/1 with do not fill until 3/1    Spoke with Pt  Pt stated he did get the last fill on 3/1 and takes x3 tabs daily  Pt states he does not take more than x3 tabs a day  Pt stated he took x1 tab this am & has x3 tabs remaining  (he should have more than x3 tabs remaining)     Please advise-Percocet refill

## 2023-03-27 NOTE — PLAN OF CARE
Problem: INFECTION - ADULT  Goal: Absence or prevention of progression during hospitalization  Description: INTERVENTIONS:  - Assess and monitor for signs and symptoms of infection  - Monitor lab/diagnostic results  - Monitor all insertion sites, i e  indwelling lines, tubes, and drains  - Monitor endotracheal if appropriate and nasal secretions for changes in amount and color  - [place] appropriate cooling/warming therapies per order  - Administer medications as ordered  - Instruct and encourage patient and family to use good hand hygiene technique  - Identify and instruct in appropriate isolation precautions for identified infection/condition  Outcome: Progressing     Problem: DISCHARGE PLANNING  Goal: Discharge to home or other facility with appropriate resources  Description: INTERVENTIONS:  - Identify barriers to discharge w/patient and caregiver  - Arrange for needed discharge resources and transportation as appropriate  - Identify discharge learning needs (meds, wound care, etc )  - Arrange for interpretive services to assist at discharge as needed  - Refer to Case Management Department for coordinating discharge planning if the patient needs post-hospital services based on physician/advanced practitioner order or complex needs related to functional status, cognitive ability, or social support system  Outcome: Progressing

## 2023-03-27 NOTE — TELEPHONE ENCOUNTER
Pt contacted Call Center requested refill of their medication  Medication Name: oxyCODONE-acetaminophen (Percocet) 7 5-325 MG per tablet        Frequency of Med: 3 a day       Remaining Medication: will be out today       Pharmacy and Location: [address] [place] [address] [id number] Sharyle Endo, [address] [facility] [address] [address]   Phone:  [phone]  Fax:  [phone]   CHAPIS #:  --        Pt  Preferred Callback Phone Number: [phone]      Thank you

## 2023-03-27 NOTE — TELEPHONE ENCOUNTER
The earliest we can refill the oxycodone is 3/29  I sent a partial fill of oxycodone to his pharmacy with a do not fill date of 3/29/2023

## 2023-03-28 ENCOUNTER — OFFICE VISIT (OUTPATIENT)
Dept: HEMATOLOGY ONCOLOGY | Facility: CLINIC | Age: 68
End: 2023-03-28

## 2023-03-28 VITALS
HEART RATE: 81 BPM | SYSTOLIC BLOOD PRESSURE: 128 MMHG | TEMPERATURE: 97.5 F | WEIGHT: 190 LBS | BODY MASS INDEX: 28.79 KG/M2 | OXYGEN SATURATION: 98 % | DIASTOLIC BLOOD PRESSURE: 76 MMHG | HEIGHT: 68 IN

## 2023-03-28 DIAGNOSIS — G62.0 DRUG-INDUCED POLYNEUROPATHY (HCC): Primary | ICD-10-CM

## 2023-03-28 RX ORDER — NORTRIPTYLINE HYDROCHLORIDE 25 MG/1
25 CAPSULE ORAL
Qty: 30 CAPSULE | Refills: 3 | Status: SHIPPED | OUTPATIENT
Start: 2023-03-28 | End: 2023-04-04 | Stop reason: SDUPTHER

## 2023-03-28 NOTE — PROGRESS NOTES
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, N2, stage IIIa  He has completed 4 cycles of adjuvant chemotherapy  He has expected neuropathy and decreased energy  He still able to continue to work though less vigorously and with less energy than previously  He has hand-and-foot neuropathy  Foot neuropathy impairs optimal sleep  He has been on gabapentin and now Lyrica for several weeks without benefit  Discontinue both  Nortriptyline 25 mg p o  nightly is recommended  Molecular studies had not been previously requested  They are in progress at this time  We reviewed that if indicated we will review further medical therapies  Additionally, we reviewed that radiation therapy will be recommended  For the moment, recuperate from chemotherapy and reevaluate after he has his CAT scan in 5-6 weeks  I discussed the above with the patient  The patient  voiced understanding and agreement   ______________________________________________________________________    No chief complaint on file  HPI:  Oncology History   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 ([facility] [address] )   7/23/2022 Initial Diagnosis    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 ([facility] [address] )     12/6/2022 -  Cancer Staged    Staging form: Lung, AJCC 8th Edition  - Clinical: Stage IIIA (cT1b, cN2, cM0) - Signed by Sophia Bishop PA-C on 12/6/2022  Laterality: Right       Malignant neoplasm of lower lobe of right lung ([facility] [address] )   9/13/2022 Initial Diagnosis    July 23, 2022 patient had CT chest ab pelvis regarding rule out AAA  This showed 1 1 cm right lower lobe pulmonary nodule new since prior study of November 2019  Some other smaller nodules identified    Subcarinal lymph node 2 cm  No other findings suspicious for metastatic disease  August 5, 2022 PET/CT showed 1 2 cm pulmonary nodule, SUV 2 9  Other nodules noted, subcentimeter, no hypermetabolism  Some groundglass opacities in the right upper lobe  September 13, 2022 patient had needle biopsy of the right lower lobe mass showing squamous cell carcinoma, TTF-1 positive  November 16, 2022 patient underwent right lower lobe segmentectomy  Pathology showed 1 8 cm squamous cell carcinoma  Level 4 and 11 lymph node were positive for metastatic carcinoma  1/30/2023 - 3/27/2023 Chemotherapy    palonosetron (ALOXI), 0 25 mg, Intravenous, Once, 4 of 4 cycles  Administration: 0 25 mg (1/30/2023), 0 25 mg (2/13/2023), 0 25 mg (3/6/2023), 0 25 mg (3/27/2023)  fosaprepitant (EMEND) IVPB, 150 mg, Intravenous, Once, 3 of 3 cycles  Administration: 150 mg (1/30/2023), 150 mg (2/13/2023), 150 mg (3/6/2023)  CARBOplatin (PARAPLATIN) IVPB (GOG AUC DOSING), 552 mg, Intravenous, Once, 4 of 4 cycles  Administration: 550 mg (1/30/2023), 650 mg (2/13/2023), 650 mg (3/6/2023), 500 mg (3/27/2023)  PACLItaxel (TAXOL) chemo IVPB, 349 8 mg, Intravenous, Once, 4 of 4 cycles  Dose modification: 200 mg/m2 (original dose 175 mg/m2, Cycle 2, Reason: Dose modified as per discussion with consulting physician)  Administration: 360 mg (1/30/2023), 400 mg (2/13/2023), 400 mg (3/6/2023), 400 mg (3/27/2023)  aprepitant (CINVANTI) in  mL IVPB, 130 mg, Intravenous, Once, 1 of 1 cycle  Administration: 130 mg (3/27/2023)         Interval History: Clinically stable  ECOG-  1 - Symptomatic but completely ambulatory    Review of Systems   Constitutional: Positive for fatigue  Negative for chills and fever  HENT: Negative for nosebleeds  Eyes: Negative for discharge  Respiratory: Negative for cough and shortness of breath  Cardiovascular: Negative for chest pain  Gastrointestinal: Negative for abdominal pain, constipation and diarrhea     Endocrine: Negative for polydipsia  Genitourinary: Negative for hematuria  Musculoskeletal: Negative for arthralgias  Skin: Negative for color change  Allergic/Immunologic: Negative for immunocompromised state  Neurological: Positive for weakness and numbness  Negative for dizziness and headaches  Hematological: Negative for adenopathy  Psychiatric/Behavioral: Negative for agitation  Past Medical History:   Diagnosis Date   • Abdominal aortic aneurysm without rupture    • Abdominal Aortic Duplex 02/21/2017    Ectatic infrarenal abdominal aorta  • MARYANN (acute kidney injury) ([place] [address] ) 07/23/2022   • CAD (coronary artery disease)    • Cancer ([place] [address] ) 2022    right lung CA   • COPD (chronic obstructive pulmonary disease) ([facility])    • Extremity pain    • History of echocardiogram 03/18/2014    EF 55%, mild MR and AI  Mild concentric LVH     • History of stress test 03/06/2017    Normal    • Hyperlipidemia    • Hypertension    • Joint pain    • Kidney stone    • Low back pain    • Lung cancer ([facility])    • Migraine    • Neck pain    • Obstructive sleep apnea     cannot tolerate CPAP   • Osteoarthritis    • Peripheral neuropathy    • Reflex sympathetic dystrophy    • Sacroiliitis ([place] [address] ) 02/02/2022     Patient Active Problem List   Diagnosis   • JAKI (obstructive sleep apnea)   • Chronic bronchitis ([facility])   • Abdominal aortic aneurysm (AAA) without rupture   • Lumbar spondylosis   • Lumbar degenerative disc disease   • Myofascial pain syndrome   • Chronic low back pain without sciatica   • Cervical radiculopathy   • Cervical spondylosis without myelopathy   • Tremor, essential   • Negative depression screening   • Chronic pain of both knees   • Class 1 obesity due to excess calories with serious comorbidity and body mass index (BMI) of 31 0 to 31 9 in adult   • Smoking   • Hypertension   • Chronic pain syndrome   • Uncomplicated opioid dependence ([place] [address] )   • Encounter for long-term opiate analgesic use   • Neck pain   • Hyperlipidemia   • Pre-diabetes   • Erectile dysfunction   • Insomnia   • Cortical age-related cataract of both eyes   • Urinary frequency   • 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)   • Kidney stone   • Pulmonary emphysema (HCC)   • Malignant neoplasm of lower lobe of right lung (HCC)   • Encounter for smoking cessation counseling   • Hemiparesis of left dominant side due to non-cerebrovascular etiology ([facility] [address] )   • At high risk for osteoporosis   • Coronary artery disease of native artery of native heart with stable angina pectoris (HCC)   • Stage 3a chronic kidney disease (HCC)   • Night sweats   • Other chronic pancreatitis (HCC)   • Fever       Current Outpatient Medications:   •  al mag oxide-diphenhydramine-lidocaine viscous (MAGIC MOUTHWASH) 1:1:1 suspension, Swish and spit 10 mL every 4 (four) hours as needed for mouth pain or discomfort, Disp: 90 mL, Rfl: 0  •  amLODIPine (NORVASC) 10 mg tablet, swallow 1 tablet once daily (Patient taking differently: daily at bedtime), Disp: 90 tablet, Rfl: 3  •  bisacodyl (DULCOLAX) 5 mg EC tablet, Take 1 tablet (5 mg total) by mouth daily as needed for constipation for up to 4 doses, Disp: 30 tablet, Rfl: 0  •  buPROPion (Wellbutrin XL) 150 mg 24 hr tablet, Take 1 tablet (150 mg total) by mouth every morning, Disp: 90 tablet, Rfl: 3  •  docusate sodium (COLACE) 100 mg capsule, Take 1 capsule (100 mg total) by mouth 2 (two) times a day (Patient taking differently: Take 100 mg by mouth if needed), Disp: 20 capsule, Rfl: 0  •  fluticasone (FLONASE) 50 mcg/act nasal spray, 1 spray into each nostril daily for 14 days (Patient taking differently: 1 spray into each nostril if needed), Disp: 11 1 mL, Rfl: 0  •  gabapentin (NEURONTIN) 800 mg tablet, Take 1 tablet (800 mg total) by mouth daily, Disp: 90 tablet, Rfl: 1  •  glucose blood (OneTouch Verio) test strip, Test once daily, Disp: 100 each, Rfl: 0  •  ipratropium-albuterol (DUO-NEB) 0 5-2 5 mg/3 mL nebulizer solution, inhale contents of 1 vial in nebulizer every 6 hours if needed for wheezing or shortness of breath (Patient not taking: Reported on 12/27/2022), Disp: , Rfl:   •  lisinopril (ZESTRIL) 20 mg tablet, take 1 tablet by mouth once daily, Disp: 30 tablet, Rfl: 5  •  methylPREDNISolone 4 MG tablet therapy pack, Use as directed on package (Patient not taking: Reported on 1/30/2023), Disp: 21 each, Rfl: 0  •  metoprolol succinate (TOPROL-XL) 100 mg 24 hr tablet, take 1 tablet by mouth once daily (Patient taking differently: daily at bedtime), Disp: 30 tablet, Rfl: 5  •  nicotine (NICODERM CQ) 14 mg/24hr TD 24 hr patch, Place 1 patch on the skin every 24 hours, Disp: 28 patch, Rfl: 0  •  nicotine polacrilex (NICORETTE) 2 mg gum, Chew 1 each (2 mg total) as needed for smoking cessation (Patient not taking: Reported on 2/10/2023), Disp: 100 each, Rfl: 0  •  nitroglycerin (NITROSTAT) 0 4 mg SL tablet, Place 1 tablet (0 4 mg total) under the tongue every 5 (five) minutes as needed for chest pain (Patient not taking: Reported on 2/10/2023), Disp: 25 tablet, Rfl: 5  •  oxyCODONE-acetaminophen (Percocet) 7 5-325 MG per tablet, Take 1 tablet by mouth every 8 (eight) hours as needed for moderate pain Max Daily Amount: 3 tablets, Disp: 90 tablet, Rfl: 0  •  oxyCODONE-acetaminophen (Percocet) 7 5-325 MG per tablet, Take 1 tablet by mouth every 8 (eight) hours as needed for moderate pain Do not fill until 3/29/2023 Max Daily Amount: 3 tablets, Disp: 21 tablet, Rfl: 0  •  pantoprazole (PROTONIX) 20 mg tablet, take 1 tablet by mouth once daily, Disp: 30 tablet, Rfl: 0  •  polyethylene glycol (GOLYTELY) 4000 mL solution, Take 4,000 mL by mouth once for 1 dose, Disp: 4000 mL, Rfl: 0  •  polyethylene glycol (MIRALAX) 17 g packet, Take 17 g by mouth daily for 5 days (Patient not taking: Reported on 12/20/2022), Disp: 85 g, Rfl: 0  •  pregabalin (LYRICA) 25 mg capsule, Take 1 capsule (25 mg total) by mouth in the morning Take 1 capsule 25mg by mouth for 7 days Take 1 capsule (25mg) by mouth two times a day starting on day 8, Disp: 49 capsule, Rfl: 0  •  rosuvastatin (CRESTOR) 20 MG tablet, take 1 tablet by mouth once daily (Patient taking differently: daily at bedtime), Disp: 90 tablet, Rfl: 5  •  senna (SENOKOT) 8 6 mg, Take 1 tablet (8 6 mg total) by mouth daily (Patient not taking: Reported on 12/14/2022), Disp: 30 tablet, Rfl: 0  •  sildenafil (VIAGRA) 100 mg tablet, Take 1 tablet (100 mg total) by mouth daily as needed for erectile dysfunction, Disp: 20 tablet, Rfl: 0  •  tamsulosin (FLOMAX) 0 4 mg, Take 2 capsules (0 8 mg total) by mouth daily with dinner, Disp: 60 capsule, Rfl: 0  •  tiotropium-olodaterol (Stiolto Respimat) 2 5-2 5 MCG/ACT inhaler, Inhale 2 puffs daily (Patient taking differently: Inhale 2 puffs if needed), Disp: 4 g, Rfl: 11  •  topiramate (TOPAMAX) 25 mg tablet, FOLLOW INSTRUCTIONS GIVEN TO TITRATE UP TO A MAX OF 2 TABLETS BY MOUTH TWICE DAILY AS TOLERATED AND TO LEVEL OF BENEFIT FOR TREMORS (Patient not taking: Reported on 12/27/2022), Disp: 120 tablet, Rfl: 6  •  traZODone (DESYREL) 100 mg tablet, take 1 tablet by mouth daily at bedtime, Disp: 90 tablet, Rfl: 3  No current facility-administered medications for this visit  Facility-Administered Medications Ordered in Other Visits:   •  alteplase (CATHFLO) injection 2 mg, 2 mg, Intracatheter, Q2H PRN, Cb Negro, DO  No Known Allergies  Past Surgical History:   Procedure Laterality Date   • CARDIAC CATHETERIZATION  02/13/2012    EF 70%, widely patent renal arteries, significant single-vessel CAD-medical therapy  • CARDIAC CATHETERIZATION  04/11/2013    EF 65%, 50% mid LAD, 20% prox CFX, 90% diffuse RCA, 99% mid RCA  Medical management     • CHOLECYSTECTOMY     • COLONOSCOPY     • EPIDURAL BLOCK INJECTION Bilateral 08/15/2019    Procedure: BLOCK / INJECTION EPIDURAL STEROID CERVICAL;  Surgeon: Alicia Hilton MD;  Location: [facility]; "Service: Pain Management    • FL GUIDED NEEDLE PLAC BX/ASP/INJ  08/15/2019   • IR BIOPSY LUNG  09/13/2022   • IR THORACIC DUCT EMBOLIZATION  11/22/2022   • ORTHOPEDIC SURGERY     • ME [address] INCL FLUOR GDNCE DX W/CELL WASHG SPX N/A 11/16/2022    Procedure: BRONCHOSCOPY FLEXIBLE;  Surgeon: Rusty Hale MD;  Location: [facility];  Service: Thoracic   • ME THORACOSCOPY W/LOBECTOMY SINGLE LOBE Right 11/16/2022    Procedure: LOBECTOMY LUNG; lower lobe superior segmentectomy, mediastinal lymph node dissection;  Surgeon: Rusty Hale MD;  Location: [facility];  Service: Thoracic   • ME THORACOSCOPY W/SEGMENTECTOMY Right 11/16/2022    Procedure: THORACOSCOPY VIDEO ASSISTED SURGERY (VATS); Surgeon: Rusty Hale MD;  Location: [facility];  Service: Thoracic   • TRIGGER POINT INJECTION       Social History     Objective:  Vitals:    03/28/23 1119   BP: 128/76   BP Location: Left arm   Patient Position: Sitting   Cuff Size: Standard   Pulse: 81   Temp: 97 5 °F (36 4 °C)   TempSrc: Tympanic   SpO2: 98%   Weight: 86 2 kg (190 lb)   Height: 5' 7 99\" (1 727 m)     Physical Exam      Labs: I personally reviewed the labs and imaging pertinent to this patient care    "

## 2023-03-30 DIAGNOSIS — I10 ESSENTIAL HYPERTENSION: ICD-10-CM

## 2023-03-30 RX ORDER — AMLODIPINE BESYLATE 10 MG/1
TABLET ORAL
Qty: 90 TABLET | Refills: 3 | Status: SHIPPED | OUTPATIENT
Start: 2023-03-30

## 2023-04-04 DIAGNOSIS — N52.9 ERECTILE DYSFUNCTION, UNSPECIFIED ERECTILE DYSFUNCTION TYPE: ICD-10-CM

## 2023-04-04 DIAGNOSIS — G62.0 DRUG-INDUCED POLYNEUROPATHY (HCC): Primary | ICD-10-CM

## 2023-04-04 RX ORDER — SILDENAFIL 100 MG/1
TABLET, FILM COATED ORAL
Qty: 20 TABLET | Refills: 0 | Status: SHIPPED | OUTPATIENT
Start: 2023-04-04

## 2023-04-04 RX ORDER — NORTRIPTYLINE HYDROCHLORIDE 25 MG/1
25 CAPSULE ORAL
Qty: 30 CAPSULE | Refills: 3 | Status: SHIPPED | OUTPATIENT
Start: 2023-04-04

## 2023-04-04 NOTE — TELEPHONE ENCOUNTER
Bedside shift change report given to Jose Arceo (oncoming nurse) by Melissa Jacobo (offgoing nurse). Report included the following information SBAR.     Jaime Kapadia with NP Ivette Laureano Ma NP informed to notify [address] pt is not to receive no more radiation per family and pt request, also informed her of increase nausea NP to schedule antiemetics medication for pt    [phone] attempted to notify RAD/ONC department gone for the day, pt not to receive radiation will pass info on to next nurse. Spoke with Destini Galvan and pt stated he needs auth for Nortriptyline  Email sent to oncology finance  Awaiting their reply  Pt is aware that the auth may take a few days  Will update pt once we hear a reply, and discuss with Dr Soledad Daniels

## 2023-04-05 ENCOUNTER — OFFICE VISIT (OUTPATIENT)
Dept: PAIN MEDICINE | Facility: CLINIC | Age: 68
End: 2023-04-05

## 2023-04-05 VITALS
SYSTOLIC BLOOD PRESSURE: 123 MMHG | HEIGHT: 67 IN | BODY MASS INDEX: 29.82 KG/M2 | HEART RATE: 69 BPM | WEIGHT: 190 LBS | DIASTOLIC BLOOD PRESSURE: 71 MMHG

## 2023-04-05 DIAGNOSIS — F11.20 UNCOMPLICATED OPIOID DEPENDENCE (HCC): ICD-10-CM

## 2023-04-05 DIAGNOSIS — M54.2 NECK PAIN: ICD-10-CM

## 2023-04-05 DIAGNOSIS — M54.50 CHRONIC MIDLINE LOW BACK PAIN WITHOUT SCIATICA: ICD-10-CM

## 2023-04-05 DIAGNOSIS — M47.812 CERVICAL SPONDYLOSIS WITHOUT MYELOPATHY: ICD-10-CM

## 2023-04-05 DIAGNOSIS — Z79.891 ENCOUNTER FOR LONG-TERM OPIATE ANALGESIC USE: ICD-10-CM

## 2023-04-05 DIAGNOSIS — G89.4 CHRONIC PAIN SYNDROME: Primary | ICD-10-CM

## 2023-04-05 DIAGNOSIS — M54.12 CERVICAL RADICULOPATHY: ICD-10-CM

## 2023-04-05 DIAGNOSIS — M51.36 LUMBAR DEGENERATIVE DISC DISEASE: ICD-10-CM

## 2023-04-05 DIAGNOSIS — G89.29 CHRONIC MIDLINE LOW BACK PAIN WITHOUT SCIATICA: ICD-10-CM

## 2023-04-05 DIAGNOSIS — M47.816 LUMBAR SPONDYLOSIS: ICD-10-CM

## 2023-04-05 DIAGNOSIS — M79.18 MYOFASCIAL PAIN SYNDROME: ICD-10-CM

## 2023-04-05 RX ORDER — OXYCODONE AND ACETAMINOPHEN 7.5; 325 MG/1; MG/1
1 TABLET ORAL EVERY 8 HOURS PRN
Qty: 90 TABLET | Refills: 0 | Status: SHIPPED | OUTPATIENT
Start: 2023-04-05

## 2023-04-05 NOTE — PATIENT INSTRUCTIONS

## 2023-04-05 NOTE — PROGRESS NOTES
Assessment:  1  Chronic pain syndrome    2  Chronic midline low back pain without sciatica    3  Lumbar degenerative disc disease    4  Lumbar spondylosis    5  Neck pain    6  Cervical radiculopathy    7  Cervical spondylosis without myelopathy    8  Myofascial pain syndrome    9  Encounter for long-term opiate analgesic use    10  Uncomplicated opioid dependence ([address] )        Plan:  While the patient was in the office today, I did have a thorough conversation regarding their chronic pain syndrome, medication management, and treatment plan options  Patient is being seen for a follow-up visit  Patient recently finished a round of chemotherapy for lung cancer  He tells me that he is now experiencing neuropathy in his hands and feet  His oncologist discontinued gabapentin and started him on Lyrica  It looks like it was discontinued after only taking 25 mg twice daily which is a very subtherapeutic dose of this medication  We discussed possibility of restarting Lyrica and titrating to a therapeutic dose in the future  His oncologist just prescribed nortriptyline 25 mg at bedtime  He has not even started it yet  Continue oxycodone 7 5/325 every 8 hours as needed for pain  The patient's opioid scripts were sent to their pharmacy electronically and was given a 2 month supply of prescriptions with a Do Not Fill date(s) of 4/5/2023, 5/3/2023    [place] Prescription Drug Monitoring Program report was reviewed and was appropriate     There are risks associated with opioid medications, including dependence, addiction and tolerance  The patient understands and agrees to use these medications only as prescribed  Potential side effects of the medications include, but are not limited to, constipation, drowsiness, addiction, impaired judgment and risk of fatal overdose if not taken as prescribed  The patient was warned against driving while taking sedation medications  Sharing medications is a felony   At this point in time, the patient is showing no signs of addiction, abuse, diversion or suicidal ideation  The patient will follow-up in 8 weeks for medication prescription refill and reevaluation  The patient was advised to contact the office should their symptoms worsen in the interim  The patient was agreeable and verbalized an understanding  History of Present Illness: The patient is a 75 y o  male last seen on 2/1/2023 who presents for a follow up office visit in regards to chronic pain secondary to chronic pain syndrome, neck pain, cervical radiculopathy, cervical spondylosis, chronic low back pain, myofascial pain syndrome  The patient currently reports complaints of neck pain, low back pain, pain in both hands and feet  Current pain level is a 6/10  Quality pain is described as dull, aching, sharp, numb, pins-and-needles  Current pain medications includes: Oxycodone 7 5/325 every 8 hours as needed for pain  Oncologist recently prescribed nortriptyline 25 mg at bedtime  He did not start it yet     The patient reports that this regimen is providing 50% pain relief  The patient is reporting no side effects from this pain medication regimen  Pain Contract Signed: 6/6/22  Last Urine Drug Screen: 2/1/23    I have personally reviewed and/or updated the patient's past medical history, past surgical history, family history, social history, current medications, allergies, and vital signs today  Review of Systems:    Review of Systems   Constitutional: Negative for unexpected weight change  HENT: Negative for hearing loss  Eyes: Negative for visual disturbance  Respiratory: Positive for shortness of breath  Cardiovascular: Negative for leg swelling  Gastrointestinal: Positive for nausea  Negative for constipation  Endocrine: Negative for polyuria  Genitourinary: Negative for difficulty urinating  Musculoskeletal: Positive for gait problem   Negative for joint swelling and myalgias  Decreased range of motion  Joint stiffness   Skin: Negative for rash  Neurological: Positive for dizziness  Negative for weakness and headaches  Psychiatric/Behavioral: Negative for decreased concentration  All other systems reviewed and are negative  Past Medical History:   Diagnosis Date   • Abdominal aortic aneurysm without rupture (HCC)    • Abdominal Aortic Duplex 02/21/2017    Ectatic infrarenal abdominal aorta  • MARYANN (acute kidney injury) ([facility] ) 07/23/2022   • CAD (coronary artery disease)    • Cancer ([facility] ) 2022    right lung CA   • COPD (chronic obstructive pulmonary disease) (HCC)    • Extremity pain    • History of echocardiogram 03/18/2014    EF 55%, mild MR and AI  Mild concentric LVH  • History of stress test 03/06/2017    Normal    • Hyperlipidemia    • Hypertension    • Joint pain    • Kidney stone    • Low back pain    • Lung cancer (HCC)    • Migraine    • Neck pain    • Obstructive sleep apnea     cannot tolerate CPAP   • Osteoarthritis    • Peripheral neuropathy    • Reflex sympathetic dystrophy    • Sacroiliitis ([facility] ) 02/02/2022       Past Surgical History:   Procedure Laterality Date   • CARDIAC CATHETERIZATION  02/13/2012    EF 70%, widely patent renal arteries, significant single-vessel CAD-medical therapy  • CARDIAC CATHETERIZATION  04/11/2013    EF 65%, 50% mid LAD, 20% prox CFX, 90% diffuse RCA, 99% mid RCA  Medical management     • CHOLECYSTECTOMY     • COLONOSCOPY     • EPIDURAL BLOCK INJECTION Bilateral 08/15/2019    Procedure: BLOCK / INJECTION EPIDURAL STEROID CERVICAL;  Surgeon: Martina Diaz MD;  Location: [facility];  Service: Pain Management    • FL GUIDED NEEDLE PLAC BX/ASP/INJ  08/15/2019   • IR BIOPSY LUNG  09/13/2022   • IR THORACIC DUCT EMBOLIZATION  11/22/2022   • ORTHOPEDIC SURGERY     • CO [address] INCL FLUOR GDNCE DX W/CELL WASHG 100 [facility] N/A 11/16/2022    Procedure: [address] [facility];  Surgeon: Lucia Finch MD; Location: [facility];  Service: Thoracic   • IN THORACOSCOPY W/LOBECTOMY SINGLE LOBE Right 2022    Procedure: LOBECTOMY LUNG; lower lobe superior segmentectomy, mediastinal lymph node dissection;  Surgeon: Williams Smith MD;  Location: [facility];  Service: Thoracic   • IN THORACOSCOPY W/SEGMENTECTOMY Right 2022    Procedure: THORACOSCOPY VIDEO ASSISTED SURGERY (VATS);   Surgeon: Williams Smith MD;  Location: [facility];  Service: Thoracic   • TRIGGER POINT INJECTION         Family History   Adopted: Yes   Problem Relation Age of Onset   • No Known Problems Family    • No Known Problems Mother    • No Known Problems Father        Social History     Occupational History   • Not on file   Tobacco Use   • Smoking status: Former     Packs/day: 1 50     Types: Cigarettes     Quit date: 11/3/2022     Years since quittin 4   • Smokeless tobacco: Never   Vaping Use   • Vaping Use: Never used   Substance and Sexual Activity   • Alcohol use: Not Currently   • Drug use: Never   • Sexual activity: Yes         Current Outpatient Medications:   •  amLODIPine (NORVASC) 10 mg tablet, take 1 tablet by mouth once daily, Disp: 90 tablet, Rfl: 3  •  bisacodyl (DULCOLAX) 5 mg EC tablet, Take 1 tablet (5 mg total) by mouth daily as needed for constipation for up to 4 doses, Disp: 30 tablet, Rfl: 0  •  buPROPion (Wellbutrin XL) 150 mg 24 hr tablet, Take 1 tablet (150 mg total) by mouth every morning, Disp: 90 tablet, Rfl: 3  •  glucose blood (OneTouch Verio) test strip, Test once daily, Disp: 100 each, Rfl: 0  •  lisinopril (ZESTRIL) 20 mg tablet, take 1 tablet by mouth once daily, Disp: 30 tablet, Rfl: 5  •  metoprolol succinate (TOPROL-XL) 100 mg 24 hr tablet, take 1 tablet by mouth once daily (Patient taking differently: daily at bedtime), Disp: 30 tablet, Rfl: 5  •  nortriptyline (PAMELOR) 25 mg capsule, Take 1 capsule (25 mg total) by mouth daily at bedtime, Disp: 30 capsule, Rfl: 3  • oxyCODONE-acetaminophen (Percocet) 7 5-325 MG per tablet, Take 1 tablet by mouth every 8 (eight) hours as needed for moderate pain Do not fill until 5/3/2023 Max Daily Amount: 3 tablets, Disp: 90 tablet, Rfl: 0  •  oxyCODONE-acetaminophen (Percocet) 7 5-325 MG per tablet, Take 1 tablet by mouth every 8 (eight) hours as needed for moderate pain Max Daily Amount: 3 tablets, Disp: 90 tablet, Rfl: 0  •  pantoprazole (PROTONIX) 20 mg tablet, take 1 tablet by mouth once daily, Disp: 30 tablet, Rfl: 0  •  sildenafil (VIAGRA) 100 mg tablet, TAKE 1 TABLET BY MOUTH ONCE DAILY AS NEEDED FOR ERECTILE DYSFUNCTION, Disp: 20 tablet, Rfl: 0  •  tamsulosin (FLOMAX) 0 4 mg, Take 2 capsules (0 8 mg total) by mouth daily with dinner, Disp: 60 capsule, Rfl: 0  •  traZODone (DESYREL) 100 mg tablet, take 1 tablet by mouth daily at bedtime, Disp: 90 tablet, Rfl: 3  •  al mag oxide-diphenhydramine-lidocaine viscous (MAGIC MOUTHWASH) 1:1:1 suspension, Swish and spit 10 mL every 4 (four) hours as needed for mouth pain or discomfort (Patient not taking: Reported on 3/28/2023), Disp: 90 mL, Rfl: 0  •  docusate sodium (COLACE) 100 mg capsule, Take 1 capsule (100 mg total) by mouth 2 (two) times a day (Patient not taking: Reported on 4/5/2023), Disp: 20 capsule, Rfl: 0  •  fluticasone (FLONASE) 50 mcg/act nasal spray, 1 spray into each nostril daily for 14 days (Patient taking differently: 1 spray into each nostril if needed), Disp: 11 1 mL, Rfl: 0  •  ipratropium-albuterol (DUO-NEB) 0 5-2 5 mg/3 mL nebulizer solution, inhale contents of 1 vial in nebulizer every 6 hours if needed for wheezing or shortness of breath (Patient not taking: Reported on 12/27/2022), Disp: , Rfl:   •  methylPREDNISolone 4 MG tablet therapy pack, Use as directed on package (Patient not taking: Reported on 1/30/2023), Disp: 21 each, Rfl: 0  •  nicotine (NICODERM CQ) 14 mg/24hr TD 24 hr patch, Place 1 patch on the skin every 24 hours, Disp: 28 patch, Rfl: 0  •  nicotine "polacrilex (NICORETTE) 2 mg gum, Chew 1 each (2 mg total) as needed for smoking cessation (Patient not taking: Reported on 4/5/2023), Disp: 100 each, Rfl: 0  •  nitroglycerin (NITROSTAT) 0 4 mg SL tablet, Place 1 tablet (0 4 mg total) under the tongue every 5 (five) minutes as needed for chest pain (Patient not taking: Reported on 2/10/2023), Disp: 25 tablet, Rfl: 5  •  polyethylene glycol (GOLYTELY) 4000 mL solution, Take 4,000 mL by mouth once for 1 dose, Disp: 4000 mL, Rfl: 0  •  polyethylene glycol (MIRALAX) 17 g packet, Take 17 g by mouth daily for 5 days (Patient not taking: Reported on 12/20/2022), Disp: 85 g, Rfl: 0  •  rosuvastatin (CRESTOR) 20 MG tablet, take 1 tablet by mouth once daily (Patient taking differently: daily at bedtime), Disp: 90 tablet, Rfl: 5  •  senna (SENOKOT) 8 6 mg, Take 1 tablet (8 6 mg total) by mouth daily (Patient not taking: Reported on 12/14/2022), Disp: 30 tablet, Rfl: 0  •  tiotropium-olodaterol (Stiolto Respimat) 2 5-2 5 MCG/ACT inhaler, Inhale 2 puffs daily (Patient taking differently: Inhale 2 puffs if needed), Disp: 4 g, Rfl: 11    No Known Allergies    Physical Exam:    /71   Pulse 69   Ht 5' 7\" (1 702 m)   Wt 86 2 kg (190 lb)   BMI 29 76 kg/m²     Constitutional:normal, well developed, well nourished, alert, in no distress and non-toxic and no overt pain behavior  Eyes:anicteric  HEENT:grossly intact  Neck:supple, symmetric, trachea midline and no masses   Pulmonary:even and unlabored  Cardiovascular:No edema or pitting edema present  Skin:Normal without rashes or lesions and well hydrated  Psychiatric:Mood and affect appropriate  Neurologic:Cranial Nerves II-XII grossly intact  Musculoskeletal:normal      Imaging  No orders to display         No orders of the defined types were placed in this encounter      "

## 2023-04-11 PROBLEM — R50.9 FEVER: Status: RESOLVED | Noted: 2023-02-10 | Resolved: 2023-04-11

## 2023-04-29 DIAGNOSIS — I10 ESSENTIAL HYPERTENSION: ICD-10-CM

## 2023-05-01 RX ORDER — METOPROLOL SUCCINATE 100 MG/1
TABLET, EXTENDED RELEASE ORAL
Qty: 30 TABLET | Refills: 5 | Status: SHIPPED | OUTPATIENT
Start: 2023-05-01

## 2023-05-08 ENCOUNTER — OFFICE VISIT (OUTPATIENT)
Dept: CARDIOLOGY CLINIC | Facility: CLINIC | Age: 68
End: 2023-05-08

## 2023-05-08 VITALS
WEIGHT: 187 LBS | HEART RATE: 80 BPM | DIASTOLIC BLOOD PRESSURE: 80 MMHG | SYSTOLIC BLOOD PRESSURE: 140 MMHG | HEIGHT: 67 IN | BODY MASS INDEX: 29.35 KG/M2

## 2023-05-08 DIAGNOSIS — I10 ESSENTIAL HYPERTENSION: ICD-10-CM

## 2023-05-08 DIAGNOSIS — I95.1 ORTHOSTATIC HYPOTENSION: Primary | ICD-10-CM

## 2023-05-08 DIAGNOSIS — I25.118 CORONARY ARTERY DISEASE OF NATIVE ARTERY OF NATIVE HEART WITH STABLE ANGINA PECTORIS (HCC): ICD-10-CM

## 2023-05-08 RX ORDER — LISINOPRIL 10 MG/1
10 TABLET ORAL DAILY
Qty: 90 TABLET | Refills: 3 | Status: SHIPPED | OUTPATIENT
Start: 2023-05-08

## 2023-05-08 RX ORDER — LISINOPRIL 20 MG/1
10 TABLET ORAL DAILY
Qty: 90 TABLET | Refills: 3
Start: 2023-05-08 | End: 2023-05-08

## 2023-05-08 RX ORDER — TAMSULOSIN HYDROCHLORIDE 0.4 MG/1
0.4 CAPSULE ORAL
Qty: 60 CAPSULE | Refills: 0 | Status: SHIPPED | OUTPATIENT
Start: 2023-05-08

## 2023-05-08 NOTE — PATIENT INSTRUCTIONS
Dizziness   WHAT YOU NEED TO KNOW:   What is dizziness? Dizziness is a feeling of being off balance or unsteady  Common causes of dizziness are an inner ear fluid imbalance or a lack of oxygen in your blood  Dizziness may be acute (lasts 3 days or less) or chronic (lasts longer than 3 days)  You may have dizzy spells that last from seconds to a few hours  What increases my risk for dizziness? Dizziness may get worse during certain activities or when you move a certain way  The following may also increase your risk for dizziness:  Older age    An infection, ear surgery, or an inner ear condition, such as Ménière disease    Stroke, a brain tumor, or a recent head trauma     An injury that causes a large amount of blood loss    Heart or blood pressure problems     Exposure to chemicals, or long-term alcohol use     Medicines used to treat high blood pressure, seizure disorders, or anxiety and depression     A nerve disorder, such as multiple sclerosis    What signs and symptoms may happen with dizziness? A feeling that your surroundings are moving even though you are standing still    Ringing in your ears or hearing loss     Feeling faint or lightheaded     Weakness or unsteadiness     Double vision or eye movements you cannot control    Nausea or vomiting     Confusion    How is the cause of dizziness diagnosed? Your healthcare provider may ask when the dizziness started  Tell the provider if you have dizzy spells, and how long they last  Tell the provider what happens before you become dizzy  The provider will ask if you have other health conditions and if you take any medicines  The provider will check your blood pressure and pulse to see if your dizziness may be related to your heart  Your balance, strength, reflexes, and the way you walk may also be checked  You may need any of the following tests to help find the cause of your dizziness: An EKG  records the electrical activity of your heart   An EKG can be used to check for an abnormal heart beat or heart damage  Blood tests  will check your blood sugar level, infection, and your blood cell levels  CT or MRI  pictures check for a stroke, head injury, or brain tumor  They also check for brain bleeding or swelling  You may be given contrast liquid to help your brain show up better in the pictures  Tell the healthcare provider if you have ever had an allergic reaction to contrast liquid  Do not enter the MRI room with anything metal  Metal can cause serious injury  Tell the healthcare provider if you have any metal in or on your body  How is dizziness treated? Treatment will depend on the cause of your dizziness  Your healthcare provider may give you oxygen or medicines to decrease your dizziness and nausea  Your provider may also refer you to a specialist  Raphael Hollins may need to be admitted to the hospital for treatment  How can I manage my symptoms? Do not drive  or operate heavy machinery when you are dizzy  Get up slowly  from sitting or lying down  Drink plenty of liquids  Liquids help prevent dehydration  Ask how much liquid to drink each day and which liquids are best for you  When should I seek immediate care? You have a headache and a stiff neck  You have shaking chills and a fever  You vomit over and over with no relief  Your vomit or bowel movements are red or black  You have pain in your chest, back, or abdomen  You have numbness, especially in your face, arms, or legs  You have trouble moving your arms or legs  You are confused  When should I contact my healthcare provider? You have a fever  Your symptoms do not get better with treatment  You have questions or concerns about your condition or care  CARE AGREEMENT:   You have the right to help plan your care  Learn about your health condition and how it may be treated   Discuss treatment options with your healthcare providers to decide what care you want to receive  You always have the right to refuse treatment  The above information is an  only  It is not intended as medical advice for individual conditions or treatments  Talk to your doctor, nurse or pharmacist before following any medical regimen to see if it is safe and effective for you  © Copyright [employer] 2022 Information is for End User's use only and may not be sold, redistributed or otherwise used for commercial purposes

## 2023-05-08 NOTE — PROGRESS NOTES
Subjective:        Patient ID: Ronal Dai is a 75 y o  male  Chief Complaint:  Amy Kramer is getting dizzy and lightheadedness upon standing, going up stairs, just weak and washed out  Voiding okay no problem there  On 2 Flomax at night  No other recent medication changes  He has completed chemotherapy  Awaiting follow-up scans  Feels he is eating and drinking okay, no significant weight loss  No chest pain no alarming shortness of breath no palpitations no presyncope or syncope  Vertigo is not happening at rest or with head position change  Sitting  systolic, standing 412 systolic  The following portions of the patient's history were reviewed and updated as appropriate: allergies, current medications, past family history, past medical history, past social history, past surgical history and problem list   Review of Systems   Constitutional: Negative for chills, diaphoresis, malaise/fatigue and weight gain  HENT: Negative for nosebleeds and stridor  Eyes: Negative for double vision, vision loss in left eye, vision loss in right eye and visual disturbance  Cardiovascular: Negative for chest pain, claudication, cyanosis, dyspnea on exertion, irregular heartbeat, leg swelling, near-syncope, orthopnea, palpitations, paroxysmal nocturnal dyspnea and syncope  Respiratory: Negative for cough, shortness of breath, snoring and wheezing  Endocrine: Negative for polydipsia, polyphagia and polyuria  Hematologic/Lymphatic: Negative for bleeding problem  Does not bruise/bleed easily  Skin: Negative for flushing and rash  Musculoskeletal: Negative for falls and myalgias  Gastrointestinal: Negative for abdominal pain, heartburn, hematemesis, hematochezia, melena and nausea  Genitourinary: Negative for hematuria  Neurological: Positive for light-headedness and vertigo  Negative for brief paralysis, dizziness, focal weakness, headaches and loss of balance     Psychiatric/Behavioral: "Negative for altered mental status and substance abuse  Allergic/Immunologic: Negative for hives  Objective:      /80   Pulse 80   Ht 5' 7\" (1 702 m)   Wt 84 8 kg (187 lb)   BMI 29 29 kg/m²   Physical Exam  Constitutional:       General: He is not in acute distress  Appearance: Normal appearance  He is well-developed  He is not ill-appearing, toxic-appearing or diaphoretic  HENT:      Head: Normocephalic and atraumatic  Mouth/Throat:      Mouth: Mucous membranes are moist    Eyes:      General: No scleral icterus  Pupils: Pupils are equal, round, and reactive to light  Neck:      Thyroid: No thyromegaly  Vascular: No carotid bruit or JVD  Cardiovascular:      Rate and Rhythm: Normal rate and regular rhythm  Heart sounds: Normal heart sounds  No murmur heard  No friction rub  No gallop  Pulmonary:      Effort: Pulmonary effort is normal  No respiratory distress  Breath sounds: Normal breath sounds  No stridor  No wheezing or rales  Abdominal:      General: Bowel sounds are normal  There is no distension  Palpations: Abdomen is soft  There is no mass  Tenderness: There is no abdominal tenderness  Musculoskeletal:      Cervical back: Normal range of motion and neck supple  Right lower leg: No edema  Left lower leg: No edema  Skin:     General: Skin is warm and dry  Coloration: Skin is not pale  Findings: No erythema  Neurological:      Mental Status: He is alert and oriented to person, place, and time        Coordination: Coordination normal    Psychiatric:         Mood and Affect: Mood normal          Behavior: Behavior normal          Lab Review:   Lab Requisition on 04/17/2023   Component Date Value   • Scan Result 04/17/2023 Molecular Test By [facility] for [facility] Profile    Appointment on 03/23/2023   Component Date Value   • WBC 03/23/2023 4 20 (L)    • RBC 03/23/2023 4 41    • Hemoglobin 03/23/2023 13 6    • Hematocrit " 03/23/2023 42 4    • MCV 03/23/2023 96    • MCH 03/23/2023 30 8    • MCHC 03/23/2023 32 1    • RDW 03/23/2023 15 2 (H)    • MPV 03/23/2023 9 7    • Platelets 55/10/0915 162    • nRBC 03/23/2023 0    • Neutrophils Relative 03/23/2023 45    • Immat GRANS % 03/23/2023 1    • Lymphocytes Relative 03/23/2023 39    • Monocytes Relative 03/23/2023 14 (H)    • Eosinophils Relative 03/23/2023 0    • Basophils Relative 03/23/2023 1    • Neutrophils Absolute 03/23/2023 1 95    • Immature Grans Absolute 03/23/2023 0 02    • Lymphocytes Absolute 03/23/2023 1 63    • Monocytes Absolute 03/23/2023 0 57    • Eosinophils Absolute 03/23/2023 0 00    • Basophils Absolute 03/23/2023 0 03    • Sodium 03/23/2023 135    • Potassium 03/23/2023 4 5    • Chloride 03/23/2023 101    • CO2 03/23/2023 28    • ANION GAP 03/23/2023 6    • BUN 03/23/2023 20    • Creatinine 03/23/2023 1 25    • Glucose 03/23/2023 148 (H)    • Calcium 03/23/2023 9 3    • AST 03/23/2023 14    • ALT 03/23/2023 16    • Alkaline Phosphatase 03/23/2023 81    • Total Protein 03/23/2023 6 8    • Albumin 03/23/2023 4 3    • Total Bilirubin 03/23/2023 0 66    • eGFR 03/23/2023 58      No results found  Assessment:       1  Orthostatic hypotension  tamsulosin (FLOMAX) 0 4 mg      2  Essential hypertension  lisinopril (ZESTRIL) 10 mg tablet    DISCONTINUED: lisinopril (ZESTRIL) 20 mg tablet      3  Coronary artery disease of native artery of native heart with stable angina pectoris ([facility] [address] )             Plan: We will decrease Flomax to 0 4 mg every evening, he will let urology or I know immediately if he has any difficulty urinating  We will decrease lisinopril to 10 mg a day, he requested new prescription difficult to break his 20s in half  Advised him to contact our office prior to his July scheduled follow-up visit if his symptoms worsen or do not improve within a week

## 2023-05-12 ENCOUNTER — OFFICE VISIT (OUTPATIENT)
Dept: HEMATOLOGY ONCOLOGY | Facility: CLINIC | Age: 68
End: 2023-05-12

## 2023-05-12 VITALS
HEART RATE: 67 BPM | DIASTOLIC BLOOD PRESSURE: 82 MMHG | BODY MASS INDEX: 28.72 KG/M2 | WEIGHT: 183 LBS | HEIGHT: 67 IN | OXYGEN SATURATION: 96 % | TEMPERATURE: 97.2 F | SYSTOLIC BLOOD PRESSURE: 142 MMHG

## 2023-05-12 DIAGNOSIS — C34.31 PRIMARY SQUAMOUS CELL CARCINOMA OF LOWER LOBE OF RIGHT LUNG (HCC): Primary | ICD-10-CM

## 2023-05-12 DIAGNOSIS — C34.31 MALIGNANT NEOPLASM OF LOWER LOBE OF RIGHT LUNG (HCC): ICD-10-CM

## 2023-05-12 DIAGNOSIS — R12 HEARTBURN: ICD-10-CM

## 2023-05-12 RX ORDER — PANTOPRAZOLE SODIUM 20 MG/1
TABLET, DELAYED RELEASE ORAL
Qty: 30 TABLET | Refills: 0 | Status: SHIPPED | OUTPATIENT
Start: 2023-05-12

## 2023-05-12 NOTE — LETTER
May 12, 2023     Parveen Ordoñez DO  [address] [id number]    Patient: Lito Guerrier   YOB: 1955   Date of Visit: 5/12/2023       Dear Dr Padmini Maya:    Thank you for referring Kris Echeverria to me for evaluation  Below are my notes for this consultation  If you have questions, please do not hesitate to call me  I look forward to following your patient along with you  Sincerely,        Kindra Kwon DO        CC: MD Jim Grayson DO Alexa Hampshire, DO  5/12/2023  9:44 AM  Incomplete  [facility] [address] [address]  [phone]    Heriberto Jednasra COLLIER,8/23/1921, [id number]  05/12/23    Discussion:   In summary, this is a 59-year-old male with a history of resected stage IIIa squamous cell carcinoma of the lung  PD-L1 1%, TMB high  He is due for CAT scan in the next few weeks  He has persistent and bothersome neuropathy of the feet greater than hands  He has been treated with gabapentin and nortriptyline without benefit  Lyrica is of questionable benefit  I asked him to discontinue Lyrica and observe  If pain increases, resumption of Lyrica is recommended  I also suggested alpha lipoic acid 1000 mg p o  daily trial for at least 3 weeks  At length today, we reviewed the potential utility of adjuvant immunotherapy  This decreases recurrence in the observed period of 3 years by some 20%  We reviewed potential toxicities including infusion reactions, immune related side effects such as colitis, dermatitis, hypothyroidism  We reviewed that other organs can be affected although less commonly  Side effects are generally reversible although in rare cases can be life-threatening  I asked him to consider the above and we will see him back in 1 month to move forward    Additionally, given his giovanny involvement adjuvant radiation therapy would be appropriate to consider  Lastly, he had orthostatic symptoms  Evaluated in cardiology 2 days ago  Lisinopril and Flomax were decreased  Patient has noted some improvement in the symptoms  I discussed the above with the patient  The patient  voiced understanding and agreement   ______________________________________________________________________    No chief complaint on file  HPI:  Oncology History   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 ([facility] [address] )   7/23/2022 Initial Diagnosis    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     12/6/2022 -  Cancer Staged    Staging form: Lung, AJCC 8th Edition  - Clinical: Stage IIIA (cT1b, cN2, cM0) - Signed by Storm Henning PA-C on 12/6/2022  Laterality: Right       Malignant neoplasm of lower lobe of right lung ([facility] )   9/13/2022 Initial Diagnosis    July 23, 2022 patient had CT chest ab pelvis regarding rule out AAA  This showed 1 1 cm right lower lobe pulmonary nodule new since prior study of November 2019  Some other smaller nodules identified  Subcarinal lymph node 2 cm  No other findings suspicious for metastatic disease  August 5, 2022 PET/CT showed 1 2 cm pulmonary nodule, SUV 2 9  Other nodules noted, subcentimeter, no hypermetabolism  Some groundglass opacities in the right upper lobe  September 13, 2022 patient had needle biopsy of the right lower lobe mass showing squamous cell carcinoma, TTF-1 positive  November 16, 2022 patient underwent right lower lobe segmentectomy  Pathology showed 1 8 cm squamous cell carcinoma  Level 4 and 11 lymph node were positive for metastatic carcinoma       1/30/2023 - 3/27/2023 Chemotherapy    palonosetron (ALOXI), 0 25 mg, Intravenous, Once, 4 of 4 cycles  Administration: 0 25 mg (1/30/2023), 0 25 mg (2/13/2023), 0 25 mg (3/6/2023), 0 25 mg (3/27/2023)  fosaprepitant (EMEND) IVPB, 150 mg, Intravenous, Once, 3 of 3 cycles  Administration: 150 mg (1/30/2023), 150 mg (2/13/2023), 150 mg (3/6/2023)  CARBOplatin (PARAPLATIN) IVPB ([facility] AUC DOSING), 552 mg, Intravenous, Once, 4 of 4 cycles  Administration: 550 mg (1/30/2023), 650 mg (2/13/2023), 650 mg (3/6/2023), 500 mg (3/27/2023)  PACLItaxel (TAXOL) chemo IVPB, 349 8 mg, Intravenous, Once, 4 of 4 cycles  Dose modification: 200 mg/m2 (original dose 175 mg/m2, Cycle 2, Reason: Dose modified as per discussion with consulting physician)  Administration: 360 mg (1/30/2023), 400 mg (2/13/2023), 400 mg (3/6/2023), 400 mg (3/27/2023)  aprepitant (CINVANTI) in  mL IVPB, 130 mg, Intravenous, Once, 1 of 1 cycle  Administration: 130 mg (3/27/2023)         Interval History: Clinically stable  ECOG-  1 - Symptomatic but completely ambulatory    Review of Systems   Constitutional: Negative for chills and fever  HENT: Negative for nosebleeds  Eyes: Negative for discharge  Respiratory: Negative for cough and shortness of breath  Cardiovascular: Negative for chest pain  Gastrointestinal: Negative for abdominal pain, constipation and diarrhea  Endocrine: Negative for polydipsia  Genitourinary: Negative for hematuria  Musculoskeletal: Negative for arthralgias  Skin: Negative for color change  Allergic/Immunologic: Negative for immunocompromised state  Neurological: Negative for dizziness and headaches  Hematological: Negative for adenopathy  Psychiatric/Behavioral: Negative for agitation  Past Medical History:   Diagnosis Date   • Abdominal aortic aneurysm without rupture (HCC)    • Abdominal Aortic Duplex 02/21/2017    Ectatic infrarenal abdominal aorta  • MARYANN (acute kidney injury) ([facility] [address] ) 07/23/2022   • CAD (coronary artery disease)    • Cancer ([facility] [address] [facility] ) 2022    right lung CA   • COPD (chronic obstructive pulmonary disease) (HCC)    • Extremity pain    • History of echocardiogram 03/18/2014    EF 55%, mild MR and AI  Mild concentric LVH     • History of stress test 03/06/2017    Normal    • Hyperlipidemia    • Hypertension    • Joint pain    • Kidney stone    • Low back pain    • Lung cancer (HCC)    • Migraine    • Neck pain    • Obstructive sleep apnea     cannot tolerate CPAP   • Osteoarthritis    • Peripheral neuropathy    • Reflex sympathetic dystrophy    • Sacroiliitis ([facility] ) 02/02/2022     Patient Active Problem List   Diagnosis   • JAKI (obstructive sleep apnea)   • Chronic bronchitis (HCC)   • Abdominal aortic aneurysm (AAA) without rupture   • Lumbar spondylosis   • Lumbar degenerative disc disease   • Myofascial pain syndrome   • Chronic low back pain without sciatica   • Cervical radiculopathy   • Cervical spondylosis without myelopathy   • Tremor, essential   • Negative depression screening   • Chronic pain of both knees   • Class 1 obesity due to excess calories with serious comorbidity and body mass index (BMI) of 31 0 to 31 9 in adult   • Smoking   • Hypertension   • Chronic pain syndrome   • Uncomplicated opioid dependence ([facility] )   • Encounter for long-term opiate analgesic use   • Neck pain   • Hyperlipidemia   • Pre-diabetes   • Erectile dysfunction   • Insomnia   • Cortical age-related cataract of both eyes   • Urinary frequency   • 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)   • Kidney stone   • Pulmonary emphysema (HCC)   • Malignant neoplasm of lower lobe of right lung (HCC)   • Encounter for smoking cessation counseling   • Hemiparesis of left dominant side due to non-cerebrovascular etiology ([facility] )   • At high risk for osteoporosis   • Coronary artery disease of native artery of native heart with stable angina pectoris (HCC)   • Stage 3a chronic kidney disease (HCC)   • Night sweats   • Other chronic pancreatitis (HCC)       Current Outpatient Medications:   •  amLODIPine (NORVASC) 10 mg tablet, take 1 tablet by mouth once daily, Disp: 90 tablet, Rfl: 3  •  bisacodyl (DULCOLAX) 5 mg EC tablet, Take 1 tablet (5 mg total) by mouth daily as needed for constipation for up to 4 doses, Disp: 30 tablet, Rfl: 0  •  glucose blood (OneTouch Verio) test strip, Test once daily, Disp: 100 each, Rfl: 0  •  lisinopril (ZESTRIL) 10 mg tablet, Take 1 tablet (10 mg total) by mouth daily, Disp: 90 tablet, Rfl: 3  •  metoprolol succinate (TOPROL-XL) 100 mg 24 hr tablet, take 1 tablet by mouth once daily, Disp: 30 tablet, Rfl: 5  •  nitroglycerin (NITROSTAT) 0 4 mg SL tablet, Place 1 tablet (0 4 mg total) under the tongue every 5 (five) minutes as needed for chest pain, Disp: 25 tablet, Rfl: 5  •  oxyCODONE-acetaminophen (Percocet) 7 5-325 MG per tablet, Take 1 tablet by mouth every 8 (eight) hours as needed for moderate pain Do not fill until 5/3/2023 Max Daily Amount: 3 tablets, Disp: 90 tablet, Rfl: 0  •  rosuvastatin (CRESTOR) 20 MG tablet, take 1 tablet by mouth once daily (Patient taking differently: daily at bedtime), Disp: 90 tablet, Rfl: 5  •  sildenafil (VIAGRA) 100 mg tablet, TAKE 1 TABLET BY MOUTH ONCE DAILY AS NEEDED FOR ERECTILE DYSFUNCTION, Disp: 20 tablet, Rfl: 0  •  tamsulosin (FLOMAX) 0 4 mg, Take 1 capsule (0 4 mg total) by mouth daily with dinner, Disp: 60 capsule, Rfl: 0  •  tiotropium-olodaterol (Stiolto Respimat) 2 5-2 5 MCG/ACT inhaler, Inhale 2 puffs daily (Patient taking differently: Inhale 2 puffs if needed), Disp: 4 g, Rfl: 11  •  traZODone (DESYREL) 100 mg tablet, take 1 tablet by mouth daily at bedtime, Disp: 90 tablet, Rfl: 3  •  al mag oxide-diphenhydramine-lidocaine viscous (MAGIC MOUTHWASH) 1:1:1 suspension, Swish and spit 10 mL every 4 (four) hours as needed for mouth pain or discomfort (Patient not taking: Reported on 3/28/2023), Disp: 90 mL, Rfl: 0  •  buPROPion (Wellbutrin XL) 150 mg 24 hr tablet, Take 1 tablet (150 mg total) by mouth every morning (Patient not taking: Reported on 5/12/2023), Disp: 90 tablet, Rfl: 3  •  docusate sodium (COLACE) 100 mg capsule, Take 1 capsule (100 mg total) by mouth 2 (two) times a day (Patient not taking: Reported on 4/5/2023), Disp: 20 capsule, Rfl: 0  • fluticasone (FLONASE) 50 mcg/act nasal spray, 1 spray into each nostril daily for 14 days (Patient taking differently: 1 spray into each nostril if needed), Disp: 11 1 mL, Rfl: 0  •  ipratropium-albuterol (DUO-NEB) 0 5-2 5 mg/3 mL nebulizer solution, inhale contents of 1 vial in nebulizer every 6 hours if needed for wheezing or shortness of breath (Patient not taking: Reported on 12/27/2022), Disp: , Rfl:   •  methylPREDNISolone 4 MG tablet therapy pack, Use as directed on package (Patient not taking: Reported on 1/30/2023), Disp: 21 each, Rfl: 0  •  nicotine (NICODERM CQ) 14 mg/24hr TD 24 hr patch, Place 1 patch on the skin every 24 hours, Disp: 28 patch, Rfl: 0  •  nicotine polacrilex (NICORETTE) 2 mg gum, Chew 1 each (2 mg total) as needed for smoking cessation (Patient not taking: Reported on 4/5/2023), Disp: 100 each, Rfl: 0  •  oxyCODONE-acetaminophen (Percocet) 7 5-325 MG per tablet, Take 1 tablet by mouth every 8 (eight) hours as needed for moderate pain Max Daily Amount: 3 tablets (Patient not taking: Reported on 5/8/2023), Disp: 90 tablet, Rfl: 0  •  pantoprazole (PROTONIX) 20 mg tablet, take 1 tablet by mouth once daily (Patient not taking: Reported on 5/12/2023), Disp: 30 tablet, Rfl: 0  •  polyethylene glycol (GOLYTELY) 4000 mL solution, Take 4,000 mL by mouth once for 1 dose, Disp: 4000 mL, Rfl: 0  •  polyethylene glycol (MIRALAX) 17 g packet, Take 17 g by mouth daily for 5 days (Patient not taking: Reported on 12/20/2022), Disp: 85 g, Rfl: 0  •  pregabalin (LYRICA) 50 mg capsule, Take 1 capsule (50 mg total) by mouth 2 (two) times a day (Patient not taking: Reported on 5/12/2023), Disp: 60 capsule, Rfl: 2  •  senna (SENOKOT) 8 6 mg, Take 1 tablet (8 6 mg total) by mouth daily (Patient not taking: Reported on 12/14/2022), Disp: 30 tablet, Rfl: 0  No Known Allergies  Past Surgical History:   Procedure Laterality Date   • CARDIAC CATHETERIZATION  02/13/2012    EF 70%, widely patent renal arteries, "significant single-vessel CAD-medical therapy  • CARDIAC CATHETERIZATION  04/11/2013    EF 65%, 50% mid LAD, 20% prox CFX, 90% diffuse RCA, 99% mid RCA  Medical management  • CHOLECYSTECTOMY     • COLONOSCOPY     • EPIDURAL BLOCK INJECTION Bilateral 08/15/2019    Procedure: BLOCK / INJECTION EPIDURAL STEROID CERVICAL;  Surgeon: Mikel Alcantara MD;  Location: [facility];  Service: Pain Management    • FL GUIDED NEEDLE PLAC BX/ASP/INJ  08/15/2019   • IR BIOPSY LUNG  09/13/2022   • IR THORACIC DUCT EMBOLIZATION  11/22/2022   • ORTHOPEDIC SURGERY     • MA [address] INCL FLUOR GDNCE DX W/CELL WASHG [facility] N/A 11/16/2022    Procedure: Reinaldo Camp;  Surgeon: Candice Llanos MD;  Location: [facility];  Service: Thoracic   • MA THORACOSCOPY W/LOBECTOMY SINGLE LOBE Right 11/16/2022    Procedure: LOBECTOMY LUNG; lower lobe superior segmentectomy, mediastinal lymph node dissection;  Surgeon: Candice Llanos MD;  Location: [facility];  Service: Thoracic   • MA THORACOSCOPY W/SEGMENTECTOMY Right 11/16/2022    Procedure: THORACOSCOPY VIDEO ASSISTED SURGERY (VATS); Surgeon: Candice Llanos MD;  Location: [facility];  Service: Thoracic   • TRIGGER POINT INJECTION       Social History     Objective:  Vitals:    05/12/23 0849   BP: 142/82   BP Location: Right arm   Patient Position: Sitting   Cuff Size: Standard   Pulse: 67   Temp: (!) 97 2 °F (36 2 °C)   TempSrc: Tympanic   SpO2: 96%   Weight: 83 kg (183 lb)   Height: 5' 7\" (1 702 m)     Physical Exam  Constitutional:       Appearance: He is well-developed  HENT:      Head: Normocephalic and atraumatic  Eyes:      Pupils: Pupils are equal, round, and reactive to light  Cardiovascular:      Rate and Rhythm: Normal rate and regular rhythm  Heart sounds: No murmur heard  Pulmonary:      Breath sounds: Normal breath sounds  No wheezing or rales  Abdominal:      Palpations: Abdomen is soft  Tenderness:  There is no abdominal " tenderness  Musculoskeletal:         General: No tenderness  Normal range of motion  Cervical back: Neck supple  Lymphadenopathy:      Cervical: No cervical adenopathy  Skin:     Findings: No erythema or rash  Neurological:      Mental Status: He is alert and oriented to person, place, and time  Cranial Nerves: No cranial nerve deficit  Deep Tendon Reflexes: Reflexes are normal and symmetric  Psychiatric:         Behavior: Behavior normal            Labs: I personally reviewed the labs and imaging pertinent to this patient care  Bryan Stevenson DO  5/12/2023  9:43 AM  Sign when Signing Visit  [address] [facility] [address]  [address] [facility] [id number] [phone]  [address] [facility] [address] ZELDA,4/87/1864, [id number]  05/12/23    Discussion:   In summary, this is a 79-year-old male with a history of resected stage IIIa squamous cell carcinoma of the lung  PD-L1 1%, TMB high  He is due for CAT scan in the next few weeks  He has persistent and bothersome neuropathy of the feet greater than hands  He has been treated with gabapentin and nortriptyline without benefit  Lyrica is of questionable benefit  I asked him to discontinue Lyrica and observe  If pain increases, resumption of Lyrica is recommended  I also suggested alpha lipoic acid 1000 mg p o  daily trial for at least 3 weeks  At length today, we reviewed the potential utility of adjuvant immunotherapy  This decreases recurrence in the observed period of 3 years by some 20%  We reviewed potential toxicities including infusion reactions, immune related side effects such as colitis, dermatitis, hypothyroidism  We reviewed that other organs can be affected although less commonly  Side effects are generally reversible although in rare cases can be life-threatening  I asked him to consider the above and we will see him back in 1 month to move forward    Additionally, given his giovanny involvement adjuvant radiation therapy would be appropriate to consider  I discussed the above with the patient  The patient  voiced understanding and agreement   ______________________________________________________________________    No chief complaint on file  HPI:  Oncology History   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 ([facility] [address] )   7/23/2022 Initial Diagnosis    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 ([facility] [address] )     12/6/2022 -  Cancer Staged    Staging form: Lung, AJCC 8th Edition  - Clinical: Stage IIIA (cT1b, cN2, cM0) - Signed by Porsche Johnston PA-C on 12/6/2022  Laterality: Right       Malignant neoplasm of lower lobe of right lung ([facility] [address] )   9/13/2022 Initial Diagnosis    July 23, 2022 patient had CT chest ab pelvis regarding rule out AAA  This showed 1 1 cm right lower lobe pulmonary nodule new since prior study of November 2019  Some other smaller nodules identified  Subcarinal lymph node 2 cm  No other findings suspicious for metastatic disease  August 5, 2022 PET/CT showed 1 2 cm pulmonary nodule, SUV 2 9  Other nodules noted, subcentimeter, no hypermetabolism  Some groundglass opacities in the right upper lobe  September 13, 2022 patient had needle biopsy of the right lower lobe mass showing squamous cell carcinoma, TTF-1 positive  November 16, 2022 patient underwent right lower lobe segmentectomy  Pathology showed 1 8 cm squamous cell carcinoma  Level 4 and 11 lymph node were positive for metastatic carcinoma       1/30/2023 - 3/27/2023 Chemotherapy    palonosetron (ALOXI), 0 25 mg, Intravenous, Once, 4 of 4 cycles  Administration: 0 25 mg (1/30/2023), 0 25 mg (2/13/2023), 0 25 mg (3/6/2023), 0 25 mg (3/27/2023)  fosaprepitant (EMEND) IVPB, 150 mg, Intravenous, Once, 3 of 3 cycles  Administration: 150 mg (1/30/2023), 150 mg (2/13/2023), 150 mg (3/6/2023)  CARBOplatin (PARAPLATIN) IVPB ([facility] AUC DOSING), 552 mg, Intravenous, Once, 4 of 4 cycles  Administration: 550 mg (1/30/2023), 650 mg (2/13/2023), 650 mg (3/6/2023), 500 mg (3/27/2023)  PACLItaxel (TAXOL) chemo IVPB, 349 8 mg, Intravenous, Once, 4 of 4 cycles  Dose modification: 200 mg/m2 (original dose 175 mg/m2, Cycle 2, Reason: Dose modified as per discussion with consulting physician)  Administration: 360 mg (1/30/2023), 400 mg (2/13/2023), 400 mg (3/6/2023), 400 mg (3/27/2023)  aprepitant (CINVANTI) in  mL IVPB, 130 mg, Intravenous, Once, 1 of 1 cycle  Administration: 130 mg (3/27/2023)         Interval History: Clinically stable  ECOG-  1 - Symptomatic but completely ambulatory    Review of Systems   Constitutional: Negative for chills and fever  HENT: Negative for nosebleeds  Eyes: Negative for discharge  Respiratory: Negative for cough and shortness of breath  Cardiovascular: Negative for chest pain  Gastrointestinal: Negative for abdominal pain, constipation and diarrhea  Endocrine: Negative for polydipsia  Genitourinary: Negative for hematuria  Musculoskeletal: Negative for arthralgias  Skin: Negative for color change  Allergic/Immunologic: Negative for immunocompromised state  Neurological: Negative for dizziness and headaches  Hematological: Negative for adenopathy  Psychiatric/Behavioral: Negative for agitation  Past Medical History:   Diagnosis Date   • Abdominal aortic aneurysm without rupture (HCC)    • Abdominal Aortic Duplex 02/21/2017    Ectatic infrarenal abdominal aorta  • MARYANN (acute kidney injury) ([facility] [address] ) 07/23/2022   • CAD (coronary artery disease)    • Cancer ([facility] [address] ) 2022    right lung CA   • COPD (chronic obstructive pulmonary disease) (HCC)    • Extremity pain    • History of echocardiogram 03/18/2014    EF 55%, mild MR and AI  Mild concentric LVH     • History of stress test 03/06/2017    Normal    • Hyperlipidemia    • Hypertension    • Joint pain    • Kidney stone    • Low back pain    • Lung cancer [facility])    • Migraine    • Neck pain    • Obstructive sleep apnea     cannot tolerate CPAP   • Osteoarthritis    • Peripheral neuropathy    • Reflex sympathetic dystrophy    • Sacroiliitis ([facility] ) 02/02/2022     Patient Active Problem List   Diagnosis   • JAKI (obstructive sleep apnea)   • Chronic bronchitis (HCC)   • Abdominal aortic aneurysm (AAA) without rupture   • Lumbar spondylosis   • Lumbar degenerative disc disease   • Myofascial pain syndrome   • Chronic low back pain without sciatica   • Cervical radiculopathy   • Cervical spondylosis without myelopathy   • Tremor, essential   • Negative depression screening   • Chronic pain of both knees   • Class 1 obesity due to excess calories with serious comorbidity and body mass index (BMI) of 31 0 to 31 9 in adult   • Smoking   • Hypertension   • Chronic pain syndrome   • Uncomplicated opioid dependence ([address] [place] [address] )   • Encounter for long-term opiate analgesic use   • Neck pain   • Hyperlipidemia   • Pre-diabetes   • Erectile dysfunction   • Insomnia   • Cortical age-related cataract of both eyes   • Urinary frequency   • 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)   • Kidney stone   • Pulmonary emphysema (HCC)   • Malignant neoplasm of lower lobe of right lung (HCC)   • Encounter for smoking cessation counseling   • Hemiparesis of left dominant side due to non-cerebrovascular etiology ([place] [address] )   • At high risk for osteoporosis   • Coronary artery disease of native artery of native heart with stable angina pectoris (HCC)   • Stage 3a chronic kidney disease (HCC)   • Night sweats   • Other chronic pancreatitis (HCC)       Current Outpatient Medications:   •  amLODIPine (NORVASC) 10 mg tablet, take 1 tablet by mouth once daily, Disp: 90 tablet, Rfl: 3  •  bisacodyl (DULCOLAX) 5 mg EC tablet, Take 1 tablet (5 mg total) by mouth daily as needed for constipation for up to 4 doses, Disp: 30 tablet, Rfl: 0  •  glucose blood (OneTouch Verio) test strip, Test once daily, Disp: 100 each, Rfl: 0  •  lisinopril (ZESTRIL) 10 mg tablet, Take 1 tablet (10 mg total) by mouth daily, Disp: 90 tablet, Rfl: 3  •  metoprolol succinate (TOPROL-XL) 100 mg 24 hr tablet, take 1 tablet by mouth once daily, Disp: 30 tablet, Rfl: 5  •  nitroglycerin (NITROSTAT) 0 4 mg SL tablet, Place 1 tablet (0 4 mg total) under the tongue every 5 (five) minutes as needed for chest pain, Disp: 25 tablet, Rfl: 5  •  oxyCODONE-acetaminophen (Percocet) 7 5-325 MG per tablet, Take 1 tablet by mouth every 8 (eight) hours as needed for moderate pain Do not fill until 5/3/2023 Max Daily Amount: 3 tablets, Disp: 90 tablet, Rfl: 0  •  rosuvastatin (CRESTOR) 20 MG tablet, take 1 tablet by mouth once daily (Patient taking differently: daily at bedtime), Disp: 90 tablet, Rfl: 5  •  sildenafil (VIAGRA) 100 mg tablet, TAKE 1 TABLET BY MOUTH ONCE DAILY AS NEEDED FOR ERECTILE DYSFUNCTION, Disp: 20 tablet, Rfl: 0  •  tamsulosin (FLOMAX) 0 4 mg, Take 1 capsule (0 4 mg total) by mouth daily with dinner, Disp: 60 capsule, Rfl: 0  •  tiotropium-olodaterol (Stiolto Respimat) 2 5-2 5 MCG/ACT inhaler, Inhale 2 puffs daily (Patient taking differently: Inhale 2 puffs if needed), Disp: 4 g, Rfl: 11  •  traZODone (DESYREL) 100 mg tablet, take 1 tablet by mouth daily at bedtime, Disp: 90 tablet, Rfl: 3  •  al mag oxide-diphenhydramine-lidocaine viscous (MAGIC MOUTHWASH) 1:1:1 suspension, Swish and spit 10 mL every 4 (four) hours as needed for mouth pain or discomfort (Patient not taking: Reported on 3/28/2023), Disp: 90 mL, Rfl: 0  •  buPROPion (Wellbutrin XL) 150 mg 24 hr tablet, Take 1 tablet (150 mg total) by mouth every morning (Patient not taking: Reported on 5/12/2023), Disp: 90 tablet, Rfl: 3  •  docusate sodium (COLACE) 100 mg capsule, Take 1 capsule (100 mg total) by mouth 2 (two) times a day (Patient not taking: Reported on 4/5/2023), Disp: 20 capsule, Rfl: 0  •  fluticasone (FLONASE) 50 mcg/act nasal spray, 1 spray into each nostril daily for 14 days (Patient taking differently: 1 spray into each nostril if needed), Disp: 11 1 mL, Rfl: 0  •  ipratropium-albuterol (DUO-NEB) 0 5-2 5 mg/3 mL nebulizer solution, inhale contents of 1 vial in nebulizer every 6 hours if needed for wheezing or shortness of breath (Patient not taking: Reported on 12/27/2022), Disp: , Rfl:   •  methylPREDNISolone 4 MG tablet therapy pack, Use as directed on package (Patient not taking: Reported on 1/30/2023), Disp: 21 each, Rfl: 0  •  nicotine (NICODERM CQ) 14 mg/24hr TD 24 hr patch, Place 1 patch on the skin every 24 hours, Disp: 28 patch, Rfl: 0  •  nicotine polacrilex (NICORETTE) 2 mg gum, Chew 1 each (2 mg total) as needed for smoking cessation (Patient not taking: Reported on 4/5/2023), Disp: 100 each, Rfl: 0  •  oxyCODONE-acetaminophen (Percocet) 7 5-325 MG per tablet, Take 1 tablet by mouth every 8 (eight) hours as needed for moderate pain Max Daily Amount: 3 tablets (Patient not taking: Reported on 5/8/2023), Disp: 90 tablet, Rfl: 0  •  pantoprazole (PROTONIX) 20 mg tablet, take 1 tablet by mouth once daily (Patient not taking: Reported on 5/12/2023), Disp: 30 tablet, Rfl: 0  •  polyethylene glycol (GOLYTELY) 4000 mL solution, Take 4,000 mL by mouth once for 1 dose, Disp: 4000 mL, Rfl: 0  •  polyethylene glycol (MIRALAX) 17 g packet, Take 17 g by mouth daily for 5 days (Patient not taking: Reported on 12/20/2022), Disp: 85 g, Rfl: 0  •  pregabalin (LYRICA) 50 mg capsule, Take 1 capsule (50 mg total) by mouth 2 (two) times a day (Patient not taking: Reported on 5/12/2023), Disp: 60 capsule, Rfl: 2  •  senna (SENOKOT) 8 6 mg, Take 1 tablet (8 6 mg total) by mouth daily (Patient not taking: Reported on 12/14/2022), Disp: 30 tablet, Rfl: 0  No Known Allergies  Past Surgical History:   Procedure Laterality Date   • CARDIAC CATHETERIZATION  02/13/2012    EF 70%, widely patent renal arteries, significant single-vessel CAD-medical therapy     • CARDIAC CATHETERIZATION  04/11/2013    EF "65%, 50% mid LAD, 20% prox CFX, 90% diffuse RCA, 99% mid RCA  Medical management  • CHOLECYSTECTOMY     • COLONOSCOPY     • EPIDURAL BLOCK INJECTION Bilateral 08/15/2019    Procedure: BLOCK / INJECTION EPIDURAL STEROID CERVICAL;  Surgeon: Marcine Halsted, MD;  [facility]: [facility];  Service: Pain Management    • FL GUIDED NEEDLE PLAC BX/ASP/INJ  08/15/2019   • IR BIOPSY LUNG  09/13/2022   • IR THORACIC DUCT EMBOLIZATION  11/22/2022   • ORTHOPEDIC SURGERY     • CO [address] INCL FLUOR GDNCE DX W/CELL WASHG 100 [facility] N/A 11/16/2022    Procedure: Suzzane Scarce;  Surgeon: Kathia Barba MD;  [facility]: [facility];  Service: Thoracic   • CO THORACOSCOPY W/LOBECTOMY SINGLE LOBE Right 11/16/2022    Procedure: LOBECTOMY LUNG; lower lobe superior segmentectomy, mediastinal lymph node dissection;  Surgeon: Kathia Barba MD;  [facility];  Service: Thoracic   • CO THORACOSCOPY W/SEGMENTECTOMY Right 11/16/2022    Procedure: THORACOSCOPY VIDEO ASSISTED SURGERY (VATS); Surgeon: Kathia Barba MD;  [facility];  Service: Thoracic   • TRIGGER POINT INJECTION       Social History     Objective:  Vitals:    05/12/23 0849   BP: 142/82   BP Location: Right arm   Patient Position: Sitting   Cuff Size: Standard   Pulse: 67   Temp: (!) 97 2 °F (36 2 °C)   TempSrc: Tympanic   SpO2: 96%   Weight: 83 kg (183 lb)   Height: 5' 7\" (1 702 m)     Physical Exam  Constitutional:       Appearance: He is well-developed  HENT:      Head: Normocephalic and atraumatic  Eyes:      Pupils: Pupils are equal, round, and reactive to light  Cardiovascular:      Rate and Rhythm: Normal rate and regular rhythm  Heart sounds: No murmur heard  Pulmonary:      Breath sounds: Normal breath sounds  No wheezing or rales  Abdominal:      Palpations: Abdomen is soft  Tenderness: There is no abdominal tenderness  Musculoskeletal:         General: No tenderness  Normal range of motion        " Cervical back: Neck supple  Lymphadenopathy:      Cervical: No cervical adenopathy  Skin:     Findings: No erythema or rash  Neurological:      Mental Status: He is alert and oriented to person, place, and time  Cranial Nerves: No cranial nerve deficit  Deep Tendon Reflexes: Reflexes are normal and symmetric  Psychiatric:         Behavior: Behavior normal            Labs: I personally reviewed the labs and imaging pertinent to this patient care

## 2023-05-12 NOTE — PROGRESS NOTES
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  PD-L1 1%, TMB high  He is due for CAT scan in the next few weeks  He has persistent and bothersome neuropathy of the feet greater than hands  He has been treated with gabapentin and nortriptyline without benefit  Lyrica is of questionable benefit  I asked him to discontinue Lyrica and observe  If pain increases, resumption of Lyrica is recommended  I also suggested alpha lipoic acid 1000 mg p o  daily trial for at least 3 weeks  At length today, we reviewed the potential utility of adjuvant immunotherapy  This decreases recurrence in the observed period of 3 years by some 20%  We reviewed potential toxicities including infusion reactions, immune related side effects such as colitis, dermatitis, hypothyroidism  We reviewed that other organs can be affected although less commonly  Side effects are generally reversible although in rare cases can be life-threatening  I asked him to consider the above and we will see him back in 1 month to move forward  Additionally, given his giovanny involvement adjuvant radiation therapy would be appropriate to consider  Lastly, he had orthostatic symptoms  Evaluated in cardiology 2 days ago  Lisinopril and Flomax were decreased  Patient has noted some improvement in the symptoms  I discussed the above with the patient  The patient  voiced understanding and agreement   ______________________________________________________________________    No chief complaint on file        HPI:  Oncology History   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 ([facility] [address] )   7/23/2022 Initial Diagnosis    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 ([facility] [address] )     12/6/2022 -  Cancer Staged    Staging form: Lung, AJCC 8th Edition  - Clinical: Stage IIIA (cT1b, cN2, cM0) - Signed by Kenia Carreon PA-C on 12/6/2022  Laterality: Right       Malignant neoplasm of lower lobe of right lung ([address] )   9/13/2022 Initial Diagnosis    July 23, 2022 patient had CT chest ab pelvis regarding rule out AAA  This showed 1 1 cm right lower lobe pulmonary nodule new since prior study of November 2019  Some other smaller nodules identified  Subcarinal lymph node 2 cm  No other findings suspicious for metastatic disease  August 5, 2022 PET/CT showed 1 2 cm pulmonary nodule, SUV 2 9  Other nodules noted, subcentimeter, no hypermetabolism  Some groundglass opacities in the right upper lobe  September 13, 2022 patient had needle biopsy of the right lower lobe mass showing squamous cell carcinoma, TTF-1 positive  November 16, 2022 patient underwent right lower lobe segmentectomy  Pathology showed 1 8 cm squamous cell carcinoma  Level 4 and 11 lymph node were positive for metastatic carcinoma       1/30/2023 - 3/27/2023 Chemotherapy    palonosetron (ALOXI), 0 25 mg, Intravenous, Once, 4 of 4 cycles  Administration: 0 25 mg (1/30/2023), 0 25 mg (2/13/2023), 0 25 mg (3/6/2023), 0 25 mg (3/27/2023)  fosaprepitant (EMEND) IVPB, 150 mg, Intravenous, Once, 3 of 3 cycles  Administration: 150 mg (1/30/2023), 150 mg (2/13/2023), 150 mg (3/6/2023)  CARBOplatin (PARAPLATIN) IVPB (GO AUC DOSING), 552 mg, Intravenous, Once, 4 of 4 cycles  Administration: 550 mg (1/30/2023), 650 mg (2/13/2023), 650 mg (3/6/2023), 500 mg (3/27/2023)  PACLItaxel (TAXOL) chemo IVPB, 349 8 mg, Intravenous, Once, 4 of 4 cycles  Dose modification: 200 mg/m2 (original dose 175 mg/m2, Cycle 2, Reason: Dose modified as per discussion with consulting physician)  Administration: 360 mg (1/30/2023), 400 mg (2/13/2023), 400 mg (3/6/2023), 400 mg (3/27/2023)  aprepitant (CINVANTI) in  mL IVPB, 130 mg, Intravenous, Once, 1 of 1 cycle  Administration: 130 mg (3/27/2023)         Interval History: Clinically stable  ECOG-  1 - Symptomatic but completely ambulatory    Review of Systems   Constitutional: Negative for chills and fever  HENT: Negative for nosebleeds  Eyes: Negative for discharge  Respiratory: Negative for cough and shortness of breath  Cardiovascular: Negative for chest pain  Gastrointestinal: Negative for abdominal pain, constipation and diarrhea  Endocrine: Negative for polydipsia  Genitourinary: Negative for hematuria  Musculoskeletal: Negative for arthralgias  Skin: Negative for color change  Allergic/Immunologic: Negative for immunocompromised state  Neurological: Negative for dizziness and headaches  Hematological: Negative for adenopathy  Psychiatric/Behavioral: Negative for agitation  Past Medical History:   Diagnosis Date   • Abdominal aortic aneurysm without rupture (HCC)    • Abdominal Aortic Duplex 02/21/2017    Ectatic infrarenal abdominal aorta  • [facility] 07/23/2022   • CAD (coronary artery disease)    • Cancer ([facility] ) 2022    right lung CA   • COPD (chronic obstructive pulmonary disease) ([facility])    • Extremity pain    • History of echocardiogram 03/18/2014    EF 55%, mild MR and AI  Mild concentric LVH     • History of stress test 03/06/2017    Normal    • Hyperlipidemia    • Hypertension    • Joint pain    • Kidney stone    • Low back pain    • Lung cancer (HCC)    • Migraine    • Neck pain    • Obstructive sleep apnea     cannot tolerate CPAP   • Osteoarthritis    • Peripheral neuropathy    • Reflex sympathetic dystrophy    • Sacroiliitis ([facility] ) 02/02/2022     Patient Active Problem List   Diagnosis   • JAKI (obstructive sleep apnea)   • Chronic bronchitis (HCC)   • Abdominal aortic aneurysm (AAA) without rupture   • Lumbar spondylosis   • Lumbar degenerative disc disease   • Myofascial pain syndrome   • Chronic low back pain without sciatica   • Cervical radiculopathy   • Cervical spondylosis without myelopathy   • Tremor, essential   • Negative depression screening   • Chronic pain of both knees   • Class 1 obesity due to excess calories with serious comorbidity and body mass index (BMI) of 31 0 to 31 9 in adult   • Smoking   • Hypertension   • Chronic pain syndrome   • Uncomplicated opioid dependence ([facility] [address] )   • Encounter for long-term opiate analgesic use   • Neck pain   • Hyperlipidemia   • Pre-diabetes   • Erectile dysfunction   • Insomnia   • Cortical age-related cataract of both eyes   • Urinary frequency   • 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)   • Kidney stone   • Pulmonary emphysema (HCC)   • Malignant neoplasm of lower lobe of right lung (HCC)   • Encounter for smoking cessation counseling   • Hemiparesis of left dominant side due to non-cerebrovascular etiology [facility]   • At high risk for osteoporosis   • Coronary artery disease of native artery of native heart with stable angina pectoris (HCC)   • Stage 3a chronic kidney disease (HCC)   • Night sweats   • Other chronic pancreatitis (HCC)       Current Outpatient Medications:   •  amLODIPine (NORVASC) 10 mg tablet, take 1 tablet by mouth once daily, Disp: 90 tablet, Rfl: 3  •  bisacodyl (DULCOLAX) 5 mg EC tablet, Take 1 tablet (5 mg total) by mouth daily as needed for constipation for up to 4 doses, Disp: 30 tablet, Rfl: 0  •  glucose blood (OneTouch Verio) test strip, Test once daily, Disp: 100 each, Rfl: 0  •  lisinopril (ZESTRIL) 10 mg tablet, Take 1 tablet (10 mg total) by mouth daily, Disp: 90 tablet, Rfl: 3  •  metoprolol succinate (TOPROL-XL) 100 mg 24 hr tablet, take 1 tablet by mouth once daily, Disp: 30 tablet, Rfl: 5  •  nitroglycerin (NITROSTAT) 0 4 mg SL tablet, Place 1 tablet (0 4 mg total) under the tongue every 5 (five) minutes as needed for chest pain, Disp: 25 tablet, Rfl: 5  •  oxyCODONE-acetaminophen (Percocet) 7 5-325 MG per tablet, Take 1 tablet by mouth every 8 (eight) hours as needed for moderate pain Do not fill until 5/3/2023 Max Daily Amount: 3 tablets, Disp: 90 tablet, Rfl: 0  •  rosuvastatin (CRESTOR) 20 MG tablet, take 1 tablet by mouth once daily (Patient taking differently: daily at bedtime), Disp: 90 tablet, Rfl: 5  •  sildenafil (VIAGRA) 100 mg tablet, TAKE 1 TABLET BY MOUTH ONCE DAILY AS NEEDED FOR ERECTILE DYSFUNCTION, Disp: 20 tablet, Rfl: 0  •  tamsulosin (FLOMAX) 0 4 mg, Take 1 capsule (0 4 mg total) by mouth daily with dinner, Disp: 60 capsule, Rfl: 0  •  tiotropium-olodaterol (Stiolto Respimat) 2 5-2 5 MCG/ACT inhaler, Inhale 2 puffs daily (Patient taking differently: Inhale 2 puffs if needed), Disp: 4 g, Rfl: 11  •  traZODone (DESYREL) 100 mg tablet, take 1 tablet by mouth daily at bedtime, Disp: 90 tablet, Rfl: 3  •  al mag oxide-diphenhydramine-lidocaine viscous (MAGIC MOUTHWASH) 1:1:1 suspension, Swish and spit 10 mL every 4 (four) hours as needed for mouth pain or discomfort (Patient not taking: Reported on 3/28/2023), Disp: 90 mL, Rfl: 0  •  buPROPion (Wellbutrin XL) 150 mg 24 hr tablet, Take 1 tablet (150 mg total) by mouth every morning (Patient not taking: Reported on 5/12/2023), Disp: 90 tablet, Rfl: 3  •  docusate sodium (COLACE) 100 mg capsule, Take 1 capsule (100 mg total) by mouth 2 (two) times a day (Patient not taking: Reported on 4/5/2023), Disp: 20 capsule, Rfl: 0  •  fluticasone (FLONASE) 50 mcg/act nasal spray, 1 spray into each nostril daily for 14 days (Patient taking differently: 1 spray into each nostril if needed), Disp: 11 1 mL, Rfl: 0  •  ipratropium-albuterol (DUO-NEB) 0 5-2 5 mg/3 mL nebulizer solution, inhale contents of 1 vial in nebulizer every 6 hours if needed for wheezing or shortness of breath (Patient not taking: Reported on 12/27/2022), Disp: , Rfl:   •  methylPREDNISolone 4 MG tablet therapy pack, Use as directed on package (Patient not taking: Reported on 1/30/2023), Disp: 21 each, Rfl: 0  •  nicotine (NICODERM CQ) 14 mg/24hr TD 24 hr patch, Place 1 patch on the skin every 24 hours, Disp: 28 patch, Rfl: 0  •  nicotine polacrilex (NICORETTE) 2 mg gum, Chew 1 each (2 mg total) as needed for smoking cessation (Patient not taking: Reported on 4/5/2023), Disp: 100 each, Rfl: 0  •  oxyCODONE-acetaminophen (Percocet) 7 5-325 MG per tablet, Take 1 tablet by mouth every 8 (eight) hours as needed for moderate pain Max Daily Amount: 3 tablets (Patient not taking: Reported on 5/8/2023), Disp: 90 tablet, Rfl: 0  •  pantoprazole (PROTONIX) 20 mg tablet, take 1 tablet by mouth once daily (Patient not taking: Reported on 5/12/2023), Disp: 30 tablet, Rfl: 0  •  polyethylene glycol (GOLYTELY) 4000 mL solution, Take 4,000 mL by mouth once for 1 dose, Disp: 4000 mL, Rfl: 0  •  polyethylene glycol (MIRALAX) 17 g packet, Take 17 g by mouth daily for 5 days (Patient not taking: Reported on 12/20/2022), Disp: 85 g, Rfl: 0  •  pregabalin (LYRICA) 50 mg capsule, Take 1 capsule (50 mg total) by mouth 2 (two) times a day (Patient not taking: Reported on 5/12/2023), Disp: 60 capsule, Rfl: 2  •  senna (SENOKOT) 8 6 mg, Take 1 tablet (8 6 mg total) by mouth daily (Patient not taking: Reported on 12/14/2022), Disp: 30 tablet, Rfl: 0  No Known Allergies  Past Surgical History:   Procedure Laterality Date   • CARDIAC CATHETERIZATION  02/13/2012    EF 70%, widely patent renal arteries, significant single-vessel CAD-medical therapy  • CARDIAC CATHETERIZATION  04/11/2013    EF 65%, 50% mid LAD, 20% prox CFX, 90% diffuse RCA, 99% mid RCA  Medical management     • CHOLECYSTECTOMY     • COLONOSCOPY     • EPIDURAL BLOCK INJECTION Bilateral 08/15/2019    Procedure: BLOCK / INJECTION EPIDURAL STEROID CERVICAL;  Surgeon: Lesly Lane MD;  [facility];  Service: Pain Management    • FL GUIDED NEEDLE PLAC BX/ASP/INJ  08/15/2019   • IR BIOPSY LUNG  09/13/2022   • IR THORACIC DUCT EMBOLIZATION  11/22/2022   • ORTHOPEDIC SURGERY     • NM "[address] INCL FLUOR GDNCE DX W/CELL WASHG 100 [facility] N/A 11/16/2022    Procedure: Krissy Cloud;  Surgeon: Hawk Ann MD;  [facility]: [facility];  Service: Thoracic   • ND THORACOSCOPY W/LOBECTOMY SINGLE LOBE Right 11/16/2022    Procedure: LOBECTOMY LUNG; lower lobe superior segmentectomy, mediastinal lymph node dissection;  Surgeon: Hawk Ann MD;  [facility];  Service: Thoracic   • ND THORACOSCOPY W/SEGMENTECTOMY Right 11/16/2022    Procedure: THORACOSCOPY VIDEO ASSISTED SURGERY (VATS); Surgeon: Hawk Ann MD;  [facility]: [facility];  Service: Thoracic   • TRIGGER POINT INJECTION       Social History     Objective:  Vitals:    05/12/23 0849   BP: 142/82   BP Location: Right arm   Patient Position: Sitting   Cuff Size: Standard   Pulse: 67   Temp: (!) 97 2 °F (36 2 °C)   TempSrc: Tympanic   SpO2: 96%   Weight: 83 kg (183 lb)   Height: 5' 7\" (1 702 m)     Physical Exam  Constitutional:       Appearance: He is well-developed  HENT:      Head: Normocephalic and atraumatic  Eyes:      Pupils: Pupils are equal, round, and reactive to light  Cardiovascular:      Rate and Rhythm: Normal rate and regular rhythm  Heart sounds: No murmur heard  Pulmonary:      Breath sounds: Normal breath sounds  No wheezing or rales  Abdominal:      Palpations: Abdomen is soft  Tenderness: There is no abdominal tenderness  Musculoskeletal:         General: No tenderness  Normal range of motion  Cervical back: Neck supple  Lymphadenopathy:      Cervical: No cervical adenopathy  Skin:     Findings: No erythema or rash  Neurological:      Mental Status: He is alert and oriented to person, place, and time  Cranial Nerves: No cranial nerve deficit  Deep Tendon Reflexes: Reflexes are normal and symmetric  Psychiatric:         Behavior: Behavior normal            Labs: I personally reviewed the labs and imaging pertinent to this patient care    "

## 2023-05-16 ENCOUNTER — HOSPITAL ENCOUNTER (OUTPATIENT)
Dept: CT IMAGING | Facility: HOSPITAL | Age: 68
Discharge: HOME | End: 2023-05-16

## 2023-05-16 DIAGNOSIS — C34.31 MALIGNANT NEOPLASM OF LOWER LOBE OF RIGHT LUNG (HCC): ICD-10-CM

## 2023-05-23 ENCOUNTER — OFFICE VISIT (OUTPATIENT)
Dept: CARDIAC SURGERY | Facility: CLINIC | Age: 68
End: 2023-05-23

## 2023-05-23 VITALS
HEART RATE: 60 BPM | BODY MASS INDEX: 28.93 KG/M2 | RESPIRATION RATE: 17 BRPM | HEIGHT: 67 IN | WEIGHT: 184.3 LBS | TEMPERATURE: 98.4 F | DIASTOLIC BLOOD PRESSURE: 80 MMHG | SYSTOLIC BLOOD PRESSURE: 125 MMHG | OXYGEN SATURATION: 98 %

## 2023-05-23 DIAGNOSIS — C34.31 MALIGNANT NEOPLASM OF LOWER LOBE OF RIGHT LUNG (HCC): Primary | ICD-10-CM

## 2023-05-23 NOTE — PROGRESS NOTES
Thoracic Follow-Up  Assessment/Plan:    Malignant neoplasm of lower lobe of right lung [facility])  Mr Pako Cr presents about 6 months out from a right thoracoscopic lower lobe superior segmentectomy for Stage IIIA squamous cell carcinoma  He is following with Dr Lili Resendiz and has completed adjuvant chemotherapy and is considering adjuvant radiation as well as immunotherapy  His most recent CT chest was reviewed by myself and shows no evidence of recurrence  There is a decreasing in size opacity adjacent to the staple line that likely represents improving atelectasis vs scar tissue  He is still having some SOB with climbing stairs and walking longer distances  His has inhalers prescribed by his PCP but has not gotten refills  He will call to get refills and use as instructed  I will also refer him to pulmonary rehab  We will proceed with routine surveillance  We will continue with CT chest every 6 months for 3 years post-operatively and then annually from there on out  All questions were answered and we will see him in 6 months with a repeat CT chest         Diagnoses and all orders for this visit:    Malignant neoplasm of lower lobe of right lung ([facility] [address] )  -     CT chest wo contrast; Future  -     Ambulatory Referral to Pulmonary Rehabilitation; Future          Thoracic History     Diagnosis: Stage IIIA squamous cell lung cancer right lower lobe superior segment  Procedure: right thoracoscopic lower lobe superior segmentectomy 11/16/2022  Pathology: 1 8x1  8x1 1cm invasive squamous cell carcinoma, G3, without  invasion  +lymphovascular invasion present  2 of 10 lymph nodes involved, including levels 4R and 11R  PATHOLOGIC STAGE CLASSIFICATION (pTNM, AJCC 8th Edition)- Stage IIIA- pT1b, pN0, Mx, G3)         Cancer Staging   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 ([facility] [address] )  Staging form: Lung, AJCC 8th Edition  - Clinical: Stage IIIA (cT1b, cN2, cM0) - Signed by Marquise Guerra PA-C on 12/6/2022  Laterality: Right    Oncology History   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 ([facility] [address] )   7/23/2022 Initial Diagnosis    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 ([facility] [address] )     12/6/2022 -  Cancer Staged    Staging form: Lung, AJCC 8th Edition  - Clinical: Stage IIIA (cT1b, cN2, cM0) - Signed by Marquise Guerra PA-C on 12/6/2022  Laterality: Right       Malignant neoplasm of lower lobe of right lung ([facility] [address] )   9/13/2022 Initial Diagnosis    July 23, 2022 patient had CT chest ab pelvis regarding rule out AAA  This showed 1 1 cm right lower lobe pulmonary nodule new since prior study of November 2019  Some other smaller nodules identified  Subcarinal lymph node 2 cm  No other findings suspicious for metastatic disease  August 5, 2022 PET/CT showed 1 2 cm pulmonary nodule, SUV 2 9  Other nodules noted, subcentimeter, no hypermetabolism  Some groundglass opacities in the right upper lobe  September 13, 2022 patient had needle biopsy of the right lower lobe mass showing squamous cell carcinoma, TTF-1 positive  November 16, 2022 patient underwent right lower lobe segmentectomy  Pathology showed 1 8 cm squamous cell carcinoma  Level 4 and 11 lymph node were positive for metastatic carcinoma       1/30/2023 - 3/27/2023 Chemotherapy    palonosetron (ALOXI), 0 25 mg, Intravenous, Once, 4 of 4 cycles  Administration: 0 25 mg (1/30/2023), 0 25 mg (2/13/2023), 0 25 mg (3/6/2023), 0 25 mg (3/27/2023)  fosaprepitant (EMEND) IVPB, 150 mg, Intravenous, Once, 3 of 3 cycles  Administration: 150 mg (1/30/2023), 150 mg (2/13/2023), 150 mg (3/6/2023)  CARBOplatin (PARAPLATIN) IVPB ([facility] AUC DOSING), 552 mg, Intravenous, Once, 4 of 4 cycles  Administration: 550 mg (1/30/2023), 650 mg (2/13/2023), 650 mg (3/6/2023), 500 mg (3/27/2023)  PACLItaxel (TAXOL) chemo IVPB, 349 8 mg, Intravenous, Once, 4 of 4 cycles  Dose modification: 200 mg/m2 (original dose 175 mg/m2, Cycle 2, Reason: Dose modified as per discussion with consulting physician)  Administration: 360 mg (2023), 400 mg (2023), 400 mg (3/6/2023), 400 mg (3/27/2023)  aprepitant (CINVANTI) in  mL IVPB, 130 mg, Intravenous, Once, 1 of 1 cycle  Administration: 130 mg (3/27/2023)     2023 Genomic Testing    2023 Caris-  PD-L1 1%, TMB high  MTAP deleted  p53 [id number], pathogenic variant  KRAS G694 L, VUS  CRABBP  *, pathogenic variant  KMT2D *, pathogenic variant            Subjective:    Patient ID: Rupesh Macario is a 70 y o  male  HPI  Mr Colin Elizabeth is a 75year old man with a history of Stage IIIA squamous cell carcinoma who is status post a right thoracoscopic superior segmentectomy on 2022  He had a chyle leak post-operatively for which he was started on TPN through a PICC line and underwent successful thoracic duct embolization on 2022  He had pancreatitis durins his hospital stay which improved with IVF resuscitation and was discharged on HD11  At his first port-operative appointment he was found to have a UTI and was treated with Bactrim  He has been followed by Dr Adele Santos and completed adjucant therapy from 23 to 3/27/2023  He saw Dr Adele Santos most recent on 2023 and is considering immunotherapy as well as radiation therapy, he has an appointment next month to make a decision moving forward  He presents now for his first routine surveillance imaging  CT chest 2023 was reviewed personally by myself and reveals right lower lobe postoperative changes  A 2 9 x 1 2cm opacity adjacent to the staple line which is decreased in size from 3 4 x 1 5cm on prior study  Scattered subcentimeter mediastinal lymph nodes unchanged from the prior study  Scattered subcentimeter mediastinal lymph nodes unchanged from the prior study  On discussion he completed chemotherapy in the end of March and has been struggling with neuropathy since   He has been under a lot of stress recently as his ex-wife  unexpectedly last week  He has SOB with walking up stairs and walking longer distances  He is supposed to be using both a maintenance and rescue inhaler, but ran out and has not gotten a refill  He denies cough/fevers/chills/recent illness  The following portions of the patient's history were reviewed and updated as appropriate: allergies, current medications, past family history, past medical history, past social history, past surgical history and problem list     Review of Systems   Constitutional: Negative for chills, diaphoresis and unexpected weight change  HENT: Negative  Eyes: Negative  Respiratory: Positive for shortness of breath  Negative for cough and wheezing  Cardiovascular: Negative for chest pain and leg swelling  Gastrointestinal: Negative for abdominal pain, diarrhea and nausea  Endocrine: Negative  Genitourinary: Negative  Musculoskeletal: Negative  Skin: Negative  Allergic/Immunologic: Negative  Neurological: Negative  Neuropathy     Hematological: Negative for adenopathy  Does not bruise/bleed easily  Psychiatric/Behavioral: Negative  All other systems reviewed and are negative  Objective:   Physical Exam  Vitals reviewed  Constitutional:       General: He is not in acute distress  Appearance: Normal appearance  He is not ill-appearing  HENT:      Head: Normocephalic  Nose: Nose normal       Mouth/Throat:      Mouth: Mucous membranes are moist    Eyes:      Pupils: Pupils are equal, round, and reactive to light  Cardiovascular:      Rate and Rhythm: Normal rate and regular rhythm  Heart sounds: Normal heart sounds  Pulmonary:      Effort: Pulmonary effort is normal       Breath sounds: Normal breath sounds  No wheezing, rhonchi or rales  Abdominal:      Palpations: Abdomen is soft  Musculoskeletal:         General: No swelling  Normal range of motion  Lymphadenopathy:      Cervical: No cervical adenopathy  "  Skin:     General: Skin is warm  Neurological:      General: No focal deficit present  Mental Status: He is alert and oriented to person, place, and time  Psychiatric:         Mood and Affect: Mood normal      /80 (BP Location: Right arm, Patient Position: Sitting, Cuff Size: Standard)   Pulse 60   Temp 98 4 °F (36 9 °C) (Temporal)   Resp 17   Ht 5' 7\" (1 702 m)   Wt 83 6 kg (184 lb 4 9 oz)   SpO2 98%   BMI 28 87 kg/m²     XR chest pa & lateral    Result Date: 1/30/2023  Impression No acute cardiopulmonary disease  Workstation performed: [id number]     XR chest pa & lateral    Result Date: 12/10/2022  Impression No acute cardiopulmonary disease  Workstation performed: [id number]     XR chest pa & lateral    Result Date: 12/2/2022  Impression Resolution of right apical pneumothorax  Status post thoracic duct embolization with stable postsurgical changes in the right lung  Workstation performed: [id number]     XR chest pa & lateral    Result Date: 11/26/2022  Impression Improving consolidation about the right lower lobe staple line  Persistent trace right apical pneumothorax  Workstation performed: [id number]     XR chest pa & lateral    Result Date: 11/25/2022  Impression Status post right-sided chest tube removal   There is interval development of a tiny right apical pneumothorax  Recommend follow-up chest x-ray  I personally discussed this study with ANAY BERMUDEZ on 11/25/2022 at 1:43 PM  Workstation performed: [id number]     XR chest pa & lateral    Result Date: 11/17/2022  Impression Small left pleural effusion and left basilar linear atelectasis  No pneumothorax  Workstation performed: [id number]      CT chest wo contrast    Result Date: 5/16/2023  Narrative CT CHEST WITHOUT IV CONTRAST INDICATION:   C34 31: Malignant neoplasm of lower lobe, right bronchus or lung  Excerpt from Hematology and Oncology progress note dated 5/12/2023:   \"In summary, this is a 70-year-old male with a " "history of resected stage IIIa squamous cell carcinoma of the lung  \"  \"He is due for CAT scan in the next few weeks\" COMPARISON: Multiple prior studies most recent is a CT chest from 2/3/2023  TECHNIQUE: CT examination of the chest was performed without intravenous contrast  Multiplanar 2D reformatted images were created from the source data  This examination, like all CT scans performed in the [facility], was performed utilizing techniques to minimize radiation dose exposure, including the use of iterative reconstruction and automated exposure control  Radiation dose length product (DLP) for this visit:  371 6 mGy-cm FINDINGS: LUNGS: Unchanged mild upper lobe predominant centrilobular pulmonary emphysema  Right lower lobe postoperative changes  A 2 9 x 1 2 cm opacity, #5/64, adjacent to the staple line is decreased in size from 3 4 x 1 5 cm on the prior study  No new pulmonary findings  PLEURA:  Unremarkable  HEART/GREAT VESSELS: Unchanged cardiac contours  Reidentified coronary artery calcifications  No significant pericardial effusion  No thoracic aortic aneurysm  MEDIASTINUM AND DANILO: Mild chronic circumferential esophageal thickening in keeping with a nonspecific esophagitis  Scattered subcentimeter mediastinal lymph nodes unchanged from the prior study  CHEST WALL AND LOWER NECK:  Unremarkable  VISUALIZED STRUCTURES IN THE UPPER ABDOMEN: Known renal cysts  See the recent MRI abdomen CT renal stone reports  Status post cholecystectomy  OSSEOUS STRUCTURES:  No acute fracture or destructive osseous lesion  Impression Decreasing size of the right lower lobe opacity adjacent to the staple line without new findings  Workstation performed: [id number]     CT chest wo contrast    Result Date: 2/3/2023  Narrative CT CHEST WITHOUT IV CONTRAST INDICATION:   C34 11: Malignant neoplasm of upper lobe, right bronchus or lung  COMPARISON:  CT chest 11/23/2022    Multiple chest x-rays with the most recent " study obtained 1/28/2023  TECHNIQUE: CT examination of the chest was performed without intravenous contrast  Axial, sagittal, and coronal 2D reformatted images were created from the source data and submitted for interpretation  Radiation dose length product (DLP) for this visit:  343 mGy-cm   This examination, like all CT scans performed in the [facility], was performed utilizing techniques to minimize radiation dose exposure, including the use of iterative reconstruction and automated exposure control  FINDINGS: LUNGS:  Moderate centrilobular emphysema is noted  Postsurgical changes are noted within the right lower lobe  There is somewhat linear density is demonstrated along the right lower lobe staple line  The most conspicuous portion measures 3 4 x 1 5 cm (3:121)  The extent of this consolidation/density is substantially decreased from comparison CT of November 2022  as this density measures approximately 4 6 x 3 6 cm on that exam when measured similarly  Some pleural reticular opacities within the right middle lobe and lingula likely representing atelectasis/scarring  Ill-defined subpleural groundglass opacity within the lingula appears stable from chest CT of July 2022  Stable right upper lobe calcified granuloma is noted  There is no discrete tracheal or endobronchial lesion  PLEURA:  Unremarkable  HEART/GREAT VESSELS: Heart is normal in size  Coronary calcifications are noted  Mild-to-moderate atherosclerotic calcifications are noted within the thoracic aorta and branching vessels  MEDIASTINUM AND DANILO:  Unremarkable  CHEST WALL AND LOWER NECK:  Unremarkable  VISUALIZED STRUCTURES IN THE UPPER ABDOMEN:  Status post cholecystectomy  Nonobstructing left renal calculus is noted  There are numerous renal cysts seen bilaterally  These are not fully characterized on this noncontrast study but appear overall stable in size from CT of November 2022   OSSEOUS STRUCTURES:  No acute fracture or destructive osseous lesion  Degenerative changes are noted within the spine  Impression 1  Residual consolidation/soft tissue density along the right lower lobe stable line  This is substantially improved from prior CT of November 2022 and the remaining density is favored to represent residual scarring/atelectasis, however, attention on follow-up is recommended to confirm stability/continued improvement  No new suspicious CT findings elsewhere within the chest  Workstation performed: [id number]     CT chest abdomen pelvis w contrast    Result Date: 11/23/2022  Narrative CT CHEST, ABDOMEN AND PELVIS WITH IV CONTRAST INDICATION:   s/p thoracic duct embolization for chylothorax w/ abdominal pain & guarding  COMPARISON:  11/2/2022 and 7/23/2022 TECHNIQUE: CT examination of the chest, abdomen and pelvis was performed  Axial, sagittal, and coronal 2D reformatted images were created from the source data and submitted for interpretation  Radiation dose length product (DLP) for this visit:  878 21 mGy-cm   This examination, like all CT scans performed in the [facility], was performed utilizing techniques to minimize radiation dose exposure, including the use of iterative  reconstruction and automated exposure control  IV Contrast:  100 mL of iohexol (OMNIPAQUE) 50 mL of iohexol (OMNIPAQUE) Enteric Contrast: Enteric contrast was administered  FINDINGS: CHEST LUNGS:  There is consolidation in the right lower lobe along the surgical staple line likely representing atelectasis  There is patchy opacity in the lingula in the left lower lobe which also likely represents atelectasis  There is moderate centrilobular  emphysema  There is no tracheal or endobronchial lesion  PLEURA:  There is a chest tube in place  There is no pleural effusion  HEART/GREAT VESSELS: Heart is unremarkable for patient's age  No thoracic aortic aneurysm  MEDIASTINUM AND DANILO:  Unremarkable   CHEST WALL AND LOWER NECK: Unremarkable  ABDOMEN LIVER/BILIARY TREE:  One or more subcentimeter sharply circumscribed low-density hepatic lesion(s) are noted, too small to accurately characterize, but statistically most likely to represent subcentimeter hepatic cysts  No suspicious solid hepatic lesion is identified  Hepatic contours are normal   No biliary dilatation  GALLBLADDER:  Gallbladder is surgically absent  SPLEEN:  Unremarkable  PANCREAS:  There is retroperitoneal fluid and stranding, particularly around the head of the pancreas  Lipase is elevated and this is consistent with post thoracic duct embolization pancreatitis  There is no evidence of fluid collection or pancreatic necrosis  ADRENAL GLANDS:  Unremarkable  KIDNEYS/URETERS:  There are numerous bilateral renal lesions consistent with simple and mildly complex cysts  There was no enhancement on the previous dissection protocol CT from July 2022  There are bilateral nonobstructing renal calculi  No hydronephrosis  STOMACH AND BOWEL:  Unremarkable  APPENDIX:  No findings to suggest appendicitis  ABDOMINOPELVIC CAVITY:  No ascites  No pneumoperitoneum  No lymphadenopathy  VESSELS:  The patient is status post thoracic duct embolization  There is embolization glue throughout the lymphatic ducts in the retroperitoneum and pelvis  There is a small amount of glue within the wall or lumen of the proximal left renal vein though the renal vein is completely patent distal to the glue  PELVIS REPRODUCTIVE ORGANS:  The prostate is enlarged  URINARY BLADDER:  The bladder is collapsed with a Sebastian catheter  ABDOMINAL WALL/INGUINAL REGIONS:  Unremarkable  OSSEOUS STRUCTURES:  No acute fracture or destructive osseous lesion  Impression 1  Post thoracic duct embolization pancreatitis  2   Postoperative changes in the chest without recurrent effusion or pneumothorax  3   Embolization glue within the wall or lumen of the proximal left renal vein without occlusion   The study was marked in EPIC for immediate notification  Workstation performed: [id number]      NM PET CT skull base to mid thigh    Result Date: 8/5/2022  Narrative PET/CT SCAN INDICATION:  D38 1: Neoplasm of uncertain behavior of trachea, bronchus and lung   Abnormal CT study  Right lower lobe nodule  MODIFIER: PI COMPARISON: No prior PET scans  CT chest abdomen and pelvis, 7/23/2022 CELL TYPE:  N/A TECHNIQUE:   9 2 mCi F-18-FDG administered IV  Multiplanar attenuation corrected and non attenuation corrected PET images were acquired 60 minutes post injection  Contiguous, low dose, axial CT sections were obtained from the skull base through the femurs   Intravenous contrast material was not utilized  This examination, like all CT scans performed in the [facility], was performed utilizing techniques to minimize radiation dose exposure, including the use of iterative reconstruction and automated exposure control  Fasting serum glucose: 130 mg/dl FINDINGS: VISUALIZED BRAIN:   No acute abnormalities are seen  HEAD/NECK:   Subcentimeter solitary focus of increased activity within the posterior aspect of the left parotid gland  (1200/50) associated with a subcentimeter ovoid nodule measuring approximately 0 8 cm with SUV max 3 8  CT images: No significant findings CHEST:   - Solid nodule in the superior segment of the right lower lobe abutting the major fissure  (1200/113) (3/127) measures approximately 1 2 x 1 0 cm with SUV max 2 9  A subpleural nodule on image 3/126 measuring 5 mm is unchanged from the prior study, and shows no abnormal glucose activity but too small for accurate characterization with PET/CT  Prior PET CT also showed subtle groundglass opacities within the right upper lobe, for example as seen on image 3/106, without abnormal glucose activity    There is no glucose avid mediastinal or hilar adenopathy CT images: Prior CT demonstrated airspace disease within the posterior inferior right lower lobe, since resolved  There is pulmonary emphysema  No pleural or pericardial effusion  Coronary artery calcification  ABDOMEN:   No FDG avid soft tissue lesions are seen  CT images: Hepatic steatosis and bilateral completely evaluated renal cysts  (Please refer to prior CT report)  Bilateral nephrolithiasis  The time of the prior study, there is right-sided hydronephrosis with ureteral calculi  There is mild residual dilatation of the right kidney collecting system with reduced caliber of the right ureter and diminished periureteral inflammation  No residual ureteral stone appreciated  There is at least one small calculus measuring about 2 to 3 mm in the urinary bladder (3/255)  Abdominal aortic aneurysm and dilated left common iliac artery noted  PELVIS: No FDG avid soft tissue lesions are seen  CT images: Enlarged prostate gland with calcification  Circumferential bladder wall thickening which may be related to muscular hypertrophy  OSSEOUS STRUCTURES: No FDG avid lesions are seen  CT images: No significant findings  Impression 1  Solid lesion in the superior segment of the right lower lobe abutting the major fissure measuring 1 2 x 1 0 cm in size with SUV max 2 9 most concerning for primary bronchogenic carcinoma of the lung with soft tissue sampling recommended  2  Several small groundglass opacities within the right upper lobe and an additional 5 mm nodule in the superior segment of the right lower lobe without abnormal glucose activity but too small to characterize  Reassessment during the course of follow-up  recommended  3  There is no evidence of glucose avid intrathoracic adenopathy  There is no evidence of distant glucose avid metastatic disease 4  Limited CT study shows no residual ureteral stones  A small bladder calculus is present  Right renal collecting system distention partially resolved  5   Enlarged prostate gland, abdominal aortic aneurysm, pulmonary emphysema, and hepatic steatosis 6  Subcentimeter glucose avid nodule in the left parotid gland  This may represent a small parotid neoplasm  This could either be further evaluated with gadolinium-enhanced MRI of the neck or with close interval surveillance Workstation performed: [id number]      No Barium Swallow results available for this patient

## 2023-05-23 NOTE — ASSESSMENT & PLAN NOTE
Mr Asha Broussard presents about 6 months out from a right thoracoscopic lower lobe superior segmentectomy for Stage IIIA squamous cell carcinoma  He is following with Dr Danii Singh and has completed adjuvant chemotherapy and is considering adjuvant radiation as well as immunotherapy  His most recent CT chest was reviewed by myself and shows no evidence of recurrence  There is a decreasing in size opacity adjacent to the staple line that likely represents improving atelectasis vs scar tissue  He is still having some SOB with climbing stairs and walking longer distances  His has inhalers prescribed by his PCP but has not gotten refills  He will call to get refills and use as instructed  I will also refer him to pulmonary rehab  We will proceed with routine surveillance  We will continue with CT chest every 6 months for 3 years post-operatively and then annually from there on out   All questions were answered and we will see him in 6 months with a repeat CT chest

## 2023-06-02 ENCOUNTER — OFFICE VISIT (OUTPATIENT)
Dept: PAIN MEDICINE | Facility: CLINIC | Age: 68
End: 2023-06-02

## 2023-06-02 VITALS
DIASTOLIC BLOOD PRESSURE: 75 MMHG | WEIGHT: 184 LBS | SYSTOLIC BLOOD PRESSURE: 126 MMHG | HEIGHT: 67 IN | HEART RATE: 66 BPM | BODY MASS INDEX: 28.88 KG/M2

## 2023-06-02 DIAGNOSIS — M54.2 NECK PAIN: ICD-10-CM

## 2023-06-02 DIAGNOSIS — F11.20 UNCOMPLICATED OPIOID DEPENDENCE (HCC): ICD-10-CM

## 2023-06-02 DIAGNOSIS — M51.36 LUMBAR DEGENERATIVE DISC DISEASE: ICD-10-CM

## 2023-06-02 DIAGNOSIS — M47.816 LUMBAR SPONDYLOSIS: ICD-10-CM

## 2023-06-02 DIAGNOSIS — G89.4 CHRONIC PAIN SYNDROME: Primary | ICD-10-CM

## 2023-06-02 DIAGNOSIS — M47.812 CERVICAL SPONDYLOSIS WITHOUT MYELOPATHY: ICD-10-CM

## 2023-06-02 DIAGNOSIS — M79.18 MYOFASCIAL PAIN SYNDROME: ICD-10-CM

## 2023-06-02 DIAGNOSIS — M54.50 CHRONIC MIDLINE LOW BACK PAIN WITHOUT SCIATICA: ICD-10-CM

## 2023-06-02 DIAGNOSIS — M54.12 CERVICAL RADICULOPATHY: ICD-10-CM

## 2023-06-02 DIAGNOSIS — Z79.891 ENCOUNTER FOR LONG-TERM OPIATE ANALGESIC USE: ICD-10-CM

## 2023-06-02 DIAGNOSIS — M79.2 NEUROPATHIC PAIN: ICD-10-CM

## 2023-06-02 DIAGNOSIS — G89.29 CHRONIC MIDLINE LOW BACK PAIN WITHOUT SCIATICA: ICD-10-CM

## 2023-06-02 DIAGNOSIS — N18.31 STAGE 3A CHRONIC KIDNEY DISEASE (HCC): ICD-10-CM

## 2023-06-02 RX ORDER — PREGABALIN 50 MG/1
CAPSULE ORAL
Qty: 90 CAPSULE | Refills: 1 | Status: SHIPPED | OUTPATIENT
Start: 2023-06-02

## 2023-06-02 RX ORDER — OXYCODONE AND ACETAMINOPHEN 7.5; 325 MG/1; MG/1
1 TABLET ORAL EVERY 8 HOURS PRN
Qty: 90 TABLET | Refills: 0 | Status: SHIPPED | OUTPATIENT
Start: 2023-06-02 | End: 2023-06-07

## 2023-06-02 RX ORDER — OXYCODONE AND ACETAMINOPHEN 7.5; 325 MG/1; MG/1
1 TABLET ORAL EVERY 8 HOURS PRN
Qty: 90 TABLET | Refills: 0 | Status: SHIPPED | OUTPATIENT
Start: 2023-06-02

## 2023-06-02 NOTE — PATIENT INSTRUCTIONS

## 2023-06-02 NOTE — PROGRESS NOTES
Assessment:  1  Chronic pain syndrome    2  Chronic midline low back pain without sciatica    3  Lumbar spondylosis    4  Lumbar degenerative disc disease    5  Neck pain    6  Cervical radiculopathy    7  Cervical spondylosis without myelopathy    8  Encounter for long-term opiate analgesic use    9  Neuropathic pain    10  Uncomplicated opioid dependence ([facility] [address] )    11  Myofascial pain syndrome    12  Stage 3a chronic kidney disease ([facility] [address] )        Plan:  While the patient was in the office today, I did have a thorough conversation regarding their chronic pain syndrome, medication management, and treatment plan options  Overall, patient reports that his pain remains reasonably controlled with his current medication regimen  He recently finished chemotherapy and has been experiencing neuropathy in his hands and feet related to chemo  We will start Lyrica titrating to 50 mg 3 times daily  A prescription was sent to his pharmacy  Renewed oxycodone 7 5/325 every 8 hours as needed for pain  The patient's opioid scripts were sent to their pharmacy electronically and was given a 2 month supply of prescriptions with a Do Not Fill date(s) of 6/2/2023, 6/30/2023  [place] Prescription Drug Monitoring Program report was reviewed and was appropriate     A urine drug screen was collected at today's office visit as part of our medication management protocol  The point of care testing results were appropriate for what was being prescribed  The specimen will be sent for confirmatory testing  The drug screen is medically necessary because the patient is either dependent on opioid medication or is being considered for opioid medication therapy and the results could impact ongoing or future treatment  The drug screen is to evaluate for the presences or absence of prescribed, non-prescribed, and/or illicit drugs/substances  There are risks associated with opioid medications, including dependence, addiction and tolerance  The patient understands and agrees to use these medications only as prescribed  Potential side effects of the medications include, but are not limited to, constipation, drowsiness, addiction, impaired judgment and risk of fatal overdose if not taken as prescribed  The patient was warned against driving while taking sedation medications  Sharing medications is a felony  At this point in time, the patient is showing no signs of addiction, abuse, diversion or suicidal ideation  The patient will follow-up in 8 weeks for medication prescription refill and reevaluation  The patient was advised to contact the office should their symptoms worsen in the interim  The patient was agreeable and verbalized an understanding  History of Present Illness: The patient is a 70 y o  male last seen on 4/5/2023 who presents for a follow up office visit in regards to chronic pain secondary to chronic pain syndrome, chronic low back pain, lumbar spondylosis, lumbar degenerative disc disease, neck pain, cervical radiculopathy, cervical spondylosis, neuropathic pain  The patient currently reports complaints of neck pain, low back pain, pain in both hands and feet  Current pain medications includes: Oxycodone 7 5/325 every 8 hours as needed for pain  The patient reports that this regimen is providing 30% pain relief  The patient is reporting no side effects from this pain medication regimen  Pain Contract Signed: 6/2/23  Last Urine Drug Screen: 6/2/23    I have personally reviewed and/or updated the patient's past medical history, past surgical history, family history, social history, current medications, allergies, and vital signs today  Review of Systems:    Review of Systems   Constitutional: Negative for unexpected weight change  HENT: Negative for hearing loss  Eyes: Negative for visual disturbance  Respiratory: Positive for shortness of breath  Cardiovascular: Negative for leg swelling  Gastrointestinal: Positive for constipation  Endocrine: Negative for polyuria  Genitourinary: Negative for difficulty urinating  Musculoskeletal: Positive for gait problem  Negative for joint swelling and myalgias  Decreased range of motion  Joint stiffness   Skin: Negative for rash  Neurological: Positive for dizziness  Negative for weakness and headaches  Psychiatric/Behavioral: Negative for decreased concentration  All other systems reviewed and are negative  Past Medical History:   Diagnosis Date   • Abdominal aortic aneurysm without rupture (HCC)    • Abdominal Aortic Duplex 02/21/2017    Ectatic infrarenal abdominal aorta  • MARYANN (acute kidney injury) ([facility] ) 07/23/2022   • CAD (coronary artery disease)    • Cancer ([place] [address] ) 2022    right lung CA   • COPD (chronic obstructive pulmonary disease) ([facility])    • Extremity pain    • History of echocardiogram 03/18/2014    EF 55%, mild MR and AI  Mild concentric LVH  • History of stress test 03/06/2017    Normal    • Hyperlipidemia    • Hypertension    • Joint pain    • Kidney stone    • Low back pain    • Lung cancer (HCC)    • Migraine    • Neck pain    • Obstructive sleep apnea     cannot tolerate CPAP   • Osteoarthritis    • Peripheral neuropathy    • Reflex sympathetic dystrophy    • Sacroiliitis ([place] [address] ) 02/02/2022       Past Surgical History:   Procedure Laterality Date   • CARDIAC CATHETERIZATION  02/13/2012    EF 70%, widely patent renal arteries, significant single-vessel CAD-medical therapy  • CARDIAC CATHETERIZATION  04/11/2013    EF 65%, 50% mid LAD, 20% prox CFX, 90% diffuse RCA, 99% mid RCA  Medical management     • CHOLECYSTECTOMY     • COLONOSCOPY     • EPIDURAL BLOCK INJECTION Bilateral 08/15/2019    Procedure: BLOCK / INJECTION EPIDURAL STEROID CERVICAL;  Surgeon: Daniel Moya MD;  Location: [facility];  Service: Pain Management    • FL GUIDED NEEDLE PLAC BX/ASP/INJ  08/15/2019   • IR BIOPSY LUNG  09/13/2022   • IR THORACIC DUCT EMBOLIZATION  2022   • ORTHOPEDIC SURGERY     • GA [address] INCL FLUOR GDNCE DX W/CELL WASHG [address] [facility] N/A 2022    Procedure: Brody Grew;  Surgeon: Charan Gavin MD;  Location: [facility];  Service: Thoracic   • GA THORACOSCOPY W/LOBECTOMY SINGLE LOBE Right 2022    Procedure: LOBECTOMY LUNG; lower lobe superior segmentectomy, mediastinal lymph node dissection;  Surgeon: Charan Gavin MD;  [facility]: [facility];  Service: Thoracic   • GA THORACOSCOPY W/SEGMENTECTOMY Right 2022    Procedure: THORACOSCOPY VIDEO ASSISTED SURGERY (VATS);   Surgeon: Charan Gavin MD;  Location: [facility];  Service: Thoracic   • TRIGGER POINT INJECTION         Family History   Adopted: Yes   Problem Relation Age of Onset   • No Known Problems Family    • No Known Problems Mother    • No Known Problems Father        Social History     Occupational History   • Not on file   Tobacco Use   • Smoking status: Former     Packs/day: 0 50     Types: Cigarettes     Quit date: 11/3/2022     Years since quittin 5   • Smokeless tobacco: Never   Vaping Use   • Vaping Use: Never used   Substance and Sexual Activity   • Alcohol use: Not Currently   • Drug use: Never   • Sexual activity: Yes         Current Outpatient Medications:   •  amLODIPine (NORVASC) 10 mg tablet, take 1 tablet by mouth once daily, Disp: 90 tablet, Rfl: 3  •  bisacodyl (DULCOLAX) 5 mg EC tablet, Take 1 tablet (5 mg total) by mouth daily as needed for constipation for up to 4 doses, Disp: 30 tablet, Rfl: 0  •  glucose blood (OneTouch Verio) test strip, Test once daily, Disp: 100 each, Rfl: 0  •  lisinopril (ZESTRIL) 10 mg tablet, Take 1 tablet (10 mg total) by mouth daily, Disp: 90 tablet, Rfl: 3  •  metoprolol succinate (TOPROL-XL) 100 mg 24 hr tablet, take 1 tablet by mouth once daily, Disp: 30 tablet, Rfl: 5  •  nitroglycerin (NITROSTAT) 0 4 mg SL tablet, Place 1 tablet (0 4 mg total) under the tongue every 5 (five) minutes as needed for chest pain, Disp: 25 tablet, Rfl: 5  •  oxyCODONE-acetaminophen (Percocet) 7 5-325 MG per tablet, Take 1 tablet by mouth every 8 (eight) hours as needed for moderate pain Do not fill until 6/30/2023 Max Daily Amount: 3 tablets, Disp: 90 tablet, Rfl: 0  •  oxyCODONE-acetaminophen (Percocet) 7 5-325 MG per tablet, Take 1 tablet by mouth every 8 (eight) hours as needed for moderate pain Max Daily Amount: 3 tablets, Disp: 90 tablet, Rfl: 0  •  pregabalin (LYRICA) 50 mg capsule, 1 PO QHS x 1 day, then  1 PO BID x 1 day, then 1 PO TID, Disp: 90 capsule, Rfl: 1  •  rosuvastatin (CRESTOR) 20 MG tablet, take 1 tablet by mouth once daily (Patient taking differently: daily at bedtime), Disp: 90 tablet, Rfl: 5  •  sildenafil (VIAGRA) 100 mg tablet, TAKE 1 TABLET BY MOUTH ONCE DAILY AS NEEDED FOR ERECTILE DYSFUNCTION, Disp: 20 tablet, Rfl: 0  •  tamsulosin (FLOMAX) 0 4 mg, Take 1 capsule (0 4 mg total) by mouth daily with dinner (Patient taking differently: Take 0 8 mg by mouth daily with dinner), Disp: 60 capsule, Rfl: 0  •  tiotropium-olodaterol (Stiolto Respimat) 2 5-2 5 MCG/ACT inhaler, Inhale 2 puffs daily (Patient taking differently: Inhale 2 puffs if needed), Disp: 4 g, Rfl: 11  •  traZODone (DESYREL) 100 mg tablet, take 1 tablet by mouth daily at bedtime, Disp: 90 tablet, Rfl: 3  •  al mag oxide-diphenhydramine-lidocaine viscous (MAGIC MOUTHWASH) 1:1:1 suspension, Swish and spit 10 mL every 4 (four) hours as needed for mouth pain or discomfort (Patient not taking: Reported on 6/2/2023), Disp: 90 mL, Rfl: 0  •  buPROPion (Wellbutrin XL) 150 mg 24 hr tablet, Take 1 tablet (150 mg total) by mouth every morning (Patient not taking: Reported on 5/12/2023), Disp: 90 tablet, Rfl: 3  •  docusate sodium (COLACE) 100 mg capsule, Take 1 capsule (100 mg total) by mouth 2 (two) times a day (Patient not taking: Reported on 4/5/2023), Disp: 20 capsule, Rfl: 0  •  fluticasone (FLONASE) 50 mcg/act "nasal spray, 1 spray into each nostril daily for 14 days (Patient taking differently: 1 spray into each nostril if needed), Disp: 11 1 mL, Rfl: 0  •  ipratropium-albuterol (DUO-NEB) 0 5-2 5 mg/3 mL nebulizer solution, inhale contents of 1 vial in nebulizer every 6 hours if needed for wheezing or shortness of breath (Patient not taking: Reported on 12/27/2022), Disp: , Rfl:   •  methylPREDNISolone 4 MG tablet therapy pack, Use as directed on package (Patient not taking: Reported on 1/30/2023), Disp: 21 each, Rfl: 0  •  nicotine (NICODERM CQ) 14 mg/24hr TD 24 hr patch, Place 1 patch on the skin every 24 hours, Disp: 28 patch, Rfl: 0  •  nicotine polacrilex (NICORETTE) 2 mg gum, Chew 1 each (2 mg total) as needed for smoking cessation (Patient not taking: Reported on 4/5/2023), Disp: 100 each, Rfl: 0  •  pantoprazole (PROTONIX) 20 mg tablet, take 1 tablet by mouth once daily (Patient not taking: Reported on 5/23/2023), Disp: 30 tablet, Rfl: 0  •  polyethylene glycol (GOLYTELY) 4000 mL solution, Take 4,000 mL by mouth once for 1 dose (Patient not taking: Reported on 5/23/2023), Disp: 4000 mL, Rfl: 0  •  polyethylene glycol (MIRALAX) 17 g packet, Take 17 g by mouth daily for 5 days (Patient not taking: Reported on 12/20/2022), Disp: 85 g, Rfl: 0  •  senna (SENOKOT) 8 6 mg, Take 1 tablet (8 6 mg total) by mouth daily (Patient not taking: Reported on 12/14/2022), Disp: 30 tablet, Rfl: 0    No Known Allergies    Physical Exam:    /75 (BP Location: Right arm, Patient Position: Sitting, Cuff Size: Large)   Pulse 66   Ht 5' 7\" (1 702 m)   Wt 83 5 kg (184 lb)   BMI 28 82 kg/m²     Constitutional:normal, well developed, well nourished, alert, in no distress and non-toxic and no overt pain behavior    Eyes:anicteric  HEENT:grossly intact  Neck:supple, symmetric, trachea midline and no masses   Pulmonary:even and unlabored  Cardiovascular:No edema or pitting edema present  Skin:Normal without rashes or lesions and well " hydrated  Psychiatric:Mood and affect appropriate  Neurologic:Cranial Nerves II-XII grossly intact  Musculoskeletal:normal      Imaging  No orders to display         No orders of the defined types were placed in this encounter

## 2023-06-04 LAB
6MAM UR QL CFM: NEGATIVE NG/ML
7AMINOCLONAZEPAM UR QL CFM: NEGATIVE NG/ML
A-OH ALPRAZ UR QL CFM: NEGATIVE NG/ML
ACCEPTABLE CREAT UR QL: NORMAL MG/DL
ACCEPTIBLE SP GR UR QL: NORMAL
AMPHET UR QL CFM: NEGATIVE NG/ML
AMPHET UR QL CFM: NEGATIVE NG/ML
BUPRENORPHINE UR QL CFM: NEGATIVE NG/ML
BUTALBITAL UR QL CFM: NEGATIVE NG/ML
BZE UR QL CFM: NEGATIVE NG/ML
CODEINE UR QL CFM: NEGATIVE NG/ML
DESIPRAMINE UR QL CFM: NEGATIVE NG/ML
EDDP UR QL CFM: NEGATIVE NG/ML
ETHYL GLUCURONIDE UR QL CFM: NEGATIVE NG/ML
ETHYL SULFATE UR QL SCN: NEGATIVE NG/ML
FENTANYL UR QL CFM: NEGATIVE NG/ML
GLIADIN IGG SER IA-ACNC: NEGATIVE NG/ML
GLUCOSE 30M P 50 G LAC PO SERPL-MCNC: NEGATIVE NG/ML
HYDROCODONE UR QL CFM: NEGATIVE NG/ML
HYDROCODONE UR QL CFM: NEGATIVE NG/ML
HYDROMORPHONE UR QL CFM: NEGATIVE NG/ML
LORAZEPAM UR QL CFM: NEGATIVE NG/ML
MDMA UR QL CFM: NEGATIVE NG/ML
ME-PHENIDATE UR QL CFM: NEGATIVE NG/ML
MEPERIDINE UR QL CFM: NEGATIVE NG/ML
METHADONE UR QL CFM: NEGATIVE NG/ML
METHAMPHET UR QL CFM: NEGATIVE NG/ML
MORPHINE UR QL CFM: NEGATIVE NG/ML
MORPHINE UR QL CFM: NEGATIVE NG/ML
NITRITE UR QL: NORMAL UG/ML
NORBUPRENORPHINE UR QL CFM: NEGATIVE NG/ML
NORDIAZEPAM UR QL CFM: NEGATIVE NG/ML
NORFENTANYL UR QL CFM: NEGATIVE NG/ML
NORHYDROCODONE UR QL CFM: NEGATIVE NG/ML
NORHYDROCODONE UR QL CFM: NEGATIVE NG/ML
NORMEPERIDINE UR QL CFM: NEGATIVE NG/ML
NOROXYCODONE UR QL CFM: NORMAL NG/ML
OLANZAPINE QUANTIFICATION: NEGATIVE NG/ML
OPC-3373 QUANTIFICATION: NEGATIVE
OXAZEPAM UR QL CFM: NEGATIVE NG/ML
OXYCODONE UR QL CFM: NORMAL NG/ML
OXYMORPHONE UR QL CFM: NORMAL NG/ML
OXYMORPHONE UR QL CFM: NORMAL NG/ML
PARA-FLUOROFENTANYL QUANTIFICATION: NORMAL NG/ML
PCP UR QL CFM: NEGATIVE NG/ML
PHENOBARB UR QL CFM: NEGATIVE NG/ML
RESULT ALL_PRESCRIBED MEDS AND SPECIAL INSTRUCTIONS: NORMAL
SECOBARBITAL UR QL CFM: NEGATIVE NG/ML
SL AMB 4-ANPP QUANTIFICATION: NORMAL NG/ML
SL AMB 7-OH-MITRAGYNINE (KRATOM ALKALOID) QUANTIFICATION: NEGATIVE NG/ML
SL AMB ACETYL FENTANYL QUANTIFICATION: NORMAL NG/ML
SL AMB ACETYL NORFENTANYL QUANTIFICATION: NORMAL NG/ML
SL AMB ACRYL FENTANYL QUANTIFICATION: NORMAL NG/ML
SL AMB CARFENTANIL QUANTIFICATION: NORMAL NG/ML
SL AMB CLOZAPINE QUANTIFICATION: NEGATIVE NG/ML
SL AMB CTHC (MARIJUANA METABOLITE) QUANTIFICATION: NEGATIVE NG/ML
SL AMB DEXTRORPHAN (DEXTROMETHORPHAN METABOLITE) QUANT: NEGATIVE NG/ML
SL AMB HALOPERIDOL  QUANTIFICATION: NEGATIVE NG/ML
SL AMB HALOPERIDOL METABOLITE QUANTIFICATION: NEGATIVE NG/ML
SL AMB HYDROXYRISPERIDONE QUANTIFICATION: NEGATIVE NG/ML
SL AMB N-DESMETHYL-TRAMADOL QUANTIFICATION: NEGATIVE NG/ML
SL AMB N-DESMETHYLCLOZAPINE QUANTIFICATION: NEGATIVE NG/ML
SL AMB NORQUETIAPINE QUANTIFICATION: NEGATIVE NG/ML
SL AMB PHENTERMINE QUANTIFICATION: NEGATIVE NG/ML
SL AMB PREGABALIN QUANTIFICATION: ABNORMAL
SL AMB QUETIAPINE QUANTIFICATION: NEGATIVE NG/ML
SL AMB RISPERIDONE QUANTIFICATION: NEGATIVE NG/ML
SL AMB RITALINIC ACID QUANTIFICATION: NEGATIVE NG/ML
SPECIMEN PH ACCEPTABLE UR: NORMAL
TAPENTADOL UR QL CFM: NEGATIVE NG/ML
TEMAZEPAM UR QL CFM: NEGATIVE NG/ML
TEMAZEPAM UR QL CFM: NEGATIVE NG/ML
TRAMADOL UR QL CFM: NEGATIVE NG/ML
URATE/CREAT 24H UR: NEGATIVE NG/ML

## 2023-06-06 DIAGNOSIS — S27.899A: ICD-10-CM

## 2023-06-06 RX ORDER — DOCUSATE SODIUM 100 MG/1
100 CAPSULE, LIQUID FILLED ORAL 2 TIMES DAILY
Qty: 20 CAPSULE | Refills: 0 | Status: SHIPPED | OUTPATIENT
Start: 2023-06-06

## 2023-06-07 ENCOUNTER — OFFICE VISIT (OUTPATIENT)
Dept: FAMILY MEDICINE CLINIC | Facility: CLINIC | Age: 68
End: 2023-06-07
Payer: MEDICARE

## 2023-06-07 VITALS
BODY MASS INDEX: 29.22 KG/M2 | WEIGHT: 186.19 LBS | DIASTOLIC BLOOD PRESSURE: 90 MMHG | SYSTOLIC BLOOD PRESSURE: 152 MMHG | TEMPERATURE: 97.2 F | HEART RATE: 62 BPM | OXYGEN SATURATION: 98 % | HEIGHT: 67 IN

## 2023-06-07 DIAGNOSIS — E78.2 MIXED HYPERLIPIDEMIA: ICD-10-CM

## 2023-06-07 DIAGNOSIS — G62.9 NEUROPATHY: ICD-10-CM

## 2023-06-07 DIAGNOSIS — J43.9 PULMONARY EMPHYSEMA, UNSPECIFIED EMPHYSEMA TYPE (HCC): ICD-10-CM

## 2023-06-07 DIAGNOSIS — R73.03 BORDERLINE DIABETES: ICD-10-CM

## 2023-06-07 DIAGNOSIS — R53.83 CHEMOTHERAPY-INDUCED FATIGUE: Primary | ICD-10-CM

## 2023-06-07 DIAGNOSIS — M25.561 CHRONIC PAIN OF RIGHT KNEE: ICD-10-CM

## 2023-06-07 DIAGNOSIS — N18.31 STAGE 3A CHRONIC KIDNEY DISEASE (HCC): ICD-10-CM

## 2023-06-07 DIAGNOSIS — F17.200 SMOKING: ICD-10-CM

## 2023-06-07 DIAGNOSIS — G89.29 CHRONIC PAIN OF RIGHT KNEE: ICD-10-CM

## 2023-06-07 DIAGNOSIS — R53.1 WEAKNESS: ICD-10-CM

## 2023-06-07 DIAGNOSIS — Z71.6 ENCOUNTER FOR SMOKING CESSATION COUNSELING: ICD-10-CM

## 2023-06-07 DIAGNOSIS — R20.2 PARESTHESIA: ICD-10-CM

## 2023-06-07 DIAGNOSIS — E78.5 DYSLIPIDEMIA: ICD-10-CM

## 2023-06-07 DIAGNOSIS — I10 PRIMARY HYPERTENSION: ICD-10-CM

## 2023-06-07 DIAGNOSIS — T45.1X5A CHEMOTHERAPY-INDUCED FATIGUE: Primary | ICD-10-CM

## 2023-06-07 DIAGNOSIS — C34.31 PRIMARY SQUAMOUS CELL CARCINOMA OF LOWER LOBE OF RIGHT LUNG (HCC): ICD-10-CM

## 2023-06-07 DIAGNOSIS — G81.90 HEMIPARESIS, UNSPECIFIED HEMIPARESIS ETIOLOGY, UNSPECIFIED LATERALITY (HCC): ICD-10-CM

## 2023-06-07 DIAGNOSIS — R73.03 PRE-DIABETES: ICD-10-CM

## 2023-06-07 PROCEDURE — 99214 OFFICE O/P EST MOD 30 MIN: CPT | Performed by: FAMILY MEDICINE

## 2023-06-07 RX ORDER — TIOTROPIUM BROMIDE AND OLODATEROL 3.124; 2.736 UG/1; UG/1
2 SPRAY, METERED RESPIRATORY (INHALATION) AS NEEDED
Qty: 4 G | Refills: 2 | Status: SHIPPED | OUTPATIENT
Start: 2023-06-07

## 2023-06-07 RX ORDER — NICOTINE 21 MG/24HR
1 PATCH, TRANSDERMAL 24 HOURS TRANSDERMAL EVERY 24 HOURS
Qty: 30 PATCH | Refills: 2 | Status: SHIPPED | OUTPATIENT
Start: 2023-06-07 | End: 2023-07-07

## 2023-06-07 RX ORDER — IPRATROPIUM BROMIDE AND ALBUTEROL SULFATE 2.5; .5 MG/3ML; MG/3ML
3 SOLUTION RESPIRATORY (INHALATION) EVERY 6 HOURS PRN
Qty: 30 ML | Refills: 3 | Status: SHIPPED | OUTPATIENT
Start: 2023-06-07 | End: 2023-09-05

## 2023-06-07 NOTE — ASSESSMENT & PLAN NOTE
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

## 2023-06-07 NOTE — ASSESSMENT & PLAN NOTE
Patient reports stopping the medication on 1/1/2023     -Recheck lipid panel and manage accordingly

## 2023-06-07 NOTE — ASSESSMENT & PLAN NOTE
Stable  Not in acute respiratory distress  SPO2 within normal limits on room air       -Patient was prescribed inhalers by pulmonology in an earlier encounter   -Refilled accordingly

## 2023-06-07 NOTE — ASSESSMENT & PLAN NOTE
Patient currently smoking 1 to 2 cigarettes/day    Restart bupropion/nicotine gum/nicotine patch     -Patient is interested and reports remarkable decrease in cigarette smoking with the medications   -Refill ordered   -Follow-up accordingly

## 2023-06-07 NOTE — ASSESSMENT & PLAN NOTE
Likely elevated given recently tamsulosin and lisinopril were decreased by cardiology given the patient had orthostatic hypotension     -Recheck blood pressure at home  Keep home log   -Recheck blood pressure on next visit in 4 weeks  -If blood pressure was still elevated will increase lisinopril

## 2023-06-07 NOTE — PROGRESS NOTES
Assessment/Plan:    Pulmonary emphysema (HCC)  Stable  Not in acute respiratory distress  SPO2 within normal limits on room air       -Patient was prescribed inhalers by pulmonology in an earlier encounter   -Refilled accordingly  Encounter for smoking cessation counseling  Patient currently smoking 1 to 2 cigarettes/day  Restart bupropion/nicotine gum/nicotine patch     -Patient is interested and reports remarkable decrease in cigarette smoking with the medications   -Refill ordered   -Follow-up accordingly    Night sweats  Resolved since last visit  Follow-up as needed    Pre-diabetes  Recheck HbA1c  Hyperlipidemia  Patient reports stopping the medication on 1/1/2023     -Recheck lipid panel and manage accordingly  Stage 3a chronic kidney disease (HCC)  Lab Results   Component Value Date    CREATININE 1 25 03/23/2023    CREATININE 1 01 03/02/2023    CREATININE 0 91 02/10/2023    EGFR 58 03/23/2023    EGFR 76 03/02/2023    EGFR 86 02/10/2023   Chart review done today  I personally reviewed previous lab results   -Follow-up in 4 weeks with repeat set of labs given the patient was on chemotherapy  Hypertension  Likely elevated given recently tamsulosin and lisinopril were decreased by cardiology given the patient had orthostatic hypotension     -Recheck blood pressure at home  Keep home log   -Recheck blood pressure on next visit in 4 weeks  -If blood pressure was still elevated will increase lisinopril  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 [facility])  Following up with hematology/oncology  In summary, this is a 65-year-old male with a history of resected stage IIIa squamous cell carcinoma of the lung  S/p segment ectomy of the right lower lobe and s/p chemotherapy       -Plan per hematology/oncology team       Hemiparesis [facility])  As noted on physical exam involving the upper extremities bilaterally from the shoulder down to the digits  Pain on both sides    Power: 4/5   Intact fine touch sensory  Unrestricted active and passive range of motion  Plan;  -Personally reviewed CT/MRI imaging studies of the cervical spine which showed degenerative joint disorder moderate to severe involving C3-C6  At this time I think it is multifactorial because between recent malignancy with chemotherapy and muscle wasting combined with DJD disorder   -Refer to neurology  -Follow-up in 4 weeks         Diagnoses and all orders for this visit:    Chemotherapy-induced fatigue  Comments:  Improved compared to previously  Follow-up in 4 weeks with labs  Orders:  -     Vitamin B12; Future  -     Folate; Future    Pulmonary emphysema, unspecified emphysema type ([facility] [address] )  -     tiotropium-olodaterol (Stiolto Respimat) 2 5-2 5 MCG/ACT inhaler; Inhale 2 puffs if needed (wheezing, Shortness of breath)  -     ipratropium-albuterol (DUO-NEB) 0 5-2 5 mg/3 mL nebulizer solution; Take 3 mL by nebulization every 6 (six) hours as needed for wheezing or shortness of breath    Encounter for smoking cessation counseling  Comments: The patient is smoking 1 to 2 cigarettes/day  refill made for bupropion/nicotine gum/nicotine patch  Follow-up accordingly in 4 weeks    Orders:  -     nicotine (NICODERM CQ) 14 mg/24hr TD 24 hr patch; Place 1 patch on the skin over 24 hours every 24 hours    Neuropathy  Comments:  Lower extremities distally  Involving the toes only bilaterally  Following with pain medicine clinic  Continue management per pain medicine  Orders:  -     Ambulatory Referral to Neurology; Future  -     CBC and differential; Future  -     Comprehensive metabolic panel; Future  -     Lipid panel; Future  -     TSH, 3rd generation with Free T4 reflex; Future  -     HEMOGLOBIN A1C W/ EAG ESTIMATION; Future  -     Vitamin B12; Future  -     Folate; Future    Weakness  -     Ambulatory Referral to Neurology; Future  -     CBC and differential; Future  -     Comprehensive metabolic panel;  Future  - Lipid panel; Future  -     TSH, 3rd generation with Free T4 reflex; Future  -     HEMOGLOBIN A1C W/ EAG ESTIMATION; Future  -     Vitamin B12; Future  -     Folate; Future    Borderline diabetes  -     Comprehensive metabolic panel; Future  -     HEMOGLOBIN A1C W/ EAG ESTIMATION; Future    Dyslipidemia  -     Lipid panel; Future    Paresthesia  -     Vitamin B12; Future  -     Folate; Future    Chronic pain of right knee  Comments:  Check knee x-ray  Follow-up accordingly  Orders:  -     XR knee 3 vw right non injury; Future    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)    Primary hypertension    Stage 3a chronic kidney disease (HCC)    Smoking    Mixed hyperlipidemia    Pre-diabetes    Hemiparesis, unspecified hemiparesis etiology, unspecified laterality ([facility] [address] )          PHQ-2/9 Depression Screening    Little interest or pleasure in doing things: 0 - not at all  Feeling down, depressed, or hopeless: 0 - not at all  PHQ-2 Score: 0  PHQ-2 Interpretation: Negative depression screen            Subjective:      Patient ID: Renetta Anna is a 70 y o  male  21-year-old male with a past medical history remarkable for hypertension, hyperlipidemia, chronic neuropathy, squamous cell lung carcinoma stage III s/p chemotherapy/segmentectomy, prediabetes  Patient presents today to follow-up regarding night sweats that was first noticed around 7-month ago  -Today the patient reports that night sweats have resolved  -During the visit he reports that he does experience weakness involving his upper extremities bilaterally diffusely from the shoulder down to his digits as well as numbness and sharp pain that is described as pins-and-needles involving his tips of his toes bilaterally    -Symptoms started around 1 year ago when he was first diagnosed with cancer and having progressing since onset    Prior to that time he did not have any similar complaints   -Patient reports that he continues to be able to hold a paper and a glass of water and is able to use utensils accordingly however with difficulty    -No associated dysphagia, change in voice  No history of recent injuries/trauma/falls  No history of spine surgery  Regarding pain involving his lower extremities also this started around the same time that he was diagnosed with a malignancy  He reports that it is progressing since onset  Treatments tried including gabapentin with no improvement  Currently he is on Lyrica by pain medicine clinic with mild improvement  He describes the pain as pins-and-needles involving the tips of his toes bilaterally and it is elicited/aggravated by pressure and touch and alleviated with rest and elevating his legs  However, it is constant but it is very mild when not under direct pressure  Nocturnal episodes  No associated urine or bowel incontinence  Patient is able to ambulate independently with no assistance  No weakness involving his lower extremities  The following portions of the patient's history were reviewed and updated as appropriate: allergies and past family history  Review of Systems   Constitutional: Positive for activity change  Negative for appetite change, chills, fatigue and fever  HENT: Negative  Negative for hearing loss  Eyes: Negative  Negative for visual disturbance  Respiratory: Negative for shortness of breath and wheezing  Cardiovascular: Negative for chest pain and palpitations  Gastrointestinal: Negative for abdominal pain, blood in stool, constipation, diarrhea, nausea and vomiting  Endocrine: Negative  Genitourinary: Negative for difficulty urinating and dysuria  Musculoskeletal: Positive for arthralgias  Negative for gait problem, joint swelling and myalgias  Skin: Negative  Allergic/Immunologic: Negative  Neurological: Positive for tremors and weakness  Negative for dizziness, seizures, syncope, light-headedness and headaches     Hematological: "Negative for adenopathy  Psychiatric/Behavioral: Negative  Negative for agitation and behavioral problems  Objective:    /90 (BP Location: Left arm, Patient Position: Sitting, Cuff Size: Standard)   Pulse 62   Temp (!) 97 2 °F (36 2 °C) (Tympanic)   Ht 5' 7\" (1 702 m)   Wt 84 5 kg (186 lb 3 oz)   SpO2 98%   BMI 29 16 kg/m²      Physical Exam  Constitutional:       General: He is awake  Appearance: Normal appearance  HENT:      Head: Normocephalic and atraumatic  Nose: Nose normal       Mouth/Throat:      Mouth: Mucous membranes are moist       Pharynx: Oropharynx is clear  Eyes:      General: No scleral icterus  Conjunctiva/sclera: Conjunctivae normal       Pupils: Pupils are equal, round, and reactive to light  Cardiovascular:      Rate and Rhythm: Normal rate and regular rhythm  Pulses: Normal pulses  Radial pulses are 2+ on the right side and 2+ on the left side  Heart sounds: Normal heart sounds, S1 normal and S2 normal  No murmur heard  No friction rub  No gallop  Pulmonary:      Effort: Pulmonary effort is normal  No respiratory distress  Breath sounds: Normal breath sounds and air entry  No stridor  No wheezing, rhonchi or rales  Abdominal:      General: Bowel sounds are normal       Palpations: Abdomen is soft  There is no mass  Tenderness: There is no abdominal tenderness  There is no guarding  Musculoskeletal:      Right lower leg: No edema  Left lower leg: No edema  Lymphadenopathy:      Cervical: No cervical adenopathy  Upper Body:      Right upper body: No axillary adenopathy  Left upper body: No axillary adenopathy  Skin:     General: Skin is warm and dry  Neurological:      Mental Status: He is alert and oriented to person, place, and time  Mental status is at baseline     Psychiatric:         Mood and Affect: Mood normal          "

## 2023-06-07 NOTE — ASSESSMENT & PLAN NOTE
As noted on physical exam involving the upper extremities bilaterally from the shoulder down to the digits  Pain on both sides  Power: 4/5  Intact fine touch sensory  Unrestricted active and passive range of motion  Plan;  -Personally reviewed CT/MRI imaging studies of the cervical spine which showed degenerative joint disorder moderate to severe involving C3-C6    At this time I think it is multifactorial because between recent malignancy with chemotherapy and muscle wasting combined with DJD disorder   -Refer to neurology  -Follow-up in 4 weeks

## 2023-06-12 ENCOUNTER — TELEPHONE (OUTPATIENT)
Dept: HEMATOLOGY ONCOLOGY | Facility: CLINIC | Age: 68
End: 2023-06-12

## 2023-06-12 DIAGNOSIS — C34.31 PRIMARY SQUAMOUS CELL CARCINOMA OF LOWER LOBE OF RIGHT LUNG (HCC): Primary | ICD-10-CM

## 2023-06-12 NOTE — TELEPHONE ENCOUNTER
I spoke with the patient in regards to his lab work that needs to be completed prior to his appt on 6/16/23 at 9:00am  Patient  states he will have it done tomorrow

## 2023-06-15 ENCOUNTER — APPOINTMENT (OUTPATIENT)
Dept: LAB | Facility: HOSPITAL | Age: 68
End: 2023-06-15
Payer: MEDICARE

## 2023-06-15 DIAGNOSIS — C34.31 PRIMARY SQUAMOUS CELL CARCINOMA OF LOWER LOBE OF RIGHT LUNG (HCC): ICD-10-CM

## 2023-06-15 LAB
ALBUMIN SERPL BCP-MCNC: 4.3 G/DL (ref 3.5–5)
ALP SERPL-CCNC: 76 U/L (ref 34–104)
ALT SERPL W P-5'-P-CCNC: 10 U/L (ref 7–52)
ANION GAP SERPL CALCULATED.3IONS-SCNC: 9 MMOL/L (ref 4–13)
AST SERPL W P-5'-P-CCNC: 10 U/L (ref 13–39)
BASOPHILS # BLD AUTO: 0.05 THOUSANDS/ÂΜL (ref 0–0.1)
BASOPHILS NFR BLD AUTO: 1 % (ref 0–1)
BILIRUB SERPL-MCNC: 0.62 MG/DL (ref 0.2–1)
BUN SERPL-MCNC: 19 MG/DL (ref 5–25)
CALCIUM SERPL-MCNC: 9.2 MG/DL (ref 8.4–10.2)
CHLORIDE SERPL-SCNC: 103 MMOL/L (ref 96–108)
CO2 SERPL-SCNC: 24 MMOL/L (ref 21–32)
CREAT SERPL-MCNC: 1.11 MG/DL (ref 0.6–1.3)
EOSINOPHIL # BLD AUTO: 0 THOUSAND/ÂΜL (ref 0–0.61)
EOSINOPHIL NFR BLD AUTO: 0 % (ref 0–6)
ERYTHROCYTE [DISTWIDTH] IN BLOOD BY AUTOMATED COUNT: 12.4 % (ref 11.6–15.1)
GFR SERPL CREATININE-BSD FRML MDRD: 67 ML/MIN/1.73SQ M
GLUCOSE SERPL-MCNC: 149 MG/DL (ref 65–140)
HCT VFR BLD AUTO: 43.9 % (ref 36.5–49.3)
HGB BLD-MCNC: 14.6 G/DL (ref 12–17)
IMM GRANULOCYTES # BLD AUTO: 0.03 THOUSAND/UL (ref 0–0.2)
IMM GRANULOCYTES NFR BLD AUTO: 0 % (ref 0–2)
LYMPHOCYTES # BLD AUTO: 2.08 THOUSANDS/ÂΜL (ref 0.6–4.47)
LYMPHOCYTES NFR BLD AUTO: 27 % (ref 14–44)
MCH RBC QN AUTO: 32.7 PG (ref 26.8–34.3)
MCHC RBC AUTO-ENTMCNC: 33.3 G/DL (ref 31.4–37.4)
MCV RBC AUTO: 98 FL (ref 82–98)
MONOCYTES # BLD AUTO: 0.57 THOUSAND/ÂΜL (ref 0.17–1.22)
MONOCYTES NFR BLD AUTO: 7 % (ref 4–12)
NEUTROPHILS # BLD AUTO: 4.96 THOUSANDS/ÂΜL (ref 1.85–7.62)
NEUTS SEG NFR BLD AUTO: 65 % (ref 43–75)
NRBC BLD AUTO-RTO: 0 /100 WBCS
PLATELET # BLD AUTO: 185 THOUSANDS/UL (ref 149–390)
PMV BLD AUTO: 9.7 FL (ref 8.9–12.7)
POTASSIUM SERPL-SCNC: 4 MMOL/L (ref 3.5–5.3)
PROT SERPL-MCNC: 6.6 G/DL (ref 6.4–8.4)
RBC # BLD AUTO: 4.46 MILLION/UL (ref 3.88–5.62)
SODIUM SERPL-SCNC: 136 MMOL/L (ref 135–147)
WBC # BLD AUTO: 7.69 THOUSAND/UL (ref 4.31–10.16)

## 2023-06-15 PROCEDURE — 85025 COMPLETE CBC W/AUTO DIFF WBC: CPT

## 2023-06-15 PROCEDURE — 80053 COMPREHEN METABOLIC PANEL: CPT

## 2023-06-15 PROCEDURE — 36415 COLL VENOUS BLD VENIPUNCTURE: CPT

## 2023-06-16 ENCOUNTER — OFFICE VISIT (OUTPATIENT)
Dept: HEMATOLOGY ONCOLOGY | Facility: CLINIC | Age: 68
End: 2023-06-16
Payer: MEDICARE

## 2023-06-16 ENCOUNTER — TELEPHONE (OUTPATIENT)
Dept: HEMATOLOGY ONCOLOGY | Facility: CLINIC | Age: 68
End: 2023-06-16

## 2023-06-16 VITALS
DIASTOLIC BLOOD PRESSURE: 84 MMHG | TEMPERATURE: 96.6 F | HEART RATE: 87 BPM | SYSTOLIC BLOOD PRESSURE: 136 MMHG | OXYGEN SATURATION: 99 % | BODY MASS INDEX: 29.13 KG/M2 | WEIGHT: 186 LBS

## 2023-06-16 DIAGNOSIS — C34.31 MALIGNANT NEOPLASM OF LOWER LOBE OF RIGHT LUNG (HCC): Primary | ICD-10-CM

## 2023-06-16 PROCEDURE — 99215 OFFICE O/P EST HI 40 MIN: CPT | Performed by: INTERNAL MEDICINE

## 2023-06-16 NOTE — PROGRESS NOTES
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  PD-L1 1%, TMB high  Recent CT of the chest shows decreasing opacity near the right pulmonary staple line  Energy level and appetite are gradually improving  Neuropathy is persistent, however  He has been on alpha lipoic acid for about 2 weeks now  Neuropathy did not worsen with Lyrica suspension  He has been advised to increase Lyrica dose by pain management  We reviewed potential utility of adjuvant Tecentriq  We also reviewed potential toxicities including but not limited to infusion reaction, autoimmune processes such as colitis, dermatitis, hypothyroidism  Other autoimmune processes can occur  Toxicities are generally reversible although in rare cases can be severe or life-threatening  Our chemotherapy nurse reviewed and provided written information regarding this medication  I discussed the above with the patient  The patient  voiced understanding and agreement   ______________________________________________________________________    No chief complaint on file  HPI:  Oncology History   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 ([facility] [address] )   12/6/2022 -  Cancer Staged    Staging form: Lung, AJCC 8th Edition  - Clinical: Stage IIIA (cT1b, cN2, cM0) - Signed by Nancy Robison PA-C on 12/6/2022  Laterality: Right       Malignant neoplasm of lower lobe of right lung ([facility] [address] )   9/13/2022 Initial Diagnosis    July 23, 2022 patient had CT chest ab pelvis regarding rule out AAA  This showed 1 1 cm right lower lobe pulmonary nodule new since prior study of November 2019  Some other smaller nodules identified  Subcarinal lymph node 2 cm    No other findings suspicious for metastatic disease  2022 PET/CT showed 1 2 cm pulmonary nodule, SUV 2 9  Other nodules noted, subcentimeter, no hypermetabolism  Some groundglass opacities in the right upper lobe  2022 patient had needle biopsy of the right lower lobe mass showing squamous cell carcinoma, TTF-1 positive  2022 patient underwent right lower lobe segmentectomy  Pathology showed 1 8 cm squamous cell carcinoma  Level 4 and 11 lymph node were positive for metastatic carcinoma  2023 - 3/27/2023 Chemotherapy    palonosetron (ALOXI), 0 25 mg, Intravenous, Once, 4 of 4 cycles  Administration: 0 25 mg (2023), 0 25 mg (2023), 0 25 mg (3/6/2023), 0 25 mg (3/27/2023)  fosaprepitant (EMEND) IVPB, 150 mg, Intravenous, Once, 3 of 3 cycles  Administration: 150 mg (2023), 150 mg (2023), 150 mg (3/6/2023)  CARBOplatin (PARAPLATIN) IVPB ([facility] AUC DOSING), 552 mg, Intravenous, Once, 4 of 4 cycles  Administration: 550 mg (2023), 650 mg (2023), 650 mg (3/6/2023), 500 mg (3/27/2023)  PACLItaxel (TAXOL) chemo IVPB, 349 8 mg, Intravenous, Once, 4 of 4 cycles  Dose modification: 200 mg/m2 (original dose 175 mg/m2, Cycle 2, Reason: Dose modified as per discussion with consulting physician)  Administration: 360 mg (2023), 400 mg (2023), 400 mg (3/6/2023), 400 mg (3/27/2023)  aprepitant (CINVANTI) in  mL IVPB, 130 mg, Intravenous, Once, 1 of 1 cycle  Administration: 130 mg (3/27/2023)     2023 Genomic Testing    2023 Caris-  PD-L1 1%, TMB high  MTAP deleted  , pathogenic variant  KRAS G694 L, VUS  CRABBP  *, pathogenic variant  KMT2D *, pathogenic variant         Interval History: Clinically stable  Neuropathy  ECOG-  1 - Symptomatic but completely ambulatory    Review of Systems   Constitutional: Negative for chills and fever  HENT: Negative for nosebleeds  Eyes: Negative for discharge     Respiratory: Negative for cough and shortness of breath  Cardiovascular: Negative for chest pain  Gastrointestinal: Negative for abdominal pain, constipation and diarrhea  Endocrine: Negative for polydipsia  Genitourinary: Negative for hematuria  Musculoskeletal: Negative for arthralgias  Skin: Negative for color change  Allergic/Immunologic: Negative for immunocompromised state  Neurological: Positive for numbness  Negative for dizziness and headaches  Hematological: Negative for adenopathy  Psychiatric/Behavioral: Negative for agitation  Past Medical History:   Diagnosis Date   • Abdominal aortic aneurysm without rupture (HCC)    • Abdominal Aortic Duplex 02/21/2017    Ectatic infrarenal abdominal aorta  • MARYANN (acute kidney injury) ([facility] ) 07/23/2022   • CAD (coronary artery disease)    • Cancer ([place] [address] ) 2022    right lung CA   • COPD (chronic obstructive pulmonary disease) ([facility])    • Extremity pain    • History of echocardiogram 03/18/2014    EF 55%, mild MR and AI  Mild concentric LVH     • History of stress test 03/06/2017    Normal    • Hyperlipidemia    • Hypertension    • Joint pain    • Kidney stone    • Low back pain    • Lung cancer (HCC)    • Migraine    • Neck pain    • Obstructive sleep apnea     cannot tolerate CPAP   • Osteoarthritis    • Peripheral neuropathy    • Reflex sympathetic dystrophy    • Sacroiliitis ([place] [address] ) 02/02/2022     Patient Active Problem List   Diagnosis   • JAKI (obstructive sleep apnea)   • Chronic bronchitis ([facility])   • Abdominal aortic aneurysm (AAA) without rupture   • Lumbar spondylosis   • Lumbar degenerative disc disease   • Myofascial pain syndrome   • Chronic low back pain without sciatica   • Cervical radiculopathy   • Cervical spondylosis without myelopathy   • Tremor, essential   • Negative depression screening   • Chronic pain of both knees   • Class 1 obesity due to excess calories with serious comorbidity and body mass index (BMI) of 31 0 to 31 9 in adult   • Smoking   • Hypertension   • Chronic pain syndrome   • Uncomplicated opioid dependence (HCC)   • Encounter for long-term opiate analgesic use   • Neck pain   • Hyperlipidemia   • Pre-diabetes   • Erectile dysfunction   • Insomnia   • Cortical age-related cataract of both eyes   • Urinary frequency   • 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)   • Kidney stone   • Pulmonary emphysema (HCC)   • Malignant neoplasm of lower lobe of right lung (HCC)   • Encounter for smoking cessation counseling   • Hemiparesis (HCC)   • At high risk for osteoporosis   • Coronary artery disease of native artery of native heart with stable angina pectoris (HCC)   • Stage 3a chronic kidney disease (HCC)   • Night sweats   • Other chronic pancreatitis (HCC)       Current Outpatient Medications:   •  amLODIPine (NORVASC) 10 mg tablet, take 1 tablet by mouth once daily, Disp: 90 tablet, Rfl: 3  •  bisacodyl (DULCOLAX) 5 mg EC tablet, Take 1 tablet (5 mg total) by mouth daily as needed for constipation for up to 4 doses, Disp: 30 tablet, Rfl: 0  •  docusate sodium (COLACE) 100 mg capsule, Take 1 capsule (100 mg total) by mouth 2 (two) times a day, Disp: 20 capsule, Rfl: 0  •  glucose blood (OneTouch Verio) test strip, Test once daily, Disp: 100 each, Rfl: 0  •  ipratropium-albuterol (DUO-NEB) 0 5-2 5 mg/3 mL nebulizer solution, Take 3 mL by nebulization every 6 (six) hours as needed for wheezing or shortness of breath, Disp: 30 mL, Rfl: 3  •  lisinopril (ZESTRIL) 10 mg tablet, Take 1 tablet (10 mg total) by mouth daily, Disp: 90 tablet, Rfl: 3  •  metoprolol succinate (TOPROL-XL) 100 mg 24 hr tablet, take 1 tablet by mouth once daily, Disp: 30 tablet, Rfl: 5  •  nitroglycerin (NITROSTAT) 0 4 mg SL tablet, Place 1 tablet (0 4 mg total) under the tongue every 5 (five) minutes as needed for chest pain, Disp: 25 tablet, Rfl: 5  •  oxyCODONE-acetaminophen (Percocet) 7 5-325 MG per tablet, Take 1 tablet by mouth every 8 (eight) hours as needed for moderate pain Do not fill until 6/30/2023 Max Daily Amount: 3 tablets, Disp: 90 tablet, Rfl: 0  •  pregabalin (LYRICA) 50 mg capsule, 1 PO QHS x 1 day, then  1 PO BID x 1 day, then 1 PO TID, Disp: 90 capsule, Rfl: 1  •  sildenafil (VIAGRA) 100 mg tablet, TAKE 1 TABLET BY MOUTH ONCE DAILY AS NEEDED FOR ERECTILE DYSFUNCTION, Disp: 20 tablet, Rfl: 0  •  tamsulosin (FLOMAX) 0 4 mg, Take 1 capsule (0 4 mg total) by mouth daily with dinner (Patient taking differently: Take 0 8 mg by mouth daily with dinner), Disp: 60 capsule, Rfl: 0  •  tiotropium-olodaterol (Stiolto Respimat) 2 5-2 5 MCG/ACT inhaler, Inhale 2 puffs if needed (wheezing, Shortness of breath), Disp: 4 g, Rfl: 2  •  traZODone (DESYREL) 100 mg tablet, take 1 tablet by mouth daily at bedtime, Disp: 90 tablet, Rfl: 3  •  buPROPion (Wellbutrin XL) 150 mg 24 hr tablet, Take 1 tablet (150 mg total) by mouth every morning (Patient not taking: Reported on 5/12/2023), Disp: 90 tablet, Rfl: 3  •  fluticasone (FLONASE) 50 mcg/act nasal spray, 1 spray into each nostril daily for 14 days (Patient taking differently: 1 spray into each nostril if needed), Disp: 11 1 mL, Rfl: 0  •  nicotine (NICODERM CQ) 14 mg/24hr TD 24 hr patch, Place 1 patch on the skin over 24 hours every 24 hours (Patient not taking: Reported on 6/16/2023), Disp: 30 patch, Rfl: 2  •  nicotine polacrilex (NICORETTE) 2 mg gum, Chew 1 each (2 mg total) as needed for smoking cessation (Patient not taking: Reported on 4/5/2023), Disp: 100 each, Rfl: 0  •  pantoprazole (PROTONIX) 20 mg tablet, take 1 tablet by mouth once daily (Patient not taking: Reported on 5/23/2023), Disp: 30 tablet, Rfl: 0  •  polyethylene glycol (MIRALAX) 17 g packet, Take 17 g by mouth daily for 5 days (Patient not taking: Reported on 12/20/2022), Disp: 85 g, Rfl: 0  •  rosuvastatin (CRESTOR) 20 MG tablet, take 1 tablet by mouth once daily (Patient not taking: Reported on 6/7/2023), Disp: 90 tablet, Rfl: 5  No Known Allergies  Past Surgical History:   Procedure Laterality Date   • CARDIAC CATHETERIZATION  02/13/2012    EF 70%, widely patent renal arteries, significant single-vessel CAD-medical therapy  • CARDIAC CATHETERIZATION  04/11/2013    EF 65%, 50% mid LAD, 20% prox CFX, 90% diffuse RCA, 99% mid RCA  Medical management  • CHOLECYSTECTOMY     • COLONOSCOPY     • EPIDURAL BLOCK INJECTION Bilateral 08/15/2019    Procedure: BLOCK / INJECTION EPIDURAL STEROID CERVICAL;  Surgeon: Kacie Parra MD;  Location: [facility];  Service: Pain Management    • [place] GUIDED NEEDLE PLAC BX/ASP/INJ  08/15/2019   • IR BIOPSY LUNG  09/13/2022   • IR THORACIC DUCT EMBOLIZATION  11/22/2022   • ORTHOPEDIC SURGERY     • [place] [address] INCL FLUOR GDNCE DX W/CELL WASHG [address] [facility] N/A 11/16/2022    Procedure: Loistine Flow;  Surgeon: Courtney Clemons MD;  Location: [facility];  Service: Thoracic   • [place] THORACOSCOPY W/LOBECTOMY SINGLE LOBE Right 11/16/2022    Procedure: LOBECTOMY LUNG; lower lobe superior segmentectomy, mediastinal lymph node dissection;  Surgeon: Courtney Clemons MD;  Location: [facility];  Service: Thoracic   • IL THORACOSCOPY W/SEGMENTECTOMY Right 11/16/2022    Procedure: THORACOSCOPY VIDEO ASSISTED SURGERY (VATS); Surgeon: Courtney Clemons MD;  Location: [facility];  Service: Thoracic   • TRIGGER POINT INJECTION       Social History     Objective:  Vitals:    06/16/23 0856   BP: 136/84   BP Location: Right arm   Patient Position: Sitting   Cuff Size: Standard   Pulse: 87   Temp: (!) 96 6 °F (35 9 °C)   TempSrc: Tympanic   SpO2: 99%   Weight: 84 4 kg (186 lb)     Physical Exam  Constitutional:       Appearance: He is well-developed  HENT:      Head: Normocephalic and atraumatic  Eyes:      Pupils: Pupils are equal, round, and reactive to light  Cardiovascular:      Rate and Rhythm: Normal rate and regular rhythm  Heart sounds: No murmur heard    Pulmonary:      Breath sounds: Normal breath sounds  No wheezing or rales  Abdominal:      Palpations: Abdomen is soft  Tenderness: There is no abdominal tenderness  Musculoskeletal:         General: No tenderness  Normal range of motion  Cervical back: Neck supple  Lymphadenopathy:      Cervical: No cervical adenopathy  Skin:     Findings: No erythema or rash  Neurological:      Mental Status: He is alert and oriented to person, place, and time  Cranial Nerves: No cranial nerve deficit  Deep Tendon Reflexes: Reflexes are normal and symmetric  Psychiatric:         Behavior: Behavior normal            Labs: I personally reviewed the labs and imaging pertinent to this patient care

## 2023-06-16 NOTE — TELEPHONE ENCOUNTER
While we try to accommodate patient requests, our priority is to schedule treatment according to Doctor's orders and site availability  1  Does the Provider use the intake sheet or checkout note? No   2  What would be a preferred day of the week that would work best for your infusion appointment? Any   3  Do you prefer mornings or afternoons for your appointments? AM  4  Are there any days or dates that do not work for your schedule, including any upcoming vacations? Vacation last week of August and cruise in January for 2 weeks   5  We are going to try our best to schedule you at the infusion center closest to your home  In the event that we are unable to what would be your next preferred infusion site or sites? [place]     6  Do you have transportation to take you to all of your appointments? Yes  7   Would you like the infusion center to draw labs from your port? (disregard if patient doesn't have a port or need labs for infusion appointment) N/A

## 2023-06-19 RX ORDER — SODIUM CHLORIDE 9 MG/ML
20 INJECTION, SOLUTION INTRAVENOUS ONCE
Status: CANCELLED | OUTPATIENT
Start: 2023-06-21

## 2023-06-20 ENCOUNTER — APPOINTMENT (OUTPATIENT)
Dept: LAB | Facility: HOSPITAL | Age: 68
End: 2023-06-20
Payer: MEDICARE

## 2023-06-20 DIAGNOSIS — C34.31 MALIGNANT NEOPLASM OF LOWER LOBE OF RIGHT LUNG (HCC): ICD-10-CM

## 2023-06-20 LAB
ALBUMIN SERPL BCP-MCNC: 4.2 G/DL (ref 3.5–5)
ALP SERPL-CCNC: 74 U/L (ref 34–104)
ALT SERPL W P-5'-P-CCNC: 10 U/L (ref 7–52)
ANION GAP SERPL CALCULATED.3IONS-SCNC: 8 MMOL/L
AST SERPL W P-5'-P-CCNC: 12 U/L (ref 13–39)
BASOPHILS # BLD AUTO: 0.06 THOUSANDS/ÂΜL (ref 0–0.1)
BASOPHILS NFR BLD AUTO: 1 % (ref 0–1)
BILIRUB SERPL-MCNC: 0.75 MG/DL (ref 0.2–1)
BUN SERPL-MCNC: 23 MG/DL (ref 5–25)
CALCIUM SERPL-MCNC: 9.4 MG/DL (ref 8.4–10.2)
CHLORIDE SERPL-SCNC: 104 MMOL/L (ref 96–108)
CO2 SERPL-SCNC: 25 MMOL/L (ref 21–32)
CREAT SERPL-MCNC: 1.32 MG/DL (ref 0.6–1.3)
EOSINOPHIL # BLD AUTO: 0 THOUSAND/ÂΜL (ref 0–0.61)
EOSINOPHIL NFR BLD AUTO: 0 % (ref 0–6)
ERYTHROCYTE [DISTWIDTH] IN BLOOD BY AUTOMATED COUNT: 12.5 % (ref 11.6–15.1)
GFR SERPL CREATININE-BSD FRML MDRD: 54 ML/MIN/1.73SQ M
GLUCOSE SERPL-MCNC: 99 MG/DL (ref 65–140)
HCT VFR BLD AUTO: 42.1 % (ref 36.5–49.3)
HGB BLD-MCNC: 14 G/DL (ref 12–17)
IMM GRANULOCYTES # BLD AUTO: 0.03 THOUSAND/UL (ref 0–0.2)
IMM GRANULOCYTES NFR BLD AUTO: 0 % (ref 0–2)
LYMPHOCYTES # BLD AUTO: 2.27 THOUSANDS/ÂΜL (ref 0.6–4.47)
LYMPHOCYTES NFR BLD AUTO: 22 % (ref 14–44)
MCH RBC QN AUTO: 32.4 PG (ref 26.8–34.3)
MCHC RBC AUTO-ENTMCNC: 33.3 G/DL (ref 31.4–37.4)
MCV RBC AUTO: 98 FL (ref 82–98)
MONOCYTES # BLD AUTO: 0.67 THOUSAND/ÂΜL (ref 0.17–1.22)
MONOCYTES NFR BLD AUTO: 7 % (ref 4–12)
NEUTROPHILS # BLD AUTO: 7.12 THOUSANDS/ÂΜL (ref 1.85–7.62)
NEUTS SEG NFR BLD AUTO: 70 % (ref 43–75)
NRBC BLD AUTO-RTO: 0 /100 WBCS
PLATELET # BLD AUTO: 207 THOUSANDS/UL (ref 149–390)
PMV BLD AUTO: 9.7 FL (ref 8.9–12.7)
POTASSIUM SERPL-SCNC: 3.7 MMOL/L (ref 3.5–5.3)
PROT SERPL-MCNC: 6.9 G/DL (ref 6.4–8.4)
RBC # BLD AUTO: 4.32 MILLION/UL (ref 3.88–5.62)
SODIUM SERPL-SCNC: 137 MMOL/L (ref 135–147)
T3FREE SERPL-MCNC: 3.4 PG/ML (ref 2.5–3.9)
TSH SERPL DL<=0.05 MIU/L-ACNC: 0.75 UIU/ML (ref 0.45–4.5)
WBC # BLD AUTO: 10.15 THOUSAND/UL (ref 4.31–10.16)

## 2023-06-20 PROCEDURE — 84481 FREE ASSAY (FT-3): CPT

## 2023-06-20 PROCEDURE — 84443 ASSAY THYROID STIM HORMONE: CPT

## 2023-06-20 PROCEDURE — 80053 COMPREHEN METABOLIC PANEL: CPT

## 2023-06-20 PROCEDURE — 85025 COMPLETE CBC W/AUTO DIFF WBC: CPT

## 2023-06-20 PROCEDURE — 36415 COLL VENOUS BLD VENIPUNCTURE: CPT

## 2023-06-21 ENCOUNTER — HOSPITAL ENCOUNTER (OUTPATIENT)
Dept: INFUSION CENTER | Facility: HOSPITAL | Age: 68
Discharge: HOME | End: 2023-06-21
Attending: INTERNAL MEDICINE
Payer: MEDICARE

## 2023-06-21 ENCOUNTER — APPOINTMENT (EMERGENCY)
Dept: RADIOLOGY | Facility: HOSPITAL | Age: 68
End: 2023-06-21
Payer: MEDICARE

## 2023-06-21 ENCOUNTER — TELEPHONE (OUTPATIENT)
Dept: HEMATOLOGY ONCOLOGY | Facility: CLINIC | Age: 68
End: 2023-06-21

## 2023-06-21 ENCOUNTER — HOSPITAL ENCOUNTER (EMERGENCY)
Facility: HOSPITAL | Age: 68
Discharge: HOME | End: 2023-06-21
Attending: EMERGENCY MEDICINE
Payer: MEDICARE

## 2023-06-21 VITALS
OXYGEN SATURATION: 99 % | SYSTOLIC BLOOD PRESSURE: 122 MMHG | HEART RATE: 72 BPM | RESPIRATION RATE: 20 BRPM | TEMPERATURE: 97.5 F | DIASTOLIC BLOOD PRESSURE: 71 MMHG

## 2023-06-21 VITALS
OXYGEN SATURATION: 90 % | HEIGHT: 67 IN | RESPIRATION RATE: 20 BRPM | DIASTOLIC BLOOD PRESSURE: 99 MMHG | HEART RATE: 80 BPM | SYSTOLIC BLOOD PRESSURE: 215 MMHG | TEMPERATURE: 96 F | BODY MASS INDEX: 29.12 KG/M2

## 2023-06-21 DIAGNOSIS — T78.40XA ALLERGIC REACTION: Primary | ICD-10-CM

## 2023-06-21 DIAGNOSIS — C34.31 MALIGNANT NEOPLASM OF LOWER LOBE OF RIGHT LUNG (HCC): Primary | ICD-10-CM

## 2023-06-21 LAB
ANION GAP SERPL CALCULATED.3IONS-SCNC: 8 MMOL/L
APTT PPP: 29 SECONDS (ref 23–37)
BASOPHILS # BLD AUTO: 0.05 THOUSANDS/ÂΜL (ref 0–0.1)
BASOPHILS NFR BLD AUTO: 1 % (ref 0–1)
BUN SERPL-MCNC: 19 MG/DL (ref 5–25)
CALCIUM SERPL-MCNC: 9.9 MG/DL (ref 8.4–10.2)
CARDIAC TROPONIN I PNL SERPL HS: 4 NG/L
CHLORIDE SERPL-SCNC: 103 MMOL/L (ref 96–108)
CO2 SERPL-SCNC: 26 MMOL/L (ref 21–32)
CREAT SERPL-MCNC: 0.99 MG/DL (ref 0.6–1.3)
EOSINOPHIL # BLD AUTO: 0 THOUSAND/ÂΜL (ref 0–0.61)
EOSINOPHIL NFR BLD AUTO: 0 % (ref 0–6)
ERYTHROCYTE [DISTWIDTH] IN BLOOD BY AUTOMATED COUNT: 12.5 % (ref 11.6–15.1)
GFR SERPL CREATININE-BSD FRML MDRD: 77 ML/MIN/1.73SQ M
GLUCOSE SERPL-MCNC: 130 MG/DL (ref 65–140)
GLUCOSE SERPL-MCNC: 139 MG/DL (ref 65–140)
HCT VFR BLD AUTO: 48.1 % (ref 36.5–49.3)
HGB BLD-MCNC: 15.8 G/DL (ref 12–17)
IMM GRANULOCYTES # BLD AUTO: 0.02 THOUSAND/UL (ref 0–0.2)
IMM GRANULOCYTES NFR BLD AUTO: 0 % (ref 0–2)
INR PPP: 0.86 (ref 0.84–1.19)
LYMPHOCYTES # BLD AUTO: 2.13 THOUSANDS/ÂΜL (ref 0.6–4.47)
LYMPHOCYTES NFR BLD AUTO: 28 % (ref 14–44)
MCH RBC QN AUTO: 32.7 PG (ref 26.8–34.3)
MCHC RBC AUTO-ENTMCNC: 32.8 G/DL (ref 31.4–37.4)
MCV RBC AUTO: 100 FL (ref 82–98)
MONOCYTES # BLD AUTO: 0.52 THOUSAND/ÂΜL (ref 0.17–1.22)
MONOCYTES NFR BLD AUTO: 7 % (ref 4–12)
NEUTROPHILS # BLD AUTO: 4.84 THOUSANDS/ÂΜL (ref 1.85–7.62)
NEUTS SEG NFR BLD AUTO: 64 % (ref 43–75)
NRBC BLD AUTO-RTO: 0 /100 WBCS
PLATELET # BLD AUTO: 199 THOUSANDS/UL (ref 149–390)
PMV BLD AUTO: 10 FL (ref 8.9–12.7)
POTASSIUM SERPL-SCNC: 3.9 MMOL/L (ref 3.5–5.3)
PROCALCITONIN SERPL-MCNC: <0.05 NG/ML
PROTHROMBIN TIME: 11.9 SECONDS (ref 11.6–14.5)
RBC # BLD AUTO: 4.83 MILLION/UL (ref 3.88–5.62)
SODIUM SERPL-SCNC: 137 MMOL/L (ref 135–147)
WBC # BLD AUTO: 7.56 THOUSAND/UL (ref 4.31–10.16)

## 2023-06-21 PROCEDURE — 71045 X-RAY EXAM CHEST 1 VIEW: CPT

## 2023-06-21 PROCEDURE — 85025 COMPLETE CBC W/AUTO DIFF WBC: CPT | Performed by: EMERGENCY MEDICINE

## 2023-06-21 PROCEDURE — 84145 PROCALCITONIN (PCT): CPT | Performed by: EMERGENCY MEDICINE

## 2023-06-21 PROCEDURE — 99284 EMERGENCY DEPT VISIT MOD MDM: CPT

## 2023-06-21 PROCEDURE — 82948 REAGENT STRIP/BLOOD GLUCOSE: CPT

## 2023-06-21 PROCEDURE — 96413 CHEMO IV INFUSION 1 HR: CPT

## 2023-06-21 PROCEDURE — 96375 TX/PRO/DX INJ NEW DRUG ADDON: CPT

## 2023-06-21 PROCEDURE — 85730 THROMBOPLASTIN TIME PARTIAL: CPT | Performed by: EMERGENCY MEDICINE

## 2023-06-21 PROCEDURE — 99285 EMERGENCY DEPT VISIT HI MDM: CPT | Performed by: EMERGENCY MEDICINE

## 2023-06-21 PROCEDURE — 96361 HYDRATE IV INFUSION ADD-ON: CPT

## 2023-06-21 PROCEDURE — 96360 HYDRATION IV INFUSION INIT: CPT

## 2023-06-21 PROCEDURE — 85610 PROTHROMBIN TIME: CPT | Performed by: EMERGENCY MEDICINE

## 2023-06-21 PROCEDURE — 80048 BASIC METABOLIC PNL TOTAL CA: CPT | Performed by: EMERGENCY MEDICINE

## 2023-06-21 PROCEDURE — 36415 COLL VENOUS BLD VENIPUNCTURE: CPT | Performed by: EMERGENCY MEDICINE

## 2023-06-21 PROCEDURE — 93005 ELECTROCARDIOGRAM TRACING: CPT

## 2023-06-21 PROCEDURE — 84484 ASSAY OF TROPONIN QUANT: CPT | Performed by: EMERGENCY MEDICINE

## 2023-06-21 RX ORDER — SODIUM CHLORIDE 9 MG/ML
20 INJECTION, SOLUTION INTRAVENOUS ONCE
Status: COMPLETED | OUTPATIENT
Start: 2023-06-21 | End: 2023-06-21

## 2023-06-21 RX ORDER — DIPHENHYDRAMINE HYDROCHLORIDE 50 MG/ML
50 INJECTION INTRAMUSCULAR; INTRAVENOUS ONCE
Status: COMPLETED | OUTPATIENT
Start: 2023-06-21 | End: 2023-06-21

## 2023-06-21 RX ORDER — ALBUTEROL SULFATE 2.5 MG/3ML
2.5 SOLUTION RESPIRATORY (INHALATION) ONCE
Status: DISCONTINUED | OUTPATIENT
Start: 2023-06-21 | End: 2023-06-24 | Stop reason: HOSPADM

## 2023-06-21 RX ORDER — METHYLPREDNISOLONE SODIUM SUCCINATE 40 MG/ML
20 INJECTION, POWDER, LYOPHILIZED, FOR SOLUTION INTRAMUSCULAR; INTRAVENOUS ONCE
Status: COMPLETED | OUTPATIENT
Start: 2023-06-21 | End: 2023-06-21

## 2023-06-21 RX ORDER — FAMOTIDINE 10 MG/ML
20 INJECTION, SOLUTION INTRAVENOUS EVERY 12 HOURS SCHEDULED
Status: DISCONTINUED | OUTPATIENT
Start: 2023-06-21 | End: 2023-06-24 | Stop reason: HOSPADM

## 2023-06-21 RX ADMIN — DIPHENHYDRAMINE HYDROCHLORIDE 50 MG: 50 INJECTION, SOLUTION INTRAMUSCULAR; INTRAVENOUS at 10:39

## 2023-06-21 RX ADMIN — ATEZOLIZUMAB 1200 MG: 1200 INJECTION, SOLUTION INTRAVENOUS at 09:40

## 2023-06-21 RX ADMIN — SODIUM CHLORIDE 20 ML/HR: 0.9 INJECTION, SOLUTION INTRAVENOUS at 09:05

## 2023-06-21 RX ADMIN — METHYLPREDNISOLONE SODIUM SUCCINATE 20 MG: 40 INJECTION, POWDER, FOR SOLUTION INTRAMUSCULAR; INTRAVENOUS at 11:00

## 2023-06-21 RX ADMIN — METHYLPREDNISOLONE SODIUM SUCCINATE 20 MG: 40 INJECTION, POWDER, FOR SOLUTION INTRAMUSCULAR; INTRAVENOUS at 10:45

## 2023-06-21 RX ADMIN — FAMOTIDINE 20 MG: 10 INJECTION, SOLUTION INTRAVENOUS at 10:43

## 2023-06-21 RX ADMIN — SODIUM CHLORIDE 1000 ML: 0.9 INJECTION, SOLUTION INTRAVENOUS at 11:18

## 2023-06-21 NOTE — ED PROVIDER NOTES
Final Diagnosis:  1  Allergic reaction        Chief Complaint   Patient presents with   • Allergic Reaction     Patient from infusion center receiving first dose of tecentriq for immunotherapy, per infusion nurse he states he felt tightness in his throat for 20 min, chest tightness, and rigors  Received pepcid 2ml, benedryl 50mg, and solu-medrol 40mg  HPI  Patient presents from infusion center  Patient was receiving a cancer treatment infusion, 10 cc treat, which I am unfamiliar with when he started to have a sensation that his throat was swelling and some shaking  He states that he did not think too much of it but then he told the nurse this when they came by and asked him how he was feeling  He then received Benadryl, Solu-Medrol, Pepcid  Given his ongoing shaking a medical alert was called and presents now for further evaluation  Patient states that he is feeling improved now that he is in the emergency department  He has never received the infusion that he was receiving today before  He received this through a peripheral IV in his left hand  He denies any pain  He states he is otherwise taking his medications  Review of records show that the patient has a history of stage III lung cancer, spread to lymph nodes  He had this resected in the fall of last year  He states that he has some baseline shortness of breath since that procedure but no fever chills, cough, congestion  He states that he is unsure where his cancer may be at this time and is following with oncology  Oncology notes show that they consented him on his last appointment for his current infusion  Unless otherwise specified:  - No language barrier    - History obtained from patient  - There are no limitations to the history obtained    - Previous charting was reviewed    PMH:   has a past medical history of Abdominal aortic aneurysm without rupture ([facility] ), Abdominal Aortic Duplex (02/21/2017), MARYANN (acute kidney injury) ([facility] [address] ) (07/23/2022), CAD (coronary artery disease), Cancer ([facility] ) (2022), COPD (chronic obstructive pulmonary disease) ([facility] ), Extremity pain, History of echocardiogram (03/18/2014), History of stress test (03/06/2017), Hyperlipidemia, Hypertension, Joint pain, Kidney stone, Low back pain, Lung cancer ([facility] ), Migraine, Neck pain, Obstructive sleep apnea, Osteoarthritis, Peripheral neuropathy, Reflex sympathetic dystrophy, and [facility]roiliitis ([facility] ) (02/02/2022)  PSH:   has a past surgical history that includes Cholecystectomy; Cardiac catheterization (02/13/2012); Cardiac catheterization (04/11/2013); orthopedic surgery; Trigger point injection; Epidural block injection (Bilateral, 08/15/2019); FL guided needle plac bx/asp/inj (08/15/2019); IR biopsy lung (09/13/2022); Colonoscopy; pr thoracoscopy w/lobectomy single lobe (Right, 11/16/2022); pr thoracoscopy w/segmentectomy (Right, 11/16/2022); pr brncc incl fluor gdnce dx w/cell washg spx (N/A, 11/16/2022); and IR thoracic duct embolization (11/22/2022)  ROS:  Review of Systems   - 13 point ROS was performed and all are normal unless stated in the history above  - Nursing note reviewed  Vitals reviewed  - Orders placed by myself and/or advanced practitioner / resident  PE:   Vitals:    06/21/23 1100   BP: 122/71   BP Location: Right arm   Pulse: 72   Resp: 20   Temp: 97 5 °F (36 4 °C)   TempSrc: Temporal   SpO2: 99%     Vitals reviewed by me  Patient with resting tremor, more so on left compared to right  No actual work of breathing  No conversational dyspnea  No pain with palpation over abdomen  No significant leg swelling  Normotensive, nontachycardic  No facial swelling, voice changes, drooling  Unless otherwise specified above:    General: VS reviewed  Appears in NAD    Head: Normocephalic, atraumatic  Eyes: EOM-I      ENT: Atraumatic external nose and ears  No drooling  Neck: No JVD      CV: No pallor noted  Lungs:   No tachypnea  No respiratory distress    Abdomen:  Soft, non-tender, non-distended    MSK:   No obvious deformity    Skin: Dry, intact  No obvious rash  Psychiatric/Behavioral: Appropriate mood and affect   Exam: deferred    Physical Exam     Procedures   A:  - Nursing note reviewed  ED Course as of 06/21/23 1431   Wed Jun 21, 2023   1115 Procedure Note: EKG  Date/Time: 06/21/23 11:15 AM   Interpreted by: Maame Rivas  Indications / Diagnosis: Shaking  ECG reviewed by me, the ED Provider: yes   The EKG demonstrates:  Rhythm: normal sinus  Intervals: normal intervals  Axis: normal axis  QRS/Blocks: normal QRS  ST Changes: Overall poor data interpretation, no obvious ST elevations  Patient has a resting tremor which occasionally causes erroneous interpretation  He also appears to be in sinus on the monitor  No acute ST Changes, no STD/VICTOR MANUEL  No diffuse elevations to indicate pericarditis  No coved ST elevations greater than 2mm with negative T waves in V1-3 to indicate concern for brugada  No biphasic T waves in V2, V3 to indicate Wellens (critical stenosis of LAD)  No elevation in aVR or deviation when compared to V1 (can be associated with ST depression in I,II, V4-6 when left main occlusion is present)           XR chest 1 view portable   Final Result   Development of mild vascular congestion and possible left basal infiltrate               Workstation performed: [id number]           Orders Placed This Encounter   Procedures   • XR chest 1 view portable   • CBC and differential   • Protime-INR   • APTT   • Basic metabolic panel   • HS Troponin 0hr (reflex protocol)   • Procalcitonin   • Insert peripheral IV     Labs Reviewed   CBC AND DIFFERENTIAL - Abnormal       Result Value Ref Range Status    WBC 7 56  4 31 - 10 16 Thousand/uL Final    RBC 4 83  3 88 - 5 62 Million/uL Final    Hemoglobin 15 8  12 0 - 17 0 g/dL Final    Hematocrit 48 1  36 5 - 49 3 % Final     (*) 82 - 98 fL Final    MCH "32 7  26 8 - 34 3 pg Final    MCHC 32 8  31 4 - 37 4 g/dL Final    RDW 12 5  11 6 - 15 1 % Final    MPV 10 0  8 9 - 12 7 fL Final    Platelets 193  901 - 390 Thousands/uL Final    nRBC 0  /100 WBCs Final    Neutrophils Relative 64  43 - 75 % Final    Immat GRANS % 0  0 - 2 % Final    Lymphocytes Relative 28  14 - 44 % Final    Monocytes Relative 7  4 - 12 % Final    Eosinophils Relative 0  0 - 6 % Final    Basophils Relative 1  0 - 1 % Final    Neutrophils Absolute 4 84  1 85 - 7 62 Thousands/µL Final    Immature Grans Absolute 0 02  0 00 - 0 20 Thousand/uL Final    Lymphocytes Absolute 2 13  0 60 - 4 47 Thousands/µL Final    Monocytes Absolute 0 52  0 17 - 1 22 Thousand/µL Final    Eosinophils Absolute 0 00  0 00 - 0 61 Thousand/µL Final    Basophils Absolute 0 05  0 00 - 0 10 Thousands/µL Final   PROTIME-INR - Normal    Protime 11 9  11 6 - 14 5 seconds Final    INR 0 86  0 84 - 1 19 Final   APTT - Normal    PTT 29  23 - 37 seconds Final    Comment: Therapeutic Heparin Range =  60-90 seconds   HS TROPONIN I 0HR - Normal    hs TnI 0hr 4  \"Refer to ACS Flowchart\"- see link ng/L Final    Comment:                                              Initial (time 0) result  If >=50 ng/L, Myocardial injury suggested ;  Type of myocardial injury and treatment strategy  to be determined  If 5-49 ng/L, a delta result at 2 hours and or 4 hours will be needed to further evaluate  If <4 ng/L, and chest pain has been >3 hours since onset, patient may qualify for discharge based on the HEART score in the ED  If <5 ng/L and <3hours since onset of chest pain, a delta result at 2 hours will be needed to further evaluate  HS Troponin 99th Percentile URL of a Health Population=12 ng/L with a 95% Confidence Interval of 8-18 ng/L  Second Troponin (time 2 hours)  If calculated delta >= 20 ng/L,  Myocardial injury suggested ; Type of myocardial injury and treatment strategy to be determined    If 5-49 ng/L and the calculated delta is " 5-19 ng/L, consult medical service for evaluation  Continue evaluation for ischemia on ecg and other possible etiology and repeat hs troponin at 4 hours  If delta is <5 ng/L at 2 hours, consider discharge based on risk stratification via the HEART score (if in ED), or NILAM risk score in IP/Observation  HS Troponin 99th Percentile URL of a Health Population=12 ng/L with a 95% Confidence Interval of 8-18 ng/L  PROCALCITONIN TEST - Normal    Procalcitonin <0 05  <=0 25 ng/ml Final    Comment: Suspected Lower Respiratory Tract Infection (LRTI):  - LESS than or EQUAL to 0 25 ng/mL:   low likelihood for bacterial LRTI; antibiotics DISCOURAGED   - GREATER than 0 25 ng/mL:   increased likelihood for bacterial LRTI; antibiotics ENCOURAGED  Suspected Sepsis:  - Strongly consider initiating antibiotics in ALL UNSTABLE patients  - LESS than or EQUAL to 0 5 ng/mL:   low likelihood for bacterial sepsis; antibiotics DISCOURAGED   - GREATER than 0 5 ng/mL:   increased likelihood for bacterial sepsis; antibiotics ENCOURAGED   - GREATER than 2 ng/mL:   high risk for severe sepsis / septic shock; antibiotics strongly ENCOURAGED  Decisions on antibiotic use should not be based solely on Procalcitonin (PCT) levels  If PCT is low but uncertainty exists with stopping antibiotics, repeat PCT in 6-24 hours to confirm the low level  If antibiotics are administered (regardless if initial PCT was high or low), repeat PCT every 1-2 days to consider early antibiotic cessation (when GREATER than 80% decrease from the peak OR when PCT drops below designated cutoffs, whichever comes first), so long as the infection is NOT one that typically requires prolonged treatment durations (e g , bone/joint infections, endocarditis, Staph  aureus bacteremia)      Situations of FALSE-POSITIVE Procalcitonin values:  1) Newborns < 67 hours old  2) Massive stress from severe trauma / burns, major surgery, acute pancreatitis, cardiogenic / hemorrhagic shock, sickle cell crisis, or other organ perfusion abnormalities  3) Malaria and some Candidal infections  4) Treatment with agents that stimulate cytokines (e g , OKT3, anti-lymphocyte globulins, alemtuzumab, IL-2, granulocyte transfusion [NOT GCSFs])  5) Chronic renal disease causes elevated baseline levels (consider GREATER than 0 75 ng/mL as an abnormal cut-off); initiating HD/CRRT may cause transient decreases  6) Paraneoplastic syndromes from medullary thyroid or SCLC, some forms of vasculitis, and acute [id number] disease    Situations of FALSE-NEGATIVE Procalcitonin values:  1) Too early in clinical course for PCT to have reached its peak (may repeat in 6-24 hours to confirm low level)  2) Localized infection WITHOUT systemic (SIRS / sepsis) response (e g , an abscess, osteomyelitis, cystitis)  3) Mycobacteria (e g , Tuberculosis, MAC)  4) Cystic fibrosis exacerbations     POCT GLUCOSE - Normal    POC Glucose 139  65 - 140 mg/dl Final   BASIC METABOLIC PANEL    Sodium 343  135 - 147 mmol/L Final    Potassium 3 9  3 5 - 5 3 mmol/L Final    Chloride 103  96 - 108 mmol/L Final    CO2 26  21 - 32 mmol/L Final    ANION GAP 8  mmol/L Final    BUN 19  5 - 25 mg/dL Final    Creatinine 0 99  0 60 - 1 30 mg/dL Final    Comment: Standardized to IDMS reference method    Glucose 130  65 - 140 mg/dL Final    Comment: If the patient is fasting, the ADA then defines impaired fasting glucose as > 100 mg/dL and diabetes as > or equal to 123 mg/dL      Calcium 9 9  8 4 - 10 2 mg/dL Final    eGFR 77  ml/min/1 73sq m Final    Narrative:     [place] guidelines for Chronic Kidney Disease (CKD):   •  Stage 1 with normal or high GFR (GFR > 90 mL/min/1 73 square meters)  •  Stage 2 Mild CKD (GFR = 60-89 mL/min/1 73 square meters)  •  Stage 3A Moderate CKD (GFR = 45-59 mL/min/1 73 square meters)  •  Stage 3B Moderate CKD (GFR = 30-44 mL/min/1 73 square meters)  •  Stage 4 Severe CKD (GFR = 15-29 mL/min/1 73 square meters)  •  Stage 5 End Stage CKD (GFR <15 mL/min/1 73 square meters)  Note: GFR calculation is accurate only with a steady state creatinine         Final Diagnosis:  1  Allergic reaction        P:  -Patient overall appears nontoxic  It is possible that he was having an infusion reaction from a new medication  Lower suspicion for true rigors that would indicate an infectious cause  Will evaluate for ACS, arrhythmia, obvious sources of infection  Observe   -On reevaluation patient states that he feels completely at his baseline  Analysis here was negative for ACS as, arrhythmia, obvious signs of infection  I discussed staying for a second troponin versus discharge  Patient states that he feels comfortable to go home and I believe this is reasonable  I discussed with him that he should just contact his oncologist and discuss if he needs some kind of pretreatment for possible allergy or a different type of treatment algorithm  Return cautions reviewed  Unless otherwise noted the patient's medications were reviewed and they should continue as directed  Medications   sodium chloride 0 9 % bolus 1,000 mL (0 mL Intravenous Stopped 6/21/23 1253)     Time reflects when diagnosis was documented in both MDM as applicable and the Disposition within this note     Time User Action Codes Description Comment    6/21/2023 12:38 PM Pearl Reece Add [[id number]] Allergic reaction       ED Disposition     ED Disposition   Discharge    Condition   Stable    Date/Time   Wed Jun 21, 2023 12:38 PM    [address] discharge to home/self care  Follow-up Information     Follow up With Specialties Details Why Contact Cooper Mathis DO Family Medicine   [address] [place] [id number]  [phone]      Your Oncologist  Call  Discuss possible infusion reaction         Discharge Medication List as of 6/21/2023 12:38 PM      CONTINUE these medications which have NOT CHANGED    Details   amLODIPine (NORVASC) 10 mg tablet take 1 tablet by mouth once daily, Normal      bisacodyl (DULCOLAX) 5 mg EC tablet Take 1 tablet (5 mg total) by mouth daily as needed for constipation for up to 4 doses, Starting Wed 3/8/2023, Normal      buPROPion (Wellbutrin XL) 150 mg 24 hr tablet Take 1 tablet (150 mg total) by mouth every morning, Starting Tue 1/31/2023, Normal      docusate sodium (COLACE) 100 mg capsule Take 1 capsule (100 mg total) by mouth 2 (two) times a day, Starting Tue 6/6/2023, Normal      fluticasone (FLONASE) 50 mcg/act nasal spray 1 spray into each nostril daily for 14 days, Starting Tue 12/27/2022, Until Wed 6/7/2023, Normal      glucose blood (OneTouch Verio) test strip Test once daily, Normal      ipratropium-albuterol (DUO-NEB) 0 5-2 5 mg/3 mL nebulizer solution Take 3 mL by nebulization every 6 (six) hours as needed for wheezing or shortness of breath, Starting Wed 6/7/2023, Until Tue 9/5/2023 at 2359, Normal      lisinopril (ZESTRIL) 10 mg tablet Take 1 tablet (10 mg total) by mouth daily, Starting Mon 5/8/2023, Normal      metoprolol succinate (TOPROL-XL) 100 mg 24 hr tablet take 1 tablet by mouth once daily, Normal      nicotine (NICODERM CQ) 14 mg/24hr TD 24 hr patch Place 1 patch on the skin over 24 hours every 24 hours, Starting Wed 6/7/2023, Until Fri 7/7/2023, Normal      nicotine polacrilex (NICORETTE) 2 mg gum Chew 1 each (2 mg total) as needed for smoking cessation, Starting Tue 11/1/2022, Normal      nitroglycerin (NITROSTAT) 0 4 mg SL tablet Place 1 tablet (0 4 mg total) under the tongue every 5 (five) minutes as needed for chest pain, Starting Mon 1/16/2023, Normal      oxyCODONE-acetaminophen (Percocet) 7 5-325 MG per tablet Take 1 tablet by mouth every 8 (eight) hours as needed for moderate pain Do not fill until 6/30/2023 Max Daily Amount: 3 tablets, Starting Fri 6/2/2023, Normal      pantoprazole (PROTONIX) 20 mg tablet take 1 tablet by mouth once daily, Normal      polyethylene glycol (MIRALAX) 17 g packet Take 17 g by mouth daily for 5 days, Starting Sun 2022, Until 2022, Normal      pregabalin (LYRICA) 50 mg capsule 1 PO QHS x 1 day, then  1 PO BID x 1 day, then 1 PO TID, Normal      rosuvastatin (CRESTOR) 20 MG tablet take 1 tablet by mouth once daily, Normal      sildenafil (VIAGRA) 100 mg tablet TAKE 1 TABLET BY MOUTH ONCE DAILY AS NEEDED FOR ERECTILE DYSFUNCTION, Normal      tamsulosin (FLOMAX) 0 4 mg Take 1 capsule (0 4 mg total) by mouth daily with dinner, Starting Mon 2023, Normal      tiotropium-olodaterol (Stiolto Respimat) 2 5-2 5 MCG/ACT inhaler Inhale 2 puffs if needed (wheezing, Shortness of breath), Starting 2023, Normal      traZODone (DESYREL) 100 mg tablet take 1 tablet by mouth daily at bedtime, Normal           No discharge procedures on file  Prior to Admission Medications   Prescriptions Last Dose Informant Patient Reported? Taking?    amLODIPine (NORVASC) 10 mg tablet   No No   Sig: take 1 tablet by mouth once daily   bisacodyl (DULCOLAX) 5 mg EC tablet   No No   Sig: Take 1 tablet (5 mg total) by mouth daily as needed for constipation for up to 4 doses   buPROPion (Wellbutrin XL) 150 mg 24 hr tablet   No No   Sig: Take 1 tablet (150 mg total) by mouth every morning   Patient not taking: Reported on 2023   docusate sodium (COLACE) 100 mg capsule   No No   Sig: Take 1 capsule (100 mg total) by mouth 2 (two) times a day   fluticasone (FLONASE) 50 mcg/act nasal spray   No No   Si spray into each nostril daily for 14 days   Patient taking differently: 1 spray into each nostril if needed   glucose blood (OneTouch Verio) test strip   No No   Sig: Test once daily   ipratropium-albuterol (DUO-NEB) 0 5-2 5 mg/3 mL nebulizer solution   No No   Sig: Take 3 mL by nebulization every 6 (six) hours as needed for wheezing or shortness of breath   lisinopril (ZESTRIL) 10 mg tablet   No No   Sig: Take 1 tablet (10 mg total) by mouth daily   metoprolol succinate (TOPROL-XL) 100 mg 24 hr tablet   No No   Sig: take 1 tablet by mouth once daily   nicotine (NICODERM CQ) 14 mg/24hr TD 24 hr patch   No No   Sig: Place 1 patch on the skin over 24 hours every 24 hours   Patient not taking: Reported on 2023   nicotine polacrilex (NICORETTE) 2 mg gum   No No   Sig: Chew 1 each (2 mg total) as needed for smoking cessation   Patient not taking: Reported on 2023   nitroglycerin (NITROSTAT) 0 4 mg SL tablet   No No   Sig: Place 1 tablet (0 4 mg total) under the tongue every 5 (five) minutes as needed for chest pain   oxyCODONE-acetaminophen (Percocet) 7 5-325 MG per tablet   No No   Sig: Take 1 tablet by mouth every 8 (eight) hours as needed for moderate pain Do not fill until 2023 Max Daily Amount: 3 tablets   pantoprazole (PROTONIX) 20 mg tablet   No No   Sig: take 1 tablet by mouth once daily   Patient not taking: Reported on 2023   polyethylene glycol (MIRALAX) 17 g packet   No No   Sig: Take 17 g by mouth daily for 5 days   Patient not taking: Reported on 2022   pregabalin (LYRICA) 50 mg capsule   No No   Si PO QHS x 1 day, then  1 PO BID x 1 day, then 1 PO TID   rosuvastatin (CRESTOR) 20 MG tablet   No No   Sig: take 1 tablet by mouth once daily   Patient not taking: Reported on 2023   sildenafil (VIAGRA) 100 mg tablet   No No   Sig: TAKE 1 TABLET BY MOUTH ONCE DAILY AS NEEDED FOR ERECTILE DYSFUNCTION   tamsulosin (FLOMAX) 0 4 mg   No No   Sig: Take 1 capsule (0 4 mg total) by mouth daily with dinner   Patient taking differently: Take 0 8 mg by mouth daily with dinner   tiotropium-olodaterol (Stiolto Respimat) 2 5-2 5 MCG/ACT inhaler   No No   Sig: Inhale 2 puffs if needed (wheezing, Shortness of breath)   traZODone (DESYREL) 100 mg tablet   No No   Sig: take 1 tablet by mouth daily at bedtime      Facility-Administered Medications: None       Portions of the record may have been created with voice "recognition software  Occasional wrong word or \"sound a like\" substitutions may have occurred due to the inherent limitations of voice recognition software  Read the chart carefully and recognize, using context, where substitutions have occurred      Electronically signed by:  MD Peyton Spencer MD  06/21/23 4554    "

## 2023-06-21 NOTE — TELEPHONE ENCOUNTER
"Received voicemail: \"Hi Jacob Wall [phone]  Yeah, if you could call me back at your convenience  I had my first treatment today and it went bad and they rushed me to the  I imagine you probably already know, but give me a call, let me know what to do from this point  Thank you much  Bye bye  \"    Returned phone call to patient  I let patient know that office will discuss with Dr Emiliano Najera regarding plan moving forward and will get back to him  He verbalized understanding and has no other questions or concerns at this moment     "

## 2023-06-21 NOTE — PROGRESS NOTES
Onelia on pt and asked him how he was feeling  Pt stated it feels like his throat is tightening  I asked him when it started and he stated about 20 minutes ago  Tecentriq immediately stopped  Only about 5 ml remaining  Asked pt why he didn't say anything earlier and he stated he thought it was just because he was coughing  Hypersensitivity protocol initiated  BP elevated Otherwise VS stable  Benadryl, Pepcid (only 2ml of 10 ml dose given due to sudden severe increase in throat tightening and beginning with chest tightening) and solumedrol given  NSS running  Pt symptoms becoming worse with increase throat tightening and chest tightness  [address] [facility] emergency called  [id number] transported to ER via stretcher accompanied by myself and Nallely Alvarado RN and Dr Macarena Rangel with cardiac monitor attached and nasal O2 at 4 l/min  While enroute to ER pt started with rigors which continued upon admission in the ER  Report given to Sasha  Upon my return to the [facility] pt's daughter Sherman Gavin was notified by phone of the events that transpired

## 2023-06-22 RX ORDER — METHYLPREDNISOLONE SODIUM SUCCINATE 40 MG/ML
20 INJECTION, POWDER, LYOPHILIZED, FOR SOLUTION INTRAMUSCULAR; INTRAVENOUS ONCE
Status: COMPLETED | OUTPATIENT
Start: 2023-06-21 | End: 2023-06-21

## 2023-06-22 NOTE — PROGRESS NOTES
Late entry from 6/21/23 at 1048  Received telephone order from Dr Brooke Bernard yesterday to administer a repeat dose of Solumedrol 20 mg IV due to pt's worsening symptoms from hypersensitivity reaction  The telephone order was inadvertently not entered yesterday and was entered today

## 2023-06-23 ENCOUNTER — HOSPITAL ENCOUNTER (EMERGENCY)
Facility: HOSPITAL | Age: 68
Discharge: HOME | End: 2023-06-23
Attending: EMERGENCY MEDICINE
Payer: MEDICARE

## 2023-06-23 ENCOUNTER — APPOINTMENT (EMERGENCY)
Dept: CT IMAGING | Facility: HOSPITAL | Age: 68
End: 2023-06-23
Payer: MEDICARE

## 2023-06-23 VITALS
HEART RATE: 76 BPM | DIASTOLIC BLOOD PRESSURE: 73 MMHG | TEMPERATURE: 98.5 F | WEIGHT: 186 LBS | RESPIRATION RATE: 16 BRPM | SYSTOLIC BLOOD PRESSURE: 164 MMHG | BODY MASS INDEX: 29.19 KG/M2 | OXYGEN SATURATION: 93 % | HEIGHT: 67 IN

## 2023-06-23 DIAGNOSIS — N20.0 KIDNEY STONE ON RIGHT SIDE: Primary | ICD-10-CM

## 2023-06-23 LAB
ALBUMIN SERPL BCP-MCNC: 4.6 G/DL (ref 3.5–5)
ALP SERPL-CCNC: 78 U/L (ref 34–104)
ALT SERPL W P-5'-P-CCNC: 12 U/L (ref 7–52)
ANION GAP SERPL CALCULATED.3IONS-SCNC: 10 MMOL/L
AST SERPL W P-5'-P-CCNC: 15 U/L (ref 13–39)
BACTERIA UR QL AUTO: ABNORMAL /HPF
BASOPHILS # BLD AUTO: 0.06 THOUSANDS/ÂΜL (ref 0–0.1)
BASOPHILS NFR BLD AUTO: 1 % (ref 0–1)
BILIRUB SERPL-MCNC: 0.65 MG/DL (ref 0.2–1)
BILIRUB UR QL STRIP: NEGATIVE
BUN SERPL-MCNC: 20 MG/DL (ref 5–25)
CALCIUM SERPL-MCNC: 9.8 MG/DL (ref 8.4–10.2)
CHLORIDE SERPL-SCNC: 99 MMOL/L (ref 96–108)
CLARITY UR: CLEAR
CO2 SERPL-SCNC: 26 MMOL/L (ref 21–32)
COLOR UR: YELLOW
CREAT SERPL-MCNC: 1.31 MG/DL (ref 0.6–1.3)
EOSINOPHIL # BLD AUTO: 0 THOUSAND/ÂΜL (ref 0–0.61)
EOSINOPHIL NFR BLD AUTO: 0 % (ref 0–6)
ERYTHROCYTE [DISTWIDTH] IN BLOOD BY AUTOMATED COUNT: 12.5 % (ref 11.6–15.1)
GFR SERPL CREATININE-BSD FRML MDRD: 55 ML/MIN/1.73SQ M
GLUCOSE SERPL-MCNC: 117 MG/DL (ref 65–140)
GLUCOSE UR STRIP-MCNC: NEGATIVE MG/DL
HCT VFR BLD AUTO: 45.2 % (ref 36.5–49.3)
HGB BLD-MCNC: 15.1 G/DL (ref 12–17)
HGB UR QL STRIP.AUTO: ABNORMAL
IMM GRANULOCYTES # BLD AUTO: 0.07 THOUSAND/UL (ref 0–0.2)
IMM GRANULOCYTES NFR BLD AUTO: 1 % (ref 0–2)
KETONES UR STRIP-MCNC: NEGATIVE MG/DL
LEUKOCYTE ESTERASE UR QL STRIP: ABNORMAL
LIPASE SERPL-CCNC: 24 U/L (ref 11–82)
LYMPHOCYTES # BLD AUTO: 1.83 THOUSANDS/ÂΜL (ref 0.6–4.47)
LYMPHOCYTES NFR BLD AUTO: 16 % (ref 14–44)
MCH RBC QN AUTO: 32.5 PG (ref 26.8–34.3)
MCHC RBC AUTO-ENTMCNC: 33.4 G/DL (ref 31.4–37.4)
MCV RBC AUTO: 97 FL (ref 82–98)
MONOCYTES # BLD AUTO: 0.83 THOUSAND/ÂΜL (ref 0.17–1.22)
MONOCYTES NFR BLD AUTO: 7 % (ref 4–12)
NEUTROPHILS # BLD AUTO: 8.39 THOUSANDS/ÂΜL (ref 1.85–7.62)
NEUTS SEG NFR BLD AUTO: 75 % (ref 43–75)
NITRITE UR QL STRIP: NEGATIVE
NON-SQ EPI CELLS URNS QL MICRO: ABNORMAL /HPF
NRBC BLD AUTO-RTO: 0 /100 WBCS
PH UR STRIP.AUTO: 7.5 [PH]
PLATELET # BLD AUTO: 216 THOUSANDS/UL (ref 149–390)
PMV BLD AUTO: 9.8 FL (ref 8.9–12.7)
POTASSIUM SERPL-SCNC: 3.9 MMOL/L (ref 3.5–5.3)
PROT SERPL-MCNC: 7.6 G/DL (ref 6.4–8.4)
PROT UR STRIP-MCNC: NEGATIVE MG/DL
RBC # BLD AUTO: 4.64 MILLION/UL (ref 3.88–5.62)
RBC #/AREA URNS AUTO: ABNORMAL /HPF
SODIUM SERPL-SCNC: 135 MMOL/L (ref 135–147)
SP GR UR STRIP.AUTO: 1.01 (ref 1–1.03)
UROBILINOGEN UR QL STRIP.AUTO: 0.2 E.U./DL
WBC # BLD AUTO: 11.18 THOUSAND/UL (ref 4.31–10.16)
WBC #/AREA URNS AUTO: ABNORMAL /HPF

## 2023-06-23 PROCEDURE — 85025 COMPLETE CBC W/AUTO DIFF WBC: CPT | Performed by: EMERGENCY MEDICINE

## 2023-06-23 PROCEDURE — 36415 COLL VENOUS BLD VENIPUNCTURE: CPT | Performed by: EMERGENCY MEDICINE

## 2023-06-23 PROCEDURE — 96376 TX/PRO/DX INJ SAME DRUG ADON: CPT

## 2023-06-23 PROCEDURE — 96374 THER/PROPH/DIAG INJ IV PUSH: CPT

## 2023-06-23 PROCEDURE — 80053 COMPREHEN METABOLIC PANEL: CPT | Performed by: EMERGENCY MEDICINE

## 2023-06-23 PROCEDURE — 99285 EMERGENCY DEPT VISIT HI MDM: CPT

## 2023-06-23 PROCEDURE — 74177 CT ABD & PELVIS W/CONTRAST: CPT

## 2023-06-23 PROCEDURE — G1004 CDSM NDSC: HCPCS

## 2023-06-23 PROCEDURE — 81001 URINALYSIS AUTO W/SCOPE: CPT | Performed by: EMERGENCY MEDICINE

## 2023-06-23 PROCEDURE — 83690 ASSAY OF LIPASE: CPT | Performed by: EMERGENCY MEDICINE

## 2023-06-23 PROCEDURE — 96361 HYDRATE IV INFUSION ADD-ON: CPT

## 2023-06-23 PROCEDURE — 96375 TX/PRO/DX INJ NEW DRUG ADDON: CPT

## 2023-06-23 RX ORDER — HYDROMORPHONE HCL/PF 1 MG/ML
0.5 SYRINGE (ML) INJECTION ONCE
Status: COMPLETED | OUTPATIENT
Start: 2023-06-23 | End: 2023-06-23

## 2023-06-23 RX ORDER — TAMSULOSIN HYDROCHLORIDE 0.4 MG/1
0.4 CAPSULE ORAL
Qty: 3 CAPSULE | Refills: 0 | Status: SHIPPED | OUTPATIENT
Start: 2023-06-23 | End: 2023-06-26

## 2023-06-23 RX ORDER — KETOROLAC TROMETHAMINE 30 MG/ML
15 INJECTION, SOLUTION INTRAMUSCULAR; INTRAVENOUS ONCE
Status: COMPLETED | OUTPATIENT
Start: 2023-06-23 | End: 2023-06-23

## 2023-06-23 RX ORDER — OXYCODONE HYDROCHLORIDE 5 MG/1
5 TABLET ORAL EVERY 6 HOURS PRN
Qty: 7 TABLET | Refills: 0 | Status: SHIPPED | OUTPATIENT
Start: 2023-06-23

## 2023-06-23 RX ADMIN — HYDROMORPHONE HYDROCHLORIDE 0.5 MG: 1 INJECTION, SOLUTION INTRAMUSCULAR; INTRAVENOUS; SUBCUTANEOUS at 14:30

## 2023-06-23 RX ADMIN — IOHEXOL 100 ML: 350 INJECTION, SOLUTION INTRAVENOUS at 15:38

## 2023-06-23 RX ADMIN — SODIUM CHLORIDE 1000 ML: 0.9 INJECTION, SOLUTION INTRAVENOUS at 14:29

## 2023-06-23 RX ADMIN — HYDROMORPHONE HYDROCHLORIDE 0.5 MG: 1 INJECTION, SOLUTION INTRAMUSCULAR; INTRAVENOUS; SUBCUTANEOUS at 15:12

## 2023-06-23 RX ADMIN — KETOROLAC TROMETHAMINE 15 MG: 30 INJECTION, SOLUTION INTRAMUSCULAR at 15:12

## 2023-06-23 NOTE — ED PROVIDER NOTES
Final Diagnosis:  1  Kidney stone on right side        Chief Complaint   Patient presents with   • Abdominal Pain     Patient reports RLQ pain that began at 0630 this morning that woke him from sleep  States it does somewhat radiate to back  Denies any n/v/d     HPI  Patient presents for evaluation of right upper quadrant and right flank abdominal pain  Patient states that this started this morning at 6:30 AM   He was hoping it would go away but has not  Presents now for further evaluation  He denies any dysuria hematuria  No trauma to the area  No fever chills, nausea vomiting  Patient states that he does have a history of kidney stones but this is on the left side  Patient does have a history of lung cancer status post partial lobectomy late last year  He I recently saw the patient after he received infusion therapy 2 days ago and had a poor reaction to the treatment  He was feeling well at that time  He tells me that after he left the emergency department on that day he then went to work  Review of records show that the patient has had multiple imaging done in the past   Most recently it showed that he had multiple kidney stones on the right side up to 10 mm in greatest confluence  Unless otherwise specified:  - No language barrier    - History obtained from patient  - There are no limitations to the history obtained    - Previous charting was reviewed    PMH:   has a past medical history of Abdominal aortic aneurysm without rupture ([address] ), Abdominal Aortic Duplex (02/21/2017), MARYANN (acute kidney injury) ([address] ) (07/23/2022), CAD (coronary artery disease), Cancer ([address] ) (2022), COPD (chronic obstructive pulmonary disease) ([address] ), Extremity pain, History of echocardiogram (03/18/2014), History of stress test (03/06/2017), Hyperlipidemia, Hypertension, Joint pain, Kidney stone, Low back pain, Lung cancer ([address] ), Migraine, Neck pain, Obstructive sleep apnea, Osteoarthritis, Peripheral neuropathy, Reflex "sympathetic dystrophy, and Sacroiliitis ([facility] ) (02/02/2022)  PSH:   has a past surgical history that includes Cholecystectomy; Cardiac catheterization (02/13/2012); Cardiac catheterization (04/11/2013); orthopedic surgery; Trigger point injection; Epidural block injection (Bilateral, 08/15/2019); FL guided needle plac bx/asp/inj (08/15/2019); IR biopsy lung (09/13/2022); Colonoscopy; pr thoracoscopy w/lobectomy single lobe (Right, 11/16/2022); pr thoracoscopy w/segmentectomy (Right, 11/16/2022); pr brnchsc incl fluor gdnce dx w/cell washg spx (N/A, 11/16/2022); and IR thoracic duct embolization (11/22/2022)  ROS:  Review of Systems   - 13 point ROS was performed and all are normal unless stated in the history above  - Nursing note reviewed  Vitals reviewed  - Orders placed by myself and/or advanced practitioner / resident  PE:   Vitals:    06/23/23 1403 06/23/23 1530   BP: (!) 194/95 (!) 172/87   BP Location: Right arm    Pulse: 76 73   Resp: 22 18   Temp: 98 5 °F (36 9 °C)    TempSrc: Temporal    SpO2: 98% 92%   Weight: 84 4 kg (186 lb)    Height: 5' 7\" (1 702 m)      Vitals reviewed by me  Patient appears to be uncomfortable, splinting on the right upper quadrant  Patient has a large incision that is well-healed in this area from what appears to be an open cholecystectomy previously  He also has mild right-sided flank tenderness  Unless otherwise specified above:    General: VS reviewed  Appears in NAD    Head: Normocephalic, atraumatic  Eyes: EOM-I      ENT: Atraumatic external nose and ears  No drooling  Neck: No JVD  CV: No pallor noted  Lungs:   No tachypnea  No respiratory distress    Abdomen:  Soft, non-tender, non-distended    MSK:   No obvious deformity    Skin: Dry, intact  No obvious rash  Psychiatric/Behavioral: Appropriate mood and affect   Exam: deferred    Physical Exam     Procedures   A:  - Nursing note reviewed                     ED Course as of 06/23/23 " [id number]   Fri Jun 23, 2023   1552 CT abdomen pelvis with contrast  There appears to be inflammation around the right kidney  Has large renal cysts bilaterally which are known  There are stones in the inferior portion of the right kidney as well which are also known  I find it difficult to fully appreciate the collecting system and ureter, there may be a small, 2 to 3 mm, stone about skilled nursing down the ureter but I am unsure this  CT abdomen pelvis with contrast   Final Result      1  A 3 mm mid to distal right ureteral calculus causes moderate obstructive uropathy  2  Mild diffuse distal esophageal wall thickening, suggesting esophagitis  3  Stable infrarenal abdominal aortic aneurysm measuring up to 3 5 cm  Stable ectatic and aneurysmal iliac arteries as described              Workstation performed: [id number]           Orders Placed This Encounter   Procedures   • CT abdomen pelvis with contrast   • CBC and differential   • CMP   • Lipase   • UA w Reflex to Microscopic w Reflex to Culture   • Urine Microscopic   • Insert peripheral IV   • Insert peripheral IV     Labs Reviewed   CBC AND DIFFERENTIAL - Abnormal       Result Value Ref Range Status    WBC 11 18 (*) 4 31 - 10 16 Thousand/uL Final    RBC 4 64  3 88 - 5 62 Million/uL Final    Hemoglobin 15 1  12 0 - 17 0 g/dL Final    Hematocrit 45 2  36 5 - 49 3 % Final    MCV 97  82 - 98 fL Final    MCH 32 5  26 8 - 34 3 pg Final    MCHC 33 4  31 4 - 37 4 g/dL Final    RDW 12 5  11 6 - 15 1 % Final    MPV 9 8  8 9 - 12 7 fL Final    Platelets 421  894 - 390 Thousands/uL Final    nRBC 0  /100 WBCs Final    Neutrophils Relative 75  43 - 75 % Final    Immat GRANS % 1  0 - 2 % Final    Lymphocytes Relative 16  14 - 44 % Final    Monocytes Relative 7  4 - 12 % Final    Eosinophils Relative 0  0 - 6 % Final    Basophils Relative 1  0 - 1 % Final    Neutrophils Absolute 8 39 (*) 1 85 - 7 62 Thousands/µL Final    Immature Grans Absolute 0 07  0 00 - 0 20 Thousand/uL Final    Lymphocytes Absolute 1 83  0 60 - 4 47 Thousands/µL Final    Monocytes Absolute 0 83  0 17 - 1 22 Thousand/µL Final    Eosinophils Absolute 0 00  0 00 - 0 61 Thousand/µL Final    Basophils Absolute 0 06  0 00 - 0 10 Thousands/µL Final   COMPREHENSIVE METABOLIC PANEL - Abnormal    Sodium 135  135 - 147 mmol/L Final    Potassium 3 9  3 5 - 5 3 mmol/L Final    Chloride 99  96 - 108 mmol/L Final    CO2 26  21 - 32 mmol/L Final    ANION GAP 10  mmol/L Final    BUN 20  5 - 25 mg/dL Final    Creatinine 1 31 (*) 0 60 - 1 30 mg/dL Final    Comment: Standardized to IDMS reference method    Glucose 117  65 - 140 mg/dL Final    Comment: If the patient is fasting, the ADA then defines impaired fasting glucose as > 100 mg/dL and diabetes as > or equal to 123 mg/dL  Calcium 9 8  8 4 - 10 2 mg/dL Final    AST 15  13 - 39 U/L Final    ALT 12  7 - 52 U/L Final    Comment: Specimen collection should occur prior to Sulfasalazine administration due to the potential for falsely depressed results  Alkaline Phosphatase 78  34 - 104 U/L Final    Total Protein 7 6  6 4 - 8 4 g/dL Final    Albumin 4 6  3 5 - 5 0 g/dL Final    Total Bilirubin 0 65  0 20 - 1 00 mg/dL Final    Comment: Use of this assay is not recommended for patients undergoing treatment with eltrombopag due to the potential for falsely elevated results  N-acetyl-p-benzoquinone imine (metabolite of Acetaminophen) will generate erroneously low results in samples for patients that have taken an overdose of Acetaminophen      eGFR 55  ml/min/1 73sq m Final    Narrative:     [place] guidelines for Chronic Kidney Disease (CKD):   •  Stage 1 with normal or high GFR (GFR > 90 mL/min/1 73 square meters)  •  Stage 2 Mild CKD (GFR = 60-89 mL/min/1 73 square meters)  •  Stage 3A Moderate CKD (GFR = 45-59 mL/min/1 73 square meters)  •  Stage 3B Moderate CKD (GFR = 30-44 mL/min/1 73 square meters)  •  Stage 4 Severe CKD (GFR = 15-29 mL/min/1 73 square meters)  •  Stage 5 End Stage CKD (GFR <15 mL/min/1 73 square meters)  Note: GFR calculation is accurate only with a steady state creatinine   UA W REFLEX TO MICROSCOPIC WITH REFLEX TO CULTURE - Abnormal    Color, UA Yellow   Final    Clarity, UA Clear   Final    Specific [place], UA 1 010  1 003 - 1 030 Final    pH, UA 7 5  4 5, 5 0, 5 5, 6 0, 6 5, 7 0, 7 5, 8 0 Final    Leukocytes, UA Trace (*) Negative Final    Nitrite, UA Negative  Negative Final    Protein, UA Negative  Negative mg/dl Final    Glucose, UA Negative  Negative mg/dl Final    Ketones, UA Negative  Negative mg/dl Final    Urobilinogen, UA 0 2  0 2, 1 0 E U /dl E U /dl Final    Bilirubin, UA Negative  Negative Final    Occult Blood, UA Large (*) Negative Final   URINE MICROSCOPIC - Abnormal    RBC, UA 10-20 (*) None Seen, 0-1, 1-2, 2-4, 0-5 /hpf Final    WBC, UA 1-2  None Seen, 0-1, 1-2, 0-5, 2-4 /hpf Final    Epithelial Cells None Seen  None Seen, Occasional /hpf Final    Bacteria, UA Occasional  None Seen, Occasional /hpf Final   LIPASE - Normal    Lipase 24  11 - 82 u/L Final         Final Diagnosis:  1  Kidney stone on right side        P:  -Patient presents for evaluation of right upper quadrant pain  Low suspicion for liver pathology given patient's history  Higher suspicion for possible nephrolithiasis given sudden onset of pain, significant discomfort, and history of stones on that side  Given history of malignancy will also evaluate for other intra-abdominal pathology such as obstruction, metastasis  Provide analgesia  -Imaging consistent with kidney stone  Discussed results with patient  Will provide narcotics as well as Flomax  Return precautions reviewed  Unless otherwise noted the patient's medications were reviewed and they should continue as directed      Medications   sodium chloride 0 9 % bolus 1,000 mL (0 mL Intravenous Stopped 6/23/23 3680)   HYDROmorphone (DILAUDID) injection 0 5 mg (0 5 mg Intravenous Given 6/23/23 1430)   HYDROmorphone (DILAUDID) injection 0 5 mg (0 5 mg Intravenous Given 6/23/23 1512)   ketorolac (TORADOL) injection 15 mg (15 mg Intravenous Given 6/23/23 1512)   iohexol (OMNIPAQUE) 350 MG/ML injection (SINGLE-DOSE) 100 mL (100 mL Intravenous Given 6/23/23 1538)     Time reflects when diagnosis was documented in both MDM as applicable and the Disposition within this note     Time User Action Codes Description Comment    6/23/2023  4:49 PM Elmer Arce Add [N20 0] Kidney stone on right side       ED Disposition     ED Disposition   Discharge    Condition   Stable    Date/Time   Fri Jun 23, 2023  4:48 PM    Comment   Juan Pack discharge to home/self care  Follow-up Information     Follow up With Specialties Details Why Contact Info Additional Information    Kash Sagastume DO Family Medicine   [address] [facility] [id number]  [phone]       [facility] Schedule an appointment as soon as possible for a visit  If symptoms worsen [address] [phone]  [address] [place] [address] For Urology [facility], [address] [place] [address], [address] [place] [address]        Patient's Medications   Discharge Prescriptions    OXYCODONE (ROXICODONE) 5 IMMEDIATE RELEASE TABLET    Take 1 tablet (5 mg total) by mouth every 6 (six) hours as needed for moderate pain or severe pain for up to 7 doses Max Daily Amount: 20 mg       Start Date: 6/23/2023 End Date: --       Order Dose: 5 mg       Quantity: 7 tablet    Refills: 0    TAMSULOSIN (FLOMAX) 0 4 MG    Take 1 capsule (0 4 mg total) by mouth daily with dinner for 3 days       Start Date: 6/23/2023 End Date: 6/26/2023       Order Dose: 0 4 mg       Quantity: 3 capsule    Refills: 0     No discharge procedures on file  Prior to Admission Medications   Prescriptions Last Dose Informant Patient Reported? Taking?    amLODIPine (NORVASC) 10 mg tablet   No No Sig: take 1 tablet by mouth once daily   bisacodyl (DULCOLAX) 5 mg EC tablet   No No   Sig: Take 1 tablet (5 mg total) by mouth daily as needed for constipation for up to 4 doses   buPROPion (Wellbutrin XL) 150 mg 24 hr tablet   No No   Sig: Take 1 tablet (150 mg total) by mouth every morning   Patient not taking: Reported on 2023   docusate sodium (COLACE) 100 mg capsule   No No   Sig: Take 1 capsule (100 mg total) by mouth 2 (two) times a day   fluticasone (FLONASE) 50 mcg/act nasal spray   No No   Si spray into each nostril daily for 14 days   Patient taking differently: 1 spray into each nostril if needed   glucose blood (OneTouch Verio) test strip   No No   Sig: Test once daily   ipratropium-albuterol (DUO-NEB) 0 5-2 5 mg/3 mL nebulizer solution   No No   Sig: Take 3 mL by nebulization every 6 (six) hours as needed for wheezing or shortness of breath   lisinopril (ZESTRIL) 10 mg tablet   No No   Sig: Take 1 tablet (10 mg total) by mouth daily   metoprolol succinate (TOPROL-XL) 100 mg 24 hr tablet   No No   Sig: take 1 tablet by mouth once daily   nicotine (NICODERM CQ) 14 mg/24hr TD 24 hr patch   No No   Sig: Place 1 patch on the skin over 24 hours every 24 hours   Patient not taking: Reported on 2023   nicotine polacrilex (NICORETTE) 2 mg gum   No No   Sig: Chew 1 each (2 mg total) as needed for smoking cessation   Patient not taking: Reported on 2023   nitroglycerin (NITROSTAT) 0 4 mg SL tablet   No No   Sig: Place 1 tablet (0 4 mg total) under the tongue every 5 (five) minutes as needed for chest pain   oxyCODONE-acetaminophen (Percocet) 7 5-325 MG per tablet   No No   Sig: Take 1 tablet by mouth every 8 (eight) hours as needed for moderate pain Do not fill until 2023 Max Daily Amount: 3 tablets   pantoprazole (PROTONIX) 20 mg tablet   No No   Sig: take 1 tablet by mouth once daily   Patient not taking: Reported on 2023   polyethylene glycol (MIRALAX) 17 g packet   No No   Sig: "Take 17 g by mouth daily for 5 days   Patient not taking: Reported on 2022   pregabalin (LYRICA) 50 mg capsule   No No   Si PO QHS x 1 day, then  1 PO BID x 1 day, then 1 PO TID   rosuvastatin (CRESTOR) 20 MG tablet   No No   Sig: take 1 tablet by mouth once daily   Patient not taking: Reported on 2023   sildenafil (VIAGRA) 100 mg tablet   No No   Sig: TAKE 1 TABLET BY MOUTH ONCE DAILY AS NEEDED FOR ERECTILE DYSFUNCTION   tamsulosin (FLOMAX) 0 4 mg   No No   Sig: Take 1 capsule (0 4 mg total) by mouth daily with dinner   Patient taking differently: Take 0 8 mg by mouth daily with dinner   tiotropium-olodaterol (Stiolto Respimat) 2 5-2 5 MCG/ACT inhaler   No No   Sig: Inhale 2 puffs if needed (wheezing, Shortness of breath)   traZODone (DESYREL) 100 mg tablet   No No   Sig: take 1 tablet by mouth daily at bedtime      Facility-Administered Medications: None       Portions of the record may have been created with voice recognition software  Occasional wrong word or \"sound a like\" substitutions may have occurred due to the inherent limitations of voice recognition software  Read the chart carefully and recognize, using context, where substitutions have occurred      Electronically signed by:  MD Basilia Davis MD  [id number]    " Composite Graft Text: The defect edges were debeveled with a #15 scalpel blade.  Given the location of the defect, shape of the defect, the proximity to free margins and the fact the defect was full thickness a composite graft was deemed most appropriate.  The defect was outline and then transferred to the donor site.  A full thickness graft was then excised from the donor site. The graft was then placed in the primary defect, oriented appropriately and then sutured into place.  The secondary defect was then repaired using a primary closure.

## 2023-06-26 LAB
ATRIAL RATE: 73 BPM
P AXIS: 64 DEGREES
PR INTERVAL: 162 MS
QRS AXIS: 41 DEGREES
QRSD INTERVAL: 106 MS
QT INTERVAL: 420 MS
QTC INTERVAL: 456 MS
T WAVE AXIS: 48 DEGREES
VENTRICULAR RATE: 71 BPM

## 2023-06-26 PROCEDURE — 93010 ELECTROCARDIOGRAM REPORT: CPT | Performed by: INTERNAL MEDICINE

## 2023-06-27 ENCOUNTER — VBI (OUTPATIENT)
Dept: ADMINISTRATIVE | Facility: OTHER | Age: 68
End: 2023-06-27

## 2023-06-27 NOTE — TELEPHONE ENCOUNTER
Mya Vallejo    ED Visit Information     Ed visit date: 6/23/2023  Diagnosis Description: Kidney stone on right side  In Network? Yes Doyle Anival  Discharge status: Home  Discharged with meds ? Yes  Number of ED visits to date: 2  ED Severity:3      Outreach Information    Outreach successful: Yes 1  Date letter mailed:No  Date Finalized:6/27/23    Care Coordination    Follow up appointment with pcp: no declined an earlier appt  Transportation issues ? No    [employer] type: 7 Day Outreach  Emergent necessity warranted by diagnosis: Yes  [facility] PCP: Yes  Transportation: Self Transport  Called PCP first?: No  Feels able to call PCP for urgent problems ?: Yes  Understands what emergencies can be handled by PCP ?: Yes  Ever any problems getting appointment with PCP for minor emergency/urgency problems?: Yes  Practice Contacted Patient ?: No  Pt had ED follow up with pcp/staff ?: No    Seen for follow-up out of network ?: No  Reason Patient went to ED instead of Urgent Care or PCP?: Perceived Severity of Illness  Urgent care Education?: No  06/27/2023 01:58 PM EDT by Storm Devonshire - [employer] - x1    06/27/2023 01:58 PM EDT by [employer] - x1    There was a personal communication with patient regarding recent ED visit  Velasquez White declined an earlier follow up with PCP pt stated he has an appt coming up, and he would call if he needs to  Patient is aware of their nearest [place] [address] urgent care facility, education not given  Patient expressed how well he was taken care of at the ED, and pointed out the physician who treated him was wonderful! ER Dr Gerry Enciso MD  Patient asked me to make a note of it

## 2023-07-07 ENCOUNTER — TELEPHONE (OUTPATIENT)
Dept: HEMATOLOGY ONCOLOGY | Facility: CLINIC | Age: 68
End: 2023-07-07

## 2023-07-07 NOTE — TELEPHONE ENCOUNTER
Received voicemail:    "Good morning, Shari. [address] [place] [address] [birth year]. I'm due for another infusion on Wednesday, but since the last incident I had there, I'm not sure what's going on. If you can give me a call back, my number's [phone]. Thank you."    Returned phone call to patient. I let patient know pre-medications will be added for his infusion to help prevent symptoms he experienced at his last infusion. Patient verbalized understanding and has no other questions or concerns at this moment.

## 2023-07-11 ENCOUNTER — APPOINTMENT (OUTPATIENT)
Dept: LAB | Facility: HOSPITAL | Age: 68
End: 2023-07-11
Payer: MEDICARE

## 2023-07-11 ENCOUNTER — TELEPHONE (OUTPATIENT)
Dept: HEMATOLOGY ONCOLOGY | Facility: CLINIC | Age: 68
End: 2023-07-11

## 2023-07-11 DIAGNOSIS — C34.31 MALIGNANT NEOPLASM OF LOWER LOBE OF RIGHT LUNG (HCC): ICD-10-CM

## 2023-07-11 DIAGNOSIS — C34.31 MALIGNANT NEOPLASM OF LOWER LOBE OF RIGHT LUNG (HCC): Primary | ICD-10-CM

## 2023-07-11 LAB
ALBUMIN SERPL BCP-MCNC: 4 G/DL (ref 3.5–5)
ALP SERPL-CCNC: 79 U/L (ref 34–104)
ALT SERPL W P-5'-P-CCNC: 9 U/L (ref 7–52)
ANION GAP SERPL CALCULATED.3IONS-SCNC: 11 MMOL/L
AST SERPL W P-5'-P-CCNC: 11 U/L (ref 13–39)
BASOPHILS # BLD AUTO: 0.05 THOUSANDS/ÂΜL (ref 0–0.1)
BASOPHILS NFR BLD AUTO: 1 % (ref 0–1)
BILIRUB SERPL-MCNC: 0.65 MG/DL (ref 0.2–1)
BUN SERPL-MCNC: 18 MG/DL (ref 5–25)
CALCIUM SERPL-MCNC: 9.4 MG/DL (ref 8.4–10.2)
CHLORIDE SERPL-SCNC: 102 MMOL/L (ref 96–108)
CO2 SERPL-SCNC: 23 MMOL/L (ref 21–32)
CREAT SERPL-MCNC: 1.27 MG/DL (ref 0.6–1.3)
EOSINOPHIL # BLD AUTO: 0 THOUSAND/ÂΜL (ref 0–0.61)
EOSINOPHIL NFR BLD AUTO: 0 % (ref 0–6)
ERYTHROCYTE [DISTWIDTH] IN BLOOD BY AUTOMATED COUNT: 12.3 % (ref 11.6–15.1)
GFR SERPL CREATININE-BSD FRML MDRD: 57 ML/MIN/1.73SQ M
GLUCOSE SERPL-MCNC: 158 MG/DL (ref 65–140)
HCT VFR BLD AUTO: 44.1 % (ref 36.5–49.3)
HGB BLD-MCNC: 14.4 G/DL (ref 12–17)
IMM GRANULOCYTES # BLD AUTO: 0.02 THOUSAND/UL (ref 0–0.2)
IMM GRANULOCYTES NFR BLD AUTO: 0 % (ref 0–2)
LYMPHOCYTES # BLD AUTO: 2.2 THOUSANDS/ÂΜL (ref 0.6–4.47)
LYMPHOCYTES NFR BLD AUTO: 25 % (ref 14–44)
MCH RBC QN AUTO: 31.2 PG (ref 26.8–34.3)
MCHC RBC AUTO-ENTMCNC: 32.7 G/DL (ref 31.4–37.4)
MCV RBC AUTO: 96 FL (ref 82–98)
MONOCYTES # BLD AUTO: 0.5 THOUSAND/ÂΜL (ref 0.17–1.22)
MONOCYTES NFR BLD AUTO: 6 % (ref 4–12)
NEUTROPHILS # BLD AUTO: 6.1 THOUSANDS/ÂΜL (ref 1.85–7.62)
NEUTS SEG NFR BLD AUTO: 68 % (ref 43–75)
NRBC BLD AUTO-RTO: 0 /100 WBCS
PLATELET # BLD AUTO: 209 THOUSANDS/UL (ref 149–390)
PMV BLD AUTO: 10 FL (ref 8.9–12.7)
POTASSIUM SERPL-SCNC: 3.4 MMOL/L (ref 3.5–5.3)
PROT SERPL-MCNC: 6.7 G/DL (ref 6.4–8.4)
RBC # BLD AUTO: 4.61 MILLION/UL (ref 3.88–5.62)
SODIUM SERPL-SCNC: 136 MMOL/L (ref 135–147)
WBC # BLD AUTO: 8.87 THOUSAND/UL (ref 4.31–10.16)

## 2023-07-11 PROCEDURE — 80053 COMPREHEN METABOLIC PANEL: CPT

## 2023-07-11 PROCEDURE — 85025 COMPLETE CBC W/AUTO DIFF WBC: CPT

## 2023-07-11 PROCEDURE — 36415 COLL VENOUS BLD VENIPUNCTURE: CPT

## 2023-07-11 RX ORDER — ACETAMINOPHEN 325 MG/1
650 TABLET ORAL ONCE
Status: CANCELLED
Start: 2023-07-12 | End: 2023-07-12

## 2023-07-11 NOTE — TELEPHONE ENCOUNTER
"Good morning, Shari. It's Boo Ira [birth year]. Apparently we are doing the infusion tomorrow according to your office. But I forgot, is there also blood work scheduled? If you can let me know, I'd appreciate it, [phone]. Thank you much. jono Byers."     Returned call to Shahzad Motley and advised him he will need to go for labs today and the orders are in. He voiced understanding.

## 2023-07-12 ENCOUNTER — HOSPITAL ENCOUNTER (OUTPATIENT)
Dept: INFUSION CENTER | Facility: HOSPITAL | Age: 68
Discharge: HOME | End: 2023-07-12
Attending: INTERNAL MEDICINE
Payer: MEDICARE

## 2023-07-12 VITALS
HEART RATE: 62 BPM | SYSTOLIC BLOOD PRESSURE: 173 MMHG | HEIGHT: 67 IN | DIASTOLIC BLOOD PRESSURE: 82 MMHG | BODY MASS INDEX: 29.27 KG/M2 | RESPIRATION RATE: 17 BRPM | OXYGEN SATURATION: 91 % | TEMPERATURE: 96.3 F | WEIGHT: 186.51 LBS

## 2023-07-12 DIAGNOSIS — C34.31 MALIGNANT NEOPLASM OF LOWER LOBE OF RIGHT LUNG (HCC): Primary | ICD-10-CM

## 2023-07-12 RX ORDER — SODIUM CHLORIDE 9 MG/ML
20 INJECTION, SOLUTION INTRAVENOUS ONCE
Status: COMPLETED | OUTPATIENT
Start: 2023-07-12 | End: 2023-07-12

## 2023-07-12 RX ORDER — ACETAMINOPHEN 325 MG/1
650 TABLET ORAL ONCE
Status: COMPLETED | OUTPATIENT
Start: 2023-07-12 | End: 2023-07-12

## 2023-07-12 RX ORDER — SODIUM CHLORIDE 9 MG/ML
20 INJECTION, SOLUTION INTRAVENOUS ONCE
Status: CANCELLED | OUTPATIENT
Start: 2023-07-12

## 2023-07-12 RX ADMIN — SODIUM CHLORIDE 20 ML/HR: 9 INJECTION, SOLUTION INTRAVENOUS at 12:55

## 2023-07-12 RX ADMIN — ATEZOLIZUMAB 1200 MG: 1200 INJECTION, SOLUTION INTRAVENOUS at 13:29

## 2023-07-12 RX ADMIN — ACETAMINOPHEN 650 MG: 325 TABLET, FILM COATED ORAL at 12:01

## 2023-07-12 RX ADMIN — DEXAMETHASONE SODIUM PHOSPHATE 10 MG: 10 INJECTION, SOLUTION INTRAMUSCULAR; INTRAVENOUS at 12:49

## 2023-07-12 RX ADMIN — DIPHENHYDRAMINE HYDROCHLORIDE 25 MG: 50 INJECTION, SOLUTION INTRAMUSCULAR; INTRAVENOUS at 11:58

## 2023-07-12 NOTE — PROGRESS NOTES
Pulmonary Rehabilitation Plan of Care   Initial Care Plan      Today's date: 2023   # of Exercise Sessions Completed: 1  Patient name: Darlene Naik      :   Age: 70 y.o. MRN: [id number]  Referring Physician: Carlos Alberto Ruiz  Pulmonologist: Patient does not have pulmonologist, referred by PA in Thoracic Surgery  Provider: Donny Jaramillo  Clinician: Amira Barcenas MS    Dx: Malignant neoplasm of lower lobe of right lung   Date of onset: 2022      SUMMARY OF PROGRESS:  Today is Lance's initial evaluation to begin Pulmonary Rehab for the diagnosis of malignant neoplasm of lower lobe of right lung. Patient does have a PFT on file, revealing an FEV1/FVC ratio of 91% and an FEV1 of 72%. This is suggestive of restriction. Since their diagnosis, the patient has been experiencing increased dyspnea and decreased physical activity. They report dyspnea and fatigue when completing ADLs . The patient currently does not follow a formal exercise program at home. Pt reports the following physical limitations: decreased strength, decreased functional capacity, occ SOB. Depression screening using the PHQ-9 interprets the patient's score of *** {IOB5:52433}. Anxiety screening using the NAMRATA-7 interprets the patients score of ***  {NAMRATA-7:45648}. When addressed, the patient {Actions; denies/admits to:5300} having depression/anxiety. Patient reports excellent social/emotional support. Information to begin using [employer] was provided as well as contact information for counseling through [facility]. PHQ-9 score will be reassessed in 30 days. The patient is a current smoker but has reduced the number of cigarettes to 0.5 pack per day. Patient admits to 100% medication compliance. At rest, the patient rated dyspnea ***/10 with SpO2 ***% on {6 MWT O2 requirements :91408}. They completed an initial 6MWT, walking *** ft on {6 MWT O2 requirements :63119}.  The patient’s rating of perceived dyspnea during the 6MWT was ***/10 with SpO2 ***%. Patient took *** rest breaks. Resting HR *** and BP *** with appropriate hemodynamic response to exercise reaching ***. Patient will exercise on {6 MWT O2 requirements :60091}. During recovery, HR ***, SpO2 ***%, and BP ***. Education on smoking cessation, oxygen use, breathing techniques, pulmonary anatomy, exercise for the pulmonary patient, healthy eating, stress, and relaxation will be provided. Patient goals include: increase strength, reduce SOB, improve functional capcity. Ricardo Cruz will attend 24-36 exercise sessions, 2-3x/wk for 12-18 weeks beginning ***. Will progress patient as tolerated over the next 30 days. Medication compliance: {YES/NO:98871}   Comments: Pt reports to be compliant with medications  Fall Risk: {Fall Risk:65036}   Comments: {FALL RISK FOR CR/OH:55269}    Smoking: Current user:  average ppd:  0.5    RPD at Rest: ***/10  RPD with Exercise:  ***/10    Assessment of progression of lung disease and functional status:  CAT: ***/40  Shortness of breath questionnaire: ***/120      EXERCISE ASSESSMENT and PLAN    Current Exercise Program in Rehab:       Frequency: 2-3 days/week   Supplement with home exercise 2+ days/wk as tolerated        Minutes: 30-35         METS: ***              SpO2: > 90%              RPD: 0-4                      HR: 20-30 bpm above rest   RPE: 4-5         Modalities: Treadmill, UBE, NuStep and Recumbent bike      Exercise Progression 30 Day Goals :    Frequency: 2-3 days/week    Supplement with home exercise 2+ days/wk as tolerated       Minutes: 40-45         METS: ***              SpO2: > 90%              RPD: 0-4                      HR: 20-30 bpm above rest   RPE: 4-5        Modalities: Treadmill, UBE, NuStep and Recumbent bike     Strength training:   Will be added following 2-3 weeks of monitored exercise sessions   Modalities: Leg Press, Chest Press, Lateral Raise, Arm Curl, Front Raises and Calf Raises    Oxygen Needs: {Oxygenneeds:43608}  Oxygen Goal: Maintain SpO2>90% during exercise    Home Exercise: none  Education: pursed lipped breathing, diaphragmatic breathing, relaxation breathing, home exercise, benefits of exercise for pulmonary disease, RPE scale and RPD scale    Goals: reduced score on  USCD Shortness of Breath Questionnaire, Improved 6MW distance by 10%, improved exercise tolerance (max METs tolerated in pulmonary rehab), SpO2 >90% during exercise, improved [facility] activity score, reduced score on CAT, attend pulmonary rehab regularly and start a walking program  Progressing:  Reviewed Pt goals and determined plan of care, Will continue to educate and progress as tolerated. Plan: Titrate supplemental oxygen as needed to maintain SpO2>90% with exercise, learn to conserve energy with ADLs , practice breathing techniques 3x/day and reduce time sitting at home    Readiness to change: Preparation:  (Getting ready to change)       NUTRITION ASSESSMENT AND PLAN    Weight control:    Starting weight: 186   Current weight:     Diabetes: N/A    Goals:reduced BMI to < 25, LDL <100, HDL >40, TRG <150 and CHOL <200  Education: heart healthy eating  low sodium diet  hydration  education class: Heart Healthy Eating  education class:  Label Reading  education class: healthy choices for managing pulmonary illness  Progressing:Reviewed Pt goals and determined plan of care, Will continue to educate and progress as tolerated.   Plan: Education class: Reading Food Labels and Education Class: Heart Healthy Eating  Readiness to change: Preparation:  (Getting ready to change)       PSYCHOSOCIAL ASSESSMENT AND PLAN    Emotional:  Depression assessment:  PHQ-9 = *** {DTI6:81536}            Anxiety measure:  NAMRATA-7 = *** {NAMRATA-7:95660}  Self-reported stress level: { 1-10:73847} /10  Social support: Very Good    Goals:  Reduce perceived stress to 1-3/10, improved [facility] QOL < 27, Feelings in [facility] Score < 3, Physical Fitness in [facility] Score < 3, Social Support in [facility] Score < 3, Daily Activity in [facility] Score < 3, Social Activities in [facility] Score < 3, Pain in [facility] Score < 3, Overall Health in [facility] Score < 3, Quality of Life in [facility] Score < 3 , Change in Health in [facility] Score < 3 , Increased interest in doing things, improved sleep, improved positive thoughts of well being and increased energy  Education: signs/sxs of depression, benefits of a positive support system, stress management techniques, benefits of mental health counseling, class:  Stress and Your Health , class:  Relaxation and class:  Stress and Pulmonary Disease    Progressing:Reviewed Pt goals and determined plan of care, Will continue to educate and progress as tolerated. Plan: Class: Stress and Your Health and Class: Relaxation  Readiness to change: Preparation:  (Getting ready to change)       OTHER CORE COMPONENTS     Tobacco:   Social History     Tobacco Use   Smoking Status Some Days   • Packs/day: 0.50   • Types: Cigarettes   Smokeless Tobacco Never       Tobacco Use Intervention: Referral to tobacco expert:   Brief counseling by cardiac rehab professional  Date: 7/13/2023  Pt continues to smoke 0.5 pack/day    [facility] staff will place smoking cessation referral at a later date. Blood pressure:    Resting: ***   Exercise: ***   Recovery: ***    Goals: consistent BP < 130/80, reduced dietary sodium <2300mg, moderate intensity exercise >150 mins/wk and medication compliance  Education:  pathophysiology of pulmonary disease, preventing infections, dangers of smoking, setting a smoking cessation plan, relapse education, control coughing and inspiratory muscle training  Progressing:Reviewed Pt goals and determined plan of care, Will continue to educate and progress as tolerated.   Plan: Class: Understanding Heart Disease and Class: Common Heart Medications  Readiness to change: Preparation:  (Getting ready to change) PULMONARY REHAB ASSESSMENT    Today's date: 2023  Patient name: Darlene Naik     : [birth year]       MRN: [id number]  PCP: Glenis Brito DO  Referring Physician: Wolfgang Vasquez PA-C  Pulmonologist: Patient does not follow with pulmonology, referred by PA in Thoracic Surgery    Dx: Malignant neoplasm of lower lobe of right lung   Date of onset: 2022  Cultural needs: None    Weight:    Wt Readings from Last 1 Encounters:   23 84.4 kg (186 lb)      Height:   Ht Readings from Last 1 Encounters:   23 5' 7" (1.702 m)     Medical History:   Past Medical History:   Diagnosis Date   • Abdominal aortic aneurysm without rupture (HCC)    • Abdominal Aortic Duplex 2017    Ectatic infrarenal abdominal aorta. • MARYANN (acute kidney injury) ([address]) 2022   • CAD (coronary artery disease)    • Cancer ([address])     right lung CA   • COPD (chronic obstructive pulmonary disease) (HCC)    • Extremity pain    • History of echocardiogram 2014    EF 55%, mild MR and AI. Mild concentric LVH.    • History of stress test 2017    Normal.   • Hyperlipidemia    • Hypertension    • Joint pain    • Kidney stone    • Low back pain    • Lung cancer (HCC)    • Migraine    • Neck pain    • Obstructive sleep apnea     cannot tolerate CPAP   • Osteoarthritis    • Peripheral neuropathy    • Reflex sympathetic dystrophy    • Sacroiliitis ([address]) 2022         Physical Limitations: decreased strength, decreased functional capacity, occ SOB    Oxygen needs: ***    Rating of Perceived Dyspnea at rest:  ***/10    History of Toxic Exposure:  {Toxic Exposure:18882}    Risk Factors   Cholesterol: Yes  Smoking: Current user:  average ppd:  0.5  HTN: Yes  DM: No  Obesity: Yes   Inactivity: Yes  Stress:  perceived  stress: ***/10   Stressors: Current health condition   Goals for Stress Management: Enjoy a hobby    Family History:  Family History   Adopted: Yes   Problem Relation Age of Onset   • No Known Problems Family    • No Known Problems Mother    • No Known Problems Father        Allergies: Patient has no known allergies.   ETOH:   Social History     Substance and Sexual Activity   Alcohol Use Not Currently         Current Medications:   Current Outpatient Medications   Medication Sig Dispense Refill   • amLODIPine (NORVASC) 10 mg tablet take 1 tablet by mouth once daily 90 tablet 3   • bisacodyl (DULCOLAX) 5 mg EC tablet Take 1 tablet (5 mg total) by mouth daily as needed for constipation for up to 4 doses 30 tablet 0   • buPROPion (Wellbutrin XL) 150 mg 24 hr tablet Take 1 tablet (150 mg total) by mouth every morning (Patient not taking: Reported on 5/12/2023) 90 tablet 3   • docusate sodium (COLACE) 100 mg capsule Take 1 capsule (100 mg total) by mouth 2 (two) times a day 20 capsule 0   • fluticasone (FLONASE) 50 mcg/act nasal spray 1 spray into each nostril daily for 14 days (Patient taking differently: 1 spray into each nostril if needed) 11.1 mL 0   • glucose blood (OneTouch Verio) test strip Test once daily 100 each 0   • ipratropium-albuterol (DUO-NEB) 0.5-2.5 mg/3 mL nebulizer solution Take 3 mL by nebulization every 6 (six) hours as needed for wheezing or shortness of breath 30 mL 3   • lisinopril (ZESTRIL) 10 mg tablet Take 1 tablet (10 mg total) by mouth daily 90 tablet 3   • metoprolol succinate (TOPROL-XL) 100 mg 24 hr tablet take 1 tablet by mouth once daily 30 tablet 5   • nicotine (NICODERM CQ) 14 mg/24hr TD 24 hr patch Place 1 patch on the skin over 24 hours every 24 hours (Patient not taking: Reported on 6/16/2023) 30 patch 2   • nicotine polacrilex (NICORETTE) 2 mg gum Chew 1 each (2 mg total) as needed for smoking cessation (Patient not taking: Reported on 4/5/2023) 100 each 0   • nitroglycerin (NITROSTAT) 0.4 mg SL tablet Place 1 tablet (0.4 mg total) under the tongue every 5 (five) minutes as needed for chest pain 25 tablet 5   • oxyCODONE (Roxicodone) 5 immediate release tablet Take 1 tablet (5 mg total) by mouth every 6 (six) hours as needed for moderate pain or severe pain for up to 7 doses Max Daily Amount: 20 mg 7 tablet 0   • oxyCODONE-acetaminophen (Percocet) 7.5-325 MG per tablet Take 1 tablet by mouth every 8 (eight) hours as needed for moderate pain Do not fill until 6/30/2023 Max Daily Amount: 3 tablets 90 tablet 0   • pantoprazole (PROTONIX) 20 mg tablet take 1 tablet by mouth once daily (Patient not taking: Reported on 5/23/2023) 30 tablet 0   • polyethylene glycol (MIRALAX) 17 g packet Take 17 g by mouth daily for 5 days (Patient not taking: Reported on 12/20/2022) 85 g 0   • pregabalin (LYRICA) 50 mg capsule 1 PO QHS x 1 day, then  1 PO BID x 1 day, then 1 PO TID 90 capsule 1   • rosuvastatin (CRESTOR) 20 MG tablet take 1 tablet by mouth once daily (Patient not taking: Reported on 6/7/2023) 90 tablet 5   • sildenafil (VIAGRA) 100 mg tablet TAKE 1 TABLET BY MOUTH ONCE DAILY AS NEEDED FOR ERECTILE DYSFUNCTION 20 tablet 0   • tamsulosin (FLOMAX) 0.4 mg Take 1 capsule (0.4 mg total) by mouth daily with dinner (Patient taking differently: Take 0.8 mg by mouth daily with dinner) 60 capsule 0   • tamsulosin (FLOMAX) 0.4 mg Take 1 capsule (0.4 mg total) by mouth daily with dinner for 3 days 3 capsule 0   • tiotropium-olodaterol (Stiolto Respimat) 2.5-2.5 MCG/ACT inhaler Inhale 2 puffs if needed (wheezing, Shortness of breath) 4 g 2   • traZODone (DESYREL) 100 mg tablet take 1 tablet by mouth daily at bedtime 90 tablet 3     No current facility-administered medications for this visit.            Functional Status Prior to Diagnosis for Treatment   Occupation: retired  Recreation: Enjoy a hobby, spend time outdoors  ADL’s: No limitations  [place]: No limitations  Exercise: None  Other: None    Current Functional Status  Occupation: retired  Recreation: Enjoy a hobby, spend time outdoors  ADL’s:Capable of performing light to moderate ADLs  [place]: Capable of performing light to moderate ADLs  Exercise: None  Other: None    Patient Specific Goals:  increase strength, reduce SOB, improve functional capcity    Short Term Program Goals: dietary modifications increased strength improved energy/stamina with ADLs exercise 120-150 mins/wk    Long Term Goals: increased maximal walking duration  increased intial training workload  Improved functional capacity  Improved Quality of Life - [facility] score reduced  Abstain from smoking    Oxygen Goals: Maintain SpO2>90% titrating supplemental oxygen as needed     Ability to reach goals/rehabilitation potential:  Very Good     Projected return to function: 12 weeks  Objective tests: 6 MWT      Nutritional   Reviewed details of Rate your Plate. Discussed key elements of heart healthy eating. Reviewed patient goals for dietary modifications and their clinical implications. Reviewed most recent lipid profile. Goals for dietary modification: choose lean cuts of meat  poultry without the skin  eliminate processed meats  more meatless meals  low fat dairy   increase whole grains  low sodium  improved snack choices  more nuts/seeds      Emotional/Social  Patient reports he/she is coping well with good social support and denies depression or anxiety    SOCIAL SUPPORT NETWORK    Marital status: single      Domestic Violence Screening: No    Comments: Patient requires skilled [place] to achieve goals and maximum functional capacity.

## 2023-07-13 ENCOUNTER — APPOINTMENT (OUTPATIENT)
Dept: PULMONOLOGY | Facility: HOSPITAL | Age: 68
End: 2023-07-13
Payer: MEDICARE

## 2023-07-14 ENCOUNTER — OFFICE VISIT (OUTPATIENT)
Dept: CARDIOLOGY CLINIC | Facility: CLINIC | Age: 68
End: 2023-07-14
Payer: MEDICARE

## 2023-07-14 VITALS
DIASTOLIC BLOOD PRESSURE: 80 MMHG | RESPIRATION RATE: 16 BRPM | OXYGEN SATURATION: 96 % | WEIGHT: 187 LBS | BODY MASS INDEX: 29.35 KG/M2 | SYSTOLIC BLOOD PRESSURE: 138 MMHG | HEART RATE: 58 BPM | HEIGHT: 67 IN

## 2023-07-14 DIAGNOSIS — E78.2 MIXED HYPERLIPIDEMIA: ICD-10-CM

## 2023-07-14 DIAGNOSIS — I10 PRIMARY HYPERTENSION: Primary | ICD-10-CM

## 2023-07-14 DIAGNOSIS — I25.118 CORONARY ARTERY DISEASE OF NATIVE ARTERY OF NATIVE HEART WITH STABLE ANGINA PECTORIS (HCC): ICD-10-CM

## 2023-07-14 PROCEDURE — 99213 OFFICE O/P EST LOW 20 MIN: CPT | Performed by: INTERNAL MEDICINE

## 2023-07-14 NOTE — PATIENT INSTRUCTIONS
Cholesterol and Your Health   AMBULATORY CARE:   Cholesterol  is a waxy, fat-like substance. Your body uses cholesterol to make hormones and new cells, and to protect nerves. Cholesterol is made by your body. It also comes from certain foods you eat, such as meat and dairy products. Your healthcare provider can help you set goals for your cholesterol levels. He or she can help you create a plan to meet your goals. Cholesterol level goals: Your cholesterol level goals depend on your risk for heart disease, your age, and your other health conditions. The following are general guidelines: Total cholesterol  includes low-density lipoprotein (LDL), high-density lipoprotein (HDL), and triglyceride levels. The total cholesterol level should be lower than 200 mg/dL and is best at about 150 mg/dL. LDL cholesterol  is called bad cholesterol  because it forms plaque in your arteries. As plaque builds up, your arteries become narrow, and less blood flows through. When plaque decreases blood flow to your heart, you may have chest pain. If plaque completely blocks an artery that brings blood to your heart, you may have a heart attack. Plaque can break off and form blood clots. Blood clots may block arteries in your brain and cause a stroke. The level should be less than 130 mg/dL and is best at about 100 mg/dL. HDL cholesterol  is called good cholesterol  because it helps remove LDL cholesterol from your arteries. It does this by attaching to LDL cholesterol and carrying it to your liver. Your liver breaks down LDL cholesterol so your body can get rid of it. High levels of HDL cholesterol can help prevent a heart attack and stroke. Low levels of HDL cholesterol can increase your risk for heart disease, heart attack, and stroke. The level should be 60 mg/dL or higher. Triglycerides  are a type of fat that store energy from foods you eat. High levels of triglycerides also cause plaque buildup.  This can increase your risk for a heart attack or stroke. If your triglyceride level is high, your LDL cholesterol level may also be high. The level should be less than 150 mg/dL. Any of the following can increase your risk for high cholesterol:   Smoking cigarettes    Being overweight or obese, or not getting enough exercise    Drinking large amounts of alcohol    A medical condition such as hypertension (high blood pressure) or diabetes    Certain genes passed from your parents to you    Age older than 65 years    What you need to know about having your cholesterol levels checked: Adults 21to 39years of age should have their cholesterol levels checked every 4 to 6 years. Adults 45 years or older should have their cholesterol checked every 1 to 2 years. You may need your cholesterol checked more often, or at a younger age, if you have risk factors for heart disease. You may also need to have your cholesterol checked more often if you have other health conditions, such as diabetes. Blood tests are used to check cholesterol levels. Blood tests measure your levels of triglycerides, LDL cholesterol, and HDL cholesterol. How healthy fats affect your cholesterol levels:  Healthy fats, also called unsaturated fats, help lower LDL cholesterol and triglyceride levels. Healthy fats include the following:  Monounsaturated fats  are found in foods such as olive oil, canola oil, avocado, nuts, and olives. Polyunsaturated fats,  such as omega 3 fats, are found in fish, such as salmon, trout, and tuna. They can also be found in plant foods such as flaxseed, walnuts, and soybeans. How unhealthy fats affect your cholesterol levels:  Unhealthy fats increase LDL cholesterol and triglyceride levels. They are found in foods high in cholesterol, saturated fat, and trans fat:  Cholesterol  is found in eggs, dairy, and meat. Saturated fat  is found in butter, cheese, ice cream, whole milk, and coconut oil.  Saturated fat is also found in meat, such as sausage, hot dogs, and bologna. Trans fat  is found in liquid oils and is used in fried and baked foods. Foods that contain trans fats include chips, crackers, muffins, sweet rolls, microwave popcorn, and cookies. Treatment  for high cholesterol will also decrease your risk of heart disease, heart attack, and stroke. Treatment may include any of the following:  Lifestyle changes  may include food, exercise, weight loss, and quitting smoking. You may also need to decrease the amount of alcohol you drink. Your healthcare provider will want you to start with lifestyle changes. Other treatment may be added if lifestyle changes are not enough. Your healthcare provider may recommend you work with a team to manage hyperlipidemia. The team may include medical experts such as a dietitian, an exercise or physical therapist, and a behavior therapist. Your family members may be included in helping you create lifestyle changes. Medicines  may be given to lower your LDL cholesterol, triglyceride levels, or total cholesterol level. You may need medicines to lower your cholesterol if any of the following is true:    You have a history of stroke, TIA, unstable angina, or a heart attack. Your LDL cholesterol level is 190 mg/dL or higher. You are age 36 to 76 years, have diabetes or heart disease risk factors, and your LDL cholesterol is 70 mg/dL or higher. Supplements  include fish oil, red yeast rice, and garlic. Fish oil may help lower your triglyceride and LDL cholesterol levels. It may also increase your HDL cholesterol level. Red yeast rice may help decrease your total cholesterol level and LDL cholesterol level. Garlic may help lower your total cholesterol level. Do not take any supplements without talking to your healthcare provider. Food changes you can make to lower your cholesterol levels:  A dietitian can help you create a healthy eating plan.  He or she can show you how to read food labels and choose foods low in saturated fat, trans fats, and cholesterol. Decrease the total amount of fat you eat. Choose lean meats, fat-free or 1% fat milk, and low-fat dairy products, such as yogurt and cheese. Try to limit or avoid red meats. Limit or do not eat fried foods or baked goods, such as cookies. Replace unhealthy fats with healthy fats. Cook foods in olive oil or canola oil. Choose soft margarines that are low in saturated fat and trans fat. Seeds, nuts, and avocados are other examples of healthy fats. Eat foods with omega-3 fats. Examples include salmon, tuna, mackerel, walnuts, and flaxseed. Eat fish 2 times per week. Pregnant women should not eat fish that have high levels of mercury, such as shark, swordfish, and angela mackerel. Increase the amount of high-fiber foods you eat. High-fiber foods can help lower your LDL cholesterol. Aim to get between 20 and 30 grams of fiber each day. Fruits and vegetables are high in fiber. Eat at least 5 servings each day. Other high-fiber foods are whole-grain or whole-wheat breads, pastas, or cereals, and brown rice. Eat 3 ounces of whole-grain foods each day. Increase fiber slowly. You may have abdominal discomfort, bloating, and gas if you add fiber to your diet too quickly. Eat healthy protein foods. Examples include low-fat dairy products, skinless chicken and turkey, fish, and nuts. Limit foods and drinks that are high in sugar. Your dietitian or healthcare provider can help you create daily limits for high-sugar foods and drinks. The limit may be lower if you have diabetes or another health condition. Limits can also help you lose weight if needed. Lifestyle changes you can make to lower your cholesterol levels:   Maintain a healthy weight. Ask your healthcare provider what a healthy weight is for you. Ask him or her to help you create a weight loss plan if needed.  Weight loss can decrease your total cholesterol and triglyceride levels. Weight loss may also help keep your blood pressure at a healthy level. Be physically active throughout the day. Physical activity, such as exercise, can help lower your total cholesterol level and maintain a healthy weight. Physical activity can also help increase your HDL cholesterol level. Work with your healthcare provider to create an program that is right for you. Get at least 30 to 40 minutes of moderate physical activity most days of the week. Examples of exercise include brisk walking, swimming, or biking. Also include strength training at least 2 times each week. Your healthcare providers can help you create a physical activity plan. Do not smoke. Nicotine and other chemicals in cigarettes and cigars can raise your cholesterol levels. Ask your healthcare provider for information if you currently smoke and need help to quit. E-cigarettes or smokeless tobacco still contain nicotine. Talk to your healthcare provider before you use these products. Limit or do not drink alcohol. Alcohol can increase your triglyceride levels. Ask your healthcare provider before you drink alcohol. Ask how much is okay for you to drink in 24 hours or 1 week. Follow up with your doctor as directed:  Write down your questions so you remember to ask them during your visits. © Copyright [employer] 2022 Information is for End User's use only and may not be sold, redistributed or otherwise used for commercial purposes. The above information is an  only. It is not intended as medical advice for individual conditions or treatments. Talk to your doctor, nurse or pharmacist before following any medical regimen to see if it is safe and effective for you.

## 2023-07-14 NOTE — PROGRESS NOTES
Subjective:        Patient ID: Claudia Beltran is a 70 y.o. male. Chief Complaint:  Charley Blackburn is here for routine follow-up, had an allergic reaction prior to immunotherapy, now continuing with such with prophylaxis therapy preceding, doing okay. Frustrated he has another year therapy left ear. Unfortunately due to some stress, his ex-wife  suddenly recently and he is trying to assist his daughters from that relationship with some issues, he went back to smoking. Knows we do not approve of this. Fortunately is not having any chest pains or angina, no nitro use. No alarming edema orthopnea nor unusual dyspnea. No palpitations presyncope or syncope. No unilateral symptoms. No bleeding. The following portions of the patient's history were reviewed and updated as appropriate: allergies, current medications, past family history, past medical history, past social history, past surgical history and problem list.  Review of Systems   Constitutional: Negative for chills, diaphoresis, malaise/fatigue and weight gain. HENT: Negative for nosebleeds and stridor. Eyes: Negative for double vision, vision loss in left eye, vision loss in right eye and visual disturbance. Cardiovascular: Negative for chest pain, claudication, cyanosis, dyspnea on exertion, irregular heartbeat, leg swelling, near-syncope, orthopnea, palpitations, paroxysmal nocturnal dyspnea and syncope. Respiratory: Negative for cough, shortness of breath, snoring and wheezing. Endocrine: Negative for polydipsia, polyphagia and polyuria. Hematologic/Lymphatic: Negative for bleeding problem. Does not bruise/bleed easily. Skin: Negative for flushing and rash. Musculoskeletal: Negative for falls and myalgias. Gastrointestinal: Negative for abdominal pain, heartburn, hematemesis, hematochezia, melena and nausea. Genitourinary: Negative for hematuria.    Neurological: Negative for brief paralysis, dizziness, focal weakness, headaches, light-headedness, loss of balance and vertigo. Psychiatric/Behavioral: Negative for altered mental status and substance abuse. Allergic/Immunologic: Negative for hives. Objective:      /80 (BP Location: Left arm, Patient Position: Sitting, Cuff Size: Standard)   Pulse 58   Resp 16   Ht 5' 7" (1.702 m)   Wt 84.8 kg (187 lb)   SpO2 96%   BMI 29.29 kg/m²   Physical Exam  Constitutional:       General: He is not in acute distress. Appearance: He is well-developed. He is not diaphoretic. HENT:      Head: Normocephalic and atraumatic. Eyes:      General: No scleral icterus. Pupils: Pupils are equal, round, and reactive to light. Neck:      Thyroid: No thyromegaly. Vascular: No carotid bruit or JVD. Cardiovascular:      Rate and Rhythm: Normal rate and regular rhythm. Heart sounds: Normal heart sounds. No murmur heard. No friction rub. No gallop. Pulmonary:      Effort: Pulmonary effort is normal. No respiratory distress. Breath sounds: Normal breath sounds. No stridor. No wheezing or rales. Abdominal:      General: Bowel sounds are normal. There is no distension. Palpations: Abdomen is soft. There is no mass. Tenderness: There is no abdominal tenderness. Musculoskeletal:      Cervical back: Normal range of motion and neck supple. Right lower leg: No edema. Left lower leg: No edema. Skin:     General: Skin is warm and dry. Coloration: Skin is not pale. Findings: No erythema. Neurological:      Mental Status: He is alert and oriented to person, place, and time. Coordination: Coordination normal.   Psychiatric:         Mood and Affect: Mood normal.         Behavior: Behavior normal.         Thought Content:  Thought content normal.         Judgment: Judgment normal.         Lab Review:   Appointment on 07/11/2023   Component Date Value   • WBC 07/11/2023 8.87    • RBC 07/11/2023 4.61    • Hemoglobin 07/11/2023 14.4    • Hematocrit 07/11/2023 44.1    • MCV 07/11/2023 96    • MCH 07/11/2023 31.2    • MCHC 07/11/2023 32.7    • RDW 07/11/2023 12.3    • MPV 07/11/2023 10.0    • Platelets 51/84/8311 209    • nRBC 07/11/2023 0    • Neutrophils Relative 07/11/2023 68    • Immat GRANS % 07/11/2023 0    • Lymphocytes Relative 07/11/2023 25    • Monocytes Relative 07/11/2023 6    • Eosinophils Relative 07/11/2023 0    • Basophils Relative 07/11/2023 1    • Neutrophils Absolute 07/11/2023 6.10    • Immature Grans Absolute 07/11/2023 0.02    • Lymphocytes Absolute 07/11/2023 2.20    • Monocytes Absolute 07/11/2023 0.50    • Eosinophils Absolute 07/11/2023 0.00    • Basophils Absolute 07/11/2023 0.05    • Sodium 07/11/2023 136    • Potassium 07/11/2023 3.4 (L)    • Chloride 07/11/2023 102    • CO2 07/11/2023 23    • ANION GAP 07/11/2023 11    • BUN 07/11/2023 18    • Creatinine 07/11/2023 1.27    • Glucose 07/11/2023 158 (H)    • Calcium 07/11/2023 9.4    • AST 07/11/2023 11 (L)    • ALT 07/11/2023 9    • Alkaline Phosphatase 07/11/2023 79    • Total Protein 07/11/2023 6.7    • Albumin 07/11/2023 4.0    • Total Bilirubin 07/11/2023 0.65    • eGFR 07/11/2023 57    Admission on 06/23/2023, Discharged on 06/23/2023   Component Date Value   • WBC 06/23/2023 11.18 (H)    • RBC 06/23/2023 4.64    • Hemoglobin 06/23/2023 15.1    • Hematocrit 06/23/2023 45.2    • MCV 06/23/2023 97    • MCH 06/23/2023 32.5    • MCHC 06/23/2023 33.4    • RDW 06/23/2023 12.5    • MPV 06/23/2023 9.8    • Platelets 47/73/4206 216    • nRBC 06/23/2023 0    • Neutrophils Relative 06/23/2023 75    • Immat GRANS % 06/23/2023 1    • Lymphocytes Relative 06/23/2023 16    • Monocytes Relative 06/23/2023 7    • Eosinophils Relative 06/23/2023 0    • Basophils Relative 06/23/2023 1    • Neutrophils Absolute 06/23/2023 8.39 (H)    • Immature Grans Absolute 06/23/2023 0.07    • Lymphocytes Absolute 06/23/2023 1.83    • Monocytes Absolute 06/23/2023 0.83    • Eosinophils Absolute 06/23/2023 0.00    • Basophils Absolute 06/23/2023 0.06    • Sodium 06/23/2023 135    • Potassium 06/23/2023 3.9    • Chloride 06/23/2023 99    • CO2 06/23/2023 26    • ANION GAP 06/23/2023 10    • BUN 06/23/2023 20    • Creatinine 06/23/2023 1.31 (H)    • Glucose 06/23/2023 117    • Calcium 06/23/2023 9.8    • AST 06/23/2023 15    • ALT 06/23/2023 12    • Alkaline Phosphatase 06/23/2023 78    • Total Protein 06/23/2023 7.6    • Albumin 06/23/2023 4.6    • Total Bilirubin 06/23/2023 0.65    • eGFR 06/23/2023 55    • Lipase 06/23/2023 24    • Color, UA 06/23/2023 Yellow    • Clarity, UA 06/23/2023 Clear    • Specific Gravity, UA 06/23/2023 1.010    • pH, UA 06/23/2023 7.5    • Leukocytes, UA 06/23/2023 Trace (A)    • Nitrite, UA 06/23/2023 Negative    • Protein, UA 06/23/2023 Negative    • Glucose, UA 06/23/2023 Negative    • Ketones, UA 06/23/2023 Negative    • Urobilinogen, UA 06/23/2023 0.2    • Bilirubin, UA 06/23/2023 Negative    • Occult Blood, UA 06/23/2023 Large (A)    • RBC, UA 06/23/2023 10-20 (A)    • WBC, UA 06/23/2023 1-2    • Epithelial Cells 06/23/2023 None Seen    • Bacteria, UA 06/23/2023 Occasional    Admission on 06/21/2023, Discharged on 06/21/2023   Component Date Value   • POC Glucose 06/21/2023 139    • WBC 06/21/2023 7.56    • RBC 06/21/2023 4.83    • Hemoglobin 06/21/2023 15.8    • Hematocrit 06/21/2023 48.1    • MCV 06/21/2023 100 (H)    • MCH 06/21/2023 32.7    • MCHC 06/21/2023 32.8    • RDW 06/21/2023 12.5    • MPV 06/21/2023 10.0    • Platelets 18/04/0957 199    • nRBC 06/21/2023 0    • Neutrophils Relative 06/21/2023 64    • Immat GRANS % 06/21/2023 0    • Lymphocytes Relative 06/21/2023 28    • Monocytes Relative 06/21/2023 7    • Eosinophils Relative 06/21/2023 0    • Basophils Relative 06/21/2023 1    • Neutrophils Absolute 06/21/2023 4.84    • Immature Grans Absolute 06/21/2023 0.02    • Lymphocytes Absolute 06/21/2023 2.13    • Monocytes Absolute 06/21/2023 0.52    • Eosinophils Absolute 06/21/2023 0.00    • Basophils Absolute 06/21/2023 0.05    • Protime 06/21/2023 11.9    • INR 06/21/2023 0.86    • PTT 06/21/2023 29    • Sodium 06/21/2023 137    • Potassium 06/21/2023 3.9    • Chloride 06/21/2023 103    • CO2 06/21/2023 26    • ANION GAP 06/21/2023 8    • BUN 06/21/2023 19    • Creatinine 06/21/2023 0.99    • Glucose 06/21/2023 130    • Calcium 06/21/2023 9.9    • eGFR 06/21/2023 77    • hs TnI 0hr 06/21/2023 4    • Procalcitonin 06/21/2023 <0.05    • Ventricular Rate 06/21/2023 71    • Atrial Rate 06/21/2023 73    • WV Interval 06/21/2023 162    • QRSD Interval 06/21/2023 106    • QT Interval 06/21/2023 420    • QTC Interval 06/21/2023 456    • P Axis 06/21/2023 64    • QRS Axis 06/21/2023 41    • T Wave Spencer 06/21/2023 48    Appointment on 06/20/2023   Component Date Value   • WBC 06/20/2023 10.15    • RBC 06/20/2023 4.32    • Hemoglobin 06/20/2023 14.0    • Hematocrit 06/20/2023 42.1    • MCV 06/20/2023 98    • MCH 06/20/2023 32.4    • MCHC 06/20/2023 33.3    • RDW 06/20/2023 12.5    • MPV 06/20/2023 9.7    • Platelets 68/00/1118 207    • nRBC 06/20/2023 0    • Neutrophils Relative 06/20/2023 70    • Immat GRANS % 06/20/2023 0    • Lymphocytes Relative 06/20/2023 22    • Monocytes Relative 06/20/2023 7    • Eosinophils Relative 06/20/2023 0    • Basophils Relative 06/20/2023 1    • Neutrophils Absolute 06/20/2023 7.12    • Immature Grans Absolute 06/20/2023 0.03    • Lymphocytes Absolute 06/20/2023 2.27    • Monocytes Absolute 06/20/2023 0.67    • Eosinophils Absolute 06/20/2023 0.00    • Basophils Absolute 06/20/2023 0.06    • Sodium 06/20/2023 137    • Potassium 06/20/2023 3.7    • Chloride 06/20/2023 104    • CO2 06/20/2023 25    • ANION GAP 06/20/2023 8    • BUN 06/20/2023 23    • Creatinine 06/20/2023 1.32 (H)    • Glucose 06/20/2023 99    • Calcium 06/20/2023 9.4    • AST 06/20/2023 12 (L)    • ALT 06/20/2023 10    • Alkaline Phosphatase 06/20/2023 74    • Total Protein 06/20/2023 6.9    • Albumin 06/20/2023 4.2    • Total Bilirubin 06/20/2023 0.75    • eGFR 06/20/2023 54    • TSH 3RD GENERATON 06/20/2023 0.746    • T3, Free 06/20/2023 3.40    Appointment on 06/15/2023   Component Date Value   • WBC 06/15/2023 7.69    • RBC 06/15/2023 4.46    • Hemoglobin 06/15/2023 14.6    • Hematocrit 06/15/2023 43.9    • MCV 06/15/2023 98    • MCH 06/15/2023 32.7    • MCHC 06/15/2023 33.3    • RDW 06/15/2023 12.4    • MPV 06/15/2023 9.7    • Platelets 57/36/7058 185    • nRBC 06/15/2023 0    • Neutrophils Relative 06/15/2023 65    • Immat GRANS % 06/15/2023 0    • Lymphocytes Relative 06/15/2023 27    • Monocytes Relative 06/15/2023 7    • Eosinophils Relative 06/15/2023 0    • Basophils Relative 06/15/2023 1    • Neutrophils Absolute 06/15/2023 4.96    • Immature Grans Absolute 06/15/2023 0.03    • Lymphocytes Absolute 06/15/2023 2.08    • Monocytes Absolute 06/15/2023 0.57    • Eosinophils Absolute 06/15/2023 0.00    • Basophils Absolute 06/15/2023 0.05    • Sodium 06/15/2023 136    • Potassium 06/15/2023 4.0    • Chloride 06/15/2023 103    • CO2 06/15/2023 24    • ANION GAP 06/15/2023 9    • BUN 06/15/2023 19    • Creatinine 06/15/2023 1.11    • Glucose 06/15/2023 149 (H)    • Calcium 06/15/2023 9.2    • AST 06/15/2023 10 (L)    • ALT 06/15/2023 10    • Alkaline Phosphatase 06/15/2023 76    • Total Protein 06/15/2023 6.6    • Albumin 06/15/2023 4.3    • Total Bilirubin 06/15/2023 0.62    • eGFR 06/15/2023 67    Office Visit on 06/02/2023   Component Date Value   • Alpha-Hydroxyalprazolam * 06/02/2023 negative    • Amphetamine Quantificati* 06/02/2023 negative    • Aripiprazole Quantificat* 06/02/2023 negative    • OPC-[id number] QUANTIFICATION 06/02/2023 negative    • RESULT ALL_PRESC MEDS SP* 45/88/7456 Not Applicable    • Buprenorphine Quantifica* 06/02/2023 negative    • Norbuprenorphine Quantif* 06/02/2023 negative    • Butalbital Quantification 06/02/2023 negative    • 7-Amino-Clonazepam Quant* 06/02/2023 negative    • Clozapine Quantification 06/02/2023 negative    • N-desmethylclozapine Jeovany* 06/02/2023 negative    • Cocaine metabolite Quant* 06/02/2023 negative    • Codeine Quantification 06/02/2023 negative    • Morphine Quantification 06/02/2023 negative    • Hydrocodone Quantificati* 06/02/2023 negative    • Norhydrocodone Quantific* 06/02/2023 negative    • Hydromorphone Quantifica* 06/02/2023 negative    • Desipramine Quantificati* 06/02/2023 negative    • Nordiazepam Quantificati* 06/02/2023 negative    • Temazepam Quantification 06/02/2023 negative    • Oxazepam Quantification 06/02/2023 negative    • Ethyl Glucuronide Quanti* 06/02/2023 negative    • Ethyl Sulfate Quantifica* 06/02/2023 negative    • Fentanyl Quantification 06/02/2023 negative    • Norfentanyl Quantificati* 06/02/2023 negative    • 4-ANPP Quantification 06/02/2023 Fen Neg    • Acetyl fentanyl Quantifi* 06/02/2023 Fen Neg    • Acetyl norfentanyl Quant* 06/02/2023 Fen Neg    • Acryl fentanyl Quantific* 06/02/2023 Fen Neg    • Carfentanil Quantificati* 06/02/2023 Fen Neg    • Para-fluorofentanyl Jared* 06/02/2023 Fen Neg    • Haloperidol  Quantificat* 06/02/2023 negative    • Haloperidol metabolite Q* 06/02/2023 negative    • 6-GORAN (Heroin metabolite* 06/02/2023 negative    • Hydrocodone Quantificati* 06/02/2023 negative    • Norhydrocodone Quantific* 06/02/2023 negative    • Hydromorphone Quantifica* 06/02/2023 negative    • Hydromorphone Quantifica* 06/02/2023 negative    • Mitragynine (Kratom enrique* 06/02/2023 negative    • 1-YE-Pdcglwkrylq (Kratom* 06/02/2023 negative    • Dextrorphan (Dextrometho* 06/02/2023 negative    • Lorazepam Quantification 06/02/2023 negative    • MDMA Quantification 06/02/2023 negative    • Meperidine Quantification 06/02/2023 negative    • Normeperidine Quantifica* 06/02/2023 negative    • Methadone Quantification 06/02/2023 negative    • EDDP (Methadone metaboli* 06/02/2023 negative    • Amphetamine Quantificati* 06/02/2023 negative    • Methamphetamine Quantifi* 06/02/2023 negative    • Methylphenidate Quantifi* 06/02/2023 negative    • RITALINIC ACID QUANTIFIC* 06/02/2023 negative    • Morphine Quantification 06/02/2023 negative    • Hydromorphone Quantifica* 06/02/2023 negative    • OLANZAPINE QUANTIFICATION 06/02/2023 negative    • Oxazepam Quantification 06/02/2023 negative    • Oxycodone Quantification 06/02/2023 positive-2860.995    • Noroxycodone Quantificat* 06/02/2023 positive-5174.135    • Oxymorphone Bebo Guerline* 06/02/2023 positive-526. 258    • Oxymorphone Quantificati* 06/02/2023 positive-526. 258    • Phencyclidine Quantifica* 06/02/2023 negative    • Phenobarbital Quantifica* 06/02/2023 negative    • PHENTERMINE QUANTIFICATI* 06/02/2023 negative    • PREGABALIN QUANTIFICATION 06/02/2023 negative-I (A)    • QUETIAPINE QUANTIFICATION 06/02/2023 negative    • NORQUETIAPINE QUANTIFICA* 06/02/2023 negative    • Risperidone Quantificati* 06/02/2023 negative    • Hydroxyrisperidone Quant* 06/02/2023 negative    • Secobarbital Quantificat* 06/02/2023 negative    • Tapentadol Quantification 06/02/2023 negative    • Temazepam Quantification 06/02/2023 negative    • Oxazepam Quantification 06/02/2023 negative    • cTHC (Marijuana metaboli* 06/02/2023 negative    • Tramadol Quantification 06/02/2023 negative    • O-desmethyl-tramadol Jeovany* 06/02/2023 negative    • N-DESMETHYL-TRAMADOL JEOVANY* 06/02/2023 negative    • CREATININE 06/02/2023 normal-223.8    • OXIDANT 06/02/2023 normal-0    • pH 06/02/2023 normal-5.8    • SPECIFIC GRAVITY URINE 06/02/2023 normal-1.020      CT abdomen pelvis with contrast    Result Date: 6/23/2023  Narrative: CT ABDOMEN AND PELVIS WITH IV CONTRAST INDICATION:   Sudden onset of right-sided flank/right upper quadrant pain. Patient has history of nephrolithiasis, status post cholecystectomy in the past.  Does have a history of malignancy. . COMPARISON: CT abdomen/pelvis 2/16/2023. MRI abdomen 2/16/2023. Chest CT 5/16/2023. TECHNIQUE:  CT examination of the abdomen and pelvis was performed. Multiplanar 2D reformatted images were created from the source data. This examination, like all CT scans performed in the [facility], was performed utilizing techniques to minimize radiation dose exposure, including the use of iterative reconstruction and automated exposure control. Radiation dose length product (DLP) for this visit:  964.77 mGy-cm IV Contrast:  100 mL of iohexol (OMNIPAQUE) Enteric Contrast:  Enteric contrast was not administered. FINDINGS: ABDOMEN LOWER CHEST: Mild diffuse distal esophageal wall thickening. LIVER/BILIARY TREE: Liver is diffusely decreased in density consistent with fatty change. No CT evidence of suspicious hepatic mass. Normal hepatic contours. No biliary dilatation. Right hepatic lobe simple cyst. GALLBLADDER: Gallbladder is surgically absent. SPLEEN:  Unremarkable. PANCREAS: Pancreas divisum. Otherwise, within normal limits. ADRENAL GLANDS:  Unremarkable. KIDNEYS/URETERS: There is a 3 mm mid to distal right ureteral calculus causing moderate upstream hydroureteronephrosis, perinephric and periureteral fat stranding and a delayed right nephrogram. Multiple bilateral simple and hemorrhagic cysts, better evaluated on prior MRI. STOMACH AND BOWEL:  Unremarkable. APPENDIX:  No findings to suggest appendicitis. ABDOMINOPELVIC CAVITY:  No ascites. No pneumoperitoneum. No lymphadenopathy. VESSELS:  Atherosclerotic changes are present. Fusiform infrarenal abdominal aortic aneurysm with irregular plaque measuring up to 3.5 cm, similar to prior. Stable left common iliac artery aneurysm measuring up to 2 cm and right common iliac artery ectasia measuring up to 1.6 cm. There is irregular plaque versus focal dissection in the left common iliac artery, unchanged.  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 PELVIS REPRODUCTIVE ORGANS: The prostate is enlarged. URINARY BLADDER:  Unremarkable. ABDOMINAL WALL/INGUINAL REGIONS: Stable appearance of embolization glue throughout the lymphatic ducts in the retroperitoneum and pelvis. OSSEOUS STRUCTURES: No acute fracture or destructive osseous lesion. Spinal degenerative changes are noted. Impression: 1. A 3 mm mid to distal right ureteral calculus causes moderate obstructive uropathy. 2. Mild diffuse distal esophageal wall thickening, suggesting esophagitis. 3. Stable infrarenal abdominal aortic aneurysm measuring up to 3.5 cm. Stable ectatic and aneurysmal iliac arteries as described. Workstation performed: [id number]     XR chest 1 view portable    Result Date: 6/21/2023  Narrative: CHEST INDICATION:   Shaking during infusion, history of lung malignancy status postresection. COMPARISON: 1/28/2023 EXAM PERFORMED/VIEWS:  XR CHEST PORTABLE Single view FINDINGS: Development of mild vascular congestion and possible left basilar infiltrate Cardiomediastinal silhouette appears unremarkable. No pneumothorax or pleural effusion. Osseous structures appear within normal limits for patient age. Impression: Development of mild vascular congestion and possible left basal infiltrate Workstation performed: [id number]     Recent CTIMPRESSION:     1. A 3 mm mid to distal right ureteral calculus causes moderate obstructive uropathy.     2. Mild diffuse distal esophageal wall thickening, suggesting esophagitis.     3. Stable infrarenal abdominal aortic aneurysm measuring up to 3.5 cm. Stable ectatic and aneurysmal iliac arteries as described.         Assessment:       1. Primary hypertension        2. Coronary artery disease of native artery of native heart with stable angina pectoris ([address])        3. Mixed hyperlipidemia             Plan:       Amarilis Mcintosh has no signs or symptoms reminiscent of unstable angina, heart failure, nor electrical instability. BP stable. Euvolemic. Rhythm sinus by exam.  Cardiac auscultation unremarkable. AAA recently reassessed, stable in dimension, small in size. No symptoms. Recommended smoking cessation. Meds reviewed, recommended no changes from a cardiac perspective. Ordered no acute cardiac testing. Asked him to come back in 6 months, to call sooner with any concerning potential cardiac symptoms in the meantime.

## 2023-07-17 ENCOUNTER — HOSPITAL ENCOUNTER (OUTPATIENT)
Dept: RADIOLOGY | Facility: HOSPITAL | Age: 68
Discharge: HOME | End: 2023-07-17
Payer: MEDICARE

## 2023-07-17 ENCOUNTER — APPOINTMENT (OUTPATIENT)
Dept: LAB | Facility: HOSPITAL | Age: 68
End: 2023-07-17
Payer: MEDICARE

## 2023-07-17 DIAGNOSIS — R53.1 WEAKNESS: ICD-10-CM

## 2023-07-17 DIAGNOSIS — R20.2 PARESTHESIA: ICD-10-CM

## 2023-07-17 DIAGNOSIS — M25.561 CHRONIC PAIN OF RIGHT KNEE: ICD-10-CM

## 2023-07-17 DIAGNOSIS — E78.5 DYSLIPIDEMIA: ICD-10-CM

## 2023-07-17 DIAGNOSIS — R53.83 CHEMOTHERAPY-INDUCED FATIGUE: ICD-10-CM

## 2023-07-17 DIAGNOSIS — T45.1X5A CHEMOTHERAPY-INDUCED NAUSEA: Primary | ICD-10-CM

## 2023-07-17 DIAGNOSIS — R11.0 CHEMOTHERAPY-INDUCED NAUSEA: Primary | ICD-10-CM

## 2023-07-17 DIAGNOSIS — C34.31 MALIGNANT NEOPLASM OF LOWER LOBE OF RIGHT LUNG (HCC): Primary | ICD-10-CM

## 2023-07-17 DIAGNOSIS — G62.9 NEUROPATHY: ICD-10-CM

## 2023-07-17 DIAGNOSIS — G89.29 CHRONIC PAIN OF RIGHT KNEE: ICD-10-CM

## 2023-07-17 DIAGNOSIS — T45.1X5A CHEMOTHERAPY-INDUCED FATIGUE: ICD-10-CM

## 2023-07-17 DIAGNOSIS — R73.03 BORDERLINE DIABETES: ICD-10-CM

## 2023-07-17 LAB
ALBUMIN SERPL BCP-MCNC: 4.1 G/DL (ref 3.5–5)
ALP SERPL-CCNC: 78 U/L (ref 34–104)
ALT SERPL W P-5'-P-CCNC: 9 U/L (ref 7–52)
ANION GAP SERPL CALCULATED.3IONS-SCNC: 9 MMOL/L
AST SERPL W P-5'-P-CCNC: 10 U/L (ref 13–39)
BASOPHILS # BLD AUTO: 0.05 THOUSANDS/ÂΜL (ref 0–0.1)
BASOPHILS NFR BLD AUTO: 1 % (ref 0–1)
BILIRUB SERPL-MCNC: 0.6 MG/DL (ref 0.2–1)
BUN SERPL-MCNC: 22 MG/DL (ref 5–25)
CALCIUM SERPL-MCNC: 9.3 MG/DL (ref 8.4–10.2)
CHLORIDE SERPL-SCNC: 100 MMOL/L (ref 96–108)
CHOLEST SERPL-MCNC: 179 MG/DL
CO2 SERPL-SCNC: 26 MMOL/L (ref 21–32)
CREAT SERPL-MCNC: 1.17 MG/DL (ref 0.6–1.3)
EOSINOPHIL # BLD AUTO: 0 THOUSAND/ÂΜL (ref 0–0.61)
EOSINOPHIL NFR BLD AUTO: 0 % (ref 0–6)
ERYTHROCYTE [DISTWIDTH] IN BLOOD BY AUTOMATED COUNT: 12.4 % (ref 11.6–15.1)
FOLATE SERPL-MCNC: 8.2 NG/ML
GFR SERPL CREATININE-BSD FRML MDRD: 63 ML/MIN/1.73SQ M
GLUCOSE SERPL-MCNC: 141 MG/DL (ref 65–140)
HCT VFR BLD AUTO: 45 % (ref 36.5–49.3)
HDLC SERPL-MCNC: 35 MG/DL
HGB BLD-MCNC: 14.5 G/DL (ref 12–17)
IMM GRANULOCYTES # BLD AUTO: 0.03 THOUSAND/UL (ref 0–0.2)
IMM GRANULOCYTES NFR BLD AUTO: 0 % (ref 0–2)
LDLC SERPL CALC-MCNC: 80 MG/DL (ref 0–100)
LYMPHOCYTES # BLD AUTO: 2.2 THOUSANDS/ÂΜL (ref 0.6–4.47)
LYMPHOCYTES NFR BLD AUTO: 27 % (ref 14–44)
MCH RBC QN AUTO: 30.5 PG (ref 26.8–34.3)
MCHC RBC AUTO-ENTMCNC: 32.2 G/DL (ref 31.4–37.4)
MCV RBC AUTO: 95 FL (ref 82–98)
MONOCYTES # BLD AUTO: 0.59 THOUSAND/ÂΜL (ref 0.17–1.22)
MONOCYTES NFR BLD AUTO: 7 % (ref 4–12)
NEUTROPHILS # BLD AUTO: 5.39 THOUSANDS/ÂΜL (ref 1.85–7.62)
NEUTS SEG NFR BLD AUTO: 65 % (ref 43–75)
NONHDLC SERPL-MCNC: 144 MG/DL
NRBC BLD AUTO-RTO: 0 /100 WBCS
PLATELET # BLD AUTO: 198 THOUSANDS/UL (ref 149–390)
PMV BLD AUTO: 9.8 FL (ref 8.9–12.7)
POTASSIUM SERPL-SCNC: 3.5 MMOL/L (ref 3.5–5.3)
PROT SERPL-MCNC: 6.9 G/DL (ref 6.4–8.4)
RBC # BLD AUTO: 4.76 MILLION/UL (ref 3.88–5.62)
SODIUM SERPL-SCNC: 135 MMOL/L (ref 135–147)
TRIGL SERPL-MCNC: 319 MG/DL
TSH SERPL DL<=0.05 MIU/L-ACNC: 0.82 UIU/ML (ref 0.45–4.5)
VIT B12 SERPL-MCNC: 244 PG/ML (ref 180–914)
WBC # BLD AUTO: 8.26 THOUSAND/UL (ref 4.31–10.16)

## 2023-07-17 PROCEDURE — 80061 LIPID PANEL: CPT

## 2023-07-17 PROCEDURE — 82607 VITAMIN B-12: CPT

## 2023-07-17 PROCEDURE — 80053 COMPREHEN METABOLIC PANEL: CPT

## 2023-07-17 PROCEDURE — 83036 HEMOGLOBIN GLYCOSYLATED A1C: CPT

## 2023-07-17 PROCEDURE — 84443 ASSAY THYROID STIM HORMONE: CPT

## 2023-07-17 PROCEDURE — 82746 ASSAY OF FOLIC ACID SERUM: CPT

## 2023-07-17 PROCEDURE — 85025 COMPLETE CBC W/AUTO DIFF WBC: CPT

## 2023-07-17 PROCEDURE — 36415 COLL VENOUS BLD VENIPUNCTURE: CPT

## 2023-07-17 PROCEDURE — 73562 X-RAY EXAM OF KNEE 3: CPT

## 2023-07-17 RX ORDER — DICYCLOMINE HYDROCHLORIDE 10 MG/1
10 CAPSULE ORAL
Qty: 30 CAPSULE | Refills: 0 | Status: SHIPPED | OUTPATIENT
Start: 2023-07-17 | End: 2023-07-20

## 2023-07-17 RX ORDER — ONDANSETRON HYDROCHLORIDE 8 MG/1
8 TABLET, FILM COATED ORAL EVERY 8 HOURS PRN
Qty: 20 TABLET | Refills: 2 | Status: SHIPPED | OUTPATIENT
Start: 2023-07-17

## 2023-07-18 LAB
EST. AVERAGE GLUCOSE BLD GHB EST-MCNC: 123 MG/DL
HBA1C MFR BLD: 5.9 %

## 2023-07-19 ENCOUNTER — OFFICE VISIT (OUTPATIENT)
Dept: FAMILY MEDICINE CLINIC | Facility: CLINIC | Age: 68
End: 2023-07-19
Payer: MEDICARE

## 2023-07-19 VITALS
HEIGHT: 67 IN | DIASTOLIC BLOOD PRESSURE: 82 MMHG | HEART RATE: 64 BPM | WEIGHT: 188 LBS | OXYGEN SATURATION: 96 % | SYSTOLIC BLOOD PRESSURE: 138 MMHG | BODY MASS INDEX: 29.51 KG/M2 | TEMPERATURE: 97.6 F

## 2023-07-19 DIAGNOSIS — L98.9 SKIN LESION: ICD-10-CM

## 2023-07-19 DIAGNOSIS — I71.40 ABDOMINAL AORTIC ANEURYSM (AAA) WITHOUT RUPTURE, UNSPECIFIED PART (HCC): ICD-10-CM

## 2023-07-19 DIAGNOSIS — I10 PRIMARY HYPERTENSION: ICD-10-CM

## 2023-07-19 DIAGNOSIS — L21.9 SEBORRHEIC DERMATITIS: ICD-10-CM

## 2023-07-19 DIAGNOSIS — C34.31 PRIMARY SQUAMOUS CELL CARCINOMA OF LOWER LOBE OF RIGHT LUNG (HCC): ICD-10-CM

## 2023-07-19 DIAGNOSIS — M77.9 ENTHESOPATHY: ICD-10-CM

## 2023-07-19 DIAGNOSIS — C34.31 MALIGNANT NEOPLASM OF LOWER LOBE OF RIGHT LUNG (HCC): Primary | ICD-10-CM

## 2023-07-19 DIAGNOSIS — N18.31 STAGE 3A CHRONIC KIDNEY DISEASE (HCC): ICD-10-CM

## 2023-07-19 DIAGNOSIS — F17.200 SMOKING: ICD-10-CM

## 2023-07-19 PROCEDURE — 99214 OFFICE O/P EST MOD 30 MIN: CPT | Performed by: FAMILY MEDICINE

## 2023-07-19 RX ORDER — NICOTINE 21 MG/24HR
1 PATCH, TRANSDERMAL 24 HOURS TRANSDERMAL EVERY 24 HOURS
Qty: 28 PATCH | Refills: 0 | Status: SHIPPED | OUTPATIENT
Start: 2023-07-19

## 2023-07-19 RX ORDER — KETOCONAZOLE 20 MG/ML
1 SHAMPOO TOPICAL 2 TIMES WEEKLY
Qty: 100 ML | Refills: 0 | Status: SHIPPED | OUTPATIENT
Start: 2023-07-20 | End: 2023-08-17

## 2023-07-19 NOTE — PROGRESS NOTES
Assessment/Plan:    Smoking  Patient is smoking around half a pack per day currently down from 1 pack/day. Reports not taking the nicotine gum and not interested in using it. Also, patient is not taking bupropion. Patient reports that he is taking a lot of medications and is not interested in taking bupropion. Patient is using nicotine patch and requested refill. Refill sent to pharmacy. Counseling and education about smoking cessation. 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 PD-L1 1%, TMB high. Recent CT of the chest shows decreasing opacity near the right pulmonary staple line. adjuvent chemotherapy per Hem/Onc team  [address];  -Continue to follow-up with hematology/oncology team.  -Continue to follow-up accordingly. Skin lesion  As noted on physical exam today. Patient reports that he first noticed it around 2 months ago.'s been the same since onset per the patient. No formal assessment was done by healthcare professional.    The lesion is a macule/uniform color and shape, oval-shaped, overlying crusting, during around 5 mm in diameter, located on parietal lobe midline. No associated tenderness to palpation or erythema. No history of skin cancer. Excessive dandruff was noted in the scalp area. Plan:   Start ketoconazole shampoo for next 4 weeks use twice a week and keep 3 days Between each administration. instructions given to patient. I offered the patient skin biopsy today however he deferred. Patient would like to ask his oncologist about the lesion before proceeding. Health risks were explained. Patient verbalized understanding. Also offered him referral to dermatology. Patient will think about it.   Follow-up accordingly       Diagnoses and all orders for this visit:    Malignant neoplasm of lower lobe of right lung ([address])    Enthesopathy  Comments:  RT knee   per XR   recommended tylenol/Naproxen for pain  patinet ambulating at baseline    Stage 3a chronic kidney disease ([address])  Comments:  stable. continue to follow up     Smoking  -     nicotine (NICODERM CQ) 21 mg/24 hr TD 24 hr patch; Place 1 patch on the skin over 24 hours every 24 hours    Seborrheic dermatitis  Comments:  noted at bedside today   ketoconazole 2% shampoo 2xwkly  f/u in 4-6 wks  Orders:  -     ketoconazole (NIZORAL) 2 % shampoo; Apply 1 Application topically 2 (two) times a week for 28 days    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)    Abdominal aortic aneurysm (AAA) without rupture, unspecified part ([address])  Comments:  stable following with cardiology    Primary hypertension  Comments:  stable. continue medications per cardiology    Skin lesion          PHQ-2/9 Depression Screening    Little interest or pleasure in doing things: 0 - not at all  Feeling down, depressed, or hopeless: 0 - not at all  PHQ-2 Score: 0  PHQ-2 Interpretation: Negative depression screen            Subjective:      Patient ID: Viktor Goode is a 70 y.o. male. 65-year-old male past medical history remarkable for hypertension, stage III squamous cell carcinoma on chemotherapy, chronic nicotine dependence, who presents to the office today to follow-up. Patient reports development of a skin lesion on his scalp that he first noticed around 2 months ago. Did not change in size. Associated with mild intermittent pain, no nocturnal episodes, no history of skin cancer, no associated redness or discharge or drainage. No fevers or chills, nausea or vomiting, diarrhea or constipation. No active urinary symptoms. Of note; during the visit we discussed smoking cessation techniques. Patient reports that he is not taking bupropion. Bupropion was prescribed on previous encounter.   Patient is not interested in taking the medication and would not like to resume it. Nicotine patch was prescribed on previous encounter. Patient reports using a nicotine patch and requests refill. Nicotine gum is not being used as the patient does not prefer to take it. Patient is currently smoking half pack per day down from 1 pack per day previously. The following portions of the patient's history were reviewed and updated as appropriate: current medications, past family history, past social history and problem list.    Review of Systems   Constitutional: Positive for activity change. Negative for appetite change, chills, fatigue and fever. HENT: Negative. Negative for hearing loss. Eyes: Negative. Negative for visual disturbance. Respiratory: Negative for shortness of breath and wheezing. Cardiovascular: Negative for chest pain and palpitations. Gastrointestinal: Negative for abdominal pain, blood in stool, constipation, diarrhea, nausea and vomiting. Endocrine: Negative. Genitourinary: Negative for difficulty urinating and dysuria. Musculoskeletal: Negative for arthralgias and myalgias. Skin: Negative. Allergic/Immunologic: Negative. Neurological: Positive for dizziness. Negative for seizures, syncope, light-headedness and headaches. Hematological: Negative for adenopathy. Psychiatric/Behavioral: Negative. Objective:    /82   Pulse 64   Temp 97.6 °F (36.4 °C)   Ht 5' 7" (1.702 m)   Wt 85.3 kg (188 lb)   SpO2 96%   BMI 29.44 kg/m²      Physical Exam  Vitals and nursing note reviewed. Constitutional:       General: He is not in acute distress. Appearance: Normal appearance. He is not ill-appearing or diaphoretic. HENT:      Head: Normocephalic and atraumatic. Eyes:      Conjunctiva/sclera: Conjunctivae normal.   Cardiovascular:      Rate and Rhythm: Normal rate and regular rhythm. Heart sounds: Normal heart sounds. No murmur heard.   Pulmonary:      Effort: Pulmonary effort is normal. No respiratory distress. Breath sounds: Normal breath sounds. No wheezing, rhonchi or rales. Abdominal:      General: There is no distension. Palpations: Abdomen is soft. There is no mass. Tenderness: There is no abdominal tenderness. There is no guarding or rebound. Musculoskeletal:      Cervical back: Normal range of motion and neck supple. No rigidity. Right lower leg: No edema. Left lower leg: No edema. Lymphadenopathy:      Cervical: No cervical adenopathy. Skin:     Capillary Refill: Capillary refill takes less than 2 seconds. Comments: Dandruff noted diffusely in the scalp area. Also questionable scaling noted in the parietal lobes bilaterally. Or over there is uniform shaped macular lesion noted on the parietal lobe/midline noted first around 2 months ago by the patient. Uniform color. Overall shaped. Crusting overlying the lesion. Nontender to palpation. Patient reports intermittent mild pain. Neurological:      Mental Status: He is alert and oriented to person, place, and time. Mental status is at baseline. Psychiatric:         Mood and Affect: Mood normal.         Behavior: Behavior normal.                 The patient has a history of falls. I did complete a risk assessment for falls. A plan of care for falls was documented. Fall Risk Assesment    Flowsheet Row Most Recent Value   Fall Risk    One or more falls in the last year? Yes   How many times? 2 or more falls   Feels unsteady when standing or walking? No   Worried about falling?  No

## 2023-07-19 NOTE — ASSESSMENT & PLAN NOTE
Patient is smoking around half a pack per day currently down from 1 pack/day. Reports not taking the nicotine gum and not interested in using it. Also, patient is not taking bupropion. Patient reports that he is taking a lot of medications and is not interested in taking bupropion. Patient is using nicotine patch and requested refill. Refill sent to pharmacy. Counseling and education about smoking cessation.   Patient agrees and we will follow-up accordingly

## 2023-07-19 NOTE — ASSESSMENT & PLAN NOTE
As noted on physical exam today. Patient reports that he first noticed it around 2 months ago.'s been the same since onset per the patient. No formal assessment was done by healthcare professional.    The lesion is a macule/uniform color and shape, oval-shaped, overlying crusting, during around 5 mm in diameter, located on parietal lobe midline. No associated tenderness to palpation or erythema. No history of skin cancer. Excessive dandruff was noted in the scalp area. Plan:   Start ketoconazole shampoo for next 4 weeks use twice a week and keep 3 days Between each administration. instructions given to patient. I offered the patient skin biopsy today however he deferred. Patient would like to ask his oncologist about the lesion before proceeding. Health risks were explained. Patient verbalized understanding. Also offered him referral to dermatology. Patient will think about it.   Follow-up accordingly

## 2023-07-19 NOTE — ASSESSMENT & PLAN NOTE
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 PD-L1 1%, TMB high. Recent CT of the chest shows decreasing opacity near the right pulmonary staple line. adjuvent chemotherapy per Hem/Onc team  [address];  -Continue to follow-up with hematology/oncology team.  -Continue to follow-up accordingly.

## 2023-07-20 ENCOUNTER — RA CDI HCC (OUTPATIENT)
Dept: OTHER | Facility: HOSPITAL | Age: 68
End: 2023-07-20

## 2023-07-20 DIAGNOSIS — C34.31 MALIGNANT NEOPLASM OF LOWER LOBE OF RIGHT LUNG (HCC): ICD-10-CM

## 2023-07-20 RX ORDER — DICYCLOMINE HYDROCHLORIDE 10 MG/1
CAPSULE ORAL
Qty: 30 CAPSULE | Refills: 0 | Status: SHIPPED | OUTPATIENT
Start: 2023-07-20

## 2023-07-20 NOTE — PROGRESS NOTES
[address] [facility] coding opportunities       Chart reviewed, no opportunity found: CHART REVIEWED, NO OPPORTUNITY FOUND        Patients Insurance     Medicare Insurance: Medicare

## 2023-07-25 ENCOUNTER — OFFICE VISIT (OUTPATIENT)
Dept: HEMATOLOGY ONCOLOGY | Facility: CLINIC | Age: 68
End: 2023-07-25
Payer: MEDICARE

## 2023-07-25 VITALS
BODY MASS INDEX: 29.63 KG/M2 | WEIGHT: 188.8 LBS | DIASTOLIC BLOOD PRESSURE: 86 MMHG | SYSTOLIC BLOOD PRESSURE: 156 MMHG | HEART RATE: 58 BPM | HEIGHT: 67 IN | OXYGEN SATURATION: 97 % | TEMPERATURE: 96.6 F

## 2023-07-25 DIAGNOSIS — C34.31 MALIGNANT NEOPLASM OF LOWER LOBE OF RIGHT LUNG (HCC): Primary | ICD-10-CM

## 2023-07-25 DIAGNOSIS — L98.9 LESION OF SKIN OF SCALP: Primary | ICD-10-CM

## 2023-07-25 PROCEDURE — 99215 OFFICE O/P EST HI 40 MIN: CPT | Performed by: INTERNAL MEDICINE

## 2023-07-25 NOTE — LETTER
July 25, 2023     Warden Austin MD  [address]    Patient: Elisha Archuleta   YOB: 1955   Date of Visit: 7/25/2023       Dear Dr. Cristel Sun:    Thank you for referring Jocelin Lagunas to me for evaluation. Below are my notes for this consultation. If you have questions, please do not hesitate to call me. I look forward to following your patient along with you. Sincerely,        Sahra Herrmann DO        CC: No Recipients    Sahra Herrmann DO  7/25/2023  9:18 AM  Sign when Signing Visit   W Ashlyn Gifford  [address] [facility] [address] [phone] [address] [facility],1/74/0251, [id number]  07/25/23    Discussion:   In summary, this is a 44-year-old male with a history of resected stage IIIa squamous cell carcinoma of the right lung. PD-L1 1%, TMB high. Status post adjuvant Taxol carbo. Adjuvant Tecentriq started a month ago. Over the past month he has had substantial abdominal cramping. Somewhat better with Bentyl. Denies melena or hematochezia. No fever no diarrhea. No nausea or vomiting. Also he has substantial fatigue over the past months. Worse more recently. Etiology of abdominal pain is unclear. Certainly Tecentriq related colitis is a consideration, though unusual to not be associated with GI bleed or diarrhea. Perhaps Percocet is blunting the diarrhea aspect. For the moment, I am inclined to hold Tecentriq. I asked him to call in a few weeks to evaluate whether Bentyl use has declined. If not, trial of steroids will be recommended. Lastly, he has a small scab on the scalp vertex to the left of midline. 3-4 mm. Very tender. I suggested dermatology evaluation. I discussed the above with the patient. The patient  voiced understanding and agreement.  ______________________________________________________________________    No chief complaint on file.       HPI:  Oncology History 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 ([address])   12/6/2022 -  Cancer Staged    Staging form: Lung, AJCC 8th Edition  - Clinical: Stage IIIA (cT1b, cN2, cM0) - Signed by Dipesh Keyes PA-C on 12/6/2022  Laterality: Right       Malignant neoplasm of lower lobe of right lung ([address])   9/13/2022 Initial Diagnosis    July 23, 2022 patient had CT chest ab pelvis regarding rule out AAA. This showed 1.1 cm right lower lobe pulmonary nodule new since prior study of November 2019. Some other smaller nodules identified. Subcarinal lymph node 2 cm. No other findings suspicious for metastatic disease. August 5, 2022 PET/CT showed 1.2 cm pulmonary nodule, SUV 2.9. Other nodules noted, subcentimeter, no hypermetabolism. Some groundglass opacities in the right upper lobe. September 13, 2022 patient had needle biopsy of the right lower lobe mass showing squamous cell carcinoma, TTF-1 positive. November 16, 2022 patient underwent right lower lobe segmentectomy. Pathology showed 1.8 cm squamous cell carcinoma. Level 4 and 11 lymph node were positive for metastatic carcinoma.      1/30/2023 - 3/27/2023 Chemotherapy    palonosetron (ALOXI), 0.25 mg, Intravenous, Once, 4 of 4 cycles  Administration: 0.25 mg (1/30/2023), 0.25 mg (2/13/2023), 0.25 mg (3/6/2023), 0.25 mg (3/27/2023)  fosaprepitant (EMEND) IVPB, 150 mg, Intravenous, Once, 3 of 3 cycles  Administration: 150 mg (1/30/2023), 150 mg (2/13/2023), 150 mg (3/6/2023)  CARBOplatin (PARAPLATIN) IVPB (GO AUC DOSING), 552 mg, Intravenous, Once, 4 of 4 cycles  Administration: 550 mg (1/30/2023), 650 mg (2/13/2023), 650 mg (3/6/2023), 500 mg (3/27/2023)  PACLItaxel (TAXOL) chemo IVPB, 349.8 mg, Intravenous, Once, 4 of 4 cycles  Dose modification: 200 mg/m2 (original dose 175 mg/m2, Cycle 2, Reason: Dose modified as per discussion with consulting physician)  Administration: 360 mg (1/30/2023), 400 mg (2/13/2023), 400 mg (3/6/2023), 400 mg (3/27/2023)  aprepitant (CINVANTI) in  mL IVPB, 130 mg, Intravenous, Once, 1 of 1 cycle  Administration: 130 mg (3/27/2023)     4/13/2023 Genomic Testing    April 13, 2023 Caris-  PD-L1 1%, TMB high. MTAP deleted. p53 [id number], pathogenic variant  KRAS G694 L, VUS  CRABBP  *, pathogenic variant  KMT2D *, pathogenic variant     6/21/2023 -  Chemotherapy    alteplase (CATHFLO), 2 mg, Intracatheter, Every 1 Minute as needed, 2 of 6 cycles  atezolizumab (TECENTRIQ) IVPB, 1,200 mg, Intravenous, Once, 2 of 6 cycles  Administration: 1,200 mg (6/21/2023), 1,200 mg (7/12/2023)         Interval History: Clinically stable. Abdominal pain. ECOG-  1 - Symptomatic but completely ambulatory    Review of Systems   Constitutional: Negative for chills and fever. HENT: Negative for nosebleeds. Eyes: Negative for discharge. Respiratory: Negative for cough and shortness of breath. Cardiovascular: Negative for chest pain. Gastrointestinal: Negative for abdominal pain, constipation and diarrhea. Endocrine: Negative for polydipsia. Genitourinary: Negative for hematuria. Musculoskeletal: Negative for arthralgias. Skin: Negative for color change. Allergic/Immunologic: Negative for immunocompromised state. Neurological: Negative for dizziness and headaches. Hematological: Negative for adenopathy. Psychiatric/Behavioral: Negative for agitation. Past Medical History:   Diagnosis Date   • Abdominal aortic aneurysm without rupture (HCC)    • Abdominal Aortic Duplex 02/21/2017    Ectatic infrarenal abdominal aorta. • MARYANN (acute kidney injury) ([address]) 07/23/2022   • CAD (coronary artery disease)    • Cancer ([address]) 2022    right lung CA   • COPD (chronic obstructive pulmonary disease) (HCC)    • Extremity pain    • History of echocardiogram 03/18/2014    EF 55%, mild MR and AI. Mild concentric LVH.    • History of stress test 03/06/2017    Normal.   • Hyperlipidemia    • Hypertension    • Joint pain • Kidney stone    • Low back pain    • Lung cancer (HCC)    • Migraine    • Neck pain    • Obstructive sleep apnea     cannot tolerate CPAP   • Osteoarthritis    • Peripheral neuropathy    • Reflex sympathetic dystrophy    • Sacroiliitis ([address]) 02/02/2022     Patient Active Problem List   Diagnosis   • JAKI (obstructive sleep apnea)   • Chronic bronchitis (HCC)   • Abdominal aortic aneurysm (AAA) without rupture   • Lumbar spondylosis   • Lumbar degenerative disc disease   • Myofascial pain syndrome   • Chronic low back pain without sciatica   • Cervical radiculopathy   • Cervical spondylosis without myelopathy   • Tremor, essential   • Negative depression screening   • Chronic pain of both knees   • Class 1 obesity due to excess calories with serious comorbidity and body mass index (BMI) of 31.0 to 31.9 in adult   • Smoking   • Hypertension   • Chronic pain syndrome   • Uncomplicated opioid dependence ([address])   • Encounter for long-term opiate analgesic use   • Neck pain   • Hyperlipidemia   • Pre-diabetes   • Erectile dysfunction   • Insomnia   • Cortical age-related cataract of both eyes   • Urinary frequency   • 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)   • Kidney stone   • Pulmonary emphysema (HCC)   • Malignant neoplasm of lower lobe of right lung (HCC)   • Encounter for smoking cessation counseling   • Hemiparesis ([address])   • At high risk for osteoporosis   • Coronary artery disease of native artery of native heart with stable angina pectoris (HCC)   • Stage 3a chronic kidney disease (HCC)   • Night sweats   • Other chronic pancreatitis (HCC)   • Skin lesion       Current Outpatient Medications:   •  amLODIPine (NORVASC) 10 mg tablet, take 1 tablet by mouth once daily, Disp: 90 tablet, Rfl: 3  •  bisacodyl (DULCOLAX) 5 mg EC tablet, Take 1 tablet (5 mg total) by mouth daily as needed for constipation for up to 4 doses, Disp: 30 tablet, Rfl: 0  •  dicyclomine (BENTYL) 10 mg capsule, take 1 capsule by mouth four times a day before meals and at bedtime, Disp: 30 capsule, Rfl: 0  •  docusate sodium (COLACE) 100 mg capsule, Take 1 capsule (100 mg total) by mouth 2 (two) times a day, Disp: 20 capsule, Rfl: 0  •  glucose blood (OneTouch Verio) test strip, Test once daily, Disp: 100 each, Rfl: 0  •  ketoconazole (NIZORAL) 2 % shampoo, Apply 1 Application topically 2 (two) times a week for 28 days, Disp: 100 mL, Rfl: 0  •  lisinopril (ZESTRIL) 10 mg tablet, Take 1 tablet (10 mg total) by mouth daily, Disp: 90 tablet, Rfl: 3  •  metoprolol succinate (TOPROL-XL) 100 mg 24 hr tablet, take 1 tablet by mouth once daily, Disp: 30 tablet, Rfl: 5  •  nicotine (NICODERM CQ) 21 mg/24 hr TD 24 hr patch, Place 1 patch on the skin over 24 hours every 24 hours, Disp: 28 patch, Rfl: 0  •  nitroglycerin (NITROSTAT) 0.4 mg SL tablet, Place 1 tablet (0.4 mg total) under the tongue every 5 (five) minutes as needed for chest pain, Disp: 25 tablet, Rfl: 5  •  ondansetron (ZOFRAN) 8 mg tablet, Take 1 tablet (8 mg total) by mouth every 8 (eight) hours as needed for nausea or vomiting (Patient not taking: Reported on 7/19/2023), Disp: 20 tablet, Rfl: 2  •  oxyCODONE-acetaminophen (Percocet) 7.5-325 MG per tablet, Take 1 tablet by mouth every 8 (eight) hours as needed for moderate pain Do not fill until 6/30/2023 Max Daily Amount: 3 tablets, Disp: 90 tablet, Rfl: 0  •  rosuvastatin (CRESTOR) 20 MG tablet, take 1 tablet by mouth once daily, Disp: 90 tablet, Rfl: 5  •  sildenafil (VIAGRA) 100 mg tablet, TAKE 1 TABLET BY MOUTH ONCE DAILY AS NEEDED FOR ERECTILE DYSFUNCTION, Disp: 20 tablet, Rfl: 0  •  tiotropium-olodaterol (Stiolto Respimat) 2.5-2.5 MCG/ACT inhaler, Inhale 2 puffs if needed (wheezing, Shortness of breath), Disp: 4 g, Rfl: 2  •  traZODone (DESYREL) 100 mg tablet, take 1 tablet by mouth daily at bedtime, Disp: 90 tablet, Rfl: 3  •  buPROPion (Wellbutrin XL) 150 mg 24 hr tablet, Take 1 tablet (150 mg total) by mouth every morning (Patient not taking: Reported on 5/12/2023), Disp: 90 tablet, Rfl: 3  •  fluticasone (FLONASE) 50 mcg/act nasal spray, 1 spray into each nostril daily for 14 days (Patient not taking: Reported on 7/19/2023), Disp: 11.1 mL, Rfl: 0  •  ipratropium-albuterol (DUO-NEB) 0.5-2.5 mg/3 mL nebulizer solution, Take 3 mL by nebulization every 6 (six) hours as needed for wheezing or shortness of breath (Patient not taking: Reported on 7/19/2023), Disp: 30 mL, Rfl: 3  •  pantoprazole (PROTONIX) 20 mg tablet, take 1 tablet by mouth once daily (Patient not taking: Reported on 5/23/2023), Disp: 30 tablet, Rfl: 0  •  polyethylene glycol (MIRALAX) 17 g packet, Take 17 g by mouth daily for 5 days (Patient not taking: Reported on 12/20/2022), Disp: 85 g, Rfl: 0  •  pregabalin (LYRICA) 50 mg capsule, 1 PO QHS x 1 day, then  1 PO BID x 1 day, then 1 PO TID (Patient not taking: Reported on 7/19/2023), Disp: 90 capsule, Rfl: 1  •  tamsulosin (FLOMAX) 0.4 mg, Take 1 capsule (0.4 mg total) by mouth daily with dinner for 3 days, Disp: 3 capsule, Rfl: 0  No Known Allergies  Past Surgical History:   Procedure Laterality Date   • CARDIAC CATHETERIZATION  02/13/2012    EF 70%, widely patent renal arteries, significant single-vessel CAD-medical therapy. • CARDIAC CATHETERIZATION  04/11/2013    EF 65%, 50% mid LAD, 20% prox CFX, 90% diffuse RCA, 99% mid RCA. Medical management.    • CHOLECYSTECTOMY     • COLONOSCOPY     • EPIDURAL BLOCK INJECTION Bilateral 08/15/2019    Procedure: BLOCK / INJECTION EPIDURAL STEROID CERVICAL;  Surgeon: Peter Fajardo MD;  Location: [facility];  Service: Pain Management    • FL GUIDED NEEDLE PLAC BX/ASP/INJ  08/15/2019   • IR BIOPSY LUNG  09/13/2022   • IR THORACIC DUCT EMBOLIZATION  11/22/2022   • ORTHOPEDIC SURGERY     •  [address] INCL FLUOR GDNCE DX W/CELL WASHG [address] [facility] N/A 11/16/2022    Procedure: Merced Savage;  Surgeon: Wong Brown MD;  Location: [facility];  Service: Thoracic • AZ THORACOSCOPY W/LOBECTOMY SINGLE LOBE Right 11/16/2022    Procedure: LOBECTOMY LUNG; lower lobe superior segmentectomy, mediastinal lymph node dissection;  Surgeon: Anjana Tee MD;  Location: [facility];  Service: Thoracic   • AZ THORACOSCOPY W/SEGMENTECTOMY Right 11/16/2022    Procedure: THORACOSCOPY VIDEO ASSISTED SURGERY (VATS); Surgeon: Anjana Tee MD;  Location: [facility];  Service: Thoracic   • TRIGGER POINT INJECTION       Social History     Objective:  Vitals:    07/25/23 0840   BP: 156/86   BP Location: Left arm   Patient Position: Sitting   Cuff Size: Standard   Pulse: 58   Temp: (!) 96.6 °F (35.9 °C)   TempSrc: Tympanic   SpO2: 97%   Weight: 85.6 kg (188 lb 12.8 oz)   Height: 5' 7" (1.702 m)     Physical Exam  Constitutional:       Appearance: He is well-developed. HENT:      Head: Normocephalic and atraumatic. Eyes:      Pupils: Pupils are equal, round, and reactive to light. Cardiovascular:      Rate and Rhythm: Normal rate and regular rhythm. Heart sounds: No murmur heard. Pulmonary:      Breath sounds: Normal breath sounds. No wheezing or rales. Abdominal:      Palpations: Abdomen is soft. Tenderness: There is no abdominal tenderness. Musculoskeletal:         General: No tenderness. Normal range of motion. Cervical back: Neck supple. Lymphadenopathy:      Cervical: No cervical adenopathy. Skin:     Findings: No erythema or rash. Neurological:      Mental Status: He is alert and oriented to person, place, and time. Cranial Nerves: No cranial nerve deficit. Deep Tendon Reflexes: Reflexes are normal and symmetric. Psychiatric:         Behavior: Behavior normal.           Labs: I personally reviewed the labs and imaging pertinent to this patient care.

## 2023-07-25 NOTE — PROGRESS NOTES
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 PD-L1 1%, TMB high. Status post adjuvant Taxol carbo. Adjuvant Tecentriq started a month ago. Over the past month he has had substantial abdominal cramping. Somewhat better with Bentyl. Denies melena or hematochezia. No fever no diarrhea. No nausea or vomiting. He carries a diagnosis of chronic pancreatitis. Recent lipase was normal, however. Also he has substantial fatigue over the past months. Worse more recently. Etiology of abdominal pain is unclear. Certainly Tecentriq related colitis is a consideration, though unusual to not be associated with GI bleed or diarrhea. Perhaps Percocet is blunting the diarrhea aspect. For the moment, I am inclined to hold Tecentriq. I asked him to call in a few weeks to evaluate whether Bentyl use has declined. If not, trial of steroids will be recommended. Lastly, he has a small scab on the scalp vertex to the left of midline. 3-4 mm. Very tender. I suggested dermatology evaluation. I discussed the above with the patient. The patient  voiced understanding and agreement.  ______________________________________________________________________    No chief complaint on file. HPI:  Oncology History   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 ([address])   12/6/2022 -  Cancer Staged    Staging form: Lung, AJCC 8th Edition  - Clinical: Stage IIIA (cT1b, cN2, cM0) - Signed by Oscar Montes PA-C on 12/6/2022  Laterality: Right       Malignant neoplasm of lower lobe of right lung ([address])   9/13/2022 Initial Diagnosis    July 23, 2022 patient had CT chest ab pelvis regarding rule out AAA.   This showed 1.1 cm right lower lobe pulmonary nodule new since prior study of November 2019. Some other smaller nodules identified. Subcarinal lymph node 2 cm. No other findings suspicious for metastatic disease. August 5, 2022 PET/CT showed 1.2 cm pulmonary nodule, SUV 2.9. Other nodules noted, subcentimeter, no hypermetabolism. Some groundglass opacities in the right upper lobe. September 13, 2022 patient had needle biopsy of the right lower lobe mass showing squamous cell carcinoma, TTF-1 positive. November 16, 2022 patient underwent right lower lobe segmentectomy. Pathology showed 1.8 cm squamous cell carcinoma. Level 4 and 11 lymph node were positive for metastatic carcinoma. 1/30/2023 - 3/27/2023 Chemotherapy    palonosetron (ALOXI), 0.25 mg, Intravenous, Once, 4 of 4 cycles  Administration: 0.25 mg (1/30/2023), 0.25 mg (2/13/2023), 0.25 mg (3/6/2023), 0.25 mg (3/27/2023)  fosaprepitant (EMEND) IVPB, 150 mg, Intravenous, Once, 3 of 3 cycles  Administration: 150 mg (1/30/2023), 150 mg (2/13/2023), 150 mg (3/6/2023)  CARBOplatin (PARAPLATIN) IVPB ([facility] [place] AUC DOSING), 552 mg, Intravenous, Once, 4 of 4 cycles  Administration: 550 mg (1/30/2023), 650 mg (2/13/2023), 650 mg (3/6/2023), 500 mg (3/27/2023)  PACLItaxel (TAXOL) chemo IVPB, 349.8 mg, Intravenous, Once, 4 of 4 cycles  Dose modification: 200 mg/m2 (original dose 175 mg/m2, Cycle 2, Reason: Dose modified as per discussion with consulting physician)  Administration: 360 mg (1/30/2023), 400 mg (2/13/2023), 400 mg (3/6/2023), 400 mg (3/27/2023)  aprepitant (CINVANTI) in  mL IVPB, 130 mg, Intravenous, Once, 1 of 1 cycle  Administration: 130 mg (3/27/2023)     4/13/2023 Genomic Testing    April 13, 2023 Caris-  PD-L1 1%, TMB high. MTAP deleted.   p53 [id number], pathogenic variant  KRAS G694 L, VUS  CRABBP  *, pathogenic variant  KMT2D *, pathogenic variant     6/21/2023 -  Chemotherapy    alteplase (CATHFLO), 2 mg, Intracatheter, Every 1 Minute as needed, 2 of 6 cycles  atezolizumab (TECENTRIQ) IVPB, 1,200 mg, Intravenous, Once, 2 of 6 cycles  Administration: 1,200 mg (6/21/2023), 1,200 mg (7/12/2023)         Interval History: Clinically stable. Abdominal pain. ECOG-  1 - Symptomatic but completely ambulatory    Review of Systems   Constitutional: Negative for chills and fever. HENT: Negative for nosebleeds. Eyes: Negative for discharge. Respiratory: Negative for cough and shortness of breath. Cardiovascular: Negative for chest pain. Gastrointestinal: Negative for abdominal pain, constipation and diarrhea. Endocrine: Negative for polydipsia. Genitourinary: Negative for hematuria. Musculoskeletal: Negative for arthralgias. Skin: Negative for color change. Allergic/Immunologic: Negative for immunocompromised state. Neurological: Negative for dizziness and headaches. Hematological: Negative for adenopathy. Psychiatric/Behavioral: Negative for agitation. Past Medical History:   Diagnosis Date   • Abdominal aortic aneurysm without rupture (HCC)    • Abdominal Aortic Duplex 02/21/2017    Ectatic infrarenal abdominal aorta. • MARYANN (acute kidney injury) ([address]) 07/23/2022   • CAD (coronary artery disease)    • Cancer ([address]) 2022    right lung CA   • COPD (chronic obstructive pulmonary disease) ([facility])    • Extremity pain    • History of echocardiogram 03/18/2014    EF 55%, mild MR and AI. Mild concentric LVH.    • History of stress test 03/06/2017    Normal.   • Hyperlipidemia    • Hypertension    • Joint pain    • Kidney stone    • Low back pain    • Lung cancer ([facility])    • Migraine    • Neck pain    • Obstructive sleep apnea     cannot tolerate CPAP   • Osteoarthritis    • Peripheral neuropathy    • Reflex sympathetic dystrophy    • Sacroiliitis ([address]) 02/02/2022     Patient Active Problem List   Diagnosis   • JAKI (obstructive sleep apnea)   • Chronic bronchitis ([facility])   • Abdominal aortic aneurysm (AAA) without rupture   • Lumbar spondylosis   • Lumbar degenerative disc disease   • Myofascial pain syndrome   • Chronic low back pain without sciatica   • Cervical radiculopathy   • Cervical spondylosis without myelopathy   • Tremor, essential   • Negative depression screening   • Chronic pain of both knees   • Class 1 obesity due to excess calories with serious comorbidity and body mass index (BMI) of 31.0 to 31.9 in adult   • Smoking   • Hypertension   • Chronic pain syndrome   • Uncomplicated opioid dependence ([address])   • Encounter for long-term opiate analgesic use   • Neck pain   • Hyperlipidemia   • Pre-diabetes   • Erectile dysfunction   • Insomnia   • Cortical age-related cataract of both eyes   • Urinary frequency   • 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)   • Kidney stone   • Pulmonary emphysema (HCC)   • Malignant neoplasm of lower lobe of right lung (HCC)   • Encounter for smoking cessation counseling   • Hemiparesis ([address])   • At high risk for osteoporosis   • Coronary artery disease of native artery of native heart with stable angina pectoris (HCC)   • Stage 3a chronic kidney disease (HCC)   • Night sweats   • Other chronic pancreatitis (HCC)   • Skin lesion       Current Outpatient Medications:   •  amLODIPine (NORVASC) 10 mg tablet, take 1 tablet by mouth once daily, Disp: 90 tablet, Rfl: 3  •  bisacodyl (DULCOLAX) 5 mg EC tablet, Take 1 tablet (5 mg total) by mouth daily as needed for constipation for up to 4 doses, Disp: 30 tablet, Rfl: 0  •  dicyclomine (BENTYL) 10 mg capsule, take 1 capsule by mouth four times a day before meals and at bedtime, Disp: 30 capsule, Rfl: 0  •  docusate sodium (COLACE) 100 mg capsule, Take 1 capsule (100 mg total) by mouth 2 (two) times a day, Disp: 20 capsule, Rfl: 0  •  glucose blood (OneTouch Verio) test strip, Test once daily, Disp: 100 each, Rfl: 0  •  ketoconazole (NIZORAL) 2 % shampoo, Apply 1 Application topically 2 (two) times a week for 28 days, Disp: 100 mL, Rfl: 0  •  lisinopril (ZESTRIL) 10 mg tablet, Take 1 tablet (10 mg total) by mouth daily, Disp: 90 tablet, Rfl: 3  •  metoprolol succinate (TOPROL-XL) 100 mg 24 hr tablet, take 1 tablet by mouth once daily, Disp: 30 tablet, Rfl: 5  •  nicotine (NICODERM CQ) 21 mg/24 hr TD 24 hr patch, Place 1 patch on the skin over 24 hours every 24 hours, Disp: 28 patch, Rfl: 0  •  nitroglycerin (NITROSTAT) 0.4 mg SL tablet, Place 1 tablet (0.4 mg total) under the tongue every 5 (five) minutes as needed for chest pain, Disp: 25 tablet, Rfl: 5  •  ondansetron (ZOFRAN) 8 mg tablet, Take 1 tablet (8 mg total) by mouth every 8 (eight) hours as needed for nausea or vomiting (Patient not taking: Reported on 7/19/2023), Disp: 20 tablet, Rfl: 2  •  oxyCODONE-acetaminophen (Percocet) 7.5-325 MG per tablet, Take 1 tablet by mouth every 8 (eight) hours as needed for moderate pain Do not fill until 6/30/2023 Max Daily Amount: 3 tablets, Disp: 90 tablet, Rfl: 0  •  rosuvastatin (CRESTOR) 20 MG tablet, take 1 tablet by mouth once daily, Disp: 90 tablet, Rfl: 5  •  sildenafil (VIAGRA) 100 mg tablet, TAKE 1 TABLET BY MOUTH ONCE DAILY AS NEEDED FOR ERECTILE DYSFUNCTION, Disp: 20 tablet, Rfl: 0  •  tiotropium-olodaterol (Stiolto Respimat) 2.5-2.5 MCG/ACT inhaler, Inhale 2 puffs if needed (wheezing, Shortness of breath), Disp: 4 g, Rfl: 2  •  traZODone (DESYREL) 100 mg tablet, take 1 tablet by mouth daily at bedtime, Disp: 90 tablet, Rfl: 3  •  buPROPion (Wellbutrin XL) 150 mg 24 hr tablet, Take 1 tablet (150 mg total) by mouth every morning (Patient not taking: Reported on 5/12/2023), Disp: 90 tablet, Rfl: 3  •  fluticasone (FLONASE) 50 mcg/act nasal spray, 1 spray into each nostril daily for 14 days (Patient not taking: Reported on 7/19/2023), Disp: 11.1 mL, Rfl: 0  •  ipratropium-albuterol (DUO-NEB) 0.5-2.5 mg/3 mL nebulizer solution, Take 3 mL by nebulization every 6 (six) hours as needed for wheezing or shortness of breath (Patient not taking: Reported on 7/19/2023), Disp: 30 mL, Rfl: 3  • pantoprazole (PROTONIX) 20 mg tablet, take 1 tablet by mouth once daily (Patient not taking: Reported on 5/23/2023), Disp: 30 tablet, Rfl: 0  •  polyethylene glycol (MIRALAX) 17 g packet, Take 17 g by mouth daily for 5 days (Patient not taking: Reported on 12/20/2022), Disp: 85 g, Rfl: 0  •  pregabalin (LYRICA) 50 mg capsule, 1 PO QHS x 1 day, then  1 PO BID x 1 day, then 1 PO TID (Patient not taking: Reported on 7/19/2023), Disp: 90 capsule, Rfl: 1  •  tamsulosin (FLOMAX) 0.4 mg, Take 1 capsule (0.4 mg total) by mouth daily with dinner for 3 days, Disp: 3 capsule, Rfl: 0  No Known Allergies  Past Surgical History:   Procedure Laterality Date   • CARDIAC CATHETERIZATION  02/13/2012    EF 70%, widely patent renal arteries, significant single-vessel CAD-medical therapy. • CARDIAC CATHETERIZATION  04/11/2013    EF 65%, 50% mid LAD, 20% prox CFX, 90% diffuse RCA, 99% mid RCA. Medical management. • CHOLECYSTECTOMY     • COLONOSCOPY     • EPIDURAL BLOCK INJECTION Bilateral 08/15/2019    Procedure: BLOCK / INJECTION EPIDURAL STEROID CERVICAL;  Surgeon: Dar Leone MD;  Location: [facility];  Service: Pain Management    • FL GUIDED NEEDLE PLAC BX/ASP/INJ  08/15/2019   • IR BIOPSY LUNG  09/13/2022   • IR THORACIC DUCT EMBOLIZATION  11/22/2022   • ORTHOPEDIC SURGERY     •  [address] INCL FLUOR GDNCE DX W/CELL WASHG [facility] N/A 11/16/2022    Procedure: Acacia Crawford;  Surgeon: Julisa Cabrera MD;  Location: [facility];  Service: Thoracic   • AZ THORACOSCOPY W/LOBECTOMY SINGLE LOBE Right 11/16/2022    Procedure: LOBECTOMY LUNG; lower lobe superior segmentectomy, mediastinal lymph node dissection;  Surgeon: Julisa Cabrera MD;  Location: [facility];  Service: Thoracic   • AZ THORACOSCOPY W/SEGMENTECTOMY Right 11/16/2022    Procedure: THORACOSCOPY VIDEO ASSISTED SURGERY (VATS);   Surgeon: Julisa Cabrera MD;  Location: [facility];  Service: Thoracic   • TRIGGER POINT INJECTION Social History     Objective:  Vitals:    07/25/23 0840   BP: 156/86   BP Location: Left arm   Patient Position: Sitting   Cuff Size: Standard   Pulse: 58   Temp: (!) 96.6 °F (35.9 °C)   TempSrc: Tympanic   SpO2: 97%   Weight: 85.6 kg (188 lb 12.8 oz)   Height: 5' 7" (1.702 m)     Physical Exam  Constitutional:       Appearance: He is well-developed. HENT:      Head: Normocephalic and atraumatic. Eyes:      Pupils: Pupils are equal, round, and reactive to light. Cardiovascular:      Rate and Rhythm: Normal rate and regular rhythm. Heart sounds: No murmur heard. Pulmonary:      Breath sounds: Normal breath sounds. No wheezing or rales. Abdominal:      Palpations: Abdomen is soft. Tenderness: There is no abdominal tenderness. Musculoskeletal:         General: No tenderness. Normal range of motion. Cervical back: Neck supple. Lymphadenopathy:      Cervical: No cervical adenopathy. Skin:     Findings: No erythema or rash. Neurological:      Mental Status: He is alert and oriented to person, place, and time. Cranial Nerves: No cranial nerve deficit. Deep Tendon Reflexes: Reflexes are normal and symmetric. Psychiatric:         Behavior: Behavior normal.           Labs: I personally reviewed the labs and imaging pertinent to this patient care.

## 2023-07-25 NOTE — PROGRESS NOTES
PMH:   Malignant neoplasm of lower lobe of right lung (HCC)  history of resected stage IIIa squamous cell carcinoma of the lung.  PD-L1 1%, TMB high. Skin lesion recently noted on scalp area.    Please refer to my visit note from 7/19/23       Plan:   As agreed with Hem/Onc specialist we will refer patient to dermatology   For further evaluation

## 2023-07-27 ENCOUNTER — CLINICAL SUPPORT (OUTPATIENT)
Dept: PULMONOLOGY | Facility: HOSPITAL | Age: 68
End: 2023-07-27
Payer: MEDICARE

## 2023-07-27 ENCOUNTER — TELEPHONE (OUTPATIENT)
Dept: HEMATOLOGY ONCOLOGY | Facility: CLINIC | Age: 68
End: 2023-07-27

## 2023-07-27 ENCOUNTER — OFFICE VISIT (OUTPATIENT)
Dept: FAMILY MEDICINE CLINIC | Facility: CLINIC | Age: 68
End: 2023-07-27
Payer: MEDICARE

## 2023-07-27 VITALS
DIASTOLIC BLOOD PRESSURE: 62 MMHG | BODY MASS INDEX: 29.19 KG/M2 | TEMPERATURE: 97.2 F | OXYGEN SATURATION: 96 % | HEART RATE: 55 BPM | HEIGHT: 67 IN | WEIGHT: 186 LBS | SYSTOLIC BLOOD PRESSURE: 118 MMHG

## 2023-07-27 DIAGNOSIS — E78.1 HYPERTRIGLYCERIDEMIA: ICD-10-CM

## 2023-07-27 DIAGNOSIS — Z00.00 MEDICARE ANNUAL WELLNESS VISIT, SUBSEQUENT: Primary | ICD-10-CM

## 2023-07-27 DIAGNOSIS — C77.1 SECONDARY AND UNSPECIFIED MALIGNANT NEOPLASM OF INTRATHORACIC LYMPH NODES (HCC): ICD-10-CM

## 2023-07-27 DIAGNOSIS — E78.00 HYPERCHOLESTEROLEMIA: ICD-10-CM

## 2023-07-27 DIAGNOSIS — I25.118 CORONARY ARTERY DISEASE OF NATIVE ARTERY OF NATIVE HEART WITH STABLE ANGINA PECTORIS (HCC): ICD-10-CM

## 2023-07-27 DIAGNOSIS — R73.9 HYPERGLYCEMIA: ICD-10-CM

## 2023-07-27 DIAGNOSIS — N18.31 STAGE 3A CHRONIC KIDNEY DISEASE (HCC): ICD-10-CM

## 2023-07-27 DIAGNOSIS — J43.9 PULMONARY EMPHYSEMA, UNSPECIFIED EMPHYSEMA TYPE (HCC): ICD-10-CM

## 2023-07-27 DIAGNOSIS — C34.31 MALIGNANT NEOPLASM OF LOWER LOBE OF RIGHT LUNG (HCC): Primary | ICD-10-CM

## 2023-07-27 DIAGNOSIS — F11.20 UNCOMPLICATED OPIOID DEPENDENCE (HCC): ICD-10-CM

## 2023-07-27 DIAGNOSIS — F32.A DEPRESSION, UNSPECIFIED DEPRESSION TYPE: ICD-10-CM

## 2023-07-27 DIAGNOSIS — I10 ESSENTIAL HYPERTENSION: ICD-10-CM

## 2023-07-27 DIAGNOSIS — C34.31 PRIMARY SQUAMOUS CELL CARCINOMA OF LOWER LOBE OF RIGHT LUNG (HCC): ICD-10-CM

## 2023-07-27 DIAGNOSIS — F17.200 SMOKING: ICD-10-CM

## 2023-07-27 DIAGNOSIS — F33.9 DEPRESSION, RECURRENT (HCC): ICD-10-CM

## 2023-07-27 DIAGNOSIS — C34.31 MALIGNANT NEOPLASM OF LOWER LOBE OF RIGHT LUNG (HCC): ICD-10-CM

## 2023-07-27 PROCEDURE — G0439 PPPS, SUBSEQ VISIT: HCPCS | Performed by: FAMILY MEDICINE

## 2023-07-27 PROCEDURE — 99214 OFFICE O/P EST MOD 30 MIN: CPT | Performed by: FAMILY MEDICINE

## 2023-07-27 PROCEDURE — G0239 OTH RESP PROC, GROUP: HCPCS

## 2023-07-27 NOTE — PATIENT INSTRUCTIONS
Medicare Preventive Visit Patient Instructions  Thank you for completing your Welcome to Medicare Visit or Medicare Annual Wellness Visit today. Your next wellness visit will be due in one year (7/27/2024). The screening/preventive services that you may require over the next 5-10 years are detailed below. Some tests may not apply to you based off risk factors and/or age. Screening tests ordered at today's visit but not completed yet may show as past due. Also, please note that scanned in results may not display below. Preventive Screenings:  Service Recommendations Previous Testing/Comments   Colorectal Cancer Screening  · Colonoscopy    · Fecal Occult Blood Test (FOBT)/Fecal Immunochemical Test (FIT)  · Fecal DNA/Cologuard Test  · Flexible Sigmoidoscopy Age: 43-73 years old   Colonoscopy: every 10 years (May be performed more frequently if at higher risk)  OR  FOBT/FIT: every 1 year  OR  Cologuard: every 3 years  OR  Sigmoidoscopy: every 5 years  Screening may be recommended earlier than age 39 if at higher risk for colorectal cancer. Also, an individualized decision between you and your healthcare provider will decide whether screening between the ages of 77-80 would be appropriate.  Colonoscopy: Not on file  FOBT/FIT: Not on file  Cologuard: Not on file  Sigmoidoscopy: Not on file          Prostate Cancer Screening Individualized decision between patient and health care provider in men between ages of 53-66   Medicare will cover every 12 months beginning on the day after your 50th birthday PSA: 3.0 ng/mL; 3.0 ng/mL     Screening Current     Hepatitis C Screening Once for adults born between 1945 and 1965  More frequently in patients at high risk for Hepatitis C Hep C Antibody: 08/30/2019    Screening Current   Diabetes Screening 1-2 times per year if you're at risk for diabetes or have pre-diabetes Fasting glucose: 103 mg/dL (12/8/2022)  A1C: 5.9 % (7/17/2023)  Screening Current   Cholesterol Screening Once every 5 years if you don't have a lipid disorder. May order more often based on risk factors. Lipid panel: 07/17/2023  Screening Not Indicated  History Lipid Disorder      Other Preventive Screenings Covered by Medicare:  1. Abdominal Aortic Aneurysm (AAA) Screening: covered once if your at risk. You're considered to be at risk if you have a family history of AAA or a male between the age of 70-76 who smoking at least 100 cigarettes in your lifetime. 2. Lung Cancer Screening: covers low dose CT scan once per year if you meet all of the following conditions: (1) Age 48-67; (2) No signs or symptoms of lung cancer; (3) Current smoker or have quit smoking within the last 15 years; (4) You have a tobacco smoking history of at least 20 pack years (packs per day x number of years you smoked); (5) You get a written order from a healthcare provider. 3. Glaucoma Screening: covered annually if you're considered high risk: (1) You have diabetes OR (2) Family history of glaucoma OR (3)  aged 48 and older OR (3)  American aged 72 and older  3. Osteoporosis Screening: covered every 2 years if you meet one of the following conditions: (1) Have a vertebral abnormality; (2) On glucocorticoid therapy for more than 3 months; (3) Have primary hyperparathyroidism; (4) On osteoporosis medications and need to assess response to drug therapy. 5. HIV Screening: covered annually if you're between the age of 14-79. Also covered annually if you are younger than 13 and older than 72 with risk factors for HIV infection. For pregnant patients, it is covered up to 3 times per pregnancy.     Immunizations:  Immunization Recommendations   Influenza Vaccine Annual influenza vaccination during flu season is recommended for all persons aged >= 6 months who do not have contraindications   Pneumococcal Vaccine   * Pneumococcal conjugate vaccine = PCV13 (Prevnar 13), PCV15 (Vaxneuvance), PCV20 (Prevnar 20)  * Pneumococcal polysaccharide vaccine = PPSV23 (Pneumovax) Adults 2364 years old: 1-3 doses may be recommended based on certain risk factors  Adults 72 years old: 1-2 doses may be recommended based off what pneumonia vaccine you previously received   Hepatitis B Vaccine 3 dose series if at intermediate or high risk (ex: diabetes, end stage renal disease, liver disease)   Tetanus (Td) Vaccine - COST NOT COVERED BY MEDICARE PART B Following completion of primary series, a booster dose should be given every 10 years to maintain immunity against tetanus. Td may also be given as tetanus wound prophylaxis. Tdap Vaccine - COST NOT COVERED BY MEDICARE PART B Recommended at least once for all adults. For pregnant patients, recommended with each pregnancy. Shingles Vaccine (Shingrix) - COST NOT COVERED BY MEDICARE PART B  2 shot series recommended in those aged 48 and above     Health Maintenance Due:      Topic Date Due   • Colorectal Cancer Screening  Never done   • Hepatitis C Screening  Completed     Immunizations Due:      Topic Date Due   • Pneumococcal Vaccine: 65+ Years (1 - PCV) Never done   • Hepatitis A Vaccine (1 of 2 - Risk 2-dose series) Never done   • Hepatitis B Vaccine (1 of 3 - Risk 3-dose series) Never done   • COVID-19 Vaccine (3 - Moderna risk series) 02/08/2022   • Influenza Vaccine (1) 09/01/2023     Advance Directives   What are advance directives? Advance directives are legal documents that state your wishes and plans for medical care. These plans are made ahead of time in case you lose your ability to make decisions for yourself. Advance directives can apply to any medical decision, such as the treatments you want, and if you want to donate organs. What are the types of advance directives? There are many types of advance directives, and each state has rules about how to use them. You may choose a combination of any of the following:  · Living will: This is a written record of the treatment you want.  You can also choose which treatments you do not want, which to limit, and which to stop at a certain time. This includes surgery, medicine, IV fluid, and tube feedings. · Durable power of  for healthcare [facility] [place]): This is a written record that states who you want to make healthcare choices for you when you are unable to make them for yourself. This person, called a proxy, is usually a family member or a friend. You may choose more than 1 proxy. · Do not resuscitate (DNR) order:  A DNR order is used in case your heart stops beating or you stop breathing. It is a request not to have certain forms of treatment, such as CPR. A DNR order may be included in other types of advance directives. · Medical directive: This covers the care that you want if you are in a coma, near death, or unable to make decisions for yourself. You can list the treatments you want for each condition. Treatment may include pain medicine, surgery, blood transfusions, dialysis, IV or tube feedings, and a ventilator (breathing machine). · Values history: This document has questions about your views, beliefs, and how you feel and think about life. This information can help others choose the care that you would choose. Why are advance directives important? An advance directive helps you control your care. Although spoken wishes may be used, it is better to have your wishes written down. Spoken wishes can be misunderstood, or not followed. Treatments may be given even if you do not want them. An advance directive may make it easier for your family to make difficult choices about your care. Cigarette Smoking and Your Health   Risks to your health if you smoke:  Nicotine and other chemicals found in tobacco damage every cell in your body. Even if you are a light smoker, you have an increased risk for cancer, heart disease, and lung disease. If you are pregnant or have diabetes, smoking increases your risk for complications.    Benefits to your health if you stop smoking:   · You decrease respiratory symptoms such as coughing, wheezing, and shortness of breath. · You reduce your risk for cancers of the lung, mouth, throat, kidney, bladder, pancreas, stomach, and cervix. If you already have cancer, you increase the benefits of chemotherapy. You also reduce your risk for cancer returning or a second cancer from developing. · You reduce your risk for heart disease, blood clots, heart attack, and stroke. · You reduce your risk for lung infections, and diseases such as pneumonia, asthma, chronic bronchitis, and emphysema. · Your circulation improves. More oxygen can be delivered to your body. If you have diabetes, you lower your risk for complications, such as kidney, artery, and eye diseases. You also lower your risk for nerve damage. Nerve damage can lead to amputations, poor vision, and blindness. · You improve your body's ability to heal and to fight infections. For more information and support to stop smoking:   · [employer]  Phone: 3- 396 - [phone]  Web Address: www.[employer]  Weight Management   Why it is important to manage your weight:  Being overweight increases your risk of health conditions such as heart disease, high blood pressure, type 2 diabetes, and certain types of cancer. It can also increase your risk for osteoarthritis, sleep apnea, and other respiratory problems. Aim for a slow, steady weight loss. Even a small amount of weight loss can lower your risk of health problems. How to lose weight safely:  A safe and healthy way to lose weight is to eat fewer calories and get regular exercise. You can lose up about 1 pound a week by decreasing the number of calories you eat by 500 calories each day. Healthy meal plan for weight management:  A healthy meal plan includes a variety of foods, contains fewer calories, and helps you stay healthy. A healthy meal plan includes the following:  · Eat whole-grain foods more often. A healthy meal plan should contain fiber. Fiber is the part of grains, fruits, and vegetables that is not broken down by your body. Whole-grain foods are healthy and provide extra fiber in your diet. Some examples of whole-grain foods are whole-wheat breads and pastas, oatmeal, brown rice, and bulgur. · Eat a variety of vegetables every day. Include dark, leafy greens such as spinach, kale, tatiana greens, and mustard greens. Eat yellow and orange vegetables such as carrots, sweet potatoes, and winter squash. · Eat a variety of fruits every day. Choose fresh or canned fruit (canned in its own juice or light syrup) instead of juice. Fruit juice has very little or no fiber. · Eat low-fat dairy foods. Drink fat-free (skim) milk or 1% milk. Eat fat-free yogurt and low-fat cottage cheese. Try low-fat cheeses such as mozzarella and other reduced-fat cheeses. · Choose meat and other protein foods that are low in fat. Choose beans or other legumes such as split peas or lentils. Choose fish, skinless poultry (chicken or turkey), or lean cuts of red meat (beef or pork). Before you cook meat or poultry, cut off any visible fat. · Use less fat and oil. Try baking foods instead of frying them. Add less fat, such as margarine, sour cream, regular salad dressing and mayonnaise to foods. Eat fewer high-fat foods. Some examples of high-fat foods include french fries, doughnuts, ice cream, and cakes. · Eat fewer sweets. Limit foods and drinks that are high in sugar. This includes candy, cookies, regular soda, and sweetened drinks. Exercise:  Exercise at least 30 minutes per day on most days of the week. Some examples of exercise include walking, biking, dancing, and swimming. You can also fit in more physical activity by taking the stairs instead of the elevator or parking farther away from stores. Ask your healthcare provider about the best exercise plan for you.    Narcotic (Opioid) Safety    Use narcotics safely:  · Take prescribed narcotics exactly as directed  · Do not give narcotics to others or take narcotics that belong to someone else  · Do not mix narcotics without medicines or alcohol  · Do not drive or operate heavy machinery after you take the narcotic  · Monitor for side effects and notify your healthcare provider if you experienced side effects such as nausea, sleepiness, itching, or trouble thinking clearly. Manage constipation:    Constipation is the most common side effect of narcotic medicine. Constipation is when you have hard, dry bowel movements, or you go longer than usual between bowel movements. Tell your healthcare provider about all changes in your bowel movements while you are taking narcotics. He or she may recommend laxative medicine to help you have a bowel movement. He or she may also change the kind of narcotic you are taking, or change when you take it. The following are more ways you can prevent or relieve constipation:    · Drink liquids as directed. You may need to drink extra liquids to help soften and move your bowels. Ask how much liquid to drink each day and which liquids are best for you. · Eat high-fiber foods. This may help decrease constipation by adding bulk to your bowel movements. High-fiber foods include fruits, vegetables, whole-grain breads and cereals, and beans. Your healthcare provider or dietitian can help you create a high-fiber meal plan. Your provider may also recommend a fiber supplement if you cannot get enough fiber from food. · Exercise regularly. Regular physical activity can help stimulate your intestines. Walking is a good exercise to prevent or relieve constipation. Ask which exercises are best for you. · Schedule a time each day to have a bowel movement. This may help train your body to have regular bowel movements. Bend forward while you are on the toilet to help move the bowel movement out.  Sit on the toilet for at least 10 minutes, even if you do not have a bowel movement. Store narcotics safely:   · Store narcotics where others cannot easily get them. Keep them in a locked cabinet or secure area. Do not  keep them in a purse or other bag you carry with you. A person may be looking for something else and find the narcotics. · Make sure narcotics are stored out of the reach of children. A child can easily overdose on narcotics. Narcotics may look like candy to a small child. The best way to dispose of narcotics: The laws vary by country and area. In the [facility], the best way is to return the narcotics through a take-back program. This program is offered by the [employer] ([employer]). The following are options for using the program:  · Take the narcotics to a [facility] collection site. The site is often a law enforcement center. Call your local law enforcement center for scheduled take-back days in your area. You will be given information on where to go if the collection site is in a different location. · Take the narcotics to an approved pharmacy or hospital.  A pharmacy or hospital may be set up as a collection site. You will need to ask if it is a [employer] collection site if you were not directed there. A pharmacy or doctor's office may not be able to take back narcotics unless it is a [employer] site. · Use a mail-back system. This means you are given containers to put the narcotics into. You will then mail them in the containers. · Use a take-back drop box. This is a place to leave the narcotics at any time. People and animals will not be able to get into the box. Your local law enforcement agency can tell you where to find a drop box in your area. Other ways to manage pain:   · Ask your healthcare provider about non-narcotic medicines to control pain. Nonprescription medicines include NSAIDs (such as ibuprofen) and acetaminophen. Prescription medicines include muscle relaxers, antidepressants, and steroids.   · Pain may be managed without any medicines. Some ways to relieve pain include massage, aromatherapy, or meditation. Physical or occupational therapy may also help. For more information:   · Drug Enforcement Administration  [address] , [address]  Phone: 1- 734 - 755-4745  Web Address: [employer]/drug_disposal/    · [address] and Drug Administration  [address] , [address]  Phone: 1- 884 - [phone]  Web Address: http://[employer]/     © Copyright [address] 2018 Information is for End User's use only and may not be sold, redistributed or otherwise used for commercial purposes.  All illustrations and images included in CareNotes® are the copyrighted property of [employer] or [address] [place] [address]

## 2023-07-27 NOTE — PROGRESS NOTES
Pulmonary Rehabilitation Plan of Care   Initial Care Plan      Today's date: 2023   # of Exercise Sessions Completed: Initial Visit  Patient name: Elisha Archuleta      :   Age: 70 y.o. MRN: [id number]  Referring Physician: Irais Madrigal MD  Pulmonologist: MARSHALL  Provider: [facility]  Clinician: Karena Hooks. Gilma Ramírez, MPT, CCRP  Dx:   Encounter Diagnosis   Name Primary? • Malignant neoplasm of lower lobe of right lung [facility])      Date of onset: 2023      SUMMARY OF PROGRESS:  Today is Lance's  initial evaluation to begin Pulmonary Rehab for the diagnosis of malignant neoplasm of RLL. Patient does have a PFT on file, revealing an FEV1/FVC ratio of 91% and an FEV1 of 72. This is suggestive of mild restriction. Since their diagnosis, the patient has been experiencing increased dyspnea, increased sitting time, decreased physical activity, increased fatigue and weakness. They report dyspnea, weakness, fatigue and dizziness when completing ADLs . The patient currently does not follow a formal exercise program at home. Pt reports the following physical limitations: extreme fatigue, ROSE, inability to lift/carry and difficulty on stairs/elevations. PHQ9 and NAMRATA 7 scores TBD. Patient did have paperwork prior to evaluation. He will complete at next visit. When addressed, the patient admits to having depression/anxiety. Patient reports excellent social/emotional support. Information to begin using [address] was provided as well as contact information for counseling through [employer]. PHQ-9 score will be reassessed in 30 days. The patient is a patient had abstained since oct 2022. Due to stressors he "fell off the wagon one week ago". He is currently abstaining . He is utizing the nicotine patch and Wellbutrin. . Patient admits to 100% medication compliance. At rest, the patient rated dyspnea 6/10 with SpO2 92% on room air.   They completed an initial 6MWT, walking 635 ft on room air. The patient’s rating of perceived dyspnea during the 6MWT was 7/10 with SpO2 90%. Patient took no rest breaks. Resting  /70 with appropriate hemodynamic response to exercise reaching 154/68. Patient will exercise on room air and will titrate O2 to maintain SpO2 >90%. Education on smoking cessation, oxygen use, breathing techniques, pulmonary anatomy, exercise for the pulmonary patient, healthy eating, stress, and relaxation will be provided. Patient goals include: decrease ROSE, decrease fatigue, improve strength, improve balance, ability to ambulate long distances on levels/elevations, ability to tolerate stairs, ability to lift/carry w/minimal symptoms, initiate a HEP, decrease reliance on medication, weight loss, decrease stress level and attain his maximal level of function. Donaldo Bhat will attend 24- 36 exercise sessions, 2-3x/wk for 12-18 weeks beginning 8/3/2023. Will progress patient as tolerated over the next 30 days. Medication compliance: Yes   Comments: Pt reports to be compliant with medications  Fall Risk: Low   Comments: Ambulates with a steady gait with no assist device and Has had recent syncopal episodes. Antihypertensives recently decreased by Dr. Dena Peres.      Smoking: Former user  Relapsed: average ppd:a few a day for a week  Is willing to discuss goals for tobacco cessation  uses nicotine replacement therapy:  Nicotine patch and Wellbutrin    RPD at Rest: 6/10  RPD with Exercise:  7/10    Assessment of progression of lung disease and functional status:  CAT: TBD/40  Shortness of breath questionnaire: TBD/120      EXERCISE ASSESSMENT and PLAN    Current Exercise Program in Rehab:       Frequency: 2-3 days/week   Supplement with home exercise 2+ days/wk as tolerated        Minutes: 30-35         METS: 2.5 - 3.5              SpO2: > 90% on RA              RPD: 3-5/10                      HR: 20-30 > RHR   RPE: 4-5/10         Modalities: Treadmill, UBE, NuStep and Recumbent bike      Exercise Progression 30 Day Goals :    Frequency: 2-3 days/week    Supplement with home exercise 2+ days/wk as tolerated       Minutes: 35-40         METS: 3.5 - 4.0              SpO2: > 92% on RA              RPD: 2-3/10                      HR: 20-30 > RHR   RPE: 4-5/10        Modalities: Treadmill, UBE, NuStep and Recumbent bike     Strength trainin-3 days / week  12-15 repetitions  1-2 sets per modality    Modalities: Leg Press, Chest Press and Seated Row    Oxygen Needs: on room air at rest and on room air with exercise  Oxygen Goal: Maintain SpO2>90% during exercise    Home Exercise: none  Education: pursed lipped breathing, relaxation breathing, home exercise, benefits of exercise for pulmonary disease, RPE scale, RPD scale, O2 saturation monitoring, appropriate O2 response to exercise and education class: Exercise For The Pulmonary Patient    Goals: reduced score on  USCD Shortness of Breath Questionnaire, Improved 6MW distance by 10%, reduced dyspnea during exercise (0-3/10), improved exercise tolerance (max METs tolerated in pulmonary rehab), SpO2 >90% during exercise, improved [facility] activity score, reduced score on CAT, reduced number of COPD exacerbations, reduced RPD at rest, attend pulmonary rehab regularly, decrease sitting time at home and start a walking program  Progressing:  Reviewed Pt goals and determined plan of care, Will continue to educate and progress as tolerated.     Plan: Titrate supplemental oxygen as needed to maintain SpO2>90% with exercise, learn to conserve energy with ADLs , practice breathing techniques 3x/day and reduce time sitting at home    Readiness to change: Preparation:  (Getting ready to change)       NUTRITION ASSESSMENT AND PLAN    Weight control:    Starting weight: 186   Current weight:   186    Diabetes: N/A    2023: A1C 5.9 and   2023: Chol 179, Tri 319, HDL 80    Goals:LDL <100, HDL >40, TRG <150, CHOL <200, improved A1c  < 7.0%, 2.5-5%  wt loss, choose lean meat (93-95%), eliminate processed meats, reduce portion sizes of meat to 3oz or less, increase intake of fish, shellfish, cook without added fat or use vegetable oil/spray, increase intake of meatless meals, eat 3 or more servings of whole grains a day, Eat 4-5 cups of fruits and vegetables daily, use olive or canola oil in baking, choose low sodium processed foods and eliminate butter  Education: heart healthy eating  low sodium diet  hydration  nutrition for  lipid management  wt. loss   education class:  Label Reading  education class: healthy choices for managing pulmonary illness  Progressing:Reviewed Pt goals and determined plan of care, Will continue to educate and progress as tolerated.   Plan: Education class: Reading Food Labels, switch to low fat cheeses, replace butter with soft spreads made with olive oil, canola or yogurt, replace refined grain bread with whole grain bread, replace unhealthy snacks with fruits & vegs, reduce portion sizes, eat fewer desserts and sweets, avoid processed foods, remove salt shaker from table, will replace sugar sweetened cereals with whole grain or oatmeal, drink more water, learn how to read food labels and keep added daily sugar <25g/day  Readiness to change: Preparation:  (Getting ready to change)       PSYCHOSOCIAL ASSESSMENT AND PLAN    Emotional:  Depression assessment:  PHQ-9 = TBD             Anxiety measure:  NAMRATA-7 = TBD  Self-reported stress level: 5/10   Social support: Excellent    Goals:  Reduce perceived stress to 1-3/10, improved [facility] QOL < 27, PHQ-9 - reduced severity by one level, continue medical therapy, Physical Fitness in [facility] Score < 3, Social Support in [facility] Score < 3, Daily Activity in [facility] Score < 3, Social Activities in [facility] Score < 3, Pain in [facility] Score < 3, Quality of Life in [facility] Score < 3 , Change in Health in [facility] Score < 3 , Increased interest in doing things, improved sleep, improved positive thoughts of well being, increased energy, take time to relax and Feel less anxious  Education: signs/sxs of depression, benefits of a positive support system, stress management techniques, class:  Stress and Your Health , class:  Relaxation and class:  Stress and Pulmonary Disease    Progressing:Reviewed Pt goals and determined plan of care, Will continue to educate and progress as tolerated. Plan: Class: Stress and Your Health, Class: Relaxation, Refer to behavioral health/counseling, PHQ-9 >5 will refer to MD, Refer to [employer], Practice relaxation techniques, Exercise, Spend time outdoors and Enjoy a hobby  Readiness to change: Preparation:  (Getting ready to change)       OTHER CORE COMPONENTS     Tobacco:   Social History     Tobacco Use   Smoking Status Some Days   • Packs/day: 0.50   • Types: Cigarettes   Smokeless Tobacco Never       Tobacco Use Intervention: Referral to tobacco expert:   Pt is ready to quit    Blood pressure:    Restin/70   Exercise: 154/68   Recovery: 130/66    Goals: consistent BP < 130/80, reduced dietary sodium <2300mg, moderate intensity exercise >150 mins/wk, medication compliance, reduce number of medications  needed for BP control, reduce number of cigarettes/day, Abstain from smoking and reduced angina   Education:  pathophysiology of pulmonary disease, preventing infections, dangers of smoking, setting a smoking cessation plan, relapse education, control coughing, bronchodilators, bronchial hygiene and traveling with pulmonary disease  Progressing:Reviewed Pt goals and determined plan of care, Will continue to educate and progress as tolerated.   Plan: call free Quitline - -QUIT-NOW ([phone]), Set target quit date for smoking, speak to MD about nicotine replacement therapy, reduce number of cigarettes per day, Complete abstention from smoking, visit www.smokefree.gov, avoid places with second hand smoke, Avoid Processed foods, engage in regular exercise, use salt substitutes and check labels for sodium content  Readiness to change: Preparation:  (Getting ready to change)       PULMONARY REHAB ASSESSMENT    Today's date: 2023  Patient name: Deshaun Davis     :        MRN: [id number]  PCP: Cody Mc DO  Referring Physician: Viri Welsh MD    Pulmonologist: MARSHALL    Dx: Malignant Neoplasm of RLL      Date of onset: 2023  Cultural needs: None    Weight:    Wt Readings from Last 1 Encounters:   23 84.4 kg (186 lb)      Height:   Ht Readings from Last 1 Encounters:   23 5' 7" (1.702 m)     Medical History:   Past Medical History:   Diagnosis Date   • Abdominal aortic aneurysm without rupture ([address])    • Abdominal Aortic Duplex 2017    Ectatic infrarenal abdominal aorta. • MARYANN (acute kidney injury) ([address]) 2022   • CAD (coronary artery disease)    • Cancer ([address])     right lung CA   • COPD (chronic obstructive pulmonary disease) (HCC)    • Extremity pain    • History of echocardiogram 2014    EF 55%, mild MR and AI. Mild concentric LVH.    • History of stress test 2017    Normal.   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   ETOH:   Social History     Substance and Sexual Activity   Alcohol Use Not Currently         Current Medications:   Current Outpatient Medications   Medication Sig Dispense Refill   • amLODIPine (NORVASC) 10 mg tablet take 1 tablet by mouth once daily 90 tablet 3   • bisacodyl (DULCOLAX) 5 mg EC tablet Take 1 tablet (5 mg total) by mouth daily as needed for constipation for up to 4 doses 30 tablet 0   • buPROPion (Wellbutrin XL) 150 mg 24 hr tablet Take 1 tablet (150 mg total) by mouth every morning 90 tablet 3   • choline fenofibrate (TRILIPIX) 135 MG capsule Take 1 capsule (135 mg total) by mouth daily 30 capsule 6   • dicyclomine (BENTYL) 10 mg capsule take 1 capsule by mouth four times a day before meals and at bedtime 30 capsule 0   • docusate sodium (COLACE) 100 mg capsule Take 1 capsule (100 mg total) by mouth 2 (two) times a day 20 capsule 0   • fluticasone (FLONASE) 50 mcg/act nasal spray 1 spray into each nostril daily for 14 days 11.1 mL 0   • glucose blood (OneTouch Verio) test strip Test once daily 100 each 0   • ipratropium-albuterol (DUO-NEB) 0.5-2.5 mg/3 mL nebulizer solution Take 3 mL by nebulization every 6 (six) hours as needed for wheezing or shortness of breath (Patient not taking: Reported on 7/19/2023) 30 mL 3   • ketoconazole (NIZORAL) 2 % shampoo Apply 1 Application topically 2 (two) times a week for 28 days 100 mL 0   • lisinopril (ZESTRIL) 10 mg tablet Take 1 tablet (10 mg total) by mouth daily 90 tablet 3   • metoprolol succinate (TOPROL-XL) 100 mg 24 hr tablet take 1 tablet by mouth once daily 30 tablet 5   • nicotine (NICODERM CQ) 21 mg/24 hr TD 24 hr patch Place 1 patch on the skin over 24 hours every 24 hours 28 patch 0   • nitroglycerin (NITROSTAT) 0.4 mg SL tablet Place 1 tablet (0.4 mg total) under the tongue every 5 (five) minutes as needed for chest pain 25 tablet 5   • ondansetron (ZOFRAN) 8 mg tablet Take 1 tablet (8 mg total) by mouth every 8 (eight) hours as needed for nausea or vomiting 20 tablet 2   • oxyCODONE-acetaminophen (Percocet) 7.5-325 MG per tablet Take 1 tablet by mouth every 8 (eight) hours as needed for moderate pain Do not fill until 6/30/2023 Max Daily Amount: 3 tablets 90 tablet 0   • pantoprazole (PROTONIX) 20 mg tablet take 1 tablet by mouth once daily (Patient not taking: Reported on 5/23/2023) 30 tablet 0   • polyethylene glycol (MIRALAX) 17 g packet Take 17 g by mouth daily for 5 days (Patient not taking: Reported on 12/20/2022) 85 g 0   • pregabalin (LYRICA) 50 mg capsule 1 PO QHS x 1 day, then  1 PO BID x 1 day, then 1 PO TID (Patient not taking: Reported on 7/19/2023) 90 capsule 1   • rosuvastatin (CRESTOR) 20 MG tablet take 1 tablet by mouth once daily 90 tablet 5   • sildenafil (VIAGRA) 100 mg tablet TAKE 1 TABLET BY MOUTH ONCE DAILY AS NEEDED FOR ERECTILE DYSFUNCTION 20 tablet 0   • tamsulosin (FLOMAX) 0.4 mg Take 1 capsule (0.4 mg total) by mouth daily with dinner for 3 days 3 capsule 0   • tiotropium-olodaterol (Stiolto Respimat) 2.5-2.5 MCG/ACT inhaler Inhale 2 puffs if needed (wheezing, Shortness of breath) 4 g 2   • traZODone (DESYREL) 100 mg tablet take 1 tablet by mouth daily at bedtime 90 tablet 3     No current facility-administered medications for this visit.            Functional Status Prior to Diagnosis for Treatment   Occupation: retired -construction  Recreation: Activities w/family  ADL’s: No limitations  [place]: No limitations  Exercise: No formal HEP  Other: Hx of exposures due to past employment    Current Functional Status  Occupation: retired  Recreation: sedentary at this time  ADL’s:able to perform self-care resumed driving  [place]: Capable of performing light ADLs only  Exercise: Agreeable to attend AK  Other:     Patient Specific Goals:  Patient goals include: decrease ROSE, decrease fatigue, improve strength, improve balance, ability to ambulate long distances on levels/elevations, ability to tolerate stairs, ability to lift/carry w/minimal symptoms, initiate a HEP, decrease reliance on medication, weight loss, decrease stress level and attain his maximal level of function. Short Term Program Goals: dietary modifications increased strength    Long Term Goals: increased maximal walking duration  increased intial training workload  Improved [facility] Activity Status score  Improved functional capacity  Improved Quality of Life - [facility] score reduced  Improved lipid profile  Smoking cessation  Abstain from smoking  Improved fasting glucose  Reduced stress    Oxygen Goals: Maintain SpO2>90% titrating supplemental oxygen as needed     Ability to reach goals/rehabilitation potential:  Excellent    Projected return to function: 12 weeks  Objective tests: 6 MWT      Nutritional   Reviewed details of Rate your Plate. Discussed key elements of heart healthy eating. Reviewed patient goals for dietary modifications and their clinical implications. Reviewed most recent lipid profile.      Goals for dietary modification: choose lean cuts of meat  poultry without the skin  low fat ground meat and poultry  eliminate processed meats  reduce portions of meat to 3 oz  increase fish intake  more meatless meals  increase whole grains  increase fruits and vegetables  eliminate butter  low sodium  improved snack choices  reduce sweets/frozen desserts      Emotional/Social  Patient reports he/she is coping well with good social support and denies depression or anxiety    SOCIAL SUPPORT NETWORK    Marital status: single      Domestic Violence Screening: No    Comments: Patient requires skilled AK interventions in order to attain his goals and maximal level of function.

## 2023-07-27 NOTE — PROGRESS NOTES
Assessment and Plan:     Problem List Items Addressed This Visit        Respiratory    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)    Pulmonary emphysema (HCC)    Malignant neoplasm of lower lobe of right lung 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)       Genitourinary    Stage 3a chronic kidney disease ([address])       Other    Smoking    Uncomplicated opioid dependence ([address])    Medicare annual wellness visit, subsequent - Primary    Depression, recurrent ([address])   Other Visit Diagnoses     Hypercholesterolemia        pt counseled on diet and exercise    Relevant Medications    choline fenofibrate (TRILIPIX) 135 MG capsule    Essential hypertension        Hypertriglyceridemia        pt counseled on diet and exercise    Relevant Medications    choline fenofibrate (TRILIPIX) 135 MG capsule    Hyperglycemia        normal HbA1c    Relevant Orders    Glucose, fasting    Hemoglobin A1C    Depression, unspecified depression type               Preventive health issues were discussed with patient, and age appropriate screening tests were ordered as noted in patient's After Visit Summary. Personalized health advice and appropriate referrals for health education or preventive services given if needed, as noted in patient's After Visit Summary. History of Present Illness:     Patient presents for a Medicare Wellness Visit    Hypertension  This is a chronic problem. The current episode started more than 1 year ago. The problem is unchanged. The problem is controlled. Associated symptoms include shortness of breath. Pertinent negatives include no chest pain or palpitations.       Patient Care Team:  Glenis Brito DO as PCP - General (Family Medicine)  Mallorie Cutler MD (Pain Medicine)  Yoselyn Maya Luciana Silva MD as Surgeon (Orthopedic Surgery)  Chante Johnson MD (Pain Medicine)  Crystal Torres MD (Neurology)  Elma Lai MD as Surgeon (Neurosurgery)  Annemarie Carrel, MD (Pulmonary Disease)  Francis Sanderson MD as Surgeon (Thoracic Surgery)  Desi Steele DO (Cardiology)  Ailyn Guerrero MA as Care Coordinator (Oncology)  Norma Barcenas, CHANDLER as Nurse Navigator (Oncology)  Sahra Herrmann DO as Medical Oncologist (Hematology and Oncology)  Yaniv Blackburn, CHANDLER as Registered Nurse (Hematology and Oncology)  Becky Alvarez as  (Oncology)  Florentin Abrams MD (Gastroenterology)     Review of Systems:     Review of Systems   Constitutional: Positive for fatigue. Negative for chills and fever. HENT: Negative. Negative for hearing loss. Eyes: Negative. Negative for visual disturbance. Respiratory: Positive for shortness of breath. Negative for wheezing. Cardiovascular: Negative for chest pain and palpitations. Gastrointestinal: Positive for constipation, diarrhea, nausea and vomiting. Negative for abdominal pain and blood in stool. Endocrine: Negative. Genitourinary: Negative for difficulty urinating and dysuria. Musculoskeletal: Positive for arthralgias. Negative for myalgias. Skin: Negative. Allergic/Immunologic: Negative. Neurological: Negative for seizures and syncope. Hematological: Negative for adenopathy. Psychiatric/Behavioral: Negative.          Problem List:     Patient Active Problem List   Diagnosis   • JAKI (obstructive sleep apnea)   • Chronic bronchitis (HCC)   • Abdominal aortic aneurysm (AAA) without rupture   • Lumbar spondylosis   • Lumbar degenerative disc disease   • Myofascial pain syndrome   • Chronic low back pain without sciatica   • Cervical radiculopathy   • Cervical spondylosis without myelopathy   • Tremor, essential   • Negative depression screening   • Chronic pain of both knees   • Class 1 obesity due to excess calories with serious comorbidity and body mass index (BMI) of 31.0 to 31.9 in adult   • Smoking   • Hypertension   • Chronic pain syndrome   • Uncomplicated opioid dependence ([address])   • Encounter for long-term opiate analgesic use   • Neck pain   • Medicare annual wellness visit, subsequent   • Hyperlipidemia   • Pre-diabetes   • Erectile dysfunction   • Insomnia   • Cortical age-related cataract of both eyes   • Urinary frequency   • 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)   • Kidney stone   • Pulmonary emphysema (HCC)   • Malignant neoplasm of lower lobe of right lung ([address])   • Encounter for smoking cessation counseling   • Hemiparesis ([address])   • At high risk for osteoporosis   • Coronary artery disease of native artery of native heart with stable angina pectoris (HCC)   • Stage 3a chronic kidney disease (HCC)   • Night sweats   • Other chronic pancreatitis (HCC)   • Skin lesion   • 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 • MARYANN (acute kidney injury) ([address]) 07/23/2022   • CAD (coronary artery disease)    • Cancer ([address]) 2022    right lung CA   • COPD (chronic obstructive pulmonary disease) (HCC)    • Extremity pain    • History of echocardiogram 03/18/2014    EF 55%, mild MR and AI. Mild concentric LVH.    • History of stress test 03/06/2017    Normal.   • Hyperlipidemia    • Hypertension    • Joint pain    • Kidney stone    • Low back pain    • Lung cancer (HCC)    • Migraine    • Neck pain    • Obstructive sleep apnea     cannot tolerate CPAP   • Osteoarthritis    • Peripheral neuropathy    • Reflex sympathetic dystrophy    • Sacroiliitis ([address]) 02/02/2022     Past Surgical History:   Procedure Laterality Date   • CARDIAC CATHETERIZATION  02/13/2012    EF 70%, widely patent renal arteries, significant single-vessel CAD-medical therapy. • CARDIAC CATHETERIZATION  04/11/2013    EF 65%, 50% mid LAD, 20% prox CFX, 90% diffuse RCA, 99% mid RCA. Medical management. • CHOLECYSTECTOMY     • COLONOSCOPY     • EPIDURAL BLOCK INJECTION Bilateral 08/15/2019    Procedure: BLOCK / INJECTION EPIDURAL STEROID CERVICAL;  Surgeon: Lexus Parks MD;  Location: [facility];  Service: Pain Management    • FL GUIDED NEEDLE PLAC BX/ASP/INJ  08/15/2019   • IR BIOPSY LUNG  09/13/2022   • IR THORACIC DUCT EMBOLIZATION  11/22/2022   • ORTHOPEDIC SURGERY     •  [address] INCL FLUOR GDNCE DX W/CELL WASHG [address] [facility] N/A 11/16/2022    Procedure: Carlos Oquendo;  Surgeon: Tod Lozoya MD;  Location: [facility];  Service: Thoracic   • SC THORACOSCOPY W/LOBECTOMY SINGLE LOBE Right 11/16/2022    Procedure: LOBECTOMY LUNG; lower lobe superior segmentectomy, mediastinal lymph node dissection;  Surgeon: Tod Lozoya MD;  Location: [facility];  Service: Thoracic   • SC THORACOSCOPY W/SEGMENTECTOMY Right 11/16/2022    Procedure: THORACOSCOPY VIDEO ASSISTED SURGERY (VATS);   Surgeon: Tod Lozoya MD;  Location: [facility];  Service: Thoracic   • TRIGGER POINT INJECTION        Family History:     Family History   Adopted: Yes   Problem Relation Age of Onset   • No Known Problems Family    • No Known Problems Mother    • No Known Problems Father       Social History:     Social History     Socioeconomic History   • Marital status: Single     Spouse name: None   • Number of children: None   • Years of education: None   • Highest education level: None   Occupational History   • None   Tobacco Use   • Smoking status: Some Days     Packs/day: 0.50     Types: Cigarettes   • Smokeless tobacco: Never   Vaping Use   • Vaping Use: Never used   Substance and Sexual Activity   • Alcohol use: Not Currently   • Drug use: Never   • Sexual activity: Yes   Other Topics Concern   • None   Social History Narrative   • None     Social Determinants of Health     Financial Resource Strain: Not on file   Food Insecurity: No Food Insecurity (11/23/2022)    Hunger Vital Sign    • Worried About Running Out of Food in the Last Year: Never true    • Ran Out of Food in the Last Year: Never true   Transportation Needs: No Transportation Needs (11/23/2022)    PRAPARE - Transportation    • Lack of Transportation (Medical): No    • Lack of Transportation (Non-Medical):  No   Physical Activity: Not on file   Stress: Not on file   Social Connections: Not on file   Intimate Partner Violence: Not on file   Housing Stability: Low Risk  (11/23/2022)    Housing Stability Vital Sign    • Unable to Pay for Housing in the Last Year: No    • Number of Places Lived in the Last Year: 1    • Unstable Housing in the Last Year: No      Medications and Allergies:     Current Outpatient Medications   Medication Sig Dispense Refill   • amLODIPine (NORVASC) 10 mg tablet take 1 tablet by mouth once daily 90 tablet 3   • bisacodyl (DULCOLAX) 5 mg EC tablet Take 1 tablet (5 mg total) by mouth daily as needed for constipation for up to 4 doses 30 tablet 0   • buPROPion (Wellbutrin XL) 150 mg 24 hr tablet Take 1 tablet (150 mg total) by mouth every morning 90 tablet 3   • choline fenofibrate (TRILIPIX) 135 MG capsule Take 1 capsule (135 mg total) by mouth daily 30 capsule 6   • dicyclomine (BENTYL) 10 mg capsule take 1 capsule by mouth four times a day before meals and at bedtime 30 capsule 0   • docusate sodium (COLACE) 100 mg capsule Take 1 capsule (100 mg total) by mouth 2 (two) times a day 20 capsule 0   • fluticasone (FLONASE) 50 mcg/act nasal spray 1 spray into each nostril daily for 14 days 11.1 mL 0   • glucose blood (OneTouch Verio) test strip Test once daily 100 each 0   • ketoconazole (NIZORAL) 2 % shampoo Apply 1 Application topically 2 (two) times a week for 28 days 100 mL 0   • lisinopril (ZESTRIL) 10 mg tablet Take 1 tablet (10 mg total) by mouth daily 90 tablet 3   • metoprolol succinate (TOPROL-XL) 100 mg 24 hr tablet take 1 tablet by mouth once daily 30 tablet 5   • nicotine (NICODERM CQ) 21 mg/24 hr TD 24 hr patch Place 1 patch on the skin over 24 hours every 24 hours 28 patch 0   • nitroglycerin (NITROSTAT) 0.4 mg SL tablet Place 1 tablet (0.4 mg total) under the tongue every 5 (five) minutes as needed for chest pain 25 tablet 5   • ondansetron (ZOFRAN) 8 mg tablet Take 1 tablet (8 mg total) by mouth every 8 (eight) hours as needed for nausea or vomiting 20 tablet 2   • oxyCODONE-acetaminophen (Percocet) 7.5-325 MG per tablet Take 1 tablet by mouth every 8 (eight) hours as needed for moderate pain Do not fill until 6/30/2023 Max Daily Amount: 3 tablets 90 tablet 0   • rosuvastatin (CRESTOR) 20 MG tablet take 1 tablet by mouth once daily 90 tablet 5   • sildenafil (VIAGRA) 100 mg tablet TAKE 1 TABLET BY MOUTH ONCE DAILY AS NEEDED FOR ERECTILE DYSFUNCTION 20 tablet 0   • tamsulosin (FLOMAX) 0.4 mg Take 1 capsule (0.4 mg total) by mouth daily with dinner for 3 days 3 capsule 0   • tiotropium-olodaterol (Stiolto Respimat) 2.5-2.5 MCG/ACT inhaler Inhale 2 puffs if needed (wheezing, Shortness of breath) 4 g 2   • traZODone (DESYREL) 100 mg tablet take 1 tablet by mouth daily at bedtime 90 tablet 3   • ipratropium-albuterol (DUO-NEB) 0.5-2.5 mg/3 mL nebulizer solution Take 3 mL by nebulization every 6 (six) hours as needed for wheezing or shortness of breath (Patient not taking: Reported on 7/19/2023) 30 mL 3   • pantoprazole (PROTONIX) 20 mg tablet take 1 tablet by mouth once daily (Patient not taking: Reported on 5/23/2023) 30 tablet 0   • polyethylene glycol (MIRALAX) 17 g packet Take 17 g by mouth daily for 5 days (Patient not taking: Reported on 12/20/2022) 85 g 0   • pregabalin (LYRICA) 50 mg capsule 1 PO QHS x 1 day, then  1 PO BID x 1 day, then 1 PO TID (Patient not taking: Reported on 7/19/2023) 90 capsule 1     No current facility-administered medications for this visit. No Known Allergies   Immunizations:     Immunization History   Administered Date(s) Administered   • COVID-19 MODERNA VACC 0.25 ML IM BOOSTER 01/11/2022   • COVID-19 MODERNA VACC 0.5 ML IM 04/06/2021, 05/05/2021   • Td (adult), adsorbed 01/01/2011      Health Maintenance:         Topic Date Due   • Colorectal Cancer Screening  Never done   • Hepatitis C Screening  Completed         Topic Date Due   • Pneumococcal Vaccine: 65+ Years (1 - PCV) Never done   • Hepatitis A Vaccine (1 of 2 - Risk 2-dose series) Never done   • Hepatitis B Vaccine (1 of 3 - Risk 3-dose series) Never done   • COVID-19 Vaccine (3 - Moderna risk series) 02/08/2022   • Influenza Vaccine (1) 09/01/2023      Medicare Screening Tests and Risk Assessments:     Gregorio Delaney is here for his Subsequent Wellness visit. Last Medicare Wellness visit information reviewed, patient interviewed and updates made to the record today. Health Risk Assessment:   Patient rates overall health as poor. Patient feels that their physical health rating is slightly worse. Patient is satisfied with their life. Eyesight was rated as slightly worse. Hearing was rated as same. Patient feels that their emotional and mental health rating is same. Patients states they are never, rarely angry. Patient states they are often unusually tired/fatigued. Pain experienced in the last 7 days has been some. Patient's pain rating has been 7/10. Patient states that he has experienced no weight loss or gain in last 6 months. Depression Screening:   PHQ-2 Score: 3  PHQ-9 Score: 12      Fall Risk Screening: In the past year, patient has experienced: no history of falling in past year      Home Safety:  Patient does not have trouble with stairs inside or outside of their home. Patient has working smoke alarms and has no working carbon monoxide detector.  Home safety hazards include: none. Nutrition:   Current diet is Regular. Medications:   Patient is currently taking over-the-counter supplements. OTC medications include: see medication list. Patient is not able to manage medications. Activities of Daily Living (ADLs)/Instrumental Activities of Daily Living (IADLs):   Walk and transfer into and out of bed and chair?: Yes  Dress and groom yourself?: Yes    Bathe or shower yourself?: Yes    Feed yourself?  Yes  Do your laundry/housekeeping?: Yes  Manage your money, pay your bills and track your expenses?: Yes  Make your own meals?: Yes    Do your own shopping?: Yes    Previous Hospitalizations:   Any hospitalizations or ED visits within the last 12 months?: Yes    How many hospitalizations have you had in the last year?: 1-2    Advance Care Planning:   Living will: No    Durable POA for healthcare: No    Advanced directive: No    Five wishes given: Yes      PREVENTIVE SCREENINGS      Cardiovascular Screening:    General: Screening Not Indicated and History Lipid Disorder      Diabetes Screening:     General: Screening Current      Prostate Cancer Screening:    General: Screening Current      Abdominal Aortic Aneurysm (AAA) Screening:    Risk factors include: age between 70-77 yo and tobacco use        General: Screening Not Indicated and History AAA      Lung Cancer Screening:     General: Screening Not Indicated and History Lung Cancer      Hepatitis C Screening:    General: Screening Current    Screening, Brief Intervention, and Referral to Treatment (SBIRT)    Screening      Single Item Drug Screening:  How often have you used an illegal drug (including marijuana) or a prescription medication for non-medical reasons in the past year? never    Single Item Drug Screen Score: 0  Interpretation: Negative screen for possible drug use disorder    Review of Current Opioid Use    Opioid Risk Tool (ORT) Interpretation: Complete Opioid Risk Tool (ORT)    No results found.     Physical Exam:     /62   Pulse 55   Temp (!) 97.2 °F (36.2 °C)   Ht 5' 7" (1.702 m)   Wt 84.4 kg (186 lb)   SpO2 96%   BMI 29.13 kg/m²     Physical Exam  Vitals and nursing note reviewed. Constitutional:       General: He is not in acute distress. Appearance: Normal appearance. He is well-developed. He is not ill-appearing, toxic-appearing or diaphoretic. HENT:      Head: Normocephalic and atraumatic. Right Ear: Tympanic membrane, ear canal and external ear normal. There is no impacted cerumen. Left Ear: Tympanic membrane, ear canal and external ear normal. There is no impacted cerumen. Nose: Nose normal. No congestion or rhinorrhea. Mouth/Throat:      Mouth: Mucous membranes are moist.      Pharynx: No oropharyngeal exudate or posterior oropharyngeal erythema. Eyes:      General: No scleral icterus. Right eye: No discharge. Left eye: No discharge. Conjunctiva/sclera: Conjunctivae normal.      Pupils: Pupils are equal, round, and reactive to light. Neck:      Thyroid: No thyromegaly. Trachea: No tracheal deviation. Cardiovascular:      Rate and Rhythm: Normal rate and regular rhythm. Pulses: Normal pulses. Heart sounds: Normal heart sounds. No murmur heard. Pulmonary:      Effort: Pulmonary effort is normal. No respiratory distress. Breath sounds: Normal breath sounds. No stridor. No wheezing, rhonchi or rales. Abdominal:      General: There is no distension. Palpations: Abdomen is soft. There is no mass. Tenderness: There is no abdominal tenderness. There is no guarding or rebound. Hernia: No hernia is present. Musculoskeletal:         General: No tenderness or deformity. Normal range of motion. Cervical back: Normal range of motion and neck supple. No rigidity. Right lower leg: No edema. Left lower leg: No edema. Lymphadenopathy:      Cervical: No cervical adenopathy.    Skin: General: Skin is warm. Findings: No erythema or rash. Neurological:      General: No focal deficit present. Mental Status: He is alert and oriented to person, place, and time. Mental status is at baseline. Sensory: No sensory deficit. Motor: No weakness or abnormal muscle tone. Coordination: Coordination normal.      Gait: Gait normal.      Deep Tendon Reflexes: Reflexes normal.   Psychiatric:         Mood and Affect: Mood normal.         Behavior: Behavior normal.         Thought Content: Thought content normal.         Judgment: Judgment normal.     Depression Screening Follow-up Plan: Patient's depression screening was positive with a PHQ-2 score of 3. Their PHQ-9 score was 12. Patient assessed for underlying major depression. They have no active suicidal ideations. Brief counseling provided and recommend additional follow-up/re-evaluation next office visit.      Sukhdeep Greenberg,

## 2023-07-28 ENCOUNTER — OFFICE VISIT (OUTPATIENT)
Dept: PAIN MEDICINE | Facility: CLINIC | Age: 68
End: 2023-07-28
Payer: MEDICARE

## 2023-07-28 ENCOUNTER — TELEPHONE (OUTPATIENT)
Dept: FAMILY MEDICINE CLINIC | Facility: CLINIC | Age: 68
End: 2023-07-28

## 2023-07-28 VITALS
OXYGEN SATURATION: 94 % | SYSTOLIC BLOOD PRESSURE: 115 MMHG | DIASTOLIC BLOOD PRESSURE: 70 MMHG | HEART RATE: 57 BPM | HEIGHT: 67 IN | WEIGHT: 186 LBS | BODY MASS INDEX: 29.19 KG/M2

## 2023-07-28 DIAGNOSIS — F11.20 UNCOMPLICATED OPIOID DEPENDENCE (HCC): ICD-10-CM

## 2023-07-28 DIAGNOSIS — M79.2 NEUROPATHIC PAIN: ICD-10-CM

## 2023-07-28 DIAGNOSIS — G89.4 CHRONIC PAIN SYNDROME: Primary | ICD-10-CM

## 2023-07-28 DIAGNOSIS — G89.29 CHRONIC MIDLINE LOW BACK PAIN WITHOUT SCIATICA: ICD-10-CM

## 2023-07-28 DIAGNOSIS — M47.812 CERVICAL SPONDYLOSIS WITHOUT MYELOPATHY: ICD-10-CM

## 2023-07-28 DIAGNOSIS — Z79.891 ENCOUNTER FOR LONG-TERM OPIATE ANALGESIC USE: ICD-10-CM

## 2023-07-28 DIAGNOSIS — M79.18 MYOFASCIAL PAIN SYNDROME: ICD-10-CM

## 2023-07-28 DIAGNOSIS — M54.2 NECK PAIN: ICD-10-CM

## 2023-07-28 DIAGNOSIS — M47.816 LUMBAR SPONDYLOSIS: ICD-10-CM

## 2023-07-28 DIAGNOSIS — M51.36 LUMBAR DEGENERATIVE DISC DISEASE: ICD-10-CM

## 2023-07-28 DIAGNOSIS — M54.50 CHRONIC MIDLINE LOW BACK PAIN WITHOUT SCIATICA: ICD-10-CM

## 2023-07-28 DIAGNOSIS — M54.12 CERVICAL RADICULOPATHY: ICD-10-CM

## 2023-07-28 PROCEDURE — 99214 OFFICE O/P EST MOD 30 MIN: CPT | Performed by: NURSE PRACTITIONER

## 2023-07-28 RX ORDER — OXYCODONE AND ACETAMINOPHEN 7.5; 325 MG/1; MG/1
1 TABLET ORAL EVERY 8 HOURS PRN
Qty: 90 TABLET | Refills: 0 | Status: SHIPPED | OUTPATIENT
Start: 2023-07-28

## 2023-07-28 RX ORDER — PREGABALIN 50 MG/1
50 CAPSULE ORAL 3 TIMES DAILY
Qty: 90 CAPSULE | Refills: 1 | Status: SHIPPED | OUTPATIENT
Start: 2023-07-28

## 2023-07-28 RX ORDER — LISINOPRIL 20 MG/1
TABLET ORAL
COMMUNITY
Start: 2023-07-27

## 2023-07-28 NOTE — TELEPHONE ENCOUNTER
Patient states that his pharmacy does not have choline fenofibrate if something else can be sent over for him. Please advise.

## 2023-07-28 NOTE — PROGRESS NOTES
Assessment:  1. Chronic pain syndrome    2. Chronic midline low back pain without sciatica    3. Lumbar degenerative disc disease    4. Lumbar spondylosis    5. Neck pain    6. Cervical radiculopathy    7. Cervical spondylosis without myelopathy    8. Neuropathic pain    9. Myofascial pain syndrome    10. Encounter for long-term opiate analgesic use    11. Uncomplicated opioid dependence ([address])        Plan:  While the patient was in the office today, I did have a thorough conversation regarding their chronic pain syndrome, medication management, and treatment plan options. Patient is being seen for a follow-up visit. He was last seen here on 6/2/2023 at which time Lyrica was started for neuropathic pain in his hands and feet. Patient is reporting improvement with the addition of Lyrica. Continue Lyrica 50 mg 3 times daily. A prescription was sent to his pharmacy with 1 refill. Continue oxycodone 7.5/325 every 8 hours if needed for pain. The patient's opioid scripts were sent to their pharmacy electronically and was given a 2 month supply of prescriptions with a Do Not Fill date(s) of 7/29/2023, 8/26/2023. [place] Prescription Drug Monitoring Program report was reviewed and was appropriate     There are risks associated with opioid medications, including dependence, addiction and tolerance. The patient understands and agrees to use these medications only as prescribed. Potential side effects of the medications include, but are not limited to, constipation, drowsiness, addiction, impaired judgment and risk of fatal overdose if not taken as prescribed. The patient was warned against driving while taking sedation medications. Sharing medications is a felony. At this point in time, the patient is showing no signs of addiction, abuse, diversion or suicidal ideation. The patient will follow-up in 8 weeks for medication prescription refill and reevaluation.  The patient was advised to contact the office should their symptoms worsen in the interim. The patient was agreeable and verbalized an understanding. History of Present Illness: The patient is a 70 y.o. male last seen on 6/2/2023 who presents for a follow up office visit in regards to chronic pain secondary to chronic pain syndrome, neck pain, cervical radiculopathy, cervical spondylosis, neuropathic pain, myofascial pain syndrome. The patient currently reports complaints of neck pain, upper extremity pain, pain in both hands and feet. Current pain level is a 7/10. Quality pain is described as dull, aching, sharp, throbbing. Current pain medications includes: Oxycodone 7.5/325 every 8 hours as needed for pain, Lyrica 3 mg 3 times daily. The patient reports that this regimen is providing 25-30% pain relief. The patient is reporting no side effects from this pain medication regimen. Pain Contract Signed: 2/20/23  Last Urine Drug Screen: 6/2/23    I have personally reviewed and/or updated the patient's past medical history, past surgical history, family history, social history, current medications, allergies, and vital signs today. Review of Systems:    Review of Systems   Constitutional: Negative for unexpected weight change. HENT: Negative for hearing loss. Eyes: Negative for visual disturbance. Respiratory: Positive for shortness of breath. Cardiovascular: Negative for leg swelling. Gastrointestinal: Positive for constipation. Endocrine: Negative for polyuria. Genitourinary: Negative for difficulty urinating. Musculoskeletal: Positive for arthralgias and myalgias. Negative for gait problem and joint swelling. Decreased range of motion  Joint stiffness   Skin: Negative for rash. Neurological: Positive for dizziness. Negative for weakness and headaches. Psychiatric/Behavioral: Negative for decreased concentration. All other systems reviewed and are negative.         Past Medical History:   Diagnosis Date • Abdominal aortic aneurysm without rupture (HCC)    • Abdominal Aortic Duplex 02/21/2017    Ectatic infrarenal abdominal aorta. • MARYANN (acute kidney injury) ([address]) 07/23/2022   • CAD (coronary artery disease)    • Cancer ([address]) 2022    right lung CA   • COPD (chronic obstructive pulmonary disease) (HCC)    • Extremity pain    • History of echocardiogram 03/18/2014    EF 55%, mild MR and AI. Mild concentric LVH. • History of stress test 03/06/2017    Normal.   • Hyperlipidemia    • Hypertension    • Joint pain    • Kidney stone    • Low back pain    • Lung cancer (HCC)    • Migraine    • Neck pain    • Obstructive sleep apnea     cannot tolerate CPAP   • Osteoarthritis    • Peripheral neuropathy    • Reflex sympathetic dystrophy    • Sacroiliitis ([address]) 02/02/2022       Past Surgical History:   Procedure Laterality Date   • CARDIAC CATHETERIZATION  02/13/2012    EF 70%, widely patent renal arteries, significant single-vessel CAD-medical therapy. • CARDIAC CATHETERIZATION  04/11/2013    EF 65%, 50% mid LAD, 20% prox CFX, 90% diffuse RCA, 99% mid RCA. Medical management.    • CHOLECYSTECTOMY     • COLONOSCOPY     • EPIDURAL BLOCK INJECTION Bilateral 08/15/2019    Procedure: BLOCK / INJECTION EPIDURAL STEROID CERVICAL;  Surgeon: Ricardo Ferreira MD;  Location: [facility];  Service: Pain Management    • FL GUIDED NEEDLE PLAC BX/ASP/INJ  08/15/2019   • IR BIOPSY LUNG  09/13/2022   • IR THORACIC DUCT EMBOLIZATION  11/22/2022   • ORTHOPEDIC SURGERY     •  [address] INCL FLUOR GDNCE DX W/CELL WASHG [address] [facility] N/A 11/16/2022    Procedure: Jasiel Marmolejo;  Surgeon: Mg Kirk MD;  Location: [facility];  Service: Thoracic   • HI THORACOSCOPY W/LOBECTOMY SINGLE LOBE Right 11/16/2022    Procedure: LOBECTOMY LUNG; lower lobe superior segmentectomy, mediastinal lymph node dissection;  Surgeon: Mg Kirk MD;  Location: [facility];  Service: Thoracic   • HI THORACOSCOPY W/SEGMENTECTOMY Right 11/16/2022    Procedure: THORACOSCOPY VIDEO ASSISTED SURGERY (VATS);   Surgeon: Miller Nguyễn MD;  Location: [facility];  Service: Thoracic   • TRIGGER POINT INJECTION         Family History   Adopted: Yes   Problem Relation Age of Onset   • No Known Problems Family    • No Known Problems Mother    • No Known Problems Father        Social History     Occupational History   • Not on file   Tobacco Use   • Smoking status: Some Days     Packs/day: 0.50     Types: Cigarettes   • Smokeless tobacco: Never   Vaping Use   • Vaping Use: Never used   Substance and Sexual Activity   • Alcohol use: Not Currently   • Drug use: Never   • Sexual activity: Yes         Current Outpatient Medications:   •  amLODIPine (NORVASC) 10 mg tablet, take 1 tablet by mouth once daily, Disp: 90 tablet, Rfl: 3  •  bisacodyl (DULCOLAX) 5 mg EC tablet, Take 1 tablet (5 mg total) by mouth daily as needed for constipation for up to 4 doses, Disp: 30 tablet, Rfl: 0  •  buPROPion (Wellbutrin XL) 150 mg 24 hr tablet, Take 1 tablet (150 mg total) by mouth every morning, Disp: 90 tablet, Rfl: 3  •  choline fenofibrate (TRILIPIX) 135 MG capsule, Take 1 capsule (135 mg total) by mouth daily, Disp: 30 capsule, Rfl: 6  •  dicyclomine (BENTYL) 10 mg capsule, take 1 capsule by mouth four times a day before meals and at bedtime, Disp: 30 capsule, Rfl: 0  •  docusate sodium (COLACE) 100 mg capsule, Take 1 capsule (100 mg total) by mouth 2 (two) times a day, Disp: 20 capsule, Rfl: 0  •  glucose blood (OneTouch Verio) test strip, Test once daily, Disp: 100 each, Rfl: 0  •  ketoconazole (NIZORAL) 2 % shampoo, Apply 1 Application topically 2 (two) times a week for 28 days, Disp: 100 mL, Rfl: 0  •  lisinopril (ZESTRIL) 10 mg tablet, Take 1 tablet (10 mg total) by mouth daily, Disp: 90 tablet, Rfl: 3  •  lisinopril (ZESTRIL) 20 mg tablet, , Disp: , Rfl:   •  metoprolol succinate (TOPROL-XL) 100 mg 24 hr tablet, take 1 tablet by mouth once daily, Disp: 30 tablet, Rfl: 5  •  nicotine (NICODERM CQ) 21 mg/24 hr TD 24 hr patch, Place 1 patch on the skin over 24 hours every 24 hours, Disp: 28 patch, Rfl: 0  •  nitroglycerin (NITROSTAT) 0.4 mg SL tablet, Place 1 tablet (0.4 mg total) under the tongue every 5 (five) minutes as needed for chest pain, Disp: 25 tablet, Rfl: 5  •  ondansetron (ZOFRAN) 8 mg tablet, Take 1 tablet (8 mg total) by mouth every 8 (eight) hours as needed for nausea or vomiting, Disp: 20 tablet, Rfl: 2  •  oxyCODONE-acetaminophen (Percocet) 7.5-325 MG per tablet, Take 1 tablet by mouth every 8 (eight) hours as needed for moderate pain Do not fill until 8/26/2023 Max Daily Amount: 3 tablets, Disp: 90 tablet, Rfl: 0  •  oxyCODONE-acetaminophen (Percocet) 7.5-325 MG per tablet, Take 1 tablet by mouth every 8 (eight) hours as needed for moderate pain Do not fill until 7/29/2023 Max Daily Amount: 3 tablets, Disp: 90 tablet, Rfl: 0  •  pregabalin (LYRICA) 50 mg capsule, Take 1 capsule (50 mg total) by mouth 3 (three) times a day, Disp: 90 capsule, Rfl: 1  •  rosuvastatin (CRESTOR) 20 MG tablet, take 1 tablet by mouth once daily, Disp: 90 tablet, Rfl: 5  •  sildenafil (VIAGRA) 100 mg tablet, TAKE 1 TABLET BY MOUTH ONCE DAILY AS NEEDED FOR ERECTILE DYSFUNCTION, Disp: 20 tablet, Rfl: 0  •  tiotropium-olodaterol (Stiolto Respimat) 2.5-2.5 MCG/ACT inhaler, Inhale 2 puffs if needed (wheezing, Shortness of breath), Disp: 4 g, Rfl: 2  •  traZODone (DESYREL) 100 mg tablet, take 1 tablet by mouth daily at bedtime, Disp: 90 tablet, Rfl: 3  •  fluticasone (FLONASE) 50 mcg/act nasal spray, 1 spray into each nostril daily for 14 days, Disp: 11.1 mL, Rfl: 0  •  ipratropium-albuterol (DUO-NEB) 0.5-2.5 mg/3 mL nebulizer solution, Take 3 mL by nebulization every 6 (six) hours as needed for wheezing or shortness of breath (Patient not taking: Reported on 7/28/2023), Disp: 30 mL, Rfl: 3  •  pantoprazole (PROTONIX) 20 mg tablet, take 1 tablet by mouth once daily (Patient not taking: Reported on 5/23/2023), Disp: 30 tablet, Rfl: 0  •  polyethylene glycol (MIRALAX) 17 g packet, Take 17 g by mouth daily for 5 days (Patient not taking: Reported on 12/20/2022), Disp: 85 g, Rfl: 0  •  tamsulosin (FLOMAX) 0.4 mg, Take 1 capsule (0.4 mg total) by mouth daily with dinner for 3 days, Disp: 3 capsule, Rfl: 0    No Known Allergies    Physical Exam:    /70   Pulse 57   Ht 5' 7" (1.702 m)   Wt 84.4 kg (186 lb)   SpO2 94%   BMI 29.13 kg/m²     Constitutional:normal, well developed, well nourished, alert, in no distress and non-toxic and no overt pain behavior. Eyes:anicteric  HEENT:grossly intact  Neck:supple, symmetric, trachea midline and no masses   Pulmonary:even and unlabored  Cardiovascular:No edema or pitting edema present  Skin:Normal without rashes or lesions and well hydrated  Psychiatric:Mood and affect appropriate  Neurologic:Cranial Nerves II-XII grossly intact  Musculoskeletal:normal      Imaging  No orders to display         No orders of the defined types were placed in this encounter.

## 2023-07-28 NOTE — PATIENT INSTRUCTIONS
Opioid Safety   WHAT YOU NEED TO KNOW:   An opioid medicine is used to treat pain. Examples are oxycodone, morphine, fentanyl, or codeine. Pain control and management may help you rest, heal, and return to your daily activities. You and your family will receive information about how to manage your pain at home. The instructions will include what to do if you have side effects as your pain is managed. You will get information on how to handle opioid medicine safely. You will also get suggestions on how to control pain without opioids. It is important to follow all instructions so your pain is managed effectively. DISCHARGE INSTRUCTIONS:   Call your local emergency number (911 in the ), or have someone else call if:   You have a seizure. You cannot be woken. You have trouble staying awake and your breathing is slow or shallow. Your speech is slurred, or you are confused. You are dizzy or stumble when you walk. Call your doctor, or have someone close to you call if:   You are extremely drowsy, or you have trouble staying awake or speaking. You have pale or clammy skin. You have blue fingernails or lips. Your heartbeat is slower than normal.    You cannot stop vomiting. You have questions or concerns about your condition or care. Use opioids safely:   Take prescribed opioids exactly as directed. Opioids come with directions based on the kind and how it is given. Talk to your healthcare provider or a pharmacist if you have any questions. Do not take more than the recommended amount. Too much can cause a life-threatening overdose. Do not continue to take it after your pain stops. You may develop tolerance. This means you keep needing higher doses to get the same effect. You may also develop opioid use disorder. This means you are not able to control your opioid use. Do not give opioids to others or take opioids that belong to someone else.   The kind or amount one person takes may not be right for another. The person you share them with may also be taking medicines that do not mix with opioids. He or she may drink alcohol or use other drugs that can cause life-threatening problems when mixed with opioids. Do not mix opioids with other medicines or alcohol. The combination can cause an overdose, or cause you to stop breathing. Alcohol, sleeping pills, and medicines such as antihistamines can make you sleepy. A combination with opioids can lead to a coma. Do not drive or operate heavy machinery after you use an opioid. You may feel drowsy or have trouble concentrating. You can injure yourself or others if you drive or use heavy machinery when you are not alert. Your provider or pharmacist can tell you how long to wait after a dose before you do these activities. Talk to your healthcare provider if you have any side effects. Side effects include nausea, sleepiness, itching, and trouble thinking clearly. Your provider may need to make changes to the kind or amount of opioid you are taking. He or she can also help you find ways to prevent or relieve side effects. Manage constipation:  Constipation is the most common side effect of opioid medicine. Constipation is when you have hard, dry bowel movements, or you go longer than usual between bowel movements. Tell your healthcare provider about all changes in your bowel movements while you are taking opioids. He or she may recommend laxative medicine to help you have a bowel movement. He or she may also change the kind of opioid you are taking, or change when you take it. The following are more ways you can prevent or relieve constipation:  Drink liquids as directed. You may need to drink extra liquids to help soften and move your bowels. Ask how much liquid to drink each day and which liquids are best for you. Eat high-fiber foods. This may help decrease constipation by adding bulk to your bowel movements.  High-fiber foods include fruits, vegetables, whole-grain breads and cereals, and beans. Your healthcare provider or dietitian can help you create a high-fiber meal plan. Your provider may also recommend a fiber supplement if you cannot get enough fiber from food. Exercise regularly. Regular physical activity can help stimulate your intestines. Walking is a good exercise to prevent or relieve constipation. Ask which exercises are best for you. Schedule a time each day to have a bowel movement. This may help train your body to have regular bowel movements. Bend forward while you are on the toilet to help move the bowel movement out. Sit on the toilet for at least 10 minutes, even if you do not have a bowel movement. Store opioids safely:   Store opioids where others cannot easily get them. Keep them in a locked cabinet or secure area. Do not  keep them in a purse or other bag you carry with you. A person may be looking for something else and find the opioids. Make sure opioids are stored out of the reach of children. A child can easily overdose on opioids. Opioids may look like candy to a small child. The best way to dispose of opioids: The laws vary by country and area. In the [facility], the best way is to return the opioids through a take-back program. This program is offered by the [employer] ([employer]). The following are options for using the program:  Take the opioids to a [employer] collection site. The site is often a law enforcement center. Call your local law enforcement center for scheduled take-back days in your area. You will be given information on where to go if the collection site is in a different location. Take the opioids to an approved pharmacy or hospital.  A pharmacy or hospital may be set up as a collection site. You will need to ask if it is a [employer] collection site if you were not directed there.  A pharmacy or doctor's office may not be able to take back opioids unless it is a [employer] site.    Use a mail-back system. This means you are given containers to put the opioids into. You will then mail them in the containers. Use a take-back drop box. This is a place to leave the opioids at any time. People and animals will not be able to get into the box. Your local law enforcement agency can tell you where to find a drop box in your area. Other safe ways to dispose of opioids: The medicine may come with disposal instructions. The instructions may vary depending on the brand of medicine you are using. Instructions may come in a Medication Guide, but not every medicine has one. You may instead get instructions from your pharmacy or doctor. Follow instructions carefully. The following are general guidelines to follow:  Find out if you can flush the opioid. Some opioids can be flushed down the toilet or poured into the sink. You will need to contact authorities in your area to see if this is an option for you. The FDA also offers a list of medicines that are safe to flush down the toilet. You can check the list if you cannot get the information for your local area. Ask your waste management company about rules for putting opioids in the trash. The company will be able to give you specific directions. Scratch out personal information on the original medicine label so it cannot be read. Then put it in the trash. Do not label the trash or put any information on it about the opioids. It should look like regular household trash so no one is tempted to look for the opioids. Keep the trash out of the reach of children and animals. Always make sure trash is secure. Talk to officials if you live in a facility. If you live in a nursing home or assisted living center, talk to an official. The person will know the rules for your area. Other ways to manage pain:   Ask your healthcare provider about non-opioid medicines to control pain.   Some medicines may even work better than opioids, depending on the cause of your pain. Nonprescription medicines include NSAIDs (such as ibuprofen) and acetaminophen. Prescription medicines include muscle relaxers, antidepressants, and steroids. Pain may be managed without any medicines. Some ways to relieve pain include massage, aromatherapy, or meditation. Physical or occupational therapy may also help. For more information:   Drug Enforcement Administration  [address] , [address]  Phone: 8- [phone] - [phone]  Web Address: [employer]. [employer]/drug_disposal/    [address] and Drug Administration  [address] , [address]  Phone: 0- 923 - 217-8787  Web Address: http://[employer]/  Follow up with your doctor or pain specialist as directed: You may need to have your dose adjusted. Your doctor or pain specialist can also help you find ways to manage pain without opioids. Write down your questions so you remember to ask them during your visits. © Copyright Rupesh Grazyna 2022 Information is for End User's use only and may not be sold, redistributed or otherwise used for commercial purposes. The above information is an  only. It is not intended as medical advice for individual conditions or treatments. Talk to your doctor, nurse or pharmacist before following any medical regimen to see if it is safe and effective for you.

## 2023-08-02 DIAGNOSIS — C34.31 MALIGNANT NEOPLASM OF LOWER LOBE OF RIGHT LUNG (HCC): ICD-10-CM

## 2023-08-02 RX ORDER — DICYCLOMINE HYDROCHLORIDE 10 MG/1
CAPSULE ORAL
Qty: 30 CAPSULE | Refills: 0 | Status: SHIPPED | OUTPATIENT
Start: 2023-08-02 | End: 2023-08-14

## 2023-08-03 ENCOUNTER — APPOINTMENT (OUTPATIENT)
Dept: CARDIAC REHAB | Facility: HOSPITAL | Age: 68
End: 2023-08-03
Payer: MEDICARE

## 2023-08-03 ENCOUNTER — CLINICAL SUPPORT (OUTPATIENT)
Dept: PULMONOLOGY | Facility: HOSPITAL | Age: 68
End: 2023-08-03
Payer: MEDICARE

## 2023-08-03 DIAGNOSIS — C34.31 MALIGNANT NEOPLASM OF LOWER LOBE OF RIGHT LUNG (HCC): ICD-10-CM

## 2023-08-03 PROCEDURE — G0239 OTH RESP PROC, GROUP: HCPCS

## 2023-08-08 ENCOUNTER — APPOINTMENT (OUTPATIENT)
Dept: PULMONOLOGY | Facility: HOSPITAL | Age: 68
End: 2023-08-08
Payer: MEDICARE

## 2023-08-10 ENCOUNTER — APPOINTMENT (OUTPATIENT)
Dept: PULMONOLOGY | Facility: HOSPITAL | Age: 68
End: 2023-08-10
Payer: MEDICARE

## 2023-08-13 DIAGNOSIS — C34.31 MALIGNANT NEOPLASM OF LOWER LOBE OF RIGHT LUNG (HCC): ICD-10-CM

## 2023-08-14 ENCOUNTER — TELEPHONE (OUTPATIENT)
Dept: HEMATOLOGY ONCOLOGY | Facility: CLINIC | Age: 68
End: 2023-08-14

## 2023-08-14 RX ORDER — DICYCLOMINE HYDROCHLORIDE 10 MG/1
CAPSULE ORAL
Qty: 30 CAPSULE | Refills: 0 | Status: SHIPPED | OUTPATIENT
Start: 2023-08-14

## 2023-08-14 NOTE — TELEPHONE ENCOUNTER
Rec'd call from patient asking if he can reschedule his Tecentriq. Patient states that his stomach pain is better and he has only been taking the Bentyl once a day, but not every day. I told the patient that I will speak with Dr. Yaneth Pabon and call him back shortly. Patient verbalized understanding and is in agreement with the plan.

## 2023-08-14 NOTE — TELEPHONE ENCOUNTER
Spoke with patient to let him know that Dr. Tacos Steiner is OK with him re-starting his Tecentriq. Patient would like Tuesdays at [address] [place] [address]. I told him that our schedulers will be reaching out to him. Patient verbalized understanding.

## 2023-08-15 ENCOUNTER — TELEPHONE (OUTPATIENT)
Dept: HEMATOLOGY ONCOLOGY | Facility: CLINIC | Age: 68
End: 2023-08-15

## 2023-08-15 NOTE — TELEPHONE ENCOUNTER
Spoke with patient to let him know that I attempted to call the dermatology office for [place] and they are completed booked through next year. I asked the patient if he can go out of network and he said he is willing to go to [facility]. I informed the patient that I will reach out to [facility] to get him scheduled with a dermatologist there. Patient verbalized understanding and is in agreement with the plan.

## 2023-08-17 ENCOUNTER — APPOINTMENT (OUTPATIENT)
Dept: CARDIAC REHAB | Facility: HOSPITAL | Age: 68
End: 2023-08-17
Payer: MEDICARE

## 2023-08-17 ENCOUNTER — CLINICAL SUPPORT (OUTPATIENT)
Dept: PULMONOLOGY | Facility: HOSPITAL | Age: 68
End: 2023-08-17
Payer: MEDICARE

## 2023-08-17 DIAGNOSIS — C34.31 MALIGNANT NEOPLASM OF LOWER LOBE OF RIGHT LUNG (HCC): Primary | ICD-10-CM

## 2023-08-17 PROCEDURE — G0239 OTH RESP PROC, GROUP: HCPCS

## 2023-08-22 ENCOUNTER — TELEPHONE (OUTPATIENT)
Dept: FAMILY MEDICINE CLINIC | Facility: CLINIC | Age: 68
End: 2023-08-22

## 2023-08-22 RX ORDER — SODIUM CHLORIDE 9 MG/ML
20 INJECTION, SOLUTION INTRAVENOUS ONCE
Status: CANCELLED | OUTPATIENT
Start: 2023-08-29

## 2023-08-22 RX ORDER — ACETAMINOPHEN 325 MG/1
650 TABLET ORAL ONCE
Status: CANCELLED
Start: 2023-08-29 | End: 2023-08-29

## 2023-08-22 NOTE — TELEPHONE ENCOUNTER
Patient called and left a voicemail stating needing to schedule an appt for a pre op. I did attempt to call him back to schedule this and left a voicemail for him to give us a call and schedule this.

## 2023-08-23 DIAGNOSIS — N52.9 ERECTILE DYSFUNCTION, UNSPECIFIED ERECTILE DYSFUNCTION TYPE: ICD-10-CM

## 2023-08-23 RX ORDER — SILDENAFIL 100 MG/1
100 TABLET, FILM COATED ORAL AS NEEDED
Qty: 20 TABLET | Refills: 0 | Status: SHIPPED | OUTPATIENT
Start: 2023-08-23

## 2023-08-23 NOTE — PROGRESS NOTES
Pulmonary Rehabilitation Plan of Care   30 Day Reassessment      Today's date: 2023   # of Exercise Sessions Completed: 3  Patient name: Luis Sanches      : [birth year]  Age: 70 y.o. MRN: [id number]  Referring Physician: Audrey Loza MD  Pulmonologist: MARSHALL  Provider: [facility]  Clinician: Christoph Rodriguez. Rolando York, MPT, CCRP  Dx:   Encounter Diagnosis   Name Primary? • Malignant neoplasm of lower lobe of right lung [facility])      Date of onset: 2023      SUMMARY OF PROGRESS:  Collene Heimlich has completed three sessions at  Pulmonary Rehab for the diagnosis of malignant neoplasm of RLL. Attendance has been limited due to other appointments related to Hematology/Oncology and Infusion treatments. Patient does have a PFT on file, revealing an FEV1/FVC ratio of 91% and an FEV1 of 72. This is suggestive of mild restriction. Since their diagnosis, the patient has been experiencing increased dyspnea, increased sitting time, decreased physical activity, increased fatigue and weakness. They report dyspnea, weakness, fatigue and dizziness when completing ADLs . The patient currently does not follow a formal exercise program at home. Pt reports the following physical limitations: extreme fatigue, ROSE, inability to lift/carry and difficulty on stairs/elevations. PHQ9 and NAMRATA 7 scores TBD. Patient did have paperwork prior to evaluation. He will complete at next visit. When addressed, the patient admits to having depression/anxiety. Patient reports excellent social/emotional support. Information to begin using [employer] was provided as well as contact information for counseling through [employer]. PHQ-9 score will be reassessed in 30 days. The patient is a patient had abstained since oct 2022. Due to stressors he "fell off the wagon one week ago". He is currently abstaining . He is utizing the nicotine patch and Wellbutrin. . Patient admits to 100% medication compliance.       At rest, the patient rated dyspnea 6/10 with SpO2 93-95% on room air. The patient’s rating of perceived dyspnea during the exercise sessions  was 6-7/10 with SpO2 88-93%. Patient took no rest breaks. Resting  /66 with appropriate hemodynamic response to exercise reaching 154/82. Patient will exercise on room air and will titrate O2 to maintain SpO2 >90%. Education on smoking cessation, oxygen use, breathing techniques, pulmonary anatomy, exercise for the pulmonary patient, healthy eating, stress, and relaxation will be provided. Patient goals include: decrease ROSE, decrease fatigue, improve strength, improve balance, ability to ambulate long distances on levels/elevations, ability to tolerate stairs, ability to lift/carry w/minimal symptoms, initiate a HEP, decrease reliance on medication, weight loss, decrease stress level and attain his maximal level of function. Maryam Rivera will continue to attend [place] 2X weekly. Appointments may be limited due to other necessary medical treatments. His program will be progressed as he is able to tolerate. We will continue w/the current goals and POC. Medication compliance: Yes   Comments: Pt reports to be compliant with medications  Fall Risk: Low   Comments: Ambulates with a steady gait with no assist device and Has had recent syncopal episodes. Antihypertensives recently decreased by Dr. Rebecca Ramon.      Smoking: Former user  Relapsed: average ppd:a few a day for a week  Is willing to discuss goals for tobacco cessation  uses nicotine replacement therapy:  Nicotine patch and Wellbutrin    RPD at Rest: 6/10  RPD with Exercise:  7/10    Assessment of progression of lung disease and functional status:  CAT: TBD/40  Shortness of breath questionnaire: TBD/120      EXERCISE ASSESSMENT and PLAN    Current Exercise Program in Rehab:       Frequency: 2-3 days/week   Supplement with home exercise 2+ days/wk as tolerated        Minutes: 30-35         METS: 2.5 - 3.5              SpO2: > 90% on RA              RPD: 3-5/10                      HR: 20-30 > RHR   RPE: 4-5/10         Modalities: Treadmill, UBE, NuStep and Recumbent bike      Exercise Progression 30 Day Goals :    Frequency: 2-3 days/week    Supplement with home exercise 2+ days/wk as tolerated       Minutes: 35-40         METS: 3.5 - 4.0              SpO2: > 92% on RA              RPD: 2-3/10                      HR: 20-30 > RHR   RPE: 4-5/10        Modalities: Treadmill, UBE, NuStep and Recumbent bike     Strength trainin-3 days / week  12-15 repetitions  1-2 sets per modality    Modalities: Leg Press, Chest Press and Seated Row    Oxygen Needs: on room air at rest and on room air with exercise  Oxygen Goal: Maintain SpO2>90% during exercise    Home Exercise: none  Education: pursed lipped breathing, relaxation breathing, home exercise, benefits of exercise for pulmonary disease, RPE scale, RPD scale, O2 saturation monitoring, appropriate O2 response to exercise and education class: Exercise For The Pulmonary Patient    Goals: reduced score on  USCD Shortness of Breath Questionnaire, Improved 6MW distance by 10%, reduced dyspnea during exercise (0-3/10), improved exercise tolerance (max METs tolerated in pulmonary rehab), SpO2 >90% during exercise, improved DUKE activity score, reduced score on CAT, reduced number of COPD exacerbations, reduced RPD at rest, attend pulmonary rehab regularly, decrease sitting time at home and start a walking program  Progressing:  Reviewed Pt goals and determined plan of care, Will continue to educate and progress as tolerated.     Plan: Titrate supplemental oxygen as needed to maintain SpO2>90% with exercise, learn to conserve energy with ADLs , practice breathing techniques 3x/day and reduce time sitting at home    Readiness to change: Preparation:  (Getting ready to change)       NUTRITION ASSESSMENT AND PLAN    Weight control:    Starting weight: 186   Current weight:   186    Diabetes: N/A    7/17/2023: A1C 5.9 and   7/17/2023: Chol 179, Tri 319, HDL 80    Goals:LDL <100, HDL >40, TRG <150, CHOL <200, improved A1c  < 7.0%, 2.5-5%  wt loss, choose lean meat (93-95%), eliminate processed meats, reduce portion sizes of meat to 3oz or less, increase intake of fish, shellfish, cook without added fat or use vegetable oil/spray, increase intake of meatless meals, eat 3 or more servings of whole grains a day, Eat 4-5 cups of fruits and vegetables daily, use olive or canola oil in baking, choose low sodium processed foods and eliminate butter  Education: heart healthy eating  low sodium diet  hydration  nutrition for  lipid management  wt. loss   education class:  Label Reading  education class: healthy choices for managing pulmonary illness     Progressing:Reviewed Pt goals and determined plan of care, Will continue to educate and progress as tolerated.      Plan: Education class: Reading Food Labels, switch to low fat cheeses, replace butter with soft spreads made with olive oil, canola or yogurt, replace refined grain bread with whole grain bread, replace unhealthy snacks with fruits & vegs, reduce portion sizes, eat fewer desserts and sweets, avoid processed foods, remove salt shaker from table, will replace sugar sweetened cereals with whole grain or oatmeal, drink more water, learn how to read food labels and keep added daily sugar <25g/day     Readiness to change: Preparation:  (Getting ready to change)       PSYCHOSOCIAL ASSESSMENT AND PLAN    Emotional:  Depression assessment:  PHQ-9 = TBD             Anxiety measure:  NARMATA-7 = TBD  Self-reported stress level: 5/10   Social support: Excellent    Goals:  Reduce perceived stress to 1-3/10, improved [facility] QOL < 27, PHQ-9 - reduced severity by one level, continue medical therapy, Physical Fitness in [facility] Score < 3, Social Support in [facility] Score < 3, Daily Activity in [facility] Score < 3, Social Activities in [facility] Score < 3, Pain in [facility] Score < 3, Quality of Life in [facility] Score < 3 , Change in Health in [place] Score < 3 , Increased interest in doing things, improved sleep, improved positive thoughts of well being, increased energy, take time to relax and Feel less anxious     Education: signs/sxs of depression, benefits of a positive support system, stress management techniques, class:  Stress and Your Health , class:  Relaxation and class:  Stress and Pulmonary Disease    Progressing:Pt has not made progress toward the following goals: reducing stress level. , Will continue to educate and progress as tolerated. Plan: Class: Stress and Your Health, Class: Relaxation, Refer to behavioral health/counseling, PHQ-9 >5 will refer to MD, Refer to [address], Practice relaxation techniques, Exercise, Spend time outdoors and Enjoy a hobby     Readiness to change: Preparation:  (Getting ready to change)       OTHER CORE COMPONENTS     Tobacco:   Social History     Tobacco Use   Smoking Status Some Days   • Packs/day: 0.50   • Types: Cigarettes   Smokeless Tobacco Never       Tobacco Use Intervention: Referral to tobacco expert:   Pt is ready to quit. Patient continues to smoke a few cigs daily. Blood pressure:    Restin/66   Exercise: 154/82   Recovery: 120/62    Goals: consistent BP < 130/80, reduced dietary sodium <2300mg, moderate intensity exercise >150 mins/wk, medication compliance, reduce number of medications  needed for BP control, reduce number of cigarettes/day, Abstain from smoking and reduced angina      Education:  pathophysiology of pulmonary disease, preventing infections, dangers of smoking, setting a smoking cessation plan, relapse education, control coughing, bronchodilators, bronchial hygiene and traveling with pulmonary disease     Progressing:Pt is progressing and showing improvement  toward the following goals:  resting BP consistently < 130/80.  , Will continue to educate and progress as tolerated. , Goals met: medication compliance.      Plan: call free Quitline - 1-800-QUIT-NOW (978-2228), Set target quit date for smoking, speak to MD about nicotine replacement therapy, reduce number of cigarettes per day, Complete abstention from smoking, visit www.smokefree.gov, avoid places with second hand smoke, Avoid Processed foods, engage in regular exercise, use salt substitutes and check labels for sodium content     Readiness to change: Preparation:  (Getting ready to change)

## 2023-08-28 ENCOUNTER — APPOINTMENT (OUTPATIENT)
Dept: LAB | Facility: CLINIC | Age: 68
End: 2023-08-28
Payer: MEDICARE

## 2023-08-28 DIAGNOSIS — C34.31 MALIGNANT NEOPLASM OF LOWER LOBE OF RIGHT LUNG (HCC): ICD-10-CM

## 2023-08-28 LAB
BASOPHILS # BLD AUTO: 0.09 THOUSANDS/ÂΜL (ref 0–0.1)
BASOPHILS NFR BLD AUTO: 1 % (ref 0–1)
EOSINOPHIL # BLD AUTO: 0.01 THOUSAND/ÂΜL (ref 0–0.61)
EOSINOPHIL NFR BLD AUTO: 0 % (ref 0–6)
ERYTHROCYTE [DISTWIDTH] IN BLOOD BY AUTOMATED COUNT: 13 % (ref 11.6–15.1)
HCT VFR BLD AUTO: 43.8 % (ref 36.5–49.3)
HGB BLD-MCNC: 14.3 G/DL (ref 12–17)
IMM GRANULOCYTES # BLD AUTO: 0.02 THOUSAND/UL (ref 0–0.2)
IMM GRANULOCYTES NFR BLD AUTO: 0 % (ref 0–2)
LYMPHOCYTES # BLD AUTO: 2.42 THOUSANDS/ÂΜL (ref 0.6–4.47)
LYMPHOCYTES NFR BLD AUTO: 31 % (ref 14–44)
MCH RBC QN AUTO: 30.8 PG (ref 26.8–34.3)
MCHC RBC AUTO-ENTMCNC: 32.6 G/DL (ref 31.4–37.4)
MCV RBC AUTO: 94 FL (ref 82–98)
MONOCYTES # BLD AUTO: 0.49 THOUSAND/ÂΜL (ref 0.17–1.22)
MONOCYTES NFR BLD AUTO: 6 % (ref 4–12)
NEUTROPHILS # BLD AUTO: 4.74 THOUSANDS/ÂΜL (ref 1.85–7.62)
NEUTS SEG NFR BLD AUTO: 62 % (ref 43–75)
NRBC BLD AUTO-RTO: 0 /100 WBCS
PLATELET # BLD AUTO: 207 THOUSANDS/UL (ref 149–390)
PMV BLD AUTO: 10.7 FL (ref 8.9–12.7)
RBC # BLD AUTO: 4.65 MILLION/UL (ref 3.88–5.62)
WBC # BLD AUTO: 7.77 THOUSAND/UL (ref 4.31–10.16)

## 2023-08-28 PROCEDURE — 84481 FREE ASSAY (FT-3): CPT

## 2023-08-28 PROCEDURE — 36415 COLL VENOUS BLD VENIPUNCTURE: CPT

## 2023-08-28 PROCEDURE — 85025 COMPLETE CBC W/AUTO DIFF WBC: CPT

## 2023-08-28 PROCEDURE — 84443 ASSAY THYROID STIM HORMONE: CPT

## 2023-08-28 PROCEDURE — 80053 COMPREHEN METABOLIC PANEL: CPT

## 2023-08-28 RX ORDER — DICYCLOMINE HYDROCHLORIDE 10 MG/1
CAPSULE ORAL
Qty: 30 CAPSULE | Refills: 0 | Status: SHIPPED | OUTPATIENT
Start: 2023-08-28

## 2023-08-29 ENCOUNTER — HOSPITAL ENCOUNTER (OUTPATIENT)
Dept: INFUSION CENTER | Facility: HOSPITAL | Age: 68
Discharge: HOME | End: 2023-08-29
Attending: INTERNAL MEDICINE
Payer: MEDICARE

## 2023-08-29 VITALS
TEMPERATURE: 97.4 F | DIASTOLIC BLOOD PRESSURE: 87 MMHG | RESPIRATION RATE: 17 BRPM | SYSTOLIC BLOOD PRESSURE: 177 MMHG | BODY MASS INDEX: 28.29 KG/M2 | HEIGHT: 68 IN | HEART RATE: 65 BPM

## 2023-08-29 DIAGNOSIS — C34.31 MALIGNANT NEOPLASM OF LOWER LOBE OF RIGHT LUNG (HCC): Primary | ICD-10-CM

## 2023-08-29 LAB
ALBUMIN SERPL BCP-MCNC: 4.2 G/DL (ref 3.5–5)
ALP SERPL-CCNC: 71 U/L (ref 34–104)
ALT SERPL W P-5'-P-CCNC: 11 U/L (ref 7–52)
ANION GAP SERPL CALCULATED.3IONS-SCNC: 8 MMOL/L
AST SERPL W P-5'-P-CCNC: 13 U/L (ref 13–39)
BILIRUB SERPL-MCNC: 0.77 MG/DL (ref 0.2–1)
BUN SERPL-MCNC: 25 MG/DL (ref 5–25)
CALCIUM SERPL-MCNC: 9.2 MG/DL (ref 8.4–10.2)
CHLORIDE SERPL-SCNC: 103 MMOL/L (ref 96–108)
CO2 SERPL-SCNC: 28 MMOL/L (ref 21–32)
CREAT SERPL-MCNC: 1.51 MG/DL (ref 0.6–1.3)
GFR SERPL CREATININE-BSD FRML MDRD: 46 ML/MIN/1.73SQ M
GLUCOSE SERPL-MCNC: 123 MG/DL (ref 65–140)
POTASSIUM SERPL-SCNC: 4.1 MMOL/L (ref 3.5–5.3)
PROT SERPL-MCNC: 6.7 G/DL (ref 6.4–8.4)
SODIUM SERPL-SCNC: 139 MMOL/L (ref 135–147)
T3FREE SERPL-MCNC: 3.47 PG/ML (ref 2.5–3.9)
TSH SERPL DL<=0.05 MIU/L-ACNC: 0.62 UIU/ML (ref 0.45–4.5)

## 2023-08-29 PROCEDURE — 96413 CHEMO IV INFUSION 1 HR: CPT

## 2023-08-29 PROCEDURE — 96367 TX/PROPH/DG ADDL SEQ IV INF: CPT

## 2023-08-29 RX ORDER — SODIUM CHLORIDE 9 MG/ML
20 INJECTION, SOLUTION INTRAVENOUS ONCE
Status: COMPLETED | OUTPATIENT
Start: 2023-08-29 | End: 2023-08-29

## 2023-08-29 RX ORDER — ACETAMINOPHEN 325 MG/1
650 TABLET ORAL ONCE
Status: COMPLETED | OUTPATIENT
Start: 2023-08-29 | End: 2023-08-29

## 2023-08-29 RX ADMIN — SODIUM CHLORIDE 20 ML/HR: 0.9 INJECTION, SOLUTION INTRAVENOUS at 10:50

## 2023-08-29 RX ADMIN — DIPHENHYDRAMINE HYDROCHLORIDE 25 MG: 50 INJECTION, SOLUTION INTRAMUSCULAR; INTRAVENOUS at 11:10

## 2023-08-29 RX ADMIN — ACETAMINOPHEN 650 MG: 325 TABLET, FILM COATED ORAL at 11:09

## 2023-08-29 RX ADMIN — DEXAMETHASONE SODIUM PHOSPHATE 10 MG: 10 INJECTION, SOLUTION INTRAMUSCULAR; INTRAVENOUS at 11:54

## 2023-08-29 RX ADMIN — ATEZOLIZUMAB 1200 MG: 1200 INJECTION, SOLUTION INTRAVENOUS at 12:40

## 2023-09-06 ENCOUNTER — APPOINTMENT (OUTPATIENT)
Dept: PULMONOLOGY | Facility: HOSPITAL | Age: 68
End: 2023-09-06
Payer: MEDICARE

## 2023-09-06 ENCOUNTER — TELEPHONE (OUTPATIENT)
Dept: HEMATOLOGY ONCOLOGY | Facility: CLINIC | Age: 68
End: 2023-09-06

## 2023-09-06 NOTE — TELEPHONE ENCOUNTER
Rec'd vm from pt: "Good morning. This is Adair Cooks, [phone] calling about my appointment on the 19th. I was just notified I have cataract surgery that day. If you can give me a call back and let me know if we can move it a day or two or whatever, I'd appreciate it. Thanks much.  jono Byers."

## 2023-09-07 DIAGNOSIS — J43.9 PULMONARY EMPHYSEMA, UNSPECIFIED EMPHYSEMA TYPE (HCC): ICD-10-CM

## 2023-09-08 RX ORDER — TIOTROPIUM BROMIDE AND OLODATEROL 3.124; 2.736 UG/1; UG/1
SPRAY, METERED RESPIRATORY (INHALATION)
Qty: 4 G | Refills: 2 | Status: SHIPPED | OUTPATIENT
Start: 2023-09-08

## 2023-09-11 RX ORDER — SODIUM CHLORIDE 9 MG/ML
20 INJECTION, SOLUTION INTRAVENOUS ONCE
Status: CANCELLED | OUTPATIENT
Start: 2023-09-18

## 2023-09-11 RX ORDER — ACETAMINOPHEN 325 MG/1
650 TABLET ORAL ONCE
Status: CANCELLED
Start: 2023-09-18 | End: 2023-09-18

## 2023-09-12 ENCOUNTER — TELEPHONE (OUTPATIENT)
Dept: FAMILY MEDICINE CLINIC | Facility: CLINIC | Age: 68
End: 2023-09-12

## 2023-09-12 ENCOUNTER — CLINICAL SUPPORT (OUTPATIENT)
Dept: PULMONOLOGY | Facility: HOSPITAL | Age: 68
End: 2023-09-12
Payer: MEDICARE

## 2023-09-12 ENCOUNTER — CONSULT (OUTPATIENT)
Dept: FAMILY MEDICINE CLINIC | Facility: CLINIC | Age: 68
End: 2023-09-12
Payer: MEDICARE

## 2023-09-12 VITALS
TEMPERATURE: 97.7 F | BODY MASS INDEX: 28.55 KG/M2 | WEIGHT: 188.4 LBS | HEIGHT: 68 IN | SYSTOLIC BLOOD PRESSURE: 158 MMHG | DIASTOLIC BLOOD PRESSURE: 82 MMHG | HEART RATE: 68 BPM | OXYGEN SATURATION: 96 %

## 2023-09-12 DIAGNOSIS — H25.9 SENILE CATARACT OF RIGHT EYE, UNSPECIFIED AGE-RELATED CATARACT TYPE: Primary | ICD-10-CM

## 2023-09-12 DIAGNOSIS — C34.31 MALIGNANT NEOPLASM OF LOWER LOBE OF RIGHT LUNG (HCC): Primary | ICD-10-CM

## 2023-09-12 DIAGNOSIS — J42 CHRONIC BRONCHITIS, UNSPECIFIED CHRONIC BRONCHITIS TYPE (HCC): ICD-10-CM

## 2023-09-12 DIAGNOSIS — C34.31 PRIMARY SQUAMOUS CELL CARCINOMA OF LOWER LOBE OF RIGHT LUNG (HCC): ICD-10-CM

## 2023-09-12 DIAGNOSIS — R79.89 ELEVATED SERUM CREATININE: ICD-10-CM

## 2023-09-12 DIAGNOSIS — M79.662 BILATERAL CALF PAIN: ICD-10-CM

## 2023-09-12 DIAGNOSIS — F17.200 SMOKING: ICD-10-CM

## 2023-09-12 DIAGNOSIS — I10 PRIMARY HYPERTENSION: ICD-10-CM

## 2023-09-12 DIAGNOSIS — M79.661 BILATERAL CALF PAIN: ICD-10-CM

## 2023-09-12 PROCEDURE — 99214 OFFICE O/P EST MOD 30 MIN: CPT | Performed by: FAMILY MEDICINE

## 2023-09-12 PROCEDURE — G0239 OTH RESP PROC, GROUP: HCPCS

## 2023-09-12 RX ORDER — OFLOXACIN 3 MG/ML
SOLUTION/ DROPS OPHTHALMIC
COMMUNITY
Start: 2023-09-01

## 2023-09-12 RX ORDER — PREDNISOLONE ACETATE 10 MG/ML
SUSPENSION/ DROPS OPHTHALMIC
COMMUNITY
Start: 2023-09-01

## 2023-09-12 NOTE — ASSESSMENT & PLAN NOTE
OD; proceeding with surgery at [facility] [place] on 9/19/23     EKG: reviewed 6/2023. No need for new one. No acute changes noted. Blood tests reviewed  Vital signs checked     Agree with ophthalmology plan from PCP standpoint.    With no further recommendations from PCP standpoint   We will continue to follow up accordingly post op

## 2023-09-12 NOTE — PROGRESS NOTES
Assessment/Plan:    Age-related cataract of right eye  OD; proceeding with surgery at [facility] on 9/19/23     EKG: reviewed 6/2023. No need for new one. No acute changes noted. Blood tests reviewed  Vital signs checked     Agree with ophthalmology plan from PCP standpoint. With no further recommendations from PCP standpoint   We will continue to follow up accordingly post op        Diagnoses and all orders for this visit:    Senile cataract of right eye, unspecified age-related cataract type    Elevated serum creatinine  Comments:  new onset. mostly secondary to chemotherapy   recheck BMP, Mg, Ph in the next 2 days   f/u in 4 wks  Orders:  -     Basic metabolic panel; Future  -     Magnesium; Future  -     Phosphorus; Future    Bilateral calf pain  Comments:  mostly due to low back changes   following with spine specialist   check VUS and f/u   Orders:  -     VAS lower limb venous duplex study, complete bilateral; Future    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)  Comments:  on chemotherapy   following with Hem/Onc     Chronic bronchitis, unspecified chronic bronchitis type ([address])  Comments:  stable. Primary hypertension  Comments:  vitally stable. paitent educated to check BP at home 3xwkly       Smoking  Comments:  counseling and education   patient will restart NRT today     Other orders  -     ofloxacin (OCUFLOX) 0.3 % ophthalmic solution; instill 1 drop into right eye four times a day STARTING 3 DAYS MT. ..  (REFER TO PRESCRIPTION NOTES).   -     prednisoLONE acetate (PRED FORTE) 1 % ophthalmic suspension; instill 1 drop into right eye every 2 hours TO BE STARTED AFTER SURGERY          PHQ-2/9 Depression Screening    Little interest or pleasure in doing things: 0 - not at all  Feeling down, depressed, or hopeless: 0 - not at all  Trouble falling or staying asleep, or sleeping too much: 0 - not at all  Feeling tired or having little energy: 0 - not at all  Poor appetite or overeatin - not at all  Feeling bad about yourself - or that you are a failure or have let yourself or your family down: 0 - not at all  Trouble concentrating on things, such as reading the newspaper or watching television: 0 - not at all  Moving or speaking so slowly that other people could have noticed. Or the opposite - being so fidgety or restless that you have been moving around a lot more than usual: 0 - not at all  Thoughts that you would be better off dead, or of hurting yourself in some way: 0 - not at all  PHQ-9 Score: 0   PHQ-9 Interpretation: No or Minimal depression             Subjective:      Patient ID: Sarah Geller is a 70 y.o. male. 69-year-old male past medical history remarkable for hypertension, stage III squamous cell carcinoma on chemotherapy, chronic nicotine dependence, who presents to the office today to follow-up and for preoperation exam       Patient is going for RT sided cataract surgery on 23      Today he is complaining of bilateral calf cramps/pressure-like sensation that has been ongoing for the past several months. Intermittent. No nocturnal episodes. Slightly progressing since onset. Associated with tingling/numbness in the same area. No associated weakness involving the lower extremities. Of note; patient have degenerative changes involving his back on multiple levels and is following up with spine surgery accordingly. Over, patient with active squamous cell lung carcinoma. Actively receiving chemotherapy. The following portions of the patient's history were reviewed and updated as appropriate: allergies, past family history and past social history. Review of Systems   Constitutional: Negative. Negative for chills, fatigue and fever. HENT: Negative. Negative for hearing loss. Eyes: Negative. Negative for visual disturbance. Respiratory: Negative for shortness of breath and wheezing.     Cardiovascular: Negative for chest pain and palpitations. Gastrointestinal: Negative for abdominal pain, blood in stool, constipation, diarrhea, nausea and vomiting. Endocrine: Negative. Genitourinary: Negative for difficulty urinating and dysuria. Musculoskeletal: Positive for arthralgias and back pain. Negative for myalgias. Skin: Negative. Allergic/Immunologic: Negative. Neurological: Negative for dizziness, seizures, syncope and headaches. Hematological: Negative for adenopathy. Psychiatric/Behavioral: Negative. Objective:    /82 (BP Location: Left arm, Patient Position: Sitting)   Pulse 68   Temp 97.7 °F (36.5 °C) (Tympanic)   Ht 5' 7.99" (1.727 m)   Wt 85.5 kg (188 lb 6.4 oz)   SpO2 96%   BMI 28.65 kg/m²      Physical Exam  Vitals and nursing note reviewed. Constitutional:       General: He is not in acute distress. Appearance: Normal appearance. He is not ill-appearing or diaphoretic. HENT:      Head: Normocephalic and atraumatic. Eyes:      Conjunctiva/sclera: Conjunctivae normal.   Cardiovascular:      Rate and Rhythm: Normal rate and regular rhythm. Heart sounds: Normal heart sounds. No murmur heard. Pulmonary:      Effort: Pulmonary effort is normal. No respiratory distress. Breath sounds: Normal breath sounds. No wheezing, rhonchi or rales. Abdominal:      General: There is no distension. Palpations: Abdomen is soft. There is no mass. Tenderness: There is no abdominal tenderness. There is no guarding or rebound. Musculoskeletal:      Cervical back: Normal range of motion and neck supple. No rigidity. Right lower leg: No edema. Left lower leg: No edema. Lymphadenopathy:      Cervical: No cervical adenopathy. Skin:     Capillary Refill: Capillary refill takes less than 2 seconds. Neurological:      General: No focal deficit present. Mental Status: He is alert and oriented to person, place, and time.  Mental status is at baseline. Psychiatric:         Mood and Affect: Mood normal.         Behavior: Behavior normal.         Thought Content:  Thought content normal.         Judgment: Judgment normal.

## 2023-09-12 NOTE — TELEPHONE ENCOUNTER
Spoke with staff from [employer] in regards to patient's cataract surgery. Fax number was given so they can fax over pre-op forms.

## 2023-09-16 ENCOUNTER — APPOINTMENT (OUTPATIENT)
Dept: LAB | Facility: HOSPITAL | Age: 68
End: 2023-09-16
Payer: MEDICARE

## 2023-09-16 DIAGNOSIS — C34.31 MALIGNANT NEOPLASM OF LOWER LOBE OF RIGHT LUNG (HCC): ICD-10-CM

## 2023-09-16 LAB
ALBUMIN SERPL BCP-MCNC: 4.4 G/DL (ref 3.5–5)
ALP SERPL-CCNC: 77 U/L (ref 34–104)
ALT SERPL W P-5'-P-CCNC: 10 U/L (ref 7–52)
ANION GAP SERPL CALCULATED.3IONS-SCNC: 7 MMOL/L
AST SERPL W P-5'-P-CCNC: 11 U/L (ref 13–39)
BASOPHILS # BLD AUTO: 0.07 THOUSANDS/ÂΜL (ref 0–0.1)
BASOPHILS NFR BLD AUTO: 1 % (ref 0–1)
BILIRUB SERPL-MCNC: 0.87 MG/DL (ref 0.2–1)
BUN SERPL-MCNC: 15 MG/DL (ref 5–25)
CALCIUM SERPL-MCNC: 9.4 MG/DL (ref 8.4–10.2)
CHLORIDE SERPL-SCNC: 103 MMOL/L (ref 96–108)
CO2 SERPL-SCNC: 25 MMOL/L (ref 21–32)
CREAT SERPL-MCNC: 1.25 MG/DL (ref 0.6–1.3)
EOSINOPHIL # BLD AUTO: 0 THOUSAND/ÂΜL (ref 0–0.61)
EOSINOPHIL NFR BLD AUTO: 0 % (ref 0–6)
ERYTHROCYTE [DISTWIDTH] IN BLOOD BY AUTOMATED COUNT: 13.2 % (ref 11.6–15.1)
GFR SERPL CREATININE-BSD FRML MDRD: 58 ML/MIN/1.73SQ M
GLUCOSE SERPL-MCNC: 147 MG/DL (ref 65–140)
HCT VFR BLD AUTO: 46.3 % (ref 36.5–49.3)
HGB BLD-MCNC: 15 G/DL (ref 12–17)
IMM GRANULOCYTES # BLD AUTO: 0.02 THOUSAND/UL (ref 0–0.2)
IMM GRANULOCYTES NFR BLD AUTO: 0 % (ref 0–2)
LYMPHOCYTES # BLD AUTO: 2.65 THOUSANDS/ÂΜL (ref 0.6–4.47)
LYMPHOCYTES NFR BLD AUTO: 32 % (ref 14–44)
MCH RBC QN AUTO: 30.4 PG (ref 26.8–34.3)
MCHC RBC AUTO-ENTMCNC: 32.4 G/DL (ref 31.4–37.4)
MCV RBC AUTO: 94 FL (ref 82–98)
MONOCYTES # BLD AUTO: 0.6 THOUSAND/ÂΜL (ref 0.17–1.22)
MONOCYTES NFR BLD AUTO: 7 % (ref 4–12)
NEUTROPHILS # BLD AUTO: 4.99 THOUSANDS/ÂΜL (ref 1.85–7.62)
NEUTS SEG NFR BLD AUTO: 60 % (ref 43–75)
NRBC BLD AUTO-RTO: 0 /100 WBCS
PLATELET # BLD AUTO: 176 THOUSANDS/UL (ref 149–390)
PMV BLD AUTO: 9.8 FL (ref 8.9–12.7)
POTASSIUM SERPL-SCNC: 3.9 MMOL/L (ref 3.5–5.3)
PROT SERPL-MCNC: 7 G/DL (ref 6.4–8.4)
RBC # BLD AUTO: 4.94 MILLION/UL (ref 3.88–5.62)
SODIUM SERPL-SCNC: 135 MMOL/L (ref 135–147)
WBC # BLD AUTO: 8.33 THOUSAND/UL (ref 4.31–10.16)

## 2023-09-16 PROCEDURE — 36415 COLL VENOUS BLD VENIPUNCTURE: CPT

## 2023-09-16 PROCEDURE — 80053 COMPREHEN METABOLIC PANEL: CPT

## 2023-09-16 PROCEDURE — 85025 COMPLETE CBC W/AUTO DIFF WBC: CPT

## 2023-09-18 ENCOUNTER — HOSPITAL ENCOUNTER (OUTPATIENT)
Dept: INFUSION CENTER | Facility: HOSPITAL | Age: 68
Discharge: HOME | End: 2023-09-18
Attending: INTERNAL MEDICINE
Payer: MEDICARE

## 2023-09-18 VITALS
TEMPERATURE: 97.1 F | OXYGEN SATURATION: 92 % | HEART RATE: 51 BPM | BODY MASS INDEX: 28.65 KG/M2 | DIASTOLIC BLOOD PRESSURE: 76 MMHG | RESPIRATION RATE: 18 BRPM | SYSTOLIC BLOOD PRESSURE: 164 MMHG | HEIGHT: 68 IN

## 2023-09-18 DIAGNOSIS — C34.31 MALIGNANT NEOPLASM OF LOWER LOBE OF RIGHT LUNG (HCC): Primary | ICD-10-CM

## 2023-09-18 PROCEDURE — 96367 TX/PROPH/DG ADDL SEQ IV INF: CPT

## 2023-09-18 PROCEDURE — 96413 CHEMO IV INFUSION 1 HR: CPT

## 2023-09-18 RX ORDER — ACETAMINOPHEN 325 MG/1
650 TABLET ORAL ONCE
Status: COMPLETED | OUTPATIENT
Start: 2023-09-18 | End: 2023-09-18

## 2023-09-18 RX ORDER — SODIUM CHLORIDE 9 MG/ML
20 INJECTION, SOLUTION INTRAVENOUS ONCE
Status: COMPLETED | OUTPATIENT
Start: 2023-09-18 | End: 2023-09-18

## 2023-09-18 RX ADMIN — DIPHENHYDRAMINE HYDROCHLORIDE 25 MG: 50 INJECTION, SOLUTION INTRAMUSCULAR; INTRAVENOUS at 12:50

## 2023-09-18 RX ADMIN — ACETAMINOPHEN 650 MG: 325 TABLET, FILM COATED ORAL at 12:52

## 2023-09-18 RX ADMIN — SODIUM CHLORIDE 20 ML/HR: 0.9 INJECTION, SOLUTION INTRAVENOUS at 12:49

## 2023-09-18 RX ADMIN — DEXAMETHASONE SODIUM PHOSPHATE 10 MG: 10 INJECTION, SOLUTION INTRAMUSCULAR; INTRAVENOUS at 13:24

## 2023-09-18 RX ADMIN — ATEZOLIZUMAB 1200 MG: 1200 INJECTION, SOLUTION INTRAVENOUS at 14:04

## 2023-09-18 NOTE — PLAN OF CARE
Problem: INFECTION - ADULT  Goal: Absence of fever/infection during neutropenic period  Description: INTERVENTIONS:  - Monitor WBC    Outcome: Progressing     Problem: DISCHARGE PLANNING  Goal: Discharge to home or other facility with appropriate resources  Description: INTERVENTIONS:  - Identify barriers to discharge w/patient and caregiver  - Arrange for needed discharge resources and transportation as appropriate  - Identify discharge learning needs (meds, wound care, etc.)  - Arrange for interpretive services to assist at discharge as needed  - Refer to Case Management Department for coordinating discharge planning if the patient needs post-hospital services based on physician/advanced practitioner order or complex needs related to functional status, cognitive ability, or social support system  Outcome: Progressing

## 2023-09-19 NOTE — PROGRESS NOTES
Pulmonary Rehabilitation Plan of Care   60 Day Reassessment      Today's date: 2023   # of Exercise Sessions Completed: 4  Patient name: Laila Hanna      : [id number]  Age: 70 y.o. MRN: [id number]  Referring Physician: Gus Myers MD  Pulmonologist: MARSHALL  Provider: [facility]  Clinician: Parth Martin, MPT, CCRP  Dx:   Encounter Diagnosis   Name Primary? • Malignant neoplasm of lower lobe of right lung [facility])      Date of onset: 2023      SUMMARY OF PROGRESS:  Lenora Velarde has completed four sessions at  Pulmonary Rehab for the diagnosis of malignant neoplasm of RLL. Attendance has been limited due to other appointments related to Hematology/Oncology and Infusion treatments. There has not been any significant improvement during this 30 day reporting period since he has attended only one additional session. Patient does have a PFT on file, revealing an FEV1/FVC ratio of 91% and an FEV1 of 72. This is suggestive of mild restriction. Since their diagnosis, the patient has been experiencing increased dyspnea, increased sitting time, decreased physical activity, increased fatigue and weakness. They report dyspnea, weakness, fatigue and dizziness when completing ADLs . The patient currently does not follow a formal exercise program at home. Pt reports the following physical limitations: extreme fatigue, ROSE, inability to lift/carry and difficulty on stairs/elevations. PHQ9 and NAMRATA 7 scores TBD. Patient did have paperwork prior to evaluation. He will complete at next visit. When addressed, the patient admits to having depression/anxiety. Patient reports excellent social/emotional support. Information to begin using [address] was provided as well as contact information for counseling through [employer]. PHQ-9 score will be reassessed in 30 days. The patient is a patient had abstained since oct 2022. Due to stressors he "fell off the wagon one week ago".  He is currently abstaining . He is utizing the nicotine patch and Wellbutrin. . Patient admits to 100% medication compliance. At rest, the patient rated dyspnea 6/10 with SpO2 93-95% on room air. The patient’s rating of perceived dyspnea during the exercise sessions  was 6-7/10 with SpO2 88-93%. Patient took no rest breaks. Resting  /66 with appropriate hemodynamic response to exercise reaching 154/82. Patient will exercise on room air and will titrate O2 to maintain SpO2 >90%. Education on smoking cessation, oxygen use, breathing techniques, pulmonary anatomy, exercise for the pulmonary patient, healthy eating, stress, and relaxation will be provided. Patient goals include: decrease ROSE, decrease fatigue, improve strength, improve balance, ability to ambulate long distances on levels/elevations, ability to tolerate stairs, ability to lift/carry w/minimal symptoms, initiate a HEP, decrease reliance on medication, weight loss, decrease stress level and attain his maximal level of function. Vincenzo Kessler will continue to attend [facility]. Appointments may be limited due to other necessary medical treatments. His program will be progressed as he is able to tolerate. We will continue w/the current goals and POC. Medication compliance: Yes   Comments: Pt reports to be compliant with medications  Fall Risk: Low   Comments: Ambulates with a steady gait with no assist device and Has had recent syncopal episodes. Antihypertensives recently decreased by Dr. Partha Pinto.      Smoking: Former user  Relapsed: average ppd:a few a day for a week  Is willing to discuss goals for tobacco cessation  uses nicotine replacement therapy:  Nicotine patch and Wellbutrin    RPD at Rest: 6/10  RPD with Exercise:  7/10    Assessment of progression of lung disease and functional status:  CAT: TBD/40  Shortness of breath questionnaire: TBD/120      EXERCISE ASSESSMENT and PLAN    Current Exercise Program in Rehab:       Frequency: 2-3 days/week   Supplement with home exercise 2+ days/wk as tolerated        Minutes: 30-35         METS: 2.5 - 3.5              SpO2: > 93% on RA              RPD: 3-4/10                      HR: 20-30 > RHR   RPE: 4-5/10         Modalities: Treadmill, UBE, NuStep and Recumbent bike   Able to attain 1.5 miles on the [place] in a 12 minute interval     Exercise Progression 30 Day Goals :    Frequency: 2-3 days/week    Supplement with home exercise 2+ days/wk as tolerated       Minutes: 35-40         METS: 3.5 - 4.0              SpO2: > 95% on RA              RPD: 2-3/10                      HR: 20-30 > RHR   RPE: 4-5/10        Modalities: Treadmill, UBE, NuStep and Recumbent bike     Strength trainin-3 days / week  12-15 repetitions  1-2 sets per modality    Modalities: Leg Press, Chest Press and Seated Row    Oxygen Needs: on room air at rest and on room air with exercise  Oxygen Goal: Maintain SpO2>90% during exercise    Home Exercise: none  Education: pursed lipped breathing, relaxation breathing, home exercise, benefits of exercise for pulmonary disease, RPE scale, RPD scale, O2 saturation monitoring, appropriate O2 response to exercise and education class: Exercise For The Pulmonary Patient    Goals: reduced score on  USCD Shortness of Breath Questionnaire, Improved 6MW distance by 10%, reduced dyspnea during exercise (0-3/10), improved exercise tolerance (max METs tolerated in pulmonary rehab), SpO2 >90% during exercise, improved DUKE activity score, reduced score on CAT, reduced number of COPD exacerbations, reduced RPD at rest, attend pulmonary rehab regularly, decrease sitting time at home and start a walking program  Progressing:  Reviewed Pt goals and determined plan of care, Will continue to educate and progress as tolerated.     Plan: Titrate supplemental oxygen as needed to maintain SpO2>90% with exercise, learn to conserve energy with ADLs , practice breathing techniques 3x/day and reduce time sitting at home    Readiness to change: Action:  (Changing behavior)      NUTRITION ASSESSMENT AND PLAN    Weight control:    Starting weight: 186   Current weight:   186    Diabetes: N/A    7/17/2023: A1C 5.9 and   7/17/2023: Chol 179, Tri 319, HDL 80    Goals:LDL <100, HDL >40, TRG <150, CHOL <200, improved A1c  < 7.0%, 2.5-5%  wt loss, choose lean meat (93-95%), eliminate processed meats, reduce portion sizes of meat to 3oz or less, increase intake of fish, shellfish, cook without added fat or use vegetable oil/spray, increase intake of meatless meals, eat 3 or more servings of whole grains a day, Eat 4-5 cups of fruits and vegetables daily, use olive or canola oil in baking, choose low sodium processed foods and eliminate butter  Education: heart healthy eating  low sodium diet  hydration  nutrition for  lipid management  wt. loss   education class:  Label Reading  education class: healthy choices for managing pulmonary illness     Progressing:Reviewed Pt goals and determined plan of care, Will continue to educate and progress as tolerated.      Plan: Education class: Reading Food Labels, switch to low fat cheeses, replace butter with soft spreads made with olive oil, canola or yogurt, replace refined grain bread with whole grain bread, replace unhealthy snacks with fruits & vegs, reduce portion sizes, eat fewer desserts and sweets, avoid processed foods, remove salt shaker from table, will replace sugar sweetened cereals with whole grain or oatmeal, drink more water, learn how to read food labels and keep added daily sugar <25g/day     Readiness to change: Action:  (Changing behavior)      PSYCHOSOCIAL ASSESSMENT AND PLAN    Emotional:  Depression assessment:  PHQ-9 = TBD             Anxiety measure:  NAMRATA-7 = TBD  Self-reported stress level: 5/10   Social support: Excellent    Goals:  Reduce perceived stress to 1-3/10, improved [facility] QOL < 27, PHQ-9 - reduced severity by one level, continue medical therapy, Physical Fitness in [facility] Score < 3, Social Support in [facility] Score < 3, Daily Activity in [facility] Score < 3, Social Activities in [facility] Score < 3, Pain in [facility] Score < 3, Quality of Life in [facility] Score < 3 , Change in Health in [facility] Score < 3 , Increased interest in doing things, improved sleep, improved positive thoughts of well being, increased energy, take time to relax and Feel less anxious     Education: signs/sxs of depression, benefits of a positive support system, stress management techniques, class:  Stress and Your Health , class:  Relaxation and class:  Stress and Pulmonary Disease    Progressing:Pt has not made progress toward the following goals: reducing stress level. , Will continue to educate and progress as tolerated. Plan: Class: Stress and Your Health, Class: Relaxation, Refer to behavioral health/counseling, PHQ-9 >5 will refer to MD, Refer to [address], Practice relaxation techniques, Exercise, Spend time outdoors and Enjoy a hobby     Readiness to change: Action:  (Changing behavior)      OTHER CORE COMPONENTS     Tobacco:   Social History     Tobacco Use   Smoking Status Some Days   • Packs/day: 0.50   • Types: Cigarettes   Smokeless Tobacco Never       Tobacco Use Intervention: Referral to tobacco expert:   Pt is ready to quit. Patient continues to smoke a few cigs daily.      Blood pressure:    Restin/66   Exercise: 154/82   Recovery: 120/62    Goals: consistent BP < 130/80, reduced dietary sodium <2300mg, moderate intensity exercise >150 mins/wk, medication compliance, reduce number of medications  needed for BP control, reduce number of cigarettes/day, Abstain from smoking and reduced angina      Education:  pathophysiology of pulmonary disease, preventing infections, dangers of smoking, setting a smoking cessation plan, relapse education, control coughing, bronchodilators, bronchial hygiene and traveling with pulmonary disease     Progressing:Pt is progressing and showing improvement  toward the following goals:  resting BP consistently < 130/80.  , Will continue to educate and progress as tolerated., Goals not met: continues to smoke.  , Goals met: medication compliance.      Plan: call free Quitline - 1-800-QUIT-NOW (656-6323), Set target quit date for smoking, speak to MD about nicotine replacement therapy, reduce number of cigarettes per day, Complete abstention from smoking, visit www.smokefree.gov, avoid places with second hand smoke, Avoid Processed foods, engage in regular exercise, use salt substitutes and check labels for sodium content     Readiness to change: Action:  (Changing behavior)

## 2023-09-25 PROBLEM — Z00.00 MEDICARE ANNUAL WELLNESS VISIT, SUBSEQUENT: Status: RESOLVED | Noted: 2021-06-17 | Resolved: 2023-09-25

## 2023-09-29 ENCOUNTER — OFFICE VISIT (OUTPATIENT)
Dept: PAIN MEDICINE | Facility: CLINIC | Age: 68
End: 2023-09-29
Payer: MEDICARE

## 2023-09-29 VITALS
BODY MASS INDEX: 28.95 KG/M2 | WEIGHT: 191 LBS | DIASTOLIC BLOOD PRESSURE: 54 MMHG | HEART RATE: 72 BPM | HEIGHT: 68 IN | SYSTOLIC BLOOD PRESSURE: 110 MMHG

## 2023-09-29 DIAGNOSIS — G89.4 CHRONIC PAIN SYNDROME: ICD-10-CM

## 2023-09-29 DIAGNOSIS — Z79.891 LONG-TERM CURRENT USE OF OPIATE ANALGESIC: Primary | ICD-10-CM

## 2023-09-29 DIAGNOSIS — M54.12 CERVICAL RADICULOPATHY: ICD-10-CM

## 2023-09-29 DIAGNOSIS — M51.36 LUMBAR DEGENERATIVE DISC DISEASE: ICD-10-CM

## 2023-09-29 DIAGNOSIS — M79.18 MYOFASCIAL PAIN SYNDROME: ICD-10-CM

## 2023-09-29 DIAGNOSIS — M47.816 LUMBAR SPONDYLOSIS: ICD-10-CM

## 2023-09-29 DIAGNOSIS — F11.20 UNCOMPLICATED OPIOID DEPENDENCE (HCC): ICD-10-CM

## 2023-09-29 DIAGNOSIS — M54.2 NECK PAIN: ICD-10-CM

## 2023-09-29 DIAGNOSIS — M54.50 CHRONIC MIDLINE LOW BACK PAIN WITHOUT SCIATICA: ICD-10-CM

## 2023-09-29 DIAGNOSIS — M79.2 NEUROPATHIC PAIN: ICD-10-CM

## 2023-09-29 DIAGNOSIS — Z79.891 ENCOUNTER FOR LONG-TERM OPIATE ANALGESIC USE: ICD-10-CM

## 2023-09-29 DIAGNOSIS — G89.29 CHRONIC MIDLINE LOW BACK PAIN WITHOUT SCIATICA: ICD-10-CM

## 2023-09-29 DIAGNOSIS — M47.812 CERVICAL SPONDYLOSIS WITHOUT MYELOPATHY: ICD-10-CM

## 2023-09-29 PROCEDURE — 99214 OFFICE O/P EST MOD 30 MIN: CPT | Performed by: NURSE PRACTITIONER

## 2023-09-29 RX ORDER — OXYCODONE AND ACETAMINOPHEN 7.5; 325 MG/1; MG/1
1 TABLET ORAL EVERY 8 HOURS PRN
Qty: 90 TABLET | Refills: 0 | Status: SHIPPED | OUTPATIENT
Start: 2023-09-29

## 2023-09-29 NOTE — PROGRESS NOTES
Assessment:  1. Chronic pain syndrome    2. Chronic midline low back pain without sciatica    3. Lumbar degenerative disc disease    4. Lumbar spondylosis    5. Neck pain    6. Cervical radiculopathy    7. Cervical spondylosis without myelopathy    8. Neuropathic pain    9. Myofascial pain syndrome    10. Encounter for long-term opiate analgesic use    11. Uncomplicated opioid dependence ([address])        Plan:  While the patient was in the office today, I did have a thorough conversation regarding their chronic pain syndrome, medication management, and treatment plan options. Patient is being seen for a follow-up visit. Patient continues with oxycodone 7.5/325 every 8 hours as needed for pain. He is reporting about 50% reduction in his pain. We had been prescribing Lyrica for neuropathic pain in his hands and feet. He was last seen here on 7/28/2023 and was reporting improvement. He tells me that he was taken off of Lyrica by his PCP for lack of efficacy. I advised him to reach out to his PCP to determine exactly why he was taken off of Lyrica. If he was taken off of Lyrica for a medical reason or concern, I am okay with that. If he was taken off of Lyrica due to lack of efficacy, the plan would be to increase Lyrica since we had just started him on 50 mg 3 times daily. [place] Prescription Drug Monitoring Program report was reviewed and was appropriate     A urine drug screen was collected at today's office visit as part of our medication management protocol. The point of care testing results were appropriate for what was being prescribed. The specimen will be sent for confirmatory testing. The drug screen is medically necessary because the patient is either dependent on opioid medication or is being considered for opioid medication therapy and the results could impact ongoing or future treatment.  The drug screen is to evaluate for the presences or absence of prescribed, non-prescribed, and/or illicit drugs/substances. There are risks associated with opioid medications, including dependence, addiction and tolerance. The patient understands and agrees to use these medications only as prescribed. Potential side effects of the medications include, but are not limited to, constipation, drowsiness, addiction, impaired judgment and risk of fatal overdose if not taken as prescribed. The patient was warned against driving while taking sedation medications. Sharing medications is a felony. At this point in time, the patient is showing no signs of addiction, abuse, diversion or suicidal ideation. The patient will follow-up in 8 weeks for medication prescription refill and reevaluation. The patient was advised to contact the office should their symptoms worsen in the interim. The patient was agreeable and verbalized an understanding. History of Present Illness: The patient is a 70 y.o. male last seen on 7/28/2023  who presents for a follow up office visit in regards to chronic pain secondary to chronic pain syndrome, chronic low back pain, lumbar degenerative disc disease, lumbar spondylosis, neck pain, cervical radiculopathy, neuropathic pain. The patient currently reports complaints of neck pain that radiates to the shoulders, low back pain, pain in both hands and feet. Current pain level is an 8/10. Quality pain is described as dull, aching, sharp, throbbing. Patient states that his pain is always worse in the morning. Current pain medications includes: Oxycodone 7.5/325 every 8 hours as needed for pain. The patient reports that this regimen is providing 50% pain relief. The patient is reporting no side effects from this pain medication regimen.     Pain Contract Signed: 2/20/2023  Last Urine Drug Screen: 9/29/2023    I have personally reviewed and/or updated the patient's past medical history, past surgical history, family history, social history, current medications, allergies, and vital signs today.       Review of Systems:    Review of Systems   Constitutional: Negative for chills and fever. HENT: Negative for ear pain and sore throat. Eyes: Negative for pain and visual disturbance. Respiratory: Positive for shortness of breath. Negative for cough. Cardiovascular: Negative for chest pain and palpitations. Gastrointestinal: Positive for constipation. Negative for abdominal pain and vomiting. Genitourinary: Negative for dysuria and hematuria. Musculoskeletal: Positive for gait problem. Negative for arthralgias and back pain. Decreased ROM, joint stiffness   Skin: Negative for color change and rash. Neurological: Positive for dizziness. Negative for seizures and syncope. All other systems reviewed and are negative. Past Medical History:   Diagnosis Date   • Abdominal aortic aneurysm without rupture (HCC)    • Abdominal Aortic Duplex 02/21/2017    Ectatic infrarenal abdominal aorta. • MARYANN (acute kidney injury) ([address]) 07/23/2022   • CAD (coronary artery disease)    • Cancer ([address]) 2022    right lung CA   • COPD (chronic obstructive pulmonary disease) (HCC)    • Extremity pain    • History of echocardiogram 03/18/2014    EF 55%, mild MR and AI. Mild concentric LVH. • History of stress test 03/06/2017    Normal.   • Hyperlipidemia    • Hypertension    • Joint pain    • Kidney stone    • Low back pain    • Lung cancer (HCC)    • Migraine    • Neck pain    • Obstructive sleep apnea     cannot tolerate CPAP   • Osteoarthritis    • Peripheral neuropathy    • Reflex sympathetic dystrophy    • Sacroiliitis ([address]) 02/02/2022       Past Surgical History:   Procedure Laterality Date   • CARDIAC CATHETERIZATION  02/13/2012    EF 70%, widely patent renal arteries, significant single-vessel CAD-medical therapy. • CARDIAC CATHETERIZATION  04/11/2013    EF 65%, 50% mid LAD, 20% prox CFX, 90% diffuse RCA, 99% mid RCA. Medical management.    • CATARACT EXTRACTION Right 09/19/2023   • CHOLECYSTECTOMY     • COLONOSCOPY     • EPIDURAL BLOCK INJECTION Bilateral 08/15/2019    Procedure: BLOCK / INJECTION EPIDURAL STEROID CERVICAL;  Surgeon: Matteo Moreno MD;  Location: [facility];  Service: Pain Management    • [place] GUIDED NEEDLE PLAC BX/ASP/INJ  08/15/2019   • IR BIOPSY LUNG  09/13/2022   • IR THORACIC DUCT EMBOLIZATION  11/22/2022   • ORTHOPEDIC SURGERY     •  [address] INCL FLUOR GDNCE DX W/CELL WASHG [facility] N/A 11/16/2022    Procedure: Christianson Settler;  Surgeon: Alex Marie MD;  Location: [facility];  Service: Thoracic   • [place] THORACOSCOPY W/LOBECTOMY SINGLE LOBE Right 11/16/2022    Procedure: LOBECTOMY LUNG; lower lobe superior segmentectomy, mediastinal lymph node dissection;  Surgeon: Alex Marie MD;  Location: [facility];  Service: Thoracic   • [place] THORACOSCOPY W/SEGMENTECTOMY Right 11/16/2022    Procedure: THORACOSCOPY VIDEO ASSISTED SURGERY (VATS);   Surgeon: Alex Marie MD;  Location: [facility];  Service: Thoracic   • TRIGGER POINT INJECTION         Family History   Adopted: Yes   Problem Relation Age of Onset   • No Known Problems Family    • No Known Problems Mother    • No Known Problems Father        Social History     Occupational History   • Not on file   Tobacco Use   • Smoking status: Some Days     Packs/day: 0.50     Types: Cigarettes   • Smokeless tobacco: Never   Vaping Use   • Vaping Use: Never used   Substance and Sexual Activity   • Alcohol use: Not Currently   • Drug use: Never   • Sexual activity: Yes         Current Outpatient Medications:   •  amLODIPine (NORVASC) 10 mg tablet, take 1 tablet by mouth once daily, Disp: 90 tablet, Rfl: 3  •  bisacodyl (DULCOLAX) 5 mg EC tablet, Take 1 tablet (5 mg total) by mouth daily as needed for constipation for up to 4 doses, Disp: 30 tablet, Rfl: 0  •  buPROPion (Wellbutrin XL) 150 mg 24 hr tablet, Take 1 tablet (150 mg total) by mouth every morning, Disp: 90 tablet, Rfl: 3  •  choline fenofibrate (TRILIPIX) 135 MG capsule, Take 1 capsule (135 mg total) by mouth daily, Disp: 30 capsule, Rfl: 6  •  dicyclomine (BENTYL) 10 mg capsule, take 1 capsule by mouth four times a day before meals and at bedtime, Disp: 30 capsule, Rfl: 0  •  docusate sodium (COLACE) 100 mg capsule, Take 1 capsule (100 mg total) by mouth 2 (two) times a day, Disp: 20 capsule, Rfl: 0  •  glucose blood (OneTouch Verio) test strip, Test once daily, Disp: 100 each, Rfl: 0  •  lisinopril (ZESTRIL) 10 mg tablet, Take 1 tablet (10 mg total) by mouth daily, Disp: 90 tablet, Rfl: 3  •  metoprolol succinate (TOPROL-XL) 100 mg 24 hr tablet, take 1 tablet by mouth once daily, Disp: 30 tablet, Rfl: 5  •  nitroglycerin (NITROSTAT) 0.4 mg SL tablet, Place 1 tablet (0.4 mg total) under the tongue every 5 (five) minutes as needed for chest pain, Disp: 25 tablet, Rfl: 5  •  ofloxacin (OCUFLOX) 0.3 % ophthalmic solution, instill 1 drop into right eye four times a day STARTING 3 DAYS NE. ..  (REFER TO PRESCRIPTION NOTES). , Disp: , Rfl:   •  ondansetron (ZOFRAN) 8 mg tablet, Take 1 tablet (8 mg total) by mouth every 8 (eight) hours as needed for nausea or vomiting, Disp: 20 tablet, Rfl: 2  •  oxyCODONE-acetaminophen (Percocet) 7.5-325 MG per tablet, Take 1 tablet by mouth every 8 (eight) hours as needed for moderate pain Do not fill until 10/27/2023 Max Daily Amount: 3 tablets, Disp: 90 tablet, Rfl: 0  •  oxyCODONE-acetaminophen (Percocet) 7.5-325 MG per tablet, Take 1 tablet by mouth every 8 (eight) hours as needed for moderate pain Max Daily Amount: 3 tablets, Disp: 90 tablet, Rfl: 0  •  prednisoLONE acetate (PRED FORTE) 1 % ophthalmic suspension, instill 1 drop into right eye every 2 hours TO BE STARTED AFTER SURGERY, Disp: , Rfl:   •  rosuvastatin (CRESTOR) 20 MG tablet, take 1 tablet by mouth once daily, Disp: 90 tablet, Rfl: 5  •  sildenafil (VIAGRA) 100 mg tablet, Take 1 tablet (100 mg total) by mouth as needed for erectile dysfunction, Disp: 20 tablet, Rfl: 0  •  Stiolto Respimat 2.5-2.5 MCG/ACT inhaler, inhale 2 puffs by mouth and INTO THE LUNGS once daily if needed, Disp: 4 g, Rfl: 2  •  tamsulosin (FLOMAX) 0.4 mg, Take 1 capsule (0.4 mg total) by mouth daily with dinner for 3 days, Disp: 3 capsule, Rfl: 0  •  traZODone (DESYREL) 100 mg tablet, take 1 tablet by mouth daily at bedtime, Disp: 90 tablet, Rfl: 3  •  fluticasone (FLONASE) 50 mcg/act nasal spray, 1 spray into each nostril daily for 14 days, Disp: 11.1 mL, Rfl: 0  •  ketoconazole (NIZORAL) 2 % shampoo, Apply 1 Application topically 2 (two) times a week for 28 days, Disp: 100 mL, Rfl: 0  •  nicotine (NICODERM CQ) 21 mg/24 hr TD 24 hr patch, Place 1 patch on the skin over 24 hours every 24 hours (Patient not taking: Reported on 9/12/2023), Disp: 28 patch, Rfl: 0  •  pantoprazole (PROTONIX) 20 mg tablet, take 1 tablet by mouth once daily (Patient not taking: Reported on 5/23/2023), Disp: 30 tablet, Rfl: 0  •  polyethylene glycol (MIRALAX) 17 g packet, Take 17 g by mouth daily for 5 days (Patient not taking: Reported on 12/20/2022), Disp: 85 g, Rfl: 0    No Known Allergies    Physical Exam:    /54 (BP Location: Left arm, Patient Position: Sitting, Cuff Size: Standard)   Pulse 72   Ht 5' 8" (1.727 m)   Wt 86.6 kg (191 lb)   BMI 29.04 kg/m²     Constitutional:normal, well developed, well nourished, alert, in no distress and non-toxic and no overt pain behavior. Eyes:anicteric  HEENT:grossly intact  Neck:supple, symmetric, trachea midline and no masses   Pulmonary:even and unlabored  Cardiovascular:No edema or pitting edema present  Skin:Normal without rashes or lesions and well hydrated  Psychiatric:Mood and affect appropriate  Neurologic:Cranial Nerves II-XII grossly intact  Musculoskeletal:normal      Imaging  No orders to display         No orders of the defined types were placed in this encounter.

## 2023-09-29 NOTE — PATIENT INSTRUCTIONS
Opioid Safety   WHAT YOU NEED TO KNOW:   An opioid medicine is used to treat pain. Examples are oxycodone, morphine, fentanyl, or codeine. Pain control and management may help you rest, heal, and return to your daily activities. You and your family will receive information about how to manage your pain at home. The instructions will include what to do if you have side effects as your pain is managed. You will get information on how to handle opioid medicine safely. You will also get suggestions on how to control pain without opioids. It is important to follow all instructions so your pain is managed effectively. DISCHARGE INSTRUCTIONS:   Call your local emergency number (911 in the ), or have someone else call if:   You have a seizure. You cannot be woken. You have trouble staying awake and your breathing is slow or shallow. Your speech is slurred, or you are confused. You are dizzy or stumble when you walk. Call your doctor, or have someone close to you call if:   You are extremely drowsy, or you have trouble staying awake or speaking. You have pale or clammy skin. You have blue fingernails or lips. Your heartbeat is slower than normal.    You cannot stop vomiting. You have questions or concerns about your condition or care. Use opioids safely:   Take prescribed opioids exactly as directed. Opioids come with directions based on the kind and how it is given. Talk to your healthcare provider or a pharmacist if you have any questions. Do not take more than the recommended amount. Too much can cause a life-threatening overdose. Do not continue to take it after your pain stops. You may develop tolerance. This means you keep needing higher doses to get the same effect. You may also develop opioid use disorder. This means you are not able to control your opioid use. Do not give opioids to others or take opioids that belong to someone else.   The kind or amount one person takes may not be right for another. The person you share them with may also be taking medicines that do not mix with opioids. The person may drink alcohol or use other drugs that can cause life-threatening problems when mixed with opioids. Do not mix opioids with other medicines or alcohol. The combination can cause an overdose, or cause you to stop breathing. Alcohol, sleeping pills, and medicines such as antihistamines can make you sleepy. A combination with opioids can lead to a coma. Do not drive or operate heavy machinery after you use an opioid. You may feel drowsy or have trouble concentrating. You can injure yourself or others if you drive or use heavy machinery when you are not alert. Your provider or pharmacist can tell you how long to wait after a dose before you do these activities. Talk to your healthcare provider if you have any side effects. Side effects include nausea, sleepiness, itching, and trouble thinking clearly. Your provider may need to make changes to the kind or amount of opioid you are taking. Your provider can also help you find ways to prevent or relieve side effects. Manage constipation:  Constipation is the most common side effect of opioid medicine. Constipation is when you have hard, dry bowel movements, or you go longer than usual between bowel movements. Tell your healthcare provider about all changes in your bowel movements while you are taking opioids. Your provider may recommend laxative medicine to help you have a bowel movement. Your provider may also change the kind of opioid you are taking, or change when you take it. The following are more ways you can prevent or relieve constipation:  Drink liquids as directed. You may need to drink extra liquids to help soften and move your bowels. Ask how much liquid to drink each day and which liquids are best for you. Eat high-fiber foods. This may help decrease constipation by adding bulk to your bowel movements.  High-fiber foods include fruits, vegetables, whole-grain breads and cereals, and beans. Your healthcare provider or dietitian can help you create a high-fiber meal plan. Your provider may also recommend a fiber supplement if you cannot get enough fiber from food. Exercise regularly. Regular physical activity can help stimulate your intestines. Walking is a good exercise to prevent or relieve constipation. Ask which exercises are best for you. Schedule a time each day to have a bowel movement. This may help train your body to have regular bowel movements. Bend forward while you are on the toilet to help move the bowel movement out. Sit on the toilet for at least 10 minutes, even if you do not have a bowel movement. Store opioids safely:   Store opioids where others cannot easily get them. Keep them in a locked cabinet or secure area. Do not  keep them in a purse or other bag you carry with you. A person may be looking for something else and find the opioids. Make sure opioids are stored out of the reach of children. A child can easily overdose on opioids. Opioids may look like candy to a small child. The best way to dispose of opioids: The laws vary by country and area. In the [facility], the best way is to return the opioids through a take-back program. This program is offered by the [employer] The following are options for using the program:  Take the opioids to a [employer] collection site. The site is often a law enforcement center. Call your local law enforcement center for scheduled take-back days in your area. You will be given information on where to go if the collection site is in a different location. Take the opioids to an approved pharmacy or hospital.  A pharmacy or hospital may be set up as a collection site. You will need to ask if it is a [employer] collection site if you were not directed there.  A pharmacy or doctor's office may not be able to take back opioids unless it is a [employer] site. Use a mail-back system. This means you are given containers to put the opioids into. You will then mail them in the containers. Use a take-back drop box. This is a place to leave the opioids at any time. People and animals will not be able to get into the box. Your local law enforcement agency can tell you where to find a drop box in your area. Other safe ways to dispose of opioids: The medicine may come with disposal instructions. The instructions may vary depending on the brand of medicine you are using. Instructions may come in a Medication Guide, but not every medicine has one. You may instead get instructions from your pharmacy or doctor. Follow instructions carefully. The following are general guidelines to follow:  Find out if you can flush the opioid. Some opioids can be flushed down the toilet or poured into the sink. You will need to contact authorities in your area to see if this is an option for you. The FDA also offers a list of medicines that are safe to flush down the toilet. You can check the list if you cannot get the information for your local area. Ask your waste management company about rules for putting opioids in the trash. The company will be able to give you specific directions. Scratch out personal information on the original medicine label so it cannot be read. Then put it in the trash. Do not label the trash or put any information on it about the opioids. It should look like regular household trash so no one is tempted to look for the opioids. Keep the trash out of the reach of children and animals. Always make sure trash is secure. Talk to officials if you live in a facility. If you live in a nursing home or assisted living center, talk to an official. The person will know the rules for your area. Other ways to manage pain:   Ask your healthcare provider about non-opioid medicines to control pain.   Some medicines may even work better than opioids, depending on the cause of your pain. Nonprescription medicines include NSAIDs (such as ibuprofen) and acetaminophen. Prescription medicines include muscle relaxers, antidepressants, and steroids. Pain may be managed without any medicines. Some ways to relieve pain include massage, aromatherapy, or meditation. Physical or occupational therapy may also help. Follow up with your doctor or pain specialist as directed: You may need to have your dose adjusted. Your doctor or pain specialist can also help you find ways to manage pain without opioids. Write down your questions so you remember to ask them during your visits. For more information:   Drug Enforcement Administration  [address] [facility] , [address]  Phone: 0- 473 - [phone]  Web Address: [employer].gov/drug_disposal/    [address] [facility] [[facility]] S and Drug Administration  [address]  [facility] , [address]  Phone: 1- 477 - [phone]  Web Address: http://[employer]/  © Copyright Grace Evangelista 2023 Information is for End User's use only and may not be sold, redistributed or otherwise used for commercial purposes. The above information is an  only. It is not intended as medical advice for individual conditions or treatments. Talk to your doctor, nurse or pharmacist before following any medical regimen to see if it is safe and effective for you.

## 2023-10-01 LAB
6MAM UR QL CFM: NEGATIVE NG/ML
7AMINOCLONAZEPAM UR QL CFM: NEGATIVE NG/ML
A-OH ALPRAZ UR QL CFM: NEGATIVE NG/ML
AMPHET UR QL CFM: NEGATIVE NG/ML
AMPHET UR QL CFM: NEGATIVE NG/ML
BUPRENORPHINE UR QL CFM: NEGATIVE NG/ML
BUTALBITAL UR QL CFM: NEGATIVE NG/ML
BZE UR QL CFM: NEGATIVE NG/ML
CODEINE UR QL CFM: NEGATIVE NG/ML
DESIPRAMINE UR QL CFM: NEGATIVE NG/ML
DESIPRAMINE UR QL CFM: NEGATIVE NG/ML
EDDP UR QL CFM: NEGATIVE NG/ML
ETHYL GLUCURONIDE UR QL CFM: NEGATIVE NG/ML
ETHYL SULFATE UR QL SCN: NEGATIVE NG/ML
EUTYLONE UR QL: NEGATIVE NG/ML
FENTANYL UR QL CFM: NEGATIVE NG/ML
GLIADIN IGG SER IA-ACNC: NEGATIVE NG/ML
GLUCOSE 30M P 50 G LAC PO SERPL-MCNC: NEGATIVE NG/ML
HYDROCODONE UR QL CFM: NEGATIVE NG/ML
HYDROCODONE UR QL CFM: NEGATIVE NG/ML
HYDROMORPHONE UR QL CFM: NEGATIVE NG/ML
IMIPRAMINE UR QL CFM: NEGATIVE NG/ML
LORAZEPAM UR QL CFM: NEGATIVE NG/ML
MDMA UR QL CFM: NEGATIVE NG/ML
ME-PHENIDATE UR QL CFM: NEGATIVE NG/ML
MEPERIDINE UR QL CFM: NEGATIVE NG/ML
METHADONE UR QL CFM: NEGATIVE NG/ML
METHAMPHET UR QL CFM: NEGATIVE NG/ML
MORPHINE UR QL CFM: NEGATIVE NG/ML
MORPHINE UR QL CFM: NEGATIVE NG/ML
NORBUPRENORPHINE UR QL CFM: NEGATIVE NG/ML
NORDIAZEPAM UR QL CFM: NEGATIVE NG/ML
NORFENTANYL UR QL CFM: NEGATIVE NG/ML
NORHYDROCODONE UR QL CFM: NEGATIVE NG/ML
NORHYDROCODONE UR QL CFM: NEGATIVE NG/ML
NORMEPERIDINE UR QL CFM: NEGATIVE NG/ML
NOROXYCODONE UR QL CFM: NORMAL NG/ML
OLANZAPINE QUANTIFICATION: NEGATIVE NG/ML
OPC-3373 QUANTIFICATION: NEGATIVE
OXAZEPAM UR QL CFM: NEGATIVE NG/ML
OXYCODONE UR QL CFM: NORMAL NG/ML
OXYMORPHONE UR QL CFM: NORMAL NG/ML
OXYMORPHONE UR QL CFM: NORMAL NG/ML
PARA-FLUOROFENTANYL QUANTIFICATION: NORMAL NG/ML
PCP UR QL CFM: NEGATIVE NG/ML
PHENOBARB UR QL CFM: NEGATIVE NG/ML
RESULT ALL_PRESCRIBED MEDS AND SPECIAL INSTRUCTIONS: NORMAL
SECOBARBITAL UR QL CFM: NEGATIVE NG/ML
SL AMB 4-ANPP QUANTIFICATION: NORMAL NG/ML
SL AMB 5F-ADB-M7 METABOLITE QUANTIFICATION: NEGATIVE NG/ML
SL AMB 7-OH-MITRAGYNINE (KRATOM ALKALOID) QUANTIFICATION: NEGATIVE NG/ML
SL AMB AB-FUBINACA-M3 METABOLITE QUANTIFICATION: NEGATIVE NG/ML
SL AMB ACETYL FENTANYL QUANTIFICATION: NORMAL NG/ML
SL AMB ACETYL NORFENTANYL QUANTIFICATION: NORMAL NG/ML
SL AMB ACRYL FENTANYL QUANTIFICATION: NORMAL NG/ML
SL AMB CARFENTANIL QUANTIFICATION: NORMAL NG/ML
SL AMB CLOZAPINE QUANTIFICATION: NEGATIVE NG/ML
SL AMB CTHC (MARIJUANA METABOLITE) QUANTIFICATION: NEGATIVE NG/ML
SL AMB DEXTROMETHORPHAN QUANTIFICATION: NEGATIVE NG/ML
SL AMB DEXTRORPHAN (DEXTROMETHORPHAN METABOLITE) QUANT: NEGATIVE NG/ML
SL AMB DEXTRORPHAN (DEXTROMETHORPHAN METABOLITE) QUANT: NEGATIVE NG/ML
SL AMB HALOPERIDOL  QUANTIFICATION: NEGATIVE NG/ML
SL AMB HALOPERIDOL METABOLITE QUANTIFICATION: NEGATIVE NG/ML
SL AMB HYDROXYRISPERIDONE QUANTIFICATION: NEGATIVE NG/ML
SL AMB JWH018 METABOLITE QUANTIFICATION: NEGATIVE NG/ML
SL AMB JWH073 METABOLITE QUANTIFICATION: NEGATIVE NG/ML
SL AMB MDMB-FUBINACA-M1 METABOLITE QUANTIFICATION: NEGATIVE NG/ML
SL AMB METHYLONE QUANTIFICATION: NEGATIVE NG/ML
SL AMB N-DESMETHYL-TRAMADOL QUANTIFICATION: NEGATIVE NG/ML
SL AMB N-DESMETHYLCLOZAPINE QUANTIFICATION: NEGATIVE NG/ML
SL AMB NORQUETIAPINE QUANTIFICATION: NEGATIVE NG/ML
SL AMB PHENTERMINE QUANTIFICATION: NEGATIVE NG/ML
SL AMB QUETIAPINE QUANTIFICATION: NEGATIVE NG/ML
SL AMB RCS4 METABOLITE QUANTIFICATION: NEGATIVE NG/ML
SL AMB RISPERIDONE QUANTIFICATION: NEGATIVE NG/ML
SL AMB RITALINIC ACID QUANTIFICATION: NEGATIVE NG/ML
SPECIMEN DRAWN SERPL: NEGATIVE NG/ML
TAPENTADOL UR QL CFM: NEGATIVE NG/ML
TEMAZEPAM UR QL CFM: NEGATIVE NG/ML
TEMAZEPAM UR QL CFM: NEGATIVE NG/ML
TRAMADOL UR QL CFM: NEGATIVE NG/ML
URATE/CREAT 24H UR: NEGATIVE NG/ML

## 2023-10-03 ENCOUNTER — OFFICE VISIT (OUTPATIENT)
Dept: HEMATOLOGY ONCOLOGY | Facility: CLINIC | Age: 68
End: 2023-10-03
Payer: MEDICARE

## 2023-10-03 VITALS
BODY MASS INDEX: 28.64 KG/M2 | OXYGEN SATURATION: 95 % | SYSTOLIC BLOOD PRESSURE: 116 MMHG | WEIGHT: 189 LBS | TEMPERATURE: 97.2 F | DIASTOLIC BLOOD PRESSURE: 68 MMHG | HEART RATE: 62 BPM | HEIGHT: 68 IN

## 2023-10-03 DIAGNOSIS — T45.1X5A NEUROPATHY DUE TO CHEMOTHERAPEUTIC DRUG: ICD-10-CM

## 2023-10-03 DIAGNOSIS — G89.29 CHRONIC MIDLINE LOW BACK PAIN WITHOUT SCIATICA: ICD-10-CM

## 2023-10-03 DIAGNOSIS — G62.0 NEUROPATHY DUE TO CHEMOTHERAPEUTIC DRUG: ICD-10-CM

## 2023-10-03 DIAGNOSIS — K59.03 CONSTIPATION DUE TO OPIOID THERAPY: ICD-10-CM

## 2023-10-03 DIAGNOSIS — M54.50 CHRONIC MIDLINE LOW BACK PAIN WITHOUT SCIATICA: ICD-10-CM

## 2023-10-03 DIAGNOSIS — C34.31 MALIGNANT NEOPLASM OF LOWER LOBE OF RIGHT LUNG (HCC): Primary | ICD-10-CM

## 2023-10-03 DIAGNOSIS — T40.2X5A CONSTIPATION DUE TO OPIOID THERAPY: ICD-10-CM

## 2023-10-03 DIAGNOSIS — G89.4 CHRONIC PAIN SYNDROME: ICD-10-CM

## 2023-10-03 PROCEDURE — 99215 OFFICE O/P EST HI 40 MIN: CPT | Performed by: INTERNAL MEDICINE

## 2023-10-03 RX ORDER — ACETAMINOPHEN 325 MG/1
650 TABLET ORAL ONCE
Start: 2023-10-10 | End: 2023-10-09

## 2023-10-03 RX ORDER — SODIUM CHLORIDE 9 MG/ML
20 INJECTION, SOLUTION INTRAVENOUS ONCE
OUTPATIENT
Start: 2023-10-10

## 2023-10-03 NOTE — PROGRESS NOTES
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 PD-L1 1%, TMB high. Status post adjuvant Taxol carbo. Adjuvant Tecentriq started in June 2023. Recent CBC and differential are normal.  CMP shows glucose 147, otherwise normal.  Most recent chest imaging in May 2023 showed decreasing right lower lobe opacity adjacent to staple line. He has bilateral hand and foot neuropathy. Alpha lipoic acid has been ineffective. Discontinue. He had been on Lyrica in the past.  Increase of dose of Lyrica was recommended by pain management. He has constipation related to opioids. Uses stool softeners as needed. He reports that he has momentary swallowing difficulty once or twice a day. Unlikely to be medication or structural related. Favor observation for the moment. He has bilateral calf pain with walking. He has known history of back problems. Radicular versus circulatory. Doppler is planned. I will see him back after repeat CAT scan in late November. I discussed the above with the patient. The patient  voiced understanding and agreement.  ______________________________________________________________________    No chief complaint on file. HPI:  Oncology History   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 ([address])   12/6/2022 -  Cancer Staged    Staging form: Lung, AJCC 8th Edition  - Clinical: Stage IIIA (cT1b, cN2, cM0) - Signed by Clem Beyer PA-C on 12/6/2022  Laterality: Right       Malignant neoplasm of lower lobe of right lung ([address])   9/13/2022 Initial Diagnosis    July 23, 2022 patient had CT chest ab pelvis regarding rule out AAA. This showed 1.1 cm right lower lobe pulmonary nodule new since prior study of November 2019. Some other smaller nodules identified. Subcarinal lymph node 2 cm. No other findings suspicious for metastatic disease. August 5, 2022 PET/CT showed 1.2 cm pulmonary nodule, SUV 2.9. Other nodules noted, subcentimeter, no hypermetabolism. Some groundglass opacities in the right upper lobe. September 13, 2022 patient had needle biopsy of the right lower lobe mass showing squamous cell carcinoma, TTF-1 positive. November 16, 2022 patient underwent right lower lobe segmentectomy. Pathology showed 1.8 cm squamous cell carcinoma. Level 4 and 11 lymph node were positive for metastatic carcinoma. 1/30/2023 - 3/27/2023 Chemotherapy    palonosetron (ALOXI), 0.25 mg, Intravenous, Once, 4 of 4 cycles  Administration: 0.25 mg (1/30/2023), 0.25 mg (2/13/2023), 0.25 mg (3/6/2023), 0.25 mg (3/27/2023)  fosaprepitant (EMEND) IVPB, 150 mg, Intravenous, Once, 3 of 3 cycles  Administration: 150 mg (1/30/2023), 150 mg (2/13/2023), 150 mg (3/6/2023)  CARBOplatin (PARAPLATIN) IVPB ([facility] AUC DOSING), 552 mg, Intravenous, Once, 4 of 4 cycles  Administration: 550 mg (1/30/2023), 650 mg (2/13/2023), 650 mg (3/6/2023), 500 mg (3/27/2023)  PACLItaxel (TAXOL) chemo IVPB, 349.8 mg, Intravenous, Once, 4 of 4 cycles  Dose modification: 200 mg/m2 (original dose 175 mg/m2, Cycle 2, Reason: Dose modified as per discussion with consulting physician)  Administration: 360 mg (1/30/2023), 400 mg (2/13/2023), 400 mg (3/6/2023), 400 mg (3/27/2023)  aprepitant (CINVANTI) in  mL IVPB, 130 mg, Intravenous, Once, 1 of 1 cycle  Administration: 130 mg (3/27/2023)     4/13/2023 Genomic Testing    April 13, 2023 Caris-  PD-L1 1%, TMB high. MTAP deleted.   p53 [id number], pathogenic variant  KRAS G694 L, VUS  CRABBP  *, pathogenic variant  KMT2D *, pathogenic variant     6/21/2023 -  Chemotherapy    alteplase (CATHFLO), 2 mg, Intracatheter, Every 1 Minute as needed, 4 of 6 cycles  atezolizumab (TECENTRIQ) IVPB, 1,200 mg, Intravenous, Once, 4 of 6 cycles  Administration: 1,200 mg (6/21/2023), 1,200 mg (7/12/2023), 1,200 mg (8/29/2023), 1,200 mg (9/18/2023)         Interval History: Clinically stable. Neuropathy. ECOG-  1 - Symptomatic but completely ambulatory    Review of Systems   Constitutional: Positive for fatigue. Negative for chills and fever. HENT: Negative for nosebleeds. Eyes: Negative for discharge. Respiratory: Negative for cough and shortness of breath. Cardiovascular: Negative for chest pain. Gastrointestinal: Negative for abdominal pain, constipation and diarrhea. Endocrine: Negative for polydipsia. Genitourinary: Negative for hematuria. Musculoskeletal: Negative for arthralgias. Skin: Negative for color change. Allergic/Immunologic: Negative for immunocompromised state. Neurological: Positive for numbness. Negative for dizziness and headaches. Hematological: Negative for adenopathy. Psychiatric/Behavioral: Negative for agitation. Past Medical History:   Diagnosis Date   • Abdominal aortic aneurysm without rupture (HCC)    • Abdominal Aortic Duplex 02/21/2017    Ectatic infrarenal abdominal aorta. • MARYANN (acute kidney injury) ([address]) 07/23/2022   • CAD (coronary artery disease)    • Cancer ([address]) 2022    right lung CA   • COPD (chronic obstructive pulmonary disease) (HCC)    • Extremity pain    • History of echocardiogram 03/18/2014    EF 55%, mild MR and AI. Mild concentric LVH.    • History of stress test 03/06/2017    Normal.   • Hyperlipidemia    • Hypertension    • Joint pain    • Kidney stone    • Low back pain    • Lung cancer (HCC)    • Migraine    • Neck pain    • Obstructive sleep apnea     cannot tolerate CPAP   • Osteoarthritis    • Peripheral neuropathy    • Reflex sympathetic dystrophy    • Sacroiliitis ([address]) 02/02/2022     Patient Active Problem List   Diagnosis   • JAKI (obstructive sleep apnea)   • Chronic bronchitis (HCC)   • Abdominal aortic aneurysm (AAA) without rupture   • Lumbar spondylosis   • Lumbar degenerative disc disease   • Myofascial pain syndrome   • Chronic low back pain without sciatica   • Cervical radiculopathy   • Cervical spondylosis without myelopathy   • Tremor, essential   • Negative depression screening   • Chronic pain of both knees   • Class 1 obesity due to excess calories with serious comorbidity and body mass index (BMI) of 31.0 to 31.9 in adult   • Smoking   • Hypertension   • Chronic pain syndrome   • Uncomplicated opioid dependence ([address])   • Encounter for long-term opiate analgesic use   • Neck pain   • Hyperlipidemia   • Pre-diabetes   • Erectile dysfunction   • Insomnia   • Cortical age-related cataract of both eyes   • Urinary frequency   • 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)   • Kidney stone   • Pulmonary emphysema (HCC)   • Malignant neoplasm of lower lobe of right lung (HCC)   • Encounter for smoking cessation counseling   • Hemiparesis ([address])   • At high risk for osteoporosis   • Coronary artery disease of native artery of native heart with stable angina pectoris (HCC)   • Stage 3a chronic kidney disease (HCC)   • Night sweats   • Other chronic pancreatitis (HCC)   • Skin lesion   • 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)   • Depression, recurrent (HCC)   • Neuropathic pain   • Age-related cataract of right eye       Current Outpatient Medications:   •  amLODIPine (NORVASC) 10 mg tablet, take 1 tablet by mouth once daily, Disp: 90 tablet, Rfl: 3  •  bisacodyl (DULCOLAX) 5 mg EC tablet, Take 1 tablet (5 mg total) by mouth daily as needed for constipation for up to 4 doses, Disp: 30 tablet, Rfl: 0  •  buPROPion (Wellbutrin XL) 150 mg 24 hr tablet, Take 1 tablet (150 mg total) by mouth every morning, Disp: 90 tablet, Rfl: 3  •  choline fenofibrate (TRILIPIX) 135 MG capsule, Take 1 capsule (135 mg total) by mouth daily, Disp: 30 capsule, Rfl: 6  •  dicyclomine (BENTYL) 10 mg capsule, take 1 capsule by mouth four times a day before meals and at bedtime, Disp: 30 capsule, Rfl: 0  •  docusate sodium (COLACE) 100 mg capsule, Take 1 capsule (100 mg total) by mouth 2 (two) times a day, Disp: 20 capsule, Rfl: 0  •  glucose blood (OneTouch Verio) test strip, Test once daily, Disp: 100 each, Rfl: 0  •  lisinopril (ZESTRIL) 10 mg tablet, Take 1 tablet (10 mg total) by mouth daily, Disp: 90 tablet, Rfl: 3  •  metoprolol succinate (TOPROL-XL) 100 mg 24 hr tablet, take 1 tablet by mouth once daily, Disp: 30 tablet, Rfl: 5  •  nicotine (NICODERM CQ) 21 mg/24 hr TD 24 hr patch, Place 1 patch on the skin over 24 hours every 24 hours, Disp: 28 patch, Rfl: 0  •  nitroglycerin (NITROSTAT) 0.4 mg SL tablet, Place 1 tablet (0.4 mg total) under the tongue every 5 (five) minutes as needed for chest pain, Disp: 25 tablet, Rfl: 5  •  ofloxacin (OCUFLOX) 0.3 % ophthalmic solution, instill 1 drop into right eye four times a day STARTING 3 DAYS NH. ..  (REFER TO PRESCRIPTION NOTES). , Disp: , Rfl:   •  ondansetron (ZOFRAN) 8 mg tablet, Take 1 tablet (8 mg total) by mouth every 8 (eight) hours as needed for nausea or vomiting, Disp: 20 tablet, Rfl: 2  •  oxyCODONE-acetaminophen (Percocet) 7.5-325 MG per tablet, Take 1 tablet by mouth every 8 (eight) hours as needed for moderate pain Do not fill until 10/27/2023 Max Daily Amount: 3 tablets, Disp: 90 tablet, Rfl: 0  •  oxyCODONE-acetaminophen (Percocet) 7.5-325 MG per tablet, Take 1 tablet by mouth every 8 (eight) hours as needed for moderate pain Max Daily Amount: 3 tablets, Disp: 90 tablet, Rfl: 0  •  prednisoLONE acetate (PRED FORTE) 1 % ophthalmic suspension, instill 1 drop into right eye every 2 hours TO BE STARTED AFTER SURGERY, Disp: , Rfl:   •  rosuvastatin (CRESTOR) 20 MG tablet, take 1 tablet by mouth once daily, Disp: 90 tablet, Rfl: 5  •  sildenafil (VIAGRA) 100 mg tablet, Take 1 tablet (100 mg total) by mouth as needed for erectile dysfunction, Disp: 20 tablet, Rfl: 0  • Stiolto Respimat 2.5-2.5 MCG/ACT inhaler, inhale 2 puffs by mouth and INTO THE LUNGS once daily if needed, Disp: 4 g, Rfl: 2  •  traZODone (DESYREL) 100 mg tablet, take 1 tablet by mouth daily at bedtime, Disp: 90 tablet, Rfl: 3  •  fluticasone (FLONASE) 50 mcg/act nasal spray, 1 spray into each nostril daily for 14 days, Disp: 11.1 mL, Rfl: 0  •  ketoconazole (NIZORAL) 2 % shampoo, Apply 1 Application topically 2 (two) times a week for 28 days, Disp: 100 mL, Rfl: 0  •  pantoprazole (PROTONIX) 20 mg tablet, take 1 tablet by mouth once daily (Patient not taking: Reported on 5/23/2023), Disp: 30 tablet, Rfl: 0  •  polyethylene glycol (MIRALAX) 17 g packet, Take 17 g by mouth daily for 5 days (Patient not taking: Reported on 12/20/2022), Disp: 85 g, Rfl: 0  •  tamsulosin (FLOMAX) 0.4 mg, Take 1 capsule (0.4 mg total) by mouth daily with dinner for 3 days, Disp: 3 capsule, Rfl: 0  No Known Allergies  Past Surgical History:   Procedure Laterality Date   • CARDIAC CATHETERIZATION  02/13/2012    EF 70%, widely patent renal arteries, significant single-vessel CAD-medical therapy. • CARDIAC CATHETERIZATION  04/11/2013    EF 65%, 50% mid LAD, 20% prox CFX, 90% diffuse RCA, 99% mid RCA. Medical management.    • CATARACT EXTRACTION Right 09/19/2023   • CHOLECYSTECTOMY     • COLONOSCOPY     • EPIDURAL BLOCK INJECTION Bilateral 08/15/2019    Procedure: BLOCK / INJECTION EPIDURAL STEROID CERVICAL;  Surgeon: Yasmin May MD;  Location: [facility];  Service: Pain Management    • FL GUIDED NEEDLE PLAC BX/ASP/INJ  08/15/2019   • IR BIOPSY LUNG  09/13/2022   • IR THORACIC DUCT EMBOLIZATION  11/22/2022   • ORTHOPEDIC SURGERY     •  [address] INCL FLUOR GDNCE DX W/CELL WASHG [address] [facility] [address] N/A 11/16/2022    Procedure: Teresa Hussein;  Surgeon: Jc Donaldson MD;  Location: [facility];  Service: Thoracic   • CA THORACOSCOPY W/LOBECTOMY SINGLE LOBE Right 11/16/2022    Procedure: LOBECTOMY LUNG; lower lobe superior segmentectomy, mediastinal lymph node dissection;  Surgeon: Melanie Owusu MD;  Location: [facility];  Service: Thoracic   • NC THORACOSCOPY W/SEGMENTECTOMY Right 11/16/2022    Procedure: THORACOSCOPY VIDEO ASSISTED SURGERY (VATS); Surgeon: Melanie Owusu MD;  Location: [facility];  Service: Thoracic   • TRIGGER POINT INJECTION       Social History     Objective:  Vitals:    10/03/23 1134   BP: 116/68   BP Location: Left arm   Patient Position: Sitting   Cuff Size: Standard   Pulse: 62   Temp: (!) 97.2 °F (36.2 °C)   TempSrc: Tympanic   SpO2: 95%   Weight: 85.7 kg (189 lb)   Height: 5' 8" (1.727 m)     Physical Exam  Constitutional:       Appearance: He is well-developed. HENT:      Head: Normocephalic and atraumatic. Mouth/Throat:      Mouth: Mucous membranes are moist.   Eyes:      Pupils: Pupils are equal, round, and reactive to light. Cardiovascular:      Rate and Rhythm: Normal rate and regular rhythm. Heart sounds: No murmur heard. Pulmonary:      Breath sounds: Normal breath sounds. No wheezing or rales. Abdominal:      Palpations: Abdomen is soft. Tenderness: There is no abdominal tenderness. Musculoskeletal:         General: No tenderness. Normal range of motion. Cervical back: Neck supple. Lymphadenopathy:      Cervical: No cervical adenopathy. Skin:     Findings: No erythema or rash. Neurological:      Mental Status: He is alert and oriented to person, place, and time. Cranial Nerves: No cranial nerve deficit. Deep Tendon Reflexes: Reflexes are normal and symmetric. Psychiatric:         Behavior: Behavior normal.           Labs: I personally reviewed the labs and imaging pertinent to this patient care.

## 2023-10-06 DIAGNOSIS — G47.00 INSOMNIA, UNSPECIFIED TYPE: ICD-10-CM

## 2023-10-06 DIAGNOSIS — I10 ESSENTIAL HYPERTENSION: ICD-10-CM

## 2023-10-06 RX ORDER — TRAZODONE HYDROCHLORIDE 100 MG/1
TABLET ORAL
Qty: 90 TABLET | Refills: 3 | Status: SHIPPED | OUTPATIENT
Start: 2023-10-06

## 2023-10-06 RX ORDER — METOPROLOL SUCCINATE 100 MG/1
TABLET, EXTENDED RELEASE ORAL
Qty: 90 TABLET | Refills: 3 | Status: SHIPPED | OUTPATIENT
Start: 2023-10-06

## 2023-10-08 DIAGNOSIS — I25.10 CORONARY ARTERY DISEASE INVOLVING NATIVE CORONARY ARTERY OF NATIVE HEART WITHOUT ANGINA PECTORIS: ICD-10-CM

## 2023-10-08 DIAGNOSIS — E78.2 MIXED HYPERLIPIDEMIA: ICD-10-CM

## 2023-10-09 ENCOUNTER — APPOINTMENT (OUTPATIENT)
Dept: LAB | Facility: HOSPITAL | Age: 68
End: 2023-10-09
Payer: MEDICARE

## 2023-10-09 DIAGNOSIS — C34.31 MALIGNANT NEOPLASM OF LOWER LOBE OF RIGHT LUNG (HCC): ICD-10-CM

## 2023-10-09 LAB
ALBUMIN SERPL BCP-MCNC: 4.3 G/DL (ref 3.5–5)
ALP SERPL-CCNC: 73 U/L (ref 34–104)
ALT SERPL W P-5'-P-CCNC: 10 U/L (ref 7–52)
ANION GAP SERPL CALCULATED.3IONS-SCNC: 6 MMOL/L
AST SERPL W P-5'-P-CCNC: 11 U/L (ref 13–39)
BASOPHILS # BLD AUTO: 0.08 THOUSANDS/ÂΜL (ref 0–0.1)
BASOPHILS NFR BLD AUTO: 1 % (ref 0–1)
BILIRUB SERPL-MCNC: 0.78 MG/DL (ref 0.2–1)
BUN SERPL-MCNC: 17 MG/DL (ref 5–25)
CALCIUM SERPL-MCNC: 9.5 MG/DL (ref 8.4–10.2)
CHLORIDE SERPL-SCNC: 103 MMOL/L (ref 96–108)
CO2 SERPL-SCNC: 27 MMOL/L (ref 21–32)
CREAT SERPL-MCNC: 1.17 MG/DL (ref 0.6–1.3)
EOSINOPHIL # BLD AUTO: 0 THOUSAND/ÂΜL (ref 0–0.61)
EOSINOPHIL NFR BLD AUTO: 0 % (ref 0–6)
ERYTHROCYTE [DISTWIDTH] IN BLOOD BY AUTOMATED COUNT: 13.3 % (ref 11.6–15.1)
GFR SERPL CREATININE-BSD FRML MDRD: 63 ML/MIN/1.73SQ M
GLUCOSE SERPL-MCNC: 175 MG/DL (ref 65–140)
HCT VFR BLD AUTO: 47.2 % (ref 36.5–49.3)
HGB BLD-MCNC: 15.2 G/DL (ref 12–17)
IMM GRANULOCYTES # BLD AUTO: 0.02 THOUSAND/UL (ref 0–0.2)
IMM GRANULOCYTES NFR BLD AUTO: 0 % (ref 0–2)
LYMPHOCYTES # BLD AUTO: 2.7 THOUSANDS/ÂΜL (ref 0.6–4.47)
LYMPHOCYTES NFR BLD AUTO: 30 % (ref 14–44)
MCH RBC QN AUTO: 30.3 PG (ref 26.8–34.3)
MCHC RBC AUTO-ENTMCNC: 32.2 G/DL (ref 31.4–37.4)
MCV RBC AUTO: 94 FL (ref 82–98)
MONOCYTES # BLD AUTO: 0.57 THOUSAND/ÂΜL (ref 0.17–1.22)
MONOCYTES NFR BLD AUTO: 6 % (ref 4–12)
NEUTROPHILS # BLD AUTO: 5.54 THOUSANDS/ÂΜL (ref 1.85–7.62)
NEUTS SEG NFR BLD AUTO: 63 % (ref 43–75)
NRBC BLD AUTO-RTO: 0 /100 WBCS
PLATELET # BLD AUTO: 183 THOUSANDS/UL (ref 149–390)
PMV BLD AUTO: 10.2 FL (ref 8.9–12.7)
POTASSIUM SERPL-SCNC: 3.9 MMOL/L (ref 3.5–5.3)
PROT SERPL-MCNC: 7.2 G/DL (ref 6.4–8.4)
RBC # BLD AUTO: 5.01 MILLION/UL (ref 3.88–5.62)
SODIUM SERPL-SCNC: 136 MMOL/L (ref 135–147)
T3FREE SERPL-MCNC: 3.04 PG/ML (ref 2.5–3.9)
TSH SERPL DL<=0.05 MIU/L-ACNC: 1.15 UIU/ML (ref 0.45–4.5)
WBC # BLD AUTO: 8.91 THOUSAND/UL (ref 4.31–10.16)

## 2023-10-09 PROCEDURE — 84481 FREE ASSAY (FT-3): CPT

## 2023-10-09 PROCEDURE — 80053 COMPREHEN METABOLIC PANEL: CPT

## 2023-10-09 PROCEDURE — 36415 COLL VENOUS BLD VENIPUNCTURE: CPT

## 2023-10-09 PROCEDURE — 85025 COMPLETE CBC W/AUTO DIFF WBC: CPT

## 2023-10-09 PROCEDURE — 84443 ASSAY THYROID STIM HORMONE: CPT

## 2023-10-09 RX ORDER — ROSUVASTATIN CALCIUM 20 MG/1
TABLET, COATED ORAL
Qty: 90 TABLET | Refills: 3 | Status: SHIPPED | OUTPATIENT
Start: 2023-10-09

## 2023-10-10 ENCOUNTER — HOSPITAL ENCOUNTER (OUTPATIENT)
Dept: INFUSION CENTER | Facility: HOSPITAL | Age: 68
Discharge: HOME | End: 2023-10-10
Attending: INTERNAL MEDICINE
Payer: MEDICARE

## 2023-10-10 VITALS
DIASTOLIC BLOOD PRESSURE: 73 MMHG | HEART RATE: 57 BPM | TEMPERATURE: 96.9 F | BODY MASS INDEX: 28.74 KG/M2 | RESPIRATION RATE: 18 BRPM | OXYGEN SATURATION: 95 % | HEIGHT: 68 IN | SYSTOLIC BLOOD PRESSURE: 157 MMHG

## 2023-10-10 DIAGNOSIS — C34.31 MALIGNANT NEOPLASM OF LOWER LOBE OF RIGHT LUNG (HCC): Primary | ICD-10-CM

## 2023-10-10 PROCEDURE — 96413 CHEMO IV INFUSION 1 HR: CPT

## 2023-10-10 PROCEDURE — 96367 TX/PROPH/DG ADDL SEQ IV INF: CPT

## 2023-10-10 RX ORDER — SODIUM CHLORIDE 9 MG/ML
20 INJECTION, SOLUTION INTRAVENOUS ONCE
Status: COMPLETED | OUTPATIENT
Start: 2023-10-10 | End: 2023-10-10

## 2023-10-10 RX ORDER — ACETAMINOPHEN 325 MG/1
650 TABLET ORAL ONCE
Status: COMPLETED | OUTPATIENT
Start: 2023-10-10 | End: 2023-10-10

## 2023-10-10 RX ADMIN — SODIUM CHLORIDE 20 ML/HR: 0.9 INJECTION, SOLUTION INTRAVENOUS at 09:36

## 2023-10-10 RX ADMIN — ATEZOLIZUMAB 1200 MG: 1200 INJECTION, SOLUTION INTRAVENOUS at 10:51

## 2023-10-10 RX ADMIN — ACETAMINOPHEN 650 MG: 325 TABLET, FILM COATED ORAL at 09:43

## 2023-10-10 RX ADMIN — DIPHENHYDRAMINE HYDROCHLORIDE 25 MG: 50 INJECTION, SOLUTION INTRAMUSCULAR; INTRAVENOUS at 09:36

## 2023-10-10 RX ADMIN — DEXAMETHASONE SODIUM PHOSPHATE 10 MG: 10 INJECTION, SOLUTION INTRAMUSCULAR; INTRAVENOUS at 10:09

## 2023-10-10 NOTE — PLAN OF CARE
Problem: DISCHARGE PLANNING  Goal: Discharge to home or other facility with appropriate resources  Description: INTERVENTIONS:  - Identify barriers to discharge w/patient and caregiver  - Arrange for needed discharge resources and transportation as appropriate  - Identify discharge learning needs (meds, wound care, etc.)  - Arrange for interpretive services to assist at discharge as needed  - Refer to Case Management Department for coordinating discharge planning if the patient needs post-hospital services based on physician/advanced practitioner order or complex needs related to functional status, cognitive ability, or social support system  Outcome: Progressing     Problem: INFECTION - ADULT  Goal: Absence of fever/infection during neutropenic period  Description: INTERVENTIONS:  - Monitor WBC    Outcome: Progressing

## 2023-10-24 RX ORDER — ACETAMINOPHEN 325 MG/1
650 TABLET ORAL ONCE
Status: CANCELLED
Start: 2023-10-31 | End: 2023-10-30

## 2023-10-24 RX ORDER — SODIUM CHLORIDE 9 MG/ML
20 INJECTION, SOLUTION INTRAVENOUS ONCE
Status: CANCELLED | OUTPATIENT
Start: 2023-10-31

## 2023-10-30 ENCOUNTER — APPOINTMENT (OUTPATIENT)
Dept: LAB | Facility: HOSPITAL | Age: 68
End: 2023-10-30
Payer: MEDICARE

## 2023-10-30 DIAGNOSIS — C34.31 MALIGNANT NEOPLASM OF LOWER LOBE OF RIGHT LUNG (HCC): ICD-10-CM

## 2023-10-30 LAB
ALBUMIN SERPL BCP-MCNC: 4.5 G/DL (ref 3.5–5)
ALP SERPL-CCNC: 70 U/L (ref 34–104)
ALT SERPL W P-5'-P-CCNC: 10 U/L (ref 7–52)
ANION GAP SERPL CALCULATED.3IONS-SCNC: 7 MMOL/L
AST SERPL W P-5'-P-CCNC: 12 U/L (ref 13–39)
BASOPHILS # BLD AUTO: 0.05 THOUSANDS/ÂΜL (ref 0–0.1)
BASOPHILS NFR BLD AUTO: 1 % (ref 0–1)
BILIRUB SERPL-MCNC: 0.88 MG/DL (ref 0.2–1)
BUN SERPL-MCNC: 21 MG/DL (ref 5–25)
CALCIUM SERPL-MCNC: 9.6 MG/DL (ref 8.4–10.2)
CHLORIDE SERPL-SCNC: 103 MMOL/L (ref 96–108)
CO2 SERPL-SCNC: 27 MMOL/L (ref 21–32)
CREAT SERPL-MCNC: 1.26 MG/DL (ref 0.6–1.3)
EOSINOPHIL # BLD AUTO: 0 THOUSAND/ÂΜL (ref 0–0.61)
EOSINOPHIL NFR BLD AUTO: 0 % (ref 0–6)
ERYTHROCYTE [DISTWIDTH] IN BLOOD BY AUTOMATED COUNT: 13.2 % (ref 11.6–15.1)
GFR SERPL CREATININE-BSD FRML MDRD: 57 ML/MIN/1.73SQ M
GLUCOSE SERPL-MCNC: 109 MG/DL (ref 65–140)
HCT VFR BLD AUTO: 45.6 % (ref 36.5–49.3)
HGB BLD-MCNC: 14.7 G/DL (ref 12–17)
IMM GRANULOCYTES # BLD AUTO: 0.05 THOUSAND/UL (ref 0–0.2)
IMM GRANULOCYTES NFR BLD AUTO: 1 % (ref 0–2)
LYMPHOCYTES # BLD AUTO: 2.52 THOUSANDS/ÂΜL (ref 0.6–4.47)
LYMPHOCYTES NFR BLD AUTO: 29 % (ref 14–44)
MCH RBC QN AUTO: 30.4 PG (ref 26.8–34.3)
MCHC RBC AUTO-ENTMCNC: 32.2 G/DL (ref 31.4–37.4)
MCV RBC AUTO: 94 FL (ref 82–98)
MONOCYTES # BLD AUTO: 0.67 THOUSAND/ÂΜL (ref 0.17–1.22)
MONOCYTES NFR BLD AUTO: 8 % (ref 4–12)
NEUTROPHILS # BLD AUTO: 5.52 THOUSANDS/ÂΜL (ref 1.85–7.62)
NEUTS SEG NFR BLD AUTO: 61 % (ref 43–75)
NRBC BLD AUTO-RTO: 0 /100 WBCS
PLATELET # BLD AUTO: 180 THOUSANDS/UL (ref 149–390)
PMV BLD AUTO: 9.8 FL (ref 8.9–12.7)
POTASSIUM SERPL-SCNC: 3.8 MMOL/L (ref 3.5–5.3)
PROT SERPL-MCNC: 7 G/DL (ref 6.4–8.4)
RBC # BLD AUTO: 4.83 MILLION/UL (ref 3.88–5.62)
SODIUM SERPL-SCNC: 137 MMOL/L (ref 135–147)
WBC # BLD AUTO: 8.81 THOUSAND/UL (ref 4.31–10.16)

## 2023-10-30 PROCEDURE — 85025 COMPLETE CBC W/AUTO DIFF WBC: CPT

## 2023-10-30 PROCEDURE — 36415 COLL VENOUS BLD VENIPUNCTURE: CPT

## 2023-10-30 PROCEDURE — 80053 COMPREHEN METABOLIC PANEL: CPT

## 2023-10-31 ENCOUNTER — HOSPITAL ENCOUNTER (OUTPATIENT)
Dept: INFUSION CENTER | Facility: HOSPITAL | Age: 68
Discharge: HOME | End: 2023-10-31
Attending: INTERNAL MEDICINE
Payer: MEDICARE

## 2023-10-31 VITALS
SYSTOLIC BLOOD PRESSURE: 168 MMHG | HEIGHT: 68 IN | RESPIRATION RATE: 17 BRPM | HEART RATE: 62 BPM | DIASTOLIC BLOOD PRESSURE: 81 MMHG | WEIGHT: 190.26 LBS | TEMPERATURE: 96 F | BODY MASS INDEX: 28.83 KG/M2 | OXYGEN SATURATION: 93 %

## 2023-10-31 DIAGNOSIS — C34.31 MALIGNANT NEOPLASM OF LOWER LOBE OF RIGHT LUNG (HCC): Primary | ICD-10-CM

## 2023-10-31 PROCEDURE — 96367 TX/PROPH/DG ADDL SEQ IV INF: CPT

## 2023-10-31 PROCEDURE — 96413 CHEMO IV INFUSION 1 HR: CPT

## 2023-10-31 RX ORDER — ACETAMINOPHEN 325 MG/1
650 TABLET ORAL ONCE
Status: COMPLETED | OUTPATIENT
Start: 2023-10-31 | End: 2023-10-31

## 2023-10-31 RX ORDER — SODIUM CHLORIDE 9 MG/ML
20 INJECTION, SOLUTION INTRAVENOUS ONCE
Status: COMPLETED | OUTPATIENT
Start: 2023-10-31 | End: 2023-10-31

## 2023-10-31 RX ADMIN — SODIUM CHLORIDE 20 ML/HR: 0.9 INJECTION, SOLUTION INTRAVENOUS at 09:49

## 2023-10-31 RX ADMIN — DIPHENHYDRAMINE HYDROCHLORIDE 25 MG: 50 INJECTION, SOLUTION INTRAMUSCULAR; INTRAVENOUS at 10:12

## 2023-10-31 RX ADMIN — ATEZOLIZUMAB 1200 MG: 1200 INJECTION, SOLUTION INTRAVENOUS at 11:24

## 2023-10-31 RX ADMIN — ACETAMINOPHEN 650 MG: 325 TABLET, FILM COATED ORAL at 10:11

## 2023-10-31 RX ADMIN — DEXAMETHASONE SODIUM PHOSPHATE 10 MG: 10 INJECTION, SOLUTION INTRAMUSCULAR; INTRAVENOUS at 10:52

## 2023-11-03 DIAGNOSIS — C34.31 MALIGNANT NEOPLASM OF LOWER LOBE OF RIGHT LUNG (HCC): Primary | ICD-10-CM

## 2023-11-13 RX ORDER — SODIUM CHLORIDE 9 MG/ML
20 INJECTION, SOLUTION INTRAVENOUS ONCE
Status: CANCELLED | OUTPATIENT
Start: 2023-11-22

## 2023-11-13 RX ORDER — ACETAMINOPHEN 325 MG/1
650 TABLET ORAL ONCE
Status: CANCELLED | OUTPATIENT
Start: 2023-11-22 | End: 2023-11-20

## 2023-11-16 ENCOUNTER — TELEPHONE (OUTPATIENT)
Dept: HEMATOLOGY ONCOLOGY | Facility: CLINIC | Age: 68
End: 2023-11-16

## 2023-11-16 DIAGNOSIS — R10.9 FLANK PAIN, ACUTE: Primary | ICD-10-CM

## 2023-11-16 NOTE — TELEPHONE ENCOUNTER
Rec'd call from pt who reports increasing temp over the last 3-4 days. T max 102.5 today. Also with c/o r sided abd pain and r sided flank pain. Pt describes frequently feeling like he needs to void but then unable to. He also is having flow issues despite currently being on flomax. Reviewed with Dr Benigno Miller. Advised pt he would like UAC&S, blood cultures, cbc, and cmp done and he will be sending abx script in for pt to start. Pt given strict instructions to report to ER if symptoms worsen. Pt voiced understanding.

## 2023-11-17 ENCOUNTER — APPOINTMENT (OUTPATIENT)
Dept: LAB | Facility: HOSPITAL | Age: 68
End: 2023-11-17
Payer: MEDICARE

## 2023-11-17 DIAGNOSIS — R10.9 FLANK PAIN, ACUTE: ICD-10-CM

## 2023-11-17 DIAGNOSIS — C34.31 MALIGNANT NEOPLASM OF LOWER LOBE OF RIGHT LUNG (HCC): ICD-10-CM

## 2023-11-17 LAB
ALBUMIN SERPL BCP-MCNC: 4.2 G/DL (ref 3.5–5)
ALP SERPL-CCNC: 64 U/L (ref 34–104)
ALT SERPL W P-5'-P-CCNC: 11 U/L (ref 7–52)
ANION GAP SERPL CALCULATED.3IONS-SCNC: 9 MMOL/L
AST SERPL W P-5'-P-CCNC: 11 U/L (ref 13–39)
BACTERIA UR QL AUTO: ABNORMAL /HPF
BASOPHILS # BLD AUTO: 0.05 THOUSANDS/ÂΜL (ref 0–0.1)
BASOPHILS NFR BLD AUTO: 1 % (ref 0–1)
BILIRUB SERPL-MCNC: 0.94 MG/DL (ref 0.2–1)
BILIRUB UR QL STRIP: ABNORMAL
BUN SERPL-MCNC: 18 MG/DL (ref 5–25)
CALCIUM SERPL-MCNC: 9.2 MG/DL (ref 8.4–10.2)
CHLORIDE SERPL-SCNC: 101 MMOL/L (ref 96–108)
CLARITY UR: CLEAR
CO2 SERPL-SCNC: 23 MMOL/L (ref 21–32)
COLOR UR: YELLOW
CREAT SERPL-MCNC: 1.05 MG/DL (ref 0.6–1.3)
EOSINOPHIL # BLD AUTO: 0 THOUSAND/ÂΜL (ref 0–0.61)
EOSINOPHIL NFR BLD AUTO: 0 % (ref 0–6)
ERYTHROCYTE [DISTWIDTH] IN BLOOD BY AUTOMATED COUNT: 13.3 % (ref 11.6–15.1)
GFR SERPL CREATININE-BSD FRML MDRD: 72 ML/MIN/1.73SQ M
GLUCOSE SERPL-MCNC: 181 MG/DL (ref 65–140)
GLUCOSE UR STRIP-MCNC: NEGATIVE MG/DL
HCT VFR BLD AUTO: 45.6 % (ref 36.5–49.3)
HGB BLD-MCNC: 14.7 G/DL (ref 12–17)
HGB UR QL STRIP.AUTO: ABNORMAL
IMM GRANULOCYTES # BLD AUTO: 0.01 THOUSAND/UL (ref 0–0.2)
IMM GRANULOCYTES NFR BLD AUTO: 0 % (ref 0–2)
KETONES UR STRIP-MCNC: ABNORMAL MG/DL
LEUKOCYTE ESTERASE UR QL STRIP: ABNORMAL
LYMPHOCYTES # BLD AUTO: 1.41 THOUSANDS/ÂΜL (ref 0.6–4.47)
LYMPHOCYTES NFR BLD AUTO: 18 % (ref 14–44)
MCH RBC QN AUTO: 29.9 PG (ref 26.8–34.3)
MCHC RBC AUTO-ENTMCNC: 32.2 G/DL (ref 31.4–37.4)
MCV RBC AUTO: 93 FL (ref 82–98)
MONOCYTES # BLD AUTO: 0.77 THOUSAND/ÂΜL (ref 0.17–1.22)
MONOCYTES NFR BLD AUTO: 10 % (ref 4–12)
MUCOUS THREADS UR QL AUTO: ABNORMAL
NEUTROPHILS # BLD AUTO: 5.41 THOUSANDS/ÂΜL (ref 1.85–7.62)
NEUTS SEG NFR BLD AUTO: 71 % (ref 43–75)
NITRITE UR QL STRIP: NEGATIVE
NON-SQ EPI CELLS URNS QL MICRO: ABNORMAL /HPF
NRBC BLD AUTO-RTO: 0 /100 WBCS
PH UR STRIP.AUTO: 5.5 [PH]
PLATELET # BLD AUTO: 154 THOUSANDS/UL (ref 149–390)
PMV BLD AUTO: 9.9 FL (ref 8.9–12.7)
POTASSIUM SERPL-SCNC: 3.8 MMOL/L (ref 3.5–5.3)
PROT SERPL-MCNC: 7 G/DL (ref 6.4–8.4)
PROT UR STRIP-MCNC: ABNORMAL MG/DL
RBC # BLD AUTO: 4.91 MILLION/UL (ref 3.88–5.62)
RBC #/AREA URNS AUTO: ABNORMAL /HPF
SODIUM SERPL-SCNC: 133 MMOL/L (ref 135–147)
SP GR UR STRIP.AUTO: >=1.03 (ref 1–1.03)
UROBILINOGEN UR QL STRIP.AUTO: 0.2 E.U./DL
WBC # BLD AUTO: 7.65 THOUSAND/UL (ref 4.31–10.16)
WBC #/AREA URNS AUTO: ABNORMAL /HPF

## 2023-11-17 PROCEDURE — 87040 BLOOD CULTURE FOR BACTERIA: CPT

## 2023-11-17 PROCEDURE — 81001 URINALYSIS AUTO W/SCOPE: CPT

## 2023-11-17 PROCEDURE — 36415 COLL VENOUS BLD VENIPUNCTURE: CPT

## 2023-11-17 PROCEDURE — 85025 COMPLETE CBC W/AUTO DIFF WBC: CPT

## 2023-11-17 PROCEDURE — 80053 COMPREHEN METABOLIC PANEL: CPT

## 2023-11-17 RX ORDER — LEVOFLOXACIN 500 MG/1
500 TABLET, FILM COATED ORAL EVERY 24 HOURS
Qty: 7 TABLET | Refills: 0 | Status: SHIPPED | OUTPATIENT
Start: 2023-11-17 | End: 2023-11-24

## 2023-11-19 LAB
BACTERIA BLD CULT: NORMAL
BACTERIA BLD CULT: NORMAL

## 2023-11-20 ENCOUNTER — TELEPHONE (OUTPATIENT)
Dept: HEMATOLOGY ONCOLOGY | Facility: CLINIC | Age: 68
End: 2023-11-20

## 2023-11-20 NOTE — TELEPHONE ENCOUNTER
Spoke with patient today. He continues to have a fever. He started Levaquin on 11/17/23. When fever spikes at night time he has drenching sweats and needs to change his sheets. He also developed a dry cough since starting Levaquin. It was difficult for patient to have conversation with me due to coughing. Per Dr. Zaria Zuniga patient can D/C Levaquin - labs were negative. He will try Aleve 2 PO Q 12 hours - take with food.   He will take a COVID test and will call tomorrow with an update  Next chemo is scheduled for 11/22/23    Patient agreeable to plan and verbalized understanding

## 2023-11-21 ENCOUNTER — TELEPHONE (OUTPATIENT)
Dept: HEMATOLOGY ONCOLOGY | Facility: CLINIC | Age: 68
End: 2023-11-21

## 2023-11-21 DIAGNOSIS — R50.9 FEVER OF UNKNOWN ORIGIN: Primary | ICD-10-CM

## 2023-11-21 NOTE — TELEPHONE ENCOUNTER
Returned the "[facility]" phone to pt regarding patient's fever. Dr. Khoa Allen reviewed his temperatures range from 100.6-102.6    Consult for Infectious Disease ordered. Pt to continue with infusions of Tecentriq tomorrow.

## 2023-11-22 ENCOUNTER — HOSPITAL ENCOUNTER (OUTPATIENT)
Dept: INFUSION CENTER | Facility: HOSPITAL | Age: 68
Discharge: HOME | End: 2023-11-22
Attending: INTERNAL MEDICINE
Payer: MEDICARE

## 2023-11-22 ENCOUNTER — HOSPITAL ENCOUNTER (OUTPATIENT)
Dept: CT IMAGING | Facility: HOSPITAL | Age: 68
Discharge: HOME | End: 2023-11-22
Payer: MEDICARE

## 2023-11-22 VITALS
RESPIRATION RATE: 18 BRPM | HEART RATE: 75 BPM | SYSTOLIC BLOOD PRESSURE: 153 MMHG | DIASTOLIC BLOOD PRESSURE: 72 MMHG | TEMPERATURE: 97.6 F | OXYGEN SATURATION: 99 %

## 2023-11-22 DIAGNOSIS — C34.31 MALIGNANT NEOPLASM OF LOWER LOBE OF RIGHT LUNG (HCC): Primary | ICD-10-CM

## 2023-11-22 DIAGNOSIS — C34.31 MALIGNANT NEOPLASM OF LOWER LOBE OF RIGHT LUNG (HCC): ICD-10-CM

## 2023-11-22 LAB
BACTERIA BLD CULT: NORMAL
BACTERIA BLD CULT: NORMAL

## 2023-11-22 PROCEDURE — G1004 CDSM NDSC: HCPCS

## 2023-11-22 PROCEDURE — 71250 CT THORAX DX C-: CPT

## 2023-11-22 RX ORDER — SODIUM CHLORIDE 9 MG/ML
20 INJECTION, SOLUTION INTRAVENOUS ONCE
Status: COMPLETED | OUTPATIENT
Start: 2023-11-22 | End: 2023-11-22

## 2023-11-22 RX ORDER — ACETAMINOPHEN 325 MG/1
650 TABLET ORAL ONCE
Status: COMPLETED | OUTPATIENT
Start: 2023-11-22 | End: 2023-11-22

## 2023-11-22 RX ADMIN — ACETAMINOPHEN 650 MG: 325 TABLET, FILM COATED ORAL at 12:36

## 2023-11-22 RX ADMIN — SODIUM CHLORIDE 20 ML/HR: 0.9 INJECTION, SOLUTION INTRAVENOUS at 12:31

## 2023-11-22 RX ADMIN — ATEZOLIZUMAB 1200 MG: 1200 INJECTION, SOLUTION INTRAVENOUS at 13:41

## 2023-11-22 RX ADMIN — DEXAMETHASONE SODIUM PHOSPHATE 10 MG: 10 INJECTION, SOLUTION INTRAMUSCULAR; INTRAVENOUS at 13:06

## 2023-11-22 RX ADMIN — DIPHENHYDRAMINE HYDROCHLORIDE 25 MG: 50 INJECTION, SOLUTION INTRAMUSCULAR; INTRAVENOUS at 12:31

## 2023-11-24 ENCOUNTER — OFFICE VISIT (OUTPATIENT)
Dept: PAIN MEDICINE | Facility: CLINIC | Age: 68
End: 2023-11-24
Payer: MEDICARE

## 2023-11-24 VITALS
DIASTOLIC BLOOD PRESSURE: 84 MMHG | SYSTOLIC BLOOD PRESSURE: 159 MMHG | HEIGHT: 67 IN | BODY MASS INDEX: 30.13 KG/M2 | HEART RATE: 69 BPM | WEIGHT: 192 LBS

## 2023-11-24 DIAGNOSIS — M54.50 CHRONIC MIDLINE LOW BACK PAIN WITHOUT SCIATICA: ICD-10-CM

## 2023-11-24 DIAGNOSIS — M54.12 CERVICAL RADICULOPATHY: ICD-10-CM

## 2023-11-24 DIAGNOSIS — M79.18 MYOFASCIAL PAIN SYNDROME: ICD-10-CM

## 2023-11-24 DIAGNOSIS — M47.812 CERVICAL SPONDYLOSIS WITHOUT MYELOPATHY: ICD-10-CM

## 2023-11-24 DIAGNOSIS — M79.2 NEUROPATHIC PAIN: ICD-10-CM

## 2023-11-24 DIAGNOSIS — M54.2 NECK PAIN: ICD-10-CM

## 2023-11-24 DIAGNOSIS — G89.4 CHRONIC PAIN SYNDROME: Primary | ICD-10-CM

## 2023-11-24 DIAGNOSIS — F11.20 UNCOMPLICATED OPIOID DEPENDENCE (HCC): ICD-10-CM

## 2023-11-24 DIAGNOSIS — G89.29 CHRONIC MIDLINE LOW BACK PAIN WITHOUT SCIATICA: ICD-10-CM

## 2023-11-24 DIAGNOSIS — M47.816 LUMBAR SPONDYLOSIS: ICD-10-CM

## 2023-11-24 DIAGNOSIS — M51.36 LUMBAR DEGENERATIVE DISC DISEASE: ICD-10-CM

## 2023-11-24 DIAGNOSIS — Z79.891 ENCOUNTER FOR LONG-TERM OPIATE ANALGESIC USE: ICD-10-CM

## 2023-11-24 PROCEDURE — 99214 OFFICE O/P EST MOD 30 MIN: CPT | Performed by: NURSE PRACTITIONER

## 2023-11-24 RX ORDER — OXYCODONE AND ACETAMINOPHEN 7.5; 325 MG/1; MG/1
1 TABLET ORAL EVERY 8 HOURS PRN
Qty: 90 TABLET | Refills: 0 | Status: SHIPPED | OUTPATIENT
Start: 2023-11-24

## 2023-11-24 RX ORDER — PREGABALIN 50 MG/1
50 CAPSULE ORAL 3 TIMES DAILY
Qty: 90 CAPSULE | Refills: 1 | Status: SHIPPED | OUTPATIENT
Start: 2023-11-24

## 2023-11-24 NOTE — PROGRESS NOTES
Assessment:  1. Chronic pain syndrome    2. Neck pain    3. Cervical spondylosis without myelopathy    4. Cervical radiculopathy    5. Chronic midline low back pain without sciatica    6. Lumbar spondylosis    7. Lumbar degenerative disc disease    8. Myofascial pain syndrome    9. Neuropathic pain    10. Uncomplicated opioid dependence ([address])    11. Encounter for long-term opiate analgesic use        Plan:  While the patient was in the office today, I did have a thorough conversation regarding their chronic pain syndrome, medication management, and treatment plan options. Being seen for a follow-up visit. Overall, pain remains reasonably controlled with his current medication regimen. Meds reduces pain by about 50%. He denies side effects or medications. Continue oxycodone 7.5/325 every 8 hours as needed for pain. The patient's opioid scripts were sent to their pharmacy electronically and was given a 2 month supply of prescriptions with a Do Not Fill date(s) of 11/24/2023, 12/22/2023. Continue Lyrica 50 mg. Will increase to 3 times daily. A new prescription was sent to his pharmacy with 2 refills. [address] [place] [address],[address] Prescription Drug Monitoring Program report was reviewed and was appropriate     There are risks associated with opioid medications, including dependence, addiction and tolerance. The patient understands and agrees to use these medications only as prescribed. Potential side effects of the medications include, but are not limited to, constipation, drowsiness, addiction, impaired judgment and risk of fatal overdose if not taken as prescribed. The patient was warned against driving while taking sedation medications. Sharing medications is a felony. At this point in time, the patient is showing no signs of addiction, abuse, diversion or suicidal ideation. The patient will follow-up in 8 weeks for medication prescription refill and reevaluation.  The patient was advised to contact the office should their symptoms worsen in the interim. The patient was agreeable and verbalized an understanding. History of Present Illness: The patient is a 70 y.o. 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  The patient currently reports complaints of neck pain, pain in both shoulders, low back pain, pain in both hands and feet. Current pain level is a 7/10. Quality pain is described as sharp, throbbing. Current pain medications includes: Oxycodone 7.5/325 every 8 hours as needed for pain, Lyrica 50 mg twice daily. The patient reports that this regimen is providing 50% pain relief. The patient is reporting no side effects from this pain medication regimen. Pain Contract Signed: 2/20/23  Last Urine Drug Screen: 9/29/23    I have personally reviewed and/or updated the patient's past medical history, past surgical history, family history, social history, current medications, allergies, and vital signs today. Review of Systems:    Review of Systems   Constitutional:  Negative for unexpected weight change. HENT:  Negative for hearing loss. Eyes:  Negative for visual disturbance. Respiratory:  Positive for shortness of breath. Cardiovascular:  Negative for leg swelling. Gastrointestinal:  Positive for constipation. Endocrine: Negative for polyuria. Genitourinary:  Negative for difficulty urinating. Musculoskeletal:  Positive for gait problem. Negative for joint swelling and myalgias. Decreased range of motion  Joint stiffness   Skin:  Negative for rash. Neurological:  Positive for dizziness. Negative for weakness and headaches. Psychiatric/Behavioral:  Negative for decreased concentration.     All other systems reviewed and are negative. Past Medical History:   Diagnosis Date    Abdominal aortic aneurysm without rupture (HCC)     Abdominal Aortic Duplex 02/21/2017    Ectatic infrarenal abdominal aorta. MARYANN (acute kidney injury) ([address]) 07/23/2022    CAD (coronary artery disease)     Cancer ([address]) 2022    right lung CA    COPD (chronic obstructive pulmonary disease) (HCC)     Extremity pain     History of echocardiogram 03/18/2014    EF 55%, mild MR and AI. Mild concentric LVH. History of stress test 03/06/2017    Normal.    Hyperlipidemia     Hypertension     Joint pain     Kidney stone     Low back pain     Lung cancer ([address])     Migraine     Neck pain     Obstructive sleep apnea     cannot tolerate CPAP    Osteoarthritis     Peripheral neuropathy     Reflex sympathetic dystrophy     Sacroiliitis ([address]) 02/02/2022       Past Surgical History:   Procedure Laterality Date    CARDIAC CATHETERIZATION  02/13/2012    EF 70%, widely patent renal arteries, significant single-vessel CAD-medical therapy. CARDIAC CATHETERIZATION  04/11/2013    EF 65%, 50% mid LAD, 20% prox CFX, 90% diffuse RCA, 99% mid RCA. Medical management.     CATARACT EXTRACTION Right 09/19/2023    CHOLECYSTECTOMY      COLONOSCOPY      EPIDURAL BLOCK INJECTION Bilateral 08/15/2019    Procedure: BLOCK / INJECTION EPIDURAL STEROID CERVICAL;  Surgeon: Radha Hallman MD;  Location: [facility];  Service: Pain Management     FL GUIDED NEEDLE PLAC BX/ASP/INJ  08/15/2019    IR BIOPSY LUNG  09/13/2022    IR THORACIC DUCT EMBOLIZATION  11/22/2022    ORTHOPEDIC SURGERY       [address] INCL FLUOR GDNCE DX W/CELL WASHG SPX N/A 11/16/2022    Procedure: Karon Dominguez;  Surgeon: Dieudonne Bansal MD;  Location: [facility];  Service: Thoracic    [place] THORACOSCOPY W/LOBECTOMY SINGLE LOBE Right 11/16/2022    Procedure: LOBECTOMY LUNG; lower lobe superior segmentectomy, mediastinal lymph node dissection;  Surgeon: Dieudonne Bansal MD;  Location: [facility]; Service: Thoracic    MS THORACOSCOPY W/SEGMENTECTOMY Right 11/16/2022    Procedure: THORACOSCOPY VIDEO ASSISTED SURGERY (VATS);   Surgeon: Rigoberto Wang MD;  Location: [facility];  Service: Thoracic    TRIGGER POINT INJECTION         Family History   Adopted: Yes   Problem Relation Age of Onset    No Known Problems Family     No Known Problems Mother     No Known Problems Father        Social History     Occupational History    Not on file   Tobacco Use    Smoking status: Some Days     Packs/day: 0.50     Types: Cigarettes    Smokeless tobacco: Never   Vaping Use    Vaping Use: Never used   Substance and Sexual Activity    Alcohol use: Not Currently    Drug use: Never    Sexual activity: Yes         Current Outpatient Medications:     amLODIPine (NORVASC) 10 mg tablet, take 1 tablet by mouth once daily, Disp: 90 tablet, Rfl: 3    bisacodyl (DULCOLAX) 5 mg EC tablet, Take 1 tablet (5 mg total) by mouth daily as needed for constipation for up to 4 doses, Disp: 30 tablet, Rfl: 0    buPROPion (Wellbutrin XL) 150 mg 24 hr tablet, Take 1 tablet (150 mg total) by mouth every morning, Disp: 90 tablet, Rfl: 3    choline fenofibrate (TRILIPIX) 135 MG capsule, Take 1 capsule (135 mg total) by mouth daily, Disp: 30 capsule, Rfl: 6    dicyclomine (BENTYL) 10 mg capsule, take 1 capsule by mouth four times a day before meals and at bedtime, Disp: 30 capsule, Rfl: 0    docusate sodium (COLACE) 100 mg capsule, Take 1 capsule (100 mg total) by mouth 2 (two) times a day, Disp: 20 capsule, Rfl: 0    glucose blood (OneTouch Verio) test strip, Test once daily, Disp: 100 each, Rfl: 0    lisinopril (ZESTRIL) 10 mg tablet, Take 1 tablet (10 mg total) by mouth daily, Disp: 90 tablet, Rfl: 3    metoprolol succinate (TOPROL-XL) 100 mg 24 hr tablet, take 1 tablet by mouth once daily, Disp: 90 tablet, Rfl: 3    nicotine (NICODERM CQ) 21 mg/24 hr TD 24 hr patch, Place 1 patch on the skin over 24 hours every 24 hours, Disp: 28 patch, Rfl: 0    nitroglycerin (NITROSTAT) 0.4 mg SL tablet, Place 1 tablet (0.4 mg total) under the tongue every 5 (five) minutes as needed for chest pain, Disp: 25 tablet, Rfl: 5    ofloxacin (OCUFLOX) 0.3 % ophthalmic solution, instill 1 drop into right eye four times a day STARTING 3 DAYS AK. ..  (REFER TO PRESCRIPTION NOTES). , Disp: , Rfl:     ondansetron (ZOFRAN) 8 mg tablet, Take 1 tablet (8 mg total) by mouth every 8 (eight) hours as needed for nausea or vomiting, Disp: 20 tablet, Rfl: 2    oxyCODONE-acetaminophen (Percocet) 7.5-325 MG per tablet, Take 1 tablet by mouth every 8 (eight) hours as needed for moderate pain Max Daily Amount: 3 tablets, Disp: 90 tablet, Rfl: 0    oxyCODONE-acetaminophen (Percocet) 7.5-325 MG per tablet, Take 1 tablet by mouth every 8 (eight) hours as needed for moderate pain Do not fill until 12/22/2023 Max Daily Amount: 3 tablets, Disp: 90 tablet, Rfl: 0    prednisoLONE acetate (PRED FORTE) 1 % ophthalmic suspension, instill 1 drop into right eye every 2 hours TO BE STARTED AFTER SURGERY, Disp: , Rfl:     pregabalin (LYRICA) 50 mg capsule, Take 1 capsule (50 mg total) by mouth 3 (three) times a day, Disp: 90 capsule, Rfl: 1    rosuvastatin (CRESTOR) 20 MG tablet, take 1 tablet by mouth once daily, Disp: 90 tablet, Rfl: 3    sildenafil (VIAGRA) 100 mg tablet, Take 1 tablet (100 mg total) by mouth as needed for erectile dysfunction, Disp: 20 tablet, Rfl: 0    Stiolto Respimat 2.5-2.5 MCG/ACT inhaler, inhale 2 puffs by mouth and INTO THE LUNGS once daily if needed, Disp: 4 g, Rfl: 2    traZODone (DESYREL) 100 mg tablet, take 1 tablet by mouth daily at bedtime, Disp: 90 tablet, Rfl: 3    fluticasone (FLONASE) 50 mcg/act nasal spray, 1 spray into each nostril daily for 14 days, Disp: 11.1 mL, Rfl: 0    ketoconazole (NIZORAL) 2 % shampoo, Apply 1 Application topically 2 (two) times a week for 28 days, Disp: 100 mL, Rfl: 0    levofloxacin (LEVAQUIN) 500 mg tablet, Take 1 tablet (500 mg total) by mouth every 24 hours for 7 days (Patient not taking: Reported on 11/24/2023), Disp: 7 tablet, Rfl: 0    pantoprazole (PROTONIX) 20 mg tablet, take 1 tablet by mouth once daily (Patient not taking: Reported on 11/24/2023), Disp: 30 tablet, Rfl: 0    polyethylene glycol (MIRALAX) 17 g packet, Take 17 g by mouth daily for 5 days (Patient not taking: Reported on 12/20/2022), Disp: 85 g, Rfl: 0    tamsulosin (FLOMAX) 0.4 mg, Take 1 capsule (0.4 mg total) by mouth daily with dinner for 3 days, Disp: 3 capsule, Rfl: 0  No current facility-administered medications for this visit. Facility-Administered Medications Ordered in Other Visits:     alteplase (CATHFLO) injection 2 mg, 2 mg, Intracatheter, Q1MIN PRN, Pecolia Noon, DO    No Known Allergies    Physical Exam:    /84   Pulse 69   Ht 5' 7" (1.702 m)   Wt 87.1 kg (192 lb)   BMI 30.07 kg/m²     Constitutional:normal, well developed, well nourished, alert, in no distress and non-toxic and no overt pain behavior. Eyes:anicteric  HEENT:grossly intact  Neck:supple, symmetric, trachea midline and no masses   Pulmonary:even and unlabored  Cardiovascular:No edema or pitting edema present  Skin:Normal without rashes or lesions and well hydrated  Psychiatric:Mood and affect appropriate  Neurologic:Cranial Nerves II-XII grossly intact  Musculoskeletal:normal      Imaging  No orders to display         No orders of the defined types were placed in this encounter.

## 2023-11-24 NOTE — PATIENT INSTRUCTIONS
Opioid Safety   WHAT YOU NEED TO KNOW:   An opioid medicine is used to treat pain. Examples are oxycodone, morphine, fentanyl, or codeine. Pain control and management may help you rest, heal, and return to your daily activities. You and your family will receive information about how to manage your pain at home. The instructions will include what to do if you have side effects as your pain is managed. You will get information on how to handle opioid medicine safely. You will also get suggestions on how to control pain without opioids. It is important to follow all instructions so your pain is managed effectively. DISCHARGE INSTRUCTIONS:   Call your local emergency number (911 in the ), or have someone else call if:   You have a seizure. You cannot be woken. You have trouble staying awake and your breathing is slow or shallow. Your speech is slurred, or you are confused. You are dizzy or stumble when you walk. Call your doctor, or have someone close to you call if:   You are extremely drowsy, or you have trouble staying awake or speaking. You have pale or clammy skin. You have blue fingernails or lips. Your heartbeat is slower than normal.    You cannot stop vomiting. You have questions or concerns about your condition or care. Use opioids safely:   Take prescribed opioids exactly as directed. Opioids come with directions based on the kind and how it is given. Talk to your healthcare provider or a pharmacist if you have any questions. Do not take more than the recommended amount. Too much can cause a life-threatening overdose. Do not continue to take it after your pain stops. You may develop tolerance. This means you keep needing higher doses to get the same effect. You may also develop opioid use disorder. This means you are not able to control your opioid use. Do not give opioids to others or take opioids that belong to someone else.   The kind or amount one person takes may not be right for another. The person you share them with may also be taking medicines that do not mix with opioids. The person may drink alcohol or use other drugs that can cause life-threatening problems when mixed with opioids. Do not mix opioids with other medicines or alcohol. The combination can cause an overdose, or cause you to stop breathing. Alcohol, sleeping pills, and medicines such as antihistamines can make you sleepy. A combination with opioids can lead to a coma. Do not drive or operate heavy machinery after you use an opioid. You may feel drowsy or have trouble concentrating. You can injure yourself or others if you drive or use heavy machinery when you are not alert. Your provider or pharmacist can tell you how long to wait after a dose before you do these activities. Talk to your healthcare provider if you have any side effects. Side effects include nausea, sleepiness, itching, and trouble thinking clearly. Your provider may need to make changes to the kind or amount of opioid you are taking. Your provider can also help you find ways to prevent or relieve side effects. Manage constipation:  Constipation is the most common side effect of opioid medicine. Constipation is when you have hard, dry bowel movements, or you go longer than usual between bowel movements. Tell your healthcare provider about all changes in your bowel movements while you are taking opioids. Your provider may recommend laxative medicine to help you have a bowel movement. Your provider may also change the kind of opioid you are taking, or change when you take it. The following are more ways you can prevent or relieve constipation:  Drink liquids as directed. You may need to drink extra liquids to help soften and move your bowels. Ask how much liquid to drink each day and which liquids are best for you. Eat high-fiber foods. This may help decrease constipation by adding bulk to your bowel movements.  High-fiber foods include fruits, vegetables, whole-grain breads and cereals, and beans. Your healthcare provider or dietitian can help you create a high-fiber meal plan. Your provider may also recommend a fiber supplement if you cannot get enough fiber from food. Exercise regularly. Regular physical activity can help stimulate your intestines. Walking is a good exercise to prevent or relieve constipation. Ask which exercises are best for you. Schedule a time each day to have a bowel movement. This may help train your body to have regular bowel movements. Bend forward while you are on the toilet to help move the bowel movement out. Sit on the toilet for at least 10 minutes, even if you do not have a bowel movement. Store opioids safely:   Store opioids where others cannot easily get them. Keep them in a locked cabinet or secure area. Do not  keep them in a purse or other bag you carry with you. A person may be looking for something else and find the opioids. Make sure opioids are stored out of the reach of children. A child can easily overdose on opioids. Opioids may look like candy to a small child. The best way to dispose of opioids: The laws vary by country and area. In the [facility], the best way is to return the opioids through a take-back program. This program is offered by the [employer] ([employer]). The following are options for using the program:  Take the opioids to a [facility] collection site. The site is often a law enforcement center. Call your local law enforcement center for scheduled take-back days in your area. You will be given information on where to go if the collection site is in a different location. Take the opioids to an approved pharmacy or hospital.  A pharmacy or hospital may be set up as a collection site. You will need to ask if it is a [facility] collection site if you were not directed there.  A pharmacy or doctor's office may not be able to take back opioids unless it is a [facility] site. Use a mail-back system. This means you are given containers to put the opioids into. You will then mail them in the containers. Use a take-back drop box. This is a place to leave the opioids at any time. People and animals will not be able to get into the box. Your local law enforcement agency can tell you where to find a drop box in your area. Other safe ways to dispose of opioids: The medicine may come with disposal instructions. The instructions may vary depending on the brand of medicine you are using. Instructions may come in a Medication Guide, but not every medicine has one. You may instead get instructions from your pharmacy or doctor. Follow instructions carefully. The following are general guidelines to follow:  Find out if you can flush the opioid. Some opioids can be flushed down the toilet or poured into the sink. You will need to contact authorities in your area to see if this is an option for you. The FDA also offers a list of medicines that are safe to flush down the toilet. You can check the list if you cannot get the information for your local area. Ask your waste management company about rules for putting opioids in the trash. The company will be able to give you specific directions. Scratch out personal information on the original medicine label so it cannot be read. Then put it in the trash. Do not label the trash or put any information on it about the opioids. It should look like regular household trash so no one is tempted to look for the opioids. Keep the trash out of the reach of children and animals. Always make sure trash is secure. Talk to officials if you live in a facility. If you live in a nursing home or assisted living center, talk to an official. The person will know the rules for your area. Other ways to manage pain:   Ask your healthcare provider about non-opioid medicines to control pain.   Some medicines may even work better than opioids, depending on the cause of your pain. Nonprescription medicines include NSAIDs (such as ibuprofen) and acetaminophen. Prescription medicines include muscle relaxers, antidepressants, and steroids. Pain may be managed without any medicines. Some ways to relieve pain include massage, aromatherapy, or meditation. Physical or occupational therapy may also help. Follow up with your doctor or pain specialist as directed: You may need to have your dose adjusted. Your doctor or pain specialist can also help you find ways to manage pain without opioids. Write down your questions so you remember to ask them during your visits. For more information:   Drug Enforcement Administration  [address] [facility] , [address] [facility]  Phone: 2- 601 - 709-0986  Web Address: [employer]. [employer] [employer].gov/drug_disposal/    [employer] and Drug Administration  [address] , [address] [facility] [address]  Phone: 4- 436 - 976-9118  Web Address: http://mSchool/  © Copyright [facility] 2023 Information is for End User's use only and may not be sold, redistributed or otherwise used for commercial purposes. The above information is an  only. It is not intended as medical advice for individual conditions or treatments. Talk to your doctor, nurse or pharmacist before following any medical regimen to see if it is safe and effective for you.

## 2023-11-27 ENCOUNTER — TELEMEDICINE (OUTPATIENT)
Dept: CARDIAC SURGERY | Facility: CLINIC | Age: 68
End: 2023-11-27
Payer: MEDICARE

## 2023-11-27 ENCOUNTER — TELEPHONE (OUTPATIENT)
Dept: HEMATOLOGY ONCOLOGY | Facility: CLINIC | Age: 68
End: 2023-11-27

## 2023-11-27 ENCOUNTER — TELEPHONE (OUTPATIENT)
Dept: PULMONOLOGY | Facility: CLINIC | Age: 68
End: 2023-11-27

## 2023-11-27 DIAGNOSIS — C34.31 PRIMARY SQUAMOUS CELL CARCINOMA OF LOWER LOBE OF RIGHT LUNG (HCC): Primary | ICD-10-CM

## 2023-11-27 DIAGNOSIS — J18.9 PNEUMONIA OF BOTH LUNGS DUE TO INFECTIOUS ORGANISM, UNSPECIFIED PART OF LUNG: ICD-10-CM

## 2023-11-27 DIAGNOSIS — N52.9 ERECTILE DYSFUNCTION, UNSPECIFIED ERECTILE DYSFUNCTION TYPE: ICD-10-CM

## 2023-11-27 PROCEDURE — 99212 OFFICE O/P EST SF 10 MIN: CPT | Performed by: PHYSICIAN ASSISTANT

## 2023-11-27 RX ORDER — AZITHROMYCIN 250 MG/1
TABLET, FILM COATED ORAL
Qty: 6 TABLET | Refills: 0 | Status: SHIPPED | OUTPATIENT
Start: 2023-11-27 | End: 2023-12-01

## 2023-11-27 RX ORDER — AMOXICILLIN AND CLAVULANATE POTASSIUM 875; 125 MG/1; MG/1
1 TABLET, FILM COATED ORAL EVERY 12 HOURS SCHEDULED
Qty: 14 TABLET | Refills: 0 | Status: SHIPPED | OUTPATIENT
Start: 2023-11-27 | End: 2023-12-04

## 2023-11-27 NOTE — TELEPHONE ENCOUNTER
Patient Call    Who are you speaking with? Patient    If it is not the patient, are they listed on an active communication consent form? Yes   What is the reason for this call? Patient is very sick and would like to know if he can have his appt over the phone   Does this require a call back? Yes   If a call back is required, please list best call back number [phone]   If a call back is required, advise that a message will be forwarded to their care team and someone will return their call as soon as possible. Did you relay this information to the patient?  Yes

## 2023-11-27 NOTE — ASSESSMENT & PLAN NOTE
Patient with about 1.5 weeks of fevers and productive cough, CT chest with evidence of pneumonia. Will treat with Augmentin and Azithromycin. Will repeat CT chest in 3 months for follow-up. He will call if he does not have improvement in his symptoms with antibiotics.

## 2023-11-27 NOTE — PROGRESS NOTES
Thoracic Follow-Up  Assessment/Plan:    No problem-specific Assessment & Plan notes found for this encounter. {Assess/PlanSmartLinks:03688}      Thoracic History         Diagnosis: Stage IIIA squamous cell lung cancer right lower lobe superior segment  Procedure: right thoracoscopic lower lobe superior segmentectomy 11/16/2022  Pathology: 1.8x1. 8x1.1cm invasive squamous cell carcinoma, G3, without  invasion. +lymphovascular invasion present. 2 of 10 lymph nodes involved, including levels 4R and 11R. PATHOLOGIC STAGE CLASSIFICATION (pTNM, AJCC 8th Edition)- Stage IIIA- pT1b, pN0, Mx, G3). Cancer Staging   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)  Staging form: Lung, AJCC 8th Edition  - Clinical: Stage IIIA (cT1b, cN2, cM0) - Signed by Kay Schrader PA-C on 12/6/2022  Laterality: Right  Oncology History   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 ([address])   12/6/2022 -  Cancer Staged    Staging form: Lung, AJCC 8th Edition  - Clinical: Stage IIIA (cT1b, cN2, cM0) - Signed by Kay Schrader PA-C on 12/6/2022  Laterality: Right       Malignant neoplasm of lower lobe of right lung ([address])   9/13/2022 Initial Diagnosis    July 23, 2022 patient had CT chest ab pelvis regarding rule out AAA. This showed 1.1 cm right lower lobe pulmonary nodule new since prior study of November 2019. Some other smaller nodules identified. Subcarinal lymph node 2 cm. No other findings suspicious for metastatic disease. August 5, 2022 PET/CT showed 1.2 cm pulmonary nodule, SUV 2.9. Other nodules noted, subcentimeter, no hypermetabolism. Some groundglass opacities in the right upper lobe. September 13, 2022 patient had needle biopsy of the right lower lobe mass showing squamous cell carcinoma, TTF-1 positive. November 16, 2022 patient underwent right lower lobe segmentectomy. Pathology showed 1.8 cm squamous cell carcinoma.   Level 4 and 11 lymph node were positive for metastatic carcinoma. 1/30/2023 - 3/27/2023 Chemotherapy    palonosetron (ALOXI), 0.25 mg, Intravenous, Once, 4 of 4 cycles  Administration: 0.25 mg (1/30/2023), 0.25 mg (2/13/2023), 0.25 mg (3/6/2023), 0.25 mg (3/27/2023)  fosaprepitant (EMEND) IVPB, 150 mg, Intravenous, Once, 3 of 3 cycles  Administration: 150 mg (1/30/2023), 150 mg (2/13/2023), 150 mg (3/6/2023)  CARBOplatin (PARAPLATIN) IVPB ([facility] AUC DOSING), 552 mg, Intravenous, Once, 4 of 4 cycles  Administration: 550 mg (1/30/2023), 650 mg (2/13/2023), 650 mg (3/6/2023), 500 mg (3/27/2023)  PACLItaxel (TAXOL) chemo IVPB, 349.8 mg, Intravenous, Once, 4 of 4 cycles  Dose modification: 200 mg/m2 (original dose 175 mg/m2, Cycle 2, Reason: Dose modified as per discussion with consulting physician)  Administration: 360 mg (1/30/2023), 400 mg (2/13/2023), 400 mg (3/6/2023), 400 mg (3/27/2023)  aprepitant (CINVANTI) in  mL IVPB, 130 mg, Intravenous, Once, 1 of 1 cycle  Administration: 130 mg (3/27/2023)     4/13/2023 Genomic Testing    April 13, 2023 Caris-  PD-L1 1%, TMB high. MTAP deleted. p53 [id number], pathogenic variant  KRAS G694 L, VUS  CRABBP  *, pathogenic variant  KMT2D *, pathogenic variant     6/21/2023 -  Chemotherapy    alteplase (CATHFLO), 2 mg, Intracatheter, Every 1 Minute as needed, 7 of 10 cycles  atezolizumab (TECENTRIQ) IVPB, 1,200 mg, Intravenous, Once, 7 of 10 cycles  Administration: 1,200 mg (6/21/2023), 1,200 mg (7/12/2023), 1,200 mg (8/29/2023), 1,200 mg (9/18/2023), 1,200 mg (10/10/2023), 1,200 mg (10/31/2023)         Subjective:    Patient ID: Nela Thakkar is a 70 y.o. male. HPI  Mr Aj Negron is a 70year old male with PMH Stage IIIA squamous cell carcinoma s/p right htoracoscopic superior segementectomy     {[facility] ambulatory SmartLinks:53439}    Review of Systems      Objective:   Physical ExamThere were no vitals taken for this visit.     XR chest pa & lateral    Result Date: 1/30/2023  Impression No acute cardiopulmonary disease. Workstation performed: [id number]     XR chest pa & lateral    Result Date: 12/10/2022  Impression No acute cardiopulmonary disease. Workstation performed: [id number]     XR chest pa & lateral    Result Date: 12/2/2022  Impression Resolution of right apical pneumothorax. Status post thoracic duct embolization with stable postsurgical changes in the right lung. Workstation performed: [id number]      CT chest wo contrast    Result Date: 11/27/2023  Narrative CT CHEST WITHOUT IV CONTRAST INDICATION:   C34.31: Malignant neoplasm of lower lobe, right bronchus or lung. Right lower lobe segmentectomy and IR thoracic duct embolization 11/2022 COMPARISON: 5/16/2023 TECHNIQUE: CT examination of the chest was performed. Axial, sagittal, and coronal 2D reformatted images were created from the source data and submitted for interpretation. Radiation dose length product (DLP) for this visit:  374 mGy-cm . This examination, like all CT scans performed in the [facility], was performed utilizing techniques to minimize radiation dose exposure, including the use of iterative reconstruction and automated exposure control. IV Contrast: FINDINGS: LUNGS: Similar emphysema, mild anterior, upper lobe predominant peripheral interstitial changes, calcified right upper lobe granuloma, right peripheral middle lobe fibrotic change and right lower lobe surgical change. New tree-in-bud-type nodules in all right lobes and the superior segment of the left lower lobe, typical of bronchiolitis, often aspiration. Confluent areas of groundglass airspace attenuation in the right upper lobe (3/110) and right middle lobe (3/132). Mild dependent tracheal secretions. Otherwise patent central airways. PLEURA:  Within normal limits. HEART/GREAT VESSELS:  Atherosclerosis. MEDIASTINUM AND DANILO: Thoracic duct embolization. No new enlarged lymph nodes. Similar small lymph nodes, not enlarged by CT criteria.  Similar mild esophageal wall thickening. Normal caliber. CHEST WALL AND LOWER NECK:   Within normal limits. VISUALIZED STRUCTURES IN THE UPPER ABDOMEN: Similar left renal cysts and nonobstructing 0.3 cm stone. Tiny, new hyperdense right renal cyst. Cholecystectomy. OSSEOUS STRUCTURES:  Degenerative changes The study was marked in EPIC for significant notification. Impression No findings suspicious for new malignancy in the chest. Infectious/inflammatory type bilateral nodules and right lung groundglass airspace opacities. Recommend follow-up in 3 months after treatment. Workstation performed: [id number]     CT chest wo contrast    Result Date: 5/16/2023  Narrative CT CHEST WITHOUT IV CONTRAST INDICATION:   C34.31: Malignant neoplasm of lower lobe, right bronchus or lung. Excerpt from Hematology and Oncology progress note dated 5/12/2023:  "In summary, this is a 70-year-old male with a history of resected stage IIIa squamous cell carcinoma of the lung."  "He is due for CAT scan in the next few weeks" COMPARISON: Multiple prior studies most recent is a CT chest from 2/3/2023. TECHNIQUE: CT examination of the chest was performed without intravenous contrast. Multiplanar 2D reformatted images were created from the source data. This examination, like all CT scans performed in the [facility], was performed utilizing techniques to minimize radiation dose exposure, including the use of iterative reconstruction and automated exposure control. Radiation dose length product (DLP) for this visit:  371.6 mGy-cm FINDINGS: LUNGS: Unchanged mild upper lobe predominant centrilobular pulmonary emphysema. Right lower lobe postoperative changes. A 2.9 x 1.2 cm opacity, #5/64, adjacent to the staple line is decreased in size from 3.4 x 1.5 cm on the prior study. No new pulmonary findings. PLEURA:  Unremarkable. HEART/GREAT VESSELS: Unchanged cardiac contours. Reidentified coronary artery calcifications.  No significant pericardial effusion. No thoracic aortic aneurysm. MEDIASTINUM AND DANILO: Mild chronic circumferential esophageal thickening in keeping with a nonspecific esophagitis. Scattered subcentimeter mediastinal lymph nodes unchanged from the prior study. CHEST WALL AND LOWER NECK:  Unremarkable. VISUALIZED STRUCTURES IN THE UPPER ABDOMEN: Known renal cysts. See the recent MRI abdomen CT renal stone reports. Status post cholecystectomy. OSSEOUS STRUCTURES:  No acute fracture or destructive osseous lesion. Impression Decreasing size of the right lower lobe opacity adjacent to the staple line without new findings. Workstation performed: [id number]     CT chest wo contrast    Result Date: 2/3/2023  Narrative CT CHEST WITHOUT IV CONTRAST INDICATION:   C34.11: Malignant neoplasm of upper lobe, right bronchus or lung. COMPARISON:  CT chest 11/23/2022. Multiple chest x-rays with the most recent study obtained 1/28/2023. TECHNIQUE: CT examination of the chest was performed without intravenous contrast. Axial, sagittal, and coronal 2D reformatted images were created from the source data and submitted for interpretation. Radiation dose length product (DLP) for this visit:  343 mGy-cm . This examination, like all CT scans performed in the [facility], was performed utilizing techniques to minimize radiation dose exposure, including the use of iterative reconstruction and automated exposure control. FINDINGS: LUNGS:  Moderate centrilobular emphysema is noted. Postsurgical changes are noted within the right lower lobe. There is somewhat linear density is demonstrated along the right lower lobe staple line. The most conspicuous portion measures 3.4 x 1.5 cm (3:121). The extent of this consolidation/density is substantially decreased from comparison CT of November 2022  as this density measures approximately 4.6 x 3.6 cm on that exam when measured similarly.   Some pleural reticular opacities within the right middle lobe and lingula likely representing atelectasis/scarring. Ill-defined subpleural groundglass opacity within the lingula appears stable from chest CT of July 2022. Stable right upper lobe calcified granuloma is noted. There is no discrete tracheal or endobronchial lesion. PLEURA:  Unremarkable. HEART/GREAT VESSELS: Heart is normal in size. Coronary calcifications are noted. Mild-to-moderate atherosclerotic calcifications are noted within the thoracic aorta and branching vessels. MEDIASTINUM AND DANILO:  Unremarkable. CHEST WALL AND LOWER NECK:  Unremarkable. VISUALIZED STRUCTURES IN THE UPPER ABDOMEN:  Status post cholecystectomy. Nonobstructing left renal calculus is noted. There are numerous renal cysts seen bilaterally. These are not fully characterized on this noncontrast study but appear overall stable in size from CT of November 2022. OSSEOUS STRUCTURES:  No acute fracture or destructive osseous lesion. Degenerative changes are noted within the spine. Impression 1. Residual consolidation/soft tissue density along the right lower lobe stable line. This is substantially improved from prior CT of November 2022 and the remaining density is favored to represent residual scarring/atelectasis, however, attention on follow-up is recommended to confirm stability/continued improvement. No new suspicious CT findings elsewhere within the chest. Workstation performed: [id number]     No CT Chest,Abdomen,Pelvis results available for this patient. No NM PET CT results available for this patient. No Barium Swallow results available for this patient.

## 2023-11-27 NOTE — ASSESSMENT & PLAN NOTE
Mr César Brice is about a year out from a right VATS superior segmentectomy on 11/16/23. He completed neoadjuvant chemotherapy and is now on immunotherapy. His CT chest shows evidence of pneumonia and he is symptomatic with productive cough and fevers. There is no evidence of recurrent disease. I will prescribe him a course of antibiotics with Augementin and Azithromycin and repeat a CT chest in 3 months. He will call back if he does not have symptom improvement. All questions were answered and he is in agreement with this plan.

## 2023-11-27 NOTE — PROGRESS NOTES
Virtual Regular Visit    Verification of patient location:    Patient is located at Home in the following state in which I hold an active license [place]      Assessment/Plan:    Problem List Items Addressed This Visit          Respiratory    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 ([address]) - Primary            Reason for visit is   Chief Complaint   Patient presents with    Virtual Regular Visit          Encounter provider Carl Trivedi PA-C    Provider located at [place] [id number] [phone]      Recent Visits  No visits were found meeting these conditions. Showing recent visits within past 7 days and meeting all other requirements  Today's Visits  Date Type Provider Dept   11/27/23 Telemedicine Jhoan De Los Santos PA-C  Thoracic Surg [facility] [place]   Showing today's visits and meeting all other requirements  Future Appointments  No visits were found meeting these conditions. Showing future appointments within next 150 days and meeting all other requirements       The patient was identified by name and date of birth. Pio Cordoba was informed that this is a telemedicine visit and that the visit is being conducted through Telephone. My office door was closed. The patient was notified the following individuals were present in the room. He acknowledged consent and understanding of privacy and security of the video platform. The patient has agreed to participate and understands they can discontinue the visit at any time. Patient is aware this is a billable service. Subjective  Pio Cordoba is a 70 y.o. male *** . HPI     Past Medical History:   Diagnosis Date    Abdominal aortic aneurysm without rupture (HCC)     Abdominal Aortic Duplex 02/21/2017    Ectatic infrarenal abdominal aorta.     MARYANN (acute kidney injury) ([address]) 07/23/2022    CAD (coronary artery disease)     Cancer ([address]) 2022 right lung CA    COPD (chronic obstructive pulmonary disease) (HCC)     Extremity pain     History of echocardiogram 03/18/2014    EF 55%, mild MR and AI. Mild concentric LVH. History of stress test 03/06/2017    Normal.    Hyperlipidemia     Hypertension     Joint pain     Kidney stone     Low back pain     Lung cancer ([address])     Migraine     Neck pain     Obstructive sleep apnea     cannot tolerate CPAP    Osteoarthritis     Peripheral neuropathy     Reflex sympathetic dystrophy     Sacroiliitis ([address]) 02/02/2022       Past Surgical History:   Procedure Laterality Date    CARDIAC CATHETERIZATION  02/13/2012    EF 70%, widely patent renal arteries, significant single-vessel CAD-medical therapy. CARDIAC CATHETERIZATION  04/11/2013    EF 65%, 50% mid LAD, 20% prox CFX, 90% diffuse RCA, 99% mid RCA. Medical management. CATARACT EXTRACTION Right 09/19/2023    CHOLECYSTECTOMY      COLONOSCOPY      EPIDURAL BLOCK INJECTION Bilateral 08/15/2019    Procedure: BLOCK / INJECTION EPIDURAL STEROID CERVICAL;  Surgeon: Yasmin May MD;  Location: [facility];  Service: Pain Management     FL GUIDED NEEDLE PLAC BX/ASP/INJ  08/15/2019    IR BIOPSY LUNG  09/13/2022    IR THORACIC DUCT EMBOLIZATION  11/22/2022    ORTHOPEDIC SURGERY       [address] INCL FLUOR GDNCE DX W/CELL WASHG SPX N/A 11/16/2022    Procedure: Teresa Keenan;  Surgeon: Jc Donaldson MD;  Location: [facility];  Service: Thoracic    RI THORACOSCOPY W/LOBECTOMY SINGLE LOBE Right 11/16/2022    Procedure: LOBECTOMY LUNG; lower lobe superior segmentectomy, mediastinal lymph node dissection;  Surgeon: Jc Donaldson MD;  Location: [facility];  Service: Thoracic    RI THORACOSCOPY W/SEGMENTECTOMY Right 11/16/2022    Procedure: THORACOSCOPY VIDEO ASSISTED SURGERY (VATS);   Surgeon: Jc Donaldson MD;  Location: [facility];  Service: Thoracic    TRIGGER POINT INJECTION         Current Outpatient Medications Medication Sig Dispense Refill    amLODIPine (NORVASC) 10 mg tablet take 1 tablet by mouth once daily 90 tablet 3    bisacodyl (DULCOLAX) 5 mg EC tablet Take 1 tablet (5 mg total) by mouth daily as needed for constipation for up to 4 doses 30 tablet 0    buPROPion (Wellbutrin XL) 150 mg 24 hr tablet Take 1 tablet (150 mg total) by mouth every morning 90 tablet 3    choline fenofibrate (TRILIPIX) 135 MG capsule Take 1 capsule (135 mg total) by mouth daily 30 capsule 6    dicyclomine (BENTYL) 10 mg capsule take 1 capsule by mouth four times a day before meals and at bedtime 30 capsule 0    docusate sodium (COLACE) 100 mg capsule Take 1 capsule (100 mg total) by mouth 2 (two) times a day 20 capsule 0    fluticasone (FLONASE) 50 mcg/act nasal spray 1 spray into each nostril daily for 14 days 11.1 mL 0    glucose blood (OneTouch Verio) test strip Test once daily 100 each 0    ketoconazole (NIZORAL) 2 % shampoo Apply 1 Application topically 2 (two) times a week for 28 days 100 mL 0    lisinopril (ZESTRIL) 10 mg tablet Take 1 tablet (10 mg total) by mouth daily 90 tablet 3    metoprolol succinate (TOPROL-XL) 100 mg 24 hr tablet take 1 tablet by mouth once daily 90 tablet 3    nicotine (NICODERM CQ) 21 mg/24 hr TD 24 hr patch Place 1 patch on the skin over 24 hours every 24 hours 28 patch 0    nitroglycerin (NITROSTAT) 0.4 mg SL tablet Place 1 tablet (0.4 mg total) under the tongue every 5 (five) minutes as needed for chest pain 25 tablet 5    ofloxacin (OCUFLOX) 0.3 % ophthalmic solution instill 1 drop into right eye four times a day STARTING 3 DAYS RI. ..  (REFER TO PRESCRIPTION NOTES).       ondansetron (ZOFRAN) 8 mg tablet Take 1 tablet (8 mg total) by mouth every 8 (eight) hours as needed for nausea or vomiting 20 tablet 2    oxyCODONE-acetaminophen (Percocet) 7.5-325 MG per tablet Take 1 tablet by mouth every 8 (eight) hours as needed for moderate pain Max Daily Amount: 3 tablets 90 tablet 0    oxyCODONE-acetaminophen (Percocet) 7.5-325 MG per tablet Take 1 tablet by mouth every 8 (eight) hours as needed for moderate pain Do not fill until 12/22/2023 Max Daily Amount: 3 tablets 90 tablet 0    pantoprazole (PROTONIX) 20 mg tablet take 1 tablet by mouth once daily (Patient not taking: Reported on 11/24/2023) 30 tablet 0    polyethylene glycol (MIRALAX) 17 g packet Take 17 g by mouth daily for 5 days (Patient not taking: Reported on 12/20/2022) 85 g 0    prednisoLONE acetate (PRED FORTE) 1 % ophthalmic suspension instill 1 drop into right eye every 2 hours TO BE STARTED AFTER SURGERY      pregabalin (LYRICA) 50 mg capsule Take 1 capsule (50 mg total) by mouth 3 (three) times a day 90 capsule 1    rosuvastatin (CRESTOR) 20 MG tablet take 1 tablet by mouth once daily 90 tablet 3    sildenafil (VIAGRA) 100 mg tablet Take 1 tablet (100 mg total) by mouth as needed for erectile dysfunction 20 tablet 0    Stiolto Respimat 2.5-2.5 MCG/ACT inhaler inhale 2 puffs by mouth and INTO THE LUNGS once daily if needed 4 g 2    tamsulosin (FLOMAX) 0.4 mg Take 1 capsule (0.4 mg total) by mouth daily with dinner for 3 days 3 capsule 0    traZODone (DESYREL) 100 mg tablet take 1 tablet by mouth daily at bedtime 90 tablet 3     No current facility-administered medications for this visit. No Known Allergies    Review of Systems    Video Exam    There were no vitals filed for this visit.     Physical Exam     Visit Time  Total Visit Duration: 20 minutes

## 2023-11-27 NOTE — PROGRESS NOTES
Virtual Regular Visit    Verification of patient location:    Patient is located at Home in the following state in which I hold an active license [place]      Assessment/Plan:    Problem List Items Addressed This Visit          Respiratory    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 [facility]) - Primary     Mr Pilo Kay is about a year out from a right VATS superior segmentectomy on 11/16/23. He completed neoadjuvant chemotherapy and is now on immunotherapy. His CT chest shows evidence of pneumonia and he is symptomatic with productive cough and fevers. There is no evidence of recurrent disease. I will prescribe him a course of antibiotics with Augementin and Azithromycin and repeat a CT chest in 3 months. He will call back if he does not have symptom improvement. All questions were answered and he is in agreement with this plan. Pneumonia due to infectious organism     Patient with about 1.5 weeks of fevers and productive cough, CT chest with evidence of pneumonia. Will treat with Augmentin and Azithromycin. Will repeat CT chest in 3 months for follow-up. He will call if he does not have improvement in his symptoms with antibiotics. Relevant Medications    amoxicillin-clavulanate (AUGMENTIN) 875-125 mg per tablet    azithromycin (ZITHROMAX) 250 mg tablet            Reason for visit is   Chief Complaint   Patient presents with    Virtual Regular Visit          Encounter provider Claudia Roberts PA-C    Provider located at [address] [place] [id number] [phone]      Recent Visits  No visits were found meeting these conditions.   Showing recent visits within past 7 days and meeting all other requirements  Today's Visits  Date Type Provider Dept   11/27/23 Telemedicine Sandor Li PA-C [facility]   Showing today's visits and meeting all other requirements  Future Appointments  No visits were found meeting these conditions. Showing future appointments within next 150 days and meeting all other requirements       The patient was identified by name and date of birth. Lorri Sagastume was informed that this is a telemedicine visit and that the visit is being conducted through Telephone. My office door was closed. The patient was notified the following individuals were present in the room. He acknowledged consent and understanding of privacy and security of the video platform. The patient has agreed to participate and understands they can discontinue the visit at any time. Patient is aware this is a billable service. Subjective  Lorri Sagastume is a 70 y.o. male     Mr Maria Ines Gifford is a 70year old male with PMH Stage IIIA squamous cell carcinoma s/p right thoracoscopic superior segmentectomy on 11/16/23 who presents for routine surveillance. He completed neoadjuvant chemotherapy 1/20/23-3/27/2023 and is now on Tecentriq since June 2023. CT chest on 11/22/23 was personally reviewed by myself in PACS. It shows new tree-in-bud type nodules in the right lobes and the superior segment of the left lwoer lobe, typical of bronchiolitis. Confluent areas of ground glass airspace attenuation in the right upper and right middle lobe. No findings suspicious for new malignancy in the chest.     On discussion he has had fevers for about a week in a half, up to 102. 6. Continues with fevers 100-101. He has a productive cough and feels congestion in his chest. He has yellow/gray phlegm. He has chronic SOB, but no change from his baseline. SpO2 on home oximeter is 95-96%. His son is also sick. He has been in contact with Dr Zaria Zuniga and was started on Levaquin on 11/18, but this was discontinued given normal CBC and blood cultures. He had no improvement with antibiotics. Anirudh HILL     Past Medical History:   Diagnosis Date    Abdominal aortic aneurysm without rupture (HCC)     Abdominal Aortic Duplex 02/21/2017    Ectatic infrarenal abdominal aorta. MARYANN (acute kidney injury) ([address]) 07/23/2022    CAD (coronary artery disease)     Cancer ([address]) 2022    right lung CA    COPD (chronic obstructive pulmonary disease) (HCC)     Extremity pain     History of echocardiogram 03/18/2014    EF 55%, mild MR and AI. Mild concentric LVH. History of stress test 03/06/2017    Normal.    Hyperlipidemia     Hypertension     Joint pain     Kidney stone     Low back pain     Lung cancer ([address])     Migraine     Neck pain     Obstructive sleep apnea     cannot tolerate CPAP    Osteoarthritis     Peripheral neuropathy     Reflex sympathetic dystrophy     Sacroiliitis ([address]) 02/02/2022       Past Surgical History:   Procedure Laterality Date    CARDIAC CATHETERIZATION  02/13/2012    EF 70%, widely patent renal arteries, significant single-vessel CAD-medical therapy. CARDIAC CATHETERIZATION  04/11/2013    EF 65%, 50% mid LAD, 20% prox CFX, 90% diffuse RCA, 99% mid RCA. Medical management.     CATARACT EXTRACTION Right 09/19/2023    CHOLECYSTECTOMY      COLONOSCOPY      EPIDURAL BLOCK INJECTION Bilateral 08/15/2019    Procedure: BLOCK / INJECTION EPIDURAL STEROID CERVICAL;  Surgeon: Leo Clark MD;  Location: [facility];  Service: Pain Management     FL GUIDED NEEDLE PLAC BX/ASP/INJ  08/15/2019    IR BIOPSY LUNG  09/13/2022    IR THORACIC DUCT EMBOLIZATION  11/22/2022    ORTHOPEDIC SURGERY       [address] INCL FLUOR GDNCE DX W/CELL WASHG SPX N/A 11/16/2022    Procedure: Pily Allen;  Surgeon: Karen Clark MD;  Location: [facility];  Service: Thoracic    ID THORACOSCOPY W/LOBECTOMY SINGLE LOBE Right 11/16/2022    Procedure: LOBECTOMY LUNG; lower lobe superior segmentectomy, mediastinal lymph node dissection;  Surgeon: Karen Clark MD;  Location: [facility];  Service: Thoracic    ID THORACOSCOPY W/SEGMENTECTOMY Right 11/16/2022    Procedure: THORACOSCOPY VIDEO ASSISTED SURGERY (VATS); Surgeon: Chichi Valdez MD;  Location: [facility];  Service: Thoracic    TRIGGER POINT INJECTION         Current Outpatient Medications   Medication Sig Dispense Refill    amoxicillin-clavulanate (AUGMENTIN) 875-125 mg per tablet Take 1 tablet by mouth every 12 (twelve) hours for 7 days 14 tablet 0    azithromycin (ZITHROMAX) 250 mg tablet Take 2 tablets today then 1 tablet daily x 4 days 6 tablet 0    amLODIPine (NORVASC) 10 mg tablet take 1 tablet by mouth once daily 90 tablet 3    bisacodyl (DULCOLAX) 5 mg EC tablet Take 1 tablet (5 mg total) by mouth daily as needed for constipation for up to 4 doses 30 tablet 0    buPROPion (Wellbutrin XL) 150 mg 24 hr tablet Take 1 tablet (150 mg total) by mouth every morning 90 tablet 3    choline fenofibrate (TRILIPIX) 135 MG capsule Take 1 capsule (135 mg total) by mouth daily 30 capsule 6    dicyclomine (BENTYL) 10 mg capsule take 1 capsule by mouth four times a day before meals and at bedtime 30 capsule 0    docusate sodium (COLACE) 100 mg capsule Take 1 capsule (100 mg total) by mouth 2 (two) times a day 20 capsule 0    fluticasone (FLONASE) 50 mcg/act nasal spray 1 spray into each nostril daily for 14 days 11.1 mL 0    glucose blood (OneTouch Verio) test strip Test once daily 100 each 0    ketoconazole (NIZORAL) 2 % shampoo Apply 1 Application topically 2 (two) times a week for 28 days 100 mL 0    lisinopril (ZESTRIL) 10 mg tablet Take 1 tablet (10 mg total) by mouth daily 90 tablet 3    metoprolol succinate (TOPROL-XL) 100 mg 24 hr tablet take 1 tablet by mouth once daily 90 tablet 3    nicotine (NICODERM CQ) 21 mg/24 hr TD 24 hr patch Place 1 patch on the skin over 24 hours every 24 hours 28 patch 0    nitroglycerin (NITROSTAT) 0.4 mg SL tablet Place 1 tablet (0.4 mg total) under the tongue every 5 (five) minutes as needed for chest pain 25 tablet 5    ofloxacin (OCUFLOX) 0.3 % ophthalmic solution instill 1 drop into right eye four times a day STARTING 3 DAYS OK. ..  (REFER TO PRESCRIPTION NOTES). ondansetron (ZOFRAN) 8 mg tablet Take 1 tablet (8 mg total) by mouth every 8 (eight) hours as needed for nausea or vomiting 20 tablet 2    oxyCODONE-acetaminophen (Percocet) 7.5-325 MG per tablet Take 1 tablet by mouth every 8 (eight) hours as needed for moderate pain Max Daily Amount: 3 tablets 90 tablet 0    oxyCODONE-acetaminophen (Percocet) 7.5-325 MG per tablet Take 1 tablet by mouth every 8 (eight) hours as needed for moderate pain Do not fill until 12/22/2023 Max Daily Amount: 3 tablets 90 tablet 0    pantoprazole (PROTONIX) 20 mg tablet take 1 tablet by mouth once daily (Patient not taking: Reported on 11/24/2023) 30 tablet 0    polyethylene glycol (MIRALAX) 17 g packet Take 17 g by mouth daily for 5 days (Patient not taking: Reported on 12/20/2022) 85 g 0    prednisoLONE acetate (PRED FORTE) 1 % ophthalmic suspension instill 1 drop into right eye every 2 hours TO BE STARTED AFTER SURGERY      pregabalin (LYRICA) 50 mg capsule Take 1 capsule (50 mg total) by mouth 3 (three) times a day 90 capsule 1    rosuvastatin (CRESTOR) 20 MG tablet take 1 tablet by mouth once daily 90 tablet 3    sildenafil (VIAGRA) 100 mg tablet Take 1 tablet (100 mg total) by mouth as needed for erectile dysfunction 20 tablet 0    Stiolto Respimat 2.5-2.5 MCG/ACT inhaler inhale 2 puffs by mouth and INTO THE LUNGS once daily if needed 4 g 2    tamsulosin (FLOMAX) 0.4 mg Take 1 capsule (0.4 mg total) by mouth daily with dinner for 3 days 3 capsule 0    traZODone (DESYREL) 100 mg tablet take 1 tablet by mouth daily at bedtime 90 tablet 3     No current facility-administered medications for this visit. No Known Allergies    Review of Systems   Constitutional:  Positive for fever. Negative for chills, diaphoresis and unexpected weight change. HENT: Negative. Eyes: Negative. Respiratory:  Positive for cough and shortness of breath. Negative for wheezing.     Cardiovascular: Negative for chest pain and leg swelling. Gastrointestinal:  Negative for abdominal pain, diarrhea and nausea. Endocrine: Negative. Genitourinary: Negative. Musculoskeletal: Negative. Skin: Negative. Allergic/Immunologic: Negative. Neurological: Negative. Hematological:  Negative for adenopathy. Does not bruise/bleed easily. Psychiatric/Behavioral: Negative. All other systems reviewed and are negative. Video Exam    There were no vitals filed for this visit. Physical Exam  Neurological:      Mental Status: He is oriented to person, place, and time.    Psychiatric:         Mood and Affect: Mood normal.          Visit Time  Total Visit Duration: 18 minutes

## 2023-11-28 RX ORDER — SILDENAFIL 100 MG/1
100 TABLET, FILM COATED ORAL AS NEEDED
Qty: 20 TABLET | Refills: 0 | Status: SHIPPED | OUTPATIENT
Start: 2023-11-28

## 2023-12-04 ENCOUNTER — TELEPHONE (OUTPATIENT)
Dept: INFECTIOUS DISEASES | Facility: CLINIC | Age: 68
End: 2023-12-04

## 2023-12-04 ENCOUNTER — TELEPHONE (OUTPATIENT)
Dept: HEMATOLOGY ONCOLOGY | Facility: CLINIC | Age: 68
End: 2023-12-04

## 2023-12-04 NOTE — TELEPHONE ENCOUNTER
Jorge López from [facility] calls the office today regarding patients referral.     Jorge López states she is calling to get patient scheduled for an appointment. Informed Penny referral was received and was sent to [facility] for review. Penny verbalizes understanding at this time.

## 2023-12-04 NOTE — TELEPHONE ENCOUNTER
Spoke with patient who stated he has completed his Augmentin antibiotic course and is continuing to have fevers throughout the day ranging from 99-101F with associated chills. Pt states the fever breaks after he takes his Percocet in the morning and the afternoon. I informed pt he can take ibuprofen in between Percocet doses if he spikes a fever. Pt reports having cough and has been taking muscinex. I educated pt on the importance of hydration and informed him he has an appt tomorrow with Dr. Momo Agustin and can further discuss. Pt verbalized understanding and in agreement with plan.

## 2023-12-05 ENCOUNTER — APPOINTMENT (OUTPATIENT)
Dept: LAB | Facility: HOSPITAL | Age: 68
End: 2023-12-05
Payer: MEDICARE

## 2023-12-05 ENCOUNTER — OFFICE VISIT (OUTPATIENT)
Dept: HEMATOLOGY ONCOLOGY | Facility: CLINIC | Age: 68
End: 2023-12-05
Payer: MEDICARE

## 2023-12-05 VITALS
WEIGHT: 191.6 LBS | OXYGEN SATURATION: 95 % | SYSTOLIC BLOOD PRESSURE: 162 MMHG | HEIGHT: 68 IN | TEMPERATURE: 96.8 F | RESPIRATION RATE: 16 BRPM | HEART RATE: 66 BPM | DIASTOLIC BLOOD PRESSURE: 90 MMHG | BODY MASS INDEX: 29.04 KG/M2

## 2023-12-05 DIAGNOSIS — R50.9 FEVER OF UNKNOWN ORIGIN: ICD-10-CM

## 2023-12-05 DIAGNOSIS — C34.31 MALIGNANT NEOPLASM OF LOWER LOBE OF RIGHT LUNG (HCC): ICD-10-CM

## 2023-12-05 DIAGNOSIS — K86.1 OTHER CHRONIC PANCREATITIS (HCC): ICD-10-CM

## 2023-12-05 DIAGNOSIS — J42 CHRONIC BRONCHITIS, UNSPECIFIED CHRONIC BRONCHITIS TYPE (HCC): ICD-10-CM

## 2023-12-05 DIAGNOSIS — C34.31 PRIMARY SQUAMOUS CELL CARCINOMA OF LOWER LOBE OF RIGHT LUNG (HCC): ICD-10-CM

## 2023-12-05 DIAGNOSIS — R50.9 FEVER OF UNKNOWN ORIGIN: Primary | ICD-10-CM

## 2023-12-05 LAB
ALBUMIN SERPL BCP-MCNC: 4.4 G/DL (ref 3.5–5)
ALP SERPL-CCNC: 70 U/L (ref 34–104)
ALT SERPL W P-5'-P-CCNC: 11 U/L (ref 7–52)
AMYLASE SERPL-CCNC: 18 IU/L (ref 29–103)
ANION GAP SERPL CALCULATED.3IONS-SCNC: 8 MMOL/L
AST SERPL W P-5'-P-CCNC: 13 U/L (ref 13–39)
BASOPHILS # BLD AUTO: 0.08 THOUSANDS/ÂΜL (ref 0–0.1)
BASOPHILS NFR BLD AUTO: 1 % (ref 0–1)
BILIRUB SERPL-MCNC: 0.79 MG/DL (ref 0.2–1)
BUN SERPL-MCNC: 15 MG/DL (ref 5–25)
CALCIUM SERPL-MCNC: 9.6 MG/DL (ref 8.4–10.2)
CHLORIDE SERPL-SCNC: 104 MMOL/L (ref 96–108)
CO2 SERPL-SCNC: 25 MMOL/L (ref 21–32)
CREAT SERPL-MCNC: 1.12 MG/DL (ref 0.6–1.3)
EOSINOPHIL # BLD AUTO: 0 THOUSAND/ÂΜL (ref 0–0.61)
EOSINOPHIL NFR BLD AUTO: 0 % (ref 0–6)
ERYTHROCYTE [DISTWIDTH] IN BLOOD BY AUTOMATED COUNT: 13.3 % (ref 11.6–15.1)
GFR SERPL CREATININE-BSD FRML MDRD: 66 ML/MIN/1.73SQ M
GLUCOSE SERPL-MCNC: 172 MG/DL (ref 65–140)
HCT VFR BLD AUTO: 48 % (ref 36.5–49.3)
HGB BLD-MCNC: 15.5 G/DL (ref 12–17)
IMM GRANULOCYTES # BLD AUTO: 0.03 THOUSAND/UL (ref 0–0.2)
IMM GRANULOCYTES NFR BLD AUTO: 0 % (ref 0–2)
LIPASE SERPL-CCNC: 26 U/L (ref 11–82)
LYMPHOCYTES # BLD AUTO: 2.3 THOUSANDS/ÂΜL (ref 0.6–4.47)
LYMPHOCYTES NFR BLD AUTO: 21 % (ref 14–44)
MCH RBC QN AUTO: 30.1 PG (ref 26.8–34.3)
MCHC RBC AUTO-ENTMCNC: 32.3 G/DL (ref 31.4–37.4)
MCV RBC AUTO: 93 FL (ref 82–98)
MONOCYTES # BLD AUTO: 0.69 THOUSAND/ÂΜL (ref 0.17–1.22)
MONOCYTES NFR BLD AUTO: 6 % (ref 4–12)
NEUTROPHILS # BLD AUTO: 8.14 THOUSANDS/ÂΜL (ref 1.85–7.62)
NEUTS SEG NFR BLD AUTO: 72 % (ref 43–75)
NRBC BLD AUTO-RTO: 0 /100 WBCS
PLATELET # BLD AUTO: 252 THOUSANDS/UL (ref 149–390)
PMV BLD AUTO: 9.9 FL (ref 8.9–12.7)
POTASSIUM SERPL-SCNC: 3.9 MMOL/L (ref 3.5–5.3)
PROT SERPL-MCNC: 7.3 G/DL (ref 6.4–8.4)
RBC # BLD AUTO: 5.15 MILLION/UL (ref 3.88–5.62)
SODIUM SERPL-SCNC: 137 MMOL/L (ref 135–147)
WBC # BLD AUTO: 11.24 THOUSAND/UL (ref 4.31–10.16)

## 2023-12-05 PROCEDURE — 80053 COMPREHEN METABOLIC PANEL: CPT

## 2023-12-05 PROCEDURE — 85025 COMPLETE CBC W/AUTO DIFF WBC: CPT

## 2023-12-05 PROCEDURE — 82150 ASSAY OF AMYLASE: CPT

## 2023-12-05 PROCEDURE — 36415 COLL VENOUS BLD VENIPUNCTURE: CPT

## 2023-12-05 PROCEDURE — 83690 ASSAY OF LIPASE: CPT

## 2023-12-05 PROCEDURE — 99215 OFFICE O/P EST HI 40 MIN: CPT | Performed by: INTERNAL MEDICINE

## 2023-12-05 RX ORDER — CYCLOSPORINE 0.5 MG/ML
EMULSION OPHTHALMIC
COMMUNITY
Start: 2023-10-30

## 2023-12-05 NOTE — PROGRESS NOTES
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 PD-L1 1%, TMB high. Status post adjuvant Taxol carbo. Adjuvant Tecentriq started June 2023. Recent chest CT showed some groundglass opacities, infectious/inflammatory. Patient has been on recent antibiotic without improvement in fevers. He takes Percocet for back pain and has some temperature improvement for a few hours after Percocet. He had had fevers earlier this year, approximately February. These were ultimately attributed to an episode of pancreatitis. Repeat amylase and lipase are requested at this time. I will confer with GI regarding applicability of imaging. Patient reports that he has some moderate abdominal pains over the past month or so since fevers have arisen. .  Will see him back in 3 weeks for review. I do not believe Tecentriq is the cause of his fevers. I suspect cough is secondary to COPD exacerbation. We may ask for pulmonary reevaluation. I discussed the above with the patient. The patient  voiced understanding and agreement.  ______________________________________________________________________    Chief Complaint   Patient presents with    Follow-up       HPI:  Oncology History   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 ([address])   12/6/2022 -  Cancer Staged    Staging form: Lung, AJCC 8th Edition  - Clinical: Stage IIIA (cT1b, cN2, cM0) - Signed by Anu Davis PA-C on 12/6/2022  Laterality: Right       Malignant neoplasm of lower lobe of right lung ([address])   9/13/2022 Initial Diagnosis    July 23, 2022 patient had CT chest ab pelvis regarding rule out AAA. This showed 1.1 cm right lower lobe pulmonary nodule new since prior study of November 2019.   Some other smaller nodules identified. Subcarinal lymph node 2 cm. No other findings suspicious for metastatic disease. August 5, 2022 PET/CT showed 1.2 cm pulmonary nodule, SUV 2.9. Other nodules noted, subcentimeter, no hypermetabolism. Some groundglass opacities in the right upper lobe. September 13, 2022 patient had needle biopsy of the right lower lobe mass showing squamous cell carcinoma, TTF-1 positive. November 16, 2022 patient underwent right lower lobe segmentectomy. Pathology showed 1.8 cm squamous cell carcinoma. Level 4 and 11 lymph node were positive for metastatic carcinoma. 1/30/2023 - 3/27/2023 Chemotherapy    palonosetron (ALOXI), 0.25 mg, Intravenous, Once, 4 of 4 cycles  Administration: 0.25 mg (1/30/2023), 0.25 mg (2/13/2023), 0.25 mg (3/6/2023), 0.25 mg (3/27/2023)  fosaprepitant (EMEND) IVPB, 150 mg, Intravenous, Once, 3 of 3 cycles  Administration: 150 mg (1/30/2023), 150 mg (2/13/2023), 150 mg (3/6/2023)  CARBOplatin (PARAPLATIN) IVPB ([facility] [place] AUC DOSING), 552 mg, Intravenous, Once, 4 of 4 cycles  Administration: 550 mg (1/30/2023), 650 mg (2/13/2023), 650 mg (3/6/2023), 500 mg (3/27/2023)  PACLItaxel (TAXOL) chemo IVPB, 349.8 mg, Intravenous, Once, 4 of 4 cycles  Dose modification: 200 mg/m2 (original dose 175 mg/m2, Cycle 2, Reason: Dose modified as per discussion with consulting physician)  Administration: 360 mg (1/30/2023), 400 mg (2/13/2023), 400 mg (3/6/2023), 400 mg (3/27/2023)  aprepitant (CINVANTI) in  mL IVPB, 130 mg, Intravenous, Once, 1 of 1 cycle  Administration: 130 mg (3/27/2023)     4/13/2023 Genomic Testing    April 13, 2023 Caris-  PD-L1 1%, TMB high. MTAP deleted.   p53 [id number], pathogenic variant  KRAS G694 L, VUS  CRABBP  *, pathogenic variant  KMT2D *, pathogenic variant     6/21/2023 -  Chemotherapy    alteplase (CATHFLO), 2 mg, Intracatheter, Every 1 Minute as needed, 7 of 10 cycles  atezolizumab (TECENTRIQ) IVPB, 1,200 mg, Intravenous, Once, 7 of 10 cycles  Administration: 1,200 mg (6/21/2023), 1,200 mg (7/12/2023), 1,200 mg (8/29/2023), 1,200 mg (9/18/2023), 1,200 mg (10/10/2023), 1,200 mg (10/31/2023)         Interval History: Cough, abdominal discomfort, fevers. ECOG-  1 - Symptomatic but completely ambulatory    Review of Systems   Constitutional:  Positive for fatigue and fever. Negative for chills. HENT:  Negative for nosebleeds. Eyes:  Negative for discharge. Respiratory:  Positive for cough. Negative for shortness of breath. Cardiovascular:  Negative for chest pain. Gastrointestinal:  Positive for abdominal pain. Negative for constipation and diarrhea. Endocrine: Negative for polydipsia. Genitourinary:  Negative for hematuria. Musculoskeletal:  Negative for arthralgias. Skin:  Negative for color change. Allergic/Immunologic: Negative for immunocompromised state. Neurological:  Negative for dizziness and headaches. Hematological:  Negative for adenopathy. Psychiatric/Behavioral:  Negative for agitation. Past Medical History:   Diagnosis Date    Abdominal aortic aneurysm without rupture (HCC)     Abdominal Aortic Duplex 02/21/2017    Ectatic infrarenal abdominal aorta. MARYANN (acute kidney injury) ([address]) 07/23/2022    CAD (coronary artery disease)     Cancer ([address]) 2022    right lung CA    COPD (chronic obstructive pulmonary disease) ([facility])     Extremity pain     History of echocardiogram 03/18/2014    EF 55%, mild MR and AI. Mild concentric LVH.     History of stress test 03/06/2017    Normal.    Hyperlipidemia     Hypertension     Joint pain     Kidney stone     Low back pain     Lung cancer (HCC)     Migraine     Neck pain     Obstructive sleep apnea     cannot tolerate CPAP    Osteoarthritis     Peripheral neuropathy     Reflex sympathetic dystrophy     Sacroiliitis ([address]) 02/02/2022     Patient Active Problem List   Diagnosis    JAKI (obstructive sleep apnea)    Chronic bronchitis (HCC)    Abdominal aortic aneurysm (AAA) without rupture    Lumbar spondylosis    Lumbar degenerative disc disease    Myofascial pain syndrome    Chronic low back pain without sciatica    Cervical radiculopathy    Cervical spondylosis without myelopathy    Tremor, essential    Negative depression screening    Chronic pain of both knees    Class 1 obesity due to excess calories with serious comorbidity and body mass index (BMI) of 31.0 to 31.9 in adult    Smoking    Hypertension    Chronic pain syndrome    Uncomplicated opioid dependence ([address])    Encounter for long-term opiate analgesic use    Neck pain    Hyperlipidemia    Pre-diabetes    Erectile dysfunction    Insomnia    Cortical age-related cataract of both eyes    Urinary frequency    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)    Kidney stone    Pulmonary emphysema (HCC)    Malignant neoplasm of lower lobe of right lung ([address])    Encounter for smoking cessation counseling    Hemiparesis (HCC)    At high risk for osteoporosis    Coronary artery disease of native artery of native heart with stable angina pectoris (HCC)    Stage 3a chronic kidney disease (HCC)    Night sweats    Other chronic pancreatitis (HCC)    Skin lesion    Depression, recurrent (HCC)    Neuropathic pain    Age-related cataract of right eye    Constipation due to opioid therapy    Neuropathy due to chemotherapeutic drug     Pneumonia due to infectious organism       Current Outpatient Medications:     amLODIPine (NORVASC) 10 mg tablet, take 1 tablet by mouth once daily, Disp: 90 tablet, Rfl: 3    bisacodyl (DULCOLAX) 5 mg EC tablet, Take 1 tablet (5 mg total) by mouth daily as needed for constipation for up to 4 doses, Disp: 30 tablet, Rfl: 0    buPROPion (Wellbutrin XL) 150 mg 24 hr tablet, Take 1 tablet (150 mg total) by mouth every morning, Disp: 90 tablet, Rfl: 3    choline fenofibrate (TRILIPIX) 135 MG capsule, Take 1 capsule (135 mg total) by mouth daily, Disp: 30 capsule, Rfl: 6    dicyclomine (BENTYL) 10 mg capsule, take 1 capsule by mouth four times a day before meals and at bedtime, Disp: 30 capsule, Rfl: 0    docusate sodium (COLACE) 100 mg capsule, Take 1 capsule (100 mg total) by mouth 2 (two) times a day, Disp: 20 capsule, Rfl: 0    glucose blood (OneTouch Verio) test strip, Test once daily, Disp: 100 each, Rfl: 0    lisinopril (ZESTRIL) 10 mg tablet, Take 1 tablet (10 mg total) by mouth daily, Disp: 90 tablet, Rfl: 3    metoprolol succinate (TOPROL-XL) 100 mg 24 hr tablet, take 1 tablet by mouth once daily, Disp: 90 tablet, Rfl: 3    nicotine (NICODERM CQ) 21 mg/24 hr TD 24 hr patch, Place 1 patch on the skin over 24 hours every 24 hours, Disp: 28 patch, Rfl: 0    nitroglycerin (NITROSTAT) 0.4 mg SL tablet, Place 1 tablet (0.4 mg total) under the tongue every 5 (five) minutes as needed for chest pain, Disp: 25 tablet, Rfl: 5    ofloxacin (OCUFLOX) 0.3 % ophthalmic solution, instill 1 drop into right eye four times a day STARTING 3 DAYS MS. ..  (REFER TO PRESCRIPTION NOTES). , Disp: , Rfl:     ondansetron (ZOFRAN) 8 mg tablet, Take 1 tablet (8 mg total) by mouth every 8 (eight) hours as needed for nausea or vomiting, Disp: 20 tablet, Rfl: 2    oxyCODONE-acetaminophen (Percocet) 7.5-325 MG per tablet, Take 1 tablet by mouth every 8 (eight) hours as needed for moderate pain Max Daily Amount: 3 tablets, Disp: 90 tablet, Rfl: 0    oxyCODONE-acetaminophen (Percocet) 7.5-325 MG per tablet, Take 1 tablet by mouth every 8 (eight) hours as needed for moderate pain Do not fill until 12/22/2023 Max Daily Amount: 3 tablets, Disp: 90 tablet, Rfl: 0    prednisoLONE acetate (PRED FORTE) 1 % ophthalmic suspension, instill 1 drop into right eye every 2 hours TO BE STARTED AFTER SURGERY, Disp: , Rfl:     pregabalin (LYRICA) 50 mg capsule, Take 1 capsule (50 mg total) by mouth 3 (three) times a day, Disp: 90 capsule, Rfl: 1    Restasis 0.05 % ophthalmic emulsion, , Disp: , Rfl:     rosuvastatin (CRESTOR) 20 MG tablet, take 1 tablet by mouth once daily, Disp: 90 tablet, Rfl: 3    sildenafil (VIAGRA) 100 mg tablet, Take 1 tablet (100 mg total) by mouth as needed for erectile dysfunction, Disp: 20 tablet, Rfl: 0    Stiolto Respimat 2.5-2.5 MCG/ACT inhaler, inhale 2 puffs by mouth and INTO THE LUNGS once daily if needed, Disp: 4 g, Rfl: 2    traZODone (DESYREL) 100 mg tablet, take 1 tablet by mouth daily at bedtime, Disp: 90 tablet, Rfl: 3    fluticasone (FLONASE) 50 mcg/act nasal spray, 1 spray into each nostril daily for 14 days, Disp: 11.1 mL, Rfl: 0    ketoconazole (NIZORAL) 2 % shampoo, Apply 1 Application topically 2 (two) times a week for 28 days, Disp: 100 mL, Rfl: 0    pantoprazole (PROTONIX) 20 mg tablet, take 1 tablet by mouth once daily (Patient not taking: Reported on 12/5/2023), Disp: 30 tablet, Rfl: 0    polyethylene glycol (MIRALAX) 17 g packet, Take 17 g by mouth daily for 5 days (Patient not taking: Reported on 12/20/2022), Disp: 85 g, Rfl: 0    tamsulosin (FLOMAX) 0.4 mg, Take 1 capsule (0.4 mg total) by mouth daily with dinner for 3 days, Disp: 3 capsule, Rfl: 0  No Known Allergies  Past Surgical History:   Procedure Laterality Date    CARDIAC CATHETERIZATION  02/13/2012    EF 70%, widely patent renal arteries, significant single-vessel CAD-medical therapy. CARDIAC CATHETERIZATION  04/11/2013    EF 65%, 50% mid LAD, 20% prox CFX, 90% diffuse RCA, 99% mid RCA. Medical management.     CATARACT EXTRACTION Right 09/19/2023    CHOLECYSTECTOMY      COLONOSCOPY      EPIDURAL BLOCK INJECTION Bilateral 08/15/2019    Procedure: BLOCK / INJECTION EPIDURAL STEROID CERVICAL;  Surgeon: Guzman Figueroa MD;  Location: [facility];  Service: Pain Management     FL GUIDED NEEDLE PLAC BX/ASP/INJ  08/15/2019    IR BIOPSY LUNG  09/13/2022    IR THORACIC DUCT EMBOLIZATION  11/22/2022    ORTHOPEDIC SURGERY       [address] INCL FLUOR GDNCE DX W/CELL WASHG SPX N/A 11/16/2022    Procedure: BRONCHOSCOPY FLEXIBLE;  Surgeon: Josephine Meredith Pamella Prince MD;  Location: [facility];  Service: Thoracic    MO THORACOSCOPY W/LOBECTOMY SINGLE LOBE Right 11/16/2022    Procedure: LOBECTOMY LUNG; lower lobe superior segmentectomy, mediastinal lymph node dissection;  Surgeon: Jc Donaldson MD;  Location: [facility];  Service: Thoracic    MO THORACOSCOPY W/SEGMENTECTOMY Right 11/16/2022    Procedure: THORACOSCOPY VIDEO ASSISTED SURGERY (VATS); Surgeon: Jc Donaldson MD;  Location: [facility];  Service: Thoracic    TRIGGER POINT INJECTION       Social History     Objective:  Vitals:    12/05/23 1002   BP: 162/90   BP Location: Left arm   Patient Position: Sitting   Cuff Size: Extra-Large   Pulse: 66   Resp: 16   Temp: (!) 96.8 °F (36 °C)   TempSrc: Tympanic   SpO2: 95%   Weight: 86.9 kg (191 lb 9.6 oz)   Height: 5' 8" (1.727 m)     Physical Exam  Constitutional:       Appearance: He is well-developed. HENT:      Head: Normocephalic and atraumatic. Mouth/Throat:      Mouth: Mucous membranes are moist.   Eyes:      Pupils: Pupils are equal, round, and reactive to light. Cardiovascular:      Rate and Rhythm: Normal rate and regular rhythm. Heart sounds: No murmur heard. Pulmonary:      Breath sounds: Normal breath sounds. No wheezing or rales. Abdominal:      Palpations: Abdomen is soft. Tenderness: There is no abdominal tenderness. Musculoskeletal:         General: No tenderness. Normal range of motion. Cervical back: Neck supple. Lymphadenopathy:      Cervical: No cervical adenopathy. Skin:     Findings: No erythema or rash. Neurological:      Mental Status: He is alert and oriented to person, place, and time. Cranial Nerves: No cranial nerve deficit. Deep Tendon Reflexes: Reflexes are normal and symmetric. Psychiatric:         Behavior: Behavior normal.           Labs: I personally reviewed the labs and imaging pertinent to this patient care.

## 2023-12-06 ENCOUNTER — TELEPHONE (OUTPATIENT)
Dept: INFECTIOUS DISEASES | Facility: CLINIC | Age: 68
End: 2023-12-06

## 2023-12-06 RX ORDER — ACETAMINOPHEN 325 MG/1
650 TABLET ORAL ONCE
OUTPATIENT
Start: 2023-12-11 | End: 2023-12-13

## 2023-12-06 RX ORDER — SODIUM CHLORIDE 9 MG/ML
20 INJECTION, SOLUTION INTRAVENOUS ONCE
OUTPATIENT
Start: 2023-12-11

## 2023-12-06 NOTE — TELEPHONE ENCOUNTER
Spoke with patient today with regard to his referral to see us. Patient had been treated for pneumonia after referral was placed. Inquired as to whether this had helped his fevers, or not. Patient states he has had fevers since the weekend before Thanksgiving. He still gets fevers up to in the 101 degree range. He states that this has happened in the past after chemo, and that it may be related to the chemo. He would like to have an appointment. Provided patient an appointment 12/20 with Dr. David Lim at [facility] office. Patient accepted it, and thanked me for my call.

## 2023-12-08 ENCOUNTER — TELEPHONE (OUTPATIENT)
Dept: HEMATOLOGY ONCOLOGY | Facility: CLINIC | Age: 68
End: 2023-12-08

## 2023-12-08 ENCOUNTER — TELEPHONE (OUTPATIENT)
Dept: INFECTIOUS DISEASES | Facility: CLINIC | Age: 68
End: 2023-12-08

## 2023-12-08 NOTE — TELEPHONE ENCOUNTER
Reached out to [facility] w/ Hem Onc to let her know Dr. Cleopatra Johns feels patient may need a more urgent evaluation potentially via the hospital. Message sent to her. She lets me know that she will let Dr. Christina Caruso be aware.

## 2023-12-08 NOTE — TELEPHONE ENCOUNTER
Received call from Dr. Aquiles Worthington office asking if patient can be sooner than 12/20 (scheduled in [place] w/ Dr. Clara Vides), that he is having a lot of symptoms that they are concerned about. Unfortunately we do not have any sooner availability. Patient being referred for FUO. Will route to office doctor to review and see what her thoughts are, otherwise 12/20 is next available at any office that we can offer at.

## 2023-12-08 NOTE — TELEPHONE ENCOUNTER
Can you please discuss with Dr. Fouzia Javed that pt be seen by Thoracic Surgery. Pt still has fevers.

## 2023-12-08 NOTE — TELEPHONE ENCOUNTER
Called and spoke to Christine to let him know that Dr. Niall Delarosa would like him to be seen by a Thoracic Surgeon. Rosangela Wilkinson to contact him on Monday 12/11/23 with the details. Christine is aware and in agreement with the plan. Also reviewed that if he felt poorly, he could go to the ED.

## 2023-12-11 ENCOUNTER — TELEPHONE (OUTPATIENT)
Dept: PULMONOLOGY | Facility: CLINIC | Age: 68
End: 2023-12-11

## 2023-12-11 ENCOUNTER — HOSPITAL ENCOUNTER (OUTPATIENT)
Dept: INFUSION CENTER | Facility: HOSPITAL | Age: 68
Discharge: HOME | End: 2023-12-11
Attending: INTERNAL MEDICINE
Payer: MEDICARE

## 2023-12-11 VITALS
HEART RATE: 67 BPM | DIASTOLIC BLOOD PRESSURE: 81 MMHG | TEMPERATURE: 97.3 F | RESPIRATION RATE: 18 BRPM | WEIGHT: 194.89 LBS | BODY MASS INDEX: 29.54 KG/M2 | HEIGHT: 68 IN | SYSTOLIC BLOOD PRESSURE: 166 MMHG

## 2023-12-11 DIAGNOSIS — C34.31 MALIGNANT NEOPLASM OF LOWER LOBE OF RIGHT LUNG (HCC): Primary | ICD-10-CM

## 2023-12-11 PROCEDURE — 96413 CHEMO IV INFUSION 1 HR: CPT

## 2023-12-11 PROCEDURE — 96367 TX/PROPH/DG ADDL SEQ IV INF: CPT

## 2023-12-11 RX ORDER — ACETAMINOPHEN 325 MG/1
650 TABLET ORAL ONCE
Status: COMPLETED | OUTPATIENT
Start: 2023-12-11 | End: 2023-12-11

## 2023-12-11 RX ORDER — SODIUM CHLORIDE 9 MG/ML
20 INJECTION, SOLUTION INTRAVENOUS ONCE
Status: COMPLETED | OUTPATIENT
Start: 2023-12-11 | End: 2023-12-11

## 2023-12-11 RX ADMIN — ATEZOLIZUMAB 1200 MG: 1200 INJECTION, SOLUTION INTRAVENOUS at 11:57

## 2023-12-11 RX ADMIN — ACETAMINOPHEN 650 MG: 325 TABLET, FILM COATED ORAL at 10:58

## 2023-12-11 RX ADMIN — DIPHENHYDRAMINE HYDROCHLORIDE 25 MG: 50 INJECTION, SOLUTION INTRAMUSCULAR; INTRAVENOUS at 11:00

## 2023-12-11 RX ADMIN — DEXAMETHASONE SODIUM PHOSPHATE 10 MG: 10 INJECTION, SOLUTION INTRAMUSCULAR; INTRAVENOUS at 11:24

## 2023-12-11 RX ADMIN — SODIUM CHLORIDE 20 ML/HR: 0.9 INJECTION, SOLUTION INTRAVENOUS at 11:00

## 2023-12-11 NOTE — TELEPHONE ENCOUNTER
----- Message from Shawn Aguilar RN sent at 12/11/2023 11:55 AM EST -----  Antoinette , I would imagine that would be fine. ----- Message -----  From: Ozzie Archer  Sent: 12/11/2023  11:53 AM EST  To: Shawn Aguilar RN    Hello, Dr Ariel Bettencourt is not in our office the rest of December or in January. Can this patient be seen by Moraima Shane the NP?    ----- Message -----  From: Shawn Aguilar RN  Sent: 12/11/2023  11:39 AM EST  To: [facility]; #    Hi, please see below. Can patient be seen relatively soon? Let me know! Thanks!  ----- Message -----  From: Nancy Zaidi PA-C  Sent: 12/11/2023  11:16 AM EST  To: CHANDLER Arora,     Thanks for reaching out and helping us coordinate plan of care for Christine. This would be something that would need to be evaluated by pulm. He sees Dr. Felix Adkins and was supposed to have follow-up with them anyways but hasn't been seen since August 2022. Could you see if they could get him in quickly? I'm happy to reach out to Dr Jimenez Miller if needed. Thanks,   Erik Mendoza     ----- Message -----  From: Yoli Welch MD  Sent: 12/11/2023  10:52 AM EST  To: Nancy Zaidi PA-C    Yes! Pulmonary should eval.  ----- Message -----  From: Nancy Zaidi PA-C  Sent: 12/11/2023  10:12 AM EST  To: [facility],     I saw Alsyon Aguirre on 11/27. He is about a year out from a right VATS superior segmentectomy on 11/16/23 for Stage IIIA squamous cell carcinoma. He completed neoadjuvant chemotherapy and is now on immunotherapy(with Dr Tita Rojas). When I saw him he had fevers/chills/cough and CT showed b/l GGO consistent with pneumonia. I treated with abx and have him scheduled for a repeat CT chest in 3 months. Dr Tita Rojas reached out to ID because he continues with fevers- ID thinks he may need a bronch. He has seen pulm in the past as well.  I was going to send him back to pulm for evaluation of this, but wanted to be sure that you are in agreement with this plan. Let me know what you think. Thanks,   Flavia Solis      ----- Message -----  From: Angelique Escoto  Sent: 12/11/2023   9:59 AM EST  To: Tomy Mora PA-C; #      ----- Message -----  From: Arpita Alexander RN  Sent: 12/11/2023   9:22 AM EST  To: Thor[facility] [place] Clerical; #    Pt recently seen 11/27 by Flavia Solis, was prescribed abx, still with fevers. Dr Tosha Thompson recently saw him and referred to ID. ID unable to get him in until 12/20 but Dr Zaria Clark reviewed pts chart and thinks pt may need bronchoscopy to further eval based off recent CT results. Please review and if needed move pts f/u appt up. TY!

## 2023-12-11 NOTE — PROGRESS NOTES
Lance Celaya  tolerated treatment well with no complications.       Truong Harrington is aware of future appt on 1/2/2024  at 0900     :   AVS  (Declined by Truong Harrington)

## 2023-12-11 NOTE — PLAN OF CARE
Problem: Potential for Falls  Goal: Patient will remain free of falls  Description: INTERVENTIONS:  - Educate patient/family on patient safety including physical limitations  - Instruct patient to call for assistance with activity   - Consult OT/PT to assist with strengthening/mobility   - Keep Call bell within reach  - Keep bed low and locked with side rails adjusted as appropriate  - Keep care items and personal belongings within reach  - Initiate and maintain comfort rounds  - Make Fall Risk Sign visible to staff  - Offer Toileting every  Hours, in advance of need  - Initiate/Maintain alarm  - Obtain necessary fall risk management equipment:   - Apply yellow socks and bracelet for high fall risk patients  - Consider moving patient to room near nurses station  12/11/2023 1122 by Jim Jeffery RN  Outcome: Progressing  12/11/2023 1121 by Jim Jeffery, RN  Outcome: Progressing

## 2023-12-12 ENCOUNTER — TELEPHONE (OUTPATIENT)
Dept: PULMONOLOGY | Facility: CLINIC | Age: 68
End: 2023-12-12

## 2023-12-12 NOTE — PROGRESS NOTES
Consultation - Infectious Disease   Lance Hazel 68 y.o. male MRN: [id number]  Unit/Bed#:  Encounter: [id number]      IMPRESSION & RECOMMENDATIONS:     Fevers:  -Patient with daily fevers, night sweats, and dry cough since around 11/16. Blood cultures negative on 11/17. He does have abnormal lung findings as below, consider atypical lung infection such as fungal, NTM, Nocardia given immunosuppressed state. BAL from 12/14 so far with Candida only which is likely colonizer. No recent travel. No TB exposures, though has traveled to [place] in past and 17 countries in [place]. 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 Given his report of foul smelling sweat, consider Brucellosis though no obvious exposure risk. Has pet cat but no bites/scratches. Consider non-infectious causes such as malignancy vs. side effect of his immunotherapy.  -Check repeat CT chest to reassess lung findings  -Check CT A/P to assess for adenopathy/malignancy   -Follow up BAL cultures (fungal, AFB)  -Check Serum Cryptococcal antigen and urine histoplasma antigens for invasive pulmonary fungal infection  -Repeat blood cultures X2  -Check HIV screen  -Check Brucella antibodies  -Check Quantiferon gold  -Check acute EBV panel  -Check parasite smear, Anaplasma PCR, Lyme testing  -Return to ID clinic in 2-3 weeks  -Ongoing follow up with Pulmonology and Oncology; will  discuss if any concern this could be a reaction to his immunotherapy    Abnormal CT Chest:  -CT 11/22 showing new tree-in-bud nodules throughout right lung and left lower lobe, as well as areas of groundglass opacitiy in right upper lobe and right middle lobe. CT appearance is atypical for typical bacterial pneumonia and he did not respond to multiple antibiotic courses arguing against this. Time course also is too long for viral infection. Consider if could be NTM vs. Nocardia give immunosuppressed state. Consider  invasive fungal infection such as Histoplasma or Cryptococcus. I would consider if this may be a pneumonitis developing to patients immunotherapy (atezolizumab) which was started in June and he is till receiving. Recent BAL labs from 12/14 negative for PJP, cultures only growing Candida which is likely colonizer.  -Repeat CT chest now to reassess for progression  -Follow up pending fungal and AFB BAL cultures from 12/14  -Check urine histoplasma antigen, serum cryptococcal antigen  -Follow up with Pulmonology is upcoming  -Will discuss with Pulm and Oncology if may be pneumonitis related to his immunotherapy    3. 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 Now on immunotherapy since April/May.   -Follow up CT scans as above  -Consider side effect of immunotherapy causing fevers, as above    I have discussed the above management plan in detail with the primary service    I have performed an extensive review of the medical records in Epic including review of the notes, radiographs, and laboratory results     HISTORY OF PRESENT ILLNESS:  Reason for Consult: fevers    HPI: Lance Hazel is a 68 y.o. year old male with a history of stage III squamous cell carcinoma lung, COPD, nephrolithiasis.    Regarding his lung cancer, he was treated with prior VATS resection on 11/16/2022  and adjuvant therapy. He was started on adjuvant immunotherapy with atezolizumab (Tecentriq) fusions every 21 days.  Had called into his oncology office on 11/16 complaining of of fevers up to 102.5 for 3 to 4 days.  He also complained of right-sided abdominal pain and right flank pain.  He also started develop a dry cough. UA unremarkable as were blood cultures. Trialed on a course of levofloxacin, however he was still having fever.  CT scan of the chest was done on 11/22 which was notable for a calcified right upper lobe granuloma, right peripheral middle lobe fibrotic change.  There  were new tree-in-bud nodules in the right lung and superior segment of the left lower lobe, per report typical of bronchiolitis or aspiration.  There were also confluent areas of groundglass airspace attenuation in the right upper lobe right middle lobe.  Visualized upper abdomen noted renal cysts and a nonobstructing 0.3 cm renal stone.  No obvious findings suggest new malignancy in the chest.     He had follow up with Thoracic surgery on 11/27, he was given a course of Augmentin and Azithromycin for pneumonia based on CT findings, fevers and cough that were still ongoing. He continued to have fevers despite his antibiotic course. He saw oncology on 12/05, was also complaining of some abdominal pain. Amylase/lipase checked given a prior episode of pancreatitis, both unremarkable.     Been on immunotherapy infusions since April or May. He thinks he did have fevers back in February as well that went away. Then came back a week before Thanksgiving. He also has a dry cough since this time that is new. His fevers are daily. He has associated night sweats with them. He notes the sweat has a particularly bad smell. No diarrhea, sore throat, emesis. He has some abdominal fullness/bloating that occurs after eating starting around the same time as his cough/fevers. No rash, joint pain/swelling or wounds. He has dentures, no oral pain or lesions. No travel in last two years. He works in construction with refurbishing homes. He is exposed to dirt/dust at work and occasional  lines. Not a lot of soil exposure. Has pet cat at home, but no bites/scratches. No other animal exposure. Does not eat unpastureized dairy or imported meats/cheeses. No other animal exposure. He has traveled many years ago to [place] for vacation and multiple countries in [place]. Lives at home with his fiance and 5 year old son, neither whom are sick. No clear tick exposures, he is not in wooded areas a lot outside of one of the homes  he does work on. He does note about one year ago a lot of deer would come to the home he was working on and he would feed them, including letting one eat an apple out of his mouth once.       REVIEW OF SYSTEMS:  A complete review of systems is negative other than that noted in the HPI.    PAST MEDICAL HISTORY:  Past Medical History:   Diagnosis Date    Abdominal aortic aneurysm without rupture ([facility])     Abdominal Aortic Duplex 02/21/2017    Ectatic infrarenal abdominal aorta.    MARYANN (acute kidney injury) ([facility]) 07/23/2022    CAD (coronary artery disease)     Cancer ([facility]) 2022    right lung CA    COPD (chronic obstructive pulmonary disease) ([facility])     Extremity pain     History of echocardiogram 03/18/2014    EF 55%, mild MR and AI.  Mild concentric LVH.    History of stress test 03/06/2017    Normal.    Hyperlipidemia     Hypertension     Joint pain     Kidney stone     Low back pain     Lung cancer ([facility])     Migraine     Neck pain     Obstructive sleep apnea     cannot tolerate CPAP    Osteoarthritis     Peripheral neuropathy     Reflex sympathetic dystrophy     Sacroiliitis ([facility]) 02/02/2022     Past Surgical History:   Procedure Laterality Date    CARDIAC CATHETERIZATION  02/13/2012    EF 70%, widely patent renal arteries, significant single-vessel CAD-medical therapy.    CARDIAC CATHETERIZATION  04/11/2013    EF 65%, 50% mid LAD, 20% prox CFX, 90% diffuse RCA, 99% mid RCA. Medical management.    CATARACT EXTRACTION Right 09/19/2023    CHOLECYSTECTOMY      COLONOSCOPY      EPIDURAL BLOCK INJECTION Bilateral 08/15/2019    Procedure: BLOCK / INJECTION EPIDURAL STEROID CERVICAL;  Surgeon: Ricardo Park MD;  Location: [facility];  Service: Pain Management     FL GUIDED NEEDLE PLAC BX/ASP/INJ  08/15/2019    IR BIOPSY LUNG  09/13/2022    IR THORACIC DUCT EMBOLIZATION  11/22/2022    ORTHOPEDIC SURGERY      DC BRNCC INCL FLUOR GDNCE DX W/CELL WASHG SPX N/A 11/16/2022    Procedure: BRONCHOSCOPY FLEXIBLE;  Surgeon:  Gulshan Madison MD;  Location: [facility];  Service: Thoracic    LA THORACOSCOPY W/LOBECTOMY SINGLE LOBE Right 11/16/2022    Procedure: LOBECTOMY LUNG; lower lobe superior segmentectomy, mediastinal lymph node dissection;  Surgeon: Gulshan Madison MD;  Location: [facility];  Service: Thoracic    LA THORACOSCOPY W/SEGMENTECTOMY Right 11/16/2022    Procedure: THORACOSCOPY VIDEO ASSISTED SURGERY (VATS);  Surgeon: Gulshan Madison MD;  Location: [facility];  Service: Thoracic    TRIGGER POINT INJECTION         FAMILY HISTORY:  Non-contributory    SOCIAL HISTORY:  Social History   Social History     Substance and Sexual Activity   Alcohol Use Not Currently     Social History     Substance and Sexual Activity   Drug Use Never     Social History     Tobacco Use   Smoking Status Some Days    Current packs/day: 0.50    Types: Cigarettes   Smokeless Tobacco Never       ALLERGIES:  No Known Allergies    MEDICATIONS:  All current active medications have been reviewed.      PHYSICAL EXAM:  Vitals:    12/20/23 1354   BP: 134/76   Pulse: 59   Temp: (!) 97.1 °F (36.2 °C)   SpO2: 97%         General Appearance:  Appearing well, nontoxic, and in no distress   Head:  Normocephalic, without obvious abnormality, atraumatic   Eyes:  Conjunctiva pink and sclera anicteric, both eyes   Nose: Nares normal, mucosa normal, no drainage   Throat: Oropharynx moist without lesions; dentures in place   Neck: Supple   Back:   Symmetric   Lungs:   Clear to auscultation bilaterally, respirations unlabored   Chest Wall:  No tenderness or deformity   Heart:  RRR; no murmur, rub or gallop   Abdomen:   Soft, non-tender   Extremities: No cyanosis, clubbing or edema   Skin: No rashes or lesions. No draining wounds noted.   Lymph nodes: Cervical, supraclavicular nodes normal   Neurologic: Alert and oriented       LABS, IMAGING, & OTHER STUDIES:  Lab Results:  I have personally reviewed pertinent labs.              Imaging  Studies:   I have personally reviewed pertinent imaging study reports and images in PACS.      Other Studies:   I have personally reviewed pertinent reports.      Connor Azevedo MD  [facility]

## 2023-12-12 NOTE — TELEPHONE ENCOUNTER
I spoke to the patient, reports increased cough/mucus production and intermittent fever since Thanksgiving.     Not improved with antibiotics/treatment given as an outpatient    Agrees to undergo bronchoscopy and BAL, plan to be done at [address] [place] [address] on Thursday 12/14 around 2 PM by Dr. Alvaro Huffman is in process, the GI lab team will be in contact with him

## 2023-12-12 NOTE — TELEPHONE ENCOUNTER
----- Message from Odalis Tello PA-C sent at 12/11/2023  4:02 PM EST -----  Hi Dr Brianne Larios,     We have a joint patient, Peter West. He is about a year out from a right VATS superior segmentectomy on 11/16/23 for Stage IIIA squamous cell carcinoma. He completed neoadjuvant chemotherapy and is now on immunotherapy(with Dr Megan Stuart). When I saw him he had fevers/chills/cough and CT showed b/l GGO consistent with pneumonia. I treated with abx and have him scheduled for a repeat CT chest in 3 months. Dr Megan Stuart reached out to ID because he continues with fevers- ID thinks he may need a bronch. They are seeing him on 12/20. He had originally been seeing you for his COPD,but was lost in follow-up. Would he be able to follow-up with you should he need further management of his pneumonia or a bronch? We tried to book a f/u with you but were told you are OOO through January. Is there anywhere to squeeze him? ThanksTamela           ----- Message -----  From: Jai Richard RN  Sent: 12/11/2023   1:53 PM EST  To: Odalis Tello PA-C    Thanks [facility] like Dr Rohith Cox is out of office through January so they will schedule him with the NP.  ----- Message -----  From: Odalis Tello PA-C  Sent: 12/11/2023  11:16 AM EST  To: CHANDLER Guallpa Thanks for reaching out and helping us coordinate plan of care for Christine. This would be something that would need to be evaluated by pulm. He sees Dr. Cruz Figueroa and was supposed to have follow-up with them anyways but hasn't been seen since August 2022. Could you see if they could get him in quickly? I'm happy to reach out to Dr Brianne Larios if needed. ThanksTamela     ----- Message -----  From: Mila Quiñones MD  Sent: 12/11/2023  10:52 AM EST  To: Odalis Tello PA-C    Yes!   Pulmonary should eval.  ----- Message -----  From: Odalis Tello PA-C  Sent: 12/11/2023  10:12 AM EST  To: MD Esther Chung,     I saw Ignacio Carlton on 11/27. He is about a year out from a right VATS superior segmentectomy on 11/16/23 for Stage IIIA squamous cell carcinoma. He completed neoadjuvant chemotherapy and is now on immunotherapy(with Dr Nathalie Mayo). When I saw him he had fevers/chills/cough and CT showed b/l GGO consistent with pneumonia. I treated with abx and have him scheduled for a repeat CT chest in 3 months. Dr Nathalie Mayo reached out to ID because he continues with fevers- ID thinks he may need a bronch. He has seen pulm in the past as well. I was going to send him back to pulm for evaluation of this, but wanted to be sure that you are in agreement with this plan. Let me know what you think. Thanks,   Kelly Bernstein      ----- Message -----  From: Gabrielle Bland  Sent: 12/11/2023   9:59 AM EST  To: Essence Colmenares PA-C; #      ----- Message -----  From: Emelyn Conti RN  Sent: 12/11/2023   9:22 AM EST  To: [facility]; #    Pt recently seen 11/27 by Kelly Bernstein, was prescribed abx, still with fevers. Dr Nathalie Mayo recently saw him and referred to ID. ID unable to get him in until 12/20 but Dr Felicity Bella reviewed pts chart and thinks pt may need bronchoscopy to further eval based off recent CT results. Please review and if needed move pts f/u appt up. TY!

## 2023-12-13 ENCOUNTER — PREP FOR PROCEDURE (OUTPATIENT)
Dept: PULMONOLOGY | Facility: CLINIC | Age: 68
End: 2023-12-13

## 2023-12-13 DIAGNOSIS — C34.90 NON-SMALL CELL LUNG CANCER, UNSPECIFIED LATERALITY (HCC): Primary | ICD-10-CM

## 2023-12-14 ENCOUNTER — ANESTHESIA (OUTPATIENT)
Dept: GASTROENTEROLOGY | Facility: HOSPITAL | Age: 68
End: 2023-12-14

## 2023-12-14 ENCOUNTER — HOSPITAL ENCOUNTER (OUTPATIENT)
Dept: GASTROENTEROLOGY | Facility: HOSPITAL | Age: 68
Setting detail: OUTPATIENT SURGERY
End: 2023-12-14
Attending: INTERNAL MEDICINE
Payer: MEDICARE

## 2023-12-14 ENCOUNTER — ANESTHESIA EVENT (OUTPATIENT)
Dept: GASTROENTEROLOGY | Facility: HOSPITAL | Age: 68
End: 2023-12-14

## 2023-12-14 VITALS
HEART RATE: 72 BPM | DIASTOLIC BLOOD PRESSURE: 61 MMHG | SYSTOLIC BLOOD PRESSURE: 129 MMHG | TEMPERATURE: 97 F | OXYGEN SATURATION: 96 % | RESPIRATION RATE: 16 BRPM

## 2023-12-14 DIAGNOSIS — R50.9 FEVER, UNSPECIFIED FEVER CAUSE: ICD-10-CM

## 2023-12-14 DIAGNOSIS — J42 CHRONIC BRONCHITIS, UNSPECIFIED CHRONIC BRONCHITIS TYPE (HCC): ICD-10-CM

## 2023-12-14 DIAGNOSIS — C34.90 NON-SMALL CELL LUNG CANCER, UNSPECIFIED LATERALITY (HCC): ICD-10-CM

## 2023-12-14 LAB
BASOPHILS NFR FLD: 1 %
EOSINOPHIL NFR FLD MANUAL: 2 %
LYMPHOCYTES NFR BLD AUTO: 7 %
MACROPHAGES NFR FLD: 69 %
NEUTS SEG NFR BLD AUTO: 21 %
TOTAL CELLS COUNTED SPEC: 100

## 2023-12-14 PROCEDURE — 87102 FUNGUS ISOLATION CULTURE: CPT | Performed by: INTERNAL MEDICINE

## 2023-12-14 PROCEDURE — 87205 SMEAR GRAM STAIN: CPT | Performed by: INTERNAL MEDICINE

## 2023-12-14 PROCEDURE — 89051 BODY FLUID CELL COUNT: CPT | Performed by: INTERNAL MEDICINE

## 2023-12-14 PROCEDURE — 88112 CYTOPATH CELL ENHANCE TECH: CPT | Performed by: PATHOLOGY

## 2023-12-14 PROCEDURE — 87070 CULTURE OTHR SPECIMN AEROBIC: CPT | Performed by: INTERNAL MEDICINE

## 2023-12-14 PROCEDURE — 87116 MYCOBACTERIA CULTURE: CPT | Performed by: INTERNAL MEDICINE

## 2023-12-14 PROCEDURE — 87206 SMEAR FLUORESCENT/ACID STAI: CPT | Performed by: INTERNAL MEDICINE

## 2023-12-14 PROCEDURE — 31624 DX BRONCHOSCOPE/LAVAGE: CPT | Performed by: INTERNAL MEDICINE

## 2023-12-14 PROCEDURE — 87798 DETECT AGENT NOS DNA AMP: CPT | Performed by: INTERNAL MEDICINE

## 2023-12-14 PROCEDURE — 87106 FUNGI IDENTIFICATION YEAST: CPT | Performed by: INTERNAL MEDICINE

## 2023-12-14 RX ORDER — EPHEDRINE SULFATE 50 MG/ML
INJECTION INTRAVENOUS AS NEEDED
Status: DISCONTINUED | OUTPATIENT
Start: 2023-12-14 | End: 2023-12-14

## 2023-12-14 RX ORDER — DEXAMETHASONE SODIUM PHOSPHATE 10 MG/ML
INJECTION, SOLUTION INTRAMUSCULAR; INTRAVENOUS AS NEEDED
Status: DISCONTINUED | OUTPATIENT
Start: 2023-12-14 | End: 2023-12-14

## 2023-12-14 RX ORDER — FENTANYL CITRATE/PF 50 MCG/ML
50 SYRINGE (ML) INJECTION
Status: DISCONTINUED | OUTPATIENT
Start: 2023-12-14 | End: 2023-12-14 | Stop reason: HOSPADM

## 2023-12-14 RX ORDER — PROPOFOL 10 MG/ML
INJECTION, EMULSION INTRAVENOUS AS NEEDED
Status: DISCONTINUED | OUTPATIENT
Start: 2023-12-14 | End: 2023-12-14

## 2023-12-14 RX ORDER — PROPOFOL 10 MG/ML
INJECTION, EMULSION INTRAVENOUS CONTINUOUS PRN
Status: DISCONTINUED | OUTPATIENT
Start: 2023-12-14 | End: 2023-12-14

## 2023-12-14 RX ORDER — LIDOCAINE HYDROCHLORIDE 10 MG/ML
INJECTION, SOLUTION EPIDURAL; INFILTRATION; INTRACAUDAL; PERINEURAL AS NEEDED
Status: DISCONTINUED | OUTPATIENT
Start: 2023-12-14 | End: 2023-12-14

## 2023-12-14 RX ORDER — FENTANYL CITRATE 50 UG/ML
INJECTION, SOLUTION INTRAMUSCULAR; INTRAVENOUS AS NEEDED
Status: DISCONTINUED | OUTPATIENT
Start: 2023-12-14 | End: 2023-12-14

## 2023-12-14 RX ORDER — MIDAZOLAM HYDROCHLORIDE 2 MG/2ML
INJECTION, SOLUTION INTRAMUSCULAR; INTRAVENOUS AS NEEDED
Status: DISCONTINUED | OUTPATIENT
Start: 2023-12-14 | End: 2023-12-14

## 2023-12-14 RX ORDER — ONDANSETRON 2 MG/ML
4 INJECTION INTRAMUSCULAR; INTRAVENOUS ONCE AS NEEDED
Status: DISCONTINUED | OUTPATIENT
Start: 2023-12-14 | End: 2023-12-14 | Stop reason: HOSPADM

## 2023-12-14 RX ORDER — ONDANSETRON 2 MG/ML
INJECTION INTRAMUSCULAR; INTRAVENOUS AS NEEDED
Status: DISCONTINUED | OUTPATIENT
Start: 2023-12-14 | End: 2023-12-14

## 2023-12-14 RX ADMIN — FENTANYL CITRATE 50 MCG: 50 INJECTION INTRAMUSCULAR; INTRAVENOUS at 13:56

## 2023-12-14 RX ADMIN — MIDAZOLAM HYDROCHLORIDE 2 MG: 1 INJECTION, SOLUTION INTRAMUSCULAR; INTRAVENOUS at 13:19

## 2023-12-14 RX ADMIN — EPHEDRINE SULFATE 10 MG: 50 INJECTION, SOLUTION INTRAVENOUS at 13:55

## 2023-12-14 RX ADMIN — LIDOCAINE HYDROCHLORIDE 50 MG: 10 INJECTION, SOLUTION EPIDURAL; INFILTRATION; INTRACAUDAL; PERINEURAL at 13:39

## 2023-12-14 RX ADMIN — DEXAMETHASONE SODIUM PHOSPHATE 10 MG: 10 INJECTION, SOLUTION INTRAMUSCULAR; INTRAVENOUS at 13:39

## 2023-12-14 RX ADMIN — PROPOFOL 150 MG: 10 INJECTION, EMULSION INTRAVENOUS at 13:39

## 2023-12-14 RX ADMIN — LIDOCAINE HYDROCHLORIDE 50 MG: 10 INJECTION, SOLUTION EPIDURAL; INFILTRATION; INTRACAUDAL; PERINEURAL at 13:51

## 2023-12-14 RX ADMIN — PROPOFOL 150 MCG/KG/MIN: 10 INJECTION, EMULSION INTRAVENOUS at 13:44

## 2023-12-14 RX ADMIN — ONDANSETRON 4 MG: 2 INJECTION INTRAMUSCULAR; INTRAVENOUS at 13:51

## 2023-12-14 RX ADMIN — FENTANYL CITRATE 50 MCG: 50 INJECTION INTRAMUSCULAR; INTRAVENOUS at 13:48

## 2023-12-14 NOTE — ANESTHESIA PREPROCEDURE EVALUATION
Procedure:  BRONCHOSCOPY    Relevant Problems   CARDIO   (+) Abdominal aortic aneurysm (AAA) without rupture   (+) Coronary artery disease of native artery of native heart with stable angina pectoris (HCC)   (+) Hyperlipidemia   (+) Hypertension      GI/HEPATIC   (+) Other chronic pancreatitis (HCC)      /RENAL   (+) Kidney stone   (+) Stage 3a chronic kidney disease (HCC)      MUSCULOSKELETAL   (+) Cervical spondylosis without myelopathy   (+) Chronic low back pain without sciatica   (+) Lumbar degenerative disc disease   (+) Lumbar spondylosis   (+) Myofascial pain syndrome      NEURO/PSYCH   (+) Chronic low back pain without sciatica   (+) Chronic pain syndrome   (+) Depression, recurrent (HCC)   (+) Myofascial pain syndrome      PULMONARY   (+) Chronic bronchitis (HCC)   (+) JAKI (obstructive sleep apnea)   (+) Pneumonia due to infectious organism   (+) Pulmonary emphysema ([address])   (+) Smoking     CT A/P (06/2023): IMPRESSION:     1. A 3 mm mid to distal right ureteral calculus causes moderate obstructive uropathy. 2. Mild diffuse distal esophageal wall thickening, suggesting esophagitis. 3. Stable infrarenal abdominal aortic aneurysm measuring up to 3.5 cm. Stable ectatic and aneurysmal iliac arteries as described    Physical Exam    Airway    Mallampati score: II  TM Distance: >3 FB  Neck ROM: full     Dental   No notable dental hx     Cardiovascular  Rhythm: regular, Rate: normal    Pulmonary   Breath sounds clear to auscultation    Other Findings  Intercisor Distance > 3cm          Anesthesia Plan  ASA Score- 3     Anesthesia Type- general with ASA Monitors. Additional Monitors:     Airway Plan: LMA. Comment: Discussed benefits/risks of general anesthesia including possibility of mouth/throat pain, injury to lips/teeth, nausea/vomiting, and surgical pain along with more rare complications such as stroke, MI, pneumonia, aspiration, and injury to blood vessels. All questions answered. Adonis Purcell Plan Factors-Exercise tolerance (METS): >4 METS. Chart reviewed. EKG reviewed. Existing labs reviewed. Induction- intravenous. Postoperative Plan- Plan for postoperative opioid use. Planned trial extubation    Informed Consent- Anesthetic plan and risks discussed with patient. I personally reviewed this patient with the CRNA. Discussed and agreed on the Anesthesia Plan with the CRNA. Marycruz Corona

## 2023-12-14 NOTE — ANESTHESIA POSTPROCEDURE EVALUATION
Post-Op Assessment Note    CV Status:  Stable  Pain Score: 0    Pain management: adequate       Mental Status:  Alert and awake   Hydration Status:  Euvolemic   PONV Controlled:  Controlled   Airway Patency:  Patent     Post Op Vitals Reviewed: Yes      Staff: CRNA               BP   114/65   Temp   37   Pulse  83   Resp   16   SpO2   92

## 2023-12-14 NOTE — RESPIRATORY THERAPY NOTE
Assisted Dr. Helga Luna with Bronchoscopy procedure. 8mls 2% lidocaine instilled onto the cords, trachea, gigi and mainstem. 180mls NSS instilled into the RUL with the a 30ml return and 120mls NSS instilled into the RML with a 40ml return. All specimans sent to the lab. Tolerated procedure well.

## 2023-12-15 LAB
RHODAMINE-AURAMINE STN SPEC: NORMAL

## 2023-12-16 LAB
BACTERIA BRONCH AEROBE CULT: NO GROWTH
BACTERIA BRONCH AEROBE CULT: NO GROWTH
BACTERIA BRONCH AEROBE CULT: NORMAL
GRAM STN SPEC: NORMAL
PNEUMOCYSTIS PCR: NEGATIVE
PNEUMOCYSTIS PCR: NEGATIVE

## 2023-12-18 LAB
FUNGUS SPEC CULT: NORMAL
FUNGUS SPEC CULT: NORMAL

## 2023-12-19 LAB
MYCOBACTERIUM SPEC CULT: NORMAL
RHODAMINE-AURAMINE STN SPEC: NORMAL

## 2023-12-19 PROCEDURE — 88112 CYTOPATH CELL ENHANCE TECH: CPT | Performed by: PATHOLOGY

## 2023-12-20 ENCOUNTER — CONSULT (OUTPATIENT)
Age: 68
End: 2023-12-20
Payer: MEDICARE

## 2023-12-20 VITALS
HEART RATE: 59 BPM | OXYGEN SATURATION: 97 % | BODY MASS INDEX: 29.58 KG/M2 | HEIGHT: 68 IN | WEIGHT: 195.2 LBS | DIASTOLIC BLOOD PRESSURE: 76 MMHG | SYSTOLIC BLOOD PRESSURE: 134 MMHG | TEMPERATURE: 97.1 F

## 2023-12-20 DIAGNOSIS — R50.9 FEVER OF UNKNOWN ORIGIN: ICD-10-CM

## 2023-12-20 LAB — FUNGUS SPEC CULT: ABNORMAL

## 2023-12-20 PROCEDURE — 99205 OFFICE O/P NEW HI 60 MIN: CPT | Performed by: INTERNAL MEDICINE

## 2023-12-21 ENCOUNTER — APPOINTMENT (OUTPATIENT)
Dept: LAB | Facility: HOSPITAL | Age: 68
End: 2023-12-21
Payer: MEDICARE

## 2023-12-21 ENCOUNTER — HOSPITAL ENCOUNTER (OUTPATIENT)
Dept: CT IMAGING | Facility: HOSPITAL | Age: 68
Discharge: HOME | End: 2023-12-21
Attending: INTERNAL MEDICINE
Payer: MEDICARE

## 2023-12-21 DIAGNOSIS — R50.9 FEVER OF UNKNOWN ORIGIN: ICD-10-CM

## 2023-12-21 LAB
B BURGDOR IGG+IGM SER QL IA: NEGATIVE
CRYPTOC AG SER QL: NEGATIVE
HIV 1+2 AB+HIV1 P24 AG SERPL QL IA: NORMAL
HIV 2 AB SERPL QL IA: NORMAL
HIV1 AB SERPL QL IA: NORMAL
HIV1 P24 AG SERPL QL IA: NORMAL

## 2023-12-21 PROCEDURE — 74177 CT ABD & PELVIS W/CONTRAST: CPT

## 2023-12-21 PROCEDURE — 87385 HISTOPLASMA CAPSUL AG IA: CPT | Performed by: INTERNAL MEDICINE

## 2023-12-21 PROCEDURE — 86618 LYME DISEASE ANTIBODY: CPT

## 2023-12-21 PROCEDURE — 86480 TB TEST CELL IMMUN MEASURE: CPT

## 2023-12-21 PROCEDURE — 87040 BLOOD CULTURE FOR BACTERIA: CPT

## 2023-12-21 PROCEDURE — 86665 EPSTEIN-BARR CAPSID VCA: CPT

## 2023-12-21 PROCEDURE — 36415 COLL VENOUS BLD VENIPUNCTURE: CPT

## 2023-12-21 PROCEDURE — 86663 EPSTEIN-BARR ANTIBODY: CPT

## 2023-12-21 PROCEDURE — 87207 SMEAR SPECIAL STAIN: CPT

## 2023-12-21 PROCEDURE — 71260 CT THORAX DX C+: CPT

## 2023-12-21 PROCEDURE — G1004 CDSM NDSC: HCPCS

## 2023-12-21 PROCEDURE — 87468 ANAPLSMA PHGCYTOPHLM AMP PRB: CPT

## 2023-12-21 PROCEDURE — 87899 AGENT NOS ASSAY W/OPTIC: CPT

## 2023-12-21 PROCEDURE — 86622 BRUCELLA ANTIBODY: CPT

## 2023-12-21 PROCEDURE — 86664 EPSTEIN-BARR NUCLEAR ANTIGEN: CPT

## 2023-12-21 PROCEDURE — 87389 HIV-1 AG W/HIV-1&-2 AB AG IA: CPT

## 2023-12-21 RX ADMIN — IOHEXOL 100 ML: 350 INJECTION, SOLUTION INTRAVENOUS at 10:12

## 2023-12-22 ENCOUNTER — TELEPHONE (OUTPATIENT)
Age: 68
End: 2023-12-22

## 2023-12-22 LAB
% PARASITEMIA: 0
EBV NA IGG SER IA-ACNC: 363 U/ML (ref 0–17.9)
EBV VCA IGG SER IA-ACNC: >600 U/ML (ref 0–17.9)
EBV VCA IGM SER IA-ACNC: <36 U/ML (ref 0–35.9)
GAMMA INTERFERON BACKGROUND BLD IA-ACNC: 0 IU/ML
INTERPRETATION: ABNORMAL
M TB IFN-G BLD-IMP: NEGATIVE
M TB IFN-G CD4+ BCKGRND COR BLD-ACNC: 0 IU/ML
M TB IFN-G CD4+ BCKGRND COR BLD-ACNC: 0.05 IU/ML
MITOGEN IGNF BCKGRD COR BLD-ACNC: 10 IU/ML
PARASITE BLD: NO
PATHOLOGIST INTERPRETATION: NORMAL

## 2023-12-22 NOTE — TELEPHONE ENCOUNTER
Pt contacted Call Center requested refill of their medication.      Pt has an appt scheduled for 1/5/24 but he only has enough medication until Dolores day.  Pt is asking if he can get a few pills to hold him over until his appt    Medication Name: Percocet      Dosage of Med: 7.5-325 mg      Frequency of Med: 3 x per day      Remaining Medication: 7      Pharmacy and Location: [id number] [address] [phone]         Pt. Preferred Callback Phone Number: [phone]      Thank you.       PLEASE ADVISE PATIENTS:    REFILL REQUESTS WILL BE PROCESSED WITHIN 24-48 HOURS.

## 2023-12-22 NOTE — TELEPHONE ENCOUNTER
S/W pt and advised he has a Rx on file with a fill date of 12/22/23. Advised he call the pharmacy to fill

## 2023-12-24 LAB
BACTERIA BLD CULT: NORMAL
BACTERIA BLD CULT: NORMAL

## 2023-12-26 ENCOUNTER — OFFICE VISIT (OUTPATIENT)
Dept: HEMATOLOGY ONCOLOGY | Facility: CLINIC | Age: 68
End: 2023-12-26
Payer: MEDICARE

## 2023-12-26 ENCOUNTER — APPOINTMENT (OUTPATIENT)
Dept: LAB | Facility: HOSPITAL | Age: 68
End: 2023-12-26
Payer: MEDICARE

## 2023-12-26 VITALS
RESPIRATION RATE: 16 BRPM | DIASTOLIC BLOOD PRESSURE: 92 MMHG | BODY MASS INDEX: 29.7 KG/M2 | OXYGEN SATURATION: 95 % | HEIGHT: 68 IN | SYSTOLIC BLOOD PRESSURE: 174 MMHG | WEIGHT: 196 LBS | HEART RATE: 62 BPM | TEMPERATURE: 97 F

## 2023-12-26 DIAGNOSIS — C34.31 MALIGNANT NEOPLASM OF LOWER LOBE OF RIGHT LUNG (HCC): ICD-10-CM

## 2023-12-26 DIAGNOSIS — R50.9 FEVER, UNSPECIFIED FEVER CAUSE: ICD-10-CM

## 2023-12-26 DIAGNOSIS — C34.31 PRIMARY SQUAMOUS CELL CARCINOMA OF LOWER LOBE OF RIGHT LUNG (HCC): Primary | ICD-10-CM

## 2023-12-26 LAB
ALBUMIN SERPL BCP-MCNC: 4.3 G/DL (ref 3.5–5)
ALP SERPL-CCNC: 68 U/L (ref 34–104)
ALT SERPL W P-5'-P-CCNC: 12 U/L (ref 7–52)
ANION GAP SERPL CALCULATED.3IONS-SCNC: 8 MMOL/L
AST SERPL W P-5'-P-CCNC: 11 U/L (ref 13–39)
BACTERIA BLD CULT: NORMAL
BACTERIA BLD CULT: NORMAL
BASOPHILS # BLD AUTO: 0.07 THOUSANDS/ÂΜL (ref 0–0.1)
BASOPHILS NFR BLD AUTO: 1 % (ref 0–1)
BILIRUB SERPL-MCNC: 0.72 MG/DL (ref 0.2–1)
BUN SERPL-MCNC: 18 MG/DL (ref 5–25)
CALCIUM SERPL-MCNC: 9.1 MG/DL (ref 8.4–10.2)
CHLORIDE SERPL-SCNC: 104 MMOL/L (ref 96–108)
CO2 SERPL-SCNC: 26 MMOL/L (ref 21–32)
CREAT SERPL-MCNC: 1.07 MG/DL (ref 0.6–1.3)
EOSINOPHIL # BLD AUTO: 0 THOUSAND/ÂΜL (ref 0–0.61)
EOSINOPHIL NFR BLD AUTO: 0 % (ref 0–6)
ERYTHROCYTE [DISTWIDTH] IN BLOOD BY AUTOMATED COUNT: 13.4 % (ref 11.6–15.1)
FUNGUS SPEC CULT: NORMAL
FUNGUS SPEC CULT: NORMAL
GFR SERPL CREATININE-BSD FRML MDRD: 70 ML/MIN/1.73SQ M
GLUCOSE SERPL-MCNC: 123 MG/DL (ref 65–140)
HCT VFR BLD AUTO: 49.4 % (ref 36.5–49.3)
HGB BLD-MCNC: 15.5 G/DL (ref 12–17)
IMM GRANULOCYTES # BLD AUTO: 0.03 THOUSAND/UL (ref 0–0.2)
IMM GRANULOCYTES NFR BLD AUTO: 0 % (ref 0–2)
LYMPHOCYTES # BLD AUTO: 2.84 THOUSANDS/ÂΜL (ref 0.6–4.47)
LYMPHOCYTES NFR BLD AUTO: 31 % (ref 14–44)
MCH RBC QN AUTO: 30.1 PG (ref 26.8–34.3)
MCHC RBC AUTO-ENTMCNC: 31.4 G/DL (ref 31.4–37.4)
MCV RBC AUTO: 96 FL (ref 82–98)
MONOCYTES # BLD AUTO: 0.7 THOUSAND/ÂΜL (ref 0.17–1.22)
MONOCYTES NFR BLD AUTO: 8 % (ref 4–12)
NEUTROPHILS # BLD AUTO: 5.44 THOUSANDS/ÂΜL (ref 1.85–7.62)
NEUTS SEG NFR BLD AUTO: 60 % (ref 43–75)
NRBC BLD AUTO-RTO: 0 /100 WBCS
PLATELET # BLD AUTO: 204 THOUSANDS/UL (ref 149–390)
PMV BLD AUTO: 9.9 FL (ref 8.9–12.7)
POTASSIUM SERPL-SCNC: 4 MMOL/L (ref 3.5–5.3)
PROT SERPL-MCNC: 6.8 G/DL (ref 6.4–8.4)
RBC # BLD AUTO: 5.15 MILLION/UL (ref 3.88–5.62)
SODIUM SERPL-SCNC: 138 MMOL/L (ref 135–147)
WBC # BLD AUTO: 9.08 THOUSAND/UL (ref 4.31–10.16)

## 2023-12-26 PROCEDURE — 99214 OFFICE O/P EST MOD 30 MIN: CPT | Performed by: NURSE PRACTITIONER

## 2023-12-26 PROCEDURE — 85025 COMPLETE CBC W/AUTO DIFF WBC: CPT

## 2023-12-26 PROCEDURE — 80053 COMPREHEN METABOLIC PANEL: CPT

## 2023-12-26 PROCEDURE — 36415 COLL VENOUS BLD VENIPUNCTURE: CPT

## 2023-12-26 RX ORDER — ACETAMINOPHEN 325 MG/1
650 TABLET ORAL ONCE
Status: CANCELLED | OUTPATIENT
Start: 2024-01-02 | End: 2024-01-01

## 2023-12-26 RX ORDER — SODIUM CHLORIDE 9 MG/ML
20 INJECTION, SOLUTION INTRAVENOUS ONCE
Status: CANCELLED | OUTPATIENT
Start: 2024-01-02

## 2023-12-26 NOTE — PROGRESS NOTES
[facility] HEMATOLOG[facility] ONCOLOGY SPECIALISTS [place]  [address] [facility] [address]  [phone]  Oncology Progress Note  Lance Hazel, 1955, [id number]  12/26/2023        Assessment/Plan:   1. 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)  2. Fever, unspecified fever cause   Patient is a 68-year-old male with a history of stage IIIa squamous cell carcinoma of the right lung status post resection.  PD-L1 was 1%, TMB high, he is status post adjuvant Taxol and carbo.  He started adjuvant Tecentriq in June 2023.  Overall patient is able to function without restriction.  He has had a persistent fever of unknown origin although not felt to be from Tecentriq.  Patient has seen infectious disease.  He was found to have an abnormal CT scan of the lungs showing tree-in-bud nodules in the right lung and left lower lobe as well as groundglass opacities in the right upper and right middle lobes.  CT appearances atypical for typical bacterial pneumonia.  He did not respond to multiple antibiotics.   Patient underwent bronchoscopy on 12/14/2023 that was negative.  Overall he is able to function without restriction.  He reports taking Percocet for back pain that helps control the fevers as it includes acetaminophen.  Patient had a CT scan of the chest abdomen pelvis on 12/21/2023 however results are pending.  We will contact him once we see the results.  He is anticipated to have his next treatment on January 2, 2024.  We will plan to see him in 1 month, patient verbalized understanding and is in agreement with the plan.    Goals and Barriers:    Current Goal:   Prolong Survival from Cancer.     Barriers: None.      Patient's Capacity to Self Care:  Patient is able to self care.    Sruthi AMIN  Medical Oncology/Hematology  [facility]  ______________________________________________________________________________________________________________    Subjective     History of present illness/Cancer History:   Oncology History   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)   12/6/2022 -  Cancer Staged    Staging form: Lung, AJCC 8th Edition  - Clinical: Stage IIIA (cT1b, cN2, cM0) - Signed by Erinn Gooden PA-C on 12/6/2022  Laterality: Right       Malignant neoplasm of lower lobe of right lung (HCC)   9/13/2022 Initial Diagnosis    July 23, 2022 patient had CT chest ab pelvis regarding rule out AAA.  This showed 1.1 cm right lower lobe pulmonary nodule new since prior study of November 2019.  Some other smaller nodules identified.  Subcarinal lymph node 2 cm.  No other findings suspicious for metastatic disease.  August 5, 2022 PET/CT showed 1.2 cm pulmonary nodule, SUV 2.9.  Other nodules noted, subcentimeter, no hypermetabolism.  Some groundglass opacities in the right upper lobe.  September 13, 2022 patient had needle biopsy of the right lower lobe mass showing squamous cell carcinoma, TTF-1 positive.  November 16, 2022 patient underwent right lower lobe segmentectomy.  Pathology showed 1.8 cm squamous cell carcinoma.  Level 4 and 11 lymph node were positive for metastatic carcinoma.     1/30/2023 - 3/27/2023 Chemotherapy    palonosetron (ALOXI), 0.25 mg, Intravenous, Once, 4 of 4 cycles  Administration: 0.25 mg (1/30/2023), 0.25 mg (2/13/2023), 0.25 mg (3/6/2023), 0.25 mg (3/27/2023)  fosaprepitant (EMEND) IVPB, 150 mg, Intravenous, Once, 3 of 3 cycles  Administration: 150 mg (1/30/2023), 150 mg (2/13/2023), 150 mg (3/6/2023)  CARBOplatin (PARAPLATIN) IVPB ([facility] AUC DOSING), 552 mg, Intravenous, Once, 4 of 4 cycles  Administration: 550 mg (1/30/2023), 650 mg (2/13/2023), 650 mg (3/6/2023), 500 mg (3/27/2023)  PACLItaxel (TAXOL) chemo IVPB, 349.8 mg, Intravenous, Once, 4 of 4 cycles  Dose modification: 200 mg/m2  (original dose 175 mg/m2, Cycle 2, Reason: Dose modified as per discussion with consulting physician)  Administration: 360 mg (2023), 400 mg (2023), 400 mg (3/6/2023), 400 mg (3/27/2023)  aprepitant (CINVANTI) in  mL IVPB, 130 mg, Intravenous, Once, 1 of 1 cycle  Administration: 130 mg (3/27/2023)     2023 Genomic Testing    2023 Caris-  PD-L1 1%, TMB high.  MTAP deleted.  p53 [id number], pathogenic variant  KRAS G694 L, VUS  CRABBP  *, pathogenic variant  KMT2D *, pathogenic variant     2023 -  Chemotherapy    alteplase (CATHFLO), 2 mg, Intracatheter, Every 1 Minute as needed, 8 of 10 cycles  atezolizumab (TECENTRIQ) IVPB, 1,200 mg, Intravenous, Once, 8 of 10 cycles  Administration: 1,200 mg (2023), 1,200 mg (2023), 1,200 mg (2023), 1,200 mg (2023), 1,200 mg (10/10/2023), 1,200 mg (10/31/2023), 1,200 mg (2023), 1,200 mg (2023)        Lab Results   Component Value Date    WBC 11.24 (H) 2023    HGB 15.5 2023    HCT 48.0 2023    MCV 93 2023     2023     Lab Results   Component Value Date    SODIUM 137 2023    K 3.9 2023     2023    CO2 25 2023    AGAP 8 2023    BUN 15 2023    CREATININE 1.12 2023    GLUC 172 (H) 2023    GLUF 103 (H) 2022    CALCIUM 9.6 2023    AST 13 2023    ALT 11 2023    ALKPHOS 70 2023    TP 7.3 2023    TBILI 0.79 2023    EGFR 66 2023   Interval history:  clinically stable     ECO - Symptomatic but completely ambulatory    Review of Systems   Constitutional:  Positive for fever. Negative for activity change, appetite change, fatigue and unexpected weight change.   Respiratory:  Negative for cough and shortness of breath.    Cardiovascular:  Negative for chest pain and leg swelling.   Gastrointestinal:  Negative for abdominal pain, constipation, diarrhea and nausea.   Endocrine:  Negative for cold intolerance and heat intolerance.   Musculoskeletal:  Negative for arthralgias and myalgias.   Skin: Negative.    Neurological:  Positive for headaches. Negative for dizziness and weakness.   Hematological:  Negative for adenopathy. Does not bruise/bleed easily.     Current Outpatient Medications:     amLODIPine (NORVASC) 10 mg tablet, take 1 tablet by mouth once daily, Disp: 90 tablet, Rfl: 3    bisacodyl (DULCOLAX) 5 mg EC tablet, Take 1 tablet (5 mg total) by mouth daily as needed for constipation for up to 4 doses, Disp: 30 tablet, Rfl: 0    lisinopril (ZESTRIL) 10 mg tablet, Take 1 tablet (10 mg total) by mouth daily, Disp: 90 tablet, Rfl: 3    metoprolol succinate (TOPROL-XL) 100 mg 24 hr tablet, take 1 tablet by mouth once daily, Disp: 90 tablet, Rfl: 3    nitroglycerin (NITROSTAT) 0.4 mg SL tablet, Place 1 tablet (0.4 mg total) under the tongue every 5 (five) minutes as needed for chest pain, Disp: 25 tablet, Rfl: 5    ondansetron (ZOFRAN) 8 mg tablet, Take 1 tablet (8 mg total) by mouth every 8 (eight) hours as needed for nausea or vomiting, Disp: 20 tablet, Rfl: 2    oxyCODONE-acetaminophen (Percocet) 7.5-325 MG per tablet, Take 1 tablet by mouth every 8 (eight) hours as needed for moderate pain Max Daily Amount: 3 tablets, Disp: 90 tablet, Rfl: 0    oxyCODONE-acetaminophen (Percocet) 7.5-325 MG per tablet, Take 1 tablet by mouth every 8 (eight) hours as needed for moderate pain Do not fill until 12/22/2023 Max Daily Amount: 3 tablets, Disp: 90 tablet, Rfl: 0    pregabalin (LYRICA) 50 mg capsule, Take 1 capsule (50 mg total) by mouth 3 (three) times a day, Disp: 90 capsule, Rfl: 1    Restasis 0.05 % ophthalmic emulsion, , Disp: , Rfl:     rosuvastatin (CRESTOR) 20 MG tablet, take 1 tablet by mouth once daily, Disp: 90 tablet, Rfl: 3    sildenafil (VIAGRA) 100 mg tablet, Take 1 tablet (100 mg total) by mouth as needed for erectile dysfunction, Disp: 20 tablet, Rfl: 0    Stiolto Respimat  2.5-2.5 MCG/ACT inhaler, inhale 2 puffs by mouth and INTO THE LUNGS once daily if needed, Disp: 4 g, Rfl: 2    traZODone (DESYREL) 100 mg tablet, take 1 tablet by mouth daily at bedtime, Disp: 90 tablet, Rfl: 3    buPROPion (Wellbutrin XL) 150 mg 24 hr tablet, Take 1 tablet (150 mg total) by mouth every morning (Patient not taking: Reported on 12/14/2023), Disp: 90 tablet, Rfl: 3    choline fenofibrate (TRILIPIX) 135 MG capsule, Take 1 capsule (135 mg total) by mouth daily (Patient not taking: Reported on 12/14/2023), Disp: 30 capsule, Rfl: 6    dicyclomine (BENTYL) 10 mg capsule, take 1 capsule by mouth four times a day before meals and at bedtime (Patient not taking: Reported on 12/14/2023), Disp: 30 capsule, Rfl: 0    docusate sodium (COLACE) 100 mg capsule, Take 1 capsule (100 mg total) by mouth 2 (two) times a day (Patient not taking: Reported on 12/14/2023), Disp: 20 capsule, Rfl: 0    fluticasone (FLONASE) 50 mcg/act nasal spray, 1 spray into each nostril daily for 14 days (Patient not taking: Reported on 12/14/2023), Disp: 11.1 mL, Rfl: 0    glucose blood (OneTouch Verio) test strip, Test once daily (Patient not taking: Reported on 12/26/2023), Disp: 100 each, Rfl: 0    ketoconazole (NIZORAL) 2 % shampoo, Apply 1 Application topically 2 (two) times a week for 28 days, Disp: 100 mL, Rfl: 0    nicotine (NICODERM CQ) 21 mg/24 hr TD 24 hr patch, Place 1 patch on the skin over 24 hours every 24 hours (Patient not taking: Reported on 12/14/2023), Disp: 28 patch, Rfl: 0    ofloxacin (OCUFLOX) 0.3 % ophthalmic solution, instill 1 drop into right eye four times a day STARTING 3 DAYS ND...  (REFER TO PRESCRIPTION NOTES). (Patient not taking: Reported on 12/14/2023), Disp: , Rfl:     pantoprazole (PROTONIX) 20 mg tablet, take 1 tablet by mouth once daily (Patient not taking: Reported on 12/5/2023), Disp: 30 tablet, Rfl: 0    polyethylene glycol (MIRALAX) 17 g packet, Take 17 g by mouth daily for 5 days (Patient not  "taking: Reported on 12/20/2022), Disp: 85 g, Rfl: 0    prednisoLONE acetate (PRED FORTE) 1 % ophthalmic suspension, instill 1 drop into right eye every 2 hours TO BE STARTED AFTER SURGERY (Patient not taking: Reported on 12/14/2023), Disp: , Rfl:     tamsulosin (FLOMAX) 0.4 mg, Take 1 capsule (0.4 mg total) by mouth daily with dinner for 3 days, Disp: 3 capsule, Rfl: 0  No Known Allergies    Objective   BP (!) 174/92 (BP Location: Right arm, Patient Position: Sitting, Cuff Size: Extra-Large)   Pulse 62   Temp (!) 97 °F (36.1 °C) (Tympanic)   Resp 16   Ht 5' 8\" (1.727 m)   Wt 88.9 kg (196 lb)   SpO2 95%   BMI 29.80 kg/m²   Wt Readings from Last 6 Encounters:   12/26/23 88.9 kg (196 lb)   12/20/23 88.5 kg (195 lb 3.2 oz)   12/11/23 88.4 kg (194 lb 14.2 oz)   12/05/23 86.9 kg (191 lb 9.6 oz)   11/24/23 87.1 kg (192 lb)   10/31/23 86.3 kg (190 lb 4.1 oz)     Physical Exam  Vitals reviewed.   Constitutional:       Appearance: Normal appearance. He is well-developed.   HENT:      Head: Normocephalic and atraumatic.   Eyes:      Pupils: Pupils are equal, round, and reactive to light.   Pulmonary:      Effort: Pulmonary effort is normal. No respiratory distress.   Musculoskeletal:         General: Normal range of motion.      Cervical back: Normal range of motion.   Lymphadenopathy:      Cervical: No cervical adenopathy.   Skin:     General: Skin is dry.   Neurological:      Mental Status: He is alert and oriented to person, place, and time.   Psychiatric:         Behavior: Behavior normal.     The following historical data was reviewed:  Past Medical History:   Diagnosis Date    Abdominal aortic aneurysm without rupture (HCC)     Abdominal Aortic Duplex 02/21/2017    Ectatic infrarenal abdominal aorta.    MARYANN (acute kidney injury) ([facility]) 07/23/2022    CAD (coronary artery disease)     Cancer ([facility]) 2022    right lung CA    COPD (chronic obstructive pulmonary disease) ([facility])     Extremity pain     History of " echocardiogram 03/18/2014    EF 55%, mild MR and AI.  Mild concentric LVH.    History of stress test 03/06/2017    Normal.    Hyperlipidemia     Hypertension     Joint pain     Kidney stone     Low back pain     Lung cancer (HCC)     Migraine     Neck pain     Obstructive sleep apnea     cannot tolerate CPAP    Osteoarthritis     Peripheral neuropathy     Reflex sympathetic dystrophy     Sacroiliitis (HCC) 02/02/2022     Past Surgical History:   Procedure Laterality Date    CARDIAC CATHETERIZATION  02/13/2012    EF 70%, widely patent renal arteries, significant single-vessel CAD-medical therapy.    CARDIAC CATHETERIZATION  04/11/2013    EF 65%, 50% mid LAD, 20% prox CFX, 90% diffuse RCA, 99% mid RCA. Medical management.    CATARACT EXTRACTION Right 09/19/2023    CHOLECYSTECTOMY      COLONOSCOPY      EPIDURAL BLOCK INJECTION Bilateral 08/15/2019    Procedure: BLOCK / INJECTION EPIDURAL STEROID CERVICAL;  Surgeon: Ricardo Park MD;  Location: [facility];  Service: Pain Management     FL GUIDED NEEDLE PLAC BX/ASP/INJ  08/15/2019    IR BIOPSY LUNG  09/13/2022    IR THORACIC DUCT EMBOLIZATION  11/22/2022    ORTHOPEDIC SURGERY      UT [facility] INCL FLUOR GDNCE DX W/CELL WASHG SPX N/A 11/16/2022    Procedure: BRONCHOSCOPY FLEXIBLE;  Surgeon: Gulshan Madison MD;  Location: [facility];  Service: Thoracic    UT THORACOSCOPY W/LOBECTOMY SINGLE LOBE Right 11/16/2022    Procedure: LOBECTOMY LUNG; lower lobe superior segmentectomy, mediastinal lymph node dissection;  Surgeon: Gulshan Madison MD;  Location: [facility];  Service: Thoracic    UT THORACOSCOPY W/SEGMENTECTOMY Right 11/16/2022    Procedure: THORACOSCOPY VIDEO ASSISTED SURGERY (VATS);  Surgeon: Gulshan Madison MD;  Location: [facility];  Service: Thoracic    TRIGGER POINT INJECTION       Please note:  This report has been generated by a voice recognition software system. Therefore there may be syntax, spelling, and/or grammatical  errors. Please call if you have any questions.

## 2023-12-27 LAB
A PHAGOCYTOPH DNA BLD QL NAA+PROBE: NEGATIVE
MYCOBACTERIUM SPEC CULT: NORMAL
RHODAMINE-AURAMINE STN SPEC: NORMAL

## 2023-12-28 LAB
Lab: NORMAL
MISCELLANEOUS LAB TEST RESULT: NORMAL
STONE ANALYSIS-IMP: NORMAL

## 2024-01-02 ENCOUNTER — HOSPITAL ENCOUNTER (OUTPATIENT)
Dept: INFUSION CENTER | Facility: HOSPITAL | Age: 69
Discharge: HOME | End: 2024-01-02
Attending: INTERNAL MEDICINE
Payer: MEDICARE

## 2024-01-02 VITALS
RESPIRATION RATE: 18 BRPM | OXYGEN SATURATION: 92 % | HEART RATE: 68 BPM | TEMPERATURE: 96.8 F | SYSTOLIC BLOOD PRESSURE: 158 MMHG | HEIGHT: 68 IN | BODY MASS INDEX: 29.81 KG/M2 | DIASTOLIC BLOOD PRESSURE: 73 MMHG

## 2024-01-02 DIAGNOSIS — C34.31 MALIGNANT NEOPLASM OF LOWER LOBE OF RIGHT LUNG (HCC): Primary | ICD-10-CM

## 2024-01-02 LAB
FUNGUS SPEC CULT: NORMAL
FUNGUS SPEC CULT: NORMAL

## 2024-01-02 RX ORDER — ACETAMINOPHEN 325 MG/1
650 TABLET ORAL ONCE
Status: COMPLETED | OUTPATIENT
Start: 2024-01-02 | End: 2024-01-02

## 2024-01-02 RX ORDER — SODIUM CHLORIDE 9 MG/ML
20 INJECTION, SOLUTION INTRAVENOUS ONCE
Status: COMPLETED | OUTPATIENT
Start: 2024-01-02 | End: 2024-01-02

## 2024-01-02 RX ADMIN — ACETAMINOPHEN 650 MG: 325 TABLET, FILM COATED ORAL at 10:04

## 2024-01-02 RX ADMIN — SODIUM CHLORIDE 20 ML/HR: 0.9 INJECTION, SOLUTION INTRAVENOUS at 10:02

## 2024-01-02 RX ADMIN — DEXAMETHASONE SODIUM PHOSPHATE 10 MG: 10 INJECTION, SOLUTION INTRAMUSCULAR; INTRAVENOUS at 10:34

## 2024-01-02 RX ADMIN — ATEZOLIZUMAB 1200 MG: 1200 INJECTION, SOLUTION INTRAVENOUS at 11:10

## 2024-01-02 RX ADMIN — DIPHENHYDRAMINE HYDROCHLORIDE 25 MG: 50 INJECTION, SOLUTION INTRAMUSCULAR; INTRAVENOUS at 10:02

## 2024-01-02 NOTE — PROGRESS NOTES
Lance Hazel  tolerated treatment well with no complications.      Lance Hazel is aware of future appt on 1/21 at 0900.     AVS printed and given to Lance Hazel:   No (Declined by Lance Hazel) x

## 2024-01-03 ENCOUNTER — OFFICE VISIT (OUTPATIENT)
Dept: PULMONOLOGY | Facility: CLINIC | Age: 69
End: 2024-01-03

## 2024-01-03 VITALS
SYSTOLIC BLOOD PRESSURE: 160 MMHG | TEMPERATURE: 100.2 F | BODY MASS INDEX: 30.04 KG/M2 | DIASTOLIC BLOOD PRESSURE: 82 MMHG | HEART RATE: 88 BPM | WEIGHT: 198.2 LBS | OXYGEN SATURATION: 98 % | HEIGHT: 68 IN

## 2024-01-03 DIAGNOSIS — R50.9 FEVER OF UNKNOWN ORIGIN: ICD-10-CM

## 2024-01-03 DIAGNOSIS — R93.89 ABNORMAL CT OF THE CHEST: Primary | ICD-10-CM

## 2024-01-03 DIAGNOSIS — J43.9 PULMONARY EMPHYSEMA, UNSPECIFIED EMPHYSEMA TYPE (HCC): ICD-10-CM

## 2024-01-03 DIAGNOSIS — F17.200 SMOKING: ICD-10-CM

## 2024-01-03 LAB
MYCOBACTERIUM SPEC CULT: NORMAL
RHODAMINE-AURAMINE STN SPEC: NORMAL

## 2024-01-03 NOTE — ASSESSMENT & PLAN NOTE
-Continues with daily fevers, is 100.2 in the office today  -Bronch with BAL performed on 12/14/2023 negative, ID workup so far with no identifiable cause  -CT chest shows improvement of previous groundglass densities, do not suspect continued fevers are from infectious etiology in the lungs  -Consider side effect of his immunotherapy as cause of continued fevers  -Consider holding Tecentriq for a few weeks to see if fevers improve  -Patient has follow-up appoint with ID next week  -Will discuss findings/recommendations with ID and oncology team

## 2024-01-03 NOTE — ASSESSMENT & PLAN NOTE
-Symptoms currently stable without any recent exacerbations  -Continue Stiolto and albuterol as needed

## 2024-01-03 NOTE — ASSESSMENT & PLAN NOTE
-Recent CT chest abdomen pelvis from 12/21/2023 reviewed with the patient.  There has been some interval improvement of the bilateral groundglass densities since CT chest in November.  Patient also having improvement of cough since November, but with continued fevers of unknown origin for which he is following with ID  -Patient has repeat CT chest scheduled for 2/26/2024, will follow-up with this scan to evaluate for continued improvement

## 2024-01-03 NOTE — PROGRESS NOTES
Pulmonary Follow Up Note  Lance Hazel 68 y.o. male MRN: [id number]  1/3/2024    Assessment:    Pulmonary emphysema (HCC)  -Symptoms currently stable without any recent exacerbations  -Continue Stiolto and albuterol as needed    Abnormal CT of the chest  -Recent CT chest abdomen pelvis from 12/21/2023 reviewed with the patient.  There has been some interval improvement of the bilateral groundglass densities since CT chest in November.  Patient also having improvement of cough since November, but with continued fevers of unknown origin for which he is following with ID  -Patient has repeat CT chest scheduled for 2/26/2024, will follow-up with this scan to evaluate for continued improvement    Smoking  -Patient recently resumed smoking 1/2 ppd.  He had previous success with Wellbutrin and nicotine patches which she has both of at home  -Encouraged smoking cessation efforts    Fever of unknown origin  -Continues with daily fevers, is 100.2 in the office today  -Bronch with BAL performed on 12/14/2023 negative, ID workup so far with no identifiable cause  -CT chest shows improvement of previous groundglass densities, do not suspect continued fevers are from infectious etiology in the lungs  -Consider side effect of his immunotherapy as cause of continued fevers  -Consider holding Tecentriq for a few weeks to see if fevers improve  -Patient has follow-up appoint with ID next week  -Will discuss findings/recommendations with ID and oncology team      Plan:    Diagnoses and all orders for this visit:    Abnormal CT of the chest    Pulmonary emphysema, unspecified emphysema type (HCC)    Smoking    Fever of unknown origin        Return in about 2 months (around 3/3/2024).      History of Present Illness     Chief Complaint:   Chief Complaint   Patient presents with    Follow-up     Just having his normal breathing issues       Patient ID: Lance is a 68 y.o. y.o. male has a past medical history of Abdominal aortic  aneurysm without rupture (HCC), Abdominal Aortic Duplex (02/21/2017), MARYANN (acute kidney injury) (HCC) (07/23/2022), CAD (coronary artery disease), Cancer (HCC) (2022), COPD (chronic obstructive pulmonary disease) ([facility]), Extremity pain, History of echocardiogram (03/18/2014), History of stress test (03/06/2017), Hyperlipidemia, Hypertension, Joint pain, Kidney stone, Low back pain, Lung cancer (HCC), Migraine, Neck pain, Obstructive sleep apnea, Osteoarthritis, Peripheral neuropathy, Reflex sympathetic dystrophy, and Sacroiliitis (HCC) (02/02/2022).      1/3/2024  HPI: Lance Hazel is a 68 y.o. male who presents to the office today for a follow-up visit.  He has a past medical history including but not limited to COPD, CAD, hypertension, tobacco abuse, JAKI not on CPAP, AAA, osteoarthritis, and peripheral neuropathy.  He was previously seen in the office in August 2022.  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  Patient has been on immunotherapy with Tecentriq since June 2023.    Most recently, patient developed fevers in mid November.  He had a CT chest 11/22/2023 which showed infectious/inflammatory bilateral nodules and right lung groundglass opacities.  He was treated with 2 rounds of antibiotics and he underwent bronchoscopy with BAL on 12/14/2023.  Cultures negative except for positive Candida glabrata, which ID believes may be a colonizer.  Fevers have persisted. Patient has had additional workup with ID and has a follow-up appointment with them next week.    Patient returns today with continued daily fevers usually in the  range.  Still has a cough, but states this has improved since November.  Still has shortness of breath that is worse with exertion especially if using the stairs or carrying heavy items.  Still having chills, and night sweats, but  notes night sweats have improved over the past 2 weeks.  He has started smoking again recently, 1/2 ppd.  There was a period of time where he quit for 9 months using Wellbutrin and nicotine patches.  He has these at home with plans of using again when he is ready to quit.  Patient also has Stiolto inhaler at home which she is using on an as-needed basis.  Does not find any benefit of using on a regular basis.  Not using albuterol rescue inhaler.        Review of Systems   Constitutional:  Positive for chills and fever. Negative for activity change and unexpected weight change.   HENT:  Negative for congestion, postnasal drip, rhinorrhea, sore throat and trouble swallowing.    Respiratory:  Positive for cough and shortness of breath. Negative for chest tightness and wheezing.    Cardiovascular:  Negative for chest pain, palpitations and leg swelling.   Allergic/Immunologic: Negative.        Historical Information   Past Medical History:   Diagnosis Date    Abdominal aortic aneurysm without rupture ([facility])     Abdominal Aortic Duplex 02/21/2017    Ectatic infrarenal abdominal aorta.    MARYANN (acute kidney injury) ([facility]) 07/23/2022    CAD (coronary artery disease)     Cancer ([facility]) 2022    right lung CA    COPD (chronic obstructive pulmonary disease) ([facility])     Extremity pain     History of echocardiogram 03/18/2014    EF 55%, mild MR and AI.  Mild concentric LVH.    History of stress test 03/06/2017    Normal.    Hyperlipidemia     Hypertension     Joint pain     Kidney stone     Low back pain     Lung cancer ([facility])     Migraine     Neck pain     Obstructive sleep apnea     cannot tolerate CPAP    Osteoarthritis     Peripheral neuropathy     Reflex sympathetic dystrophy     Sacroiliitis ([facility]) 02/02/2022     Past Surgical History:   Procedure Laterality Date    CARDIAC CATHETERIZATION  02/13/2012    EF 70%, widely patent renal arteries, significant single-vessel CAD-medical therapy.    CARDIAC CATHETERIZATION  04/11/2013    EF  65%, 50% mid LAD, 20% prox CFX, 90% diffuse RCA, 99% mid RCA. Medical management.    CATARACT EXTRACTION Right 09/19/2023    CHOLECYSTECTOMY      COLONOSCOPY      EPIDURAL BLOCK INJECTION Bilateral 08/15/2019    Procedure: BLOCK / INJECTION EPIDURAL STEROID CERVICAL;  Surgeon: Ricardo Park MD;  Location: [facility];  Service: Pain Management     FL GUIDED NEEDLE PLAC BX/ASP/INJ  08/15/2019    IR BIOPSY LUNG  09/13/2022    IR THORACIC DUCT EMBOLIZATION  11/22/2022    ORTHOPEDIC SURGERY      AL BRNCC INCL FLUOR GDNCE DX W/CELL WASHG SPX N/A 11/16/2022    Procedure: BRONCHOSCOPY FLEXIBLE;  Surgeon: Gulshan Madison MD;  Location: [facility];  Service: Thoracic    AL THORACOSCOPY W/LOBECTOMY SINGLE LOBE Right 11/16/2022    Procedure: LOBECTOMY LUNG; lower lobe superior segmentectomy, mediastinal lymph node dissection;  Surgeon: Gulshan Madison MD;  Location: [facility];  Service: Thoracic    AL THORACOSCOPY W/SEGMENTECTOMY Right 11/16/2022    Procedure: THORACOSCOPY VIDEO ASSISTED SURGERY (VATS);  Surgeon: Gulshan Madison MD;  Location: [facility];  Service: Thoracic    TRIGGER POINT INJECTION       Family History   Adopted: Yes   Problem Relation Age of Onset    No Known Problems Family     No Known Problems Mother     No Known Problems Father        Smoking history: He reports that he has been smoking cigarettes. He has never used smokeless tobacco.    Occupational History:     Immunization History   Administered Date(s) Administered    COVID-19 MODERNA VACC 0.25 ML IM BOOSTER 01/11/2022    COVID-19 MODERNA VACC 0.5 ML IM 04/06/2021, 05/05/2021    Td (adult), adsorbed 01/01/2011       Meds/Allergies     Current Outpatient Medications:     amLODIPine (NORVASC) 10 mg tablet, take 1 tablet by mouth once daily, Disp: 90 tablet, Rfl: 3    bisacodyl (DULCOLAX) 5 mg EC tablet, Take 1 tablet (5 mg total) by mouth daily as needed for constipation for up to 4 doses, Disp: 30 tablet, Rfl:  0    ketoconazole (NIZORAL) 2 % shampoo, Apply 1 Application topically 2 (two) times a week for 28 days, Disp: 100 mL, Rfl: 0    lisinopril (ZESTRIL) 10 mg tablet, Take 1 tablet (10 mg total) by mouth daily, Disp: 90 tablet, Rfl: 3    metoprolol succinate (TOPROL-XL) 100 mg 24 hr tablet, take 1 tablet by mouth once daily, Disp: 90 tablet, Rfl: 3    nitroglycerin (NITROSTAT) 0.4 mg SL tablet, Place 1 tablet (0.4 mg total) under the tongue every 5 (five) minutes as needed for chest pain, Disp: 25 tablet, Rfl: 5    ondansetron (ZOFRAN) 8 mg tablet, Take 1 tablet (8 mg total) by mouth every 8 (eight) hours as needed for nausea or vomiting, Disp: 20 tablet, Rfl: 2    oxyCODONE-acetaminophen (Percocet) 7.5-325 MG per tablet, Take 1 tablet by mouth every 8 (eight) hours as needed for moderate pain Max Daily Amount: 3 tablets, Disp: 90 tablet, Rfl: 0    oxyCODONE-acetaminophen (Percocet) 7.5-325 MG per tablet, Take 1 tablet by mouth every 8 (eight) hours as needed for moderate pain Do not fill until 12/22/2023 Max Daily Amount: 3 tablets, Disp: 90 tablet, Rfl: 0    pregabalin (LYRICA) 50 mg capsule, Take 1 capsule (50 mg total) by mouth 3 (three) times a day, Disp: 90 capsule, Rfl: 1    Restasis 0.05 % ophthalmic emulsion, , Disp: , Rfl:     rosuvastatin (CRESTOR) 20 MG tablet, take 1 tablet by mouth once daily, Disp: 90 tablet, Rfl: 3    sildenafil (VIAGRA) 100 mg tablet, Take 1 tablet (100 mg total) by mouth as needed for erectile dysfunction, Disp: 20 tablet, Rfl: 0    Stiolto Respimat 2.5-2.5 MCG/ACT inhaler, inhale 2 puffs by mouth and INTO THE LUNGS once daily if needed, Disp: 4 g, Rfl: 2    tamsulosin (FLOMAX) 0.4 mg, Take 1 capsule (0.4 mg total) by mouth daily with dinner for 3 days, Disp: 3 capsule, Rfl: 0    traZODone (DESYREL) 100 mg tablet, take 1 tablet by mouth daily at bedtime, Disp: 90 tablet, Rfl: 3    buPROPion (Wellbutrin XL) 150 mg 24 hr tablet, Take 1 tablet (150 mg total) by mouth every morning  (Patient not taking: Reported on 12/14/2023), Disp: 90 tablet, Rfl: 3    choline fenofibrate (TRILIPIX) 135 MG capsule, Take 1 capsule (135 mg total) by mouth daily (Patient not taking: Reported on 12/14/2023), Disp: 30 capsule, Rfl: 6    dicyclomine (BENTYL) 10 mg capsule, take 1 capsule by mouth four times a day before meals and at bedtime (Patient not taking: Reported on 12/14/2023), Disp: 30 capsule, Rfl: 0    docusate sodium (COLACE) 100 mg capsule, Take 1 capsule (100 mg total) by mouth 2 (two) times a day (Patient not taking: Reported on 12/14/2023), Disp: 20 capsule, Rfl: 0    fluticasone (FLONASE) 50 mcg/act nasal spray, 1 spray into each nostril daily for 14 days (Patient not taking: Reported on 12/14/2023), Disp: 11.1 mL, Rfl: 0    glucose blood (OneTouch Verio) test strip, Test once daily (Patient not taking: Reported on 12/26/2023), Disp: 100 each, Rfl: 0    nicotine (NICODERM CQ) 21 mg/24 hr TD 24 hr patch, Place 1 patch on the skin over 24 hours every 24 hours (Patient not taking: Reported on 12/14/2023), Disp: 28 patch, Rfl: 0    ofloxacin (OCUFLOX) 0.3 % ophthalmic solution, instill 1 drop into right eye four times a day STARTING 3 DAYS PA...  (REFER TO PRESCRIPTION NOTES). (Patient not taking: Reported on 12/14/2023), Disp: , Rfl:     pantoprazole (PROTONIX) 20 mg tablet, take 1 tablet by mouth once daily (Patient not taking: Reported on 12/5/2023), Disp: 30 tablet, Rfl: 0    polyethylene glycol (MIRALAX) 17 g packet, Take 17 g by mouth daily for 5 days (Patient not taking: Reported on 12/20/2022), Disp: 85 g, Rfl: 0    prednisoLONE acetate (PRED FORTE) 1 % ophthalmic suspension, instill 1 drop into right eye every 2 hours TO BE STARTED AFTER SURGERY (Patient not taking: Reported on 12/14/2023), Disp: , Rfl:   No current facility-administered medications for this visit.    Facility-Administered Medications Ordered in Other Visits:     alteplase (CATHFLO) injection 2 mg, 2 mg, Intracatheter, Q1MIN  "Paul RDORIGUEZ DO  Allergies: No Known Allergies      Vitals:  Vitals:    01/03/24 1001 01/03/24 1031   BP: (!) 184/98 160/82   BP Location: Left arm    Patient Position: Sitting    Cuff Size: Adult    Pulse: 88    Temp: 100.2 °F (37.9 °C)    TempSrc: Temporal    SpO2: 98%    Weight: 89.9 kg (198 lb 3.2 oz)    Height: 5' 8\" (1.727 m)    Oxygen Therapy  SpO2: 98 %  .  Wt Readings from Last 3 Encounters:   01/03/24 89.9 kg (198 lb 3.2 oz)   12/26/23 88.9 kg (196 lb)   12/20/23 88.5 kg (195 lb 3.2 oz)     Body mass index is 30.14 kg/m².    Physical Exam  Vitals and nursing note reviewed.   Constitutional:       General: He is not in acute distress.     Appearance: Normal appearance. He is well-developed.   Cardiovascular:      Rate and Rhythm: Normal rate and regular rhythm.      Heart sounds: Normal heart sounds. No murmur heard.  Pulmonary:      Effort: Pulmonary effort is normal. No respiratory distress.      Breath sounds: Examination of the left-lower field reveals rales. Rales present. No decreased breath sounds, wheezing or rhonchi.   Musculoskeletal:         General: No swelling.      Right lower leg: No edema.      Left lower leg: No edema.   Psychiatric:         Mood and Affect: Mood and affect normal.         Behavior: Behavior normal. Behavior is cooperative.           Labs: I have personally reviewed pertinent lab results.  Lab Results   Component Value Date    WBC 9.08 12/26/2023    HGB 15.5 12/26/2023    HCT 49.4 (H) 12/26/2023    MCV 96 12/26/2023     12/26/2023     Lab Results   Component Value Date    CALCIUM 9.1 12/26/2023    K 4.0 12/26/2023    CO2 26 12/26/2023     12/26/2023    BUN 18 12/26/2023    CREATININE 1.07 12/26/2023     No results found for: \"IGE\"  Lab Results   Component Value Date    ALT 12 12/26/2023    AST 11 (L) 12/26/2023    ALKPHOS 68 12/26/2023       Imaging and other studies: I have personally reviewed pertinent reports and I have personally reviewed " pertinent films in PACS     CT chest abdomen pelvis with contrast 12/21/2023   Lungs-  1.  Improved right-sided airspace opacities compared to November 2023 with residual mild groundglass density in the inferior posterior portion of the right lower lobe. Tree-in-bud opacities in the bilateral lower lobes have also improved with residual   nodules in the right lower lobe.     2.  Otherwise stable chest including borderline mediastinal lymph nodes, esophageal thickening, and chronic/postoperative opacities in the lungs.        Pulmonary function testing:     Pulmonary Functions Testing Results: 9/21/2022    FEV1/FVC ratio 70%    FEV1 72% predicted  FVC 78% predicted  (+) response to bronchodilators  TLC 99 % predicted   % predicted  DLCO corrected for hemoglobin 71 % predicted    Impression: Normal lung volumes.  Restrictive pattern on spirometry.  Near significant improvement in airflow and vital capacity following administration of bronchodilators.  Mildly reduced diffusion capacity.  Normal airway resistance as indicated by the specific airway conductance.

## 2024-01-03 NOTE — ASSESSMENT & PLAN NOTE
-Patient recently resumed smoking 1/2 ppd.  He had previous success with Wellbutrin and nicotine patches which she has both of at home  -Encouraged smoking cessation efforts

## 2024-01-05 ENCOUNTER — OFFICE VISIT (OUTPATIENT)
Dept: PAIN MEDICINE | Facility: CLINIC | Age: 69
End: 2024-01-05
Payer: MEDICARE

## 2024-01-05 VITALS
SYSTOLIC BLOOD PRESSURE: 155 MMHG | WEIGHT: 196.7 LBS | DIASTOLIC BLOOD PRESSURE: 81 MMHG | HEART RATE: 65 BPM | BODY MASS INDEX: 29.81 KG/M2 | HEIGHT: 68 IN

## 2024-01-05 DIAGNOSIS — G89.4 CHRONIC PAIN SYNDROME: Primary | ICD-10-CM

## 2024-01-05 DIAGNOSIS — M79.2 NEUROPATHIC PAIN: ICD-10-CM

## 2024-01-05 DIAGNOSIS — F33.9 DEPRESSION, RECURRENT (HCC): ICD-10-CM

## 2024-01-05 DIAGNOSIS — Z79.891 ENCOUNTER FOR LONG-TERM OPIATE ANALGESIC USE: ICD-10-CM

## 2024-01-05 DIAGNOSIS — G89.29 CHRONIC MIDLINE LOW BACK PAIN WITHOUT SCIATICA: ICD-10-CM

## 2024-01-05 DIAGNOSIS — N18.31 STAGE 3A CHRONIC KIDNEY DISEASE (HCC): ICD-10-CM

## 2024-01-05 DIAGNOSIS — I71.40 ABDOMINAL AORTIC ANEURYSM (AAA) WITHOUT RUPTURE, UNSPECIFIED PART (HCC): ICD-10-CM

## 2024-01-05 DIAGNOSIS — M47.812 CERVICAL SPONDYLOSIS WITHOUT MYELOPATHY: ICD-10-CM

## 2024-01-05 DIAGNOSIS — M54.12 CERVICAL RADICULOPATHY: ICD-10-CM

## 2024-01-05 DIAGNOSIS — M47.816 LUMBAR SPONDYLOSIS: ICD-10-CM

## 2024-01-05 DIAGNOSIS — M54.50 CHRONIC MIDLINE LOW BACK PAIN WITHOUT SCIATICA: ICD-10-CM

## 2024-01-05 DIAGNOSIS — F11.20 UNCOMPLICATED OPIOID DEPENDENCE (HCC): ICD-10-CM

## 2024-01-05 DIAGNOSIS — M51.36 LUMBAR DEGENERATIVE DISC DISEASE: ICD-10-CM

## 2024-01-05 DIAGNOSIS — M79.18 MYOFASCIAL PAIN SYNDROME: ICD-10-CM

## 2024-01-05 DIAGNOSIS — M54.2 NECK PAIN: ICD-10-CM

## 2024-01-05 PROCEDURE — 99214 OFFICE O/P EST MOD 30 MIN: CPT | Performed by: NURSE PRACTITIONER

## 2024-01-05 RX ORDER — NALOXONE HYDROCHLORIDE 4 MG/.1ML
SPRAY NASAL
Qty: 1 EACH | Refills: 1 | Status: SHIPPED | OUTPATIENT
Start: 2024-01-05 | End: 2025-01-04

## 2024-01-05 RX ORDER — OXYCODONE AND ACETAMINOPHEN 7.5; 325 MG/1; MG/1
1 TABLET ORAL EVERY 8 HOURS PRN
Qty: 90 TABLET | Refills: 0 | Status: SHIPPED | OUTPATIENT
Start: 2024-02-18

## 2024-01-05 RX ORDER — OXYCODONE AND ACETAMINOPHEN 7.5; 325 MG/1; MG/1
1 TABLET ORAL EVERY 8 HOURS PRN
Qty: 90 TABLET | Refills: 0 | Status: SHIPPED | OUTPATIENT
Start: 2024-01-21

## 2024-01-05 NOTE — PATIENT INSTRUCTIONS
Opioid Safety   WHAT YOU NEED TO KNOW:   An opioid medicine is used to treat pain. Examples are oxycodone, morphine, fentanyl, or codeine. Pain control and management may help you rest, heal, and return to your daily activities. You and your family will receive information about how to manage your pain at home. The instructions will include what to do if you have side effects as your pain is managed. You will get information on how to handle opioid medicine safely. You will also get suggestions on how to control pain without opioids. It is important to follow all instructions so your pain is managed effectively.  DISCHARGE INSTRUCTIONS:   Call your local emergency number (911 in the ), or have someone else call if:   You have a seizure.    You cannot be woken.    You have trouble staying awake and your breathing is slow or shallow.    Your speech is slurred, or you are confused.    You are dizzy or stumble when you walk.    Call your doctor, or have someone close to you call if:   You are extremely drowsy, or you have trouble staying awake or speaking.    You have pale or clammy skin.    You have blue fingernails or lips.    Your heartbeat is slower than normal.    You cannot stop vomiting.    You have questions or concerns about your condition or care.    Use opioids safely:   Take prescribed opioids exactly as directed.  Opioids come with directions based on the kind and how it is given. Talk to your healthcare provider or a pharmacist if you have any questions. Do not take more than the recommended amount. Too much can cause a life-threatening overdose. Do not continue to take it after your pain stops. You may develop tolerance. This means you keep needing higher doses to get the same effect. You may also develop opioid use disorder. This means you are not able to control your opioid use.    Do not give opioids to others or take opioids that belong to someone else.  The kind or amount one person takes may not  be right for another. The person you share them with may also be taking medicines that do not mix with opioids. The person may drink alcohol or use other drugs that can cause life-threatening problems when mixed with opioids.    Do not mix opioids with other medicines or alcohol.  The combination can cause an overdose, or cause you to stop breathing. Alcohol, sleeping pills, and medicines such as antihistamines can make you sleepy. A combination with opioids can lead to a coma.    Do not drive or operate heavy machinery after you use an opioid.  You may feel drowsy or have trouble concentrating. You can injure yourself or others if you drive or use heavy machinery when you are not alert. Your provider or pharmacist can tell you how long to wait after a dose before you do these activities.    Talk to your healthcare provider if you have any side effects.  Side effects include nausea, sleepiness, itching, and trouble thinking clearly. Your provider may need to make changes to the kind or amount of opioid you are taking. Your provider can also help you find ways to prevent or relieve side effects.    Manage constipation:  Constipation is the most common side effect of opioid medicine. Constipation is when you have hard, dry bowel movements, or you go longer than usual between bowel movements. Tell your healthcare provider about all changes in your bowel movements while you are taking opioids. Your provider may recommend laxative medicine to help you have a bowel movement. Your provider may also change the kind of opioid you are taking, or change when you take it. The following are more ways you can prevent or relieve constipation:  Drink liquids as directed.  You may need to drink extra liquids to help soften and move your bowels. Ask how much liquid to drink each day and which liquids are best for you.    Eat high-fiber foods.  This may help decrease constipation by adding bulk to your bowel movements. High-fiber  foods include fruits, vegetables, whole-grain breads and cereals, and beans. Your healthcare provider or dietitian can help you create a high-fiber meal plan. Your provider may also recommend a fiber supplement if you cannot get enough fiber from food.         Exercise regularly.  Regular physical activity can help stimulate your intestines. Walking is a good exercise to prevent or relieve constipation. Ask which exercises are best for you.         Schedule a time each day to have a bowel movement.  This may help train your body to have regular bowel movements. Bend forward while you are on the toilet to help move the bowel movement out. Sit on the toilet for at least 10 minutes, even if you do not have a bowel movement.    Store opioids safely:   Store opioids where others cannot easily get them.  Keep them in a locked cabinet or secure area. Do not  keep them in a purse or other bag you carry with you. A person may be looking for something else and find the opioids.         Make sure opioids are stored out of the reach of children.  A child can easily overdose on opioids. Opioids may look like candy to a small child.    The best way to dispose of opioids:  The laws vary by country and area. In the [place], the best way is to return the opioids through a take-back program. This program is offered by the [place] Drug Enforcement Agency ([employer]). The following are options for using the program:  Take the opioids to a [employer] collection site.  The site is often a law enforcement center. Call your local law enforcement center for scheduled take-back days in your area. You will be given information on where to go if the collection site is in a different location.    Take the opioids to an approved pharmacy or hospital.  A pharmacy or hospital may be set up as a collection site. You will need to ask if it is a [employer] collection site if you were not directed there. A pharmacy or doctor's office may not be able to take back opioids  unless it is a [employer] site.    Use a mail-back system.  This means you are given containers to put the opioids into. You will then mail them in the containers.    Use a take-back drop box.  This is a place to leave the opioids at any time. People and animals will not be able to get into the box. Your local law enforcement agency can tell you where to find a drop box in your area.    Other safe ways to dispose of opioids:  The medicine may come with disposal instructions. The instructions may vary depending on the brand of medicine you are using. Instructions may come in a Medication Guide, but not every medicine has one. You may instead get instructions from your pharmacy or doctor. Follow instructions carefully. The following are general guidelines to follow:  Find out if you can flush the opioid.  Some opioids can be flushed down the toilet or poured into the sink. You will need to contact authorities in your area to see if this is an option for you. The FDA also offers a list of medicines that are safe to flush down the toilet. You can check the list if you cannot get the information for your local area.    Ask your waste management company about rules for putting opioids in the trash.  The company will be able to give you specific directions. Scratch out personal information on the original medicine label so it cannot be read. Then put it in the trash. Do not label the trash or put any information on it about the opioids. It should look like regular household trash so no one is tempted to look for the opioids. Keep the trash out of the reach of children and animals. Always make sure trash is secure.    Talk to officials if you live in a facility.  If you live in a nursing home or assisted living center, talk to an official. The person will know the rules for your area.    Other ways to manage pain:   Ask your healthcare provider about non-opioid medicines to control pain.  Some medicines may even work better than  opioids, depending on the cause of your pain. Nonprescription medicines include NSAIDs (such as ibuprofen) and acetaminophen. Prescription medicines include muscle relaxers, antidepressants, and steroids.    Pain may be managed without any medicines.  Some ways to relieve pain include massage, aromatherapy, or meditation. Physical or occupational therapy may also help.    Follow up with your doctor or pain specialist as directed:  You may need to have your dose adjusted. Your doctor or pain specialist can also help you find ways to manage pain without opioids. Write down your questions so you remember to ask them during your visits.  For more information:   Drug Enforcement Administration  [address] [place] [address]  Phone: 0- 651 - [phone]  Web Address: https://[email or web address]_[email or web address]    US Food and Drug Administration  [address] [place] [address]  Phone: 3- 885 - [phone]  Web Address: http://www.fda.gov  © Copyright [employer]ative 2023 Information is for End User's use only and may not be sold, redistributed or otherwise used for commercial purposes.  The above information is an  only. It is not intended as medical advice for individual conditions or treatments. Talk to your doctor, nurse or pharmacist before following any medical regimen to see if it is safe and effective for you.

## 2024-01-05 NOTE — PROGRESS NOTES
Assessment:  1. Chronic pain syndrome    2. Cervical radiculopathy    3. Neck pain    4. Cervical spondylosis without myelopathy    5. Chronic midline low back pain without sciatica    6. Lumbar spondylosis    7. Lumbar degenerative disc disease    8. Myofascial pain syndrome    9. Neuropathic pain    10. Encounter for long-term opiate analgesic use    11. Uncomplicated opioid dependence (HCC)    12. Depression, recurrent (HCC)    13. Abdominal aortic aneurysm (AAA) without rupture, unspecified part (HCC)    14. Stage 3a chronic kidney disease (HCC)        Plan:  While the patient was in the office today, I did have a thorough conversation regarding their chronic pain syndrome, medication management, and treatment plan options.  Patient is being seen for a follow-up visit.  Overall, patient reports that his pain is reasonably controlled with his current medication regimen.  Medications reduce his pain by 40 to 50%.  He denies side effects from meds.    Continue oxycodone 7.5/325 every 8 hours as needed for pain.  The patient's opioid scripts were sent to their pharmacy electronically and was given a 2 month supply of prescriptions with a Do Not Fill date(s) of 1/21/2024, 2/18/2024.    Continue Lyrica 50 mg 3 times daily.  He did not require refill of this medication during today's visit.    [place] Prescription Drug Monitoring Program report was reviewed and was appropriate     A urine drug screen was collected at today's office visit as part of our medication management protocol. The point of care testing results were appropriate for what was being prescribed. The specimen will be sent for confirmatory testing. The drug screen is medically necessary because the patient is either dependent on opioid medication or is being considered for opioid medication therapy and the results could impact ongoing or future treatment. The drug screen is to evaluate for the presences or absence of prescribed, non-prescribed,  and/or illicit drugs/substances.    There are risks associated with opioid medications, including dependence, addiction and tolerance. The patient understands and agrees to use these medications only as prescribed. Potential side effects of the medications include, but are not limited to, constipation, drowsiness, addiction, impaired judgment and risk of fatal overdose if not taken as prescribed. The patient was warned against driving while taking sedation medications.  Sharing medications is a felony. At this point in time, the patient is showing no signs of addiction, abuse, diversion or suicidal ideation.    The patient will follow-up in 8 weeks for medication prescription refill and reevaluation. The patient was advised to contact the office should their symptoms worsen in the interim. The patient was agreeable and verbalized an understanding.        History of Present Illness:    The patient is a 68 y.o. male last seen on 11/24/20232 who presents for a follow up office visit in regards to chronic pain secondary to chronic pain syndrome, neck pain, cervical spondylosis, chronic low back pain, lumbar spondylosis, lumbar degenerative disc disease, myofascial pain syndrome, neuropathic pain .  The patient currently reports complaints of neck pain, low back pain.  Current pain level is a 5/10.  Quality pain is described as sharp, throbbing, shooting    Current pain medications includes: Oxycodone 7.5/325 every 8 hours as needed for pain, Lyrica 50 mg 3 times daily.  The patient reports that this regimen is providing 40-50% pain relief.  The patient is reporting no side effects from this pain medication regimen.    Pain Contract Signed: 2/20/2023  Last Urine Drug Screen: 1/5/2024    I have personally reviewed and/or updated the patient's past medical history, past surgical history, family history, social history, current medications, allergies, and vital signs today.       Review of Systems:    Review of Systems    Eyes:  Negative for visual disturbance.   Respiratory:  Positive for shortness of breath. Negative for wheezing.    Cardiovascular:  Negative for chest pain and palpitations.   Gastrointestinal:  Negative for constipation and nausea.   Endocrine: Negative for polydipsia.   Musculoskeletal:  Negative for gait problem, joint swelling and myalgias.        Decreased range of motion   Skin:  Negative for rash.   Neurological:  Positive for dizziness. Negative for headaches.   Psychiatric/Behavioral:  Negative for decreased concentration.          Past Medical History:   Diagnosis Date    Abdominal aortic aneurysm without rupture ([facility])     Abdominal Aortic Duplex 02/21/2017    Ectatic infrarenal abdominal aorta.    MARYANN (acute kidney injury) ([facility]) 07/23/2022    CAD (coronary artery disease)     Cancer ([facility]) 2022    right lung CA    COPD (chronic obstructive pulmonary disease) ([facility])     Extremity pain     History of echocardiogram 03/18/2014    EF 55%, mild MR and AI.  Mild concentric LVH.    History of stress test 03/06/2017    Normal.    Hyperlipidemia     Hypertension     Joint pain     Kidney stone     Low back pain     Lung cancer ([facility])     Migraine     Neck pain     Obstructive sleep apnea     cannot tolerate CPAP    Osteoarthritis     Peripheral neuropathy     Reflex sympathetic dystrophy     Sacroiliitis ([facility]) 02/02/2022       Past Surgical History:   Procedure Laterality Date    CARDIAC CATHETERIZATION  02/13/2012    EF 70%, widely patent renal arteries, significant single-vessel CAD-medical therapy.    CARDIAC CATHETERIZATION  04/11/2013    EF 65%, 50% mid LAD, 20% prox CFX, 90% diffuse RCA, 99% mid RCA. Medical management.    CATARACT EXTRACTION Right 09/19/2023    CHOLECYSTECTOMY      COLONOSCOPY      EPIDURAL BLOCK INJECTION Bilateral 08/15/2019    Procedure: BLOCK / INJECTION EPIDURAL STEROID CERVICAL;  Surgeon: Ricardo Park MD;  Location: [facility];  Service: Pain Management     FL GUIDED NEEDLE  PLAC BX/ASP/INJ  08/15/2019    IR BIOPSY LUNG  09/13/2022    IR THORACIC DUCT EMBOLIZATION  11/22/2022    ORTHOPEDIC SURGERY      CT [facility] INCL FLUOR GDNCE DX W/CELL WASHG SPX N/A 11/16/2022    Procedure: BRONCHOSCOPY FLEXIBLE;  Surgeon: Gulshan Madison MD;  Location: [facility];  Service: Thoracic    CT THORACOSCOPY W/LOBECTOMY SINGLE LOBE Right 11/16/2022    Procedure: LOBECTOMY LUNG; lower lobe superior segmentectomy, mediastinal lymph node dissection;  Surgeon: Gulshan Madison MD;  Location: [facility];  Service: Thoracic    CT THORACOSCOPY W/SEGMENTECTOMY Right 11/16/2022    Procedure: THORACOSCOPY VIDEO ASSISTED SURGERY (VATS);  Surgeon: Gulshan Madison MD;  Location: [facility];  Service: Thoracic    TRIGGER POINT INJECTION         Family History   Adopted: Yes   Problem Relation Age of Onset    No Known Problems Family     No Known Problems Mother     No Known Problems Father        Social History     Occupational History    Not on file   Tobacco Use    Smoking status: Some Days     Current packs/day: 0.50     Types: Cigarettes    Smokeless tobacco: Never   Vaping Use    Vaping status: Never Used   Substance and Sexual Activity    Alcohol use: Not Currently    Drug use: Never    Sexual activity: Yes         Current Outpatient Medications:     amLODIPine (NORVASC) 10 mg tablet, take 1 tablet by mouth once daily, Disp: 90 tablet, Rfl: 3    bisacodyl (DULCOLAX) 5 mg EC tablet, Take 1 tablet (5 mg total) by mouth daily as needed for constipation for up to 4 doses, Disp: 30 tablet, Rfl: 0    ketoconazole (NIZORAL) 2 % shampoo, Apply 1 Application topically 2 (two) times a week for 28 days, Disp: 100 mL, Rfl: 0    lisinopril (ZESTRIL) 10 mg tablet, Take 1 tablet (10 mg total) by mouth daily, Disp: 90 tablet, Rfl: 3    metoprolol succinate (TOPROL-XL) 100 mg 24 hr tablet, take 1 tablet by mouth once daily, Disp: 90 tablet, Rfl: 3    naloxone (NARCAN) 4 mg/0.1 mL nasal spray,  Administer 1 spray into a nostril. If no response after 2-3 minutes, give another dose in the other nostril using a new spray., Disp: 1 each, Rfl: 1    nitroglycerin (NITROSTAT) 0.4 mg SL tablet, Place 1 tablet (0.4 mg total) under the tongue every 5 (five) minutes as needed for chest pain, Disp: 25 tablet, Rfl: 5    ondansetron (ZOFRAN) 8 mg tablet, Take 1 tablet (8 mg total) by mouth every 8 (eight) hours as needed for nausea or vomiting, Disp: 20 tablet, Rfl: 2    [START ON 1/21/2024] oxyCODONE-acetaminophen (Percocet) 7.5-325 MG per tablet, Take 1 tablet by mouth every 8 (eight) hours as needed for moderate pain Max Daily Amount: 3 tablets Do not start before January 21, 2024., Disp: 90 tablet, Rfl: 0    [START ON 2/18/2024] oxyCODONE-acetaminophen (Percocet) 7.5-325 MG per tablet, Take 1 tablet by mouth every 8 (eight) hours as needed for moderate pain Max Daily Amount: 3 tablets Do not start before February 18, 2024., Disp: 90 tablet, Rfl: 0    pregabalin (LYRICA) 50 mg capsule, Take 1 capsule (50 mg total) by mouth 3 (three) times a day, Disp: 90 capsule, Rfl: 1    Restasis 0.05 % ophthalmic emulsion, , Disp: , Rfl:     rosuvastatin (CRESTOR) 20 MG tablet, take 1 tablet by mouth once daily, Disp: 90 tablet, Rfl: 3    sildenafil (VIAGRA) 100 mg tablet, Take 1 tablet (100 mg total) by mouth as needed for erectile dysfunction, Disp: 20 tablet, Rfl: 0    Stiolto Respimat 2.5-2.5 MCG/ACT inhaler, inhale 2 puffs by mouth and INTO THE LUNGS once daily if needed, Disp: 4 g, Rfl: 2    tamsulosin (FLOMAX) 0.4 mg, Take 1 capsule (0.4 mg total) by mouth daily with dinner for 3 days, Disp: 3 capsule, Rfl: 0    traZODone (DESYREL) 100 mg tablet, take 1 tablet by mouth daily at bedtime, Disp: 90 tablet, Rfl: 3    buPROPion (Wellbutrin XL) 150 mg 24 hr tablet, Take 1 tablet (150 mg total) by mouth every morning (Patient not taking: Reported on 12/14/2023), Disp: 90 tablet, Rfl: 3    choline fenofibrate (TRILIPIX) 135 MG  "capsule, Take 1 capsule (135 mg total) by mouth daily (Patient not taking: Reported on 12/14/2023), Disp: 30 capsule, Rfl: 6    dicyclomine (BENTYL) 10 mg capsule, take 1 capsule by mouth four times a day before meals and at bedtime (Patient not taking: Reported on 12/14/2023), Disp: 30 capsule, Rfl: 0    docusate sodium (COLACE) 100 mg capsule, Take 1 capsule (100 mg total) by mouth 2 (two) times a day (Patient not taking: Reported on 12/14/2023), Disp: 20 capsule, Rfl: 0    fluticasone (FLONASE) 50 mcg/act nasal spray, 1 spray into each nostril daily for 14 days (Patient not taking: Reported on 12/14/2023), Disp: 11.1 mL, Rfl: 0    glucose blood (OneTouch Verio) test strip, Test once daily (Patient not taking: Reported on 12/26/2023), Disp: 100 each, Rfl: 0    nicotine (NICODERM CQ) 21 mg/24 hr TD 24 hr patch, Place 1 patch on the skin over 24 hours every 24 hours (Patient not taking: Reported on 12/14/2023), Disp: 28 patch, Rfl: 0    ofloxacin (OCUFLOX) 0.3 % ophthalmic solution, instill 1 drop into right eye four times a day STARTING 3 DAYS NV...  (REFER TO PRESCRIPTION NOTES). (Patient not taking: Reported on 12/14/2023), Disp: , Rfl:     pantoprazole (PROTONIX) 20 mg tablet, take 1 tablet by mouth once daily (Patient not taking: Reported on 12/5/2023), Disp: 30 tablet, Rfl: 0    polyethylene glycol (MIRALAX) 17 g packet, Take 17 g by mouth daily for 5 days (Patient not taking: Reported on 12/20/2022), Disp: 85 g, Rfl: 0    prednisoLONE acetate (PRED FORTE) 1 % ophthalmic suspension, instill 1 drop into right eye every 2 hours TO BE STARTED AFTER SURGERY (Patient not taking: Reported on 12/14/2023), Disp: , Rfl:   No current facility-administered medications for this visit.    No Known Allergies    Physical Exam:    /81   Pulse 65   Ht 5' 8\" (1.727 m)   Wt 89.2 kg (196 lb 11.2 oz)   BMI 29.91 kg/m²     Constitutional:normal, well developed, well nourished, alert, in no distress and non-toxic and no " overt pain behavior.  Eyes:anicteric  HEENT:grossly intact  Neck:supple, symmetric, trachea midline and no masses   Pulmonary:even and unlabored  Cardiovascular:No edema or pitting edema present  Skin:Normal without rashes or lesions and well hydrated  Psychiatric:Mood and affect appropriate  Neurologic:Cranial Nerves II-XII grossly intact  Musculoskeletal:normal      Imaging  No orders to display         No orders of the defined types were placed in this encounter.

## 2024-01-08 LAB
FUNGUS SPEC CULT: NORMAL
FUNGUS SPEC CULT: NORMAL

## 2024-01-09 LAB
MYCOBACTERIUM SPEC CULT: NORMAL
RHODAMINE-AURAMINE STN SPEC: NORMAL

## 2024-01-09 NOTE — PROGRESS NOTES
REQUIRED DOCUMENTATION:      1. This service was provided via Telemedicine using [employer] platform.  2. Provider located at [facility] outpatient office.  3. TeleMed provider: Connor Azevedo MD  4. Identify all parties in room with patient during tele consult: patient only  5. After connecting through [employer], patient was identified by name and date of birth and assistant checked wristband.  Patient was then informed that this was a Telemedicine visit and that the exam was being conducted confidentially over secure lines. My office door was closed. No one else was in the room.  Patient acknowledged consent and understanding of privacy and security of the Telemedicine visit, and gave us permission to have the assistant stay in the room in order to assist with the history and to conduct the exam.  I informed the patient that I have reviewed their record in Epic and presented the opportunity for them to ask any questions regarding the visit today.  The patient agreed to participate.        Progress Note - Infectious Disease   Lance Hazel 68 y.o. male MRN: [id number]  Unit/Bed#:  Encounter: [id number]      Impression/Plan:    Fevers:  -Patient with daily fevers, night sweats, and dry cough since around 11/16/23. Negative infectious workup includes multiple sets of blood cultures, HIV screen, Lyme testing, Anaplasma PCR, parasite smear, Quantiferon gold, serum cryptococcal antigen, Brucella antibodies, acute EBV panel. Recent BAL cultures from 12/14/23 negative other than for Candida which is oral colonizer.   CT scans of chest/abdomen/pelvis from 12/21/23 show improving RUL/RML opacity compared to November, near complete resolution of prior tree-in-bud opacities in lower lobes. Also with stable borderline mediastinal lymphadenopathy. CBC and CMP from 12/26/23 unremarkable. He still complains of daily fevers/night sweats, though has no other major new symptoms and otherwise stable. Consider non-infectious  causes such as side effect of his immunotherapy.  -Patient has had extensive negative infectious workup as above, with improvement in prior lung findings, though still having reported daily fevers. Fortunately he is otherwise clinically stable  -At this time I do not see any clear infectious etiology to explain patient's fevers  -Will follow up urine histoplasma antigen for completeness  -Would consider if this may be a side effect of patients immunotherapy as it seems symptoms started several months after this therapy was started; defer to Oncology decision to change therapy and see if fevers resolve  -Consider workup for HLH (though no splenomegaly or cytopenias) and auto-immune conditions (BRYAN, RF etc); will defer to referring provider and PCP  -Plan for ID follow up in 4 weeks; counseled to call office sooner should he develop new symptoms      Abnormal CT Chest:  -CT 11/22 showing new tree-in-bud nodules throughout right lung and left lower lobe, as well as areas of groundglass opacitiy in right upper lobe and right middle lobe. CT appearance is atypical for typical bacterial pneumonia and he did not respond to multiple antibiotic courses arguing against this. Time course also is too long for viral infection. BAL cultures from 12/14/23 were negative for PJP and all other cultures (fungal, AFB) were negative other than for Candida which is likely oral colonizer. Serum Cryptococcal antigen negative. Repeat CT chest 12/21/23 showing improvement in lung findings. Discussed case with Pulmonology who patient is also seeing, agree there is low current concern for pulmonary infection.  -Follow up final fungal and AFB BAL cultures from 12/14/23  -Follow up urine histoplasma antigen  -Follow up with Pulmonology   -Monitor respiratory symptoms     3. 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 Now on immunotherapy since April/May.   -Consider side effect of  immunotherapy causing fevers, as above     4. Intermittent epigastric pains:  -Reports he has off and on epigastric pain after eating. Has had this before. Recent CT A/P without any obvious etiology, recent LFTs normal, prior lipase normal. He has never had EGD.   -Would suggest eventual EGD to evaluate for peptic ulcer disease  -PCP follow up    Subjective:  Since our last visit, patient thinks the fevers are a bit better during day but still occur at night and still having night sweats off and on. Still feeling tired. Cough is may be slightly increasing again, dry. No other new symptoms. No joint pain or swelling, no rash. He is noticing some intermittent abdominal pain in epigastric region still after eating. He had similar pains while he was on chemotherapy. Was supposed to get EGD in past but has not done this.     Objective:  Documented physical exam has been primarily done by the patient's nurse and/or the primary service due to limited examination abilities on telemedicine     General Appearance:  Alert, interactive, nontoxic, no acute distress.   Throat: Oropharynx moist without lesions.    Lungs:   respirations unlabored   Heart:     Abdomen:   non-distended   Extremities: No clubbing, cyanosis or edema   Skin: No new rashes or lesions on exposed skin         Labs:   All pertinent labs and imaging studies were personally reviewed      Micro:        Imaging:          Connor Azevedo MD  [facility]

## 2024-01-11 ENCOUNTER — TELEMEDICINE (OUTPATIENT)
Dept: INFECTIOUS DISEASES | Facility: CLINIC | Age: 69
End: 2024-01-11
Payer: MEDICARE

## 2024-01-11 DIAGNOSIS — R50.9 FEVER OF UNKNOWN ORIGIN: Primary | ICD-10-CM

## 2024-01-11 PROCEDURE — 99214 OFFICE O/P EST MOD 30 MIN: CPT | Performed by: INTERNAL MEDICINE

## 2024-01-15 LAB
FUNGUS SPEC CULT: NORMAL
FUNGUS SPEC CULT: NORMAL

## 2024-01-16 DIAGNOSIS — F32.A DEPRESSION, UNSPECIFIED DEPRESSION TYPE: ICD-10-CM

## 2024-01-16 DIAGNOSIS — Z87.891 HISTORY OF SMOKING: ICD-10-CM

## 2024-01-16 LAB
MYCOBACTERIUM SPEC CULT: NORMAL
RHODAMINE-AURAMINE STN SPEC: NORMAL

## 2024-01-16 RX ORDER — SODIUM CHLORIDE 9 MG/ML
20 INJECTION, SOLUTION INTRAVENOUS ONCE
Status: CANCELLED | OUTPATIENT
Start: 2024-01-23

## 2024-01-16 RX ORDER — ACETAMINOPHEN 325 MG/1
650 TABLET ORAL ONCE
Status: CANCELLED | OUTPATIENT
Start: 2024-01-23 | End: 2024-01-22

## 2024-01-16 RX ORDER — BUPROPION HYDROCHLORIDE 150 MG/1
150 TABLET ORAL EVERY MORNING
Qty: 90 TABLET | Refills: 3 | Status: SHIPPED | OUTPATIENT
Start: 2024-01-16

## 2024-01-18 ENCOUNTER — TELEPHONE (OUTPATIENT)
Dept: HEMATOLOGY ONCOLOGY | Facility: CLINIC | Age: 69
End: 2024-01-18

## 2024-01-18 NOTE — TELEPHONE ENCOUNTER
Returned call to pt. Advised him Dr Duncan would like to see him in the office to review. Pt scheduled for virtual appt tomorrow.

## 2024-01-18 NOTE — TELEPHONE ENCOUNTER
"\"Ankit Mccarthy,This is your favorite pain [place], [address] Phone number [phone]. Infectious disease was supposed to get a hold of Doctor Gonzalez. I'm trying to find out if the infusion still on for the 21st. If you can give me a call back, I'd appreciate it. Thanks so much. Demar Byers.\"    "

## 2024-01-19 ENCOUNTER — TELEPHONE (OUTPATIENT)
Dept: HEMATOLOGY ONCOLOGY | Facility: CLINIC | Age: 69
End: 2024-01-19

## 2024-01-19 ENCOUNTER — TELEMEDICINE (OUTPATIENT)
Dept: HEMATOLOGY ONCOLOGY | Facility: CLINIC | Age: 69
End: 2024-01-19
Payer: MEDICARE

## 2024-01-19 ENCOUNTER — PATIENT OUTREACH (OUTPATIENT)
Dept: CASE MANAGEMENT | Facility: HOSPITAL | Age: 69
End: 2024-01-19

## 2024-01-19 DIAGNOSIS — C34.31 MALIGNANT NEOPLASM OF LOWER LOBE OF RIGHT LUNG (HCC): Primary | ICD-10-CM

## 2024-01-19 DIAGNOSIS — R50.9 FUO (FEVER OF UNKNOWN ORIGIN): ICD-10-CM

## 2024-01-19 DIAGNOSIS — R50.9 FEVER OF UNKNOWN ORIGIN: ICD-10-CM

## 2024-01-19 PROCEDURE — 99443 PR PHYS/QHP TELEPHONE EVALUATION 21-30 MIN: CPT | Performed by: INTERNAL MEDICINE

## 2024-01-19 NOTE — PROGRESS NOTES
ONEAL returned pt's call this morning, he had called into the [employer] asking for a SW to reach out.  Pt shared with me today that his ex wife passed away a few months ago and he was then awarded her SS shelter income.  This took his monthly income from $1100 to $2500 a month, and he subsequently lost his benefits, including SNAP and Medicaid secondary to his Medicare.  Pt is concerned about medical bills coming in, as he is on active cancer treatment.  He tells me that he's already received one bill for $1700.    We talked through the options as I see them at this point and agreed to the following:   - pt will go to [employer] today and look at insurance plans to see if this is affordable for him.  -MSW will refer to our finance team to see if there is drug  assistance available.  - MSW will ask the hospital finance team to mail pt an application.    Pt was appreciative of the call back today and the options provided.  He says that he is able to look online for insurance plans today.  I will ask ONEAL Beltran to follow up with him next week to see how he'd like to proceed, as she would be his normal SW.  No other needs noted at this time.

## 2024-01-19 NOTE — TELEPHONE ENCOUNTER
Spoke with patient regarding foundation assistance that was secured to help off-set his out of pocket costs associated with this infusion treatment.     He was approved through the co-pay relief:  Fund Applied for :  Non-Small Cell Lung Cancers  Award Year :  2024    Effective Date :  January 19, 2024  Expiry Date :  January 18, 2025    LookbackDate :  July 23, 2023  Balance :  $ 5000.00  I will submit infusion claims to get paid.   Also explained that once these funds are exhausted, I will review to see if there is more available. Once he gets the financial assistance application, should he have any questions he can give us a call.

## 2024-01-19 NOTE — PROGRESS NOTES
Virtual Brief Visit    This Visit is being completed by telephone. The Patient is located at Home and in the following state in which I hold an active license [place]    The patient was identified by name and date of birth. Lance TAMY Yasemin was informed that this is a telemedicine visit and that the visit is being conducted through the Epic Embedded platform. He agrees to proceed..  My office door was closed. No one else was in the room.  He acknowledged consent and understanding of privacy and security of the video platform. The patient has agreed to participate and understands they can discontinue the visit at any time.    Patient is aware this is a billable service.       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  Status post adjuvant Taxol carbo.  Added Tecentriq in June 2023.  Over the past few months he has had unexplained fevers.  He had had fevers in February, prior to immunotherapy, attributed to pancreatitis at that time.  Recent pancreatic enzymes normal.  CT chest ab pelvis disclosed no convincing source of fever.  ID evaluation identified no cause for fever.  We reviewed that it is possible that is related to immunotherapy.  Temperatures generally ranging in the 100 +/- range.  Some benefit with Percocet/Tylenol.  Malaise fluctuates.  Autoimmune studies are requested.  I reviewed the above at length with the patient.  He voiced understanding.  He wishes to continue with immunotherapy and monitor.  I reviewed his medical conditions, medications, laboratory studies.    Problem List Items Addressed This Visit    None  Visit Diagnoses       FUO (fever of unknown origin)        Relevant Orders    C-reactive protein    Antinuclear Antibodies, IFA    RF Screen w/ Reflex to Titer    Sedimentation rate, automated            Recent Visits  No visits were found meeting these conditions.  Showing recent visits  within past 7 days and meeting all other requirements  Today's Visits  Date Type Provider Dept   01/19/24 Telemedicine Paul Duncan DO  [facility]   Showing today's visits and meeting all other requirements  Future Appointments  No visits were found meeting these conditions.  Showing future appointments within next 150 days and meeting all other requirements         Visit Time  Total Visit Duration: 25 min.

## 2024-01-19 NOTE — TELEPHONE ENCOUNTER
Patient Call    Who are you speaking with? Patient    If it is not the patient, are they listed on an active communication consent form? N/A   What is the reason for this call? Pt would like to speak with a . Was kurt \[facility]\"" phone number   Does this require a call back? Yes   If a call back is required, please list best call back number [phone]    If a call back is required, advise that a message will be forwarded to their care team and someone will return their call as soon as possible.   Did you relay this information to the patient? Yes

## 2024-01-22 ENCOUNTER — OFFICE VISIT (OUTPATIENT)
Dept: FAMILY MEDICINE CLINIC | Facility: CLINIC | Age: 69
End: 2024-01-22
Payer: MEDICARE

## 2024-01-22 ENCOUNTER — APPOINTMENT (OUTPATIENT)
Dept: LAB | Facility: HOSPITAL | Age: 69
End: 2024-01-22
Payer: MEDICARE

## 2024-01-22 ENCOUNTER — TELEPHONE (OUTPATIENT)
Dept: HEMATOLOGY ONCOLOGY | Facility: CLINIC | Age: 69
End: 2024-01-22

## 2024-01-22 VITALS
HEART RATE: 66 BPM | SYSTOLIC BLOOD PRESSURE: 146 MMHG | BODY MASS INDEX: 30.31 KG/M2 | TEMPERATURE: 96.7 F | HEIGHT: 68 IN | OXYGEN SATURATION: 94 % | WEIGHT: 200 LBS | DIASTOLIC BLOOD PRESSURE: 80 MMHG

## 2024-01-22 DIAGNOSIS — F11.20 UNCOMPLICATED OPIOID DEPENDENCE (HCC): ICD-10-CM

## 2024-01-22 DIAGNOSIS — F33.9 DEPRESSION, RECURRENT (HCC): ICD-10-CM

## 2024-01-22 DIAGNOSIS — F17.200 SMOKING: ICD-10-CM

## 2024-01-22 DIAGNOSIS — J43.9 PULMONARY EMPHYSEMA, UNSPECIFIED EMPHYSEMA TYPE (HCC): ICD-10-CM

## 2024-01-22 DIAGNOSIS — Z23 ENCOUNTER FOR IMMUNIZATION: ICD-10-CM

## 2024-01-22 DIAGNOSIS — N18.2 CKD (CHRONIC KIDNEY DISEASE) STAGE 2, GFR 60-89 ML/MIN: ICD-10-CM

## 2024-01-22 DIAGNOSIS — R50.9 FUO (FEVER OF UNKNOWN ORIGIN): ICD-10-CM

## 2024-01-22 DIAGNOSIS — G62.0 NEUROPATHY DUE TO CHEMOTHERAPEUTIC DRUG: ICD-10-CM

## 2024-01-22 DIAGNOSIS — I25.118 CORONARY ARTERY DISEASE OF NATIVE ARTERY OF NATIVE HEART WITH STABLE ANGINA PECTORIS (HCC): ICD-10-CM

## 2024-01-22 DIAGNOSIS — C34.31 MALIGNANT NEOPLASM OF LOWER LOBE OF RIGHT LUNG (HCC): ICD-10-CM

## 2024-01-22 DIAGNOSIS — R73.9 HYPERGLYCEMIA: Primary | ICD-10-CM

## 2024-01-22 DIAGNOSIS — T45.1X5A NEUROPATHY DUE TO CHEMOTHERAPEUTIC DRUG: ICD-10-CM

## 2024-01-22 DIAGNOSIS — I10 ESSENTIAL HYPERTENSION: ICD-10-CM

## 2024-01-22 DIAGNOSIS — G25.0 ESSENTIAL TREMOR: ICD-10-CM

## 2024-01-22 DIAGNOSIS — K86.1 OTHER CHRONIC PANCREATITIS (HCC): ICD-10-CM

## 2024-01-22 PROBLEM — J42 CHRONIC BRONCHITIS (HCC): Status: RESOLVED | Noted: 2019-01-28 | Resolved: 2024-01-22

## 2024-01-22 PROBLEM — G81.90 HEMIPARESIS (HCC): Status: RESOLVED | Noted: 2022-11-01 | Resolved: 2024-01-22

## 2024-01-22 LAB
ALBUMIN SERPL BCP-MCNC: 4.2 G/DL (ref 3.5–5)
ALP SERPL-CCNC: 67 U/L (ref 34–104)
ALT SERPL W P-5'-P-CCNC: 14 U/L (ref 7–52)
ANION GAP SERPL CALCULATED.3IONS-SCNC: 7 MMOL/L
AST SERPL W P-5'-P-CCNC: 12 U/L (ref 13–39)
BASOPHILS # BLD AUTO: 0.05 THOUSANDS/ÂΜL (ref 0–0.1)
BASOPHILS NFR BLD AUTO: 1 % (ref 0–1)
BILIRUB SERPL-MCNC: 0.7 MG/DL (ref 0.2–1)
BUN SERPL-MCNC: 16 MG/DL (ref 5–25)
CALCIUM SERPL-MCNC: 9.3 MG/DL (ref 8.4–10.2)
CHLORIDE SERPL-SCNC: 102 MMOL/L (ref 96–108)
CO2 SERPL-SCNC: 27 MMOL/L (ref 21–32)
CREAT SERPL-MCNC: 1.08 MG/DL (ref 0.6–1.3)
CRP SERPL QL: 6.5 MG/L
EOSINOPHIL # BLD AUTO: 0 THOUSAND/ÂΜL (ref 0–0.61)
EOSINOPHIL NFR BLD AUTO: 0 % (ref 0–6)
ERYTHROCYTE [DISTWIDTH] IN BLOOD BY AUTOMATED COUNT: 13.2 % (ref 11.6–15.1)
ERYTHROCYTE [SEDIMENTATION RATE] IN BLOOD: 13 MM/HOUR (ref 0–19)
GFR SERPL CREATININE-BSD FRML MDRD: 69 ML/MIN/1.73SQ M
GLUCOSE SERPL-MCNC: 150 MG/DL (ref 65–140)
HCT VFR BLD AUTO: 50 % (ref 36.5–49.3)
HGB BLD-MCNC: 15.9 G/DL (ref 12–17)
IMM GRANULOCYTES # BLD AUTO: 0.02 THOUSAND/UL (ref 0–0.2)
IMM GRANULOCYTES NFR BLD AUTO: 0 % (ref 0–2)
LYMPHOCYTES # BLD AUTO: 2.62 THOUSANDS/ÂΜL (ref 0.6–4.47)
LYMPHOCYTES NFR BLD AUTO: 31 % (ref 14–44)
MCH RBC QN AUTO: 30 PG (ref 26.8–34.3)
MCHC RBC AUTO-ENTMCNC: 31.8 G/DL (ref 31.4–37.4)
MCV RBC AUTO: 94 FL (ref 82–98)
MONOCYTES # BLD AUTO: 0.62 THOUSAND/ÂΜL (ref 0.17–1.22)
MONOCYTES NFR BLD AUTO: 7 % (ref 4–12)
NEUTROPHILS # BLD AUTO: 5.09 THOUSANDS/ÂΜL (ref 1.85–7.62)
NEUTS SEG NFR BLD AUTO: 61 % (ref 43–75)
NRBC BLD AUTO-RTO: 0 /100 WBCS
PLATELET # BLD AUTO: 200 THOUSANDS/UL (ref 149–390)
PMV BLD AUTO: 10.1 FL (ref 8.9–12.7)
POTASSIUM SERPL-SCNC: 3.9 MMOL/L (ref 3.5–5.3)
PROT SERPL-MCNC: 6.6 G/DL (ref 6.4–8.4)
RBC # BLD AUTO: 5.3 MILLION/UL (ref 3.88–5.62)
RHEUMATOID FACT SER QL LA: NEGATIVE
SL AMB POCT HEMOGLOBIN AIC: 6.6 (ref ?–6.5)
SODIUM SERPL-SCNC: 136 MMOL/L (ref 135–147)
WBC # BLD AUTO: 8.4 THOUSAND/UL (ref 4.31–10.16)

## 2024-01-22 PROCEDURE — 80053 COMPREHEN METABOLIC PANEL: CPT

## 2024-01-22 PROCEDURE — 36415 COLL VENOUS BLD VENIPUNCTURE: CPT

## 2024-01-22 PROCEDURE — 83036 HEMOGLOBIN GLYCOSYLATED A1C: CPT | Performed by: FAMILY MEDICINE

## 2024-01-22 PROCEDURE — 86140 C-REACTIVE PROTEIN: CPT

## 2024-01-22 PROCEDURE — 85652 RBC SED RATE AUTOMATED: CPT

## 2024-01-22 PROCEDURE — 86430 RHEUMATOID FACTOR TEST QUAL: CPT

## 2024-01-22 PROCEDURE — 85025 COMPLETE CBC W/AUTO DIFF WBC: CPT

## 2024-01-22 PROCEDURE — 86038 ANTINUCLEAR ANTIBODIES: CPT

## 2024-01-22 PROCEDURE — 99214 OFFICE O/P EST MOD 30 MIN: CPT | Performed by: FAMILY MEDICINE

## 2024-01-22 NOTE — TELEPHONE ENCOUNTER
"\"Judith, this is [place] [address] [phone]. I had talked to Lisa's blood work done today and the CRP came out really high. I'm just curious if that's gonna affect my infusion tomorrow. If you can give me a call back, I'd appreciate it. Thanks much.\"  "

## 2024-01-22 NOTE — PROGRESS NOTES
Assessment/Plan:    No problem-specific Assessment & Plan notes found for this encounter.       Diagnoses and all orders for this visit:    Hyperglycemia  -     POCT hemoglobin A1c  -     Glucose, fasting; Future  -     Hemoglobin A1C; Future    Encounter for immunization  -     Pneumococcal Conjugate Vaccine 20-valent (PCV20)  -     influenza vaccine, high-dose, PF 0.7 mL (FLUZONE HIGH-DOSE)    Pulmonary emphysema, unspecified emphysema type (HCC)    Malignant neoplasm of lower lobe of right lung (HCC)    Coronary artery disease of native artery of native heart with stable angina pectoris (HCC)  Comments:  quit smoking    CKD (chronic kidney disease) stage 2, GFR 60-89 ml/min    Depression, recurrent (HCC)    Essential tremor  -     Ambulatory Referral to Neurology; Future    Uncomplicated opioid dependence (HCC)  Comments:  stable    Smoking  Comments:  quit smoking    Essential hypertension    Neuropathy due to chemotherapeutic drug     Other chronic pancreatitis (HCC)          PHQ-2/9 Depression Screening    Little interest or pleasure in doing things: 0 - not at all  Feeling down, depressed, or hopeless: 0 - not at all  Trouble falling or staying asleep, or sleeping too much: 0 - not at all  Feeling tired or having little energy: 0 - not at all  Poor appetite or overeatin - not at all  Feeling bad about yourself - or that you are a failure or have let yourself or your family down: 0 - not at all  Trouble concentrating on things, such as reading the newspaper or watching television: 0 - not at all  Moving or speaking so slowly that other people could have noticed. Or the opposite - being so fidgety or restless that you have been moving around a lot more than usual: 0 - not at all  Thoughts that you would be better off dead, or of hurting yourself in some way: 0 - not at all  PHQ-9 Score: 0  PHQ-9 Interpretation: No or Minimal depression            Subjective:      Patient ID: Lance Hazel is a 68 y.o.  "male.    Follow up for hyperglycemia, pt has been having high blood sugars on labs        The following portions of the patient's history were reviewed and updated as appropriate: allergies, current medications, past family history, past medical history, past social history, past surgical history, and problem list.    Review of Systems   Eyes:  Negative for visual disturbance.   Cardiovascular:  Negative for chest pain and palpitations.   Gastrointestinal:  Positive for abdominal pain. Negative for nausea and vomiting.   Genitourinary:  Positive for frequency.   Skin:  Negative for wound.   Neurological:  Negative for numbness.         Objective:    /80   Pulse 66   Temp (!) 96.7 °F (35.9 °C) (Tympanic)   Ht 5' 8\" (1.727 m)   Wt 90.7 kg (200 lb)   SpO2 94%   BMI 30.41 kg/m²      Physical Exam  Vitals and nursing note reviewed.   Constitutional:       General: He is not in acute distress.     Appearance: Normal appearance. He is not ill-appearing, toxic-appearing or diaphoretic.   HENT:      Head: Normocephalic and atraumatic.      Mouth/Throat:      Mouth: Mucous membranes are moist.   Eyes:      Conjunctiva/sclera: Conjunctivae normal.   Cardiovascular:      Rate and Rhythm: Normal rate and regular rhythm.      Heart sounds: Normal heart sounds. No murmur heard.  Pulmonary:      Effort: Pulmonary effort is normal. No respiratory distress.      Breath sounds: Normal breath sounds. No wheezing, rhonchi or rales.   Abdominal:      General: There is no distension.      Palpations: Abdomen is soft. There is no mass.      Tenderness: There is no abdominal tenderness. There is no guarding or rebound.   Musculoskeletal:      Cervical back: Normal range of motion. No rigidity.      Right lower leg: No edema.      Left lower leg: No edema.   Lymphadenopathy:      Cervical: No cervical adenopathy.   Neurological:      Mental Status: He is alert and oriented to person, place, and time.   Psychiatric:         Mood " and Affect: Mood normal.         Behavior: Behavior normal.         Thought Content: Thought content normal.         Judgment: Judgment normal.

## 2024-01-22 NOTE — TELEPHONE ENCOUNTER
Spoke with Avery to let him know he can still be treated with CRP 6.5 tomorrow. Pt verbalized understanding and stated he continues with the same fevers, cough, and intermittent night sweats. Pt also states he broke his pinky toe which was evaluated by his PCP. I let him know we will keep him updated if anything changes and to call with any new or worsening symptoms

## 2024-01-23 ENCOUNTER — HOSPITAL ENCOUNTER (OUTPATIENT)
Dept: INFUSION CENTER | Facility: HOSPITAL | Age: 69
Discharge: HOME | End: 2024-01-23
Payer: MEDICARE

## 2024-01-23 VITALS
HEIGHT: 68 IN | SYSTOLIC BLOOD PRESSURE: 154 MMHG | HEART RATE: 64 BPM | DIASTOLIC BLOOD PRESSURE: 90 MMHG | TEMPERATURE: 96.5 F | RESPIRATION RATE: 18 BRPM | BODY MASS INDEX: 30.42 KG/M2

## 2024-01-23 DIAGNOSIS — C34.31 MALIGNANT NEOPLASM OF LOWER LOBE OF RIGHT LUNG (HCC): Primary | ICD-10-CM

## 2024-01-23 LAB
MYCOBACTERIUM SPEC CULT: NORMAL
RHODAMINE-AURAMINE STN SPEC: NORMAL

## 2024-01-23 PROCEDURE — 96367 TX/PROPH/DG ADDL SEQ IV INF: CPT

## 2024-01-23 PROCEDURE — 96413 CHEMO IV INFUSION 1 HR: CPT

## 2024-01-23 RX ORDER — ACETAMINOPHEN 325 MG/1
650 TABLET ORAL ONCE
Status: COMPLETED | OUTPATIENT
Start: 2024-01-23 | End: 2024-01-23

## 2024-01-23 RX ORDER — SODIUM CHLORIDE 9 MG/ML
20 INJECTION, SOLUTION INTRAVENOUS ONCE
Status: COMPLETED | OUTPATIENT
Start: 2024-01-23 | End: 2024-01-23

## 2024-01-23 RX ADMIN — DIPHENHYDRAMINE HYDROCHLORIDE 25 MG: 50 INJECTION, SOLUTION INTRAMUSCULAR; INTRAVENOUS at 09:40

## 2024-01-23 RX ADMIN — ATEZOLIZUMAB 1200 MG: 1200 INJECTION, SOLUTION INTRAVENOUS at 10:56

## 2024-01-23 RX ADMIN — SODIUM CHLORIDE 20 ML/HR: 0.9 INJECTION, SOLUTION INTRAVENOUS at 09:29

## 2024-01-23 RX ADMIN — DEXAMETHASONE SODIUM PHOSPHATE 10 MG: 10 INJECTION, SOLUTION INTRAMUSCULAR; INTRAVENOUS at 10:19

## 2024-01-23 RX ADMIN — ACETAMINOPHEN 650 MG: 325 TABLET, FILM COATED ORAL at 09:31

## 2024-01-23 NOTE — PROGRESS NOTES
Lance Hazel  tolerated treatment well with no complications.      Lance Hazel is aware of future appt on 2/13 at 0830.     AVS printed and given to Lance Hazel:   No (Declined by Lance Hazel) x

## 2024-01-24 LAB — ANA TITR SER IF: NEGATIVE {TITER}

## 2024-01-26 ENCOUNTER — TELEPHONE (OUTPATIENT)
Dept: CARDIOLOGY CLINIC | Facility: CLINIC | Age: 69
End: 2024-01-26

## 2024-01-26 ENCOUNTER — OFFICE VISIT (OUTPATIENT)
Dept: CARDIOLOGY CLINIC | Facility: CLINIC | Age: 69
End: 2024-01-26
Payer: MEDICARE

## 2024-01-26 VITALS
HEART RATE: 60 BPM | HEIGHT: 66 IN | RESPIRATION RATE: 18 BRPM | DIASTOLIC BLOOD PRESSURE: 80 MMHG | SYSTOLIC BLOOD PRESSURE: 138 MMHG | OXYGEN SATURATION: 90 % | BODY MASS INDEX: 31.98 KG/M2 | WEIGHT: 199 LBS

## 2024-01-26 DIAGNOSIS — I10 PRIMARY HYPERTENSION: Primary | ICD-10-CM

## 2024-01-26 DIAGNOSIS — I25.10 CORONARY ARTERY DISEASE INVOLVING NATIVE CORONARY ARTERY OF NATIVE HEART WITHOUT ANGINA PECTORIS: ICD-10-CM

## 2024-01-26 DIAGNOSIS — J43.9 PULMONARY EMPHYSEMA, UNSPECIFIED EMPHYSEMA TYPE (HCC): ICD-10-CM

## 2024-01-26 DIAGNOSIS — E78.2 MIXED HYPERLIPIDEMIA: ICD-10-CM

## 2024-01-26 PROBLEM — J18.9 PNEUMONIA DUE TO INFECTIOUS ORGANISM: Status: RESOLVED | Noted: 2023-11-27 | Resolved: 2024-01-26

## 2024-01-26 PROCEDURE — 99213 OFFICE O/P EST LOW 20 MIN: CPT | Performed by: INTERNAL MEDICINE

## 2024-01-26 RX ORDER — ROSUVASTATIN CALCIUM 40 MG/1
40 TABLET, COATED ORAL DAILY
Qty: 90 TABLET | Refills: 3 | Status: SHIPPED | OUTPATIENT
Start: 2024-01-26

## 2024-01-26 RX ORDER — LISINOPRIL 20 MG/1
20 TABLET ORAL DAILY
COMMUNITY
Start: 2024-01-19 | End: 2024-01-26 | Stop reason: SDUPTHER

## 2024-01-26 NOTE — PROGRESS NOTES
"Subjective:        Patient ID: Lance Hazel is a 68 y.o. male.    Chief Complaint:  [facility]very is here for routine FU.  Having fevers they feel from chemo.  No cp or unusual ROSE, moderate ROSE persists.  Back to smoking due to stress.  No nitro use.  No edema.  BP labile he reports.  Dizzy/LH resolved on lower dose flomax and lisinopril.      The following portions of the patient's history were reviewed and updated as appropriate: allergies, current medications, past family history, past medical history, past social history, past surgical history, and problem list.  Review of Systems   Constitutional: Positive for fever. Negative for chills, diaphoresis, malaise/fatigue and weight gain.   HENT:  Negative for nosebleeds and stridor.    Eyes:  Negative for double vision, vision loss in left eye, vision loss in right eye and visual disturbance.   Cardiovascular:  Positive for dyspnea on exertion. Negative for chest pain, claudication, cyanosis, irregular heartbeat, leg swelling, near-syncope, orthopnea, palpitations, paroxysmal nocturnal dyspnea and syncope.   Respiratory:  Negative for cough, shortness of breath, snoring and wheezing.    Endocrine: Negative for polydipsia, polyphagia and polyuria.   Hematologic/Lymphatic: Negative for bleeding problem. Does not bruise/bleed easily.   Skin:  Negative for flushing and rash.   Musculoskeletal:  Negative for falls and myalgias.   Gastrointestinal:  Negative for abdominal pain, heartburn, hematemesis, hematochezia, melena and nausea.   Genitourinary:  Negative for hematuria.   Neurological:  Negative for brief paralysis, dizziness, focal weakness, headaches, light-headedness, loss of balance and vertigo.   Psychiatric/Behavioral:  Negative for altered mental status and substance abuse.    Allergic/Immunologic: Negative for hives.          Objective:      /80 (BP Location: Left arm, Patient Position: Sitting, Cuff Size: Standard)   Pulse 60   Resp 18   Ht 5' 6\" (1.676 " m)   Wt 90.3 kg (199 lb)   SpO2 90%   BMI 32.12 kg/m²   Physical Exam  Constitutional:       General: He is not in acute distress.     Appearance: He is well-developed. He is not ill-appearing, toxic-appearing or diaphoretic.   HENT:      Head: Normocephalic and atraumatic.   Eyes:      General: No scleral icterus.     Pupils: Pupils are equal, round, and reactive to light.   Neck:      Thyroid: No thyromegaly.      Vascular: No carotid bruit or JVD.   Cardiovascular:      Rate and Rhythm: Normal rate and regular rhythm.      Heart sounds: Normal heart sounds. No murmur heard.     No friction rub. No gallop.   Pulmonary:      Effort: Pulmonary effort is normal. No respiratory distress.      Breath sounds: Normal breath sounds. No stridor. No wheezing or rales.   Abdominal:      General: Bowel sounds are normal. There is no distension.      Palpations: Abdomen is soft. There is no mass.      Tenderness: There is no abdominal tenderness.   Musculoskeletal:      Cervical back: Normal range of motion and neck supple.      Right lower leg: No edema.      Left lower leg: No edema.   Skin:     General: Skin is warm and dry.      Coloration: Skin is not pale.      Findings: No erythema.   Neurological:      Mental Status: He is alert and oriented to person, place, and time. Mental status is at baseline.      Coordination: Coordination normal.   Psychiatric:         Mood and Affect: Mood normal.         Behavior: Behavior normal.         Thought Content: Thought content normal.         Judgment: Judgment normal.         Lab Review:   Appointment on 01/22/2024   Component Date Value    WBC 01/22/2024 8.40     RBC 01/22/2024 5.30     Hemoglobin 01/22/2024 15.9     Hematocrit 01/22/2024 50.0 (H)     MCV 01/22/2024 94     MCH 01/22/2024 30.0     MCHC 01/22/2024 31.8     RDW 01/22/2024 13.2     MPV 01/22/2024 10.1     Platelets 01/22/2024 200     nRBC 01/22/2024 0     Neutrophils Relative 01/22/2024 61     Immat GRANS %  01/22/2024 0     Lymphocytes Relative 01/22/2024 31     Monocytes Relative 01/22/2024 7     Eosinophils Relative 01/22/2024 0     Basophils Relative 01/22/2024 1     Neutrophils Absolute 01/22/2024 5.09     Immature Grans Absolute 01/22/2024 0.02     Lymphocytes Absolute 01/22/2024 2.62     Monocytes Absolute 01/22/2024 0.62     Eosinophils Absolute 01/22/2024 0.00     Basophils Absolute 01/22/2024 0.05     Sodium 01/22/2024 136     Potassium 01/22/2024 3.9     Chloride 01/22/2024 102     CO2 01/22/2024 27     ANION GAP 01/22/2024 7     BUN 01/22/2024 16     Creatinine 01/22/2024 1.08     Glucose 01/22/2024 150 (H)     Calcium 01/22/2024 9.3     AST 01/22/2024 12 (L)     ALT 01/22/2024 14     Alkaline Phosphatase 01/22/2024 67     Total Protein 01/22/2024 6.6     Albumin 01/22/2024 4.2     Total Bilirubin 01/22/2024 0.70     eGFR 01/22/2024 69     CRP 01/22/2024 6.5 (H)     Antinuclear Antibodies, * 01/22/2024 Negative     Rheumatoid Factor 01/22/2024 Negative     Sed Rate 01/22/2024 13    Office Visit on 01/22/2024   Component Date Value    Hemoglobin A1C 01/22/2024 6.6 (A)    Appointment on 12/26/2023   Component Date Value    WBC 12/26/2023 9.08     RBC 12/26/2023 5.15     Hemoglobin 12/26/2023 15.5     Hematocrit 12/26/2023 49.4 (H)     MCV 12/26/2023 96     MCH 12/26/2023 30.1     MCHC 12/26/2023 31.4     RDW 12/26/2023 13.4     MPV 12/26/2023 9.9     Platelets 12/26/2023 204     nRBC 12/26/2023 0     Neutrophils Relative 12/26/2023 60     Immat GRANS % 12/26/2023 0     Lymphocytes Relative 12/26/2023 31     Monocytes Relative 12/26/2023 8     Eosinophils Relative 12/26/2023 0     Basophils Relative 12/26/2023 1     Neutrophils Absolute 12/26/2023 5.44     Immature Grans Absolute 12/26/2023 0.03     Lymphocytes Absolute 12/26/2023 2.84     Monocytes Absolute 12/26/2023 0.70     Eosinophils Absolute 12/26/2023 0.00     Basophils Absolute 12/26/2023 0.07     Sodium 12/26/2023 138     Potassium 12/26/2023 4.0      Chloride 12/26/2023 104     CO2 12/26/2023 26     ANION GAP 12/26/2023 8     BUN 12/26/2023 18     Creatinine 12/26/2023 1.07     Glucose 12/26/2023 123     Calcium 12/26/2023 9.1     AST 12/26/2023 11 (L)     ALT 12/26/2023 12     Alkaline Phosphatase 12/26/2023 68     Total Protein 12/26/2023 6.8     Albumin 12/26/2023 4.3     Total Bilirubin 12/26/2023 0.72     eGFR 12/26/2023 70    Appointment on 12/21/2023   Component Date Value    % Parasitemia 12/21/2023 0     Is Blood Parasite Present 12/21/2023 No     Anaplasma phagocytophilum 12/21/2023 Negative     Blood Culture 12/21/2023 No Growth After 5 Days.     Miscellaneous Lab Test R* 12/21/2023 Brucella Antibody w/Reflex to Agglutination     Comment 12/21/2023 See written result report     REFERRAL TEST NAME 12/21/2023 Brucella Antibody w/Reflex to Agglutination     REFERRAL LAB 12/21/2023 [facility]     REFERRAL TEST CODE 12/21/2023 89883     Crypto Ag 12/21/2023 Negative     EBV VCA IgG 12/21/2023 >600.0 (H)     EBV VCA IgM 12/21/2023 <36.0     EBV Nuclear Ag Ab 12/21/2023 363.0 (H)     INTERPRETATION 12/21/2023 Comment     Blood Culture 12/21/2023 No Growth After 5 Days.     HIV-1 p24 Antigen 12/21/2023 Non-Reactive     HIV-1 Antibody 12/21/2023 Non-Reactive     HIV-2 Antibody 12/21/2023 Non-Reactive     HIV Ag-Ab 5th Gen 12/21/2023 Non-Reactive     QFT Nil 12/21/2023 0.00     QFT TB1-NIL 12/21/2023 0.05     QFT TB2-NIL 12/21/2023 0.00     QFT Mitogen-NIL 12/21/2023 10.00     QFT Final Interpretation 12/21/2023 Negative     Lyme Total Antibodies 12/21/2023 Negative     Path Review 12/21/2023 No parasites identified.    Evaluated by Ligia Hargrove M.D.  Interpretation performed at [facility], [address] [id number]    Hospital Outpatient Visit on 12/14/2023   Component Date Value    Case Report 12/14/2023                      Value:Non-gynecologic Cytology                          Case: [id number]                                  Authorizing Provider:   India Dominguez,  Collected:           12/14/2023 1411                                     MD                                                                           Ordering Location:     [facility] [place] Received:            12/14/2023 1449                                     Endoscopy                                                                    Pathologist:           Domingo Ward MD                                                                 Specimen:    Lung, Right Middle Lobe Bronchoalveolar Lavage                                             Final Diagnosis 12/14/2023                      Value:This result contains rich text formatting which cannot be displayed here.    Gross Description 12/14/2023                      Value:This result contains rich text formatting which cannot be displayed here.    Additional Information 12/14/2023                      Value:This result contains rich text formatting which cannot be displayed here.    Fungus (Mycology) Culture 12/14/2023 No Fungus Isolated at 4 Weeks     Bronchial Culture 12/14/2023 No growth     Gram Stain Result 12/14/2023 No Polys or Bacteria seen     AFB Culture 12/14/2023 No AFB Isolated at 5 Weeks     AFB Stain 12/14/2023 No acid fast bacilli seen     PNEUMOCYSTIS PCR 12/14/2023 Negative     Fungus (Mycology) Culture 12/14/2023 No Fungus Isolated at 4 Weeks     Bronchial Culture 12/14/2023 No growth     Gram Stain Result 12/14/2023 No Polys or Bacteria seen     AFB Culture 12/14/2023 No AFB Isolated at 5 Weeks     AFB Stain 12/14/2023 No acid fast bacilli seen     PNEUMOCYSTIS PCR 12/14/2023 Negative     Fungus (Mycology) Culture 12/14/2023 2+ Growth of Candida glabrata (A)     Bronchial Culture 12/14/2023 Few Colonies of     Gram Stain Result 12/14/2023 No Polys or Bacteria seen     AFB Culture 12/14/2023 No AFB Isolated at 5  Weeks     AFB Stain 12/14/2023 No acid fast bacilli seen     Neutrophil Count, Fluid 12/14/2023 21     Lymphs, Fluid 12/14/2023 7     Eos, Fluid 12/14/2023 2     Basos, Fluid 12/14/2023 1     Macrophage count 12/14/2023 69     Total Counted 12/14/2023 100    Appointment on 12/05/2023   Component Date Value    WBC 12/05/2023 11.24 (H)     RBC 12/05/2023 5.15     Hemoglobin 12/05/2023 15.5     Hematocrit 12/05/2023 48.0     MCV 12/05/2023 93     MCH 12/05/2023 30.1     MCHC 12/05/2023 32.3     RDW 12/05/2023 13.3     MPV 12/05/2023 9.9     Platelets 12/05/2023 252     nRBC 12/05/2023 0     Neutrophils Relative 12/05/2023 72     Immat GRANS % 12/05/2023 0     Lymphocytes Relative 12/05/2023 21     Monocytes Relative 12/05/2023 6     Eosinophils Relative 12/05/2023 0     Basophils Relative 12/05/2023 1     Neutrophils Absolute 12/05/2023 8.14 (H)     Immature Grans Absolute 12/05/2023 0.03     Lymphocytes Absolute 12/05/2023 2.30     Monocytes Absolute 12/05/2023 0.69     Eosinophils Absolute 12/05/2023 0.00     Basophils Absolute 12/05/2023 0.08     Sodium 12/05/2023 137     Potassium 12/05/2023 3.9     Chloride 12/05/2023 104     CO2 12/05/2023 25     ANION GAP 12/05/2023 8     BUN 12/05/2023 15     Creatinine 12/05/2023 1.12     Glucose 12/05/2023 172 (H)     Calcium 12/05/2023 9.6     AST 12/05/2023 13     ALT 12/05/2023 11     Alkaline Phosphatase 12/05/2023 70     Total Protein 12/05/2023 7.3     Albumin 12/05/2023 4.4     Total Bilirubin 12/05/2023 0.79     eGFR 12/05/2023 66     Amylase 12/05/2023 18 (L)     Lipase 12/05/2023 26    Appointment on 11/17/2023   Component Date Value    WBC 11/17/2023 7.65     RBC 11/17/2023 4.91     Hemoglobin 11/17/2023 14.7     Hematocrit 11/17/2023 45.6     MCV 11/17/2023 93     MCH 11/17/2023 29.9     MCHC 11/17/2023 32.2     RDW 11/17/2023 13.3     MPV 11/17/2023 9.9     Platelets 11/17/2023 154     nRBC 11/17/2023 0     Neutrophils Relative 11/17/2023 71     Immat GRANS %  11/17/2023 0     Lymphocytes Relative 11/17/2023 18     Monocytes Relative 11/17/2023 10     Eosinophils Relative 11/17/2023 0     Basophils Relative 11/17/2023 1     Neutrophils Absolute 11/17/2023 5.41     Immature Grans Absolute 11/17/2023 0.01     Lymphocytes Absolute 11/17/2023 1.41     Monocytes Absolute 11/17/2023 0.77     Eosinophils Absolute 11/17/2023 0.00     Basophils Absolute 11/17/2023 0.05     Sodium 11/17/2023 133 (L)     Potassium 11/17/2023 3.8     Chloride 11/17/2023 101     CO2 11/17/2023 23     ANION GAP 11/17/2023 9     BUN 11/17/2023 18     Creatinine 11/17/2023 1.05     Glucose 11/17/2023 181 (H)     Calcium 11/17/2023 9.2     AST 11/17/2023 11 (L)     ALT 11/17/2023 11     Alkaline Phosphatase 11/17/2023 64     Total Protein 11/17/2023 7.0     Albumin 11/17/2023 4.2     Total Bilirubin 11/17/2023 0.94     eGFR 11/17/2023 72     Blood Culture 11/17/2023 No Growth After 5 Days.     Blood Culture 11/17/2023 No Growth After 5 Days.    Telephone on 11/16/2023   Component Date Value    Color, UA 11/17/2023 Yellow     Clarity, UA 11/17/2023 Clear     Specific Gravity, UA 11/17/2023 >=1.030     pH, UA 11/17/2023 5.5     Leukocytes, UA 11/17/2023 Trace (A)     Nitrite, UA 11/17/2023 Negative     Protein, UA 11/17/2023 30 (1+) (A)     Glucose, UA 11/17/2023 Negative     Ketones, UA 11/17/2023 Trace (A)     Urobilinogen, UA 11/17/2023 0.2     Bilirubin, UA 11/17/2023 Small (A)     Occult Blood, UA 11/17/2023 Trace-Intact (A)     RBC, UA 11/17/2023 1-2     WBC, UA 11/17/2023 2-4     Epithelial Cells 11/17/2023 Occasional     Bacteria, UA 11/17/2023 Occasional     MUCUS THREADS 11/17/2023 Occasional (A)    Appointment on 10/30/2023   Component Date Value    WBC 10/30/2023 8.81     RBC 10/30/2023 4.83     Hemoglobin 10/30/2023 14.7     Hematocrit 10/30/2023 45.6     MCV 10/30/2023 94     MCH 10/30/2023 30.4     MCHC 10/30/2023 32.2     RDW 10/30/2023 13.2     MPV 10/30/2023 9.8     Platelets 10/30/2023  180     nRBC 10/30/2023 0     Neutrophils Relative 10/30/2023 61     Immat GRANS % 10/30/2023 1     Lymphocytes Relative 10/30/2023 29     Monocytes Relative 10/30/2023 8     Eosinophils Relative 10/30/2023 0     Basophils Relative 10/30/2023 1     Neutrophils Absolute 10/30/2023 5.52     Immature Grans Absolute 10/30/2023 0.05     Lymphocytes Absolute 10/30/2023 2.52     Monocytes Absolute 10/30/2023 0.67     Eosinophils Absolute 10/30/2023 0.00     Basophils Absolute 10/30/2023 0.05     Sodium 10/30/2023 137     Potassium 10/30/2023 3.8     Chloride 10/30/2023 103     CO2 10/30/2023 27     ANION GAP 10/30/2023 7     BUN 10/30/2023 21     Creatinine 10/30/2023 1.26     Glucose 10/30/2023 109     Calcium 10/30/2023 9.6     AST 10/30/2023 12 (L)     ALT 10/30/2023 10     Alkaline Phosphatase 10/30/2023 70     Total Protein 10/30/2023 7.0     Albumin 10/30/2023 4.5     Total Bilirubin 10/30/2023 0.88     eGFR 10/30/2023 57    Appointment on 10/09/2023   Component Date Value    WBC 10/09/2023 8.91     RBC 10/09/2023 5.01     Hemoglobin 10/09/2023 15.2     Hematocrit 10/09/2023 47.2     MCV 10/09/2023 94     MCH 10/09/2023 30.3     MCHC 10/09/2023 32.2     RDW 10/09/2023 13.3     MPV 10/09/2023 10.2     Platelets 10/09/2023 183     nRBC 10/09/2023 0     Neutrophils Relative 10/09/2023 63     Immat GRANS % 10/09/2023 0     Lymphocytes Relative 10/09/2023 30     Monocytes Relative 10/09/2023 6     Eosinophils Relative 10/09/2023 0     Basophils Relative 10/09/2023 1     Neutrophils Absolute 10/09/2023 5.54     Immature Grans Absolute 10/09/2023 0.02     Lymphocytes Absolute 10/09/2023 2.70     Monocytes Absolute 10/09/2023 0.57     Eosinophils Absolute 10/09/2023 0.00     Basophils Absolute 10/09/2023 0.08     Sodium 10/09/2023 136     Potassium 10/09/2023 3.9     Chloride 10/09/2023 103     CO2 10/09/2023 27     ANION GAP 10/09/2023 6     BUN 10/09/2023 17     Creatinine 10/09/2023 1.17     Glucose 10/09/2023 175 (H)      Calcium 10/09/2023 9.5     AST 10/09/2023 11 (L)     ALT 10/09/2023 10     Alkaline Phosphatase 10/09/2023 73     Total Protein 10/09/2023 7.2     Albumin 10/09/2023 4.3     Total Bilirubin 10/09/2023 0.78     eGFR 10/09/2023 63     TSH 3RD GENERATON 10/09/2023 1.146     T3, Free 10/09/2023 3.04    There may be more visits with results that are not included.     No results found.      Assessment:       1. Primary hypertension        2. Coronary artery disease involving native coronary artery of native heart without angina pectoris  rosuvastatin (CRESTOR) 40 MG tablet    Hepatic function panel      3. Mixed hyperlipidemia  rosuvastatin (CRESTOR) 40 MG tablet    Hepatic function panel      4. Pulmonary emphysema, unspecified emphysema type (HCC)             Plan:       Avery has no evidence of angina, CHF nor dysrhythmia.  Euvolemic, normotensive.  LDL minimal up and Trig moderately up.  Off fenofibrate.  Will try to up Crestor first-call with SE (ie myalgias).  Increase Crestor to 40 mg daily.  Hepatic function panel 8 weeks.  Cont Toprol, ASA, Lisinopril, amlodipine at present doses.  Rto 6 months, call sooner with issues.

## 2024-01-26 NOTE — PATIENT INSTRUCTIONS
Cholesterol and Your Health   AMBULATORY CARE:   Cholesterol  is a waxy, fat-like substance. Your body uses cholesterol to make hormones and new cells, and to protect nerves. Cholesterol is made by your body. It also comes from certain foods you eat, such as meat and dairy products. Your healthcare provider can help you set goals for your cholesterol levels. Your provider can help you create a plan to meet your goals.  Cholesterol level goals:  Your cholesterol level goals depend on your risk for heart disease, your age, and your other health conditions. The following are general guidelines:  Total cholesterol  includes low-density lipoprotein (LDL), high-density lipoprotein (HDL), and triglyceride levels. The total cholesterol level should be lower than 200 mg/dL and is best at about 150 mg/dL.    LDL cholesterol  is called bad cholesterol  because it forms plaque in your arteries. As plaque builds up, your arteries become narrow, and less blood flows through. When plaque decreases blood flow to your heart, you may have chest pain. If plaque completely blocks an artery that brings blood to your heart, you may have a heart attack. Plaque can break off and form blood clots. Blood clots may block arteries in your brain and cause a stroke. The level should be less than 130 mg/dL and is best at about 100 mg/dL.         HDL cholesterol  is called good cholesterol  because it helps remove LDL cholesterol from your arteries. It does this by attaching to LDL cholesterol and carrying it to your liver. Your liver breaks down LDL cholesterol so your body can get rid of it. High levels of HDL cholesterol can help prevent a heart attack and stroke. Low levels of HDL cholesterol can increase your risk for heart disease, heart attack, and stroke. The level should be at least 40 mg/dL in males or at least 50 mg/dL in females.    Triglycerides  are a type of fat that store energy from foods you eat. High levels of triglycerides also  cause plaque buildup. This can increase your risk for a heart attack or stroke. If your triglyceride level is high, your LDL cholesterol level may also be high. The level should be less than 150 mg/dL.    Any of the following can increase your risk for high cholesterol:   Smoking or drinking large amounts of alcohol    Having overweight or obesity, or not getting enough exercise    A medical condition such as hypertension (high blood pressure) or diabetes    A family history of high cholesterol    Age older than 65    What you need to know about having your cholesterol levels checked:  Adults 20 to 45 years of age should have their cholesterol levels checked every 4 to 6 years. Adults 45 years or older should have their cholesterol checked every 1 to 2 years. You may need your cholesterol checked more often, or at a younger age, if you have risk factors for heart disease. You may also need to have your cholesterol checked more often if you have other health conditions, such as diabetes. Blood tests are used to check cholesterol levels. Blood tests measure your levels of triglycerides, LDL cholesterol, and HDL cholesterol.  How healthy fats affect your cholesterol levels:  Healthy fats, also called unsaturated fats, help lower LDL cholesterol and triglyceride levels. Healthy fats include the following:  Monounsaturated fats  are found in foods such as olive oil, canola oil, avocado, nuts, and olives.    Polyunsaturated fats,  such as omega 3 fats, are found in fish, such as salmon, trout, and tuna. They can also be found in plant foods such as flaxseed, walnuts, and soybeans.    How unhealthy fats affect your cholesterol levels:  Unhealthy fats increase LDL cholesterol and triglyceride levels. They are found in foods high in cholesterol, saturated fat, and trans fat:  Cholesterol  is found in eggs, dairy, and meat.    Saturated fat  is found in butter, cheese, ice cream, whole milk, and coconut oil. Saturated fat is  also found in meat, such as sausage, hot dogs, and bologna.    Trans fat  is found in liquid oils and is used in fried and baked foods. Foods that contain trans fats include chips, crackers, muffins, sweet rolls, microwave popcorn, and cookies.    Treatment  for high cholesterol will also decrease your risk of heart disease, heart attack, and stroke. Treatment may include any of the following:  Lifestyle changes  may include food, exercise, weight loss, and quitting smoking. You may also need to decrease the amount of alcohol you drink. Your healthcare provider will want you to start with lifestyle changes. Other treatment may be added if lifestyle changes are not enough. Your healthcare provider may recommend you work with a team to manage hyperlipidemia. The team may include medical experts such as a dietitian, an exercise or physical therapist, and a behavior therapist. Your family members may be included in helping you create lifestyle changes.    Medicines  may be given to lower your LDL cholesterol, triglyceride levels, or total cholesterol level. You may need medicines to lower your cholesterol if any of the following is true:    You have a history of stroke, TIA, unstable angina, or a heart attack.    Your LDL cholesterol level is 190 mg/dL or higher.    You are age 40 to 75 years, have diabetes or heart disease risk factors, and your LDL cholesterol is 70 mg/dL or higher.    Supplements  include fish oil, red yeast rice, and garlic. Fish oil may help lower your triglyceride and LDL cholesterol levels. It may also increase your HDL cholesterol level. Red yeast rice may help decrease your total cholesterol level and LDL cholesterol level. Garlic may help lower your total cholesterol level. Do not take any supplements without talking to your healthcare provider.    Food changes you can make to lower your cholesterol levels:  A dietitian can help you create a healthy eating plan. Your dietitian can show you how  to read food labels and choose foods low in saturated fat, trans fats, and cholesterol.     Decrease the total amount of fat you eat.  Choose lean meats, fat-free or 1% fat milk, and low-fat dairy products, such as yogurt and cheese. Try to limit or avoid red meats. Limit or do not eat fried foods or baked goods, such as cookies.    Replace unhealthy fats with healthy fats.  Cook foods in olive oil or canola oil. Choose soft margarines that are low in saturated fat and trans fat. Seeds, nuts, and avocados are other examples of healthy fats.    Eat foods with omega-3 fats.  Examples include salmon, tuna, mackerel, walnuts, and flaxseed. Eat fish 2 times per week. Pregnant women should not eat fish that have high levels of mercury, such as shark, swordfish, and angela mackerel.         Increase the amount of high-fiber foods you eat.  High-fiber foods can help lower your LDL cholesterol. Aim to get between 20 and 30 grams of fiber each day. Fruits and vegetables are high in fiber. Eat at least 5 servings each day. Other high-fiber foods are whole-grain or whole-wheat breads, pastas, or cereals, and brown rice. Eat 3 ounces of whole-grain foods each day. Increase fiber slowly. You may have abdominal discomfort, bloating, and gas if you add fiber to your diet too quickly.         Eat healthy protein foods.  Examples include low-fat dairy products, skinless chicken and turkey, fish, and nuts.    Limit foods and drinks that are high in sugar.  Your dietitian or healthcare provider can help you create daily limits for high-sugar foods and drinks. The limit may be lower if you have diabetes or another health condition. Limits can also help you lose weight if needed.  Lifestyle changes you can make to lower your cholesterol levels:   Maintain a healthy weight.  Ask your healthcare provider what a healthy weight is for you. Ask your provider to help you create a weight loss plan if needed. Weight loss can decrease your total  cholesterol and triglyceride levels. Weight loss may also help keep your blood pressure at a healthy level.    Be physically active throughout the day.  Physical activity, such as exercise, can help lower your total cholesterol level and maintain a healthy weight. Physical activity can also help increase your HDL cholesterol level. Work with your healthcare provider to create an program that is right for you. Get at least 30 to 40 minutes of moderate physical activity most days of the week. Examples of exercise include brisk walking, swimming, or biking. Also include strength training at least 2 times each week. Your healthcare providers can help you create a physical activity plan.            Do not smoke.  Nicotine and other chemicals in cigarettes and cigars can raise your cholesterol levels. Ask your healthcare provider for information if you currently smoke and need help to quit. E-cigarettes or smokeless tobacco still contain nicotine. Talk to your healthcare provider before you use these products.         Limit or do not drink alcohol.  Alcohol can increase your triglyceride levels. Ask your healthcare provider before you drink alcohol. Ask how much is okay for you to drink in 24 hours or 1 week.    Follow up with your doctor as directed:  Write down your questions so you remember to ask them during your visits.  © Copyright [employer] 2023 Information is for End User's use only and may not be sold, redistributed or otherwise used for commercial purposes.  The above information is an  only. It is not intended as medical advice for individual conditions or treatments. Talk to your doctor, nurse or pharmacist before following any medical regimen to see if it is safe and effective for you.

## 2024-01-26 NOTE — TELEPHONE ENCOUNTER
Patient went and picked up prescriptions and noticed that the Lisinopril he picked up was 20 MG , states he is not taking 20 mg and has been on 10 MG.     Upon investigation; there are 2 active orders for LISINOPRIL  , will remove 20 MG order.     Dr. Lopez wrote in his note to continue 10 mg his previous dosage

## 2024-01-29 ENCOUNTER — PATIENT OUTREACH (OUTPATIENT)
Dept: CASE MANAGEMENT | Facility: HOSPITAL | Age: 69
End: 2024-01-29

## 2024-01-29 NOTE — PROGRESS NOTES
LSW spoke with patient this afternoon for a follow up call regarding insurance. Patient reports that he did just receive the FA application two days ago He had questions concerning his bank account and sharing it with is S/O and her sister. He didn't know who to ask and explain the situation. JEREMYW provided pt with the phone number for the financial revenue team.     Patient currently only has medicare. He lost his PA MA due to an increase in monthly income. He received a medical bill for $1700. Patient did not look into the other secondary insurances yet. He will when he has time and feels up to it. Pt had no further questions at this time. He will work on completing the FA application. Emotional support provided. JEREMYW provided patient with my name and phone number.

## 2024-01-30 LAB
H CAPSUL AG UR IA-ACNC: NEGATIVE
MYCOBACTERIUM SPEC CULT: NORMAL
RHODAMINE-AURAMINE STN SPEC: NORMAL

## 2024-02-06 RX ORDER — ACETAMINOPHEN 325 MG/1
650 TABLET ORAL ONCE
Status: CANCELLED | OUTPATIENT
Start: 2024-02-14 | End: 2024-02-13

## 2024-02-06 RX ORDER — SODIUM CHLORIDE 9 MG/ML
20 INJECTION, SOLUTION INTRAVENOUS ONCE
Status: CANCELLED | OUTPATIENT
Start: 2024-02-14

## 2024-02-12 ENCOUNTER — APPOINTMENT (OUTPATIENT)
Dept: LAB | Facility: HOSPITAL | Age: 69
End: 2024-02-12
Payer: MEDICARE

## 2024-02-12 DIAGNOSIS — R73.9 HYPERGLYCEMIA: ICD-10-CM

## 2024-02-12 DIAGNOSIS — C34.31 MALIGNANT NEOPLASM OF LOWER LOBE OF RIGHT LUNG (HCC): ICD-10-CM

## 2024-02-12 LAB
ALBUMIN SERPL BCP-MCNC: 4.5 G/DL (ref 3.5–5)
ALP SERPL-CCNC: 77 U/L (ref 34–104)
ALT SERPL W P-5'-P-CCNC: 15 U/L (ref 7–52)
ANION GAP SERPL CALCULATED.3IONS-SCNC: 5 MMOL/L
AST SERPL W P-5'-P-CCNC: 13 U/L (ref 13–39)
BASOPHILS # BLD AUTO: 0.06 THOUSANDS/ÂΜL (ref 0–0.1)
BASOPHILS NFR BLD AUTO: 1 % (ref 0–1)
BILIRUB SERPL-MCNC: 0.88 MG/DL (ref 0.2–1)
BUN SERPL-MCNC: 16 MG/DL (ref 5–25)
CALCIUM SERPL-MCNC: 9.4 MG/DL (ref 8.4–10.2)
CHLORIDE SERPL-SCNC: 103 MMOL/L (ref 96–108)
CO2 SERPL-SCNC: 30 MMOL/L (ref 21–32)
CREAT SERPL-MCNC: 1.12 MG/DL (ref 0.6–1.3)
EOSINOPHIL # BLD AUTO: 0 THOUSAND/ÂΜL (ref 0–0.61)
EOSINOPHIL NFR BLD AUTO: 0 % (ref 0–6)
ERYTHROCYTE [DISTWIDTH] IN BLOOD BY AUTOMATED COUNT: 13.2 % (ref 11.6–15.1)
EST. AVERAGE GLUCOSE BLD GHB EST-MCNC: 146 MG/DL
GFR SERPL CREATININE-BSD FRML MDRD: 66 ML/MIN/1.73SQ M
GLUCOSE P FAST SERPL-MCNC: 151 MG/DL (ref 65–99)
HBA1C MFR BLD: 6.7 %
HCT VFR BLD AUTO: 49.7 % (ref 36.5–49.3)
HGB BLD-MCNC: 16.1 G/DL (ref 12–17)
IMM GRANULOCYTES # BLD AUTO: 0.04 THOUSAND/UL (ref 0–0.2)
IMM GRANULOCYTES NFR BLD AUTO: 0 % (ref 0–2)
LYMPHOCYTES # BLD AUTO: 2.7 THOUSANDS/ÂΜL (ref 0.6–4.47)
LYMPHOCYTES NFR BLD AUTO: 29 % (ref 14–44)
MCH RBC QN AUTO: 30.3 PG (ref 26.8–34.3)
MCHC RBC AUTO-ENTMCNC: 32.4 G/DL (ref 31.4–37.4)
MCV RBC AUTO: 94 FL (ref 82–98)
MONOCYTES # BLD AUTO: 0.7 THOUSAND/ÂΜL (ref 0.17–1.22)
MONOCYTES NFR BLD AUTO: 8 % (ref 4–12)
NEUTROPHILS # BLD AUTO: 5.68 THOUSANDS/ÂΜL (ref 1.85–7.62)
NEUTS SEG NFR BLD AUTO: 62 % (ref 43–75)
NRBC BLD AUTO-RTO: 0 /100 WBCS
PLATELET # BLD AUTO: 200 THOUSANDS/UL (ref 149–390)
PMV BLD AUTO: 10.1 FL (ref 8.9–12.7)
POTASSIUM SERPL-SCNC: 4.2 MMOL/L (ref 3.5–5.3)
PROT SERPL-MCNC: 7.1 G/DL (ref 6.4–8.4)
RBC # BLD AUTO: 5.31 MILLION/UL (ref 3.88–5.62)
SODIUM SERPL-SCNC: 138 MMOL/L (ref 135–147)
WBC # BLD AUTO: 9.18 THOUSAND/UL (ref 4.31–10.16)

## 2024-02-12 PROCEDURE — 80053 COMPREHEN METABOLIC PANEL: CPT

## 2024-02-12 PROCEDURE — 83036 HEMOGLOBIN GLYCOSYLATED A1C: CPT

## 2024-02-12 PROCEDURE — 85025 COMPLETE CBC W/AUTO DIFF WBC: CPT

## 2024-02-12 PROCEDURE — 36415 COLL VENOUS BLD VENIPUNCTURE: CPT

## 2024-02-13 ENCOUNTER — HOSPITAL ENCOUNTER (OUTPATIENT)
Dept: INFUSION CENTER | Facility: HOSPITAL | Age: 69
Discharge: HOME | End: 2024-02-13
Attending: INTERNAL MEDICINE

## 2024-02-14 ENCOUNTER — HOSPITAL ENCOUNTER (OUTPATIENT)
Dept: INFUSION CENTER | Facility: HOSPITAL | Age: 69
Discharge: HOME | End: 2024-02-14
Attending: INTERNAL MEDICINE
Payer: MEDICARE

## 2024-02-14 ENCOUNTER — OFFICE VISIT (OUTPATIENT)
Age: 69
End: 2024-02-14
Payer: MEDICARE

## 2024-02-14 VITALS
DIASTOLIC BLOOD PRESSURE: 83 MMHG | RESPIRATION RATE: 18 BRPM | HEART RATE: 62 BPM | TEMPERATURE: 96.3 F | BODY MASS INDEX: 30.26 KG/M2 | SYSTOLIC BLOOD PRESSURE: 169 MMHG | HEIGHT: 68 IN | OXYGEN SATURATION: 93 %

## 2024-02-14 VITALS
SYSTOLIC BLOOD PRESSURE: 160 MMHG | TEMPERATURE: 97 F | OXYGEN SATURATION: 97 % | BODY MASS INDEX: 31.83 KG/M2 | DIASTOLIC BLOOD PRESSURE: 82 MMHG | WEIGHT: 202.8 LBS | HEIGHT: 67 IN | HEART RATE: 76 BPM

## 2024-02-14 DIAGNOSIS — R50.9 FEVER OF UNKNOWN ORIGIN: Primary | ICD-10-CM

## 2024-02-14 DIAGNOSIS — R93.89 ABNORMAL CT OF THE CHEST: ICD-10-CM

## 2024-02-14 DIAGNOSIS — C34.31 MALIGNANT NEOPLASM OF LOWER LOBE OF RIGHT LUNG (HCC): ICD-10-CM

## 2024-02-14 DIAGNOSIS — C34.31 MALIGNANT NEOPLASM OF LOWER LOBE OF RIGHT LUNG (HCC): Primary | ICD-10-CM

## 2024-02-14 PROCEDURE — 99215 OFFICE O/P EST HI 40 MIN: CPT | Performed by: INTERNAL MEDICINE

## 2024-02-14 RX ORDER — ACETAMINOPHEN 325 MG/1
650 TABLET ORAL ONCE
Status: COMPLETED | OUTPATIENT
Start: 2024-02-14 | End: 2024-02-14

## 2024-02-14 RX ORDER — SODIUM CHLORIDE 9 MG/ML
20 INJECTION, SOLUTION INTRAVENOUS ONCE
Status: COMPLETED | OUTPATIENT
Start: 2024-02-14 | End: 2024-02-14

## 2024-02-14 RX ADMIN — DEXAMETHASONE SODIUM PHOSPHATE 10 MG: 10 INJECTION, SOLUTION INTRAMUSCULAR; INTRAVENOUS at 12:21

## 2024-02-14 RX ADMIN — ACETAMINOPHEN 650 MG: 325 TABLET, FILM COATED ORAL at 11:52

## 2024-02-14 RX ADMIN — SODIUM CHLORIDE 20 ML/HR: 0.9 INJECTION, SOLUTION INTRAVENOUS at 11:46

## 2024-02-14 RX ADMIN — DIPHENHYDRAMINE HYDROCHLORIDE 25 MG: 50 INJECTION, SOLUTION INTRAMUSCULAR; INTRAVENOUS at 11:48

## 2024-02-14 RX ADMIN — ATEZOLIZUMAB 1200 MG: 1200 INJECTION, SOLUTION INTRAVENOUS at 13:19

## 2024-02-14 NOTE — PROGRESS NOTES
Progress Note - Infectious Disease   Lance Hazel 68 y.o. male MRN: [id number]  Unit/Bed#:  Encounter: [id number]      Impression/Plan:  1.  FUO.  Patient continues now with daily low-grade fevers and mild sweats for the last few months.  Extensive infectious workup has been negative including now urine histo antigen.  Suspicion overall low that patient's ongoing fevers are infectious in nature.  Question remains if they may be drug related given #3.  Patient currently looking to remain on immunotherapy through May for at least 1 year and plans to discuss further with oncology afterwards.  No antibiotics/antivirals recommended  No additional workup recommended currently from an ID standpoint  Continue antipyretics as needed  Discussed very sparing use of Aleve 125 mg twice daily if fevers particularly bothersome  Consider addition of PPI given #4  If persistent very high fevers recommend evaluation in the ER to rule out acute/new infection  Follow-up otherwise with PCP/oncology  No further follow-up recommended in the ID office for now    2.  Abnormal CT chest.  Recent CT from November was noted to be abnormal.  Changes have largely resolved on subsequent imaging.  BAL fungal and AFB studies have all been negative and urine histo antigen negative.  Continue follow-up otherwise with pulmonary    3.  Squamous cell carcinoma of the lung on atezolizumab.  Possibly contributing to patient's ongoing fevers.  Patient plans at least 1 year of therapy through May.  Reports fever seems to be petering and somewhat manageable currently.  Continue with Granix as needed and follow-up with oncology    4.  Intermittent epigastric pains.  Patient reports now having discomfort in the abdomen every time he eats or drinks.  He does report a previous history of reflux requiring PPI.  We discussed contacting primary and potentially restarting a PPI or considering evaluation with GI as outpatient.    Above plan discussed in detail with  the patient  Will forward office visit to PCP and ID colleague    RTO PRN    I have spent a total time of 40 minutes on 02/14/24 in caring for this patient including Diagnostic results, Patient and family education, Impressions, Documenting in the medical record, Reviewing / ordering tests, medicine, procedures  , Obtaining or reviewing history  , and Communicating with other healthcare professionals .    Antibiotics:  None    Subjective:  Briefly the patient is a 68-year-old gentleman with history of stage III squamous cell carcinoma of the lung, COPD and nephrolithiasis.  The patient in terms of his lung cancer underwent VATS resection in November 2022 and has been on adjuvant therapy including immunotherapy.  He had contacted oncology for episodes of recurrent fevers.  He received course of antibiotic without change.  CT imaging was done which showed calcified right upper lobe granuloma along with fibrotic changes and some tree-in-bud changes in the left lower lobe.  Patient after that time was seen by thoracic surgery and received courses of antibiotics.  He had lipase and amylase checked given prior episode of pancreatitis.  Patient had been on immunotherapy since April/May.  He was initially evaluated by our office on 12/20.  Blood cultures had been negative.  Concern was possible for atypical infections.  He underwent BAL/bronchoscopy on 12/14 and cultures ultimately only with Candida.  Additional workup was ordered at that visit.  HIV, Lyme, Anaplasma, parasite smears, QuantiFERON and cryptococcal antigen, Brucella antibodies, EBV panel were all negative.  Repeat CT imaging ultimately showed improving right upper lobe/right middle lobe opacities with near complete resolution of prior tree-in-bud changes.  CBC and CMP had otherwise been unremarkable.  Patient was still mentioning issues of fever/night sweats.  Discussion ultimately resulted in concern for the possibility of fevers related to his underlying  immunotherapy.  Urine histo antigen was pending which was also ultimately now negative.  Patient presents now for follow-up.    Patient reports that he is still having fevers almost every day or every other day but they are averaging in the low 100s.  He reports in the past he would have fevers as high as 102.  He is noticing that he seems to be able to tolerate more.  He reports plans for completion of his immunotherapy hopefully by May which would be 1 year on the medication.  He notes also that he is still having abdominal discomfort every time he eats or drinks and it sort of an uneasy or unsettled feeling in the stomach.  We discussed diet and at baseline he is constipated likely from his ongoing Percocet.  We discussed potentially reaching out to primary and looking into being started on a PPI which she has required in the past.  We discussed essentially his otherwise negative workup and suspicion remaining that his immunotherapy is driving his current fever.  The patient reports feeling poorly post his infusions and he may see some reduction in fever with the steroids but he overall does not tolerate steroids well either regardless.  We discussed possible trial of low-dose 125 mg Aleve when fevers are very high but again being very judicious about use given his underlying kidney dysfunction.  Lastly we discussed monitoring for very high persistent fevers as again patient may develop secondary infections in the setting of his underlying immunosuppressive therapy and that it would be appropriate to seek evaluation in the ER if/when this does happen.  Patient also mention issues with his pain management specialist and ongoing difficulties with mobility.  I recommended that he potentially be seen by an orthopedic provider who could potentially offer intervention as opposed to only pain medication.  We discussed at this point that there is no further workup that I would recommend from an ID standpoint and  "additional questions were answered.  No formal follow-up planned at this point.    ROS: A complete 12 point ROS is negative other than that noted in the HPI    Followup portions patient history reviewed and updated as:  Allergies, current medications, past medical history, past social history, past surgical history, and the problem list    Objective:  Vitals:  Vitals:    02/14/24 1448   BP: 160/82   BP Location: Left arm   Patient Position: Sitting   Pulse: 76   Temp: (!) 97 °F (36.1 °C)   TempSrc: Tympanic   SpO2: 97%   Weight: 92 kg (202 lb 12.8 oz)   Height: 5' 7\" (1.702 m)       Physical Exam:   General Appearance:  Alert, interactive, appearing well, nontoxic, no acute distress.   Throat: Oropharynx moist without lesions.    Abdomen:   Non-distended and flat.   Extremities: No clubbing, cyanosis or edema   Skin: No new rashes or lesions. No new draining wounds.  Patient denies developing any additional symptoms associated with his fevers including rashes       Labs, Imaging, & Other studies:   All pertinent labs and imaging studies were personally reviewed as below    Lab Results   Component Value Date    K 4.2 02/12/2024     02/12/2024    CO2 30 02/12/2024    BUN 16 02/12/2024    CREATININE 1.12 02/12/2024    GLUF 151 (H) 02/12/2024    CALCIUM 9.4 02/12/2024    CORRECTEDCA 10.2 (H) 12/08/2022    AST 13 02/12/2024    ALT 15 02/12/2024    ALKPHOS 77 02/12/2024    EGFR 66 02/12/2024     Lab Results   Component Value Date    WBC 9.18 02/12/2024    HGB 16.1 02/12/2024    HCT 49.7 (H) 02/12/2024    MCV 94 02/12/2024     02/12/2024   No results found for: \"SEDRATE\"    Recent Lab interpretation/comments: Recent labs reviewed including CBC and chemistry along with sed rate which have been negative.  Urine histo antigen and additional ID workup has been negative    Recent Imaging interpretation/comments: No new imaging since December    Culture data: Previous blood cultures as well as bronchoscopy cultures " have all been negative

## 2024-02-14 NOTE — PROGRESS NOTES
Lance Hazel  tolerated treatment well with no complications.      Lance Hazel is aware of future appt on 3/5/24 at 0830.     AVS printed and given to Lance Hazel:   No (Declined by Lance Hazel) x

## 2024-02-15 DIAGNOSIS — R73.03 PRE-DIABETES: ICD-10-CM

## 2024-02-15 DIAGNOSIS — N52.9 ERECTILE DYSFUNCTION, UNSPECIFIED ERECTILE DYSFUNCTION TYPE: ICD-10-CM

## 2024-02-15 RX ORDER — SILDENAFIL 100 MG/1
100 TABLET, FILM COATED ORAL AS NEEDED
Qty: 20 TABLET | Refills: 0 | Status: SHIPPED | OUTPATIENT
Start: 2024-02-15

## 2024-02-15 RX ORDER — BLOOD SUGAR DIAGNOSTIC
STRIP MISCELLANEOUS
Qty: 100 EACH | Refills: 0 | Status: SHIPPED | OUTPATIENT
Start: 2024-02-15

## 2024-02-16 ENCOUNTER — TELEPHONE (OUTPATIENT)
Dept: NEUROLOGY | Facility: CLINIC | Age: 69
End: 2024-02-16

## 2024-02-16 NOTE — TELEPHONE ENCOUNTER
Patient calling to schedule new patient appointment for tremors and neuropathy of hands and feet.  Patient previously seen by Brinda with last office visit 4/10/2020.  No testing done.  Triage intake sent.

## 2024-02-19 ENCOUNTER — TELEPHONE (OUTPATIENT)
Dept: HEMATOLOGY ONCOLOGY | Facility: CLINIC | Age: 69
End: 2024-02-19

## 2024-02-19 DIAGNOSIS — M25.532 ACUTE PAIN OF LEFT WRIST: Primary | ICD-10-CM

## 2024-02-19 NOTE — TELEPHONE ENCOUNTER
Spoke with patient who informed me he has been having intermittent sharp shooting left wrist pain since last Wednesday. Pt reports these severe pains occur with certain movements. Pt with neuropathy and states it is new and worse than neuropathy in fingers, denies redness or bruising at site. Pt reports very mild swelling and denies any recent strenuous activity or any swelling/pain in right wrist. After reviewing with Dr. Duncan, I let patient know that he thinks this may be carpal tunnel syndrome. I educated patient on importance of taking Lyrica TID as prescribed and let him know we can place referral to ortho to be evaluated. Pt verbalized understanding and in agreement with plan

## 2024-02-20 ENCOUNTER — TELEPHONE (OUTPATIENT)
Age: 69
End: 2024-02-20

## 2024-02-20 ENCOUNTER — TELEPHONE (OUTPATIENT)
Dept: HEMATOLOGY ONCOLOGY | Facility: CLINIC | Age: 69
End: 2024-02-20

## 2024-02-20 NOTE — TELEPHONE ENCOUNTER
Patient is being referred to a orthopedics. Please schedule accordingly.    [facility], [place]   [phone]

## 2024-02-20 NOTE — TELEPHONE ENCOUNTER
Appointment Change  Cancel, Reschedule, Change to Virtual      Who are you speaking with? Patient   If it is not the patient, is the caller listed on the communication consent form? Yes   Which provider is the appointment scheduled with? Dr. Madison and Erinn Gooden PA-C   When was the original appointment scheduled?    Please list date and time 3/4/24   At which location is the appointment scheduled to take place? [place]   Was the appointment rescheduled?     Was the appointment changed from an in person visit to a virtual visit?    If so, please list the details of the change. 3/19/24   What is the reason for the appointment change? provider

## 2024-02-21 PROBLEM — R50.9 FEVER OF UNKNOWN ORIGIN: Status: RESOLVED | Noted: 2023-02-10 | Resolved: 2024-02-21

## 2024-02-26 ENCOUNTER — HOSPITAL ENCOUNTER (OUTPATIENT)
Dept: CT IMAGING | Facility: HOSPITAL | Age: 69
Discharge: HOME | End: 2024-02-26
Payer: MEDICARE

## 2024-02-26 ENCOUNTER — APPOINTMENT (OUTPATIENT)
Dept: LAB | Facility: HOSPITAL | Age: 69
End: 2024-02-26
Payer: MEDICARE

## 2024-02-26 DIAGNOSIS — J18.9 PNEUMONIA OF BOTH LUNGS DUE TO INFECTIOUS ORGANISM, UNSPECIFIED PART OF LUNG: ICD-10-CM

## 2024-02-26 DIAGNOSIS — C34.31 PRIMARY SQUAMOUS CELL CARCINOMA OF LOWER LOBE OF RIGHT LUNG (HCC): ICD-10-CM

## 2024-02-26 DIAGNOSIS — C34.31 MALIGNANT NEOPLASM OF LOWER LOBE OF RIGHT LUNG (HCC): ICD-10-CM

## 2024-02-26 LAB
ALBUMIN SERPL BCP-MCNC: 4.3 G/DL (ref 3.5–5)
ALP SERPL-CCNC: 65 U/L (ref 34–104)
ALT SERPL W P-5'-P-CCNC: 18 U/L (ref 7–52)
ANION GAP SERPL CALCULATED.3IONS-SCNC: 8 MMOL/L
AST SERPL W P-5'-P-CCNC: 16 U/L (ref 13–39)
BASOPHILS # BLD AUTO: 0.06 THOUSANDS/ÂΜL (ref 0–0.1)
BASOPHILS NFR BLD AUTO: 1 % (ref 0–1)
BILIRUB SERPL-MCNC: 0.77 MG/DL (ref 0.2–1)
BUN SERPL-MCNC: 14 MG/DL (ref 5–25)
CALCIUM SERPL-MCNC: 9.3 MG/DL (ref 8.4–10.2)
CHLORIDE SERPL-SCNC: 104 MMOL/L (ref 96–108)
CO2 SERPL-SCNC: 24 MMOL/L (ref 21–32)
CREAT SERPL-MCNC: 1.05 MG/DL (ref 0.6–1.3)
EOSINOPHIL # BLD AUTO: 0 THOUSAND/ÂΜL (ref 0–0.61)
EOSINOPHIL NFR BLD AUTO: 0 % (ref 0–6)
ERYTHROCYTE [DISTWIDTH] IN BLOOD BY AUTOMATED COUNT: 13.2 % (ref 11.6–15.1)
GFR SERPL CREATININE-BSD FRML MDRD: 72 ML/MIN/1.73SQ M
GLUCOSE SERPL-MCNC: 159 MG/DL (ref 65–140)
HCT VFR BLD AUTO: 47.3 % (ref 36.5–49.3)
HGB BLD-MCNC: 15.1 G/DL (ref 12–17)
IMM GRANULOCYTES # BLD AUTO: 0.02 THOUSAND/UL (ref 0–0.2)
IMM GRANULOCYTES NFR BLD AUTO: 0 % (ref 0–2)
LYMPHOCYTES # BLD AUTO: 2.38 THOUSANDS/ÂΜL (ref 0.6–4.47)
LYMPHOCYTES NFR BLD AUTO: 28 % (ref 14–44)
MCH RBC QN AUTO: 30 PG (ref 26.8–34.3)
MCHC RBC AUTO-ENTMCNC: 31.9 G/DL (ref 31.4–37.4)
MCV RBC AUTO: 94 FL (ref 82–98)
MONOCYTES # BLD AUTO: 0.69 THOUSAND/ÂΜL (ref 0.17–1.22)
MONOCYTES NFR BLD AUTO: 8 % (ref 4–12)
NEUTROPHILS # BLD AUTO: 5.28 THOUSANDS/ÂΜL (ref 1.85–7.62)
NEUTS SEG NFR BLD AUTO: 63 % (ref 43–75)
NRBC BLD AUTO-RTO: 0 /100 WBCS
PLATELET # BLD AUTO: 169 THOUSANDS/UL (ref 149–390)
PMV BLD AUTO: 9.9 FL (ref 8.9–12.7)
POTASSIUM SERPL-SCNC: 3.9 MMOL/L (ref 3.5–5.3)
PROT SERPL-MCNC: 6.8 G/DL (ref 6.4–8.4)
RBC # BLD AUTO: 5.04 MILLION/UL (ref 3.88–5.62)
SODIUM SERPL-SCNC: 136 MMOL/L (ref 135–147)
WBC # BLD AUTO: 8.43 THOUSAND/UL (ref 4.31–10.16)

## 2024-02-26 PROCEDURE — 71250 CT THORAX DX C-: CPT

## 2024-02-26 PROCEDURE — 36415 COLL VENOUS BLD VENIPUNCTURE: CPT

## 2024-02-26 PROCEDURE — 80053 COMPREHEN METABOLIC PANEL: CPT

## 2024-02-26 PROCEDURE — 85025 COMPLETE CBC W/AUTO DIFF WBC: CPT

## 2024-02-26 PROCEDURE — G1004 CDSM NDSC: HCPCS

## 2024-02-27 ENCOUNTER — OFFICE VISIT (OUTPATIENT)
Dept: HEMATOLOGY ONCOLOGY | Facility: CLINIC | Age: 69
End: 2024-02-27
Payer: MEDICARE

## 2024-02-27 VITALS
SYSTOLIC BLOOD PRESSURE: 155 MMHG | HEIGHT: 67 IN | WEIGHT: 202 LBS | OXYGEN SATURATION: 95 % | DIASTOLIC BLOOD PRESSURE: 92 MMHG | TEMPERATURE: 96.5 F | BODY MASS INDEX: 31.71 KG/M2 | HEART RATE: 73 BPM

## 2024-02-27 DIAGNOSIS — T45.1X5A NEUROPATHY DUE TO CHEMOTHERAPEUTIC DRUG: ICD-10-CM

## 2024-02-27 DIAGNOSIS — M79.2 NEUROPATHIC PAIN: ICD-10-CM

## 2024-02-27 DIAGNOSIS — G62.0 NEUROPATHY DUE TO CHEMOTHERAPEUTIC DRUG: ICD-10-CM

## 2024-02-27 DIAGNOSIS — C34.31 MALIGNANT NEOPLASM OF LOWER LOBE OF RIGHT LUNG (HCC): Primary | ICD-10-CM

## 2024-02-27 DIAGNOSIS — R50.9 FUO (FEVER OF UNKNOWN ORIGIN): ICD-10-CM

## 2024-02-27 DIAGNOSIS — J40 BRONCHITIS: ICD-10-CM

## 2024-02-27 PROCEDURE — G2211 COMPLEX E/M VISIT ADD ON: HCPCS | Performed by: INTERNAL MEDICINE

## 2024-02-27 PROCEDURE — 99215 OFFICE O/P EST HI 40 MIN: CPT | Performed by: INTERNAL MEDICINE

## 2024-02-27 RX ORDER — ACETAMINOPHEN 325 MG/1
650 TABLET ORAL ONCE
OUTPATIENT
Start: 2024-03-05 | End: 2024-03-05

## 2024-02-27 RX ORDER — SODIUM CHLORIDE 9 MG/ML
20 INJECTION, SOLUTION INTRAVENOUS ONCE
OUTPATIENT
Start: 2024-03-05

## 2024-02-27 RX ORDER — CIPROFLOXACIN 750 MG/1
750 TABLET, FILM COATED ORAL EVERY 12 HOURS SCHEDULED
Qty: 20 TABLET | Refills: 0 | Status: SHIPPED | OUTPATIENT
Start: 2024-02-27 | End: 2024-03-08

## 2024-02-27 NOTE — PROGRESS NOTES
[facility]ATOLOG[facility] [place]  [address] 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  Status post adjuvant Taxol carbo, added Tecentriq in June 2023.  He has continued neuropathy despite alpha lipoic acid, gabapentin, Lyrica.  I suggested discontinuing Lyrica at this time for ineffectiveness.  He has continued fevers.  Source unknown.  We considered the possibility that this was due to Tecentriq.  This had been held in late July/early August 2023.  Perhaps this timeframe was too short to allow resolution of drug related fever.  As such, we will suspend Tecentriq at this time and reevaluate in 2 months.  Lastly, he has cough productive of yellowish sputum over the past few days.  Antibiotic is prescribed for presumed bacterial bronchitis.  I discussed the above with the patient.  The patient  voiced understanding and agreement.  ______________________________________________________________________    No chief complaint on file.      HPI:  Oncology History   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)   12/6/2022 -  Cancer Staged    Staging form: Lung, AJCC 8th Edition  - Clinical: Stage IIIA (cT1b, cN2, cM0) - Signed by Erinn Gooden PA-C on 12/6/2022  Laterality: Right       Malignant neoplasm of lower lobe of right lung (HCC)   9/13/2022 Initial Diagnosis    July 23, 2022 patient had CT chest ab pelvis regarding rule out AAA.  This showed 1.1 cm right lower lobe pulmonary nodule new since prior study of November 2019.  Some other smaller nodules identified.  Subcarinal lymph node 2 cm.  No other findings suspicious for metastatic disease.  August 5, 2022 PET/CT showed 1.2 cm pulmonary nodule, SUV 2.9.  Other nodules noted, subcentimeter, no hypermetabolism.  Some  groundglass opacities in the right upper lobe.  September 13, 2022 patient had needle biopsy of the right lower lobe mass showing squamous cell carcinoma, TTF-1 positive.  November 16, 2022 patient underwent right lower lobe segmentectomy.  Pathology showed 1.8 cm squamous cell carcinoma.  Level 4 and 11 lymph node were positive for metastatic carcinoma.     1/30/2023 - 3/27/2023 Chemotherapy    palonosetron (ALOXI), 0.25 mg, Intravenous, Once, 4 of 4 cycles  Administration: 0.25 mg (1/30/2023), 0.25 mg (2/13/2023), 0.25 mg (3/6/2023), 0.25 mg (3/27/2023)  fosaprepitant (EMEND) IVPB, 150 mg, Intravenous, Once, 3 of 3 cycles  Administration: 150 mg (1/30/2023), 150 mg (2/13/2023), 150 mg (3/6/2023)  CARBOplatin (PARAPLATIN) IVPB ([facility] AUC DOSING), 552 mg, Intravenous, Once, 4 of 4 cycles  Administration: 550 mg (1/30/2023), 650 mg (2/13/2023), 650 mg (3/6/2023), 500 mg (3/27/2023)  PACLItaxel (TAXOL) chemo IVPB, 349.8 mg, Intravenous, Once, 4 of 4 cycles  Dose modification: 200 mg/m2 (original dose 175 mg/m2, Cycle 2, Reason: Dose modified as per discussion with consulting physician)  Administration: 360 mg (1/30/2023), 400 mg (2/13/2023), 400 mg (3/6/2023), 400 mg (3/27/2023)  aprepitant (CINVANTI) in  mL IVPB, 130 mg, Intravenous, Once, 1 of 1 cycle  Administration: 130 mg (3/27/2023)     4/13/2023 Genomic Testing    April 13, 2023 [facility]-  PD-L1 1%, TMB high.  MTAP deleted.  p53 [id number], pathogenic variant  KRAS G694 L, VUS  CRABBP  *, pathogenic variant  KMT2D *, pathogenic variant     6/21/2023 -  Chemotherapy    alteplase (CATHFLO), 2 mg, Intracatheter, Every 1 Minute as needed, 11 of 15 cycles  atezolizumab (TECENTRIQ) IVPB, 1,200 mg, Intravenous, Once, 11 of 15 cycles  Administration: 1,200 mg (6/21/2023), 1,200 mg (7/12/2023), 1,200 mg (8/29/2023), 1,200 mg (9/18/2023), 1,200 mg (10/10/2023), 1,200 mg (10/31/2023), 1,200 mg (11/22/2023), 1,200 mg (12/11/2023), 1,200 mg (1/2/2024), 1,200 mg  (1/23/2024), 1,200 mg (2/14/2024)         Interval History: Clinically stable.  Cough.  Fevers.  Fatigue.  ECOG-  1 - Symptomatic but completely ambulatory    Review of Systems   Constitutional:  Positive for fatigue and fever. Negative for chills.   HENT:  Negative for nosebleeds.    Eyes:  Negative for discharge.   Respiratory:  Positive for cough. Negative for shortness of breath.    Cardiovascular:  Negative for chest pain.   Gastrointestinal:  Negative for abdominal pain, constipation and diarrhea.   Endocrine: Negative for polydipsia.   Genitourinary:  Negative for hematuria.   Musculoskeletal:  Negative for arthralgias.   Skin:  Negative for color change.   Allergic/Immunologic: Negative for immunocompromised state.   Neurological:  Positive for numbness. Negative for dizziness and headaches.   Hematological:  Negative for adenopathy.   Psychiatric/Behavioral:  Negative for agitation.        Past Medical History:   Diagnosis Date    Abdominal aortic aneurysm without rupture ([facility])     Abdominal Aortic Duplex 02/21/2017    Ectatic infrarenal abdominal aorta.    MARYANN (acute kidney injury) ([facility]) 07/23/2022    CAD (coronary artery disease)     Cancer ([facility]) 2022    right lung CA    Chronic bronchitis ([facility]) 01/28/2019    COPD (chronic obstructive pulmonary disease) ([facility])     Extremity pain     Hemiparesis ([facility]) 11/01/2022    History of echocardiogram 03/18/2014    EF 55%, mild MR and AI.  Mild concentric LVH.    History of stress test 03/06/2017    Normal.    Hyperlipidemia     Hypertension     Joint pain     Kidney stone     Low back pain     Lung cancer ([facility])     Migraine     Neck pain     Obstructive sleep apnea     cannot tolerate CPAP    Osteoarthritis     Peripheral neuropathy     Reflex sympathetic dystrophy     Sacroiliitis ([facility]) 02/02/2022     Patient Active Problem List   Diagnosis    JAKI (obstructive sleep apnea)    Abdominal aortic aneurysm (AAA) without rupture    Lumbar spondylosis    Lumbar  degenerative disc disease    Myofascial pain syndrome    Chronic low back pain without sciatica    Cervical radiculopathy    Cervical spondylosis without myelopathy    Essential tremor    Negative depression screening    Chronic pain of both knees    Class 1 obesity due to excess calories with serious comorbidity and body mass index (BMI) of 31.0 to 31.9 in adult    Smoking    Hypertension    Chronic pain syndrome    Uncomplicated opioid dependence (HCC)    Encounter for long-term opiate analgesic use    Neck pain    Hyperlipidemia    Pre-diabetes    Erectile dysfunction    Insomnia    Cortical age-related cataract of both eyes    Urinary frequency    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)    Kidney stone    Pulmonary emphysema (HCC)    Malignant neoplasm of lower lobe of right lung (HCC)    Encounter for smoking cessation counseling    At high risk for osteoporosis    Coronary artery disease of native artery of native heart with stable angina pectoris (HCC)    Stage 3a chronic kidney disease (HCC)    Night sweats    Other chronic pancreatitis (HCC)    Skin lesion    Depression, recurrent (HCC)    Neuropathic pain    Age-related cataract of right eye    Constipation due to opioid therapy    Neuropathy due to chemotherapeutic drug     Hyperglycemia       Current Outpatient Medications:     amLODIPine (NORVASC) 10 mg tablet, take 1 tablet by mouth once daily, Disp: 90 tablet, Rfl: 3    bisacodyl (DULCOLAX) 5 mg EC tablet, Take 1 tablet (5 mg total) by mouth daily as needed for constipation for up to 4 doses, Disp: 30 tablet, Rfl: 0    buPROPion (WELLBUTRIN XL) 150 mg 24 hr tablet, take 1 tablet by mouth daily every morning, Disp: 90 tablet, Rfl: 3    dicyclomine (BENTYL) 10 mg capsule, take 1 capsule by mouth four times a day before meals and at bedtime, Disp: 30 capsule, Rfl: 0    docusate sodium (COLACE) 100 mg capsule, Take 1 capsule (100 mg total) by mouth 2 (two) times a day, Disp: 20  capsule, Rfl: 0    glucose blood (OneTouch Verio) test strip, Test once daily, Disp: 100 each, Rfl: 0    lisinopril (ZESTRIL) 10 mg tablet, Take 1 tablet (10 mg total) by mouth daily, Disp: 90 tablet, Rfl: 3    metoprolol succinate (TOPROL-XL) 100 mg 24 hr tablet, take 1 tablet by mouth once daily, Disp: 90 tablet, Rfl: 3    naloxone (NARCAN) 4 mg/0.1 mL nasal spray, Administer 1 spray into a nostril. If no response after 2-3 minutes, give another dose in the other nostril using a new spray., Disp: 1 each, Rfl: 1    nicotine (NICODERM CQ) 21 mg/24 hr TD 24 hr patch, Place 1 patch on the skin over 24 hours every 24 hours, Disp: 28 patch, Rfl: 0    nitroglycerin (NITROSTAT) 0.4 mg SL tablet, Place 1 tablet (0.4 mg total) under the tongue every 5 (five) minutes as needed for chest pain, Disp: 25 tablet, Rfl: 5    ofloxacin (OCUFLOX) 0.3 % ophthalmic solution, , Disp: , Rfl:     ondansetron (ZOFRAN) 8 mg tablet, Take 1 tablet (8 mg total) by mouth every 8 (eight) hours as needed for nausea or vomiting, Disp: 20 tablet, Rfl: 2    oxyCODONE-acetaminophen (Percocet) 7.5-325 MG per tablet, Take 1 tablet by mouth every 8 (eight) hours as needed for moderate pain Max Daily Amount: 3 tablets Do not start before January 21, 2024., Disp: 90 tablet, Rfl: 0    oxyCODONE-acetaminophen (Percocet) 7.5-325 MG per tablet, Take 1 tablet by mouth every 8 (eight) hours as needed for moderate pain Max Daily Amount: 3 tablets Do not start before February 18, 2024., Disp: 90 tablet, Rfl: 0    pantoprazole (PROTONIX) 20 mg tablet, take 1 tablet by mouth once daily, Disp: 30 tablet, Rfl: 0    prednisoLONE acetate (PRED FORTE) 1 % ophthalmic suspension, , Disp: , Rfl:     pregabalin (LYRICA) 50 mg capsule, Take 1 capsule (50 mg total) by mouth 3 (three) times a day, Disp: 90 capsule, Rfl: 1    Restasis 0.05 % ophthalmic emulsion, , Disp: , Rfl:     rosuvastatin (CRESTOR) 40 MG tablet, Take 1 tablet (40 mg total) by mouth daily, Disp: 90 tablet,  Rfl: 3    sildenafil (VIAGRA) 100 mg tablet, Take 1 tablet (100 mg total) by mouth as needed for erectile dysfunction, Disp: 20 tablet, Rfl: 0    Stiolto Respimat 2.5-2.5 MCG/ACT inhaler, inhale 2 puffs by mouth and INTO THE LUNGS once daily if needed, Disp: 4 g, Rfl: 2    traZODone (DESYREL) 100 mg tablet, take 1 tablet by mouth daily at bedtime, Disp: 90 tablet, Rfl: 3    fluticasone (FLONASE) 50 mcg/act nasal spray, 1 spray into each nostril daily for 14 days (Patient not taking: Reported on 12/14/2023), Disp: 11.1 mL, Rfl: 0    ketoconazole (NIZORAL) 2 % shampoo, Apply 1 Application topically 2 (two) times a week for 28 days, Disp: 100 mL, Rfl: 0    polyethylene glycol (MIRALAX) 17 g packet, Take 17 g by mouth daily for 5 days (Patient not taking: Reported on 12/20/2022), Disp: 85 g, Rfl: 0    tamsulosin (FLOMAX) 0.4 mg, Take 1 capsule (0.4 mg total) by mouth daily with dinner for 3 days, Disp: 3 capsule, Rfl: 0  No Known Allergies  Past Surgical History:   Procedure Laterality Date    CARDIAC CATHETERIZATION  02/13/2012    EF 70%, widely patent renal arteries, significant single-vessel CAD-medical therapy.    CARDIAC CATHETERIZATION  04/11/2013    EF 65%, 50% mid LAD, 20% prox CFX, 90% diffuse RCA, 99% mid RCA. Medical management.    CATARACT EXTRACTION Right 09/19/2023    CHOLECYSTECTOMY      COLONOSCOPY      EPIDURAL BLOCK INJECTION Bilateral 08/15/2019    Procedure: BLOCK / INJECTION EPIDURAL STEROID CERVICAL;  Surgeon: Ricardo Park MD;  Location: [facility];  Service: Pain Management     FL GUIDED NEEDLE PLAC BX/ASP/INJ  08/15/2019    IR BIOPSY LUNG  09/13/2022    IR THORACIC DUCT EMBOLIZATION  11/22/2022    ORTHOPEDIC SURGERY      IA BRNCHSC INCL FLUOR GDNCE DX W/CELL WASHG SPX N/A 11/16/2022    Procedure: BRONCHOSCOPY FLEXIBLE;  Surgeon: Gulshan Madison MD;  Location: [facility];  Service: Thoracic    IA THORACOSCOPY W/LOBECTOMY SINGLE LOBE Right 11/16/2022    Procedure: LOBECTOMY LUNG;  "lower lobe superior segmentectomy, mediastinal lymph node dissection;  Surgeon: Gulshan Madison MD;  Location: [facility];  Service: Thoracic    ME THORACOSCOPY W/SEGMENTECTOMY Right 11/16/2022    Procedure: THORACOSCOPY VIDEO ASSISTED SURGERY (VATS);  Surgeon: Gulshan Madison MD;  Location: [facility];  Service: Thoracic    TRIGGER POINT INJECTION       Social History     Objective:  Vitals:    02/27/24 0843   BP: 155/92   BP Location: Left arm   Patient Position: Sitting   Cuff Size: Standard   Pulse: 73   Temp: (!) 96.5 °F (35.8 °C)   TempSrc: Tympanic   SpO2: 95%   Weight: 91.6 kg (202 lb)   Height: 5' 7\" (1.702 m)     Physical Exam  Constitutional:       Appearance: He is well-developed.   HENT:      Head: Normocephalic and atraumatic.      Mouth/Throat:      Mouth: Mucous membranes are moist.   Eyes:      Pupils: Pupils are equal, round, and reactive to light.   Cardiovascular:      Rate and Rhythm: Normal rate and regular rhythm.      Heart sounds: No murmur heard.  Pulmonary:      Breath sounds: Normal breath sounds. No wheezing or rales.   Abdominal:      Palpations: Abdomen is soft.      Tenderness: There is no abdominal tenderness.   Musculoskeletal:         General: No tenderness. Normal range of motion.      Cervical back: Neck supple.   Lymphadenopathy:      Cervical: No cervical adenopathy.   Skin:     Findings: No erythema or rash.   Neurological:      Mental Status: He is alert and oriented to person, place, and time.      Cranial Nerves: No cranial nerve deficit.      Deep Tendon Reflexes: Reflexes are normal and symmetric.   Psychiatric:         Behavior: Behavior normal.           Labs:  I personally reviewed the labs and imaging pertinent to this patient care.  "

## 2024-03-05 ENCOUNTER — HOSPITAL ENCOUNTER (OUTPATIENT)
Dept: INFUSION CENTER | Facility: HOSPITAL | Age: 69
End: 2024-03-05
Attending: INTERNAL MEDICINE

## 2024-03-12 ENCOUNTER — OFFICE VISIT (OUTPATIENT)
Dept: PULMONOLOGY | Facility: CLINIC | Age: 69
End: 2024-03-12
Payer: MEDICARE

## 2024-03-12 VITALS
OXYGEN SATURATION: 99 % | HEIGHT: 67 IN | BODY MASS INDEX: 31.42 KG/M2 | TEMPERATURE: 98.2 F | WEIGHT: 200.2 LBS | HEART RATE: 66 BPM | SYSTOLIC BLOOD PRESSURE: 134 MMHG | DIASTOLIC BLOOD PRESSURE: 70 MMHG

## 2024-03-12 DIAGNOSIS — F17.200 SMOKING: ICD-10-CM

## 2024-03-12 DIAGNOSIS — J42 CHRONIC BRONCHITIS, UNSPECIFIED CHRONIC BRONCHITIS TYPE (HCC): Primary | ICD-10-CM

## 2024-03-12 DIAGNOSIS — R50.9 FEVER OF UNKNOWN ORIGIN: ICD-10-CM

## 2024-03-12 DIAGNOSIS — C34.31 PRIMARY SQUAMOUS CELL CARCINOMA OF LOWER LOBE OF RIGHT LUNG (HCC): ICD-10-CM

## 2024-03-12 DIAGNOSIS — R93.89 ABNORMAL CT OF THE CHEST: ICD-10-CM

## 2024-03-12 PROCEDURE — 99214 OFFICE O/P EST MOD 30 MIN: CPT

## 2024-03-12 PROCEDURE — 99406 BEHAV CHNG SMOKING 3-10 MIN: CPT

## 2024-03-12 RX ORDER — VARENICLINE TARTRATE 0.5 MG/1
0.5 TABLET, FILM COATED ORAL 2 TIMES DAILY
Qty: 60 TABLET | Refills: 1 | Status: SHIPPED | OUTPATIENT
Start: 2024-03-12

## 2024-03-12 RX ORDER — NICOTINE 21 MG/24HR
1 PATCH, TRANSDERMAL 24 HOURS TRANSDERMAL EVERY 24 HOURS
Qty: 28 PATCH | Refills: 0 | Status: SHIPPED | OUTPATIENT
Start: 2024-03-12

## 2024-03-12 RX ORDER — ALBUTEROL SULFATE 90 UG/1
2 AEROSOL, METERED RESPIRATORY (INHALATION) EVERY 6 HOURS PRN
Qty: 18 G | Refills: 5 | Status: SHIPPED | OUTPATIENT
Start: 2024-03-12

## 2024-03-12 NOTE — ASSESSMENT & PLAN NOTE
-Recent CT chest from 2/26/2024 reviewed with the patient.  The previous right lung infiltrate and associated bronchiolitis have cleared.  No new pulmonary findings.

## 2024-03-12 NOTE — PROGRESS NOTES
Pulmonary Follow Up Note  Lance Hazel 68 y.o. male MRN: [id number]  3/12/2024    Assessment:    Smoking  -Patient continues to smoke 3/4-1 ppd.  Tried the Wellbutrin that was prescribed previously for smoking cessation, however developed mood changes and he stopped taking  -Patient tried Chantix a couple years ago with some success, however developed GI side effects once increased to the 1 mg BID dose  -Counseled on smoking cessation for 5 minutes  -Discontinue Wellbutrin, start on low-dose Chantix 0.5 mg twice daily.   -Patient may also use nicotine patches and lozenge  -We discussed current smoking habits and finding healthy alternatives  -Follow-up at next visit for smoking cessation progress    Chronic bronchitis (HCC)  -Symptoms relatively stable without any recent exacerbations.  He is only using his Stiolto as needed, does not have albuterol at home.  Has shortness of breath with moderate exertion and cough  -Advised patient to start using Stiolto regularly to see if his symptoms improve with regular use.  Added albuterol inhaler to have on hand for shortness of breath or wheezing, instructed on use.    Abnormal CT of the chest  -Recent CT chest from 2/26/2024 reviewed with the patient.  The previous right lung infiltrate and associated bronchiolitis have cleared.  No new pulmonary findings.    Fever of unknown origin  -All previous infectious workup with no identifiable cause  -He was recently taken off of Tecentriq by heme-onc as this was suspected to be the underlying cause of his fevers.    -Patient reports no fevers in the past 4 days.    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  -Continue to follow with oncology        Plan:    Diagnoses and all orders for this visit:    Abnormal CT of the chest    Fever of unknown origin    Smoking  -     nicotine polacrilex (COMMIT) 4 MG lozenge; Apply 1 lozenge (4 mg total) to the mouth or throat as needed for smoking  cessation  -     varenicline (CHANTIX) 0.5 mg tablet; Take 1 tablet (0.5 mg total) by mouth 2 (two) times a day  -     nicotine (NICODERM CQ) 21 mg/24 hr TD 24 hr patch; Place 1 patch on the skin over 24 hours every 24 hours    Chronic bronchitis, unspecified chronic bronchitis type (HCC)  -     albuterol (Ventolin HFA) 90 mcg/act inhaler; Inhale 2 puffs every 6 (six) hours as needed for wheezing    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)          Return in about 3 months (around 6/12/2024).      History of Present Illness     Chief Complaint:   Chief Complaint   Patient presents with    Follow-up     Patient states that steps bother his breathing       Patient ID: Lance is a 68 y.o. y.o. male has a past medical history of Abdominal aortic aneurysm without rupture ([facility]), Abdominal Aortic Duplex (02/21/2017), MARYANN (acute kidney injury) ([facility]) (07/23/2022), CAD (coronary artery disease), Cancer (HCC) (2022), Chronic bronchitis (HCC) (01/28/2019), COPD (chronic obstructive pulmonary disease) ([facility]), Extremity pain, Hemiparesis (HCC) (11/01/2022), History of echocardiogram (03/18/2014), History of stress test (03/06/2017), Hyperlipidemia, Hypertension, Joint pain, Kidney stone, Low back pain, Lung cancer (HCC), Migraine, Neck pain, Obstructive sleep apnea, Osteoarthritis, Peripheral neuropathy, Reflex sympathetic dystrophy, and Sacroiliitis ([facility]) (02/02/2022).      3/12/2024  HPI: Lance Hazel is a 68 y.o. male who presents to the office today for a follow-up visit.  He has a past medical history including but not limited to COPD, stage IIIa squamous cell carcinoma of the right lung requiring a VATS resection on 11/16/2022, CAD, hypertension, tobacco abuse, JAKI not on CPAP, AAA, osteoarthritis, and peripheral neuropathy.  He was previously seen in the office 2 months ago.  He was still experiencing fevers of unknown origin, thought to be related to his Tecentriq.  Had extensive infectious  disease workup prior with no identifiable cause.  He was recently seen by heme-onc who noted continued fevers and placed Tecentriq on hold.    Returns today stating that he has not had a fever in 4 days.  Has shortness of breath with moderate exertion and use of stairs.  States he can walk a city block without shortness of breath.  Has frequent cough with occasional productive mucus.  He has been using his Stiolto inhaler and does not have an albuterol inhaler at home he continues to smoke 3/4- 1 ppd.  He was on Wellbutrin for smoking cessation, however felt like he developed mood changes and stopped taking.  He tells me that he was on Chantix previously and did well on the 0.5 mg dose, however when increased to 1 mg he developed GI side effects and discontinued.        Review of Systems   Constitutional:  Negative for activity change, chills, fever and unexpected weight change.   HENT:  Negative for congestion, postnasal drip, rhinorrhea, sore throat and trouble swallowing.    Respiratory:  Positive for cough and shortness of breath. Negative for chest tightness and wheezing.    Cardiovascular:  Negative for chest pain, palpitations and leg swelling.   Allergic/Immunologic: Negative.        Historical Information   Past Medical History:   Diagnosis Date    Abdominal aortic aneurysm without rupture (HCC)     Abdominal Aortic Duplex 02/21/2017    Ectatic infrarenal abdominal aorta.    MARYANN (acute kidney injury) ([facility]) 07/23/2022    CAD (coronary artery disease)     Cancer ([facility]) 2022    right lung CA    Chronic bronchitis ([facility]) 01/28/2019    COPD (chronic obstructive pulmonary disease) ([facility])     Extremity pain     Hemiparesis ([facility]) 11/01/2022    History of echocardiogram 03/18/2014    EF 55%, mild MR and AI.  Mild concentric LVH.    History of stress test 03/06/2017    Normal.    Hyperlipidemia     Hypertension     Joint pain     Kidney stone     Low back pain     Lung cancer ([facility])     Migraine     Neck pain      Obstructive sleep apnea     cannot tolerate CPAP    Osteoarthritis     Peripheral neuropathy     Reflex sympathetic dystrophy     Sacroiliitis (HCC) 02/02/2022     Past Surgical History:   Procedure Laterality Date    CARDIAC CATHETERIZATION  02/13/2012    EF 70%, widely patent renal arteries, significant single-vessel CAD-medical therapy.    CARDIAC CATHETERIZATION  04/11/2013    EF 65%, 50% mid LAD, 20% prox CFX, 90% diffuse RCA, 99% mid RCA. Medical management.    CATARACT EXTRACTION Right 09/19/2023    CHOLECYSTECTOMY      COLONOSCOPY      EPIDURAL BLOCK INJECTION Bilateral 08/15/2019    Procedure: BLOCK / INJECTION EPIDURAL STEROID CERVICAL;  Surgeon: Ricardo Park MD;  Location: [facility];  Service: Pain Management     FL GUIDED NEEDLE PLAC BX/ASP/INJ  08/15/2019    IR BIOPSY LUNG  09/13/2022    IR THORACIC DUCT EMBOLIZATION  11/22/2022    ORTHOPEDIC SURGERY      SD BRNCC INCL FLUOR GDNCE DX W/CELL WASHG SPX N/A 11/16/2022    Procedure: BRONCHOSCOPY FLEXIBLE;  Surgeon: Gulshan Madison MD;  Location: [facility];  Service: Thoracic    SD THORACOSCOPY W/LOBECTOMY SINGLE LOBE Right 11/16/2022    Procedure: LOBECTOMY LUNG; lower lobe superior segmentectomy, mediastinal lymph node dissection;  Surgeon: Gulshan Madison MD;  Location: [facility];  Service: Thoracic    SD THORACOSCOPY W/SEGMENTECTOMY Right 11/16/2022    Procedure: THORACOSCOPY VIDEO ASSISTED SURGERY (VATS);  Surgeon: Gulshan Madison MD;  Location: [facility];  Service: Thoracic    TRIGGER POINT INJECTION       Family History   Adopted: Yes   Problem Relation Age of Onset    No Known Problems Family     No Known Problems Mother     No Known Problems Father        Smoking history: He reports that he has been smoking cigarettes. He started smoking about 4 months ago. He has a 0.4 pack-year smoking history. He has never used smokeless tobacco.    Occupational History:     Immunization History   Administered Date(s)  Administered    COVID-19 MODERNA VACC 0.25 ML IM BOOSTER 01/11/2022    COVID-19 MODERNA VACC 0.5 ML IM 04/06/2021, 05/05/2021    Td (adult), adsorbed 01/01/2011       Meds/Allergies     Current Outpatient Medications:     albuterol (Ventolin HFA) 90 mcg/act inhaler, Inhale 2 puffs every 6 (six) hours as needed for wheezing, Disp: 18 g, Rfl: 5    amLODIPine (NORVASC) 10 mg tablet, take 1 tablet by mouth once daily, Disp: 90 tablet, Rfl: 3    bisacodyl (DULCOLAX) 5 mg EC tablet, Take 1 tablet (5 mg total) by mouth daily as needed for constipation for up to 4 doses, Disp: 30 tablet, Rfl: 0    dicyclomine (BENTYL) 10 mg capsule, take 1 capsule by mouth four times a day before meals and at bedtime, Disp: 30 capsule, Rfl: 0    docusate sodium (COLACE) 100 mg capsule, Take 1 capsule (100 mg total) by mouth 2 (two) times a day, Disp: 20 capsule, Rfl: 0    glucose blood (OneTouch Verio) test strip, Test once daily, Disp: 100 each, Rfl: 0    lisinopril (ZESTRIL) 10 mg tablet, Take 1 tablet (10 mg total) by mouth daily, Disp: 90 tablet, Rfl: 3    metoprolol succinate (TOPROL-XL) 100 mg 24 hr tablet, take 1 tablet by mouth once daily, Disp: 90 tablet, Rfl: 3    naloxone (NARCAN) 4 mg/0.1 mL nasal spray, Administer 1 spray into a nostril. If no response after 2-3 minutes, give another dose in the other nostril using a new spray., Disp: 1 each, Rfl: 1    nicotine (NICODERM CQ) 21 mg/24 hr TD 24 hr patch, Place 1 patch on the skin over 24 hours every 24 hours, Disp: 28 patch, Rfl: 0    nicotine polacrilex (COMMIT) 4 MG lozenge, Apply 1 lozenge (4 mg total) to the mouth or throat as needed for smoking cessation, Disp: 100 each, Rfl: 0    nitroglycerin (NITROSTAT) 0.4 mg SL tablet, Place 1 tablet (0.4 mg total) under the tongue every 5 (five) minutes as needed for chest pain, Disp: 25 tablet, Rfl: 5    ofloxacin (OCUFLOX) 0.3 % ophthalmic solution, , Disp: , Rfl:     ondansetron (ZOFRAN) 8 mg tablet, Take 1 tablet (8 mg total) by  mouth every 8 (eight) hours as needed for nausea or vomiting, Disp: 20 tablet, Rfl: 2    oxyCODONE-acetaminophen (Percocet) 7.5-325 MG per tablet, Take 1 tablet by mouth every 8 (eight) hours as needed for moderate pain Max Daily Amount: 3 tablets Do not start before February 18, 2024., Disp: 90 tablet, Rfl: 0    pantoprazole (PROTONIX) 20 mg tablet, take 1 tablet by mouth once daily, Disp: 30 tablet, Rfl: 0    prednisoLONE acetate (PRED FORTE) 1 % ophthalmic suspension, , Disp: , Rfl:     pregabalin (LYRICA) 50 mg capsule, Take 1 capsule (50 mg total) by mouth 3 (three) times a day, Disp: 90 capsule, Rfl: 1    Restasis 0.05 % ophthalmic emulsion, , Disp: , Rfl:     rosuvastatin (CRESTOR) 40 MG tablet, Take 1 tablet (40 mg total) by mouth daily, Disp: 90 tablet, Rfl: 3    sildenafil (VIAGRA) 100 mg tablet, Take 1 tablet (100 mg total) by mouth as needed for erectile dysfunction, Disp: 20 tablet, Rfl: 0    Stiolto Respimat 2.5-2.5 MCG/ACT inhaler, inhale 2 puffs by mouth and INTO THE LUNGS once daily if needed, Disp: 4 g, Rfl: 2    tamsulosin (FLOMAX) 0.4 mg, Take 1 capsule (0.4 mg total) by mouth daily with dinner for 3 days, Disp: 3 capsule, Rfl: 0    traZODone (DESYREL) 100 mg tablet, take 1 tablet by mouth daily at bedtime, Disp: 90 tablet, Rfl: 3    varenicline (CHANTIX) 0.5 mg tablet, Take 1 tablet (0.5 mg total) by mouth 2 (two) times a day, Disp: 60 tablet, Rfl: 1    fluticasone (FLONASE) 50 mcg/act nasal spray, 1 spray into each nostril daily for 14 days (Patient not taking: Reported on 12/14/2023), Disp: 11.1 mL, Rfl: 0    ketoconazole (NIZORAL) 2 % shampoo, Apply 1 Application topically 2 (two) times a week for 28 days (Patient not taking: Reported on 3/12/2024), Disp: 100 mL, Rfl: 0    oxyCODONE-acetaminophen (Percocet) 7.5-325 MG per tablet, Take 1 tablet by mouth every 8 (eight) hours as needed for moderate pain Max Daily Amount: 3 tablets Do not start before January 21, 2024., Disp: 90 tablet, Rfl: 0    " polyethylene glycol (MIRALAX) 17 g packet, Take 17 g by mouth daily for 5 days (Patient not taking: Reported on 12/20/2022), Disp: 85 g, Rfl: 0  Allergies: No Known Allergies      Vitals:  Vitals:    03/12/24 0939   BP: 134/70   BP Location: Left arm   Patient Position: Sitting   Cuff Size: Adult   Pulse: 66   Temp: 98.2 °F (36.8 °C)   TempSrc: Temporal   SpO2: 99%   Weight: 90.8 kg (200 lb 3.2 oz)   Height: 5' 7\" (1.702 m)     Oxygen Therapy  SpO2: 99 %  .  Wt Readings from Last 3 Encounters:   03/12/24 90.8 kg (200 lb 3.2 oz)   02/27/24 91.6 kg (202 lb)   02/14/24 92 kg (202 lb 12.8 oz)     Body mass index is 31.36 kg/m².    Physical Exam  Vitals and nursing note reviewed.   Constitutional:       General: He is not in acute distress.     Appearance: Normal appearance. He is well-developed.   Cardiovascular:      Rate and Rhythm: Normal rate and regular rhythm.      Heart sounds: Normal heart sounds. No murmur heard.  Pulmonary:      Effort: Pulmonary effort is normal. No respiratory distress.      Breath sounds: No decreased breath sounds, wheezing, rhonchi or rales.   Musculoskeletal:         General: No swelling.      Right lower leg: No edema.      Left lower leg: No edema.   Psychiatric:         Mood and Affect: Mood and affect normal.         Behavior: Behavior normal. Behavior is cooperative.           Labs: I have personally reviewed pertinent lab results.  Lab Results   Component Value Date    WBC 8.43 02/26/2024    HGB 15.1 02/26/2024    HCT 47.3 02/26/2024    MCV 94 02/26/2024     02/26/2024     Lab Results   Component Value Date    CALCIUM 9.3 02/26/2024    K 3.9 02/26/2024    CO2 24 02/26/2024     02/26/2024    BUN 14 02/26/2024    CREATININE 1.05 02/26/2024     No results found for: \"IGE\"  Lab Results   Component Value Date    ALT 18 02/26/2024    AST 16 02/26/2024    ALKPHOS 65 02/26/2024       Imaging and other studies: I have personally reviewed pertinent reports and I have personally " reviewed pertinent films in PACS     CT chest 2/26/2024  Previously noted findings for right lung infiltrate and associated bronchiolitis have cleared.  No new pulmonary findings.  Otherwise stable exam.  Stable mediastinal lymph nodes, upper limits of normal size.  Stable mild to moderate esophageal thickening.     CT chest abdomen pelvis with contrast 12/21/2023   Lungs-  1.  Improved right-sided airspace opacities compared to November 2023 with residual mild groundglass density in the inferior posterior portion of the right lower lobe. Tree-in-bud opacities in the bilateral lower lobes have also improved with residual   nodules in the right lower lobe.     2.  Otherwise stable chest including borderline mediastinal lymph nodes, esophageal thickening, and chronic/postoperative opacities in the lungs.        Pulmonary function testing:     Pulmonary Functions Testing Results: 9/21/2022    FEV1/FVC ratio 70%    FEV1 72% predicted  FVC 78% predicted  (+) response to bronchodilators  TLC 99 % predicted   % predicted  DLCO corrected for hemoglobin 71 % predicted    Impression: Normal lung volumes.  Restrictive pattern on spirometry.  Near significant improvement in airflow and vital capacity following administration of bronchodilators.  Mildly reduced diffusion capacity.  Normal airway resistance as indicated by the specific airway conductance.

## 2024-03-12 NOTE — ASSESSMENT & PLAN NOTE
-Symptoms relatively stable without any recent exacerbations.  He is only using his Stiolto as needed, does not have albuterol at home.  Has shortness of breath with moderate exertion and cough  -Advised patient to start using Stiolto regularly to see if his symptoms improve with regular use.  Added albuterol inhaler to have on hand for shortness of breath or wheezing, instructed on use.

## 2024-03-12 NOTE — ASSESSMENT & PLAN NOTE
-Patient continues to smoke 3/4-1 ppd.  Tried the Wellbutrin that was prescribed previously for smoking cessation, however developed mood changes and he stopped taking  -Patient tried Chantix a couple years ago with some success, however developed GI side effects once increased to the 1 mg BID dose  -Counseled on smoking cessation for 5 minutes  -Discontinue Wellbutrin, start on low-dose Chantix 0.5 mg twice daily.   -Patient may also use nicotine patches and lozenge  -We discussed current smoking habits and finding healthy alternatives  -Follow-up at next visit for smoking cessation progress

## 2024-03-12 NOTE — ASSESSMENT & PLAN NOTE
-All previous infectious workup with no identifiable cause  -He was recently taken off of Tecentriq by heme-onc as this was suspected to be the underlying cause of his fevers.    -Patient reports no fevers in the past 4 days.    -Continue to monitor

## 2024-03-19 ENCOUNTER — TELEMEDICINE (OUTPATIENT)
Dept: CARDIAC SURGERY | Facility: CLINIC | Age: 69
End: 2024-03-19
Payer: MEDICARE

## 2024-03-19 DIAGNOSIS — C34.31 PRIMARY SQUAMOUS CELL CARCINOMA OF LOWER LOBE OF RIGHT LUNG (HCC): Primary | ICD-10-CM

## 2024-03-19 PROCEDURE — 99212 OFFICE O/P EST SF 10 MIN: CPT | Performed by: THORACIC SURGERY (CARDIOTHORACIC VASCULAR SURGERY)

## 2024-03-19 NOTE — PROGRESS NOTES
Virtual Regular Visit    Verification of patient location:    Patient is located at Home in the following state in which I hold an active license [place]      Assessment/Plan:    Problem List Items Addressed This Visit          Respiratory    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) - Primary     Mr Delaney is s/p a right lower lobe segmentectomy for Stage IIIA squamous cell carcinoma. He completed neoadjuvant chemotherapy and up until recently was on Tecentriq. He has had ongoing fevers and fatigue for the last few months. He has been evaluated by ID and has also had bronchoscopy with BAL and there is no evidence of infectious cause. He did have some inflammatory changes on his CT scan in November that have resolved on most recent CT chest. He notes continued fevers and thus his Tecentriq was stopped as this may be due to the medication. From a thoracic surgery standpoint his CT chest shows resolution of previous inflammatory changes and thus will return to normal surveillance with repeat CT chest and f/u in 6 months. I will discuss with pulmonology (Hailey AMIN), infectious disease (Dr Warner) and medical oncology (Dr Duncan) as well as the patient's PCP (Dr Lopez) to assess if any further work-up of these fevers is warranted given that they resolved while on Cipro but have again come back. Myself or a partner of mine will call him with recommendations. He will follow-up in 6 months for continued surveillance.          Relevant Orders    CT chest wo contrast            Reason for visit is   Chief Complaint   Patient presents with    Virtual Regular Visit          Encounter provider Gulshan Madison MD    Provider located at [facility] [address] [place] [address] [phone]      Recent Visits  No visits were found meeting these conditions.  Showing recent visits within past 7 days and meeting all other  requirements  Today's Visits  Date Type Provider Dept   03/19/24 Telemedicine Gulshan Madison MD [facility]   Showing today's visits and meeting all other requirements  Future Appointments  No visits were found meeting these conditions.  Showing future appointments within next 150 days and meeting all other requirements       The patient was identified by name and date of birth. Lance Hazel was informed that this is a telemedicine visit and that the visit is being conducted through Telephone.  My office door was closed. No one else was in the room.  He acknowledged consent and understanding of privacy and security of the video platform. The patient has agreed to participate and understands they can discontinue the visit at any time.    Patient is aware this is a billable service.     Subjective  Lance Hazel is a 68 y.o. male with PMH Stage IIIA squamous cell carcinoma s/p right lower lobe superior segmentectomy on 11/16/2023. He had neoadjuvant chemotherapy and is up until recently on 2/14/24 was his last infusion. He has had ongoing fevers and was worked up by ID, pulmonology and underwent bronchoscopy and thus far there has been no evidence of infectious cause. He was previously seen in our office in November at which point he had these fevers and was feeling fatigued and his CT chest showed inflammatory changes bilaterally and right sided ground glass opacities. He was treated with Augementin and Azithromycin for presumed pneumonia. Since then it was suspected that his fevers may be related to Tecentriq and thus this was stopped recent, his last infusion was 2/14/24. He continues with fevers of 100.4-100.9 associated with sweats since that time. He states that he was recently treated with Cipro by Dr Duncan for bacterial bronchitis given productive sputum. He notes that he had resolution of his fevers while on Cipro, but after he completed the course they have come back.  He notes that for a period of time over the last few months he did have night sweats that drenched his clothing and he had to change in the middle of the night. He has not had this in the last few weeks. He does not have generalized pruritus.      HPI     Past Medical History:   Diagnosis Date    Abdominal aortic aneurysm without rupture ([facility])     Abdominal Aortic Duplex 02/21/2017    Ectatic infrarenal abdominal aorta.    MARYANN (acute kidney injury) ([facility]) 07/23/2022    CAD (coronary artery disease)     Cancer ([facility]) 2022    right lung CA    Chronic bronchitis ([facility]) 01/28/2019    COPD (chronic obstructive pulmonary disease) ([facility])     Extremity pain     Hemiparesis ([facility]) 11/01/2022    History of echocardiogram 03/18/2014    EF 55%, mild MR and AI.  Mild concentric LVH.    History of stress test 03/06/2017    Normal.    Hyperlipidemia     Hypertension     Joint pain     Kidney stone     Low back pain     Lung cancer ([facility])     Migraine     Neck pain     Obstructive sleep apnea     cannot tolerate CPAP    Osteoarthritis     Peripheral neuropathy     Reflex sympathetic dystrophy     Sacroiliitis ([facility]) 02/02/2022       Past Surgical History:   Procedure Laterality Date    CARDIAC CATHETERIZATION  02/13/2012    EF 70%, widely patent renal arteries, significant single-vessel CAD-medical therapy.    CARDIAC CATHETERIZATION  04/11/2013    EF 65%, 50% mid LAD, 20% prox CFX, 90% diffuse RCA, 99% mid RCA. Medical management.    CATARACT EXTRACTION Right 09/19/2023    CHOLECYSTECTOMY      COLONOSCOPY      EPIDURAL BLOCK INJECTION Bilateral 08/15/2019    Procedure: BLOCK / INJECTION EPIDURAL STEROID CERVICAL;  Surgeon: Ricardo Park MD;  Location: [facility];  Service: Pain Management     FL GUIDED NEEDLE PLAC BX/ASP/INJ  08/15/2019    IR BIOPSY LUNG  09/13/2022    IR THORACIC DUCT EMBOLIZATION  11/22/2022    ORTHOPEDIC SURGERY      [place] [facility] INCL FLUOR GDNCE DX W/CELL WASHG SPX N/A 11/16/2022    Procedure: BRONCHOSCOPY  FLEXIBLE;  Surgeon: Gulshan Madison MD;  Location: [facility];  Service: Thoracic    [place] THORACOSCOPY W/LOBECTOMY SINGLE LOBE Right 11/16/2022    Procedure: LOBECTOMY LUNG; lower lobe superior segmentectomy, mediastinal lymph node dissection;  Surgeon: Gulshan Madison MD;  Location: [facility];  Service: Thoracic    [place] THORACOSCOPY W/SEGMENTECTOMY Right 11/16/2022    Procedure: THORACOSCOPY VIDEO ASSISTED SURGERY (VATS);  Surgeon: Gulshan Madison MD;  Location: [facility];  Service: Thoracic    TRIGGER POINT INJECTION         Current Outpatient Medications   Medication Sig Dispense Refill    albuterol (Ventolin HFA) 90 mcg/act inhaler Inhale 2 puffs every 6 (six) hours as needed for wheezing 18 g 5    amLODIPine (NORVASC) 10 mg tablet take 1 tablet by mouth once daily 90 tablet 3    bisacodyl (DULCOLAX) 5 mg EC tablet Take 1 tablet (5 mg total) by mouth daily as needed for constipation for up to 4 doses 30 tablet 0    dicyclomine (BENTYL) 10 mg capsule take 1 capsule by mouth four times a day before meals and at bedtime 30 capsule 0    docusate sodium (COLACE) 100 mg capsule Take 1 capsule (100 mg total) by mouth 2 (two) times a day 20 capsule 0    fluticasone (FLONASE) 50 mcg/act nasal spray 1 spray into each nostril daily for 14 days (Patient not taking: Reported on 12/14/2023) 11.1 mL 0    glucose blood (OneTouch Verio) test strip Test once daily 100 each 0    ketoconazole (NIZORAL) 2 % shampoo Apply 1 Application topically 2 (two) times a week for 28 days (Patient not taking: Reported on 3/12/2024) 100 mL 0    lisinopril (ZESTRIL) 10 mg tablet Take 1 tablet (10 mg total) by mouth daily 90 tablet 3    metoprolol succinate (TOPROL-XL) 100 mg 24 hr tablet take 1 tablet by mouth once daily 90 tablet 3    naloxone (NARCAN) 4 mg/0.1 mL nasal spray Administer 1 spray into a nostril. If no response after 2-3 minutes, give another dose in the other nostril using a new spray. 1 each 1     nicotine (NICODERM CQ) 21 mg/24 hr TD 24 hr patch Place 1 patch on the skin over 24 hours every 24 hours 28 patch 0    nicotine polacrilex (COMMIT) 4 MG lozenge Apply 1 lozenge (4 mg total) to the mouth or throat as needed for smoking cessation 100 each 0    nitroglycerin (NITROSTAT) 0.4 mg SL tablet Place 1 tablet (0.4 mg total) under the tongue every 5 (five) minutes as needed for chest pain 25 tablet 5    ofloxacin (OCUFLOX) 0.3 % ophthalmic solution       ondansetron (ZOFRAN) 8 mg tablet Take 1 tablet (8 mg total) by mouth every 8 (eight) hours as needed for nausea or vomiting 20 tablet 2    oxyCODONE-acetaminophen (Percocet) 7.5-325 MG per tablet Take 1 tablet by mouth every 8 (eight) hours as needed for moderate pain Max Daily Amount: 3 tablets Do not start before January 21, 2024. 90 tablet 0    oxyCODONE-acetaminophen (Percocet) 7.5-325 MG per tablet Take 1 tablet by mouth every 8 (eight) hours as needed for moderate pain Max Daily Amount: 3 tablets Do not start before February 18, 2024. 90 tablet 0    pantoprazole (PROTONIX) 20 mg tablet take 1 tablet by mouth once daily 30 tablet 0    polyethylene glycol (MIRALAX) 17 g packet Take 17 g by mouth daily for 5 days (Patient not taking: Reported on 12/20/2022) 85 g 0    prednisoLONE acetate (PRED FORTE) 1 % ophthalmic suspension       pregabalin (LYRICA) 50 mg capsule Take 1 capsule (50 mg total) by mouth 3 (three) times a day 90 capsule 1    Restasis 0.05 % ophthalmic emulsion       rosuvastatin (CRESTOR) 40 MG tablet Take 1 tablet (40 mg total) by mouth daily 90 tablet 3    sildenafil (VIAGRA) 100 mg tablet Take 1 tablet (100 mg total) by mouth as needed for erectile dysfunction 20 tablet 0    Stiolto Respimat 2.5-2.5 MCG/ACT inhaler inhale 2 puffs by mouth and INTO THE LUNGS once daily if needed 4 g 2    tamsulosin (FLOMAX) 0.4 mg Take 1 capsule (0.4 mg total) by mouth daily with dinner for 3 days 3 capsule 0    traZODone (DESYREL) 100 mg tablet take 1  tablet by mouth daily at bedtime 90 tablet 3    varenicline (CHANTIX) 0.5 mg tablet Take 1 tablet (0.5 mg total) by mouth 2 (two) times a day 60 tablet 1     No current facility-administered medications for this visit.        No Known Allergies    Review of Systems   Constitutional:  Positive for diaphoresis and fever. Negative for chills and unexpected weight change.   HENT: Negative.     Eyes: Negative.    Respiratory:  Negative for cough, shortness of breath and wheezing.    Cardiovascular:  Negative for chest pain and leg swelling.   Gastrointestinal:  Negative for abdominal pain, diarrhea and nausea.   Endocrine: Negative.    Genitourinary: Negative.    Musculoskeletal: Negative.    Skin: Negative.    Allergic/Immunologic: Negative.    Neurological: Negative.    Hematological:  Negative for adenopathy. Does not bruise/bleed easily.   Psychiatric/Behavioral: Negative.     All other systems reviewed and are negative.      Video Exam    There were no vitals filed for this visit.    Physical Exam  HENT:      Head: Normocephalic.   Pulmonary:      Effort: Pulmonary effort is normal.   Musculoskeletal:      Cervical back: Neck supple.   Neurological:      Mental Status: He is alert and oriented to person, place, and time.   Psychiatric:         Mood and Affect: Mood normal.          Visit Time  Total Visit Duration: 12 minutes

## 2024-03-20 ENCOUNTER — TELEPHONE (OUTPATIENT)
Dept: NEUROLOGY | Facility: CLINIC | Age: 69
End: 2024-03-20

## 2024-03-21 DIAGNOSIS — G89.29 CHRONIC MIDLINE LOW BACK PAIN WITHOUT SCIATICA: ICD-10-CM

## 2024-03-21 DIAGNOSIS — F11.20 UNCOMPLICATED OPIOID DEPENDENCE (HCC): ICD-10-CM

## 2024-03-21 DIAGNOSIS — M79.2 NEUROPATHIC PAIN: ICD-10-CM

## 2024-03-21 DIAGNOSIS — M54.50 CHRONIC MIDLINE LOW BACK PAIN WITHOUT SCIATICA: ICD-10-CM

## 2024-03-21 DIAGNOSIS — G89.4 CHRONIC PAIN SYNDROME: ICD-10-CM

## 2024-03-21 DIAGNOSIS — Z79.891 ENCOUNTER FOR LONG-TERM OPIATE ANALGESIC USE: ICD-10-CM

## 2024-03-21 DIAGNOSIS — M54.2 NECK PAIN: ICD-10-CM

## 2024-03-21 DIAGNOSIS — M54.12 CERVICAL RADICULOPATHY: ICD-10-CM

## 2024-03-21 DIAGNOSIS — M51.36 LUMBAR DEGENERATIVE DISC DISEASE: ICD-10-CM

## 2024-03-21 DIAGNOSIS — M79.18 MYOFASCIAL PAIN SYNDROME: ICD-10-CM

## 2024-03-21 DIAGNOSIS — M47.812 CERVICAL SPONDYLOSIS WITHOUT MYELOPATHY: ICD-10-CM

## 2024-03-21 DIAGNOSIS — M47.816 LUMBAR SPONDYLOSIS: ICD-10-CM

## 2024-03-21 RX ORDER — OXYCODONE AND ACETAMINOPHEN 7.5; 325 MG/1; MG/1
1 TABLET ORAL EVERY 8 HOURS PRN
Qty: 21 TABLET | Refills: 0 | Status: SHIPPED | OUTPATIENT
Start: 2024-03-21 | End: 2024-03-28 | Stop reason: SDUPTHER

## 2024-03-21 NOTE — TELEPHONE ENCOUNTER
Reason for call:   [x] Refill   [] Prior Auth  [] Other:     Office:   [] PCP/Provider -   [x] Specialty/Provider - S&P    Medication: OXYCODONE- ACETAMINOPHEN    Dose/Frequency: 7.5-325 MG    Quantity: 90    Pharmacy:   [id number] - [place] - [address] [place] [id number]  Phone: [phone]  Fax: [phone]     Does the patient have enough for 3 days?   [] Yes   [x] No - Send as HP to POD

## 2024-03-21 NOTE — TELEPHONE ENCOUNTER
LOV 1/5/24, was to f/u in 8 weeks  Pt cxl 2/28 and 3/8 provider change noted in chart  Pt is scheduled for 3/28.  Please advise regarding a short refill until seen

## 2024-03-22 ENCOUNTER — TELEPHONE (OUTPATIENT)
Dept: OTHER | Facility: HOSPITAL | Age: 69
End: 2024-03-22

## 2024-03-22 NOTE — TELEPHONE ENCOUNTER
I called patient after discussing with pulmonology as well as infectious disease. The likely cause of his fevers is drug-realted due to Tecentriq. The half life of this drug is 27 days and last dose was on 2/14. The recommendation is that low grade fevers may still be due to Tecentriq. Will continue to monitor off and will continue follow-up with Dr Duncan and Hailey Redd. No further ID follow up is warranted and this is not suspected to be infectious. HE understands and was appreciative of my call

## 2024-03-25 ENCOUNTER — CONSULT (OUTPATIENT)
Dept: NEUROLOGY | Facility: CLINIC | Age: 69
End: 2024-03-25
Payer: MEDICARE

## 2024-03-25 VITALS
OXYGEN SATURATION: 96 % | BODY MASS INDEX: 31.32 KG/M2 | DIASTOLIC BLOOD PRESSURE: 72 MMHG | HEART RATE: 63 BPM | SYSTOLIC BLOOD PRESSURE: 122 MMHG | WEIGHT: 200 LBS | TEMPERATURE: 98.1 F

## 2024-03-25 DIAGNOSIS — G62.0 NEUROPATHY DUE TO CHEMOTHERAPEUTIC DRUG (HCC): Primary | ICD-10-CM

## 2024-03-25 DIAGNOSIS — G60.3 IDIOPATHIC PROGRESSIVE NEUROPATHY: ICD-10-CM

## 2024-03-25 DIAGNOSIS — G25.0 ESSENTIAL TREMOR: ICD-10-CM

## 2024-03-25 DIAGNOSIS — T45.1X5A NEUROPATHY DUE TO CHEMOTHERAPEUTIC DRUG (HCC): Primary | ICD-10-CM

## 2024-03-25 PROCEDURE — 99205 OFFICE O/P NEW HI 60 MIN: CPT | Performed by: PSYCHIATRY & NEUROLOGY

## 2024-03-25 RX ORDER — PRIMIDONE 50 MG/1
25 TABLET ORAL
Qty: 30 TABLET | Refills: 3 | Status: SHIPPED | OUTPATIENT
Start: 2024-03-25

## 2024-03-25 NOTE — ASSESSMENT & PLAN NOTE
I agree that Mr. Hazel appears to have neuropathy.  He has stocking and glove distribution numbness based on his description even though his deficits are relatively mild.  His symptoms started during treatment with carboplatin.  This medicine is notorious for causing neuropathy that can worsen after it is stopped (coasting) but the worsening has continued for longer than it is typical (6 months or so).  He is on an immune checkpoint inhibitor which is equally notorious for causing neuromuscular complications such as neuropathy, but his symptoms started before he was placed on this medication.  He has a history of glucose intolerance with a hemoglobin A1c of 6.7 which could account for his neuropathy and could have been worsened by his other factors.  I told him to continue treatment of this metabolic condition with his doctor.  I will obtain additional laboratory testing and will perform an EMG.  He only needs 1 or 2 additional doses of his immune checkpoint inhibitor so given that his condition neurologically appears fairly mild I do not see a contraindication to continuing his treatment from my standpoint at this time.  Obviously, if fevers continue or if his condition worsens this would be reconsidered.

## 2024-03-25 NOTE — ASSESSMENT & PLAN NOTE
I agree that Mr. Hazel has essential tremor.  I assured him and his wife that there is no evidence of Parkinson's disease or any other degenerative process.  He is bothered enough by his symptoms that he wants to initiate treatment.  Topamax did not help but in my personal experience this medicine is better used during the day.  I think he is a good candidate for using primidone.  I gave him the prescription for 50 mg tablets but told him that for the first week he should cut them in half to avoid sedation.  If it is well-tolerated he can go up to a full tablet but still needs to watch for sedation.  There is plenty of room for us to titrate the dose based on his response.

## 2024-03-25 NOTE — PROGRESS NOTES
"Please note: this note was created with voice recognition software. Occasional wrong word or \"sound alike\" substitutions may occur due to the inherent limitations of voice recognition software. Read the chart carefully and recognize (using context) where substitutions may have occurred. I am available to discuss any questions.       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 Hazel appears to have neuropathy.  He has stocking and glove distribution numbness based on his description even though his deficits are relatively mild.  His symptoms started during treatment with carboplatin.  This medicine is notorious for causing neuropathy that can worsen after it is stopped (coasting) but the worsening has continued for longer than it is typical (6 months or so).  He is on an immune checkpoint inhibitor which is equally notorious for causing neuromuscular complications such as neuropathy, but his symptoms started before he was placed on this medication.  He has a history of glucose intolerance with a hemoglobin A1c of 6.7 which could account for his neuropathy and could have been worsened by his other factors.  I told him to continue treatment of this metabolic condition with his doctor.  I will obtain additional laboratory testing and will perform an EMG.  He only needs 1 or 2 additional doses of his immune checkpoint inhibitor so given that his condition neurologically appears fairly mild I do not see a contraindication to continuing his treatment from my standpoint at this time.  Obviously, if fevers continue or if his condition worsens this would be reconsidered.    Orders:  -     EMG 2 limb lower extremity; Future  -     Vitamin B12; Future  -     Vitamin B1, whole blood; Future  -     Protein, Total and Protein Electrophoresis with Immunofixation; Future  -     TSH w/Reflex; Future    2. Essential tremor  Assessment & Plan:  I agree that Mr. Hazel has essential tremor.  I " assured him and his wife that there is no evidence of Parkinson's disease or any other degenerative process.  He is bothered enough by his symptoms that he wants to initiate treatment.  Topamax did not help but in my personal experience this medicine is better used during the day.  I think he is a good candidate for using primidone.  I gave him the prescription for 50 mg tablets but told him that for the first week he should cut them in half to avoid sedation.  If it is well-tolerated he can go up to a full tablet but still needs to watch for sedation.  There is plenty of room for us to titrate the dose based on his response.    Orders:  -     primidone (MYSOLINE) 50 mg tablet; Take 0.5 tablets (25 mg total) by mouth daily in the early morning    3. Idiopathic progressive neuropathy  -     Vitamin B12; Future        Problem List Items Addressed This Visit       Essential tremor     I agree that Mr. Hazel has essential tremor.  I assured him and his wife that there is no evidence of Parkinson's disease or any other degenerative process.  He is bothered enough by his symptoms that he wants to initiate treatment.  Topamax did not help but in my personal experience this medicine is better used during the day.  I think he is a good candidate for using primidone.  I gave him the prescription for 50 mg tablets but told him that for the first week he should cut them in half to avoid sedation.  If it is well-tolerated he can go up to a full tablet but still needs to watch for sedation.  There is plenty of room for us to titrate the dose based on his response.         Relevant Medications    primidone (MYSOLINE) 50 mg tablet    Neuropathy due to chemotherapeutic drug  - Primary     I agree that Mr. Hazel appears to have neuropathy.  He has stocking and glove distribution numbness based on his description even though his deficits are relatively mild.  His symptoms started during treatment with carboplatin.  This medicine is  notorious for causing neuropathy that can worsen after it is stopped (coasting) but the worsening has continued for longer than it is typical (6 months or so).  He is on an immune checkpoint inhibitor which is equally notorious for causing neuromuscular complications such as neuropathy, but his symptoms started before he was placed on this medication.  He has a history of glucose intolerance with a hemoglobin A1c of 6.7 which could account for his neuropathy and could have been worsened by his other factors.  I told him to continue treatment of this metabolic condition with his doctor.  I will obtain additional laboratory testing and will perform an EMG.  He only needs 1 or 2 additional doses of his immune checkpoint inhibitor so given that his condition neurologically appears fairly mild I do not see a contraindication to continuing his treatment from my standpoint at this time.  Obviously, if fevers continue or if his condition worsens this would be reconsidered.         Relevant Orders    EMG 2 limb lower extremity    Vitamin B12    Vitamin B1, whole blood    Protein, Total and Protein Electrophoresis with Immunofixation    TSH w/Reflex     Other Visit Diagnoses       Idiopathic progressive neuropathy        Relevant Orders    Vitamin B12            Problem List       JAKI (obstructive sleep apnea)    Chronic bronchitis (HCC)    Abdominal aortic aneurysm (AAA) without rupture    Lumbar spondylosis    Lumbar degenerative disc disease    Myofascial pain syndrome    Chronic low back pain without sciatica    Cervical radiculopathy    Cervical spondylosis without myelopathy    Overview     Added automatically from request for surgery [id number]         Essential tremor    Current Assessment & Plan     I agree that Mr. Hazel has essential tremor.  I assured him and his wife that there is no evidence of Parkinson's disease or any other degenerative process.  He is bothered enough by his symptoms that he wants to initiate  treatment.  Topamax did not help but in my personal experience this medicine is better used during the day.  I think he is a good candidate for using primidone.  I gave him the prescription for 50 mg tablets but told him that for the first week he should cut them in half to avoid sedation.  If it is well-tolerated he can go up to a full tablet but still needs to watch for sedation.  There is plenty of room for us to titrate the dose based on his response.         Negative depression screening    Chronic pain of both knees    Class 1 obesity due to excess calories with serious comorbidity and body mass index (BMI) of 31.0 to 31.9 in adult    Smoking    Overview     pt counseled on quitting smoking         Hypertension    Chronic pain syndrome    Uncomplicated opioid dependence (HCC)    Encounter for long-term opiate analgesic use    Neck pain    Hyperlipidemia    Pre-diabetes    Erectile dysfunction    Insomnia    Cortical age-related cataract of both eyes    Overview     pt is a low cardiovascular risk for proposed procedure         Urinary frequency    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)    Kidney stone    Pulmonary emphysema (HCC)    Malignant neoplasm of lower lobe of right lung (HCC)    Encounter for smoking cessation counseling    At high risk for osteoporosis    Coronary artery disease of native artery of native heart with stable angina pectoris (HCC)    Stage 3a chronic kidney disease (HCC)    Night sweats    Other chronic pancreatitis (HCC)    FUO (fever of unknown origin)    Skin lesion    Depression, recurrent (HCC)    Neuropathic pain    Age-related cataract of right eye    Constipation due to opioid therapy    Neuropathy due to chemotherapeutic drug     Current Assessment & Plan     I agree that Mr. Hazel appears to have neuropathy.  He has stocking and glove distribution numbness based on his description even though his deficits are relatively mild.  His symptoms started  during treatment with carboplatin.  This medicine is notorious for causing neuropathy that can worsen after it is stopped (coasting) but the worsening has continued for longer than it is typical (6 months or so).  He is on an immune checkpoint inhibitor which is equally notorious for causing neuromuscular complications such as neuropathy, but his symptoms started before he was placed on this medication.  He has a history of glucose intolerance with a hemoglobin A1c of 6.7 which could account for his neuropathy and could have been worsened by his other factors.  I told him to continue treatment of this metabolic condition with his doctor.  I will obtain additional laboratory testing and will perform an EMG.  He only needs 1 or 2 additional doses of his immune checkpoint inhibitor so given that his condition neurologically appears fairly mild I do not see a contraindication to continuing his treatment from my standpoint at this time.  Obviously, if fevers continue or if his condition worsens this would be reconsidered.         Hyperglycemia    Bronchitis    Abnormal CT of the chest    Fever of unknown origin     Other Visit Diagnoses       Idiopathic progressive neuropathy                  I had the pleasure of seeing Lance Hazel, a 68 y.o. male, for neuromuscular consultation. The referral was for numbness and tremor.     He developed a tremor nearly 20 years ago involving his hands.  The dominant left is involved more severely.  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  He denies change in his balance.  His voice and head are not involved.  He was diagnosed with essential tremor and treated with Topamax at night which she does not believe led to improvement.    He has a history of squamous cell lung carcinoma.  He had resection in January 2023 followed by chemotherapy with Taxol and carboplatin.  Near the end of this course of chemotherapy he developed tingling that started  "in his feet, and then spread into the hands.  He continued the medication for 1 more month and was then changed to Tecentriq.  His numbness has progressed minimally since then.  It feels \"like I am walking on sponges\".  His hands and feet are still involved symmetrically.  He is scheduled for a another dose in May although it is currently being held for what is suspected to potentially represents drug-related fevers.    He has a history of \"borderline diabetes\" with his last hemoglobin A1c of 6.7.      Past Medical History:   Diagnosis Date    Abdominal aortic aneurysm without rupture ([facility])     Abdominal Aortic Duplex 02/21/2017    Ectatic infrarenal abdominal aorta.    MARYANN (acute kidney injury) ([facility]) 07/23/2022    CAD (coronary artery disease)     Cancer ([facility]) 2022    right lung CA    Chronic bronchitis ([facility]) 01/28/2019    COPD (chronic obstructive pulmonary disease) ([facility])     Extremity pain     Hemiparesis ([facility]) 11/01/2022    History of echocardiogram 03/18/2014    EF 55%, mild MR and AI.  Mild concentric LVH.    History of stress test 03/06/2017    Normal.    Hyperlipidemia     Hypertension     Joint pain     Kidney stone     Low back pain     Lung cancer ([facility])     Migraine     Neck pain     Obstructive sleep apnea     cannot tolerate CPAP    Osteoarthritis     Peripheral neuropathy     Reflex sympathetic dystrophy     Sacroiliitis ([facility]) 02/02/2022       Past Surgical History:   Procedure Laterality Date    CARDIAC CATHETERIZATION  02/13/2012    EF 70%, widely patent renal arteries, significant single-vessel CAD-medical therapy.    CARDIAC CATHETERIZATION  04/11/2013    EF 65%, 50% mid LAD, 20% prox CFX, 90% diffuse RCA, 99% mid RCA. Medical management.    CATARACT EXTRACTION Right 09/19/2023    CHOLECYSTECTOMY      COLONOSCOPY      EPIDURAL BLOCK INJECTION Bilateral 08/15/2019    Procedure: BLOCK / INJECTION EPIDURAL STEROID CERVICAL;  Surgeon: Ricardo Park MD;  Location: [facility];  Service: Pain " Management     FL GUIDED NEEDLE PLAC BX/ASP/INJ  08/15/2019    IR BIOPSY LUNG  09/13/2022    IR THORACIC DUCT EMBOLIZATION  11/22/2022    ORTHOPEDIC SURGERY      AL [facility] INCL FLUOR GDNCE DX W/CELL WASHG SPX N/A 11/16/2022    Procedure: BRONCHOSCOPY FLEXIBLE;  Surgeon: Gulshan Madison MD;  Location: [facility];  Service: Thoracic    AL THORACOSCOPY W/LOBECTOMY SINGLE LOBE Right 11/16/2022    Procedure: LOBECTOMY LUNG; lower lobe superior segmentectomy, mediastinal lymph node dissection;  Surgeon: Gulshan Madison MD;  Location: [facility];  Service: Thoracic    AL THORACOSCOPY W/SEGMENTECTOMY Right 11/16/2022    Procedure: THORACOSCOPY VIDEO ASSISTED SURGERY (VATS);  Surgeon: Gulshan Madison MD;  Location: [facility];  Service: Thoracic    TRIGGER POINT INJECTION         Patient has no known allergies.    Social History     Tobacco Use   Smoking Status Every Day    Current packs/day: 1.00    Average packs/day: 1 pack/day for 0.4 years (0.4 ttl pk-yrs)    Types: Cigarettes    Start date: 11/2023   Smokeless Tobacco Never       Social History     Substance and Sexual Activity   Alcohol Use Not Currently       Social History     Substance and Sexual Activity   Drug Use Never       Family History   Adopted: Yes   Problem Relation Age of Onset    No Known Problems Family     No Known Problems Mother     No Known Problems Father          Current Outpatient Medications:     albuterol (Ventolin HFA) 90 mcg/act inhaler, Inhale 2 puffs every 6 (six) hours as needed for wheezing, Disp: 18 g, Rfl: 5    amLODIPine (NORVASC) 10 mg tablet, take 1 tablet by mouth once daily, Disp: 90 tablet, Rfl: 3    docusate sodium (COLACE) 100 mg capsule, Take 1 capsule (100 mg total) by mouth 2 (two) times a day, Disp: 20 capsule, Rfl: 0    glucose blood (OneTouch Verio) test strip, Test once daily, Disp: 100 each, Rfl: 0    lisinopril (ZESTRIL) 10 mg tablet, Take 1 tablet (10 mg total) by mouth daily, Disp: 90  tablet, Rfl: 3    metoprolol succinate (TOPROL-XL) 100 mg 24 hr tablet, take 1 tablet by mouth once daily, Disp: 90 tablet, Rfl: 3    naloxone (NARCAN) 4 mg/0.1 mL nasal spray, Administer 1 spray into a nostril. If no response after 2-3 minutes, give another dose in the other nostril using a new spray., Disp: 1 each, Rfl: 1    nitroglycerin (NITROSTAT) 0.4 mg SL tablet, Place 1 tablet (0.4 mg total) under the tongue every 5 (five) minutes as needed for chest pain, Disp: 25 tablet, Rfl: 5    ofloxacin (OCUFLOX) 0.3 % ophthalmic solution, , Disp: , Rfl:     ondansetron (ZOFRAN) 8 mg tablet, Take 1 tablet (8 mg total) by mouth every 8 (eight) hours as needed for nausea or vomiting, Disp: 20 tablet, Rfl: 2    oxyCODONE-acetaminophen (Percocet) 7.5-325 MG per tablet, Take 1 tablet by mouth every 8 (eight) hours as needed for moderate pain Max Daily Amount: 3 tablets Do not start before January 21, 2024., Disp: 90 tablet, Rfl: 0    primidone (MYSOLINE) 50 mg tablet, Take 0.5 tablets (25 mg total) by mouth daily in the early morning, Disp: 30 tablet, Rfl: 3    Restasis 0.05 % ophthalmic emulsion, , Disp: , Rfl:     rosuvastatin (CRESTOR) 40 MG tablet, Take 1 tablet (40 mg total) by mouth daily, Disp: 90 tablet, Rfl: 3    sildenafil (VIAGRA) 100 mg tablet, Take 1 tablet (100 mg total) by mouth as needed for erectile dysfunction, Disp: 20 tablet, Rfl: 0    Stiolto Respimat 2.5-2.5 MCG/ACT inhaler, inhale 2 puffs by mouth and INTO THE LUNGS once daily if needed, Disp: 4 g, Rfl: 2    tamsulosin (FLOMAX) 0.4 mg, Take 1 capsule (0.4 mg total) by mouth daily with dinner for 3 days, Disp: 3 capsule, Rfl: 0    traZODone (DESYREL) 100 mg tablet, take 1 tablet by mouth daily at bedtime, Disp: 90 tablet, Rfl: 3    varenicline (CHANTIX) 0.5 mg tablet, Take 1 tablet (0.5 mg total) by mouth 2 (two) times a day, Disp: 60 tablet, Rfl: 1    bisacodyl (DULCOLAX) 5 mg EC tablet, Take 1 tablet (5 mg total) by mouth daily as needed for  constipation for up to 4 doses (Patient not taking: Reported on 3/25/2024), Disp: 30 tablet, Rfl: 0    dicyclomine (BENTYL) 10 mg capsule, take 1 capsule by mouth four times a day before meals and at bedtime (Patient not taking: Reported on 3/25/2024), Disp: 30 capsule, Rfl: 0    fluticasone (FLONASE) 50 mcg/act nasal spray, 1 spray into each nostril daily for 14 days (Patient not taking: Reported on 12/14/2023), Disp: 11.1 mL, Rfl: 0    ketoconazole (NIZORAL) 2 % shampoo, Apply 1 Application topically 2 (two) times a week for 28 days (Patient not taking: Reported on 3/12/2024), Disp: 100 mL, Rfl: 0    nicotine (NICODERM CQ) 21 mg/24 hr TD 24 hr patch, Place 1 patch on the skin over 24 hours every 24 hours (Patient not taking: Reported on 3/25/2024), Disp: 28 patch, Rfl: 0    nicotine polacrilex (COMMIT) 4 MG lozenge, Apply 1 lozenge (4 mg total) to the mouth or throat as needed for smoking cessation (Patient not taking: Reported on 3/25/2024), Disp: 100 each, Rfl: 0    oxyCODONE-acetaminophen (Percocet) 7.5-325 MG per tablet, Take 1 tablet by mouth every 8 (eight) hours as needed for moderate pain Max Daily Amount: 3 tablets (Patient not taking: Reported on 3/25/2024), Disp: 21 tablet, Rfl: 0    pantoprazole (PROTONIX) 20 mg tablet, take 1 tablet by mouth once daily (Patient not taking: Reported on 3/25/2024), Disp: 30 tablet, Rfl: 0    polyethylene glycol (MIRALAX) 17 g packet, Take 17 g by mouth daily for 5 days (Patient not taking: Reported on 12/20/2022), Disp: 85 g, Rfl: 0    prednisoLONE acetate (PRED FORTE) 1 % ophthalmic suspension, , Disp: , Rfl:     pregabalin (LYRICA) 50 mg capsule, Take 1 capsule (50 mg total) by mouth 3 (three) times a day (Patient not taking: Reported on 3/25/2024), Disp: 90 capsule, Rfl: 1    Appointment on 02/26/2024   Component Date Value Ref Range Status    WBC 02/26/2024 8.43  4.31 - 10.16 Thousand/uL Final    RBC 02/26/2024 5.04  3.88 - 5.62 Million/uL Final    Hemoglobin  02/26/2024 15.1  12.0 - 17.0 g/dL Final    Hematocrit 02/26/2024 47.3  36.5 - 49.3 % Final    MCV 02/26/2024 94  82 - 98 fL Final    MCH 02/26/2024 30.0  26.8 - 34.3 pg Final    MCHC 02/26/2024 31.9  31.4 - 37.4 g/dL Final    RDW 02/26/2024 13.2  11.6 - 15.1 % Final    MPV 02/26/2024 9.9  8.9 - 12.7 fL Final    Platelets 02/26/2024 169  149 - 390 Thousands/uL Final    nRBC 02/26/2024 0  /100 WBCs Final    Neutrophils Relative 02/26/2024 63  43 - 75 % Final    Immature Grans % 02/26/2024 0  0 - 2 % Final    Lymphocytes Relative 02/26/2024 28  14 - 44 % Final    Monocytes Relative 02/26/2024 8  4 - 12 % Final    Eosinophils Relative 02/26/2024 0  0 - 6 % Final    Basophils Relative 02/26/2024 1  0 - 1 % Final    Neutrophils Absolute 02/26/2024 5.28  1.85 - 7.62 Thousands/µL Final    Absolute Immature Grans 02/26/2024 0.02  0.00 - 0.20 Thousand/uL Final    Absolute Lymphocytes 02/26/2024 2.38  0.60 - 4.47 Thousands/µL Final    Absolute Monocytes 02/26/2024 0.69  0.17 - 1.22 Thousand/µL Final    Eosinophils Absolute 02/26/2024 0.00  0.00 - 0.61 Thousand/µL Final    Basophils Absolute 02/26/2024 0.06  0.00 - 0.10 Thousands/µL Final    Sodium 02/26/2024 136  135 - 147 mmol/L Final    Potassium 02/26/2024 3.9  3.5 - 5.3 mmol/L Final    Chloride 02/26/2024 104  96 - 108 mmol/L Final    CO2 02/26/2024 24  21 - 32 mmol/L Final    ANION GAP 02/26/2024 8  mmol/L Final    BUN 02/26/2024 14  5 - 25 mg/dL Final    Creatinine 02/26/2024 1.05  0.60 - 1.30 mg/dL Final    Standardized to IDMS reference method    Glucose 02/26/2024 159 (H)  65 - 140 mg/dL Final    If the patient is fasting, the ADA then defines impaired fasting glucose as > 100 mg/dL and diabetes as > or equal to 123 mg/dL.    Calcium 02/26/2024 9.3  8.4 - 10.2 mg/dL Final    AST 02/26/2024 16  13 - 39 U/L Final    ALT 02/26/2024 18  7 - 52 U/L Final    Specimen collection should occur prior to Sulfasalazine administration due to the potential for falsely depressed  results.     Alkaline Phosphatase 02/26/2024 65  34 - 104 U/L Final    Total Protein 02/26/2024 6.8  6.4 - 8.4 g/dL Final    Albumin 02/26/2024 4.3  3.5 - 5.0 g/dL Final    Total Bilirubin 02/26/2024 0.77  0.20 - 1.00 mg/dL Final    Use of this assay is not recommended for patients undergoing treatment with eltrombopag due to the potential for falsely elevated results.  N-acetyl-p-benzoquinone imine (metabolite of Acetaminophen) will generate erroneously low results in samples for patients that have taken an overdose of Acetaminophen.    eGFR 02/26/2024 72  ml/min/1.73sq m Final   Appointment on 02/12/2024   Component Date Value Ref Range Status    WBC 02/12/2024 9.18  4.31 - 10.16 Thousand/uL Final    RBC 02/12/2024 5.31  3.88 - 5.62 Million/uL Final    Hemoglobin 02/12/2024 16.1  12.0 - 17.0 g/dL Final    Hematocrit 02/12/2024 49.7 (H)  36.5 - 49.3 % Final    MCV 02/12/2024 94  82 - 98 fL Final    MCH 02/12/2024 30.3  26.8 - 34.3 pg Final    MCHC 02/12/2024 32.4  31.4 - 37.4 g/dL Final    RDW 02/12/2024 13.2  11.6 - 15.1 % Final    MPV 02/12/2024 10.1  8.9 - 12.7 fL Final    Platelets 02/12/2024 200  149 - 390 Thousands/uL Final    nRBC 02/12/2024 0  /100 WBCs Final    Neutrophils Relative 02/12/2024 62  43 - 75 % Final    Immature Grans % 02/12/2024 0  0 - 2 % Final    Lymphocytes Relative 02/12/2024 29  14 - 44 % Final    Monocytes Relative 02/12/2024 8  4 - 12 % Final    Eosinophils Relative 02/12/2024 0  0 - 6 % Final    Basophils Relative 02/12/2024 1  0 - 1 % Final    Neutrophils Absolute 02/12/2024 5.68  1.85 - 7.62 Thousands/µL Final    Absolute Immature Grans 02/12/2024 0.04  0.00 - 0.20 Thousand/uL Final    Absolute Lymphocytes 02/12/2024 2.70  0.60 - 4.47 Thousands/µL Final    Absolute Monocytes 02/12/2024 0.70  0.17 - 1.22 Thousand/µL Final    Eosinophils Absolute 02/12/2024 0.00  0.00 - 0.61 Thousand/µL Final    Basophils Absolute 02/12/2024 0.06  0.00 - 0.10 Thousands/µL Final    Sodium 02/12/2024  138  135 - 147 mmol/L Final    Potassium 02/12/2024 4.2  3.5 - 5.3 mmol/L Final    Chloride 02/12/2024 103  96 - 108 mmol/L Final    CO2 02/12/2024 30  21 - 32 mmol/L Final    ANION GAP 02/12/2024 5  mmol/L Final    BUN 02/12/2024 16  5 - 25 mg/dL Final    Creatinine 02/12/2024 1.12  0.60 - 1.30 mg/dL Final    Standardized to IDMS reference method    Glucose, Fasting 02/12/2024 151 (H)  65 - 99 mg/dL Final    Calcium 02/12/2024 9.4  8.4 - 10.2 mg/dL Final    AST 02/12/2024 13  13 - 39 U/L Final    ALT 02/12/2024 15  7 - 52 U/L Final    Specimen collection should occur prior to Sulfasalazine administration due to the potential for falsely depressed results.     Alkaline Phosphatase 02/12/2024 77  34 - 104 U/L Final    Total Protein 02/12/2024 7.1  6.4 - 8.4 g/dL Final    Albumin 02/12/2024 4.5  3.5 - 5.0 g/dL Final    Total Bilirubin 02/12/2024 0.88  0.20 - 1.00 mg/dL Final    Use of this assay is not recommended for patients undergoing treatment with eltrombopag due to the potential for falsely elevated results.  N-acetyl-p-benzoquinone imine (metabolite of Acetaminophen) will generate erroneously low results in samples for patients that have taken an overdose of Acetaminophen.    eGFR 02/12/2024 66  ml/min/1.73sq m Final    Hemoglobin A1C 02/12/2024 6.7 (H)  Normal 4.0-5.6%; PreDiabetic 5.7-6.4%; Diabetic >=6.5%; Glycemic control for adults with diabetes <7.0% % Final    EAG 02/12/2024 146  mg/dl Final   Appointment on 01/22/2024   Component Date Value Ref Range Status    WBC 01/22/2024 8.40  4.31 - 10.16 Thousand/uL Final    RBC 01/22/2024 5.30  3.88 - 5.62 Million/uL Final    Hemoglobin 01/22/2024 15.9  12.0 - 17.0 g/dL Final    Hematocrit 01/22/2024 50.0 (H)  36.5 - 49.3 % Final    MCV 01/22/2024 94  82 - 98 fL Final    MCH 01/22/2024 30.0  26.8 - 34.3 pg Final    MCHC 01/22/2024 31.8  31.4 - 37.4 g/dL Final    RDW 01/22/2024 13.2  11.6 - 15.1 % Final    MPV 01/22/2024 10.1  8.9 - 12.7 fL Final    Platelets  01/22/2024 200  149 - 390 Thousands/uL Final    nRBC 01/22/2024 0  /100 WBCs Final    Neutrophils Relative 01/22/2024 61  43 - 75 % Final    Immature Grans % 01/22/2024 0  0 - 2 % Final    Lymphocytes Relative 01/22/2024 31  14 - 44 % Final    Monocytes Relative 01/22/2024 7  4 - 12 % Final    Eosinophils Relative 01/22/2024 0  0 - 6 % Final    Basophils Relative 01/22/2024 1  0 - 1 % Final    Neutrophils Absolute 01/22/2024 5.09  1.85 - 7.62 Thousands/µL Final    Absolute Immature Grans 01/22/2024 0.02  0.00 - 0.20 Thousand/uL Final    Absolute Lymphocytes 01/22/2024 2.62  0.60 - 4.47 Thousands/µL Final    Absolute Monocytes 01/22/2024 0.62  0.17 - 1.22 Thousand/µL Final    Eosinophils Absolute 01/22/2024 0.00  0.00 - 0.61 Thousand/µL Final    Basophils Absolute 01/22/2024 0.05  0.00 - 0.10 Thousands/µL Final    Sodium 01/22/2024 136  135 - 147 mmol/L Final    Potassium 01/22/2024 3.9  3.5 - 5.3 mmol/L Final    Chloride 01/22/2024 102  96 - 108 mmol/L Final    CO2 01/22/2024 27  21 - 32 mmol/L Final    ANION GAP 01/22/2024 7  mmol/L Final    BUN 01/22/2024 16  5 - 25 mg/dL Final    Creatinine 01/22/2024 1.08  0.60 - 1.30 mg/dL Final    Standardized to IDMS reference method    Glucose 01/22/2024 150 (H)  65 - 140 mg/dL Final    If the patient is fasting, the ADA then defines impaired fasting glucose as > 100 mg/dL and diabetes as > or equal to 123 mg/dL.    Calcium 01/22/2024 9.3  8.4 - 10.2 mg/dL Final    AST 01/22/2024 12 (L)  13 - 39 U/L Final    ALT 01/22/2024 14  7 - 52 U/L Final    Specimen collection should occur prior to Sulfasalazine administration due to the potential for falsely depressed results.     Alkaline Phosphatase 01/22/2024 67  34 - 104 U/L Final    Total Protein 01/22/2024 6.6  6.4 - 8.4 g/dL Final    Albumin 01/22/2024 4.2  3.5 - 5.0 g/dL Final    Total Bilirubin 01/22/2024 0.70  0.20 - 1.00 mg/dL Final    Use of this assay is not recommended for patients undergoing treatment with eltrombopag  due to the potential for falsely elevated results.  N-acetyl-p-benzoquinone imine (metabolite of Acetaminophen) will generate erroneously low results in samples for patients that have taken an overdose of Acetaminophen.    eGFR 01/22/2024 69  ml/min/1.73sq m Final    CRP 01/22/2024 6.5 (H)  <3.0 mg/L Final    Antinuclear Antibodies, IFA 01/22/2024 Negative   Final                                         Negative   <1:80                                       Borderline  1:80                                       Positive   >1:80  ICAP nomenclature: AC-0  For more information about Hep-2 cell patterns use  ANApatterns.org, the official website for the International  Consensus on Antinuclear Antibody (BRYAN) Patterns (ICAP).    Rheumatoid Factor 01/22/2024 Negative  Negative Final    Sed Rate 01/22/2024 13  0 - 19 mm/hour Final   Office Visit on 01/22/2024   Component Date Value Ref Range Status    Hemoglobin A1C 01/22/2024 6.6 (A)  6.5 Final   Appointment on 12/26/2023   Component Date Value Ref Range Status    WBC 12/26/2023 9.08  4.31 - 10.16 Thousand/uL Final    RBC 12/26/2023 5.15  3.88 - 5.62 Million/uL Final    Hemoglobin 12/26/2023 15.5  12.0 - 17.0 g/dL Final    Hematocrit 12/26/2023 49.4 (H)  36.5 - 49.3 % Final    MCV 12/26/2023 96  82 - 98 fL Final    MCH 12/26/2023 30.1  26.8 - 34.3 pg Final    MCHC 12/26/2023 31.4  31.4 - 37.4 g/dL Final    RDW 12/26/2023 13.4  11.6 - 15.1 % Final    MPV 12/26/2023 9.9  8.9 - 12.7 fL Final    Platelets 12/26/2023 204  149 - 390 Thousands/uL Final    nRBC 12/26/2023 0  /100 WBCs Final    Neutrophils Relative 12/26/2023 60  43 - 75 % Final    Immature Grans % 12/26/2023 0  0 - 2 % Final    Lymphocytes Relative 12/26/2023 31  14 - 44 % Final    Monocytes Relative 12/26/2023 8  4 - 12 % Final    Eosinophils Relative 12/26/2023 0  0 - 6 % Final    Basophils Relative 12/26/2023 1  0 - 1 % Final    Neutrophils Absolute 12/26/2023 5.44  1.85 - 7.62 Thousands/µL Final    Absolute  Immature Grans 12/26/2023 0.03  0.00 - 0.20 Thousand/uL Final    Absolute Lymphocytes 12/26/2023 2.84  0.60 - 4.47 Thousands/µL Final    Absolute Monocytes 12/26/2023 0.70  0.17 - 1.22 Thousand/µL Final    Eosinophils Absolute 12/26/2023 0.00  0.00 - 0.61 Thousand/µL Final    Basophils Absolute 12/26/2023 0.07  0.00 - 0.10 Thousands/µL Final    Sodium 12/26/2023 138  135 - 147 mmol/L Final    Potassium 12/26/2023 4.0  3.5 - 5.3 mmol/L Final    Chloride 12/26/2023 104  96 - 108 mmol/L Final    CO2 12/26/2023 26  21 - 32 mmol/L Final    ANION GAP 12/26/2023 8  mmol/L Final    BUN 12/26/2023 18  5 - 25 mg/dL Final    Creatinine 12/26/2023 1.07  0.60 - 1.30 mg/dL Final    Standardized to IDMS reference method    Glucose 12/26/2023 123  65 - 140 mg/dL Final    If the patient is fasting, the ADA then defines impaired fasting glucose as > 100 mg/dL and diabetes as > or equal to 123 mg/dL.    Calcium 12/26/2023 9.1  8.4 - 10.2 mg/dL Final    AST 12/26/2023 11 (L)  13 - 39 U/L Final    ALT 12/26/2023 12  7 - 52 U/L Final    Specimen collection should occur prior to Sulfasalazine administration due to the potential for falsely depressed results.     Alkaline Phosphatase 12/26/2023 68  34 - 104 U/L Final    Total Protein 12/26/2023 6.8  6.4 - 8.4 g/dL Final    Albumin 12/26/2023 4.3  3.5 - 5.0 g/dL Final    Total Bilirubin 12/26/2023 0.72  0.20 - 1.00 mg/dL Final    Use of this assay is not recommended for patients undergoing treatment with eltrombopag due to the potential for falsely elevated results.  N-acetyl-p-benzoquinone imine (metabolite of Acetaminophen) will generate erroneously low results in samples for patients that have taken an overdose of Acetaminophen.    eGFR 12/26/2023 70  ml/min/1.73sq m Final   Appointment on 12/21/2023   Component Date Value Ref Range Status    % Parasitemia 12/21/2023 0   Final    Is Blood Parasite Present 12/21/2023 No  No Final    Anaplasma phagocytophilum 12/21/2023 Negative  Negative  Final    No Anaplasma phagocytophilum DNA detected.  A. phagocytophilum has been  characterized as the causative agent of Human Granulocytic  Ehrlichiosis (HGE).    Blood Culture 12/21/2023 No Growth After 5 Days.   Final    Miscellaneous Lab Test Result 12/21/2023 Brucella Antibody w/Reflex to Agglutination   Final    Comment 12/21/2023 See written result report   Final    REFERRAL TEST NAME 12/21/2023 Brucella Antibody w/Reflex to Agglutination   Final    REFERRAL LAB 12/21/2023 [facility]   Final    REFERRAL TEST CODE 12/21/2023 20585   Final    Crypto Ag 12/21/2023 Negative  Negative Final    EBV VCA IgG 12/21/2023 >600.0 (H)  0.0 - 17.9 U/mL Final                                     Negative        <18.0                                   Equivocal 18.0 - 21.9                                   Positive        >21.9    EBV VCA IgM 12/21/2023 <36.0  0.0 - 35.9 U/mL Final                                     Negative        <36.0                                   Equivocal 36.0 - 43.9                                   Positive        >43.9    EBV Nuclear Ag Ab 12/21/2023 363.0 (H)  0.0 - 17.9 U/mL Final                                     Negative        <18.0                                   Equivocal 18.0 - 21.9                                   Positive        >21.9    INTERPRETATION 12/21/2023 Comment   Final                   EBV Interpretation Chart  Key: Antibody Present +    Antibody Absent -  Interpretation             VCA-IgM   VCA-IgG  EBNA-IgG  No previous infection/        -         -         -  Susceptible  Primary infection (new        +         +         -  or recent)  Past Infection               +or-       +         +  See comment below*            +         -         -  *Results indicate infection with EBV at some time   however cannot predict the timing of the infection   since antibodies to EBNA usually develop after   primary infection or, alternatively, approximately   5-10% of patients with EBV  never develop antibodies   to EBNA.    Blood Culture 12/21/2023 No Growth After 5 Days.   Final    HIV-1 p24 Antigen 12/21/2023 Non-Reactive  Non-Reactive Final    HIV-1 Antibody 12/21/2023 Non-Reactive  Non-Reactive Final    HIV-2 Antibody 12/21/2023 Non-Reactive  Non-Reactive Final    HIV Ag-Ab 5th Gen 12/21/2023 Non-Reactive  Non-Reactive Final    A Non-Reactive test result does not preclude the possibility of exposure or infection with HIV-1 and/or HIV-2.  Non-Reactive results can occur if the quantity of marker present is below the detection limits or is not present during the stage of disease in which a sample is collected. Repeat testing should be considered where there is clinical suspicion of infection.       QFT Nil 12/21/2023 0.00  0 - 8.0 IU/ml Final    QFT TB1-NIL 12/21/2023 0.05  IU/ml Final    QFT TB2-NIL 12/21/2023 0.00  IU/ml Final    QFT Mitogen-NIL 12/21/2023 10.00  IU/ml Final    QFT Final Interpretation 12/21/2023 Negative  Negative Final    No Interferon-gamma response to M. tuberculosis antigens detected.  Infection with M. tuberculosis is unlikely.  A single negative result does not exclude infection with M. tuberculosis. In patients at high risk for M. tuberculosis infection, a second test should be considered in accordance with the 2017 ATS/IDSA/CDC Clinical Practice Guidelines for Diagnosis of Tuberculosis in Adults and Children. False negative results can be a result of incorrect blood sample collection or handling of the specimen affecting lymphocyte function.    Lyme Total Antibodies 12/21/2023 Negative  Negative Final    Path Review 12/21/2023 No parasites identified.    Evaluated by Ligia Hargrove M.D.  Interpretation performed at [facility], [address] [facility] [id number]   Final   Consult on 12/20/2023   Component Date Value Ref Range Status    Histoplasma Galactomannan Ag 12/21/2023 Negative  <0.5 ng/mL Final   Hospital Outpatient Visit on 12/14/2023   Component Date Value Ref  Range Status    Case Report 12/14/2023    Final                    Value:Non-gynecologic Cytology                          Case: [id number]                                  Authorizing Provider:  India Dominguez,  Collected:           12/14/2023 1411                                     MD                                                                           Ordering Location:     [facility] [place] Received:            12/14/2023 1449                                     Endoscopy                                                                    Pathologist:           Domingo Ward MD                                                                 Specimen:    Lung, Right Middle Lobe Bronchoalveolar Lavage                                             Final Diagnosis 12/14/2023    Final                    Value:This result contains rich text formatting which cannot be displayed here.    Gross Description 12/14/2023    Final                    Value:This result contains rich text formatting which cannot be displayed here.    Additional Information 12/14/2023    Final                    Value:This result contains rich text formatting which cannot be displayed here.    Fungus (Mycology) Culture 12/14/2023 No Fungus Isolated at 4 Weeks   Final    Bronchial Culture 12/14/2023 No growth   Final    Gram Stain Result 12/14/2023 No Polys or Bacteria seen   Final    AFB Culture 12/14/2023 No AFB Isolated at 6 Weeks   Final    AFB Stain 12/14/2023 No acid fast bacilli seen   Final    PNEUMOCYSTIS PCR 12/14/2023 Negative  Negative, Invalid Final    Fungus (Mycology) Culture 12/14/2023 No Fungus Isolated at 4 Weeks   Final    Bronchial Culture 12/14/2023 No growth   Final    Gram Stain Result 12/14/2023 No Polys or Bacteria seen   Final    AFB Culture 12/14/2023 No AFB Isolated at 6 Weeks   Final    AFB Stain  12/14/2023 No acid fast bacilli seen   Final    PNEUMOCYSTIS PCR 12/14/2023 Negative  Negative, Invalid Final    Fungus (Mycology) Culture 12/14/2023 2+ Growth of Candida glabrata (A)   Final    This organism has been edited. The previous result was Yeast species on 12/20/2023 at 0925 EST.    Bronchial Culture 12/14/2023 Few Colonies of   Final    Mixed Respiratory matt    Gram Stain Result 12/14/2023 No Polys or Bacteria seen   Final    AFB Culture 12/14/2023 No AFB Isolated at 6 Weeks   Final    AFB Stain 12/14/2023 No acid fast bacilli seen   Final    Neutrophil Count, Fluid 12/14/2023 21  % Final    Lymphs, Fluid 12/14/2023 7  % Final    Eos, Fluid 12/14/2023 2  % Final    Basos, Fluid 12/14/2023 1  % Final    Macrophage count 12/14/2023 69  % Final    Total Counted 12/14/2023 100   Final   Appointment on 12/05/2023   Component Date Value Ref Range Status    WBC 12/05/2023 11.24 (H)  4.31 - 10.16 Thousand/uL Final    RBC 12/05/2023 5.15  3.88 - 5.62 Million/uL Final    Hemoglobin 12/05/2023 15.5  12.0 - 17.0 g/dL Final    Hematocrit 12/05/2023 48.0  36.5 - 49.3 % Final    MCV 12/05/2023 93  82 - 98 fL Final    MCH 12/05/2023 30.1  26.8 - 34.3 pg Final    MCHC 12/05/2023 32.3  31.4 - 37.4 g/dL Final    RDW 12/05/2023 13.3  11.6 - 15.1 % Final    MPV 12/05/2023 9.9  8.9 - 12.7 fL Final    Platelets 12/05/2023 252  149 - 390 Thousands/uL Final    nRBC 12/05/2023 0  /100 WBCs Final    Neutrophils Relative 12/05/2023 72  43 - 75 % Final    Immature Grans % 12/05/2023 0  0 - 2 % Final    Lymphocytes Relative 12/05/2023 21  14 - 44 % Final    Monocytes Relative 12/05/2023 6  4 - 12 % Final    Eosinophils Relative 12/05/2023 0  0 - 6 % Final    Basophils Relative 12/05/2023 1  0 - 1 % Final    Neutrophils Absolute 12/05/2023 8.14 (H)  1.85 - 7.62 Thousands/µL Final    Absolute Immature Grans 12/05/2023 0.03  0.00 - 0.20 Thousand/uL Final    Absolute Lymphocytes 12/05/2023 2.30  0.60 - 4.47 Thousands/µL Final     Absolute Monocytes 12/05/2023 0.69  0.17 - 1.22 Thousand/µL Final    Eosinophils Absolute 12/05/2023 0.00  0.00 - 0.61 Thousand/µL Final    Basophils Absolute 12/05/2023 0.08  0.00 - 0.10 Thousands/µL Final    Sodium 12/05/2023 137  135 - 147 mmol/L Final    Potassium 12/05/2023 3.9  3.5 - 5.3 mmol/L Final    Chloride 12/05/2023 104  96 - 108 mmol/L Final    CO2 12/05/2023 25  21 - 32 mmol/L Final    ANION GAP 12/05/2023 8  mmol/L Final    BUN 12/05/2023 15  5 - 25 mg/dL Final    Creatinine 12/05/2023 1.12  0.60 - 1.30 mg/dL Final    Standardized to IDMS reference method    Glucose 12/05/2023 172 (H)  65 - 140 mg/dL Final    If the patient is fasting, the ADA then defines impaired fasting glucose as > 100 mg/dL and diabetes as > or equal to 123 mg/dL.    Calcium 12/05/2023 9.6  8.4 - 10.2 mg/dL Final    AST 12/05/2023 13  13 - 39 U/L Final    ALT 12/05/2023 11  7 - 52 U/L Final    Specimen collection should occur prior to Sulfasalazine administration due to the potential for falsely depressed results.     Alkaline Phosphatase 12/05/2023 70  34 - 104 U/L Final    Total Protein 12/05/2023 7.3  6.4 - 8.4 g/dL Final    Albumin 12/05/2023 4.4  3.5 - 5.0 g/dL Final    Total Bilirubin 12/05/2023 0.79  0.20 - 1.00 mg/dL Final    Use of this assay is not recommended for patients undergoing treatment with eltrombopag due to the potential for falsely elevated results.  N-acetyl-p-benzoquinone imine (metabolite of Acetaminophen) will generate erroneously low results in samples for patients that have taken an overdose of Acetaminophen.    eGFR 12/05/2023 66  ml/min/1.73sq m Final    Amylase 12/05/2023 18 (L)  29 - 103 IU/L Final    Lipase 12/05/2023 26  11 - 82 u/L Final   Appointment on 11/17/2023   Component Date Value Ref Range Status    WBC 11/17/2023 7.65  4.31 - 10.16 Thousand/uL Final    RBC 11/17/2023 4.91  3.88 - 5.62 Million/uL Final    Hemoglobin 11/17/2023 14.7  12.0 - 17.0 g/dL Final    Hematocrit 11/17/2023 45.6   36.5 - 49.3 % Final    MCV 11/17/2023 93  82 - 98 fL Final    MCH 11/17/2023 29.9  26.8 - 34.3 pg Final    MCHC 11/17/2023 32.2  31.4 - 37.4 g/dL Final    RDW 11/17/2023 13.3  11.6 - 15.1 % Final    MPV 11/17/2023 9.9  8.9 - 12.7 fL Final    Platelets 11/17/2023 154  149 - 390 Thousands/uL Final    nRBC 11/17/2023 0  /100 WBCs Final    Neutrophils Relative 11/17/2023 71  43 - 75 % Final    Immature Grans % 11/17/2023 0  0 - 2 % Final    Lymphocytes Relative 11/17/2023 18  14 - 44 % Final    Monocytes Relative 11/17/2023 10  4 - 12 % Final    Eosinophils Relative 11/17/2023 0  0 - 6 % Final    Basophils Relative 11/17/2023 1  0 - 1 % Final    Neutrophils Absolute 11/17/2023 5.41  1.85 - 7.62 Thousands/µL Final    Absolute Immature Grans 11/17/2023 0.01  0.00 - 0.20 Thousand/uL Final    Absolute Lymphocytes 11/17/2023 1.41  0.60 - 4.47 Thousands/µL Final    Absolute Monocytes 11/17/2023 0.77  0.17 - 1.22 Thousand/µL Final    Eosinophils Absolute 11/17/2023 0.00  0.00 - 0.61 Thousand/µL Final    Basophils Absolute 11/17/2023 0.05  0.00 - 0.10 Thousands/µL Final    Sodium 11/17/2023 133 (L)  135 - 147 mmol/L Final    Potassium 11/17/2023 3.8  3.5 - 5.3 mmol/L Final    Chloride 11/17/2023 101  96 - 108 mmol/L Final    CO2 11/17/2023 23  21 - 32 mmol/L Final    ANION GAP 11/17/2023 9  mmol/L Final    BUN 11/17/2023 18  5 - 25 mg/dL Final    Creatinine 11/17/2023 1.05  0.60 - 1.30 mg/dL Final    Standardized to IDMS reference method    Glucose 11/17/2023 181 (H)  65 - 140 mg/dL Final    If the patient is fasting, the ADA then defines impaired fasting glucose as > 100 mg/dL and diabetes as > or equal to 123 mg/dL.    Calcium 11/17/2023 9.2  8.4 - 10.2 mg/dL Final    AST 11/17/2023 11 (L)  13 - 39 U/L Final    ALT 11/17/2023 11  7 - 52 U/L Final    Specimen collection should occur prior to Sulfasalazine administration due to the potential for falsely depressed results.     Alkaline Phosphatase 11/17/2023 64  34 - 104 U/L  Final    Total Protein 11/17/2023 7.0  6.4 - 8.4 g/dL Final    Albumin 11/17/2023 4.2  3.5 - 5.0 g/dL Final    Total Bilirubin 11/17/2023 0.94  0.20 - 1.00 mg/dL Final    Use of this assay is not recommended for patients undergoing treatment with eltrombopag due to the potential for falsely elevated results.  N-acetyl-p-benzoquinone imine (metabolite of Acetaminophen) will generate erroneously low results in samples for patients that have taken an overdose of Acetaminophen.    eGFR 11/17/2023 72  ml/min/1.73sq m Final    Blood Culture 11/17/2023 No Growth After 5 Days.   Final    Blood Culture 11/17/2023 No Growth After 5 Days.   Final   There may be more visits with results that are not included.        No results found for this or any previous visit.     No results found for this or any previous visit.    Results for orders placed during the hospital encounter of 09/21/22    MRI brain w wo contrast    Narrative  MRI BRAIN WITH AND WITHOUT CONTRAST    INDICATION: C34.90: Malignant neoplasm of unspecified part of unspecified bronchus or lung.    COMPARISON:  None.    TECHNIQUE:  Sagittal T1, axial T2, axial FLAIR, axial T1, axial [place], axial diffusion.  Sagittal, axial T1 postcontrast.  Axial bravo postcontrast with coronal reconstructions.      IV Contrast:  9 mL of Gadobutrol injection (SINGLE-DOSE)    IMAGE QUALITY:   Diagnostic.    FINDINGS:    BRAIN PARENCHYMA:  There is no discrete mass, mass effect or midline shift. There is no intracranial hemorrhage.  Normal posterior fossa.  Diffusion imaging is unremarkable.    There are no white matter changes in the cerebral hemispheres.    Postcontrast imaging of the brain demonstrates no abnormal enhancement.    VENTRICLES:  Normal for the patient's age.    SELLA AND PITUITARY GLAND:  Normal.    ORBITS:  Normal.    PARANASAL SINUSES:  Normal.    VASCULATURE:  Evaluation of the major intracranial vasculature demonstrates appropriate flow voids.    CALVARIUM AND SKULL  BASE:  Normal.    EXTRACRANIAL SOFT TISSUES:  Normal.    Impression  No evidence of metastatic disease.    Workstation performed: [id number]    Results for orders placed during the hospital encounter of 10/03/22    MRI cervical spine without contrast    Narrative  MRI CERVICAL SPINE WITHOUT CONTRAST    INDICATION: G89.4: Chronic pain syndrome  M54.2: Cervicalgia  M54.12: Radiculopathy, cervical region.    COMPARISON:  MRI cervical spine 6/21/2019    TECHNIQUE:  Sagittal T1, sagittal T2, sagittal inversion recovery, axial T2, axial  2D merge    IMAGE QUALITY:  Diagnostic    FINDINGS:    ALIGNMENT:  Normal alignment of the cervical spine.  No compression fracture.  No subluxation.  No scoliosis.    MARROW SIGNAL:  Normal marrow signal is identified within the visualized bony structures.  A small hemangioma is noted within the T1 vertebral body.    CERVICAL AND VISUALIZED THORACIC CORD:  Normal signal within the visualized cord.    PREVERTEBRAL AND PARASPINAL SOFT TISSUES:  Normal.    VISUALIZED POSTERIOR FOSSA:  The visualized posterior fossa demonstrates no abnormal signal.    CERVICAL DISC SPACES:    C2-3: No focal disc herniation, central canal stenosis, or neural foraminal narrowing.    C3-4: Increased diffuse disc bulge, eccentric to the left.  Left facet hypertrophy.  No central canal narrowing.  Moderate to severe left neural foraminal stenosis is again noted.    C4-5: Stable broad-based central disc protrusion with bilateral uncovertebral hypertrophy.  Left facet hypertrophy.  Mild central canal narrowing.  Moderate to severe left neural foraminal stenosis is again noted.    C5-6: Stable broad-based central disc protrusion partially effacing the ventral subarachnoid space..  No central canal narrowing.  Mild bilateral neural foraminal stenosis.    C6-7: Stable disc bulge and bilateral uncovertebral hypertrophy.  No central canal narrowing.  Mild bilateral neural foraminal stenosis.    C7-T1: No focal disc  herniation, central canal stenosis, or neural foraminal narrowing.    UPPER THORACIC DISC SPACES:  Normal.    Impression  1. Multilevel degenerative disc disease similar to the prior study of 6/21/2019 with no new disc herniation.  Stable broad-based disc protrusions are noted at the C4-5 and C5-6 levels with mild central canal narrowing at C4-5.  There is again moderate to  severe left C3-4 and C4-5 neural foraminal narrowing.  2. The cervical cord appears normal in caliber and signal with no evidence of focal impingement.      Workstation performed: [id number]      Results for orders placed during the hospital encounter of 06/21/19    MRI cervical spine wo contrast    Narrative  MRI CERVICAL SPINE WITHOUT CONTRAST    INDICATION: 64-year-old male, neck pain, left arm pain    COMPARISON:  2/18/2019 x-rays    TECHNIQUE:  Sagittal T1, sagittal T2, sagittal inversion recovery, axial T2, axial  2D merge    IMAGE QUALITY:  Diagnostic    FINDINGS:    ALIGNMENT:  Normal alignment of the cervical spine.  No compression fracture.  No subluxation.  No scoliosis.    MARROW SIGNAL:  Multilevel degenerative marrow.  No significant marrow pathology.    CERVICAL AND VISUALIZED THORACIC CORD:  Normal signal within the visualized cord.    PREVERTEBRAL AND PARASPINAL SOFT TISSUES:  Normal.    VISUALIZED POSTERIOR FOSSA:  The visualized posterior fossa demonstrates no abnormal signal.    CERVICAL DISC SPACES:    C2-C3:  Normal.    C3-C4:  Mild degenerative disc disease, moderate degenerative facet/uncinate process process arthrosis, moderate to severe left foraminal stenosis, possible left C4 nerve root encroachment    C4-C5:  Mild degenerative disc disease, moderate degenerative facet/uncinate process arthrosis, mild central canal stenosis, moderate to severe left foraminal stenosis, moderate right foraminal stenosis,  possible bilateral C5 nerve root encroachment    C5-C6:  Mild degenerative disc disease, mild to moderate  degenerative facet/uncinate process arthrosis, mild bilateral foraminal stenosis, bulging annulus, no overt neural element impingement    C6-C7:  Mild to moderate degenerative disc disease, mild to moderate degenerative facet/uncinate process arthrosis, mild bilateral foraminal stenosis, bulging annulus, no overt neural element impingement    C7-T1:  Normal.    UPPER THORACIC DISC SPACES:  Normal.    Impression  Multilevel degenerative spondylosis, consistent with x-rays    Moderate to severe left foraminal stenosis C3-4    Mild central canal stenosis, bilateral foraminal stenosis C4-5    Mild bilateral foraminal stenosis C5-6 and C6-7          Workstation performed: [id number]    No results found for this or any previous visit.    No results found for this or any previous visit.    No results found for this or any previous visit.    Results for orders placed during the hospital encounter of 08/25/20    MRI lumbar spine wo contrast    Narrative  MRI LUMBAR SPINE WITHOUT CONTRAST    INDICATION: M48.062: Spinal stenosis, lumbar region with neurogenic claudication  M47.816: Spondylosis without myelopathy or radiculopathy, lumbar region  M54.16: Radiculopathy, lumbar region.    COMPARISON:  6/21/2019.    TECHNIQUE:  Sagittal T1, sagittal T2, sagittal inversion recovery, axial T1 and axial T2, coronal T2.  IMAGE QUALITY:  Diagnostic    FINDINGS:    VERTEBRAL BODIES:  There are 5 lumbar type vertebral bodies.  Normal alignment of the lumbar spine.  T11 and L1 hemangiomas.    SACRUM:  Unremarkable.    DISTAL CORD AND CONUS:  Normal signal morphology of the distal thoracic cord and conus, terminating at L1-2.    PARASPINAL SOFT TISSUES:  No mass or fluid collection.    LOWER THORACIC DISC SPACES:  Stable chronic disc degenerative change without evidence of disc herniation or central canal stenosis.  Mild left neural foraminal narrowing at T12-L1.    LUMBAR DISC SPACES:    L1-L2:  Mild posterior disc bulge, disc and facet  osteophytosis.  Ligamentum flavum hypertrophy.  No central canal stenosis.  Mild left neural foraminal narrowing.    L2-L3:  Mild posterior disc bulge, disc and facet osteophytosis.  Ligamentum flavum hypertrophy.  No central canal stenosis.  Mild left neural foraminal narrowing.    L3-L4:  Minimal posterior disc osteophytes.  No central canal stenosis or neural foraminal narrowing.    L4-L5:  Broad-based posterior disc protrusion extending to the right lateral zone, new since the prior exam.  Facet osteophytosis.  Moderate to severe central canal stenosis with crowding of the cauda equina.  Encroachment of the traversing L5 nerve  roots in the subarticular zones.  Severe bilateral neural foraminal narrowing.    L5-S1:  Posterior disc bulge.  Superimposed paracentral disc protrusion and annular tear.  Mild facet osteophytosis.  No central canal stenosis.  Severe bilateral neural foraminal narrowing.    Impression  1.  L4-5 broad-based posterior and right lateral disc region, new since the prior exam and resulting in moderate to severe central canal stenosis.  Encroachment of the traversing L5 nerve roots in the subarticular zones.  Severe bilateral neural  foraminal narrowing.  2.  Advanced disc and facet degenerative change at L5-S1 resulting in severe bilateral neural foraminal narrowing, not significantly changed.      Workstation performed: [id number]      Results for orders placed during the hospital encounter of 06/21/19    MRI lumbar spine wo contrast    Narrative  MRI LUMBAR SPINE WITHOUT CONTRAST    INDICATION: 64-year-old male, worsening back pain  COMPARISON:  2/13/2019 x-rays      TECHNIQUE:  Sagittal T1, sagittal T2, sagittal inversion recovery, axial T1 and axial T2, coronal T2.  IMAGE QUALITY:  Diagnostic    FINDINGS:    VERTEBRAL BODIES:  Normal alignment of the lumbar spine.  No spondylolysis or spondylolisthesis. No scoliosis.  No compression fracture.  T11 and L1 hemangiomas.  No significant  marrow pathology.    SACRUM:  Normal signal within the sacrum. No evidence of insufficiency or stress fracture.    DISTAL CORD AND CONUS:  Normal size and signal within the distal cord and conus.    PARASPINAL SOFT TISSUES:  Bilateral renal cysts consistent with 9/23/2008 CT.  Infrarenal AAA measuring up to 3 cm has developed subsequent to the 9/23/2008 abdominopelvic CT.    LOWER THORACIC DISC SPACES:  Normal disc height and signal.  No disc herniation, canal stenosis or foraminal narrowing.    LUMBAR DISC SPACES:    L1-L2:  Mild degenerative spondylosis and bulging annulus, no stenosis    L2-L3:  Mild degenerative spondylosis and bulging annulus, no stenosis    L3-L4:  Mild degenerative spondylosis, bulging annulus, no stenosis    L4-L5:  Mild degenerative disc disease, moderate degenerative facet arthrosis, bulging annulus, mild bilateral foraminal stenosis, small right foraminal disc protrusion, probable right L4 nerve root encroachment.    L5-S1: Mild degenerative spondylosis, small central disc protrusion.  Mild bilateral foraminal stenosis.  Although the disc protrusion is contiguous with both S1 nerve roots, no actual nerve root displacement or compression are identified.    Impression  Multilevel degenerative spondylosis, consistent with x-rays    Mild bilateral foraminal stenosis, small right foraminal disc protrusion L4-5    Small noncompressive central disc protrusion, mild bilateral foraminal stenosis L5-S1    Small infrarenal AAA measuring up to 3 cm      Workstation performed: [id number]    No results found for this or any previous visit.    No results found for this or any previous visit.    No results found for this or any previous visit.    No results found for this or any previous visit.      Review of Systems   Constitutional:  Negative for appetite change, fatigue and fever.   HENT: Negative.  Negative for hearing loss, tinnitus, trouble swallowing and voice change.    Eyes: Negative.  Negative  for photophobia, pain and visual disturbance.   Respiratory: Negative.  Negative for shortness of breath.    Cardiovascular: Negative.  Negative for palpitations.   Gastrointestinal: Negative.  Negative for nausea and vomiting.   Endocrine: Negative.  Negative for cold intolerance.   Genitourinary: Negative.  Negative for dysuria, frequency and urgency.   Musculoskeletal:  Negative for back pain, gait problem, myalgias, neck pain and neck stiffness.   Skin: Negative.  Negative for rash.   Allergic/Immunologic: Negative.    Neurological:  Positive for dizziness (once alyson  while), tremors (essential) and numbness (numbness/tingling bl hands/feet, brought on by chemp for lung ca 1 yr ago). Negative for seizures, syncope, facial asymmetry, speech difficulty, weakness, light-headedness and headaches.   Hematological: Negative.  Does not bruise/bleed easily.   Psychiatric/Behavioral:  Positive for sleep disturbance (trouble staying asleep). Negative for confusion and hallucinations.          On examination,     Blood pressure 122/72, pulse 63, temperature 98.1 °F (36.7 °C), temperature source Temporal, weight 90.7 kg (200 lb), SpO2 96%.    Well developed, well nourished, in no acute distress    Normocephalic, atraumatic    Heart: regular rate and rhythm  I did not hear a carotid bruit    Extremities: no clubbing, cyanosis, or edema    Speech and cognition appeared normal    Flexion/extension tremor of hands, worse on the left  No bradykinesia; normal tapping. No cogwheeling, even with reinforcement    Cranial nerves:  II: Pupils equal, round, and reactive to light. No gross visual field defect. I did not appreciate optic disc edema.  III, IV, VI: Extraocular movements intact  V: Normal facial sensation in all three divisions of the trigeminal nerve bilaterally  VII: Normal facial strength  VIII: Hearing intact to finger rub bilaterally  IX, X: Palate elevated symmetrically  XI: Sternocleidomastoid strength normal  "bilaterally  XII: Tongue protruded in midline without atrophy or fibrillations    Motor:  Normal tone and bulk throughout.   Muscle strength testing by the MRC scale was 5/5 in the deltoid, biceps, triceps, wrist extensors, wrist flexors, finger extensors, finger flexors,. Hip flexors, quadriceps, ankle dorsiflexors, ankle plantar flexors, and EHL bilaterally    Deep tendon reflexes:   2+ and symmetrical in the biceps, triceps, brachioradialis, patellas, and ankles  Toes downgoing (no Babinski sign)  No Doe's sign    Sensation: Minimally diminished pinprick and light touch in the toes. No truncal sensory level    Cerebellar: normal finger to nose and heel to shin testing    Gait: Normal heel, toe, and tandem gait  Normal turning; no \"pull\" sign            There are no Patient Instructions on file for this visit.      Thank you very much for allowing me to participate in your patient's care. Please feel free to contact me for any questions or concerns. Please be aware of the inherent limitations of voice recognition software, which may result in transcriptional errors.    Camron Baltazar MD  "

## 2024-03-28 ENCOUNTER — OFFICE VISIT (OUTPATIENT)
Age: 69
End: 2024-03-28
Payer: MEDICARE

## 2024-03-28 VITALS
HEIGHT: 67 IN | SYSTOLIC BLOOD PRESSURE: 145 MMHG | BODY MASS INDEX: 31.39 KG/M2 | WEIGHT: 200 LBS | DIASTOLIC BLOOD PRESSURE: 78 MMHG | HEART RATE: 68 BPM

## 2024-03-28 DIAGNOSIS — M51.36 LUMBAR DEGENERATIVE DISC DISEASE: ICD-10-CM

## 2024-03-28 DIAGNOSIS — F11.20 UNCOMPLICATED OPIOID DEPENDENCE (HCC): ICD-10-CM

## 2024-03-28 DIAGNOSIS — M54.50 CHRONIC MIDLINE LOW BACK PAIN WITHOUT SCIATICA: ICD-10-CM

## 2024-03-28 DIAGNOSIS — M54.2 NECK PAIN: ICD-10-CM

## 2024-03-28 DIAGNOSIS — M54.12 CERVICAL RADICULOPATHY: ICD-10-CM

## 2024-03-28 DIAGNOSIS — G89.4 CHRONIC PAIN SYNDROME: Primary | ICD-10-CM

## 2024-03-28 DIAGNOSIS — M79.2 NEUROPATHIC PAIN: ICD-10-CM

## 2024-03-28 DIAGNOSIS — M47.812 CERVICAL SPONDYLOSIS WITHOUT MYELOPATHY: ICD-10-CM

## 2024-03-28 DIAGNOSIS — Z79.891 ENCOUNTER FOR LONG-TERM OPIATE ANALGESIC USE: ICD-10-CM

## 2024-03-28 DIAGNOSIS — G89.29 CHRONIC MIDLINE LOW BACK PAIN WITHOUT SCIATICA: ICD-10-CM

## 2024-03-28 DIAGNOSIS — M79.18 MYOFASCIAL PAIN SYNDROME: ICD-10-CM

## 2024-03-28 DIAGNOSIS — M47.816 LUMBAR SPONDYLOSIS: ICD-10-CM

## 2024-03-28 PROCEDURE — 99214 OFFICE O/P EST MOD 30 MIN: CPT | Performed by: NURSE PRACTITIONER

## 2024-03-28 RX ORDER — OXYCODONE AND ACETAMINOPHEN 7.5; 325 MG/1; MG/1
1 TABLET ORAL EVERY 8 HOURS PRN
Qty: 90 TABLET | Refills: 0 | Status: SHIPPED | OUTPATIENT
Start: 2024-03-28

## 2024-03-28 RX ORDER — OXYCODONE AND ACETAMINOPHEN 7.5; 325 MG/1; MG/1
1 TABLET ORAL EVERY 8 HOURS PRN
Qty: 90 TABLET | Refills: 0 | Status: SHIPPED | OUTPATIENT
Start: 2024-04-25

## 2024-03-28 NOTE — PROGRESS NOTES
Assessment:  1. Chronic pain syndrome    2. Neck pain    3. Cervical radiculopathy    4. Cervical spondylosis without myelopathy    5. Chronic midline low back pain without sciatica    6. Lumbar spondylosis    7. Myofascial pain syndrome    8. Lumbar degenerative disc disease    9. Neuropathic pain    10. Uncomplicated opioid dependence (HCC)    11. Encounter for long-term opiate analgesic use        Plan:  While the patient was in the office today, I did have a thorough conversation regarding their chronic pain syndrome, medication management, and treatment plan options.  Patient is being seen for a follow-up visit.  Overall, his typical pain is reduced by about 50% with current medications.  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  He is under the care of a neurologist.  He just started on primidone today.  He is scheduled for EMGs in the near future.    Renewed oxycodone 7.5/325 every 8 hours as needed for pain.  The patient's opioid scripts were sent to their pharmacy electronically and was given a 2 month supply of prescriptions with a Do Not Fill date(s) of 3/28/2024, 4/25/2024.    Lyrica was discontinued by patient's oncologist.  He was only taking 50 mg 3 times daily without significant improvement.  May consider restarting Lyrica in the future.    [place] Prescription Drug Monitoring Program report was reviewed and was appropriate     A urine drug screen was collected at today's office visit as part of our medication management protocol. The point of care testing results were appropriate for what was being prescribed. The specimen will be sent for confirmatory testing. The drug screen is medically necessary because the patient is either dependent on opioid medication or is being considered for opioid medication therapy and the results could impact ongoing or future treatment. The drug screen is to evaluate for the presences or absence of prescribed,  non-prescribed, and/or illicit drugs/substances.    There are risks associated with opioid medications, including dependence, addiction and tolerance. The patient understands and agrees to use these medications only as prescribed. Potential side effects of the medications include, but are not limited to, constipation, drowsiness, addiction, impaired judgment and risk of fatal overdose if not taken as prescribed. The patient was warned against driving while taking sedation medications.  Sharing medications is a felony. At this point in time, the patient is showing no signs of addiction, abuse, diversion or suicidal ideation.    While the patient was in the office today an opioid contract was thoroughly reviewed and signed by the patient. The patient was given adequate time to ask questions in regards to the contract and a signed copy was sent home for his/her records.    The patient will follow-up in 8 weeks for medication prescription refill and reevaluation. The patient was advised to contact the office should their symptoms worsen in the interim. The patient was agreeable and verbalized an understanding.        History of Present Illness:    The patient is a 68 y.o. male last seen on 1/5/2024 who presents for a follow up office visit in regards to chronic pain secondary to chronic pain syndrome, chronic low back pain, lumbar spondylosis, chronic neck pain, cervical spondylosis, neuropathy.  The patient currently reports complaints of neck pain, pain in both shoulders, low back pain, pain in both hands and feet.  Current pain level is a 6/10.  Quality pain is described as sharp and shooting.    Current pain medications includes: Oxycodone 7.5/325 every 8 hours as needed for pain.  Neurologist recently started primidone for neuropathy  .  The patient reports that this regimen is providing 50% pain relief.  The patient is reporting no side effects from this pain medication regimen.    Pain Contract Signed:  3/28/24  Last Urine Drug Screen: 3/28/24    I have personally reviewed and/or updated the patient's past medical history, past surgical history, family history, social history, current medications, allergies, and vital signs today.       Review of Systems:    Review of Systems   Constitutional:  Negative for unexpected weight change.   HENT:  Negative for hearing loss.    Eyes:  Negative for visual disturbance.   Respiratory:  Positive for shortness of breath.    Cardiovascular:  Negative for leg swelling.   Gastrointestinal:  Positive for constipation.   Endocrine: Negative for polyuria.   Genitourinary:  Negative for difficulty urinating.   Musculoskeletal:  Positive for gait problem. Negative for joint swelling and myalgias.        Decreased range of motion  Joint stiffness   Skin:  Negative for rash.   Neurological:  Positive for dizziness. Negative for weakness and headaches.   Psychiatric/Behavioral:  Negative for decreased concentration.    All other systems reviewed and are negative.        Past Medical History:   Diagnosis Date    Abdominal aortic aneurysm without rupture ([facility])     Abdominal Aortic Duplex 02/21/2017    Ectatic infrarenal abdominal aorta.    MARYANN (acute kidney injury) ([facility]) 07/23/2022    CAD (coronary artery disease)     Cancer ([facility]) 2022    right lung CA    Chronic bronchitis ([facility]) 01/28/2019    COPD (chronic obstructive pulmonary disease) ([facility])     Extremity pain     Hemiparesis ([facility]) 11/01/2022    History of echocardiogram 03/18/2014    EF 55%, mild MR and AI.  Mild concentric LVH.    History of stress test 03/06/2017    Normal.    Hyperlipidemia     Hypertension     Joint pain     Kidney stone     Low back pain     Lung cancer ([facility])     Migraine     Neck pain     Obstructive sleep apnea     cannot tolerate CPAP    Osteoarthritis     Peripheral neuropathy     Reflex sympathetic dystrophy     Sacroiliitis ([facility]) 02/02/2022       Past Surgical History:   Procedure Laterality Date    CARDIAC  CATHETERIZATION  02/13/2012    EF 70%, widely patent renal arteries, significant single-vessel CAD-medical therapy.    CARDIAC CATHETERIZATION  04/11/2013    EF 65%, 50% mid LAD, 20% prox CFX, 90% diffuse RCA, 99% mid RCA. Medical management.    CATARACT EXTRACTION Right 09/19/2023    CHOLECYSTECTOMY      COLONOSCOPY      EPIDURAL BLOCK INJECTION Bilateral 08/15/2019    Procedure: BLOCK / INJECTION EPIDURAL STEROID CERVICAL;  Surgeon: Ricardo Park MD;  Location: [facility];  Service: Pain Management     FL GUIDED NEEDLE PLAC BX/ASP/INJ  08/15/2019    IR BIOPSY LUNG  09/13/2022    IR THORACIC DUCT EMBOLIZATION  11/22/2022    ORTHOPEDIC SURGERY      [facility] [facility] W/CELL WASHG SPX N/A 11/16/2022    Procedure: BRONCHOSCOPY FLEXIBLE;  Surgeon: Gulshan Madison MD;  Location: [facility];  Service: Thoracic    [place] THORACOSCOPY W/LOBECTOMY SINGLE LOBE Right 11/16/2022    Procedure: LOBECTOMY LUNG; lower lobe superior segmentectomy, mediastinal lymph node dissection;  Surgeon: Gulshan Madison MD;  Location: [facility];  Service: Thoracic    [place] THORACOSCOPY W/SEGMENTECTOMY Right 11/16/2022    Procedure: THORACOSCOPY VIDEO ASSISTED SURGERY (VATS);  Surgeon: Gulshan Madison MD;  Location: [facility];  Service: Thoracic    TRIGGER POINT INJECTION         Family History   Adopted: Yes   Problem Relation Age of Onset    No Known Problems Family     No Known Problems Mother     No Known Problems Father        Social History     Occupational History    Not on file   Tobacco Use    Smoking status: Every Day     Current packs/day: 1.00     Average packs/day: 1 pack/day for 0.4 years (0.4 ttl pk-yrs)     Types: Cigarettes     Start date: 11/2023    Smokeless tobacco: Never   Vaping Use    Vaping status: Never Used   Substance and Sexual Activity    Alcohol use: Not Currently    Drug use: Never    Sexual activity: Yes         Current Outpatient Medications:     albuterol (Ventolin  HFA) 90 mcg/act inhaler, Inhale 2 puffs every 6 (six) hours as needed for wheezing, Disp: 18 g, Rfl: 5    amLODIPine (NORVASC) 10 mg tablet, take 1 tablet by mouth once daily, Disp: 90 tablet, Rfl: 3    docusate sodium (COLACE) 100 mg capsule, Take 1 capsule (100 mg total) by mouth 2 (two) times a day, Disp: 20 capsule, Rfl: 0    glucose blood (OneTouch Verio) test strip, Test once daily, Disp: 100 each, Rfl: 0    lisinopril (ZESTRIL) 10 mg tablet, Take 1 tablet (10 mg total) by mouth daily, Disp: 90 tablet, Rfl: 3    metoprolol succinate (TOPROL-XL) 100 mg 24 hr tablet, take 1 tablet by mouth once daily, Disp: 90 tablet, Rfl: 3    naloxone (NARCAN) 4 mg/0.1 mL nasal spray, Administer 1 spray into a nostril. If no response after 2-3 minutes, give another dose in the other nostril using a new spray., Disp: 1 each, Rfl: 1    nitroglycerin (NITROSTAT) 0.4 mg SL tablet, Place 1 tablet (0.4 mg total) under the tongue every 5 (five) minutes as needed for chest pain, Disp: 25 tablet, Rfl: 5    ofloxacin (OCUFLOX) 0.3 % ophthalmic solution, , Disp: , Rfl:     ondansetron (ZOFRAN) 8 mg tablet, Take 1 tablet (8 mg total) by mouth every 8 (eight) hours as needed for nausea or vomiting, Disp: 20 tablet, Rfl: 2    oxyCODONE-acetaminophen (Percocet) 7.5-325 MG per tablet, Take 1 tablet by mouth every 8 (eight) hours as needed for moderate pain Max Daily Amount: 3 tablets, Disp: 90 tablet, Rfl: 0    [START ON 4/25/2024] oxyCODONE-acetaminophen (Percocet) 7.5-325 MG per tablet, Take 1 tablet by mouth every 8 (eight) hours as needed for moderate pain Max Daily Amount: 3 tablets Do not start before April 25, 2024., Disp: 90 tablet, Rfl: 0    primidone (MYSOLINE) 50 mg tablet, Take 0.5 tablets (25 mg total) by mouth daily in the early morning, Disp: 30 tablet, Rfl: 3    Restasis 0.05 % ophthalmic emulsion, , Disp: , Rfl:     rosuvastatin (CRESTOR) 40 MG tablet, Take 1 tablet (40 mg total) by mouth daily, Disp: 90 tablet, Rfl: 3     sildenafil (VIAGRA) 100 mg tablet, Take 1 tablet (100 mg total) by mouth as needed for erectile dysfunction, Disp: 20 tablet, Rfl: 0    Stiolto Respimat 2.5-2.5 MCG/ACT inhaler, inhale 2 puffs by mouth and INTO THE LUNGS once daily if needed, Disp: 4 g, Rfl: 2    traZODone (DESYREL) 100 mg tablet, take 1 tablet by mouth daily at bedtime, Disp: 90 tablet, Rfl: 3    varenicline (CHANTIX) 0.5 mg tablet, Take 1 tablet (0.5 mg total) by mouth 2 (two) times a day, Disp: 60 tablet, Rfl: 1    bisacodyl (DULCOLAX) 5 mg EC tablet, Take 1 tablet (5 mg total) by mouth daily as needed for constipation for up to 4 doses (Patient not taking: Reported on 3/25/2024), Disp: 30 tablet, Rfl: 0    dicyclomine (BENTYL) 10 mg capsule, take 1 capsule by mouth four times a day before meals and at bedtime (Patient not taking: Reported on 3/25/2024), Disp: 30 capsule, Rfl: 0    fluticasone (FLONASE) 50 mcg/act nasal spray, 1 spray into each nostril daily for 14 days (Patient not taking: Reported on 12/14/2023), Disp: 11.1 mL, Rfl: 0    ketoconazole (NIZORAL) 2 % shampoo, Apply 1 Application topically 2 (two) times a week for 28 days (Patient not taking: Reported on 3/12/2024), Disp: 100 mL, Rfl: 0    nicotine (NICODERM CQ) 21 mg/24 hr TD 24 hr patch, Place 1 patch on the skin over 24 hours every 24 hours (Patient not taking: Reported on 3/25/2024), Disp: 28 patch, Rfl: 0    nicotine polacrilex (COMMIT) 4 MG lozenge, Apply 1 lozenge (4 mg total) to the mouth or throat as needed for smoking cessation (Patient not taking: Reported on 3/25/2024), Disp: 100 each, Rfl: 0    pantoprazole (PROTONIX) 20 mg tablet, take 1 tablet by mouth once daily (Patient not taking: Reported on 3/25/2024), Disp: 30 tablet, Rfl: 0    polyethylene glycol (MIRALAX) 17 g packet, Take 17 g by mouth daily for 5 days (Patient not taking: Reported on 12/20/2022), Disp: 85 g, Rfl: 0    prednisoLONE acetate (PRED FORTE) 1 % ophthalmic suspension, , Disp: , Rfl:     tamsulosin  "(FLOMAX) 0.4 mg, Take 1 capsule (0.4 mg total) by mouth daily with dinner for 3 days, Disp: 3 capsule, Rfl: 0    No Known Allergies    Physical Exam:    /78   Pulse 68   Ht 5' 7\" (1.702 m)   Wt 90.7 kg (200 lb)   BMI 31.32 kg/m²     Constitutional:normal, well developed, well nourished, alert, in no distress and non-toxic and no overt pain behavior.  Eyes:anicteric  HEENT:grossly intact  Neck:supple, symmetric, trachea midline and no masses   Pulmonary:even and unlabored  Cardiovascular:No edema or pitting edema present  Skin:Normal without rashes or lesions and well hydrated  Psychiatric:Mood and affect appropriate  Neurologic:Cranial Nerves II-XII grossly intact  Musculoskeletal: Gait is slow and guarded.      Imaging  No orders to display         No orders of the defined types were placed in this encounter.      "

## 2024-03-28 NOTE — PATIENT INSTRUCTIONS
Opioid Safety   WHAT YOU NEED TO KNOW:   An opioid medicine is used to treat pain. Examples are oxycodone, morphine, fentanyl, or codeine. Pain control and management may help you rest, heal, and return to your daily activities. You and your family will receive information about how to manage your pain at home. The instructions will include what to do if you have side effects as your pain is managed. You will get information on how to handle opioid medicine safely. You will also get suggestions on how to control pain without opioids. It is important to follow all instructions so your pain is managed effectively.  DISCHARGE INSTRUCTIONS:   Call your local emergency number (911 in the ), or have someone else call if:   You have a seizure.    You cannot be woken.    You have trouble staying awake and your breathing is slow or shallow.    Your speech is slurred, or you are confused.    You are dizzy or stumble when you walk.    Call your doctor, or have someone close to you call if:   You are extremely drowsy, or you have trouble staying awake or speaking.    You have pale or clammy skin.    You have blue fingernails or lips.    Your heartbeat is slower than normal.    You cannot stop vomiting.    You have questions or concerns about your condition or care.    Use opioids safely:   Take prescribed opioids exactly as directed.  Opioids come with directions based on the kind and how it is given. Talk to your healthcare provider or a pharmacist if you have any questions. Do not take more than the recommended amount. Too much can cause a life-threatening overdose. Do not continue to take it after your pain stops. You may develop tolerance. This means you keep needing higher doses to get the same effect. You may also develop opioid use disorder. This means you are not able to control your opioid use.    Do not give opioids to others or take opioids that belong to someone else.  The kind or amount one person takes may not  be right for another. The person you share them with may also be taking medicines that do not mix with opioids. The person may drink alcohol or use other drugs that can cause life-threatening problems when mixed with opioids.    Do not mix opioids with other medicines or alcohol.  The combination can cause an overdose, or cause you to stop breathing. Alcohol, sleeping pills, and medicines such as antihistamines can make you sleepy. A combination with opioids can lead to a coma.    Do not drive or operate heavy machinery after you use an opioid.  You may feel drowsy or have trouble concentrating. You can injure yourself or others if you drive or use heavy machinery when you are not alert. Your provider or pharmacist can tell you how long to wait after a dose before you do these activities.    Talk to your healthcare provider if you have any side effects.  Side effects include nausea, sleepiness, itching, and trouble thinking clearly. Your provider may need to make changes to the kind or amount of opioid you are taking. Your provider can also help you find ways to prevent or relieve side effects.    Manage constipation:  Constipation is the most common side effect of opioid medicine. Constipation is when you have hard, dry bowel movements, or you go longer than usual between bowel movements. Tell your healthcare provider about all changes in your bowel movements while you are taking opioids. Your provider may recommend laxative medicine to help you have a bowel movement. Your provider may also change the kind of opioid you are taking, or change when you take it. The following are more ways you can prevent or relieve constipation:  Drink liquids as directed.  You may need to drink extra liquids to help soften and move your bowels. Ask how much liquid to drink each day and which liquids are best for you.    Eat high-fiber foods.  This may help decrease constipation by adding bulk to your bowel movements. High-fiber  foods include fruits, vegetables, whole-grain breads and cereals, and beans. Your healthcare provider or dietitian can help you create a high-fiber meal plan. Your provider may also recommend a fiber supplement if you cannot get enough fiber from food.         Exercise regularly.  Regular physical activity can help stimulate your intestines. Walking is a good exercise to prevent or relieve constipation. Ask which exercises are best for you.         Schedule a time each day to have a bowel movement.  This may help train your body to have regular bowel movements. Bend forward while you are on the toilet to help move the bowel movement out. Sit on the toilet for at least 10 minutes, even if you do not have a bowel movement.    Store opioids safely:   Store opioids where others cannot easily get them.  Keep them in a locked cabinet or secure area. Do not  keep them in a purse or other bag you carry with you. A person may be looking for something else and find the opioids.         Make sure opioids are stored out of the reach of children.  A child can easily overdose on opioids. Opioids may look like candy to a small child.    The best way to dispose of opioids:  The laws vary by country and area. In the [place], the best way is to return the opioids through a take-back program. This program is offered by the [place] Drug Enforcement Agency ([employer]). The following are options for using the program:  Take the opioids to a [employer] collection site.  The site is often a law enforcement center. Call your local law enforcement center for scheduled take-back days in your area. You will be given information on where to go if the collection site is in a different location.    Take the opioids to an approved pharmacy or hospital.  A pharmacy or hospital may be set up as a collection site. You will need to ask if it is a [employer] collection site if you were not directed there. A pharmacy or doctor's office may not be able to take back opioids  unless it is a [employer] site.    Use a mail-back system.  This means you are given containers to put the opioids into. You will then mail them in the containers.    Use a take-back drop box.  This is a place to leave the opioids at any time. People and animals will not be able to get into the box. Your local law enforcement agency can tell you where to find a drop box in your area.    Other safe ways to dispose of opioids:  The medicine may come with disposal instructions. The instructions may vary depending on the brand of medicine you are using. Instructions may come in a Medication Guide, but not every medicine has one. You may instead get instructions from your pharmacy or doctor. Follow instructions carefully. The following are general guidelines to follow:  Find out if you can flush the opioid.  Some opioids can be flushed down the toilet or poured into the sink. You will need to contact authorities in your area to see if this is an option for you. The FDA also offers a list of medicines that are safe to flush down the toilet. You can check the list if you cannot get the information for your local area.    Ask your waste management company about rules for putting opioids in the trash.  The company will be able to give you specific directions. Scratch out personal information on the original medicine label so it cannot be read. Then put it in the trash. Do not label the trash or put any information on it about the opioids. It should look like regular household trash so no one is tempted to look for the opioids. Keep the trash out of the reach of children and animals. Always make sure trash is secure.    Talk to officials if you live in a facility.  If you live in a nursing home or assisted living center, talk to an official. The person will know the rules for your area.    Other ways to manage pain:   Ask your healthcare provider about non-opioid medicines to control pain.  Some medicines may even work better than  opioids, depending on the cause of your pain. Nonprescription medicines include NSAIDs (such as ibuprofen) and acetaminophen. Prescription medicines include muscle relaxers, antidepressants, and steroids.    Pain may be managed without any medicines.  Some ways to relieve pain include massage, aromatherapy, or meditation. Physical or occupational therapy may also help.    Follow up with your doctor or pain specialist as directed:  You may need to have your dose adjusted. Your doctor or pain specialist can also help you find ways to manage pain without opioids. Write down your questions so you remember to ask them during your visits.  For more information:   Drug Enforcement Administration  [address] [place] [id number]  Phone: 3- 667 - 932-8695  Web Address: https://www.deadiversion.[facility].gov/drug_disposal/    [place] Food and Drug Administration  [address] [place] [address]  Phone: 8- 454 - 184-2256  Web Address: http://www.fda.gov  © Copyright [employer] 2023 Information is for End User's use only and may not be sold, redistributed or otherwise used for commercial purposes.  The above information is an  only. It is not intended as medical advice for individual conditions or treatments. Talk to your doctor, nurse or pharmacist before following any medical regimen to see if it is safe and effective for you.

## 2024-03-30 LAB
6MAM UR QL CFM: NEGATIVE NG/ML
7AMINOCLONAZEPAM UR QL CFM: NEGATIVE NG/ML
A-OH ALPRAZ UR QL CFM: NEGATIVE NG/ML
ACCEPTABLE CREAT UR QL: NORMAL MG/DL
ACCEPTIBLE SP GR UR QL: NORMAL
AMPHET UR QL CFM: NEGATIVE NG/ML
AMPHET UR QL CFM: NEGATIVE NG/ML
BUPRENORPHINE UR QL CFM: NEGATIVE NG/ML
BUTALBITAL UR QL CFM: NEGATIVE NG/ML
BZE UR QL CFM: NEGATIVE NG/ML
CODEINE UR QL CFM: NEGATIVE NG/ML
DESIPRAMINE UR QL CFM: NEGATIVE NG/ML
EDDP UR QL CFM: NEGATIVE NG/ML
ETHYL GLUCURONIDE UR QL CFM: NEGATIVE NG/ML
ETHYL SULFATE UR QL SCN: NEGATIVE NG/ML
FENTANYL UR QL CFM: NEGATIVE NG/ML
GLIADIN IGG SER IA-ACNC: NEGATIVE NG/ML
GLUCOSE 30M P 50 G LAC PO SERPL-MCNC: NEGATIVE NG/ML
HYDROCODONE UR QL CFM: NEGATIVE NG/ML
HYDROCODONE UR QL CFM: NEGATIVE NG/ML
HYDROMORPHONE UR QL CFM: NEGATIVE NG/ML
LORAZEPAM UR QL CFM: NEGATIVE NG/ML
MDMA UR QL CFM: NEGATIVE NG/ML
ME-PHENIDATE UR QL CFM: NEGATIVE NG/ML
MEPERIDINE UR QL CFM: NEGATIVE NG/ML
METHADONE UR QL CFM: NEGATIVE NG/ML
METHAMPHET UR QL CFM: NEGATIVE NG/ML
MORPHINE UR QL CFM: NEGATIVE NG/ML
MORPHINE UR QL CFM: NEGATIVE NG/ML
NITRITE UR QL: NORMAL UG/ML
NORBUPRENORPHINE UR QL CFM: NEGATIVE NG/ML
NORDIAZEPAM UR QL CFM: NEGATIVE NG/ML
NORFENTANYL UR QL CFM: NEGATIVE NG/ML
NORHYDROCODONE UR QL CFM: NEGATIVE NG/ML
NORHYDROCODONE UR QL CFM: NEGATIVE NG/ML
NORMEPERIDINE UR QL CFM: NEGATIVE NG/ML
NOROXYCODONE UR QL CFM: NORMAL NG/ML
OLANZAPINE QUANTIFICATION: NEGATIVE NG/ML
OPC-3373 QUANTIFICATION: NEGATIVE
OXAZEPAM UR QL CFM: NEGATIVE NG/ML
OXAZEPAM UR QL CFM: NEGATIVE NG/ML
OXYCODONE UR QL CFM: NORMAL NG/ML
OXYMORPHONE UR QL CFM: NORMAL NG/ML
OXYMORPHONE UR QL CFM: NORMAL NG/ML
PARA-FLUOROFENTANYL QUANTIFICATION: NORMAL NG/ML
RESULT ALL_PRESCRIBED MEDS AND SPECIAL INSTRUCTIONS: NORMAL
SECOBARBITAL UR QL CFM: NEGATIVE NG/ML
SL AMB 4-ANPP QUANTIFICATION: NORMAL NG/ML
SL AMB 5F-ADB-M7 METABOLITE QUANTIFICATION: NEGATIVE NG/ML
SL AMB 7-OH-MITRAGYNINE (KRATOM ALKALOID) QUANTIFICATION: NEGATIVE NG/ML
SL AMB AB-FUBINACA-M3 METABOLITE QUANTIFICATION: NEGATIVE NG/ML
SL AMB ACETYL FENTANYL QUANTIFICATION: NORMAL NG/ML
SL AMB ACETYL NORFENTANYL QUANTIFICATION: NORMAL NG/ML
SL AMB ACRYL FENTANYL QUANTIFICATION: NORMAL NG/ML
SL AMB CARFENTANIL QUANTIFICATION: NORMAL NG/ML
SL AMB CTHC (MARIJUANA METABOLITE) QUANTIFICATION: NEGATIVE NG/ML
SL AMB DEXTROMETHORPHAN QUANTIFICATION: NEGATIVE NG/ML
SL AMB DEXTRORPHAN (DEXTROMETHORPHAN METABOLITE) QUANT: NEGATIVE NG/ML
SL AMB DEXTRORPHAN (DEXTROMETHORPHAN METABOLITE) QUANT: NEGATIVE NG/ML
SL AMB HYDROXYRISPERIDONE QUANTIFICATION: NEGATIVE NG/ML
SL AMB JWH018 METABOLITE QUANTIFICATION: NEGATIVE NG/ML
SL AMB JWH073 METABOLITE QUANTIFICATION: NEGATIVE NG/ML
SL AMB MDMB-FUBINACA-M1 METABOLITE QUANTIFICATION: NEGATIVE NG/ML
SL AMB N-DESMETHYL-TRAMADOL QUANTIFICATION: NEGATIVE NG/ML
SL AMB NORQUETIAPINE QUANTIFICATION: NEGATIVE NG/ML
SL AMB PHENTERMINE QUANTIFICATION: NEGATIVE NG/ML
SL AMB QUETIAPINE QUANTIFICATION: NEGATIVE NG/ML
SL AMB RCS4 METABOLITE QUANTIFICATION: NEGATIVE NG/ML
SL AMB RISPERIDONE QUANTIFICATION: NEGATIVE NG/ML
SL AMB RITALINIC ACID QUANTIFICATION: NEGATIVE NG/ML
SPECIMEN DRAWN SERPL: NEGATIVE NG/ML
SPECIMEN PH ACCEPTABLE UR: NORMAL
TAPENTADOL UR QL CFM: NEGATIVE NG/ML
TEMAZEPAM UR QL CFM: NEGATIVE NG/ML
TRAMADOL UR QL CFM: NEGATIVE NG/ML
URATE/CREAT 24H UR: NEGATIVE NG/ML

## 2024-04-09 DIAGNOSIS — I10 ESSENTIAL HYPERTENSION: ICD-10-CM

## 2024-04-09 RX ORDER — AMLODIPINE BESYLATE 10 MG/1
10 TABLET ORAL DAILY
Qty: 90 TABLET | Refills: 1 | Status: SHIPPED | OUTPATIENT
Start: 2024-04-09

## 2024-04-12 ENCOUNTER — HOSPITAL ENCOUNTER (OUTPATIENT)
Dept: NEUROLOGY | Facility: CLINIC | Age: 69
End: 2024-04-12
Payer: MEDICARE

## 2024-04-12 DIAGNOSIS — T45.1X5A NEUROPATHY DUE TO CHEMOTHERAPEUTIC DRUG (HCC): ICD-10-CM

## 2024-04-12 DIAGNOSIS — G62.0 NEUROPATHY DUE TO CHEMOTHERAPEUTIC DRUG (HCC): ICD-10-CM

## 2024-04-12 PROCEDURE — 95911 NRV CNDJ TEST 9-10 STUDIES: CPT | Performed by: PSYCHIATRY & NEUROLOGY

## 2024-04-12 PROCEDURE — 95886 MUSC TEST DONE W/N TEST COMP: CPT | Performed by: PSYCHIATRY & NEUROLOGY

## 2024-04-25 ENCOUNTER — PATIENT OUTREACH (OUTPATIENT)
Dept: CASE MANAGEMENT | Facility: HOSPITAL | Age: 69
End: 2024-04-25

## 2024-04-25 NOTE — PROGRESS NOTES
LSW had a request earlier this year to assist patient with financial questions. Pt needed direction for insurance and our financial advocates assisted him. Pt had no other concerns for [facility]. This case will be closed at this time. Pt has not had any Oncology SW needs. [facility] will close this case at this time.

## 2024-04-27 PROBLEM — R50.9 FUO (FEVER OF UNKNOWN ORIGIN): Status: RESOLVED | Noted: 2023-02-10 | Resolved: 2024-04-27

## 2024-04-30 ENCOUNTER — APPOINTMENT (OUTPATIENT)
Dept: LAB | Facility: HOSPITAL | Age: 69
End: 2024-04-30
Payer: MEDICARE

## 2024-04-30 ENCOUNTER — OFFICE VISIT (OUTPATIENT)
Dept: HEMATOLOGY ONCOLOGY | Facility: CLINIC | Age: 69
End: 2024-04-30
Payer: MEDICARE

## 2024-04-30 VITALS
TEMPERATURE: 99.1 F | OXYGEN SATURATION: 95 % | DIASTOLIC BLOOD PRESSURE: 80 MMHG | WEIGHT: 200 LBS | HEART RATE: 67 BPM | SYSTOLIC BLOOD PRESSURE: 164 MMHG | BODY MASS INDEX: 31.39 KG/M2 | HEIGHT: 67 IN

## 2024-04-30 DIAGNOSIS — C34.31 MALIGNANT NEOPLASM OF LOWER LOBE OF RIGHT LUNG (HCC): ICD-10-CM

## 2024-04-30 DIAGNOSIS — C34.31 PRIMARY SQUAMOUS CELL CARCINOMA OF LOWER LOBE OF RIGHT LUNG (HCC): Primary | ICD-10-CM

## 2024-04-30 DIAGNOSIS — G62.0 NEUROPATHY DUE TO CHEMOTHERAPEUTIC DRUG (HCC): ICD-10-CM

## 2024-04-30 DIAGNOSIS — T45.1X5A NEUROPATHY DUE TO CHEMOTHERAPEUTIC DRUG (HCC): ICD-10-CM

## 2024-04-30 DIAGNOSIS — K86.1 OTHER CHRONIC PANCREATITIS (HCC): ICD-10-CM

## 2024-04-30 DIAGNOSIS — G60.3 IDIOPATHIC PROGRESSIVE NEUROPATHY: ICD-10-CM

## 2024-04-30 DIAGNOSIS — E78.2 MIXED HYPERLIPIDEMIA: ICD-10-CM

## 2024-04-30 DIAGNOSIS — I25.10 CORONARY ARTERY DISEASE INVOLVING NATIVE CORONARY ARTERY OF NATIVE HEART WITHOUT ANGINA PECTORIS: ICD-10-CM

## 2024-04-30 LAB
BASOPHILS # BLD AUTO: 0.06 THOUSANDS/ÂΜL (ref 0–0.1)
BASOPHILS NFR BLD AUTO: 1 % (ref 0–1)
BILIRUB DIRECT SERPL-MCNC: 0.12 MG/DL (ref 0–0.2)
EOSINOPHIL # BLD AUTO: 0 THOUSAND/ÂΜL (ref 0–0.61)
EOSINOPHIL NFR BLD AUTO: 0 % (ref 0–6)
ERYTHROCYTE [DISTWIDTH] IN BLOOD BY AUTOMATED COUNT: 12.6 % (ref 11.6–15.1)
HCT VFR BLD AUTO: 49.3 % (ref 36.5–49.3)
HGB BLD-MCNC: 16 G/DL (ref 12–17)
IMM GRANULOCYTES # BLD AUTO: 0.02 THOUSAND/UL (ref 0–0.2)
IMM GRANULOCYTES NFR BLD AUTO: 0 % (ref 0–2)
LYMPHOCYTES # BLD AUTO: 2.38 THOUSANDS/ÂΜL (ref 0.6–4.47)
LYMPHOCYTES NFR BLD AUTO: 27 % (ref 14–44)
MCH RBC QN AUTO: 30.8 PG (ref 26.8–34.3)
MCHC RBC AUTO-ENTMCNC: 32.5 G/DL (ref 31.4–37.4)
MCV RBC AUTO: 95 FL (ref 82–98)
MONOCYTES # BLD AUTO: 0.72 THOUSAND/ÂΜL (ref 0.17–1.22)
MONOCYTES NFR BLD AUTO: 8 % (ref 4–12)
NEUTROPHILS # BLD AUTO: 5.68 THOUSANDS/ÂΜL (ref 1.85–7.62)
NEUTS SEG NFR BLD AUTO: 64 % (ref 43–75)
NRBC BLD AUTO-RTO: 0 /100 WBCS
PLATELET # BLD AUTO: 187 THOUSANDS/UL (ref 149–390)
PMV BLD AUTO: 9.7 FL (ref 8.9–12.7)
RBC # BLD AUTO: 5.2 MILLION/UL (ref 3.88–5.62)
TSH SERPL DL<=0.05 MIU/L-ACNC: 1.02 UIU/ML (ref 0.45–4.5)
VIT B12 SERPL-MCNC: 295 PG/ML (ref 180–914)
WBC # BLD AUTO: 8.86 THOUSAND/UL (ref 4.31–10.16)

## 2024-04-30 PROCEDURE — 82248 BILIRUBIN DIRECT: CPT

## 2024-04-30 PROCEDURE — 82607 VITAMIN B-12: CPT

## 2024-04-30 PROCEDURE — 84165 PROTEIN E-PHORESIS SERUM: CPT

## 2024-04-30 PROCEDURE — 99215 OFFICE O/P EST HI 40 MIN: CPT | Performed by: INTERNAL MEDICINE

## 2024-04-30 PROCEDURE — 84443 ASSAY THYROID STIM HORMONE: CPT

## 2024-04-30 PROCEDURE — 80053 COMPREHEN METABOLIC PANEL: CPT

## 2024-04-30 PROCEDURE — 85025 COMPLETE CBC W/AUTO DIFF WBC: CPT

## 2024-04-30 PROCEDURE — 84425 ASSAY OF VITAMIN B-1: CPT

## 2024-04-30 PROCEDURE — 36415 COLL VENOUS BLD VENIPUNCTURE: CPT

## 2024-04-30 PROCEDURE — G2211 COMPLEX E/M VISIT ADD ON: HCPCS | Performed by: INTERNAL MEDICINE

## 2024-04-30 NOTE — PROGRESS NOTES
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 Status post adjuvant Taxol carbo, added Tecentriq in June 2023.   He has been on treatment hiatus since mid February 2024 to investigate potential contribution to ongoing fevers.  These have not diminished.  I note that the half-life of Tecentriq is 27 days.  We could not convincingly say that fevers are unrelated until mid June.  The patient feels that it is more likely that fevers are stemming from some other problem and wishes to return to Tecentriq.  6 more treatments will complete his adjuvant therapy.  Additionally he has persistent neuropathic symptoms in the feet greater than hands.  He had an EMG which was remarkably normal.  I will leave it to neurology to reconcile his symptoms with EMG findings.  Lastly, he reports abdominal discomfort.  This starts 10 to 20 minutes after eating and resolves over time.?Related to history of pancreatitis?.  He will confer with his PCP.  Denies melena or hematochezia.    I discussed the above with the patient.  The patient  voiced understanding and agreement.  ______________________________________________________________________    No chief complaint on file.      HPI:  Oncology History   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)   11/16/2022 Surgery    R VATS lower lobe super segmentectomy      11/16/2022 -  Cancer Staged    Staging form: Lung, AJCC 8th Edition  - Pathologic stage from 11/16/2022: Stage IIIA (pT1b, pN2, cM0) - Signed by Erinn Gooden PA-C on 3/19/2024       Malignant neoplasm of lower lobe of right lung (HCC)   9/13/2022 Initial Diagnosis    July 23, 2022 patient had CT chest ab pelvis regarding rule out AAA.  This showed  1.1 cm right lower lobe pulmonary nodule new since prior study of November 2019.  Some other smaller nodules identified.  Subcarinal lymph node 2 cm.  No other findings suspicious for metastatic disease.  August 5, 2022 PET/CT showed 1.2 cm pulmonary nodule, SUV 2.9.  Other nodules noted, subcentimeter, no hypermetabolism.  Some groundglass opacities in the right upper lobe.  September 13, 2022 patient had needle biopsy of the right lower lobe mass showing squamous cell carcinoma, TTF-1 positive.  November 16, 2022 patient underwent right lower lobe segmentectomy.  Pathology showed 1.8 cm squamous cell carcinoma.  Level 4 and 11 lymph node were positive for metastatic carcinoma.     1/30/2023 - 3/27/2023 Chemotherapy    palonosetron (ALOXI), 0.25 mg, Intravenous, Once, 4 of 4 cycles  Administration: 0.25 mg (1/30/2023), 0.25 mg (2/13/2023), 0.25 mg (3/6/2023), 0.25 mg (3/27/2023)  fosaprepitant (EMEND) IVPB, 150 mg, Intravenous, Once, 3 of 3 cycles  Administration: 150 mg (1/30/2023), 150 mg (2/13/2023), 150 mg (3/6/2023)  CARBOplatin (PARAPLATIN) IVPB ([facility] [place] AUC DOSING), 552 mg, Intravenous, Once, 4 of 4 cycles  Administration: 550 mg (1/30/2023), 650 mg (2/13/2023), 650 mg (3/6/2023), 500 mg (3/27/2023)  PACLItaxel (TAXOL) chemo IVPB, 349.8 mg, Intravenous, Once, 4 of 4 cycles  Dose modification: 200 mg/m2 (original dose 175 mg/m2, Cycle 2, Reason: Dose modified as per discussion with consulting physician)  Administration: 360 mg (1/30/2023), 400 mg (2/13/2023), 400 mg (3/6/2023), 400 mg (3/27/2023)  aprepitant (CINVANTI) in  mL IVPB, 130 mg, Intravenous, Once, 1 of 1 cycle  Administration: 130 mg (3/27/2023)     4/13/2023 Genomic Testing    April 13, 2023 Caris-  PD-L1 1%, TMB high.  MTAP deleted.  p53 [id number], pathogenic variant  KRAS G694 L, VUS  CRABBP  *, pathogenic variant  KMT2D *, pathogenic variant     6/21/2023 -  Chemotherapy    alteplase (CATHFLO), 2 mg, Intracatheter, Every 1 Minute as  needed, 11 of 15 cycles  atezolizumab (TECENTRIQ) IVPB, 1,200 mg, Intravenous, Once, 11 of 15 cycles  Administration: 1,200 mg (6/21/2023), 1,200 mg (7/12/2023), 1,200 mg (8/29/2023), 1,200 mg (9/18/2023), 1,200 mg (10/10/2023), 1,200 mg (10/31/2023), 1,200 mg (11/22/2023), 1,200 mg (12/11/2023), 1,200 mg (1/2/2024), 1,200 mg (1/23/2024), 1,200 mg (2/14/2024)         Interval History: Clinically stable.  Low-grade fevers, , intermittent night sweats.  Peripheral neuropathy.  ECOG-  1 - Symptomatic but completely ambulatory    Review of Systems   Constitutional:  Positive for fatigue. Negative for chills and fever.   HENT:  Negative for nosebleeds.    Eyes:  Negative for discharge.   Respiratory:  Negative for cough and shortness of breath.    Cardiovascular:  Negative for chest pain.   Gastrointestinal:  Positive for abdominal pain. Negative for constipation and diarrhea.   Endocrine: Negative for polydipsia.   Genitourinary:  Negative for hematuria.   Musculoskeletal:  Negative for arthralgias.   Skin:  Negative for color change.   Allergic/Immunologic: Negative for immunocompromised state.   Neurological:  Positive for numbness. Negative for dizziness and headaches.   Hematological:  Negative for adenopathy.   Psychiatric/Behavioral:  Negative for agitation.        Past Medical History:   Diagnosis Date    Abdominal aortic aneurysm without rupture ([facility])     Abdominal Aortic Duplex 02/21/2017    Ectatic infrarenal abdominal aorta.    MARYANN (acute kidney injury) ([facility]) 07/23/2022    CAD (coronary artery disease)     Cancer ([facility]) 2022    right lung CA    Chronic bronchitis ([facility]) 01/28/2019    COPD (chronic obstructive pulmonary disease) ([facility])     Extremity pain     Hemiparesis ([facility]) 11/01/2022    History of echocardiogram 03/18/2014    EF 55%, mild MR and AI.  Mild concentric LVH.    History of stress test 03/06/2017    Normal.    Hyperlipidemia     Hypertension     Joint pain     Kidney stone     Low back pain      Lung cancer (HCC)     Migraine     Neck pain     Obstructive sleep apnea     cannot tolerate CPAP    Osteoarthritis     Peripheral neuropathy     Reflex sympathetic dystrophy     Sacroiliitis (HCC) 02/02/2022     Patient Active Problem List   Diagnosis    JAKI (obstructive sleep apnea)    Chronic bronchitis (HCC)    Abdominal aortic aneurysm (AAA) without rupture    Lumbar spondylosis    Lumbar degenerative disc disease    Myofascial pain syndrome    Chronic low back pain without sciatica    Cervical radiculopathy    Cervical spondylosis without myelopathy    Essential tremor    Negative depression screening    Chronic pain of both knees    Class 1 obesity due to excess calories with serious comorbidity and body mass index (BMI) of 31.0 to 31.9 in adult    Smoking    Hypertension    Chronic pain syndrome    Uncomplicated opioid dependence (HCC)    Encounter for long-term opiate analgesic use    Neck pain    Hyperlipidemia    Pre-diabetes    Erectile dysfunction    Insomnia    Cortical age-related cataract of both eyes    Urinary frequency    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)    Kidney stone    Pulmonary emphysema (HCC)    Malignant neoplasm of lower lobe of right lung (HCC)    Encounter for smoking cessation counseling    At high risk for osteoporosis    Coronary artery disease of native artery of native heart with stable angina pectoris (HCC)    Stage 3a chronic kidney disease (HCC)    Night sweats    Other chronic pancreatitis (HCC)    Skin lesion    Depression, recurrent (HCC)    Neuropathic pain    Age-related cataract of right eye    Constipation due to opioid therapy    Neuropathy due to chemotherapeutic drug (HCC)    Hyperglycemia    Bronchitis    Abnormal CT of the chest    Fever of unknown origin       Current Outpatient Medications:     albuterol (Ventolin HFA) 90 mcg/act inhaler, Inhale 2 puffs every 6 (six) hours as needed for wheezing, Disp: 18 g, Rfl: 5    amLODIPine  (NORVASC) 10 mg tablet, swallow 1 tablet once daily, Disp: 90 tablet, Rfl: 1    bisacodyl (DULCOLAX) 5 mg EC tablet, Take 1 tablet (5 mg total) by mouth daily as needed for constipation for up to 4 doses (Patient not taking: Reported on 3/25/2024), Disp: 30 tablet, Rfl: 0    dicyclomine (BENTYL) 10 mg capsule, take 1 capsule by mouth four times a day before meals and at bedtime (Patient not taking: Reported on 3/25/2024), Disp: 30 capsule, Rfl: 0    docusate sodium (COLACE) 100 mg capsule, Take 1 capsule (100 mg total) by mouth 2 (two) times a day, Disp: 20 capsule, Rfl: 0    fluticasone (FLONASE) 50 mcg/act nasal spray, 1 spray into each nostril daily for 14 days (Patient not taking: Reported on 12/14/2023), Disp: 11.1 mL, Rfl: 0    glucose blood (OneTouch Verio) test strip, Test once daily, Disp: 100 each, Rfl: 0    ketoconazole (NIZORAL) 2 % shampoo, Apply 1 Application topically 2 (two) times a week for 28 days (Patient not taking: Reported on 3/12/2024), Disp: 100 mL, Rfl: 0    lisinopril (ZESTRIL) 10 mg tablet, Take 1 tablet (10 mg total) by mouth daily, Disp: 90 tablet, Rfl: 3    metoprolol succinate (TOPROL-XL) 100 mg 24 hr tablet, take 1 tablet by mouth once daily, Disp: 90 tablet, Rfl: 3    naloxone (NARCAN) 4 mg/0.1 mL nasal spray, Administer 1 spray into a nostril. If no response after 2-3 minutes, give another dose in the other nostril using a new spray., Disp: 1 each, Rfl: 1    nicotine (NICODERM CQ) 21 mg/24 hr TD 24 hr patch, Place 1 patch on the skin over 24 hours every 24 hours (Patient not taking: Reported on 3/25/2024), Disp: 28 patch, Rfl: 0    nicotine polacrilex (COMMIT) 4 MG lozenge, Apply 1 lozenge (4 mg total) to the mouth or throat as needed for smoking cessation (Patient not taking: Reported on 3/25/2024), Disp: 100 each, Rfl: 0    nitroglycerin (NITROSTAT) 0.4 mg SL tablet, Place 1 tablet (0.4 mg total) under the tongue every 5 (five) minutes as needed for chest pain, Disp: 25 tablet,  Rfl: 5    ofloxacin (OCUFLOX) 0.3 % ophthalmic solution, , Disp: , Rfl:     ondansetron (ZOFRAN) 8 mg tablet, Take 1 tablet (8 mg total) by mouth every 8 (eight) hours as needed for nausea or vomiting, Disp: 20 tablet, Rfl: 2    oxyCODONE-acetaminophen (Percocet) 7.5-325 MG per tablet, Take 1 tablet by mouth every 8 (eight) hours as needed for moderate pain Max Daily Amount: 3 tablets, Disp: 90 tablet, Rfl: 0    oxyCODONE-acetaminophen (Percocet) 7.5-325 MG per tablet, Take 1 tablet by mouth every 8 (eight) hours as needed for moderate pain Max Daily Amount: 3 tablets Do not start before April 25, 2024., Disp: 90 tablet, Rfl: 0    pantoprazole (PROTONIX) 20 mg tablet, take 1 tablet by mouth once daily (Patient not taking: Reported on 3/25/2024), Disp: 30 tablet, Rfl: 0    polyethylene glycol (MIRALAX) 17 g packet, Take 17 g by mouth daily for 5 days (Patient not taking: Reported on 12/20/2022), Disp: 85 g, Rfl: 0    prednisoLONE acetate (PRED FORTE) 1 % ophthalmic suspension, , Disp: , Rfl:     primidone (MYSOLINE) 50 mg tablet, Take 0.5 tablets (25 mg total) by mouth daily in the early morning, Disp: 30 tablet, Rfl: 3    Restasis 0.05 % ophthalmic emulsion, , Disp: , Rfl:     rosuvastatin (CRESTOR) 40 MG tablet, Take 1 tablet (40 mg total) by mouth daily, Disp: 90 tablet, Rfl: 3    sildenafil (VIAGRA) 100 mg tablet, Take 1 tablet (100 mg total) by mouth as needed for erectile dysfunction, Disp: 20 tablet, Rfl: 0    Stiolto Respimat 2.5-2.5 MCG/ACT inhaler, inhale 2 puffs by mouth and INTO THE LUNGS once daily if needed, Disp: 4 g, Rfl: 2    tamsulosin (FLOMAX) 0.4 mg, Take 1 capsule (0.4 mg total) by mouth daily with dinner for 3 days, Disp: 3 capsule, Rfl: 0    traZODone (DESYREL) 100 mg tablet, take 1 tablet by mouth daily at bedtime, Disp: 90 tablet, Rfl: 3    varenicline (CHANTIX) 0.5 mg tablet, Take 1 tablet (0.5 mg total) by mouth 2 (two) times a day, Disp: 60 tablet, Rfl: 1  No Known Allergies  Past Surgical  "History:   Procedure Laterality Date    CARDIAC CATHETERIZATION  02/13/2012    EF 70%, widely patent renal arteries, significant single-vessel CAD-medical therapy.    CARDIAC CATHETERIZATION  04/11/2013    EF 65%, 50% mid LAD, 20% prox CFX, 90% diffuse RCA, 99% mid RCA. Medical management.    CATARACT EXTRACTION Right 09/19/2023    CHOLECYSTECTOMY      COLONOSCOPY      EPIDURAL BLOCK INJECTION Bilateral 08/15/2019    Procedure: BLOCK / INJECTION EPIDURAL STEROID CERVICAL;  Surgeon: Ricardo Park MD;  Location: [facility];  Service: Pain Management     FL GUIDED NEEDLE PLAC BX/ASP/INJ  08/15/2019    IR BIOPSY LUNG  09/13/2022    IR THORACIC DUCT EMBOLIZATION  11/22/2022    ORTHOPEDIC SURGERY      LA BRNCHSC INCL FLUOR GDNCE DX W/CELL WASHG SPX N/A 11/16/2022    Procedure: BRONCHOSCOPY FLEXIBLE;  Surgeon: Gulshan Madison MD;  Location: [facility];  Service: Thoracic    LA THORACOSCOPY W/LOBECTOMY SINGLE LOBE Right 11/16/2022    Procedure: LOBECTOMY LUNG; lower lobe superior segmentectomy, mediastinal lymph node dissection;  Surgeon: Gulshan Madison MD;  Location: [facility];  Service: Thoracic    LA THORACOSCOPY W/SEGMENTECTOMY Right 11/16/2022    Procedure: THORACOSCOPY VIDEO ASSISTED SURGERY (VATS);  Surgeon: Gulshan Madison MD;  Location: [facility];  Service: Thoracic    TRIGGER POINT INJECTION       Social History     Objective:  Vitals:    04/30/24 1005   BP: 164/80   BP Location: Left arm   Patient Position: Sitting   Cuff Size: Standard   Pulse: 67   Temp: 99.1 °F (37.3 °C)   TempSrc: Temporal   SpO2: 95%   Weight: 90.7 kg (200 lb)   Height: 5' 7\" (1.702 m)     Physical Exam  Constitutional:       Appearance: He is well-developed.   HENT:      Head: Normocephalic and atraumatic.      Mouth/Throat:      Mouth: Mucous membranes are moist.   Eyes:      Pupils: Pupils are equal, round, and reactive to light.   Cardiovascular:      Rate and Rhythm: Normal rate and regular " rhythm.      Heart sounds: No murmur heard.  Pulmonary:      Breath sounds: Normal breath sounds. No wheezing or rales.   Abdominal:      Palpations: Abdomen is soft.      Tenderness: There is no abdominal tenderness.   Musculoskeletal:         General: No tenderness. Normal range of motion.      Cervical back: Neck supple.   Lymphadenopathy:      Cervical: No cervical adenopathy.   Skin:     Findings: No erythema or rash.   Neurological:      Mental Status: He is alert and oriented to person, place, and time.      Cranial Nerves: No cranial nerve deficit.      Deep Tendon Reflexes: Reflexes are normal and symmetric.   Psychiatric:         Behavior: Behavior normal.           Labs:  I personally reviewed the labs and imaging pertinent to this patient care.

## 2024-04-30 NOTE — LETTER
April 30, 2024     Maico Lopez, DO  [address]  [place] [address]    Patient: Lance Hazel   YOB: 1955   Date of Visit: 4/30/2024       Dear Dr. Lopez:    Thank you for referring Lance Hazel to me for evaluation. Below are my notes for this consultation.    If you have questions, please do not hesitate to call me. I look forward to following your patient along with you.         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 Status post adjuvant Taxol carbo, added Tecentriq in June 2023.   He has been on treatment hiatus since mid February 2024 to investigate potential contribution to ongoing fevers.  These have not diminished.  I note that the half-life of Tecentriq is 27 days.  We could not convincingly say that fevers are unrelated until mid June.  The patient feels that it is more likely that fevers are stemming from some other problem and wishes to return to Tecentriq.  6 more treatments will complete his adjuvant therapy.  Additionally he has persistent neuropathic symptoms in the feet greater than hands.  He had an EMG which was remarkably normal.  I will leave it to neurology to reconcile his symptoms with EMG findings.  Lastly, he reports abdominal discomfort.  This starts 10 to 20 minutes after eating and resolves over time.?  Mesenteric ischemia versus gastric ulcer.  He will confer with his PCP.  Denies melena or hematochezia.    I discussed the above with the patient.  The patient  voiced understanding and  agreement.  ______________________________________________________________________    No chief complaint on file.      HPI:  Oncology History   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)   11/16/2022 Surgery    R VATS lower lobe super segmentectomy      11/16/2022 -  Cancer Staged    Staging form: Lung, AJCC 8th Edition  - Pathologic stage from 11/16/2022: Stage IIIA (pT1b, pN2, cM0) - Signed by Erinn Gooden PA-C on 3/19/2024       Malignant neoplasm of lower lobe of right lung (HCC)   9/13/2022 Initial Diagnosis    July 23, 2022 patient had CT chest ab pelvis regarding rule out AAA.  This showed 1.1 cm right lower lobe pulmonary nodule new since prior study of November 2019.  Some other smaller nodules identified.  Subcarinal lymph node 2 cm.  No other findings suspicious for metastatic disease.  August 5, 2022 PET/CT showed 1.2 cm pulmonary nodule, SUV 2.9.  Other nodules noted, subcentimeter, no hypermetabolism.  Some groundglass opacities in the right upper lobe.  September 13, 2022 patient had needle biopsy of the right lower lobe mass showing squamous cell carcinoma, TTF-1 positive.  November 16, 2022 patient underwent right lower lobe segmentectomy.  Pathology showed 1.8 cm squamous cell carcinoma.  Level 4 and 11 lymph node were positive for metastatic carcinoma.     1/30/2023 - 3/27/2023 Chemotherapy    palonosetron (ALOXI), 0.25 mg, Intravenous, Once, 4 of 4 cycles  Administration: 0.25 mg (1/30/2023), 0.25 mg (2/13/2023), 0.25 mg (3/6/2023), 0.25 mg (3/27/2023)  fosaprepitant (EMEND) IVPB, 150 mg, Intravenous, Once, 3 of 3 cycles  Administration: 150 mg (1/30/2023), 150 mg (2/13/2023), 150 mg (3/6/2023)  CARBOplatin (PARAPLATIN) IVPB ([facility] AUC DOSING), 552 mg, Intravenous, Once, 4 of 4 cycles  Administration: 550 mg (1/30/2023), 650 mg (2/13/2023), 650 mg (3/6/2023), 500 mg (3/27/2023)  PACLItaxel (TAXOL) chemo IVPB, 349.8 mg, Intravenous, Once, 4 of 4 cycles  Dose  modification: 200 mg/m2 (original dose 175 mg/m2, Cycle 2, Reason: Dose modified as per discussion with consulting physician)  Administration: 360 mg (1/30/2023), 400 mg (2/13/2023), 400 mg (3/6/2023), 400 mg (3/27/2023)  aprepitant (CINVANTI) in  mL IVPB, 130 mg, Intravenous, Once, 1 of 1 cycle  Administration: 130 mg (3/27/2023)     4/13/2023 Genomic Testing    April 13, 2023 Caris-  PD-L1 1%, TMB high.  MTAP deleted.  p53 [id number], pathogenic variant  KRAS G694 L, VUS  CRABBP  *, pathogenic variant  KMT2D *, pathogenic variant     6/21/2023 -  Chemotherapy    alteplase (CATHFLO), 2 mg, Intracatheter, Every 1 Minute as needed, 11 of 15 cycles  atezolizumab (TECENTRIQ) IVPB, 1,200 mg, Intravenous, Once, 11 of 15 cycles  Administration: 1,200 mg (6/21/2023), 1,200 mg (7/12/2023), 1,200 mg (8/29/2023), 1,200 mg (9/18/2023), 1,200 mg (10/10/2023), 1,200 mg (10/31/2023), 1,200 mg (11/22/2023), 1,200 mg (12/11/2023), 1,200 mg (1/2/2024), 1,200 mg (1/23/2024), 1,200 mg (2/14/2024)         Interval History: Clinically stable.  Low-grade fevers, , intermittent night sweats.  Peripheral neuropathy.  ECOG-  1 - Symptomatic but completely ambulatory    Review of Systems   Constitutional:  Positive for fatigue. Negative for chills and fever.   HENT:  Negative for nosebleeds.    Eyes:  Negative for discharge.   Respiratory:  Negative for cough and shortness of breath.    Cardiovascular:  Negative for chest pain.   Gastrointestinal:  Positive for abdominal pain. Negative for constipation and diarrhea.   Endocrine: Negative for polydipsia.   Genitourinary:  Negative for hematuria.   Musculoskeletal:  Negative for arthralgias.   Skin:  Negative for color change.   Allergic/Immunologic: Negative for immunocompromised state.   Neurological:  Positive for numbness. Negative for dizziness and headaches.   Hematological:  Negative for adenopathy.   Psychiatric/Behavioral:  Negative for agitation.        Past Medical  History:   Diagnosis Date   • Abdominal aortic aneurysm without rupture (HCC)    • Abdominal Aortic Duplex 02/21/2017    Ectatic infrarenal abdominal aorta.   • MARYANN (acute kidney injury) (HCC) 07/23/2022   • CAD (coronary artery disease)    • Cancer (HCC) 2022    right lung CA   • Chronic bronchitis (HCC) 01/28/2019   • COPD (chronic obstructive pulmonary disease) (HCC)    • Extremity pain    • Hemiparesis ([facility]) 11/01/2022   • History of echocardiogram 03/18/2014    EF 55%, mild MR and AI.  Mild concentric LVH.   • History of stress test 03/06/2017    Normal.   • Hyperlipidemia    • Hypertension    • Joint pain    • Kidney stone    • Low back pain    • Lung cancer (HCC)    • Migraine    • Neck pain    • Obstructive sleep apnea     cannot tolerate CPAP   • Osteoarthritis    • Peripheral neuropathy    • Reflex sympathetic dystrophy    • Sacroiliitis ([facility]) 02/02/2022     Patient Active Problem List   Diagnosis   • JAKI (obstructive sleep apnea)   • Chronic bronchitis (HCC)   • Abdominal aortic aneurysm (AAA) without rupture   • Lumbar spondylosis   • Lumbar degenerative disc disease   • Myofascial pain syndrome   • Chronic low back pain without sciatica   • Cervical radiculopathy   • Cervical spondylosis without myelopathy   • Essential tremor   • Negative depression screening   • Chronic pain of both knees   • Class 1 obesity due to excess calories with serious comorbidity and body mass index (BMI) of 31.0 to 31.9 in adult   • Smoking   • Hypertension   • Chronic pain syndrome   • Uncomplicated opioid dependence (HCC)   • Encounter for long-term opiate analgesic use   • Neck pain   • Hyperlipidemia   • Pre-diabetes   • Erectile dysfunction   • Insomnia   • Cortical age-related cataract of both eyes   • Urinary frequency   • 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)   • Kidney stone   • Pulmonary emphysema (HCC)   • Malignant neoplasm of lower lobe of right lung (HCC)   • Encounter for smoking  cessation counseling   • At high risk for osteoporosis   • Coronary artery disease of native artery of native heart with stable angina pectoris (HCC)   • Stage 3a chronic kidney disease (HCC)   • Night sweats   • Other chronic pancreatitis (HCC)   • Skin lesion   • Depression, recurrent (HCC)   • Neuropathic pain   • Age-related cataract of right eye   • Constipation due to opioid therapy   • Neuropathy due to chemotherapeutic drug (HCC)   • Hyperglycemia   • Bronchitis   • Abnormal CT of the chest   • Fever of unknown origin       Current Outpatient Medications:   •  albuterol (Ventolin HFA) 90 mcg/act inhaler, Inhale 2 puffs every 6 (six) hours as needed for wheezing, Disp: 18 g, Rfl: 5  •  amLODIPine (NORVASC) 10 mg tablet, swallow 1 tablet once daily, Disp: 90 tablet, Rfl: 1  •  bisacodyl (DULCOLAX) 5 mg EC tablet, Take 1 tablet (5 mg total) by mouth daily as needed for constipation for up to 4 doses (Patient not taking: Reported on 3/25/2024), Disp: 30 tablet, Rfl: 0  •  dicyclomine (BENTYL) 10 mg capsule, take 1 capsule by mouth four times a day before meals and at bedtime (Patient not taking: Reported on 3/25/2024), Disp: 30 capsule, Rfl: 0  •  docusate sodium (COLACE) 100 mg capsule, Take 1 capsule (100 mg total) by mouth 2 (two) times a day, Disp: 20 capsule, Rfl: 0  •  fluticasone (FLONASE) 50 mcg/act nasal spray, 1 spray into each nostril daily for 14 days (Patient not taking: Reported on 12/14/2023), Disp: 11.1 mL, Rfl: 0  •  glucose blood (OneTouch Verio) test strip, Test once daily, Disp: 100 each, Rfl: 0  •  ketoconazole (NIZORAL) 2 % shampoo, Apply 1 Application topically 2 (two) times a week for 28 days (Patient not taking: Reported on 3/12/2024), Disp: 100 mL, Rfl: 0  •  lisinopril (ZESTRIL) 10 mg tablet, Take 1 tablet (10 mg total) by mouth daily, Disp: 90 tablet, Rfl: 3  •  metoprolol succinate (TOPROL-XL) 100 mg 24 hr tablet, take 1 tablet by mouth once daily, Disp: 90 tablet, Rfl: 3  •   naloxone (NARCAN) 4 mg/0.1 mL nasal spray, Administer 1 spray into a nostril. If no response after 2-3 minutes, give another dose in the other nostril using a new spray., Disp: 1 each, Rfl: 1  •  nicotine (NICODERM CQ) 21 mg/24 hr TD 24 hr patch, Place 1 patch on the skin over 24 hours every 24 hours (Patient not taking: Reported on 3/25/2024), Disp: 28 patch, Rfl: 0  •  nicotine polacrilex (COMMIT) 4 MG lozenge, Apply 1 lozenge (4 mg total) to the mouth or throat as needed for smoking cessation (Patient not taking: Reported on 3/25/2024), Disp: 100 each, Rfl: 0  •  nitroglycerin (NITROSTAT) 0.4 mg SL tablet, Place 1 tablet (0.4 mg total) under the tongue every 5 (five) minutes as needed for chest pain, Disp: 25 tablet, Rfl: 5  •  ofloxacin (OCUFLOX) 0.3 % ophthalmic solution, , Disp: , Rfl:   •  ondansetron (ZOFRAN) 8 mg tablet, Take 1 tablet (8 mg total) by mouth every 8 (eight) hours as needed for nausea or vomiting, Disp: 20 tablet, Rfl: 2  •  oxyCODONE-acetaminophen (Percocet) 7.5-325 MG per tablet, Take 1 tablet by mouth every 8 (eight) hours as needed for moderate pain Max Daily Amount: 3 tablets, Disp: 90 tablet, Rfl: 0  •  oxyCODONE-acetaminophen (Percocet) 7.5-325 MG per tablet, Take 1 tablet by mouth every 8 (eight) hours as needed for moderate pain Max Daily Amount: 3 tablets Do not start before April 25, 2024., Disp: 90 tablet, Rfl: 0  •  pantoprazole (PROTONIX) 20 mg tablet, take 1 tablet by mouth once daily (Patient not taking: Reported on 3/25/2024), Disp: 30 tablet, Rfl: 0  •  polyethylene glycol (MIRALAX) 17 g packet, Take 17 g by mouth daily for 5 days (Patient not taking: Reported on 12/20/2022), Disp: 85 g, Rfl: 0  •  prednisoLONE acetate (PRED FORTE) 1 % ophthalmic suspension, , Disp: , Rfl:   •  primidone (MYSOLINE) 50 mg tablet, Take 0.5 tablets (25 mg total) by mouth daily in the early morning, Disp: 30 tablet, Rfl: 3  •  Restasis 0.05 % ophthalmic emulsion, , Disp: , Rfl:   •  rosuvastatin  (CRESTOR) 40 MG tablet, Take 1 tablet (40 mg total) by mouth daily, Disp: 90 tablet, Rfl: 3  •  sildenafil (VIAGRA) 100 mg tablet, Take 1 tablet (100 mg total) by mouth as needed for erectile dysfunction, Disp: 20 tablet, Rfl: 0  •  Stiolto Respimat 2.5-2.5 MCG/ACT inhaler, inhale 2 puffs by mouth and INTO THE LUNGS once daily if needed, Disp: 4 g, Rfl: 2  •  tamsulosin (FLOMAX) 0.4 mg, Take 1 capsule (0.4 mg total) by mouth daily with dinner for 3 days, Disp: 3 capsule, Rfl: 0  •  traZODone (DESYREL) 100 mg tablet, take 1 tablet by mouth daily at bedtime, Disp: 90 tablet, Rfl: 3  •  varenicline (CHANTIX) 0.5 mg tablet, Take 1 tablet (0.5 mg total) by mouth 2 (two) times a day, Disp: 60 tablet, Rfl: 1  No Known Allergies  Past Surgical History:   Procedure Laterality Date   • CARDIAC CATHETERIZATION  02/13/2012    EF 70%, widely patent renal arteries, significant single-vessel CAD-medical therapy.   • CARDIAC CATHETERIZATION  04/11/2013    EF 65%, 50% mid LAD, 20% prox CFX, 90% diffuse RCA, 99% mid RCA. Medical management.   • CATARACT EXTRACTION Right 09/19/2023   • CHOLECYSTECTOMY     • COLONOSCOPY     • EPIDURAL BLOCK INJECTION Bilateral 08/15/2019    Procedure: BLOCK / INJECTION EPIDURAL STEROID CERVICAL;  Surgeon: Ricardo Park MD;  Location: [facility];  Service: Pain Management    • FL GUIDED NEEDLE PLAC BX/ASP/INJ  08/15/2019   • IR BIOPSY LUNG  09/13/2022   • IR THORACIC DUCT EMBOLIZATION  11/22/2022   • ORTHOPEDIC SURGERY     • NM [facility] INCL FLUOR GDNCE DX W/CELL WASHG SPX N/A 11/16/2022    Procedure: BRONCHOSCOPY FLEXIBLE;  Surgeon: Gulshan Madison MD;  Location: [facility];  Service: Thoracic   • NM THORACOSCOPY W/LOBECTOMY SINGLE LOBE Right 11/16/2022    Procedure: LOBECTOMY LUNG; lower lobe superior segmentectomy, mediastinal lymph node dissection;  Surgeon: Gulshan Madison MD;  Location: [facility];  Service: Thoracic   • NM THORACOSCOPY W/SEGMENTECTOMY Right 11/16/2022     "Procedure: THORACOSCOPY VIDEO ASSISTED SURGERY (VATS);  Surgeon: Gulshan Madison MD;  Location: [facility];  Service: Thoracic   • TRIGGER POINT INJECTION       Social History    Objective:  Vitals:    04/30/24 1005   BP: 164/80   BP Location: Left arm   Patient Position: Sitting   Cuff Size: Standard   Pulse: 67   Temp: 99.1 °F (37.3 °C)   TempSrc: Temporal   SpO2: 95%   Weight: 90.7 kg (200 lb)   Height: 5' 7\" (1.702 m)     Physical Exam  Constitutional:       Appearance: He is well-developed.   HENT:      Head: Normocephalic and atraumatic.      Mouth/Throat:      Mouth: Mucous membranes are moist.   Eyes:      Pupils: Pupils are equal, round, and reactive to light.   Cardiovascular:      Rate and Rhythm: Normal rate and regular rhythm.      Heart sounds: No murmur heard.  Pulmonary:      Breath sounds: Normal breath sounds. No wheezing or rales.   Abdominal:      Palpations: Abdomen is soft.      Tenderness: There is no abdominal tenderness.   Musculoskeletal:         General: No tenderness. Normal range of motion.      Cervical back: Neck supple.   Lymphadenopathy:      Cervical: No cervical adenopathy.   Skin:     Findings: No erythema or rash.   Neurological:      Mental Status: He is alert and oriented to person, place, and time.      Cranial Nerves: No cranial nerve deficit.      Deep Tendon Reflexes: Reflexes are normal and symmetric.   Psychiatric:         Behavior: Behavior normal.           Labs:  I personally reviewed the labs and imaging pertinent to this patient care.  "

## 2024-05-01 PROBLEM — R50.9 FEVER OF UNKNOWN ORIGIN: Status: RESOLVED | Noted: 2024-03-12 | Resolved: 2024-05-01

## 2024-05-01 LAB
ALBUMIN SERPL ELPH-MCNC: 4.31 G/DL (ref 3.2–5.1)
ALBUMIN SERPL ELPH-MCNC: 61.6 % (ref 48–70)
ALPHA1 GLOB SERPL ELPH-MCNC: 0.33 G/DL (ref 0.15–0.47)
ALPHA1 GLOB SERPL ELPH-MCNC: 4.7 % (ref 1.8–7)
ALPHA2 GLOB SERPL ELPH-MCNC: 0.73 G/DL (ref 0.42–1.04)
ALPHA2 GLOB SERPL ELPH-MCNC: 10.4 % (ref 5.9–14.9)
BETA GLOB ABNORMAL SERPL ELPH-MCNC: 0.43 G/DL (ref 0.31–0.57)
BETA1 GLOB SERPL ELPH-MCNC: 6.1 % (ref 4.7–7.7)
BETA2 GLOB SERPL ELPH-MCNC: 6 % (ref 3.1–7.9)
BETA2+GAMMA GLOB SERPL ELPH-MCNC: 0.42 G/DL (ref 0.2–0.58)
GAMMA GLOB ABNORMAL SERPL ELPH-MCNC: 0.78 G/DL (ref 0.4–1.66)
GAMMA GLOB SERPL ELPH-MCNC: 11.2 % (ref 6.9–22.3)
IGG/ALB SER: 1.6 {RATIO} (ref 1.1–1.8)
PROT PATTERN SERPL ELPH-IMP: NORMAL
PROT SERPL-MCNC: 7 G/DL (ref 6.4–8.2)

## 2024-05-02 PROCEDURE — 84165 PROTEIN E-PHORESIS SERUM: CPT | Performed by: STUDENT IN AN ORGANIZED HEALTH CARE EDUCATION/TRAINING PROGRAM

## 2024-05-04 LAB — VIT B1 BLD-SCNC: 175.3 NMOL/L (ref 66.5–200)

## 2024-05-05 DIAGNOSIS — I10 ESSENTIAL HYPERTENSION: ICD-10-CM

## 2024-05-06 RX ORDER — LISINOPRIL 10 MG/1
10 TABLET ORAL DAILY
Qty: 90 TABLET | Refills: 1 | Status: SHIPPED | OUTPATIENT
Start: 2024-05-06

## 2024-05-07 ENCOUNTER — HOSPITAL ENCOUNTER (OUTPATIENT)
Dept: INFUSION CENTER | Facility: HOSPITAL | Age: 69
Discharge: HOME | End: 2024-05-07
Attending: INTERNAL MEDICINE
Payer: MEDICARE

## 2024-05-07 VITALS
TEMPERATURE: 97.5 F | HEART RATE: 67 BPM | OXYGEN SATURATION: 96 % | SYSTOLIC BLOOD PRESSURE: 152 MMHG | RESPIRATION RATE: 16 BRPM | DIASTOLIC BLOOD PRESSURE: 75 MMHG

## 2024-05-07 DIAGNOSIS — C34.31 MALIGNANT NEOPLASM OF LOWER LOBE OF RIGHT LUNG (HCC): Primary | ICD-10-CM

## 2024-05-07 RX ORDER — ACETAMINOPHEN 325 MG/1
650 TABLET ORAL ONCE
Status: COMPLETED | OUTPATIENT
Start: 2024-05-07 | End: 2024-05-07

## 2024-05-07 RX ORDER — SODIUM CHLORIDE 9 MG/ML
20 INJECTION, SOLUTION INTRAVENOUS ONCE
Status: COMPLETED | OUTPATIENT
Start: 2024-05-07 | End: 2024-05-07

## 2024-05-07 RX ADMIN — ACETAMINOPHEN 650 MG: 325 TABLET, FILM COATED ORAL at 08:56

## 2024-05-07 RX ADMIN — DEXAMETHASONE SODIUM PHOSPHATE 10 MG: 10 INJECTION, SOLUTION INTRAMUSCULAR; INTRAVENOUS at 08:57

## 2024-05-07 RX ADMIN — SODIUM CHLORIDE 20 ML/HR: 0.9 INJECTION, SOLUTION INTRAVENOUS at 08:56

## 2024-05-07 RX ADMIN — DIPHENHYDRAMINE HYDROCHLORIDE 25 MG: 50 INJECTION, SOLUTION INTRAMUSCULAR; INTRAVENOUS at 09:37

## 2024-05-07 RX ADMIN — ATEZOLIZUMAB 1200 MG: 1200 INJECTION, SOLUTION INTRAVENOUS at 10:17

## 2024-05-07 NOTE — PROGRESS NOTES
Labs reviewed. Patient tolerated IV Tecentriq well with no complications.      Lance Hazel is aware of future appt on 05/31/24 at 0800.     AVS declined.

## 2024-05-08 LAB
ALBUMIN SERPL BCP-MCNC: 4.5 G/DL (ref 3.5–5)
ALP SERPL-CCNC: 69 U/L (ref 34–104)
ALT SERPL W P-5'-P-CCNC: 19 U/L (ref 7–52)
ANION GAP SERPL CALCULATED.3IONS-SCNC: 8 MMOL/L (ref 4–13)
AST SERPL W P-5'-P-CCNC: 18 U/L (ref 13–39)
BILIRUB SERPL-MCNC: 0.98 MG/DL (ref 0.2–1)
BUN SERPL-MCNC: 18 MG/DL (ref 5–25)
CALCIUM SERPL-MCNC: 9.3 MG/DL (ref 8.4–10.2)
CHLORIDE SERPL-SCNC: 101 MMOL/L (ref 96–108)
CO2 SERPL-SCNC: 27 MMOL/L (ref 21–32)
CREAT SERPL-MCNC: 1.09 MG/DL (ref 0.6–1.3)
GFR SERPL CREATININE-BSD FRML MDRD: 68 ML/MIN/1.73SQ M
GLUCOSE SERPL-MCNC: 141 MG/DL (ref 65–140)
POTASSIUM SERPL-SCNC: 4.2 MMOL/L (ref 3.5–5.3)
PROT SERPL-MCNC: 7.4 G/DL (ref 6.4–8.4)
SODIUM SERPL-SCNC: 136 MMOL/L (ref 135–147)

## 2024-05-13 ENCOUNTER — OFFICE VISIT (OUTPATIENT)
Dept: PAIN MEDICINE | Facility: CLINIC | Age: 69
End: 2024-05-13
Payer: MEDICARE

## 2024-05-13 VITALS
HEART RATE: 54 BPM | WEIGHT: 194.4 LBS | DIASTOLIC BLOOD PRESSURE: 79 MMHG | HEIGHT: 67 IN | RESPIRATION RATE: 16 BRPM | BODY MASS INDEX: 30.51 KG/M2 | SYSTOLIC BLOOD PRESSURE: 175 MMHG

## 2024-05-13 DIAGNOSIS — M54.50 CHRONIC MIDLINE LOW BACK PAIN WITHOUT SCIATICA: ICD-10-CM

## 2024-05-13 DIAGNOSIS — M54.2 NECK PAIN: ICD-10-CM

## 2024-05-13 DIAGNOSIS — M79.18 MYOFASCIAL PAIN SYNDROME: ICD-10-CM

## 2024-05-13 DIAGNOSIS — F11.20 UNCOMPLICATED OPIOID DEPENDENCE (HCC): ICD-10-CM

## 2024-05-13 DIAGNOSIS — M51.36 LUMBAR DEGENERATIVE DISC DISEASE: ICD-10-CM

## 2024-05-13 DIAGNOSIS — M54.12 CERVICAL RADICULOPATHY: ICD-10-CM

## 2024-05-13 DIAGNOSIS — M47.812 CERVICAL SPONDYLOSIS WITHOUT MYELOPATHY: ICD-10-CM

## 2024-05-13 DIAGNOSIS — G89.4 CHRONIC PAIN SYNDROME: ICD-10-CM

## 2024-05-13 DIAGNOSIS — N20.0 KIDNEY STONE ON RIGHT SIDE: ICD-10-CM

## 2024-05-13 DIAGNOSIS — M47.816 LUMBAR SPONDYLOSIS: ICD-10-CM

## 2024-05-13 DIAGNOSIS — G89.29 CHRONIC MIDLINE LOW BACK PAIN WITHOUT SCIATICA: ICD-10-CM

## 2024-05-13 DIAGNOSIS — M79.2 NEUROPATHIC PAIN: ICD-10-CM

## 2024-05-13 DIAGNOSIS — Z79.891 ENCOUNTER FOR LONG-TERM OPIATE ANALGESIC USE: ICD-10-CM

## 2024-05-13 PROCEDURE — 99214 OFFICE O/P EST MOD 30 MIN: CPT | Performed by: NURSE PRACTITIONER

## 2024-05-13 RX ORDER — TAMSULOSIN HYDROCHLORIDE 0.4 MG/1
0.4 CAPSULE ORAL
Qty: 3 CAPSULE | Refills: 0 | Status: CANCELLED | OUTPATIENT
Start: 2024-05-13 | End: 2024-05-16

## 2024-05-13 RX ORDER — OXYCODONE AND ACETAMINOPHEN 7.5; 325 MG/1; MG/1
1 TABLET ORAL EVERY 8 HOURS PRN
Qty: 90 TABLET | Refills: 0 | Status: SHIPPED | OUTPATIENT
Start: 2024-06-22

## 2024-05-13 RX ORDER — OXYCODONE AND ACETAMINOPHEN 7.5; 325 MG/1; MG/1
1 TABLET ORAL EVERY 8 HOURS PRN
Qty: 90 TABLET | Refills: 0 | Status: SHIPPED | OUTPATIENT
Start: 2024-05-25

## 2024-05-13 NOTE — PATIENT INSTRUCTIONS
Opioid Safety   WHAT YOU NEED TO KNOW:   An opioid medicine is used to treat pain. Examples are oxycodone, morphine, fentanyl, or codeine. Pain control and management may help you rest, heal, and return to your daily activities. You and your family will receive information about how to manage your pain at home. The instructions will include what to do if you have side effects as your pain is managed. You will get information on how to handle opioid medicine safely. You will also get suggestions on how to control pain without opioids. It is important to follow all instructions so your pain is managed effectively.  DISCHARGE INSTRUCTIONS:   Call your local emergency number (911 in the ), or have someone else call if:   You have a seizure.    You cannot be woken.    You have trouble staying awake and your breathing is slow or shallow.    Your speech is slurred, or you are confused.    You are dizzy or stumble when you walk.    Call your doctor, or have someone close to you call if:   You are extremely drowsy, or you have trouble staying awake or speaking.    You have pale or clammy skin.    You have blue fingernails or lips.    Your heartbeat is slower than normal.    You cannot stop vomiting.    You have questions or concerns about your condition or care.    Use opioids safely:   Take prescribed opioids exactly as directed.  Opioids come with directions based on the kind and how it is given. Talk to your healthcare provider or a pharmacist if you have any questions. Do not take more than the recommended amount. Too much can cause a life-threatening overdose. Do not continue to take it after your pain stops. You may develop tolerance. This means you keep needing higher doses to get the same effect. You may also develop opioid use disorder. This means you are not able to control your opioid use.    Do not give opioids to others or take opioids that belong to someone else.  The kind or amount one person takes may not  be right for another. The person you share them with may also be taking medicines that do not mix with opioids. The person may drink alcohol or use other drugs that can cause life-threatening problems when mixed with opioids.    Do not mix opioids with other medicines or alcohol.  The combination can cause an overdose, or cause you to stop breathing. Alcohol, sleeping pills, and medicines such as antihistamines can make you sleepy. A combination with opioids can lead to a coma.    Do not drive or operate heavy machinery after you use an opioid.  You may feel drowsy or have trouble concentrating. You can injure yourself or others if you drive or use heavy machinery when you are not alert. Your provider or pharmacist can tell you how long to wait after a dose before you do these activities.    Talk to your healthcare provider if you have any side effects.  Side effects include nausea, sleepiness, itching, and trouble thinking clearly. Your provider may need to make changes to the kind or amount of opioid you are taking. Your provider can also help you find ways to prevent or relieve side effects.    Manage constipation:  Constipation is the most common side effect of opioid medicine. Constipation is when you have hard, dry bowel movements, or you go longer than usual between bowel movements. Tell your healthcare provider about all changes in your bowel movements while you are taking opioids. Your provider may recommend laxative medicine to help you have a bowel movement. Your provider may also change the kind of opioid you are taking, or change when you take it. The following are more ways you can prevent or relieve constipation:  Drink liquids as directed.  You may need to drink extra liquids to help soften and move your bowels. Ask how much liquid to drink each day and which liquids are best for you.    Eat high-fiber foods.  This may help decrease constipation by adding bulk to your bowel movements. High-fiber  foods include fruits, vegetables, whole-grain breads and cereals, and beans. Your healthcare provider or dietitian can help you create a high-fiber meal plan. Your provider may also recommend a fiber supplement if you cannot get enough fiber from food.         Exercise regularly.  Regular physical activity can help stimulate your intestines. Walking is a good exercise to prevent or relieve constipation. Ask which exercises are best for you.         Schedule a time each day to have a bowel movement.  This may help train your body to have regular bowel movements. Bend forward while you are on the toilet to help move the bowel movement out. Sit on the toilet for at least 10 minutes, even if you do not have a bowel movement.    Store opioids safely:   Store opioids where others cannot easily get them.  Keep them in a locked cabinet or secure area. Do not  keep them in a purse or other bag you carry with you. A person may be looking for something else and find the opioids.         Make sure opioids are stored out of the reach of children.  A child can easily overdose on opioids. Opioids may look like candy to a small child.    The best way to dispose of opioids:  The laws vary by country and area. In the [place], the best way is to return the opioids through a take-back program. This program is offered by the [place] Drug Enforcement Agency ([employer]). The following are options for using the program:  Take the opioids to a [employer] collection site.  The site is often a law enforcement center. Call your local law enforcement center for scheduled take-back days in your area. You will be given information on where to go if the collection site is in a different location.    Take the opioids to an approved pharmacy or hospital.  A pharmacy or hospital may be set up as a collection site. You will need to ask if it is a [employer] collection site if you were not directed there. A pharmacy or doctor's office may not be able to take back opioids  unless it is a [employer] site.    Use a mail-back system.  This means you are given containers to put the opioids into. You will then mail them in the containers.    Use a take-back drop box.  This is a place to leave the opioids at any time. People and animals will not be able to get into the box. Your local law enforcement agency can tell you where to find a drop box in your area.    Other safe ways to dispose of opioids:  The medicine may come with disposal instructions. The instructions may vary depending on the brand of medicine you are using. Instructions may come in a Medication Guide, but not every medicine has one. You may instead get instructions from your pharmacy or doctor. Follow instructions carefully. The following are general guidelines to follow:  Find out if you can flush the opioid.  Some opioids can be flushed down the toilet or poured into the sink. You will need to contact authorities in your area to see if this is an option for you. The FDA also offers a list of medicines that are safe to flush down the toilet. You can check the list if you cannot get the information for your local area.    Ask your waste management company about rules for putting opioids in the trash.  The company will be able to give you specific directions. Scratch out personal information on the original medicine label so it cannot be read. Then put it in the trash. Do not label the trash or put any information on it about the opioids. It should look like regular household trash so no one is tempted to look for the opioids. Keep the trash out of the reach of children and animals. Always make sure trash is secure.    Talk to officials if you live in a facility.  If you live in a nursing home or assisted living center, talk to an official. The person will know the rules for your area.    Other ways to manage pain:   Ask your healthcare provider about non-opioid medicines to control pain.  Some medicines may even work better than  opioids, depending on the cause of your pain. Nonprescription medicines include NSAIDs (such as ibuprofen) and acetaminophen. Prescription medicines include muscle relaxers, antidepressants, and steroids.    Pain may be managed without any medicines.  Some ways to relieve pain include massage, aromatherapy, or meditation. Physical or occupational therapy may also help.    Follow up with your doctor or pain specialist as directed:  You may need to have your dose adjusted. Your doctor or pain specialist can also help you find ways to manage pain without opioids. Write down your questions so you remember to ask them during your visits.  For more information:   Drug Enforcement Administration  [address] [place] [address]  Phone: 3- 567 - 623-3672  Web Address: https://www.deadiversion.[facility].gov/drug_disposal/    [place] Food and Drug Administration  [address] [place] [address]  Phone: 0- 166 - 880-3664  Web Address: http://www.fda.gov  © Copyright [employer]ative 2023 Information is for End User's use only and may not be sold, redistributed or otherwise used for commercial purposes.  The above information is an  only. It is not intended as medical advice for individual conditions or treatments. Talk to your doctor, nurse or pharmacist before following any medical regimen to see if it is safe and effective for you.

## 2024-05-13 NOTE — PROGRESS NOTES
Assessment:  1. Chronic pain syndrome    2. Neck pain    3. Cervical spondylosis without myelopathy    4. Cervical radiculopathy    5. Lumbar spondylosis    6. Myofascial pain syndrome    7. Neuropathic pain    8. Chronic midline low back pain without sciatica    9. Lumbar degenerative disc disease    10. Uncomplicated opioid dependence (HCC)    11. Encounter for long-term opiate analgesic use        Plan:  While the patient was in the office today, I did have a thorough conversation regarding their chronic pain syndrome, medication management, and treatment plan options.  Patient is being seen for a follow-up visit.  Overall, he reports that his pain is reasonably controlled with current medication regimen which reduces his pain by about 50%.  He denies side effects from meds.    Continue oxycodone 7.5/325 every 8 hours as needed for pain.  The patient's opioid scripts were sent to their pharmacy electronically and was given a 2 month supply of prescriptions with a Do Not Fill date(s) of 5/25/2024, 6/22/2024.    There are risks associated with opioid medications, including dependence, addiction and tolerance. The patient understands and agrees to use these medications only as prescribed. Potential side effects of the medications include, but are not limited to, constipation, drowsiness, addiction, impaired judgment and risk of fatal overdose if not taken as prescribed. The patient was warned against driving while taking sedation medications.  Sharing medications is a felony. At this point in time, the patient is showing no signs of addiction, abuse, diversion or suicidal ideation.    [place] Prescription Drug Monitoring Program report was reviewed and was appropriate     The patient will follow-up in 2 months for medication prescription refill and reevaluation. The patient was advised to contact the office should their symptoms worsen in the interim. The patient was agreeable and verbalized an  understanding.    History of Present Illness:  The patient is a 68 y.o. male who presents for a follow up office visit in regards to Follow-up.   The patient’s current symptoms include complaints of neck pain, pain in both shoulders, low back pain, pain in both hands and feet.  Current pain level is a 3/10.  Quality pain is described as dull, aching, sharp, shooting, numb.    Current pain medications includes: Oxycodone 7.5/325 every 8 hours as needed for pain.  The patient reports that this regimen is providing 50% pain relief.  The patient is reporting no side effects from this pain medication regimen.    I have personally reviewed and/or updated the patient's past medical history, past surgical history, family history, social history, current medications, allergies, and vital signs today.         Review of Systems  Review of Systems   Respiratory:  Positive for shortness of breath.    Gastrointestinal:  Positive for constipation.   Musculoskeletal:  Positive for back pain and gait problem.        Decrease ROM   Neurological:  Positive for dizziness.   All other systems reviewed and are negative.          Past Medical History:   Diagnosis Date    Abdominal aortic aneurysm without rupture ([facility])     Abdominal Aortic Duplex 02/21/2017    Ectatic infrarenal abdominal aorta.    MARYANN (acute kidney injury) ([facility]) 07/23/2022    CAD (coronary artery disease)     Cancer ([facility]) 2022    right lung CA    Chronic bronchitis ([facility]) 01/28/2019    COPD (chronic obstructive pulmonary disease) ([facility])     Extremity pain     Hemiparesis ([facility]) 11/01/2022    History of echocardiogram 03/18/2014    EF 55%, mild MR and AI.  Mild concentric LVH.    History of stress test 03/06/2017    Normal.    Hyperlipidemia     Hypertension     Joint pain     Kidney stone     Low back pain     Lung cancer (HCC)     Migraine     Neck pain     Obstructive sleep apnea     cannot tolerate CPAP    Osteoarthritis     Peripheral neuropathy     Reflex sympathetic  dystrophy     Sacroiliitis (HCC) 02/02/2022       Past Surgical History:   Procedure Laterality Date    CARDIAC CATHETERIZATION  02/13/2012    EF 70%, widely patent renal arteries, significant single-vessel CAD-medical therapy.    CARDIAC CATHETERIZATION  04/11/2013    EF 65%, 50% mid LAD, 20% prox CFX, 90% diffuse RCA, 99% mid RCA. Medical management.    CATARACT EXTRACTION Right 09/19/2023    CHOLECYSTECTOMY      COLONOSCOPY      EPIDURAL BLOCK INJECTION Bilateral 08/15/2019    Procedure: BLOCK / INJECTION EPIDURAL STEROID CERVICAL;  Surgeon: Ricardo Park MD;  Location: [facility];  Service: Pain Management     FL GUIDED NEEDLE PLAC BX/ASP/INJ  08/15/2019    IR BIOPSY LUNG  09/13/2022    IR THORACIC DUCT EMBOLIZATION  11/22/2022    ORTHOPEDIC SURGERY      [facility]/CELL WASHG SPX N/A 11/16/2022    Procedure: BRONCHOSCOPY FLEXIBLE;  Surgeon: Gulshan Madison MD;  Location: [facility];  Service: Thoracic    GA THORACOSCOPY W/LOBECTOMY SINGLE LOBE Right 11/16/2022    Procedure: LOBECTOMY LUNG; lower lobe superior segmentectomy, mediastinal lymph node dissection;  Surgeon: Gulshan Madison MD;  Location: [facility];  Service: Thoracic    GA THORACOSCOPY W/SEGMENTECTOMY Right 11/16/2022    Procedure: THORACOSCOPY VIDEO ASSISTED SURGERY (VATS);  Surgeon: Gulshan Madison MD;  Location: [facility];  Service: Thoracic    TRIGGER POINT INJECTION         Family History   Adopted: Yes   Problem Relation Age of Onset    No Known Problems Family     No Known Problems Mother     No Known Problems Father        Social History     Occupational History    Not on file   Tobacco Use    Smoking status: Every Day     Current packs/day: 1.00     Average packs/day: 1 pack/day for 0.5 years (0.5 ttl pk-yrs)     Types: Cigarettes     Start date: 11/2023    Smokeless tobacco: Never   Vaping Use    Vaping status: Never Used   Substance and Sexual Activity    Alcohol use: Not  Currently    Drug use: Never    Sexual activity: Yes         Current Outpatient Medications:     albuterol (Ventolin HFA) 90 mcg/act inhaler, Inhale 2 puffs every 6 (six) hours as needed for wheezing, Disp: 18 g, Rfl: 5    amLODIPine (NORVASC) 10 mg tablet, swallow 1 tablet once daily, Disp: 90 tablet, Rfl: 1    docusate sodium (COLACE) 100 mg capsule, Take 1 capsule (100 mg total) by mouth 2 (two) times a day, Disp: 20 capsule, Rfl: 0    glucose blood (OneTouch Verio) test strip, Test once daily, Disp: 100 each, Rfl: 0    lisinopril (ZESTRIL) 10 mg tablet, take 1 tablet by mouth once daily, Disp: 90 tablet, Rfl: 1    metoprolol succinate (TOPROL-XL) 100 mg 24 hr tablet, take 1 tablet by mouth once daily, Disp: 90 tablet, Rfl: 3    naloxone (NARCAN) 4 mg/0.1 mL nasal spray, Administer 1 spray into a nostril. If no response after 2-3 minutes, give another dose in the other nostril using a new spray., Disp: 1 each, Rfl: 1    nitroglycerin (NITROSTAT) 0.4 mg SL tablet, Place 1 tablet (0.4 mg total) under the tongue every 5 (five) minutes as needed for chest pain, Disp: 25 tablet, Rfl: 5    ofloxacin (OCUFLOX) 0.3 % ophthalmic solution, , Disp: , Rfl:     ondansetron (ZOFRAN) 8 mg tablet, Take 1 tablet (8 mg total) by mouth every 8 (eight) hours as needed for nausea or vomiting, Disp: 20 tablet, Rfl: 2    [START ON 5/25/2024] oxyCODONE-acetaminophen (Percocet) 7.5-325 MG per tablet, Take 1 tablet by mouth every 8 (eight) hours as needed for moderate pain Max Daily Amount: 3 tablets Do not start before May 25, 2024., Disp: 90 tablet, Rfl: 0    [START ON 6/22/2024] oxyCODONE-acetaminophen (Percocet) 7.5-325 MG per tablet, Take 1 tablet by mouth every 8 (eight) hours as needed for moderate pain Max Daily Amount: 3 tablets Do not start before June 22, 2024., Disp: 90 tablet, Rfl: 0    primidone (MYSOLINE) 50 mg tablet, Take 0.5 tablets (25 mg total) by mouth daily in the early morning, Disp: 30 tablet, Rfl: 3    Restasis  0.05 % ophthalmic emulsion, , Disp: , Rfl:     rosuvastatin (CRESTOR) 40 MG tablet, Take 1 tablet (40 mg total) by mouth daily, Disp: 90 tablet, Rfl: 3    sildenafil (VIAGRA) 100 mg tablet, Take 1 tablet (100 mg total) by mouth as needed for erectile dysfunction, Disp: 20 tablet, Rfl: 0    Stiolto Respimat 2.5-2.5 MCG/ACT inhaler, inhale 2 puffs by mouth and INTO THE LUNGS once daily if needed, Disp: 4 g, Rfl: 2    traZODone (DESYREL) 100 mg tablet, take 1 tablet by mouth daily at bedtime, Disp: 90 tablet, Rfl: 3    varenicline (CHANTIX) 0.5 mg tablet, Take 1 tablet (0.5 mg total) by mouth 2 (two) times a day, Disp: 60 tablet, Rfl: 1    bisacodyl (DULCOLAX) 5 mg EC tablet, Take 1 tablet (5 mg total) by mouth daily as needed for constipation for up to 4 doses (Patient not taking: Reported on 3/25/2024), Disp: 30 tablet, Rfl: 0    dicyclomine (BENTYL) 10 mg capsule, take 1 capsule by mouth four times a day before meals and at bedtime (Patient not taking: Reported on 3/25/2024), Disp: 30 capsule, Rfl: 0    fluticasone (FLONASE) 50 mcg/act nasal spray, 1 spray into each nostril daily for 14 days (Patient not taking: Reported on 12/14/2023), Disp: 11.1 mL, Rfl: 0    ketoconazole (NIZORAL) 2 % shampoo, Apply 1 Application topically 2 (two) times a week for 28 days (Patient not taking: Reported on 3/12/2024), Disp: 100 mL, Rfl: 0    nicotine (NICODERM CQ) 21 mg/24 hr TD 24 hr patch, Place 1 patch on the skin over 24 hours every 24 hours (Patient not taking: Reported on 3/25/2024), Disp: 28 patch, Rfl: 0    nicotine polacrilex (COMMIT) 4 MG lozenge, Apply 1 lozenge (4 mg total) to the mouth or throat as needed for smoking cessation (Patient not taking: Reported on 3/25/2024), Disp: 100 each, Rfl: 0    pantoprazole (PROTONIX) 20 mg tablet, take 1 tablet by mouth once daily (Patient not taking: Reported on 3/25/2024), Disp: 30 tablet, Rfl: 0    polyethylene glycol (MIRALAX) 17 g packet, Take 17 g by mouth daily for 5 days  "(Patient not taking: Reported on 12/20/2022), Disp: 85 g, Rfl: 0    prednisoLONE acetate (PRED FORTE) 1 % ophthalmic suspension, , Disp: , Rfl:     tamsulosin (FLOMAX) 0.4 mg, Take 1 capsule (0.4 mg total) by mouth daily with dinner for 3 days, Disp: 3 capsule, Rfl: 0    No Known Allergies    Physical Exam:    BP (!) 175/79   Pulse (!) 54   Resp 16   Ht 5' 7\" (1.702 m)   Wt 88.2 kg (194 lb 6.4 oz)   BMI 30.45 kg/m²     Constitutional:normal, well developed, well nourished, alert, in no distress and non-toxic and no overt pain behavior.  Eyes:anicteric  HEENT:grossly intact  Neck:supple, symmetric, trachea midline and no masses   Pulmonary:even and unlabored  Cardiovascular:No edema or pitting edema present  Skin:Normal without rashes or lesions and well hydrated  Psychiatric:Mood and affect appropriate  Neurologic:Cranial Nerves II-XII grossly intact  Musculoskeletal:normal    Imaging  No orders to display       No orders of the defined types were placed in this encounter.     "

## 2024-05-14 ENCOUNTER — TELEPHONE (OUTPATIENT)
Age: 69
End: 2024-05-14

## 2024-05-14 DIAGNOSIS — N20.0 KIDNEY STONE ON RIGHT SIDE: ICD-10-CM

## 2024-05-14 RX ORDER — TAMSULOSIN HYDROCHLORIDE 0.4 MG/1
0.4 CAPSULE ORAL
Qty: 3 CAPSULE | Refills: 0 | Status: CANCELLED | OUTPATIENT
Start: 2024-05-14 | End: 2024-05-17

## 2024-05-16 ENCOUNTER — TELEPHONE (OUTPATIENT)
Dept: HEMATOLOGY ONCOLOGY | Facility: CLINIC | Age: 69
End: 2024-05-16

## 2024-05-16 NOTE — TELEPHONE ENCOUNTER
"\"Good morning. This is Avery BELL, [id number] [phone]. Been having a lot of stomach problems and they seem to be getting worse. I'm not sure if I talked to Doctor Lisa or who I would speak to. I've also. I've also lost about 10 lbs. So if you can give me a call I would appreciate it. Thank you much. Bye, bye.\"  "

## 2024-05-16 NOTE — TELEPHONE ENCOUNTER
Returned call to pt regarding his stomach pain he has been having. Pt states that his pain started when he started his treatment and is constant, and tends to be more on his R side. Rates pain 7/10 and said that he has lost 8 lbs. Stated that pain does alleviate when he eats food. No associated N/V. Has tried taking tums but that did not help him. Informed patient that we will discuss with Dr Duncan and get back to him with any recommendations.

## 2024-05-20 ENCOUNTER — OFFICE VISIT (OUTPATIENT)
Dept: FAMILY MEDICINE CLINIC | Facility: CLINIC | Age: 69
End: 2024-05-20
Payer: MEDICARE

## 2024-05-20 VITALS
BODY MASS INDEX: 30.26 KG/M2 | HEART RATE: 64 BPM | SYSTOLIC BLOOD PRESSURE: 132 MMHG | OXYGEN SATURATION: 97 % | HEIGHT: 67 IN | DIASTOLIC BLOOD PRESSURE: 80 MMHG | TEMPERATURE: 97.5 F | WEIGHT: 192.8 LBS

## 2024-05-20 DIAGNOSIS — N18.31 STAGE 3A CHRONIC KIDNEY DISEASE (HCC): ICD-10-CM

## 2024-05-20 DIAGNOSIS — C34.31 MALIGNANT NEOPLASM OF LOWER LOBE OF RIGHT LUNG (HCC): ICD-10-CM

## 2024-05-20 DIAGNOSIS — T45.1X5A NEUROPATHY DUE TO CHEMOTHERAPEUTIC DRUG (HCC): ICD-10-CM

## 2024-05-20 DIAGNOSIS — F11.20 UNCOMPLICATED OPIOID DEPENDENCE (HCC): ICD-10-CM

## 2024-05-20 DIAGNOSIS — C34.31 PRIMARY SQUAMOUS CELL CARCINOMA OF LOWER LOBE OF RIGHT LUNG (HCC): ICD-10-CM

## 2024-05-20 DIAGNOSIS — R73.03 PREDIABETES: Primary | ICD-10-CM

## 2024-05-20 DIAGNOSIS — F33.9 DEPRESSION, RECURRENT (HCC): ICD-10-CM

## 2024-05-20 DIAGNOSIS — I25.118 CORONARY ARTERY DISEASE OF NATIVE ARTERY OF NATIVE HEART WITH STABLE ANGINA PECTORIS (HCC): ICD-10-CM

## 2024-05-20 DIAGNOSIS — R63.4 WEIGHT LOSS: ICD-10-CM

## 2024-05-20 DIAGNOSIS — K21.9 GASTROESOPHAGEAL REFLUX DISEASE WITHOUT ESOPHAGITIS: ICD-10-CM

## 2024-05-20 DIAGNOSIS — I10 PRIMARY HYPERTENSION: ICD-10-CM

## 2024-05-20 DIAGNOSIS — G62.0 NEUROPATHY DUE TO CHEMOTHERAPEUTIC DRUG (HCC): ICD-10-CM

## 2024-05-20 PROBLEM — E66.09 CLASS 1 OBESITY DUE TO EXCESS CALORIES WITH SERIOUS COMORBIDITY AND BODY MASS INDEX (BMI) OF 31.0 TO 31.9 IN ADULT: Status: RESOLVED | Noted: 2020-02-14 | Resolved: 2024-05-20

## 2024-05-20 PROBLEM — E66.811 CLASS 1 OBESITY DUE TO EXCESS CALORIES WITH SERIOUS COMORBIDITY AND BODY MASS INDEX (BMI) OF 31.0 TO 31.9 IN ADULT: Status: RESOLVED | Noted: 2020-02-14 | Resolved: 2024-05-20

## 2024-05-20 PROBLEM — K86.1 OTHER CHRONIC PANCREATITIS (HCC): Status: RESOLVED | Noted: 2023-02-10 | Resolved: 2024-05-20

## 2024-05-20 LAB — SL AMB POCT HEMOGLOBIN AIC: 6.8 (ref ?–6.5)

## 2024-05-20 PROCEDURE — 99214 OFFICE O/P EST MOD 30 MIN: CPT | Performed by: FAMILY MEDICINE

## 2024-05-20 PROCEDURE — 83036 HEMOGLOBIN GLYCOSYLATED A1C: CPT | Performed by: FAMILY MEDICINE

## 2024-05-20 PROCEDURE — G2211 COMPLEX E/M VISIT ADD ON: HCPCS | Performed by: FAMILY MEDICINE

## 2024-05-20 RX ORDER — PANTOPRAZOLE SODIUM 40 MG/1
40 TABLET, DELAYED RELEASE ORAL DAILY
Qty: 30 TABLET | Refills: 3 | Status: SHIPPED | OUTPATIENT
Start: 2024-05-20

## 2024-05-20 NOTE — PROGRESS NOTES
Assessment/Plan:    No problem-specific Assessment & Plan notes found for this encounter.       Diagnoses and all orders for this visit:    Prediabetes  -     Glucose, fasting; Future  -     Hemoglobin A1C; Future    Primary hypertension    Weight loss    Malignant neoplasm of lower lobe of right lung (HCC)  Comments:  per oncology    Neuropathy due to chemotherapeutic drug (HCC)    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)  Comments:  per oncology    Stage 3a chronic kidney disease (HCC)    Coronary artery disease of native artery of native heart with stable angina pectoris (HCC)    Uncomplicated opioid dependence (HCC)    Depression, recurrent (HCC)    Gastroesophageal reflux disease without esophagitis  -     pantoprazole (PROTONIX) 40 mg tablet; Take 1 tablet (40 mg total) by mouth daily          PHQ-2/9 Depression Screening    Little interest or pleasure in doing things: 0 - not at all  Feeling down, depressed, or hopeless: 0 - not at all  Trouble falling or staying asleep, or sleeping too much: 0 - not at all  Feeling tired or having little energy: 0 - not at all  Poor appetite or overeatin - not at all  Feeling bad about yourself - or that you are a failure or have let yourself or your family down: 0 - not at all  Trouble concentrating on things, such as reading the newspaper or watching television: 0 - not at all  Moving or speaking so slowly that other people could have noticed. Or the opposite - being so fidgety or restless that you have been moving around a lot more than usual: 0 - not at all  Thoughts that you would be better off dead, or of hurting yourself in some way: 0 - not at all  PHQ-9 Score: 0  PHQ-9 Interpretation: No or Minimal depression            Subjective:      Patient ID: Lance Hazel is a 69 y.o. male.    Follow up for hyperglycemia, pt had an elevated HbA1c below 7.0 and not currently on medication    Abdominal Pain  This is a new problem. The current  "episode started more than 1 month ago. The onset quality is gradual. The pain is located in the periumbilical region. The pain is at a severity of 6/10. The quality of the pain is sharp. Associated symptoms include constipation and frequency. Pertinent negatives include no diarrhea, nausea or vomiting. Relieved by: eating. Treatments tried: tums, pepto. The treatment provided no relief.     The following portions of the patient's history were reviewed and updated as appropriate: allergies, current medications, past family history, past medical history, past social history, past surgical history, and problem list.    Review of Systems   Eyes:  Negative for visual disturbance.   Cardiovascular:  Negative for chest pain and palpitations.   Gastrointestinal:  Positive for abdominal pain and constipation. Negative for blood in stool, diarrhea, nausea and vomiting.   Genitourinary:  Positive for frequency.   Skin:  Negative for wound.   Neurological:  Negative for numbness.         Objective:    /80   Pulse 64   Temp 97.5 °F (36.4 °C) (Tympanic)   Ht 5' 7\" (1.702 m)   Wt 87.5 kg (192 lb 12.8 oz)   SpO2 97%   BMI 30.20 kg/m²      Physical Exam  Vitals and nursing note reviewed.   Constitutional:       General: He is not in acute distress.     Appearance: Normal appearance. He is not ill-appearing, toxic-appearing or diaphoretic.   HENT:      Head: Normocephalic and atraumatic.      Mouth/Throat:      Mouth: Mucous membranes are moist.   Eyes:      Conjunctiva/sclera: Conjunctivae normal.   Cardiovascular:      Rate and Rhythm: Normal rate and regular rhythm.      Heart sounds: Normal heart sounds. No murmur heard.  Pulmonary:      Effort: Pulmonary effort is normal. No respiratory distress.      Breath sounds: Normal breath sounds. No wheezing, rhonchi or rales.   Abdominal:      General: There is no distension.      Palpations: Abdomen is soft. There is no mass.      Tenderness: There is no abdominal " tenderness. There is no guarding or rebound.   Musculoskeletal:      Cervical back: Normal range of motion. No rigidity.      Right lower leg: No edema.      Left lower leg: No edema.   Lymphadenopathy:      Cervical: No cervical adenopathy.   Neurological:      Mental Status: He is alert and oriented to person, place, and time.   Psychiatric:         Mood and Affect: Mood normal.         Behavior: Behavior normal.         Thought Content: Thought content normal.         Judgment: Judgment normal.

## 2024-05-28 RX ORDER — SODIUM CHLORIDE 9 MG/ML
20 INJECTION, SOLUTION INTRAVENOUS ONCE
Status: CANCELLED | OUTPATIENT
Start: 2024-05-31

## 2024-05-28 RX ORDER — ACETAMINOPHEN 325 MG/1
650 TABLET ORAL ONCE
Status: CANCELLED | OUTPATIENT
Start: 2024-05-31 | End: 2024-05-28

## 2024-05-29 ENCOUNTER — APPOINTMENT (OUTPATIENT)
Dept: LAB | Facility: HOSPITAL | Age: 69
End: 2024-05-29
Payer: MEDICARE

## 2024-05-29 DIAGNOSIS — C34.31 MALIGNANT NEOPLASM OF LOWER LOBE OF RIGHT LUNG (HCC): ICD-10-CM

## 2024-05-29 LAB
ALBUMIN SERPL BCP-MCNC: 4.4 G/DL (ref 3.5–5)
ALP SERPL-CCNC: 71 U/L (ref 34–104)
ALT SERPL W P-5'-P-CCNC: 19 U/L (ref 7–52)
ANION GAP SERPL CALCULATED.3IONS-SCNC: 6 MMOL/L (ref 4–13)
AST SERPL W P-5'-P-CCNC: 18 U/L (ref 13–39)
BASOPHILS # BLD AUTO: 0.07 THOUSANDS/ÂΜL (ref 0–0.1)
BASOPHILS NFR BLD AUTO: 1 % (ref 0–1)
BILIRUB SERPL-MCNC: 1.18 MG/DL (ref 0.2–1)
BUN SERPL-MCNC: 16 MG/DL (ref 5–25)
CALCIUM SERPL-MCNC: 9.7 MG/DL (ref 8.4–10.2)
CHLORIDE SERPL-SCNC: 100 MMOL/L (ref 96–108)
CO2 SERPL-SCNC: 29 MMOL/L (ref 21–32)
CREAT SERPL-MCNC: 1.31 MG/DL (ref 0.6–1.3)
EOSINOPHIL # BLD AUTO: 0 THOUSAND/ÂΜL (ref 0–0.61)
EOSINOPHIL NFR BLD AUTO: 0 % (ref 0–6)
ERYTHROCYTE [DISTWIDTH] IN BLOOD BY AUTOMATED COUNT: 12.8 % (ref 11.6–15.1)
GFR SERPL CREATININE-BSD FRML MDRD: 54 ML/MIN/1.73SQ M
GLUCOSE SERPL-MCNC: 167 MG/DL (ref 65–140)
HCT VFR BLD AUTO: 46.8 % (ref 36.5–49.3)
HGB BLD-MCNC: 15.1 G/DL (ref 12–17)
IMM GRANULOCYTES # BLD AUTO: 0.01 THOUSAND/UL (ref 0–0.2)
IMM GRANULOCYTES NFR BLD AUTO: 0 % (ref 0–2)
LYMPHOCYTES # BLD AUTO: 2.57 THOUSANDS/ÂΜL (ref 0.6–4.47)
LYMPHOCYTES NFR BLD AUTO: 28 % (ref 14–44)
MCH RBC QN AUTO: 30.4 PG (ref 26.8–34.3)
MCHC RBC AUTO-ENTMCNC: 32.3 G/DL (ref 31.4–37.4)
MCV RBC AUTO: 94 FL (ref 82–98)
MONOCYTES # BLD AUTO: 0.58 THOUSAND/ÂΜL (ref 0.17–1.22)
MONOCYTES NFR BLD AUTO: 6 % (ref 4–12)
NEUTROPHILS # BLD AUTO: 5.87 THOUSANDS/ÂΜL (ref 1.85–7.62)
NEUTS SEG NFR BLD AUTO: 65 % (ref 43–75)
NRBC BLD AUTO-RTO: 0 /100 WBCS
PLATELET # BLD AUTO: 189 THOUSANDS/UL (ref 149–390)
PMV BLD AUTO: 10.3 FL (ref 8.9–12.7)
POTASSIUM SERPL-SCNC: 4.8 MMOL/L (ref 3.5–5.3)
PROT SERPL-MCNC: 6.9 G/DL (ref 6.4–8.4)
RBC # BLD AUTO: 4.96 MILLION/UL (ref 3.88–5.62)
SODIUM SERPL-SCNC: 135 MMOL/L (ref 135–147)
TSH SERPL DL<=0.05 MIU/L-ACNC: 0.59 UIU/ML (ref 0.45–4.5)
WBC # BLD AUTO: 9.1 THOUSAND/UL (ref 4.31–10.16)

## 2024-05-29 PROCEDURE — 85025 COMPLETE CBC W/AUTO DIFF WBC: CPT

## 2024-05-29 PROCEDURE — 84443 ASSAY THYROID STIM HORMONE: CPT

## 2024-05-29 PROCEDURE — 36415 COLL VENOUS BLD VENIPUNCTURE: CPT

## 2024-05-29 PROCEDURE — 80053 COMPREHEN METABOLIC PANEL: CPT

## 2024-05-31 ENCOUNTER — HOSPITAL ENCOUNTER (OUTPATIENT)
Dept: INFUSION CENTER | Facility: HOSPITAL | Age: 69
End: 2024-05-31
Attending: INTERNAL MEDICINE
Payer: MEDICARE

## 2024-05-31 VITALS
HEART RATE: 56 BPM | TEMPERATURE: 97.4 F | DIASTOLIC BLOOD PRESSURE: 69 MMHG | BODY MASS INDEX: 30.26 KG/M2 | RESPIRATION RATE: 16 BRPM | SYSTOLIC BLOOD PRESSURE: 119 MMHG | OXYGEN SATURATION: 95 % | WEIGHT: 192.8 LBS | HEIGHT: 67 IN

## 2024-05-31 DIAGNOSIS — C34.31 MALIGNANT NEOPLASM OF LOWER LOBE OF RIGHT LUNG (HCC): Primary | ICD-10-CM

## 2024-05-31 DIAGNOSIS — N52.9 ERECTILE DYSFUNCTION, UNSPECIFIED ERECTILE DYSFUNCTION TYPE: ICD-10-CM

## 2024-05-31 PROCEDURE — 96413 CHEMO IV INFUSION 1 HR: CPT

## 2024-05-31 PROCEDURE — 96367 TX/PROPH/DG ADDL SEQ IV INF: CPT

## 2024-05-31 RX ORDER — SODIUM CHLORIDE 9 MG/ML
20 INJECTION, SOLUTION INTRAVENOUS ONCE
Status: COMPLETED | OUTPATIENT
Start: 2024-05-31 | End: 2024-05-31

## 2024-05-31 RX ORDER — ACETAMINOPHEN 325 MG/1
650 TABLET ORAL ONCE
Status: COMPLETED | OUTPATIENT
Start: 2024-05-31 | End: 2024-05-31

## 2024-05-31 RX ADMIN — DEXAMETHASONE SODIUM PHOSPHATE 10 MG: 10 INJECTION, SOLUTION INTRAMUSCULAR; INTRAVENOUS at 08:20

## 2024-05-31 RX ADMIN — SODIUM CHLORIDE 20 ML/HR: 0.9 INJECTION, SOLUTION INTRAVENOUS at 08:17

## 2024-05-31 RX ADMIN — DIPHENHYDRAMINE HYDROCHLORIDE 25 MG: 50 INJECTION, SOLUTION INTRAMUSCULAR; INTRAVENOUS at 08:55

## 2024-05-31 RX ADMIN — ACETAMINOPHEN 650 MG: 325 TABLET, FILM COATED ORAL at 08:19

## 2024-05-31 RX ADMIN — ATEZOLIZUMAB 1200 MG: 1200 INJECTION, SOLUTION INTRAVENOUS at 09:36

## 2024-05-31 NOTE — PROGRESS NOTES
Labs reviewed. Patient tolerated IV Tecentriq treatment well with no complications.      Lance Hazel is aware of future appt on 06/20/24 at 1230.     AVS declined.

## 2024-06-01 RX ORDER — SILDENAFIL 100 MG/1
100 TABLET, FILM COATED ORAL AS NEEDED
Qty: 20 TABLET | Refills: 0 | Status: SHIPPED | OUTPATIENT
Start: 2024-06-01

## 2024-06-11 ENCOUNTER — OFFICE VISIT (OUTPATIENT)
Dept: PULMONOLOGY | Facility: CLINIC | Age: 69
End: 2024-06-11
Payer: MEDICARE

## 2024-06-11 VITALS
DIASTOLIC BLOOD PRESSURE: 72 MMHG | RESPIRATION RATE: 16 BRPM | HEART RATE: 57 BPM | WEIGHT: 192.2 LBS | SYSTOLIC BLOOD PRESSURE: 128 MMHG | BODY MASS INDEX: 30.17 KG/M2 | OXYGEN SATURATION: 96 % | TEMPERATURE: 97.6 F | HEIGHT: 67 IN

## 2024-06-11 DIAGNOSIS — R09.82 POSTNASAL DRIP: ICD-10-CM

## 2024-06-11 DIAGNOSIS — J43.9 PULMONARY EMPHYSEMA, UNSPECIFIED EMPHYSEMA TYPE (HCC): Primary | ICD-10-CM

## 2024-06-11 DIAGNOSIS — C34.31 PRIMARY SQUAMOUS CELL CARCINOMA OF LOWER LOBE OF RIGHT LUNG (HCC): ICD-10-CM

## 2024-06-11 DIAGNOSIS — F17.200 SMOKING: ICD-10-CM

## 2024-06-11 PROCEDURE — 99214 OFFICE O/P EST MOD 30 MIN: CPT

## 2024-06-11 RX ORDER — FLUTICASONE PROPIONATE 50 MCG
1 SPRAY, SUSPENSION (ML) NASAL DAILY
Qty: 11.1 ML | Refills: 0 | Status: SHIPPED | OUTPATIENT
Start: 2024-06-11 | End: 2024-06-25

## 2024-06-11 RX ORDER — TIOTROPIUM BROMIDE AND OLODATEROL 3.124; 2.736 UG/1; UG/1
2 SPRAY, METERED RESPIRATORY (INHALATION) DAILY
Qty: 4 G | Refills: 5 | Status: SHIPPED | OUTPATIENT
Start: 2024-06-11

## 2024-06-11 NOTE — PROGRESS NOTES
Pulmonary Follow Up Note  Lance Hazel 69 y.o. 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  Occasional wheezing.  No cough/mucus  -There has been some confusion as patient no longer has Stiolto, has not been using.  Only on albuterol, using twice daily  -Continues to smoke 1 ppd  -PFTs in 2022 with borderline obstruction, mild restriction, positive bronchodilator response, mildly reduced diffusion capacity    Plan:  -Encouraged smoking cessation  -Restart Stiolto 2 puffs daily.  Continue albuterol every 6 hours as needed for shortness of breath or wheezing  -Update PFTs to evaluate for any worsening lung function.  Since his last testing, he was diagnosed and started treatment for lung CA  -Follow-up in 3 months or sooner if needed    Smoking  -Currently smoking 1 ppd.  Having stomach issues, did not start Chantix.  NRT not covered by prescription insurance  -Patient still interested in quitting.  I provided 1 800 quit NOW number for free resources   -Evaluate smoking cessation progress at next office visit      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  -Back on Tecentriq infusions  -Has CT chest scheduled in September for surveillance  -Continue to follow with oncology        Plan:    Diagnoses and all orders for this visit:    Pulmonary emphysema, unspecified emphysema type (HCC)  -     tiotropium-olodaterol (Stiolto Respimat) 2.5-2.5 MCG/ACT inhaler; Inhale 2 puffs daily  -     Complete PFT with post Bronchodilator and Six Minute walk; Future    Smoking    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)    Postnasal drip  Comments:  flonase 2 wks.  f/u as needed  Orders:  -     fluticasone (FLONASE) 50 mcg/act nasal spray; 1 spray into each nostril daily for 14 days            Return in about 3 months (around  9/11/2024).      History of Present Illness     Chief Complaint:   Chief Complaint   Patient presents with    Follow-up       Patient ID: Lance is a 69 y.o. y.o. male has a past medical history of Abdominal aortic aneurysm without rupture (HCC), Abdominal Aortic Duplex (02/21/2017), MARYANN (acute kidney injury) (HCC) (07/23/2022), CAD (coronary artery disease), Cancer (HCC) (2022), Chronic bronchitis (HCC) (01/28/2019), Class 1 obesity due to excess calories with serious comorbidity and body mass index (BMI) of 31.0 to 31.9 in adult (02/14/2020), COPD (chronic obstructive pulmonary disease) ([facility]), Extremity pain, Hemiparesis ([facility]) (11/01/2022), History of echocardiogram (03/18/2014), History of stress test (03/06/2017), Hyperlipidemia, Hypertension, Joint pain, Kidney stone, Low back pain, Lung cancer (HCC), Migraine, Neck pain, Obstructive sleep apnea, Osteoarthritis, Other chronic pancreatitis (HCC) (02/10/2023), Peripheral neuropathy, Reflex sympathetic dystrophy, and Sacroiliitis (HCC) (02/02/2022).      6/11/2024  HPI: Lance Hazel is a 69 y.o. male who presents to the office today for a follow-up visit.  He has a past medical history including but not limited to COPD, stage IIIa squamous cell carcinoma of the right lung requiring a VATS resection on 11/16/2022, CAD, hypertension, tobacco abuse, JAKI not on CPAP, AAA, osteoarthritis, and peripheral neuropathy.  He was previously seen in the office in March.  He was placed on low-dose Chantix for smoking cessation and continued on nicotine patches/lozenges.  He was instructed to increase Stiolto from as needed to regular use to improve symptoms.  He was restarted on Tecentriq 3 weeks ago for lung CA treatment.  This was on hold due to suspected cause of his fevers.  Patient states he thinks he still has fevers off and on.  None recently.    Patient returns today stating that he is still having shortness of breath mostly with use of stairs, and more exertional  activities at work.  He works as a contractor building/renovating houses.  Using albuterol inhaler once or twice per day.  Does not have Stiolto inhaler at home.  No cough/mucus, chest pain, lower extremity swelling.  Has occasional wheezing.  He continues to smoke 1 ppd.  States he did not start taking the Chantix that was prescribed at his office visit because he was having stomach issues.  Could not get NRT as it was not covered by his prescription insurance.        Review of Systems   Constitutional:  Negative for activity change, chills, fever and unexpected weight change.   HENT:  Negative for congestion, postnasal drip, rhinorrhea, sore throat and trouble swallowing.    Respiratory:  Positive for cough and shortness of breath. Negative for chest tightness and wheezing.    Cardiovascular:  Negative for chest pain, palpitations and leg swelling.   Allergic/Immunologic: Negative.        Historical Information   Past Medical History:   Diagnosis Date    Abdominal aortic aneurysm without rupture ([facility])     Abdominal Aortic Duplex 02/21/2017    Ectatic infrarenal abdominal aorta.    MARYANN (acute kidney injury) ([facility]) 07/23/2022    CAD (coronary artery disease)     Cancer ([facility]) 2022    right lung CA    Chronic bronchitis ([facility]) 01/28/2019    Class 1 obesity due to excess calories with serious comorbidity and body mass index (BMI) of 31.0 to 31.9 in adult 02/14/2020    COPD (chronic obstructive pulmonary disease) ([facility])     Extremity pain     Hemiparesis ([facility]) 11/01/2022    History of echocardiogram 03/18/2014    EF 55%, mild MR and AI.  Mild concentric LVH.    History of stress test 03/06/2017    Normal.    Hyperlipidemia     Hypertension     Joint pain     Kidney stone     Low back pain     Lung cancer ([facility])     Migraine     Neck pain     Obstructive sleep apnea     cannot tolerate CPAP    Osteoarthritis     Other chronic pancreatitis ([facility]) 02/10/2023    Peripheral neuropathy     Reflex sympathetic dystrophy      Sacroiliitis (HCC) 02/02/2022     Past Surgical History:   Procedure Laterality Date    CARDIAC CATHETERIZATION  02/13/2012    EF 70%, widely patent renal arteries, significant single-vessel CAD-medical therapy.    CARDIAC CATHETERIZATION  04/11/2013    EF 65%, 50% mid LAD, 20% prox CFX, 90% diffuse RCA, 99% mid RCA. Medical management.    CATARACT EXTRACTION Right 09/19/2023    CHOLECYSTECTOMY      COLONOSCOPY      EPIDURAL BLOCK INJECTION Bilateral 08/15/2019    Procedure: BLOCK / INJECTION EPIDURAL STEROID CERVICAL;  Surgeon: Ricardo Park MD;  Location: [facility];  Service: Pain Management     FL GUIDED NEEDLE PLAC BX/ASP/INJ  08/15/2019    IR BIOPSY LUNG  09/13/2022    IR THORACIC DUCT EMBOLIZATION  11/22/2022    ORTHOPEDIC SURGERY      VT [facility] INCL FLUOR GDNCE DX W/CELL WASHG SPX N/A 11/16/2022    Procedure: BRONCHOSCOPY FLEXIBLE;  Surgeon: Gulshan Madison MD;  Location: [facility];  Service: Thoracic    VT THORACOSCOPY W/LOBECTOMY SINGLE LOBE Right 11/16/2022    Procedure: LOBECTOMY LUNG; lower lobe superior segmentectomy, mediastinal lymph node dissection;  Surgeon: Gulshan Madison MD;  Location: [facility];  Service: Thoracic    VT THORACOSCOPY W/SEGMENTECTOMY Right 11/16/2022    Procedure: THORACOSCOPY VIDEO ASSISTED SURGERY (VATS);  Surgeon: Gulshan Madison MD;  Location: [facility];  Service: Thoracic    TRIGGER POINT INJECTION       Family History   Adopted: Yes   Problem Relation Age of Onset    No Known Problems Family     No Known Problems Mother     No Known Problems Father        Smoking history: He reports that he has been smoking cigarettes. He started smoking about 7 months ago. He has a 0.6 pack-year smoking history. He has never used smokeless tobacco.    Occupational History:     Immunization History   Administered Date(s) Administered    COVID-19 MODERNA VACC 0.25 ML IM BOOSTER 01/11/2022    COVID-19 MODERNA VACC 0.5 ML IM 04/06/2021, 05/05/2021    Td  (adult), adsorbed 01/01/2011       Meds/Allergies     Current Outpatient Medications:     albuterol (Ventolin HFA) 90 mcg/act inhaler, Inhale 2 puffs every 6 (six) hours as needed for wheezing, Disp: 18 g, Rfl: 5    amLODIPine (NORVASC) 10 mg tablet, swallow 1 tablet once daily, Disp: 90 tablet, Rfl: 1    bisacodyl (DULCOLAX) 5 mg EC tablet, Take 1 tablet (5 mg total) by mouth daily as needed for constipation for up to 4 doses, Disp: 30 tablet, Rfl: 0    docusate sodium (COLACE) 100 mg capsule, Take 1 capsule (100 mg total) by mouth 2 (two) times a day (Patient taking differently: Take 100 mg by mouth if needed), Disp: 20 capsule, Rfl: 0    fluticasone (FLONASE) 50 mcg/act nasal spray, 1 spray into each nostril daily for 14 days, Disp: 11.1 mL, Rfl: 0    glucose blood (OneTouch Verio) test strip, Test once daily, Disp: 100 each, Rfl: 0    lisinopril (ZESTRIL) 10 mg tablet, take 1 tablet by mouth once daily, Disp: 90 tablet, Rfl: 1    metoprolol succinate (TOPROL-XL) 100 mg 24 hr tablet, take 1 tablet by mouth once daily, Disp: 90 tablet, Rfl: 3    ondansetron (ZOFRAN) 8 mg tablet, Take 1 tablet (8 mg total) by mouth every 8 (eight) hours as needed for nausea or vomiting, Disp: 20 tablet, Rfl: 2    oxyCODONE-acetaminophen (Percocet) 7.5-325 MG per tablet, Take 1 tablet by mouth every 8 (eight) hours as needed for moderate pain Max Daily Amount: 3 tablets Do not start before May 25, 2024., Disp: 90 tablet, Rfl: 0    pantoprazole (PROTONIX) 40 mg tablet, Take 1 tablet (40 mg total) by mouth daily, Disp: 30 tablet, Rfl: 3    primidone (MYSOLINE) 50 mg tablet, Take 0.5 tablets (25 mg total) by mouth daily in the early morning (Patient taking differently: Take 50 mg by mouth daily in the early morning), Disp: 30 tablet, Rfl: 3    rosuvastatin (CRESTOR) 40 MG tablet, Take 1 tablet (40 mg total) by mouth daily, Disp: 90 tablet, Rfl: 3    sildenafil (VIAGRA) 100 mg tablet, Take 1 tablet (100 mg total) by mouth as needed for  "erectile dysfunction, Disp: 20 tablet, Rfl: 0    tiotropium-olodaterol (Stiolto Respimat) 2.5-2.5 MCG/ACT inhaler, Inhale 2 puffs daily, Disp: 4 g, Rfl: 5    traZODone (DESYREL) 100 mg tablet, take 1 tablet by mouth daily at bedtime, Disp: 90 tablet, Rfl: 3    dicyclomine (BENTYL) 10 mg capsule, take 1 capsule by mouth four times a day before meals and at bedtime (Patient not taking: Reported on 3/25/2024), Disp: 30 capsule, Rfl: 0    naloxone (NARCAN) 4 mg/0.1 mL nasal spray, Administer 1 spray into a nostril. If no response after 2-3 minutes, give another dose in the other nostril using a new spray. (Patient not taking: Reported on 5/20/2024), Disp: 1 each, Rfl: 1    nitroglycerin (NITROSTAT) 0.4 mg SL tablet, Place 1 tablet (0.4 mg total) under the tongue every 5 (five) minutes as needed for chest pain (Patient not taking: Reported on 5/20/2024), Disp: 25 tablet, Rfl: 5    [START ON 6/22/2024] oxyCODONE-acetaminophen (Percocet) 7.5-325 MG per tablet, Take 1 tablet by mouth every 8 (eight) hours as needed for moderate pain Max Daily Amount: 3 tablets Do not start before June 22, 2024. (Patient not taking: Reported on 5/20/2024 Do not start before June 22, 2024.), Disp: 90 tablet, Rfl: 0    Restasis 0.05 % ophthalmic emulsion, , Disp: , Rfl:     tamsulosin (FLOMAX) 0.4 mg, Take 1 capsule (0.4 mg total) by mouth daily with dinner for 3 days, Disp: 3 capsule, Rfl: 0  Allergies: No Known Allergies      Vitals:  Vitals:    06/11/24 0930   BP: 128/72   BP Location: Right arm   Patient Position: Sitting   Cuff Size: Adult   Pulse: 57   Resp: 16   Temp: 97.6 °F (36.4 °C)   TempSrc: Temporal   SpO2: 96%   Weight: 87.2 kg (192 lb 3.2 oz)   Height: 5' 7.01\" (1.702 m)     Oxygen Therapy  SpO2: 96 %  .  Wt Readings from Last 3 Encounters:   06/11/24 87.2 kg (192 lb 3.2 oz)   05/31/24 87.5 kg (192 lb 12.8 oz)   05/20/24 87.5 kg (192 lb 12.8 oz)     Body mass index is 30.09 kg/m².    Physical Exam  Vitals and nursing note " "reviewed.   Constitutional:       General: He is not in acute distress.     Appearance: Normal appearance. He is well-developed.   Cardiovascular:      Rate and Rhythm: Normal rate and regular rhythm.      Heart sounds: Normal heart sounds. No murmur heard.  Pulmonary:      Effort: Pulmonary effort is normal. No respiratory distress.      Breath sounds: No decreased breath sounds, wheezing, rhonchi or rales.   Musculoskeletal:         General: No swelling.      Right lower leg: No edema.      Left lower leg: No edema.   Psychiatric:         Mood and Affect: Mood and affect normal.         Behavior: Behavior normal. Behavior is cooperative.           Labs: I have personally reviewed pertinent lab results.  Lab Results   Component Value Date    WBC 9.10 05/29/2024    HGB 15.1 05/29/2024    HCT 46.8 05/29/2024    MCV 94 05/29/2024     05/29/2024     Lab Results   Component Value Date    CALCIUM 9.7 05/29/2024    K 4.8 05/29/2024    CO2 29 05/29/2024     05/29/2024    BUN 16 05/29/2024    CREATININE 1.31 (H) 05/29/2024     No results found for: \"IGE\"  Lab Results   Component Value Date    ALT 19 05/29/2024    AST 18 05/29/2024    ALKPHOS 71 05/29/2024       Imaging and other studies: I have personally reviewed pertinent reports and I have personally reviewed pertinent films in PACS     CT chest 2/26/2024  Previously noted findings for right lung infiltrate and associated bronchiolitis have cleared.  No new pulmonary findings.  Otherwise stable exam.  Stable mediastinal lymph nodes, upper limits of normal size.  Stable mild to moderate esophageal thickening.     CT chest abdomen pelvis with contrast 12/21/2023   Lungs-  1.  Improved right-sided airspace opacities compared to November 2023 with residual mild groundglass density in the inferior posterior portion of the right lower lobe. Tree-in-bud opacities in the bilateral lower lobes have also improved with residual   nodules in the right lower lobe.     2. "  Otherwise stable chest including borderline mediastinal lymph nodes, esophageal thickening, and chronic/postoperative opacities in the lungs.        Pulmonary function testing:     Pulmonary Functions Testing Results: 9/21/2022    FEV1/FVC ratio 70%    FEV1 72% predicted  FVC 78% predicted  (+) response to bronchodilators  TLC 99 % predicted   % predicted  DLCO corrected for hemoglobin 71 % predicted    Impression: Normal lung volumes.  Restrictive pattern on spirometry.  Near significant improvement in airflow and vital capacity following administration of bronchodilators.  Mildly reduced diffusion capacity.  Normal airway resistance as indicated by the specific airway conductance.

## 2024-06-11 NOTE — PATIENT INSTRUCTIONS
[facility] Quit[facility]: Call 3-800-QUIT-NOW (542-9577) for free coaching, help getting quit-smoking medications, advice on dealing with cravings & withdrawal, and support while you go tobacco-free.

## 2024-06-11 NOTE — ASSESSMENT & PLAN NOTE
-Currently smoking 1 ppd.  Having stomach issues, did not start Chantix.  NRT not covered by prescription insurance  -Patient still interested in quitting.  I provided 1 800 quit NOW number for free resources   -Evaluate smoking cessation progress at next office visit

## 2024-06-11 NOTE — ASSESSMENT & PLAN NOTE
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  Occasional wheezing.  No cough/mucus  -There has been some confusion as patient no longer has Stiolto, has not been using.  Only on albuterol, using twice daily  -Continues to smoke 1 ppd  -PFTs in 2022 with borderline obstruction, mild restriction, positive bronchodilator response, mildly reduced diffusion capacity    Plan:  -Encouraged smoking cessation  -Restart Stiolto 2 puffs daily.  Continue albuterol every 6 hours as needed for shortness of breath or wheezing  -Update PFTs to evaluate for any worsening lung function.  Since his last testing, he was diagnosed and started treatment for lung CA  -Follow-up in 3 months or sooner if needed

## 2024-06-14 RX ORDER — SODIUM CHLORIDE 9 MG/ML
20 INJECTION, SOLUTION INTRAVENOUS ONCE
Status: CANCELLED | OUTPATIENT
Start: 2024-06-20

## 2024-06-14 RX ORDER — ACETAMINOPHEN 325 MG/1
650 TABLET ORAL ONCE
Status: CANCELLED | OUTPATIENT
Start: 2024-06-20 | End: 2024-06-21

## 2024-06-19 ENCOUNTER — APPOINTMENT (OUTPATIENT)
Dept: LAB | Facility: HOSPITAL | Age: 69
End: 2024-06-19
Payer: MEDICARE

## 2024-06-19 DIAGNOSIS — C34.31 MALIGNANT NEOPLASM OF LOWER LOBE OF RIGHT LUNG (HCC): Primary | ICD-10-CM

## 2024-06-19 DIAGNOSIS — C34.31 MALIGNANT NEOPLASM OF LOWER LOBE OF RIGHT LUNG (HCC): ICD-10-CM

## 2024-06-19 LAB
ALBUMIN SERPL BCG-MCNC: 4.3 G/DL (ref 3.5–5)
ALP SERPL-CCNC: 68 U/L (ref 34–104)
ALT SERPL W P-5'-P-CCNC: 14 U/L (ref 7–52)
ANION GAP SERPL CALCULATED.3IONS-SCNC: 9 MMOL/L (ref 4–13)
AST SERPL W P-5'-P-CCNC: 14 U/L (ref 13–39)
BASOPHILS # BLD AUTO: 0.06 THOUSANDS/ÂΜL (ref 0–0.1)
BASOPHILS NFR BLD AUTO: 1 % (ref 0–1)
BILIRUB SERPL-MCNC: 1.03 MG/DL (ref 0.2–1)
BUN SERPL-MCNC: 17 MG/DL (ref 5–25)
CALCIUM SERPL-MCNC: 9.7 MG/DL (ref 8.4–10.2)
CHLORIDE SERPL-SCNC: 103 MMOL/L (ref 96–108)
CO2 SERPL-SCNC: 25 MMOL/L (ref 21–32)
CREAT SERPL-MCNC: 1.36 MG/DL (ref 0.6–1.3)
EOSINOPHIL # BLD AUTO: 0 THOUSAND/ÂΜL (ref 0–0.61)
EOSINOPHIL NFR BLD AUTO: 0 % (ref 0–6)
ERYTHROCYTE [DISTWIDTH] IN BLOOD BY AUTOMATED COUNT: 12.7 % (ref 11.6–15.1)
GFR SERPL CREATININE-BSD FRML MDRD: 52 ML/MIN/1.73SQ M
GLUCOSE SERPL-MCNC: 178 MG/DL (ref 65–140)
HCT VFR BLD AUTO: 49.2 % (ref 36.5–49.3)
HGB BLD-MCNC: 15.7 G/DL (ref 12–17)
IMM GRANULOCYTES # BLD AUTO: 0.02 THOUSAND/UL (ref 0–0.2)
IMM GRANULOCYTES NFR BLD AUTO: 0 % (ref 0–2)
LYMPHOCYTES # BLD AUTO: 2.39 THOUSANDS/ÂΜL (ref 0.6–4.47)
LYMPHOCYTES NFR BLD AUTO: 23 % (ref 14–44)
MCH RBC QN AUTO: 30.1 PG (ref 26.8–34.3)
MCHC RBC AUTO-ENTMCNC: 31.9 G/DL (ref 31.4–37.4)
MCV RBC AUTO: 94 FL (ref 82–98)
MONOCYTES # BLD AUTO: 0.65 THOUSAND/ÂΜL (ref 0.17–1.22)
MONOCYTES NFR BLD AUTO: 6 % (ref 4–12)
NEUTROPHILS # BLD AUTO: 7.33 THOUSANDS/ÂΜL (ref 1.85–7.62)
NEUTS SEG NFR BLD AUTO: 70 % (ref 43–75)
NRBC BLD AUTO-RTO: 0 /100 WBCS
PLATELET # BLD AUTO: 192 THOUSANDS/UL (ref 149–390)
PMV BLD AUTO: 10.3 FL (ref 8.9–12.7)
POTASSIUM SERPL-SCNC: 4.1 MMOL/L (ref 3.5–5.3)
PROT SERPL-MCNC: 6.6 G/DL (ref 6.4–8.4)
RBC # BLD AUTO: 5.22 MILLION/UL (ref 3.88–5.62)
SODIUM SERPL-SCNC: 137 MMOL/L (ref 135–147)
WBC # BLD AUTO: 10.45 THOUSAND/UL (ref 4.31–10.16)

## 2024-06-19 PROCEDURE — 80053 COMPREHEN METABOLIC PANEL: CPT

## 2024-06-19 PROCEDURE — 85025 COMPLETE CBC W/AUTO DIFF WBC: CPT

## 2024-06-19 PROCEDURE — 36415 COLL VENOUS BLD VENIPUNCTURE: CPT

## 2024-06-20 ENCOUNTER — HOSPITAL ENCOUNTER (OUTPATIENT)
Dept: INFUSION CENTER | Facility: HOSPITAL | Age: 69
Discharge: HOME | End: 2024-06-20
Attending: INTERNAL MEDICINE
Payer: MEDICARE

## 2024-06-20 VITALS
WEIGHT: 192.2 LBS | DIASTOLIC BLOOD PRESSURE: 77 MMHG | BODY MASS INDEX: 30.17 KG/M2 | SYSTOLIC BLOOD PRESSURE: 134 MMHG | RESPIRATION RATE: 16 BRPM | HEART RATE: 58 BPM | OXYGEN SATURATION: 97 % | TEMPERATURE: 97.6 F | HEIGHT: 67 IN

## 2024-06-20 DIAGNOSIS — C34.31 MALIGNANT NEOPLASM OF LOWER LOBE OF RIGHT LUNG (HCC): Primary | ICD-10-CM

## 2024-06-20 PROCEDURE — 96367 TX/PROPH/DG ADDL SEQ IV INF: CPT

## 2024-06-20 PROCEDURE — 96413 CHEMO IV INFUSION 1 HR: CPT

## 2024-06-20 RX ORDER — SODIUM CHLORIDE 9 MG/ML
20 INJECTION, SOLUTION INTRAVENOUS ONCE
Status: COMPLETED | OUTPATIENT
Start: 2024-06-20 | End: 2024-06-20

## 2024-06-20 RX ORDER — ACETAMINOPHEN 325 MG/1
650 TABLET ORAL ONCE
Status: COMPLETED | OUTPATIENT
Start: 2024-06-20 | End: 2024-06-20

## 2024-06-20 RX ADMIN — DEXAMETHASONE SODIUM PHOSPHATE 10 MG: 10 INJECTION, SOLUTION INTRAMUSCULAR; INTRAVENOUS at 13:12

## 2024-06-20 RX ADMIN — ACETAMINOPHEN 650 MG: 325 TABLET, FILM COATED ORAL at 12:42

## 2024-06-20 RX ADMIN — DIPHENHYDRAMINE HYDROCHLORIDE 25 MG: 50 INJECTION, SOLUTION INTRAMUSCULAR; INTRAVENOUS at 12:44

## 2024-06-20 RX ADMIN — ATEZOLIZUMAB 1200 MG: 1200 INJECTION, SOLUTION INTRAVENOUS at 13:56

## 2024-06-20 RX ADMIN — SODIUM CHLORIDE 20 ML/HR: 0.9 INJECTION, SOLUTION INTRAVENOUS at 12:42

## 2024-06-20 NOTE — PROGRESS NOTES
Recent labs reviewed. Lance Hazel tolerated Tecentriq treatment well with no complications.      Lance Hazel is aware of future appt on 7/11/24 at 0800.     AVS declined.

## 2024-07-02 ENCOUNTER — TELEPHONE (OUTPATIENT)
Age: 69
End: 2024-07-02

## 2024-07-02 ENCOUNTER — OFFICE VISIT (OUTPATIENT)
Dept: HEMATOLOGY ONCOLOGY | Facility: CLINIC | Age: 69
End: 2024-07-02
Payer: MEDICARE

## 2024-07-02 ENCOUNTER — APPOINTMENT (OUTPATIENT)
Dept: LAB | Facility: HOSPITAL | Age: 69
End: 2024-07-02
Payer: MEDICARE

## 2024-07-02 VITALS
TEMPERATURE: 97.7 F | OXYGEN SATURATION: 94 % | HEART RATE: 66 BPM | HEIGHT: 67 IN | WEIGHT: 194 LBS | BODY MASS INDEX: 30.45 KG/M2 | DIASTOLIC BLOOD PRESSURE: 83 MMHG | SYSTOLIC BLOOD PRESSURE: 125 MMHG

## 2024-07-02 DIAGNOSIS — C34.31 PRIMARY SQUAMOUS CELL CARCINOMA OF LOWER LOBE OF RIGHT LUNG (HCC): Primary | ICD-10-CM

## 2024-07-02 DIAGNOSIS — C34.31 MALIGNANT NEOPLASM OF LOWER LOBE OF RIGHT LUNG (HCC): ICD-10-CM

## 2024-07-02 DIAGNOSIS — N18.31 STAGE 3A CHRONIC KIDNEY DISEASE (HCC): ICD-10-CM

## 2024-07-02 LAB
ALBUMIN SERPL BCG-MCNC: 4.2 G/DL (ref 3.5–5)
ALP SERPL-CCNC: 69 U/L (ref 34–104)
ALT SERPL W P-5'-P-CCNC: 16 U/L (ref 7–52)
ANION GAP SERPL CALCULATED.3IONS-SCNC: 8 MMOL/L (ref 4–13)
AST SERPL W P-5'-P-CCNC: 15 U/L (ref 13–39)
BASOPHILS # BLD AUTO: 0.08 THOUSANDS/ÂΜL (ref 0–0.1)
BASOPHILS NFR BLD AUTO: 1 % (ref 0–1)
BILIRUB SERPL-MCNC: 0.85 MG/DL (ref 0.2–1)
BUN SERPL-MCNC: 16 MG/DL (ref 5–25)
CALCIUM SERPL-MCNC: 9.8 MG/DL (ref 8.4–10.2)
CHLORIDE SERPL-SCNC: 100 MMOL/L (ref 96–108)
CO2 SERPL-SCNC: 28 MMOL/L (ref 21–32)
CREAT SERPL-MCNC: 1.34 MG/DL (ref 0.6–1.3)
EOSINOPHIL # BLD AUTO: 0 THOUSAND/ÂΜL (ref 0–0.61)
EOSINOPHIL NFR BLD AUTO: 0 % (ref 0–6)
ERYTHROCYTE [DISTWIDTH] IN BLOOD BY AUTOMATED COUNT: 13.2 % (ref 11.6–15.1)
GFR SERPL CREATININE-BSD FRML MDRD: 53 ML/MIN/1.73SQ M
GLUCOSE SERPL-MCNC: 146 MG/DL (ref 65–140)
HCT VFR BLD AUTO: 47.5 % (ref 36.5–49.3)
HGB BLD-MCNC: 15.2 G/DL (ref 12–17)
IMM GRANULOCYTES # BLD AUTO: 0.03 THOUSAND/UL (ref 0–0.2)
IMM GRANULOCYTES NFR BLD AUTO: 0 % (ref 0–2)
LYMPHOCYTES # BLD AUTO: 2.73 THOUSANDS/ÂΜL (ref 0.6–4.47)
LYMPHOCYTES NFR BLD AUTO: 24 % (ref 14–44)
MCH RBC QN AUTO: 30 PG (ref 26.8–34.3)
MCHC RBC AUTO-ENTMCNC: 32 G/DL (ref 31.4–37.4)
MCV RBC AUTO: 94 FL (ref 82–98)
MONOCYTES # BLD AUTO: 0.69 THOUSAND/ÂΜL (ref 0.17–1.22)
MONOCYTES NFR BLD AUTO: 6 % (ref 4–12)
NEUTROPHILS # BLD AUTO: 7.94 THOUSANDS/ÂΜL (ref 1.85–7.62)
NEUTS SEG NFR BLD AUTO: 69 % (ref 43–75)
NRBC BLD AUTO-RTO: 0 /100 WBCS
PLATELET # BLD AUTO: 193 THOUSANDS/UL (ref 149–390)
PMV BLD AUTO: 10.2 FL (ref 8.9–12.7)
POTASSIUM SERPL-SCNC: 4 MMOL/L (ref 3.5–5.3)
PROT SERPL-MCNC: 6.3 G/DL (ref 6.4–8.4)
RBC # BLD AUTO: 5.06 MILLION/UL (ref 3.88–5.62)
SODIUM SERPL-SCNC: 136 MMOL/L (ref 135–147)
TSH SERPL DL<=0.05 MIU/L-ACNC: 1.49 UIU/ML (ref 0.45–4.5)
WBC # BLD AUTO: 11.47 THOUSAND/UL (ref 4.31–10.16)

## 2024-07-02 PROCEDURE — 80053 COMPREHEN METABOLIC PANEL: CPT

## 2024-07-02 PROCEDURE — G2211 COMPLEX E/M VISIT ADD ON: HCPCS | Performed by: INTERNAL MEDICINE

## 2024-07-02 PROCEDURE — 84443 ASSAY THYROID STIM HORMONE: CPT

## 2024-07-02 PROCEDURE — 36415 COLL VENOUS BLD VENIPUNCTURE: CPT

## 2024-07-02 PROCEDURE — 85025 COMPLETE CBC W/AUTO DIFF WBC: CPT

## 2024-07-02 PROCEDURE — 99214 OFFICE O/P EST MOD 30 MIN: CPT | Performed by: INTERNAL MEDICINE

## 2024-07-02 NOTE — TELEPHONE ENCOUNTER
Patient called concerned about elevated white blood count. He would like a call back to discuss. He states he continues to have stomach aches since he last saw PCP last week, he is not sure if its related to his blood count being high. I tried to schedule a follow  appt for him as he requested however, there is no availability no time soon for PCP or another provider.. I tried to call the office to see if he can be scheduled for a sooner date and was not able to get through. Please call patient back to schedule.

## 2024-07-02 NOTE — PROGRESS NOTES
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 Status post adjuvant Taxol carbo, added Tecentriq in June 2023.   He has been on treatment hiatus since mid February 2024 to investigate potential contribution to ongoing fevers.    May 2024 resumed Tecentriq.  Should finish in August 2024.  Interestingly, fevers have resolved.  Additionally, he notes epigastric discomfort.  No melena or hematochezia.Better after eating a meal.  No other palliative/provocative factors identified.  He is on a PPI.  There had been talk about upper and lower endoscopy.  We will schedule this.  Recent CMP shows creatinine 1.36.  Repeat.  If increases, investigation.  If all is well, I will see him back in 3 months after repeat imaging in mid September.  I discussed the above with the patient.  The patient  voiced understanding and agreement.  ______________________________________________________________________    No chief complaint on file.      HPI:  Oncology History   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)   11/16/2022 Surgery    R VATS lower lobe super segmentectomy      11/16/2022 -  Cancer Staged    Staging form: Lung, AJCC 8th Edition  - Pathologic stage from 11/16/2022: Stage IIIA (pT1b, pN2, cM0) - Signed by Erinn Gooden PA-C on 3/19/2024       Malignant neoplasm of lower lobe of right lung (HCC)   9/13/2022 Initial Diagnosis    July 23, 2022 patient had CT chest ab pelvis regarding rule out AAA.  This showed 1.1 cm right lower lobe pulmonary nodule new since prior study of November 2019.  Some other smaller nodules identified.  Subcarinal lymph node 2 cm.  No other findings suspicious for metastatic disease.  August 5, 2022 PET/CT showed 1.2  cm pulmonary nodule, SUV 2.9.  Other nodules noted, subcentimeter, no hypermetabolism.  Some groundglass opacities in the right upper lobe.  September 13, 2022 patient had needle biopsy of the right lower lobe mass showing squamous cell carcinoma, TTF-1 positive.  November 16, 2022 patient underwent right lower lobe segmentectomy.  Pathology showed 1.8 cm squamous cell carcinoma.  Level 4 and 11 lymph node were positive for metastatic carcinoma.     1/30/2023 - 3/27/2023 Chemotherapy    palonosetron (ALOXI), 0.25 mg, Intravenous, Once, 4 of 4 cycles  Administration: 0.25 mg (1/30/2023), 0.25 mg (2/13/2023), 0.25 mg (3/6/2023), 0.25 mg (3/27/2023)  fosaprepitant (EMEND) IVPB, 150 mg, Intravenous, Once, 3 of 3 cycles  Administration: 150 mg (1/30/2023), 150 mg (2/13/2023), 150 mg (3/6/2023)  CARBOplatin (PARAPLATIN) IVPB ([facility] AUC DOSING), 552 mg, Intravenous, Once, 4 of 4 cycles  Administration: 550 mg (1/30/2023), 650 mg (2/13/2023), 650 mg (3/6/2023), 500 mg (3/27/2023)  PACLItaxel (TAXOL) chemo IVPB, 349.8 mg, Intravenous, Once, 4 of 4 cycles  Dose modification: 200 mg/m2 (original dose 175 mg/m2, Cycle 2, Reason: Dose modified as per discussion with consulting physician)  Administration: 360 mg (1/30/2023), 400 mg (2/13/2023), 400 mg (3/6/2023), 400 mg (3/27/2023)  aprepitant (CINVANTI) in  mL IVPB, 130 mg, Intravenous, Once, 1 of 1 cycle  Administration: 130 mg (3/27/2023)     4/13/2023 Genomic Testing    April 13, 2023 Caris-  PD-L1 1%, TMB high.  MTAP deleted.  p53 [id number], pathogenic variant  KRAS G694 L, VUS  CRABBP  *, pathogenic variant  KMT2D *, pathogenic variant     6/21/2023 -  Chemotherapy    alteplase (CATHFLO), 2 mg, Intracatheter, Every 1 Minute as needed, 14 of 17 cycles  atezolizumab (TECENTRIQ) IVPB, 1,200 mg, Intravenous, Once, 14 of 17 cycles  Administration: 1,200 mg (6/21/2023), 1,200 mg (7/12/2023), 1,200 mg (8/29/2023), 1,200 mg (9/18/2023), 1,200 mg (10/10/2023), 1,200 mg  (10/31/2023), 1,200 mg (11/22/2023), 1,200 mg (12/11/2023), 1,200 mg (1/2/2024), 1,200 mg (1/23/2024), 1,200 mg (2/14/2024), 1,200 mg (5/7/2024), 1,200 mg (5/31/2024), 1,200 mg (6/20/2024)         Interval History: Clinically stable.  Epigastric discomfort.  ECOG-  1 - Symptomatic but completely ambulatory    Review of Systems   Constitutional:  Negative for chills and fever.   HENT:  Negative for nosebleeds.    Eyes:  Negative for discharge.   Respiratory:  Negative for cough and shortness of breath.    Cardiovascular:  Negative for chest pain.   Gastrointestinal:  Positive for abdominal pain. Negative for constipation and diarrhea.   Endocrine: Negative for polydipsia.   Genitourinary:  Negative for hematuria.   Musculoskeletal:  Negative for arthralgias.   Skin:  Negative for color change.   Allergic/Immunologic: Negative for immunocompromised state.   Neurological:  Negative for dizziness and headaches.   Hematological:  Negative for adenopathy.   Psychiatric/Behavioral:  Negative for agitation.        Past Medical History:   Diagnosis Date    Abdominal aortic aneurysm without rupture (Formerly [facility])     Abdominal Aortic Duplex 02/21/2017    Ectatic infrarenal abdominal aorta.    MARYANN (acute kidney injury) (Formerly [facility]) 07/23/2022    CAD (coronary artery disease)     Cancer (Formerly [facility]) 2022    right lung CA    Chronic bronchitis (Formerly [facility]) 01/28/2019    Class 1 obesity due to excess calories with serious comorbidity and body mass index (BMI) of 31.0 to 31.9 in adult 02/14/2020    COPD (chronic obstructive pulmonary disease) (Formerly [facility])     Extremity pain     Hemiparesis (Formerly [facility]) 11/01/2022    History of echocardiogram 03/18/2014    EF 55%, mild MR and AI.  Mild concentric LVH.    History of stress test 03/06/2017    Normal.    Hyperlipidemia     Hypertension     Joint pain     Kidney stone     Low back pain     Lung cancer (Formerly [facility])     Migraine     Neck pain     Obstructive sleep apnea     cannot tolerate CPAP    Osteoarthritis     Other chronic  pancreatitis (HCC) 02/10/2023    Peripheral neuropathy     Reflex sympathetic dystrophy     Sacroiliitis (HCC) 02/02/2022     Patient Active Problem List   Diagnosis    JAKI (obstructive sleep apnea)    Chronic bronchitis (HCC)    Abdominal aortic aneurysm (AAA) without rupture    Lumbar spondylosis    Lumbar degenerative disc disease    Myofascial pain syndrome    Chronic low back pain without sciatica    Cervical radiculopathy    Cervical spondylosis without myelopathy    Essential tremor    Negative depression screening    Chronic pain of both knees    Smoking    Hypertension    Chronic pain syndrome    Uncomplicated opioid dependence (HCC)    Encounter for long-term opiate analgesic use    Neck pain    Hyperlipidemia    Pre-diabetes    Erectile dysfunction    Insomnia    Cortical age-related cataract of both eyes    Urinary frequency    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)    Kidney stone    Pulmonary emphysema (HCC)    Malignant neoplasm of lower lobe of right lung (HCC)    Encounter for smoking cessation counseling    At high risk for osteoporosis    Coronary artery disease of native artery of native heart with stable angina pectoris (HCC)    Stage 3a chronic kidney disease (HCC)    Night sweats    Skin lesion    Depression, recurrent (HCC)    Neuropathic pain    Age-related cataract of right eye    Constipation due to opioid therapy    Neuropathy due to chemotherapeutic drug (HCC)    Hyperglycemia    Bronchitis    Abnormal CT of the chest       Current Outpatient Medications:     albuterol (Ventolin HFA) 90 mcg/act inhaler, Inhale 2 puffs every 6 (six) hours as needed for wheezing, Disp: 18 g, Rfl: 5    amLODIPine (NORVASC) 10 mg tablet, swallow 1 tablet once daily, Disp: 90 tablet, Rfl: 1    bisacodyl (DULCOLAX) 5 mg EC tablet, Take 1 tablet (5 mg total) by mouth daily as needed for constipation for up to 4 doses, Disp: 30 tablet, Rfl: 0    dicyclomine (BENTYL) 10 mg capsule, take  1 capsule by mouth four times a day before meals and at bedtime (Patient not taking: Reported on 3/25/2024), Disp: 30 capsule, Rfl: 0    docusate sodium (COLACE) 100 mg capsule, Take 1 capsule (100 mg total) by mouth 2 (two) times a day (Patient taking differently: Take 100 mg by mouth if needed), Disp: 20 capsule, Rfl: 0    fluticasone (FLONASE) 50 mcg/act nasal spray, 1 spray into each nostril daily for 14 days, Disp: 11.1 mL, Rfl: 0    glucose blood (OneTouch Verio) test strip, Test once daily, Disp: 100 each, Rfl: 0    lisinopril (ZESTRIL) 10 mg tablet, take 1 tablet by mouth once daily, Disp: 90 tablet, Rfl: 1    metoprolol succinate (TOPROL-XL) 100 mg 24 hr tablet, take 1 tablet by mouth once daily, Disp: 90 tablet, Rfl: 3    naloxone (NARCAN) 4 mg/0.1 mL nasal spray, Administer 1 spray into a nostril. If no response after 2-3 minutes, give another dose in the other nostril using a new spray. (Patient not taking: Reported on 5/20/2024), Disp: 1 each, Rfl: 1    nitroglycerin (NITROSTAT) 0.4 mg SL tablet, Place 1 tablet (0.4 mg total) under the tongue every 5 (five) minutes as needed for chest pain (Patient not taking: Reported on 5/20/2024), Disp: 25 tablet, Rfl: 5    ondansetron (ZOFRAN) 8 mg tablet, Take 1 tablet (8 mg total) by mouth every 8 (eight) hours as needed for nausea or vomiting, Disp: 20 tablet, Rfl: 2    oxyCODONE-acetaminophen (Percocet) 7.5-325 MG per tablet, Take 1 tablet by mouth every 8 (eight) hours as needed for moderate pain Max Daily Amount: 3 tablets Do not start before May 25, 2024., Disp: 90 tablet, Rfl: 0    oxyCODONE-acetaminophen (Percocet) 7.5-325 MG per tablet, Take 1 tablet by mouth every 8 (eight) hours as needed for moderate pain Max Daily Amount: 3 tablets Do not start before June 22, 2024. (Patient not taking: Reported on 5/20/2024 Do not start before June 22, 2024.), Disp: 90 tablet, Rfl: 0    pantoprazole (PROTONIX) 40 mg tablet, Take 1 tablet (40 mg total) by mouth daily,  Disp: 30 tablet, Rfl: 3    primidone (MYSOLINE) 50 mg tablet, Take 0.5 tablets (25 mg total) by mouth daily in the early morning (Patient taking differently: Take 50 mg by mouth daily in the early morning), Disp: 30 tablet, Rfl: 3    Restasis 0.05 % ophthalmic emulsion, , Disp: , Rfl:     rosuvastatin (CRESTOR) 40 MG tablet, Take 1 tablet (40 mg total) by mouth daily, Disp: 90 tablet, Rfl: 3    sildenafil (VIAGRA) 100 mg tablet, Take 1 tablet (100 mg total) by mouth as needed for erectile dysfunction, Disp: 20 tablet, Rfl: 0    tamsulosin (FLOMAX) 0.4 mg, Take 1 capsule (0.4 mg total) by mouth daily with dinner for 3 days, Disp: 3 capsule, Rfl: 0    tiotropium-olodaterol (Stiolto Respimat) 2.5-2.5 MCG/ACT inhaler, Inhale 2 puffs daily, Disp: 4 g, Rfl: 5    traZODone (DESYREL) 100 mg tablet, take 1 tablet by mouth daily at bedtime, Disp: 90 tablet, Rfl: 3  No Known Allergies  Past Surgical History:   Procedure Laterality Date    CARDIAC CATHETERIZATION  02/13/2012    EF 70%, widely patent renal arteries, significant single-vessel CAD-medical therapy.    CARDIAC CATHETERIZATION  04/11/2013    EF 65%, 50% mid LAD, 20% prox CFX, 90% diffuse RCA, 99% mid RCA. Medical management.    CATARACT EXTRACTION Right 09/19/2023    CHOLECYSTECTOMY      COLONOSCOPY      EPIDURAL BLOCK INJECTION Bilateral 08/15/2019    Procedure: BLOCK / INJECTION EPIDURAL STEROID CERVICAL;  Surgeon: Ricardo Park MD;  Location: [facility];  Service: Pain Management     FL GUIDED NEEDLE PLAC BX/ASP/INJ  08/15/2019    IR BIOPSY LUNG  09/13/2022    IR THORACIC DUCT EMBOLIZATION  11/22/2022    ORTHOPEDIC SURGERY      IA [facility] INCL FLUOR GDNCE DX W/CELL WASHG SPX N/A 11/16/2022    Procedure: BRONCHOSCOPY FLEXIBLE;  Surgeon: Gulshan [place] MD;  Location: [facility];  Service: Thoracic    IA THORACOSCOPY W/LOBECTOMY SINGLE LOBE Right 11/16/2022    Procedure: LOBECTOMY LUNG; lower lobe superior segmentectomy, mediastinal lymph node  "dissection;  Surgeon: Gulshan [place] MD;  Location: [facility];  Service: Thoracic    NJ THORACOSCOPY W/SEGMENTECTOMY Right 11/16/2022    Procedure: THORACOSCOPY VIDEO ASSISTED SURGERY (VATS);  Surgeon: Gulshan [place] MD;  Location: [facility];  Service: Thoracic    TRIGGER POINT INJECTION       Social History     Objective:  Vitals:    07/02/24 0924   BP: 125/83   BP Location: Left arm   Patient Position: Sitting   Cuff Size: Standard   Pulse: 66   Temp: 97.7 °F (36.5 °C)   TempSrc: Temporal   SpO2: 94%   Weight: 88 kg (194 lb)   Height: 5' 7\" (1.702 m)     Physical Exam  Constitutional:       Appearance: He is well-developed.   HENT:      Head: Normocephalic and atraumatic.      Mouth/Throat:      Mouth: Mucous membranes are moist.   Eyes:      Pupils: Pupils are equal, round, and reactive to light.   Cardiovascular:      Rate and Rhythm: Normal rate and regular rhythm.      Heart sounds: No murmur heard.  Pulmonary:      Breath sounds: Normal breath sounds. No wheezing or rales.   Abdominal:      Palpations: Abdomen is soft.      Tenderness: There is no abdominal tenderness.   Musculoskeletal:         General: No tenderness. Normal range of motion.      Cervical back: Neck supple.   Lymphadenopathy:      Cervical: No cervical adenopathy.   Skin:     Findings: No erythema or rash.   Neurological:      Mental Status: He is alert and oriented to person, place, and time.      Cranial Nerves: No cranial nerve deficit.      Deep Tendon Reflexes: Reflexes are normal and symmetric.   Psychiatric:         Behavior: Behavior normal.           Labs:  I personally reviewed the labs and imaging pertinent to this patient care.  "

## 2024-07-02 NOTE — TELEPHONE ENCOUNTER
Received call from patient regarding blood work results from today. He is requesting if Dr Duncan has reviewed his WBC and Kidney functions and if they are ok for him to receive treatment on 7/11 as scheduled. I informed him results may not have been reviewed,as I do not see any comments in his chart. He is aware that we will review with Dr Duncan and we will contact him with any recommendations.  
Reviewed labs with Dr. Duncan.  OK to proceed with treatment as ordered.    Phoned patient.  Aware of Dr. Duncan's recommendations.  No additional questions at this time   
Normal

## 2024-07-05 RX ORDER — SODIUM CHLORIDE 9 MG/ML
20 INJECTION, SOLUTION INTRAVENOUS ONCE
OUTPATIENT
Start: 2024-07-12

## 2024-07-05 RX ORDER — ACETAMINOPHEN 325 MG/1
650 TABLET ORAL ONCE
OUTPATIENT
Start: 2024-07-12 | End: 2024-07-12

## 2024-07-11 ENCOUNTER — HOSPITAL ENCOUNTER (OUTPATIENT)
Dept: INFUSION CENTER | Facility: HOSPITAL | Age: 69
Discharge: HOME | End: 2024-07-11
Attending: INTERNAL MEDICINE
Payer: MEDICARE

## 2024-07-11 VITALS
OXYGEN SATURATION: 94 % | BODY MASS INDEX: 30.28 KG/M2 | RESPIRATION RATE: 18 BRPM | HEART RATE: 59 BPM | DIASTOLIC BLOOD PRESSURE: 76 MMHG | WEIGHT: 192.9 LBS | SYSTOLIC BLOOD PRESSURE: 138 MMHG | HEIGHT: 67 IN | TEMPERATURE: 96.9 F

## 2024-07-11 DIAGNOSIS — C34.31 MALIGNANT NEOPLASM OF LOWER LOBE OF RIGHT LUNG (HCC): Primary | ICD-10-CM

## 2024-07-11 PROCEDURE — 96413 CHEMO IV INFUSION 1 HR: CPT

## 2024-07-11 PROCEDURE — 96367 TX/PROPH/DG ADDL SEQ IV INF: CPT

## 2024-07-11 RX ORDER — ACETAMINOPHEN 325 MG/1
650 TABLET ORAL ONCE
Status: COMPLETED | OUTPATIENT
Start: 2024-07-11 | End: 2024-07-11

## 2024-07-11 RX ORDER — SODIUM CHLORIDE 9 MG/ML
20 INJECTION, SOLUTION INTRAVENOUS ONCE
Status: COMPLETED | OUTPATIENT
Start: 2024-07-11 | End: 2024-07-11

## 2024-07-11 RX ADMIN — ACETAMINOPHEN 650 MG: 325 TABLET, FILM COATED ORAL at 08:25

## 2024-07-11 RX ADMIN — SODIUM CHLORIDE 20 ML/HR: 0.9 INJECTION, SOLUTION INTRAVENOUS at 08:24

## 2024-07-11 RX ADMIN — ATEZOLIZUMAB 1200 MG: 1200 INJECTION, SOLUTION INTRAVENOUS at 09:27

## 2024-07-11 RX ADMIN — DEXAMETHASONE SODIUM PHOSPHATE 10 MG: 10 INJECTION, SOLUTION INTRAMUSCULAR; INTRAVENOUS at 08:48

## 2024-07-11 RX ADMIN — DIPHENHYDRAMINE HYDROCHLORIDE 25 MG: 50 INJECTION, SOLUTION INTRAMUSCULAR; INTRAVENOUS at 08:26

## 2024-07-11 NOTE — PROGRESS NOTES
Pt states he hasn't had a bowel movement in 6 days, has been taking 2 different stool softeners, advised to try miralax as directed.  Pt has GI consult 8/8 for ongoing GI issues.  Pt otherwise offers no new complaints today, tolerated tecentriq without complications. Confirmed next appt 8-1-24 0800, AVS declined.

## 2024-07-15 ENCOUNTER — TELEPHONE (OUTPATIENT)
Age: 69
End: 2024-07-15

## 2024-07-15 DIAGNOSIS — B37.0 ORAL THRUSH: Primary | ICD-10-CM

## 2024-07-15 NOTE — TELEPHONE ENCOUNTER
Patient called back after inquiring about having the magic mouthwash ordered for him again. Stated that he noticed white patches on his tongue and burning when he eats or drinks anything. Stated he has had this before in the beginning of his treatment and that the magic mouthwash seemed to help him. Patient requesting a call back to see if this can be ordered for him or when it is ordered.

## 2024-07-15 NOTE — TELEPHONE ENCOUNTER
Patient called back to inform him that Nystatin swish and spit will be sent to pharmacy for patient, and to call back if symptoms do not resolve. Pt verbalized understanding.

## 2024-07-15 NOTE — TELEPHONE ENCOUNTER
Nystatin swish and spit will be sent to pharmacy for patient, please let him know to call us back if symptoms do not resolve. Thanks.

## 2024-07-15 NOTE — TELEPHONE ENCOUNTER
Pt calling saying he is having the same symptoms that he was having when Dr. Gonzalez prescribed the Magic mouthwash.     Pt is hoping he can have some prescribed again.    Please call pt back to let him know if this can be completed.    Pt can be called back at [phone].

## 2024-07-16 ENCOUNTER — TELEPHONE (OUTPATIENT)
Age: 69
End: 2024-07-16

## 2024-07-16 NOTE — TELEPHONE ENCOUNTER
Please send prescription for Magic Mouthwash to [facility] in [place], phone number [phone].     He states [facility] never received the RX from yesterday and they don't have it in stock anyway.

## 2024-07-17 ENCOUNTER — TELEPHONE (OUTPATIENT)
Age: 69
End: 2024-07-17

## 2024-07-17 NOTE — TELEPHONE ENCOUNTER
Spoke with patient who stated neither pharmacy had his prescription for magic mouthwash. I clarified the prescription is for Nystatin for oral thrush and not magic mouthwash. I called [facility] who confirmed they do have the prescription and ready for . Information relayed back to [facility] and he appreciated call.

## 2024-07-25 ENCOUNTER — OFFICE VISIT (OUTPATIENT)
Age: 69
End: 2024-07-25
Payer: MEDICARE

## 2024-07-25 VITALS
DIASTOLIC BLOOD PRESSURE: 76 MMHG | SYSTOLIC BLOOD PRESSURE: 123 MMHG | HEART RATE: 65 BPM | HEIGHT: 67 IN | WEIGHT: 194.4 LBS | BODY MASS INDEX: 30.51 KG/M2

## 2024-07-25 DIAGNOSIS — M54.2 NECK PAIN: ICD-10-CM

## 2024-07-25 DIAGNOSIS — Z79.891 ENCOUNTER FOR LONG-TERM OPIATE ANALGESIC USE: ICD-10-CM

## 2024-07-25 DIAGNOSIS — F11.20 UNCOMPLICATED OPIOID DEPENDENCE (HCC): ICD-10-CM

## 2024-07-25 DIAGNOSIS — G89.29 CHRONIC MIDLINE LOW BACK PAIN WITHOUT SCIATICA: ICD-10-CM

## 2024-07-25 DIAGNOSIS — M47.812 CERVICAL SPONDYLOSIS WITHOUT MYELOPATHY: ICD-10-CM

## 2024-07-25 DIAGNOSIS — M54.12 CERVICAL RADICULOPATHY: ICD-10-CM

## 2024-07-25 DIAGNOSIS — M47.816 LUMBAR SPONDYLOSIS: ICD-10-CM

## 2024-07-25 DIAGNOSIS — M51.36 LUMBAR DEGENERATIVE DISC DISEASE: ICD-10-CM

## 2024-07-25 DIAGNOSIS — G89.4 CHRONIC PAIN SYNDROME: Primary | ICD-10-CM

## 2024-07-25 DIAGNOSIS — M54.50 CHRONIC MIDLINE LOW BACK PAIN WITHOUT SCIATICA: ICD-10-CM

## 2024-07-25 DIAGNOSIS — M79.18 MYOFASCIAL PAIN SYNDROME: ICD-10-CM

## 2024-07-25 DIAGNOSIS — M79.2 NEUROPATHIC PAIN: ICD-10-CM

## 2024-07-25 PROCEDURE — G2211 COMPLEX E/M VISIT ADD ON: HCPCS | Performed by: NURSE PRACTITIONER

## 2024-07-25 PROCEDURE — 99214 OFFICE O/P EST MOD 30 MIN: CPT | Performed by: NURSE PRACTITIONER

## 2024-07-25 RX ORDER — OXYCODONE AND ACETAMINOPHEN 7.5; 325 MG/1; MG/1
1 TABLET ORAL EVERY 8 HOURS PRN
Qty: 90 TABLET | Refills: 0 | Status: SHIPPED | OUTPATIENT
Start: 2024-08-22

## 2024-07-25 RX ORDER — OXYCODONE AND ACETAMINOPHEN 7.5; 325 MG/1; MG/1
1 TABLET ORAL EVERY 8 HOURS PRN
Qty: 90 TABLET | Refills: 0 | Status: SHIPPED | OUTPATIENT
Start: 2024-07-25

## 2024-07-25 NOTE — PROGRESS NOTES
Assessment:  1. Chronic pain syndrome    2. Neck pain    3. Cervical spondylosis without myelopathy    4. Cervical radiculopathy    5. Lumbar spondylosis    6. Neuropathic pain    7. Chronic midline low back pain without sciatica    8. Lumbar degenerative disc disease    9. Myofascial pain syndrome    10. Uncomplicated opioid dependence (HCC)    11. Encounter for long-term opiate analgesic use        Plan:  While the patient was in the office today, I did have a thorough conversation regarding their chronic pain syndrome, medication management, and treatment plan options.  Patient is being seen for a follow-up visit.  Overall, pain is fairly well-controlled with the use of oxycodone 7.5/325 every 8 hours as needed for pain.  He denies side effects from it.    Continue oxycodone 7.5/325 every 8 hours as needed for pain.  The patient's opioid scripts were sent to their pharmacy electronically and was given a 2 month supply of prescriptions with a Do Not Fill date(s) of 7/25/2024, 8/22/2024.    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  He is under the care of a neurologist.  He was previously tried on primidone which was discontinued due to side effects.  We had started him on Lyrica 50 mg 3 times daily which was discontinued by his oncologist.  He is not sure why it was discontinued.  I advised him to discuss restarting Lyrica with the oncologist to make sure there are no contraindications to doing so.  If the oncologist is okay with it, we will restart Lyrica, titrating to therapeutic dose.    [place] Prescription Drug Monitoring Program report was reviewed and was appropriate     A urine drug screen was collected at today's office visit as part of our medication management protocol. The point of care testing results were appropriate for what was being prescribed. The specimen will be sent for confirmatory testing. The drug screen is medically necessary because the patient is either dependent  on opioid medication or is being considered for opioid medication therapy and the results could impact ongoing or future treatment. The drug screen is to evaluate for the presences or absence of prescribed, non-prescribed, and/or illicit drugs/substances.    There are risks associated with opioid medications, including dependence, addiction and tolerance. The patient understands and agrees to use these medications only as prescribed. Potential side effects of the medications include, but are not limited to, constipation, drowsiness, addiction, impaired judgment and risk of fatal overdose if not taken as prescribed. The patient was warned against driving while taking sedation medications.  Sharing medications is a felony. At this point in time, the patient is showing no signs of addiction, abuse, diversion or suicidal ideation.    The patient will follow-up in 8 weeks for medication prescription refill and reevaluation. The patient was advised to contact the office should their symptoms worsen in the interim. The patient was agreeable and verbalized an understanding.        History of Present Illness:    The patient is a 69 y.o. male last seen on 5/13/2024 who presents for a follow up office visit in regards to chronic pain secondary to chronic pain syndrome, neck pain, cervical radiculopathy, cervical spondylosis, chronic low back pain, lumbar spondylosis, lumbar degenerative disc disease, neuropathic pain.  The patient currently reports complaints of neck pain, pain in both shoulders, low back pain, pain in his hands and feet.  Current pain level is an 8/10.  Quality pain is described as dull, sharp, throbbing.    Current pain medications includes: Oxycodone 7.5/325 every 8 hours as needed for pain .  The patient reports that this regimen is providing up to 50% pain relief.  The patient is reporting no side effects from this pain medication regimen.    Pain Contract Signed: 3/28/2024  Last Urine Drug Screen:  7/25/2024    I have personally reviewed and/or updated the patient's past medical history, past surgical history, family history, social history, current medications, allergies, and vital signs today.       Review of Systems:    Review of Systems   Eyes:  Negative for visual disturbance.   Respiratory:  Positive for shortness of breath. Negative for wheezing.    Cardiovascular:  Negative for chest pain and palpitations.   Gastrointestinal:  Positive for constipation. Negative for nausea.   Endocrine: Negative for polydipsia.   Musculoskeletal:  Positive for gait problem. Negative for joint swelling and myalgias.        Decreased range of motion  Joint stiffness   Skin:  Negative for rash.   Neurological:  Positive for dizziness. Negative for headaches.   Psychiatric/Behavioral:  Negative for decreased concentration.          Past Medical History:   Diagnosis Date    Abdominal aortic aneurysm without rupture ([facility])     Abdominal Aortic Duplex 02/21/2017    Ectatic infrarenal abdominal aorta.    MARYANN (acute kidney injury) ([facility]) 07/23/2022    CAD (coronary artery disease)     Cancer ([facility]) 2022    right lung CA    Chronic bronchitis ([facility]) 01/28/2019    Class 1 obesity due to excess calories with serious comorbidity and body mass index (BMI) of 31.0 to 31.9 in adult 02/14/2020    COPD (chronic obstructive pulmonary disease) ([facility])     Extremity pain     Hemiparesis ([facility]) 11/01/2022    History of echocardiogram 03/18/2014    EF 55%, mild MR and AI.  Mild concentric LVH.    History of stress test 03/06/2017    Normal.    Hyperlipidemia     Hypertension     Joint pain     Kidney stone     Low back pain     Lung cancer ([facility])     Migraine     Neck pain     Obstructive sleep apnea     cannot tolerate CPAP    Osteoarthritis     Other chronic pancreatitis ([facility]) 02/10/2023    Peripheral neuropathy     Reflex sympathetic dystrophy     Sacroiliitis ([facility]) 02/02/2022       Past Surgical History:   Procedure Laterality Date    CARDIAC  CATHETERIZATION  02/13/2012    EF 70%, widely patent renal arteries, significant single-vessel CAD-medical therapy.    CARDIAC CATHETERIZATION  04/11/2013    EF 65%, 50% mid LAD, 20% prox CFX, 90% diffuse RCA, 99% mid RCA. Medical management.    CATARACT EXTRACTION Right 09/19/2023    CHOLECYSTECTOMY      COLONOSCOPY      EPIDURAL BLOCK INJECTION Bilateral 08/15/2019    Procedure: BLOCK / INJECTION EPIDURAL STEROID CERVICAL;  Surgeon: Ricardo Park MD;  Location: [facility];  Service: Pain Management     FL GUIDED NEEDLE PLAC BX/ASP/INJ  08/15/2019    IR BIOPSY LUNG  09/13/2022    IR THORACIC DUCT EMBOLIZATION  11/22/2022    [facility]OPEDIC SURGERY      [facility] W/CELL WASHG SPX N/A 11/16/2022    Procedure: BRONCHOSCOPY FLEXIBLE;  Surgeon: Gulshan Madison MD;  Location: [facility];  Service: Thoracic    MN THORACOSCOPY W/LOBECTOMY SINGLE LOBE Right 11/16/2022    Procedure: LOBECTOMY LUNG; lower lobe superior segmentectomy, mediastinal lymph node dissection;  Surgeon: Gulshan Madison MD;  [facility];  Service: Thoracic    MN THORACOSCOPY W/SEGMENTECTOMY Right 11/16/2022    Procedure: THORACOSCOPY VIDEO ASSISTED SURGERY (VATS);  Surgeon: Gulshan Madison MD;  [facility]: [facility];  Service: Thoracic    TRIGGER POINT INJECTION         Family History   Adopted: Yes   Problem Relation Age of Onset    No Known Problems Family     No Known Problems Mother     No Known Problems Father        Social History     Occupational History    Not on file   Tobacco Use    Smoking status: Every Day     Current packs/day: 1.00     Average packs/day: 1 pack/day for 0.7 years (0.7 ttl pk-yrs)     Types: Cigarettes     Start date: 11/2023    Smokeless tobacco: Never   Vaping Use    Vaping status: Never Used   Substance and Sexual Activity    Alcohol use: Not Currently    Drug use: Never    Sexual activity: Yes         Current Outpatient Medications:     albuterol (Ventolin  HFA) 90 mcg/act inhaler, Inhale 2 puffs every 6 (six) hours as needed for wheezing, Disp: 18 g, Rfl: 5    amLODIPine (NORVASC) 10 mg tablet, swallow 1 tablet once daily, Disp: 90 tablet, Rfl: 1    bisacodyl (DULCOLAX) 5 mg EC tablet, Take 1 tablet (5 mg total) by mouth daily as needed for constipation for up to 4 doses, Disp: 30 tablet, Rfl: 0    fluticasone (FLONASE) 50 mcg/act nasal spray, 1 spray into each nostril daily for 14 days, Disp: 11.1 mL, Rfl: 0    glucose blood (OneTouch Verio) test strip, Test once daily, Disp: 100 each, Rfl: 0    lisinopril (ZESTRIL) 10 mg tablet, take 1 tablet by mouth once daily, Disp: 90 tablet, Rfl: 1    metoprolol succinate (TOPROL-XL) 100 mg 24 hr tablet, take 1 tablet by mouth once daily, Disp: 90 tablet, Rfl: 3    nystatin (MYCOSTATIN) 500,000 units/5 mL suspension, Apply 5 mL (500,000 Units total) to the mouth or throat 4 (four) times a day, Disp: 100 mL, Rfl: 0    ondansetron (ZOFRAN) 8 mg tablet, Take 1 tablet (8 mg total) by mouth every 8 (eight) hours as needed for nausea or vomiting, Disp: 20 tablet, Rfl: 2    [START ON 8/22/2024] oxyCODONE-acetaminophen (Percocet) 7.5-325 MG per tablet, Take 1 tablet by mouth every 8 (eight) hours as needed for moderate pain Max Daily Amount: 3 tablets Do not start before August 22, 2024., Disp: 90 tablet, Rfl: 0    oxyCODONE-acetaminophen (Percocet) 7.5-325 MG per tablet, Take 1 tablet by mouth every 8 (eight) hours as needed for moderate pain Max Daily Amount: 3 tablets, Disp: 90 tablet, Rfl: 0    pantoprazole (PROTONIX) 40 mg tablet, Take 1 tablet (40 mg total) by mouth daily, Disp: 30 tablet, Rfl: 3    primidone (MYSOLINE) 50 mg tablet, Take 0.5 tablets (25 mg total) by mouth daily in the early morning (Patient taking differently: Take 50 mg by mouth daily in the early morning), Disp: 30 tablet, Rfl: 3    rosuvastatin (CRESTOR) 40 MG tablet, Take 1 tablet (40 mg total) by mouth daily, Disp: 90 tablet, Rfl: 3    sildenafil (VIAGRA)  "100 mg tablet, Take 1 tablet (100 mg total) by mouth as needed for erectile dysfunction, Disp: 20 tablet, Rfl: 0    tamsulosin (FLOMAX) 0.4 mg, Take 1 capsule (0.4 mg total) by mouth daily with dinner for 3 days, Disp: 3 capsule, Rfl: 0    tiotropium-olodaterol (Stiolto Respimat) 2.5-2.5 MCG/ACT inhaler, Inhale 2 puffs daily, Disp: 4 g, Rfl: 5    traZODone (DESYREL) 100 mg tablet, take 1 tablet by mouth daily at bedtime, Disp: 90 tablet, Rfl: 3    naloxone (NARCAN) 4 mg/0.1 mL nasal spray, Administer 1 spray into a nostril. If no response after 2-3 minutes, give another dose in the other nostril using a new spray. (Patient not taking: Reported on 5/20/2024), Disp: 1 each, Rfl: 1    nitroglycerin (NITROSTAT) 0.4 mg SL tablet, Place 1 tablet (0.4 mg total) under the tongue every 5 (five) minutes as needed for chest pain (Patient not taking: Reported on 5/20/2024), Disp: 25 tablet, Rfl: 5    Restasis 0.05 % ophthalmic emulsion, , Disp: , Rfl:     No Known Allergies    Physical Exam:    /76 (BP Location: Left arm, Patient Position: Sitting, Cuff Size: Large)   Pulse 65   Ht 5' 7\" (1.702 m)   Wt 88.2 kg (194 lb 6.4 oz)   BMI 30.45 kg/m²     Constitutional:normal, well developed, well nourished, alert, in no distress and non-toxic and no overt pain behavior.  Eyes:anicteric  HEENT:grossly intact  Neck:supple, symmetric, trachea midline and no masses   Pulmonary:even and unlabored  Cardiovascular:No edema or pitting edema present  Skin:Normal without rashes or lesions and well hydrated  Psychiatric:Mood and affect appropriate  Neurologic:Cranial Nerves II-XII grossly intact  Musculoskeletal: Gait is slow and guarded.      Imaging  No orders to display         No orders of the defined types were placed in this encounter.      "

## 2024-07-26 ENCOUNTER — OFFICE VISIT (OUTPATIENT)
Dept: CARDIOLOGY CLINIC | Facility: CLINIC | Age: 69
End: 2024-07-26
Payer: MEDICARE

## 2024-07-26 VITALS
WEIGHT: 196 LBS | SYSTOLIC BLOOD PRESSURE: 136 MMHG | HEART RATE: 88 BPM | HEIGHT: 68 IN | BODY MASS INDEX: 29.7 KG/M2 | DIASTOLIC BLOOD PRESSURE: 78 MMHG

## 2024-07-26 DIAGNOSIS — I71.40 ABDOMINAL AORTIC ANEURYSM (AAA) WITHOUT RUPTURE, UNSPECIFIED PART (HCC): ICD-10-CM

## 2024-07-26 DIAGNOSIS — I25.10 CORONARY ARTERY DISEASE INVOLVING NATIVE CORONARY ARTERY OF NATIVE HEART WITHOUT ANGINA PECTORIS: ICD-10-CM

## 2024-07-26 DIAGNOSIS — F17.200 SMOKING: ICD-10-CM

## 2024-07-26 DIAGNOSIS — I10 ESSENTIAL HYPERTENSION: ICD-10-CM

## 2024-07-26 DIAGNOSIS — I10 PRIMARY HYPERTENSION: ICD-10-CM

## 2024-07-26 DIAGNOSIS — I25.118 CORONARY ARTERY DISEASE OF NATIVE ARTERY OF NATIVE HEART WITH STABLE ANGINA PECTORIS (HCC): ICD-10-CM

## 2024-07-26 DIAGNOSIS — E78.2 MIXED HYPERLIPIDEMIA: Primary | ICD-10-CM

## 2024-07-26 PROCEDURE — 93000 ELECTROCARDIOGRAM COMPLETE: CPT | Performed by: INTERNAL MEDICINE

## 2024-07-26 PROCEDURE — 99214 OFFICE O/P EST MOD 30 MIN: CPT | Performed by: INTERNAL MEDICINE

## 2024-07-26 RX ORDER — SODIUM CHLORIDE 9 MG/ML
20 INJECTION, SOLUTION INTRAVENOUS ONCE
Status: CANCELLED | OUTPATIENT
Start: 2024-08-01

## 2024-07-26 RX ORDER — AMLODIPINE BESYLATE 10 MG/1
10 TABLET ORAL DAILY
Qty: 90 TABLET | Refills: 3 | Status: SHIPPED | OUTPATIENT
Start: 2024-07-26

## 2024-07-26 RX ORDER — ASPIRIN 81 MG/1
81 TABLET, CHEWABLE ORAL DAILY
Start: 2024-07-26

## 2024-07-26 RX ORDER — LISINOPRIL 10 MG/1
10 TABLET ORAL DAILY
Qty: 90 TABLET | Refills: 3 | Status: SHIPPED | OUTPATIENT
Start: 2024-07-26

## 2024-07-26 RX ORDER — METOPROLOL SUCCINATE 100 MG/1
100 TABLET, EXTENDED RELEASE ORAL DAILY
Qty: 90 TABLET | Refills: 3 | Status: SHIPPED | OUTPATIENT
Start: 2024-07-26

## 2024-07-26 RX ORDER — NITROGLYCERIN 0.4 MG/1
0.4 TABLET SUBLINGUAL
Qty: 25 TABLET | Refills: 5 | Status: SHIPPED | OUTPATIENT
Start: 2024-07-26

## 2024-07-26 RX ORDER — ACETAMINOPHEN 325 MG/1
650 TABLET ORAL ONCE
Status: CANCELLED | OUTPATIENT
Start: 2024-08-01 | End: 2024-08-02

## 2024-07-26 NOTE — ASSESSMENT & PLAN NOTE
Lab Results   Component Value Date    CREATININE 1 25 03/23/2023    CREATININE 1 01 03/02/2023    CREATININE 0 91 02/10/2023    EGFR 58 03/23/2023    EGFR 76 03/02/2023    EGFR 86 02/10/2023   Chart review done today  I personally reviewed previous lab results   -Follow-up in 4 weeks with repeat set of labs given the patient was on chemotherapy  balance training/bed mobility training/gait training/strengthening/transfer training

## 2024-07-26 NOTE — PROGRESS NOTES
Patient ID: Lance Hazel is a 69 y.o. male.        Plan:      Hypertension  Controlled    Coronary artery disease of native artery of native heart with stable angina pectoris (HCC)  Cont same  Call if sx escalate for consideration of stress testing    Smoking  Encouraged cessation    Hyperlipidemia  On proper statin    Abdominal aortic aneurysm (AAA) without rupture  Stable at 3.5 cm       Follow up Plan/Other summary comments:  Advised no med changes today, refilled several.  24 bloodwork reviewed.  LP ordered.  Return in about 6 months (around 1/26/2025).    HPI: Avery is here for routine FU.  Hasn't had any alarming chest pains nor progressive unusual dyspnea of concern.  No palps nor (pre)syncope.  No tia or aislinn sx.  No edema.  Still smoking;.      Results for orders placed or performed in visit on 07/26/24   POCT ECG    Impression    NSR 70 bpm, wnl         Most recent or relevant cardiac/vascular testing:    10/22 NSTNormal left ventricular systolic function.    No evidence for prior myocardial infarction.    No evidence for ischemia by perfusion imaging.     Remote cath severe single vessel RCA disease    Past Surgical History:   Procedure Laterality Date    CARDIAC CATHETERIZATION  02/13/2012    EF 70%, widely patent renal arteries, significant single-vessel CAD-medical therapy.    CARDIAC CATHETERIZATION  04/11/2013    EF 65%, 50% mid LAD, 20% prox CFX, 90% diffuse RCA, 99% mid RCA. Medical management.    CATARACT EXTRACTION Right 09/19/2023    CHOLECYSTECTOMY      COLONOSCOPY      EPIDURAL BLOCK INJECTION Bilateral 08/15/2019    Procedure: BLOCK / INJECTION EPIDURAL STEROID CERVICAL;  Surgeon: Ricardo Park MD;  [facility];  Service: Pain Management     FL GUIDED NEEDLE PLAC BX/ASP/INJ  08/15/2019    IR BIOPSY LUNG  09/13/2022    IR THORACIC DUCT EMBOLIZATION  11/22/2022    ORTHOPEDIC SURGERY      [facility] INCL FLUOR GDNCE DX W/CELL WASHG SPX N/A 11/16/2022    Procedure:  "BRONCHOSCOPY FLEXIBLE;  Surgeon: Gulshan Madison MD;  [facility];  Service: Thoracic    ND THORACOSCOPY W/LOBECTOMY SINGLE LOBE Right 11/16/2022    Procedure: LOBECTOMY LUNG; lower lobe superior segmentectomy, mediastinal lymph node dissection;  Surgeon: Gulshan Madison MD;  [facility];  Service: Thoracic    ND THORACOSCOPY W/SEGMENTECTOMY Right 11/16/2022    Procedure: THORACOSCOPY VIDEO ASSISTED SURGERY (VATS);  Surgeon: Gulshan Madison MD;  [facility];  Service: Thoracic    TRIGGER POINT INJECTION         Lipid Profile: Reviewed      Review of Systems   10  point ROS  was otherwise non pertinent or negative except as per HPI or as below.           Objective:     /78   Pulse 88   Ht 5' 8\" (1.727 m)   Wt 88.9 kg (196 lb)   BMI 29.80 kg/m²     PHYSICAL EXAM:    General:  Normal appearance in no distress.  Eyes:  Anicteric.  Oral mucosa:  Moist.  Neck:  No JVD. Carotid upstrokes are brisk without bruits.  No masses.  Chest:  Clear to auscultation, with marked decreased BS bl.  Cardiac:  Regular No palpable PMI.  Normal S1 and S2.  No murmur gallop or rub.  Abdomen:  Soft and nontender. No palpable organomegaly or aortic enlargement.  Extremities:  No peripheral edema.  Musculoskeletal:  Symmetric.   Vascular:  Pedal pulses are intact.  Neuro:  Grossly symmetric.  Psych:  Alert and oriented x3.      Meds reviewed.    Past Medical History:   Diagnosis Date    Abdominal aortic aneurysm without rupture (HCC)     Abdominal Aortic Duplex 02/21/2017    Ectatic infrarenal abdominal aorta.    MARYANN (acute kidney injury) ([facility]) 07/23/2022    CAD (coronary artery disease)     Cancer ([facility]) 2022    right lung CA    Chronic bronchitis ([facility]) 01/28/2019    Class 1 obesity due to excess calories with serious comorbidity and body mass index (BMI) of 31.0 to 31.9 in adult 02/14/2020    COPD (chronic obstructive pulmonary disease) ([facility])     Extremity pain     Hemiparesis " ([facility]) 11/01/2022    History of echocardiogram 03/18/2014    EF 55%, mild MR and AI.  Mild concentric LVH.    History of stress test 03/06/2017    Normal.    Hyperlipidemia     Hypertension     Joint pain     Kidney stone     Low back pain     Lung cancer ([facility])     Migraine     Neck pain     Obstructive sleep apnea     cannot tolerate CPAP    Osteoarthritis     Other chronic pancreatitis ([facility]) 02/10/2023    Peripheral neuropathy     Reflex sympathetic dystrophy     Sacroiliitis ([facility]) 02/02/2022           Social History     Tobacco Use   Smoking Status Every Day    Current packs/day: 1.00    Average packs/day: 1 pack/day for 0.7 years (0.7 ttl pk-yrs)    Types: Cigarettes    Start date: 11/2023   Smokeless Tobacco Never       Current Outpatient Medications:     albuterol (Ventolin HFA) 90 mcg/act inhaler, Inhale 2 puffs every 6 (six) hours as needed for wheezing, Disp: 18 g, Rfl: 5    amLODIPine (NORVASC) 10 mg tablet, Take 1 tablet (10 mg total) by mouth daily, Disp: 90 tablet, Rfl: 3    aspirin 81 mg chewable tablet, Chew 1 tablet (81 mg total) daily, Disp: , Rfl:     bisacodyl (DULCOLAX) 5 mg EC tablet, Take 1 tablet (5 mg total) by mouth daily as needed for constipation for up to 4 doses, Disp: 30 tablet, Rfl: 0    glucose blood (OneTouch Verio) test strip, Test once daily, Disp: 100 each, Rfl: 0    lisinopril (ZESTRIL) 10 mg tablet, Take 1 tablet (10 mg total) by mouth daily, Disp: 90 tablet, Rfl: 3    metoprolol succinate (TOPROL-XL) 100 mg 24 hr tablet, Take 1 tablet (100 mg total) by mouth daily, Disp: 90 tablet, Rfl: 3    nitroglycerin (NITROSTAT) 0.4 mg SL tablet, Place 1 tablet (0.4 mg total) under the tongue every 5 (five) minutes as needed for chest pain, Disp: 25 tablet, Rfl: 5    nystatin (MYCOSTATIN) 500,000 units/5 mL suspension, Apply 5 mL (500,000 Units total) to the mouth or throat 4 (four) times a day, Disp: 100 mL, Rfl: 0    [START ON 8/22/2024] oxyCODONE-acetaminophen (Percocet) 7.5-325 MG per  tablet, Take 1 tablet by mouth every 8 (eight) hours as needed for moderate pain Max Daily Amount: 3 tablets Do not start before August 22, 2024., Disp: 90 tablet, Rfl: 0    Restasis 0.05 % ophthalmic emulsion, , Disp: , Rfl:     rosuvastatin (CRESTOR) 40 MG tablet, Take 1 tablet (40 mg total) by mouth daily, Disp: 90 tablet, Rfl: 3    sildenafil (VIAGRA) 100 mg tablet, Take 1 tablet (100 mg total) by mouth as needed for erectile dysfunction, Disp: 20 tablet, Rfl: 0    tiotropium-olodaterol (Stiolto Respimat) 2.5-2.5 MCG/ACT inhaler, Inhale 2 puffs daily, Disp: 4 g, Rfl: 5    traZODone (DESYREL) 100 mg tablet, take 1 tablet by mouth daily at bedtime, Disp: 90 tablet, Rfl: 3    fluticasone (FLONASE) 50 mcg/act nasal spray, 1 spray into each nostril daily for 14 days, Disp: 11.1 mL, Rfl: 0    naloxone (NARCAN) 4 mg/0.1 mL nasal spray, Administer 1 spray into a nostril. If no response after 2-3 minutes, give another dose in the other nostril using a new spray. (Patient not taking: Reported on 5/20/2024), Disp: 1 each, Rfl: 1    ondansetron (ZOFRAN) 8 mg tablet, Take 1 tablet (8 mg total) by mouth every 8 (eight) hours as needed for nausea or vomiting (Patient not taking: Reported on 7/26/2024), Disp: 20 tablet, Rfl: 2    oxyCODONE-acetaminophen (Percocet) 7.5-325 MG per tablet, Take 1 tablet by mouth every 8 (eight) hours as needed for moderate pain Max Daily Amount: 3 tablets (Patient not taking: Reported on 7/26/2024), Disp: 90 tablet, Rfl: 0    pantoprazole (PROTONIX) 40 mg tablet, Take 1 tablet (40 mg total) by mouth daily (Patient not taking: Reported on 7/26/2024), Disp: 30 tablet, Rfl: 3    primidone (MYSOLINE) 50 mg tablet, Take 0.5 tablets (25 mg total) by mouth daily in the early morning (Patient not taking: Reported on 7/26/2024), Disp: 30 tablet, Rfl: 3    tamsulosin (FLOMAX) 0.4 mg, Take 1 capsule (0.4 mg total) by mouth daily with dinner for 3 days, Disp: 3 capsule, Rfl: 0

## 2024-07-26 NOTE — PATIENT INSTRUCTIONS
"Patient Education     Coronary artery disease   The Basics   Written by the doctors and editors at [facility]   What is coronary artery disease? -- Coronary artery disease is a condition that puts you at risk for heart attack and other forms of heart disease. In people who have coronary artery disease, the arteries that supply blood to the heart get clogged with fatty deposits (figure 1).  Other names for this disease are \"coronary heart disease\" or just \"heart disease.\"  What are the symptoms of coronary artery disease? -- Many people have no symptoms. For those who do, the most common symptoms usually happen with exercise. They can include:   Pain, pressure, or discomfort in the center of the chest - This type of chest pain is called \"angina.\"   Pain, tingling, or discomfort in other parts of the upper body - This might include the arms, back, neck, jaw, or stomach.   Feeling short of breath  What are the symptoms of a heart attack? -- The first symptom of coronary artery disease can be a heart attack (figure 2). That's why it is so important to know how to spot a heart attack.  The symptoms of a heart attack can include:   Pain, pressure, or discomfort in the center of the chest   Pain, tingling, or discomfort in other parts of the upper body, including the arms, back, neck, jaw, or stomach   Shortness of breath   Nausea, vomiting, burping, or heartburn   Sweating or cold, clammy skin   Racing or uneven heartbeat   Feeling dizzy or lightheaded  If these symptoms last more than 10 minutes or they keep coming and going, call for an ambulance right away (in the [place] and [place], call 9-1-1). Do not try to get to the hospital on your own.  Some people with coronary artery disease have chest pain even when they are not having a heart attack. This is most likely to happen when they are walking, going up stairs, or moving around. But if you have chest pain that is new or different, see a doctor right away.  Is there a test " "for coronary artery disease? -- Yes. If your doctor or nurse thinks that you might have coronary artery disease, they might order blood tests and 1 or more of these tests:   Electrocardiogram (\"ECG\") - This test measures the electrical activity in your heart.   Stress test - This is also called an exercise test. For this test, you might be asked to run or walk on a treadmill while you also have an ECG. Physical activity increases the heart's need for blood. This test helps doctors see if the heart is getting enough blood. If you cannot walk or run, your doctor might give you a medicine to make your heart pump faster.   Echocardiogram - This test uses sound waves to create an image of your heart as it beats.   Cardiac catheterization (\"cardiac cath\") - During this test, the doctor puts a thin tube into a blood vessel in your leg or arm. Then, they move the tube up to your heart. Next, the doctor puts a dye that shows up on X-ray into the tube. This part of the test is called \"coronary angiography.\" It can show whether any of the arteries in your heart are clogged.  How is coronary artery disease treated? -- The main treatments for coronary artery disease are:   Lifestyle changes - To reduce your risk of heart attack and death, you should:   Quit smoking, if you smoke. Your doctor or nurse can help with this.   Eat lots of fruits, vegetables, and foods with a lot of fiber. Avoid foods with a lot of sugar.   Walk or do some form of physical activity on most days of the week.   Try to lose weight, if you have excess body weight.   Medicines - The medicines to treat heart disease are very important. Some medicines lower your risk of heart attacks and can help you live longer. But you must take them every day, as instructed. Your doctor might prescribe:   Medicines called \"statins,\" which lower cholesterol   Medicines to lower blood pressure   Aspirin or other medicines that help prevent blood clots   Medicines to treat " "diabetes  People who have chest pain caused by coronary artery disease (called angina) can also get medicines to relive their pain. These medicines might include \"nitrates,\" \"beta blockers,\" and others.  Some people with coronary artery disease can also have:   Stenting - The doctor puts a thin plastic tube into the blocked artery, and uses a tiny balloon to open the blockage. Then, the doctor leaves a tiny mesh tube called a \"stent\" inside the artery to hold it open.   Bypass surgery - This is also known as \"coronary artery bypass grafting\" (\"CABG\"). During this surgery, the doctor removes a piece of blood vessel from another part of the body. Then, they reattach the blood vessel above and below the area that is clogged. This re-routes blood around the clog and allows it to get to the part of the heart that was not getting blood (figure 3).  If your doctor recommends stenting or bypass surgery, ask these questions:   What are the benefits of this procedure for me? Will it help me live longer? Will it reduce my chance of having a heart attack? Will I feel better if I have this procedure?   What are the risks of this procedure?   What happens if I don't have this procedure?  All topics are updated as new evidence becomes available and our peer review process is complete.  This topic retrieved from [employer] on: Apr 13, 2024.  Topic 58634 Version 33.0  Release: 32.3.2 - C32.102  © 2024 UpToDate, Inc. and/or its affiliates. All rights reserved.  figure 1: Coronary heart disease     In people with coronary heart disease, the coronary arteries get clogged with fatty deposits called plaques.  Graphic 07508 Version 5.0  figure 2: Heart attack symptoms     This picture shows the main symptoms of a heart attack. People who are having a heart attack often have only some of these symptoms. The pain, pressure, and discomfort caused by a heart attack mostly affect the left side of the body, but can also affect the right.  Women " "are more likely than men to have to have symptoms other than chest pain. But chest pain or discomfort is the most common symptom of a heart attack in both women and men.  If you think that you are having a heart attack, call for an ambulance (in the [place] and [place], call 9-1-1). Do not try to get yourself to the hospital.  Graphic 61120 Version 4.0  figure 3: Coronary artery bypass graft surgery     During coronary artery bypass surgery, the surgeon removes a piece of blood vessel from the leg, chest, arm, or belly. Then, the surgeon uses that piece of blood vessel (called a \"graft\") to reroute blood around the blocked artery. The surgery is called \"bypass surgery\" because it bypasses the blockage. Some people have more than 1 blocked artery bypassed. In this picture, the graft came from a vein in the leg called the \"saphenous vein.\" But grafts can come from other places, too.  Graphic 94310 Version 6.0  Consumer Information Use and Disclaimer   Disclaimer: This generalized information is a limited summary of diagnosis, treatment, and/or medication information. It is not meant to be comprehensive and should be used as a tool to help the user understand and/or assess potential diagnostic and treatment options. It does NOT include all information about conditions, treatments, medications, side effects, or risks that may apply to a specific patient. It is not intended to be medical advice or a substitute for the medical advice, diagnosis, or treatment of a health care provider based on the health care provider's examination and assessment of a patient's specific and unique circumstances. Patients must speak with a health care provider for complete information about their health, medical questions, and treatment options, including any risks or benefits regarding use of medications. This information does not endorse any treatments or medications as safe, effective, or approved for treating a specific patient. UpToDate, " Inc. and its affiliates disclaim any warranty or liability relating to this information or the use thereof.The use of this information is governed by the Terms of Use, available at https://www.wolterskluwer.com/en/know/clinical-effectiveness-terms. 2024© [employer] and its affiliates and/or licensors. All rights reserved.  Copyright   © 2024 [employer] and/or its affiliates. All rights reserved.

## 2024-07-29 LAB
6MAM UR QL CFM: NEGATIVE NG/ML
7AMINOCLONAZEPAM UR QL CFM: NEGATIVE NG/ML
A-OH ALPRAZ UR QL CFM: NEGATIVE NG/ML
ACCEPTABLE CREAT UR QL: NORMAL MG/DL
ACCEPTIBLE SP GR UR QL: NORMAL
AMITRIP UR QL CFM: NEGATIVE NG/ML
AMPHET UR QL CFM: NEGATIVE NG/ML
AMPHET UR QL CFM: NEGATIVE NG/ML
BUPRENORPHINE UR QL CFM: NEGATIVE NG/ML
BUTALBITAL UR QL CFM: NEGATIVE NG/ML
BZE UR QL CFM: NEGATIVE NG/ML
CARISOPRODOL UR QL CFM: NEGATIVE NG/ML
CODEINE UR QL CFM: NEGATIVE NG/ML
EDDP UR QL CFM: NEGATIVE NG/ML
ETHYL GLUCURONIDE UR QL CFM: NEGATIVE NG/ML
ETHYL SULFATE UR QL SCN: NEGATIVE NG/ML
FENTANYL UR QL CFM: NEGATIVE NG/ML
FLUOXETINE UR QL CFM: NEGATIVE NG/ML
GLUCOSE 30M P 50 G LAC PO SERPL-MCNC: NEGATIVE NG/ML
HYDROCODONE UR QL CFM: NEGATIVE NG/ML
HYDROCODONE UR QL CFM: NEGATIVE NG/ML
HYDROMORPHONE UR QL CFM: NEGATIVE NG/ML
LAMOTRIGINE UR QL CFM: NEGATIVE NG/ML
LORAZEPAM UR QL CFM: NEGATIVE NG/ML
MDMA UR QL CFM: NEGATIVE NG/ML
MEPERIDINE UR QL CFM: NEGATIVE NG/ML
MEPROBAMATE UR QL CFM: NEGATIVE NG/ML
METHADONE UR QL CFM: NEGATIVE NG/ML
METHAMPHET UR QL CFM: NEGATIVE NG/ML
MORPHINE UR QL CFM: NEGATIVE NG/ML
MORPHINE UR QL CFM: NEGATIVE NG/ML
NITRITE UR QL: NORMAL UG/ML
NORBUPRENORPHINE UR QL CFM: NEGATIVE NG/ML
NORDIAZEPAM UR QL CFM: NEGATIVE NG/ML
NORFENTANYL UR QL CFM: NEGATIVE NG/ML
NORFLUOXETINE UR QL CFM: NEGATIVE NG/ML
NORHYDROCODONE UR QL CFM: NEGATIVE NG/ML
NORHYDROCODONE UR QL CFM: NEGATIVE NG/ML
NORMEPERIDINE UR QL CFM: NEGATIVE NG/ML
NOROXYCODONE UR QL CFM: NORMAL NG/ML
NORTRIP UR QL CFM: NEGATIVE NG/ML
OLANZAPINE QUANTIFICATION: NEGATIVE NG/ML
OPC-3373 QUANTIFICATION: NEGATIVE NG/ML
OXAZEPAM UR QL CFM: NEGATIVE NG/ML
OXYCODONE UR QL CFM: NORMAL NG/ML
OXYMORPHONE UR QL CFM: NORMAL NG/ML
OXYMORPHONE UR QL CFM: NORMAL NG/ML
PARA-FLUOROFENTANYL QUANTIFICATION: NORMAL NG/ML
PCP UR QL CFM: NEGATIVE NG/ML
PROLACTIN SERPL-MCNC: NEGATIVE NG/ML
RESULT ALL_PRESCRIBED MEDS AND SPECIAL INSTRUCTIONS: NORMAL
SL AMB 4-ANPP QUANTIFICATION: NORMAL NG/ML
SL AMB 5F-ADB-M7 METABOLITE QUANTIFICATION: NEGATIVE NG/ML
SL AMB AB-FUBINACA-M3 METABOLITE QUANTIFICATION: NEGATIVE NG/ML
SL AMB ACETYL FENTANYL QUANTIFICATION: NORMAL NG/ML
SL AMB ACETYL NORFENTANYL QUANTIFICATION: NORMAL NG/ML
SL AMB ACRYL FENTANYL QUANTIFICATION: NORMAL NG/ML
SL AMB CARFENTANIL QUANTIFICATION: NORMAL NG/ML
SL AMB CITALOPRAM/ESCITALOPRAM QUANTIFICATION: NEGATIVE NG/ML
SL AMB CITALOPRAM/ESCITALOPRAM QUANTIFICATION: NEGATIVE NG/ML
SL AMB CLOZAPINE QUANTIFICATION: NEGATIVE NG/ML
SL AMB CTHC (MARIJUANA METABOLITE) QUANTIFICATION: NEGATIVE NG/ML
SL AMB CZOLPIDEM (ZOLPIDEM METABOLITE) QUANTIFICATION: NEGATIVE NG/ML
SL AMB HYDROXYBUPROPION QUANTIFICATION: NEGATIVE NG/ML
SL AMB HYDROXYRISPERIDONE QUANTIFICATION: NEGATIVE NG/ML
SL AMB JWH018 METABOLITE QUANTIFICATION: NEGATIVE NG/ML
SL AMB JWH073 METABOLITE QUANTIFICATION: NEGATIVE NG/ML
SL AMB MDMB-FUBINACA-M1 METABOLITE QUANTIFICATION: NEGATIVE NG/ML
SL AMB N-DESMETHYL-TRAMADOL QUANTIFICATION: NEGATIVE NG/ML
SL AMB N-DESMETHYLCLOZAPINE QUANTIFICATION: NEGATIVE NG/ML
SL AMB NORQUETIAPINE QUANTIFICATION: NEGATIVE NG/ML
SL AMB PHENTERMINE QUANTIFICATION: NEGATIVE NG/ML
SL AMB QUETIAPINE QUANTIFICATION: NEGATIVE NG/ML
SL AMB RCS4 METABOLITE QUANTIFICATION: NEGATIVE NG/ML
SL AMB RISPERIDONE QUANTIFICATION: NEGATIVE NG/ML
SPECIMEN DRAWN SERPL: NEGATIVE NG/ML
SPECIMEN PH ACCEPTABLE UR: NORMAL
TAPENTADOL UR QL CFM: NEGATIVE NG/ML
TEMAZEPAM UR QL CFM: NEGATIVE NG/ML
TEMAZEPAM UR QL CFM: NEGATIVE NG/ML
TRAMADOL UR QL CFM: NEGATIVE NG/ML
TRAZODONE UR QL CFM: NORMAL NG/ML
URATE/CREAT 24H UR: NEGATIVE NG/ML

## 2024-07-31 ENCOUNTER — APPOINTMENT (OUTPATIENT)
Dept: LAB | Facility: HOSPITAL | Age: 69
End: 2024-07-31
Payer: MEDICARE

## 2024-07-31 DIAGNOSIS — E78.2 MIXED HYPERLIPIDEMIA: ICD-10-CM

## 2024-07-31 DIAGNOSIS — R73.03 PREDIABETES: ICD-10-CM

## 2024-07-31 DIAGNOSIS — C34.31 MALIGNANT NEOPLASM OF LOWER LOBE OF RIGHT LUNG (HCC): ICD-10-CM

## 2024-07-31 LAB
CHOLEST SERPL-MCNC: 100 MG/DL
EST. AVERAGE GLUCOSE BLD GHB EST-MCNC: 146 MG/DL
GLUCOSE P FAST SERPL-MCNC: 151 MG/DL (ref 65–99)
HBA1C MFR BLD: 6.7 %
HDLC SERPL-MCNC: 30 MG/DL
LDLC SERPL CALC-MCNC: 34 MG/DL (ref 0–100)
NONHDLC SERPL-MCNC: 70 MG/DL
TRIGL SERPL-MCNC: 182 MG/DL
TSH SERPL DL<=0.05 MIU/L-ACNC: 0.75 UIU/ML (ref 0.45–4.5)

## 2024-07-31 PROCEDURE — 83036 HEMOGLOBIN GLYCOSYLATED A1C: CPT

## 2024-07-31 PROCEDURE — 80061 LIPID PANEL: CPT

## 2024-07-31 PROCEDURE — 82947 ASSAY GLUCOSE BLOOD QUANT: CPT

## 2024-07-31 PROCEDURE — 84443 ASSAY THYROID STIM HORMONE: CPT

## 2024-07-31 PROCEDURE — 36415 COLL VENOUS BLD VENIPUNCTURE: CPT

## 2024-08-01 ENCOUNTER — HOSPITAL ENCOUNTER (OUTPATIENT)
Dept: INFUSION CENTER | Facility: HOSPITAL | Age: 69
Discharge: HOME | End: 2024-08-01
Attending: INTERNAL MEDICINE
Payer: MEDICARE

## 2024-08-01 ENCOUNTER — APPOINTMENT (OUTPATIENT)
Dept: LAB | Facility: HOSPITAL | Age: 69
End: 2024-08-01
Payer: MEDICARE

## 2024-08-01 VITALS
HEART RATE: 62 BPM | RESPIRATION RATE: 17 BRPM | OXYGEN SATURATION: 97 % | DIASTOLIC BLOOD PRESSURE: 78 MMHG | SYSTOLIC BLOOD PRESSURE: 132 MMHG | TEMPERATURE: 97.1 F

## 2024-08-01 DIAGNOSIS — C34.31 MALIGNANT NEOPLASM OF LOWER LOBE OF RIGHT LUNG (HCC): Primary | ICD-10-CM

## 2024-08-01 DIAGNOSIS — C34.31 MALIGNANT NEOPLASM OF LOWER LOBE OF RIGHT LUNG (HCC): ICD-10-CM

## 2024-08-01 LAB
ALBUMIN SERPL BCG-MCNC: 4 G/DL (ref 3.5–5)
ALP SERPL-CCNC: 56 U/L (ref 34–104)
ALT SERPL W P-5'-P-CCNC: 18 U/L (ref 7–52)
ANION GAP SERPL CALCULATED.3IONS-SCNC: 8 MMOL/L (ref 4–13)
AST SERPL W P-5'-P-CCNC: 18 U/L (ref 13–39)
BASOPHILS # BLD AUTO: 0.05 THOUSANDS/ÂΜL (ref 0–0.1)
BASOPHILS NFR BLD AUTO: 1 % (ref 0–1)
BILIRUB SERPL-MCNC: 0.73 MG/DL (ref 0.2–1)
BUN SERPL-MCNC: 22 MG/DL (ref 5–25)
CALCIUM SERPL-MCNC: 9.2 MG/DL (ref 8.4–10.2)
CHLORIDE SERPL-SCNC: 102 MMOL/L (ref 96–108)
CO2 SERPL-SCNC: 26 MMOL/L (ref 21–32)
CREAT SERPL-MCNC: 1.45 MG/DL (ref 0.6–1.3)
EOSINOPHIL # BLD AUTO: 0.17 THOUSAND/ÂΜL (ref 0–0.61)
EOSINOPHIL NFR BLD AUTO: 2 % (ref 0–6)
ERYTHROCYTE [DISTWIDTH] IN BLOOD BY AUTOMATED COUNT: 13.4 % (ref 11.6–15.1)
GFR SERPL CREATININE-BSD FRML MDRD: 48 ML/MIN/1.73SQ M
GLUCOSE SERPL-MCNC: 190 MG/DL (ref 65–140)
HCT VFR BLD AUTO: 46.5 % (ref 36.5–49.3)
HGB BLD-MCNC: 15.1 G/DL (ref 12–17)
IMM GRANULOCYTES # BLD AUTO: 0.02 THOUSAND/UL (ref 0–0.2)
IMM GRANULOCYTES NFR BLD AUTO: 0 % (ref 0–2)
LYMPHOCYTES # BLD AUTO: 2.14 THOUSANDS/ÂΜL (ref 0.6–4.47)
LYMPHOCYTES NFR BLD AUTO: 26 % (ref 14–44)
MCH RBC QN AUTO: 30.5 PG (ref 26.8–34.3)
MCHC RBC AUTO-ENTMCNC: 32.5 G/DL (ref 31.4–37.4)
MCV RBC AUTO: 94 FL (ref 82–98)
MONOCYTES # BLD AUTO: 0.77 THOUSAND/ÂΜL (ref 0.17–1.22)
MONOCYTES NFR BLD AUTO: 9 % (ref 4–12)
NEUTROPHILS # BLD AUTO: 5.23 THOUSANDS/ÂΜL (ref 1.85–7.62)
NEUTS SEG NFR BLD AUTO: 62 % (ref 43–75)
NRBC BLD AUTO-RTO: 0 /100 WBCS
PLATELET # BLD AUTO: 184 THOUSANDS/UL (ref 149–390)
PMV BLD AUTO: 10.2 FL (ref 8.9–12.7)
POTASSIUM SERPL-SCNC: 3.7 MMOL/L (ref 3.5–5.3)
PROT SERPL-MCNC: 6.1 G/DL (ref 6.4–8.4)
RBC # BLD AUTO: 4.95 MILLION/UL (ref 3.88–5.62)
SODIUM SERPL-SCNC: 136 MMOL/L (ref 135–147)
WBC # BLD AUTO: 8.38 THOUSAND/UL (ref 4.31–10.16)

## 2024-08-01 PROCEDURE — 80053 COMPREHEN METABOLIC PANEL: CPT

## 2024-08-01 PROCEDURE — 85025 COMPLETE CBC W/AUTO DIFF WBC: CPT

## 2024-08-01 PROCEDURE — 96413 CHEMO IV INFUSION 1 HR: CPT

## 2024-08-01 PROCEDURE — 36415 COLL VENOUS BLD VENIPUNCTURE: CPT

## 2024-08-01 PROCEDURE — 96367 TX/PROPH/DG ADDL SEQ IV INF: CPT

## 2024-08-01 RX ORDER — SODIUM CHLORIDE 9 MG/ML
20 INJECTION, SOLUTION INTRAVENOUS ONCE
Status: COMPLETED | OUTPATIENT
Start: 2024-08-01 | End: 2024-08-01

## 2024-08-01 RX ORDER — ACETAMINOPHEN 325 MG/1
650 TABLET ORAL ONCE
Status: COMPLETED | OUTPATIENT
Start: 2024-08-01 | End: 2024-08-01

## 2024-08-01 RX ADMIN — DIPHENHYDRAMINE HYDROCHLORIDE 25 MG: 50 INJECTION, SOLUTION INTRAMUSCULAR; INTRAVENOUS at 08:27

## 2024-08-01 RX ADMIN — SODIUM CHLORIDE 20 ML/HR: 0.9 INJECTION, SOLUTION INTRAVENOUS at 08:27

## 2024-08-01 RX ADMIN — DEXAMETHASONE SODIUM PHOSPHATE 10 MG: 10 INJECTION, SOLUTION INTRAMUSCULAR; INTRAVENOUS at 09:01

## 2024-08-01 RX ADMIN — ATEZOLIZUMAB 1200 MG: 1200 INJECTION, SOLUTION INTRAVENOUS at 09:41

## 2024-08-01 RX ADMIN — ACETAMINOPHEN 650 MG: 325 TABLET, FILM COATED ORAL at 08:31

## 2024-08-01 NOTE — PROGRESS NOTES
Recent labs reviewed. Pt tolerated Tecentriq infusion well without complications. PIV removed without incident.     Lance Hazel is aware of future appt on 8/22/24 at 8AM.     AVS declined by Lance Hazel.    Pt discharged off unit in stable condition with all personal belongings.

## 2024-08-08 ENCOUNTER — OFFICE VISIT (OUTPATIENT)
Dept: GASTROENTEROLOGY | Facility: CLINIC | Age: 69
End: 2024-08-08
Payer: MEDICARE

## 2024-08-08 VITALS
OXYGEN SATURATION: 93 % | RESPIRATION RATE: 16 BRPM | WEIGHT: 192.4 LBS | HEART RATE: 58 BPM | TEMPERATURE: 98.3 F | SYSTOLIC BLOOD PRESSURE: 159 MMHG | DIASTOLIC BLOOD PRESSURE: 78 MMHG | HEIGHT: 68 IN | BODY MASS INDEX: 29.16 KG/M2

## 2024-08-08 DIAGNOSIS — Z12.11 SCREENING FOR COLON CANCER: ICD-10-CM

## 2024-08-08 DIAGNOSIS — R10.13 EPIGASTRIC ABDOMINAL PAIN: Primary | ICD-10-CM

## 2024-08-08 DIAGNOSIS — C34.31 PRIMARY SQUAMOUS CELL CARCINOMA OF LOWER LOBE OF RIGHT LUNG (HCC): ICD-10-CM

## 2024-08-08 DIAGNOSIS — T40.2X5A CONSTIPATION DUE TO OPIOID THERAPY: ICD-10-CM

## 2024-08-08 DIAGNOSIS — R13.19 ESOPHAGEAL DYSPHAGIA: ICD-10-CM

## 2024-08-08 DIAGNOSIS — K59.03 CONSTIPATION DUE TO OPIOID THERAPY: ICD-10-CM

## 2024-08-08 PROCEDURE — 99214 OFFICE O/P EST MOD 30 MIN: CPT | Performed by: STUDENT IN AN ORGANIZED HEALTH CARE EDUCATION/TRAINING PROGRAM

## 2024-08-08 PROCEDURE — G2211 COMPLEX E/M VISIT ADD ON: HCPCS | Performed by: STUDENT IN AN ORGANIZED HEALTH CARE EDUCATION/TRAINING PROGRAM

## 2024-08-08 RX ORDER — BISACODYL 5 MG/1
20 TABLET, DELAYED RELEASE ORAL ONCE
Qty: 4 TABLET | Refills: 0 | Status: SHIPPED | OUTPATIENT
Start: 2024-08-08 | End: 2024-08-08

## 2024-08-08 RX ORDER — POLYETHYLENE GLYCOL 3350 17 G/17G
238 POWDER, FOR SOLUTION ORAL ONCE
Qty: 238 G | Refills: 0 | Status: SHIPPED | OUTPATIENT
Start: 2024-08-08 | End: 2024-08-08

## 2024-08-08 RX ORDER — OMEPRAZOLE 40 MG/1
40 CAPSULE, DELAYED RELEASE ORAL DAILY
Qty: 30 CAPSULE | Refills: 2 | Status: SHIPPED | OUTPATIENT
Start: 2024-08-08

## 2024-08-08 NOTE — PATIENT INSTRUCTIONS
We will schedule you for upper endoscopy (EGD) and colonoscopy  Please follow the instructions for the bowel prep  We will start you on omeprazole 40 mg daily        COLONOSCOPY  MIRALAX/Dulcolax Bowel Preparation Instructions    The OR/GI Lab will contact you the evening prior to your procedure with your exact arrival time.    Our practice requires a 1 week notice for any cancellations or rescheduling. We kindly ask that you immediately notify us of any changes including any new medications that are prescribed. Thank you for your cooperation.       WEEK BEFORE YOUR PROCEDURE:  Stop taking Iron tablets.  5 days prior, AVOID vegetables and fruits with skins or seeds, nuts, corn, popcorn and whole grain breads.   Purchase: One (1) 238-gram container of Miralax (polyethylene glycol 3350), four (4) 5 mg Dulcolax (bisacodyl) tablets, and one (1) 64-ounce bottle of Gatorade (sports drink) - no red, orange, or purple. These may be purchased at any pharmacy without a prescription. Generic products are permissible.   Arrange responsible transportation for day of the procedure.     DAY BEFORE THE PROCEDURE:   CLEAR liquids only for entire day prior. Nothing red, orange or purple.    You MAY have:                                                               Soda  Water  Broth Gatorade  Jello  Popsicles Coffee/tea without milk/creamer     YOU MAY NOT HAVE:  Solid foods   Milk and milk products    Juice with pulp    BOWEL PREPARATION:  Includes: One (1) 238-gram container of Miralax (polyethylene glycol 3350), four (4) 5 mg Dulcolax (bisacodyl) tablets, and one (1) 64-ounce bottle of Gatorade (sports drink).  Preparation may be refrigerated.  Entire bowel prep should be completed.     Afternoon before the procedure (2:00 pm - 5:00 pm):    Take two (2) 5 mg Dulcolax laxative tablets.     Evening before the procedure (6:00 pm):  Mix entire container of Miralax with one (1) 64-ounce bottle of Gatorade and shake until all  medication is dissolved.   Begin drinking solution. Drink an eight (8) ounce cup every 10-15 minutes until you have consumed half (32 ounces) of the solution.  Refrigerate remaining solution.    Night before the procedure (8:00 pm):  Take two (2) 5 mg Dulcolax laxative tablets.     Beginning 5 hours before your procedure:  Drink the remaining amount of prepared solution (32 ounces).  Drink an eight (8) ounce cup every 10-15 minutes until you have consumed the remaining solution.     Bowel prep should be completed 4 hours prior to procedure time.    NOTHING TO EAT OR DRINK AFTER MIDNIGHT- EXCEPT FOR YOUR PREP    DAY OF THE PROCEDURE:  You may brush your teeth.  Leave all jewelry at home.  Please arrive for your procedure as indicated by the OR / GI Lab / Endoscopy Unit. The hospital will contact you the day before with your exact arrival time.   Make sure you have arranged ahead of time for a responsible adult (18 or older) to accompany and drive you home after the procedure.  Please discuss any transportation concerns with our staff prior to your procedure.    The effects of the anesthesia can persist for 24 hours.  After receiving the sedation, you must exercise caution before engaging in any activity that could harm yourself and others (such as driving a car).  Do not make any important decisions or do not drink any alcoholic beverages during this time period.  After your procedure, you may have anything you'd like to eat or drink.  You will probably want to start with something light.  Please include plenty of fluids.  Avoid items that cause gas such as sodas and salads.    SPECIAL INSTRUCTIONS:    For patients currently taking blood thinners and/or antiplatelet therapy our office will contact the prescribing provider.  Our office will contact you with any required changes to your medication regimen.     Blood thinner (i.e. - Coumadin, Pradaxa, Lovenox, Xarelto, Eliquis)  ?  Continue (Do Not  Stop)  ? Stop______________for_____________days prior to the procedure.    Antiplatelet (i.e. - Plavix, Aggrenox, Effient, Brilinta)  ?  Continue (Do Not Stop)  ? Stop______________for_____________days prior to the procedure.       Diabetes:   If you are Diabetic, please see separate Diabetic Instruction Sheet.          Prescribed medications:  Do not stop your aspirin, or any of your other medications (unless instructed otherwise).    Take the rest of your prescribed medications with small sips of water at least 2 hours prior to your procedure.      For any questions or concerns related to your bowel preparation or pre-procedure instructions, please contact our office at [phone].  Thank you for choosing [facility]!

## 2024-08-09 ENCOUNTER — NURSE TRIAGE (OUTPATIENT)
Age: 69
End: 2024-08-09

## 2024-08-09 NOTE — ASSESSMENT & PLAN NOTE
May be secondary to underlying Schatzki's ring or hiatal hernia or esophageal stricture.  He did not receive any radiation for his lung cancer diagnosis so radiation stricture is unlikely.  Lower suspicion for motility disorder.    We will schedule him for upper endoscopy for evaluation  Start omeprazole 40 mg daily    I obtained informed consent from the patient.  The risks/benefits/alternatives of the procedure were discussed with the patient.  Risks included, but not limited to, infection, bleeding, perforation, injury to organs in the abdomen, missed lesion and incomplete procedure were discussed.  Patient was agreeable and electronic signature was obtained.

## 2024-08-09 NOTE — PROGRESS NOTES
[facility]  Outpatient Follow-up  Encounter: [id number]    PATIENT INFO     Name: Lance Hazel  YOB: 1955   Age: 69 y.o.   Sex: male   MRN: [id number]    ASSESSMENT & PLAN     Lance Hazel is a 69 y.o. male with history of lung cancer status post resection, currently on chemotherapy, complications related to thoracic duct embolization with pancreatitis consistent with intermittent epigastric abdominal pain who presents to GI office for follow-up and need for EGD and colonoscopy per hematology/oncology.    Problem List Items Addressed This Visit       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)    Constipation due to opioid therapy     Fortunately, this appears to be decently controlled with Dulcolax which he takes as needed.    Plan for colonoscopy  Continue Dulcolax as needed  Escalate bowel regimen therapy as needed         Epigastric abdominal pain - Primary     Etiology for this remains unclear.  His symptoms do not appear to be typical for peptic ulcer disease or gastritis or reflux.  These factors could certainly be contributing.  However, non-GI etiologies should be considered including musculoskeletal etiology.    We will schedule for EGD for evaluation  Trial omeprazole 40 mg daily    I obtained informed consent from the patient.  The risks/benefits/alternatives of the procedure were discussed with the patient.  Risks included, but not limited to, infection, bleeding, perforation, injury to organs in the abdomen, missed lesion and incomplete procedure were discussed.  Patient was agreeable and electronic signature was obtained.          Relevant Medications    omeprazole (PriLOSEC) 40 MG capsule    Other Relevant Orders    EGD    Screening for colon cancer     Due for colon cancer screening.  No alarming lower GI symptoms.  He does have constipation secondary to opioids.  No family history of colon cancer.    Will schedule for  colonoscopy  MiraLAX/Dulcolax bowel prep    I obtained informed consent from the patient.  The risks/benefits/alternatives of the procedure were discussed with the patient.  Risks included, but not limited to, infection, bleeding, perforation, injury to organs in the abdomen, missed lesion and incomplete procedure were discussed.  Patient was agreeable and electronic signature was obtained.          Relevant Orders    Colonoscopy    Esophageal dysphagia     May be secondary to underlying Schatzki's ring or hiatal hernia or esophageal stricture.  He did not receive any radiation for his lung cancer diagnosis so radiation stricture is unlikely.  Lower suspicion for motility disorder.    We will schedule him for upper endoscopy for evaluation  Start omeprazole 40 mg daily    I obtained informed consent from the patient.  The risks/benefits/alternatives of the procedure were discussed with the patient.  Risks included, but not limited to, infection, bleeding, perforation, injury to organs in the abdomen, missed lesion and incomplete procedure were discussed.  Patient was agreeable and electronic signature was obtained.          Relevant Medications    omeprazole (PriLOSEC) 40 MG capsule     Orders Placed This Encounter   Procedures    EGD    Colonoscopy       FOLLOW-UP: 6 months    HISTORY OF PRESENT ILLNESS       Lance Hazel is a 69 y.o. male who presents to GI office for follow-up and need for EGD and colonoscopy at direction of his oncologist.  Patient is new to me but has been seen by my colleague in the past.  He has a history of lung cancer status post resection with disease involving his lymph nodes, currently on chemotherapy, complicated by thoracic duct embolization with pancreatitis, having intermittent epigastric abdominal pain.  He reports that he continues to have this upper abdominal pain which is intermittent but can be severe.  It has gotten worse over the last couple of months.  He has been taking  pantoprazole with no relief of symptoms.  He does get some relief of his pain when he takes his narcotic pain medications.  He does have some mild constipation as well related to his opioid medications.  He states that this is relieved using Dulcolax.  He very quickly has bowel movement after taking Dulcolax.  Denies any hematochezia or melena.     ENDOSCOPIC HISTORY     UPPER ENDOSCOPY: None    COLONOSCOPY: Remote; report not available    REVIEW OF SYSTEMS     CONSTITUTIONAL: Denies any fever, chills, rigors, and weight loss  HEENT: No earache or tinnitus, denies hearing loss or visual disturbances  CARDIOVASCULAR: No chest pain or palpitations  RESPIRATORY: Denies any cough, hemoptysis, shortness of breath or dyspnea on exertion  GASTROINTESTINAL: As noted in the History of Present Illness  GENITOURINARY: No problems with urination, denies any hematuria or dysuria  NEUROLOGIC: No dizziness or vertigo, denies headaches   MUSCULOSKELETAL: Denies any muscle or joint pain   SKIN: Denies jaundice or itching  PSYCHOSOCIAL: Denies depression or anxiety, denies any recent memory loss     Historical Information   Past Medical History:   Diagnosis Date    Abdominal aortic aneurysm without rupture ([facility])     Abdominal Aortic Duplex 02/21/2017    Ectatic infrarenal abdominal aorta.    MARYANN (acute kidney injury) ([facility]) 07/23/2022    CAD (coronary artery disease)     Cancer ([facility]) 2022    right lung CA    Chronic bronchitis ([facility]) 01/28/2019    Class 1 obesity due to excess calories with serious comorbidity and body mass index (BMI) of 31.0 to 31.9 in adult 02/14/2020    COPD (chronic obstructive pulmonary disease) ([facility])     Extremity pain     Hemiparesis ([facility]) 11/01/2022    History of echocardiogram 03/18/2014    EF 55%, mild MR and AI.  Mild concentric LVH.    History of stress test 03/06/2017    Normal.    Hyperlipidemia     Hypertension     Joint pain     Kidney stone     Low back pain     Lung cancer (HCC)     Migraine     Neck  pain     Obstructive sleep apnea     cannot tolerate CPAP    Osteoarthritis     Other chronic pancreatitis (HCC) 02/10/2023    Peripheral neuropathy     Reflex sympathetic dystrophy     Sacroiliitis (HCC) 02/02/2022     Past Surgical History:   Procedure Laterality Date    CARDIAC CATHETERIZATION  02/13/2012    EF 70%, widely patent renal arteries, significant single-vessel CAD-medical therapy.    CARDIAC CATHETERIZATION  04/11/2013    EF 65%, 50% mid LAD, 20% prox CFX, 90% diffuse RCA, 99% mid RCA. Medical management.    CATARACT EXTRACTION Right 09/19/2023    CHOLECYSTECTOMY      COLONOSCOPY      EPIDURAL BLOCK INJECTION Bilateral 08/15/2019    Procedure: BLOCK / INJECTION EPIDURAL STEROID CERVICAL;  Surgeon: Ricardo Park MD;  Location: [facility];  Service: Pain Management     FL GUIDED NEEDLE PLAC BX/ASP/INJ  08/15/2019    IR BIOPSY LUNG  09/13/2022    IR THORACIC DUCT EMBOLIZATION  11/22/2022    ORTHOPEDIC SURGERY      [facility] INCL FLUOR GDNCE DX W/CELL WASHG SPX N/A 11/16/2022    Procedure: BRONCHOSCOPY FLEXIBLE;  Surgeon: Gulshan Madison MD;  Location: [facility];  Service: Thoracic    AZ THORACOSCOPY W/LOBECTOMY SINGLE LOBE Right 11/16/2022    Procedure: LOBECTOMY LUNG; lower lobe superior segmentectomy, mediastinal lymph node dissection;  Surgeon: Gulshan Madison MD;  Location: [facility];  Service: Thoracic    AZ THORACOSCOPY W/SEGMENTECTOMY Right 11/16/2022    Procedure: THORACOSCOPY VIDEO ASSISTED SURGERY (VATS);  Surgeon: Gulshan Madison MD;  Location: [facility];  Service: Thoracic    TRIGGER POINT INJECTION       Social History   Social History     Substance and Sexual Activity   Alcohol Use Not Currently     Social History     Substance and Sexual Activity   Drug Use Never     Social History     Tobacco Use   Smoking Status Every Day    Current packs/day: 1.00    Average packs/day: 1 pack/day for 0.8 years (0.8 ttl pk-yrs)    Types: Cigarettes    Start date:  11/2023   Smokeless Tobacco Never     Family History   Adopted: Yes   Problem Relation Age of Onset    No Known Problems Family     No Known Problems Mother     No Known Problems Father        MEDICATIONS & ALLERGIES     Current Outpatient Medications   Medication Instructions    albuterol (Ventolin HFA) 90 mcg/act inhaler 2 puffs, Inhalation, Every 6 hours PRN    amLODIPine (NORVASC) 10 mg, Oral, Daily    aspirin 81 mg, Oral, Daily    bisacodyl (DULCOLAX) 5 mg, Oral, Daily PRN    bisacodyl (DULCOLAX) 20 mg, Oral, Once    fluticasone (FLONASE) 50 mcg/act nasal spray 1 spray, Nasal, Daily    glucose blood (OneTouch Verio) test strip Test once daily    lisinopril (ZESTRIL) 10 mg, Oral, Daily    metoprolol succinate (TOPROL-XL) 100 mg, Oral, Daily    naloxone (NARCAN) 4 mg/0.1 mL nasal spray Administer 1 spray into a nostril. If no response after 2-3 minutes, give another dose in the other nostril using a new spray.    nitroglycerin (NITROSTAT) 0.4 mg, Sublingual, Every 5 minutes PRN    nystatin (MYCOSTATIN) 500,000 Units, Mouth/Throat, 4 times daily    omeprazole (PRILOSEC) 40 mg, Oral, Daily    ondansetron (ZOFRAN) 8 mg, Oral, Every 8 hours PRN    [START ON 8/22/2024] oxyCODONE-acetaminophen (Percocet) 7.5-325 MG per tablet 1 tablet, Oral, Every 8 hours PRN    oxyCODONE-acetaminophen (Percocet) 7.5-325 MG per tablet 1 tablet, Oral, Every 8 hours PRN    polyethylene glycol (MIRALAX) 238 g, Oral, Once, Take 238 g my mouth. Use as directed    primidone (MYSOLINE) 25 mg, Oral, Daily (early morning)    Restasis 0.05 % ophthalmic emulsion     rosuvastatin (CRESTOR) 40 mg, Oral, Daily    sildenafil (VIAGRA) 100 mg, Oral, As needed    tamsulosin (FLOMAX) 0.4 mg, Oral, Daily with dinner    tiotropium-olodaterol (Stiolto Respimat) 2.5-2.5 MCG/ACT inhaler 2 puffs, Inhalation, Daily    traZODone (DESYREL) 100 mg tablet take 1 tablet by mouth daily at bedtime     No Known Allergies    PHYSICAL EXAM      Objective   Blood pressure  "159/78, pulse 58, temperature 98.3 °F (36.8 °C), resp. rate 16, height 5' 8\" (1.727 m), weight 87.3 kg (192 lb 6.4 oz), SpO2 93%. Body mass index is 29.25 kg/m².    General Appearance:   Alert, cooperative, no distress   HEENT:   Normocephalic, atraumatic, anicteric     Neck:   Supple, symmetrical, trachea midline   Lungs:   Equal chest rise, respirations unlabored    Heart:   Regular rate   Abdomen:   Soft, non-tender, non-distended; normal bowel sounds; no masses, no organomegaly    Rectal:   Deferred    Extremities:   No cyanosis or edema    Neuro:   Moves all 4 extremities    Skin:   No jaundice, rashes, or lesions      LABORATORY RESULTS     No visits with results within 1 Day(s) from this visit.   Latest known visit with results is:   Appointment on 08/01/2024   Component Date Value    WBC 08/01/2024 8.38     RBC 08/01/2024 4.95     Hemoglobin 08/01/2024 15.1     Hematocrit 08/01/2024 46.5     MCV 08/01/2024 94     MCH 08/01/2024 30.5     MCHC 08/01/2024 32.5     RDW 08/01/2024 13.4     MPV 08/01/2024 10.2     Platelets 08/01/2024 184     nRBC 08/01/2024 0     Segmented % 08/01/2024 62     Immature Grans % 08/01/2024 0     Lymphocytes % 08/01/2024 26     Monocytes % 08/01/2024 9     Eosinophils Relative 08/01/2024 2     Basophils Relative 08/01/2024 1     Absolute Neutrophils 08/01/2024 5.23     Absolute Immature Grans 08/01/2024 0.02     Absolute Lymphocytes 08/01/2024 2.14     Absolute Monocytes 08/01/2024 0.77     Eosinophils Absolute 08/01/2024 0.17     Basophils Absolute 08/01/2024 0.05     Sodium 08/01/2024 136     Potassium 08/01/2024 3.7     Chloride 08/01/2024 102     CO2 08/01/2024 26     ANION GAP 08/01/2024 8     BUN 08/01/2024 22     Creatinine 08/01/2024 1.45 (H)     Glucose 08/01/2024 190 (H)     Calcium 08/01/2024 9.2     AST 08/01/2024 18     ALT 08/01/2024 18     Alkaline Phosphatase 08/01/2024 56     Total Protein 08/01/2024 6.1 (L)     Albumin 08/01/2024 4.0     Total Bilirubin 08/01/2024 " 0.73     eGFR 08/01/2024 48         IMAGING RESULTS     No results found.  I have personally reviewed any available and pertinent imaging study reports.      Aleks Holcomb D.O.  [facility]  Division of Gastroenterology & Hepatology  Available on [employer]  [email or web address]    ** Please Note: This note is constructed using a voice recognition dictation system. **

## 2024-08-09 NOTE — TELEPHONE ENCOUNTER
LOV 08/08/24, EGD/Colonoscopy 10/22/24    Pt reports he was seen in office yesterday and prescribed Gavalyte. Pt inquiring if he is to consume the bowel prep all at once. Discussed bowel prep instructions with pt. Pt verbalized understanding. Pt aware not to prepare prep until day prior to procedure. Pt will begin using omeprazole 40 mg daily the lower abdominal pain. Pt made aware benefits of this medication may take 1-2 weeks to take affect.    Reason for Disposition   Information only question and nurse able to answer    Protocols used: Information Only Call - No Triage-ADULT-[place]

## 2024-08-09 NOTE — ASSESSMENT & PLAN NOTE
Due for colon cancer screening.  No alarming lower GI symptoms.  He does have constipation secondary to opioids.  No family history of colon cancer.    Will schedule for colonoscopy  MiraLAX/Dulcolax bowel prep    I obtained informed consent from the patient.  The risks/benefits/alternatives of the procedure were discussed with the patient.  Risks included, but not limited to, infection, bleeding, perforation, injury to organs in the abdomen, missed lesion and incomplete procedure were discussed.  Patient was agreeable and electronic signature was obtained.

## 2024-08-09 NOTE — ASSESSMENT & PLAN NOTE
Etiology for this remains unclear.  His symptoms do not appear to be typical for peptic ulcer disease or gastritis or reflux.  These factors could certainly be contributing.  However, non-GI etiologies should be considered including musculoskeletal etiology.    We will schedule for EGD for evaluation  Trial omeprazole 40 mg daily    I obtained informed consent from the patient.  The risks/benefits/alternatives of the procedure were discussed with the patient.  Risks included, but not limited to, infection, bleeding, perforation, injury to organs in the abdomen, missed lesion and incomplete procedure were discussed.  Patient was agreeable and electronic signature was obtained.

## 2024-08-09 NOTE — ASSESSMENT & PLAN NOTE
Fortunately, this appears to be decently controlled with Dulcolax which he takes as needed.    Plan for colonoscopy  Continue Dulcolax as needed  Escalate bowel regimen therapy as needed

## 2024-08-13 ENCOUNTER — TELEPHONE (OUTPATIENT)
Dept: HEMATOLOGY ONCOLOGY | Facility: CLINIC | Age: 69
End: 2024-08-13

## 2024-08-16 RX ORDER — ACETAMINOPHEN 325 MG/1
650 TABLET ORAL ONCE
Status: CANCELLED | OUTPATIENT
Start: 2024-08-22 | End: 2024-08-23

## 2024-08-16 RX ORDER — SODIUM CHLORIDE 9 MG/ML
20 INJECTION, SOLUTION INTRAVENOUS ONCE
Status: CANCELLED | OUTPATIENT
Start: 2024-08-22

## 2024-08-22 ENCOUNTER — APPOINTMENT (OUTPATIENT)
Dept: LAB | Facility: HOSPITAL | Age: 69
End: 2024-08-22
Payer: MEDICARE

## 2024-08-22 ENCOUNTER — HOSPITAL ENCOUNTER (OUTPATIENT)
Dept: INFUSION CENTER | Facility: HOSPITAL | Age: 69
Discharge: HOME | End: 2024-08-22
Attending: INTERNAL MEDICINE
Payer: MEDICARE

## 2024-08-22 VITALS
HEART RATE: 60 BPM | OXYGEN SATURATION: 93 % | DIASTOLIC BLOOD PRESSURE: 76 MMHG | SYSTOLIC BLOOD PRESSURE: 146 MMHG | RESPIRATION RATE: 17 BRPM | TEMPERATURE: 97.2 F

## 2024-08-22 DIAGNOSIS — C34.31 MALIGNANT NEOPLASM OF LOWER LOBE OF RIGHT LUNG (HCC): Primary | ICD-10-CM

## 2024-08-22 DIAGNOSIS — C34.31 MALIGNANT NEOPLASM OF LOWER LOBE OF RIGHT LUNG (HCC): ICD-10-CM

## 2024-08-22 LAB
ALBUMIN SERPL BCG-MCNC: 4 G/DL (ref 3.5–5)
ALP SERPL-CCNC: 61 U/L (ref 34–104)
ALT SERPL W P-5'-P-CCNC: 12 U/L (ref 7–52)
ANION GAP SERPL CALCULATED.3IONS-SCNC: 8 MMOL/L (ref 4–13)
AST SERPL W P-5'-P-CCNC: 14 U/L (ref 13–39)
BASOPHILS # BLD AUTO: 0.08 THOUSANDS/ÂΜL (ref 0–0.1)
BASOPHILS NFR BLD AUTO: 1 % (ref 0–1)
BILIRUB SERPL-MCNC: 0.95 MG/DL (ref 0.2–1)
BUN SERPL-MCNC: 16 MG/DL (ref 5–25)
CALCIUM SERPL-MCNC: 8.8 MG/DL (ref 8.4–10.2)
CHLORIDE SERPL-SCNC: 101 MMOL/L (ref 96–108)
CO2 SERPL-SCNC: 27 MMOL/L (ref 21–32)
CREAT SERPL-MCNC: 1.21 MG/DL (ref 0.6–1.3)
EOSINOPHIL # BLD AUTO: 0 THOUSAND/ÂΜL (ref 0–0.61)
EOSINOPHIL NFR BLD AUTO: 0 % (ref 0–6)
ERYTHROCYTE [DISTWIDTH] IN BLOOD BY AUTOMATED COUNT: 12.8 % (ref 11.6–15.1)
GFR SERPL CREATININE-BSD FRML MDRD: 60 ML/MIN/1.73SQ M
GLUCOSE SERPL-MCNC: 194 MG/DL (ref 65–140)
HCT VFR BLD AUTO: 47.4 % (ref 36.5–49.3)
HGB BLD-MCNC: 15.1 G/DL (ref 12–17)
IMM GRANULOCYTES # BLD AUTO: 0.03 THOUSAND/UL (ref 0–0.2)
IMM GRANULOCYTES NFR BLD AUTO: 0 % (ref 0–2)
LYMPHOCYTES # BLD AUTO: 2.07 THOUSANDS/ÂΜL (ref 0.6–4.47)
LYMPHOCYTES NFR BLD AUTO: 20 % (ref 14–44)
MCH RBC QN AUTO: 30.1 PG (ref 26.8–34.3)
MCHC RBC AUTO-ENTMCNC: 31.9 G/DL (ref 31.4–37.4)
MCV RBC AUTO: 94 FL (ref 82–98)
MONOCYTES # BLD AUTO: 0.75 THOUSAND/ÂΜL (ref 0.17–1.22)
MONOCYTES NFR BLD AUTO: 7 % (ref 4–12)
NEUTROPHILS # BLD AUTO: 7.28 THOUSANDS/ÂΜL (ref 1.85–7.62)
NEUTS SEG NFR BLD AUTO: 72 % (ref 43–75)
NRBC BLD AUTO-RTO: 0 /100 WBCS
PLATELET # BLD AUTO: 226 THOUSANDS/UL (ref 149–390)
PMV BLD AUTO: 10 FL (ref 8.9–12.7)
POTASSIUM SERPL-SCNC: 3.9 MMOL/L (ref 3.5–5.3)
PROT SERPL-MCNC: 6.5 G/DL (ref 6.4–8.4)
RBC # BLD AUTO: 5.02 MILLION/UL (ref 3.88–5.62)
SODIUM SERPL-SCNC: 136 MMOL/L (ref 135–147)
TSH SERPL DL<=0.05 MIU/L-ACNC: 1.55 UIU/ML (ref 0.45–4.5)
WBC # BLD AUTO: 10.21 THOUSAND/UL (ref 4.31–10.16)

## 2024-08-22 PROCEDURE — 84443 ASSAY THYROID STIM HORMONE: CPT

## 2024-08-22 PROCEDURE — 96367 TX/PROPH/DG ADDL SEQ IV INF: CPT

## 2024-08-22 PROCEDURE — 36415 COLL VENOUS BLD VENIPUNCTURE: CPT

## 2024-08-22 PROCEDURE — 80053 COMPREHEN METABOLIC PANEL: CPT

## 2024-08-22 PROCEDURE — 96413 CHEMO IV INFUSION 1 HR: CPT

## 2024-08-22 PROCEDURE — 85025 COMPLETE CBC W/AUTO DIFF WBC: CPT

## 2024-08-22 RX ORDER — SODIUM CHLORIDE 9 MG/ML
20 INJECTION, SOLUTION INTRAVENOUS ONCE
Status: COMPLETED | OUTPATIENT
Start: 2024-08-22 | End: 2024-08-22

## 2024-08-22 RX ORDER — ACETAMINOPHEN 325 MG/1
650 TABLET ORAL ONCE
Status: COMPLETED | OUTPATIENT
Start: 2024-08-22 | End: 2024-08-22

## 2024-08-22 RX ADMIN — DEXAMETHASONE SODIUM PHOSPHATE 10 MG: 10 INJECTION, SOLUTION INTRAMUSCULAR; INTRAVENOUS at 09:54

## 2024-08-22 RX ADMIN — ACETAMINOPHEN 650 MG: 325 TABLET, FILM COATED ORAL at 09:25

## 2024-08-22 RX ADMIN — ATEZOLIZUMAB 1200 MG: 1200 INJECTION, SOLUTION INTRAVENOUS at 10:31

## 2024-08-22 RX ADMIN — SODIUM CHLORIDE 20 ML/HR: 0.9 INJECTION, SOLUTION INTRAVENOUS at 09:30

## 2024-08-22 RX ADMIN — DIPHENHYDRAMINE HYDROCHLORIDE 25 MG: 50 INJECTION, SOLUTION INTRAMUSCULAR; INTRAVENOUS at 09:24

## 2024-08-22 NOTE — PROGRESS NOTES
Lance TAMY Mcmillanin tolerated Tecentriq treatment well with no complications.      No further appointment needed at this time, clarifying with provider.     AVS declined.

## 2024-09-07 PROBLEM — Z12.11 SCREENING FOR COLON CANCER: Status: RESOLVED | Noted: 2024-08-08 | Resolved: 2024-09-07

## 2024-09-11 ENCOUNTER — TELEPHONE (OUTPATIENT)
Age: 69
End: 2024-09-11

## 2024-09-11 DIAGNOSIS — C34.31 MALIGNANT NEOPLASM OF LOWER LOBE OF RIGHT LUNG (HCC): Primary | ICD-10-CM

## 2024-09-11 NOTE — TELEPHONE ENCOUNTER
Avery would like to know if there are any more infusions planned for him, there are none scheduled at this time. Please contact him at [phone]

## 2024-09-12 ENCOUNTER — TELEPHONE (OUTPATIENT)
Dept: HEMATOLOGY ONCOLOGY | Facility: CLINIC | Age: 69
End: 2024-09-12

## 2024-09-12 ENCOUNTER — OFFICE VISIT (OUTPATIENT)
Age: 69
End: 2024-09-12
Payer: MEDICARE

## 2024-09-12 VITALS
HEART RATE: 56 BPM | SYSTOLIC BLOOD PRESSURE: 113 MMHG | HEIGHT: 68 IN | DIASTOLIC BLOOD PRESSURE: 67 MMHG | BODY MASS INDEX: 29.1 KG/M2 | WEIGHT: 192 LBS

## 2024-09-12 DIAGNOSIS — F11.20 UNCOMPLICATED OPIOID DEPENDENCE (HCC): ICD-10-CM

## 2024-09-12 DIAGNOSIS — M54.2 NECK PAIN: ICD-10-CM

## 2024-09-12 DIAGNOSIS — G89.29 CHRONIC MIDLINE LOW BACK PAIN WITHOUT SCIATICA: ICD-10-CM

## 2024-09-12 DIAGNOSIS — Z79.891 ENCOUNTER FOR LONG-TERM OPIATE ANALGESIC USE: ICD-10-CM

## 2024-09-12 DIAGNOSIS — M54.12 CERVICAL RADICULOPATHY: ICD-10-CM

## 2024-09-12 DIAGNOSIS — G89.4 CHRONIC PAIN SYNDROME: Primary | ICD-10-CM

## 2024-09-12 DIAGNOSIS — M54.50 CHRONIC MIDLINE LOW BACK PAIN WITHOUT SCIATICA: ICD-10-CM

## 2024-09-12 DIAGNOSIS — M51.36 LUMBAR DEGENERATIVE DISC DISEASE: ICD-10-CM

## 2024-09-12 DIAGNOSIS — M47.816 LUMBAR SPONDYLOSIS: ICD-10-CM

## 2024-09-12 DIAGNOSIS — M79.2 NEUROPATHIC PAIN: ICD-10-CM

## 2024-09-12 DIAGNOSIS — M47.812 CERVICAL SPONDYLOSIS WITHOUT MYELOPATHY: ICD-10-CM

## 2024-09-12 DIAGNOSIS — M79.18 MYOFASCIAL PAIN SYNDROME: ICD-10-CM

## 2024-09-12 PROCEDURE — 99214 OFFICE O/P EST MOD 30 MIN: CPT | Performed by: NURSE PRACTITIONER

## 2024-09-12 PROCEDURE — G2211 COMPLEX E/M VISIT ADD ON: HCPCS | Performed by: NURSE PRACTITIONER

## 2024-09-12 RX ORDER — OXYCODONE AND ACETAMINOPHEN 7.5; 325 MG/1; MG/1
1 TABLET ORAL EVERY 8 HOURS PRN
Qty: 90 TABLET | Refills: 0 | Status: SHIPPED | OUTPATIENT
Start: 2024-10-17

## 2024-09-12 RX ORDER — OXYCODONE AND ACETAMINOPHEN 7.5; 325 MG/1; MG/1
1 TABLET ORAL EVERY 8 HOURS PRN
Qty: 90 TABLET | Refills: 0 | Status: SHIPPED | OUTPATIENT
Start: 2024-09-19

## 2024-09-12 NOTE — PROGRESS NOTES
Assessment:  1. Chronic pain syndrome    2. Neck pain    3. Cervical spondylosis without myelopathy    4. Cervical radiculopathy    5. Chronic midline low back pain without sciatica    6. Lumbar spondylosis    7. Lumbar degenerative disc disease    8. Myofascial pain syndrome    9. Neuropathic pain    10. Uncomplicated opioid dependence (HCC)    11. Encounter for long-term opiate analgesic use        Plan:  While the patient was in the office today, I did have a thorough conversation regarding their chronic pain syndrome, medication management, and treatment plan options.  Patient is being seen for a follow-up visit.  His pain remains reasonably controlled with the use of oxycodone.  He reports up to 50% reduction in pain.    Continue oxycodone 7.5/325 every 8 hours as needed for pain.  The patient's opioid scripts were sent to their pharmacy electronically and was given a 2 month supply of prescriptions with a Do Not Fill date(s) of 9/19/2024, 10/17/2024.    [place] Prescription Drug Monitoring Program report was reviewed and was appropriate     There are risks associated with opioid medications, including dependence, addiction and tolerance. The patient understands and agrees to use these medications only as prescribed. Potential side effects of the medications include, but are not limited to, constipation, drowsiness, addiction, impaired judgment and risk of fatal overdose if not taken as prescribed. The patient was warned against driving while taking sedation medications.  Sharing medications is a felony. At this point in time, the patient is showing no signs of addiction, abuse, diversion or suicidal ideation.    The patient will follow-up in 8 weeks for medication prescription refill and reevaluation. The patient was advised to contact the office should their symptoms worsen in the interim. The patient was agreeable and verbalized an understanding.        History of Present Illness:    The patient is a 69  y.o. male last seen on 7/25/2024 who presents for a follow up office visit in regards to chronic pain secondary to chronic pain syndrome, neck pain, cervical spondylosis, cervical radiculopathy, chronic low back pain, lumbar spondylosis, lumbar degenerative disc disease, neuropathic pain, myofascial pain syndrome.  The patient currently reports complaints of neck and bilateral shoulder pain, low back pain.  Current pain level is a 7/10.  Quality pain is described as sharp, shooting.    Current pain medications includes: Oxycodone 7.5/325 every 8 hours as needed for pain .  The patient reports that this regimen is providing 50% pain relief.  The patient is reporting no side effects from this pain medication regimen.    Pain Contract Signed: 3/28/24  Last Urine Drug Screen: 7/25/24    I have personally reviewed and/or updated the patient's past medical history, past surgical history, family history, social history, current medications, allergies, and vital signs today.       Review of Systems:    Review of Systems   Constitutional:  Negative for unexpected weight change.   HENT:  Negative for hearing loss.    Eyes:  Negative for visual disturbance.   Respiratory:  Negative for shortness of breath.    Cardiovascular:  Negative for leg swelling.   Gastrointestinal:  Positive for constipation.   Endocrine: Negative for polyuria.   Genitourinary:  Negative for difficulty urinating.   Musculoskeletal:  Positive for gait problem. Negative for joint swelling and myalgias.        Decreased range of motion   Skin:  Negative for rash.   Neurological:  Positive for dizziness. Negative for weakness and headaches.   Psychiatric/Behavioral:  Negative for decreased concentration.    All other systems reviewed and are negative.        Past Medical History:   Diagnosis Date    Abdominal aortic aneurysm without rupture (HCC)     Abdominal Aortic Duplex 02/21/2017    Ectatic infrarenal abdominal aorta.    MARYANN (acute kidney injury) (HCC)  07/23/2022    CAD (coronary artery disease)     Cancer ([facility]) 2022    right lung CA    Chronic bronchitis ([facility]) 01/28/2019    Class 1 obesity due to excess calories with serious comorbidity and body mass index (BMI) of 31.0 to 31.9 in adult 02/14/2020    COPD (chronic obstructive pulmonary disease) ([facility])     Extremity pain     Hemiparesis ([facility]) 11/01/2022    History of echocardiogram 03/18/2014    EF 55%, mild MR and AI.  Mild concentric LVH.    History of stress test 03/06/2017    Normal.    Hyperlipidemia     Hypertension     Joint pain     Kidney stone     Low back pain     Lung cancer (HCC)     Migraine     Neck pain     Obstructive sleep apnea     cannot tolerate CPAP    Osteoarthritis     Other chronic pancreatitis ([facility]) 02/10/2023    Peripheral neuropathy     Reflex sympathetic dystrophy     Sacroiliitis ([facility]) 02/02/2022       Past Surgical History:   Procedure Laterality Date    CARDIAC CATHETERIZATION  02/13/2012    EF 70%, widely patent renal arteries, significant single-vessel CAD-medical therapy.    CARDIAC CATHETERIZATION  04/11/2013    EF 65%, 50% mid LAD, 20% prox CFX, 90% diffuse RCA, 99% mid RCA. Medical management.    CATARACT EXTRACTION Right 09/19/2023    CHOLECYSTECTOMY      COLONOSCOPY      EPIDURAL BLOCK INJECTION Bilateral 08/15/2019    Procedure: BLOCK / INJECTION EPIDURAL STEROID CERVICAL;  Surgeon: Ricardo Park MD;  Location: [facility];  Service: Pain Management     FL GUIDED NEEDLE PLAC BX/ASP/INJ  08/15/2019    IR BIOPSY LUNG  09/13/2022    IR THORACIC DUCT EMBOLIZATION  11/22/2022    ORTHOPEDIC SURGERY      VA BRNCC INCL FLUOR GDNCE DX W/CELL WASHG SPX N/A 11/16/2022    Procedure: BRONCHOSCOPY FLEXIBLE;  Surgeon: Gulshan Madison MD;  Location: [facility];  Service: Thoracic    VA THORACOSCOPY W/LOBECTOMY SINGLE LOBE Right 11/16/2022    Procedure: LOBECTOMY LUNG; lower lobe superior segmentectomy, mediastinal lymph node dissection;  Surgeon: Gulshan Madison  MD;  Location: [facility];  Service: Thoracic    [place] THORACOSCOPY W/SEGMENTECTOMY Right 11/16/2022    Procedure: THORACOSCOPY VIDEO ASSISTED SURGERY (VATS);  Surgeon: Gulshan Madison MD;  Location: [facility];  Service: Thoracic    TRIGGER POINT INJECTION         Family History   Adopted: Yes   Problem Relation Age of Onset    No Known Problems Family     No Known Problems Mother     No Known Problems Father        Social History     Occupational History    Not on file   Tobacco Use    Smoking status: Every Day     Current packs/day: 1.00     Average packs/day: 1 pack/day for 0.9 years (0.9 ttl pk-yrs)     Types: Cigarettes     Start date: 11/2023    Smokeless tobacco: Never   Vaping Use    Vaping status: Never Used   Substance and Sexual Activity    Alcohol use: Not Currently    Drug use: Never    Sexual activity: Yes         Current Outpatient Medications:     albuterol (Ventolin HFA) 90 mcg/act inhaler, Inhale 2 puffs every 6 (six) hours as needed for wheezing, Disp: 18 g, Rfl: 5    amLODIPine (NORVASC) 10 mg tablet, Take 1 tablet (10 mg total) by mouth daily, Disp: 90 tablet, Rfl: 3    aspirin 81 mg chewable tablet, Chew 1 tablet (81 mg total) daily, Disp: , Rfl:     bisacodyl (DULCOLAX) 5 mg EC tablet, Take 1 tablet (5 mg total) by mouth daily as needed for constipation for up to 4 doses, Disp: 30 tablet, Rfl: 0    glucose blood (OneTouch Verio) test strip, Test once daily, Disp: 100 each, Rfl: 0    lisinopril (ZESTRIL) 10 mg tablet, Take 1 tablet (10 mg total) by mouth daily, Disp: 90 tablet, Rfl: 3    metoprolol succinate (TOPROL-XL) 100 mg 24 hr tablet, Take 1 tablet (100 mg total) by mouth daily, Disp: 90 tablet, Rfl: 3    nitroglycerin (NITROSTAT) 0.4 mg SL tablet, Place 1 tablet (0.4 mg total) under the tongue every 5 (five) minutes as needed for chest pain, Disp: 25 tablet, Rfl: 5    nystatin (MYCOSTATIN) 500,000 units/5 mL suspension, Apply 5 mL (500,000 Units total) to the mouth or throat 4  (four) times a day, Disp: 100 mL, Rfl: 0    omeprazole (PriLOSEC) 40 MG capsule, Take 1 capsule (40 mg total) by mouth daily, Disp: 30 capsule, Rfl: 2    ondansetron (ZOFRAN) 8 mg tablet, Take 1 tablet (8 mg total) by mouth every 8 (eight) hours as needed for nausea or vomiting, Disp: 20 tablet, Rfl: 2    [START ON 9/19/2024] oxyCODONE-acetaminophen (Percocet) 7.5-325 MG per tablet, Take 1 tablet by mouth every 8 (eight) hours as needed for moderate pain Max Daily Amount: 3 tablets Do not start before September 19, 2024., Disp: 90 tablet, Rfl: 0    [START ON 10/17/2024] oxyCODONE-acetaminophen (Percocet) 7.5-325 MG per tablet, Take 1 tablet by mouth every 8 (eight) hours as needed for moderate pain Max Daily Amount: 3 tablets Do not start before October 17, 2024., Disp: 90 tablet, Rfl: 0    Restasis 0.05 % ophthalmic emulsion, , Disp: , Rfl:     rosuvastatin (CRESTOR) 40 MG tablet, Take 1 tablet (40 mg total) by mouth daily, Disp: 90 tablet, Rfl: 3    sildenafil (VIAGRA) 100 mg tablet, Take 1 tablet (100 mg total) by mouth as needed for erectile dysfunction, Disp: 20 tablet, Rfl: 0    tiotropium-olodaterol (Stiolto Respimat) 2.5-2.5 MCG/ACT inhaler, Inhale 2 puffs daily, Disp: 4 g, Rfl: 5    traZODone (DESYREL) 100 mg tablet, take 1 tablet by mouth daily at bedtime, Disp: 90 tablet, Rfl: 3    bisacodyl (DULCOLAX) 5 mg EC tablet, Take 4 tablets (20 mg total) by mouth once for 1 dose, Disp: 4 tablet, Rfl: 0    fluticasone (FLONASE) 50 mcg/act nasal spray, 1 spray into each nostril daily for 14 days, Disp: 11.1 mL, Rfl: 0    naloxone (NARCAN) 4 mg/0.1 mL nasal spray, Administer 1 spray into a nostril. If no response after 2-3 minutes, give another dose in the other nostril using a new spray. (Patient not taking: Reported on 5/20/2024), Disp: 1 each, Rfl: 1    polyethylene glycol (MiraLax) 17 GM/SCOOP powder, Take 238 g by mouth once for 1 dose Take 238 g my mouth. Use as directed, Disp: 238 g, Rfl: 0    primidone  "(MYSOLINE) 50 mg tablet, Take 0.5 tablets (25 mg total) by mouth daily in the early morning (Patient not taking: Reported on 9/12/2024), Disp: 30 tablet, Rfl: 3    tamsulosin (FLOMAX) 0.4 mg, Take 1 capsule (0.4 mg total) by mouth daily with dinner for 3 days, Disp: 3 capsule, Rfl: 0    No Known Allergies    Physical Exam:    /67   Pulse 56   Ht 5' 8\" (1.727 m)   Wt 87.1 kg (192 lb)   BMI 29.19 kg/m²     Constitutional:normal, well developed, well nourished, alert, in no distress and non-toxic and no overt pain behavior.  Eyes:anicteric  HEENT:grossly intact  Neck:supple, symmetric, trachea midline and no masses   Pulmonary:even and unlabored  Cardiovascular:No edema or pitting edema present  Skin:Normal without rashes or lesions and well hydrated  Psychiatric:Mood and affect appropriate  Neurologic:Cranial Nerves II-XII grossly intact  Musculoskeletal: Gait is slow and guarded.      Imaging  No orders to display         No orders of the defined types were placed in this encounter.      "

## 2024-09-12 NOTE — TELEPHONE ENCOUNTER
Called pt and advised him per Dr Duncan he has completed his 1 year of tecentriq so no more treatment needed at this time. Pt voiced understanding.

## 2024-09-13 ENCOUNTER — HOSPITAL ENCOUNTER (OUTPATIENT)
Dept: INFUSION CENTER | Facility: HOSPITAL | Age: 69
Discharge: HOME | End: 2024-09-13
Attending: INTERNAL MEDICINE

## 2024-09-15 DIAGNOSIS — G25.0 ESSENTIAL TREMOR: ICD-10-CM

## 2024-09-16 ENCOUNTER — OFFICE VISIT (OUTPATIENT)
Dept: FAMILY MEDICINE CLINIC | Facility: CLINIC | Age: 69
End: 2024-09-16
Payer: MEDICARE

## 2024-09-16 VITALS
BODY MASS INDEX: 29.43 KG/M2 | WEIGHT: 194.2 LBS | DIASTOLIC BLOOD PRESSURE: 70 MMHG | OXYGEN SATURATION: 98 % | HEIGHT: 68 IN | SYSTOLIC BLOOD PRESSURE: 120 MMHG | HEART RATE: 63 BPM | TEMPERATURE: 96.2 F

## 2024-09-16 DIAGNOSIS — R73.03 PRE-DIABETES: ICD-10-CM

## 2024-09-16 DIAGNOSIS — G62.0 NEUROPATHY DUE TO CHEMOTHERAPEUTIC DRUG (HCC): ICD-10-CM

## 2024-09-16 DIAGNOSIS — C34.31 MALIGNANT NEOPLASM OF LOWER LOBE OF RIGHT LUNG (HCC): ICD-10-CM

## 2024-09-16 DIAGNOSIS — F17.200 SMOKING: ICD-10-CM

## 2024-09-16 DIAGNOSIS — N52.9 ERECTILE DYSFUNCTION, UNSPECIFIED ERECTILE DYSFUNCTION TYPE: ICD-10-CM

## 2024-09-16 DIAGNOSIS — Z00.00 MEDICARE ANNUAL WELLNESS VISIT, SUBSEQUENT: Primary | ICD-10-CM

## 2024-09-16 DIAGNOSIS — N18.31 STAGE 3A CHRONIC KIDNEY DISEASE (HCC): ICD-10-CM

## 2024-09-16 DIAGNOSIS — J43.9 PULMONARY EMPHYSEMA, UNSPECIFIED EMPHYSEMA TYPE (HCC): ICD-10-CM

## 2024-09-16 DIAGNOSIS — T45.1X5A NEUROPATHY DUE TO CHEMOTHERAPEUTIC DRUG (HCC): ICD-10-CM

## 2024-09-16 DIAGNOSIS — C34.31 PRIMARY SQUAMOUS CELL CARCINOMA OF LOWER LOBE OF RIGHT LUNG (HCC): ICD-10-CM

## 2024-09-16 DIAGNOSIS — Z23 ENCOUNTER FOR IMMUNIZATION: ICD-10-CM

## 2024-09-16 DIAGNOSIS — F33.9 DEPRESSION, RECURRENT (HCC): ICD-10-CM

## 2024-09-16 DIAGNOSIS — I25.118 CORONARY ARTERY DISEASE OF NATIVE ARTERY OF NATIVE HEART WITH STABLE ANGINA PECTORIS (HCC): ICD-10-CM

## 2024-09-16 DIAGNOSIS — E78.1 HYPERTRIGLYCERIDEMIA: ICD-10-CM

## 2024-09-16 PROBLEM — J42 CHRONIC BRONCHITIS (HCC): Status: RESOLVED | Noted: 2019-01-28 | Resolved: 2024-09-16

## 2024-09-16 PROCEDURE — G0439 PPPS, SUBSEQ VISIT: HCPCS | Performed by: FAMILY MEDICINE

## 2024-09-16 PROCEDURE — 99214 OFFICE O/P EST MOD 30 MIN: CPT | Performed by: FAMILY MEDICINE

## 2024-09-16 RX ORDER — SILDENAFIL 100 MG/1
100 TABLET, FILM COATED ORAL AS NEEDED
Qty: 20 TABLET | Refills: 0 | Status: SHIPPED | OUTPATIENT
Start: 2024-09-16

## 2024-09-16 RX ORDER — PRIMIDONE 50 MG/1
TABLET ORAL
Qty: 45 TABLET | Refills: 3 | Status: SHIPPED | OUTPATIENT
Start: 2024-09-16

## 2024-09-16 NOTE — PATIENT INSTRUCTIONS
Medicare Preventive Visit Patient Instructions  Thank you for completing your Welcome to Medicare Visit or Medicare Annual Wellness Visit today. Your next wellness visit will be due in one year (9/17/2025).  The screening/preventive services that you may require over the next 5-10 years are detailed below. Some tests may not apply to you based off risk factors and/or age. Screening tests ordered at today's visit but not completed yet may show as past due. Also, please note that scanned in results may not display below.  Preventive Screenings:  Service Recommendations Previous Testing/Comments   Colorectal Cancer Screening  Colonoscopy    Fecal Occult Blood Test (FOBT)/Fecal Immunochemical Test (FIT)  Fecal DNA/Cologuard Test  Flexible Sigmoidoscopy Age: 45-75 years old   Colonoscopy: every 10 years (May be performed more frequently if at higher risk)  OR  FOBT/FIT: every 1 year  OR  Cologuard: every 3 years  OR  Sigmoidoscopy: every 5 years  Screening may be recommended earlier than age 45 if at higher risk for colorectal cancer. Also, an individualized decision between you and your healthcare provider will decide whether screening between the ages of 76-85 would be appropriate. Colonoscopy: Not on file  FOBT/FIT: Not on file  Cologuard: Not on file  Sigmoidoscopy: Not on file    Risks and Benefits Discussed     Prostate Cancer Screening Individualized decision between patient and health care provider in men between ages of 55-69   Medicare will cover every 12 months beginning on the day after your 50th birthday PSA: 3.0 ng/mL; 3.0 ng/mL     Risks and Benefits Discussed     Hepatitis C Screening Once for adults born between 1945 and 1965  More frequently in patients at high risk for Hepatitis C Hep C Antibody: 08/30/2019    Screening Current   Diabetes Screening 1-2 times per year if you're at risk for diabetes or have pre-diabetes Fasting glucose: 151 mg/dL (7/31/2024)  A1C: 6.7 % (7/31/2024)  Screening Current    Cholesterol Screening Once every 5 years if you don't have a lipid disorder. May order more often based on risk factors. Lipid panel: 07/31/2024  Screening Not Indicated  History Lipid Disorder      Other Preventive Screenings Covered by Medicare:  Abdominal Aortic Aneurysm (AAA) Screening: covered once if your at risk. You're considered to be at risk if you have a family history of AAA or a male between the age of 65-75 who smoking at least 100 cigarettes in your lifetime.  Lung Cancer Screening: covers low dose CT scan once per year if you meet all of the following conditions: (1) Age 55-77; (2) No signs or symptoms of lung cancer; (3) Current smoker or have quit smoking within the last 15 years; (4) You have a tobacco smoking history of at least 20 pack years (packs per day x number of years you smoked); (5) You get a written order from a healthcare provider.  Glaucoma Screening: covered annually if you're considered high risk: (1) You have diabetes OR (2) Family history of glaucoma OR (3)  aged 50 and older OR (4)  American aged 65 and older  Osteoporosis Screening: covered every 2 years if you meet one of the following conditions: (1) Have a vertebral abnormality; (2) On glucocorticoid therapy for more than 3 months; (3) Have primary hyperparathyroidism; (4) On osteoporosis medications and need to assess response to drug therapy.  HIV Screening: covered annually if you're between the age of 15-65. Also covered annually if you are younger than 15 and older than 65 with risk factors for HIV infection. For pregnant patients, it is covered up to 3 times per pregnancy.    Immunizations:  Immunization Recommendations   Influenza Vaccine Annual influenza vaccination during flu season is recommended for all persons aged >= 6 months who do not have contraindications   Pneumococcal Vaccine   * Pneumococcal conjugate vaccine = PCV13 (Prevnar 13), PCV15 (Vaxneuvance), PCV20 (Prevnar 20)  *  Pneumococcal polysaccharide vaccine = PPSV23 (Pneumovax) Adults 19-63 yo with certain risk factors or if 65+ yo  If never received any pneumonia vaccine: recommend Prevnar 20 (PCV20)  Give PCV20 if previously received 1 dose of PCV13 or PPSV23   Hepatitis B Vaccine 3 dose series if at intermediate or high risk (ex: diabetes, end stage renal disease, liver disease)   Respiratory syncytial virus (RSV) Vaccine - COVERED BY MEDICARE PART D  * RSVPreF3 (Arexvy) CDC recommends that adults 60 years of age and older may receive a single dose of RSV vaccine using shared clinical decision-making (SCDM)   Tetanus (Td) Vaccine - COST NOT COVERED BY MEDICARE PART B Following completion of primary series, a booster dose should be given every 10 years to maintain immunity against tetanus. Td may also be given as tetanus wound prophylaxis.   Tdap Vaccine - COST NOT COVERED BY MEDICARE PART B Recommended at least once for all adults. For pregnant patients, recommended with each pregnancy.   Shingles Vaccine (Shingrix) - COST NOT COVERED BY MEDICARE PART B  2 shot series recommended in those 19 years and older who have or will have weakened immune systems or those 50 years and older     Health Maintenance Due:      Topic Date Due   • Colorectal Cancer Screening  Never done   • Hepatitis C Screening  Completed     Immunizations Due:      Topic Date Due   • Pneumococcal Vaccine: 65+ Years (1 of 2 - PCV) Never done   • COVID-19 Vaccine (4 - 2023-24 season) 09/01/2024   • Influenza Vaccine (1) 09/01/2024     Advance Directives   What are advance directives?  Advance directives are legal documents that state your wishes and plans for medical care. These plans are made ahead of time in case you lose your ability to make decisions for yourself. Advance directives can apply to any medical decision, such as the treatments you want, and if you want to donate organs.   What are the types of advance directives?  There are many types of advance  directives, and each state has rules about how to use them. You may choose a combination of any of the following:  Living will:  This is a written record of the treatment you want. You can also choose which treatments you do not want, which to limit, and which to stop at a certain time. This includes surgery, medicine, IV fluid, and tube feedings.   Durable power of  for healthcare (DPAHC):  This is a written record that states who you want to make healthcare choices for you when you are unable to make them for yourself. This person, called a proxy, is usually a family member or a friend. You may choose more than 1 proxy.  Do not resuscitate (DNR) order:  A DNR order is used in case your heart stops beating or you stop breathing. It is a request not to have certain forms of treatment, such as CPR. A DNR order may be included in other types of advance directives.  Medical directive:  This covers the care that you want if you are in a coma, near death, or unable to make decisions for yourself. You can list the treatments you want for each condition. Treatment may include pain medicine, surgery, blood transfusions, dialysis, IV or tube feedings, and a ventilator (breathing machine).  Values history:  This document has questions about your views, beliefs, and how you feel and think about life. This information can help others choose the care that you would choose.  Why are advance directives important?  An advance directive helps you control your care. Although spoken wishes may be used, it is better to have your wishes written down. Spoken wishes can be misunderstood, or not followed. Treatments may be given even if you do not want them. An advance directive may make it easier for your family to make difficult choices about your care.   Cigarette Smoking and Your Health   Risks to your health if you smoke:  Nicotine and other chemicals found in tobacco damage every cell in your body. Even if you are a light  smoker, you have an increased risk for cancer, heart disease, and lung disease. If you are pregnant or have diabetes, smoking increases your risk for complications.   Benefits to your health if you stop smoking:   You decrease respiratory symptoms such as coughing, wheezing, and shortness of breath.   You reduce your risk for cancers of the lung, mouth, throat, kidney, bladder, pancreas, stomach, and cervix. If you already have cancer, you increase the benefits of chemotherapy. You also reduce your risk for cancer returning or a second cancer from developing.   You reduce your risk for heart disease, blood clots, heart attack, and stroke.   You reduce your risk for lung infections, and diseases such as pneumonia, asthma, chronic bronchitis, and emphysema.  Your circulation improves. More oxygen can be delivered to your body. If you have diabetes, you lower your risk for complications, such as kidney, artery, and eye diseases. You also lower your risk for nerve damage. Nerve damage can lead to amputations, poor vision, and blindness.  You improve your body's ability to heal and to fight infections.  For more information and support to stop smoking:   [employer]  Phone: 1- 385 - 603-1922  Web Address: www.[employer]  Weight Management   Why it is important to manage your weight:  Being overweight increases your risk of health conditions such as heart disease, high blood pressure, type 2 diabetes, and certain types of cancer. It can also increase your risk for osteoarthritis, sleep apnea, and other respiratory problems. Aim for a slow, steady weight loss. Even a small amount of weight loss can lower your risk of health problems.  How to lose weight safely:  A safe and healthy way to lose weight is to eat fewer calories and get regular exercise. You can lose up about 1 pound a week by decreasing the number of calories you eat by 500 calories each day.   Healthy meal plan for weight management:  A healthy meal plan  includes a variety of foods, contains fewer calories, and helps you stay healthy. A healthy meal plan includes the following:  Eat whole-grain foods more often.  A healthy meal plan should contain fiber. Fiber is the part of grains, fruits, and vegetables that is not broken down by your body. Whole-grain foods are healthy and provide extra fiber in your diet. Some examples of whole-grain foods are whole-wheat breads and pastas, oatmeal, brown rice, and bulgur.  Eat a variety of vegetables every day.  Include dark, leafy greens such as spinach, kale, tatiana greens, and mustard greens. Eat yellow and orange vegetables such as carrots, sweet potatoes, and winter squash.   Eat a variety of fruits every day.  Choose fresh or canned fruit (canned in its own juice or light syrup) instead of juice. Fruit juice has very little or no fiber.  Eat low-fat dairy foods.  Drink fat-free (skim) milk or 1% milk. Eat fat-free yogurt and low-fat cottage cheese. Try low-fat cheeses such as mozzarella and other reduced-fat cheeses.  Choose meat and other protein foods that are low in fat.  Choose beans or other legumes such as split peas or lentils. Choose fish, skinless poultry (chicken or turkey), or lean cuts of red meat (beef or pork). Before you cook meat or poultry, cut off any visible fat.   Use less fat and oil.  Try baking foods instead of frying them. Add less fat, such as margarine, sour cream, regular salad dressing and mayonnaise to foods. Eat fewer high-fat foods. Some examples of high-fat foods include french fries, doughnuts, ice cream, and cakes.  Eat fewer sweets.  Limit foods and drinks that are high in sugar. This includes candy, cookies, regular soda, and sweetened drinks.  Exercise:  Exercise at least 30 minutes per day on most days of the week. Some examples of exercise include walking, biking, dancing, and swimming. You can also fit in more physical activity by taking the stairs instead of the elevator or  parking farther away from stores. Ask your healthcare provider about the best exercise plan for you.      © Copyright [employer] 2018 Information is for End User's use only and may not be sold, redistributed or otherwise used for commercial purposes. All illustrations and images included in CareNotes® are the copyrighted property of [employer] or [employer]

## 2024-09-16 NOTE — ASSESSMENT & PLAN NOTE
Lab Results   Component Value Date    EGFR 60 08/22/2024    EGFR 48 08/01/2024    EGFR 53 07/02/2024    CREATININE 1.21 08/22/2024    CREATININE 1.45 (H) 08/01/2024    CREATININE 1.34 (H) 07/02/2024

## 2024-09-16 NOTE — ASSESSMENT & PLAN NOTE
Orders:    sildenafil (VIAGRA) 100 mg tablet; Take 1 tablet (100 mg total) by mouth as needed for erectile dysfunction

## 2024-09-16 NOTE — ASSESSMENT & PLAN NOTE
Orders:    metFORMIN (GLUCOPHAGE) 500 mg tablet; Take 1 tablet (500 mg total) by mouth 2 (two) times a day with meals    Glucose, fasting; Future    Hemoglobin A1C; Future

## 2024-09-16 NOTE — PROGRESS NOTES
Ambulatory Visit  Name: Lance Hazel      : 1955      MRN: [id number]  Encounter Provider: Maico Lopez DO  Encounter Date: 2024   Encounter department: [facility]  Medicare annual wellness visit, subsequent         Encounter for immunization    Orders:    Pneumococcal Conjugate Vaccine 20-valent (Pcv20)    Pre-diabetes    Orders:    metFORMIN (GLUCOPHAGE) 500 mg tablet; Take 1 tablet (500 mg total) by mouth 2 (two) times a day with meals    Glucose, fasting; Future    Hemoglobin A1C; Future    Depression, recurrent (HCC)           Stage 3a chronic kidney disease (HCC)  Lab Results   Component Value Date    EGFR 60 2024    EGFR 48 2024    EGFR 53 2024    CREATININE 1.21 2024    CREATININE 1.45 (H) 2024    CREATININE 1.34 (H) 2024            Neuropathy due to chemotherapeutic drug (HCC)         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)         Pulmonary emphysema, unspecified emphysema type (HCC)         Malignant neoplasm of lower lobe of right lung (HCC)         Coronary artery disease of native artery of native heart with stable angina pectoris (HCC)         Erectile dysfunction, unspecified erectile dysfunction type    Orders:    sildenafil (VIAGRA) 100 mg tablet; Take 1 tablet (100 mg total) by mouth as needed for erectile dysfunction    Hypertriglyceridemia         Smoking            Preventive health issues were discussed with patient, and age appropriate screening tests were ordered as noted in patient's After Visit Summary. Personalized health advice and appropriate referrals for health education or preventive services given if needed, as noted in patient's After Visit Summary.    History of Present Illness     HPI   Patient Care Team:  Maico Lopez DO as PCP - General (Family Medicine)  Ricardo Park MD (Pain Medicine)  Rober Mejia MD as Surgeon  (Orthopedic Surgery)  Ricardo Park MD (Pain Medicine)  Ryan Collier MD (Neurology)  Jose Manuel Orozco MD as Surgeon (Neurosurgery)  Priscilla Ritter MD (Pulmonary Disease)  Gulshan Madison MD as Surgeon (Thoracic Surgery)  Melchor Lopez DO (Cardiology)  Mandy Pinto, RN as Nurse Navigator (Oncology)  Paul Duncan DO as Medical Oncologist (Hematology and Oncology)  Shari Hastings, RN as Registered Nurse (Hematology and Oncology)  Camron Whitaker as  (Oncology)  Ronal Lieberman MD (Gastroenterology)  ELOISA Leon as Nurse Practitioner (Pulmonology)  Barbara Kocher, CRNP (Nurse Practitioner)    Review of Systems   Constitutional:  Positive for fatigue. Negative for chills and fever.   HENT: Negative.  Negative for hearing loss.    Eyes: Negative.  Negative for visual disturbance.   Respiratory:  Positive for shortness of breath. Negative for wheezing.    Cardiovascular:  Negative for chest pain and palpitations.   Gastrointestinal:  Negative for abdominal pain, blood in stool, constipation, diarrhea, nausea and vomiting.   Endocrine: Negative.    Genitourinary:  Negative for difficulty urinating and dysuria.   Musculoskeletal:  Positive for arthralgias. Negative for myalgias.   Skin: Negative.    Allergic/Immunologic: Negative.    Neurological:  Negative for seizures and syncope.   Hematological:  Negative for adenopathy.   Psychiatric/Behavioral: Negative.       Medical History Reviewed by provider this encounter:       Annual Wellness Visit Questionnaire   Lance is here for his Subsequent Wellness visit. Last Medicare Wellness visit information reviewed, patient interviewed and updates made to the record today.      Health Risk Assessment:   Patient rates overall health as good. Patient feels that their physical health rating is same. Patient is satisfied with their life. Eyesight was rated as same. Hearing was rated as same. Patient  feels that their emotional and mental health rating is same. Patients states they are never, rarely angry. Patient states they are always unusually tired/fatigued. Pain experienced in the last 7 days has been some. Patient's pain rating has been 5/10. Patient states that he has experienced no weight loss or gain in last 6 months.     Depression Screening:   PHQ-9 Score: 0      Fall Risk Screening:   In the past year, patient has experienced: no history of falling in past year      Home Safety:  Patient does not have trouble with stairs inside or outside of their home. Patient has working smoke alarms and has no working carbon monoxide detector. Home safety hazards include: none.     Nutrition:   Current diet is Regular.     Medications:   Patient is currently taking over-the-counter supplements. OTC medications include: see medication list. Patient is able to manage medications.     Activities of Daily Living (ADLs)/Instrumental Activities of Daily Living (IADLs):   Walk and transfer into and out of bed and chair?: Yes  Dress and groom yourself?: Yes    Bathe or shower yourself?: Yes    Feed yourself? Yes  Do your laundry/housekeeping?: Yes  Manage your money, pay your bills and track your expenses?: Yes  Make your own meals?: Yes    Do your own shopping?: Yes    Previous Hospitalizations:   Any hospitalizations or ED visits within the last 12 months?: No      Advance Care Planning:   Living will: Yes    Durable POA for healthcare: Yes    Advanced directive: Yes    Advanced directive counseling given: No    Five wishes given: No    Patient declined ACP directive: No    End of Life Decisions reviewed with patient: Yes    Provider agrees with end of life decisions: Yes      Cognitive Screening:   Provider or family/friend/caregiver concerned regarding cognition?: No    PREVENTIVE SCREENINGS      Cardiovascular Screening:    General: Screening Not Indicated and History Lipid Disorder      Diabetes Screening:      "General: Screening Current      Colorectal Cancer Screening:     General: Risks and Benefits Discussed      Prostate Cancer Screening:    General: Risks and Benefits Discussed      Osteoporosis Screening:    General: Risks and Benefits Discussed      Abdominal Aortic Aneurysm (AAA) Screening:    Risk factors include: age between 65-76 yo and tobacco use        General: Screening Not Indicated and History AAA      Lung Cancer Screening:     General: Screening Not Indicated and History Lung Cancer      Hepatitis C Screening:    General: Screening Current    Screening, Brief Intervention, and Referral to Treatment (SBIRT)    Screening  Typical number of drinks in a day: 0  Typical number of drinks in a week: 0  Interpretation: Low risk drinking behavior.    Single Item Drug Screening:  How often have you used an illegal drug (including marijuana) or a prescription medication for non-medical reasons in the past year? never    Single Item Drug Screen Score: 0  Interpretation: Negative screen for possible drug use disorder    Social Determinants of Health     Food Insecurity: No Food Insecurity (9/16/2024)    Hunger Vital Sign     Worried About Running Out of Food in the Last Year: Never true     Ran Out of Food in the Last Year: Never true   Transportation Needs: No Transportation Needs (9/16/2024)    PRAPARE - Transportation     Lack of Transportation (Medical): No     Lack of Transportation (Non-Medical): No   Housing Stability: Unknown (9/16/2024)    Housing Stability Vital Sign     Unable to Pay for Housing in the Last Year: No     Homeless in the Last Year: No   Utilities: Not At Risk (9/16/2024)    [facility] Utilities     Threatened with loss of utilities: No     No results found.    Objective     /70   Pulse 63   Temp (!) 96.2 °F (35.7 °C) (Tympanic)   Ht 5' 8\" (1.727 m)   Wt 88.1 kg (194 lb 3.2 oz)   SpO2 98%   BMI 29.53 kg/m²     Physical Exam  Vitals and nursing note reviewed.   Constitutional:       " General: He is not in acute distress.     Appearance: Normal appearance. He is well-developed. He is not ill-appearing, toxic-appearing or diaphoretic.   HENT:      Head: Normocephalic and atraumatic.      Right Ear: Tympanic membrane, ear canal and external ear normal. There is no impacted cerumen.      Left Ear: Tympanic membrane, ear canal and external ear normal. There is no impacted cerumen.      Nose: Nose normal. No congestion or rhinorrhea.      Mouth/Throat:      Mouth: Mucous membranes are moist.      Pharynx: No oropharyngeal exudate or posterior oropharyngeal erythema.   Eyes:      General: No scleral icterus.        Right eye: No discharge.         Left eye: No discharge.      Conjunctiva/sclera: Conjunctivae normal.      Pupils: Pupils are equal, round, and reactive to light.   Neck:      Thyroid: No thyromegaly.      Trachea: No tracheal deviation.   Cardiovascular:      Rate and Rhythm: Normal rate and regular rhythm.      Pulses: Normal pulses.      Heart sounds: Normal heart sounds. No murmur heard.  Pulmonary:      Effort: Pulmonary effort is normal. No respiratory distress.      Breath sounds: Normal breath sounds. No stridor. No wheezing or rales.   Abdominal:      General: There is no distension.      Palpations: Abdomen is soft. There is no mass.      Tenderness: There is no abdominal tenderness. There is no guarding or rebound.      Hernia: No hernia is present.   Musculoskeletal:         General: No tenderness or deformity. Normal range of motion.      Cervical back: Normal range of motion and neck supple. No rigidity.      Right lower leg: No edema.      Left lower leg: No edema.   Lymphadenopathy:      Cervical: No cervical adenopathy.   Skin:     General: Skin is warm.      Findings: No erythema or rash.   Neurological:      Mental Status: He is alert and oriented to person, place, and time.      Sensory: No sensory deficit.      Motor: No abnormal muscle tone.      Deep Tendon Reflexes:  Reflexes normal.   Psychiatric:         Mood and Affect: Mood normal.         Behavior: Behavior normal.         Thought Content: Thought content normal.         Judgment: Judgment normal.

## 2024-09-19 ENCOUNTER — HOSPITAL ENCOUNTER (OUTPATIENT)
Dept: CT IMAGING | Facility: HOSPITAL | Age: 69
Discharge: HOME | End: 2024-09-19
Payer: MEDICARE

## 2024-09-19 DIAGNOSIS — C34.31 PRIMARY SQUAMOUS CELL CARCINOMA OF LOWER LOBE OF RIGHT LUNG (HCC): ICD-10-CM

## 2024-09-19 PROCEDURE — 71250 CT THORAX DX C-: CPT

## 2024-09-24 ENCOUNTER — OFFICE VISIT (OUTPATIENT)
Dept: HEMATOLOGY ONCOLOGY | Facility: CLINIC | Age: 69
End: 2024-09-24
Payer: MEDICARE

## 2024-09-24 ENCOUNTER — APPOINTMENT (OUTPATIENT)
Dept: LAB | Facility: HOSPITAL | Age: 69
End: 2024-09-24
Payer: MEDICARE

## 2024-09-24 VITALS
SYSTOLIC BLOOD PRESSURE: 150 MMHG | OXYGEN SATURATION: 94 % | TEMPERATURE: 98.5 F | BODY MASS INDEX: 29.49 KG/M2 | HEIGHT: 68 IN | HEART RATE: 65 BPM | DIASTOLIC BLOOD PRESSURE: 75 MMHG | WEIGHT: 194.6 LBS

## 2024-09-24 DIAGNOSIS — R53.83 FATIGUE, UNSPECIFIED TYPE: ICD-10-CM

## 2024-09-24 DIAGNOSIS — E53.8 FOLATE DEFICIENCY: ICD-10-CM

## 2024-09-24 DIAGNOSIS — E53.8 B12 DEFICIENCY: ICD-10-CM

## 2024-09-24 DIAGNOSIS — C34.31 MALIGNANT NEOPLASM OF LOWER LOBE OF RIGHT LUNG (HCC): ICD-10-CM

## 2024-09-24 DIAGNOSIS — D50.9 IRON DEFICIENCY ANEMIA, UNSPECIFIED IRON DEFICIENCY ANEMIA TYPE: ICD-10-CM

## 2024-09-24 DIAGNOSIS — E53.8 B12 DEFICIENCY: Primary | ICD-10-CM

## 2024-09-24 PROCEDURE — 82746 ASSAY OF FOLIC ACID SERUM: CPT

## 2024-09-24 PROCEDURE — 83540 ASSAY OF IRON: CPT

## 2024-09-24 PROCEDURE — 99214 OFFICE O/P EST MOD 30 MIN: CPT | Performed by: NURSE PRACTITIONER

## 2024-09-24 PROCEDURE — 36415 COLL VENOUS BLD VENIPUNCTURE: CPT

## 2024-09-24 PROCEDURE — 82728 ASSAY OF FERRITIN: CPT

## 2024-09-24 PROCEDURE — G2211 COMPLEX E/M VISIT ADD ON: HCPCS | Performed by: NURSE PRACTITIONER

## 2024-09-24 PROCEDURE — 83550 IRON BINDING TEST: CPT

## 2024-09-24 PROCEDURE — 82607 VITAMIN B-12: CPT

## 2024-09-24 NOTE — PROGRESS NOTES
[facility] [place]  [address] Located within [facility] [id number] [phone]  Oncology Progress Note  Lance Hazel, 1955, [id number]  9/24/2024        Assessment/Plan:   1. B12 deficiency  2. Iron deficiency anemia, unspecified iron deficiency anemia type  3. Folate deficiency  4. Fatigue, unspecified type  5. Malignant neoplasm of lower lobe of right lung (HCC)   Patient is a 69-year-old male with history of stage IIIa squamous cell carcinoma the right lung, PD-L1 1%, TMB high. Status post adjuvant Taxol carbo, added Tecentriq in June 2023.   He was on treatment hiatus since mid February 2024 to investigate potential contribution to ongoing fevers.  Tecentriq resumed in May 2024,  Completed August 22, 2024.  Overall he is able to function without restriction.  Chest CT from 9/19/2024 showed no interval change in appearance of lungs since prior exam.  No specific findings to suggest static or recurrent disease in the thorax.  No progressive mediastinal or hilar adenopathy.  There is chronic circumcorneal diffuse esophageal nonspecific wall thickening.  Labs from 8/22/2024 show a WBC 10.21 otherwise normal CBC and differential.  TSH 1.5, CMP shows a creatinine of 1.21, normal calcium and protein, EGFR 60.  We discussed that he is on observation with routine CTs of the chest.  This is ordered by thoracic surgery.  We will plan to see him in 3 months.  At some point he may be discharged from our practice and continue follow with thoracic surgery.    - Iron Panel (Includes Ferritin, Iron Sat%, Iron, and TIBC); Future  - Vitamin B12; Future  - Folate; Future  - cyanocobalamin (VITAMIN B-12) 1000 MCG tablet; Take 1 tablet (1,000 mcg total) by mouth daily  Dispense: 90 tablet; Refill: 3    Patient verbalized understanding and is in agreement to the plan.   Patient knows to call the Hematology/Oncology office with any questions and concerns regarding the  above.    Goals and Barriers:    Current Goal:   Prolong Survival from Cancer.     Barriers: None.      Patient's Capacity to Self Care:  Patient is able to self care.    Sruthi AMIN  Medical Oncology/Hematology  [facility]  ______________________________________________________________________________________________________________    Subjective     History of present illness/Cancer History:   Oncology History   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)   11/16/2022 Surgery    R VATS lower lobe super segmentectomy      11/16/2022 -  Cancer Staged    Staging form: Lung, AJCC 8th Edition  - Pathologic stage from 11/16/2022: Stage IIIA (pT1b, pN2, cM0) - Signed by Erinn Gooden PA-C on 3/19/2024       Malignant neoplasm of lower lobe of right lung (HCC)   9/13/2022 Initial Diagnosis    July 23, 2022 patient had CT chest ab pelvis regarding rule out AAA.  This showed 1.1 cm right lower lobe pulmonary nodule new since prior study of November 2019.  Some other smaller nodules identified.  Subcarinal lymph node 2 cm.  No other findings suspicious for metastatic disease.  August 5, 2022 PET/CT showed 1.2 cm pulmonary nodule, SUV 2.9.  Other nodules noted, subcentimeter, no hypermetabolism.  Some groundglass opacities in the right upper lobe.  September 13, 2022 patient had needle biopsy of the right lower lobe mass showing squamous cell carcinoma, TTF-1 positive.  November 16, 2022 patient underwent right lower lobe segmentectomy.  Pathology showed 1.8 cm squamous cell carcinoma.  Level 4 and 11 lymph node were positive for metastatic carcinoma.     1/30/2023 - 3/27/2023 Chemotherapy    palonosetron (ALOXI), 0.25 mg, Intravenous, Once, 4 of 4 cycles  Administration: 0.25 mg (1/30/2023), 0.25 mg (2/13/2023), 0.25 mg (3/6/2023), 0.25 mg (3/27/2023)  fosaprepitant (EMEND) IVPB, 150 mg, Intravenous, Once, 3 of 3 cycles  Administration: 150 mg (1/30/2023), 150 mg  (2/13/2023), 150 mg (3/6/2023)  CARBOplatin (PARAPLATIN) IVPB ([facility] AUC DOSING), 552 mg, Intravenous, Once, 4 of 4 cycles  Administration: 550 mg (1/30/2023), 650 mg (2/13/2023), 650 mg (3/6/2023), 500 mg (3/27/2023)  PACLItaxel (TAXOL) chemo IVPB, 349.8 mg, Intravenous, Once, 4 of 4 cycles  Dose modification: 200 mg/m2 (original dose 175 mg/m2, Cycle 2, Reason: Dose modified as per discussion with consulting physician)  Administration: 360 mg (1/30/2023), 400 mg (2/13/2023), 400 mg (3/6/2023), 400 mg (3/27/2023)  aprepitant (CINVANTI) in  mL IVPB, 130 mg, Intravenous, Once, 1 of 1 cycle  Administration: 130 mg (3/27/2023)     4/13/2023 Genomic Testing    April 13, 2023 Caris-  PD-L1 1%, TMB high.  MTAP deleted.  p53 [id number], pathogenic variant  KRAS G694 L, VUS  CRABBP  *, pathogenic variant  KMT2D *, pathogenic variant     6/21/2023 - 8/22/2024 Chemotherapy    alteplase (CATHFLO), 2 mg, Intracatheter, Every 1 Minute as needed, 17 of 17 cycles  atezolizumab (TECENTRIQ) IVPB, 1,200 mg, Intravenous, Once, 17 of 17 cycles  Administration: 1,200 mg (6/21/2023), 1,200 mg (7/12/2023), 1,200 mg (8/29/2023), 1,200 mg (9/18/2023), 1,200 mg (10/10/2023), 1,200 mg (10/31/2023), 1,200 mg (11/22/2023), 1,200 mg (12/11/2023), 1,200 mg (1/2/2024), 1,200 mg (1/23/2024), 1,200 mg (2/14/2024), 1,200 mg (5/7/2024), 1,200 mg (5/31/2024), 1,200 mg (6/20/2024), 1,200 mg (7/11/2024), 1,200 mg (8/22/2024), 1,200 mg (8/1/2024)          Lab Results   Component Value Date    WBC 10.21 (H) 08/22/2024    HGB 15.1 08/22/2024    HCT 47.4 08/22/2024    MCV 94 08/22/2024     08/22/2024     Lab Results   Component Value Date    SODIUM 136 08/22/2024    K 3.9 08/22/2024     08/22/2024    CO2 27 08/22/2024    AGAP 8 08/22/2024    BUN 16 08/22/2024    CREATININE 1.21 08/22/2024    GLUC 194 (H) 08/22/2024    GLUF 151 (H) 07/31/2024    CALCIUM 8.8 08/22/2024    AST 14 08/22/2024    ALT 12 08/22/2024    ALKPHOS 61 08/22/2024     TP 6.5 2024    TBILI 0.95 2024    EGFR 60 2024       Interval history:  clinically stable     ECO - Symptomatic but completely ambulatory    Review of Systems   Constitutional:  Positive for fatigue. Negative for activity change, appetite change, fever and unexpected weight change.   Respiratory:  Negative for cough and shortness of breath.    Cardiovascular:  Negative for chest pain and leg swelling.   Gastrointestinal:  Negative for abdominal pain, constipation, diarrhea and nausea.   Endocrine: Negative for cold intolerance and heat intolerance.   Musculoskeletal:  Negative for arthralgias and myalgias.   Skin: Negative.    Neurological:  Negative for dizziness, weakness and headaches.   Hematological:  Negative for adenopathy. Does not bruise/bleed easily.   Psychiatric/Behavioral:  Positive for sleep disturbance.        Current Outpatient Medications:     albuterol (Ventolin HFA) 90 mcg/act inhaler, Inhale 2 puffs every 6 (six) hours as needed for wheezing, Disp: 18 g, Rfl: 5    amLODIPine (NORVASC) 10 mg tablet, Take 1 tablet (10 mg total) by mouth daily, Disp: 90 tablet, Rfl: 3    aspirin 81 mg chewable tablet, Chew 1 tablet (81 mg total) daily, Disp: , Rfl:     bisacodyl (DULCOLAX) 5 mg EC tablet, Take 1 tablet (5 mg total) by mouth daily as needed for constipation for up to 4 doses, Disp: 30 tablet, Rfl: 0    cyanocobalamin (VITAMIN B-12) 1000 MCG tablet, Take 1 tablet (1,000 mcg total) by mouth daily, Disp: 90 tablet, Rfl: 3    glucose blood (OneTouch Verio) test strip, Test once daily, Disp: 100 each, Rfl: 0    lisinopril (ZESTRIL) 10 mg tablet, Take 1 tablet (10 mg total) by mouth daily, Disp: 90 tablet, Rfl: 3    metFORMIN (GLUCOPHAGE) 500 mg tablet, Take 1 tablet (500 mg total) by mouth 2 (two) times a day with meals, Disp: 30 tablet, Rfl: 6    metoprolol succinate (TOPROL-XL) 100 mg 24 hr tablet, Take 1 tablet (100 mg total) by mouth daily, Disp: 90 tablet, Rfl: 3     nitroglycerin (NITROSTAT) 0.4 mg SL tablet, Place 1 tablet (0.4 mg total) under the tongue every 5 (five) minutes as needed for chest pain, Disp: 25 tablet, Rfl: 5    nystatin (MYCOSTATIN) 500,000 units/5 mL suspension, Apply 5 mL (500,000 Units total) to the mouth or throat 4 (four) times a day, Disp: 100 mL, Rfl: 0    ondansetron (ZOFRAN) 8 mg tablet, Take 1 tablet (8 mg total) by mouth every 8 (eight) hours as needed for nausea or vomiting, Disp: 20 tablet, Rfl: 2    oxyCODONE-acetaminophen (Percocet) 7.5-325 MG per tablet, Take 1 tablet by mouth every 8 (eight) hours as needed for moderate pain Max Daily Amount: 3 tablets Do not start before September 19, 2024., Disp: 90 tablet, Rfl: 0    [START ON 10/17/2024] oxyCODONE-acetaminophen (Percocet) 7.5-325 MG per tablet, Take 1 tablet by mouth every 8 (eight) hours as needed for moderate pain Max Daily Amount: 3 tablets Do not start before October 17, 2024., Disp: 90 tablet, Rfl: 0    primidone (MYSOLINE) 50 mg tablet, take 1/2 tablet by mouth once daily every morning, Disp: 45 tablet, Rfl: 3    Restasis 0.05 % ophthalmic emulsion, , Disp: , Rfl:     rosuvastatin (CRESTOR) 40 MG tablet, Take 1 tablet (40 mg total) by mouth daily, Disp: 90 tablet, Rfl: 3    sildenafil (VIAGRA) 100 mg tablet, Take 1 tablet (100 mg total) by mouth as needed for erectile dysfunction, Disp: 20 tablet, Rfl: 0    tiotropium-olodaterol (Stiolto Respimat) 2.5-2.5 MCG/ACT inhaler, Inhale 2 puffs daily, Disp: 4 g, Rfl: 5    traZODone (DESYREL) 100 mg tablet, take 1 tablet by mouth daily at bedtime, Disp: 90 tablet, Rfl: 3    bisacodyl (DULCOLAX) 5 mg EC tablet, Take 4 tablets (20 mg total) by mouth once for 1 dose (Patient not taking: Reported on 9/16/2024), Disp: 4 tablet, Rfl: 0    fluticasone (FLONASE) 50 mcg/act nasal spray, 1 spray into each nostril daily for 14 days, Disp: 11.1 mL, Rfl: 0    naloxone (NARCAN) 4 mg/0.1 mL nasal spray, Administer 1 spray into a nostril. If no response  "after 2-3 minutes, give another dose in the other nostril using a new spray. (Patient not taking: Reported on 5/20/2024), Disp: 1 each, Rfl: 1    polyethylene glycol (MiraLax) 17 GM/SCOOP powder, Take 238 g by mouth once for 1 dose Take 238 g my mouth. Use as directed, Disp: 238 g, Rfl: 0    tamsulosin (FLOMAX) 0.4 mg, Take 1 capsule (0.4 mg total) by mouth daily with dinner for 3 days, Disp: 3 capsule, Rfl: 0  No Known Allergies    Advance Directive and Living Will:            Objective   /75 (BP Location: Right arm, Patient Position: Sitting, Cuff Size: Standard)   Pulse 65   Temp 98.5 °F (36.9 °C) (Temporal)   Ht 5' 8\" (1.727 m)   Wt 88.3 kg (194 lb 9.6 oz)   SpO2 94%   BMI 29.59 kg/m²   Wt Readings from Last 6 Encounters:   09/24/24 88.3 kg (194 lb 9.6 oz)   09/16/24 88.1 kg (194 lb 3.2 oz)   09/12/24 87.1 kg (192 lb)   08/08/24 87.3 kg (192 lb 6.4 oz)   07/26/24 88.9 kg (196 lb)   07/25/24 88.2 kg (194 lb 6.4 oz)       Physical Exam  Vitals reviewed.   Constitutional:       Appearance: Normal appearance. He is well-developed.   HENT:      Head: Normocephalic and atraumatic.   Eyes:      Pupils: Pupils are equal, round, and reactive to light.   Pulmonary:      Effort: Pulmonary effort is normal. No respiratory distress.   Musculoskeletal:         General: Normal range of motion.      Cervical back: Normal range of motion.   Lymphadenopathy:      Cervical: No cervical adenopathy.   Skin:     General: Skin is dry.   Neurological:      Mental Status: He is alert and oriented to person, place, and time.   Psychiatric:         Behavior: Behavior normal.       Imaging Review:      The following historical data was reviewed:  Past Medical History:   Diagnosis Date    Abdominal aortic aneurysm without rupture (HCC)     Abdominal Aortic Duplex 02/21/2017    Ectatic infrarenal abdominal aorta.    MARYANN (acute kidney injury) ([facility]) 07/23/2022    CAD (coronary artery disease)     Cancer ([facility]) 2022    right lung CA "    Chronic bronchitis (HCC) 01/28/2019    Class 1 obesity due to excess calories with serious comorbidity and body mass index (BMI) of 31.0 to 31.9 in adult 02/14/2020    COPD (chronic obstructive pulmonary disease) ([facility])     Extremity pain     Hemiparesis ([facility]) 11/01/2022    History of echocardiogram 03/18/2014    EF 55%, mild MR and AI.  Mild concentric LVH.    History of stress test 03/06/2017    Normal.    Hyperlipidemia     Hypertension     Joint pain     Kidney stone     Low back pain     Lung cancer (HCC)     Migraine     Neck pain     Obstructive sleep apnea     cannot tolerate CPAP    Osteoarthritis     Other chronic pancreatitis (HCC) 02/10/2023    Peripheral neuropathy     Reflex sympathetic dystrophy     Sacroiliitis ([facility]) 02/02/2022     Past Surgical History:   Procedure Laterality Date    CARDIAC CATHETERIZATION  02/13/2012    EF 70%, widely patent renal arteries, significant single-vessel CAD-medical therapy.    CARDIAC CATHETERIZATION  04/11/2013    EF 65%, 50% mid LAD, 20% prox CFX, 90% diffuse RCA, 99% mid RCA. Medical management.    CATARACT EXTRACTION Right 09/19/2023    CHOLECYSTECTOMY      COLONOSCOPY      EPIDURAL BLOCK INJECTION Bilateral 08/15/2019    Procedure: BLOCK / INJECTION EPIDURAL STEROID CERVICAL;  Surgeon: Ricardo Park MD;  Location: [facility];  Service: Pain Management     FL GUIDED NEEDLE PLAC BX/ASP/INJ  08/15/2019    IR BIOPSY LUNG  09/13/2022    IR THORACIC DUCT EMBOLIZATION  11/22/2022    ORTHOPEDIC SURGERY      MT BRNCHSC INCL FLUOR GDNCE DX W/CELL WASHG SPX N/A 11/16/2022    Procedure: BRONCHOSCOPY FLEXIBLE;  Surgeon: Gulshan Madison MD;  Location: [facility];  Service: Thoracic    MT THORACOSCOPY W/LOBECTOMY SINGLE LOBE Right 11/16/2022    Procedure: LOBECTOMY LUNG; lower lobe superior segmentectomy, mediastinal lymph node dissection;  Surgeon: Gulshan Maidson MD;  Location: [facility];  Service: Thoracic    MT THORACOSCOPY W/SEGMENTECTOMY  Right 11/16/2022    Procedure: THORACOSCOPY VIDEO ASSISTED SURGERY (VATS);  Surgeon: Gulshan Madison MD;  Location: [facility];  Service: Thoracic    TRIGGER POINT INJECTION           Please note:  This report has been generated by a voice recognition software system. Therefore there may be syntax, spelling, and/or grammatical errors. Please call if you have any questions.

## 2024-09-25 DIAGNOSIS — R73.03 PRE-DIABETES: ICD-10-CM

## 2024-09-25 LAB
FERRITIN SERPL-MCNC: 145 NG/ML (ref 24–336)
FOLATE SERPL-MCNC: 7.3 NG/ML
IRON SATN MFR SERPL: 19 % (ref 15–50)
IRON SERPL-MCNC: 58 UG/DL (ref 50–212)
TIBC SERPL-MCNC: 305 UG/DL (ref 250–450)
UIBC SERPL-MCNC: 247 UG/DL (ref 155–355)
VIT B12 SERPL-MCNC: 273 PG/ML (ref 180–914)

## 2024-09-27 ENCOUNTER — APPOINTMENT (EMERGENCY)
Dept: CT IMAGING | Facility: HOSPITAL | Age: 69
End: 2024-09-27
Payer: MEDICARE

## 2024-09-27 ENCOUNTER — ANESTHESIA EVENT (OUTPATIENT)
Dept: ANESTHESIOLOGY | Facility: HOSPITAL | Age: 69
End: 2024-09-27

## 2024-09-27 ENCOUNTER — APPOINTMENT (OUTPATIENT)
Dept: PERIOP | Facility: HOSPITAL | Age: 69
End: 2024-09-27
Payer: MEDICARE

## 2024-09-27 ENCOUNTER — ANESTHESIA EVENT (OUTPATIENT)
Dept: PERIOP | Facility: HOSPITAL | Age: 69
End: 2024-09-27
Payer: MEDICARE

## 2024-09-27 ENCOUNTER — ANESTHESIA (OUTPATIENT)
Dept: PERIOP | Facility: HOSPITAL | Age: 69
End: 2024-09-27
Payer: MEDICARE

## 2024-09-27 ENCOUNTER — HOSPITAL ENCOUNTER (OUTPATIENT)
Facility: HOSPITAL | Age: 69
Setting detail: OBSERVATION
Discharge: HOME | End: 2024-09-27
Attending: EMERGENCY MEDICINE | Admitting: INTERNAL MEDICINE
Payer: MEDICARE

## 2024-09-27 ENCOUNTER — ANESTHESIA (OUTPATIENT)
Dept: ANESTHESIOLOGY | Facility: HOSPITAL | Age: 69
End: 2024-09-27

## 2024-09-27 VITALS
TEMPERATURE: 98.6 F | BODY MASS INDEX: 29.07 KG/M2 | OXYGEN SATURATION: 90 % | RESPIRATION RATE: 17 BRPM | WEIGHT: 191.8 LBS | HEIGHT: 68 IN | SYSTOLIC BLOOD PRESSURE: 124 MMHG | HEART RATE: 79 BPM | DIASTOLIC BLOOD PRESSURE: 73 MMHG

## 2024-09-27 DIAGNOSIS — R50.9 FEVER: ICD-10-CM

## 2024-09-27 DIAGNOSIS — R10.9 INTRACTABLE ABDOMINAL PAIN: ICD-10-CM

## 2024-09-27 DIAGNOSIS — R13.19 ESOPHAGEAL DYSPHAGIA: ICD-10-CM

## 2024-09-27 DIAGNOSIS — R10.13 EPIGASTRIC PAIN: ICD-10-CM

## 2024-09-27 DIAGNOSIS — R10.13 EPIGASTRIC ABDOMINAL PAIN: Primary | ICD-10-CM

## 2024-09-27 DIAGNOSIS — K44.9 HIATAL HERNIA: ICD-10-CM

## 2024-09-27 LAB
ALBUMIN SERPL BCG-MCNC: 4.1 G/DL (ref 3.5–5)
ALP SERPL-CCNC: 62 U/L (ref 34–104)
ALT SERPL W P-5'-P-CCNC: 9 U/L (ref 7–52)
ANION GAP SERPL CALCULATED.3IONS-SCNC: 8 MMOL/L (ref 4–13)
ANION GAP SERPL CALCULATED.3IONS-SCNC: 9 MMOL/L (ref 4–13)
APTT PPP: 30 SECONDS (ref 23–34)
AST SERPL W P-5'-P-CCNC: 12 U/L (ref 13–39)
BACTERIA UR QL AUTO: NORMAL /HPF
BASOPHILS # BLD AUTO: 0.05 THOUSANDS/ΜL (ref 0–0.1)
BASOPHILS # BLD AUTO: 0.08 THOUSANDS/ΜL (ref 0–0.1)
BASOPHILS NFR BLD AUTO: 1 % (ref 0–1)
BASOPHILS NFR BLD AUTO: 1 % (ref 0–1)
BILIRUB SERPL-MCNC: 0.96 MG/DL (ref 0.2–1)
BILIRUB UR QL STRIP: NEGATIVE
BUN SERPL-MCNC: 12 MG/DL (ref 5–25)
BUN SERPL-MCNC: 14 MG/DL (ref 5–25)
CALCIUM SERPL-MCNC: 8.8 MG/DL (ref 8.4–10.2)
CALCIUM SERPL-MCNC: 9.2 MG/DL (ref 8.4–10.2)
CHLORIDE SERPL-SCNC: 97 MMOL/L (ref 96–108)
CHLORIDE SERPL-SCNC: 98 MMOL/L (ref 96–108)
CLARITY UR: CLEAR
CO2 SERPL-SCNC: 26 MMOL/L (ref 21–32)
CO2 SERPL-SCNC: 28 MMOL/L (ref 21–32)
COLOR UR: YELLOW
CREAT SERPL-MCNC: 1.11 MG/DL (ref 0.6–1.3)
CREAT SERPL-MCNC: 1.22 MG/DL (ref 0.6–1.3)
EOSINOPHIL # BLD AUTO: 0 THOUSAND/ΜL (ref 0–0.61)
EOSINOPHIL # BLD AUTO: 0.03 THOUSAND/ΜL (ref 0–0.61)
EOSINOPHIL NFR BLD AUTO: 0 % (ref 0–6)
EOSINOPHIL NFR BLD AUTO: 0 % (ref 0–6)
ERYTHROCYTE [DISTWIDTH] IN BLOOD BY AUTOMATED COUNT: 12.8 % (ref 11.6–15.1)
ERYTHROCYTE [DISTWIDTH] IN BLOOD BY AUTOMATED COUNT: 12.9 % (ref 11.6–15.1)
FLUAV RNA RESP QL NAA+PROBE: NEGATIVE
FLUBV RNA RESP QL NAA+PROBE: NEGATIVE
GFR SERPL CREATININE-BSD FRML MDRD: 59 ML/MIN/1.73SQ M
GFR SERPL CREATININE-BSD FRML MDRD: 66 ML/MIN/1.73SQ M
GLUCOSE SERPL-MCNC: 113 MG/DL (ref 65–140)
GLUCOSE SERPL-MCNC: 114 MG/DL (ref 65–140)
GLUCOSE SERPL-MCNC: 122 MG/DL (ref 65–140)
GLUCOSE SERPL-MCNC: 157 MG/DL (ref 65–140)
GLUCOSE UR STRIP-MCNC: NEGATIVE MG/DL
HCT VFR BLD AUTO: 45.4 % (ref 36.5–49.3)
HCT VFR BLD AUTO: 47.3 % (ref 36.5–49.3)
HGB BLD-MCNC: 14.8 G/DL (ref 12–17)
HGB BLD-MCNC: 15.3 G/DL (ref 12–17)
HGB UR QL STRIP.AUTO: ABNORMAL
IMM GRANULOCYTES # BLD AUTO: 0.04 THOUSAND/UL (ref 0–0.2)
IMM GRANULOCYTES # BLD AUTO: 0.04 THOUSAND/UL (ref 0–0.2)
IMM GRANULOCYTES NFR BLD AUTO: 0 % (ref 0–2)
IMM GRANULOCYTES NFR BLD AUTO: 0 % (ref 0–2)
INR PPP: 0.89 (ref 0.85–1.19)
KETONES UR STRIP-MCNC: NEGATIVE MG/DL
LACTATE SERPL-SCNC: 1.1 MMOL/L (ref 0.5–2)
LEUKOCYTE ESTERASE UR QL STRIP: ABNORMAL
LIPASE SERPL-CCNC: 26 U/L (ref 11–82)
LYMPHOCYTES # BLD AUTO: 2.05 THOUSANDS/ΜL (ref 0.6–4.47)
LYMPHOCYTES # BLD AUTO: 2.11 THOUSANDS/ΜL (ref 0.6–4.47)
LYMPHOCYTES NFR BLD AUTO: 17 % (ref 14–44)
LYMPHOCYTES NFR BLD AUTO: 20 % (ref 14–44)
MAGNESIUM SERPL-MCNC: 1.9 MG/DL (ref 1.9–2.7)
MCH RBC QN AUTO: 29.8 PG (ref 26.8–34.3)
MCH RBC QN AUTO: 30.2 PG (ref 26.8–34.3)
MCHC RBC AUTO-ENTMCNC: 32.3 G/DL (ref 31.4–37.4)
MCHC RBC AUTO-ENTMCNC: 32.6 G/DL (ref 31.4–37.4)
MCV RBC AUTO: 91 FL (ref 82–98)
MCV RBC AUTO: 94 FL (ref 82–98)
MONOCYTES # BLD AUTO: 1.08 THOUSAND/ΜL (ref 0.17–1.22)
MONOCYTES # BLD AUTO: 1.13 THOUSAND/ΜL (ref 0.17–1.22)
MONOCYTES NFR BLD AUTO: 10 % (ref 4–12)
MONOCYTES NFR BLD AUTO: 9 % (ref 4–12)
NEUTROPHILS # BLD AUTO: 7.25 THOUSANDS/ΜL (ref 1.85–7.62)
NEUTROPHILS # BLD AUTO: 8.78 THOUSANDS/ΜL (ref 1.85–7.62)
NEUTS SEG NFR BLD AUTO: 69 % (ref 43–75)
NEUTS SEG NFR BLD AUTO: 73 % (ref 43–75)
NITRITE UR QL STRIP: NEGATIVE
NON-SQ EPI CELLS URNS QL MICRO: NORMAL /HPF
NRBC BLD AUTO-RTO: 0 /100 WBCS
NRBC BLD AUTO-RTO: 0 /100 WBCS
PH UR STRIP.AUTO: 6 [PH]
PHOSPHATE SERPL-MCNC: 2.9 MG/DL (ref 2.3–4.1)
PLATELET # BLD AUTO: 172 THOUSANDS/UL (ref 149–390)
PLATELET # BLD AUTO: 172 THOUSANDS/UL (ref 149–390)
PLATELET # BLD AUTO: 175 THOUSANDS/UL (ref 149–390)
PMV BLD AUTO: 10 FL (ref 8.9–12.7)
PMV BLD AUTO: 9.8 FL (ref 8.9–12.7)
PMV BLD AUTO: 9.8 FL (ref 8.9–12.7)
POTASSIUM SERPL-SCNC: 3.5 MMOL/L (ref 3.5–5.3)
POTASSIUM SERPL-SCNC: 3.7 MMOL/L (ref 3.5–5.3)
PROCALCITONIN SERPL-MCNC: 0.1 NG/ML
PROT SERPL-MCNC: 6.7 G/DL (ref 6.4–8.4)
PROT UR STRIP-MCNC: ABNORMAL MG/DL
PROTHROMBIN TIME: 12.5 SECONDS (ref 12.3–15)
RBC # BLD AUTO: 4.97 MILLION/UL (ref 3.88–5.62)
RBC # BLD AUTO: 5.06 MILLION/UL (ref 3.88–5.62)
RBC #/AREA URNS AUTO: NORMAL /HPF
RSV RNA RESP QL NAA+PROBE: NEGATIVE
SARS-COV-2 RNA RESP QL NAA+PROBE: NEGATIVE
SODIUM SERPL-SCNC: 133 MMOL/L (ref 135–147)
SODIUM SERPL-SCNC: 133 MMOL/L (ref 135–147)
SP GR UR STRIP.AUTO: 1.02 (ref 1–1.03)
UROBILINOGEN UR STRIP-ACNC: <2 MG/DL
WBC # BLD AUTO: 10.47 THOUSAND/UL (ref 4.31–10.16)
WBC # BLD AUTO: 12.17 THOUSAND/UL (ref 4.31–10.16)
WBC #/AREA URNS AUTO: NORMAL /HPF

## 2024-09-27 PROCEDURE — 83735 ASSAY OF MAGNESIUM: CPT | Performed by: INTERNAL MEDICINE

## 2024-09-27 PROCEDURE — 83690 ASSAY OF LIPASE: CPT | Performed by: EMERGENCY MEDICINE

## 2024-09-27 PROCEDURE — 84145 PROCALCITONIN (PCT): CPT | Performed by: EMERGENCY MEDICINE

## 2024-09-27 PROCEDURE — 80048 BASIC METABOLIC PNL TOTAL CA: CPT | Performed by: INTERNAL MEDICINE

## 2024-09-27 PROCEDURE — 94760 N-INVAS EAR/PLS OXIMETRY 1: CPT

## 2024-09-27 PROCEDURE — 93005 ELECTROCARDIOGRAM TRACING: CPT

## 2024-09-27 PROCEDURE — 82948 REAGENT STRIP/BLOOD GLUCOSE: CPT

## 2024-09-27 PROCEDURE — 85610 PROTHROMBIN TIME: CPT | Performed by: EMERGENCY MEDICINE

## 2024-09-27 PROCEDURE — 81001 URINALYSIS AUTO W/SCOPE: CPT | Performed by: EMERGENCY MEDICINE

## 2024-09-27 PROCEDURE — 85049 AUTOMATED PLATELET COUNT: CPT | Performed by: INTERNAL MEDICINE

## 2024-09-27 PROCEDURE — 80053 COMPREHEN METABOLIC PANEL: CPT | Performed by: EMERGENCY MEDICINE

## 2024-09-27 PROCEDURE — 84100 ASSAY OF PHOSPHORUS: CPT | Performed by: INTERNAL MEDICINE

## 2024-09-27 PROCEDURE — 85025 COMPLETE CBC W/AUTO DIFF WBC: CPT | Performed by: INTERNAL MEDICINE

## 2024-09-27 PROCEDURE — 88305 TISSUE EXAM BY PATHOLOGIST: CPT | Performed by: PATHOLOGY

## 2024-09-27 PROCEDURE — 99285 EMERGENCY DEPT VISIT HI MDM: CPT | Performed by: EMERGENCY MEDICINE

## 2024-09-27 PROCEDURE — 83605 ASSAY OF LACTIC ACID: CPT | Performed by: EMERGENCY MEDICINE

## 2024-09-27 PROCEDURE — 99284 EMERGENCY DEPT VISIT MOD MDM: CPT

## 2024-09-27 PROCEDURE — 43239 EGD BIOPSY SINGLE/MULTIPLE: CPT | Performed by: INTERNAL MEDICINE

## 2024-09-27 PROCEDURE — 85730 THROMBOPLASTIN TIME PARTIAL: CPT | Performed by: EMERGENCY MEDICINE

## 2024-09-27 PROCEDURE — 71275 CT ANGIOGRAPHY CHEST: CPT

## 2024-09-27 PROCEDURE — 0241U HB NFCT DS VIR RESP RNA 4 TRGT: CPT | Performed by: EMERGENCY MEDICINE

## 2024-09-27 PROCEDURE — 87040 BLOOD CULTURE FOR BACTERIA: CPT | Performed by: EMERGENCY MEDICINE

## 2024-09-27 PROCEDURE — 36415 COLL VENOUS BLD VENIPUNCTURE: CPT | Performed by: EMERGENCY MEDICINE

## 2024-09-27 PROCEDURE — 99223 1ST HOSP IP/OBS HIGH 75: CPT | Performed by: INTERNAL MEDICINE

## 2024-09-27 PROCEDURE — 99238 HOSP IP/OBS DSCHRG MGMT 30/<: CPT | Performed by: HOSPITALIST

## 2024-09-27 PROCEDURE — 96374 THER/PROPH/DIAG INJ IV PUSH: CPT

## 2024-09-27 PROCEDURE — 74177 CT ABD & PELVIS W/CONTRAST: CPT

## 2024-09-27 PROCEDURE — 85025 COMPLETE CBC W/AUTO DIFF WBC: CPT | Performed by: EMERGENCY MEDICINE

## 2024-09-27 RX ORDER — ONDANSETRON 2 MG/ML
4 INJECTION INTRAMUSCULAR; INTRAVENOUS ONCE AS NEEDED
Status: DISCONTINUED | OUTPATIENT
Start: 2024-09-27 | End: 2024-09-27

## 2024-09-27 RX ORDER — NICOTINE 21 MG/24HR
1 PATCH, TRANSDERMAL 24 HOURS TRANSDERMAL DAILY
Status: DISCONTINUED | OUTPATIENT
Start: 2024-09-27 | End: 2024-09-27 | Stop reason: HOSPADM

## 2024-09-27 RX ORDER — ACETAMINOPHEN 325 MG/1
650 TABLET ORAL EVERY 4 HOURS PRN
Status: DISCONTINUED | OUTPATIENT
Start: 2024-09-27 | End: 2024-09-27 | Stop reason: HOSPADM

## 2024-09-27 RX ORDER — PANTOPRAZOLE SODIUM 40 MG/1
40 TABLET, DELAYED RELEASE ORAL
Status: DISCONTINUED | OUTPATIENT
Start: 2024-09-28 | End: 2024-09-27 | Stop reason: HOSPADM

## 2024-09-27 RX ORDER — ASPIRIN 81 MG/1
81 TABLET, CHEWABLE ORAL DAILY
Status: DISCONTINUED | OUTPATIENT
Start: 2024-09-27 | End: 2024-09-27 | Stop reason: HOSPADM

## 2024-09-27 RX ORDER — PRIMIDONE 50 MG/1
25 TABLET ORAL
Status: DISCONTINUED | OUTPATIENT
Start: 2024-09-27 | End: 2024-09-27 | Stop reason: HOSPADM

## 2024-09-27 RX ORDER — PROPOFOL 10 MG/ML
INJECTION, EMULSION INTRAVENOUS AS NEEDED
Status: DISCONTINUED | OUTPATIENT
Start: 2024-09-27 | End: 2024-09-27

## 2024-09-27 RX ORDER — POLYETHYLENE GLYCOL 3350 17 G/17G
17 POWDER, FOR SOLUTION ORAL DAILY PRN
Status: DISCONTINUED | OUTPATIENT
Start: 2024-09-27 | End: 2024-09-27 | Stop reason: HOSPADM

## 2024-09-27 RX ORDER — HEPARIN SODIUM 5000 [USP'U]/ML
5000 INJECTION, SOLUTION INTRAVENOUS; SUBCUTANEOUS EVERY 8 HOURS SCHEDULED
Status: DISCONTINUED | OUTPATIENT
Start: 2024-09-27 | End: 2024-09-27 | Stop reason: HOSPADM

## 2024-09-27 RX ORDER — ATORVASTATIN CALCIUM 40 MG/1
80 TABLET, FILM COATED ORAL
Status: DISCONTINUED | OUTPATIENT
Start: 2024-09-27 | End: 2024-09-27 | Stop reason: HOSPADM

## 2024-09-27 RX ORDER — MAGNESIUM HYDROXIDE/ALUMINUM HYDROXICE/SIMETHICONE 120; 1200; 1200 MG/30ML; MG/30ML; MG/30ML
30 SUSPENSION ORAL ONCE
Status: COMPLETED | OUTPATIENT
Start: 2024-09-27 | End: 2024-09-27

## 2024-09-27 RX ORDER — INSULIN LISPRO 100 [IU]/ML
1-6 INJECTION, SOLUTION INTRAVENOUS; SUBCUTANEOUS
Status: DISCONTINUED | OUTPATIENT
Start: 2024-09-27 | End: 2024-09-27 | Stop reason: HOSPADM

## 2024-09-27 RX ORDER — AMLODIPINE BESYLATE 10 MG/1
10 TABLET ORAL DAILY
Status: DISCONTINUED | OUTPATIENT
Start: 2024-09-27 | End: 2024-09-27 | Stop reason: HOSPADM

## 2024-09-27 RX ORDER — TRAZODONE HYDROCHLORIDE 50 MG/1
100 TABLET, FILM COATED ORAL
Status: DISCONTINUED | OUTPATIENT
Start: 2024-09-27 | End: 2024-09-27 | Stop reason: HOSPADM

## 2024-09-27 RX ORDER — LISINOPRIL 10 MG/1
10 TABLET ORAL DAILY
Status: DISCONTINUED | OUTPATIENT
Start: 2024-09-27 | End: 2024-09-27 | Stop reason: HOSPADM

## 2024-09-27 RX ORDER — HYDROMORPHONE HCL/PF 1 MG/ML
0.5 SYRINGE (ML) INJECTION EVERY 4 HOURS PRN
Status: DISCONTINUED | OUTPATIENT
Start: 2024-09-27 | End: 2024-09-27 | Stop reason: HOSPADM

## 2024-09-27 RX ORDER — BISACODYL 5 MG/1
5 TABLET, DELAYED RELEASE ORAL DAILY PRN
Status: DISCONTINUED | OUTPATIENT
Start: 2024-09-27 | End: 2024-09-27

## 2024-09-27 RX ORDER — SUCRALFATE 1 G/1
1 TABLET ORAL ONCE
Status: COMPLETED | OUTPATIENT
Start: 2024-09-27 | End: 2024-09-27

## 2024-09-27 RX ORDER — GLYCOPYRROLATE 0.2 MG/ML
INJECTION INTRAMUSCULAR; INTRAVENOUS AS NEEDED
Status: DISCONTINUED | OUTPATIENT
Start: 2024-09-27 | End: 2024-09-27

## 2024-09-27 RX ORDER — ALBUTEROL SULFATE 90 UG/1
2 INHALANT RESPIRATORY (INHALATION) EVERY 6 HOURS PRN
Status: DISCONTINUED | OUTPATIENT
Start: 2024-09-27 | End: 2024-09-27 | Stop reason: HOSPADM

## 2024-09-27 RX ORDER — METOPROLOL SUCCINATE 100 MG/1
100 TABLET, EXTENDED RELEASE ORAL DAILY
Status: DISCONTINUED | OUTPATIENT
Start: 2024-09-27 | End: 2024-09-27 | Stop reason: HOSPADM

## 2024-09-27 RX ORDER — ONDANSETRON 2 MG/ML
4 INJECTION INTRAMUSCULAR; INTRAVENOUS EVERY 6 HOURS PRN
Status: DISCONTINUED | OUTPATIENT
Start: 2024-09-27 | End: 2024-09-27 | Stop reason: HOSPADM

## 2024-09-27 RX ORDER — ALBUTEROL SULFATE 0.83 MG/ML
2.5 SOLUTION RESPIRATORY (INHALATION) ONCE AS NEEDED
Status: DISCONTINUED | OUTPATIENT
Start: 2024-09-27 | End: 2024-09-27

## 2024-09-27 RX ORDER — HYDROMORPHONE HCL/PF 1 MG/ML
0.5 SYRINGE (ML) INJECTION ONCE
Status: COMPLETED | OUTPATIENT
Start: 2024-09-27 | End: 2024-09-27

## 2024-09-27 RX ORDER — SODIUM CHLORIDE, SODIUM LACTATE, POTASSIUM CHLORIDE, CALCIUM CHLORIDE 600; 310; 30; 20 MG/100ML; MG/100ML; MG/100ML; MG/100ML
INJECTION, SOLUTION INTRAVENOUS CONTINUOUS PRN
Status: DISCONTINUED | OUTPATIENT
Start: 2024-09-27 | End: 2024-09-27

## 2024-09-27 RX ORDER — OXYCODONE AND ACETAMINOPHEN 5; 325 MG/1; MG/1
1.5 TABLET ORAL EVERY 8 HOURS PRN
Status: DISCONTINUED | OUTPATIENT
Start: 2024-09-27 | End: 2024-09-27 | Stop reason: HOSPADM

## 2024-09-27 RX ADMIN — GLYCOPYRROLATE 0.2 MG: 0.2 INJECTION INTRAMUSCULAR; INTRAVENOUS at 11:13

## 2024-09-27 RX ADMIN — PROPOFOL 50 MG: 10 INJECTION, EMULSION INTRAVENOUS at 11:17

## 2024-09-27 RX ADMIN — LISINOPRIL 10 MG: 10 TABLET ORAL at 08:24

## 2024-09-27 RX ADMIN — HEPARIN SODIUM 5000 UNITS: 5000 INJECTION, SOLUTION INTRAVENOUS; SUBCUTANEOUS at 07:05

## 2024-09-27 RX ADMIN — NICOTINE 1 PATCH: 21 PATCH, EXTENDED RELEASE TRANSDERMAL at 08:24

## 2024-09-27 RX ADMIN — PROPOFOL 100 MG: 10 INJECTION, EMULSION INTRAVENOUS at 11:14

## 2024-09-27 RX ADMIN — AMLODIPINE BESYLATE 10 MG: 10 TABLET ORAL at 08:24

## 2024-09-27 RX ADMIN — HEPARIN SODIUM 5000 UNITS: 5000 INJECTION, SOLUTION INTRAVENOUS; SUBCUTANEOUS at 14:27

## 2024-09-27 RX ADMIN — HYDROMORPHONE HYDROCHLORIDE 0.5 MG: 1 INJECTION, SOLUTION INTRAMUSCULAR; INTRAVENOUS; SUBCUTANEOUS at 05:43

## 2024-09-27 RX ADMIN — SUCRALFATE 1 G: 1 TABLET ORAL at 04:27

## 2024-09-27 RX ADMIN — METOPROLOL SUCCINATE 100 MG: 100 TABLET, EXTENDED RELEASE ORAL at 08:24

## 2024-09-27 RX ADMIN — ASPIRIN 81 MG 81 MG: 81 TABLET ORAL at 08:24

## 2024-09-27 RX ADMIN — IOHEXOL 100 ML: 350 INJECTION, SOLUTION INTRAVENOUS at 04:13

## 2024-09-27 RX ADMIN — SODIUM CHLORIDE, SODIUM LACTATE, POTASSIUM CHLORIDE, AND CALCIUM CHLORIDE: .6; .31; .03; .02 INJECTION, SOLUTION INTRAVENOUS at 11:10

## 2024-09-27 RX ADMIN — ALUMINUM HYDROXIDE, MAGNESIUM HYDROXIDE, AND SIMETHICONE 30 ML: 1200; 120; 1200 SUSPENSION ORAL at 04:27

## 2024-09-27 RX ADMIN — HYDROMORPHONE HYDROCHLORIDE 0.5 MG: 1 INJECTION, SOLUTION INTRAMUSCULAR; INTRAVENOUS; SUBCUTANEOUS at 02:27

## 2024-09-27 NOTE — ED PROVIDER NOTES
Final diagnoses:   Epigastric pain   Fever     ED Disposition       ED Disposition   Admit    Condition   Stable    Date/Time   Fri Sep 27, 2024  5:37 AM    Comment   Case was discussed with PARKER and the patient's admission status was agreed to be Admission Status: observation status to the service of Dr. Mathews .               Assessment & Plan       Medical Decision Making  I reviewed the patient's medical chart, PMHx, prior encounters, medications.    My independent interpretation of ECG: NSR, rate of 77, no acute ischemic changes    My DDx includes: Gastritis, PUD, retained cystic duct, pancreatitis, gastroenteritis, viral syndrome (including covid, flu, RSV).     Will obtain GI labs including CBC, CMP to evaluate electrolytes and kidney function, lipase to evaluate pancreas. Will check UA. Will treat supportively, reassess.     O2 is in the low 90s on exam, given cancer hx with recent chemotherapy last month, will pursue CTA PE in additional to CT abdomen pelvis.    Does have fever on temporal temp here, however an oral temp was normal.  However he does describe having fevers at home today as high as 101 measured them.  Given this, cultures were sent.    CT scan just shows stable 3 cm aortic aneurysm, per literature review risk of rupture is nearly 0% for aneurysm of this size.  Reviewing patient's history with GI, it does appear that he has had recurrent epigastric pain in the past and is scheduled for endoscopy and colonoscopy on 10/2.    I reassessed the patient, he continues to have severe abdominal pain, is very uncomfortable.  At this point he is to receive 1 dose of Dilaudid, GI cocktail.  Will give another dose of Dilaudid.  At this time given that GI is available today, will admit for observation.  Patient is comfortable with plan, admit.      Amount and/or Complexity of Data Reviewed  Labs: ordered. Decision-making details documented in ED Course.  Radiology: ordered.    Risk  OTC drugs.  Prescription  "drug management.  Decision regarding hospitalization.        ED Course as of 09/27/24 2142   Fri Sep 27, 2024   0256 WBC(!): 12.17  SIRS positive, will order sepsis labs.   0537 On reassessment, patient is doubled over in pain, appears very uncomfortable. Very te       Medications   HYDROmorphone (DILAUDID) injection 0.5 mg (0.5 mg Intravenous Given 9/27/24 0227)   iohexol (OMNIPAQUE) 350 MG/ML injection (MULTI-DOSE) 100 mL (100 mL Intravenous Given 9/27/24 0413)   sucralfate (CARAFATE) tablet 1 g (1 g Oral Given 9/27/24 0427)   aluminum-magnesium hydroxide-simethicone (MAALOX) oral suspension 30 mL (30 mL Oral Given 9/27/24 0427)   HYDROmorphone (DILAUDID) injection 0.5 mg (0.5 mg Intravenous Given 9/27/24 0543)       ED Risk Strat Scores                           SBIRT 20yo+      Flowsheet Row Most Recent Value   Initial Alcohol Screen: US AUDIT-C     1. How often do you have a drink containing alcohol? 0 Filed at: 09/27/2024 0211   2. How many drinks containing alcohol do you have on a typical day you are drinking?  0 Filed at: 09/27/2024 0211   3a. Male UNDER 65: How often do you have five or more drinks on one occasion? 0 Filed at: 09/27/2024 0211   Audit-C Score 0 Filed at: 09/27/2024 0211   WISAM: How many times in the past year have you...    Used an illegal drug or used a prescription medication for non-medical reasons? Never Filed at: 09/27/2024 0211                            History of Present Illness       Chief Complaint   Patient presents with    Abdominal Pain     Patient presents to ED from home w/c/o epigastric pain x 2 days constant worse tonight, patient reports eating less today due to pain, he does not know what makes the pain worse, patient reports fever/chills, denies n/v/d, last bm yesterday \"normal\"        Past Medical History:   Diagnosis Date    Abdominal aortic aneurysm without rupture (HCC)     Abdominal Aortic Duplex 02/21/2017    Ectatic infrarenal abdominal aorta.    MARYANN (acute kidney " injury) ([facility]) 07/23/2022    CAD (coronary artery disease)     Cancer ([facility]) 2022    right lung CA    Chronic bronchitis ([facility]) 01/28/2019    Class 1 obesity due to excess calories with serious comorbidity and body mass index (BMI) of 31.0 to 31.9 in adult 02/14/2020    COPD (chronic obstructive pulmonary disease) ([facility])     Extremity pain     Hemiparesis ([facility]) 11/01/2022    History of echocardiogram 03/18/2014    EF 55%, mild MR and AI.  Mild concentric LVH.    History of stress test 03/06/2017    Normal.    Hyperlipidemia     Hypertension     Joint pain     Kidney stone     Low back pain     Lung cancer ([facility])     Migraine     Neck pain     Obstructive sleep apnea     cannot tolerate CPAP    Osteoarthritis     Other chronic pancreatitis ([facility]) 02/10/2023    Peripheral neuropathy     Reflex sympathetic dystrophy     Sacroiliitis ([facility]) 02/02/2022      Past Surgical History:   Procedure Laterality Date    CARDIAC CATHETERIZATION  02/13/2012    EF 70%, widely patent renal arteries, significant single-vessel CAD-medical therapy.    CARDIAC CATHETERIZATION  04/11/2013    EF 65%, 50% mid LAD, 20% prox CFX, 90% diffuse RCA, 99% mid RCA. Medical management.    CATARACT EXTRACTION Right 09/19/2023    CHOLECYSTECTOMY      COLONOSCOPY      EPIDURAL BLOCK INJECTION Bilateral 08/15/2019    Procedure: BLOCK / INJECTION EPIDURAL STEROID CERVICAL;  Surgeon: Ricardo Park MD;  Location: [facility];  Service: Pain Management     FL GUIDED NEEDLE PLAC BX/ASP/INJ  08/15/2019    IR BIOPSY LUNG  09/13/2022    IR THORACIC DUCT EMBOLIZATION  11/22/2022    ORTHOPEDIC SURGERY      [facility] INCL FLUOR GDNCE DX W/CELL WASHG SPX N/A 11/16/2022    Procedure: BRONCHOSCOPY FLEXIBLE;  Surgeon: Gulshan Madison MD;  Location:  MAIN OR;  Service: Thoracic    LA THORACOSCOPY W/LOBECTOMY SINGLE LOBE Right 11/16/2022    Procedure: LOBECTOMY LUNG; lower lobe superior segmentectomy, mediastinal lymph node dissection;  Surgeon: Gulshan  Lance Madison MD;  Location: [facility];  Service: Thoracic    [place] THORACOSCOPY W/SEGMENTECTOMY Right 11/16/2022    Procedure: THORACOSCOPY VIDEO ASSISTED SURGERY (VATS);  Surgeon: Gulshan Madison MD;  Location: [facility];  Service: Thoracic    TRIGGER POINT INJECTION        Family History   Adopted: Yes   Problem Relation Age of Onset    No Known Problems Family     No Known Problems Mother     No Known Problems Father       Social History     Tobacco Use    Smoking status: Every Day     Current packs/day: 1.00     Average packs/day: 1 pack/day for 0.9 years (0.9 ttl pk-yrs)     Types: Cigarettes     Start date: 11/2023    Smokeless tobacco: Never   Vaping Use    Vaping status: Never Used   Substance Use Topics    Alcohol use: Not Currently    Drug use: Never      E-Cigarette/Vaping    E-Cigarette Use Never User       E-Cigarette/Vaping Substances    Nicotine No     THC No     CBD No     Flavoring No     Other No     Unknown No       I have reviewed and agree with the history as documented.     69-year-old male with a past medical history of primary squamous cell carcinoma of the lung last finished chemotherapy at the end of August, who presents for abdominal pain.  Patient has been seen by GI for recurrent epigastric pain, is scheduled for endoscopy/colonoscopy on 10/2.  Patient reports this been going on since yesterday morning.  Patient describes it is gradually getting worse, mostly in the epigastrium.  He denies any nausea or vomiting.  No diarrhea.  No bloody bowel movements.  No chest pain or shortness of breath.  He does describe fevers at home over the past day as high as 101, measured.  Review of systems otherwise negative        Review of Systems   Constitutional:  Negative for diaphoresis.   HENT:  Negative for congestion and sore throat.    Eyes:  Negative for visual disturbance.   Respiratory:  Negative for cough, chest tightness and shortness of breath.    Cardiovascular:  Negative for  chest pain, palpitations and leg swelling.   Gastrointestinal:  Positive for abdominal pain. Negative for abdominal distention, constipation, diarrhea, nausea and vomiting.   Genitourinary:  Negative for difficulty urinating and dysuria.   Musculoskeletal:  Negative for arthralgias and myalgias.   Skin:  Negative for rash.   Neurological:  Negative for dizziness, weakness, light-headedness, numbness and headaches.   Psychiatric/Behavioral:  Negative for agitation, behavioral problems and confusion. The patient is not nervous/anxious.    All other systems reviewed and are negative.          Objective       ED Triage Vitals   Temperature Pulse Blood Pressure Respirations SpO2 Patient Position - Orthostatic VS   09/27/24 0206 09/27/24 0206 09/27/24 0206 09/27/24 0206 09/27/24 0206 09/27/24 0604   (!) 100.6 °F (38.1 °C) 81 157/78 18 91 % Sitting      Temp Source Heart Rate Source BP Location FiO2 (%) Pain Score    09/27/24 0206 -- 09/27/24 0604 -- 09/27/24 0206    Temporal  Right arm  7      Vitals      Date and Time Temp Pulse SpO2 Resp BP Pain Score FACES Pain Rating User   09/27/24 1431 98.6 °F (37 °C) 79 90 % -- 124/73 -- -- DII   09/27/24 1424 -- -- 93 % -- -- -- -- GK   09/27/24 1208 98.6 °F (37 °C) 74 92 % 17 138/76 -- -- DII   09/27/24 1149 97.6 °F (36.4 °C) 71 93 % 17 133/77 3 -- CO   09/27/24 1145 98.5 °F (36.9 °C) 71 93 % 21 97/59 3 -- CO   09/27/24 1130 -- 70 94 % 18 103/54 4 -- CO   09/27/24 1125 98.5 °F (36.9 °C) 70 93 % 20 103/54 4 -- CO   09/27/24 0710 98.6 °F (37 °C) 79 93 % 18 129/83 -- -- DII   09/27/24 0709 -- -- -- -- -- 4 -- TLR   09/27/24 0625 -- 89 94 % 16 -- -- -- MD   09/27/24 0604 -- -- -- -- -- No Pain -- TLR   09/27/24 0604 -- -- -- 18 -- -- -- HAA   09/27/24 0604 98.8 °F (37.1 °C) 82 92 % -- 125/84 -- -- DII   09/27/24 0250 -- -- -- -- -- 5 -- CP   09/27/24 0228 99.7 °F (37.6 °C) -- -- -- -- -- -- LC   09/27/24 0227 -- -- -- -- -- 7 -- CP   09/27/24 0208 -- -- 92 % -- -- -- --    09/27/24  0206 100.6 °F (38.1 °C) 81 91 % 18 157/78 7 -- LC            Physical Exam  Constitutional:       Appearance: He is well-developed.   HENT:      Head: Normocephalic and atraumatic.   Cardiovascular:      Rate and Rhythm: Normal rate and regular rhythm.      Heart sounds: Normal heart sounds. No murmur heard.  Pulmonary:      Effort: Pulmonary effort is normal. No respiratory distress.      Breath sounds: Normal breath sounds.   Abdominal:      General: Bowel sounds are normal. There is no distension.      Palpations: Abdomen is soft.      Tenderness: There is abdominal tenderness in the right upper quadrant and epigastric area. There is no right CVA tenderness or left CVA tenderness.   Musculoskeletal:         General: No deformity.   Skin:     General: Skin is warm.      Findings: No rash.   Neurological:      Mental Status: He is alert and oriented to person, place, and time.   Psychiatric:         Behavior: Behavior normal.         Thought Content: Thought content normal.         Judgment: Judgment normal.         Results Reviewed       Procedure Component Value Units Date/Time    Blood culture #1 [[id number]] Collected: 09/27/24 0303    Lab Status: Preliminary result Specimen: Blood from Arm, Right Updated: 09/27/24 1301     Blood Culture Received in Microbiology Lab. Culture in Progress.    Blood culture #2 [[id number]] Collected: 09/27/24 0308    Lab Status: Preliminary result Specimen: Blood from Hand, Left Updated: 09/27/24 1301     Blood Culture Received in Microbiology Lab. Culture in Progress.    Basic metabolic panel [[id number]]  (Abnormal) Collected: 09/27/24 0550    Lab Status: Final result Specimen: Blood from Arm, Left Updated: 09/27/24 0625     Sodium 133 mmol/L      Potassium 3.7 mmol/L      Chloride 97 mmol/L      CO2 28 mmol/L      ANION GAP 8 mmol/L      BUN 12 mg/dL      Creatinine 1.11 mg/dL      Glucose 114 mg/dL      Calcium 8.8 mg/dL      eGFR 66 ml/min/1.73sq m     Narrative:      National  Kidney Disease Foundation guidelines for Chronic Kidney Disease (CKD):     Stage 1 with normal or high GFR (GFR > 90 mL/min/1.73 square meters)    Stage 2 Mild CKD (GFR = 60-89 mL/min/1.73 square meters)    Stage 3A Moderate CKD (GFR = 45-59 mL/min/1.73 square meters)    Stage 3B Moderate CKD (GFR = 30-44 mL/min/1.73 square meters)    Stage 4 Severe CKD (GFR = 15-29 mL/min/1.73 square meters)    Stage 5 End Stage CKD (GFR <15 mL/min/1.73 square meters)  Note: GFR calculation is accurate only with a steady state creatinine    Magnesium [[id number]]  (Normal) Collected: 09/27/24 0550    Lab Status: Final result Specimen: Blood from Arm, Left Updated: 09/27/24 0625     Magnesium 1.9 mg/dL     Phosphorus [[id number]]  (Normal) Collected: 09/27/24 0550    Lab Status: Final result Specimen: Blood from Arm, Left Updated: 09/27/24 0625     Phosphorus 2.9 mg/dL     CBC and differential [[id number]]  (Abnormal) Collected: 09/27/24 0550    Lab Status: Final result Specimen: Blood from Arm, Left Updated: 09/27/24 0605     WBC 10.47 Thousand/uL      RBC 4.97 Million/uL      Hemoglobin 14.8 g/dL      Hematocrit 45.4 %      MCV 91 fL      MCH 29.8 pg      MCHC 32.6 g/dL      RDW 12.8 %      MPV 9.8 fL      Platelets 172 Thousands/uL      nRBC 0 /100 WBCs      Segmented % 69 %      Immature Grans % 0 %      Lymphocytes % 20 %      Monocytes % 10 %      Eosinophils Relative 0 %      Basophils Relative 1 %      Absolute Neutrophils 7.25 Thousands/µL      Absolute Immature Grans 0.04 Thousand/uL      Absolute Lymphocytes 2.05 Thousands/µL      Absolute Monocytes 1.08 Thousand/µL      Eosinophils Absolute 0.00 Thousand/µL      Basophils Absolute 0.05 Thousands/µL     Platelet count [[id number]]  (Normal) Collected: 09/27/24 0550    Lab Status: Final result Specimen: Blood from Arm, Left Updated: 09/27/24 0605     Platelets 172 Thousands/uL      MPV 9.8 fL     Urine Microscopic [[id number]]  (Normal) Collected: 09/27/24 0356    Lab Status:  Final result Specimen: Urine, Clean Catch Updated: 09/27/24 0430     RBC, UA 1-2 /hpf      WBC, UA 2-4 /hpf      Epithelial Cells Occasional /hpf      Bacteria, UA Occasional /hpf     UA w Reflex to Microscopic w Reflex to Culture [[id number]]  (Abnormal) Collected: 09/27/24 0356    Lab Status: Final result Specimen: Urine, Clean Catch Updated: 09/27/24 0419     Color, UA Yellow     Clarity, UA Clear     Specific Gravity, UA 1.020     pH, UA 6.0     Leukocytes, UA Small     Nitrite, UA Negative     Protein, UA Trace mg/dl      Glucose, UA Negative mg/dl      Ketones, UA Negative mg/dl      Urobilinogen, UA <2.0 mg/dl      Bilirubin, UA Negative     Occult Blood, UA Trace    FLU/RSV/COVID - if FLU/RSV clinically relevant [[id number]]  (Normal) Collected: 09/27/24 0309    Lab Status: Final result Specimen: Nares from Nose Updated: 09/27/24 0356     SARS-CoV-2 Negative     INFLUENZA A PCR Negative     INFLUENZA B PCR Negative     RSV PCR Negative    Narrative:      This test has been performed using the CoV-2/Flu/RSV plus assay on the "2,10E+07" GeneXpert platform. This test has been validated by the  and verified by the performing laboratory.     This test is designed to amplify and detect the following: nucleocapsid (N), envelope (E), and RNA-dependent RNA polymerase (RdRP) genes of the SARS-CoV-2 genome; matrix (M), basic polymerase (PB2), and acidic protein (PA) segments of the influenza A genome; matrix (M) and non-structural protein (NS) segments of the influenza B genome, and the nucleocapsid genes of RSV A and RSV B.     Positive results are indicative of the presence of Flu A, Flu B, RSV, and/or SARS-CoV-2 RNA. Positive results for SARS-CoV-2 or suspected novel influenza should be reported to state, local, or federal health departments according to local reporting requirements.      All results should be assessed in conjunction with clinical presentation and other laboratory markers for clinical  management.     FOR PEDIATRIC PATIENTS - copy/paste COVID Guidelines URL to browser: [email or web address]       Lactic acid [[id number]]  (Normal) Collected: 09/27/24 0303    Lab Status: Final result Specimen: Blood from Arm, Right Updated: 09/27/24 0333     LACTIC ACID 1.1 mmol/L     Narrative:      Result may be elevated if tourniquet was used during collection.    Procalcitonin [[id number]]  (Normal) Collected: 09/27/24 0212    Lab Status: Final result Specimen: Blood Updated: 09/27/24 0327     Procalcitonin 0.10 ng/ml     Comprehensive metabolic panel [[id number]]  (Abnormal) Collected: 09/27/24 0212    Lab Status: Final result Specimen: Blood from Arm, Left Updated: 09/27/24 0317     Sodium 133 mmol/L      Potassium 3.5 mmol/L      Chloride 98 mmol/L      CO2 26 mmol/L      ANION GAP 9 mmol/L      BUN 14 mg/dL      Creatinine 1.22 mg/dL      Glucose 157 mg/dL      Calcium 9.2 mg/dL      AST 12 U/L      ALT 9 U/L      Alkaline Phosphatase 62 U/L      Total Protein 6.7 g/dL      Albumin 4.1 g/dL      Total Bilirubin 0.96 mg/dL      eGFR 59 ml/min/1.73sq m     Narrative:      National Kidney Disease Foundation guidelines for Chronic Kidney Disease (CKD):     Stage 1 with normal or high GFR (GFR > 90 mL/min/1.73 square meters)    Stage 2 Mild CKD (GFR = 60-89 mL/min/1.73 square meters)    Stage 3A Moderate CKD (GFR = 45-59 mL/min/1.73 square meters)    Stage 3B Moderate CKD (GFR = 30-44 mL/min/1.73 square meters)    Stage 4 Severe CKD (GFR = 15-29 mL/min/1.73 square meters)    Stage 5 End Stage CKD (GFR <15 mL/min/1.73 square meters)  Note: GFR calculation is accurate only with a steady state creatinine    Lipase [[id number]]  (Normal) Collected: 09/27/24 0212    Lab Status: Final result Specimen: Blood from Arm, Left Updated: 09/27/24 0317     Lipase 26 u/L     Protime-INR [[id number]]  (Normal) Collected: 09/27/24 0212    Lab Status: Final result Specimen: Blood  Updated: 09/27/24 0312     Protime 12.5 seconds      INR 0.89    Narrative:      INR Therapeutic Range    Indication                                             INR Range      Atrial Fibrillation                                               2.0-3.0  Hypercoagulable State                                    2.0.2.3  Left Ventricular Asist Device                            2.0-3.0  Mechanical Heart Valve                                  -    Aortic(with afib, MI, embolism, HF, LA enlargement,    and/or coagulopathy)                                     2.0-3.0 (2.5-3.5)     Mitral                                                             2.5-3.5  Prosthetic/Bioprosthetic Heart Valve               2.0-3.0  Venous thromboembolism (VTE: VT, PE        2.0-3.0    APTT [[id number]]  (Normal) Collected: 09/27/24 0212    Lab Status: Final result Specimen: Blood Updated: 09/27/24 0312     PTT 30 seconds     CBC and differential [[id number]]  (Abnormal) Collected: 09/27/24 0212    Lab Status: Final result Specimen: Blood from Arm, Left Updated: 09/27/24 0237     WBC 12.17 Thousand/uL      RBC 5.06 Million/uL      Hemoglobin 15.3 g/dL      Hematocrit 47.3 %      MCV 94 fL      MCH 30.2 pg      MCHC 32.3 g/dL      RDW 12.9 %      MPV 10.0 fL      Platelets 175 Thousands/uL      nRBC 0 /100 WBCs      Segmented % 73 %      Immature Grans % 0 %      Lymphocytes % 17 %      Monocytes % 9 %      Eosinophils Relative 0 %      Basophils Relative 1 %      Absolute Neutrophils 8.78 Thousands/µL      Absolute Immature Grans 0.04 Thousand/uL      Absolute Lymphocytes 2.11 Thousands/µL      Absolute Monocytes 1.13 Thousand/µL      Eosinophils Absolute 0.03 Thousand/µL      Basophils Absolute 0.08 Thousands/µL             PE Study with CT abdomen & pelvis with contrast   Final Interpretation by Delon Avila DO (09/27 8346)      No acute findings in the chest, abdomen or pelvis.      Stable infrarenal abdominal aortic aneurysm with small left  common iliac and bilateral internal iliac aneurysms. There is extensive atherosclerotic disease.      Additional chronic findings are stable.         Workstation performed: [id number]             ECG 12 Lead Documentation Only    Date/Time: 2024 4:42 AM    Performed by: Vincent Amos MD  Authorized by: Vincent Amos MD    Indications / Diagnosis:  Epigastric pain  ECG reviewed by me, the ED Provider: yes    Patient location:  ED  Previous ECG:     Previous ECG:  Compared to current    Similarity:  No change  Interpretation:     Interpretation: normal    Rate:     ECG rate:  77    ECG rate assessment: normal    Rhythm:     Rhythm: sinus rhythm    Ectopy:     Ectopy: none    QRS:     QRS axis:  Normal    QRS intervals:  Normal  Conduction:     Conduction: normal    ST segments:     ST segments:  Normal  T waves:     T waves: normal        ED Medication and Procedure Management   Prior to Admission Medications   Prescriptions Last Dose Informant Patient Reported? Taking?   Restasis 0.05 % ophthalmic emulsion  Self Yes No   albuterol (Ventolin HFA) 90 mcg/act inhaler More than a month Self No No   Sig: Inhale 2 puffs every 6 (six) hours as needed for wheezing   amLODIPine (NORVASC) 10 mg tablet Past Week Self No Yes   Sig: Take 1 tablet (10 mg total) by mouth daily   aspirin 81 mg chewable tablet Past Week Self No Yes   Sig: Chew 1 tablet (81 mg total) daily   bisacodyl (DULCOLAX) 5 mg EC tablet Not Taking Self No No   Sig: Take 1 tablet (5 mg total) by mouth daily as needed for constipation for up to 4 doses   Patient not taking: Reported on 2024   bisacodyl (DULCOLAX) 5 mg EC tablet   No No   Sig: Take 4 tablets (20 mg total) by mouth once for 1 dose   Patient not taking: Reported on 2024   cyanocobalamin (VITAMIN B-12) 1000 MCG tablet Past Week  No Yes   Sig: Take 1 tablet (1,000 mcg total) by mouth daily   fluticasone (FLONASE) 50 mcg/act nasal spray   No No   Si spray  into each nostril daily for 14 days   glucose blood (OneTouch Verio) test strip  Self No No   Sig: Test once daily   lisinopril (ZESTRIL) 10 mg tablet Past Week Self No Yes   Sig: Take 1 tablet (10 mg total) by mouth daily   metFORMIN (GLUCOPHAGE) 500 mg tablet Past Week  No Yes   Sig: take 1 tablet by mouth twice a day with meals   metoprolol succinate (TOPROL-XL) 100 mg 24 hr tablet Past Week Self No Yes   Sig: Take 1 tablet (100 mg total) by mouth daily   naloxone (NARCAN) 4 mg/0.1 mL nasal spray  Self No No   Sig: Administer 1 spray into a nostril. If no response after 2-3 minutes, give another dose in the other nostril using a new spray.   Patient not taking: Reported on 5/20/2024   nitroglycerin (NITROSTAT) 0.4 mg SL tablet  Self No No   Sig: Place 1 tablet (0.4 mg total) under the tongue every 5 (five) minutes as needed for chest pain   nystatin (MYCOSTATIN) 500,000 units/5 mL suspension  Self No No   Sig: Apply 5 mL (500,000 Units total) to the mouth or throat 4 (four) times a day   ondansetron (ZOFRAN) 8 mg tablet Not Taking Self No No   Sig: Take 1 tablet (8 mg total) by mouth every 8 (eight) hours as needed for nausea or vomiting   Patient not taking: Reported on 9/27/2024   oxyCODONE-acetaminophen (Percocet) 7.5-325 MG per tablet   No No   Sig: Take 1 tablet by mouth every 8 (eight) hours as needed for moderate pain Max Daily Amount: 3 tablets Do not start before September 19, 2024.   oxyCODONE-acetaminophen (Percocet) 7.5-325 MG per tablet Past Week  No Yes   Sig: Take 1 tablet by mouth every 8 (eight) hours as needed for moderate pain Max Daily Amount: 3 tablets Do not start before October 17, 2024.   polyethylene glycol (MiraLax) 17 GM/SCOOP powder   No No   Sig: Take 238 g by mouth once for 1 dose Take 238 g my mouth. Use as directed   primidone (MYSOLINE) 50 mg tablet   No No   Sig: take 1/2 tablet by mouth once daily every morning   rosuvastatin (CRESTOR) 40 MG tablet Past Week Self No Yes   Sig:  Take 1 tablet (40 mg total) by mouth daily   sildenafil (VIAGRA) 100 mg tablet   No No   Sig: Take 1 tablet (100 mg total) by mouth as needed for erectile dysfunction   tamsulosin (FLOMAX) 0.4 mg Past Week  No Yes   Sig: Take 1 capsule (0.4 mg total) by mouth daily with dinner for 3 days   tiotropium-olodaterol (Stiolto Respimat) 2.5-2.5 MCG/ACT inhaler  Self No No   Sig: Inhale 2 puffs daily   traZODone (DESYREL) 100 mg tablet Past Week Self No Yes   Sig: take 1 tablet by mouth daily at bedtime      Facility-Administered Medications: None     Discharge Medication List as of 9/27/2024  3:38 PM        CONTINUE these medications which have NOT CHANGED    Details   amLODIPine (NORVASC) 10 mg tablet Take 1 tablet (10 mg total) by mouth daily, Starting Fri 7/26/2024, Normal      aspirin 81 mg chewable tablet Chew 1 tablet (81 mg total) daily, Starting Fri 7/26/2024, No Print      cyanocobalamin (VITAMIN B-12) 1000 MCG tablet Take 1 tablet (1,000 mcg total) by mouth daily, Starting Tue 9/24/2024, Normal      lisinopril (ZESTRIL) 10 mg tablet Take 1 tablet (10 mg total) by mouth daily, Starting Fri 7/26/2024, Normal      metFORMIN (GLUCOPHAGE) 500 mg tablet take 1 tablet by mouth twice a day with meals, Starting Wed 9/25/2024, Normal      metoprolol succinate (TOPROL-XL) 100 mg 24 hr tablet Take 1 tablet (100 mg total) by mouth daily, Starting Fri 7/26/2024, Normal      !! oxyCODONE-acetaminophen (Percocet) 7.5-325 MG per tablet Take 1 tablet by mouth every 8 (eight) hours as needed for moderate pain Max Daily Amount: 3 tablets Do not start before October 17, 2024., Starting Thu 10/17/2024, Normal      rosuvastatin (CRESTOR) 40 MG tablet Take 1 tablet (40 mg total) by mouth daily, Starting Fri 1/26/2024, Normal      tamsulosin (FLOMAX) 0.4 mg Take 1 capsule (0.4 mg total) by mouth daily with dinner for 3 days, Starting Fri 6/23/2023, Until Fri 9/27/2024, Normal      traZODone (DESYREL) 100 mg tablet take 1 tablet by mouth  daily at bedtime, Normal      albuterol (Ventolin HFA) 90 mcg/act inhaler Inhale 2 puffs every 6 (six) hours as needed for wheezing, Starting Tue 3/12/2024, Normal      bisacodyl (DULCOLAX) 5 mg EC tablet Take 1 tablet (5 mg total) by mouth daily as needed for constipation for up to 4 doses, Starting Wed 3/8/2023, Normal      fluticasone (FLONASE) 50 mcg/act nasal spray 1 spray into each nostril daily for 14 days, Starting Tue 6/11/2024, Until Mon 9/16/2024, Normal      glucose blood (OneTouch Verio) test strip Test once daily, Normal      naloxone (NARCAN) 4 mg/0.1 mL nasal spray Administer 1 spray into a nostril. If no response after 2-3 minutes, give another dose in the other nostril using a new spray., Normal      nitroglycerin (NITROSTAT) 0.4 mg SL tablet Place 1 tablet (0.4 mg total) under the tongue every 5 (five) minutes as needed for chest pain, Starting Fri 7/26/2024, Normal      nystatin (MYCOSTATIN) 500,000 units/5 mL suspension Apply 5 mL (500,000 Units total) to the mouth or throat 4 (four) times a day, Starting Tue 7/16/2024, Normal      ondansetron (ZOFRAN) 8 mg tablet Take 1 tablet (8 mg total) by mouth every 8 (eight) hours as needed for nausea or vomiting, Starting Mon 7/17/2023, Normal      !! oxyCODONE-acetaminophen (Percocet) 7.5-325 MG per tablet Take 1 tablet by mouth every 8 (eight) hours as needed for moderate pain Max Daily Amount: 3 tablets Do not start before September 19, 2024., Starting u 9/19/2024, Normal      primidone (MYSOLINE) 50 mg tablet take 1/2 tablet by mouth once daily every morning, Normal      Restasis 0.05 % ophthalmic emulsion Historical Med      sildenafil (VIAGRA) 100 mg tablet Take 1 tablet (100 mg total) by mouth as needed for erectile dysfunction, Starting Mon 9/16/2024, Normal      tiotropium-olodaterol (Stiolto Respimat) 2.5-2.5 MCG/ACT inhaler Inhale 2 puffs daily, Starting Tue 6/11/2024, Normal       !! - Potential duplicate medications found. Please discuss  with provider.        STOP taking these medications       polyethylene glycol (MiraLax) 17 GM/SCOOP powder Comments:   Reason for Stopping:             Outpatient Discharge Orders   Ambulatory referral to Gastroenterology   Standing Status: Future Standing Exp. Date: 09/27/25      Discharge Diet     Activity as tolerated     ED SEPSIS DOCUMENTATION   Time reflects when diagnosis was documented in both MDM as applicable and the Disposition within this note       Time User Action Codes Description Comment    9/27/2024  5:37 AM Vincent Amos Add [R10.13] Epigastric pain     9/27/2024  5:37 AM Vincent Amos Add [R50.9] Fever     9/27/2024  5:41 AM Didier Mathews Add [R13.19] Esophageal dysphagia     9/27/2024  5:42 AM Didier Mathews Modify [R10.13] Epigastric pain     9/27/2024  5:42 AM Didier Mathews Add [R10.13] Epigastric abdominal pain     9/27/2024 11:16 AM LopezAngelina lucio Modify [R10.13] Epigastric pain     9/27/2024 11:16 AM LopezAngelina lucio Modify [R50.9] Fever     9/27/2024 11:16 AM Angelina Lopez Modify [R13.19] Esophageal dysphagia     9/27/2024 11:16 AM Angelina Lopez Modify [R10.13] Epigastric abdominal pain     9/27/2024  3:10 PM Humayun, Omama Modify [R10.13] Epigastric pain     9/27/2024  3:10 PM Humayun, Omama Modify [R50.9] Fever     9/27/2024  3:10 PM Humayun, Omama Modify [R13.19] Esophageal dysphagia     9/27/2024  3:10 PM Humayun, Omama Modify [R10.13] Epigastric abdominal pain     9/27/2024  3:10 PM Humayun, Omama Add [K44.9] Hiatal hernia     9/27/2024  3:10 PM Humayun, Omama Add [R10.9] Intractable abdominal pain                  Vincent Amos MD  09/27/24 [id number]

## 2024-09-27 NOTE — ASSESSMENT & PLAN NOTE
Lab Results   Component Value Date    EGFR 66 09/27/2024    EGFR 59 09/27/2024    EGFR 60 08/22/2024    CREATININE 1.11 09/27/2024    CREATININE 1.22 09/27/2024    CREATININE 1.21 08/22/2024   Creatinine at baseline

## 2024-09-27 NOTE — NURSING NOTE
Patient discharged to home. Patient VSS. IV removed with catheter tip intact, DSD and pressure applied. Patient verbalized understanding of discharge instructions. All belongings returned to patient upon discharge.

## 2024-09-27 NOTE — ASSESSMENT & PLAN NOTE
Blood pressure stable  Continue home beta-blocker, calcium channel blocker, ACE inhibitor  Monitor blood pressures at home

## 2024-09-27 NOTE — ASSESSMENT & PLAN NOTE
Endoscopy findings: 3 cm hiatal hernia   Gastroenterology consultation appreciated. Recommended resuming normal diet and PPI on discharge

## 2024-09-27 NOTE — CONSULTS
"Consultation -  [facility]  Lance Hazel 69 y.o. male MRN: [id number]  Unit/Bed#: 424-01 Encounter: [id number]    Inpatient consult to gastroenterology  Consult performed by: Joann Mayberry PA-C  Consult ordered by: Didier Mathews DO        Reason for Consult / Principal Problem:     \"Epigastric pain, esophageal dysphagia\"    ASSESSMENT AND PLAN:      70 y/o M with pmhx sig for lung cancer s/p resection currently on chemotherapy, complications related to thoracic duct embolization with pancreatitis, history of CAD, chronic bronchitis, HTN, HLD, JAKI, AAA.  History of cholecystectomy. Pt presented with acute onset of epigastric/RUQ pain. Notes lower abdominal pain more of a chronic issue. Epigastric pain progressively worsened and pt presented to ER. Symptoms initially associated with documented fever, t max 100.6F. symptoms not related to/affected by oral intake, defecation, positional change or exertion. Notes intermittent dysphagia. He had a CT C/A/P which was negative for acute pathology.  Serologies at time of admission with WBC 12.17, Hb 15.3, MCV 94, platelets 175, BUN 14, creatinine 1.22, AST 12, ALT 9, ALP 62, albumin 4.1, T. bili 0.96, lipase 26, INR 0.89.  He was given analgesic regimen to include Dilaudid, Maalox, sucralfate. Had had been evaluated by GI in the outpatient setting and tentatively had bidirectional endoscopic evaluation in 10/2024 however did not feel he could wait due to pain.     Epigastric pain  History of cholecystectomy   Dysphagia     Pt with acute onset of epigastric/RUQ pain.   More chronically with waxing/waning migratory abd pain complaints per chart review.   Ddx includes peptic process, atypical biliary ductal abnormalities, IBS, dysmotility, functional dyspepsia, MSK etiol, etc.   CT did not demonstrate any evidence of pancreatitis, any obvious biliary pathology. Does not sound vascular at present.   Recommend EGD now to investigative into possible " intraluminal pathology.  Maintain NPO at this time.    Continue supportive care to include sucralfate. Okay for PRN H2RA.   Continue with analgesic regimen per primary team. Avoid NSAIDS at this time. Okay for PRN anti-emetics.   Pertaining to dysphagia, intermittent and chronic issue, EGD will evaluate for intraluminal pathology such as reflux esophagitis, EoE, web/ring/stricture, etc.   Additional recommendations pending endoscopic evaluation.     Screening for colon cancer     Pt is due/overdue for colonoscopy for cancer screening purposes.   We will perform colonoscopy in the outpatient setting.     GI will follow closely at this time.   ______________________________________________________________________    HPI: Patient is a 69 y.o. male with past medical history significant for lung cancer s/p resection currently on chemotherapy, complications related to thoracic duct embolization with pancreatitis, history of CAD, chronic bronchitis, HTN, HLD, JAKI, AAA.  History of cholecystectomy.    Presented to the hospital on 9/27/2024 with intractable epigastric pain.  Pt shares that while he has been dealing with chronic lower abdominal pain for several weeks of not longer, this type of upper abdominal pain acutely began within the past 48 hours or so. Only potential precipitant he can recall is heaving lifting at work and starting on metformin about a week ago. No travel, change in diet, antibiotic usage, known exposure to sick contacts.  Pain felt like a tight band and pressure across his upper abdomen.  When very severe it radiated through to the back.  Was not affected by oral intake or defecation.  Present all of the time though would wax and wane in severity.  No associated nausea or vomiting.  No diarrhea, BRBPR or melena.  Tends toward some constipation at baseline. He did have a fever which was documented both at home in the ER, Tmax 100.6F.  No NSAID use and no EtOH intake. He does have pancreas divisum per  previous MRI.     He had a CT C/A/P which was negative for acute pathology.  Serologies at time of admission with WBC 12.17, Hb 15.3, MCV 94, platelets 175, BUN 14, creatinine 1.22, AST 12, ALT 9, ALP 62, albumin 4.1, T. bili 0.96, lipase 26, INR 0.89.  He was given analgesic regimen to include Dilaudid, Maalox, sucralfate.     Review of Systems   Otherwise Per HPI    Historical Information   Past Medical History:   Diagnosis Date    Abdominal aortic aneurysm without rupture ([facility])     Abdominal Aortic Duplex 02/21/2017    Ectatic infrarenal abdominal aorta.    MARYANN (acute kidney injury) ([facility]) 07/23/2022    CAD (coronary artery disease)     Cancer ([facility]) 2022    right lung CA    Chronic bronchitis ([facility]) 01/28/2019    Class 1 obesity due to excess calories with serious comorbidity and body mass index (BMI) of 31.0 to 31.9 in adult 02/14/2020    COPD (chronic obstructive pulmonary disease) ([facility])     Extremity pain     Hemiparesis ([facility]) 11/01/2022    History of echocardiogram 03/18/2014    EF 55%, mild MR and AI.  Mild concentric LVH.    History of stress test 03/06/2017    Normal.    Hyperlipidemia     Hypertension     Joint pain     Kidney stone     Low back pain     Lung cancer ([facility])     Migraine     Neck pain     Obstructive sleep apnea     cannot tolerate CPAP    Osteoarthritis     Other chronic pancreatitis ([facility]) 02/10/2023    Peripheral neuropathy     Reflex sympathetic dystrophy     Sacroiliitis ([facility]) 02/02/2022     Past Surgical History:   Procedure Laterality Date    CARDIAC CATHETERIZATION  02/13/2012    EF 70%, widely patent renal arteries, significant single-vessel CAD-medical therapy.    CARDIAC CATHETERIZATION  04/11/2013    EF 65%, 50% mid LAD, 20% prox CFX, 90% diffuse RCA, 99% mid RCA. Medical management.    CATARACT EXTRACTION Right 09/19/2023    CHOLECYSTECTOMY      COLONOSCOPY      EPIDURAL BLOCK INJECTION Bilateral 08/15/2019    Procedure: BLOCK / INJECTION EPIDURAL STEROID CERVICAL;  Surgeon:  Ricardo Park MD;  Location: [facility];  Service: Pain Management     FL GUIDED NEEDLE PLAC BX/ASP/INJ  08/15/2019    IR BIOPSY LUNG  09/13/2022    IR THORACIC DUCT EMBOLIZATION  11/22/2022    ORTHOPEDIC SURGERY      [facility] INCL FLUOR GDNCE DX W/CELL WASHG SPX N/A 11/16/2022    Procedure: BRONCHOSCOPY FLEXIBLE;  Surgeon: Gulshan Madison MD;  Location: [facility];  Service: Thoracic    [place] THORACOSCOPY W/LOBECTOMY SINGLE LOBE Right 11/16/2022    Procedure: LOBECTOMY LUNG; lower lobe superior segmentectomy, mediastinal lymph node dissection;  Surgeon: Gulshan Madison MD;  Location: [facility];  Service: Thoracic    [place] THORACOSCOPY W/SEGMENTECTOMY Right 11/16/2022    Procedure: THORACOSCOPY VIDEO ASSISTED SURGERY (VATS);  Surgeon: Gulshan Madison MD;  Location: [facility];  Service: Thoracic    TRIGGER POINT INJECTION       Social History   Social History     Substance and Sexual Activity   Alcohol Use Not Currently     Social History     Substance and Sexual Activity   Drug Use Never     Social History     Tobacco Use   Smoking Status Every Day    Current packs/day: 1.00    Average packs/day: 1 pack/day for 0.9 years (0.9 ttl pk-yrs)    Types: Cigarettes    Start date: 11/2023   Smokeless Tobacco Never     Family History   Adopted: Yes   Problem Relation Age of Onset    No Known Problems Family     No Known Problems Mother     No Known Problems Father      Meds/Allergies       Medications Prior to Admission:     amLODIPine (NORVASC) 10 mg tablet    aspirin 81 mg chewable tablet    cyanocobalamin (VITAMIN B-12) 1000 MCG tablet    lisinopril (ZESTRIL) 10 mg tablet    metFORMIN (GLUCOPHAGE) 500 mg tablet    metoprolol succinate (TOPROL-XL) 100 mg 24 hr tablet    [START ON 10/17/2024] oxyCODONE-acetaminophen (Percocet) 7.5-325 MG per tablet    rosuvastatin (CRESTOR) 40 MG tablet    tamsulosin (FLOMAX) 0.4 mg    traZODone (DESYREL) 100 mg tablet    albuterol (Ventolin HFA) 90  mcg/act inhaler    bisacodyl (DULCOLAX) 5 mg EC tablet    bisacodyl (DULCOLAX) 5 mg EC tablet    fluticasone (FLONASE) 50 mcg/act nasal spray    glucose blood (OneTouch Verio) test strip    naloxone (NARCAN) 4 mg/0.1 mL nasal spray    nitroglycerin (NITROSTAT) 0.4 mg SL tablet    nystatin (MYCOSTATIN) 500,000 units/5 mL suspension    ondansetron (ZOFRAN) 8 mg tablet    oxyCODONE-acetaminophen (Percocet) 7.5-325 MG per tablet    polyethylene glycol (MiraLax) 17 GM/SCOOP powder    primidone (MYSOLINE) 50 mg tablet    Restasis 0.05 % ophthalmic emulsion    sildenafil (VIAGRA) 100 mg tablet    tiotropium-olodaterol (Stiolto Respimat) 2.5-2.5 MCG/ACT inhaler    Current Facility-Administered Medications:     acetaminophen (TYLENOL) tablet 650 mg, Q4H PRN    albuterol (PROVENTIL HFA,VENTOLIN HFA) inhaler 2 puff, Q6H PRN    amLODIPine (NORVASC) tablet 10 mg, Daily    aspirin chewable tablet 81 mg, Daily    atorvastatin (LIPITOR) tablet 80 mg, Daily With Dinner    heparin (porcine) subcutaneous injection 5,000 Units, Q8H NITIN **AND** [COMPLETED] Platelet count, Once    HYDROmorphone (DILAUDID) injection 0.5 mg, Q4H PRN    insulin lispro (HumALOG/ADMELOG) 100 units/mL subcutaneous injection 1-6 Units, TID AC **AND** Fingerstick Glucose (POCT), TID AC    insulin lispro (HumALOG/ADMELOG) 100 units/mL subcutaneous injection 1-6 Units, HS    lisinopril (ZESTRIL) tablet 10 mg, Daily    metoprolol succinate (TOPROL-XL) 24 hr tablet 100 mg, Daily    nicotine (NICODERM CQ) 21 mg/24 hr TD 24 hr patch 1 patch, Daily    umeclidinium 62.5 mcg/actuation inhaler AEPB 1 puff, Daily **AND** olodaterol HCl (STRIVERDI RESPIMAT) inhaler 2 puff, Daily    ondansetron (ZOFRAN) injection 4 mg, Q6H PRN    oxyCODONE-acetaminophen (PERCOCET) 5-325 mg per tablet 1.5 tablet, Q8H PRN    polyethylene glycol (MIRALAX) packet 17 g, Daily PRN    primidone (MYSOLINE) tablet 25 mg, HS    traZODone (DESYREL) tablet 100 mg, HS    No Known Allergies    Objective  "    Blood pressure 129/83, pulse 79, temperature 98.6 °F (37 °C), resp. rate 18, height 5' 8\" (1.727 m), weight 87 kg (191 lb 12.8 oz), SpO2 93%. Body mass index is 29.16 kg/m².    No intake or output data in the 24 hours ending 09/27/24 0744    Physical Exam  Vitals and nursing note reviewed.   Constitutional:       General: He is not in acute distress.     Appearance: He is well-developed.   HENT:      Head: Normocephalic and atraumatic.   Eyes:      General: No scleral icterus.     Conjunctiva/sclera: Conjunctivae normal.   Cardiovascular:      Rate and Rhythm: Normal rate.   Pulmonary:      Effort: Pulmonary effort is normal. No respiratory distress.   Abdominal:      General: A surgical scar is present. Bowel sounds are normal. There is no distension.      Palpations: Abdomen is soft.      Tenderness: There is abdominal tenderness. There is no guarding or rebound.   Musculoskeletal:         General: No swelling.   Skin:     General: Skin is warm and dry.      Coloration: Skin is not jaundiced.   Neurological:      General: No focal deficit present.      Mental Status: He is alert.   Psychiatric:         Mood and Affect: Mood normal.         Behavior: Behavior normal.        Lab Results:   Admission on 09/27/2024   Component Date Value    WBC 09/27/2024 12.17 (H)     RBC 09/27/2024 5.06     Hemoglobin 09/27/2024 15.3     Hematocrit 09/27/2024 47.3     MCV 09/27/2024 94     MCH 09/27/2024 30.2     MCHC 09/27/2024 32.3     RDW 09/27/2024 12.9     MPV 09/27/2024 10.0     Platelets 09/27/2024 175     nRBC 09/27/2024 0     Segmented % 09/27/2024 73     Immature Grans % 09/27/2024 0     Lymphocytes % 09/27/2024 17     Monocytes % 09/27/2024 9     Eosinophils Relative 09/27/2024 0     Basophils Relative 09/27/2024 1     Absolute Neutrophils 09/27/2024 8.78 (H)     Absolute Immature Grans 09/27/2024 0.04     Absolute Lymphocytes 09/27/2024 2.11     Absolute Monocytes 09/27/2024 1.13     Eosinophils Absolute 09/27/2024 " 0.03     Basophils Absolute 09/27/2024 0.08     Sodium 09/27/2024 133 (L)     Potassium 09/27/2024 3.5     Chloride 09/27/2024 98     CO2 09/27/2024 26     ANION GAP 09/27/2024 9     BUN 09/27/2024 14     Creatinine 09/27/2024 1.22     Glucose 09/27/2024 157 (H)     Calcium 09/27/2024 9.2     AST 09/27/2024 12 (L)     ALT 09/27/2024 9     Alkaline Phosphatase 09/27/2024 62     Total Protein 09/27/2024 6.7     Albumin 09/27/2024 4.1     Total Bilirubin 09/27/2024 0.96     eGFR 09/27/2024 59     Lipase 09/27/2024 26     Color, UA 09/27/2024 Yellow     Clarity, UA 09/27/2024 Clear     Specific Gravity, UA 09/27/2024 1.020     pH, UA 09/27/2024 6.0     Leukocytes, UA 09/27/2024 Small (A)     Nitrite, UA 09/27/2024 Negative     Protein, UA 09/27/2024 Trace (A)     Glucose, UA 09/27/2024 Negative     Ketones, UA 09/27/2024 Negative     Urobilinogen, UA 09/27/2024 <2.0     Bilirubin, UA 09/27/2024 Negative     Occult Blood, UA 09/27/2024 Trace (A)     LACTIC ACID 09/27/2024 1.1     Procalcitonin 09/27/2024 0.10     Protime 09/27/2024 12.5     INR 09/27/2024 0.89     PTT 09/27/2024 30     SARS-CoV-2 09/27/2024 Negative     INFLUENZA A PCR 09/27/2024 Negative     INFLUENZA B PCR 09/27/2024 Negative     RSV PCR 09/27/2024 Negative     RBC, UA 09/27/2024 1-2     WBC, UA 09/27/2024 2-4     Epithelial Cells 09/27/2024 Occasional     Bacteria, UA 09/27/2024 Occasional     Sodium 09/27/2024 133 (L)     Potassium 09/27/2024 3.7     Chloride 09/27/2024 97     CO2 09/27/2024 28     ANION GAP 09/27/2024 8     BUN 09/27/2024 12     Creatinine 09/27/2024 1.11     Glucose 09/27/2024 114     Calcium 09/27/2024 8.8     eGFR 09/27/2024 66     Magnesium 09/27/2024 1.9     Phosphorus 09/27/2024 2.9     WBC 09/27/2024 10.47 (H)     RBC 09/27/2024 4.97     Hemoglobin 09/27/2024 14.8     Hematocrit 09/27/2024 45.4     MCV 09/27/2024 91     MCH 09/27/2024 29.8     MCHC 09/27/2024 32.6     RDW 09/27/2024 12.8     MPV 09/27/2024 9.8     Platelets  09/27/2024 172     nRBC 09/27/2024 0     Segmented % 09/27/2024 69     Immature Grans % 09/27/2024 0     Lymphocytes % 09/27/2024 20     Monocytes % 09/27/2024 10     Eosinophils Relative 09/27/2024 0     Basophils Relative 09/27/2024 1     Absolute Neutrophils 09/27/2024 7.25     Absolute Immature Grans 09/27/2024 0.04     Absolute Lymphocytes 09/27/2024 2.05     Absolute Monocytes 09/27/2024 1.08     Eosinophils Absolute 09/27/2024 0.00     Basophils Absolute 09/27/2024 0.05     Platelets 09/27/2024 172     MPV 09/27/2024 9.8     POC Glucose 09/27/2024 113      Imaging Studies: I have personally reviewed pertinent imaging studies.    Joann Mayberry PA-C    **Please note:  Dictation voice to text software may have been used in the creation of this record.  Occasional wrong word or “sound alike” substitutions may have occurred due to the inherent limitations of voice recognition software.  Read the chart carefully and recognize, using context, where substitutions have occurred.**

## 2024-09-27 NOTE — DISCHARGE SUMMARY
Discharge Summary - Hospitalist   Name: Lance Hazel 69 y.o. male I MRN: [id number]  Unit/Bed#: 424-01 I Date of Admission: 9/27/2024   Date of Service: 9/27/2024 I Hospital Day: 0     Assessment & Plan  Hiatal hernia  Endoscopy findings: 3 cm hiatal hernia   Gastroenterology consultation appreciated. Recommended resuming normal diet and PPI on discharge   Intractable abdominal pain (Resolved: 9/27/2024)  Patient presents with intractable epigastric abdominal pain  Denies hematemesis, melena, hematochezia  Pain unrelieved by analgesics in the ED  CT abdomen with no evidence of acute intra-abdominal pathology  Endoscopy findings: 3 cm hiatal hernia   Gastroenterology consultation appreciated. Recommended resuming normal diet and PPI  Patient remained asymptomatic on floor and discharged on home PPI  JAKI (obstructive sleep apnea)  Continue home CPAP  Abdominal aortic aneurysm (AAA) without rupture  Stable at 3.5 cm  Outpatient follow-up recommended   Hypertension  Blood pressure stable  Continue home beta-blocker, calcium channel blocker, ACE inhibitor  Monitor blood pressures at home   Pulmonary emphysema (HCC)  Not in acute exacerbation  Saturating well on room air  Continue home inhalers  Malignant neoplasm of lower lobe of right lung (HCC)  History of resection  Outpatient follow-up with hematology/oncology  Coronary artery disease of native artery of native heart with stable angina pectoris (HCC)  Continue aspirin and statin therapy  Monitor for anginal symptoms  Outpatient follow-up with cardiology  Stage 3a chronic kidney disease (HCC)  Lab Results   Component Value Date    EGFR 66 09/27/2024    EGFR 59 09/27/2024    EGFR 60 08/22/2024    CREATININE 1.11 09/27/2024    CREATININE 1.22 09/27/2024    CREATININE 1.21 08/22/2024   Creatinine at baseline        Discharging Physician / Practitioner: Isma Houston MD  PCP: Maico Howe DO  Admission Date:   Admission Orders (From admission, onward)        Ordered        09/27/24 0537  Place in Observation  Once                          Discharge Date: 09/27/24    Consultations During Hospital Stay:  Gastroenterology    Procedures Performed:   CT Chest, abdomen and pelvis : No acute findings in the chest, abdomen or pelvis. Stable infrarenal abdominal aortic aneurysm with small left common iliac and bilateral internal iliac aneurysms. There is extensive atherosclerotic disease. Additional chronic findings are stable.  Endoscopy Upper GI : 3 cm hiatal hernia     Significant Findings / Test Results:   Results for orders placed or performed during the hospital encounter of 09/27/24   CBC and differential    Collection Time: 09/27/24  2:12 AM   Result Value Ref Range    WBC 12.17 (H) 4.31 - 10.16 Thousand/uL    RBC 5.06 3.88 - 5.62 Million/uL    Hemoglobin 15.3 12.0 - 17.0 g/dL    Hematocrit 47.3 36.5 - 49.3 %    MCV 94 82 - 98 fL    MCH 30.2 26.8 - 34.3 pg    MCHC 32.3 31.4 - 37.4 g/dL    RDW 12.9 11.6 - 15.1 %    MPV 10.0 8.9 - 12.7 fL    Platelets 175 149 - 390 Thousands/uL    nRBC 0 /100 WBCs    Segmented % 73 43 - 75 %    Immature Grans % 0 0 - 2 %    Lymphocytes % 17 14 - 44 %    Monocytes % 9 4 - 12 %    Eosinophils Relative 0 0 - 6 %    Basophils Relative 1 0 - 1 %    Absolute Neutrophils 8.78 (H) 1.85 - 7.62 Thousands/µL    Absolute Immature Grans 0.04 0.00 - 0.20 Thousand/uL    Absolute Lymphocytes 2.11 0.60 - 4.47 Thousands/µL    Absolute Monocytes 1.13 0.17 - 1.22 Thousand/µL    Eosinophils Absolute 0.03 0.00 - 0.61 Thousand/µL    Basophils Absolute 0.08 0.00 - 0.10 Thousands/µL   Comprehensive metabolic panel    Collection Time: 09/27/24  2:12 AM   Result Value Ref Range    Sodium 133 (L) 135 - 147 mmol/L    Potassium 3.5 3.5 - 5.3 mmol/L    Chloride 98 96 - 108 mmol/L    CO2 26 21 - 32 mmol/L    ANION GAP 9 4 - 13 mmol/L    BUN 14 5 - 25 mg/dL    Creatinine 1.22 0.60 - 1.30 mg/dL    Glucose 157 (H) 65 - 140 mg/dL    Calcium 9.2 8.4 - 10.2 mg/dL    AST 12  (L) 13 - 39 U/L    ALT 9 7 - 52 U/L    Alkaline Phosphatase 62 34 - 104 U/L    Total Protein 6.7 6.4 - 8.4 g/dL    Albumin 4.1 3.5 - 5.0 g/dL    Total Bilirubin 0.96 0.20 - 1.00 mg/dL    eGFR 59 ml/min/1.73sq m   Lipase    Collection Time: 09/27/24  2:12 AM   Result Value Ref Range    Lipase 26 11 - 82 u/L   Procalcitonin    Collection Time: 09/27/24  2:12 AM   Result Value Ref Range    Procalcitonin 0.10 <=0.25 ng/ml   Protime-INR    Collection Time: 09/27/24  2:12 AM   Result Value Ref Range    Protime 12.5 12.3 - 15.0 seconds    INR 0.89 0.85 - 1.19   APTT    Collection Time: 09/27/24  2:12 AM   Result Value Ref Range    PTT 30 23 - 34 seconds   Blood culture #1    Collection Time: 09/27/24  3:03 AM    Specimen: Arm, Right; Blood   Result Value Ref Range    Blood Culture Received in Microbiology Lab. Culture in Progress.    Lactic acid    Collection Time: 09/27/24  3:03 AM   Result Value Ref Range    LACTIC ACID 1.1 0.5 - 2.0 mmol/L   Blood culture #2    Collection Time: 09/27/24  3:08 AM    Specimen: Hand, Left; Blood   Result Value Ref Range    Blood Culture Received in Microbiology Lab. Culture in Progress.    FLU/RSV/COVID - if FLU/RSV clinically relevant    Collection Time: 09/27/24  3:09 AM    Specimen: Nose; Nares   Result Value Ref Range    SARS-CoV-2 Negative Negative    INFLUENZA A PCR Negative Negative    INFLUENZA B PCR Negative Negative    RSV PCR Negative Negative   UA w Reflex to Microscopic w Reflex to Culture    Collection Time: 09/27/24  3:56 AM    Specimen: Urine, Clean Catch   Result Value Ref Range    Color, UA Yellow     Clarity, UA Clear     Specific Gravity, UA 1.020 1.005 - 1.030    pH, UA 6.0 4.5, 5.0, 5.5, 6.0, 6.5, 7.0, 7.5, 8.0    Leukocytes, UA Small (A) Negative    Nitrite, UA Negative Negative    Protein, UA Trace (A) Negative mg/dl    Glucose, UA Negative Negative mg/dl    Ketones, UA Negative Negative mg/dl    Urobilinogen, UA <2.0 <2.0 mg/dl mg/dl    Bilirubin, UA Negative  Negative    Occult Blood, UA Trace (A) Negative   Urine Microscopic    Collection Time: 09/27/24  3:56 AM   Result Value Ref Range    RBC, UA 1-2 None Seen, 0-1, 1-2, 2-4, 0-5 /hpf    WBC, UA 2-4 None Seen, 0-1, 1-2, 0-5, 2-4 /hpf    Epithelial Cells Occasional None Seen, Occasional /hpf    Bacteria, UA Occasional None Seen, Occasional /hpf   Basic metabolic panel    Collection Time: 09/27/24  5:50 AM   Result Value Ref Range    Sodium 133 (L) 135 - 147 mmol/L    Potassium 3.7 3.5 - 5.3 mmol/L    Chloride 97 96 - 108 mmol/L    CO2 28 21 - 32 mmol/L    ANION GAP 8 4 - 13 mmol/L    BUN 12 5 - 25 mg/dL    Creatinine 1.11 0.60 - 1.30 mg/dL    Glucose 114 65 - 140 mg/dL    Calcium 8.8 8.4 - 10.2 mg/dL    eGFR 66 ml/min/1.73sq m   Magnesium    Collection Time: 09/27/24  5:50 AM   Result Value Ref Range    Magnesium 1.9 1.9 - 2.7 mg/dL   Phosphorus    Collection Time: 09/27/24  5:50 AM   Result Value Ref Range    Phosphorus 2.9 2.3 - 4.1 mg/dL   CBC and differential    Collection Time: 09/27/24  5:50 AM   Result Value Ref Range    WBC 10.47 (H) 4.31 - 10.16 Thousand/uL    RBC 4.97 3.88 - 5.62 Million/uL    Hemoglobin 14.8 12.0 - 17.0 g/dL    Hematocrit 45.4 36.5 - 49.3 %    MCV 91 82 - 98 fL    MCH 29.8 26.8 - 34.3 pg    MCHC 32.6 31.4 - 37.4 g/dL    RDW 12.8 11.6 - 15.1 %    MPV 9.8 8.9 - 12.7 fL    Platelets 172 149 - 390 Thousands/uL    nRBC 0 /100 WBCs    Segmented % 69 43 - 75 %    Immature Grans % 0 0 - 2 %    Lymphocytes % 20 14 - 44 %    Monocytes % 10 4 - 12 %    Eosinophils Relative 0 0 - 6 %    Basophils Relative 1 0 - 1 %    Absolute Neutrophils 7.25 1.85 - 7.62 Thousands/µL    Absolute Immature Grans 0.04 0.00 - 0.20 Thousand/uL    Absolute Lymphocytes 2.05 0.60 - 4.47 Thousands/µL    Absolute Monocytes 1.08 0.17 - 1.22 Thousand/µL    Eosinophils Absolute 0.00 0.00 - 0.61 Thousand/µL    Basophils Absolute 0.05 0.00 - 0.10 Thousands/µL   Platelet count    Collection Time: 09/27/24  5:50 AM   Result Value Ref  "Range    Platelets 172 149 - 390 Thousands/uL    MPV 9.8 8.9 - 12.7 fL   Fingerstick Glucose (POCT)    Collection Time: 09/27/24  7:10 AM   Result Value Ref Range    POC Glucose 113 65 - 140 mg/dl   Fingerstick Glucose (POCT)    Collection Time: 09/27/24 12:07 PM   Result Value Ref Range    POC Glucose 122 65 - 140 mg/dl     *Note: Due to a large number of results and/or encounters for the requested time period, some results have not been displayed. A complete set of results can be found in Results Review.          Test Results Pending at Discharge (will require follow up):   None      Outpatient Tests Requested:  None    Complications:  None     Reason for Admission: Pain Epigastrium     Hospital Course:   69-year-old male who presents to the hospital for intractable abdominal pain, sited specifically in epigastric region, was constant,dull and rated as 9/10. No aggravating or relieving factors, pain is not made worse or better with eating or bending forward.  It was associated with intermittent fever with rigor and chills since 1 day, spiking 101 before arrival to ED. Patient denied any symptoms of diarrhea, constipation, nausea, vomiting, cough, shortness of breath or palpitations.  Managed with supportive care. Received 2 doses of hydromorphone injections and Maalox in ED   Gastroenterology was consulted, recommended EGD.  Patient underwent EGD which revealed a 3 cm hiatal hernia, the esophagus and duodenum appeared normal.  No noted gastritis, or ulcer disease. Patient pain subsided on floor and remained asymptomatic and stable for discharge. Will continue PPI along with regular home medication on discharge      Condition at Discharge: stable    Discharge Day Visit / Exam:   Subjective:    Vitals: Blood Pressure: 124/73 (09/27/24 1431)  Pulse: 79 (09/27/24 1431)  Temperature: 98.6 °F (37 °C) (09/27/24 1431)  Temp Source: Oral (09/27/24 0604)  Respirations: 17 (09/27/24 1208)  Height: 5' 8\" (172.7 cm) (09/27/24 " 0604)  Weight - Scale: 87 kg (191 lb 12.8 oz) (09/27/24 0604)  SpO2: 90 % (09/27/24 1431)  Exam:   Physical Exam  Constitutional:       Appearance: He is obese.   HENT:      Head: Normocephalic and atraumatic.      Nose: Nose normal.      Mouth/Throat:      Mouth: Mucous membranes are moist.      Pharynx: Oropharynx is clear.   Eyes:      Extraocular Movements: Extraocular movements intact.      Conjunctiva/sclera: Conjunctivae normal.      Pupils: Pupils are equal, round, and reactive to light.   Cardiovascular:      Rate and Rhythm: Normal rate and regular rhythm.      Pulses: Normal pulses.      Heart sounds: Normal heart sounds.   Pulmonary:      Effort: Pulmonary effort is normal.      Breath sounds: Normal breath sounds.   Abdominal:      General: Bowel sounds are normal. There is no distension.      Palpations: Abdomen is soft.      Tenderness: There is abdominal tenderness. There is no guarding or rebound.   Musculoskeletal:         General: Normal range of motion.      Cervical back: Normal range of motion and neck supple.   Skin:     General: Skin is warm and dry.      Capillary Refill: Capillary refill takes less than 2 seconds.   Neurological:      General: No focal deficit present.      Mental Status: He is alert and oriented to person, place, and time.   Psychiatric:         Mood and Affect: Mood normal.         Behavior: Behavior normal.         Thought Content: Thought content normal.         Judgment: Judgment normal.       Discharge instructions/Information to patient and family:   See after visit summary for information provided to patient and family.      Provisions for Follow-Up Care:  See after visit summary for information related to follow-up care and any pertinent home health orders.      Mobility at time of Discharge:   Basic Mobility Inpatient Raw Score: 24  JH-HLM Goal: 8: Walk 250 feet or more  JH-HLM Achieved: 7: Walk 25 feet or more  HLM Goal achieved. Continue to encourage appropriate  mobility.     Disposition:   Home    Planned Readmission: No    Discharge Medications:  See after visit summary for reconciled discharge medications provided to patient and/or family.      Administrative Statements   Discharge Statement:  I have spent a total time of 60 minutes in caring for this patient on the day of the visit/encounter. >30 minutes of time was spent on: Diagnostic results, Prognosis, Risks and benefits of tx options, Patient and family education, Counseling / Coordination of care, Documenting in the medical record, Reviewing / ordering tests, medicine, procedures  , and Communicating with other healthcare professionals .    **Please Note: This note may have been constructed using a voice recognition system**   Chonodrocutaneous Helical Advancement Flap Text: The defect edges were debeveled with a #15 scalpel blade.  Given the location of the defect and the proximity to free margins a chondrocutaneous helical advancement flap was deemed most appropriate.  Using a sterile surgical marker, the appropriate advancement flap was drawn incorporating the defect and placing the expected incisions within the relaxed skin tension lines where possible.    The area thus outlined was incised deep to adipose tissue with a #15 scalpel blade.  The skin margins were undermined to an appropriate distance in all directions utilizing iris scissors.

## 2024-09-27 NOTE — ANESTHESIA POSTPROCEDURE EVALUATION
Post-Op Assessment Note    CV Status:  Stable  Pain Score: 0    Pain management: adequate       Mental Status:  Alert and awake   Hydration Status:  Euvolemic   PONV Controlled:  Controlled   Airway Patency:  Patent     Post Op Vitals Reviewed: Yes    No anethesia notable event occurred.    Staff: CRNA               BP   144/70   Temp 97   Pulse 78   Resp 12   SpO2 95

## 2024-09-27 NOTE — CASE MANAGEMENT
Case Management Discharge Planning Note    Patient name Lance Hazel  Location /424-01 MRN [id number]  : 1955 Date 2024       Current Admission Date: 2024  Current Admission Diagnosis:Intractable abdominal pain   Patient Active Problem List    Diagnosis Date Noted Date Diagnosed    Intractable abdominal pain 2024     Epigastric abdominal pain 2024     Esophageal dysphagia 2024     Abnormal CT of the chest 2024     Bronchitis 2024     Hyperglycemia 2024     Constipation due to opioid therapy 10/03/2023     Neuropathy due to chemotherapeutic drug (HCC) 10/03/2023     Age-related cataract of right eye 2023     Neuropathic pain 2023     Depression, recurrent (HCC) 2023     Skin lesion 2023     Night sweats 2023     Coronary artery disease of native artery of native heart with stable angina pectoris (HCC) 2023     Stage 3a chronic kidney disease (HCC) 2023     At high risk for osteoporosis 2022     Malignant neoplasm of lower lobe of right lung (HCC) 10/04/2022     Encounter for smoking cessation counseling 10/04/2022     Pulmonary emphysema (HCC) 08/15/2022     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     Kidney stone 2022     Cortical age-related cataract of both eyes 2022     Urinary frequency 2022     Pre-diabetes 10/07/2021     Erectile dysfunction 10/07/2021     Insomnia 10/07/2021     Hyperlipidemia 2021     Uncomplicated opioid dependence (HCC) 10/19/2020     Encounter for long-term opiate analgesic use 10/19/2020     Neck pain 10/19/2020     Chronic pain syndrome 2020     Smoking 2020     Chronic pain of both knees 2019     Negative depression screening 2019     Essential tremor 2019     Cervical spondylosis without myelopathy 2019     Lumbar spondylosis 2019     Lumbar degenerative disc disease  02/18/2019     Myofascial pain syndrome 02/18/2019     Chronic low back pain without sciatica 02/18/2019     Cervical radiculopathy 02/18/2019     JAKI (obstructive sleep apnea) 01/28/2019     Abdominal aortic aneurysm (AAA) without rupture 01/28/2019     Hypertension 02/22/2016       LOS (days): 0  Geometric Mean LOS (GMLOS) (days):   Days to GMLOS:     OBJECTIVE:            Current admission status: Observation   Preferred Pharmacy:   [id number] - [place], [place] - [address]  [address] [facility] [address] [facility] [address]  Phone: [phone] Fax: [phone]    [facility] [address] [place] [address] - [address]  [address]  [facility] [address]  Phone: [phone] Fax: [phone]    [facility] [address]  [address] [facility] [address]  Phone: [phone] Fax: [phone]    Primary Care Provider: Maico Howe DO    Primary Insurance: MEDICARE  Secondary Insurance:     DISCHARGE DETAILS:  Chart review done. Pt admitted with intractable abdominal pain; GI consulted; ?Endoscope. No Cm needs identified at this time. Will follow in the event any dc needs arise.

## 2024-09-27 NOTE — H&P
H&P - Hospitalist   Name: Lance Hazel 69 y.o. male I MRN: [id number]  Unit/Bed#: RM14 I Date of Admission: 9/27/2024   Date of Service: 9/27/2024 I Hospital Day: 0     Assessment & Plan  Intractable abdominal pain  Patient presents with intractable epigastric abdominal pain  Has been seen in the outpatient setting with plans for outpatient endoscopy  Unclear etiology however differential diagnosis includes peptic ulcer disease versus musculoskeletal strain  Hemoglobin stable and at baseline  Pain unrelieved by analgesics in the ED  CT abdomen with no evidence of acute intra-abdominal pathology  Denies hematemesis, melena, hematochezia    Gastroenterology consultation appreciated for possible inpatient endoscopic evaluation  NPO sips with meds  Pain regimen as ordered  Bowel regimen as needed  JAKI (obstructive sleep apnea)  Continue home CPAP  Respiratory protocol  Abdominal aortic aneurysm (AAA) without rupture  Stable at 3.5 cm  Outpatient follow-up with vascular surgery  Hypertension  Blood pressure stable  Continue home beta-blocker, calcium channel blocker, ACE inhibitor  Monitor blood pressures  Pulmonary emphysema (HCC)  Not in acute exacerbation  Saturating well on room air  Continue home inhalers  Malignant neoplasm of lower lobe of right lung (HCC)  History of resection  Outpatient follow-up with hematology/oncology  Coronary artery disease of native artery of native heart with stable angina pectoris (HCC)  Continue aspirin and statin therapy  Monitor for anginal symptoms  Outpatient follow-up with cardiology  Stage 3a chronic kidney disease (HCC)  Lab Results   Component Value Date    EGFR 59 09/27/2024    EGFR 60 08/22/2024    EGFR 48 08/01/2024    CREATININE 1.22 09/27/2024    CREATININE 1.21 08/22/2024    CREATININE 1.45 (H) 08/01/2024   Creatinine at baseline  Trend BMP  Avoid nephrotoxic agents and hypotension    VTE Pharmacologic Prophylaxis: VTE Score: 3 Moderate Risk (Score 3-4) -  Pharmacological DVT Prophylaxis Ordered: heparin.  Code Status: Level 1 - Full Code   Discussion with family: Patient declined call to .     Anticipated Length of Stay: Patient will be admitted on an observation basis with an anticipated length of stay of less than 2 midnights secondary to intractable epigastric pain.    History of Present Illness   Chief Complaint: Abdominal pain    Lance Hazel is a 69 y.o. male with a PMH of COPD, hypertension, lung cancer status postresection, coronary artery disease, hyperlipidemia who presents with intractable epigastric pain.  The patient has been having epigastric pain over the course of the past few weeks.  He was seen in the outpatient setting by gastroenterology and was planned for endoscopic evaluation.  In the ED, all vital signs are found to be stable.  Laboratory analysis unremarkable.  CT abdomen with no acute intra-abdominal pathology.  The patient's pain was unrelieved by analgesics in the ED.  The patient was assigned observation in the setting of intractable epigastric abdominal pain for possible inpatient endoscopic evaluation with gastroenterology.    Review of Systems   Constitutional:  Negative for chills and fever.   HENT:  Negative for ear pain and sore throat.    Eyes:  Negative for pain and visual disturbance.   Respiratory:  Negative for cough and shortness of breath.    Cardiovascular:  Negative for chest pain and palpitations.   Gastrointestinal:  Negative for abdominal pain and vomiting.   Genitourinary:  Negative for dysuria and hematuria.   Musculoskeletal:  Negative for arthralgias and back pain.   Skin:  Negative for color change and rash.   Neurological:  Negative for seizures and syncope.   All other systems reviewed and are negative.      I have reviewed the patient's PMH, PSH, Social History, Family History, Meds, and Allergies      Objective     Vitals:   Blood Pressure: 157/78 (09/27/24 0206)  Pulse: 81 (09/27/24  0206)  Temperature: 99.7 °F (37.6 °C) (09/27/24 0228)  Temp Source: Oral (09/27/24 0228)  Respirations: 18 (09/27/24 0206)  Weight - Scale: 88 kg (194 lb) (09/27/24 0206)  SpO2: 92 % (09/27/24 0208)    /78   Pulse 81   Temp 99.7 °F (37.6 °C) (Oral)   Resp 18   Wt 88 kg (194 lb)   SpO2 92%   BMI 29.50 kg/m²     General Appearance:    Alert, cooperative, no distress, appears stated age   Head:    Normocephalic, without obvious abnormality, atraumatic   Eyes:    PERRL, conjunctiva/corneas clear, EOM's intact, fundi     benign, both eyes        Ears:    Normal TM's and external ear canals, both ears   Nose:   Nares normal, septum midline, mucosa normal, no drainage    or sinus tenderness   Throat:   Lips, mucosa, and tongue normal; teeth and gums normal   Neck:   Supple, symmetrical, trachea midline, no adenopathy;        thyroid:  No enlargement/tenderness/nodules; no carotid    bruit or JVD   Back:     Symmetric, no curvature, ROM normal, no CVA tenderness   Lungs:     Clear to auscultation bilaterally, respirations unlabored   Chest wall:    No tenderness or deformity   Heart:    Regular rate and rhythm, S1 and S2 normal, no murmur, rub    or gallop   Abdomen:     Soft, non-tender, bowel sounds active all four quadrants,     no masses, no organomegaly   Genitalia:    Normal male without lesion, discharge or tenderness   Rectal:    Normal tone, normal prostate, no masses or tenderness;    guaiac negative stool   Extremities:   Extremities normal, atraumatic, no cyanosis or edema   Pulses:   2+ and symmetric all extremities   Skin:   Skin color, texture, turgor normal, no rashes or lesions   Lymph nodes:   Cervical, supraclavicular, and axillary nodes normal   Neurologic:   CNII-XII intact. Normal strength, sensation and reflexes       throughout        Lines/Drains:  Lines/Drains/Airways       Active Status       None                        Additional Data:   Lab Results: I have reviewed the following  results: CBC/BMP:   .     09/27/24  0212   WBC 12.17*   HGB 15.3   HCT 47.3      SODIUM 133*   K 3.5   CL 98   CO2 26   BUN 14   CREATININE 1.22   GLUC 157*      Results from last 7 days   Lab Units 09/27/24  0212   WBC Thousand/uL 12.17*   HEMOGLOBIN g/dL 15.3   HEMATOCRIT % 47.3   PLATELETS Thousands/uL 175   SEGS PCT % 73   LYMPHO PCT % 17   MONO PCT % 9   EOS PCT % 0     Results from last 7 days   Lab Units 09/27/24  0212   SODIUM mmol/L 133*   POTASSIUM mmol/L 3.5   CHLORIDE mmol/L 98   CO2 mmol/L 26   BUN mg/dL 14   CREATININE mg/dL 1.22   ANION GAP mmol/L 9   CALCIUM mg/dL 9.2   ALBUMIN g/dL 4.1   TOTAL BILIRUBIN mg/dL 0.96   ALK PHOS U/L 62   ALT U/L 9   AST U/L 12*   GLUCOSE RANDOM mg/dL 157*     Results from last 7 days   Lab Units 09/27/24  0212   INR  0.89         Lab Results   Component Value Date    HGBA1C 6.7 (H) 07/31/2024    HGBA1C 6.8 (A) 05/20/2024    HGBA1C 6.7 (H) 02/12/2024     Results from last 7 days   Lab Units 09/27/24  0303 09/27/24  0212   LACTIC ACID mmol/L 1.1  --    PROCALCITONIN ng/ml  --  0.10       Imaging Review: Reviewed radiology reports from this admission including: CT abdomen/pelvis.  Other Studies: No additional pertinent studies reviewed.    Administrative Statements   I have spent a total time of 75 minutes in caring for this patient on the day of the visit/encounter including Diagnostic results, Prognosis, Risks and benefits of tx options, Instructions for management, Patient and family education, Importance of tx compliance, Risk factor reductions, Impressions, Counseling / Coordination of care, Documenting in the medical record, Reviewing / ordering tests, medicine, procedures  , Obtaining or reviewing history  , and Communicating with other healthcare professionals .    ** Please Note: This note has been constructed using a voice recognition system. **

## 2024-09-27 NOTE — ASSESSMENT & PLAN NOTE
Patient presents with intractable epigastric abdominal pain  Has been seen in the outpatient setting with plans for outpatient endoscopy  Unclear etiology however differential diagnosis includes peptic ulcer disease versus musculoskeletal strain  Hemoglobin stable and at baseline  Pain unrelieved by analgesics in the ED  CT abdomen with no evidence of acute intra-abdominal pathology  Denies hematemesis, melena, hematochezia    Gastroenterology consultation appreciated for possible inpatient endoscopic evaluation  NPO sips with meds  Pain regimen as ordered  Bowel regimen as needed

## 2024-09-27 NOTE — ASSESSMENT & PLAN NOTE
Lab Results   Component Value Date    EGFR 59 09/27/2024    EGFR 60 08/22/2024    EGFR 48 08/01/2024    CREATININE 1.22 09/27/2024    CREATININE 1.21 08/22/2024    CREATININE 1.45 (H) 08/01/2024   Creatinine at baseline  Trend BMP  Avoid nephrotoxic agents and hypotension

## 2024-09-27 NOTE — ASSESSMENT & PLAN NOTE
Continue aspirin and statin therapy  Monitor for anginal symptoms  Outpatient follow-up with cardiology

## 2024-09-27 NOTE — ASSESSMENT & PLAN NOTE
Blood pressure stable  Continue home beta-blocker, calcium channel blocker, ACE inhibitor  Monitor blood pressures

## 2024-09-27 NOTE — ANESTHESIA PREPROCEDURE EVALUATION
Procedure:  PRE-OP ONLY    EGD for refractory abdominal pain    Stress: Negative for ischemia   CTA stable infrarenal aneurysm     Relevant Problems   CARDIO   (+) Abdominal aortic aneurysm (AAA) without rupture   (+) Coronary artery disease of native artery of native heart with stable angina pectoris (HCC)   (+) Hyperlipidemia   (+) Hypertension      GI/HEPATIC   (+) Esophageal dysphagia      /RENAL   (+) Kidney stone   (+) Stage 3a chronic kidney disease (HCC)      MUSCULOSKELETAL   (+) Cervical spondylosis without myelopathy   (+) Chronic low back pain without sciatica   (+) Lumbar degenerative disc disease   (+) Lumbar spondylosis   (+) Myofascial pain syndrome      NEURO/PSYCH   (+) Chronic low back pain without sciatica   (+) Chronic pain syndrome   (+) Depression, recurrent (HCC)   (+) Myofascial pain syndrome      PULMONARY   (+) JAKI (obstructive sleep apnea)   (+) Pulmonary emphysema (HCC)   (+) Smoking             Anesthesia Plan  ASA Score- 3     Anesthesia Type- IV sedation with anesthesia with ASA Monitors.         Additional Monitors:     Airway Plan:            Plan Factors-    Chart reviewed. EKG reviewed.  Existing labs reviewed. Patient summary reviewed.                  Induction-     Postoperative Plan-     Perioperative Resuscitation Plan - Level 1 - Full Code.       Informed Consent-

## 2024-09-27 NOTE — DISCHARGE INSTR - AVS FIRST PAGE
Please follow-up with your primary care provider in 1 week  Please follow-up with gastroenterology as previously arranged  As discussed, recommend using Tylenol scheduled for 5 to 7 days to help with pain, avoid NSAIDs.  Would recommend heat and ice as well, 30 minutes each as able to throughout the day.

## 2024-09-27 NOTE — QUICK NOTE
69-year-old male who presents to the hospital for intractable abdominal pain, no real associated symptoms, pain is not made worse or better with eating, he notes no diarrhea or constipation.  She does have some associated intermittent fevers.  Patient underwent EGD for further evaluation of his intractable pain, which found a 3 cm hiatal hernia, over the esophagus and duodenum appeared normal.  No noted gastritis, or ulcer disease.  Unclear etiology of pain given workup thus far.  Will continue PPI, and add further symptomatic control.  Anticipate discharge within next 24 hours.

## 2024-09-27 NOTE — RESPIRATORY THERAPY NOTE
RT Protocol Note  Lance Hazel 69 y.o. male MRN: [id number]  Unit/Bed#: 424-01 Encounter: [id number]    Assessment    Principal Problem:    Intractable abdominal pain  Active Problems:    JAKI (obstructive sleep apnea)    Abdominal aortic aneurysm (AAA) without rupture    Hypertension    Pulmonary emphysema (HCC)    Malignant neoplasm of lower lobe of right lung ([facility])    Coronary artery disease of native artery of native heart with stable angina pectoris ([facility])    Stage 3a chronic kidney disease ([facility])      Home Pulmonary Medications:    Home Devices/Therapy: Other (Comment) (Patient denies home O2, denies PAP therapy for JAKI.  He did state that he has an inhaler at home, that he rarely ever uses.)    Past Medical History:   Diagnosis Date    Abdominal aortic aneurysm without rupture ([facility])     Abdominal Aortic Duplex 02/21/2017    Ectatic infrarenal abdominal aorta.    MARYANN (acute kidney injury) ([facility]) 07/23/2022    CAD (coronary artery disease)     Cancer ([facility]) 2022    right lung CA    Chronic bronchitis ([facility]) 01/28/2019    Class 1 obesity due to excess calories with serious comorbidity and body mass index (BMI) of 31.0 to 31.9 in adult 02/14/2020    COPD (chronic obstructive pulmonary disease) ([facility])     Extremity pain     Hemiparesis ([facility]) 11/01/2022    History of echocardiogram 03/18/2014    EF 55%, mild MR and AI.  Mild concentric LVH.    History of stress test 03/06/2017    Normal.    Hyperlipidemia     Hypertension     Joint pain     Kidney stone     Low back pain     Lung cancer ([facility])     Migraine     Neck pain     Obstructive sleep apnea     cannot tolerate CPAP    Osteoarthritis     Other chronic pancreatitis ([facility]) 02/10/2023    Peripheral neuropathy     Reflex sympathetic dystrophy     Sacroiliitis ([facility]) 02/02/2022     Social History     Socioeconomic History    Marital status: Single     Spouse name: None    Number of children: None    Years of education: None    Highest education level: None   Occupational  "History    None   Tobacco Use    Smoking status: Every Day     Current packs/day: 1.00     Average packs/day: 1 pack/day for 0.9 years (0.9 ttl pk-yrs)     Types: Cigarettes     Start date: 11/2023    Smokeless tobacco: Never   Vaping Use    Vaping status: Never Used   Substance and Sexual Activity    Alcohol use: Not Currently    Drug use: Never    Sexual activity: Yes   Other Topics Concern    None   Social History Narrative    None     Social Determinants of Health     Financial Resource Strain: Not on file   Food Insecurity: No Food Insecurity (9/16/2024)    Hunger Vital Sign     Worried About Running Out of Food in the Last Year: Never true     Ran Out of Food in the Last Year: Never true   Transportation Needs: No Transportation Needs (9/16/2024)    PRAPARE - Transportation     Lack of Transportation (Medical): No     Lack of Transportation (Non-Medical): No   Physical Activity: Not on file   Stress: Not on file   Social Connections: Not on file   Intimate Partner Violence: Not on file   Housing Stability: Unknown (9/16/2024)    Housing Stability Vital Sign     Unable to Pay for Housing in the Last Year: No     Number of Times Moved in the Last Year: Not on file     Homeless in the Last Year: No       Subjective     Patient assessed per RT protocol.  Patient stated to me that he has a history of COPD.  He did have PFT testing done in the past.  He originally came into the hospital for abdominal pain.  Patient denies any shortness of breath.  He also has a history of lung CA, with removal of part of his RLL and CKD, but denies taking regular bronchodilator therapy.  Patient did also tel me that he was told that he had JAKI, but stated \"I could never tolerate that mask.\"  Lung sounds on my assessment are clear/diminished bilaterally with no wheezing present at this time.  Will plan to move forward with PRN Albuterol, and continue to monitor SPO2 level per protocol, given his lung history and JAKI history.  " "    Objective    Physical Exam:   Assessment Type: Assess only  General Appearance: Alert, Awake  Respiratory Pattern: Normal  Chest Assessment: Chest expansion symmetrical  Bilateral Breath Sounds: Clear, Diminished  Cough: None    Vitals:  Blood pressure 125/84, pulse 89, temperature 98.8 °F (37.1 °C), temperature source Oral, resp. rate 16, height 5' 8\" (1.727 m), weight 87 kg (191 lb 12.8 oz), SpO2 94%.          Imaging and other studies: Reviewed radiology reports from this admission including:  .          Plan    Respiratory Plan: No distress/Pulmonary history    PRN Albuterol.        Resp Comments: Patient assessed per RT protocol   "

## 2024-09-27 NOTE — ANESTHESIA PREPROCEDURE EVALUATION
Procedure:  EGD  EGD for refractory abdominal pain     Stress: Negative for ischemia   CTA stable infrarenal aneurysm  Relevant Problems   CARDIO   (+) Abdominal aortic aneurysm (AAA) without rupture   (+) Coronary artery disease of native artery of native heart with stable angina pectoris (HCC)   (+) Hyperlipidemia   (+) Hypertension      GI/HEPATIC   (+) Esophageal dysphagia      /RENAL   (+) Kidney stone   (+) Stage 3a chronic kidney disease (HCC)      MUSCULOSKELETAL   (+) Cervical spondylosis without myelopathy   (+) Chronic low back pain without sciatica   (+) Lumbar degenerative disc disease   (+) Lumbar spondylosis   (+) Myofascial pain syndrome      NEURO/PSYCH   (+) Chronic low back pain without sciatica   (+) Chronic pain syndrome   (+) Depression, recurrent (HCC)   (+) Myofascial pain syndrome      PULMONARY   (+) JAKI (obstructive sleep apnea)   (+) Pulmonary emphysema (HCC)   (+) Smoking        Physical Exam    Airway    Mallampati score: I  TM Distance: >3 FB  Neck ROM: full     Dental   No notable dental hx     Cardiovascular      Pulmonary      Other Findings        Anesthesia Plan  ASA Score- 3     Anesthesia Type- IV sedation with anesthesia with ASA Monitors.         Additional Monitors:     Airway Plan:            Plan Factors-Exercise tolerance (METS): >4 METS.    Chart reviewed. EKG reviewed.  Existing labs reviewed. Patient summary reviewed.    Patient is not a current smoker.      Obstructive sleep apnea risk education given perioperatively.        Induction-     Postoperative Plan-     Perioperative Resuscitation Plan - Level 1 - Full Code.       Informed Consent- Anesthetic plan and risks discussed with patient.  I personally reviewed this patient with the CRNA. Discussed and agreed on the Anesthesia Plan with the CRNA..

## 2024-09-27 NOTE — ASSESSMENT & PLAN NOTE
Patient presents with intractable epigastric abdominal pain  Denies hematemesis, melena, hematochezia  Pain unrelieved by analgesics in the ED  CT abdomen with no evidence of acute intra-abdominal pathology  Endoscopy findings: 3 cm hiatal hernia   Gastroenterology consultation appreciated. Recommended resuming normal diet and PPI  Patient remained asymptomatic on floor and discharged on home PPI

## 2024-09-28 NOTE — CASE MANAGEMENT
Case Management Discharge Planning Note    Patient name Lance Hazel  Location /424-01 MRN [id number]  : 1955 Date 2024       Current Admission Date: 2024  Current Admission Diagnosis:Hiatal hernia   Patient Active Problem List    Diagnosis Date Noted Date Diagnosed    Hiatal hernia 2024     Epigastric abdominal pain 2024     Esophageal dysphagia 2024     Abnormal CT of the chest 2024     Bronchitis 2024     Hyperglycemia 2024     Constipation due to opioid therapy 10/03/2023     Neuropathy due to chemotherapeutic drug (HCC) 10/03/2023     Age-related cataract of right eye 2023     Neuropathic pain 2023     Depression, recurrent (HCC) 2023     Skin lesion 2023     Night sweats 2023     Coronary artery disease of native artery of native heart with stable angina pectoris (HCC) 2023     Stage 3a chronic kidney disease (HCC) 2023     At high risk for osteoporosis 2022     Malignant neoplasm of lower lobe of right lung (HCC) 10/04/2022     Encounter for smoking cessation counseling 10/04/2022     Pulmonary emphysema (HCC) 08/15/2022     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     Kidney stone 2022     Cortical age-related cataract of both eyes 2022     Urinary frequency 2022     Pre-diabetes 10/07/2021     Erectile dysfunction 10/07/2021     Insomnia 10/07/2021     Hyperlipidemia 2021     Uncomplicated opioid dependence (HCC) 10/19/2020     Encounter for long-term opiate analgesic use 10/19/2020     Neck pain 10/19/2020     Chronic pain syndrome 2020     Smoking 2020     Chronic pain of both knees 2019     Negative depression screening 2019     Essential tremor 2019     Cervical spondylosis without myelopathy 2019     Lumbar spondylosis 2019     Lumbar degenerative disc disease 2019     Myofascial pain  syndrome 02/18/2019     Chronic low back pain without sciatica 02/18/2019     Cervical radiculopathy 02/18/2019     JAKI (obstructive sleep apnea) 01/28/2019     Abdominal aortic aneurysm (AAA) without rupture 01/28/2019     Hypertension 02/22/2016       LOS (days): 0  Geometric Mean LOS (GMLOS) (days):   Days to GMLOS:     OBJECTIVE:            Current admission status: Observation   Preferred Pharmacy:   [id number] - [place], [place] - [address] [address]  Phone: [phone] Fax: [phone]    [facility] [address] [place] [address] [address]  [address]  [facility] [address]  Phone: [phone] Fax: [phone]    [address]  [address] [facility] [address]  Phone: [phone] Fax: [phone]    Primary Care Provider: Maico Howe DO    Primary Insurance: MEDICARE  Secondary Insurance:     DISCHARGE DETAILS:  Late entry;Pt was dc'd to home on 9/27/24 with OP follow up with GI; Thoracic Surgery and OP colonoscopy and EGD.

## 2024-09-29 LAB
BACTERIA BLD CULT: NORMAL
BACTERIA BLD CULT: NORMAL

## 2024-09-30 ENCOUNTER — OFFICE VISIT (OUTPATIENT)
Dept: FAMILY MEDICINE CLINIC | Facility: CLINIC | Age: 69
End: 2024-09-30
Payer: MEDICARE

## 2024-09-30 VITALS
TEMPERATURE: 98 F | DIASTOLIC BLOOD PRESSURE: 72 MMHG | SYSTOLIC BLOOD PRESSURE: 120 MMHG | HEIGHT: 68 IN | WEIGHT: 192.4 LBS | BODY MASS INDEX: 29.16 KG/M2 | HEART RATE: 62 BPM | OXYGEN SATURATION: 97 %

## 2024-09-30 DIAGNOSIS — I25.118 CORONARY ARTERY DISEASE OF NATIVE ARTERY OF NATIVE HEART WITH STABLE ANGINA PECTORIS (HCC): ICD-10-CM

## 2024-09-30 DIAGNOSIS — J43.9 PULMONARY EMPHYSEMA, UNSPECIFIED EMPHYSEMA TYPE (HCC): ICD-10-CM

## 2024-09-30 DIAGNOSIS — I71.40 ABDOMINAL AORTIC ANEURYSM (AAA) WITHOUT RUPTURE, UNSPECIFIED PART (HCC): ICD-10-CM

## 2024-09-30 DIAGNOSIS — Z23 ENCOUNTER FOR IMMUNIZATION: ICD-10-CM

## 2024-09-30 DIAGNOSIS — R10.13 EPIGASTRIC PAIN: Primary | ICD-10-CM

## 2024-09-30 LAB
ATRIAL RATE: 77 BPM
ATRIAL RATE: 77 BPM
P AXIS: 40 DEGREES
P AXIS: 69 DEGREES
PR INTERVAL: 168 MS
PR INTERVAL: 174 MS
QRS AXIS: 31 DEGREES
QRS AXIS: 62 DEGREES
QRSD INTERVAL: 90 MS
QRSD INTERVAL: 92 MS
QT INTERVAL: 372 MS
QT INTERVAL: 372 MS
QTC INTERVAL: 420 MS
QTC INTERVAL: 420 MS
T WAVE AXIS: 51 DEGREES
T WAVE AXIS: 58 DEGREES
VENTRICULAR RATE: 77 BPM
VENTRICULAR RATE: 77 BPM

## 2024-09-30 PROCEDURE — 93010 ELECTROCARDIOGRAM REPORT: CPT | Performed by: INTERNAL MEDICINE

## 2024-09-30 PROCEDURE — 99213 OFFICE O/P EST LOW 20 MIN: CPT | Performed by: FAMILY MEDICINE

## 2024-09-30 PROCEDURE — G2211 COMPLEX E/M VISIT ADD ON: HCPCS | Performed by: FAMILY MEDICINE

## 2024-09-30 NOTE — PROGRESS NOTES
Assessment/Plan:    No problem-specific Assessment & Plan notes found for this encounter.       Diagnoses and all orders for this visit:    Epigastric pain  Comments:  resolved    Encounter for immunization  -     Pneumococcal Conjugate Vaccine 20-valent (Pcv20)    Abdominal aortic aneurysm (AAA) without rupture, unspecified part (HCC)  Comments:  stable    Pulmonary emphysema, unspecified emphysema type (HCC)  Comments:  quit smoking    Coronary artery disease of native artery of native heart with stable angina pectoris (HCC)  Comments:  per cardiology          PHQ-2/9 Depression Screening    Little interest or pleasure in doing things: 0 - not at all  Feeling down, depressed, or hopeless: 0 - not at all  Trouble falling or staying asleep, or sleeping too much: 0 - not at all  Feeling tired or having little energy: 0 - not at all  Poor appetite or overeatin - not at all  Feeling bad about yourself - or that you are a failure or have let yourself or your family down: 0 - not at all  Trouble concentrating on things, such as reading the newspaper or watching television: 0 - not at all  Moving or speaking so slowly that other people could have noticed. Or the opposite - being so fidgety or restless that you have been moving around a lot more than usual: 0 - not at all  Thoughts that you would be better off dead, or of hurting yourself in some way: 0 - not at all  PHQ-9 Score: 0  PHQ-9 Interpretation: No or Minimal depression            Subjective:      Patient ID: Lance Hazel is a 69 y.o. male.    Pt went to the ER, had a negative workup, pain is no resolved    Abdominal Pain  This is a new problem. The current episode started in the past 7 days. The onset quality is sudden. The problem occurs constantly. The pain is located in the epigastric region. Pertinent negatives include no constipation, diarrhea, fever, nausea or vomiting.       The following portions of the patient's history were reviewed and updated  "as appropriate: allergies, current medications, past family history, past medical history, past social history, past surgical history, and problem list.    Review of Systems   Constitutional:  Negative for chills and fever.   Respiratory:  Positive for shortness of breath. Negative for wheezing.    Cardiovascular:  Negative for chest pain and palpitations.   Gastrointestinal:  Positive for abdominal pain. Negative for blood in stool, constipation, diarrhea, nausea and vomiting.         Objective:    /72   Pulse 62   Temp 98 °F (36.7 °C) (Tympanic)   Ht 5' 8\" (1.727 m)   Wt 87.3 kg (192 lb 6.4 oz)   SpO2 97%   BMI 29.25 kg/m²      Physical Exam  Vitals and nursing note reviewed.   Constitutional:       General: He is not in acute distress.     Appearance: Normal appearance. He is not ill-appearing or diaphoretic.   HENT:      Head: Normocephalic and atraumatic.   Eyes:      Conjunctiva/sclera: Conjunctivae normal.   Cardiovascular:      Rate and Rhythm: Normal rate and regular rhythm.      Heart sounds: Normal heart sounds. No murmur heard.  Pulmonary:      Effort: Pulmonary effort is normal. No respiratory distress.      Breath sounds: Normal breath sounds. No wheezing, rhonchi or rales.   Abdominal:      General: There is no distension.      Palpations: Abdomen is soft. There is no mass.      Tenderness: There is no abdominal tenderness. There is no guarding or rebound.   Musculoskeletal:      Cervical back: Normal range of motion and neck supple. No rigidity.      Right lower leg: No edema.      Left lower leg: No edema.   Lymphadenopathy:      Cervical: No cervical adenopathy.   Neurological:      Mental Status: He is alert and oriented to person, place, and time.   Psychiatric:         Mood and Affect: Mood normal.         Behavior: Behavior normal.         Thought Content: Thought content normal.         Judgment: Judgment normal.         "

## 2024-10-02 LAB
BACTERIA BLD CULT: NORMAL
BACTERIA BLD CULT: NORMAL

## 2024-10-02 PROCEDURE — 88305 TISSUE EXAM BY PATHOLOGIST: CPT | Performed by: PATHOLOGY

## 2024-10-04 ENCOUNTER — PATIENT OUTREACH (OUTPATIENT)
Dept: OTHER | Facility: CLINIC | Age: 69
End: 2024-10-04

## 2024-10-04 ENCOUNTER — TELEPHONE (OUTPATIENT)
Dept: HEMATOLOGY ONCOLOGY | Facility: CLINIC | Age: 69
End: 2024-10-04

## 2024-10-04 NOTE — TELEPHONE ENCOUNTER
----- Message from Hailey ZUÑIGA sent at 10/4/2024  2:23 PM EDT -----    ----- Message -----  From: Joann Mayberry PA-C  Sent: 9/27/2024   3:58 PM EDT  To: Hailey Hawley MA    Pt was hospitalized and had EGD.   Needs to proceed with colonoscopy with Dr Holcomb and have f/u OV with him

## 2024-10-05 DIAGNOSIS — G47.00 INSOMNIA, UNSPECIFIED TYPE: ICD-10-CM

## 2024-10-07 RX ORDER — TRAZODONE HYDROCHLORIDE 100 MG/1
TABLET ORAL
Qty: 90 TABLET | Refills: 1 | Status: SHIPPED | OUTPATIENT
Start: 2024-10-07

## 2024-10-08 ENCOUNTER — TELEPHONE (OUTPATIENT)
Age: 69
End: 2024-10-08

## 2024-10-08 NOTE — TELEPHONE ENCOUNTER
Patient is requesting if Dr Lopez can review the EGD report and biopsy, patient had during recent ED visit.

## 2024-10-09 NOTE — RESULT ENCOUNTER NOTE
The results were negative (unremarkable).  I do not feel follow-up is indicated for the distal esophagus finding of few goblet cell metaplasia since there was no endoscopic evidence of Gamino's esophagus.  This was communicated via [employer].

## 2024-10-15 ENCOUNTER — OFFICE VISIT (OUTPATIENT)
Dept: CARDIOLOGY CLINIC | Facility: CLINIC | Age: 69
End: 2024-10-15
Payer: MEDICARE

## 2024-10-15 VITALS
HEART RATE: 64 BPM | SYSTOLIC BLOOD PRESSURE: 130 MMHG | DIASTOLIC BLOOD PRESSURE: 80 MMHG | BODY MASS INDEX: 29.25 KG/M2 | HEIGHT: 68 IN | WEIGHT: 193 LBS

## 2024-10-15 DIAGNOSIS — I71.43 INFRARENAL ABDOMINAL AORTIC ANEURYSM (AAA) WITHOUT RUPTURE (HCC): ICD-10-CM

## 2024-10-15 DIAGNOSIS — I25.118 CORONARY ARTERY DISEASE OF NATIVE ARTERY OF NATIVE HEART WITH STABLE ANGINA PECTORIS (HCC): Primary | ICD-10-CM

## 2024-10-15 DIAGNOSIS — E78.2 MIXED HYPERLIPIDEMIA: ICD-10-CM

## 2024-10-15 PROCEDURE — 99214 OFFICE O/P EST MOD 30 MIN: CPT | Performed by: INTERNAL MEDICINE

## 2024-10-15 NOTE — PROGRESS NOTES
Patient ID: Lance Hazel is a 69 y.o. male.        Plan:      Assessment & Plan  Coronary artery disease of native artery of native heart with stable angina pectoris (HCC)  No change in pattern.  Infrarenal abdominal aortic aneurysm (AAA) without rupture (HCC)  No size change on recent CT.  Mixed hyperlipidemia  Tolerating statin tx.      Follow up Plan/Other summary comments:    1 year follow-up with his usual cardiologist Dr. Lopez.    HPI: Patient is seen today in follow-up.  He had a recent CT of the chest that went into the abdomen.  Aneurysm of the aorta as well as iliac artery were found and patient was told to follow-up here.  I am seeing the patient in Dr. Lopez's absence today.  I have reviewed the recent scan as well as prior ones.  None of these measurements are of change in size from 2023.    No recent angina is present.  He continues to get treatment for his stage III lung cancer.            Most recent or relevant cardiac/vascular testing:    Reviewed      Past Surgical History:   Procedure Laterality Date    CARDIAC CATHETERIZATION  02/13/2012    EF 70%, widely patent renal arteries, significant single-vessel CAD-medical therapy.    CARDIAC CATHETERIZATION  04/11/2013    EF 65%, 50% mid LAD, 20% prox CFX, 90% diffuse RCA, 99% mid RCA. Medical management.    CATARACT EXTRACTION Right 09/19/2023    CHOLECYSTECTOMY      COLONOSCOPY      EPIDURAL BLOCK INJECTION Bilateral 08/15/2019    Procedure: BLOCK / INJECTION EPIDURAL STEROID CERVICAL;  Surgeon: Ricardo Park MD;  Location: [facility];  Service: Pain Management     FL GUIDED NEEDLE PLAC BX/ASP/INJ  08/15/2019    IR BIOPSY LUNG  09/13/2022    IR THORACIC DUCT EMBOLIZATION  11/22/2022    [facility]EDIC SURGERY      [facility]/CELL WASHG SPX N/A 11/16/2022    Procedure: BRONCHOSCOPY FLEXIBLE;  Surgeon: Gulshan Madison MD;  Location: [facility];  Service: Thoracic    NV THORACOSCOPY W/LOBECTOMY SINGLE LOBE  "Right 11/16/2022    Procedure: LOBECTOMY LUNG; lower lobe superior segmentectomy, mediastinal lymph node dissection;  Surgeon: Gulshan Madison MD;  Location: [facility];  Service: Thoracic    TN THORACOSCOPY W/SEGMENTECTOMY Right 11/16/2022    Procedure: THORACOSCOPY VIDEO ASSISTED SURGERY (VATS);  Surgeon: Gulshan Madison MD;  Location: [facility];  Service: Thoracic    TRIGGER POINT INJECTION         Lipid Profile: Reviewed      Review of Systems   10  point ROS  was otherwise non pertinent or negative except as per HPI or as below.   Gait:  Normal.        Objective:     /80 (BP Location: Right arm, Patient Position: Sitting)   Pulse 64   Ht 5' 8\" (1.727 m)   Wt 87.5 kg (193 lb)   BMI 29.35 kg/m²     PHYSICAL EXAM:    General:  Normal appearance in no distress.  Eyes:  Anicteric.  Oral mucosa:  Moist.  Neck:  No JVD. Carotid upstrokes are brisk without bruits.  No masses.  Chest:  Clear to auscultation.  Cardiac:  No palpable PMI.  Normal S1 and S2.  No murmur gallop or rub.  Abdomen:  Soft and nontender. No palpable organomegaly or aortic enlargement.  Extremities:  No peripheral edema.  Musculoskeletal:  Symmetric.   Vascular:  Femoral pulses are brisk without bruits.  Popliteal pulses are intact bilaterally.   Pedal pulses are intact.  Neuro:  Grossly symmetric.  Psych:  Alert and oriented x3.      Meds reviewed.    Past Medical History:   Diagnosis Date    Abdominal aortic aneurysm without rupture (HCC)     Abdominal Aortic Duplex 02/21/2017    Ectatic infrarenal abdominal aorta.    MARYANN (acute kidney injury) ([facility]) 07/23/2022    CAD (coronary artery disease)     Cancer ([facility]ly [facility]) 2022    right lung CA    Chronic bronchitis ([facility]) 01/28/2019    Class 1 obesity due to excess calories with serious comorbidity and body mass index (BMI) of 31.0 to 31.9 in adult 02/14/2020    COPD (chronic obstructive pulmonary disease) ([facility])     Coronary artery disease of native artery of native heart with " stable angina pectoris (HCC)     Extremity pain     Hemiparesis (HCC) 11/01/2022    Hiatal hernia     History of echocardiogram 03/18/2014    EF 55%, mild MR and AI.  Mild concentric LVH.    History of stress test 03/06/2017    Normal.    Hyperlipidemia     Hypertension     Joint pain     Kidney stone     Low back pain     Lung cancer (HCC)     Migraine     Neck pain     Obstructive sleep apnea     cannot tolerate CPAP    Osteoarthritis     Other chronic pancreatitis (HCC) 02/10/2023    Peripheral neuropathy     Reflex sympathetic dystrophy     Sacroiliitis (HCC) 02/02/2022           Social History     Tobacco Use   Smoking Status Every Day    Current packs/day: 1.00    Average packs/day: 1 pack/day for 1 year (1.0 ttl pk-yrs)    Types: Cigarettes    Start date: 11/2023   Smokeless Tobacco Never

## 2024-10-22 ENCOUNTER — OFFICE VISIT (OUTPATIENT)
Dept: CARDIAC SURGERY | Facility: CLINIC | Age: 69
End: 2024-10-22
Payer: MEDICARE

## 2024-10-22 VITALS
HEART RATE: 66 BPM | BODY MASS INDEX: 29.44 KG/M2 | HEIGHT: 68 IN | OXYGEN SATURATION: 96 % | TEMPERATURE: 98.7 F | SYSTOLIC BLOOD PRESSURE: 144 MMHG | DIASTOLIC BLOOD PRESSURE: 79 MMHG | RESPIRATION RATE: 15 BRPM | WEIGHT: 194.22 LBS

## 2024-10-22 DIAGNOSIS — C34.31 PRIMARY SQUAMOUS CELL CARCINOMA OF LOWER LOBE OF RIGHT LUNG (HCC): Primary | ICD-10-CM

## 2024-10-22 PROCEDURE — 99213 OFFICE O/P EST LOW 20 MIN: CPT | Performed by: THORACIC SURGERY (CARDIOTHORACIC VASCULAR SURGERY)

## 2024-10-22 RX ORDER — OMEPRAZOLE 40 MG/1
1 CAPSULE, DELAYED RELEASE ORAL DAILY
COMMUNITY
Start: 2024-10-10

## 2024-10-22 NOTE — ASSESSMENT & PLAN NOTE
Mr. Hazel has no clinical or radiographic appearance of recurrent lung cancer, now 2 years out from his resection.  We will plan to repeat his CT scan in 6 months and see him back in the office.  He knows he can call the office at anytime with questions or concerns.

## 2024-10-22 NOTE — PROGRESS NOTES
Assessment/Plan:    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)  Mr. Hazel has no clinical or radiographic appearance of recurrent lung cancer, now 2 years out from his resection.  We will plan to repeat his CT scan in 6 months and see him back in the office.  He knows he can call the office at anytime with questions or concerns.       Diagnoses and all orders for this visit:    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)  -     CT chest wo contrast; Future    Other orders  -     omeprazole (PriLOSEC) 40 MG capsule; Take 1 capsule by mouth in the morning          Thoracic History   Cancer Staging   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)  Staging form: Lung, AJCC 8th Edition  - Pathologic stage from 11/16/2022: Stage IIIA (pT1b, pN2, cM0) - Signed by Erinn Gooden PA-C on 3/19/2024    Oncology History   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)   11/16/2022 Surgery    R VATS lower lobe super segmentectomy      11/16/2022 -  Cancer Staged    Staging form: Lung, AJCC 8th Edition  - Pathologic stage from 11/16/2022: Stage IIIA (pT1b, pN2, cM0) - Signed by Erinn Gooden PA-C on 3/19/2024       Malignant neoplasm of lower lobe of right lung (HCC)   9/13/2022 Initial Diagnosis    July 23, 2022 patient had CT chest ab pelvis regarding rule out AAA.  This showed 1.1 cm right lower lobe pulmonary nodule new since prior study of November 2019.  Some other smaller nodules identified.  Subcarinal lymph node 2 cm.  No other findings suspicious for metastatic disease.  August 5, 2022 PET/CT showed 1.2 cm pulmonary nodule, SUV 2.9.  Other nodules noted, subcentimeter, no hypermetabolism.  Some groundglass opacities in the right upper lobe.  September 13, 2022 patient had needle biopsy of the right lower lobe mass showing squamous cell carcinoma, TTF-1 positive.  November 16, 2022 patient underwent right lower lobe  segmentectomy.  Pathology showed 1.8 cm squamous cell carcinoma.  Level 4 and 11 lymph node were positive for metastatic carcinoma.     1/30/2023 - 3/27/2023 Chemotherapy    palonosetron (ALOXI), 0.25 mg, Intravenous, Once, 4 of 4 cycles  Administration: 0.25 mg (1/30/2023), 0.25 mg (2/13/2023), 0.25 mg (3/6/2023), 0.25 mg (3/27/2023)  fosaprepitant (EMEND) IVPB, 150 mg, Intravenous, Once, 3 of 3 cycles  Administration: 150 mg (1/30/2023), 150 mg (2/13/2023), 150 mg (3/6/2023)  CARBOplatin (PARAPLATIN) IVPB ([facility] AUC DOSING), 552 mg, Intravenous, Once, 4 of 4 cycles  Administration: 550 mg (1/30/2023), 650 mg (2/13/2023), 650 mg (3/6/2023), 500 mg (3/27/2023)  PACLItaxel (TAXOL) chemo IVPB, 349.8 mg, Intravenous, Once, 4 of 4 cycles  Dose modification: 200 mg/m2 (original dose 175 mg/m2, Cycle 2, Reason: Dose modified as per discussion with consulting physician)  Administration: 360 mg (1/30/2023), 400 mg (2/13/2023), 400 mg (3/6/2023), 400 mg (3/27/2023)  aprepitant (CINVANTI) in  mL IVPB, 130 mg, Intravenous, Once, 1 of 1 cycle  Administration: 130 mg (3/27/2023)     4/13/2023 Genomic Testing    April 13, 2023 [facility]-  PD-L1 1%, TMB high.  MTAP deleted.  p53 [id number], pathogenic variant  KRAS G694 L, VUS  CRABBP  *, pathogenic variant  KMT2D *, pathogenic variant     6/21/2023 - 8/22/2024 Chemotherapy    alteplase (CATHFLO), 2 mg, Intracatheter, Every 1 Minute as needed, 17 of 17 cycles  atezolizumab (TECENTRIQ) IVPB, 1,200 mg, Intravenous, Once, 17 of 17 cycles  Administration: 1,200 mg (6/21/2023), 1,200 mg (7/12/2023), 1,200 mg (8/29/2023), 1,200 mg (9/18/2023), 1,200 mg (10/10/2023), 1,200 mg (10/31/2023), 1,200 mg (11/22/2023), 1,200 mg (12/11/2023), 1,200 mg (1/2/2024), 1,200 mg (1/23/2024), 1,200 mg (2/14/2024), 1,200 mg (5/7/2024), 1,200 mg (5/31/2024), 1,200 mg (6/20/2024), 1,200 mg (7/11/2024), 1,200 mg (8/22/2024), 1,200 mg (8/1/2024)              Subjective:    Patient ID: Lance Hazel  is a 69 y.o. male.    Mr. Hazel returns to the office today for routine lung cancer surveillance.  He is now 2 years out from a right thoracoscopic lower lobe superior segmentectomy for what turned out to be stage IIIa squamous cell cancer.  He underwent adjuvant chemotherapy and immunotherapy.  The patient notes an approximate 10 pound weight loss over the last 6 months.  He states that this is due to his epigastric discomfort.  He underwent an EGD last month without any significant though he does have some circumferential esophageal thickening on his CT scan.  He denies new headaches or blurry, new bone or joint pains, or new numbness or weakness in extremity.  He denies cough, purulent sputum production, or hemoptysis.    CT scan of the chest performed on September 20, 2024 is personally reviewed.  He has stable postsurgical changes in his right hemithorax.  There are no new or growing pulmonary nodules nor are there any worrisome mediastinal or hilar lymph nodes.  I do not see signs of recurrent lung cancer.        The following portions of the patient's history were reviewed and updated as appropriate: allergies, current medications, past family history, past medical history, past social history, past surgical history and problem list.    Review of Systems   Constitutional:  Negative for activity change, appetite change, chills, fever and unexpected weight change.   HENT:  Negative for voice change.    Respiratory:  Negative for cough, shortness of breath and wheezing.    Cardiovascular:  Negative for chest pain, palpitations and leg swelling.   Gastrointestinal:  Negative for abdominal pain, constipation, diarrhea, nausea and vomiting.   Musculoskeletal:  Negative for arthralgias and myalgias.   Skin:  Negative for rash.   Neurological:  Negative for dizziness, seizures, weakness, numbness and headaches.   Hematological:  Negative for adenopathy.   Psychiatric/Behavioral:  Negative for confusion.       "    Objective:   Physical Exam  Vitals reviewed.   Constitutional:       General: He is not in acute distress.     Appearance: Normal appearance. He is well-developed.   HENT:      Head: Normocephalic and atraumatic.   Eyes:      General: No scleral icterus.     Conjunctiva/sclera: Conjunctivae normal.      Pupils: Pupils are equal, round, and reactive to light.   Neck:      Trachea: No tracheal deviation.   Cardiovascular:      Rate and Rhythm: Normal rate and regular rhythm.      Heart sounds: Normal heart sounds.   Pulmonary:      Effort: Pulmonary effort is normal. No respiratory distress.      Breath sounds: Normal breath sounds. No wheezing or rales.   Abdominal:      General: Bowel sounds are normal. There is no distension.      Palpations: Abdomen is soft.      Tenderness: There is no abdominal tenderness.   Musculoskeletal:      Cervical back: Normal range of motion and neck supple.      Right lower leg: No edema.      Left lower leg: No edema.   Lymphadenopathy:      Cervical: No cervical adenopathy.   Skin:     General: Skin is warm and dry.   Neurological:      Mental Status: He is alert and oriented to person, place, and time.      Cranial Nerves: No cranial nerve deficit.   Psychiatric:         Behavior: Behavior normal.         Thought Content: Thought content normal.         Judgment: Judgment normal.     /79 (BP Location: Left arm, Patient Position: Sitting, Cuff Size: Standard)   Pulse 66   Temp 98.7 °F (37.1 °C) (Temporal)   Resp 15   Ht 5' 8\" (1.727 m)   Wt 88.1 kg (194 lb 3.6 oz)   SpO2 96%   BMI 29.53 kg/m²        CT chest wo contrast    Result Date: 9/20/2024  Narrative CT CHEST WITHOUT IV CONTRAST INDICATION: C34.31: Malignant neoplasm of lower lobe, right bronchus or lung. COMPARISON: 2/26/2024 TECHNIQUE: CT examination of the chest was performed without intravenous contrast. Multiplanar 2D reformatted images were created from the source data. This examination, like all CT scans " performed in the [facility] beds at the [facility], was performed utilizing techniques to minimize radiation dose exposure, including the use of iterative reconstruction and automated exposure control. Radiation dose length product (DLP) for this visit: 439.32 mGy-cm FINDINGS: LUNGS: Postsurgical changes status post segmentectomy in the right lung. Surgical suture material with surrounding opacity likely scarring, unchanged from prior. Subpleural scarring and/or fibrosis bilaterally. Prominent changes in the lateral right middle lobe. Pulmonary emphysematous change. Poorly marginated faint groundglass opacity, left upper lobe, image 3/130, unchanged from 2 prior examinations for example 5/16/2023 PLEURA: No pleural effusion HEART/GREAT VESSELS: Coronary artery calcification. No thoracic aortic aneurysm. MEDIASTINUM AND DANILO: Small hiatal hernia again noted. Diffuse circumferential esophageal wall thickening is again seen, noted on multiple prior examinations again such as 5/16/2023. No progressive mediastinal adenopathy CHEST WALL AND LOWER NECK: Left anterior medial chest wall subcutaneous nodule measuring 0.7 cm, likely sebaceous cyst/epidermoid inclusion cyst. Correlate clinically. No axillary adenopathy. VISUALIZED STRUCTURES IN THE UPPER ABDOMEN: Status post cholecystectomy. Bilateral hyperdense renal nodules, likely hemorrhagic or proteinaceous containing cysts, without suspicious change from prior. Left kidney upper pole calculus. Bilateral water density cysts. Probable hepatic cyst image 2/136, unchanged from prior abdominal CT 12/21/2023. OSSEOUS STRUCTURES: Stable     Impression 1. No interval change in appearance of lungs since prior examination. No specific findings to suggest static or recurrent disease in the thorax 2. No progressive mediastinal or hilar adenopathy. 3. Chronic circumferential diffuse esophageal nonspecific wall thickening Workstation performed: [id number]

## 2024-11-08 DIAGNOSIS — R10.13 EPIGASTRIC ABDOMINAL PAIN: Primary | ICD-10-CM

## 2024-11-08 DIAGNOSIS — R13.19 ESOPHAGEAL DYSPHAGIA: ICD-10-CM

## 2024-11-08 RX ORDER — OMEPRAZOLE 40 MG/1
40 CAPSULE, DELAYED RELEASE ORAL DAILY
Qty: 30 CAPSULE | Refills: 1 | Status: SHIPPED | OUTPATIENT
Start: 2024-11-08

## 2024-11-14 ENCOUNTER — OFFICE VISIT (OUTPATIENT)
Age: 69
End: 2024-11-14
Payer: MEDICARE

## 2024-11-14 VITALS
HEIGHT: 68 IN | DIASTOLIC BLOOD PRESSURE: 79 MMHG | WEIGHT: 190 LBS | BODY MASS INDEX: 28.79 KG/M2 | HEART RATE: 63 BPM | SYSTOLIC BLOOD PRESSURE: 131 MMHG

## 2024-11-14 DIAGNOSIS — M54.50 CHRONIC MIDLINE LOW BACK PAIN WITHOUT SCIATICA: ICD-10-CM

## 2024-11-14 DIAGNOSIS — M54.12 CERVICAL RADICULOPATHY: ICD-10-CM

## 2024-11-14 DIAGNOSIS — F11.20 UNCOMPLICATED OPIOID DEPENDENCE (HCC): ICD-10-CM

## 2024-11-14 DIAGNOSIS — M51.369 LUMBAR DEGENERATIVE DISC DISEASE: ICD-10-CM

## 2024-11-14 DIAGNOSIS — M47.812 CERVICAL SPONDYLOSIS WITHOUT MYELOPATHY: ICD-10-CM

## 2024-11-14 DIAGNOSIS — G89.29 CHRONIC MIDLINE LOW BACK PAIN WITHOUT SCIATICA: ICD-10-CM

## 2024-11-14 DIAGNOSIS — M47.816 LUMBAR SPONDYLOSIS: ICD-10-CM

## 2024-11-14 DIAGNOSIS — M54.16 LUMBAR RADICULOPATHY: ICD-10-CM

## 2024-11-14 DIAGNOSIS — M79.18 MYOFASCIAL PAIN SYNDROME: ICD-10-CM

## 2024-11-14 DIAGNOSIS — G89.4 CHRONIC PAIN SYNDROME: Primary | ICD-10-CM

## 2024-11-14 DIAGNOSIS — M54.2 NECK PAIN: ICD-10-CM

## 2024-11-14 DIAGNOSIS — M79.2 NEUROPATHIC PAIN: ICD-10-CM

## 2024-11-14 DIAGNOSIS — Z79.891 ENCOUNTER FOR LONG-TERM OPIATE ANALGESIC USE: ICD-10-CM

## 2024-11-14 DIAGNOSIS — M54.9 MID BACK PAIN: ICD-10-CM

## 2024-11-14 PROCEDURE — G2211 COMPLEX E/M VISIT ADD ON: HCPCS | Performed by: NURSE PRACTITIONER

## 2024-11-14 PROCEDURE — 99214 OFFICE O/P EST MOD 30 MIN: CPT | Performed by: NURSE PRACTITIONER

## 2024-11-14 RX ORDER — OXYCODONE AND ACETAMINOPHEN 7.5; 325 MG/1; MG/1
1 TABLET ORAL EVERY 8 HOURS PRN
Qty: 90 TABLET | Refills: 0 | Status: SHIPPED | OUTPATIENT
Start: 2024-12-16

## 2024-11-14 RX ORDER — OXYCODONE AND ACETAMINOPHEN 7.5; 325 MG/1; MG/1
1 TABLET ORAL EVERY 8 HOURS PRN
Qty: 90 TABLET | Refills: 0 | Status: SHIPPED | OUTPATIENT
Start: 2024-11-18

## 2024-11-14 NOTE — PROGRESS NOTES
Assessment:  1. Chronic pain syndrome    2. Neck pain    3. Cervical spondylosis without myelopathy    4. Cervical radiculopathy    5. Chronic midline low back pain without sciatica    6. Lumbar radiculopathy    7. Lumbar spondylosis    8. Lumbar degenerative disc disease    9. Mid back pain    10. Myofascial pain syndrome    11. Neuropathic pain    12. Uncomplicated opioid dependence (HCC)    13. Encounter for long-term opiate analgesic use        Plan:  While the patient was in the office today, I did have a thorough conversation regarding their chronic pain syndrome, medication management, and treatment plan options.  Patient is being seen for a follow-up visit.  He has been experiencing increased low back pain as well as increased pain in both lower extremities, posterior aspect.  She continues with numbness tingling and pain in both feet related to neuropathy.  Also, complaining of increased mid back pain.    Will update an MRI of his lumbar spine to rule out worsening intraspinal pathology that would contribute to his current symptoms.  Will order an x-ray of the thoracic spine.    Renewed oxycodone 7.5/325 every 8 hours as needed for pain.  The patient's opioid scripts were sent to their pharmacy electronically and was given a 2 month supply of prescriptions with a Do Not Fill date(s) of 11/19/2024, 12/16/2024.    [place] Prescription Drug Monitoring Program report was reviewed and was appropriate     A urine drug screen was collected at today's office visit as part of our medication management protocol. The point of care testing results were appropriate for what was being prescribed. The specimen will be sent for confirmatory testing. The drug screen is medically necessary because the patient is either dependent on opioid medication or is being considered for opioid medication therapy and the results could impact ongoing or future treatment. The drug screen is to evaluate for the presences or absence of  prescribed, non-prescribed, and/or illicit drugs/substances.    There are risks associated with opioid medications, including dependence, addiction and tolerance. The patient understands and agrees to use these medications only as prescribed. Potential side effects of the medications include, but are not limited to, constipation, drowsiness, addiction, impaired judgment and risk of fatal overdose if not taken as prescribed. The patient was warned against driving while taking sedation medications.  Sharing medications is a felony. At this point in time, the patient is showing no signs of addiction, abuse, diversion or suicidal ideation.    The patient will follow-up in 8 weeks for medication prescription refill and reevaluation. The patient was advised to contact the office should their symptoms worsen in the interim. The patient was agreeable and verbalized an understanding.        History of Present Illness:    The patient is a 69 y.o. male last seen on 9/12/2024 who presents for a follow up office visit in regards to chronic pain secondary to chronic low back pain, lumbar radiculopathy, lumbar degenerative disc disease, mid back pain, chronic neck pain.  Cervical radiculopathy.  The patient currently reports complaints of increasing low back and bilateral leg pain.  Increasing weakness in the lower extremities.  Chronic numbness and tingling in both feet.  Mid back pain, neck pain radiating to both shoulders.  Current pain level is an 8/10.  Quality pain is described as sharp.    Current pain medications includes: Oxycodone 7.5/325 every 8 hours as needed for pain  .  The patient reports that this regimen is providing to 50% pain relief.  The patient is reporting no side effects from this pain medication regimen.    Pain Contract Signed: 3/28/24  Last Urine Drug Screen: 11/14/24    I have personally reviewed and/or updated the patient's past medical history, past surgical history, family history, social history,  current medications, allergies, and vital signs today.       Review of Systems:    Review of Systems   Constitutional:  Negative for unexpected weight change.   HENT:  Negative for hearing loss.    Eyes:  Negative for visual disturbance.   Respiratory:  Positive for shortness of breath.    Cardiovascular:  Negative for leg swelling.   Gastrointestinal:  Positive for constipation.   Endocrine: Negative for polyuria.   Genitourinary:  Negative for difficulty urinating.   Musculoskeletal:  Positive for gait problem. Negative for joint swelling and myalgias.        Decreased range of motion  Joint stiffness     Skin:  Negative for rash.   Neurological:  Positive for dizziness. Negative for weakness and headaches.   Psychiatric/Behavioral:  Negative for decreased concentration.    All other systems reviewed and are negative.        Past Medical History:   Diagnosis Date    Abdominal aortic aneurysm without rupture ([facility])     Abdominal Aortic Duplex 02/21/2017    Ectatic infrarenal abdominal aorta.    MARYANN (acute kidney injury) ([facility]) 07/23/2022    CAD (coronary artery disease)     Cancer ([facility]) 2022    right lung CA    Chronic bronchitis ([facility]) 01/28/2019    Class 1 obesity due to excess calories with serious comorbidity and body mass index (BMI) of 31.0 to 31.9 in adult 02/14/2020    COPD (chronic obstructive pulmonary disease) ([facility])     Coronary artery disease of native artery of native heart with stable angina pectoris ([facility])     Extremity pain     Hemiparesis ([facility]) 11/01/2022    Hiatal hernia     History of echocardiogram 03/18/2014    EF 55%, mild MR and AI.  Mild concentric LVH.    History of stress test 03/06/2017    Normal.    Hyperlipidemia     Hypertension     Joint pain     Kidney stone     Low back pain     Lung cancer ([facility])     Migraine     Neck pain     Obstructive sleep apnea     cannot tolerate CPAP    Osteoarthritis     Other chronic pancreatitis ([facility]) 02/10/2023    Peripheral neuropathy     Reflex sympathetic  dystrophy     Sacroiliitis (HCC) 02/02/2022       Past Surgical History:   Procedure Laterality Date    CARDIAC CATHETERIZATION  02/13/2012    EF 70%, widely patent renal arteries, significant single-vessel CAD-medical therapy.    CARDIAC CATHETERIZATION  04/11/2013    EF 65%, 50% mid LAD, 20% prox CFX, 90% diffuse RCA, 99% mid RCA. Medical management.    CATARACT EXTRACTION Right 09/19/2023    CHOLECYSTECTOMY      COLONOSCOPY      EPIDURAL BLOCK INJECTION Bilateral 08/15/2019    Procedure: BLOCK / INJECTION EPIDURAL STEROID CERVICAL;  Surgeon: Ricardo Park MD;  Location: [facility];  Service: Pain Management     FL GUIDED NEEDLE PLAC BX/ASP/INJ  08/15/2019    IR BIOPSY LUNG  09/13/2022    IR THORACIC DUCT EMBOLIZATION  11/22/2022    ORTHOPEDIC SURGERY      [facility]CELL [facility] SPX N/A 11/16/2022    Procedure: BRONCHOSCOPY FLEXIBLE;  Surgeon: Gulshan Madison MD;  Location: [facility];  Service: Thoracic    CT THORACOSCOPY W/LOBECTOMY SINGLE LOBE Right 11/16/2022    Procedure: LOBECTOMY LUNG; lower lobe superior segmentectomy, mediastinal lymph node dissection;  Surgeon: Gulshan Madison MD;  Location: [facility];  Service: Thoracic    CT THORACOSCOPY W/SEGMENTECTOMY Right 11/16/2022    Procedure: THORACOSCOPY VIDEO ASSISTED SURGERY (VATS);  Surgeon: Gulshan Madison MD;  Location: [facility];  Service: Thoracic    TRIGGER POINT INJECTION         Family History   Adopted: Yes   Problem Relation Age of Onset    No Known Problems Family     No Known Problems Mother     No Known Problems Father        Social History     Occupational History    Not on file   Tobacco Use    Smoking status: Every Day     Current packs/day: 1.00     Average packs/day: 1 pack/day for 1 year (1.0 ttl pk-yrs)     Types: Cigarettes     Start date: 11/2023    Smokeless tobacco: Never   Vaping Use    Vaping status: Never Used   Substance and Sexual Activity    Alcohol use: Not Currently     Drug use: Never    Sexual activity: Yes         Current Outpatient Medications:     albuterol (Ventolin HFA) 90 mcg/act inhaler, Inhale 2 puffs every 6 (six) hours as needed for wheezing, Disp: 18 g, Rfl: 5    amLODIPine (NORVASC) 10 mg tablet, Take 1 tablet (10 mg total) by mouth daily, Disp: 90 tablet, Rfl: 3    aspirin 81 mg chewable tablet, Chew 1 tablet (81 mg total) daily, Disp: , Rfl:     cyanocobalamin (VITAMIN B-12) 1000 MCG tablet, Take 1 tablet (1,000 mcg total) by mouth daily, Disp: 90 tablet, Rfl: 3    glucose blood (OneTouch Verio) test strip, Test once daily, Disp: 100 each, Rfl: 0    lisinopril (ZESTRIL) 10 mg tablet, Take 1 tablet (10 mg total) by mouth daily, Disp: 90 tablet, Rfl: 3    metoprolol succinate (TOPROL-XL) 100 mg 24 hr tablet, Take 1 tablet (100 mg total) by mouth daily, Disp: 90 tablet, Rfl: 3    naloxone (NARCAN) 4 mg/0.1 mL nasal spray, Administer 1 spray into a nostril. If no response after 2-3 minutes, give another dose in the other nostril using a new spray., Disp: 1 each, Rfl: 1    nitroglycerin (NITROSTAT) 0.4 mg SL tablet, Place 1 tablet (0.4 mg total) under the tongue every 5 (five) minutes as needed for chest pain, Disp: 25 tablet, Rfl: 5    nystatin (MYCOSTATIN) 500,000 units/5 mL suspension, Apply 5 mL (500,000 Units total) to the mouth or throat 4 (four) times a day, Disp: 100 mL, Rfl: 0    omeprazole (PriLOSEC) 40 MG capsule, take 1 capsule by mouth once daily, Disp: 30 capsule, Rfl: 1    [START ON 11/18/2024] oxyCODONE-acetaminophen (Percocet) 7.5-325 MG per tablet, Take 1 tablet by mouth every 8 (eight) hours as needed for moderate pain Max Daily Amount: 3 tablets Do not start before November 18, 2024., Disp: 90 tablet, Rfl: 0    [START ON 12/16/2024] oxyCODONE-acetaminophen (Percocet) 7.5-325 MG per tablet, Take 1 tablet by mouth every 8 (eight) hours as needed for moderate pain Max Daily Amount: 3 tablets Do not start before December 16, 2024., Disp: 90 tablet,  "Rfl: 0    Restasis 0.05 % ophthalmic emulsion, , Disp: , Rfl:     rosuvastatin (CRESTOR) 40 MG tablet, Take 1 tablet (40 mg total) by mouth daily, Disp: 90 tablet, Rfl: 3    sildenafil (VIAGRA) 100 mg tablet, Take 1 tablet (100 mg total) by mouth as needed for erectile dysfunction, Disp: 20 tablet, Rfl: 0    tiotropium-olodaterol (Stiolto Respimat) 2.5-2.5 MCG/ACT inhaler, Inhale 2 puffs daily, Disp: 4 g, Rfl: 5    traZODone (DESYREL) 100 mg tablet, take 1 tablet by mouth daily at bedtime, Disp: 90 tablet, Rfl: 1    bisacodyl (DULCOLAX) 5 mg EC tablet, Take 1 tablet (5 mg total) by mouth daily as needed for constipation for up to 4 doses (Patient not taking: Reported on 9/27/2024), Disp: 30 tablet, Rfl: 0    fluticasone (FLONASE) 50 mcg/act nasal spray, 1 spray into each nostril daily for 14 days, Disp: 11.1 mL, Rfl: 0    metFORMIN (GLUCOPHAGE) 500 mg tablet, take 1 tablet by mouth twice a day with meals (Patient not taking: Reported on 11/14/2024), Disp: 180 tablet, Rfl: 1    ondansetron (ZOFRAN) 8 mg tablet, Take 1 tablet (8 mg total) by mouth every 8 (eight) hours as needed for nausea or vomiting (Patient not taking: Reported on 9/27/2024), Disp: 20 tablet, Rfl: 2    primidone (MYSOLINE) 50 mg tablet, take 1/2 tablet by mouth once daily every morning (Patient not taking: Reported on 11/14/2024), Disp: 45 tablet, Rfl: 3    tamsulosin (FLOMAX) 0.4 mg, Take 1 capsule (0.4 mg total) by mouth daily with dinner for 3 days, Disp: 3 capsule, Rfl: 0    No Known Allergies    Physical Exam:    /79   Pulse 63   Ht 5' 8\" (1.727 m)   Wt 86.2 kg (190 lb)   BMI 28.89 kg/m²     Constitutional:normal, well developed, well nourished, alert, in no distress and non-toxic and no overt pain behavior.  Eyes:anicteric  HEENT:grossly intact  Neck:supple, symmetric, trachea midline and no masses   Pulmonary:even and unlabored  Cardiovascular:No edema or pitting edema present  Skin:Normal without rashes or lesions and well " hydrated  Psychiatric:Mood and affect appropriate  Neurologic:Cranial Nerves II-XII grossly intact  Musculoskeletal: Gait is slow and guarded.  Range of motion of the lumbar spine is limited in all planes.  Motor strength is equal, but slightly decreased in both lower extremities at +4/+5.  There is decreased sensation to pinprick in both feet and calves.  Difficulty with heel and toe walk due to lower extremity weakness.      Imaging  MRI lumbar spine without contrast    (Results Pending)   XR spine thoracic 3 vw    (Results Pending)         Orders Placed This Encounter   Procedures    MRI lumbar spine without contrast    XR spine thoracic 3 vw

## 2024-11-16 LAB
6MAM UR QL CFM: NEGATIVE NG/ML
7AMINOCLONAZEPAM UR QL CFM: NEGATIVE NG/ML
A-OH ALPRAZ UR QL CFM: NEGATIVE NG/ML
ACCEPTABLE CREAT UR QL: NORMAL MG/DL
ACCEPTIBLE SP GR UR QL: NORMAL
AMPHET UR QL CFM: NEGATIVE NG/ML
AMPHET UR QL CFM: NEGATIVE NG/ML
BUPRENORPHINE UR QL CFM: NEGATIVE NG/ML
BUTALBITAL UR QL CFM: NEGATIVE NG/ML
BZE UR QL CFM: NEGATIVE NG/ML
CODEINE UR QL CFM: NEGATIVE NG/ML
DESIPRAMINE UR QL CFM: NEGATIVE NG/ML
EDDP UR QL CFM: NEGATIVE NG/ML
ETHYL GLUCURONIDE UR QL CFM: NEGATIVE NG/ML
ETHYL SULFATE UR QL SCN: NEGATIVE NG/ML
FENTANYL UR QL CFM: NEGATIVE NG/ML
GLIADIN IGG SER IA-ACNC: NEGATIVE NG/ML
GLUCOSE 30M P 50 G LAC PO SERPL-MCNC: NEGATIVE NG/ML
HYDROCODONE UR QL CFM: NEGATIVE NG/ML
HYDROCODONE UR QL CFM: NEGATIVE NG/ML
HYDROMORPHONE UR QL CFM: NEGATIVE NG/ML
LORAZEPAM UR QL CFM: NEGATIVE NG/ML
MDMA UR QL CFM: NEGATIVE NG/ML
ME-PHENIDATE UR QL CFM: NEGATIVE NG/ML
MEPERIDINE UR QL CFM: NEGATIVE NG/ML
METHADONE UR QL CFM: NEGATIVE NG/ML
METHAMPHET UR QL CFM: NEGATIVE NG/ML
MORPHINE UR QL CFM: NEGATIVE NG/ML
MORPHINE UR QL CFM: NEGATIVE NG/ML
NITRITE UR QL: NORMAL UG/ML
NORBUPRENORPHINE UR QL CFM: NEGATIVE NG/ML
NORDIAZEPAM UR QL CFM: NEGATIVE NG/ML
NORFENTANYL UR QL CFM: NEGATIVE NG/ML
NORHYDROCODONE UR QL CFM: NEGATIVE NG/ML
NORHYDROCODONE UR QL CFM: NEGATIVE NG/ML
NORMEPERIDINE UR QL CFM: NEGATIVE NG/ML
NOROXYCODONE UR QL CFM: NORMAL NG/ML
OLANZAPINE QUANTIFICATION: NEGATIVE NG/ML
OPC-3373 QUANTIFICATION: NEGATIVE NG/ML
OXAZEPAM UR QL CFM: NEGATIVE NG/ML
OXYCODONE UR QL CFM: NORMAL NG/ML
OXYMORPHONE UR QL CFM: NORMAL NG/ML
OXYMORPHONE UR QL CFM: NORMAL NG/ML
PARA-FLUOROFENTANYL QUANTIFICATION: NORMAL NG/ML
PCP UR QL CFM: NEGATIVE NG/ML
PHENOBARB UR QL CFM: NEGATIVE NG/ML
RESULT ALL_PRESCRIBED MEDS AND SPECIAL INSTRUCTIONS: NORMAL
SECOBARBITAL UR QL CFM: NEGATIVE NG/ML
SL AMB 7-OH-MITRAGYNINE (KRATOM ALKALOID) QUANTIFICATION: NEGATIVE NG/ML
SL AMB ACETYL FENTANYL QUANTIFICATION: NORMAL NG/ML
SL AMB ACETYL NORFENTANYL QUANTIFICATION: NORMAL NG/ML
SL AMB ACRYL FENTANYL QUANTIFICATION: NORMAL NG/ML
SL AMB CARFENTANIL QUANTIFICATION: NORMAL NG/ML
SL AMB CLOZAPINE QUANTIFICATION: NEGATIVE NG/ML
SL AMB CTHC (MARIJUANA METABOLITE) QUANTIFICATION: NEGATIVE NG/ML
SL AMB DEXTRORPHAN (DEXTROMETHORPHAN METABOLITE) QUANT: NEGATIVE NG/ML
SL AMB HALOPERIDOL  QUANTIFICATION: NEGATIVE NG/ML
SL AMB HALOPERIDOL METABOLITE QUANTIFICATION: NEGATIVE NG/ML
SL AMB HYDROXYRISPERIDONE QUANTIFICATION: NEGATIVE NG/ML
SL AMB N-DESMETHYL-TRAMADOL QUANTIFICATION: NEGATIVE NG/ML
SL AMB N-DESMETHYLCLOZAPINE QUANTIFICATION: NEGATIVE NG/ML
SL AMB NORQUETIAPINE QUANTIFICATION: NEGATIVE NG/ML
SL AMB PHENTERMINE QUANTIFICATION: NEGATIVE NG/ML
SL AMB PREGABALIN QUANTIFICATION: NEGATIVE NG/ML
SL AMB QUETIAPINE QUANTIFICATION: NEGATIVE NG/ML
SL AMB RISPERIDONE QUANTIFICATION: NEGATIVE NG/ML
SL AMB RITALINIC ACID QUANTIFICATION: NEGATIVE NG/ML
SPECIMEN PH ACCEPTABLE UR: NORMAL
TAPENTADOL UR QL CFM: NEGATIVE NG/ML
TEMAZEPAM UR QL CFM: NEGATIVE NG/ML
TEMAZEPAM UR QL CFM: NEGATIVE NG/ML
TRAMADOL UR QL CFM: NEGATIVE NG/ML
URATE/CREAT 24H UR: NEGATIVE NG/ML

## 2024-11-18 DIAGNOSIS — R10.13 EPIGASTRIC ABDOMINAL PAIN: ICD-10-CM

## 2024-11-18 DIAGNOSIS — R13.19 ESOPHAGEAL DYSPHAGIA: ICD-10-CM

## 2024-11-18 RX ORDER — OMEPRAZOLE 40 MG/1
40 CAPSULE, DELAYED RELEASE ORAL DAILY
Qty: 90 CAPSULE | Refills: 1 | Status: SHIPPED | OUTPATIENT
Start: 2024-11-18

## 2024-12-02 ENCOUNTER — TELEPHONE (OUTPATIENT)
Age: 69
End: 2024-12-02

## 2024-12-02 NOTE — TELEPHONE ENCOUNTER
Rescheduled    Scheduled date of colonoscopy (as of today): 1-28- 2025  Physician performing colonoscopy: Dr. Holcomb  Location of colonoscopy: [facility]  Bowel prep reviewed with patient: 8-8-2024  Instructions reviewed with patient by:   Clearances: n/a

## 2024-12-19 ENCOUNTER — HOSPITAL ENCOUNTER (OUTPATIENT)
Dept: MRI IMAGING | Facility: HOSPITAL | Age: 69
Discharge: HOME | End: 2024-12-19
Payer: MEDICARE

## 2024-12-19 ENCOUNTER — HOSPITAL ENCOUNTER (OUTPATIENT)
Dept: RADIOLOGY | Facility: HOSPITAL | Age: 69
Discharge: HOME | End: 2024-12-19
Payer: MEDICARE

## 2024-12-19 ENCOUNTER — RESULTS FOLLOW-UP (OUTPATIENT)
Age: 69
End: 2024-12-19

## 2024-12-19 DIAGNOSIS — R29.898 LEFT HAND WEAKNESS: ICD-10-CM

## 2024-12-19 DIAGNOSIS — G89.29 CHRONIC MIDLINE LOW BACK PAIN WITHOUT SCIATICA: ICD-10-CM

## 2024-12-19 DIAGNOSIS — G89.4 CHRONIC PAIN SYNDROME: ICD-10-CM

## 2024-12-19 DIAGNOSIS — M54.9 MID BACK PAIN: ICD-10-CM

## 2024-12-19 DIAGNOSIS — M54.50 CHRONIC MIDLINE LOW BACK PAIN WITHOUT SCIATICA: ICD-10-CM

## 2024-12-19 DIAGNOSIS — M54.16 LUMBAR RADICULOPATHY: ICD-10-CM

## 2024-12-19 PROCEDURE — 73110 X-RAY EXAM OF WRIST: CPT

## 2024-12-19 PROCEDURE — 72148 MRI LUMBAR SPINE W/O DYE: CPT

## 2024-12-19 PROCEDURE — 72072 X-RAY EXAM THORAC SPINE 3VWS: CPT

## 2024-12-19 PROCEDURE — 73130 X-RAY EXAM OF HAND: CPT

## 2024-12-19 NOTE — TELEPHONE ENCOUNTER
----- Message from Barbara Kocher, CRNP sent at 12/19/2024 12:58 PM EST -----  Please call patient and let him know that the recent x-ray of his thoracic spine shows age-related degenerative changes.  No acute findings.

## 2024-12-23 ENCOUNTER — OFFICE VISIT (OUTPATIENT)
Dept: FAMILY MEDICINE CLINIC | Facility: CLINIC | Age: 69
End: 2024-12-23
Payer: MEDICARE

## 2024-12-23 VITALS
TEMPERATURE: 97.5 F | SYSTOLIC BLOOD PRESSURE: 136 MMHG | BODY MASS INDEX: 29.86 KG/M2 | HEIGHT: 68 IN | WEIGHT: 197 LBS | OXYGEN SATURATION: 97 % | HEART RATE: 61 BPM | DIASTOLIC BLOOD PRESSURE: 78 MMHG

## 2024-12-23 DIAGNOSIS — I25.10 CORONARY ARTERY DISEASE INVOLVING NATIVE CORONARY ARTERY OF NATIVE HEART WITHOUT ANGINA PECTORIS: ICD-10-CM

## 2024-12-23 DIAGNOSIS — Z12.11 SCREENING FOR COLON CANCER: ICD-10-CM

## 2024-12-23 DIAGNOSIS — I10 ESSENTIAL HYPERTENSION: ICD-10-CM

## 2024-12-23 DIAGNOSIS — N52.9 ERECTILE DYSFUNCTION, UNSPECIFIED ERECTILE DYSFUNCTION TYPE: ICD-10-CM

## 2024-12-23 DIAGNOSIS — M54.50 LUMBAR SPINE PAIN: Primary | ICD-10-CM

## 2024-12-23 DIAGNOSIS — G47.00 INSOMNIA, UNSPECIFIED TYPE: ICD-10-CM

## 2024-12-23 DIAGNOSIS — Z23 ENCOUNTER FOR IMMUNIZATION: ICD-10-CM

## 2024-12-23 DIAGNOSIS — N18.31 STAGE 3A CHRONIC KIDNEY DISEASE (HCC): ICD-10-CM

## 2024-12-23 DIAGNOSIS — E78.2 MIXED HYPERLIPIDEMIA: ICD-10-CM

## 2024-12-23 PROCEDURE — G2211 COMPLEX E/M VISIT ADD ON: HCPCS | Performed by: FAMILY MEDICINE

## 2024-12-23 PROCEDURE — 99213 OFFICE O/P EST LOW 20 MIN: CPT | Performed by: FAMILY MEDICINE

## 2024-12-23 RX ORDER — LISINOPRIL 10 MG/1
10 TABLET ORAL DAILY
Qty: 90 TABLET | Refills: 1 | Status: SHIPPED | OUTPATIENT
Start: 2024-12-23

## 2024-12-23 RX ORDER — ROSUVASTATIN CALCIUM 40 MG/1
40 TABLET, COATED ORAL DAILY
Qty: 90 TABLET | Refills: 1 | Status: SHIPPED | OUTPATIENT
Start: 2024-12-23

## 2024-12-23 RX ORDER — GABAPENTIN 100 MG/1
100 CAPSULE ORAL 2 TIMES DAILY
Qty: 60 CAPSULE | Refills: 5 | Status: SHIPPED | OUTPATIENT
Start: 2024-12-23

## 2024-12-23 RX ORDER — METOPROLOL SUCCINATE 100 MG/1
100 TABLET, EXTENDED RELEASE ORAL DAILY
Qty: 90 TABLET | Refills: 1 | Status: SHIPPED | OUTPATIENT
Start: 2024-12-23

## 2024-12-23 RX ORDER — AMLODIPINE BESYLATE 10 MG/1
10 TABLET ORAL DAILY
Qty: 90 TABLET | Refills: 1 | Status: SHIPPED | OUTPATIENT
Start: 2024-12-23

## 2024-12-23 RX ORDER — TRAZODONE HYDROCHLORIDE 100 MG/1
100 TABLET ORAL
Qty: 90 TABLET | Refills: 1 | Status: SHIPPED | OUTPATIENT
Start: 2024-12-23

## 2024-12-23 RX ORDER — ASPIRIN 81 MG/1
81 TABLET, CHEWABLE ORAL DAILY
Qty: 90 TABLET | Refills: 1 | Status: SHIPPED | OUTPATIENT
Start: 2024-12-23

## 2024-12-23 RX ORDER — SILDENAFIL 100 MG/1
100 TABLET, FILM COATED ORAL AS NEEDED
Qty: 20 TABLET | Refills: 0 | Status: SHIPPED | OUTPATIENT
Start: 2024-12-23

## 2024-12-23 NOTE — PROGRESS NOTES
Assessment/Plan:    No problem-specific Assessment & Plan notes found for this encounter.       Diagnoses and all orders for this visit:    Lumbar spine pain    Encounter for immunization  -     Pneumococcal Conjugate Vaccine 20-valent (Pcv20)  -     HEPATITIS A VACCINE ADULT IM  -     influenza vaccine, high-dose, PF 0.5 mL (Fluzone High Dose)    Stage 3a chronic kidney disease (HCC)          PHQ-2/9 Depression Screening    Little interest or pleasure in doing things: 0 - not at all  Feeling down, depressed, or hopeless: 0 - not at all  Trouble falling or staying asleep, or sleeping too much: 0 - not at all  Feeling tired or having little energy: 0 - not at all  Poor appetite or overeatin - not at all  Feeling bad about yourself - or that you are a failure or have let yourself or your family down: 0 - not at all  Trouble concentrating on things, such as reading the newspaper or watching television: 0 - not at all  Moving or speaking so slowly that other people could have noticed. Or the opposite - being so fidgety or restless that you have been moving around a lot more than usual: 0 - not at all  Thoughts that you would be better off dead, or of hurting yourself in some way: 0 - not at all  PHQ-9 Score: 0  PHQ-9 Interpretation: No or Minimal depression            Subjective:      Patient ID: Lance Hazel is a 69 y.o. male.    Pt complains of pain in his lower back shooting up, pt also complains of right bicep pain which began a few days ago        The following portions of the patient's history were reviewed and updated as appropriate: allergies, current medications, past family history, past medical history, past social history, past surgical history, and problem list.    Review of Systems   Eyes:  Negative for visual disturbance.   Cardiovascular:  Negative for chest pain and palpitations.   Neurological:  Negative for headaches.         Objective:    /78   Pulse 61   Temp 97.5 °F (36.4 °C)   Ht  "5' 8\" (1.727 m)   Wt 89.4 kg (197 lb)   SpO2 97%   BMI 29.95 kg/m²      Physical Exam    "

## 2024-12-23 NOTE — TELEPHONE ENCOUNTER
Pt would like to get medication refilled for 90 day supply. Pt states that he may be losing his insurance on January 1st so would like to have enough if this happens.

## 2024-12-24 ENCOUNTER — TELEPHONE (OUTPATIENT)
Dept: HEMATOLOGY ONCOLOGY | Facility: CLINIC | Age: 69
End: 2024-12-24

## 2024-12-26 DIAGNOSIS — E53.8 VITAMIN B 12 DEFICIENCY: ICD-10-CM

## 2024-12-26 DIAGNOSIS — E53.8 FOLATE DEFICIENCY: ICD-10-CM

## 2024-12-26 DIAGNOSIS — D53.9 NUTRITIONAL ANEMIA: ICD-10-CM

## 2024-12-26 DIAGNOSIS — R79.0 LOW FERRITIN: ICD-10-CM

## 2024-12-26 DIAGNOSIS — Z78.9 VEGAN DIET: ICD-10-CM

## 2024-12-26 DIAGNOSIS — D64.9 ANEMIA, UNSPECIFIED TYPE: Primary | ICD-10-CM

## 2024-12-27 ENCOUNTER — APPOINTMENT (OUTPATIENT)
Dept: LAB | Facility: HOSPITAL | Age: 69
End: 2024-12-27
Payer: MEDICARE

## 2024-12-27 DIAGNOSIS — E53.8 VITAMIN B 12 DEFICIENCY: ICD-10-CM

## 2024-12-27 DIAGNOSIS — D64.9 ANEMIA, UNSPECIFIED TYPE: ICD-10-CM

## 2024-12-27 DIAGNOSIS — D53.9 NUTRITIONAL ANEMIA: ICD-10-CM

## 2024-12-27 DIAGNOSIS — Z78.9 VEGAN DIET: ICD-10-CM

## 2024-12-27 DIAGNOSIS — R79.0 LOW FERRITIN: ICD-10-CM

## 2024-12-27 DIAGNOSIS — C34.31 MALIGNANT NEOPLASM OF LOWER LOBE OF RIGHT LUNG (HCC): ICD-10-CM

## 2024-12-27 DIAGNOSIS — E53.8 FOLATE DEFICIENCY: ICD-10-CM

## 2024-12-27 LAB
ALBUMIN SERPL BCG-MCNC: 4.2 G/DL (ref 3.5–5)
ALP SERPL-CCNC: 66 U/L (ref 34–104)
ALT SERPL W P-5'-P-CCNC: 12 U/L (ref 7–52)
ANION GAP SERPL CALCULATED.3IONS-SCNC: 8 MMOL/L (ref 4–13)
AST SERPL W P-5'-P-CCNC: 12 U/L (ref 13–39)
BASOPHILS # BLD AUTO: 0.09 THOUSANDS/ÂΜL (ref 0–0.1)
BASOPHILS NFR BLD AUTO: 1 % (ref 0–1)
BILIRUB SERPL-MCNC: 0.82 MG/DL (ref 0.2–1)
BUN SERPL-MCNC: 16 MG/DL (ref 5–25)
CALCIUM SERPL-MCNC: 9.2 MG/DL (ref 8.4–10.2)
CHLORIDE SERPL-SCNC: 101 MMOL/L (ref 96–108)
CO2 SERPL-SCNC: 28 MMOL/L (ref 21–32)
CREAT SERPL-MCNC: 1.22 MG/DL (ref 0.6–1.3)
EOSINOPHIL # BLD AUTO: 0 THOUSAND/ÂΜL (ref 0–0.61)
EOSINOPHIL NFR BLD AUTO: 0 % (ref 0–6)
ERYTHROCYTE [DISTWIDTH] IN BLOOD BY AUTOMATED COUNT: 13.1 % (ref 11.6–15.1)
FERRITIN SERPL-MCNC: 158 NG/ML (ref 24–336)
FOLATE SERPL-MCNC: 6.9 NG/ML
GFR SERPL CREATININE-BSD FRML MDRD: 59 ML/MIN/1.73SQ M
GLUCOSE P FAST SERPL-MCNC: 159 MG/DL (ref 65–99)
HCT VFR BLD AUTO: 49.2 % (ref 36.5–49.3)
HGB BLD-MCNC: 15.9 G/DL (ref 12–17)
IMM GRANULOCYTES # BLD AUTO: 0.03 THOUSAND/UL (ref 0–0.2)
IMM GRANULOCYTES NFR BLD AUTO: 0 % (ref 0–2)
IRON SATN MFR SERPL: 22 % (ref 15–50)
IRON SERPL-MCNC: 71 UG/DL (ref 50–212)
LYMPHOCYTES # BLD AUTO: 2.58 THOUSANDS/ÂΜL (ref 0.6–4.47)
LYMPHOCYTES NFR BLD AUTO: 25 % (ref 14–44)
MCH RBC QN AUTO: 29.8 PG (ref 26.8–34.3)
MCHC RBC AUTO-ENTMCNC: 32.3 G/DL (ref 31.4–37.4)
MCV RBC AUTO: 92 FL (ref 82–98)
MONOCYTES # BLD AUTO: 0.77 THOUSAND/ÂΜL (ref 0.17–1.22)
MONOCYTES NFR BLD AUTO: 7 % (ref 4–12)
NEUTROPHILS # BLD AUTO: 6.91 THOUSANDS/ÂΜL (ref 1.85–7.62)
NEUTS SEG NFR BLD AUTO: 67 % (ref 43–75)
NRBC BLD AUTO-RTO: 0 /100 WBCS
PLATELET # BLD AUTO: 218 THOUSANDS/UL (ref 149–390)
PMV BLD AUTO: 10 FL (ref 8.9–12.7)
POTASSIUM SERPL-SCNC: 4.1 MMOL/L (ref 3.5–5.3)
PROT SERPL-MCNC: 6.8 G/DL (ref 6.4–8.4)
RBC # BLD AUTO: 5.33 MILLION/UL (ref 3.88–5.62)
SODIUM SERPL-SCNC: 137 MMOL/L (ref 135–147)
TIBC SERPL-MCNC: 329 UG/DL (ref 250–450)
TRANSFERRIN SERPL-MCNC: 235 MG/DL (ref 203–362)
UIBC SERPL-MCNC: 258 UG/DL (ref 155–355)
VIT B12 SERPL-MCNC: 739 PG/ML (ref 180–914)
WBC # BLD AUTO: 10.38 THOUSAND/UL (ref 4.31–10.16)

## 2024-12-27 PROCEDURE — 80053 COMPREHEN METABOLIC PANEL: CPT

## 2024-12-27 PROCEDURE — 82728 ASSAY OF FERRITIN: CPT

## 2024-12-27 PROCEDURE — 82746 ASSAY OF FOLIC ACID SERUM: CPT

## 2024-12-27 PROCEDURE — 83540 ASSAY OF IRON: CPT

## 2024-12-27 PROCEDURE — 36415 COLL VENOUS BLD VENIPUNCTURE: CPT

## 2024-12-27 PROCEDURE — 85025 COMPLETE CBC W/AUTO DIFF WBC: CPT

## 2024-12-27 PROCEDURE — 82607 VITAMIN B-12: CPT

## 2024-12-27 PROCEDURE — 83550 IRON BINDING TEST: CPT

## 2024-12-31 ENCOUNTER — OFFICE VISIT (OUTPATIENT)
Dept: HEMATOLOGY ONCOLOGY | Facility: CLINIC | Age: 69
End: 2024-12-31
Payer: MEDICARE

## 2024-12-31 ENCOUNTER — HOSPITAL ENCOUNTER (OUTPATIENT)
Dept: NON INVASIVE DIAGNOSTICS | Facility: HOSPITAL | Age: 69
Discharge: HOME | End: 2024-12-31
Payer: MEDICARE

## 2024-12-31 VITALS
DIASTOLIC BLOOD PRESSURE: 58 MMHG | HEIGHT: 68 IN | BODY MASS INDEX: 29.55 KG/M2 | TEMPERATURE: 97.9 F | HEART RATE: 58 BPM | WEIGHT: 195 LBS | SYSTOLIC BLOOD PRESSURE: 118 MMHG | OXYGEN SATURATION: 95 %

## 2024-12-31 DIAGNOSIS — C34.31 PRIMARY SQUAMOUS CELL CARCINOMA OF LOWER LOBE OF RIGHT LUNG (HCC): ICD-10-CM

## 2024-12-31 DIAGNOSIS — Z76.89 ENCOUNTER FOR ASSESSMENT FOR DEEP VEIN THROMBOSIS (DVT): ICD-10-CM

## 2024-12-31 DIAGNOSIS — M79.601 PAIN IN RIGHT ARM: ICD-10-CM

## 2024-12-31 DIAGNOSIS — E53.8 FOLATE DEFICIENCY: Primary | ICD-10-CM

## 2024-12-31 DIAGNOSIS — D64.9 ANEMIA, UNSPECIFIED TYPE: ICD-10-CM

## 2024-12-31 PROCEDURE — 99214 OFFICE O/P EST MOD 30 MIN: CPT | Performed by: NURSE PRACTITIONER

## 2024-12-31 PROCEDURE — 93971 EXTREMITY STUDY: CPT

## 2024-12-31 RX ORDER — FOLIC ACID 1 MG/1
1 TABLET ORAL DAILY
Qty: 30 TABLET | Refills: 6 | Status: SHIPPED | OUTPATIENT
Start: 2024-12-31

## 2024-12-31 NOTE — LETTER
January 3, 2025     Maico Lopez, DO  [address] [place] [address] [address].  [place] [address]    Patient: Lance Hazel   YOB: 1955   Date of Visit: 2024       Dear Dr. Lopez:    Thank you for referring Lance Hazel to me for evaluation. Below are my notes for this consultation.    If you have questions, please do not hesitate to call me. I look forward to following your patient along with you.         Sincerely,        ELOISA Bojorquez        CC: No Recipients    ELOISA Bojorquez  1/3/2025  5:10 PM  Sign when Signing Visit  Name: Lance Hazel      : 1955      MRN: [id number]  Encounter Provider: ELOISA Bojorquez  Encounter Date: 2024   Encounter department: [facility]  :  Assessment & Plan  Folate deficiency   Folate level 6.9.  Advised to start folic acid 1 mg once daily.  Orders:  •  folic acid ( Folic Acid) 1 mg tablet; Take 1 tablet (1 mg total) by mouth daily  •  Folate; Future    Anemia, unspecified type   Labs from 2024 show a WBC 10.38 otherwise normal CBC and differential, CMP shows creatinine 1.22, EGFR 59, AST decreased at 12 otherwise normal.  Vitamin B12 739, ferritin 158 and iron saturation 22%.  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  CT abdomen and pelvis PE study 2024 showed no acute findings, stable infrarenal abdominal aortic aneurysm with small left common iliac and bilateral internal iliac aneurysms.  Extensive atherosclerotic disease.  Patient has chronic pain in the lower back, MRI of the lumbar spine showed herniated disc L3-4.  No osseous lesions to suggest metastasis.   Will continue to monitor in 3 months likely for period of 2 years per the NCCN guidelines.  He also follows with thoracic surgery.  Patient verbalized understanding and agrees to the plan.  Orders:  •  VAS upper limb venous duplex scan, unilateral/limited; Future  •  Comprehensive metabolic panel; Future  •  CBC; Future  •  Folate; Future    Encounter for assessment for deep vein thrombosis (DVT)   Patient reports pain and swelling in the right upper arm.  Concern for DVT.  Although he does not have a history of previous DVT he does have a history of malignancy.  I will contact him with the results and manage as indicated.    Pain in right arm      History of Present Illness  No chief complaint on file.  Follow-up    Pertinent Medical History  Lance Hazel is a 69 y.o. male with history of stage IIIa squamous cell carcinoma the right lung, PD-L1 1%, TMB high. Status post adjuvant Taxol carbo, added Tecentriq in June 2023. He was on treatment hiatus since mid February 2024 to investigate potential contribution to ongoing fevers.  Tecentriq resumed in May 2024 and completed in August 22, 2024.  Overall he functions without restriction.  He does report fatigue.     Review of Systems   Constitutional:  Negative for activity change, appetite change, fatigue, fever and unexpected weight change.   Respiratory:  Negative for cough and shortness of breath.    Cardiovascular:  Negative for chest pain and leg swelling.   Gastrointestinal:  Negative for abdominal pain, constipation, diarrhea and nausea.   Endocrine: Negative for cold intolerance and heat intolerance.   Musculoskeletal:  Positive for arthralgias and myalgias.        Pain in right arm   Skin: Negative.    Neurological:  Negative for dizziness, weakness and headaches.   Hematological:  Negative for adenopathy. Does not bruise/bleed easily.       Objective  /58 (BP Location: Left arm, Patient Position: Sitting, Cuff Size: Standard)   Pulse 58   Temp  "97.9 °F (36.6 °C) (Temporal)   Ht 5' 8\" (1.727 m)   Wt 88.5 kg (195 lb)   SpO2 95%   BMI 29.65 kg/m²     Physical Exam  Vitals reviewed.   Constitutional:       Appearance: Normal appearance. He is well-developed.   HENT:      Head: Normocephalic and atraumatic.   Eyes:      Pupils: Pupils are equal, round, and reactive to light.   Pulmonary:      Effort: Pulmonary effort is normal. No respiratory distress.   Musculoskeletal:         General: Normal range of motion.      Cervical back: Normal range of motion.   Lymphadenopathy:      Cervical: No cervical adenopathy.   Skin:     General: Skin is dry.   Neurological:      Mental Status: He is alert and oriented to person, place, and time.   Psychiatric:         Behavior: Behavior normal.     Labs: I have reviewed the following labs:  Results for orders placed or performed in visit on 12/27/24   CBC and differential   Result Value Ref Range    WBC 10.38 (H) 4.31 - 10.16 Thousand/uL    RBC 5.33 3.88 - 5.62 Million/uL    Hemoglobin 15.9 12.0 - 17.0 g/dL    Hematocrit 49.2 36.5 - 49.3 %    MCV 92 82 - 98 fL    MCH 29.8 26.8 - 34.3 pg    MCHC 32.3 31.4 - 37.4 g/dL    RDW 13.1 11.6 - 15.1 %    MPV 10.0 8.9 - 12.7 fL    Platelets 218 149 - 390 Thousands/uL    nRBC 0 /100 WBCs    Segmented % 67 43 - 75 %    Immature Grans % 0 0 - 2 %    Lymphocytes % 25 14 - 44 %    Monocytes % 7 4 - 12 %    Eosinophils Relative 0 0 - 6 %    Basophils Relative 1 0 - 1 %    Absolute Neutrophils 6.91 1.85 - 7.62 Thousands/µL    Absolute Immature Grans 0.03 0.00 - 0.20 Thousand/uL    Absolute Lymphocytes 2.58 0.60 - 4.47 Thousands/µL    Absolute Monocytes 0.77 0.17 - 1.22 Thousand/µL    Eosinophils Absolute 0.00 0.00 - 0.61 Thousand/µL    Basophils Absolute 0.09 0.00 - 0.10 Thousands/µL   Comprehensive metabolic panel   Result Value Ref Range    Sodium 137 135 - 147 mmol/L    Potassium 4.1 3.5 - 5.3 mmol/L    Chloride 101 96 - 108 mmol/L    CO2 28 21 - 32 mmol/L    ANION GAP 8 4 - 13 mmol/L "    BUN 16 5 - 25 mg/dL    Creatinine 1.22 0.60 - 1.30 mg/dL    Glucose, Fasting 159 (H) 65 - 99 mg/dL    Calcium 9.2 8.4 - 10.2 mg/dL    AST 12 (L) 13 - 39 U/L    ALT 12 7 - 52 U/L    Alkaline Phosphatase 66 34 - 104 U/L    Total Protein 6.8 6.4 - 8.4 g/dL    Albumin 4.2 3.5 - 5.0 g/dL    Total Bilirubin 0.82 0.20 - 1.00 mg/dL    eGFR 59 ml/min/1.73sq m   Vitamin B12   Result Value Ref Range    Vitamin B-12 739 180 - 914 pg/mL   Result Value Ref Range    Folate 6.9 >5.9 ng/mL   TIBC Panel (incl. Iron, TIBC, % Iron Saturation)   Result Value Ref Range    Iron Saturation 22 15 - 50 %    TIBC 329 250 - 450 ug/dL    Iron 71 50 - 212 ug/dL    Transferrin 235 203 - 362 mg/dL    UIBC 258 155 - 355 ug/dL   Result Value Ref Range    Ferritin 158 24 - 336 ng/mL     *Note: Due to a large number of results and/or encounters for the requested time period, some results have not been displayed. A complete set of results can be found in Results Review.

## 2024-12-31 NOTE — ASSESSMENT & PLAN NOTE
CT scan from 9/19/2024 of the chest showed no interval change or specific findings to suggest recurrent disease in the chest.  CT abdomen and pelvis PE study 9/27/2024 showed no acute findings, stable infrarenal abdominal aortic aneurysm with small left common iliac and bilateral internal iliac aneurysms.  Extensive atherosclerotic disease.  Patient has chronic pain in the lower back, MRI of the lumbar spine showed herniated disc L3-4.  No osseous lesions to suggest metastasis.  Will continue to monitor in 3 months likely for period of 2 years per the NCCN guidelines.  He also follows with thoracic surgery.  Patient verbalized understanding and agrees to the plan.  Orders:    VAS upper limb venous duplex scan, unilateral/limited; Future    Comprehensive metabolic panel; Future    CBC; Future    Folate; Future

## 2024-12-31 NOTE — PROGRESS NOTES
Name: Lance Hazel      : 1955      MRN: [id number]  Encounter Provider: ELOISA Bojorquez  Encounter Date: 2024   Encounter department: [facility] [place]  :  Assessment & Plan  Folate deficiency   Folate level 6.9.  Advised to start folic acid 1 mg once daily.  Orders:    folic acid ( Folic Acid) 1 mg tablet; Take 1 tablet (1 mg total) by mouth daily    Folate; Future    Anemia, unspecified type   Labs from 2024 show a WBC 10.38 otherwise normal CBC and differential, CMP shows creatinine 1.22, EGFR 59, AST decreased at 12 otherwise normal.  Vitamin B12 739, ferritin 158 and iron saturation 22%.  Orders:    folic acid ( Folic Acid) 1 mg tablet; Take 1 tablet (1 mg total) by mouth daily    Comprehensive metabolic panel; Future    CBC; Future    Folate; Future    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)   CT scan from 2024 of the chest showed no interval change or specific findings to suggest recurrent disease in the chest.  CT abdomen and pelvis PE study 2024 showed no acute findings, stable infrarenal abdominal aortic aneurysm with small left common iliac and bilateral internal iliac aneurysms.  Extensive atherosclerotic disease.  Patient has chronic pain in the lower back, MRI of the lumbar spine showed herniated disc L3-4.  No osseous lesions to suggest metastasis.  Will continue to monitor in 3 months likely for period of 2 years per the NCCN guidelines.  He also follows with thoracic surgery.  Patient verbalized understanding and agrees to the plan.  Orders:    VAS upper limb venous duplex scan, unilateral/limited; Future    Comprehensive metabolic panel; Future    CBC; Future    Folate; Future    Encounter for assessment for deep vein thrombosis (DVT)   Patient reports pain and swelling in the right upper arm.  Concern for DVT.  Although he does not have a history of previous DVT he does have a history of  "malignancy.  I will contact him with the results and manage as indicated.    Pain in right arm      History of Present Illness   No chief complaint on file.  Follow-up    Pertinent Medical History   Lance Hazel is a 69 y.o. male with history of stage IIIa squamous cell carcinoma the right lung, PD-L1 1%, TMB high. Status post adjuvant Taxol carbo, added Tecentriq in June 2023. He was on treatment hiatus since mid February 2024 to investigate potential contribution to ongoing fevers.  Tecentriq resumed in May 2024 and completed in August 22, 2024.  Overall he functions without restriction.  He does report fatigue.     Review of Systems   Constitutional:  Negative for activity change, appetite change, fatigue, fever and unexpected weight change.   Respiratory:  Negative for cough and shortness of breath.    Cardiovascular:  Negative for chest pain and leg swelling.   Gastrointestinal:  Negative for abdominal pain, constipation, diarrhea and nausea.   Endocrine: Negative for cold intolerance and heat intolerance.   Musculoskeletal:  Positive for arthralgias and myalgias.        Pain in right arm   Skin: Negative.    Neurological:  Negative for dizziness, weakness and headaches.   Hematological:  Negative for adenopathy. Does not bruise/bleed easily.       Objective   /58 (BP Location: Left arm, Patient Position: Sitting, Cuff Size: Standard)   Pulse 58   Temp 97.9 °F (36.6 °C) (Temporal)   Ht 5' 8\" (1.727 m)   Wt 88.5 kg (195 lb)   SpO2 95%   BMI 29.65 kg/m²     Physical Exam  Vitals reviewed.   Constitutional:       Appearance: Normal appearance. He is well-developed.   HENT:      Head: Normocephalic and atraumatic.   Eyes:      Pupils: Pupils are equal, round, and reactive to light.   Pulmonary:      Effort: Pulmonary effort is normal. No respiratory distress.   Musculoskeletal:         General: Normal range of motion.      Cervical back: Normal range of motion.   Lymphadenopathy:      Cervical: No " cervical adenopathy.   Skin:     General: Skin is dry.   Neurological:      Mental Status: He is alert and oriented to person, place, and time.   Psychiatric:         Behavior: Behavior normal.     Labs: I have reviewed the following labs:  Results for orders placed or performed in visit on 12/27/24   CBC and differential   Result Value Ref Range    WBC 10.38 (H) 4.31 - 10.16 Thousand/uL    RBC 5.33 3.88 - 5.62 Million/uL    Hemoglobin 15.9 12.0 - 17.0 g/dL    Hematocrit 49.2 36.5 - 49.3 %    MCV 92 82 - 98 fL    MCH 29.8 26.8 - 34.3 pg    MCHC 32.3 31.4 - 37.4 g/dL    RDW 13.1 11.6 - 15.1 %    MPV 10.0 8.9 - 12.7 fL    Platelets 218 149 - 390 Thousands/uL    nRBC 0 /100 WBCs    Segmented % 67 43 - 75 %    Immature Grans % 0 0 - 2 %    Lymphocytes % 25 14 - 44 %    Monocytes % 7 4 - 12 %    Eosinophils Relative 0 0 - 6 %    Basophils Relative 1 0 - 1 %    Absolute Neutrophils 6.91 1.85 - 7.62 Thousands/µL    Absolute Immature Grans 0.03 0.00 - 0.20 Thousand/uL    Absolute Lymphocytes 2.58 0.60 - 4.47 Thousands/µL    Absolute Monocytes 0.77 0.17 - 1.22 Thousand/µL    Eosinophils Absolute 0.00 0.00 - 0.61 Thousand/µL    Basophils Absolute 0.09 0.00 - 0.10 Thousands/µL   Comprehensive metabolic panel   Result Value Ref Range    Sodium 137 135 - 147 mmol/L    Potassium 4.1 3.5 - 5.3 mmol/L    Chloride 101 96 - 108 mmol/L    CO2 28 21 - 32 mmol/L    ANION GAP 8 4 - 13 mmol/L    BUN 16 5 - 25 mg/dL    Creatinine 1.22 0.60 - 1.30 mg/dL    Glucose, Fasting 159 (H) 65 - 99 mg/dL    Calcium 9.2 8.4 - 10.2 mg/dL    AST 12 (L) 13 - 39 U/L    ALT 12 7 - 52 U/L    Alkaline Phosphatase 66 34 - 104 U/L    Total Protein 6.8 6.4 - 8.4 g/dL    Albumin 4.2 3.5 - 5.0 g/dL    Total Bilirubin 0.82 0.20 - 1.00 mg/dL    eGFR 59 ml/min/1.73sq m   Vitamin B12   Result Value Ref Range    Vitamin B-12 739 180 - 914 pg/mL   Result Value Ref Range    Folate 6.9 >5.9 ng/mL   TIBC Panel (incl. Iron, TIBC, % Iron Saturation)   Result Value Ref Range     Iron Saturation 22 15 - 50 %    TIBC 329 250 - 450 ug/dL    Iron 71 50 - 212 ug/dL    Transferrin 235 203 - 362 mg/dL    UIBC 258 155 - 355 ug/dL   Result Value Ref Range    Ferritin 158 24 - 336 ng/mL     *Note: Due to a large number of results and/or encounters for the requested time period, some results have not been displayed. A complete set of results can be found in Results Review.

## 2025-01-01 PROCEDURE — 93971 EXTREMITY STUDY: CPT | Performed by: SURGERY

## 2025-01-02 ENCOUNTER — RESULTS FOLLOW-UP (OUTPATIENT)
Dept: HEMATOLOGY ONCOLOGY | Facility: CLINIC | Age: 70
End: 2025-01-02

## 2025-01-16 ENCOUNTER — OFFICE VISIT (OUTPATIENT)
Age: 70
End: 2025-01-16
Payer: MEDICARE

## 2025-01-16 VITALS — HEIGHT: 68 IN | BODY MASS INDEX: 29.67 KG/M2 | WEIGHT: 195.8 LBS

## 2025-01-16 DIAGNOSIS — M51.369 LUMBAR DEGENERATIVE DISC DISEASE: ICD-10-CM

## 2025-01-16 DIAGNOSIS — M47.816 LUMBAR SPONDYLOSIS: ICD-10-CM

## 2025-01-16 DIAGNOSIS — M79.18 MYOFASCIAL PAIN SYNDROME: ICD-10-CM

## 2025-01-16 DIAGNOSIS — M79.2 NEUROPATHIC PAIN: ICD-10-CM

## 2025-01-16 DIAGNOSIS — F11.20 UNCOMPLICATED OPIOID DEPENDENCE (HCC): ICD-10-CM

## 2025-01-16 DIAGNOSIS — M54.2 NECK PAIN: ICD-10-CM

## 2025-01-16 DIAGNOSIS — M54.50 CHRONIC MIDLINE LOW BACK PAIN WITHOUT SCIATICA: ICD-10-CM

## 2025-01-16 DIAGNOSIS — M54.12 CERVICAL RADICULOPATHY: ICD-10-CM

## 2025-01-16 DIAGNOSIS — G89.29 CHRONIC MIDLINE LOW BACK PAIN WITHOUT SCIATICA: ICD-10-CM

## 2025-01-16 DIAGNOSIS — Z79.891 ENCOUNTER FOR LONG-TERM OPIATE ANALGESIC USE: ICD-10-CM

## 2025-01-16 DIAGNOSIS — G89.4 CHRONIC PAIN SYNDROME: Primary | ICD-10-CM

## 2025-01-16 DIAGNOSIS — M47.812 CERVICAL SPONDYLOSIS WITHOUT MYELOPATHY: ICD-10-CM

## 2025-01-16 PROCEDURE — 99214 OFFICE O/P EST MOD 30 MIN: CPT | Performed by: NURSE PRACTITIONER

## 2025-01-16 RX ORDER — OXYCODONE AND ACETAMINOPHEN 7.5; 325 MG/1; MG/1
1 TABLET ORAL EVERY 8 HOURS PRN
Qty: 90 TABLET | Refills: 0 | Status: SHIPPED | OUTPATIENT
Start: 2025-01-16

## 2025-01-16 RX ORDER — OXYCODONE AND ACETAMINOPHEN 7.5; 325 MG/1; MG/1
1 TABLET ORAL EVERY 8 HOURS PRN
Qty: 90 TABLET | Refills: 0 | Status: SHIPPED | OUTPATIENT
Start: 2025-02-13 | End: 2025-01-21

## 2025-01-16 NOTE — PROGRESS NOTES
Assessment:  1. Chronic pain syndrome    2. Neck pain    3. Cervical spondylosis without myelopathy    4. Cervical radiculopathy    5. Chronic midline low back pain without sciatica    6. Lumbar spondylosis    7. Lumbar degenerative disc disease    8. Neuropathic pain    9. Myofascial pain syndrome    10. Uncomplicated opioid dependence (HCC)    11. Encounter for long-term opiate analgesic use        Plan:  While the patient was in the office today, I did have a thorough conversation regarding their chronic pain syndrome, medication management, and treatment plan options.  Patient is being seen for a follow-up visit.  He was last seen here on 11/14/2024 at which time an MRI of his lumbar spine was ordered due to complaints of increasing pain in his low back radiating down the posterior aspect of both legs.  MRI reveals a new right sided disc extrusion at L3-4.  Otherwise, there are multilevel arthritic changes noted, multilevel bulging disks.  There is moderate bilateral foraminal narrowing at L5-S1 with mild multilevel central and foraminal narrowing at most other levels.  MRI results were reviewed with patient during today's visit.    We discussed possibility of performing an L5-S1 interlaminar epidural steroid injection.  We discussed possibility of medial branch blocks in the future to determine if he is a candidate for radiofrequency ablations.  Patient states that he would like to hold off on interventional therapy at the moment.    Would recommend physical therapy for lumbar core strengthening/stretching.    Was recently started on gabapentin 100 mg 3 times daily and tizanidine 4 mg every 8 hours as needed for spasms by his PCP.  He states that medications are somewhat helpful.    Continue oxycodone 5/325 every 8 hours as needed for pain.  The patient's opioid scripts were sent to their pharmacy electronically and was given a 2 month supply of prescriptions with a Do Not Fill date(s) of 1/16/2025,  2/13/2025.    [place] Prescription Drug Monitoring Program report was reviewed and was appropriate     There are risks associated with opioid medications, including dependence, addiction and tolerance. The patient understands and agrees to use these medications only as prescribed. Potential side effects of the medications include, but are not limited to, constipation, drowsiness, addiction, impaired judgment and risk of fatal overdose if not taken as prescribed. The patient was warned against driving while taking sedation medications.  Sharing medications is a felony. At this point in time, the patient is showing no signs of addiction, abuse, diversion or suicidal ideation.    The patient will follow-up in 8 weeks for medication prescription refill and reevaluation. The patient was advised to contact the office should their symptoms worsen in the interim. The patient was agreeable and verbalized an understanding.        History of Present Illness:    The patient is a 69 y.o. male last seen on 11/14/2024 who presents for a follow up office visit in regards to chronic pain secondary to chronic pain syndrome, neck pain, cervical spondylosis, chronic low back pain, lumbar spondylosis, lumbar degenerative disc disease, neuropathic pain.  The patient currently reports complaints of pain, low back pain that can intermittently radiate down the posterior aspect of both legs.  Current pain level is a 7/10.  Pain is always worse in the morning.  Quality pain is described as dull, aching, sharp, throbbing.    Current pain medications includes: Oxycodone 7.5/325 every 8 hours as needed for pain.  PCP recently started gabapentin 100 mg 3 times daily and tizanidine 4 mg every 8 hours as needed for spasms .  The patient reports that this regimen is providing 25-30% pain relief.  The patient is reporting no side effects from this pain medication regimen.    Pain Contract Signed: 3/28/24  Last Urine Drug Screen: 11/14/24    I  have personally reviewed and/or updated the patient's past medical history, past surgical history, family history, social history, current medications, allergies, and vital signs today.       Review of Systems:    Review of Systems   Constitutional:  Negative for unexpected weight change.   HENT:  Negative for hearing loss.    Eyes:  Negative for visual disturbance.   Respiratory:  Positive for shortness of breath.    Cardiovascular:  Negative for leg swelling.   Gastrointestinal:  Positive for constipation.   Endocrine: Negative for polyuria.   Genitourinary:  Negative for difficulty urinating.   Musculoskeletal:  Positive for gait problem. Negative for joint swelling and myalgias.        Decreased range of motion  Joint stiffness   Skin:  Negative for rash.   Neurological:  Positive for dizziness. Negative for weakness and headaches.   Psychiatric/Behavioral:  Negative for decreased concentration.    All other systems reviewed and are negative.        Past Medical History:   Diagnosis Date    Abdominal aortic aneurysm without rupture ([facility])     Abdominal Aortic Duplex 02/21/2017    Ectatic infrarenal abdominal aorta.    MARYANN (acute kidney injury) ([facility]) 07/23/2022    CAD (coronary artery disease)     Cancer ([facility]) 2022    right lung CA    Chronic bronchitis ([facility]) 01/28/2019    Class 1 obesity due to excess calories with serious comorbidity and body mass index (BMI) of 31.0 to 31.9 in adult 02/14/2020    COPD (chronic obstructive pulmonary disease) ([facility])     Coronary artery disease of native artery of native heart with stable angina pectoris ([facility])     Extremity pain     Hemiparesis ([facility]) 11/01/2022    Hiatal hernia     History of echocardiogram 03/18/2014    EF 55%, mild MR and AI.  Mild concentric LVH.    History of stress test 03/06/2017    Normal.    Hyperlipidemia     Hypertension     Joint pain     Kidney stone     Low back pain     Lung cancer ([facility])     Migraine     Neck pain     Obstructive sleep apnea      cannot tolerate CPAP    Osteoarthritis     Other chronic pancreatitis (HCC) 02/10/2023    Peripheral neuropathy     Reflex sympathetic dystrophy     Sacroiliitis (HCC) 02/02/2022       Past Surgical History:   Procedure Laterality Date    CARDIAC CATHETERIZATION  02/13/2012    EF 70%, widely patent renal arteries, significant single-vessel CAD-medical therapy.    CARDIAC CATHETERIZATION  04/11/2013    EF 65%, 50% mid LAD, 20% prox CFX, 90% diffuse RCA, 99% mid RCA. Medical management.    CATARACT EXTRACTION Right 09/19/2023    CHOLECYSTECTOMY      COLONOSCOPY      EPIDURAL BLOCK INJECTION Bilateral 08/15/2019    Procedure: BLOCK / INJECTION EPIDURAL STEROID CERVICAL;  Surgeon: Ricardo Park MD;  Location: [facility];  Service: Pain Management     FL GUIDED NEEDLE PLAC BX/ASP/INJ  08/15/2019    IR BIOPSY LUNG  09/13/2022    IR THORACIC DUCT EMBOLIZATION  11/22/2022    ORTHOPEDIC SURGERY      [facility] W/CELL WASHG SPX N/A 11/16/2022    Procedure: BRONCHOSCOPY FLEXIBLE;  Surgeon: Gulshan Madison MD;  Location: [facility];  Service: Thoracic    MT THORACOSCOPY W/LOBECTOMY SINGLE LOBE Right 11/16/2022    Procedure: LOBECTOMY LUNG; lower lobe superior segmentectomy, mediastinal lymph node dissection;  Surgeon: Gulshan Madison MD;  Location: [facility];  Service: Thoracic    MT THORACOSCOPY W/SEGMENTECTOMY Right 11/16/2022    Procedure: THORACOSCOPY VIDEO ASSISTED SURGERY (VATS);  Surgeon: Gulshan Madison MD;  Location: [facility];  Service: Thoracic    TRIGGER POINT INJECTION         Family History   Adopted: Yes   Problem Relation Age of Onset    No Known Problems Family     No Known Problems Mother     No Known Problems Father        Social History     Occupational History    Not on file   Tobacco Use    Smoking status: Every Day     Current packs/day: 1.00     Average packs/day: 1 pack/day for 1.2 years (1.2 ttl pk-yrs)     Types: Cigarettes     Start date:  11/2023    Smokeless tobacco: Never   Vaping Use    Vaping status: Never Used   Substance and Sexual Activity    Alcohol use: Not Currently    Drug use: Never    Sexual activity: Yes         Current Outpatient Medications:     albuterol (Ventolin HFA) 90 mcg/act inhaler, Inhale 2 puffs every 6 (six) hours as needed for wheezing, Disp: 18 g, Rfl: 5    amLODIPine (NORVASC) 10 mg tablet, Take 1 tablet (10 mg total) by mouth daily, Disp: 90 tablet, Rfl: 1    aspirin 81 mg chewable tablet, Chew 1 tablet (81 mg total) daily, Disp: 90 tablet, Rfl: 1    bisacodyl (DULCOLAX) 5 mg EC tablet, Take 1 tablet (5 mg total) by mouth daily as needed for constipation for up to 4 doses, Disp: 30 tablet, Rfl: 0    cyanocobalamin (VITAMIN B-12) 1000 MCG tablet, Take 1 tablet (1,000 mcg total) by mouth daily, Disp: 90 tablet, Rfl: 3    folic acid (KP Folic Acid) 1 mg tablet, Take 1 tablet (1 mg total) by mouth daily, Disp: 30 tablet, Rfl: 6    gabapentin (NEURONTIN) 100 mg capsule, Take 1 capsule (100 mg total) by mouth 2 (two) times a day, Disp: 60 capsule, Rfl: 5    glucose blood (OneTouch Verio) test strip, Test once daily, Disp: 100 each, Rfl: 0    lisinopril (ZESTRIL) 10 mg tablet, Take 1 tablet (10 mg total) by mouth daily, Disp: 90 tablet, Rfl: 1    metoprolol succinate (TOPROL-XL) 100 mg 24 hr tablet, Take 1 tablet (100 mg total) by mouth daily, Disp: 90 tablet, Rfl: 1    ondansetron (ZOFRAN) 8 mg tablet, Take 1 tablet (8 mg total) by mouth every 8 (eight) hours as needed for nausea or vomiting, Disp: 20 tablet, Rfl: 2    [START ON 2/13/2025] oxyCODONE-acetaminophen (Percocet) 7.5-325 MG per tablet, Take 1 tablet by mouth every 8 (eight) hours as needed for moderate pain Max Daily Amount: 3 tablets Do not start before February 13, 2025., Disp: 90 tablet, Rfl: 0    oxyCODONE-acetaminophen (Percocet) 7.5-325 MG per tablet, Take 1 tablet by mouth every 8 (eight) hours as needed for moderate pain Max Daily Amount: 3 tablets, Disp: 90  "tablet, Rfl: 0    Restasis 0.05 % ophthalmic emulsion, , Disp: , Rfl:     rosuvastatin (CRESTOR) 40 MG tablet, Take 1 tablet (40 mg total) by mouth daily, Disp: 90 tablet, Rfl: 1    sildenafil (VIAGRA) 100 mg tablet, Take 1 tablet (100 mg total) by mouth as needed for erectile dysfunction, Disp: 20 tablet, Rfl: 0    tiotropium-olodaterol (Stiolto Respimat) 2.5-2.5 MCG/ACT inhaler, Inhale 2 puffs daily, Disp: 4 g, Rfl: 5    tiZANidine (ZANAFLEX) 4 mg tablet, Take 1 tablet (4 mg total) by mouth every 8 (eight) hours as needed for muscle spasms, Disp: 90 tablet, Rfl: 1    traZODone (DESYREL) 100 mg tablet, Take 1 tablet (100 mg total) by mouth daily at bedtime, Disp: 90 tablet, Rfl: 1    fluticasone (FLONASE) 50 mcg/act nasal spray, 1 spray into each nostril daily for 14 days, Disp: 11.1 mL, Rfl: 0    metFORMIN (GLUCOPHAGE) 500 mg tablet, take 1 tablet by mouth twice a day with meals (Patient not taking: Reported on 1/16/2025), Disp: 180 tablet, Rfl: 1    naloxone (NARCAN) 4 mg/0.1 mL nasal spray, Administer 1 spray into a nostril. If no response after 2-3 minutes, give another dose in the other nostril using a new spray., Disp: 1 each, Rfl: 1    nystatin (MYCOSTATIN) 500,000 units/5 mL suspension, Apply 5 mL (500,000 Units total) to the mouth or throat 4 (four) times a day (Patient not taking: Reported on 12/23/2024), Disp: 100 mL, Rfl: 0    omeprazole (PriLOSEC) 40 MG capsule, Take 1 capsule (40 mg total) by mouth daily (Patient not taking: Reported on 1/16/2025), Disp: 90 capsule, Rfl: 1    primidone (MYSOLINE) 50 mg tablet, take 1/2 tablet by mouth once daily every morning (Patient not taking: Reported on 1/16/2025), Disp: 45 tablet, Rfl: 3    tamsulosin (FLOMAX) 0.4 mg, Take 1 capsule (0.4 mg total) by mouth daily with dinner for 3 days, Disp: 3 capsule, Rfl: 0    No Known Allergies    Physical Exam:    Ht 5' 8\" (1.727 m)   Wt 88.8 kg (195 lb 12.8 oz)   BMI 29.77 kg/m²     Constitutional:normal, well developed, " well nourished, alert, in no distress and non-toxic and no overt pain behavior.  Eyes:anicteric  HEENT:grossly intact  Neck:supple, symmetric, trachea midline and no masses   Pulmonary:even and unlabored  Cardiovascular:No edema or pitting edema present  Skin:Normal without rashes or lesions and well hydrated  Psychiatric:Mood and affect appropriate  Neurologic:Cranial Nerves II-XII grossly intact  Musculoskeletal: Gait is slow and guarded.      Imaging  No orders to display         Orders Placed This Encounter   Procedures    Ambulatory Referral to Physical Therapy

## 2025-01-27 ENCOUNTER — TELEPHONE (OUTPATIENT)
Age: 70
End: 2025-01-27

## 2025-01-27 ENCOUNTER — OFFICE VISIT (OUTPATIENT)
Dept: FAMILY MEDICINE CLINIC | Facility: CLINIC | Age: 70
End: 2025-01-27
Payer: MEDICARE

## 2025-01-27 VITALS
WEIGHT: 195 LBS | HEART RATE: 57 BPM | TEMPERATURE: 98.6 F | HEIGHT: 68 IN | SYSTOLIC BLOOD PRESSURE: 112 MMHG | DIASTOLIC BLOOD PRESSURE: 80 MMHG | BODY MASS INDEX: 29.55 KG/M2 | OXYGEN SATURATION: 95 %

## 2025-01-27 DIAGNOSIS — Z23 ENCOUNTER FOR IMMUNIZATION: ICD-10-CM

## 2025-01-27 DIAGNOSIS — K59.03 DRUG-INDUCED CONSTIPATION: ICD-10-CM

## 2025-01-27 DIAGNOSIS — N52.9 ERECTILE DYSFUNCTION, UNSPECIFIED ERECTILE DYSFUNCTION TYPE: ICD-10-CM

## 2025-01-27 DIAGNOSIS — M54.50 LUMBAR SPINE PAIN: Primary | ICD-10-CM

## 2025-01-27 PROCEDURE — 99214 OFFICE O/P EST MOD 30 MIN: CPT | Performed by: NURSE PRACTITIONER

## 2025-01-27 PROCEDURE — G2211 COMPLEX E/M VISIT ADD ON: HCPCS | Performed by: NURSE PRACTITIONER

## 2025-01-27 RX ORDER — GABAPENTIN 100 MG/1
300 CAPSULE ORAL 2 TIMES DAILY
Qty: 180 CAPSULE | Refills: 5 | Status: SHIPPED | OUTPATIENT
Start: 2025-01-27 | End: 2025-01-27

## 2025-01-27 RX ORDER — BISACODYL 5 MG/1
5 TABLET, DELAYED RELEASE ORAL DAILY PRN
Qty: 30 TABLET | Refills: 0 | Status: SHIPPED | OUTPATIENT
Start: 2025-01-27

## 2025-01-27 RX ORDER — SILDENAFIL 25 MG/1
25 TABLET, FILM COATED ORAL AS NEEDED
Qty: 30 TABLET | Refills: 3 | Status: SHIPPED | OUTPATIENT
Start: 2025-01-27

## 2025-01-27 RX ORDER — GABAPENTIN 300 MG/1
300 CAPSULE ORAL 2 TIMES DAILY
Qty: 60 CAPSULE | Refills: 1 | Status: SHIPPED | OUTPATIENT
Start: 2025-01-27

## 2025-01-27 NOTE — PATIENT INSTRUCTIONS
Start to increase Gabapentin to 300 mg once a day and 100 mg once daily for 5 days and if feeling okay then can increase to 300 mg twice a day.

## 2025-01-27 NOTE — ASSESSMENT & PLAN NOTE
Was given rx for 100 mg but he states he is taking it in quarters, will give refill of 25 mg for patient instead. BP is controlled doing it this way.   Orders:  •  sildenafil (VIAGRA) 25 MG tablet; Take 1 tablet (25 mg total) by mouth as needed for erectile dysfunction

## 2025-01-27 NOTE — PROGRESS NOTES
Name: Lance Hazel      : 1955      MRN: [id number]  Encounter Provider: ELOISA Samaniego  Encounter Date: 2025   Encounter department: [facility]  :  Assessment & Plan  Lumbar spine pain  - was started on Gabapentin 100 mg bid about one month ago. Has helped  with some thoracici pain that was beginning and he has had minimal relief in the lower back. Sometimes helps with the severity of the pain. He is tolerating it well. Will increase to 300 mg bid and titration up on this dosing was reviewed. Possible side effects reviewed. No red flag symptoms.   He does still work in construction.   Still getting some cramping in the hands but states that the Zanaflex did help with this. He has some trouble staying asleep and constipation as well. Due to all these symptoms, recommend trial of magnesium 400 mg daily at bedtime to see if this helps.   Orders:  •  gabapentin (NEURONTIN) 300 mg capsule; Take 1 capsule (300 mg total) by mouth 2 (two) times a day    Drug-induced constipation  Chronic since his chemo. Uses Dulcolax prn and only has a few left. Requesting refill. Has BM every couple of days. Counseled on lifestyle modification.   Orders:  •  bisacodyl (DULCOLAX) 5 mg EC tablet; Take 1 tablet (5 mg total) by mouth daily as needed for constipation for up to 4 doses    Erectile dysfunction, unspecified erectile dysfunction type  Was given rx for 100 mg but he states he is taking it in quarters, will give refill of 25 mg for patient instead. BP is controlled doing it this way.   Orders:  •  sildenafil (VIAGRA) 25 MG tablet; Take 1 tablet (25 mg total) by mouth as needed for erectile dysfunction    Encounter for immunization    Orders:  •  Pneumococcal Conjugate Vaccine 20-valent (Pcv20)  •  HEPATITIS A VACCINE ADULT IM  •  influenza vaccine, high-dose, PF 0.5 mL (Fluzone High Dose)           History of Present Illness   HPI  Review of Systems   Constitutional:  Negative  "for chills and fever.   Respiratory:  Negative for cough and shortness of breath.    Cardiovascular:  Negative for chest pain and palpitations.   Gastrointestinal:  Positive for constipation. Negative for abdominal pain, diarrhea, nausea and vomiting.   Genitourinary:  Negative for dysuria and hematuria.   Musculoskeletal:  Positive for back pain. Negative for arthralgias.   Skin:  Negative for color change and rash.   Neurological:  Negative for seizures and syncope.   All other systems reviewed and are negative.      Objective   /80   Pulse 57   Temp 98.6 °F (37 °C)   Ht 5' 8\" (1.727 m)   Wt 88.5 kg (195 lb)   SpO2 95%   BMI 29.65 kg/m²      Physical Exam  Vitals and nursing note reviewed.   Constitutional:       General: He is not in acute distress.     Appearance: He is well-developed.   HENT:      Head: Normocephalic and atraumatic.   Cardiovascular:      Rate and Rhythm: Normal rate and regular rhythm.      Pulses: Normal pulses.      Heart sounds: Normal heart sounds. No murmur heard.  Pulmonary:      Effort: Pulmonary effort is normal. No respiratory distress.      Breath sounds: Normal breath sounds.   Abdominal:      General: Bowel sounds are normal.      Palpations: Abdomen is soft.   Musculoskeletal:      Cervical back: Neck supple.      Lumbar back: Tenderness present. No lacerations, spasms or bony tenderness. Decreased range of motion. No scoliosis.   Skin:     General: Skin is warm and dry.      Capillary Refill: Capillary refill takes less than 2 seconds.   Neurological:      Mental Status: He is alert.   Psychiatric:         Mood and Affect: Mood normal.         "

## 2025-01-27 NOTE — TELEPHONE ENCOUNTER
Pt cancelled his colon with Dr Holcomb for 01/2//25 due to illness. I spoke with Selma at [facility] and made her aware. Pt will call to reschedule when feeling better.

## 2025-02-23 DIAGNOSIS — M54.50 LUMBAR SPINE PAIN: ICD-10-CM

## 2025-02-28 NOTE — PROGRESS NOTES
Name: Lance Hazel      : 1955      MRN: [id number]  Encounter Provider: ELOISA Samaniego  Encounter Date: 3/3/2025   Encounter department: [facility]  :  Assessment & Plan  Lumbar spine pain  Presents for follow up on lumbar back pain after increase of gabapentin from 100 mg bid to 300 mg bid and addition of magnesium supplementation.  States he has seen a minimal improvement in his symptoms with the higher dose but the afternoon is when his symptoms are the worst. As he moves around through out the day, his symptoms increase but then he takes his gabapentin at night and it seems to help. He is also on Percocet tid. He does have f/u with spine and pain this week and will be discussing ablation versus epidural for his pain. Will trial increasing Gabapentin to TID, possible side effects reviewed.   Orders:  •  gabapentin (NEURONTIN) 300 mg capsule; Take 1 capsule (300 mg total) by mouth 3 (three) times a day    Fever, unspecified fever cause  Patient states that he has been having fevers at night when his percocet is wearing off. He states they are low grade between .5. Only other symptoms is increased shortness of breath. Does have labs to have completed for hem/onc and will see about getting this CT chest earlier.   Orders:  •  TSH, 3rd generation with Free T4 reflex; Future  •  UA w Reflex to Microscopic w Reflex to Culture; Future  •  Lyme Total AB W Reflex to IGM/IGG; Future  •  BRYAN Screen w/Reflex Cascade; Future  •  C-reactive protein; Future  •  EBV Acute Panel; Future  •  RF Screen w/ Reflex to Titer; Future    Encounter for immunization    Orders:  •  Pneumococcal Conjugate Vaccine 20-valent (Pcv20)  •  HEPATITIS A VACCINE ADULT IM    Screening for colon cancer    Orders:  •  Ambulatory Referral to Gastroenterology; Future    Pulmonary emphysema, unspecified emphysema type (HCC)  Does follow with thoracic surgery after dx of lung cancer. He does notice a  slight increase in shortness of breath with exertion like doing the stairs the last few weeks. He does have a routine CT chest follow up scheduled in April but due to this and unexplained fevers at night, recommend he  call their office to see if he can have this completed sooner.        Stage 3a chronic kidney disease (HCC)  Lab Results   Component Value Date    EGFR 59 12/27/2024    EGFR 66 09/27/2024    EGFR 59 09/27/2024    CREATININE 1.22 12/27/2024    CREATININE 1.11 09/27/2024    CREATININE 1.22 09/27/2024     Orders:  •  Ferritin; Future    Other fatigue  Feeling more tired through out the day and often has to lay down after work but only sleeping a few hours at night, likely due to pain.              History of Present Illness   HPI  Review of Systems   Constitutional:  Positive for fatigue and fever. Negative for activity change, appetite change, diaphoresis and unexpected weight change.   HENT:  Negative for congestion, mouth sores, rhinorrhea, sinus pain, trouble swallowing and voice change.    Eyes:  Negative for photophobia and visual disturbance.   Respiratory:  Positive for shortness of breath. Negative for apnea, cough, chest tightness and wheezing.    Cardiovascular:  Negative for chest pain, palpitations and leg swelling.   Gastrointestinal:  Negative for abdominal distention, abdominal pain, blood in stool, constipation, diarrhea, nausea and vomiting.   Endocrine: Negative for cold intolerance, heat intolerance, polydipsia, polyphagia and polyuria.   Genitourinary:  Negative for decreased urine volume, difficulty urinating, frequency and urgency.   Musculoskeletal:  Negative for arthralgias, myalgias, neck pain and neck stiffness.   Skin:  Negative for color change, rash and wound.   Neurological:  Negative for dizziness, weakness, light-headedness, numbness and headaches.   Hematological:  Negative for adenopathy. Does not bruise/bleed easily.   Psychiatric/Behavioral:  Negative for self-injury,  "sleep disturbance and suicidal ideas. The patient is not nervous/anxious.        Objective   /82   Pulse 68   Temp 98.9 °F (37.2 °C)   Ht 5' 8\" (1.727 m)   Wt 89.7 kg (197 lb 12.8 oz)   SpO2 95%   BMI 30.08 kg/m²      Physical Exam  Vitals and nursing note reviewed.   Constitutional:       General: He is not in acute distress.     Appearance: He is well-developed.   HENT:      Head: Normocephalic and atraumatic.   Cardiovascular:      Rate and Rhythm: Normal rate and regular rhythm.      Pulses: Normal pulses.      Heart sounds: Normal heart sounds. No murmur heard.  Pulmonary:      Effort: Pulmonary effort is normal. No respiratory distress.      Breath sounds: Normal breath sounds.   Abdominal:      General: Abdomen is flat. Bowel sounds are normal.      Palpations: Abdomen is soft.      Tenderness: There is no abdominal tenderness.   Musculoskeletal:      Cervical back: Neck supple. No tenderness.   Lymphadenopathy:      Cervical: No cervical adenopathy.   Skin:     General: Skin is warm and dry.      Capillary Refill: Capillary refill takes less than 2 seconds.   Neurological:      Mental Status: He is alert.   Psychiatric:         Mood and Affect: Mood normal.         "

## 2025-03-03 ENCOUNTER — OFFICE VISIT (OUTPATIENT)
Dept: FAMILY MEDICINE CLINIC | Facility: CLINIC | Age: 70
End: 2025-03-03
Payer: COMMERCIAL

## 2025-03-03 VITALS
TEMPERATURE: 98.9 F | DIASTOLIC BLOOD PRESSURE: 82 MMHG | OXYGEN SATURATION: 95 % | SYSTOLIC BLOOD PRESSURE: 130 MMHG | WEIGHT: 197.8 LBS | HEIGHT: 68 IN | HEART RATE: 68 BPM | BODY MASS INDEX: 29.98 KG/M2

## 2025-03-03 DIAGNOSIS — N18.31 STAGE 3A CHRONIC KIDNEY DISEASE (HCC): ICD-10-CM

## 2025-03-03 DIAGNOSIS — R50.9 FEVER, UNSPECIFIED FEVER CAUSE: ICD-10-CM

## 2025-03-03 DIAGNOSIS — J43.9 PULMONARY EMPHYSEMA, UNSPECIFIED EMPHYSEMA TYPE (HCC): ICD-10-CM

## 2025-03-03 DIAGNOSIS — Z12.11 SCREENING FOR COLON CANCER: ICD-10-CM

## 2025-03-03 DIAGNOSIS — R53.83 OTHER FATIGUE: ICD-10-CM

## 2025-03-03 DIAGNOSIS — Z23 ENCOUNTER FOR IMMUNIZATION: ICD-10-CM

## 2025-03-03 DIAGNOSIS — M54.50 LUMBAR SPINE PAIN: Primary | ICD-10-CM

## 2025-03-03 PROCEDURE — G2211 COMPLEX E/M VISIT ADD ON: HCPCS | Performed by: NURSE PRACTITIONER

## 2025-03-03 PROCEDURE — 99214 OFFICE O/P EST MOD 30 MIN: CPT | Performed by: NURSE PRACTITIONER

## 2025-03-03 RX ORDER — GABAPENTIN 300 MG/1
300 CAPSULE ORAL 3 TIMES DAILY
Qty: 90 CAPSULE | Refills: 1 | Status: SHIPPED | OUTPATIENT
Start: 2025-03-03

## 2025-03-03 NOTE — ASSESSMENT & PLAN NOTE
Lab Results   Component Value Date    EGFR 59 12/27/2024    EGFR 66 09/27/2024    EGFR 59 09/27/2024    CREATININE 1.22 12/27/2024    CREATININE 1.11 09/27/2024    CREATININE 1.22 09/27/2024     Orders:  •  Ferritin; Future

## 2025-03-03 NOTE — ASSESSMENT & PLAN NOTE
Does follow with thoracic surgery after dx of lung cancer. He does notice a slight increase in shortness of breath with exertion like doing the stairs the last few weeks. He does have a routine CT chest follow up scheduled in April but due to this and unexplained fevers at night, recommend he  call their office to see if he can have this completed sooner.

## 2025-03-06 ENCOUNTER — OFFICE VISIT (OUTPATIENT)
Age: 70
End: 2025-03-06
Payer: MEDICARE

## 2025-03-06 ENCOUNTER — OFFICE VISIT (OUTPATIENT)
Dept: CARDIOLOGY CLINIC | Facility: CLINIC | Age: 70
End: 2025-03-06
Payer: COMMERCIAL

## 2025-03-06 VITALS
HEIGHT: 70 IN | BODY MASS INDEX: 27.92 KG/M2 | HEART RATE: 60 BPM | DIASTOLIC BLOOD PRESSURE: 70 MMHG | SYSTOLIC BLOOD PRESSURE: 120 MMHG | WEIGHT: 195 LBS

## 2025-03-06 VITALS — BODY MASS INDEX: 29.86 KG/M2 | WEIGHT: 197 LBS | HEIGHT: 68 IN

## 2025-03-06 DIAGNOSIS — M54.12 CERVICAL RADICULOPATHY: ICD-10-CM

## 2025-03-06 DIAGNOSIS — E78.2 MIXED HYPERLIPIDEMIA: ICD-10-CM

## 2025-03-06 DIAGNOSIS — M47.816 LUMBAR SPONDYLOSIS: ICD-10-CM

## 2025-03-06 DIAGNOSIS — M54.2 NECK PAIN: ICD-10-CM

## 2025-03-06 DIAGNOSIS — I71.40 ABDOMINAL AORTIC ANEURYSM (AAA) WITHOUT RUPTURE, UNSPECIFIED PART (HCC): ICD-10-CM

## 2025-03-06 DIAGNOSIS — F11.20 UNCOMPLICATED OPIOID DEPENDENCE (HCC): ICD-10-CM

## 2025-03-06 DIAGNOSIS — M79.2 NEUROPATHIC PAIN: ICD-10-CM

## 2025-03-06 DIAGNOSIS — G89.29 CHRONIC MIDLINE LOW BACK PAIN WITHOUT SCIATICA: ICD-10-CM

## 2025-03-06 DIAGNOSIS — G89.4 CHRONIC PAIN SYNDROME: ICD-10-CM

## 2025-03-06 DIAGNOSIS — I10 PRIMARY HYPERTENSION: ICD-10-CM

## 2025-03-06 DIAGNOSIS — M54.50 CHRONIC MIDLINE LOW BACK PAIN WITHOUT SCIATICA: ICD-10-CM

## 2025-03-06 DIAGNOSIS — I25.118 CORONARY ARTERY DISEASE OF NATIVE ARTERY OF NATIVE HEART WITH STABLE ANGINA PECTORIS (HCC): Primary | ICD-10-CM

## 2025-03-06 DIAGNOSIS — M47.812 CERVICAL SPONDYLOSIS WITHOUT MYELOPATHY: ICD-10-CM

## 2025-03-06 DIAGNOSIS — Z79.891 ENCOUNTER FOR LONG-TERM OPIATE ANALGESIC USE: ICD-10-CM

## 2025-03-06 DIAGNOSIS — M79.18 MYOFASCIAL PAIN SYNDROME: ICD-10-CM

## 2025-03-06 DIAGNOSIS — F17.200 SMOKING: ICD-10-CM

## 2025-03-06 DIAGNOSIS — M51.369 LUMBAR DEGENERATIVE DISC DISEASE: ICD-10-CM

## 2025-03-06 PROCEDURE — 99214 OFFICE O/P EST MOD 30 MIN: CPT | Performed by: INTERNAL MEDICINE

## 2025-03-06 PROCEDURE — G2211 COMPLEX E/M VISIT ADD ON: HCPCS | Performed by: NURSE PRACTITIONER

## 2025-03-06 PROCEDURE — 99214 OFFICE O/P EST MOD 30 MIN: CPT | Performed by: NURSE PRACTITIONER

## 2025-03-06 RX ORDER — OXYCODONE AND ACETAMINOPHEN 7.5; 325 MG/1; MG/1
1 TABLET ORAL EVERY 8 HOURS PRN
Qty: 90 TABLET | Refills: 0 | Status: SHIPPED | OUTPATIENT
Start: 2025-04-10

## 2025-03-06 RX ORDER — OXYCODONE AND ACETAMINOPHEN 7.5; 325 MG/1; MG/1
1 TABLET ORAL EVERY 8 HOURS PRN
Qty: 90 TABLET | Refills: 0 | Status: SHIPPED | OUTPATIENT
Start: 2025-03-13

## 2025-03-06 NOTE — PROGRESS NOTES
Patient ID: Lance Hazel is a 69 y.o. male.        Plan:      Coronary artery disease of native artery of native heart with stable angina pectoris (HCC)  Cautioned patient about use of Viagra and NTG within 24 hours of each other  Cont Asa 81 mg QD, amlodipine 10 mg QD    Hypertension  Controlled  Cont Toprol 100 mg QD and Lisinopril 10 mg QD    Abdominal aortic aneurysm (AAA) without rupture  Chronic  Small  Due for FU this Fall    Smoking  Encouraged cessation    Hyperlipidemia  Controlled on Crestor 40 mg QD, continue       Follow up Plan/Other summary comments:  Advised no medication changes today.   Return in about 6 months (around 9/6/2025).  Call sooner with issues or need for elective surgery.     HPI: Avery is here for routine FU.  Slightly more SOB with exertion, his CT scan was moved up he states.  No Cp nor palpitations, no edema.  1 ppd smoker stil but this is down from 2 ppd.  Relates some fainting episodes if took Percocet/gabapentin/muscle relaxer together, he has changed things here and is better.  We reviewed his Dec bloodwork    Most recent or relevant cardiac/vascular testing:    10/22 NSTNormal left ventricular systolic function.    No evidence for prior myocardial infarction.    No evidence for ischemia by perfusion imaging.     Remote cath severe single vessel RCA disease    9/24 CT  Emphysema  Severe atherosclerosis  3 cm AAA, 2 cm Left common Iliac aneurysm      Past Surgical History:   Procedure Laterality Date    CARDIAC CATHETERIZATION  02/13/2012    EF 70%, widely patent renal arteries, significant single-vessel CAD-medical therapy.    CARDIAC CATHETERIZATION  04/11/2013    EF 65%, 50% mid LAD, 20% prox CFX, 90% diffuse RCA, 99% mid RCA. Medical management.    CATARACT EXTRACTION Right 09/19/2023    CHOLECYSTECTOMY      COLONOSCOPY      EPIDURAL BLOCK INJECTION Bilateral 08/15/2019    Procedure: BLOCK / INJECTION EPIDURAL STEROID CERVICAL;  Surgeon: Ricardo Park MD;   "Location: [facility];  Service: Pain Management     FL GUIDED NEEDLE PLAC BX/ASP/INJ  08/15/2019    IR BIOPSY LUNG  09/13/2022    IR THORACIC DUCT EMBOLIZATION  11/22/2022    ORTHOPEDIC SURGERY      MA BRNCHSC INCL FLUOR GDNCE DX W/CELL WASHG SPX N/A 11/16/2022    Procedure: BRONCHOSCOPY FLEXIBLE;  Surgeon: Gulshan Madison MD;  Location: [facility];  Service: Thoracic    MA THORACOSCOPY W/LOBECTOMY SINGLE LOBE Right 11/16/2022    Procedure: LOBECTOMY LUNG; lower lobe superior segmentectomy, mediastinal lymph node dissection;  Surgeon: Gulshan Madison MD;  Location: [facility];  Service: Thoracic    MA THORACOSCOPY W/SEGMENTECTOMY Right 11/16/2022    Procedure: THORACOSCOPY VIDEO ASSISTED SURGERY (VATS);  Surgeon: Gulshan Madison MD;  Location: [facility];  Service: Thoracic    TRIGGER POINT INJECTION         Lipid Profile: Reviewed      Review of Systems   10  point ROS  was otherwise non pertinent or negative except as per HPI or as below.       Objective:     /70 (BP Location: Right arm, Patient Position: Sitting)   Pulse 60   Ht 5' 9.6\" (1.768 m)   Wt 88.5 kg (195 lb)   BMI 28.30 kg/m²     PHYSICAL EXAM:    General:  Normal appearance in no distress.  Eyes:  Anicteric.  Oral mucosa:  Moist.  Neck:  No JVD. Carotid upstrokes are brisk without bruits.  No masses.  Chest:  Scatt rhonchi, exp wheeze, cough with deep rbeath, but no rales, overall marked decreased BS bl.  Cardiac:  Regular No palpable PMI.  Normal S1 and S2.  No murmur gallop or rub.  Abdomen:  Soft and nontender. No palpable organomegaly or aortic enlargement.  Extremities:  No peripheral edema.  Musculoskeletal:  Symmetric.   Neuro:  Grossly symmetric.  Psych:  Alert and oriented x3.      Meds reviewed.    Current Outpatient Medications:     albuterol (Ventolin HFA) 90 mcg/act inhaler, Inhale 2 puffs every 6 (six) hours as needed for wheezing, Disp: 18 g, Rfl: 5    amLODIPine (NORVASC) 10 mg tablet, Take 1 " tablet (10 mg total) by mouth daily, Disp: 90 tablet, Rfl: 1    aspirin 81 mg chewable tablet, Chew 1 tablet (81 mg total) daily, Disp: 90 tablet, Rfl: 1    bisacodyl (DULCOLAX) 5 mg EC tablet, Take 1 tablet (5 mg total) by mouth daily as needed for constipation for up to 4 doses, Disp: 30 tablet, Rfl: 0    cyanocobalamin (VITAMIN B-12) 1000 MCG tablet, Take 1 tablet (1,000 mcg total) by mouth daily, Disp: 90 tablet, Rfl: 3    folic acid (KP Folic Acid) 1 mg tablet, Take 1 tablet (1 mg total) by mouth daily, Disp: 30 tablet, Rfl: 6    gabapentin (NEURONTIN) 300 mg capsule, Take 1 capsule (300 mg total) by mouth 3 (three) times a day, Disp: 90 capsule, Rfl: 1    glucose blood (OneTouch Verio) test strip, Test once daily, Disp: 100 each, Rfl: 0    lisinopril (ZESTRIL) 10 mg tablet, Take 1 tablet (10 mg total) by mouth daily, Disp: 90 tablet, Rfl: 1    metoprolol succinate (TOPROL-XL) 100 mg 24 hr tablet, Take 1 tablet (100 mg total) by mouth daily, Disp: 90 tablet, Rfl: 1    [START ON 4/10/2025] oxyCODONE-acetaminophen (Percocet) 7.5-325 MG per tablet, Take 1 tablet by mouth every 8 (eight) hours as needed for moderate pain Max Daily Amount: 3 tablets Do not start before April 10, 2025., Disp: 90 tablet, Rfl: 0    [START ON 3/13/2025] oxyCODONE-acetaminophen (Percocet) 7.5-325 MG per tablet, Take 1 tablet by mouth every 8 (eight) hours as needed for moderate pain Max Daily Amount: 3 tablets Do not start before March 13, 2025., Disp: 90 tablet, Rfl: 0    Restasis 0.05 % ophthalmic emulsion, , Disp: , Rfl:     rosuvastatin (CRESTOR) 40 MG tablet, Take 1 tablet (40 mg total) by mouth daily, Disp: 90 tablet, Rfl: 1    sildenafil (VIAGRA) 25 MG tablet, Take 1 tablet (25 mg total) by mouth as needed for erectile dysfunction, Disp: 30 tablet, Rfl: 3    tamsulosin (FLOMAX) 0.4 mg, Take 1 capsule (0.4 mg total) by mouth daily with dinner for 3 days, Disp: 3 capsule, Rfl: 0    tiotropium-olodaterol (Stiolto Respimat) 2.5-2.5  MCG/ACT inhaler, Inhale 2 puffs daily, Disp: 4 g, Rfl: 5    tiZANidine (ZANAFLEX) 4 mg tablet, take 1 tablet by mouth every 8 hours if needed for muscle spasm, Disp: 90 tablet, Rfl: 1    traZODone (DESYREL) 100 mg tablet, Take 1 tablet (100 mg total) by mouth daily at bedtime, Disp: 90 tablet, Rfl: 1    fluticasone (FLONASE) 50 mcg/act nasal spray, 1 spray into each nostril daily for 14 days, Disp: 11.1 mL, Rfl: 0    naloxone (NARCAN) 4 mg/0.1 mL nasal spray, Administer 1 spray into a nostril. If no response after 2-3 minutes, give another dose in the other nostril using a new spray., Disp: 1 each, Rfl: 1     Past Medical History:   Diagnosis Date    Abdominal aortic aneurysm without rupture ([facility]ly [facility])     Abdominal Aortic Duplex 02/21/2017    Ectatic infrarenal abdominal aorta.    MARYANN (acute kidney injury) (Formerly [facility]) 07/23/2022    CAD (coronary artery disease)     Cancer ([facility]ly [facility]) 2022    right lung CA    Chronic bronchitis ([facility]) 01/28/2019    Class 1 obesity due to excess calories with serious comorbidity and body mass index (BMI) of 31.0 to 31.9 in adult 02/14/2020    COPD (chronic obstructive pulmonary disease) ([facility])     Coronary artery disease of native artery of native heart with stable angina pectoris ([facility]ly [facility])     Extremity pain     Hemiparesis ([facility]) 11/01/2022    Hiatal hernia     History of echocardiogram 03/18/2014    EF 55%, mild MR and AI.  Mild concentric LVH.    History of stress test 03/06/2017    Normal.    Hyperlipidemia     Hypertension     Joint pain     Kidney stone     Low back pain     Lung cancer (HCC)     Migraine     Neck pain     Obstructive sleep apnea     cannot tolerate CPAP    Osteoarthritis     Other chronic pancreatitis ([facility]) 02/10/2023    Peripheral neuropathy     Reflex sympathetic dystrophy     Sacroiliitis ([facility]) 02/02/2022           Social History     Tobacco Use   Smoking Status Every Day    Current packs/day: 1.00    Average packs/day: 1 pack/day for 1.3 years (1.3 ttl pk-yrs)    Types:  Cigarettes    Start date: 11/2023   Smokeless Tobacco Never

## 2025-03-06 NOTE — ASSESSMENT & PLAN NOTE
Cautioned patient about use of Viagra and NTG within 24 hours of each other  Cont Asa 81 mg QD, amlodipine 10 mg QD

## 2025-03-06 NOTE — PATIENT INSTRUCTIONS
"Patient Education     Coronary artery disease   The Basics   Written by the doctors and editors at [facility]   What is coronary artery disease? -- Coronary artery disease is a condition that puts you at risk for heart attack and other forms of heart disease. In people who have coronary artery disease, the arteries that supply blood to the heart get clogged with fatty deposits (figure 1).  Other names for this disease are \"coronary heart disease\" or just \"heart disease.\"  What are the symptoms of coronary artery disease? -- Many people have no symptoms. For those who do, the most common symptoms usually happen with exercise. They can include:   Pain, pressure, or discomfort in the center of the chest - This type of chest pain is called \"angina.\"   Pain, tingling, or discomfort in other parts of the upper body - This might include the arms, back, neck, jaw, or stomach.   Feeling short of breath  What are the symptoms of a heart attack? -- The first symptom of coronary artery disease can be a heart attack (figure 2). That's why it is so important to know how to spot a heart attack.  The symptoms of a heart attack can include:   Pain, pressure, or discomfort in the center of the chest   Pain, tingling, or discomfort in other parts of the upper body, including the arms, back, neck, jaw, or stomach   Shortness of breath   Nausea, vomiting, burping, or heartburn   Sweating or cold, clammy skin   Racing or uneven heartbeat   Feeling dizzy or lightheaded  If these symptoms last more than 10 minutes or they keep coming and going, call for an ambulance right away (in the [place] and [place], call 9-1-1). Do not try to get to the hospital on your own.  Some people with coronary artery disease have chest pain even when they are not having a heart attack. This is most likely to happen when they are walking, going up stairs, or moving around. But if you have chest pain that is new or different, see a doctor right away.  Is there a test " Chief Complaint  Left flank pain    History Of Present Illness  Mr. Sung Anglin, is a 28 yo M with hx of kidney stones, prior stent, presenting with left flank pain.  Symptoms started today.  Has severe, similar pain in the left flank with a small amount of blood in his urine.  No fevers or chills.  He has been nauseated with bouts of emesis.  Came to the hospital for further evaluation.    In the ED, vitals afebrile, , RR 20, /78, normal sats.  Labs with wbc 11.7, hgb 15.6, plt 279, sodium 135, K 3.5, glucose 161, creatinine 1.01, normal LFT's, UA with trace leukesterase, neg nitrite, few bacteria.  CT abdomen with left hydronephrosis with 1.4 cm stone in left ureter.  Urology consulted.  Hospitalized for further care.      Review of Systems:  Constitutional: Negative other than as per history of present illness  Skin: Negative other than as per history of present illness  Eyes: Negative other than as per history of present illness  ENT: Negative other than as per history of present illness  Endocrine: Negative other than as per history of present illness  CV: Negative other than as per history of present illness  Respiratory: Negative other than as per history of present illness  GI: Negative other than as per history of present illness  Musculoskeletal: Negative other than as per history of present illness  Neurologic: Negative other than as per history of present illness  Hematologic: Negative other than as per history of present illness  Psychiatric: Negative other than as per history of present illness    Past Medical History  Past Medical History:   Diagnosis Date    Kidney stone         Surgical History  Past Surgical History:   Procedure Laterality Date    Ureteral stent placement          Social History  Social History     Socioeconomic History    Marital status: /Civil Union     Spouse name: Not on file    Number of children: Not on file    Years of education: Not on file    Highest  "for coronary artery disease? -- Yes. If your doctor or nurse thinks that you might have coronary artery disease, they might order blood tests and 1 or more of these tests:   Electrocardiogram (\"ECG\") - This test measures the electrical activity in your heart.   Stress test - This is also called an exercise test. For this test, you might be asked to run or walk on a treadmill while you also have an ECG. Physical activity increases the heart's need for blood. This test helps doctors see if the heart is getting enough blood. If you cannot walk or run, your doctor might give you a medicine to make your heart pump faster.   Echocardiogram - This test uses sound waves to create an image of your heart as it beats.   Cardiac catheterization (\"cardiac cath\") - During this test, the doctor puts a thin tube into a blood vessel in your leg or arm. Then, they move the tube up to your heart. Next, the doctor puts a dye that shows up on X-ray into the tube. This part of the test is called \"coronary angiography.\" It can show whether any of the arteries in your heart are clogged.  How is coronary artery disease treated? -- The main treatments for coronary artery disease are:   Lifestyle changes - To reduce your risk of heart attack and death, you should:   Quit smoking, if you smoke. Your doctor or nurse can help with this.   Eat lots of fruits, vegetables, and foods with a lot of fiber. Avoid foods with a lot of sugar.   Walk or do some form of physical activity on most days of the week.   Try to lose weight, if you have excess body weight.   Medicines - The medicines to treat heart disease are very important. Some medicines lower your risk of heart attacks and can help you live longer. But you must take them every day, as instructed. Your doctor might prescribe:   Medicines called \"statins,\" which lower cholesterol   Medicines to lower blood pressure   Aspirin or other medicines that help prevent blood clots   Medicines to treat " education level: Not on file   Occupational History    Not on file   Tobacco Use    Smoking status: Never    Smokeless tobacco: Never   Vaping Use    Vaping status: never used   Substance and Sexual Activity    Alcohol use: Yes     Alcohol/week: 1.0 standard drink of alcohol     Types: 1 Standard drinks or equivalent per week     Comment: Occasionally    Drug use: Never    Sexual activity: Not on file   Other Topics Concern    Not on file   Social History Narrative    Not on file     Social Determinants of Health     Financial Resource Strain: Low Risk  (3/9/2024)    Financial Resource Strain     Unable to Get: None   Food Insecurity: Low Risk  (3/9/2024)    Food Insecurity     Worried about Food: Never true     Food is Gone: Never true   Transportation Needs: Not At Risk (3/9/2024)    Transportation Needs     Lack of Reliable Transportation: No   Physical Activity: Not on file   Stress: Not on file   Social Connections: Low Risk  (3/9/2024)    Social Connections     Social Connectivity: 3 to 5 times a week   Interpersonal Safety: Low Risk  (10/15/2024)    Interpersonal Safety     How often physically hurt: Never     How often insulted or talked down to: Never     How often threatened with harm: Never     How often scream or curse at: Never        Family History    Family History   Problem Relation Age of Onset    Diabetes Father     Patient is unaware of any medical problems Other         Allergies  ALLERGIES:  Patient has no known allergies.    Medications  Current Facility-Administered Medications   Medication    cefTRIAXone (ROCEPHIN) syringe 2,000 mg     No current outpatient medications on file.         Vitals:  .Visit Vitals  /64   Pulse 88   Temp 97.9 °F (36.6 °C) (Temporal)   Resp 18   Wt 77 kg (169 lb 12.1 oz)   SpO2 98%   BMI 27.44 kg/m²        Physical Exam:  General: Awake and alert  HEENT: EOMI, Normocephalic  Neck: Supple, no lymphadenopathy  Lung: Equal breaths, no wheeze, rhonchi or  "diabetes  People who have chest pain caused by coronary artery disease (called angina) can also get medicines to relive their pain. These medicines might include \"nitrates,\" \"beta blockers,\" and others.  Some people with coronary artery disease can also have:   Stenting - The doctor puts a thin plastic tube into the blocked artery, and uses a tiny balloon to open the blockage. Then, the doctor leaves a tiny mesh tube called a \"stent\" inside the artery to hold it open.   Bypass surgery - This is also known as \"coronary artery bypass grafting\" (\"CABG\"). During this surgery, the doctor removes a piece of blood vessel from another part of the body. Then, they reattach the blood vessel above and below the area that is clogged. This re-routes blood around the clog and allows it to get to the part of the heart that was not getting blood (figure 3).  If your doctor recommends stenting or bypass surgery, ask these questions:   What are the benefits of this procedure for me? Will it help me live longer? Will it reduce my chance of having a heart attack? Will I feel better if I have this procedure?   What are the risks of this procedure?   What happens if I don't have this procedure?  All topics are updated as new evidence becomes available and our peer review process is complete.  This topic retrieved from [employer] on: Apr 13, 2024.  Topic 65966 Version 33.0  Release: 32.3.2 - C32.102  © 2024 UpToDate, Inc. and/or its affiliates. All rights reserved.  figure 1: Coronary heart disease     In people with coronary heart disease, the coronary arteries get clogged with fatty deposits called plaques.  Graphic 61252 Version 5.0  figure 2: Heart attack symptoms     This picture shows the main symptoms of a heart attack. People who are having a heart attack often have only some of these symptoms. The pain, pressure, and discomfort caused by a heart attack mostly affect the left side of the body, but can also affect the right.  Women " crackles  Cardiac: No murmur.  Regular Rate and Rhythm  Abdomen: No guarding, no distension  Musculoskeletal: No deformities  Neurologic: No focal deficits  Skin: Normal turgor  Psychiatric: Cooperative    Imaging    CT ABDOMEN PELVIS FOR KIDNEY STONES WO CONTRAST   Final Result   Interval removal of left ureteral stent. Stable severe left hydronephrosis   with a 1.4 cm stone in the left ureter at the L3-L4 level, similar to   prior. Multiple 6 mm and smaller left kidney stones.            Electronically Signed by: DUSTIN ESPINAL M.D.    Signed on: 10/15/2024 7:06 PM    Workstation ID: [id number]           Labs     Admission on 10/15/2024   Component Date Value Ref Range Status    Sodium 10/15/2024 135  135 - 145 mmol/L Final    Potassium 10/15/2024 3.5  3.4 - 5.1 mmol/L Final    Chloride 10/15/2024 99  97 - 110 mmol/L Final    Carbon Dioxide 10/15/2024 25  21 - 32 mmol/L Final    Anion Gap 10/15/2024 15  7 - 19 mmol/L Final    Glucose 10/15/2024 161 (H)  70 - 99 mg/dL Final    BUN 10/15/2024 19  6 - 20 mg/dL Final    Creatinine 10/15/2024 1.01  0.67 - 1.17 mg/dL Final    Glomerular Filtration Rate 10/15/2024 >90  >=60 Final    eGFR results = or >60 mL/min/1.73m2 = Normal kidney function. Estimated GFR calculated using the CKD-EPI-R (2021) equation that does not include race in the creatinine calculation.    BUN/Cr 10/15/2024 19  7 - 25 Final    Calcium 10/15/2024 9.4  8.4 - 10.2 mg/dL Final    Bilirubin, Total 10/15/2024 0.5  0.2 - 1.0 mg/dL Final    GOT/AST 10/15/2024 18  <=37 Units/L Final    GPT/ALT 10/15/2024 39  <64 Units/L Final    Alkaline Phosphatase 10/15/2024 104  45 - 117 Units/L Final    Albumin 10/15/2024 4.3  3.4 - 5.0 g/dL Final    Protein, Total 10/15/2024 7.9  6.4 - 8.2 g/dL Final    Globulin 10/15/2024 3.6  2.0 - 4.0 g/dL Final    A/G Ratio 10/15/2024 1.2  1.0 - 2.4 Final    Lipase 10/15/2024 17  15 - 77 Units/L Final    COLOR, URINALYSIS 10/15/2024 Yellow   Final    APPEARANCE, URINALYSIS  "are more likely than men to have to have symptoms other than chest pain. But chest pain or discomfort is the most common symptom of a heart attack in both women and men.  If you think that you are having a heart attack, call for an ambulance (in the [place] and [place], call 9-1-1). Do not try to get yourself to the hospital.  Graphic 63768 Version 4.0  figure 3: Coronary artery bypass graft surgery     During coronary artery bypass surgery, the surgeon removes a piece of blood vessel from the leg, chest, arm, or belly. Then, the surgeon uses that piece of blood vessel (called a \"graft\") to reroute blood around the blocked artery. The surgery is called \"bypass surgery\" because it bypasses the blockage. Some people have more than 1 blocked artery bypassed. In this picture, the graft came from a vein in the leg called the \"saphenous vein.\" But grafts can come from other places, too.  Graphic 14774 Version 6.0  Consumer Information Use and Disclaimer   Disclaimer: This generalized information is a limited summary of diagnosis, treatment, and/or medication information. It is not meant to be comprehensive and should be used as a tool to help the user understand and/or assess potential diagnostic and treatment options. It does NOT include all information about conditions, treatments, medications, side effects, or risks that may apply to a specific patient. It is not intended to be medical advice or a substitute for the medical advice, diagnosis, or treatment of a health care provider based on the health care provider's examination and assessment of a patient's specific and unique circumstances. Patients must speak with a health care provider for complete information about their health, medical questions, and treatment options, including any risks or benefits regarding use of medications. This information does not endorse any treatments or medications as safe, effective, or approved for treating a specific patient. UpToDate, " 10/15/2024 Turbid   Final    GLUCOSE, URINALYSIS 10/15/2024 Negative  Negative mg/dL Final    BILIRUBIN, URINALYSIS 10/15/2024 Negative  Negative Final    KETONES, URINALYSIS 10/15/2024 20 (A)  Negative mg/dL Final    SPECIFIC GRAVITY, URINALYSIS 10/15/2024 1.025  1.005 - 1.030 Final    Measured by refractometry    OCCULT BLOOD, URINALYSIS 10/15/2024 Large (A)  Negative Final    PH, URINALYSIS 10/15/2024 8.0 (H)  5.0 - 7.0 Final    PROTEIN, URINALYSIS 10/15/2024 100 (A)  Negative mg/dL Final    UROBILINOGEN, URINALYSIS 10/15/2024 0.2  0.2, 1.0 mg/dL Final    NITRITE, URINALYSIS 10/15/2024 Negative  Negative Final    LEUKOCYTE ESTERASE, URINALYSIS 10/15/2024 Trace (A)  Negative Final    SQUAMOUS EPITHELIAL, URINALYSIS 10/15/2024 None Seen  None Seen, 1 to 5 /hpf Final    ERYTHROCYTES, URINALYSIS 10/15/2024 >100 (A)  None Seen, 1 to 2 /hpf Final    LEUKOCYTES, URINALYSIS 10/15/2024 6 to 10 (A)  None Seen, 1 to 5 /hpf Final    BACTERIA, URINALYSIS 10/15/2024 Few (A)  None Seen /hpf Final    HYALINE CASTS, URINALYSIS 10/15/2024 None Seen  None Seen, 1 to 5 /lpf Final    AMORPHOUS MATERIAL 10/15/2024 Present   Final    WBC 10/15/2024 11.7 (H)  4.2 - 11.0 K/mcL Final    RBC 10/15/2024 5.30  4.50 - 5.90 mil/mcL Final    HGB 10/15/2024 15.6  13.0 - 17.0 g/dL Final    HCT 10/15/2024 45.0  39.0 - 51.0 % Final    MCV 10/15/2024 84.9  78.0 - 100.0 fl Final    MCH 10/15/2024 29.4  26.0 - 34.0 pg Final    MCHC 10/15/2024 34.7  32.0 - 36.5 g/dL Final    RDW-CV 10/15/2024 12.2  11.0 - 15.0 % Final    RDW-SD 10/15/2024 37.1 (L)  39.0 - 50.0 fL Final    PLT 10/15/2024 279  140 - 450 K/mcL Final    NRBC 10/15/2024 0  <=0 /100 WBC Final    Neutrophil, Percent 10/15/2024 90  % Final    Lymphocytes, Percent 10/15/2024 7  % Final    Mono, Percent 10/15/2024 3  % Final    Eosinophils, Percent 10/15/2024 0  % Final    Basophils, Percent 10/15/2024 0  % Final    Immature Granulocytes 10/15/2024 0  % Final    Absolute Neutrophils 10/15/2024  10.5 (H)  1.8 - 7.7 K/mcL Final    Absolute Lymphocytes 10/15/2024 0.8 (L)  1.0 - 4.8 K/mcL Final    Absolute Monocytes 10/15/2024 0.4  0.3 - 0.9 K/mcL Final    Absolute Eosinophils  10/15/2024 0.0  0.0 - 0.5 K/mcL Final    Absolute Basophils 10/15/2024 0.0  0.0 - 0.3 K/mcL Final    Absolute Immature Granulocytes 10/15/2024 0.0  0.0 - 0.2 K/mcL Final      Assessment and Plan:  Mr. Sung Anglin, is a 30 yo M with hx of kidney stones, prior stent, presenting with left flank pain.    #Left Obstructing Ureteral Stone with Hydronephrosis  -Presents with left flank pain, hematuria  -CT abdomen with 1.4 cm left ureter stone with severe hydronephrosis  -UA with blood, trace leukesterase and few bacteria  -Received on empiric dose rocephin in ED.  Will wait on urine culture  -IV fluids, flomax, strain urine  -Morphine prn pain  -Urology on consult  -NPO after midnight    DVT Prophylaxis: lovenox sq  Code Status: Full Code    Primary Care Physician  Rivera Silver MD           Inc. and its affiliates disclaim any warranty or liability relating to this information or the use thereof.The use of this information is governed by the Terms of Use, available at https://www.wolterskluwer.com/en/know/clinical-effectiveness-terms. 2024© [employer] and its affiliates and/or licensors. All rights reserved.  Copyright   © 2024 [employer] and/or its affiliates. All rights reserved.

## 2025-03-06 NOTE — PROGRESS NOTES
Assessment:  1. Chronic pain syndrome    2. Neck pain    3. Cervical spondylosis without myelopathy    4. Cervical radiculopathy    5. Chronic midline low back pain without sciatica    6. Lumbar spondylosis    7. Lumbar degenerative disc disease    8. Neuropathic pain    9. Myofascial pain syndrome    10. Uncomplicated opioid dependence (HCC)    11. Encounter for long-term opiate analgesic use        Plan:  While the patient was in the office today, I did have a thorough conversation regarding their chronic pain syndrome, medication management, and treatment plan options.  Patient is being seen for a follow-up visit.  He was last seen here on 1/16/2025.  New MRI results were reviewed with him at that time.  We discussed possibility of performing an L5-S1 interlaminar epidural steroid injections.  Also discussed possibility of medial branch blocks.  Patient would like to move forward with the L5-S1 interlaminar epidural steroid injection.    Patient will be scheduled for an L5-S1 interlaminar epidural steroid injection.    Patient was referred to physical therapy.  He was scheduled to start, but had to cancel due to influenza.  He hopes to start therapy in the near future.    Continue oxycodone 5/325 every 8 hours as needed for pain.  The patient's opioid scripts were sent to their pharmacy electronically and was given a 2 month supply of prescriptions with a Do Not Fill date(s) of 3/13/2025, 4/10/2025.    Continue gabapentin 100 mg 3 times daily as prescribed by his PCP.    Continue tizanidine 4 mg every 8 hours as prescribed by his PCP.    [place] Prescription Drug Monitoring Program report was reviewed and was appropriate     A urine drug screen was collected at today's office visit as part of our medication management protocol. The point of care testing results were appropriate for what was being prescribed. The specimen will be sent for confirmatory testing. The drug screen is medically necessary because  the patient is either dependent on opioid medication or is being considered for opioid medication therapy and the results could impact ongoing or future treatment. The drug screen is to evaluate for the presences or absence of prescribed, non-prescribed, and/or illicit drugs/substances.    There are risks associated with opioid medications, including dependence, addiction and tolerance. The patient understands and agrees to use these medications only as prescribed. Potential side effects of the medications include, but are not limited to, constipation, drowsiness, addiction, impaired judgment and risk of fatal overdose if not taken as prescribed. The patient was warned against driving while taking sedation medications.  Sharing medications is a felony. At this point in time, the patient is showing no signs of addiction, abuse, diversion or suicidal ideation.    Complete risks and benefits including bleeding, infection, tissue reaction, nerve injury and allergic reaction were discussed. The approach was demonstrated using models and literature was provided. Verbal and written consent was obtained.    While the patient was in the office today an opioid contract was thoroughly reviewed and signed by the patient. The patient was given adequate time to ask questions in regards to the contract and a signed copy was sent home for his/her records.      The patient will follow-up in 2 months for medication prescription refill and reevaluation. The patient was advised to contact the office should their symptoms worsen in the interim. The patient was agreeable and verbalized an understanding.        History of Present Illness:    The patient is a 69 y.o. male last seen on 1/16/2025 who presents for a follow up office visit in regards to chronic pain secondary to chronic low back pain, lumbar spondylosis, lumbar degenerative disc disease, chronic neck pain, cervical spondylosis, cervical radiculopathy.  The patient currently  reports complaints of neck pain that radiates to both shoulders.  Complaints of low back pain.  Current pain level is a 7/10.  Quality of pain is described as sharp, throbbing, shooting    Current pain medications includes: Oxycodone 5/325 every 8 hours as needed for pain.  PCP prescribes gabapentin 100 mg 3 times daily and tizanidine 4 mg every 8 hours as needed for spasms .  The patient reports that this regimen is providing 30-40% pain relief.  The patient is reporting no side effects from this pain medication regimen.    Pain Contract Signed: 3/6/25  Last Urine Drug Screen: 3/6/25    I have personally reviewed and/or updated the patient's past medical history, past surgical history, family history, social history, current medications, allergies, and vital signs today.       Review of Systems:    Review of Systems   Constitutional:  Negative for unexpected weight change.   HENT:  Negative for hearing loss.    Eyes:  Negative for visual disturbance.   Respiratory:  Positive for shortness of breath.    Cardiovascular:  Negative for leg swelling.   Gastrointestinal:  Positive for constipation.   Endocrine: Negative for polyuria.   Genitourinary:  Negative for difficulty urinating.   Musculoskeletal:  Positive for gait problem. Negative for joint swelling and myalgias.        Decreased range of motion  Joint stiffness   Skin:  Negative for rash.   Neurological:  Positive for dizziness. Negative for weakness and headaches.   Psychiatric/Behavioral:  Negative for decreased concentration.    All other systems reviewed and are negative.      Past Medical History:   Diagnosis Date    Abdominal aortic aneurysm without rupture (HCC)     Abdominal Aortic Duplex 02/21/2017    Ectatic infrarenal abdominal aorta.    MARYANN (acute kidney injury) ([facility]) 07/23/2022    CAD (coronary artery disease)     Cancer ([facility]) 2022    right lung CA    Chronic bronchitis ([facility]) 01/28/2019    Class 1 obesity due to excess calories with serious  comorbidity and body mass index (BMI) of 31.0 to 31.9 in adult 02/14/2020    COPD (chronic obstructive pulmonary disease) (HCC)     Coronary artery disease of native artery of native heart with stable angina pectoris (HCC)     Extremity pain     Hemiparesis ([facility]) 11/01/2022    Hiatal hernia     History of echocardiogram 03/18/2014    EF 55%, mild MR and AI.  Mild concentric LVH.    History of stress test 03/06/2017    Normal.    Hyperlipidemia     Hypertension     Joint pain     Kidney stone     Low back pain     Lung cancer (HCC)     Migraine     Neck pain     Obstructive sleep apnea     cannot tolerate CPAP    Osteoarthritis     Other chronic pancreatitis (HCC) 02/10/2023    Peripheral neuropathy     Reflex sympathetic dystrophy     Sacroiliitis ([facility]) 02/02/2022       Past Surgical History:   Procedure Laterality Date    CARDIAC CATHETERIZATION  02/13/2012    EF 70%, widely patent renal arteries, significant single-vessel CAD-medical therapy.    CARDIAC CATHETERIZATION  04/11/2013    EF 65%, 50% mid LAD, 20% prox CFX, 90% diffuse RCA, 99% mid RCA. Medical management.    CATARACT EXTRACTION Right 09/19/2023    CHOLECYSTECTOMY      COLONOSCOPY      EPIDURAL BLOCK INJECTION Bilateral 08/15/2019    Procedure: BLOCK / INJECTION EPIDURAL STEROID CERVICAL;  Surgeon: Ricardo Park MD;  Location: [facility];  Service: Pain Management     FL GUIDED NEEDLE PLAC BX/ASP/INJ  08/15/2019    IR BIOPSY LUNG  09/13/2022    IR THORACIC DUCT EMBOLIZATION  11/22/2022    ORTHOPEDIC SURGERY      ID BRNCC INCL FLUOR GDNCE DX W/CELL WASHG SPX N/A 11/16/2022    Procedure: BRONCHOSCOPY FLEXIBLE;  Surgeon: Gulshan Madison MD;  Location: [facility];  Service: Thoracic    ID THORACOSCOPY W/LOBECTOMY SINGLE LOBE Right 11/16/2022    Procedure: LOBECTOMY LUNG; lower lobe superior segmentectomy, mediastinal lymph node dissection;  Surgeon: Gulshan Madison MD;  Location: [facility];  Service: Thoracic    ID THORACOSCOPY  W/SEGMENTECTOMY Right 11/16/2022    Procedure: THORACOSCOPY VIDEO ASSISTED SURGERY (VATS);  Surgeon: Gulshan Madison MD;  Location: [facility];  Service: Thoracic    TRIGGER POINT INJECTION         Family History   Adopted: Yes   Problem Relation Age of Onset    No Known Problems Family     No Known Problems Mother     No Known Problems Father        Social History     Occupational History    Not on file   Tobacco Use    Smoking status: Every Day     Current packs/day: 1.00     Average packs/day: 1 pack/day for 1.3 years (1.3 ttl pk-yrs)     Types: Cigarettes     Start date: 11/2023    Smokeless tobacco: Never   Vaping Use    Vaping status: Never Used   Substance and Sexual Activity    Alcohol use: Not Currently    Drug use: Never    Sexual activity: Yes         Current Outpatient Medications:     albuterol (Ventolin HFA) 90 mcg/act inhaler, Inhale 2 puffs every 6 (six) hours as needed for wheezing, Disp: 18 g, Rfl: 5    amLODIPine (NORVASC) 10 mg tablet, Take 1 tablet (10 mg total) by mouth daily, Disp: 90 tablet, Rfl: 1    aspirin 81 mg chewable tablet, Chew 1 tablet (81 mg total) daily, Disp: 90 tablet, Rfl: 1    bisacodyl (DULCOLAX) 5 mg EC tablet, Take 1 tablet (5 mg total) by mouth daily as needed for constipation for up to 4 doses, Disp: 30 tablet, Rfl: 0    cyanocobalamin (VITAMIN B-12) 1000 MCG tablet, Take 1 tablet (1,000 mcg total) by mouth daily, Disp: 90 tablet, Rfl: 3    folic acid (KP Folic Acid) 1 mg tablet, Take 1 tablet (1 mg total) by mouth daily, Disp: 30 tablet, Rfl: 6    gabapentin (NEURONTIN) 300 mg capsule, Take 1 capsule (300 mg total) by mouth 3 (three) times a day, Disp: 90 capsule, Rfl: 1    glucose blood (OneTouch Verio) test strip, Test once daily, Disp: 100 each, Rfl: 0    lisinopril (ZESTRIL) 10 mg tablet, Take 1 tablet (10 mg total) by mouth daily, Disp: 90 tablet, Rfl: 1    metoprolol succinate (TOPROL-XL) 100 mg 24 hr tablet, Take 1 tablet (100 mg total) by mouth daily,  "Disp: 90 tablet, Rfl: 1    oxyCODONE-acetaminophen (Percocet) 7.5-325 MG per tablet, Take 1 tablet by mouth every 8 (eight) hours as needed for moderate pain Max Daily Amount: 3 tablets, Disp: 90 tablet, Rfl: 0    Restasis 0.05 % ophthalmic emulsion, , Disp: , Rfl:     rosuvastatin (CRESTOR) 40 MG tablet, Take 1 tablet (40 mg total) by mouth daily, Disp: 90 tablet, Rfl: 1    sildenafil (VIAGRA) 25 MG tablet, Take 1 tablet (25 mg total) by mouth as needed for erectile dysfunction, Disp: 30 tablet, Rfl: 3    tiotropium-olodaterol (Stiolto Respimat) 2.5-2.5 MCG/ACT inhaler, Inhale 2 puffs daily (Patient taking differently: Inhale 2 puffs if needed States uses as needed), Disp: 4 g, Rfl: 5    tiZANidine (ZANAFLEX) 4 mg tablet, take 1 tablet by mouth every 8 hours if needed for muscle spasm, Disp: 90 tablet, Rfl: 1    traZODone (DESYREL) 100 mg tablet, Take 1 tablet (100 mg total) by mouth daily at bedtime, Disp: 90 tablet, Rfl: 1    fluticasone (FLONASE) 50 mcg/act nasal spray, 1 spray into each nostril daily for 14 days, Disp: 11.1 mL, Rfl: 0    naloxone (NARCAN) 4 mg/0.1 mL nasal spray, Administer 1 spray into a nostril. If no response after 2-3 minutes, give another dose in the other nostril using a new spray., Disp: 1 each, Rfl: 1    tamsulosin (FLOMAX) 0.4 mg, Take 1 capsule (0.4 mg total) by mouth daily with dinner for 3 days, Disp: 3 capsule, Rfl: 0    No Known Allergies    Physical Exam:    Ht 5' 8\" (1.727 m)   Wt 89.4 kg (197 lb)   BMI 29.95 kg/m²     Constitutional:normal, well developed, well nourished, alert, in no distress and non-toxic and no overt pain behavior.  Eyes:anicteric  HEENT:grossly intact  Neck:supple, symmetric, trachea midline and no masses   Pulmonary:even and unlabored  Cardiovascular:No edema or pitting edema present  Skin:Normal without rashes or lesions and well hydrated  Psychiatric:Mood and affect appropriate  Neurologic:Cranial Nerves II-XII grossly intact  Musculoskeletal: Gait is " slow and guarded.      Imaging  No orders to display         No orders of the defined types were placed in this encounter.

## 2025-03-14 ENCOUNTER — APPOINTMENT (OUTPATIENT)
Dept: LAB | Facility: HOSPITAL | Age: 70
End: 2025-03-14
Payer: COMMERCIAL

## 2025-03-14 ENCOUNTER — TELEPHONE (OUTPATIENT)
Age: 70
End: 2025-03-14

## 2025-03-14 ENCOUNTER — HOSPITAL ENCOUNTER (OUTPATIENT)
Dept: CT IMAGING | Facility: HOSPITAL | Age: 70
Discharge: HOME | End: 2025-03-14
Attending: THORACIC SURGERY (CARDIOTHORACIC VASCULAR SURGERY)
Payer: COMMERCIAL

## 2025-03-14 DIAGNOSIS — E53.8 FOLATE DEFICIENCY: ICD-10-CM

## 2025-03-14 DIAGNOSIS — C34.31 PRIMARY SQUAMOUS CELL CARCINOMA OF LOWER LOBE OF RIGHT LUNG (HCC): ICD-10-CM

## 2025-03-14 DIAGNOSIS — D64.9 ANEMIA, UNSPECIFIED TYPE: ICD-10-CM

## 2025-03-14 DIAGNOSIS — R73.03 PRE-DIABETES: ICD-10-CM

## 2025-03-14 DIAGNOSIS — N18.31 STAGE 3A CHRONIC KIDNEY DISEASE (HCC): ICD-10-CM

## 2025-03-14 DIAGNOSIS — R50.9 FEVER, UNSPECIFIED FEVER CAUSE: ICD-10-CM

## 2025-03-14 LAB
ALBUMIN SERPL BCG-MCNC: 4.3 G/DL (ref 3.5–5)
ALP SERPL-CCNC: 59 U/L (ref 34–104)
ALT SERPL W P-5'-P-CCNC: 9 U/L (ref 7–52)
ANION GAP SERPL CALCULATED.3IONS-SCNC: 8 MMOL/L (ref 4–13)
AST SERPL W P-5'-P-CCNC: 11 U/L (ref 13–39)
BACTERIA UR QL AUTO: ABNORMAL /HPF
BILIRUB SERPL-MCNC: 0.79 MG/DL (ref 0.2–1)
BILIRUB UR QL STRIP: NEGATIVE
BUN SERPL-MCNC: 22 MG/DL (ref 5–25)
CALCIUM SERPL-MCNC: 9.2 MG/DL (ref 8.4–10.2)
CAOX CRY URNS QL MICRO: ABNORMAL /HPF
CHLORIDE SERPL-SCNC: 103 MMOL/L (ref 96–108)
CLARITY UR: CLEAR
CO2 SERPL-SCNC: 27 MMOL/L (ref 21–32)
COLOR UR: YELLOW
CREAT SERPL-MCNC: 1.12 MG/DL (ref 0.6–1.3)
CRP SERPL QL: 5.7 MG/L
ERYTHROCYTE [DISTWIDTH] IN BLOOD BY AUTOMATED COUNT: 13.5 % (ref 11.6–15.1)
EST. AVERAGE GLUCOSE BLD GHB EST-MCNC: 137 MG/DL
FERRITIN SERPL-MCNC: 178 NG/ML (ref 24–336)
FOLATE SERPL-MCNC: >22.3 NG/ML
GFR SERPL CREATININE-BSD FRML MDRD: 66 ML/MIN/1.73SQ M
GLUCOSE SERPL-MCNC: 106 MG/DL (ref 65–140)
GLUCOSE UR STRIP-MCNC: NEGATIVE MG/DL
HBA1C MFR BLD: 6.4 %
HCT VFR BLD AUTO: 46.7 % (ref 36.5–49.3)
HGB BLD-MCNC: 15.1 G/DL (ref 12–17)
HGB UR QL STRIP.AUTO: ABNORMAL
HYALINE CASTS #/AREA URNS LPF: ABNORMAL /LPF
KETONES UR STRIP-MCNC: NEGATIVE MG/DL
LEUKOCYTE ESTERASE UR QL STRIP: ABNORMAL
MCH RBC QN AUTO: 30.4 PG (ref 26.8–34.3)
MCHC RBC AUTO-ENTMCNC: 32.3 G/DL (ref 31.4–37.4)
MCV RBC AUTO: 94 FL (ref 82–98)
MUCOUS THREADS UR QL AUTO: ABNORMAL
NITRITE UR QL STRIP: NEGATIVE
NON-SQ EPI CELLS URNS QL MICRO: ABNORMAL /HPF
PH UR STRIP.AUTO: 5.5 [PH]
PLATELET # BLD AUTO: 184 THOUSANDS/UL (ref 149–390)
PMV BLD AUTO: 10.5 FL (ref 8.9–12.7)
POTASSIUM SERPL-SCNC: 4 MMOL/L (ref 3.5–5.3)
PROT SERPL-MCNC: 6.8 G/DL (ref 6.4–8.4)
PROT UR STRIP-MCNC: ABNORMAL MG/DL
RBC # BLD AUTO: 4.97 MILLION/UL (ref 3.88–5.62)
RBC #/AREA URNS AUTO: ABNORMAL /HPF
RHEUMATOID FACT SERPL-ACNC: <10 IU/ML
SODIUM SERPL-SCNC: 138 MMOL/L (ref 135–147)
SP GR UR STRIP.AUTO: 1.02 (ref 1–1.03)
TSH SERPL DL<=0.05 MIU/L-ACNC: 1.2 UIU/ML (ref 0.45–4.5)
UROBILINOGEN UR STRIP-ACNC: 2 MG/DL
WBC # BLD AUTO: 10.84 THOUSAND/UL (ref 4.31–10.16)
WBC #/AREA URNS AUTO: ABNORMAL /HPF

## 2025-03-14 PROCEDURE — 81001 URINALYSIS AUTO W/SCOPE: CPT

## 2025-03-14 PROCEDURE — 86665 EPSTEIN-BARR CAPSID VCA: CPT

## 2025-03-14 PROCEDURE — 86664 EPSTEIN-BARR NUCLEAR ANTIGEN: CPT

## 2025-03-14 PROCEDURE — 86618 LYME DISEASE ANTIBODY: CPT

## 2025-03-14 PROCEDURE — 86038 ANTINUCLEAR ANTIBODIES: CPT

## 2025-03-14 PROCEDURE — 86140 C-REACTIVE PROTEIN: CPT

## 2025-03-14 PROCEDURE — 86663 EPSTEIN-BARR ANTIBODY: CPT

## 2025-03-14 PROCEDURE — 86431 RHEUMATOID FACTOR QUANT: CPT

## 2025-03-14 PROCEDURE — 84443 ASSAY THYROID STIM HORMONE: CPT

## 2025-03-14 PROCEDURE — 36415 COLL VENOUS BLD VENIPUNCTURE: CPT

## 2025-03-14 PROCEDURE — 82728 ASSAY OF FERRITIN: CPT

## 2025-03-14 PROCEDURE — 83036 HEMOGLOBIN GLYCOSYLATED A1C: CPT

## 2025-03-14 PROCEDURE — 80053 COMPREHEN METABOLIC PANEL: CPT

## 2025-03-14 PROCEDURE — 87086 URINE CULTURE/COLONY COUNT: CPT

## 2025-03-14 PROCEDURE — 86225 DNA ANTIBODY NATIVE: CPT

## 2025-03-14 PROCEDURE — 71250 CT THORAX DX C-: CPT

## 2025-03-14 PROCEDURE — 85027 COMPLETE CBC AUTOMATED: CPT

## 2025-03-14 PROCEDURE — 82746 ASSAY OF FOLIC ACID SERUM: CPT

## 2025-03-14 NOTE — TELEPHONE ENCOUNTER
Patient is asking if the provider can review the labs that did come in and call him back. Patient states he saw on mychart a couple of abnormal labs and is concerned

## 2025-03-15 LAB
B BURGDOR IGG+IGM SER QL IA: NEGATIVE
BACTERIA UR CULT: NORMAL
EBV EA IGG SER QL IA: ABNORMAL
EBV NA IGG SER QL IA: POSITIVE
EBV VCA IGG SER QL IA: POSITIVE
EBV VCA IGM SER QL IA: NEGATIVE

## 2025-03-18 NOTE — TELEPHONE ENCOUNTER
Patient had recent labs and urine done ordered by Cristal he is wanting to know the results.. Please advise.

## 2025-03-19 DIAGNOSIS — R39.9 UTI SYMPTOMS: Primary | ICD-10-CM

## 2025-03-19 DIAGNOSIS — R82.2 BILIRUBIN IN URINE: Primary | ICD-10-CM

## 2025-03-19 LAB
DSDNA IGG SERPL IA-ACNC: 0.9 IU/ML (ref ?–15)
NUCLEAR IGG SER IA-RTO: 0.2 RATIO (ref ?–1)

## 2025-03-19 RX ORDER — SULFAMETHOXAZOLE AND TRIMETHOPRIM 800; 160 MG/1; MG/1
1 TABLET ORAL EVERY 12 HOURS SCHEDULED
Qty: 6 TABLET | Refills: 0 | Status: SHIPPED | OUTPATIENT
Start: 2025-03-19 | End: 2025-03-22

## 2025-03-19 NOTE — TELEPHONE ENCOUNTER
Called and made patient aware, states he is hydrating enough during the day but urine is still very dark in color and he is having trouble urinating.

## 2025-03-19 NOTE — TELEPHONE ENCOUNTER
When I spoke with patient he stated that he has to push while urinating and has a very weak stream, after urinating he has some pain and feels like he still has to go.

## 2025-03-20 ENCOUNTER — TELEPHONE (OUTPATIENT)
Dept: OTHER | Facility: OTHER | Age: 70
End: 2025-03-20

## 2025-03-20 NOTE — TELEPHONE ENCOUNTER
Spoke with Avery to advise that we have placed a call to the area that reads the imaging and that he should see results within the next few days.  Confirmed follow up appointment date and time with Dr. Madison.  Patient acknowledged understanding.

## 2025-03-20 NOTE — TELEPHONE ENCOUNTER
Patient calling inquiring about the results of his CT scan; has not been read by radiology yet. Can you please look into this? It has been 6 days since the study.

## 2025-03-21 ENCOUNTER — HOSPITAL ENCOUNTER (OUTPATIENT)
Dept: ULTRASOUND IMAGING | Facility: HOSPITAL | Age: 70
Discharge: HOME | End: 2025-03-21
Payer: COMMERCIAL

## 2025-03-21 DIAGNOSIS — R82.2 BILIRUBIN IN URINE: ICD-10-CM

## 2025-03-21 PROCEDURE — 76700 US EXAM ABDOM COMPLETE: CPT

## 2025-03-28 ENCOUNTER — TELEPHONE (OUTPATIENT)
Age: 70
End: 2025-03-28

## 2025-03-28 NOTE — TELEPHONE ENCOUNTER
I called and spoke with the pt in regards to his message about a letter he received regarding his recent CT scan results that was done on 03/14/25. The pt was previously scheduled for 04/29/25 with Dr. Madison, but he is requesting to be to be seen earlier due to the letter worrying him. I rescheduled his appointment for 04/01/2025 @ 1:40 pm. Pt verbalized understanding and was appreciative of the call.

## 2025-03-28 NOTE — TELEPHONE ENCOUNTER
Patient received letter from the hospital about his CT scan that was done on 3/14 stating that he should contact Dr. Madison to review results and follow-up with repeat scans.    Patient is requesting that Dr. Madison review results and call back at [phone] as this letter has him concerned and unsure.

## 2025-03-30 ENCOUNTER — RA CDI HCC (OUTPATIENT)
Dept: OTHER | Facility: HOSPITAL | Age: 70
End: 2025-03-30

## 2025-03-30 NOTE — PROGRESS NOTES
HCC coding opportunities       Chart reviewed, no opportunity found: CHART REVIEWED, NO OPPORTUNITY FOUND        Patients Insurance     Medicare Insurance: [employer] Medicare Complete           done

## 2025-04-01 ENCOUNTER — OFFICE VISIT (OUTPATIENT)
Dept: CARDIAC SURGERY | Facility: CLINIC | Age: 70
End: 2025-04-01
Payer: COMMERCIAL

## 2025-04-01 VITALS
BODY MASS INDEX: 29.37 KG/M2 | SYSTOLIC BLOOD PRESSURE: 138 MMHG | WEIGHT: 193.78 LBS | OXYGEN SATURATION: 96 % | HEART RATE: 71 BPM | RESPIRATION RATE: 16 BRPM | HEIGHT: 68 IN | TEMPERATURE: 98.1 F | DIASTOLIC BLOOD PRESSURE: 84 MMHG

## 2025-04-01 DIAGNOSIS — C34.31 PRIMARY SQUAMOUS CELL CARCINOMA OF LOWER LOBE OF RIGHT LUNG (HCC): Primary | ICD-10-CM

## 2025-04-01 DIAGNOSIS — J43.2 CENTRILOBULAR EMPHYSEMA (HCC): ICD-10-CM

## 2025-04-01 PROCEDURE — 99214 OFFICE O/P EST MOD 30 MIN: CPT | Performed by: THORACIC SURGERY (CARDIOTHORACIC VASCULAR SURGERY)

## 2025-04-01 NOTE — ASSESSMENT & PLAN NOTE
Mr. Hazel has no clinical or radiographic appearance of recurrent lung cancer.  The small nodule is trachea I think represents some mucus but we will repeat his CT scan in 3 months to be certain.  Otherwise we will see him back in 12 months with a noncontrasted CT scan of the chest to continue his cancer follow-up.  He knows and call the office at anytime with questions or concerns.  I will also schedule him for    Orders:    CT chest wo contrast; Future    CT chest wo contrast; Future

## 2025-04-01 NOTE — PROGRESS NOTES
Name: Lance Hazel      : 1955      MRN: [id number]  Encounter Provider: Gulshan Madison MD  Encounter Date: 2025   Encounter department: [facility]  :  Assessment & Plan  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)  Mr. Hazel has no clinical or radiographic appearance of recurrent lung cancer.  The small nodule is trachea I think represents some mucus but we will repeat his CT scan in 3 months to be certain.  Otherwise we will see him back in 12 months with a noncontrasted CT scan of the chest to continue his cancer follow-up.  He knows and call the office at anytime with questions or concerns.  I will also schedule him for    Orders:    CT chest wo contrast; Future    CT chest wo contrast; Future    Centrilobular emphysema (HCC)    Orders:    Ambulatory Referral to Pulmonology; Future        Thoracic History     Oncology History   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)   2022 Surgery    R VATS lower lobe super segmentectomy      2022 -  Cancer Staged    Staging form: Lung, AJCC 8th Edition  - Pathologic stage from 2022: Stage IIIA (pT1b, pN2, cM0) - Signed by Erinn Gooden PA-C on 3/19/2024       Malignant neoplasm of lower lobe of right lung (HCC)   2022 Initial Diagnosis    2022 patient had CT chest ab pelvis regarding rule out AAA.  This showed 1.1 cm right lower lobe pulmonary nodule new since prior study of 2019.  Some other smaller nodules identified.  Subcarinal lymph node 2 cm.  No other findings suspicious for metastatic disease.  2022 PET/CT showed 1.2 cm pulmonary nodule, SUV 2.9.  Other nodules noted, subcentimeter, no hypermetabolism.  Some groundglass opacities in the right upper lobe.  2022 patient had needle biopsy of the right lower lobe mass showing squamous cell carcinoma, TTF-1 positive.  2022 patient  underwent right lower lobe segmentectomy.  Pathology showed 1.8 cm squamous cell carcinoma.  Level 4 and 11 lymph node were positive for metastatic carcinoma.     1/30/2023 - 3/27/2023 Chemotherapy    palonosetron (ALOXI), 0.25 mg, Intravenous, Once, 4 of 4 cycles  Administration: 0.25 mg (1/30/2023), 0.25 mg (2/13/2023), 0.25 mg (3/6/2023), 0.25 mg (3/27/2023)  fosaprepitant (EMEND) IVPB, 150 mg, Intravenous, Once, 3 of 3 cycles  Administration: 150 mg (1/30/2023), 150 mg (2/13/2023), 150 mg (3/6/2023)  CARBOplatin (PARAPLATIN) [place]PB [place] [place] 552 mg, Intravenous, Once, 4 of 4 cycles  Administration: 550 mg (1/30/2023), 650 mg (2/13/2023), 650 mg (3/6/2023), 500 mg (3/27/2023)  PACLItaxel (TAXOL) chemo IVPB, 349.8 mg, Intravenous, Once, 4 of 4 cycles  Dose modification: 200 mg/m2 (original dose 175 mg/m2, Cycle 2, Reason: Dose modified as per discussion with consulting physician)  Administration: 360 mg (1/30/2023), 400 mg (2/13/2023), 400 mg (3/6/2023), 400 mg (3/27/2023)  aprepitant (CINVANTI) in  mL IVPB, 130 mg, Intravenous, Once, 1 of 1 cycle  Administration: 130 mg (3/27/2023)     4/13/2023 Genomic Testing    April 13, 2023 [facility]-  PD-L1 1%, TMB high.  MTAP deleted.  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, pathogenic variant  KRAS G694 L, VUS  CRABBP  *, pathogenic variant  KMT2D *, pathogenic variant     6/21/2023 - 8/22/2024 Chemotherapy    alteplase (CATHFLO), 2 mg, Intracatheter, Every 1 Minute as needed, 17 of 17 cycles  atezolizumab (TECENTRIQ) IVPB, 1,200 mg, Intravenous, Once, 17 of 17 cycles  Administration: 1,200 mg (6/21/2023), 1,200 mg (7/12/2023), 1,200 mg (8/29/2023), 1,200 mg (9/18/2023), 1,200 mg (10/10/2023), 1,200 mg (10/31/2023), 1,200 mg (11/22/2023), 1,200 mg (12/11/2023), 1,200 mg (1/2/2024), 1,200 mg (1/23/2024), 1,200 mg (2/14/2024), 1,200 mg (5/7/2024), 1,200 mg (5/31/2024), 1,200 mg (6/20/2024), 1,200 mg (7/11/2024), 1,200 mg (8/22/2024), 1,200 mg (8/1/2024)          History of Present  Illness   HPI  Lance Hazel is a 69 y.o. male returns to the office today for routine lung cancer surveillance.  He underwent a right thoracoscopic lower lobe superior segmentectomy and mediastinal lymph node dissection on November 16, 2022 for what turned out to be a stage IIIa, T1b, N2 squamous cell cancer.  He received adjuvant chemotherapy and immunotherapy ending in August 2024.  Overall, he has been doing fairly well.  He does have a persistent cough and feels like he has difficulty clearing his secretions.  He denies purulent sputum production, or hemoptysis.  He lost a few pounds but then leveled off.  He denies new headaches or blurry vision, new bone or joint pains.  He does have what he feels is progressive neuropathy.  He complains of some shortness of breath especially with stair climbing or carrying heavy objects.  He continues to work in construction.    He underwent a chest CT March 14, 2025 and this is personally reviewed.  There are no new or growing pulmonary nodules nor are there any concerning mediastinal or hilar lymph nodes.  There is no sign of recurrent lung cancer.  There is a 3 mm nodular area in the upper trachea that is unable to be differentiated between secretions or mass.    Review of Systems   Constitutional:  Positive for fever. Negative for activity change, appetite change, chills and unexpected weight change.   HENT:  Negative for voice change.    Respiratory:  Positive for cough and shortness of breath. Negative for wheezing.    Cardiovascular:  Negative for chest pain, palpitations and leg swelling.   Gastrointestinal:  Negative for abdominal pain, constipation, diarrhea, nausea and vomiting.   Musculoskeletal:  Negative for arthralgias and myalgias.   Skin:  Negative for rash.   Neurological:  Positive for tremors. Negative for dizziness, seizures, weakness, numbness and headaches.   Hematological:  Negative for adenopathy.   Psychiatric/Behavioral:  Negative for confusion.  "           Objective   /84 (Patient Position: Sitting, Cuff Size: Large)   Pulse 71   Temp 98.1 °F (36.7 °C) (Temporal)   Resp 16   Ht 5' 8\" (1.727 m)   Wt 87.9 kg (193 lb 12.6 oz)   SpO2 96%   BMI 29.46 kg/m²     Pain Screening:     ECOG ECOG Performance Status: 0 - Fully active, able to carry on all pre-disease performance without restriction  Physical Exam  Vitals reviewed.   Constitutional:       General: He is not in acute distress.     Appearance: Normal appearance. He is well-developed.   HENT:      Head: Normocephalic and atraumatic.   Eyes:      General: No scleral icterus.     Conjunctiva/sclera: Conjunctivae normal.      Pupils: Pupils are equal, round, and reactive to light.   Neck:      Trachea: No tracheal deviation.   Cardiovascular:      Rate and Rhythm: Normal rate and regular rhythm.      Heart sounds: Normal heart sounds.   Pulmonary:      Effort: Pulmonary effort is normal. No respiratory distress.      Breath sounds: Normal breath sounds. No wheezing or rales.   Abdominal:      General: Bowel sounds are normal. There is no distension.      Palpations: Abdomen is soft.      Tenderness: There is no abdominal tenderness.   Musculoskeletal:      Cervical back: Normal range of motion and neck supple.      Right lower leg: No edema.      Left lower leg: No edema.   Lymphadenopathy:      Cervical: No cervical adenopathy.   Skin:     General: Skin is warm and dry.   Neurological:      Mental Status: He is alert and oriented to person, place, and time.      Cranial Nerves: No cranial nerve deficit.   Psychiatric:         Behavior: Behavior normal.         Thought Content: Thought content normal.         Judgment: Judgment normal.         Labs:     Radiology Results Review : I have reviewed images/report studies in PACS as described above (in the HPI).  Pathology: I have reviewed pathology reports described above.      "

## 2025-04-03 ENCOUNTER — OFFICE VISIT (OUTPATIENT)
Dept: PULMONOLOGY | Facility: CLINIC | Age: 70
End: 2025-04-03
Payer: COMMERCIAL

## 2025-04-03 ENCOUNTER — OFFICE VISIT (OUTPATIENT)
Dept: FAMILY MEDICINE CLINIC | Facility: CLINIC | Age: 70
End: 2025-04-03
Payer: COMMERCIAL

## 2025-04-03 VITALS
TEMPERATURE: 98 F | OXYGEN SATURATION: 98 % | WEIGHT: 193 LBS | HEIGHT: 68 IN | DIASTOLIC BLOOD PRESSURE: 82 MMHG | BODY MASS INDEX: 29.25 KG/M2 | HEART RATE: 53 BPM | SYSTOLIC BLOOD PRESSURE: 155 MMHG

## 2025-04-03 VITALS
DIASTOLIC BLOOD PRESSURE: 78 MMHG | OXYGEN SATURATION: 97 % | HEIGHT: 68 IN | SYSTOLIC BLOOD PRESSURE: 122 MMHG | WEIGHT: 193 LBS | TEMPERATURE: 96.6 F | HEART RATE: 56 BPM | BODY MASS INDEX: 29.25 KG/M2

## 2025-04-03 DIAGNOSIS — F17.210 CIGARETTE NICOTINE DEPENDENCE WITHOUT COMPLICATION: ICD-10-CM

## 2025-04-03 DIAGNOSIS — I71.40 ABDOMINAL AORTIC ANEURYSM (AAA) WITHOUT RUPTURE, UNSPECIFIED PART (HCC): ICD-10-CM

## 2025-04-03 DIAGNOSIS — Z71.6 ENCOUNTER FOR SMOKING CESSATION COUNSELING: ICD-10-CM

## 2025-04-03 DIAGNOSIS — M51.369 DEGENERATION OF INTERVERTEBRAL DISC OF LUMBAR REGION, UNSPECIFIED WHETHER PAIN PRESENT: ICD-10-CM

## 2025-04-03 DIAGNOSIS — K76.0 HEPATIC STEATOSIS: ICD-10-CM

## 2025-04-03 DIAGNOSIS — Z12.11 SCREENING FOR COLON CANCER: ICD-10-CM

## 2025-04-03 DIAGNOSIS — R06.09 DYSPNEA ON EXERTION: ICD-10-CM

## 2025-04-03 DIAGNOSIS — R50.9 FEVER, UNSPECIFIED FEVER CAUSE: Primary | ICD-10-CM

## 2025-04-03 DIAGNOSIS — J43.2 CENTRILOBULAR EMPHYSEMA (HCC): ICD-10-CM

## 2025-04-03 DIAGNOSIS — Z23 ENCOUNTER FOR IMMUNIZATION: ICD-10-CM

## 2025-04-03 DIAGNOSIS — E11.9 TYPE 2 DIABETES MELLITUS WITHOUT COMPLICATION, WITHOUT LONG-TERM CURRENT USE OF INSULIN (HCC): ICD-10-CM

## 2025-04-03 DIAGNOSIS — C34.91 NSCLC OF RIGHT LUNG (HCC): Primary | ICD-10-CM

## 2025-04-03 PROCEDURE — 99214 OFFICE O/P EST MOD 30 MIN: CPT | Performed by: NURSE PRACTITIONER

## 2025-04-03 PROCEDURE — 99214 OFFICE O/P EST MOD 30 MIN: CPT

## 2025-04-03 RX ORDER — NICOTINE 21 MG/24HR
1 PATCH, TRANSDERMAL 24 HOURS TRANSDERMAL EVERY 24 HOURS
Qty: 28 PATCH | Refills: 0 | Status: SHIPPED | OUTPATIENT
Start: 2025-04-03

## 2025-04-03 RX ORDER — BUPROPION HYDROCHLORIDE 100 MG/1
100 TABLET, EXTENDED RELEASE ORAL 2 TIMES DAILY
Qty: 90 TABLET | Refills: 1 | Status: SHIPPED | OUTPATIENT
Start: 2025-04-03

## 2025-04-03 RX ORDER — FLUTICASONE FUROATE, UMECLIDINIUM BROMIDE AND VILANTEROL TRIFENATATE 100; 62.5; 25 UG/1; UG/1; UG/1
1 POWDER RESPIRATORY (INHALATION) DAILY
Qty: 60 BLISTER | Refills: 0 | Status: SHIPPED | COMMUNITY
Start: 2025-04-03 | End: 2025-05-03

## 2025-04-03 NOTE — ASSESSMENT & PLAN NOTE
Patient is smoking under 1 ppd. He is ready to quit. He took Wellbutrin before and it did help. He wants to re-start. Counseled  on possible side effects, monitor HR and BP as may need lower dose of Metoprolol while taking Wellbutrin.   Denies any current depression symptoms.   He has been progressively having an increase in shortness of breath for a while now. Does have a cough- productive of clear mucus. He is sing his Stiolto inhaler and albuterol prn.  Has f/u with pulmonology later today and will discuss symptoms.   Orders:  •  buPROPion (Wellbutrin SR) 100 mg 12 hr tablet; Take 1 tablet (100 mg total) by mouth 2 (two) times a day  •  nicotine (NICODERM CQ) 21 mg/24 hr TD 24 hr patch; Place 1 patch on the skin over 24 hours every 24 hours

## 2025-04-03 NOTE — ASSESSMENT & PLAN NOTE
Known distal abdominal aortic aneurysm measuring 3.6 cm, previously 3.3 cm on ultrasound 5/13/2022. Referral provided for follow up with vascular.   Orders:  •  Ambulatory Referral to Vascular Surgery; Future

## 2025-04-03 NOTE — PROGRESS NOTES
Name: Lance Hazel      : 1955      MRN: [id number]  Encounter Provider: ELOISA Samaniego  Encounter Date: 4/3/2025   Encounter department: On license of [facility]  :  Assessment & Plan  Fever, unspecified fever cause  Has been having fevers of unknown origin. CRP 5.7. but improved from previous. HE is due for colon cancer screening which I recommend at this time.   CT of the chest; Stable postsurgical changes in the right lower lobe without imaging findings of local recurrence or metastasis in the chest.  A 0.3 cm nodular lesion in the upper trachea (series 601 image 73), which can represent secretions or a soft tissue nodule. Attention on CT chest in 3 months is recommended to assess for resolution  States he stopped checking his temperature but does still feel feverish over night. Appetite increased but still noticing weight loss. Recommend f/u with hem/onc and colonoscopy.        Type 2 diabetes mellitus without complication, without long-term current use of insulin (HCC)    Lab Results   Component Value Date    HGBA1C 6.4 (H) 2025   Well controlled without medication at this time.          Abdominal aortic aneurysm (AAA) without rupture, unspecified part (HCC)  Known distal abdominal aortic aneurysm measuring 3.6 cm, previously 3.3 cm on ultrasound 2022. Referral provided for follow up with vascular.   Orders:  •  Ambulatory Referral to Vascular Surgery; Future    Encounter for immunization         Screening for colon cancer    Orders:  •  Ambulatory Referral to Gastroenterology; Future    Degeneration of intervertebral disc of lumbar region, unspecified whether pain present  Patient presents for follow up on lumbar spine pain with increasing Gabapentin to 300 mg TID.  Does not feel a significant improvement but feels it is helping some. Has an epidural scheduled with pain management.        Hepatic steatosis  Hepatic steatosis. Right hepatic lobe cyst. Bilateral  "renal cysts. Counseled on diet and exercise.             Encounter for smoking cessation counseling  Patient is smoking under 1 ppd. He is ready to quit. He took Wellbutrin before and it did help. He wants to re-start. Counseled  on possible side effects, monitor HR and BP as may need lower dose of Metoprolol while taking Wellbutrin.   Denies any current depression symptoms.   He has been progressively having an increase in shortness of breath for a while now. Does have a cough- productive of clear mucus. He is sing his Stiolto inhaler and albuterol prn.  Has f/u with pulmonology later today and will discuss symptoms.   Orders:  •  buPROPion (Wellbutrin SR) 100 mg 12 hr tablet; Take 1 tablet (100 mg total) by mouth 2 (two) times a day  •  nicotine (NICODERM CQ) 21 mg/24 hr TD 24 hr patch; Place 1 patch on the skin over 24 hours every 24 hours           History of Present Illness   HPI  Review of Systems   Constitutional:  Positive for fever and unexpected weight change. Negative for appetite change, chills and fatigue.   HENT:  Negative for ear pain and sore throat.    Eyes:  Negative for pain and visual disturbance.   Respiratory:  Positive for cough and shortness of breath.    Cardiovascular:  Negative for chest pain and palpitations.   Gastrointestinal:  Negative for abdominal pain and vomiting.   Genitourinary:  Negative for dysuria and hematuria.   Musculoskeletal:  Negative for arthralgias and back pain.   Skin:  Negative for color change and rash.   Neurological:  Negative for seizures and syncope.   All other systems reviewed and are negative.      Objective   /78   Pulse 56   Temp (!) 96.6 °F (35.9 °C)   Ht 5' 8\" (1.727 m)   Wt 87.5 kg (193 lb)   SpO2 97%   BMI 29.35 kg/m²      Physical Exam  Vitals and nursing note reviewed.   Constitutional:       General: He is not in acute distress.     Appearance: He is well-developed.   HENT:      Head: Normocephalic and atraumatic.   Cardiovascular:      " Rate and Rhythm: Normal rate and regular rhythm.      Heart sounds: No murmur heard.  Pulmonary:      Effort: Pulmonary effort is normal. No respiratory distress.      Breath sounds: No wheezing or rhonchi.   Musculoskeletal:         General: No swelling.      Cervical back: Neck supple.   Skin:     General: Skin is warm and dry.      Capillary Refill: Capillary refill takes less than 2 seconds.   Neurological:      Mental Status: He is alert.   Psychiatric:         Mood and Affect: Mood normal.

## 2025-04-03 NOTE — PATIENT INSTRUCTIONS
- Continue holding stiolto while trying trelegy inhaler sample.  Take 1 puff once daily and rinse out mouth after each use.  - Lung function testing  - Continue working on quitting smoking  - Follow up in 4 months

## 2025-04-03 NOTE — ASSESSMENT & PLAN NOTE
Patient presents for follow up on lumbar spine pain with increasing Gabapentin to 300 mg TID.  Does not feel a significant improvement but feels it is helping some. Has an epidural scheduled with pain management.

## 2025-04-03 NOTE — PROGRESS NOTES
Progress note - Pulmonary Medicine   Lance Hazel 69 y.o. male MRN: [id number]       Impression & Plan:   Lance Hazel is a 69 y.o. male with PMHx of NSCLC s/p RLL segmentectomy with adjuvant chemo/immunotherapy, CAD, emphysema, HTN, HLD, CKD 3a, depression and tobacco use disorder who presents for follow-up.    Centrilobular emphysema (HCC)  Dyspnea on exertion  - The patient has a history of borderline obstruction on PFTs from 2022 with mild DLCO reduction and is a current smoker.  Suspect his ROSE is related to no longer taking maintenance inhalers and continued smoking, habitus may also be contributing.  - Will give sample of trelegy 100 mcg 1 puff once daily.  Discussed the importance of rinsing out his mouth after each use.  He can continue holding the stiolto while trying this.  - Continue with albuterol MDI as needed.  Can consider prescribing nebulizer with flutter valve if not improving with trelegy.  - Update PFTs  - He has declined PCV 20  - Follow up in 4 months.  -     Ambulatory Referral to Pulmonology  -     Complete PFT with post bronchodilator; Future  -     fluticasone-umeclidinium-vilanterol (Trelegy Ellipta) 100-62.5-25 mcg/actuation inhaler; Inhale 1 puff daily Rinse mouth after use.    NSCLC of right lung (HCC)  - Status post segmentectomy with adjuvant chemo/immunotherapy and is following with oncology and thoracic surgery.  Most recent CT was unremarkable aside from a 3 mm abnormality in the trachea which potentially was a nodule vs secretions.  He has a repeat CT scheduled for 7/2025.    Cigarette nicotine dependence without complication  - The patient is down to 3/4 PPD and is ready to quit.  He was just re-prescribed bupropion and plans to start it soon.  Recommend complete cessation.    Return in about 4 months (around 8/3/2025).  ______________________________________________________________________    HPI:    Lance Hazel is a 69 y.o. male with PMHx of NSCLC s/p RLL  segmentectomy with adjuvant chemo/immunotherapy, CAD, emphysema, HTN, HLD, CKD 3a, depression and tobacco use disorder who presents for follow-up.  The patient was last seen in the office on 6/11/2024 at which time plan was for smoking cessation, reinitiation of Stiolto, continuation of albuterol MDI, updated PFTs and continued follow-up with thoracic surgery/oncology.  He reports that he was getting some benefit from stiolto, but stopped in a few weeks ago for unclear reasons.  He states while on the stiolto he was rarely using his albuterol MDI.  He does note a chronic cough, but has difficulty expectorating, if he is able to expectorate it is yellow in color.  He does report nasal congestion for the past few months, but is not taking any nasal sprays currently.  He is unsure about post-nasal drip or environmental allergies.  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  He does have dyspnea with stairs/inclines or when bending over.  He denies unintentional weight loss, palpitations, chest pain, hemoptysis or pedal edema.    He is still smoking 3/4 PPD currently and was prescribed bupropion today which has helped him in the past with cessation.    Current Medications:    Current Outpatient Medications:     albuterol (Ventolin HFA) 90 mcg/act inhaler, Inhale 2 puffs every 6 (six) hours as needed for wheezing, Disp: 18 g, Rfl: 5    amLODIPine (NORVASC) 10 mg tablet, Take 1 tablet (10 mg total) by mouth daily, Disp: 90 tablet, Rfl: 1    aspirin 81 mg chewable tablet, Chew 1 tablet (81 mg total) daily, Disp: 90 tablet, Rfl: 1    bisacodyl (DULCOLAX) 5 mg EC tablet, Take 1 tablet (5 mg total) by mouth daily as needed for constipation for up to 4 doses, Disp: 30 tablet, Rfl: 0    buPROPion (Wellbutrin SR) 100 mg 12 hr tablet, Take 1 tablet (100 mg total) by mouth 2 (two) times a day, Disp: 90 tablet, Rfl: 1    cyanocobalamin (VITAMIN B-12) 1000 MCG tablet, Take 1 tablet (1,000 mcg  total) by mouth daily, Disp: 90 tablet, Rfl: 3    fluticasone (FLONASE) 50 mcg/act nasal spray, 1 spray into each nostril daily for 14 days, Disp: 11.1 mL, Rfl: 0    fluticasone-umeclidinium-vilanterol (Trelegy Ellipta) 100-62.5-25 mcg/actuation inhaler, Inhale 1 puff daily Rinse mouth after use., Disp: 60 blister, Rfl: 0    folic acid (KP Folic Acid) 1 mg tablet, Take 1 tablet (1 mg total) by mouth daily, Disp: 30 tablet, Rfl: 6    gabapentin (NEURONTIN) 300 mg capsule, Take 1 capsule (300 mg total) by mouth 3 (three) times a day, Disp: 90 capsule, Rfl: 1    glucose blood (OneTouch Verio) test strip, Test once daily, Disp: 100 each, Rfl: 0    lisinopril (ZESTRIL) 10 mg tablet, Take 1 tablet (10 mg total) by mouth daily, Disp: 90 tablet, Rfl: 1    metoprolol succinate (TOPROL-XL) 100 mg 24 hr tablet, Take 1 tablet (100 mg total) by mouth daily, Disp: 90 tablet, Rfl: 1    nicotine (NICODERM CQ) 21 mg/24 hr TD 24 hr patch, Place 1 patch on the skin over 24 hours every 24 hours, Disp: 28 patch, Rfl: 0    [START ON 4/10/2025] oxyCODONE-acetaminophen (Percocet) 7.5-325 MG per tablet, Take 1 tablet by mouth every 8 (eight) hours as needed for moderate pain Max Daily Amount: 3 tablets Do not start before April 10, 2025., Disp: 90 tablet, Rfl: 0    oxyCODONE-acetaminophen (Percocet) 7.5-325 MG per tablet, Take 1 tablet by mouth every 8 (eight) hours as needed for moderate pain Max Daily Amount: 3 tablets Do not start before March 13, 2025., Disp: 90 tablet, Rfl: 0    Restasis 0.05 % ophthalmic emulsion, , Disp: , Rfl:     rosuvastatin (CRESTOR) 40 MG tablet, Take 1 tablet (40 mg total) by mouth daily, Disp: 90 tablet, Rfl: 1    sildenafil (VIAGRA) 25 MG tablet, Take 1 tablet (25 mg total) by mouth as needed for erectile dysfunction, Disp: 30 tablet, Rfl: 3    tamsulosin (FLOMAX) 0.4 mg, Take 1 capsule (0.4 mg total) by mouth daily with dinner for 3 days, Disp: 3 capsule, Rfl: 0    tiotropium-olodaterol (Stiolto Respimat)  "2.5-2.5 MCG/ACT inhaler, Inhale 2 puffs daily, Disp: 4 g, Rfl: 5    tiZANidine (ZANAFLEX) 4 mg tablet, take 1 tablet by mouth every 8 hours if needed for muscle spasm, Disp: 90 tablet, Rfl: 1    traZODone (DESYREL) 100 mg tablet, Take 1 tablet (100 mg total) by mouth daily at bedtime, Disp: 90 tablet, Rfl: 1    naloxone (NARCAN) 4 mg/0.1 mL nasal spray, Administer 1 spray into a nostril. If no response after 2-3 minutes, give another dose in the other nostril using a new spray. (Patient not taking: Reported on 4/3/2025), Disp: 1 each, Rfl: 1    Review of Systems:  Review of Systems  10-point system review completed, all of which are negative except as mentioned above.    Past medical history, surgical history, and family history were reviewed and updated as appropriate    Social history updates:  Social History     Tobacco Use   Smoking Status Every Day    Current packs/day: 1.00    Average packs/day: 1 pack/day for 1.4 years (1.4 ttl pk-yrs)    Types: Cigarettes    Start date: 11/2023   Smokeless Tobacco Never     PhysicalExamination:  Vitals:   /82   Pulse (!) 53   Temp 98 °F (36.7 °C) (Temporal)   Ht 5' 8\" (1.727 m)   Wt 87.5 kg (193 lb)   SpO2 98%   BMI 29.35 kg/m²   Body mass index is 29.35 kg/m².    Constitutional: NAD, on room air, no conversational dyspnea   Skin: Warm, dry, no rashes noted   Eyes: PERRL, normal conjunctiva  ENT: Nasal congestion absent, moist mucus membranes.  Neck: No JVD, trachea is midline, no adenopathy.  Resp: CTA B/L, no W/R/R   Cardiac: RRR, +S1/S2, no M/R/G  Extremities: No digital clubbing or pedal edema  Neuro: AAOx3    Diagnostic Data:  Labs:  I personally reviewed the most recent laboratory data pertinent to today's visit    Lab Results   Component Value Date    WBC 10.84 (H) 03/14/2025    HGB 15.1 03/14/2025    HCT 46.7 03/14/2025    MCV 94 03/14/2025     03/14/2025     Lab Results   Component Value Date    SODIUM 138 03/14/2025    K 4.0 03/14/2025    CO2 27 " "2025     2025    BUN 22 2025    CREATININE 1.12 2025    CALCIUM 9.2 2025     PFT results:  The most recent pulmonary function tests were reviewed.  PFT w/ BD -- 2022  Pre-bronchodilator results:  FVC: 3.18L, 78% predicted  FEV1: 2.23L, 72% predicted  FEV1/FVC Ratio: 70%    Post-bronchodilator results:  FVC: 3.53L, 87% predicted, +10% change  FEV1: 2.49L, 80% predicted, +11% change  FEV1/FVC Ratio: 70%    Lung volumes by body plethysmography:   T.57L, 99% predicted   RV: 2.83, 123% predicted  RV/T, 122% predicted    DLCO Corrected for Hemoglobin: 17.71, 71%    Imaging:  I personally reviewed the images in PACS pertinent to today's visit:  CT Chest WO -- 3/14/2025  Stable postsurgical changes in the right lower lobe without imaging findings of local recurrence or metastasis in the chest.  A 0.3 cm nodular lesion in the upper trachea (series 601 image 73), which can represent secretions or a soft tissue nodule. Attention on CT chest in 3 months is recommended to assess for resolution.    Other studies:  Echo Complete -- 2019  LEFT VENTRICLE:  Systolic function was vigorous. Ejection fraction was estimated to be 75 %.  There were no regional wall motion abnormalities.  Wall thickness was mildly increased.  There was mild concentric hypertrophy.     RIGHT VENTRICLE:  Systolic function was normal.     LEFT ATRIUM:  The atrium was mildly dilated.     MITRAL VALVE:  There was mild annular calcification.  There was trace regurgitation.     TRICUSPID VALVE:  There was trace regurgitation.     PERICARDIUM:  There was no pericardial effusion.      Yaa Madsen MD  Pulmonary-Critical Care and Sleep Medicine  25    Portions of the record may have been created with voice recognition software. Occasional wrong word or \"sound a like\" substitutions may have occurred due to the inherent limitations of voice recognition software. Please read the chart carefully and " recognize, using context, where substitutions have occurred.

## 2025-04-04 ENCOUNTER — TELEPHONE (OUTPATIENT)
Age: 70
End: 2025-04-04

## 2025-04-04 NOTE — TELEPHONE ENCOUNTER
Caller: Avery Hazel    Doctor: Dr. Rupa Felipe    Reason for call: Patient calling to schedule consult from PCP regarding AAA. He is currently scheduled in [place] on 6/10/25. He would prefer the [facility] but there are no available appointments. He would appreciate a call if anything sooner becomes available in the [facility] office.    Call back#: [phone]

## 2025-04-11 ENCOUNTER — HOSPITAL ENCOUNTER (OUTPATIENT)
Dept: RADIOLOGY | Facility: HOSPITAL | Age: 70
End: 2025-04-11
Payer: COMMERCIAL

## 2025-04-11 VITALS
OXYGEN SATURATION: 94 % | TEMPERATURE: 97.5 F | SYSTOLIC BLOOD PRESSURE: 168 MMHG | HEART RATE: 60 BPM | DIASTOLIC BLOOD PRESSURE: 92 MMHG | RESPIRATION RATE: 18 BRPM

## 2025-04-11 DIAGNOSIS — G89.4 CHRONIC PAIN SYNDROME: ICD-10-CM

## 2025-04-11 DIAGNOSIS — G89.29 CHRONIC MIDLINE LOW BACK PAIN WITHOUT SCIATICA: ICD-10-CM

## 2025-04-11 DIAGNOSIS — M47.816 LUMBAR SPONDYLOSIS: ICD-10-CM

## 2025-04-11 DIAGNOSIS — M51.369 LUMBAR DEGENERATIVE DISC DISEASE: ICD-10-CM

## 2025-04-11 DIAGNOSIS — M54.50 CHRONIC MIDLINE LOW BACK PAIN WITHOUT SCIATICA: ICD-10-CM

## 2025-04-11 PROCEDURE — 62323 NJX INTERLAMINAR LMBR/SAC: CPT | Performed by: ANESTHESIOLOGY

## 2025-04-11 RX ORDER — METHYLPREDNISOLONE ACETATE 80 MG/ML
80 INJECTION, SUSPENSION INTRA-ARTICULAR; INTRALESIONAL; INTRAMUSCULAR; PARENTERAL; SOFT TISSUE ONCE
Status: COMPLETED | OUTPATIENT
Start: 2025-04-11 | End: 2025-04-11

## 2025-04-11 RX ADMIN — METHYLPREDNISOLONE ACETATE 80 MG: 80 INJECTION, SUSPENSION INTRA-ARTICULAR; INTRALESIONAL; INTRAMUSCULAR; PARENTERAL; SOFT TISSUE at 10:55

## 2025-04-11 RX ADMIN — IOHEXOL 1 ML: 300 INJECTION, SOLUTION INTRAVENOUS at 10:54

## 2025-04-11 NOTE — DISCHARGE INSTR - LAB
Epidural Steroid Injection   WHAT YOU NEED TO KNOW:   An epidural steroid injection (TIM) is a procedure to inject steroid medicine into the epidural space. The epidural space is between your spinal cord and vertebrae. Steroids reduce inflammation and fluid buildup in your spine that may be causing pain. You may be given pain medicine along with the steroids.          ACTIVITY  Do not drive or operate machinery today.  No strenuous activity today - bending, lifting, etc.  You may resume normal activites starting tomorrow - start slowly and as tolerated.  You may shower today, but no tub baths or hot tubs.  You may have numbness for several hours from the local anesthetic. Please use caution and common sense, especially with weight-bearing activities.    CARE OF THE INJECTION SITE  If you have soreness or pain, apply ice to the area today (20 minutes on/20 minutes off).  Starting tomorrow, you may use warm, moist heat or ice if needed.  You may have an increase or change in your discomfort for 36-48 hours after your treatment.  Apply ice and continue with any pain medication you have been prescribed.  Notify [facility] if you have any of the following: redness, drainage, swelling, headache, stiff neck or fever above 100°F.    SPECIAL INSTRUCTIONS  Our office will contact you in approximately 14 days for a progress report.    MEDICATIONS  Continue to take all routine medications.  Our office may have instructed you to hold some medications.    As no general anesthesia was used in today's procedure, you should not experience any side effects related to anesthesia.     If you are diabetic, the steroids used in today's injection may temporarily increase your blood sugar levels after the first few days after your injection. Please keep a close eye on your sugars and alert the doctor who manages your diabetes if your sugars are significantly high from your baseline or you are symptomatic.     If you have a  problem specifically related to your procedure, please call our office at [phone].  Problems not related to your procedure should be directed to your primary care physician.

## 2025-04-11 NOTE — H&P
Assessment:  1. Chronic pain syndrome  FL spine and pain procedure    FL spine and pain procedure      2. Chronic midline low back pain without sciatica  FL spine and pain procedure    FL spine and pain procedure      3. Lumbar spondylosis  FL spine and pain procedure    FL spine and pain procedure      4. Lumbar degenerative disc disease  FL spine and pain procedure    FL spine and pain procedure          Plan:  Lance Hazel is a 69 y.o. male with complaints of low back and leg pain presents to surgical center for procedure.  We will perform a L5-S1 interlaminar epidural steroid injection  2. Follow-up 1 month after injection    Complete risks and benefits including bleeding, infection, tissue reaction, nerve injury and allergic reaction were discussed. The approach was demonstrated using models and literature was provided. Verbal and written consent was obtained.    My impressions and treatment recommendations were discussed in detail with the patient who verbalized understanding and had no further questions.  Discharge instructions were provided. I personally saw and examined the patient and I agree with the above discussed plan of care.    Orders Placed This Encounter   Procedures    FL spine and pain procedure     Standing Status:   Standing     Number of Occurrences:   1     Reason for Exam::   L5-S1 IESI     Anticoagulant hold needed?:   baby asa     No orders of the defined types were placed in this encounter.      History of Present Illness:  Lance Hazel is a 69 y.o. male who presents for a follow up office visit in regards to low back and leg pain.   The patient’s current symptoms include irritation of the cervical throbbing without particular time pain.      I have personally reviewed and/or updated the patient's past medical history, past surgical history, family history, social history, current medications, allergies, and vital signs today.     Review of Systems   Musculoskeletal:  Positive for  arthralgias and back pain.   Neurological:  Positive for weakness.   All other systems reviewed and are negative.      Patient Active Problem List   Diagnosis    JAKI (obstructive sleep apnea)    Abdominal aortic aneurysm (AAA) without rupture    Lumbar spondylosis    Lumbar degenerative disc disease    Myofascial pain syndrome    Chronic low back pain without sciatica    Cervical radiculopathy    Cervical spondylosis without myelopathy    Essential tremor    Negative depression screening    Chronic pain of both knees    Smoking    Hypertension    Chronic pain syndrome    Uncomplicated opioid dependence (HCC)    Encounter for long-term opiate analgesic use    Neck pain    Hyperlipidemia    Pre-diabetes    Erectile dysfunction    Insomnia    Cortical age-related cataract of both eyes    Urinary frequency    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)    Kidney stone    Pulmonary emphysema (HCC)    Malignant neoplasm of lower lobe of right lung (HCC)    Encounter for smoking cessation counseling    At high risk for osteoporosis    Coronary artery disease of native artery of native heart with stable angina pectoris (HCC)    Stage 3a chronic kidney disease (HCC)    Night sweats    Skin lesion    Depression, recurrent (HCC)    Neuropathic pain    Age-related cataract of right eye    Constipation due to opioid therapy    Neuropathy due to chemotherapeutic drug (HCC)    Hyperglycemia    Bronchitis    Abnormal CT of the chest    Epigastric abdominal pain    Esophageal dysphagia    Hiatal hernia       Past Medical History:   Diagnosis Date    Abdominal aortic aneurysm without rupture (HCC)     Abdominal Aortic Duplex 02/21/2017    Ectatic infrarenal abdominal aorta.    MARYANN (acute kidney injury) (HCC) 07/23/2022    CAD (coronary artery disease)     Cancer (HCC) 2022    right lung CA    Chronic bronchitis (HCC) 01/28/2019    Class 1 obesity due to excess calories with serious comorbidity and body mass  index (BMI) of 31.0 to 31.9 in adult 02/14/2020    COPD (chronic obstructive pulmonary disease) ([facility])     Coronary artery disease of native artery of native heart with stable angina pectoris (HCC)     Extremity pain     Hemiparesis ([facility]) 11/01/2022    Hiatal hernia     History of echocardiogram 03/18/2014    EF 55%, mild MR and AI.  Mild concentric LVH.    History of stress test 03/06/2017    Normal.    Hyperlipidemia     Hypertension     Joint pain     Kidney stone     Low back pain     Lung cancer (HCC)     Migraine     Neck pain     Obstructive sleep apnea     cannot tolerate CPAP    Osteoarthritis     Other chronic pancreatitis (HCC) 02/10/2023    Peripheral neuropathy     Reflex sympathetic dystrophy     Sacroiliitis (HCC) 02/02/2022       Past Surgical History:   Procedure Laterality Date    CARDIAC CATHETERIZATION  02/13/2012    EF 70%, widely patent renal arteries, significant single-vessel CAD-medical therapy.    CARDIAC CATHETERIZATION  04/11/2013    EF 65%, 50% mid LAD, 20% prox CFX, 90% diffuse RCA, 99% mid RCA. Medical management.    CATARACT EXTRACTION Right 09/19/2023    CHOLECYSTECTOMY      COLONOSCOPY      EPIDURAL BLOCK INJECTION Bilateral 08/15/2019    Procedure: BLOCK / INJECTION EPIDURAL STEROID CERVICAL;  Surgeon: Ricardo Park MD;  [facility]: [facility];  Service: Pain Management     FL GUIDED NEEDLE PLAC BX/ASP/INJ  08/15/2019    IR BIOPSY LUNG  09/13/2022    IR THORACIC DUCT EMBOLIZATION  11/22/2022    ORTHOPEDIC SURGERY      MO [facility] INCL FLUOR GDNCE DX W/CELL WASHG SPX N/A 11/16/2022    Procedure: BRONCHOSCOPY FLEXIBLE;  Surgeon: Gulshan Madison MD;  [facility];  Service: Thoracic    MO THORACOSCOPY W/LOBECTOMY SINGLE LOBE Right 11/16/2022    Procedure: LOBECTOMY LUNG; lower lobe superior segmentectomy, mediastinal lymph node dissection;  Surgeon: Gulshan Madison MD;  [facility];  Service: Thoracic    MO THORACOSCOPY W/SEGMENTECTOMY Right  11/16/2022    Procedure: THORACOSCOPY VIDEO ASSISTED SURGERY (VATS);  Surgeon: Gulshan Madison MD;  [facility];  Service: Thoracic    TRIGGER POINT INJECTION         Family History   Adopted: Yes   Problem Relation Age of Onset    No Known Problems Family     No Known Problems Mother     No Known Problems Father        Social History     Occupational History    Not on file   Tobacco Use    Smoking status: Every Day     Current packs/day: 1.00     Average packs/day: 1 pack/day for 1.4 years (1.4 ttl pk-yrs)     Types: Cigarettes     Start date: 11/2023    Smokeless tobacco: Never   Vaping Use    Vaping status: Never Used   Substance and Sexual Activity    Alcohol use: Not Currently    Drug use: Never    Sexual activity: Yes       Current Outpatient Medications on File Prior to Encounter   Medication Sig    albuterol (Ventolin HFA) 90 mcg/act inhaler Inhale 2 puffs every 6 (six) hours as needed for wheezing    amLODIPine (NORVASC) 10 mg tablet Take 1 tablet (10 mg total) by mouth daily    aspirin 81 mg chewable tablet Chew 1 tablet (81 mg total) daily    bisacodyl (DULCOLAX) 5 mg EC tablet Take 1 tablet (5 mg total) by mouth daily as needed for constipation for up to 4 doses    buPROPion (Wellbutrin SR) 100 mg 12 hr tablet Take 1 tablet (100 mg total) by mouth 2 (two) times a day    cyanocobalamin (VITAMIN B-12) 1000 MCG tablet Take 1 tablet (1,000 mcg total) by mouth daily    fluticasone (FLONASE) 50 mcg/act nasal spray 1 spray into each nostril daily for 14 days    fluticasone-umeclidinium-vilanterol (Trelegy Ellipta) 100-62.5-25 mcg/actuation inhaler Inhale 1 puff daily Rinse mouth after use.    folic acid (KP Folic Acid) 1 mg tablet Take 1 tablet (1 mg total) by mouth daily    gabapentin (NEURONTIN) 300 mg capsule Take 1 capsule (300 mg total) by mouth 3 (three) times a day    glucose blood (OneTouch Verio) test strip Test once daily    lisinopril (ZESTRIL) 10 mg tablet Take 1 tablet (10 mg  total) by mouth daily    metoprolol succinate (TOPROL-XL) 100 mg 24 hr tablet Take 1 tablet (100 mg total) by mouth daily    naloxone (NARCAN) 4 mg/0.1 mL nasal spray Administer 1 spray into a nostril. If no response after 2-3 minutes, give another dose in the other nostril using a new spray. (Patient not taking: Reported on 4/3/2025)    nicotine (NICODERM CQ) 21 mg/24 hr TD 24 hr patch Place 1 patch on the skin over 24 hours every 24 hours    oxyCODONE-acetaminophen (Percocet) 7.5-325 MG per tablet Take 1 tablet by mouth every 8 (eight) hours as needed for moderate pain Max Daily Amount: 3 tablets Do not start before April 10, 2025.    oxyCODONE-acetaminophen (Percocet) 7.5-325 MG per tablet Take 1 tablet by mouth every 8 (eight) hours as needed for moderate pain Max Daily Amount: 3 tablets Do not start before March 13, 2025.    Restasis 0.05 % ophthalmic emulsion     rosuvastatin (CRESTOR) 40 MG tablet Take 1 tablet (40 mg total) by mouth daily    sildenafil (VIAGRA) 25 MG tablet Take 1 tablet (25 mg total) by mouth as needed for erectile dysfunction    tamsulosin (FLOMAX) 0.4 mg Take 1 capsule (0.4 mg total) by mouth daily with dinner for 3 days    tiotropium-olodaterol (Stiolto Respimat) 2.5-2.5 MCG/ACT inhaler Inhale 2 puffs daily    tiZANidine (ZANAFLEX) 4 mg tablet take 1 tablet by mouth every 8 hours if needed for muscle spasm    traZODone (DESYREL) 100 mg tablet Take 1 tablet (100 mg total) by mouth daily at bedtime     No current facility-administered medications on file prior to encounter.       No Known Allergies    Physical Exam:    /77   Pulse 57   Temp 97.5 °F (36.4 °C) (Temporal)   Resp 18   SpO2 94%     Constitutional:normal, well developed, well nourished, alert, in no distress and non-toxic and no overt pain behavior.  Eyes:anicteric  HEENT:grossly intact  Neck:supple, symmetric, trachea midline and no masses   Pulmonary:even and unlabored  Cardiovascular:No edema or pitting edema  present  Skin:Normal without rashes or lesions and well hydrated  Psychiatric:Mood and affect appropriate  Neurologic:Cranial Nerves II-XII grossly intact  Musculoskeletal:normal

## 2025-04-21 ENCOUNTER — HOSPITAL ENCOUNTER (OUTPATIENT)
Dept: PULMONOLOGY | Facility: HOSPITAL | Age: 70
Discharge: HOME | End: 2025-04-21

## 2025-04-21 RX ORDER — ALBUTEROL SULFATE 0.83 MG/ML
2.5 SOLUTION RESPIRATORY (INHALATION) ONCE AS NEEDED
Status: DISCONTINUED | OUTPATIENT
Start: 2025-04-21 | End: 2025-04-25 | Stop reason: HOSPADM

## 2025-04-25 ENCOUNTER — TELEPHONE (OUTPATIENT)
Dept: PAIN MEDICINE | Facility: CLINIC | Age: 70
End: 2025-04-25

## 2025-05-06 DIAGNOSIS — M54.50 LUMBAR SPINE PAIN: ICD-10-CM

## 2025-05-07 ENCOUNTER — OFFICE VISIT (OUTPATIENT)
Age: 70
End: 2025-05-07
Payer: COMMERCIAL

## 2025-05-07 VITALS — WEIGHT: 190.4 LBS | HEIGHT: 68 IN | BODY MASS INDEX: 28.85 KG/M2

## 2025-05-07 DIAGNOSIS — M54.12 CERVICAL RADICULOPATHY: ICD-10-CM

## 2025-05-07 DIAGNOSIS — Z79.891 ENCOUNTER FOR LONG-TERM OPIATE ANALGESIC USE: ICD-10-CM

## 2025-05-07 DIAGNOSIS — M47.812 CERVICAL SPONDYLOSIS WITHOUT MYELOPATHY: ICD-10-CM

## 2025-05-07 DIAGNOSIS — M79.2 NEUROPATHIC PAIN: ICD-10-CM

## 2025-05-07 DIAGNOSIS — M54.2 NECK PAIN: ICD-10-CM

## 2025-05-07 DIAGNOSIS — M54.50 CHRONIC MIDLINE LOW BACK PAIN WITHOUT SCIATICA: ICD-10-CM

## 2025-05-07 DIAGNOSIS — M51.369 LUMBAR DEGENERATIVE DISC DISEASE: ICD-10-CM

## 2025-05-07 DIAGNOSIS — G89.29 CHRONIC MIDLINE LOW BACK PAIN WITHOUT SCIATICA: ICD-10-CM

## 2025-05-07 DIAGNOSIS — M47.816 LUMBAR SPONDYLOSIS: ICD-10-CM

## 2025-05-07 DIAGNOSIS — M79.18 MYOFASCIAL PAIN SYNDROME: ICD-10-CM

## 2025-05-07 DIAGNOSIS — F11.20 UNCOMPLICATED OPIOID DEPENDENCE (HCC): ICD-10-CM

## 2025-05-07 DIAGNOSIS — G89.4 CHRONIC PAIN SYNDROME: Primary | ICD-10-CM

## 2025-05-07 PROCEDURE — 99214 OFFICE O/P EST MOD 30 MIN: CPT | Performed by: NURSE PRACTITIONER

## 2025-05-07 RX ORDER — OXYCODONE AND ACETAMINOPHEN 7.5; 325 MG/1; MG/1
1 TABLET ORAL EVERY 8 HOURS PRN
Qty: 90 TABLET | Refills: 0 | Status: SHIPPED | OUTPATIENT
Start: 2025-05-24

## 2025-05-07 RX ORDER — OXYCODONE AND ACETAMINOPHEN 7.5; 325 MG/1; MG/1
1 TABLET ORAL EVERY 8 HOURS PRN
Qty: 90 TABLET | Refills: 0 | Status: SHIPPED | OUTPATIENT
Start: 2025-06-21

## 2025-05-07 NOTE — PROGRESS NOTES
Assessment:  1. Chronic pain syndrome    2. Neck pain    3. Cervical spondylosis without myelopathy    4. Cervical radiculopathy    5. Chronic midline low back pain without sciatica    6. Lumbar spondylosis    7. Lumbar degenerative disc disease    8. Neuropathic pain    9. Myofascial pain syndrome    10. Uncomplicated opioid dependence (HCC)    11. Encounter for long-term opiate analgesic use        Plan:  While the patient was in the office today, I did have a thorough conversation regarding their chronic pain syndrome, medication management, and treatment plan options.  Patient is being seen for a follow-up visit.  He underwent an L5-S1 interlaminar epidural steroid injection on 4/11/2025.  Overall, he is reporting about 50% improvement in his back and leg pain.  Pain has become pretty tolerable.  Will repeat interventional therapy in the future if needed.    Continue oxycodone 5/325 every 8 hours as needed for pain.  The patient's opioid scripts were sent to their pharmacy electronically and was given a 2 month supply of prescriptions with a Do Not Fill date(s) of 5/24/2025, 6/21/2025.    Continue gabapentin 300 mg 3 times daily as prescribed by his PCP.    Continue tizanidine 4 mg every 8 hours as needed for spasms as prescribed by his PCP.    [place] Prescription Drug Monitoring Program report was reviewed and was appropriate     There are risks associated with opioid medications, including dependence, addiction and tolerance. The patient understands and agrees to use these medications only as prescribed. Potential side effects of the medications include, but are not limited to, constipation, drowsiness, addiction, impaired judgment and risk of fatal overdose if not taken as prescribed. The patient was warned against driving while taking sedation medications.  Sharing medications is a felony. At this point in time, the patient is showing no signs of addiction, abuse, diversion or suicidal ideation.    The  patient will follow-up in 8 weeks for medication prescription refill and reevaluation. The patient was advised to contact the office should their symptoms worsen in the interim. The patient was agreeable and verbalized an understanding.        History of Present Illness:    The patient is a 69 y.o. male last seen on 3/6/2025 who presents for a follow up office visit in regards to chronic pain secondary to neck pain syndrome, chronic low back pain, lumbar radiculopathy, lumbar degenerative disc disease, neck pain, cervical spondylosis, cervical radiculopathy, myofascial pain syndrome.  The patient currently reports plaints of neck pain that radiates to both shoulders, low back pain.  Current pain level is a 5/10.  Quality of pain is described as intermittent, sharp, shooting.    Current pain medications includes: Oxycodone 5/325 every 8 hours as needed for pain.  PCP prescribes gabapentin 300 mg 3 times daily and tizanidine 4 mg every 8 hours as needed for spasms  .  The patient reports that this regimen is providing 50% pain relief.  The patient is reporting no side effects from this pain medication regimen.    Pain Contract Signed: 3/6/25  Last Urine Drug Screen: 3/6/25    I have personally reviewed and/or updated the patient's past medical history, past surgical history, family history, social history, current medications, allergies, and vital signs today.       Review of Systems:    Review of Systems   Constitutional:  Negative for unexpected weight change.   HENT:  Negative for hearing loss.    Eyes:  Negative for visual disturbance.   Respiratory:  Positive for shortness of breath.    Cardiovascular:  Negative for leg swelling.   Gastrointestinal:  Positive for constipation.   Endocrine: Negative for polyuria.   Genitourinary:  Negative for difficulty urinating.   Musculoskeletal:  Negative for gait problem, joint swelling and myalgias.        Decreased range of motion  Joint stiffness   Skin:  Negative for  rash.   Neurological:  Positive for dizziness. Negative for weakness and headaches.   Psychiatric/Behavioral:  Negative for decreased concentration.    All other systems reviewed and are negative.        Past Medical History:   Diagnosis Date    Abdominal aortic aneurysm without rupture ([facility])     Abdominal Aortic Duplex 02/21/2017    Ectatic infrarenal abdominal aorta.    MARYANN (acute kidney injury) ([facility]) 07/23/2022    CAD (coronary artery disease)     Cancer ([facility]) 2022    right lung CA    Chronic bronchitis ([facility]) 01/28/2019    Class 1 obesity due to excess calories with serious comorbidity and body mass index (BMI) of 31.0 to 31.9 in adult 02/14/2020    COPD (chronic obstructive pulmonary disease) ([facility])     Coronary artery disease of native artery of native heart with stable angina pectoris ([facility])     Extremity pain     Hemiparesis ([facility]) 11/01/2022    Hiatal hernia     History of echocardiogram 03/18/2014    EF 55%, mild MR and AI.  Mild concentric LVH.    History of stress test 03/06/2017    Normal.    Hyperlipidemia     Hypertension     Joint pain     Kidney stone     Low back pain     Lung cancer ([facility])     Migraine     Neck pain     Obstructive sleep apnea     cannot tolerate CPAP    Osteoarthritis     Other chronic pancreatitis ([facility]) 02/10/2023    Peripheral neuropathy     Reflex sympathetic dystrophy     Sacroiliitis ([facility]) 02/02/2022       Past Surgical History:   Procedure Laterality Date    CARDIAC CATHETERIZATION  02/13/2012    EF 70%, widely patent renal arteries, significant single-vessel CAD-medical therapy.    CARDIAC CATHETERIZATION  04/11/2013    EF 65%, 50% mid LAD, 20% prox CFX, 90% diffuse RCA, 99% mid RCA. Medical management.    CATARACT EXTRACTION Right 09/19/2023    CHOLECYSTECTOMY      COLONOSCOPY      EPIDURAL BLOCK INJECTION Bilateral 08/15/2019    Procedure: BLOCK / INJECTION EPIDURAL STEROID CERVICAL;  Surgeon: Ricardo Park MD;  [facility];  Service: Pain Management     FL  GUIDED NEEDLE PLAC BX/ASP/INJ  08/15/2019    IR BIOPSY LUNG  09/13/2022    IR THORACIC DUCT EMBOLIZATION  11/22/2022    ORTHOPEDIC SURGERY      [place] [facility] INCL FLUOR GDNCE DX W/CELL WASHG SPX N/A 11/16/2022    Procedure: BRONCHOSCOPY FLEXIBLE;  Surgeon: Gulshan Madison MD;  [facility];  Service: Thoracic    TN THORACOSCOPY W/LOBECTOMY SINGLE LOBE Right 11/16/2022    Procedure: LOBECTOMY LUNG; lower lobe superior segmentectomy, mediastinal lymph node dissection;  Surgeon: Gulshan Madison MD;  [facility];  Service: Thoracic    TN THORACOSCOPY W/SEGMENTECTOMY Right 11/16/2022    Procedure: THORACOSCOPY VIDEO ASSISTED SURGERY (VATS);  Surgeon: Gulshan Madison MD;  [facility];  Service: Thoracic    TRIGGER POINT INJECTION         Family History   Adopted: Yes   Problem Relation Age of Onset    No Known Problems Family     No Known Problems Mother     No Known Problems Father        Social History     Occupational History    Not on file   Tobacco Use    Smoking status: Every Day     Current packs/day: 1.00     Average packs/day: 1 pack/day for 1.5 years (1.5 ttl pk-yrs)     Types: Cigarettes     Start date: 11/2023    Smokeless tobacco: Never   Vaping Use    Vaping status: Never Used   Substance and Sexual Activity    Alcohol use: Not Currently    Drug use: Never    Sexual activity: Yes         Current Outpatient Medications:     albuterol (Ventolin HFA) 90 mcg/act inhaler, Inhale 2 puffs every 6 (six) hours as needed for wheezing, Disp: 18 g, Rfl: 5    amLODIPine (NORVASC) 10 mg tablet, Take 1 tablet (10 mg total) by mouth daily, Disp: 90 tablet, Rfl: 1    aspirin 81 mg chewable tablet, Chew 1 tablet (81 mg total) daily, Disp: 90 tablet, Rfl: 1    bisacodyl (DULCOLAX) 5 mg EC tablet, Take 1 tablet (5 mg total) by mouth daily as needed for constipation for up to 4 doses, Disp: 30 tablet, Rfl: 0    buPROPion (Wellbutrin SR) 100 mg 12 hr tablet, Take 1 tablet (100  mg total) by mouth 2 (two) times a day, Disp: 90 tablet, Rfl: 1    cyanocobalamin (VITAMIN B-12) 1000 MCG tablet, Take 1 tablet (1,000 mcg total) by mouth daily, Disp: 90 tablet, Rfl: 3    folic acid (KP Folic Acid) 1 mg tablet, Take 1 tablet (1 mg total) by mouth daily, Disp: 30 tablet, Rfl: 6    gabapentin (NEURONTIN) 300 mg capsule, Take 1 capsule (300 mg total) by mouth 3 (three) times a day, Disp: 90 capsule, Rfl: 1    glucose blood (OneTouch Verio) test strip, Test once daily, Disp: 100 each, Rfl: 0    lisinopril (ZESTRIL) 10 mg tablet, Take 1 tablet (10 mg total) by mouth daily, Disp: 90 tablet, Rfl: 1    metoprolol succinate (TOPROL-XL) 100 mg 24 hr tablet, Take 1 tablet (100 mg total) by mouth daily, Disp: 90 tablet, Rfl: 1    nicotine (NICODERM CQ) 21 mg/24 hr TD 24 hr patch, Place 1 patch on the skin over 24 hours every 24 hours, Disp: 28 patch, Rfl: 0    [START ON 5/24/2025] oxyCODONE-acetaminophen (Percocet) 7.5-325 MG per tablet, Take 1 tablet by mouth every 8 (eight) hours as needed for moderate pain Max Daily Amount: 3 tablets Do not start before May 24, 2025., Disp: 90 tablet, Rfl: 0    [START ON 6/21/2025] oxyCODONE-acetaminophen (Percocet) 7.5-325 MG per tablet, Take 1 tablet by mouth every 8 (eight) hours as needed for moderate pain Max Daily Amount: 3 tablets Do not start before June 21, 2025., Disp: 90 tablet, Rfl: 0    Restasis 0.05 % ophthalmic emulsion, , Disp: , Rfl:     rosuvastatin (CRESTOR) 40 MG tablet, Take 1 tablet (40 mg total) by mouth daily, Disp: 90 tablet, Rfl: 1    sildenafil (VIAGRA) 25 MG tablet, Take 1 tablet (25 mg total) by mouth as needed for erectile dysfunction, Disp: 30 tablet, Rfl: 3    tiotropium-olodaterol (Stiolto Respimat) 2.5-2.5 MCG/ACT inhaler, Inhale 2 puffs daily, Disp: 4 g, Rfl: 5    tiZANidine (ZANAFLEX) 4 mg tablet, take 1 tablet by mouth every 8 hours if needed for muscle spasm, Disp: 90 tablet, Rfl: 1    traZODone (DESYREL) 100 mg tablet, Take 1 tablet  "(100 mg total) by mouth daily at bedtime, Disp: 90 tablet, Rfl: 1    fluticasone (FLONASE) 50 mcg/act nasal spray, 1 spray into each nostril daily for 14 days, Disp: 11.1 mL, Rfl: 0    fluticasone-umeclidinium-vilanterol (Trelegy Ellipta) 100-62.5-25 mcg/actuation inhaler, Inhale 1 puff daily Rinse mouth after use., Disp: 60 blister, Rfl: 0    tamsulosin (FLOMAX) 0.4 mg, Take 1 capsule (0.4 mg total) by mouth daily with dinner for 3 days, Disp: 3 capsule, Rfl: 0    No Known Allergies    Physical Exam:    Ht 5' 8\" (1.727 m)   Wt 86.4 kg (190 lb 6.4 oz)   BMI 28.95 kg/m²     Constitutional:normal, well developed, well nourished, alert, in no distress and non-toxic and no overt pain behavior.  Eyes:anicteric  HEENT:grossly intact  Neck:supple, symmetric, trachea midline and no masses   Pulmonary:even and unlabored  Cardiovascular:No edema or pitting edema present  Skin:Normal without rashes or lesions and well hydrated  Psychiatric:Mood and affect appropriate  Neurologic:Cranial Nerves II-XII grossly intact  Musculoskeletal: Gait is slow and guarded.      Imaging  No orders to display         No orders of the defined types were placed in this encounter.      "

## 2025-05-16 ENCOUNTER — TELEPHONE (OUTPATIENT)
Age: 70
End: 2025-05-16

## 2025-05-16 ENCOUNTER — OFFICE VISIT (OUTPATIENT)
Dept: FAMILY MEDICINE CLINIC | Facility: CLINIC | Age: 70
End: 2025-05-16
Payer: COMMERCIAL

## 2025-05-16 VITALS
HEIGHT: 68 IN | SYSTOLIC BLOOD PRESSURE: 132 MMHG | OXYGEN SATURATION: 93 % | DIASTOLIC BLOOD PRESSURE: 84 MMHG | WEIGHT: 191 LBS | TEMPERATURE: 97.8 F | HEART RATE: 63 BPM | BODY MASS INDEX: 28.95 KG/M2

## 2025-05-16 DIAGNOSIS — J43.9 PULMONARY EMPHYSEMA, UNSPECIFIED EMPHYSEMA TYPE (HCC): ICD-10-CM

## 2025-05-16 DIAGNOSIS — B07.0 PLANTAR WART: ICD-10-CM

## 2025-05-16 DIAGNOSIS — F17.200 SMOKING ADDICTION: Primary | ICD-10-CM

## 2025-05-16 DIAGNOSIS — Z23 ENCOUNTER FOR IMMUNIZATION: ICD-10-CM

## 2025-05-16 DIAGNOSIS — R32 URINARY INCONTINENCE, UNSPECIFIED TYPE: ICD-10-CM

## 2025-05-16 DIAGNOSIS — I10 ESSENTIAL HYPERTENSION: ICD-10-CM

## 2025-05-16 DIAGNOSIS — Z12.11 SCREENING FOR COLON CANCER: ICD-10-CM

## 2025-05-16 DIAGNOSIS — Z12.5 SCREENING FOR PROSTATE CANCER: ICD-10-CM

## 2025-05-16 DIAGNOSIS — E78.2 MIXED HYPERLIPIDEMIA: ICD-10-CM

## 2025-05-16 PROCEDURE — 99213 OFFICE O/P EST LOW 20 MIN: CPT | Performed by: FAMILY MEDICINE

## 2025-05-16 NOTE — TELEPHONE ENCOUNTER
New Patient      Insurance   Current Insurance?[employer]   Insurance E-verified? Yes    History   Reason for appointment/active symptoms?  Urinary incontinence      Has the patient had any previous Urologist(s)? No     Was the patient seen in the ED? No     Labs/Imaging(Including Out Of Network)? No     Records Requested? Yes  Records Visible in EPIC? Yes     Personal history of cancer? Yes stage 3 lung      Appointment   Office location preference:  [place]  ?   Appointment Details   Date:  6/23  Time:  11:40  Location:  [place]  Provider:  Venu  Does the appointment need further review? No

## 2025-05-16 NOTE — PROGRESS NOTES
Assessment/Plan:    No problem-specific Assessment & Plan notes found for this encounter.       Diagnoses and all orders for this visit:    Smoking addiction  Comments:  pt is progressing, pt encouraged    Essential hypertension  -     CBC and differential; Future  -     TSH, 3rd generation with Free T4 reflex; Future    Mixed hyperlipidemia  -     Comprehensive metabolic panel; Future  -     Lipid panel; Future    Pulmonary emphysema, unspecified emphysema type (HCC)  Comments:  quit smoking    Encounter for immunization    Screening for colon cancer  -     Ambulatory Referral to Gastroenterology; Future    Screening for prostate cancer  -     PSA, Total Screen; Future    Plantar wart  -     Ambulatory Referral to Podiatry; Future    Urinary incontinence, unspecified type  -     Ambulatory Referral to Urology; Future          PHQ-2/9 Depression Screening    Little interest or pleasure in doing things: 0 - not at all  Feeling down, depressed, or hopeless: 0 - not at all  Trouble falling or staying asleep, or sleeping too much: 0 - not at all  Feeling tired or having little energy: 0 - not at all  Poor appetite or overeatin - not at all  Feeling bad about yourself - or that you are a failure or have let yourself or your family down: 0 - not at all  Trouble concentrating on things, such as reading the newspaper or watching television: 0 - not at all  Moving or speaking so slowly that other people could have noticed. Or the opposite - being so fidgety or restless that you have been moving around a lot more than usual: 0 - not at all  Thoughts that you would be better off dead, or of hurting yourself in some way: 0 - not at all  PHQ-9 Score: 0  PHQ-9 Interpretation: No or Minimal depression            Subjective:      Patient ID: Lance Hazel is a 69 y.o. male.    Pt here for follow up for smoking cessation, pt cut down from 2 ppd to 1 ppd      The following portions of the patient's history were reviewed and  "updated as appropriate: allergies, current medications, past family history, past medical history, past social history, past surgical history, and problem list.    Review of Systems   Constitutional:  Negative for chills, fever and unexpected weight change.   Respiratory:  Negative for shortness of breath and wheezing.    Cardiovascular:  Negative for chest pain and palpitations.         Objective:    /84   Pulse 63   Temp 97.8 °F (36.6 °C) (Tympanic)   Ht 5' 8\" (1.727 m)   Wt 86.6 kg (191 lb)   SpO2 93%   BMI 29.04 kg/m²      Physical Exam  Vitals and nursing note reviewed.   Constitutional:       General: He is not in acute distress.     Appearance: Normal appearance. He is not ill-appearing or diaphoretic.   HENT:      Head: Normocephalic and atraumatic.     Eyes:      Conjunctiva/sclera: Conjunctivae normal.       Cardiovascular:      Rate and Rhythm: Normal rate and regular rhythm.      Heart sounds: Normal heart sounds. No murmur heard.  Pulmonary:      Effort: Pulmonary effort is normal. No respiratory distress.      Breath sounds: Normal breath sounds. No wheezing, rhonchi or rales.   Abdominal:      General: There is no distension.      Palpations: Abdomen is soft. There is no mass.      Tenderness: There is no abdominal tenderness. There is no guarding or rebound.     Musculoskeletal:      Cervical back: Normal range of motion and neck supple. No rigidity.      Right lower leg: No edema.      Left lower leg: No edema.   Lymphadenopathy:      Cervical: No cervical adenopathy.     Neurological:      Mental Status: He is alert and oriented to person, place, and time.     Psychiatric:         Mood and Affect: Mood normal.         Behavior: Behavior normal.         Thought Content: Thought content normal.         Judgment: Judgment normal.           "

## 2025-05-19 DIAGNOSIS — M54.50 LUMBAR SPINE PAIN: ICD-10-CM

## 2025-05-20 ENCOUNTER — HOSPITAL ENCOUNTER (OUTPATIENT)
Dept: PULMONOLOGY | Facility: HOSPITAL | Age: 70
Discharge: HOME | End: 2025-05-20
Payer: COMMERCIAL

## 2025-05-20 ENCOUNTER — APPOINTMENT (OUTPATIENT)
Dept: LAB | Facility: HOSPITAL | Age: 70
End: 2025-05-20
Payer: COMMERCIAL

## 2025-05-20 DIAGNOSIS — Z12.5 SCREENING FOR PROSTATE CANCER: ICD-10-CM

## 2025-05-20 DIAGNOSIS — E78.2 MIXED HYPERLIPIDEMIA: ICD-10-CM

## 2025-05-20 DIAGNOSIS — I10 ESSENTIAL HYPERTENSION: ICD-10-CM

## 2025-05-20 DIAGNOSIS — J43.2 CENTRILOBULAR EMPHYSEMA (HCC): ICD-10-CM

## 2025-05-20 LAB
ALBUMIN SERPL BCG-MCNC: 4.6 G/DL (ref 3.5–5)
ALP SERPL-CCNC: 63 U/L (ref 34–104)
ALT SERPL W P-5'-P-CCNC: 12 U/L (ref 7–52)
ANION GAP SERPL CALCULATED.3IONS-SCNC: 9 MMOL/L (ref 4–13)
AST SERPL W P-5'-P-CCNC: 12 U/L (ref 13–39)
BASOPHILS # BLD AUTO: 0.08 THOUSANDS/ÂΜL (ref 0–0.1)
BASOPHILS NFR BLD AUTO: 1 % (ref 0–1)
BILIRUB SERPL-MCNC: 0.95 MG/DL (ref 0.2–1)
BUN SERPL-MCNC: 21 MG/DL (ref 5–25)
CALCIUM SERPL-MCNC: 9.6 MG/DL (ref 8.4–10.2)
CHLORIDE SERPL-SCNC: 101 MMOL/L (ref 96–108)
CHOLEST SERPL-MCNC: 132 MG/DL (ref ?–200)
CO2 SERPL-SCNC: 27 MMOL/L (ref 21–32)
CREAT SERPL-MCNC: 1.13 MG/DL (ref 0.6–1.3)
EOSINOPHIL # BLD AUTO: 0 THOUSAND/ÂΜL (ref 0–0.61)
EOSINOPHIL NFR BLD AUTO: 0 % (ref 0–6)
ERYTHROCYTE [DISTWIDTH] IN BLOOD BY AUTOMATED COUNT: 13.4 % (ref 11.6–15.1)
GFR SERPL CREATININE-BSD FRML MDRD: 65 ML/MIN/1.73SQ M
GLUCOSE SERPL-MCNC: 112 MG/DL (ref 65–140)
HCT VFR BLD AUTO: 51 % (ref 36.5–49.3)
HDLC SERPL-MCNC: 33 MG/DL
HGB BLD-MCNC: 16.3 G/DL (ref 12–17)
IMM GRANULOCYTES # BLD AUTO: 0.03 THOUSAND/UL (ref 0–0.2)
IMM GRANULOCYTES NFR BLD AUTO: 0 % (ref 0–2)
LDLC SERPL CALC-MCNC: 40 MG/DL (ref 0–100)
LYMPHOCYTES # BLD AUTO: 2.95 THOUSANDS/ÂΜL (ref 0.6–4.47)
LYMPHOCYTES NFR BLD AUTO: 28 % (ref 14–44)
MCH RBC QN AUTO: 30.4 PG (ref 26.8–34.3)
MCHC RBC AUTO-ENTMCNC: 32 G/DL (ref 31.4–37.4)
MCV RBC AUTO: 95 FL (ref 82–98)
MONOCYTES # BLD AUTO: 0.81 THOUSAND/ÂΜL (ref 0.17–1.22)
MONOCYTES NFR BLD AUTO: 8 % (ref 4–12)
NEUTROPHILS # BLD AUTO: 6.84 THOUSANDS/ÂΜL (ref 1.85–7.62)
NEUTS SEG NFR BLD AUTO: 63 % (ref 43–75)
NONHDLC SERPL-MCNC: 99 MG/DL
NRBC BLD AUTO-RTO: 0 /100 WBCS
PLATELET # BLD AUTO: 213 THOUSANDS/UL (ref 149–390)
PMV BLD AUTO: 10.6 FL (ref 8.9–12.7)
POTASSIUM SERPL-SCNC: 4.3 MMOL/L (ref 3.5–5.3)
PROT SERPL-MCNC: 7.3 G/DL (ref 6.4–8.4)
PSA SERPL-MCNC: 5.42 NG/ML (ref 0–4)
RBC # BLD AUTO: 5.36 MILLION/UL (ref 3.88–5.62)
SODIUM SERPL-SCNC: 137 MMOL/L (ref 135–147)
TRIGL SERPL-MCNC: 296 MG/DL (ref ?–150)
TSH SERPL DL<=0.05 MIU/L-ACNC: 1.09 UIU/ML (ref 0.45–4.5)
WBC # BLD AUTO: 10.71 THOUSAND/UL (ref 4.31–10.16)

## 2025-05-20 PROCEDURE — G0103 PSA SCREENING: HCPCS

## 2025-05-20 PROCEDURE — 84443 ASSAY THYROID STIM HORMONE: CPT

## 2025-05-20 PROCEDURE — 94060 EVALUATION OF WHEEZING: CPT

## 2025-05-20 PROCEDURE — 80061 LIPID PANEL: CPT

## 2025-05-20 PROCEDURE — 94760 N-INVAS EAR/PLS OXIMETRY 1: CPT

## 2025-05-20 PROCEDURE — 85025 COMPLETE CBC W/AUTO DIFF WBC: CPT

## 2025-05-20 PROCEDURE — 36415 COLL VENOUS BLD VENIPUNCTURE: CPT

## 2025-05-20 PROCEDURE — 94726 PLETHYSMOGRAPHY LUNG VOLUMES: CPT

## 2025-05-20 PROCEDURE — 94729 DIFFUSING CAPACITY: CPT

## 2025-05-20 PROCEDURE — 80053 COMPREHEN METABOLIC PANEL: CPT

## 2025-05-20 RX ORDER — GABAPENTIN 300 MG/1
300 CAPSULE ORAL 3 TIMES DAILY
Qty: 90 CAPSULE | Refills: 3 | Status: SHIPPED | OUTPATIENT
Start: 2025-05-20

## 2025-05-20 RX ORDER — ALBUTEROL SULFATE 0.83 MG/ML
2.5 SOLUTION RESPIRATORY (INHALATION) ONCE AS NEEDED
Status: COMPLETED | OUTPATIENT
Start: 2025-05-20 | End: 2025-05-20

## 2025-05-20 RX ADMIN — ALBUTEROL SULFATE 2.5 MG: 2.5 SOLUTION RESPIRATORY (INHALATION) at 09:27

## 2025-05-21 ENCOUNTER — TELEPHONE (OUTPATIENT)
Age: 70
End: 2025-05-21

## 2025-05-21 NOTE — TELEPHONE ENCOUNTER
Pt called and stated his PSA results are elevated and he was instructed by his PCP to contact the office for a sooner appointment. Pt was added to the wait list, he stated he would prefer the [place] [facility] [place] office.     Please review if NP appointment is appropriate.

## 2025-05-21 NOTE — TELEPHONE ENCOUNTER
Pt called about his lab results as he saw that his PSA came back abnormal please advise as he does have an appt with urology in June

## 2025-05-23 ENCOUNTER — APPOINTMENT (OUTPATIENT)
Dept: RADIOLOGY | Facility: MEDICAL CENTER | Age: 70
End: 2025-05-23
Attending: NURSE PRACTITIONER
Payer: COMMERCIAL

## 2025-05-23 ENCOUNTER — OFFICE VISIT (OUTPATIENT)
Dept: PAIN MEDICINE | Facility: CLINIC | Age: 70
End: 2025-05-23

## 2025-05-23 VITALS
WEIGHT: 192.6 LBS | OXYGEN SATURATION: 95 % | HEART RATE: 64 BPM | BODY MASS INDEX: 29.19 KG/M2 | RESPIRATION RATE: 16 BRPM | SYSTOLIC BLOOD PRESSURE: 143 MMHG | HEIGHT: 68 IN | DIASTOLIC BLOOD PRESSURE: 80 MMHG | TEMPERATURE: 98.2 F

## 2025-05-23 DIAGNOSIS — M54.6 THORACIC SPINE PAIN: ICD-10-CM

## 2025-05-23 DIAGNOSIS — M54.50 CHRONIC MIDLINE LOW BACK PAIN WITHOUT SCIATICA: ICD-10-CM

## 2025-05-23 DIAGNOSIS — G89.4 CHRONIC PAIN SYNDROME: ICD-10-CM

## 2025-05-23 DIAGNOSIS — G89.29 CHRONIC MIDLINE LOW BACK PAIN WITHOUT SCIATICA: ICD-10-CM

## 2025-05-23 DIAGNOSIS — G89.4 CHRONIC PAIN SYNDROME: Primary | ICD-10-CM

## 2025-05-23 PROCEDURE — 72072 X-RAY EXAM THORAC SPINE 3VWS: CPT

## 2025-05-23 PROCEDURE — 72110 X-RAY EXAM L-2 SPINE 4/>VWS: CPT

## 2025-05-23 RX ORDER — PREDNISONE 10 MG/1
TABLET ORAL
Qty: 15 TABLET | Refills: 0 | Status: SHIPPED | OUTPATIENT
Start: 2025-05-23

## 2025-05-23 NOTE — PROGRESS NOTES
Assessment:  1. Chronic pain syndrome    2. Chronic midline low back pain without sciatica    3. Thoracic spine pain        Plan:  While the patient was in the office today, I did have a thorough conversation regarding their chronic pain syndrome, medication management, and treatment plan options.  Patient is being on an emergent basis.  He has been experiencing increased thoracolumbar pain for the past week.  There was no inciting event.    Will x-ray the thoracic and lumbar spine to rule out pathology that would contribute to current symptoms.    I discussed with the patient that at this point time since I feel that there is a significant inflammatory component to their pain symptoms, that they would benefit from a titrating dose of oral steroids over the next 7 days. I advised the patient that while on the steroids, they should not take any other oral NSAIDs except for acetaminophen or Tylenol until they have completed the steroid taper. I also advised them that once they have completed the steroid taper, they are to give our office a follow up phone call to let us know how they are doing and if there is any improvement.The patient was agreeable and verbalized an understanding.    Continue oxycodone 5/325 every 8 hours as needed for pain.    Continue gabapentin 300 mg 3 times daily and tizanidine 4 mg every 8 hours as prescribed by his PCP.    Will call patient with x-ray results soon as they are available to review.    Keep already scheduled follow-up appointment.      History of Present Illness:  The patient is a 70 y.o. male who presents for a follow up office visit in regards to Back Pain and Neck Pain.   The patient’s current symptoms include complaints of increased mid and low back pain for 1 week.  No inciting event.  Pain can go up to a 10/10.  Quality pain is described as sharp.    Current pain medications includes: Oxycodone 5/325 every 8 hours as needed for pain.  PCP prescribes gabapentin 300 mg 3  "times daily and tizanidine 4 mg every 8 hours as needed for spasms.  The patient reports that this regimen is providing 20% pain relief.  The patient is reporting no side effects from this pain medication regimen.    I have personally reviewed and/or updated the patient's past medical history, past surgical history, family history, social history, current medications, allergies, and vital signs today.         Review of Systems  Review of Systems   Constitutional: Negative.    HENT: Negative.     Eyes: Negative.    Respiratory: Negative.     Cardiovascular: Negative.    Gastrointestinal: Negative.    Endocrine: Negative.    Genitourinary: Negative.    Musculoskeletal:  Positive for back pain and neck pain.   Skin: Negative.    Allergic/Immunologic: Negative.    Neurological: Negative.    Hematological: Negative.    Psychiatric/Behavioral: Negative.             Past Medical History[1]    Past Surgical History[2]    Family History[3]    Social History     Occupational History    Not on file   Tobacco Use    Smoking status: Every Day     Current packs/day: 1.00     Average packs/day: 1 pack/day for 1.6 years (1.6 ttl pk-yrs)     Types: Cigarettes     Start date: 11/2023    Smokeless tobacco: Never   Vaping Use    Vaping status: Never Used   Substance and Sexual Activity    Alcohol use: Not Currently    Drug use: Never    Sexual activity: Yes       Current Medications[4]    Allergies[5]    Physical Exam:    /80 (BP Location: Left arm, Patient Position: Sitting, Cuff Size: Large)   Pulse 64   Temp 98.2 °F (36.8 °C) (Temporal)   Resp 16   Ht 5' 8\" (1.727 m)   Wt 87.4 kg (192 lb 9.6 oz)   SpO2 95%   BMI 29.28 kg/m²     Constitutional:normal, well developed, well nourished, alert, in no distress and non-toxic and no overt pain behavior.  Eyes:anicteric  HEENT:grossly intact  Neck:supple, symmetric, trachea midline and no masses   Pulmonary:even and unlabored  Cardiovascular:No edema or pitting edema " present  Skin:Normal without rashes or lesions and well hydrated  Psychiatric:Mood and affect appropriate  Neurologic:Cranial Nerves II-XII grossly intact  Musculoskeletal:Gait is slow and guarded.  Range of motion of the lumbar spine spine and thoracic spine are limited in all planes.    Imaging  No orders to display       Orders Placed This Encounter   Procedures    XR spine thoracic 3 vw    XR spine lumbar minimum 4 views non injury       THORACIC SPINE     INDICATION:   Chronic pain syndrome. Dorsalgia, unspecified.      COMPARISON:  None.     VIEWS:  XR SPINE THORACIC 3 VW  Images: 4     FINDINGS:     There is no fracture or pathologic bone lesion.     Thoracic vertebral alignment is within normal limits.     Age related degenerative changes are seen.      There is no displacement of the paraspinal line.      The pedicles appear intact.     IMPRESSION:        No acute osseous abnormality.  Degenerative changes as described.       MRI LUMBAR SPINE WITHOUT CONTRAST     INDICATION: G89.4: Chronic pain syndrome  M54.50: Low back pain, unspecified  G89.29: Other chronic pain  M54.16: Radiculopathy, lumbar region.   Increasing low back and bilateral leg pain.     COMPARISON: Prior MRI August 25, 2020     TECHNIQUE:  Multiplanar, multisequence imaging of the lumbar spine was performed. .        IMAGE QUALITY:  Diagnostic     FINDINGS:     VERTEBRAL BODIES:  There are 5 lumbar type vertebral bodies.  Normal alignment of the lumbar spine.  No spondylolysis or spondylolisthesis. No scoliosis.  No compression fracture.    Vertebral hemangiomas are seen in T11, L1, and L5 vertebral bodies.     SACRUM:  Normal signal within the sacrum. No evidence of insufficiency or stress fracture.     DISTAL CORD AND CONUS:  Normal size and signal within the distal cord and conus.     PARASPINAL SOFT TISSUES:  Paraspinal soft tissues are unremarkable.     LOWER THORACIC DISC SPACES:  Normal disc height and signal.  No disc herniation,  canal stenosis or foraminal narrowing.     LUMBAR DISC SPACES:     L1-L2: No central or foraminal narrowing. Minimal annular bulge.     L2-L3: Moderate facet hypertrophy. Minimal annular bulge identified without significant central or foraminal narrowing.     L3-L4: Right far lateral protrusion type disc herniation contacts the extraforaminal right L3 nerve root. Correlate for right L3 radiculopathy. This is seen best on series 7 image 28. Mild central stenosis noted. Mild facet hypertrophy.     L4-L5: Diffuse annular bulge with moderate facet hypertrophy and ligamentous infolding. Small central protrusion type disc nation with annular fissure also noted contributing to mild central and mild right lateral recess stenosis. Marginal osteophyte   contributes to mild right foraminal narrowing.     L5-S1: Moderate facet hypertrophy. Mild annular bulge with small central protrusion and annular fissure. Moderate bilateral foraminal narrowing. Central canal patent.     OTHER FINDINGS: Multiple T2 hyperintense renal lesions bilaterally prior described as representing cysts.     IMPRESSION:     New extruded right far lateral disc herniation at L3-4 contacts the extraforaminal right L3 nerve root. Correlate for right L3 radiculopathy.     Spondylotic degenerative changes are seen throughout the lumbar spine resulting in moderate bilateral foraminal narrowing at L5-S1 with mild multilevel central and foraminal narrowing elsewhere.                   [1]   Past Medical History:  Diagnosis Date    Abdominal aortic aneurysm without rupture (HCC)     Abdominal Aortic Duplex 02/21/2017    Ectatic infrarenal abdominal aorta.    MARYANN (acute kidney injury) ([facility]) 07/23/2022    CAD (coronary artery disease)     Cancer ([facility]) 2022    right lung CA    Chronic bronchitis ([facility]) 01/28/2019    Class 1 obesity due to excess calories with serious comorbidity and body mass index (BMI) of 31.0 to 31.9 in adult 02/14/2020    COPD (chronic obstructive  pulmonary disease) (HCC)     Coronary artery disease of native artery of native heart with stable angina pectoris (HCC)     Extremity pain     Hemiparesis (HCC) 11/01/2022    Hiatal hernia     History of echocardiogram 03/18/2014    EF 55%, mild MR and AI.  Mild concentric LVH.    History of stress test 03/06/2017    Normal.    Hyperlipidemia     Hypertension     Joint pain     Kidney stone     Low back pain     Lung cancer (HCC)     Migraine     Neck pain     Obstructive sleep apnea     cannot tolerate CPAP    Osteoarthritis     Other chronic pancreatitis (HCC) 02/10/2023    Peripheral neuropathy     Reflex sympathetic dystrophy     Sacroiliitis (HCC) 02/02/2022   [2]   Past Surgical History:  Procedure Laterality Date    CARDIAC CATHETERIZATION  02/13/2012    EF 70%, widely patent renal arteries, significant single-vessel CAD-medical therapy.    CARDIAC CATHETERIZATION  04/11/2013    EF 65%, 50% mid LAD, 20% prox CFX, 90% diffuse RCA, 99% mid RCA. Medical management.    CATARACT EXTRACTION Right 09/19/2023    CHOLECYSTECTOMY      COLONOSCOPY      EPIDURAL BLOCK INJECTION Bilateral 08/15/2019    Procedure: BLOCK / INJECTION EPIDURAL STEROID CERVICAL;  Surgeon: Ricardo Park MD;  Location: [facility];  Service: Pain Management     FL GUIDED NEEDLE PLAC BX/ASP/INJ  08/15/2019    IR BIOPSY LUNG  09/13/2022    IR THORACIC DUCT EMBOLIZATION  11/22/2022    ORTHOPEDIC SURGERY      [facility] W/CELL WASHG SPX N/A 11/16/2022    Procedure: BRONCHOSCOPY FLEXIBLE;  Surgeon: Gulshan Madison MD;  Location: [facility];  Service: Thoracic    AR THORACOSCOPY W/LOBECTOMY SINGLE LOBE Right 11/16/2022    Procedure: LOBECTOMY LUNG; lower lobe superior segmentectomy, mediastinal lymph node dissection;  Surgeon: Gulshan Madison MD;  Location: [facility];  Service: Thoracic    AR THORACOSCOPY W/SEGMENTECTOMY Right 11/16/2022    Procedure: THORACOSCOPY VIDEO ASSISTED SURGERY (VATS);  Surgeon:  Gulshan Madison MD;  Location: [facility];  Service: Thoracic    TRIGGER POINT INJECTION     [3]   Family History  Adopted: Yes   Problem Relation Name Age of Onset    No Known Problems Family      No Known Problems Mother      No Known Problems Father     [4]   Current Outpatient Medications:     albuterol (Ventolin HFA) 90 mcg/act inhaler, Inhale 2 puffs every 6 (six) hours as needed for wheezing, Disp: 18 g, Rfl: 5    amLODIPine (NORVASC) 10 mg tablet, Take 1 tablet (10 mg total) by mouth daily, Disp: 90 tablet, Rfl: 1    aspirin 81 mg chewable tablet, Chew 1 tablet (81 mg total) daily, Disp: 90 tablet, Rfl: 1    bisacodyl (DULCOLAX) 5 mg EC tablet, Take 1 tablet (5 mg total) by mouth daily as needed for constipation for up to 4 doses, Disp: 30 tablet, Rfl: 0    buPROPion (Wellbutrin SR) 100 mg 12 hr tablet, Take 1 tablet (100 mg total) by mouth 2 (two) times a day, Disp: 90 tablet, Rfl: 1    cyanocobalamin (VITAMIN B-12) 1000 MCG tablet, Take 1 tablet (1,000 mcg total) by mouth daily, Disp: 90 tablet, Rfl: 3    folic acid (KP Folic Acid) 1 mg tablet, Take 1 tablet (1 mg total) by mouth daily, Disp: 30 tablet, Rfl: 6    gabapentin (NEURONTIN) 300 mg capsule, take 1 capsule by mouth three times a day, Disp: 90 capsule, Rfl: 3    glucose blood (OneTouch Verio) test strip, Test once daily, Disp: 100 each, Rfl: 0    lisinopril (ZESTRIL) 10 mg tablet, Take 1 tablet (10 mg total) by mouth daily, Disp: 90 tablet, Rfl: 1    metoprolol succinate (TOPROL-XL) 100 mg 24 hr tablet, Take 1 tablet (100 mg total) by mouth daily, Disp: 90 tablet, Rfl: 1    nicotine (NICODERM CQ) 21 mg/24 hr TD 24 hr patch, Place 1 patch on the skin over 24 hours every 24 hours, Disp: 28 patch, Rfl: 0    [START ON 5/24/2025] oxyCODONE-acetaminophen (Percocet) 7.5-325 MG per tablet, Take 1 tablet by mouth every 8 (eight) hours as needed for moderate pain Max Daily Amount: 3 tablets Do not start before May 24, 2025., Disp: 90 tablet, Rfl:  0    predniSONE 10 mg tablet, 3 tabs once daily x 3 days; 2 tabs once daily x 2 days; 1 tab daily x 2 days, Disp: 15 tablet, Rfl: 0    Restasis 0.05 % ophthalmic emulsion, , Disp: , Rfl:     rosuvastatin (CRESTOR) 40 MG tablet, Take 1 tablet (40 mg total) by mouth daily, Disp: 90 tablet, Rfl: 1    sildenafil (VIAGRA) 25 MG tablet, Take 1 tablet (25 mg total) by mouth as needed for erectile dysfunction, Disp: 30 tablet, Rfl: 3    tiotropium-olodaterol (Stiolto Respimat) 2.5-2.5 MCG/ACT inhaler, Inhale 2 puffs daily, Disp: 4 g, Rfl: 5    tiZANidine (ZANAFLEX) 4 mg tablet, take 1 tablet by mouth every 8 hours if needed for muscle spasm, Disp: 90 tablet, Rfl: 1    traZODone (DESYREL) 100 mg tablet, Take 1 tablet (100 mg total) by mouth daily at bedtime, Disp: 90 tablet, Rfl: 1    fluticasone (FLONASE) 50 mcg/act nasal spray, 1 spray into each nostril daily for 14 days, Disp: 11.1 mL, Rfl: 0    fluticasone-umeclidinium-vilanterol (Trelegy Ellipta) 100-62.5-25 mcg/actuation inhaler, Inhale 1 puff daily Rinse mouth after use., Disp: 60 blister, Rfl: 0    [START ON 6/21/2025] oxyCODONE-acetaminophen (Percocet) 7.5-325 MG per tablet, Take 1 tablet by mouth every 8 (eight) hours as needed for moderate pain Max Daily Amount: 3 tablets Do not start before June 21, 2025. (Patient not taking: Reported on 5/23/2025 Do not start before June 21, 2025.), Disp: 90 tablet, Rfl: 0    tamsulosin (FLOMAX) 0.4 mg, Take 1 capsule (0.4 mg total) by mouth daily with dinner for 3 days, Disp: 3 capsule, Rfl: 0  [5] No Known Allergies

## 2025-05-27 ENCOUNTER — RESULTS FOLLOW-UP (OUTPATIENT)
Dept: PAIN MEDICINE | Facility: CLINIC | Age: 70
End: 2025-05-27

## 2025-05-27 NOTE — RESULT ENCOUNTER NOTE
S/w pt and advised.  He notes the aneurysm is being monitored and he does have an appointment with vascular

## 2025-05-29 DIAGNOSIS — G47.00 INSOMNIA, UNSPECIFIED TYPE: ICD-10-CM

## 2025-05-30 RX ORDER — TRAZODONE HYDROCHLORIDE 100 MG/1
100 TABLET ORAL
Qty: 90 TABLET | Refills: 1 | Status: SHIPPED | OUTPATIENT
Start: 2025-05-30 | End: 2025-06-06 | Stop reason: SDUPTHER

## 2025-06-03 ENCOUNTER — OFFICE VISIT (OUTPATIENT)
Dept: CARDIOLOGY CLINIC | Facility: CLINIC | Age: 70
End: 2025-06-03
Payer: COMMERCIAL

## 2025-06-03 VITALS
DIASTOLIC BLOOD PRESSURE: 76 MMHG | SYSTOLIC BLOOD PRESSURE: 130 MMHG | HEIGHT: 68 IN | WEIGHT: 196 LBS | HEART RATE: 60 BPM | BODY MASS INDEX: 29.7 KG/M2

## 2025-06-03 DIAGNOSIS — E78.2 MIXED HYPERLIPIDEMIA: ICD-10-CM

## 2025-06-03 DIAGNOSIS — I10 PRIMARY HYPERTENSION: ICD-10-CM

## 2025-06-03 DIAGNOSIS — I71.40 ABDOMINAL AORTIC ANEURYSM (AAA) WITHOUT RUPTURE, UNSPECIFIED PART (HCC): ICD-10-CM

## 2025-06-03 DIAGNOSIS — I25.118 CORONARY ARTERY DISEASE OF NATIVE ARTERY OF NATIVE HEART WITH STABLE ANGINA PECTORIS (HCC): Primary | ICD-10-CM

## 2025-06-03 PROCEDURE — 99214 OFFICE O/P EST MOD 30 MIN: CPT | Performed by: PHYSICIAN ASSISTANT

## 2025-06-03 NOTE — ASSESSMENT & PLAN NOTE
3.7 cm at last check on Xray in May   Growing from 3.6 on US in march  Will be seeing vascular in consult next week

## 2025-06-03 NOTE — ASSESSMENT & PLAN NOTE
No anginal symptoms   Continue medical management  On metoprolol lisinopril statin amlodipine and ASA

## 2025-06-03 NOTE — PROGRESS NOTES
[facility]   Outpatient Note  Lance Hazel  1955  [id number]  [facility] [place] [address] [address] [facility] [address] [phone]    Lance Hazel is a 70 y.o. male    Assessment and Plan:     Coronary artery disease of native artery of native heart with stable angina pectoris (HCC)  No anginal symptoms   Continue medical management  On metoprolol lisinopril statin amlodipine and ASA      Abdominal aortic aneurysm (AAA) without rupture  3.7 cm at last check on Xray in May   Growing from 3.6 on US in march  Will be seeing vascular in consult next week     Hypertension  Controlled on current medical regimen   On metoprolol lisinopril and Norvasc     Hyperlipidemia  Tolerating statin therapy  Continue Crestor 40 mg daily       Additional Plan:   No Medication changes made or testing ordered today.     Available lab and test results are reviewed with the patient as noted.    Return visit will be in six months or earlier if there are problems.     The patient is encouraged to call in the meantime if there are questions or concerns.      Subjective:   The patient is seen in follow-up today regarding CAD, HTN, HLD and infrarenal abdominal aortic aneurysm that has been growing.  AAA now measures 3.7cm in May on x-ray from 3.6 cm in March on ultrasound.  He has an appointment with vascular to consult with them.  He is not feeling well and has been having complaints of rib cage pain as well as back pain and has had multiple x-rays to assess.  Otherwise from a cardiac perspective he is doing okay.  He has no anginal symptoms.  He denies any palpitations dizziness lightheadedness or syncope.  He denies any TIA or CVA symptoms.  He has no edema orthopnea or PND.        Social History  Tobacco Use History[1],   Social History     Substance and Sexual Activity   Alcohol Use Not Currently   ,   Social History     Substance and Sexual  "Activity   Drug Use Never     Family History[2]    Medical and Surgical History  Past Medical History[3]  Past Surgical History[4]    Current Medications[5]  No Known Allergies    Review of Systems   Constitutional: Negative.   HENT: Negative.     Eyes: Negative.    Cardiovascular: Negative.  Negative for chest pain, claudication, cyanosis, dyspnea on exertion, irregular heartbeat, leg swelling, near-syncope, orthopnea, palpitations, paroxysmal nocturnal dyspnea and syncope.   Respiratory: Negative.  Negative for cough, hemoptysis, shortness of breath, sleep disturbances due to breathing, snoring, sputum production and wheezing.    Endocrine: Negative.    Hematologic/Lymphatic: Negative.    Skin: Negative.    Musculoskeletal:  Positive for back pain.        Rib cage pain   Gastrointestinal: Negative.    Genitourinary: Negative.    Neurological: Negative.    Psychiatric/Behavioral: Negative.     Allergic/Immunologic: Negative.        Objective:   /76   Pulse 60   Ht 5' 8\" (1.727 m)   Wt 88.9 kg (196 lb)   BMI 29.80 kg/m²   Physical Exam  Vitals and nursing note reviewed.   Constitutional:       Appearance: He is well-developed.   HENT:      Head: Normocephalic and atraumatic.      Mouth/Throat:      Mouth: Mucous membranes are moist.     Eyes:      General: No scleral icterus.     Conjunctiva/sclera: Conjunctivae normal.     Neck:      Thyroid: No thyromegaly.      Vascular: No JVD.     Cardiovascular:      Rate and Rhythm: Normal rate and regular rhythm.      Heart sounds: Normal heart sounds, S1 normal and S2 normal. No murmur heard.     No friction rub. No gallop.   Pulmonary:      Effort: No respiratory distress.      Breath sounds: No wheezing or rales.   Abdominal:      General: Bowel sounds are normal. There is no distension.      Palpations: Abdomen is soft.      Tenderness: There is no abdominal tenderness.      Comments: Aorta not palpable     Musculoskeletal:         General: No tenderness or " "deformity. Normal range of motion.      Cervical back: Normal range of motion and neck supple.      Right lower leg: No edema.      Left lower leg: No edema.     Skin:     General: Skin is warm and dry.     Neurological:      General: No focal deficit present.      Mental Status: He is alert and oriented to person, place, and time.     Psychiatric:         Mood and Affect: Mood normal.         Behavior: Behavior normal.         Judgment: Judgment normal.         Lab Review:   No results found for: \"CHOL\"  Lab Results   Component Value Date    HDL 33 (L) 05/20/2025     Lab Results   Component Value Date    LDLCALC 40 05/20/2025     Lab Results   Component Value Date    TRIG 296 (H) 05/20/2025     Results Reviewed       None          Results Reviewed       None          Results Reviewed       None            Recent Cardiovascular Testing:   Nuclear stress test 10/20/2022: Nonischemic    ECG Review:   9/27/2024 normal sinus rhythm                 [1]   Social History  Tobacco Use   Smoking Status Every Day    Current packs/day: 1.00    Average packs/day: 1 pack/day for 1.6 years (1.6 ttl pk-yrs)    Types: Cigarettes    Start date: 11/2023   Smokeless Tobacco Never   [2]   Family History  Adopted: Yes   Problem Relation Name Age of Onset    No Known Problems Family      No Known Problems Mother      No Known Problems Father     [3]   Past Medical History:  Diagnosis Date    Abdominal aortic aneurysm without rupture ([facility])     Abdominal Aortic Duplex 02/21/2017    Ectatic infrarenal abdominal aorta.    MARYANN (acute kidney injury) ([facility]) 07/23/2022    CAD (coronary artery disease)     Cancer ([facility]) 2022    right lung CA    Chronic bronchitis ([facility]) 01/28/2019    Class 1 obesity due to excess calories with serious comorbidity and body mass index (BMI) of 31.0 to 31.9 in adult 02/14/2020    COPD (chronic obstructive pulmonary disease) ([facility])     Coronary artery disease of native artery of native heart with stable angina pectoris " (HCC)     Extremity pain     Hemiparesis (HCC) 11/01/2022    Hiatal hernia     History of echocardiogram 03/18/2014    EF 55%, mild MR and AI.  Mild concentric LVH.    History of stress test 03/06/2017    Normal.    Hyperlipidemia     Hypertension     Joint pain     Kidney stone     Low back pain     Lung cancer (HCC)     Migraine     Neck pain     Obstructive sleep apnea     cannot tolerate CPAP    Osteoarthritis     Other chronic pancreatitis (HCC) 02/10/2023    Peripheral neuropathy     Reflex sympathetic dystrophy     Sacroiliitis (HCC) 02/02/2022   [4]   Past Surgical History:  Procedure Laterality Date    CARDIAC CATHETERIZATION  02/13/2012    EF 70%, widely patent renal arteries, significant single-vessel CAD-medical therapy.    CARDIAC CATHETERIZATION  04/11/2013    EF 65%, 50% mid LAD, 20% prox CFX, 90% diffuse RCA, 99% mid RCA. Medical management.    CATARACT EXTRACTION Right 09/19/2023    CHOLECYSTECTOMY      COLONOSCOPY      EPIDURAL BLOCK INJECTION Bilateral 08/15/2019    Procedure: BLOCK / INJECTION EPIDURAL STEROID CERVICAL;  Surgeon: Ricardo Park MD;  Location: [facility];  Service: Pain Management     FL GUIDED NEEDLE PLAC BX/ASP/INJ  08/15/2019    IR BIOPSY LUNG  09/13/2022    IR THORACIC DUCT EMBOLIZATION  11/22/2022    ORTHOPEDIC SURGERY      ID BRNCHSC INCL FLUOR GDNCE DX W/CELL WASHG SPX N/A 11/16/2022    Procedure: BRONCHOSCOPY FLEXIBLE;  Surgeon: Gulshan [place] MD;  Location: [facility];  Service: Thoracic    ID THORACOSCOPY W/LOBECTOMY SINGLE LOBE Right 11/16/2022    Procedure: LOBECTOMY LUNG; lower lobe superior segmentectomy, mediastinal lymph node dissection;  Surgeon: Gulshan [place] MD;  Location: [facility];  Service: Thoracic    ID THORACOSCOPY W/SEGMENTECTOMY Right 11/16/2022    Procedure: THORACOSCOPY VIDEO ASSISTED SURGERY (VATS);  Surgeon: Gulshan [facility] MD;  Location: [facility];  Service: Thoracic    TRIGGER POINT INJECTION     [5]    Current Outpatient Medications:     albuterol (Ventolin HFA) 90 mcg/act inhaler, Inhale 2 puffs every 6 (six) hours as needed for wheezing, Disp: 18 g, Rfl: 5    amLODIPine (NORVASC) 10 mg tablet, Take 1 tablet (10 mg total) by mouth daily, Disp: 90 tablet, Rfl: 1    aspirin 81 mg chewable tablet, Chew 1 tablet (81 mg total) daily, Disp: 90 tablet, Rfl: 1    bisacodyl (DULCOLAX) 5 mg EC tablet, Take 1 tablet (5 mg total) by mouth daily as needed for constipation for up to 4 doses, Disp: 30 tablet, Rfl: 0    buPROPion (Wellbutrin SR) 100 mg 12 hr tablet, Take 1 tablet (100 mg total) by mouth 2 (two) times a day, Disp: 90 tablet, Rfl: 1    cyanocobalamin (VITAMIN B-12) 1000 MCG tablet, Take 1 tablet (1,000 mcg total) by mouth daily, Disp: 90 tablet, Rfl: 3    fluticasone (FLONASE) 50 mcg/act nasal spray, 1 spray into each nostril daily for 14 days, Disp: 11.1 mL, Rfl: 0    fluticasone-umeclidinium-vilanterol (Trelegy Ellipta) 100-62.5-25 mcg/actuation inhaler, Inhale 1 puff daily Rinse mouth after use., Disp: 60 blister, Rfl: 0    folic acid (KP Folic Acid) 1 mg tablet, Take 1 tablet (1 mg total) by mouth daily, Disp: 30 tablet, Rfl: 6    gabapentin (NEURONTIN) 300 mg capsule, take 1 capsule by mouth three times a day, Disp: 90 capsule, Rfl: 3    glucose blood (OneTouch Verio) test strip, Test once daily, Disp: 100 each, Rfl: 0    lisinopril (ZESTRIL) 10 mg tablet, Take 1 tablet (10 mg total) by mouth daily, Disp: 90 tablet, Rfl: 1    metoprolol succinate (TOPROL-XL) 100 mg 24 hr tablet, Take 1 tablet (100 mg total) by mouth daily, Disp: 90 tablet, Rfl: 1    nicotine (NICODERM CQ) 21 mg/24 hr TD 24 hr patch, Place 1 patch on the skin over 24 hours every 24 hours, Disp: 28 patch, Rfl: 0    oxyCODONE-acetaminophen (Percocet) 7.5-325 MG per tablet, Take 1 tablet by mouth every 8 (eight) hours as needed for moderate pain Max Daily Amount: 3 tablets Do not start before May 24, 2025., Disp: 90 tablet, Rfl: 0    Restasis  0.05 % ophthalmic emulsion, , Disp: , Rfl:     rosuvastatin (CRESTOR) 40 MG tablet, Take 1 tablet (40 mg total) by mouth daily, Disp: 90 tablet, Rfl: 1    sildenafil (VIAGRA) 25 MG tablet, Take 1 tablet (25 mg total) by mouth as needed for erectile dysfunction, Disp: 30 tablet, Rfl: 3    tamsulosin (FLOMAX) 0.4 mg, Take 1 capsule (0.4 mg total) by mouth daily with dinner for 3 days, Disp: 3 capsule, Rfl: 0    tiZANidine (ZANAFLEX) 4 mg tablet, take 1 tablet by mouth every 8 hours if needed for muscle spasm, Disp: 90 tablet, Rfl: 1    traZODone (DESYREL) 100 mg tablet, take 1 tablet by mouth at bedtime, Disp: 90 tablet, Rfl: 1    predniSONE 10 mg tablet, 3 tabs once daily x 3 days; 2 tabs once daily x 2 days; 1 tab daily x 2 days (Patient not taking: Reported on 6/3/2025), Disp: 15 tablet, Rfl: 0    tiotropium-olodaterol (Stiolto Respimat) 2.5-2.5 MCG/ACT inhaler, Inhale 2 puffs daily (Patient not taking: Reported on 6/3/2025), Disp: 4 g, Rfl: 5

## 2025-06-04 DIAGNOSIS — I25.10 CORONARY ARTERY DISEASE INVOLVING NATIVE CORONARY ARTERY OF NATIVE HEART WITHOUT ANGINA PECTORIS: ICD-10-CM

## 2025-06-04 DIAGNOSIS — N52.9 ERECTILE DYSFUNCTION, UNSPECIFIED ERECTILE DYSFUNCTION TYPE: ICD-10-CM

## 2025-06-05 DIAGNOSIS — R97.20 ELEVATED PSA: Primary | ICD-10-CM

## 2025-06-05 RX ORDER — SILDENAFIL 25 MG/1
TABLET, FILM COATED ORAL
Qty: 30 TABLET | Refills: 3 | Status: SHIPPED | OUTPATIENT
Start: 2025-06-05 | End: 2025-06-06 | Stop reason: SDUPTHER

## 2025-06-05 RX ORDER — ASPIRIN 81 MG
81 TABLET,CHEWABLE ORAL DAILY
Qty: 90 TABLET | Refills: 1 | Status: SHIPPED | OUTPATIENT
Start: 2025-06-05

## 2025-06-06 ENCOUNTER — OFFICE VISIT (OUTPATIENT)
Dept: PODIATRY | Facility: CLINIC | Age: 70
End: 2025-06-06
Attending: FAMILY MEDICINE
Payer: COMMERCIAL

## 2025-06-06 VITALS — BODY MASS INDEX: 29.7 KG/M2 | WEIGHT: 196 LBS | HEIGHT: 68 IN

## 2025-06-06 DIAGNOSIS — R09.82 POSTNASAL DRIP: ICD-10-CM

## 2025-06-06 DIAGNOSIS — I10 ESSENTIAL HYPERTENSION: ICD-10-CM

## 2025-06-06 DIAGNOSIS — J43.9 PULMONARY EMPHYSEMA, UNSPECIFIED EMPHYSEMA TYPE (HCC): ICD-10-CM

## 2025-06-06 DIAGNOSIS — M79.675 PAIN IN TOES OF BOTH FEET: ICD-10-CM

## 2025-06-06 DIAGNOSIS — R53.83 FATIGUE, UNSPECIFIED TYPE: ICD-10-CM

## 2025-06-06 DIAGNOSIS — L84 CALLUS: ICD-10-CM

## 2025-06-06 DIAGNOSIS — E53.8 FOLATE DEFICIENCY: ICD-10-CM

## 2025-06-06 DIAGNOSIS — G47.00 INSOMNIA, UNSPECIFIED TYPE: ICD-10-CM

## 2025-06-06 DIAGNOSIS — Z79.01 LONG TERM CURRENT USE OF ANTICOAGULANT: Primary | ICD-10-CM

## 2025-06-06 DIAGNOSIS — D64.9 ANEMIA, UNSPECIFIED TYPE: ICD-10-CM

## 2025-06-06 DIAGNOSIS — N52.9 ERECTILE DYSFUNCTION, UNSPECIFIED ERECTILE DYSFUNCTION TYPE: ICD-10-CM

## 2025-06-06 DIAGNOSIS — M79.674 PAIN IN TOES OF BOTH FEET: ICD-10-CM

## 2025-06-06 DIAGNOSIS — B35.1 ONYCHOMYCOSIS: ICD-10-CM

## 2025-06-06 DIAGNOSIS — E53.8 B12 DEFICIENCY: ICD-10-CM

## 2025-06-06 DIAGNOSIS — J42 CHRONIC BRONCHITIS, UNSPECIFIED CHRONIC BRONCHITIS TYPE (HCC): ICD-10-CM

## 2025-06-06 DIAGNOSIS — I25.10 CORONARY ARTERY DISEASE INVOLVING NATIVE CORONARY ARTERY OF NATIVE HEART WITHOUT ANGINA PECTORIS: ICD-10-CM

## 2025-06-06 DIAGNOSIS — E78.2 MIXED HYPERLIPIDEMIA: ICD-10-CM

## 2025-06-06 DIAGNOSIS — N20.0 KIDNEY STONE ON RIGHT SIDE: ICD-10-CM

## 2025-06-06 DIAGNOSIS — Z71.6 ENCOUNTER FOR SMOKING CESSATION COUNSELING: ICD-10-CM

## 2025-06-06 DIAGNOSIS — J43.2 CENTRILOBULAR EMPHYSEMA (HCC): ICD-10-CM

## 2025-06-06 DIAGNOSIS — M24.50 CONTRACTURE OF JOINT: ICD-10-CM

## 2025-06-06 DIAGNOSIS — M54.50 LUMBAR SPINE PAIN: ICD-10-CM

## 2025-06-06 PROCEDURE — 99202 OFFICE O/P NEW SF 15 MIN: CPT | Performed by: PODIATRIST

## 2025-06-06 PROCEDURE — 11055 PARING/CUTG B9 HYPRKER LES 1: CPT | Performed by: PODIATRIST

## 2025-06-06 PROCEDURE — 11720 DEBRIDE NAIL 1-5: CPT | Performed by: PODIATRIST

## 2025-06-06 RX ORDER — FLUTICASONE PROPIONATE 50 MCG
1 SPRAY, SUSPENSION (ML) NASAL DAILY
Qty: 11.1 ML | Refills: 0 | Status: SHIPPED | OUTPATIENT
Start: 2025-06-06 | End: 2025-06-20

## 2025-06-06 RX ORDER — ALBUTEROL SULFATE 90 UG/1
2 INHALANT RESPIRATORY (INHALATION) EVERY 6 HOURS PRN
Qty: 18 G | Refills: 0 | Status: SHIPPED | OUTPATIENT
Start: 2025-06-06

## 2025-06-06 RX ORDER — TIOTROPIUM BROMIDE AND OLODATEROL 3.124; 2.736 UG/1; UG/1
2 SPRAY, METERED RESPIRATORY (INHALATION) DAILY
Qty: 4 G | Refills: 0 | Status: SHIPPED | OUTPATIENT
Start: 2025-06-06

## 2025-06-06 RX ORDER — FLUTICASONE FUROATE, UMECLIDINIUM BROMIDE AND VILANTEROL TRIFENATATE 100; 62.5; 25 UG/1; UG/1; UG/1
1 POWDER RESPIRATORY (INHALATION) DAILY
Qty: 60 BLISTER | Refills: 0 | Status: SHIPPED | OUTPATIENT
Start: 2025-06-06 | End: 2025-07-06

## 2025-06-06 RX ORDER — FOLIC ACID 1 MG/1
1 TABLET ORAL DAILY
Qty: 30 TABLET | Refills: 0 | Status: SHIPPED | OUTPATIENT
Start: 2025-06-06

## 2025-06-06 NOTE — TELEPHONE ENCOUNTER
Caller: kaden Varela     Doctor: Dr. Kocher    Reason for call: pt stated that he received a CNR letter in regards to Xray, pt stated that he spoke to a nurse in regards to the results, pt wanted to know if this letter crossed in the mail. Please Advise     Call back#: [phone]

## 2025-06-06 NOTE — TELEPHONE ENCOUNTER
Not a duplicate - Pharmacy Change due to [facility] Closure.     Reason for call:   [x] Refill   [] Prior Auth  [] Other:     Office:   [] PCP/Provider -   [x] Specialty/Provider - Yaa Madsen MD / [facility]     Medication:   tiotropium-olodaterol (Stiolto Respimat) 2.5-2.5 MCG/ACT inhaler / Inhale 2 puffs daily     fluticasone-umeclidinium-vilanterol (Trelegy Ellipta) 100-62.5-25 mcg/actuation inhaler /  Inhale 1 puff daily Rinse mouth after use     albuterol (Ventolin HFA) 90 mcg/act inhaler / Inhale 2 puffs every 6 (six) hours as needed for wheezing     fluticasone (FLONASE) 50 mcg/act nasal spray / 1 spray into each nostril daily for 14 days    Pharmacy: [facility]/pharmacy #[id number] - [place], [place] - [address] [facility]     Local Pharmacy   Does the patient have enough for 3 days?   [x] Yes   [] No - Send as HP to POD

## 2025-06-06 NOTE — RESULT ENCOUNTER NOTE
S/w pt and advised RN unsure why pt sent CNR and apologized for letter being sent. Pt verbalized understanding and very apprec of call and clarification.

## 2025-06-06 NOTE — TELEPHONE ENCOUNTER
Not a duplicate - Pharmacy Change due to [facility] Closure.     Reason for call:   [x] Refill   [] Prior Auth  [] Other:     Office:   [x] PCP/Provider - Maico Lopez DO   [] Specialty/Provider -     Medication:    traZODone (DESYREL) 100 mg tablet / take 1 tablet by mouth at bedtime    tiZANidine (ZANAFLEX) 4 mg tablet /  take 1 tablet by mouth every 8 hours if needed for muscle spasm    sildenafil (VIAGRA) 25 MG tablet / take 1 tablet by mouth once daily if needed for ERECTILE DYSFUNCTION     rosuvastatin (CRESTOR) 40 MG tablet / Take 1 tablet (40 mg total) by mouth daily     metoprolol succinate (TOPROL-XL) 100 mg 24 hr tablet / Take 1 tablet (100 mg total) by mouth daily    lisinopril (ZESTRIL) 10 mg tablet / Take 1 tablet (10 mg total) by mouth daily    gabapentin (NEURONTIN) 300 mg capsule / take 1 capsule by mouth three times a day     buPROPion (Wellbutrin SR) 100 mg 12 hr tablet / Take 1 tablet (100 mg total) by mouth 2 (two) times a day    amLODIPine (NORVASC) 10 mg tablet / Take 1 tablet (10 mg total) by mouth daily    Pharmacy: [facility]/pharmacy #[id number] - [place], [place] - [address] [facility]   Does the patient have enough for 3 days?   [x] Yes   [] No - Send as HP to POD

## 2025-06-06 NOTE — PROGRESS NOTES
"Name: Lance Hazel      : 1955      MRN: [id number]  Encounter Provider: Catarino Hood DPM  Encounter Date: 2025   Encounter department: [facility] [place]  :  Assessment & Plan  Onychomycosis       Debride mycotic nails and thin the nail plates x 2 with the use of a nail nipper manually and an electric Dremel bur was used to reduce the thickness of the nail beds and smoothed the distal aspect of the nails.   Callus       Debride Tyloma to dermal layer x 1 with the use of a 312 blade and sharp dissection.   Patient was told about over-the-counter inserts or custom inserts to reduce pressure underneath the fifth metatarsal on the left foot.    He was dispensed quarter inch felt pads to place in his inserts for longevity.  It was suggested to him that the pads be changed out weekly.  Long term current use of anticoagulant         Contracture of joint         Pain in toes of both feet         Discussed proper shoe gear, daily inspections of feet, and general foot health with patient. Patient has Q8  findings and is recommended for at risk foot care every 9-10 weeks.    Return in about 10 weeks (around 8/15/2025).       History of Present Illness   HPI  Lance Hazel is a 70 y.o. male who presents with chief complaint of painful callus on the bottom of his left foot and painful thick nails on both feet.  He is on long-term anticoagulation therapy in the form of 81 mg aspirin.  Patient presents for at-risk foot care.  Patient has no acute concerns today.  Patient has significant lower extremity risk due to neuropathy, parasthesia, edema, and trophic skin changes to the lower extremity.   History obtained from: patient    Review of Systems  Medical History Reviewed by provider this encounter:     .  Medications Ordered Prior to Encounter[1]   Social History[2]     Objective   Ht 5' 8\" (1.727 m)   Wt 88.9 kg (196 lb)   BMI 29.80 kg/m²      Physical Exam  Vascular status is 2/4 DP PT " negative digital hair, normal distal cooling, immediate capillary refill bilaterally.  Capillary refill is approximately 1 to 2 seconds.    Derm nails are brittle elongated hypertrophic yellow-brown discoloration with subungual debris x 2.  There is an increased thickness and the nails of approximately 2 mm.  There is hypertrophic tissue with a central IPK noted submet 5 on the left foot.  There is no interruption of skin lines and no pinpoint bleeding noted.    Ortho declined fifth metatarsal on the left extremity is noted with mild hammertoe deformities present on the fifth digits bilaterally.    Neuro light touch is intact and equal bilaterally.  .       [1]   Current Outpatient Medications on File Prior to Visit   Medication Sig Dispense Refill    albuterol (Ventolin HFA) 90 mcg/act inhaler Inhale 2 puffs every 6 (six) hours as needed for wheezing 18 g 5    amLODIPine (NORVASC) 10 mg tablet Take 1 tablet (10 mg total) by mouth daily 90 tablet 1    Aspirin Low Dose 81 MG chewable tablet chew and swallow 1 tablet by mouth once daily 90 tablet 1    bisacodyl (DULCOLAX) 5 mg EC tablet Take 1 tablet (5 mg total) by mouth daily as needed for constipation for up to 4 doses 30 tablet 0    buPROPion (Wellbutrin SR) 100 mg 12 hr tablet Take 1 tablet (100 mg total) by mouth 2 (two) times a day 90 tablet 1    cyanocobalamin (VITAMIN B-12) 1000 MCG tablet Take 1 tablet (1,000 mcg total) by mouth daily 90 tablet 3    fluticasone (FLONASE) 50 mcg/act nasal spray 1 spray into each nostril daily for 14 days 11.1 mL 0    fluticasone-umeclidinium-vilanterol (Trelegy Ellipta) 100-62.5-25 mcg/actuation inhaler Inhale 1 puff daily Rinse mouth after use. 60 blister 0    folic acid ( Folic Acid) 1 mg tablet Take 1 tablet (1 mg total) by mouth daily 30 tablet 6    gabapentin (NEURONTIN) 300 mg capsule take 1 capsule by mouth three times a day 90 capsule 3    glucose blood (OneTouch Verio) test strip Test once daily 100 each 0     lisinopril (ZESTRIL) 10 mg tablet Take 1 tablet (10 mg total) by mouth daily 90 tablet 1    metoprolol succinate (TOPROL-XL) 100 mg 24 hr tablet Take 1 tablet (100 mg total) by mouth daily 90 tablet 1    nicotine (NICODERM CQ) 21 mg/24 hr TD 24 hr patch Place 1 patch on the skin over 24 hours every 24 hours 28 patch 0    oxyCODONE-acetaminophen (Percocet) 7.5-325 MG per tablet Take 1 tablet by mouth every 8 (eight) hours as needed for moderate pain Max Daily Amount: 3 tablets Do not start before May 24, 2025. 90 tablet 0    Restasis 0.05 % ophthalmic emulsion       rosuvastatin (CRESTOR) 40 MG tablet Take 1 tablet (40 mg total) by mouth daily 90 tablet 1    sildenafil (VIAGRA) 25 MG tablet take 1 tablet by mouth once daily if needed for ERECTILE DYSFUNCTION 30 tablet 3    tamsulosin (FLOMAX) 0.4 mg Take 1 capsule (0.4 mg total) by mouth daily with dinner for 3 days 3 capsule 0    tiZANidine (ZANAFLEX) 4 mg tablet take 1 tablet by mouth every 8 hours if needed for muscle spasm 90 tablet 1    traZODone (DESYREL) 100 mg tablet take 1 tablet by mouth at bedtime 90 tablet 1    predniSONE 10 mg tablet 3 tabs once daily x 3 days; 2 tabs once daily x 2 days; 1 tab daily x 2 days (Patient not taking: Reported on 6/6/2025) 15 tablet 0    tiotropium-olodaterol (Stiolto Respimat) 2.5-2.5 MCG/ACT inhaler Inhale 2 puffs daily (Patient not taking: Reported on 6/3/2025) 4 g 5     No current facility-administered medications on file prior to visit.   [2]   Social History  Tobacco Use    Smoking status: Every Day     Current packs/day: 1.00     Average packs/day: 1 pack/day for 1.6 years (1.6 ttl pk-yrs)     Types: Cigarettes     Start date: 11/2023    Smokeless tobacco: Never   Vaping Use    Vaping status: Never Used   Substance and Sexual Activity    Alcohol use: Not Currently    Drug use: Never    Sexual activity: Yes

## 2025-06-06 NOTE — TELEPHONE ENCOUNTER
Pharmacy Change due to [facility] Closure.     Reason for call:   [x] Refill   [] Prior Auth  [] Other:     Office:   [] PCP/Provider -   [x] Specialty/Provider - Sruthi ELOISA Franco / [facility]     Medication: folic acid (KP Folic Acid) 1 mg tablet / Take 1 tablet (1 mg total) by mouth daily     cyanocobalamin (VITAMIN B-12) 1000 MCG tablet / Take 1 tablet (1,000 mcg total) by mouth daily     Pharmacy: [facility]/pharmacy #[id number] - [place], [place] - [address] [facility] [address] [facility] [address]     Local Pharmacy   Does the patient have enough for 3 days?   [] Yes   [x] No - Send as HP to POD

## 2025-06-08 RX ORDER — TRAZODONE HYDROCHLORIDE 100 MG/1
100 TABLET ORAL
Qty: 90 TABLET | Refills: 0 | Status: SHIPPED | OUTPATIENT
Start: 2025-06-08

## 2025-06-08 RX ORDER — LISINOPRIL 10 MG/1
10 TABLET ORAL DAILY
Qty: 90 TABLET | Refills: 0 | Status: SHIPPED | OUTPATIENT
Start: 2025-06-08

## 2025-06-08 RX ORDER — GABAPENTIN 300 MG/1
300 CAPSULE ORAL 3 TIMES DAILY
Qty: 90 CAPSULE | Refills: 0 | Status: SHIPPED | OUTPATIENT
Start: 2025-06-08

## 2025-06-08 RX ORDER — ROSUVASTATIN CALCIUM 40 MG/1
40 TABLET, COATED ORAL DAILY
Qty: 90 TABLET | Refills: 0 | Status: SHIPPED | OUTPATIENT
Start: 2025-06-08

## 2025-06-08 RX ORDER — BUPROPION HYDROCHLORIDE 100 MG/1
100 TABLET, EXTENDED RELEASE ORAL 2 TIMES DAILY
Qty: 90 TABLET | Refills: 0 | Status: SHIPPED | OUTPATIENT
Start: 2025-06-08

## 2025-06-08 RX ORDER — METOPROLOL SUCCINATE 100 MG/1
100 TABLET, EXTENDED RELEASE ORAL DAILY
Qty: 90 TABLET | Refills: 0 | Status: SHIPPED | OUTPATIENT
Start: 2025-06-08

## 2025-06-08 RX ORDER — SILDENAFIL 25 MG/1
25 TABLET, FILM COATED ORAL AS NEEDED
Qty: 30 TABLET | Refills: 0 | Status: SHIPPED | OUTPATIENT
Start: 2025-06-08

## 2025-06-08 RX ORDER — AMLODIPINE BESYLATE 10 MG/1
10 TABLET ORAL DAILY
Qty: 90 TABLET | Refills: 0 | Status: SHIPPED | OUTPATIENT
Start: 2025-06-08

## 2025-06-09 RX ORDER — TAMSULOSIN HYDROCHLORIDE 0.4 MG/1
0.4 CAPSULE ORAL
Qty: 3 CAPSULE | Refills: 0 | Status: SHIPPED | OUTPATIENT
Start: 2025-06-09 | End: 2025-06-12

## 2025-06-10 ENCOUNTER — TELEPHONE (OUTPATIENT)
Dept: PULMONOLOGY | Facility: CLINIC | Age: 70
End: 2025-06-10

## 2025-06-10 ENCOUNTER — CONSULT (OUTPATIENT)
Dept: VASCULAR SURGERY | Facility: CLINIC | Age: 70
End: 2025-06-10
Payer: COMMERCIAL

## 2025-06-10 VITALS
OXYGEN SATURATION: 94 % | WEIGHT: 194 LBS | BODY MASS INDEX: 29.4 KG/M2 | TEMPERATURE: 97.6 F | DIASTOLIC BLOOD PRESSURE: 70 MMHG | HEIGHT: 68 IN | HEART RATE: 88 BPM | RESPIRATION RATE: 16 BRPM | SYSTOLIC BLOOD PRESSURE: 126 MMHG

## 2025-06-10 DIAGNOSIS — N18.31 STAGE 3A CHRONIC KIDNEY DISEASE (HCC): ICD-10-CM

## 2025-06-10 DIAGNOSIS — Z72.0 TOBACCO ABUSE: ICD-10-CM

## 2025-06-10 DIAGNOSIS — I71.43 INFRARENAL ABDOMINAL AORTIC ANEURYSM (AAA) WITHOUT RUPTURE (HCC): Primary | ICD-10-CM

## 2025-06-10 DIAGNOSIS — E78.2 MIXED HYPERLIPIDEMIA: ICD-10-CM

## 2025-06-10 DIAGNOSIS — C34.31 PRIMARY SQUAMOUS CELL CARCINOMA OF LOWER LOBE OF RIGHT LUNG (HCC): ICD-10-CM

## 2025-06-10 PROCEDURE — 99204 OFFICE O/P NEW MOD 45 MIN: CPT | Performed by: SURGERY

## 2025-06-10 NOTE — PROGRESS NOTES
Name: Lance Hazel      : 1955      MRN: [id number]  Encounter Provider: Rupa Felipe MD  Encounter Date: 6/10/2025   Encounter department: [facility] [place]  :  Assessment & Plan    Pt is a 69 yo M w/ AAA, CAD, HTN, hiatal hernia, RLL SCC s/p resection and chemo , JAKI, COPD, lumbar DDD, CKD, MDD, tobacco abuse, HLD    Infrarenal abdominal aortic aneurysm (AAA) without rupture (HCC)  -reviewed US abdomen which shows AAA 3.6cm  -reviewed CTA C/A/P which show AAA 3.3cm in size, L GENA, and L IIA small aneurysm  -discussed indications for repair including size >5.5cm or symptomatic, discussed typical symptoms of rupture, and discussed surveillance schedule  -will continue surveillance on a yearly DU w/ vascular DU; will get AOIL and LEADs in Mar '26 with screening for pop aneurysm  -f/u 1 year    Stage 3a chronic kidney disease (HCC)  -Cr: 1.13  GFR: 65    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)  -s/p resection  and s/p chemo    Tobacco abuse  -smoking 1PPD and also 21mcg patch  -taking wellbutrin but not daily  -discussed correlation between smoking and aneurysm and need for cessation; he is wokring on this    Mixed hyperlipidemia  Medications  -cont ASA/statin        History of Present Illness   HPI:    Patient referred for AAA    Patient complains of abdominal and back pain.  He notes bloating and abdominal discomfort most of the time.  The back pain is chronic and is followed by spine/pain and getting epidurals which help briefly.    He can walk 1/2 mile on a flat surface and then is limited by SOB.  He can walk about 1 block uphill before getting SOB.    He works 6d/wk doing construction.    Patient is adopted and does not know family history.    Smoking 1PPD (highest was 2PPD).  He is also using the 21mcg patch on top of the smoking.  He is also on wellbutrin but isn't taking it daily.      Lance Hazel is a 70 y.o. male who presents  "  History obtained from: patient    Patient is here to establish care, referred by PCP. Patient has c/o abdominal pain, pain after eating, and back pain. Patient is on ASA 81 mg and Rosuvastatin. Patient is a current everyday smoker, smokes 1 pack a day.    Review of Systems   Constitutional: Negative.    HENT: Negative.     Eyes: Negative.    Respiratory: Negative.     Cardiovascular: Negative.    Gastrointestinal:  Positive for abdominal pain.   Endocrine: Negative.    Genitourinary: Negative.    Musculoskeletal:  Positive for back pain.   Skin: Negative.    Allergic/Immunologic: Negative.    Neurological: Negative.    Hematological: Negative.    Psychiatric/Behavioral: Negative.            Objective   /70 (BP Location: Right arm, Patient Position: Sitting, Cuff Size: Adult)   Pulse 88   Temp 97.6 °F (36.4 °C) (Temporal)   Resp 16   Ht 5' 8\" (1.727 m)   Wt 88 kg (194 lb)   SpO2 94%   BMI 29.50 kg/m²      Physical Exam    Cardiovascular:      Rate and Rhythm: Normal rate and regular rhythm.      Pulses:           Radial pulses are 2+ on the right side and 2+ on the left side.        Popliteal pulses are 2+ on the right side and 2+ on the left side.        Dorsalis pedis pulses are 2+ on the right side and 2+ on the left side.        Posterior tibial pulses are 2+ on the right side and 2+ on the left side.      Heart sounds: No murmur heard.  Pulmonary:      Effort: No respiratory distress.      Breath sounds: No wheezing or rales.   Abdominal:      Palpations: There is no pulsatile mass (cannot feel known aneurysm).     Musculoskeletal:      Right lower leg: No edema.      Left lower leg: No edema.     Skin:     Comments: No foot wounds B             I have reviewed and made appropriate changes to the review of systems input by the medical assistant.    Vitals:    06/10/25 0902   BP: 126/70   BP Location: Right arm   Patient Position: Sitting   Cuff Size: Adult   Pulse: 88   Resp: 16   Temp: 97.6 °F " "(36.4 °C)   TempSrc: Temporal   SpO2: 94%   Weight: 88 kg (194 lb)   Height: 5' 8\" (1.727 m)       Problem List[1]    Past Surgical History[2]    Family History[3]    Social History     Socioeconomic History   • Marital status: Single     Spouse name: Not on file   • Number of children: Not on file   • Years of education: Not on file   • Highest education level: Not on file   Occupational History   • Not on file   Tobacco Use   • Smoking status: Every Day     Current packs/day: 1.00     Average packs/day: 1 pack/day for 1.6 years (1.6 ttl pk-yrs)     Types: Cigarettes     Start date: 11/2023   • Smokeless tobacco: Never   Vaping Use   • Vaping status: Never Used   Substance and Sexual Activity   • Alcohol use: Not Currently   • Drug use: Never   • Sexual activity: Yes   Other Topics Concern   • Not on file   Social History Narrative   • Not on file     Social Drivers of Health     Financial Resource Strain: Not on file   Food Insecurity: No Food Insecurity (9/27/2024)    Nursing - Inadequate Food Risk Classification    • Worried About Running Out of Food in the Last Year: Never true    • Ran Out of Food in the Last Year: Never true    • Ran Out of Food in the Last Year: Not on file   Transportation Needs: No Transportation Needs (9/27/2024)    PRAPARE - Transportation    • Lack of Transportation (Medical): No    • Lack of Transportation (Non-Medical): No   Physical Activity: Not on file   Stress: Not on file   Social Connections: Not on file   Intimate Partner Violence: Not on file   Housing Stability: Unknown (9/27/2024)    Housing Stability Vital Sign    • Unable to Pay for Housing in the Last Year: No    • Number of Times Moved in the Last Year: Not on file    • Homeless in the Last Year: No       Allergies[4]    Current Medications[5]           [1]  Patient Active Problem List  Diagnosis   • JAKI (obstructive sleep apnea)   • Abdominal aortic aneurysm (AAA) without rupture   • Lumbar spondylosis   • Lumbar " degenerative disc disease   • Myofascial pain syndrome   • Chronic low back pain without sciatica   • Cervical radiculopathy   • Cervical spondylosis without myelopathy   • Essential tremor   • Negative depression screening   • Chronic pain of both knees   • Tobacco abuse   • Hypertension   • Chronic pain syndrome   • Uncomplicated opioid dependence (HCC)   • Encounter for long-term opiate analgesic use   • Neck pain   • Hyperlipidemia   • Pre-diabetes   • Erectile dysfunction   • Insomnia   • Cortical age-related cataract of both eyes   • Urinary frequency   • 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)   • Kidney stone   • Pulmonary emphysema (HCC)   • Malignant neoplasm of lower lobe of right lung (HCC)   • Encounter for smoking cessation counseling   • At high risk for osteoporosis   • Coronary artery disease of native artery of native heart with stable angina pectoris (HCC)   • Stage 3a chronic kidney disease (HCC)   • Night sweats   • Skin lesion   • Depression, recurrent (HCC)   • Neuropathic pain   • Age-related cataract of right eye   • Constipation due to opioid therapy   • Neuropathy due to chemotherapeutic drug (HCC)   • Hyperglycemia   • Bronchitis   • Abnormal CT of the chest   • Epigastric abdominal pain   • Esophageal dysphagia   • Hiatal hernia   • Thoracic spine pain   [2]  Past Surgical History:  Procedure Laterality Date   • CARDIAC CATHETERIZATION  02/13/2012    EF 70%, widely patent renal arteries, significant single-vessel CAD-medical therapy.   • CARDIAC CATHETERIZATION  04/11/2013    EF 65%, 50% mid LAD, 20% prox CFX, 90% diffuse RCA, 99% mid RCA. Medical management.   • CATARACT EXTRACTION Right 09/19/2023   • CHOLECYSTECTOMY     • COLONOSCOPY     • EPIDURAL BLOCK INJECTION Bilateral 08/15/2019    Procedure: BLOCK / INJECTION EPIDURAL STEROID CERVICAL;  Surgeon: Ricardo Park MD;  Location: [facility];  Service: Pain Management    • FL GUIDED NEEDLE PLAC  BX/ASP/INJ  08/15/2019   • IR BIOPSY LUNG  09/13/2022   • IR THORACIC DUCT EMBOLIZATION  11/22/2022   • ORTHOPEDIC SURGERY     • MT [facility] INCL FLUOR GDNCE DX W/CELL WASHG SPX N/A 11/16/2022    Procedure: BRONCHOSCOPY FLEXIBLE;  Surgeon: Gulshan Madison MD;  Location: [facility];  Service: Thoracic   • MT THORACOSCOPY W/LOBECTOMY SINGLE LOBE Right 11/16/2022    Procedure: LOBECTOMY LUNG; lower lobe superior segmentectomy, mediastinal lymph node dissection;  Surgeon: Gulshan Madison MD;  Location: [facility];  Service: Thoracic   • MT THORACOSCOPY W/SEGMENTECTOMY Right 11/16/2022    Procedure: THORACOSCOPY VIDEO ASSISTED SURGERY (VATS);  Surgeon: Gulshan Madison MD;  Location: [facility];  Service: Thoracic   • TRIGGER POINT INJECTION     [3]  Family History  Adopted: Yes   Problem Relation Name Age of Onset   • No Known Problems Family     • No Known Problems Mother     • No Known Problems Father     [4]  No Known Allergies[5]    Current Outpatient Medications:   •  albuterol (Ventolin HFA) 90 mcg/act inhaler, Inhale 2 puffs every 6 (six) hours as needed for wheezing, Disp: 18 g, Rfl: 0  •  amLODIPine (NORVASC) 10 mg tablet, Take 1 tablet (10 mg total) by mouth daily, Disp: 90 tablet, Rfl: 0  •  Aspirin Low Dose 81 MG chewable tablet, chew and swallow 1 tablet by mouth once daily, Disp: 90 tablet, Rfl: 1  •  bisacodyl (DULCOLAX) 5 mg EC tablet, Take 1 tablet (5 mg total) by mouth daily as needed for constipation for up to 4 doses, Disp: 30 tablet, Rfl: 0  •  buPROPion (Wellbutrin SR) 100 mg 12 hr tablet, Take 1 tablet (100 mg total) by mouth 2 (two) times a day, Disp: 90 tablet, Rfl: 0  •  cyanocobalamin (VITAMIN B-12) 1000 MCG tablet, Take 1 tablet (1,000 mcg total) by mouth daily, Disp: 90 tablet, Rfl: 0  •  fluticasone (FLONASE) 50 mcg/act nasal spray, 1 spray into each nostril daily for 14 days, Disp: 11.1 mL, Rfl: 0  •  fluticasone-umeclidinium-vilanterol (Trelegy Ellipta)  100-62.5-25 mcg/actuation inhaler, Inhale 1 puff daily Rinse mouth after use., Disp: 60 blister, Rfl: 0  •  folic acid (KP Folic Acid) 1 mg tablet, Take 1 tablet (1 mg total) by mouth daily, Disp: 30 tablet, Rfl: 0  •  gabapentin (NEURONTIN) 300 mg capsule, Take 1 capsule (300 mg total) by mouth 3 (three) times a day, Disp: 90 capsule, Rfl: 0  •  glucose blood (OneTouch Verio) test strip, Test once daily, Disp: 100 each, Rfl: 0  •  lisinopril (ZESTRIL) 10 mg tablet, Take 1 tablet (10 mg total) by mouth daily, Disp: 90 tablet, Rfl: 0  •  metoprolol succinate (TOPROL-XL) 100 mg 24 hr tablet, Take 1 tablet (100 mg total) by mouth daily, Disp: 90 tablet, Rfl: 0  •  nicotine (NICODERM CQ) 21 mg/24 hr TD 24 hr patch, Place 1 patch on the skin over 24 hours every 24 hours, Disp: 28 patch, Rfl: 0  •  oxyCODONE-acetaminophen (Percocet) 7.5-325 MG per tablet, Take 1 tablet by mouth every 8 (eight) hours as needed for moderate pain Max Daily Amount: 3 tablets Do not start before May 24, 2025., Disp: 90 tablet, Rfl: 0  •  Restasis 0.05 % ophthalmic emulsion, , Disp: , Rfl:   •  rosuvastatin (CRESTOR) 40 MG tablet, Take 1 tablet (40 mg total) by mouth daily, Disp: 90 tablet, Rfl: 0  •  sildenafil (VIAGRA) 25 MG tablet, Take 1 tablet (25 mg total) by mouth as needed for erectile dysfunction, Disp: 30 tablet, Rfl: 0  •  tamsulosin (FLOMAX) 0.4 mg, Take 1 capsule (0.4 mg total) by mouth daily with dinner for 3 days, Disp: 3 capsule, Rfl: 0  •  tiotropium-olodaterol (Stiolto Respimat) 2.5-2.5 MCG/ACT inhaler, Inhale 2 puffs daily, Disp: 4 g, Rfl: 0  •  tiZANidine (ZANAFLEX) 4 mg tablet, Take 1 tablet (4 mg total) by mouth every 8 (eight) hours as needed for muscle spasms, Disp: 90 tablet, Rfl: 0  •  traZODone (DESYREL) 100 mg tablet, Take 1 tablet (100 mg total) by mouth daily at bedtime, Disp: 90 tablet, Rfl: 0  •  predniSONE 10 mg tablet, 3 tabs once daily x 3 days; 2 tabs once daily x 2 days; 1 tab daily x 2 days (Patient not  taking: Reported on 6/6/2025), Disp: 15 tablet, Rfl: 0

## 2025-06-11 ENCOUNTER — TELEPHONE (OUTPATIENT)
Dept: PULMONOLOGY | Facility: CLINIC | Age: 70
End: 2025-06-11

## 2025-06-23 ENCOUNTER — OFFICE VISIT (OUTPATIENT)
Dept: UROLOGY | Facility: CLINIC | Age: 70
End: 2025-06-23
Payer: COMMERCIAL

## 2025-06-23 ENCOUNTER — TELEPHONE (OUTPATIENT)
Age: 70
End: 2025-06-23

## 2025-06-23 VITALS
HEART RATE: 87 BPM | BODY MASS INDEX: 29.25 KG/M2 | DIASTOLIC BLOOD PRESSURE: 68 MMHG | OXYGEN SATURATION: 95 % | SYSTOLIC BLOOD PRESSURE: 130 MMHG | WEIGHT: 193 LBS | RESPIRATION RATE: 18 BRPM | HEIGHT: 68 IN | TEMPERATURE: 97.5 F

## 2025-06-23 DIAGNOSIS — N52.9 ERECTILE DYSFUNCTION, UNSPECIFIED ERECTILE DYSFUNCTION TYPE: ICD-10-CM

## 2025-06-23 DIAGNOSIS — N40.1 BENIGN PROSTATIC HYPERPLASIA WITH POST-VOID DRIBBLING: ICD-10-CM

## 2025-06-23 DIAGNOSIS — R97.20 ELEVATED PSA: Primary | ICD-10-CM

## 2025-06-23 DIAGNOSIS — N39.43 BENIGN PROSTATIC HYPERPLASIA WITH POST-VOID DRIBBLING: ICD-10-CM

## 2025-06-23 LAB
POST-VOID RESIDUAL VOLUME, ML POC: 10 ML
SL AMB  POCT GLUCOSE, UA: NORMAL
SL AMB LEUKOCYTE ESTERASE,UA: 500
SL AMB POCT BILIRUBIN,UA: 1
SL AMB POCT BLOOD,UA: NORMAL
SL AMB POCT CLARITY,UA: NORMAL
SL AMB POCT COLOR,UA: NORMAL
SL AMB POCT KETONES,UA: 15
SL AMB POCT NITRITE,UA: NORMAL
SL AMB POCT PH,UA: 5
SL AMB POCT SPECIFIC GRAVITY,UA: 1.02
SL AMB POCT URINE PROTEIN: NORMAL
SL AMB POCT UROBILINOGEN: NORMAL

## 2025-06-23 PROCEDURE — 51798 US URINE CAPACITY MEASURE: CPT

## 2025-06-23 PROCEDURE — 99203 OFFICE O/P NEW LOW 30 MIN: CPT

## 2025-06-23 PROCEDURE — 81002 URINALYSIS NONAUTO W/O SCOPE: CPT

## 2025-06-23 RX ORDER — TADALAFIL 5 MG/1
5 TABLET ORAL DAILY
Qty: 30 TABLET | Refills: 11 | Status: SHIPPED | OUTPATIENT
Start: 2025-06-23

## 2025-06-23 NOTE — PROGRESS NOTES
Name: Lance Hazel      : 1955      MRN: [id number]  Encounter Provider: ELOISA Mcmillan  Encounter Date: 2025   Encounter department: [facility] UROLOGY [place]    :  Assessment & Plan  Elevated PSA  PSA Trend  5.423 (2025)  3.0 (2022)  2.8 (2021)  2.6 (2019)  Unknown family history of prostate cancer.  Rectal exam today was benign aside from prostatomegaly.  Plan to recheck a PSA in approximately 2 months.  Will review those results at his follow-up visit.  We did discuss potential need for multiparametric MRI of the prostate with anticipated need for MRI fusion guided biopsy of prostate if positive versus transperineal ultrasound-guided biopsy of prostate without MRI fusion.    Orders:    PSA, total and free; Future    Benign prostatic hyperplasia with post-void dribbling  Symptoms are mostly stable with Flomax 0.4 mg daily however he does report some increased postvoid dribbling and urgency over the last several weeks.  Recommend a trial of tadalafil 5 mg daily.  He was instructed to avoid taking this in combination with his blood pressure medications and Flomax.  I also instructed him to discontinue sildenafil 25 mg daily which he reports he takes for high blood pressure although indication is for erectile dysfunction.    Orders:    Ambulatory Referral to Urology    POCT urine dip    POCT Measure PVR    tadalafil (CIALIS) 5 MG tablet; Take 1 tablet (5 mg total) by mouth in the morning    Erectile dysfunction, unspecified erectile dysfunction type  According to the patient his PCP has been prescribing him sildenafil 25 mg daily for his hypertension.  Indication however is documented as erectile dysfunction.  He would like to trial tadalafil 5 mg daily instead.  He was instructed to stop his sildenafil and avoid taking the tadalafil in combination with Flomax or his other blood pressure medications.    Orders:    tadalafil (CIALIS) 5 MG tablet; Take 1 tablet  (5 mg total) by mouth in the morning        History of Present Illness     New patient to office with PMHx significant for abdominal aortic aneurysm, hypertension, coronary artery disease, JAKI, squamous cell carcinoma of the right lower lung, pulmonary emphysema, bronchitis, esophageal dysplasia, CKD, kidney stones, chronic pain, lumbar disc disease    He presents today mostly to transfer care and previously been following with Dr. Declan Rachel last seen over 1 year ago.  He is transferring care to [facility] due to ease of access to medical records.  He has a history of kidney stones with last stone episode in July 2022.  He also reports history of BPH and erectile dysfunction.      BPH:  He has been on Flomax 0.4 mg daily for many years.  He did increase this to 0.8 mg several years ago and was doing well with this but his cardiologist told him to reduce it back to 0.4 mg daily.  He does not remember why.  Over the past several weeks he has noticed increased occurrences of postvoid dribbling and urgency.  He does drink approximately 48 ounces of coffee in the morning and a lot of pineapple juice and soda throughout the day.  PVR today was only 10 mL.  He did verbalize concern regarding urine testing earlier this year with microscopic blood.  He has a history of lung cancer and is concerned because there is blood in his urine.  I reviewed his microscopic testing from March which was negative for true microscopic blood.  Urine testing today is positive for leukocytes but negative for microscopic blood or infection.  He denies any gross hematuria.    Nephrolithiasis:  Last stone episode was around July 2022.  He was seen in hospital consultation by Dr. Declan Rachel for a 2 mm right UVJ calculi.  It was around this time when he was diagnosed with lung cancer as well.    Erectile dysfunction:  This has been a longstanding problem for years and he reports that he had previously tried generic Viagra 100 mg as  needed.  He was able to achieve an erection but this would not last long enough for penetration. According to the patient, his PCP has been prescribing him sildenafil 25 mg daily for HTN and he has been taking this for years. He is interested in trying tadalafil instead.     Elevated PSA:  Unknown family history of prostate cancer as he was adopted. He denies any prior history of elevated PSA.     PSA Trend  5.423 (5/20/2025)  3.0 (11/9/2022)  2.8 (9/21/2021)  2.6 (2/13/2019)          Review of Systems   Constitutional:  Negative for chills and fever.   HENT:  Negative for congestion and sore throat.    Respiratory:  Negative for cough and shortness of breath.    Cardiovascular:  Negative for chest pain and leg swelling.   Gastrointestinal:  Negative for abdominal pain, constipation and diarrhea.   Genitourinary:  Positive for urgency. Negative for difficulty urinating, dysuria, frequency and hematuria.        Postvoid dribbling, ED   Musculoskeletal:  Negative for back pain and gait problem.   Skin:  Negative for wound.   Allergic/Immunologic: Negative for immunocompromised state.   Hematological:  Does not bruise/bleed easily.     Objective   There were no vitals taken for this visit.    Physical Exam  Vitals and nursing note reviewed.   Constitutional:       General: He is not in acute distress.     Appearance: He is well-developed.   HENT:      Head: Normocephalic and atraumatic.     Eyes:      Conjunctiva/sclera: Conjunctivae normal.       Cardiovascular:      Rate and Rhythm: Normal rate and regular rhythm.      Heart sounds: No murmur heard.  Pulmonary:      Effort: Pulmonary effort is normal. No respiratory distress.      Breath sounds: Normal breath sounds.   Abdominal:      Palpations: Abdomen is soft.      Tenderness: There is no abdominal tenderness.   Genitourinary:     Comments: Rectal exam reveals normal tone, no masses and his prostate is smooth, symmetric, and benign. No  nodules.      Musculoskeletal:         General: No swelling.      Cervical back: Neck supple.     Skin:     General: Skin is warm and dry.      Capillary Refill: Capillary refill takes less than 2 seconds.     Neurological:      Mental Status: He is alert.     Psychiatric:         Mood and Affect: Mood normal.           Imaging:          Please Note:  Voice dictation software has been used to create this document. There may be inadvertent transcriptions errors.     ELOISA Mcmillan 06/23/25

## 2025-06-23 NOTE — ASSESSMENT & PLAN NOTE
According to the patient his PCP has been prescribing him sildenafil 25 mg daily for his hypertension.  Indication however is documented as erectile dysfunction.  He would like to trial tadalafil 5 mg daily instead.  He was instructed to stop his sildenafil and avoid taking the tadalafil in combination with Flomax or his other blood pressure medications.    Orders:    tadalafil (CIALIS) 5 MG tablet; Take 1 tablet (5 mg total) by mouth in the morning

## 2025-06-23 NOTE — TELEPHONE ENCOUNTER
PA for TADALAFIL 5 MG  APPROVED     Date(s) approved UNTIL 12/31/2025    Case #    Patient advised by          [x]MyChart Message  []Phone call   [x]LMOM  []L/M to call office as no active Communication consent on file  []Unable to leave detailed message as VM not approved on Communication consent       Pharmacy advised by    [x]Fax  []Phone call  []Secure Chat    Specialty Pharmacy    []     Approval letter scanned into Media Yes

## 2025-06-23 NOTE — TELEPHONE ENCOUNTER
PA for TADALAFIL 5 MG SUBMITTED to [facility]    via      [x]bitFlyer-Case ID # [id number]       [x]PA sent as URGENT    All office notes, labs and other pertaining documents and studies sent. Clinical questions answered. Awaiting determination from insurance company.     Turnaround time for your insurance to make a decision on your Prior Authorization can take 7-21 business days.

## 2025-06-24 NOTE — PROGRESS NOTES
Spoke with patient to go over pre-procedure instructions for his lung biopsy on 9/13/22 at the [address]  Plan to arrive for 0830, have a ride to and from, and NPO after midnight the night prior  Patient is to hold his ASA per protocol, also made aware of the 2 hour post observation period 
Female

## 2025-06-27 ENCOUNTER — TELEPHONE (OUTPATIENT)
Age: 70
End: 2025-06-27

## 2025-06-27 DIAGNOSIS — M54.12 CERVICAL RADICULOPATHY: ICD-10-CM

## 2025-06-27 DIAGNOSIS — M54.50 CHRONIC MIDLINE LOW BACK PAIN WITHOUT SCIATICA: ICD-10-CM

## 2025-06-27 DIAGNOSIS — M79.2 NEUROPATHIC PAIN: ICD-10-CM

## 2025-06-27 DIAGNOSIS — M54.2 NECK PAIN: ICD-10-CM

## 2025-06-27 DIAGNOSIS — M47.812 CERVICAL SPONDYLOSIS WITHOUT MYELOPATHY: ICD-10-CM

## 2025-06-27 DIAGNOSIS — G89.4 CHRONIC PAIN SYNDROME: ICD-10-CM

## 2025-06-27 DIAGNOSIS — Z79.891 ENCOUNTER FOR LONG-TERM OPIATE ANALGESIC USE: ICD-10-CM

## 2025-06-27 DIAGNOSIS — F11.20 UNCOMPLICATED OPIOID DEPENDENCE (HCC): ICD-10-CM

## 2025-06-27 DIAGNOSIS — M79.18 MYOFASCIAL PAIN SYNDROME: ICD-10-CM

## 2025-06-27 DIAGNOSIS — M51.369 LUMBAR DEGENERATIVE DISC DISEASE: ICD-10-CM

## 2025-06-27 DIAGNOSIS — G89.29 CHRONIC MIDLINE LOW BACK PAIN WITHOUT SCIATICA: ICD-10-CM

## 2025-06-27 DIAGNOSIS — M47.816 LUMBAR SPONDYLOSIS: ICD-10-CM

## 2025-06-27 NOTE — TELEPHONE ENCOUNTER
Pt contacted Call Center requested refill of their medication.        Doctor Name: Barbara Kocher      Medication Name:   oxyCODONE-acetaminophen        Dosage of Med: 7.5-325 MG       Frequency of Med:  Take 1 tablet by mouth every 8 (eight) hours as needed for moderate pain        Remaining Medication: 2      Pharmacy and Location:   [id number] [address]  Phone: [phone]  Fax: [phone]           Pt. Preferred Callback Phone Number: [phone]       Thank you.

## 2025-06-29 ENCOUNTER — TELEPHONE (OUTPATIENT)
Age: 70
End: 2025-06-29

## 2025-06-29 DIAGNOSIS — G89.4 CHRONIC PAIN SYNDROME: ICD-10-CM

## 2025-06-29 DIAGNOSIS — Z79.891 ENCOUNTER FOR LONG-TERM OPIATE ANALGESIC USE: ICD-10-CM

## 2025-06-29 DIAGNOSIS — M54.50 CHRONIC MIDLINE LOW BACK PAIN WITHOUT SCIATICA: ICD-10-CM

## 2025-06-29 DIAGNOSIS — M79.18 MYOFASCIAL PAIN SYNDROME: ICD-10-CM

## 2025-06-29 DIAGNOSIS — M47.812 CERVICAL SPONDYLOSIS WITHOUT MYELOPATHY: ICD-10-CM

## 2025-06-29 DIAGNOSIS — G89.29 CHRONIC MIDLINE LOW BACK PAIN WITHOUT SCIATICA: ICD-10-CM

## 2025-06-29 DIAGNOSIS — M79.2 NEUROPATHIC PAIN: ICD-10-CM

## 2025-06-29 DIAGNOSIS — M54.2 NECK PAIN: ICD-10-CM

## 2025-06-29 DIAGNOSIS — F11.20 UNCOMPLICATED OPIOID DEPENDENCE (HCC): ICD-10-CM

## 2025-06-29 DIAGNOSIS — M51.369 LUMBAR DEGENERATIVE DISC DISEASE: ICD-10-CM

## 2025-06-29 DIAGNOSIS — M47.816 LUMBAR SPONDYLOSIS: ICD-10-CM

## 2025-06-29 DIAGNOSIS — M54.12 CERVICAL RADICULOPATHY: ICD-10-CM

## 2025-06-29 RX ORDER — OXYCODONE AND ACETAMINOPHEN 7.5; 325 MG/1; MG/1
1 TABLET ORAL EVERY 8 HOURS PRN
Qty: 90 TABLET | Refills: 0 | Status: CANCELLED | OUTPATIENT
Start: 2025-06-29

## 2025-06-30 RX ORDER — OXYCODONE AND ACETAMINOPHEN 7.5; 325 MG/1; MG/1
1 TABLET ORAL EVERY 8 HOURS PRN
Qty: 90 TABLET | Refills: 0 | Status: SHIPPED | OUTPATIENT
Start: 2025-06-30 | End: 2025-07-01 | Stop reason: SDUPTHER

## 2025-06-30 NOTE — TELEPHONE ENCOUNTER
Caller: Patient    Doctor: Dr. MARI    Reason for call: Patient was not able to fill his script with told to FU with [facility]    [facility] is [facility] back [facility]   Please advise    Call back#:

## 2025-07-01 ENCOUNTER — HOSPITAL ENCOUNTER (OUTPATIENT)
Dept: CT IMAGING | Facility: HOSPITAL | Age: 70
Discharge: HOME | End: 2025-07-01
Attending: THORACIC SURGERY (CARDIOTHORACIC VASCULAR SURGERY)
Payer: COMMERCIAL

## 2025-07-01 DIAGNOSIS — C34.31 PRIMARY SQUAMOUS CELL CARCINOMA OF LOWER LOBE OF RIGHT LUNG (HCC): ICD-10-CM

## 2025-07-01 PROCEDURE — 71250 CT THORAX DX C-: CPT

## 2025-07-01 RX ORDER — OXYCODONE AND ACETAMINOPHEN 7.5; 325 MG/1; MG/1
1 TABLET ORAL EVERY 8 HOURS PRN
Qty: 90 TABLET | Refills: 0 | Status: SHIPPED | OUTPATIENT
Start: 2025-07-01 | End: 2025-07-10 | Stop reason: SDUPTHER

## 2025-07-01 NOTE — TELEPHONE ENCOUNTER
S/w pt as he had not called back.  [facility]

## 2025-07-07 DIAGNOSIS — J42 CHRONIC BRONCHITIS, UNSPECIFIED CHRONIC BRONCHITIS TYPE (HCC): ICD-10-CM

## 2025-07-07 RX ORDER — ALBUTEROL SULFATE 90 UG/1
2 INHALANT RESPIRATORY (INHALATION) EVERY 6 HOURS PRN
Qty: 18 G | Refills: 3 | Status: SHIPPED | OUTPATIENT
Start: 2025-07-07

## 2025-07-10 ENCOUNTER — OFFICE VISIT (OUTPATIENT)
Age: 70
End: 2025-07-10
Payer: COMMERCIAL

## 2025-07-10 VITALS — BODY MASS INDEX: 29.25 KG/M2 | HEIGHT: 68 IN | WEIGHT: 193 LBS

## 2025-07-10 DIAGNOSIS — M25.551 RIGHT HIP PAIN: ICD-10-CM

## 2025-07-10 DIAGNOSIS — G89.29 CHRONIC MIDLINE LOW BACK PAIN WITHOUT SCIATICA: ICD-10-CM

## 2025-07-10 DIAGNOSIS — M54.2 NECK PAIN: ICD-10-CM

## 2025-07-10 DIAGNOSIS — Z79.891 LONG-TERM CURRENT USE OF OPIATE ANALGESIC: ICD-10-CM

## 2025-07-10 DIAGNOSIS — M79.2 NEUROPATHIC PAIN: ICD-10-CM

## 2025-07-10 DIAGNOSIS — Z79.891 ENCOUNTER FOR LONG-TERM OPIATE ANALGESIC USE: ICD-10-CM

## 2025-07-10 DIAGNOSIS — M51.369 LUMBAR DEGENERATIVE DISC DISEASE: ICD-10-CM

## 2025-07-10 DIAGNOSIS — F11.20 UNCOMPLICATED OPIOID DEPENDENCE (HCC): ICD-10-CM

## 2025-07-10 DIAGNOSIS — M47.816 LUMBAR SPONDYLOSIS: ICD-10-CM

## 2025-07-10 DIAGNOSIS — G89.4 CHRONIC PAIN SYNDROME: Primary | ICD-10-CM

## 2025-07-10 DIAGNOSIS — M54.50 CHRONIC MIDLINE LOW BACK PAIN WITHOUT SCIATICA: ICD-10-CM

## 2025-07-10 DIAGNOSIS — M47.812 CERVICAL SPONDYLOSIS WITHOUT MYELOPATHY: ICD-10-CM

## 2025-07-10 DIAGNOSIS — M54.12 CERVICAL RADICULOPATHY: ICD-10-CM

## 2025-07-10 DIAGNOSIS — M79.18 MYOFASCIAL PAIN SYNDROME: ICD-10-CM

## 2025-07-10 DIAGNOSIS — M46.1 SACROILIITIS (HCC): ICD-10-CM

## 2025-07-10 PROCEDURE — 99214 OFFICE O/P EST MOD 30 MIN: CPT | Performed by: NURSE PRACTITIONER

## 2025-07-10 RX ORDER — OXYCODONE AND ACETAMINOPHEN 7.5; 325 MG/1; MG/1
1 TABLET ORAL EVERY 8 HOURS PRN
Qty: 90 TABLET | Refills: 0 | Status: SHIPPED | OUTPATIENT
Start: 2025-08-26

## 2025-07-10 RX ORDER — OXYCODONE AND ACETAMINOPHEN 7.5; 325 MG/1; MG/1
1 TABLET ORAL EVERY 8 HOURS PRN
Qty: 90 TABLET | Refills: 0 | Status: SHIPPED | OUTPATIENT
Start: 2025-07-29

## 2025-07-10 NOTE — ASSESSMENT & PLAN NOTE
Orders:    oxyCODONE-acetaminophen (Percocet) 7.5-325 MG per tablet; Take 1 tablet by mouth every 8 (eight) hours as needed for moderate pain Max Daily Amount: 3 tablets Do not start before August 26, 2025.    oxyCODONE-acetaminophen (Percocet) 7.5-325 MG per tablet; Take 1 tablet by mouth every 8 (eight) hours as needed for moderate pain Max Daily Amount: 3 tablets Do not start before July 29, 2025.    FL spine and pain procedure; Future     General

## 2025-07-10 NOTE — PROGRESS NOTES
Name: Lance Hazel      : 1955      MRN: [id number]  Encounter Provider: Barbara Kocher, CRNP  Encounter Date: 7/10/2025   Enc[facility] [place]  :  Assessment & Plan  Chronic pain syndrome  Neck pain  Cervical spondylosis without myelopathy  Cervical radiculopathy  Chronic midline low back pain without sciatica  Lumbar spondylosis  Lumbar degenerative disc disease  Neuropathic pain  Myofascial pain syndrome  Uncomplicated opioid dependence (HCC)    Orders:    oxyCODONE-acetaminophen (Percocet) 7.5-325 MG per tablet; Take 1 tablet by mouth every 8 (eight) hours as needed for moderate pain Max Daily Amount: 3 tablets Do not start before 2025.    oxyCODONE-acetaminophen (Percocet) 7.5-325 MG per tablet; Take 1 tablet by mouth every 8 (eight) hours as needed for moderate pain Max Daily Amount: 3 tablets Do not start before 2025.    FL spine and pain procedure; Future    Sacroiliitis (HCC)  Encounter for long-term opiate analgesic use      Orders:    oxyCODONE-acetaminophen (Percocet) 7.5-325 MG per tablet; Take 1 tablet by mouth every 8 (eight) hours as needed for moderate pain Max Daily Amount: 3 tablets Do not start before 2025.    FL spine and pain procedure; Future    Right hip pain    Orders:    XR hip/pelv 2-3 vws right if performed; Future    While the patient was in the office today, I did have a thorough conversation regarding their chronic pain syndrome, medication management, and treatment plan options.  Patient is being seen for a follow-up visit.  He was last seen here on 2025 complaining of increased back pain.  X-rays of his thoracic and lumbar spine were ordered.  No acute findings.  There were degenerative changes noted.  The x-ray picked up an abdominal aortic aneurysm he has followed with vascular surgery regarding this finding.    He was placed on a steroid taper.  Overall, his pain has slightly improved.  He is  complaining of predominantly right sided low back pain that radiates to the right hip and groin area.  On exam, he demonstrates findings consistent with right sacroiliitis and possibly osteoarthritis of the right hip.    Will schedule patient for a diagnostic/therapeutic right sacroiliac joint injection.    Will order an x-ray of the right hip to rule out intra-articular pathology that would contribute to his current symptoms.    Continue oxycodone 5/325 every 8 hours as needed for pain.  The patient's opioid scripts were sent to their pharmacy electronically and was given a 2 month supply of prescriptions with a Do Not Fill date(s) of 7/29/2025, 8/26/2025.    PCP prescribes gabapentin 600 mg 3 times daily and tizanidine 4 mg every 8 hours as needed for spasms.    There are risks associated with opioid medications, including dependence, addiction and tolerance. The patient understands and agrees to use these medications only as prescribed. Potential side effects of the medications include, but are not limited to, constipation, drowsiness, addiction, impaired judgment and risk of fatal overdose if not taken as prescribed. The patient was warned against driving while taking sedation medications.  Sharing medications is a felony. At this point in time, the patient is showing no signs of addiction, abuse, diversion or suicidal ideation.  A urine drug screen was collected at today's office visit as part of our medication management protocol. The point of care testing results were appropriate for what was being prescribed. The specimen will be sent for confirmatory testing. The drug screen is medically necessary because the patient is either dependent on opioid medication or is being considered for opioid medication therapy and the results could impact ongoing or future treatment. The drug screen is to evaluate for the presences or absence of prescribed, non-prescribed, and/or illicit drugs/substances.  [place]  Prescription Drug Monitoring Program report was reviewed and was appropriate    Complete risks and benefits including bleeding, infection, tissue reaction, nerve injury and allergic reaction were discussed. The approach was demonstrated using models and literature was provided. Verbal and written consent was obtained.    The patient will follow-up in 2 months for medication prescription refill and reevaluation. The patient was advised to contact the office should their symptoms worsen in the interim. The patient was agreeable and verbalized an understanding.    My impressions and treatment recommendations were discussed in detail with the patient who verbalized understanding and had no further questions.  Discharge instructions were provided. I personally saw and examined the patient and I agree with the above discussed plan of care.    History of Present Illness     Lance Hazel is a 70 y.o. male who presents for a follow up office visit in regards to Back Pain. The patient’s current symptoms include complaints of neck pain, low back pain that radiates to the right hip and groin area.  Current pain level is an 8/10.  Quality pain is described as dull and aching.    Current pain medications includes:  Oxycodone 5/325 every 8 hours as needed for pain.  PCP prescribes gabapentin 600 mg 3 times daily and tizanidine 4 mg every 8 hours as needed for spasms.  The patient reports that this regimen is providing 20% pain relief.  The patient is reporting no side effects from this pain medication regimen.    Opioid contract date 3/6/2025    Last UDS Date: 3/6/2025    Review of Systems   Constitutional:  Negative for chills and fever.   HENT:  Negative for sinus pain.    Eyes:  Negative for pain.   Respiratory:  Negative for wheezing.    Cardiovascular:  Negative for palpitations.   Gastrointestinal:  Negative for abdominal pain.   Endocrine: Negative for polyuria.   Musculoskeletal:  Negative for gait problem and neck  "stiffness.        - Decreased range of motion    Skin:  Negative for rash.   Allergic/Immunologic: Negative for environmental allergies.   Neurological:  Negative for dizziness and seizures.   Psychiatric/Behavioral:  Negative for confusion and sleep disturbance.    All other systems reviewed and are negative.      Medical History Reviewed by provider this encounter:  Tobacco  Allergies  Meds  Problems  Med Hx  Surg Hx  Fam Hx     .  Medications Ordered Prior to Encounter[1]      Objective   Ht 5' 8\" (1.727 m)   Wt 87.5 kg (193 lb)   BMI 29.35 kg/m²         Physical Exam  Constitutional: normal, well developed, well nourished, alert, in no distress and non-toxic and no overt pain behavior.  Eyes: anicteric  HEENT: grossly intact  Neck: supple, symmetric, trachea midline and no masses   Pulmonary: even and unlabored  Cardiovascular: No edema or pitting edema present  Skin: Normal without rashes or lesions and well hydrated  Psychiatric: Mood and affect appropriate  Neurologic: Cranial Nerves II-XII grossly intact  Musculoskeletal: normal         [1]   Current Outpatient Medications on File Prior to Visit   Medication Sig Dispense Refill    albuterol (Ventolin HFA) 90 mcg/act inhaler Inhale 2 puffs every 6 (six) hours as needed for wheezing 18 g 3    amLODIPine (NORVASC) 10 mg tablet Take 1 tablet (10 mg total) by mouth daily 90 tablet 0    Aspirin Low Dose 81 MG chewable tablet chew and swallow 1 tablet by mouth once daily 90 tablet 1    bisacodyl (DULCOLAX) 5 mg EC tablet Take 1 tablet (5 mg total) by mouth daily as needed for constipation for up to 4 doses 30 tablet 0    buPROPion (Wellbutrin SR) 100 mg 12 hr tablet Take 1 tablet (100 mg total) by mouth 2 (two) times a day 90 tablet 0    cyanocobalamin (VITAMIN B-12) 1000 MCG tablet Take 1 tablet (1,000 mcg total) by mouth daily 90 tablet 0    folic acid (KP Folic Acid) 1 mg tablet Take 1 tablet (1 mg total) by mouth daily 30 tablet 0    gabapentin " (NEURONTIN) 300 mg capsule Take 1 capsule (300 mg total) by mouth 3 (three) times a day 90 capsule 0    glucose blood (OneTouch Verio) test strip Test once daily 100 each 0    lisinopril (ZESTRIL) 10 mg tablet Take 1 tablet (10 mg total) by mouth daily 90 tablet 0    metoprolol succinate (TOPROL-XL) 100 mg 24 hr tablet Take 1 tablet (100 mg total) by mouth daily 90 tablet 0    nicotine (NICODERM CQ) 21 mg/24 hr TD 24 hr patch Place 1 patch on the skin over 24 hours every 24 hours 28 patch 0    Restasis 0.05 % ophthalmic emulsion       rosuvastatin (CRESTOR) 40 MG tablet Take 1 tablet (40 mg total) by mouth daily 90 tablet 0    tadalafil (CIALIS) 5 MG tablet Take 1 tablet (5 mg total) by mouth in the morning 30 tablet 11    tiotropium-olodaterol (Stiolto Respimat) 2.5-2.5 MCG/ACT inhaler Inhale 2 puffs daily 4 g 0    tiZANidine (ZANAFLEX) 4 mg tablet Take 1 tablet (4 mg total) by mouth every 8 (eight) hours as needed for muscle spasms 90 tablet 0    traZODone (DESYREL) 100 mg tablet Take 1 tablet (100 mg total) by mouth daily at bedtime 90 tablet 0    [DISCONTINUED] oxyCODONE-acetaminophen (Percocet) 7.5-325 MG per tablet Take 1 tablet by mouth every 8 (eight) hours as needed for moderate pain Max Daily Amount: 3 tablets 90 tablet 0    fluticasone (FLONASE) 50 mcg/act nasal spray 1 spray into each nostril daily for 14 days 11.1 mL 0    fluticasone-umeclidinium-vilanterol (Trelegy Ellipta) 100-62.5-25 mcg/actuation inhaler Inhale 1 puff daily Rinse mouth after use. 60 blister 0    predniSONE 10 mg tablet 3 tabs once daily x 3 days; 2 tabs once daily x 2 days; 1 tab daily x 2 days (Patient not taking: Reported on 6/6/2025) 15 tablet 0    tamsulosin (FLOMAX) 0.4 mg Take 1 capsule (0.4 mg total) by mouth daily with dinner for 3 days 3 capsule 0     No current facility-administered medications on file prior to visit.

## 2025-07-10 NOTE — ASSESSMENT & PLAN NOTE
Orders:    oxyCODONE-acetaminophen (Percocet) 7.5-325 MG per tablet; Take 1 tablet by mouth every 8 (eight) hours as needed for moderate pain Max Daily Amount: 3 tablets Do not start before August 26, 2025.    oxyCODONE-acetaminophen (Percocet) 7.5-325 MG per tablet; Take 1 tablet by mouth every 8 (eight) hours as needed for moderate pain Max Daily Amount: 3 tablets Do not start before July 29, 2025.    FL spine and pain procedure; Future

## 2025-07-10 NOTE — ASSESSMENT & PLAN NOTE
Orders:    oxyCODONE-acetaminophen (Percocet) 7.5-325 MG per tablet; Take 1 tablet by mouth every 8 (eight) hours as needed for moderate pain Max Daily Amount: 3 tablets Do not start before August 26, 2025.    FL spine and pain procedure; Future

## 2025-07-14 ENCOUNTER — OFFICE VISIT (OUTPATIENT)
Age: 70
End: 2025-07-14
Payer: COMMERCIAL

## 2025-07-14 VITALS
HEART RATE: 60 BPM | HEIGHT: 68 IN | BODY MASS INDEX: 28.64 KG/M2 | WEIGHT: 189 LBS | OXYGEN SATURATION: 95 % | TEMPERATURE: 98.5 F

## 2025-07-14 DIAGNOSIS — K59.03 CONSTIPATION DUE TO OPIOID THERAPY: ICD-10-CM

## 2025-07-14 DIAGNOSIS — Z12.11 SCREENING FOR COLON CANCER: Primary | ICD-10-CM

## 2025-07-14 DIAGNOSIS — R13.19 ESOPHAGEAL DYSPHAGIA: ICD-10-CM

## 2025-07-14 DIAGNOSIS — R14.0 ABDOMINAL BLOATING: ICD-10-CM

## 2025-07-14 DIAGNOSIS — R10.13 DYSPEPSIA: ICD-10-CM

## 2025-07-14 DIAGNOSIS — T40.2X5A CONSTIPATION DUE TO OPIOID THERAPY: ICD-10-CM

## 2025-07-14 LAB
4OH-XYLAZINE UR QL CFM: NEGATIVE NG/ML
6MAM UR QL CFM: NEGATIVE NG/ML
7AMINOCLONAZEPAM UR QL CFM: NEGATIVE NG/ML
A-OH ALPRAZ UR QL CFM: NEGATIVE NG/ML
ACCEPTABLE CREAT UR QL: NORMAL MG/DL
ACCEPTIBLE SP GR UR QL: NORMAL
AMPHET UR QL CFM: NEGATIVE NG/ML
BUPRENORPHINE UR QL CFM: NEGATIVE NG/ML
BUTALBITAL UR QL CFM: NEGATIVE NG/ML
BZE UR QL CFM: NEGATIVE NG/ML
CODEINE UR QL CFM: NEGATIVE NG/ML
EDDP UR QL CFM: NEGATIVE NG/ML
ETHYL GLUCURONIDE UR QL CFM: NEGATIVE NG/ML
ETHYL SULFATE UR QL SCN: NEGATIVE NG/ML
EUTYLONE UR QL: NEGATIVE NG/ML
FENTANYL UR QL CFM: NEGATIVE NG/ML
GLIADIN IGG SER IA-ACNC: NEGATIVE NG/ML
HYDROCODONE UR QL CFM: NEGATIVE NG/ML
HYDROMORPHONE UR QL CFM: NEGATIVE NG/ML
LORAZEPAM UR QL CFM: NEGATIVE NG/ML
ME-PHENIDATE UR QL CFM: NEGATIVE NG/ML
MEPERIDINE UR QL CFM: NEGATIVE NG/ML
METHADONE UR QL CFM: NEGATIVE NG/ML
METHAMPHET UR QL CFM: NEGATIVE NG/ML
MORPHINE UR QL CFM: NEGATIVE NG/ML
NALTREXONE UR QL CFM: NEGATIVE NG/ML
NITRITE UR QL: NORMAL UG/ML
NORBUPRENORPHINE UR QL CFM: NEGATIVE NG/ML
NORDIAZEPAM UR QL CFM: NEGATIVE NG/ML
NORFENTANYL UR QL CFM: NEGATIVE NG/ML
NORHYDROCODONE UR QL CFM: NEGATIVE NG/ML
NORMEPERIDINE UR QL CFM: NEGATIVE NG/ML
NOROXYCODONE UR QL CFM: NORMAL NG/ML
OXAZEPAM UR QL CFM: NEGATIVE NG/ML
OXYCODONE UR QL CFM: NORMAL NG/ML
OXYMORPHONE UR QL CFM: NORMAL NG/ML
PARA-FLUOROFENTANYL QUANTIFICATION: NORMAL NG/ML
PHENOBARB UR QL CFM: NEGATIVE NG/ML
RESULT ALL_PRESCRIBED MEDS AND SPECIAL INSTRUCTIONS: NORMAL
SECOBARBITAL UR QL CFM: NEGATIVE NG/ML
SL AMB 5F-ADB-M7 METABOLITE QUANTIFICATION: NEGATIVE NG/ML
SL AMB 7-OH-MITRAGYNINE (KRATOM ALKALOID) QUANTIFICATION: NEGATIVE NG/ML
SL AMB AB-FUBINACA-M3 METABOLITE QUANTIFICATION: NEGATIVE NG/ML
SL AMB ACETYL FENTANYL QUANTIFICATION: NORMAL NG/ML
SL AMB ACETYL NORFENTANYL QUANTIFICATION: NORMAL NG/ML
SL AMB ACRYL FENTANYL QUANTIFICATION: NORMAL NG/ML
SL AMB CARFENTANIL QUANTIFICATION: NORMAL NG/ML
SL AMB CTHC (MARIJUANA METABOLITE) QUANTIFICATION: NEGATIVE NG/ML
SL AMB DEXTRORPHAN (DEXTROMETHORPHAN METABOLITE) QUANT: NEGATIVE NG/ML
SL AMB GABAPENTIN QUANTIFICATION: NORMAL NG/ML
SL AMB JWH018 METABOLITE QUANTIFICATION: NEGATIVE NG/ML
SL AMB JWH073 METABOLITE QUANTIFICATION: NEGATIVE NG/ML
SL AMB MDMB-FUBINACA-M1 METABOLITE QUANTIFICATION: NEGATIVE NG/ML
SL AMB METHYLONE QUANTIFICATION: NORMAL NG/ML
SL AMB N-DESMETHYL-TRAMADOL QUANTIFICATION: NEGATIVE NG/ML
SL AMB PHENTERMINE QUANTIFICATION: NEGATIVE NG/ML
SL AMB PREGABALIN QUANTIFICATION: NEGATIVE NG/ML
SL AMB RCS4 METABOLITE QUANTIFICATION: NEGATIVE NG/ML
SL AMB RITALINIC ACID QUANTIFICATION: NEGATIVE NG/ML
SMOOTH MUSCLE AB TITR SER IF: NEGATIVE NG/ML
SPECIMEN DRAWN SERPL: NEGATIVE NG/ML
SPECIMEN PH ACCEPTABLE UR: NORMAL
TAPENTADOL UR QL CFM: NEGATIVE NG/ML
TEMAZEPAM UR QL CFM: NEGATIVE NG/ML
TRAMADOL UR QL CFM: NEGATIVE NG/ML
URATE/CREAT 24H UR: NEGATIVE NG/ML
XYLAZINE UR QL CFM: NEGATIVE NG/ML

## 2025-07-14 PROCEDURE — 99214 OFFICE O/P EST MOD 30 MIN: CPT | Performed by: STUDENT IN AN ORGANIZED HEALTH CARE EDUCATION/TRAINING PROGRAM

## 2025-07-14 RX ORDER — SIMETHICONE 125 MG
125 TABLET,CHEWABLE ORAL EVERY 6 HOURS PRN
Qty: 30 TABLET | Refills: 0 | Status: SHIPPED | OUTPATIENT
Start: 2025-07-14

## 2025-07-14 NOTE — PATIENT INSTRUCTIONS
We will reschedule your colonoscopy  Please follow the instructions for the bowel prep  We will try simethicone which you may take every 6 hours as needed for abdominal bloating

## 2025-07-17 ENCOUNTER — TELEPHONE (OUTPATIENT)
Age: 70
End: 2025-07-17

## 2025-07-17 NOTE — TELEPHONE ENCOUNTER
Pt reports he has not received a message from [facility] stating they received the script that  prescribed during the last office visit. Informed pt [facility] confirmed they received the script for simethicone on 07/14 at 4:13 PM. Pt verbalized understanding and will reach out to the Pharmacy.

## 2025-07-23 DIAGNOSIS — Z12.11 SCREENING FOR COLON CANCER: Primary | ICD-10-CM

## 2025-07-23 DIAGNOSIS — N20.0 KIDNEY STONE ON RIGHT SIDE: ICD-10-CM

## 2025-07-23 RX ORDER — POLYETHYLENE GLYCOL 3350 17 G/17G
238 POWDER, FOR SOLUTION ORAL ONCE
Status: DISCONTINUED | OUTPATIENT
Start: 2025-07-23 | End: 2025-07-25 | Stop reason: HOSPADM

## 2025-07-23 RX ORDER — TAMSULOSIN HYDROCHLORIDE 0.4 MG/1
0.4 CAPSULE ORAL
Qty: 90 CAPSULE | Refills: 1 | Status: SHIPPED | OUTPATIENT
Start: 2025-07-23 | End: 2026-01-19

## 2025-07-23 RX ORDER — BISACODYL 5 MG/1
5 TABLET, DELAYED RELEASE ORAL ONCE
Qty: 4 TABLET | Refills: 0 | Status: SHIPPED | OUTPATIENT
Start: 2025-07-23 | End: 2025-07-25 | Stop reason: HOSPADM

## 2025-07-23 NOTE — TELEPHONE ENCOUNTER
Patients GI provider:  Dr. MCKEE    Number to return call: [phone]    Reason for call: Pt calling requesting dulcolax for upcomming procedure on 7/25/2025. Please send dulcolax for colonoscopy prep to patients pharmacy as per request.    Pharmacy verified.

## 2025-07-23 NOTE — TELEPHONE ENCOUNTER
Reason for call:   [x] Refill   [] Prior Auth  [] Other:     Office:   [] PCP/Provider -   [x] Specialty/Provider - uro, [place]    Medication:   Tamsulosin 0.4 mg, 1 qd, 90    Pharmacy:   [facility]   Does the patient have enough for 3 days?   [] Yes   [x] No - Send as HP to POD    Mail Away Pharmacy   Does the patient have enough for 10 days?   [] Yes   [] No - Send as HP to POD

## 2025-07-25 ENCOUNTER — ANESTHESIA (OUTPATIENT)
Dept: PERIOP | Facility: HOSPITAL | Age: 70
End: 2025-07-25
Payer: COMMERCIAL

## 2025-07-25 ENCOUNTER — ANESTHESIA EVENT (OUTPATIENT)
Dept: PERIOP | Facility: HOSPITAL | Age: 70
End: 2025-07-25
Payer: COMMERCIAL

## 2025-07-25 ENCOUNTER — HOSPITAL ENCOUNTER (OUTPATIENT)
Dept: PERIOP | Facility: HOSPITAL | Age: 70
Setting detail: OUTPATIENT SURGERY
Discharge: HOME | End: 2025-07-25
Attending: STUDENT IN AN ORGANIZED HEALTH CARE EDUCATION/TRAINING PROGRAM
Payer: COMMERCIAL

## 2025-07-25 VITALS
DIASTOLIC BLOOD PRESSURE: 79 MMHG | RESPIRATION RATE: 20 BRPM | HEIGHT: 68 IN | OXYGEN SATURATION: 94 % | TEMPERATURE: 97.6 F | BODY MASS INDEX: 28.19 KG/M2 | HEART RATE: 53 BPM | SYSTOLIC BLOOD PRESSURE: 131 MMHG | WEIGHT: 186 LBS

## 2025-07-25 DIAGNOSIS — Z12.11 SCREENING FOR COLON CANCER: ICD-10-CM

## 2025-07-25 PROCEDURE — 88305 TISSUE EXAM BY PATHOLOGIST: CPT | Performed by: PATHOLOGY

## 2025-07-25 PROCEDURE — 45385 COLONOSCOPY W/LESION REMOVAL: CPT | Performed by: STUDENT IN AN ORGANIZED HEALTH CARE EDUCATION/TRAINING PROGRAM

## 2025-07-25 RX ORDER — LIDOCAINE HYDROCHLORIDE 20 MG/ML
INJECTION, SOLUTION EPIDURAL; INFILTRATION; INTRACAUDAL; PERINEURAL AS NEEDED
Status: DISCONTINUED | OUTPATIENT
Start: 2025-07-25 | End: 2025-07-25

## 2025-07-25 RX ORDER — PROPOFOL 10 MG/ML
INJECTION, EMULSION INTRAVENOUS AS NEEDED
Status: DISCONTINUED | OUTPATIENT
Start: 2025-07-25 | End: 2025-07-25

## 2025-07-25 RX ORDER — SODIUM CHLORIDE, SODIUM LACTATE, POTASSIUM CHLORIDE, CALCIUM CHLORIDE 600; 310; 30; 20 MG/100ML; MG/100ML; MG/100ML; MG/100ML
125 INJECTION, SOLUTION INTRAVENOUS CONTINUOUS
Status: DISCONTINUED | OUTPATIENT
Start: 2025-07-25 | End: 2025-07-29 | Stop reason: HOSPADM

## 2025-07-25 RX ADMIN — PROPOFOL 20 MG: 10 INJECTION, EMULSION INTRAVENOUS at 09:56

## 2025-07-25 RX ADMIN — PROPOFOL 30 MG: 10 INJECTION, EMULSION INTRAVENOUS at 09:45

## 2025-07-25 RX ADMIN — PROPOFOL 20 MG: 10 INJECTION, EMULSION INTRAVENOUS at 09:51

## 2025-07-25 RX ADMIN — SODIUM CHLORIDE, SODIUM LACTATE, POTASSIUM CHLORIDE, AND CALCIUM CHLORIDE 125 ML/HR: .6; .31; .03; .02 INJECTION, SOLUTION INTRAVENOUS at 09:19

## 2025-07-25 RX ADMIN — LIDOCAINE HYDROCHLORIDE 100 MG: 20 INJECTION, SOLUTION EPIDURAL; INFILTRATION; INTRACAUDAL; PERINEURAL at 09:43

## 2025-07-25 RX ADMIN — PROPOFOL 130 MG: 10 INJECTION, EMULSION INTRAVENOUS at 09:43

## 2025-07-25 RX ADMIN — PROPOFOL 20 MG: 10 INJECTION, EMULSION INTRAVENOUS at 09:49

## 2025-07-25 RX ADMIN — PROPOFOL 20 MG: 10 INJECTION, EMULSION INTRAVENOUS at 09:47

## 2025-07-25 RX ADMIN — PROPOFOL 20 MG: 10 INJECTION, EMULSION INTRAVENOUS at 09:53

## 2025-07-25 NOTE — ANESTHESIA POSTPROCEDURE EVALUATION
Post-Op Assessment Note    CV Status:  Stable    Pain management: satisfactory to patient       Mental Status:  Alert and awake   Hydration Status:  Stable   PONV Controlled:  None   Airway Patency:  Patent     Post Op Vitals Reviewed: Yes    No anethesia notable event occurred.    Staff: CRNA           Last Filed PACU Vitals:  Vitals Value Taken Time   Temp 98.2    Pulse 58 07/25/25 10:08   /65    Resp 14 07/25/25 10:08   SpO2 93 % 07/25/25 10:08   Vitals shown include unfiled device data.

## 2025-07-25 NOTE — ANESTHESIA PREPROCEDURE EVALUATION
Procedure:  COLONOSCOPY    Relevant Problems   CARDIO   (+) Abdominal aortic aneurysm (AAA) without rupture   (+) Coronary artery disease of native artery of native heart with stable angina pectoris (HCC)   (+) Hyperlipidemia   (+) Hypertension      GI/HEPATIC   (+) Esophageal dysphagia   (+) Hiatal hernia      /RENAL   (+) Kidney stone   (+) Stage 3a chronic kidney disease (HCC)      MUSCULOSKELETAL   (+) Chronic low back pain without sciatica   (+) Thoracic spine pain      NEURO/PSYCH   (+) Chronic low back pain without sciatica   (+) Chronic pain syndrome   (+) Depression, recurrent (HCC)      PULMONARY   (+) JAKI (obstructive sleep apnea)   (+) Pulmonary emphysema (HCC)      JAKI no CPAP  COPD  CAD, stable. AAA. Cardiology note reviewed from 6/3/25  S/p thoracoscopy with segmentectomy      Physical Exam    Airway     Mallampati score: II  TM Distance: >3 FB  Neck ROM: full      Cardiovascular  Cardiovascular exam normal    Dental       Pulmonary  Pulmonary exam normal     Neurological      Other Findings        Anesthesia Plan  ASA Score- 3     Anesthesia Type- IV sedation with anesthesia with ASA Monitors.         Additional Monitors:     Airway Plan:            Plan Factors-    Chart reviewed. EKG reviewed.  Existing labs reviewed. Patient summary reviewed.                  Induction- intravenous.    Postoperative Plan- .   Monitoring Plan - Monitoring plan - standard ASA monitoring          Informed Consent- Anesthetic plan and risks discussed with patient.  I personally reviewed this patient with the CRNA. Discussed and agreed on the Anesthesia Plan with the CRNA..      NPO Status:  No vitals data found for the desired time range.

## 2025-07-28 ENCOUNTER — TELEPHONE (OUTPATIENT)
Age: 70
End: 2025-07-28

## 2025-07-29 DIAGNOSIS — Z71.6 ENCOUNTER FOR SMOKING CESSATION COUNSELING: ICD-10-CM

## 2025-07-30 RX ORDER — BUPROPION HYDROCHLORIDE 100 MG/1
100 TABLET, EXTENDED RELEASE ORAL 2 TIMES DAILY
Qty: 180 TABLET | Refills: 1 | Status: SHIPPED | OUTPATIENT
Start: 2025-07-30

## 2025-07-31 PROCEDURE — 88305 TISSUE EXAM BY PATHOLOGIST: CPT | Performed by: PATHOLOGY

## 2025-08-03 DIAGNOSIS — E53.8 FOLATE DEFICIENCY: ICD-10-CM

## 2025-08-03 DIAGNOSIS — M54.50 LUMBAR SPINE PAIN: ICD-10-CM

## 2025-08-03 DIAGNOSIS — D64.9 ANEMIA, UNSPECIFIED TYPE: ICD-10-CM

## 2025-08-04 RX ORDER — FOLIC ACID 1 MG/1
1000 TABLET ORAL DAILY
Qty: 30 TABLET | Refills: 3 | Status: SHIPPED | OUTPATIENT
Start: 2025-08-04

## 2025-08-05 DIAGNOSIS — R09.82 POSTNASAL DRIP: ICD-10-CM

## 2025-08-05 RX ORDER — FLUTICASONE PROPIONATE 50 MCG
1 SPRAY, SUSPENSION (ML) NASAL DAILY
Qty: 11.1 ML | Refills: 5 | Status: SHIPPED | OUTPATIENT
Start: 2025-08-05

## 2025-08-13 PROBLEM — Z12.11 SCREENING FOR COLON CANCER: Status: RESOLVED | Noted: 2024-08-08 | Resolved: 2025-08-13

## 2025-08-15 ENCOUNTER — TELEPHONE (OUTPATIENT)
Age: 70
End: 2025-08-15

## (undated) DEVICE — UTILITY MARKER,BLACK WITH LABELS: Brand: DEVON

## (undated) DEVICE — CANISTER FOR THORACIC SUCTION SYSTEM: Brand: THOPAZ DISPOSABLE CANISTER 0.8L

## (undated) DEVICE — SINGLE USE BIOPSY VALVE MAJ-210: Brand: SINGLE USE BIOPSY VALVE (STERILE)

## (undated) DEVICE — ENDOPATH ECHELON VASCULAR  RELOADS, WHITE, 35MM: Brand: ECHELON ENDOPATH

## (undated) DEVICE — PROGEL

## (undated) DEVICE — TROCAR: Brand: KII FIOS FIRST ENTRY

## (undated) DEVICE — INTENDED FOR TISSUE SEPARATION, AND OTHER PROCEDURES THAT REQUIRE A SHARP SURGICAL BLADE TO PUNCTURE OR CUT.: Brand: BARD-PARKER SAFETY BLADES SIZE 10, STERILE

## (undated) DEVICE — TRAY EPIDURAL PERIFIX 20GA X 3.5IN TUOHY 8ML

## (undated) DEVICE — CHLORAPREP HI-LITE 10.5ML ORANGE

## (undated) DEVICE — PROGEL LONG TIP 6 IN

## (undated) DEVICE — GLOVE SRG BIOGEL ECLIPSE 7.5

## (undated) DEVICE — THE ECHELON, ECHELON ENDOPATH™ AND ECHELON FLEX™ FAMILIES OF ENDOSCOPIC LINEAR CUTTERS AND RELOADS ARE STERILE, SINGLE PATIENT USE INSTRUMENTS THAT SIMULTANEOUSLY CUT AND STAPLE TISSUE. THERE ARE SIX STAGGERED ROWS OF STAPLES, THREE ON EITHER SIDE OF THE CUT LINE. THE 45 MM INSTRUMENTS HAVE A STAPLE LINE THATIS APPROXIMATELY 45 MM LONG AND A CUT LINE THAT IS APPROXIMATELY 42 MM LONG. THE SHAFT CAN ROTATE FREELY IN BOTH DIRECTIONS AND AN ARTICULATION MECHANISM ON ARTICULATING INSTRUMENTS ENABLES BENDING THE DISTAL PORTIONOF THE SHAFT TO FACILITATE LATERAL ACCESS OF THE OPERATIVE SITE.THE INSTRUMENTS ARE SHIPPED WITHOUT A RELOAD AND MUST BE LOADED PRIOR TO USE. A STAPLE RETAINING CAP ON THE RELOAD PROTECTS THE STAPLE LEG POINTS DURING SHIPPING AND TRANSPORTATION. THE INSTRUMENTS’ LOCK-OUT FEATURE IS DESIGNED TO PREVENT A USED RELOAD FROM BEING REFIRED.: Brand: ECHELON ENDOPATH

## (undated) DEVICE — SYRINGE 10ML SLIP TIP LF

## (undated) DEVICE — FIRST STEP BEDSIDE KIT - STAND-UP POUCH, ENDOSCOPIC CLEANING PAD - 1 POUCH: Brand: FIRST STEP BEDSIDE KIT - STAND-UP POUCH, ENDOSCOPIC CLEANING PAD

## (undated) DEVICE — SUT PROLENE 0 CT-1 30 IN 8424H

## (undated) DEVICE — INTENDED FOR TISSUE SEPARATION, AND OTHER PROCEDURES THAT REQUIRE A SHARP SURGICAL BLADE TO PUNCTURE OR CUT.: Brand: BARD-PARKER SAFETY BLADES SIZE 15, STERILE

## (undated) DEVICE — TUBING SUCTION 5MM X 12 FT

## (undated) DEVICE — THE ECHELON FLEX POWERED PLUS ARTICULATING ENDOSCOPIC LINEAR CUTTERS ARE STERILE, SINGLE PATIENT USE INSTRUMENTS THAT SIMULTANEOUSLYCUT AND STAPLE TISSUE. THERE ARE SIX STAGGERED ROWS OF STAPLES, THREE ON EITHER SIDE OF THE CUT LINE. THE ECHELON FLEX 45 POWERED PLUSINSTRUMENTS HAVE A STAPLE LINE THAT IS APPROXIMATELY 45 MM LONG AND A CUT LINE THAT IS APPROXIMATELY 42 MM LONG. THE SHAFT CAN ROTATE FREELYIN BOTH DIRECTIONS AND AN ARTICULATION MECHANISM ENABLES THE DISTAL PORTION OF THE SHAFT TO PIVOT TO FACILITATE LATERAL ACCESS TO THE OPERATIVESITE.THE INSTRUMENTS ARE PACKAGED WITH A PRIMARY LITHIUM BATTERY PACK THAT MUST BE INSTALLED PRIOR TO USE. THERE ARE SPECIFIC REQUIREMENTS FORDISPOSING OF THE BATTERY PACK. REFER TO THE BATTERY PACK DISPOSAL SECTION.THE INSTRUMENTS ARE PACKAGED WITHOUT A RELOAD AND MUST BE LOADED PRIOR TO USE. A STAPLE RETAINING CAP ON THE RELOAD PROTECTS THE STAPLE LEGPOINTS DURING SHIPPING AND TRANSPORTATION. THE INSTRUMENTS’ LOCK-OUT FEATURE IS DESIGNED TO PREVENT A USED OR IMPROPERLY INSTALLED RELOADFROM BEING REFIRED OR AN INSTRUMENT FROM BEING FIRED WITHOUT A RELOAD.: Brand: ECHELON FLEX

## (undated) DEVICE — FLEXIBLE ADHESIVE BANDAGE,X-LARGE: Brand: CURITY

## (undated) DEVICE — WOUND RETRACTOR AND PROTECTOR: Brand: ALEXIS WOUND PROTECTOR-RETRACTOR

## (undated) DEVICE — Device: Brand: TISSUE RETRIEVAL SYSTEM

## (undated) DEVICE — SYRINGE 3ML LL

## (undated) DEVICE — SINGLE TUBING WITH LARGE CONNECTOR FOR THORACIC SUCTION SYSTEM PUMP: Brand: THOPAZ TUBING SINGLE

## (undated) DEVICE — ECHELON FLEX  POWERED VASCULAR STAPLER WITH ADVANCED PLACEMENT TIP, 35MM: Brand: ECHELON FLEX

## (undated) DEVICE — GAUZE SPONGES,16 PLY: Brand: CURITY

## (undated) DEVICE — GLOVE INDICATOR PI UNDERGLOVE SZ 8 BLUE

## (undated) DEVICE — SUT VICRYL 3-0 SH 27 IN J416H

## (undated) DEVICE — 24 FR STRAIGHT – SOFT PVC CATHETER: Brand: PVC THORACIC CATHETERS

## (undated) DEVICE — ADHESIVE SKIN HIGH VISCOSITY EXOFIN 1ML

## (undated) DEVICE — NEEDLE 25G X 1 1/2

## (undated) DEVICE — SUT VICRYL 0 CT-1 27 IN J260H

## (undated) DEVICE — SUT VICRYL 2-0 SH 27 IN UNDYED J417H

## (undated) DEVICE — GLOVE SRG BIOGEL 8

## (undated) DEVICE — PLASTIC ADHESIVE BANDAGE: Brand: CURITY

## (undated) DEVICE — PAD GROUNDING ADULT

## (undated) DEVICE — SURGICEL 4 X 8

## (undated) DEVICE — SUT MONOCRYL 4-0 PS-2 18 IN Y496G

## (undated) DEVICE — STERILE THORACIC PACK: Brand: CARDINAL HEALTH

## (undated) DEVICE — SYRINGE 5ML LL

## (undated) DEVICE — SINGLE USE SUCTION VALVE MAJ-209: Brand: SINGLE USE SUCTION VALVE (STERILE)